# Patient Record
Sex: FEMALE | Race: WHITE | NOT HISPANIC OR LATINO | URBAN - METROPOLITAN AREA
[De-identification: names, ages, dates, MRNs, and addresses within clinical notes are randomized per-mention and may not be internally consistent; named-entity substitution may affect disease eponyms.]

---

## 2017-05-27 ENCOUNTER — EMERGENCY (EMERGENCY)
Facility: HOSPITAL | Age: 72
LOS: 0 days | Discharge: HOME | End: 2017-05-27

## 2017-06-28 DIAGNOSIS — F17.200 NICOTINE DEPENDENCE, UNSPECIFIED, UNCOMPLICATED: ICD-10-CM

## 2017-06-28 DIAGNOSIS — R05 COUGH: ICD-10-CM

## 2017-06-28 DIAGNOSIS — R06.02 SHORTNESS OF BREATH: ICD-10-CM

## 2017-06-28 DIAGNOSIS — I10 ESSENTIAL (PRIMARY) HYPERTENSION: ICD-10-CM

## 2017-06-28 DIAGNOSIS — Z79.899 OTHER LONG TERM (CURRENT) DRUG THERAPY: ICD-10-CM

## 2019-02-18 ENCOUNTER — EMERGENCY (EMERGENCY)
Facility: HOSPITAL | Age: 74
LOS: 0 days | Discharge: HOME | End: 2019-02-18
Attending: EMERGENCY MEDICINE | Admitting: EMERGENCY MEDICINE

## 2019-02-18 VITALS — SYSTOLIC BLOOD PRESSURE: 181 MMHG | HEART RATE: 76 BPM | DIASTOLIC BLOOD PRESSURE: 80 MMHG

## 2019-02-18 VITALS
SYSTOLIC BLOOD PRESSURE: 196 MMHG | TEMPERATURE: 98 F | WEIGHT: 164.91 LBS | OXYGEN SATURATION: 96 % | HEART RATE: 81 BPM | HEIGHT: 60 IN | DIASTOLIC BLOOD PRESSURE: 98 MMHG | RESPIRATION RATE: 18 BRPM

## 2019-02-18 DIAGNOSIS — R10.9 UNSPECIFIED ABDOMINAL PAIN: ICD-10-CM

## 2019-02-18 DIAGNOSIS — N20.2 CALCULUS OF KIDNEY WITH CALCULUS OF URETER: ICD-10-CM

## 2019-02-18 LAB
ALBUMIN SERPL ELPH-MCNC: 4.2 G/DL — SIGNIFICANT CHANGE UP (ref 3.5–5.2)
ALP SERPL-CCNC: 88 U/L — SIGNIFICANT CHANGE UP (ref 30–115)
ALT FLD-CCNC: 11 U/L — SIGNIFICANT CHANGE UP (ref 0–41)
ANION GAP SERPL CALC-SCNC: 14 MMOL/L — SIGNIFICANT CHANGE UP (ref 7–14)
APPEARANCE UR: CLEAR — SIGNIFICANT CHANGE UP
AST SERPL-CCNC: 19 U/L — SIGNIFICANT CHANGE UP (ref 0–41)
BACTERIA # UR AUTO: ABNORMAL
BASOPHILS # BLD AUTO: 0.03 K/UL — SIGNIFICANT CHANGE UP (ref 0–0.2)
BASOPHILS NFR BLD AUTO: 0.2 % — SIGNIFICANT CHANGE UP (ref 0–1)
BILIRUB SERPL-MCNC: 0.5 MG/DL — SIGNIFICANT CHANGE UP (ref 0.2–1.2)
BILIRUB UR-MCNC: ABNORMAL
BUN SERPL-MCNC: 23 MG/DL — HIGH (ref 10–20)
CALCIUM SERPL-MCNC: 9.6 MG/DL — SIGNIFICANT CHANGE UP (ref 8.5–10.1)
CHLORIDE SERPL-SCNC: 100 MMOL/L — SIGNIFICANT CHANGE UP (ref 98–110)
CO2 SERPL-SCNC: 22 MMOL/L — SIGNIFICANT CHANGE UP (ref 17–32)
COD CRY URNS QL: NEGATIVE — SIGNIFICANT CHANGE UP
COLOR SPEC: YELLOW — SIGNIFICANT CHANGE UP
COMMENT - URINE: SIGNIFICANT CHANGE UP
CREAT SERPL-MCNC: 1 MG/DL — SIGNIFICANT CHANGE UP (ref 0.7–1.5)
DIFF PNL FLD: ABNORMAL
EOSINOPHIL # BLD AUTO: 0.03 K/UL — SIGNIFICANT CHANGE UP (ref 0–0.7)
EOSINOPHIL NFR BLD AUTO: 0.2 % — SIGNIFICANT CHANGE UP (ref 0–8)
EPI CELLS # UR: ABNORMAL /HPF
GLUCOSE SERPL-MCNC: 123 MG/DL — HIGH (ref 70–99)
GLUCOSE UR QL: NEGATIVE MG/DL — SIGNIFICANT CHANGE UP
GRAN CASTS # UR COMP ASSIST: NEGATIVE — SIGNIFICANT CHANGE UP
HCT VFR BLD CALC: 42 % — SIGNIFICANT CHANGE UP (ref 37–47)
HGB BLD-MCNC: 13.9 G/DL — SIGNIFICANT CHANGE UP (ref 12–16)
HYALINE CASTS # UR AUTO: NEGATIVE — SIGNIFICANT CHANGE UP
IMM GRANULOCYTES NFR BLD AUTO: 0.4 % — HIGH (ref 0.1–0.3)
KETONES UR-MCNC: ABNORMAL
LACTATE SERPL-SCNC: 1.4 MMOL/L — SIGNIFICANT CHANGE UP (ref 0.5–2.2)
LEUKOCYTE ESTERASE UR-ACNC: NEGATIVE — SIGNIFICANT CHANGE UP
LIDOCAIN IGE QN: 20 U/L — SIGNIFICANT CHANGE UP (ref 7–60)
LYMPHOCYTES # BLD AUTO: 1.16 K/UL — LOW (ref 1.2–3.4)
LYMPHOCYTES # BLD AUTO: 8.6 % — LOW (ref 20.5–51.1)
MCHC RBC-ENTMCNC: 29.4 PG — SIGNIFICANT CHANGE UP (ref 27–31)
MCHC RBC-ENTMCNC: 33.1 G/DL — SIGNIFICANT CHANGE UP (ref 32–37)
MCV RBC AUTO: 88.8 FL — SIGNIFICANT CHANGE UP (ref 81–99)
MONOCYTES # BLD AUTO: 0.71 K/UL — HIGH (ref 0.1–0.6)
MONOCYTES NFR BLD AUTO: 5.2 % — SIGNIFICANT CHANGE UP (ref 1.7–9.3)
NEUTROPHILS # BLD AUTO: 11.57 K/UL — HIGH (ref 1.4–6.5)
NEUTROPHILS NFR BLD AUTO: 85.4 % — HIGH (ref 42.2–75.2)
NITRITE UR-MCNC: NEGATIVE — SIGNIFICANT CHANGE UP
NRBC # BLD: 0 /100 WBCS — SIGNIFICANT CHANGE UP (ref 0–0)
PH UR: 6 — SIGNIFICANT CHANGE UP (ref 5–8)
PLATELET # BLD AUTO: 177 K/UL — SIGNIFICANT CHANGE UP (ref 130–400)
POTASSIUM SERPL-MCNC: 3.8 MMOL/L — SIGNIFICANT CHANGE UP (ref 3.5–5)
POTASSIUM SERPL-SCNC: 3.8 MMOL/L — SIGNIFICANT CHANGE UP (ref 3.5–5)
PROT SERPL-MCNC: 7.5 G/DL — SIGNIFICANT CHANGE UP (ref 6–8)
PROT UR-MCNC: 100 MG/DL
RBC # BLD: 4.73 M/UL — SIGNIFICANT CHANGE UP (ref 4.2–5.4)
RBC # FLD: 13.8 % — SIGNIFICANT CHANGE UP (ref 11.5–14.5)
RBC CASTS # UR COMP ASSIST: ABNORMAL /HPF
SODIUM SERPL-SCNC: 136 MMOL/L — SIGNIFICANT CHANGE UP (ref 135–146)
SP GR SPEC: >=1.03 (ref 1.01–1.03)
TRI-PHOS CRY UR QL COMP ASSIST: NEGATIVE — SIGNIFICANT CHANGE UP
TROPONIN T SERPL-MCNC: <0.01 NG/ML — SIGNIFICANT CHANGE UP
URATE CRY FLD QL MICRO: NEGATIVE — SIGNIFICANT CHANGE UP
UROBILINOGEN FLD QL: 0.2 MG/DL — SIGNIFICANT CHANGE UP (ref 0.2–0.2)
WBC # BLD: 13.56 K/UL — HIGH (ref 4.8–10.8)
WBC # FLD AUTO: 13.56 K/UL — HIGH (ref 4.8–10.8)
WBC UR QL: SIGNIFICANT CHANGE UP /HPF

## 2019-02-18 RX ORDER — TAMSULOSIN HYDROCHLORIDE 0.4 MG/1
1 CAPSULE ORAL
Qty: 7 | Refills: 0
Start: 2019-02-18 | End: 2019-02-24

## 2019-02-18 RX ORDER — ONDANSETRON 8 MG/1
4 TABLET, FILM COATED ORAL ONCE
Qty: 0 | Refills: 0 | Status: COMPLETED | OUTPATIENT
Start: 2019-02-18 | End: 2019-02-18

## 2019-02-18 RX ORDER — KETOROLAC TROMETHAMINE 30 MG/ML
30 SYRINGE (ML) INJECTION ONCE
Qty: 0 | Refills: 0 | Status: COMPLETED | OUTPATIENT
Start: 2019-02-18 | End: 2019-02-18

## 2019-02-18 RX ORDER — MORPHINE SULFATE 50 MG/1
4 CAPSULE, EXTENDED RELEASE ORAL ONCE
Qty: 0 | Refills: 0 | Status: DISCONTINUED | OUTPATIENT
Start: 2019-02-18 | End: 2019-02-18

## 2019-02-18 RX ORDER — SODIUM CHLORIDE 9 MG/ML
1000 INJECTION INTRAMUSCULAR; INTRAVENOUS; SUBCUTANEOUS ONCE
Qty: 0 | Refills: 0 | Status: COMPLETED | OUTPATIENT
Start: 2019-02-18 | End: 2019-02-18

## 2019-02-18 RX ADMIN — ONDANSETRON 4 MILLIGRAM(S): 8 TABLET, FILM COATED ORAL at 17:53

## 2019-02-18 RX ADMIN — SODIUM CHLORIDE 2000 MILLILITER(S): 9 INJECTION INTRAMUSCULAR; INTRAVENOUS; SUBCUTANEOUS at 17:53

## 2019-02-18 RX ADMIN — MORPHINE SULFATE 4 MILLIGRAM(S): 50 CAPSULE, EXTENDED RELEASE ORAL at 19:19

## 2019-02-18 NOTE — ED PROVIDER NOTE - PHYSICAL EXAMINATION
VITAL SIGNS: I have reviewed the initial vital signs.   CONSTITUTIONAL: Awake, alert. Well-developed; well-nourished; in no distress. Non-toxic appearing.   SKIN: No rash, vesicles/lesion, abrasions or lacerations. No ecchymosis or signs of trauma.   HEAD: Normocephalic; atraumatic.   EYES: Symmetrical, no discharge or signs of trauma. Conjunctiva and sclera clear.   CARD: No chest wall deformity or tenderness. S1, S2 normal; no murmurs, gallops, or rubs. Regular rate and rhythm.  RESP: Good air movement. Lungs CTAB. No crackles, wheezes, rales or rhonchi.  ABD: Soft; non-distended; + RUQ tenderness. No rebound/guarding/rigidity. + right CVA tenderness. VITAL SIGNS: I have reviewed the initial vital signs.   CONSTITUTIONAL: Awake, alert. Well-developed; well-nourished; in no distress. Non-toxic appearing.   SKIN: No rash, vesicles/lesion, abrasions or lacerations. No ecchymosis or signs of trauma.   HEAD: Normocephalic; atraumatic.   EYES: Symmetrical, no discharge or signs of trauma. Conjunctiva and sclera clear.   CARD: No chest wall deformity or tenderness. S1, S2 normal; no murmurs, gallops, or rubs. Regular rate and rhythm.  RESP: Good air movement. Lungs CTAB. No crackles, wheezes, rales or rhonchi.  ABD: Soft; non-distended; + RUQ tenderness. No rebound/guarding/rigidity. + right CVA tenderness.  Neuro: awake, alert, following commands.  ENT:  MMM, no congestion

## 2019-02-18 NOTE — ED PROVIDER NOTE - NS ED ROS FT
Except as documented in HPI, all other ROS negative.   GENERAL: Denies fever/chills, loss of appetite/weight or fatigue.  SKIN: Denies rashes, abrasions, lacerations, ecchymosis, erythema, or edema.  HEAD: Denies headache, dizziness or trauma.  CARDIAC: Denies chest pain, palpitations, or SOB.   RESPIRATORY: Denies SOB, cough, hemoptysis or wheezing.   GI: + abdominal pain. + vomiting.   : Denies hematuria, dysuria or frequency.   MSK: Denies myalgias, bony deformity or pain.

## 2019-02-18 NOTE — ED PROVIDER NOTE - PROGRESS NOTE DETAILS
Pt awaiting CT scan. Ambulating around ED. States she is more comfortable if she is walking around. Pt reports improvement of symptoms. Results given to & discussed with pt. Instructed pt to f/u with urology. Signs and symptoms which should prompt return discussed in detail and pt informed they may return to ED at any time. Pt agreeable with plan and comfortable with discharge.

## 2019-02-18 NOTE — ED PROVIDER NOTE - ATTENDING CONTRIBUTION TO CARE
74 yo f presents with right sided abd pain, nausea, vomiting.  no cp, no sob, no headache, no neck pain.  pain started last night.  no urinary complaints.  no dysuria.  no blood in stool, no black stool.  awake, alert.  neck supple.  abd soft with right sided tenderness, no rebound, no guarding.  Lungs clear.  mmm.    a/p:  abd pain, vomiting.  p:  ct, labs, ivf, supportive care, ekg, cxr, rwassess.

## 2019-02-18 NOTE — ED PROVIDER NOTE - OBJECTIVE STATEMENT
Pt is a 72 y/o Female, unknown PMHX as pt has not been to a doctor in years, presents to ED w/ abdominal pain. Pt reports pain goes from the right flank, across the RUQ to the epigastric region. She states it began at 11 pm and is associated with vomiting. No fever, chills, no abdominal surgeries, no bloody vomitus, no urinary complaints, no diarrhea or bloody stools.

## 2019-02-18 NOTE — ED PROVIDER NOTE - CLINICAL SUMMARY MEDICAL DECISION MAKING FREE TEXT BOX
Pt with right sided abd pain.  pt with kidney stone.  no uti.  pt given pain meds, ivf.  pt given rx for pain meds and flomax.  pt nontoxic, well appearing.   Patient feeling better.  Pt dc with outpatient follow up.  Pt understands importance of outpatient follow up.  Pt given strict return precautions.   pt comfortable with follow up plan.  pt will follow up with urology as outpatient.  pt given strict return precautions.

## 2019-02-18 NOTE — ED PROVIDER NOTE - CARE PROVIDER_API CALL
Sathish Welch)  Urology  [address], [place] [address]  Phone: [phone]  Fax: [phone]  Follow Up Time: 1-3 Days

## 2019-02-19 NOTE — ED POST DISCHARGE NOTE - ADDITIONAL DOCUMENTATION
I called pt to follow up.  pt reports pain is much better and she is feeling better.  pt is going to follow up with urology.  I also spoke to pt about her BP.  pt reports that her BP is always this elevated and is always in the 180s-190s systolic.  I spoke to pt at length about this.  she hasn't followed up with her pmd for a long time and never took medications for her BP.  I stressed to pt the importance of following up with pmd for this elevated and encouraged her to begin treatment.  I stressed the dangers of untreated htn.  pt understood.  pt has no cp, no sob, no headache, no dizziness.  pt reports her BP is ALWAYS this high and this is not new.  pt understands importance of follow up for this and treatment/management.  pt has a doctor that she will see. I called pt to follow up.  pt reports pain is much better and she is feeling better.  pt is going to follow up with urology.  I also spoke to pt about her BP.  pt reports that her BP is always this elevated and is always in the 180s-190s systolic.  I spoke to pt at length about this.  she hasn't followed up with her pmd for a long time and never took medications for her BP.  I stressed to pt the importance of following up with pmd for this elevated BP and encouraged her to begin treatment.  I stressed the dangers of untreated htn.  pt understood.  pt has no cp, no sob, no headache, no dizziness.  pt reports her BP is ALWAYS this high and this is not new.  pt understands importance of follow up for this and treatment/management.  pt has a doctor that she will see.

## 2019-05-19 ENCOUNTER — EMERGENCY (EMERGENCY)
Facility: HOSPITAL | Age: 74
LOS: 0 days | Discharge: HOME | End: 2019-05-19
Attending: EMERGENCY MEDICINE | Admitting: EMERGENCY MEDICINE
Payer: COMMERCIAL

## 2019-05-19 VITALS
HEART RATE: 99 BPM | TEMPERATURE: 98 F | DIASTOLIC BLOOD PRESSURE: 72 MMHG | RESPIRATION RATE: 20 BRPM | SYSTOLIC BLOOD PRESSURE: 151 MMHG | OXYGEN SATURATION: 93 %

## 2019-05-19 VITALS
TEMPERATURE: 101 F | RESPIRATION RATE: 22 BRPM | DIASTOLIC BLOOD PRESSURE: 103 MMHG | OXYGEN SATURATION: 93 % | SYSTOLIC BLOOD PRESSURE: 179 MMHG | HEART RATE: 109 BPM | WEIGHT: 179.9 LBS

## 2019-05-19 DIAGNOSIS — R50.9 FEVER, UNSPECIFIED: ICD-10-CM

## 2019-05-19 DIAGNOSIS — R53.1 WEAKNESS: ICD-10-CM

## 2019-05-19 DIAGNOSIS — J06.9 ACUTE UPPER RESPIRATORY INFECTION, UNSPECIFIED: ICD-10-CM

## 2019-05-19 LAB
ALBUMIN SERPL ELPH-MCNC: 4.3 G/DL — SIGNIFICANT CHANGE UP (ref 3.5–5.2)
ALP SERPL-CCNC: 101 U/L — SIGNIFICANT CHANGE UP (ref 30–115)
ALT FLD-CCNC: 10 U/L — SIGNIFICANT CHANGE UP (ref 0–41)
ANION GAP SERPL CALC-SCNC: 9 MMOL/L — SIGNIFICANT CHANGE UP (ref 7–14)
APPEARANCE UR: CLEAR — SIGNIFICANT CHANGE UP
APTT BLD: 39.6 SEC — HIGH (ref 27–39.2)
AST SERPL-CCNC: 14 U/L — SIGNIFICANT CHANGE UP (ref 0–41)
BACTERIA # UR AUTO: ABNORMAL
BASE EXCESS BLDV CALC-SCNC: 1.7 MMOL/L — SIGNIFICANT CHANGE UP (ref -2–2)
BASOPHILS # BLD AUTO: 0.06 K/UL — SIGNIFICANT CHANGE UP (ref 0–0.2)
BASOPHILS NFR BLD AUTO: 0.7 % — SIGNIFICANT CHANGE UP (ref 0–1)
BILIRUB SERPL-MCNC: 0.4 MG/DL — SIGNIFICANT CHANGE UP (ref 0.2–1.2)
BILIRUB UR-MCNC: NEGATIVE — SIGNIFICANT CHANGE UP
BUN SERPL-MCNC: 13 MG/DL — SIGNIFICANT CHANGE UP (ref 10–20)
CA-I SERPL-SCNC: 1.18 MMOL/L — SIGNIFICANT CHANGE UP (ref 1.12–1.3)
CALCIUM SERPL-MCNC: 9.2 MG/DL — SIGNIFICANT CHANGE UP (ref 8.5–10.1)
CHLORIDE SERPL-SCNC: 99 MMOL/L — SIGNIFICANT CHANGE UP (ref 98–110)
CO2 SERPL-SCNC: 27 MMOL/L — SIGNIFICANT CHANGE UP (ref 17–32)
COLOR SPEC: YELLOW — SIGNIFICANT CHANGE UP
CREAT SERPL-MCNC: 0.9 MG/DL — SIGNIFICANT CHANGE UP (ref 0.7–1.5)
DIFF PNL FLD: ABNORMAL
EOSINOPHIL # BLD AUTO: 0.1 K/UL — SIGNIFICANT CHANGE UP (ref 0–0.7)
EOSINOPHIL NFR BLD AUTO: 1.1 % — SIGNIFICANT CHANGE UP (ref 0–8)
EPI CELLS # UR: ABNORMAL /HPF
GAS PNL BLDV: 137 MMOL/L — SIGNIFICANT CHANGE UP (ref 136–145)
GAS PNL BLDV: SIGNIFICANT CHANGE UP
GLUCOSE SERPL-MCNC: 152 MG/DL — HIGH (ref 70–99)
GLUCOSE UR QL: NEGATIVE MG/DL — SIGNIFICANT CHANGE UP
HCO3 BLDV-SCNC: 26 MMOL/L — SIGNIFICANT CHANGE UP (ref 22–29)
HCT VFR BLD CALC: 41.2 % — SIGNIFICANT CHANGE UP (ref 37–47)
HCT VFR BLDA CALC: 43.4 % — SIGNIFICANT CHANGE UP (ref 34–44)
HGB BLD CALC-MCNC: 14.2 G/DL — SIGNIFICANT CHANGE UP (ref 14–18)
HGB BLD-MCNC: 13.8 G/DL — SIGNIFICANT CHANGE UP (ref 12–16)
HOROWITZ INDEX BLDV+IHG-RTO: 21 — SIGNIFICANT CHANGE UP
HYALINE CASTS # UR AUTO: SIGNIFICANT CHANGE UP /LPF
IMM GRANULOCYTES NFR BLD AUTO: 0.3 % — SIGNIFICANT CHANGE UP (ref 0.1–0.3)
INR BLD: 1.09 RATIO — SIGNIFICANT CHANGE UP (ref 0.65–1.3)
KETONES UR-MCNC: NEGATIVE — SIGNIFICANT CHANGE UP
LACTATE BLDV-MCNC: 1.4 MMOL/L — SIGNIFICANT CHANGE UP (ref 0.5–1.6)
LEUKOCYTE ESTERASE UR-ACNC: NEGATIVE — SIGNIFICANT CHANGE UP
LYMPHOCYTES # BLD AUTO: 0.68 K/UL — LOW (ref 1.2–3.4)
LYMPHOCYTES # BLD AUTO: 7.6 % — LOW (ref 20.5–51.1)
MCHC RBC-ENTMCNC: 29.3 PG — SIGNIFICANT CHANGE UP (ref 27–31)
MCHC RBC-ENTMCNC: 33.5 G/DL — SIGNIFICANT CHANGE UP (ref 32–37)
MCV RBC AUTO: 87.5 FL — SIGNIFICANT CHANGE UP (ref 81–99)
MONOCYTES # BLD AUTO: 0.56 K/UL — SIGNIFICANT CHANGE UP (ref 0.1–0.6)
MONOCYTES NFR BLD AUTO: 6.2 % — SIGNIFICANT CHANGE UP (ref 1.7–9.3)
NEUTROPHILS # BLD AUTO: 7.57 K/UL — HIGH (ref 1.4–6.5)
NEUTROPHILS NFR BLD AUTO: 84.1 % — HIGH (ref 42.2–75.2)
NITRITE UR-MCNC: NEGATIVE — SIGNIFICANT CHANGE UP
NRBC # BLD: 0 /100 WBCS — SIGNIFICANT CHANGE UP (ref 0–0)
PCO2 BLDV: 41 MMHG — SIGNIFICANT CHANGE UP (ref 41–51)
PH BLDV: 7.42 — SIGNIFICANT CHANGE UP (ref 7.26–7.43)
PH UR: 7 — SIGNIFICANT CHANGE UP (ref 5–8)
PLATELET # BLD AUTO: 174 K/UL — SIGNIFICANT CHANGE UP (ref 130–400)
PO2 BLDV: 26 MMHG — SIGNIFICANT CHANGE UP (ref 20–40)
POTASSIUM BLDV-SCNC: 4.1 MMOL/L — SIGNIFICANT CHANGE UP (ref 3.3–5.6)
POTASSIUM SERPL-MCNC: 4.6 MMOL/L — SIGNIFICANT CHANGE UP (ref 3.5–5)
POTASSIUM SERPL-SCNC: 4.6 MMOL/L — SIGNIFICANT CHANGE UP (ref 3.5–5)
PROT SERPL-MCNC: 7.3 G/DL — SIGNIFICANT CHANGE UP (ref 6–8)
PROT UR-MCNC: ABNORMAL MG/DL
PROTHROM AB SERPL-ACNC: 12.5 SEC — SIGNIFICANT CHANGE UP (ref 9.95–12.87)
RBC # BLD: 4.71 M/UL — SIGNIFICANT CHANGE UP (ref 4.2–5.4)
RBC # FLD: 14.5 % — SIGNIFICANT CHANGE UP (ref 11.5–14.5)
RBC CASTS # UR COMP ASSIST: SIGNIFICANT CHANGE UP /HPF
SAO2 % BLDV: 51 % — SIGNIFICANT CHANGE UP
SODIUM SERPL-SCNC: 135 MMOL/L — SIGNIFICANT CHANGE UP (ref 135–146)
SP GR SPEC: 1.01 — SIGNIFICANT CHANGE UP (ref 1.01–1.03)
UROBILINOGEN FLD QL: 0.2 MG/DL — SIGNIFICANT CHANGE UP (ref 0.2–0.2)
WBC # BLD: 9 K/UL — SIGNIFICANT CHANGE UP (ref 4.8–10.8)
WBC # FLD AUTO: 9 K/UL — SIGNIFICANT CHANGE UP (ref 4.8–10.8)
WBC UR QL: SIGNIFICANT CHANGE UP /HPF

## 2019-05-19 PROCEDURE — 99284 EMERGENCY DEPT VISIT MOD MDM: CPT

## 2019-05-19 PROCEDURE — 71046 X-RAY EXAM CHEST 2 VIEWS: CPT | Mod: 26

## 2019-05-19 RX ORDER — SODIUM CHLORIDE 9 MG/ML
2000 INJECTION INTRAMUSCULAR; INTRAVENOUS; SUBCUTANEOUS ONCE
Refills: 0 | Status: COMPLETED | OUTPATIENT
Start: 2019-05-19 | End: 2019-05-19

## 2019-05-19 RX ORDER — ACETAMINOPHEN 500 MG
650 TABLET ORAL ONCE
Refills: 0 | Status: COMPLETED | OUTPATIENT
Start: 2019-05-19 | End: 2019-05-19

## 2019-05-19 RX ORDER — AZITHROMYCIN 500 MG/1
1 TABLET, FILM COATED ORAL
Qty: 6 | Refills: 0
Start: 2019-05-19 | End: 2019-05-23

## 2019-05-19 RX ADMIN — Medication 650 MILLIGRAM(S): at 20:58

## 2019-05-19 RX ADMIN — SODIUM CHLORIDE 2000 MILLILITER(S): 9 INJECTION INTRAMUSCULAR; INTRAVENOUS; SUBCUTANEOUS at 20:58

## 2019-05-19 NOTE — ED PROVIDER NOTE - CLINICAL SUMMARY MEDICAL DECISION MAKING FREE TEXT BOX
uri symptoms - initial vitals SIRS+ - improved with fluids, no leukocytosis/lactic acidosis/PNA/UTI - dc home - improved

## 2019-05-19 NOTE — ED PROVIDER NOTE - NS ED ROS FT
Review of Systems    Constitutional: (+) fever    Cardiovascular: (-) chest pain, (-) syncope (-) palpitations  Respiratory: (+) cough, (-) shortness of breath  Gastrointestinal: (-) vomiting, (-) diarrhea (-)black/bloody stools (-) abdominal pain  Genitourinary:  (-) dysuria   Musculoskeletal: (-) neck pain, (-) back pain, (-) leg pain/swelling  Integumentary: (-) rash, (-) edema  Neurological: (-) headache  Allergic/Immunologic: (-) pruritus

## 2019-05-19 NOTE — ED PROVIDER NOTE - OBJECTIVE STATEMENT
72 y/o F with PMH HTN which is diet controlled, +smoker, presents with mild constant improving sore throat, cough, congestion, runny nose,  body aches x this am. +sick contacts with similar symptoms. +chills/sweats. +global weakness. no palliating/provoking symptoms. no CP/SOB/back pain/abdominal pain.

## 2019-05-19 NOTE — ED PROVIDER NOTE - ATTENDING CONTRIBUTION TO CARE
Pt is a 74yo female who comes in for sore throat, ear pain, nasal congestion, dry cough, and myalgias that began this AM.  No travel.  Granddaughter was sick 3d ago but only had symptoms for 1d.  No other complaints.    Exam: RRR, mild crackles in L base, no tachypnea/retractions, soft NT abdomen, no LE edema, no calf tenderenss, soft NT abdomen, no LAD  Imp: r/o sepsis  Plan: labs, ua, cx, ivf, vbg, xr

## 2019-05-19 NOTE — ED ADULT TRIAGE NOTE - CHIEF COMPLAINT QUOTE
pt c/o cough/congestion/bodyaches/sore throat, started today, granddaughter also with the same symptoms a few days ago.

## 2019-05-19 NOTE — ED PROVIDER NOTE - PHYSICAL EXAMINATION
PHYSICAL EXAM:    GENERAL: Alert, appears stated age, well appearing, non-toxic  SKIN: Warm, pink and dry  EYE: Normal lids/conjunctiva  ENT: Normal hearing, patent oropharynx with mild erythema-- no exudate, TMs clear b/l. uvula midline. no LAD.   NECK: +supple. No meningismus  Pulm: Bilateral BS, normal resp effort, no wheezes, stridor, or retractions  CV: RRR, no M/R/G, 2+and = radial pulses  Abd: soft, non-tender, non-distended  Mskel: no erythema, cyanosis, edema. no calf tenderness  Neuro: AAOx3, 5/5 strength throughout. normal gait.

## 2019-05-25 LAB
CULTURE RESULTS: SIGNIFICANT CHANGE UP
SPECIMEN SOURCE: SIGNIFICANT CHANGE UP

## 2019-08-28 ENCOUNTER — EMERGENCY (EMERGENCY)
Facility: HOSPITAL | Age: 74
LOS: 0 days | Discharge: HOME | End: 2019-08-28
Attending: EMERGENCY MEDICINE | Admitting: EMERGENCY MEDICINE
Payer: COMMERCIAL

## 2019-08-28 VITALS
HEART RATE: 84 BPM | RESPIRATION RATE: 16 BRPM | TEMPERATURE: 98 F | SYSTOLIC BLOOD PRESSURE: 220 MMHG | OXYGEN SATURATION: 98 % | WEIGHT: 179.9 LBS | DIASTOLIC BLOOD PRESSURE: 102 MMHG

## 2019-08-28 VITALS — HEART RATE: 75 BPM | DIASTOLIC BLOOD PRESSURE: 107 MMHG | SYSTOLIC BLOOD PRESSURE: 208 MMHG

## 2019-08-28 DIAGNOSIS — S70.12XA CONTUSION OF LEFT THIGH, INITIAL ENCOUNTER: ICD-10-CM
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
## 2019-08-28 DIAGNOSIS — S50.02XA CONTUSION OF LEFT ELBOW, INITIAL ENCOUNTER: ICD-10-CM
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
## 2019-08-28 DIAGNOSIS — S80.12XA CONTUSION OF LEFT LOWER LEG, INITIAL ENCOUNTER: ICD-10-CM
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
## 2019-08-28 DIAGNOSIS — M79.605 PAIN IN LEFT LEG: ICD-10-CM

## 2019-08-28 PROBLEM — I10 ESSENTIAL (PRIMARY) HYPERTENSION: Chronic | Status: ACTIVE | Noted: 2019-05-19

## 2019-08-28 PROCEDURE — 99283 EMERGENCY DEPT VISIT LOW MDM: CPT

## 2019-08-28 PROCEDURE — 73590 X-RAY EXAM OF LOWER LEG: CPT | Mod: 26,LT

## 2019-08-28 PROCEDURE — 73552 X-RAY EXAM OF FEMUR 2/>: CPT | Mod: 26,LT

## 2019-08-28 PROCEDURE — 72170 X-RAY EXAM OF PELVIS: CPT | Mod: 26

## 2019-08-28 PROCEDURE — 73070 X-RAY EXAM OF ELBOW: CPT | Mod: 26,LT

## 2019-08-28 NOTE — ED ADULT NURSE NOTE - NSIMPLEMENTINTERV_GEN_ALL_ED
Implemented All Fall Risk Interventions:  [place] to call system. Call bell, personal items and telephone within reach. Instruct patient to call for assistance. Room bathroom lighting operational. Non-slip footwear when patient is off stretcher. Physically safe environment: no spills, clutter or unnecessary equipment. Stretcher in lowest position, wheels locked, appropriate side rails in place. Provide visual cue, wrist band, yellow gown, etc. Monitor gait and stability. Monitor for mental status changes and reorient to person, place, and time. Review medications for side effects contributing to fall risk. Reinforce activity limits and safety measures with patient and family.

## 2019-08-28 NOTE — ED PROVIDER NOTE - CARE PLAN
Principal Discharge DX:	Fall  Secondary Diagnosis:	Contusion of leg  Secondary Diagnosis:	Contusion of elbow

## 2019-08-28 NOTE — ED PROVIDER NOTE - CARE PROVIDER_API CALL
Adams Mcdoonugh (MD)  Orthopaedic Surgery  [address] [place] [address]  Phone: [phone]  Fax: [phone]  Follow Up Time:

## 2019-08-28 NOTE — ED PROVIDER NOTE - OBJECTIVE STATEMENT
slipped and fell on wet floor today landing on left side. C/o tenderness over left leg, left elbow, No Head injury, no neck or back pain, no LOC,

## 2019-08-28 NOTE — ED PROVIDER NOTE - MUSCULOSKELETAL MINIMAL EXAM
mild tenderness over left lateral elbow  and left lateral thigh and lower leg, FROM, No swelling or bruising, NV intact

## 2019-08-28 NOTE — ED PROVIDER NOTE - CLINICAL SUMMARY MEDICAL DECISION MAKING FREE TEXT BOX
Patient presents for evaluation of left sided elbow and lower leg pain s/p slip and fall from standing with no loc and no vomiting. she has no loc.  she deniesany neck pain she is able to ambulate well

## 2019-08-28 NOTE — ED PROVIDER NOTE - ATTENDING CONTRIBUTION TO CARE
I was present for and supervised the key and critical aspects of the procedures performed during the care of the patient. patient presents for evaluation of fall with left sided moderate throbbing elbow pain she denies any loc, she denies any vomiting she denies any neck pain.    On physical exam the patient is nc/at perrla eomi oropharynx clear cta b/l, rrr s1s2 noted abd-soft nt ndbs+ ext from with no focal deficits she   left sided elbow reveals no swelling mild pain to palpation from radial pulses 2 +=  A/P we obtained xrays which are normal at this time patient improved I will discharge with follow up to pmd

## 2019-08-28 NOTE — ED PROVIDER NOTE - NSFOLLOWUPINSTRUCTIONS_ED_ALL_ED_FT
rest, tylenol for pain, Ice to leg and elbow today and tomorrow. See prive MD for elevated BP, and orthopedic MD if pain persists.

## 2019-08-28 NOTE — ED ADULT TRIAGE NOTE - CHIEF COMPLAINT QUOTE
"I was visiting my daughter on the fourth floor. There must have been water on the floor. I slipped and fell and landed on my left side." Pt complains of pain to left arm and elbow. Pt also complains of pain to left hip and thigh. The pain travels down to left ankle.

## 2019-08-28 NOTE — ED PROVIDER NOTE - PATIENT PORTAL LINK FT
You can access the FollowMyHealth Patient Portal offered by [facility] by registering at the following website: [email or web address]. By joining [facility]’s FollowMyHealth portal, you will also be able to view your health information using other applications (apps) compatible with our system.

## 2019-10-05 ENCOUNTER — EMERGENCY (EMERGENCY)
Facility: HOSPITAL | Age: 74
LOS: 0 days | Discharge: HOME | End: 2019-10-05
Attending: EMERGENCY MEDICINE | Admitting: EMERGENCY MEDICINE
Payer: COMMERCIAL

## 2019-10-05 VITALS
DIASTOLIC BLOOD PRESSURE: 84 MMHG | HEART RATE: 79 BPM | RESPIRATION RATE: 20 BRPM | SYSTOLIC BLOOD PRESSURE: 177 MMHG | OXYGEN SATURATION: 93 %

## 2019-10-05 VITALS
OXYGEN SATURATION: 94 % | RESPIRATION RATE: 20 BRPM | DIASTOLIC BLOOD PRESSURE: 81 MMHG | TEMPERATURE: 97 F | SYSTOLIC BLOOD PRESSURE: 177 MMHG | HEART RATE: 89 BPM

## 2019-10-05 DIAGNOSIS — R05 COUGH: ICD-10-CM

## 2019-10-05 DIAGNOSIS — I10 ESSENTIAL (PRIMARY) HYPERTENSION: ICD-10-CM

## 2019-10-05 DIAGNOSIS — F17.200 NICOTINE DEPENDENCE, UNSPECIFIED, UNCOMPLICATED: ICD-10-CM

## 2019-10-05 DIAGNOSIS — R09.81 NASAL CONGESTION: ICD-10-CM

## 2019-10-05 DIAGNOSIS — R06.02 SHORTNESS OF BREATH: ICD-10-CM

## 2019-10-05 DIAGNOSIS — M25.561 PAIN IN RIGHT KNEE: ICD-10-CM

## 2019-10-05 LAB
ALBUMIN SERPL ELPH-MCNC: 4.3 G/DL — SIGNIFICANT CHANGE UP (ref 3.5–5.2)
ALP SERPL-CCNC: 90 U/L — SIGNIFICANT CHANGE UP (ref 30–115)
ALT FLD-CCNC: 10 U/L — SIGNIFICANT CHANGE UP (ref 0–41)
ANION GAP SERPL CALC-SCNC: 12 MMOL/L — SIGNIFICANT CHANGE UP (ref 7–14)
AST SERPL-CCNC: 13 U/L — SIGNIFICANT CHANGE UP (ref 0–41)
BASOPHILS # BLD AUTO: 0.06 K/UL — SIGNIFICANT CHANGE UP (ref 0–0.2)
BASOPHILS NFR BLD AUTO: 0.5 % — SIGNIFICANT CHANGE UP (ref 0–1)
BILIRUB SERPL-MCNC: 0.6 MG/DL — SIGNIFICANT CHANGE UP (ref 0.2–1.2)
BUN SERPL-MCNC: 18 MG/DL — SIGNIFICANT CHANGE UP (ref 10–20)
CALCIUM SERPL-MCNC: 9.3 MG/DL — SIGNIFICANT CHANGE UP (ref 8.5–10.1)
CHLORIDE SERPL-SCNC: 101 MMOL/L — SIGNIFICANT CHANGE UP (ref 98–110)
CO2 SERPL-SCNC: 25 MMOL/L — SIGNIFICANT CHANGE UP (ref 17–32)
CREAT SERPL-MCNC: 0.7 MG/DL — SIGNIFICANT CHANGE UP (ref 0.7–1.5)
EOSINOPHIL # BLD AUTO: 0.11 K/UL — SIGNIFICANT CHANGE UP (ref 0–0.7)
EOSINOPHIL NFR BLD AUTO: 0.9 % — SIGNIFICANT CHANGE UP (ref 0–8)
GLUCOSE SERPL-MCNC: 123 MG/DL — HIGH (ref 70–99)
HCT VFR BLD CALC: 40.8 % — SIGNIFICANT CHANGE UP (ref 37–47)
HGB BLD-MCNC: 13.5 G/DL — SIGNIFICANT CHANGE UP (ref 12–16)
IMM GRANULOCYTES NFR BLD AUTO: 0.4 % — HIGH (ref 0.1–0.3)
LACTATE SERPL-SCNC: 1 MMOL/L — SIGNIFICANT CHANGE UP (ref 0.5–2.2)
LYMPHOCYTES # BLD AUTO: 1.15 K/UL — LOW (ref 1.2–3.4)
LYMPHOCYTES # BLD AUTO: 9.5 % — LOW (ref 20.5–51.1)
MCHC RBC-ENTMCNC: 29.2 PG — SIGNIFICANT CHANGE UP (ref 27–31)
MCHC RBC-ENTMCNC: 33.1 G/DL — SIGNIFICANT CHANGE UP (ref 32–37)
MCV RBC AUTO: 88.1 FL — SIGNIFICANT CHANGE UP (ref 81–99)
MONOCYTES # BLD AUTO: 0.79 K/UL — HIGH (ref 0.1–0.6)
MONOCYTES NFR BLD AUTO: 6.5 % — SIGNIFICANT CHANGE UP (ref 1.7–9.3)
NEUTROPHILS # BLD AUTO: 9.94 K/UL — HIGH (ref 1.4–6.5)
NEUTROPHILS NFR BLD AUTO: 82.2 % — HIGH (ref 42.2–75.2)
NRBC # BLD: 0 /100 WBCS — SIGNIFICANT CHANGE UP (ref 0–0)
NT-PROBNP SERPL-SCNC: 1390 PG/ML — HIGH (ref 0–300)
PLATELET # BLD AUTO: 208 K/UL — SIGNIFICANT CHANGE UP (ref 130–400)
POTASSIUM SERPL-MCNC: 3.8 MMOL/L — SIGNIFICANT CHANGE UP (ref 3.5–5)
POTASSIUM SERPL-SCNC: 3.8 MMOL/L — SIGNIFICANT CHANGE UP (ref 3.5–5)
PROT SERPL-MCNC: 7.6 G/DL — SIGNIFICANT CHANGE UP (ref 6–8)
RBC # BLD: 4.63 M/UL — SIGNIFICANT CHANGE UP (ref 4.2–5.4)
RBC # FLD: 14.3 % — SIGNIFICANT CHANGE UP (ref 11.5–14.5)
SODIUM SERPL-SCNC: 138 MMOL/L — SIGNIFICANT CHANGE UP (ref 135–146)
TROPONIN T SERPL-MCNC: <0.01 NG/ML — SIGNIFICANT CHANGE UP
WBC # BLD: 12.1 K/UL — HIGH (ref 4.8–10.8)
WBC # FLD AUTO: 12.1 K/UL — HIGH (ref 4.8–10.8)

## 2019-10-05 PROCEDURE — 71046 X-RAY EXAM CHEST 2 VIEWS: CPT | Mod: 26

## 2019-10-05 PROCEDURE — 73562 X-RAY EXAM OF KNEE 3: CPT | Mod: 26,RT

## 2019-10-05 PROCEDURE — 99285 EMERGENCY DEPT VISIT HI MDM: CPT

## 2019-10-05 RX ORDER — IPRATROPIUM/ALBUTEROL SULFATE 18-103MCG
3 AEROSOL WITH ADAPTER (GRAM) INHALATION
Refills: 0 | Status: COMPLETED | OUTPATIENT
Start: 2019-10-05 | End: 2019-10-05

## 2019-10-05 RX ADMIN — Medication 3 MILLILITER(S): at 12:07

## 2019-10-05 RX ADMIN — Medication 3 MILLILITER(S): at 11:58

## 2019-10-05 RX ADMIN — Medication 3 MILLILITER(S): at 11:42

## 2019-10-05 NOTE — ED PROVIDER NOTE - CLINICAL SUMMARY MEDICAL DECISION MAKING FREE TEXT BOX
Patient presents with cough and congestion over the past several days productive of cough. she denies any chest pain we obtained ekg, labs cxr she was given albuterol nebs and has improved based on duration of symptoms I will continue initiate po antibiotics

## 2019-10-05 NOTE — ED PROVIDER NOTE - PHYSICAL EXAMINATION
CONST: Well appearing in NAD  EYES: PERRL, EOMI, Sclera and conjunctiva clear.   ENT: No nasal discharge. TM's clear. Oropharynx normal appearing, no erythema or exudates. No abscess or swelling. Uvula midline. No temporal artery or mastoid tenderness.  NECK: Non-tender, no meningeal signs. normal ROM. supple   CARD: Normal S1 S2; Normal rate and rhythm  RESP: Equal BS B/L,+ rhonchi bilaterally, no distress  GI: Soft, non-tender, non-distended. no cva tenderness. normal BS  MS: Normal ROM in all extremities. No midline spinal tenderness. pulses 2 +. no calf tenderness or swelling  SKIN: Warm, dry, no acute rashes. Good turgor  NEURO: A&Ox3, No focal deficits. Strength 5/5 with no sensory deficits. Steady gait.

## 2019-10-05 NOTE — ED PROVIDER NOTE - PATIENT PORTAL LINK FT
You can access the FollowMyHealth Patient Portal offered by [facility] by registering at the following website: [email or web address]. By joining [email or web address] FollowMyHealth portal, you will also be able to view your health information using other applications (apps) compatible with our system.

## 2019-10-05 NOTE — ED PROVIDER NOTE - OBJECTIVE STATEMENT
74 year old female, HTN, + smoker, presents with nasal congestion and cough x 2 weeks. + SOB over the last few days, worse with exertion. No CP, fever, chills, abd pain, or N/V/D. no recent travel. pt also complaining of right knee pain s/p fall over a month ago, had xrays performed which were normal.

## 2019-10-05 NOTE — ED PROVIDER NOTE - ATTENDING CONTRIBUTION TO CARE
I was present for and supervised the key and critical aspects of the procedures performed during the care of the patient. Patient presents for evaluation of increasing cough and congestion worse over the past 3 days she denies any associated shortness of breath she denies any fevers or chills.  she denies any abdominal pain or back pain at this time   On physical exam she is nc/at perrla eomi oropharynx clear cta b/l, rrr s1s2 noted abd-soft nt nd bs+ ext from   A/P- we obtained cxr, ekg and labs given albuterol neb she has made a vast improvement I will discharge at thistime

## 2019-10-05 NOTE — ED PROVIDER NOTE - NS ED ROS FT
Review of Systems:  	•	CONSTITUTIONAL - no fever, no diaphoresis, no chills  	•	SKIN - no rash  	•	HEMATOLOGIC - no bleeding, no bruising  	•	EYES - no eye pain, no blurry vision  	•	ENT - no change in hearing, no sore throat, no ear pain or tinnitus  	•	RESPIRATORY - + shortness of breath, + cough  	•	CARDIAC - no chest pain, no palpitations  	•	GI - no abd pain, no nausea, no vomiting, no diarrhea, no constipation  	•	GENITO-URINARY - no discharge, no dysuria; no hematuria, no increased urinary frequency  	•	MUSCULOSKELETAL - no joint paint, no swelling, no redness  	•	NEUROLOGIC - no weakness, no headache, no paresthesias, no LOC  	•	PSYCH - no anxiety, non suicidal, non homicidal, no hallucination, no depression

## 2019-10-06 ENCOUNTER — EMERGENCY (EMERGENCY)
Facility: HOSPITAL | Age: 74
LOS: 0 days | Discharge: HOME | End: 2019-10-06
Attending: EMERGENCY MEDICINE | Admitting: EMERGENCY MEDICINE
Payer: COMMERCIAL

## 2019-10-06 VITALS
RESPIRATION RATE: 16 BRPM | HEART RATE: 105 BPM | OXYGEN SATURATION: 94 % | WEIGHT: 179.9 LBS | HEIGHT: 60 IN | DIASTOLIC BLOOD PRESSURE: 98 MMHG | TEMPERATURE: 98 F | SYSTOLIC BLOOD PRESSURE: 186 MMHG

## 2019-10-06 DIAGNOSIS — M25.461 EFFUSION, RIGHT KNEE: ICD-10-CM

## 2019-10-06 DIAGNOSIS — M25.569 PAIN IN UNSPECIFIED KNEE: ICD-10-CM

## 2019-10-06 DIAGNOSIS — M71.21 SYNOVIAL CYST OF POPLITEAL SPACE [BAKER], RIGHT KNEE: ICD-10-CM

## 2019-10-06 PROCEDURE — 99284 EMERGENCY DEPT VISIT MOD MDM: CPT

## 2019-10-06 PROCEDURE — 93971 EXTREMITY STUDY: CPT | Mod: 26,RT

## 2019-10-06 PROCEDURE — 73502 X-RAY EXAM HIP UNI 2-3 VIEWS: CPT | Mod: 26,RT

## 2019-10-06 RX ORDER — KETOROLAC TROMETHAMINE 30 MG/ML
15 SYRINGE (ML) INJECTION ONCE
Refills: 0 | Status: DISCONTINUED | OUTPATIENT
Start: 2019-10-06 | End: 2019-10-06

## 2019-10-06 RX ADMIN — Medication 15 MILLIGRAM(S): at 15:48

## 2019-10-06 NOTE — ED PROVIDER NOTE - ATTENDING CONTRIBUTION TO CARE
I was present for and supervised the key and critical aspects of the procedures performed during the care of the patient. Patient presents for evaluation of knee pain that is moderate and throbbing in nature she denies any new trauma fevers or chills she was given im and po pain medications, we obtained dvt study which is negative for dvt but has + bakers cyst we obtaind hip xray after reviewing prior records and knee injury I will discharge at this time.

## 2019-10-06 NOTE — ED PROVIDER NOTE - PHYSICAL EXAMINATION
VITALS:  I have reviewed the initial vital signs.  GENERAL: Well-developed, well-nourished, in no acute distress. Nontoxic..  CARDIO: RRR, nl S1 and S2. No murmurs, rubs, or gallops. No peripheral edema. 2+ radial and pedal pulses bilaterally.  PULM: Normal effort. CTA b/l without wheezes, rales, or rhonchi.  MSK: +right knee swelling and ttp to medial and suprapatellar joint lines. No erythema/warmth/streaking. No deformity or step off. FROM to b/l hips, knees, and ankles. Calves symmetric b/l w/o erythema/swelling/ttp/warmth.  SKIN: Warm, dry. Capillary refill <2 seconds.  NEURO: A&Ox3. Speech clear. 5/5 strength to lower extremities b/l. Sensation intact and equal throughout.

## 2019-10-06 NOTE — ED PROVIDER NOTE - PROGRESS NOTE DETAILS
LAZARUS: prelim for vascular study negative for DVT, shows baker's cyst on the right. patient reports improvement in her pain after IM toradol. placed in knee immobilizer and given cane. patient to f/u with ortho. verbalizes understanding of return precautions.

## 2019-10-06 NOTE — ED PROVIDER NOTE - CARE PROVIDER_API CALL
Adams Mcdonough (MD)  Orthopaedic Surgery  [address] [place] [address]  Phone: [phone]  Fax: [phone]  Follow Up Time: 1-3 Days

## 2019-10-06 NOTE — ED PROVIDER NOTE - PATIENT PORTAL LINK FT
You can access the FollowMyHealth Patient Portal offered by [facility] by registering at the following website: [email or web address]. By joining [facility]’s FollowMyHealth portal, you will also be able to view your health information using other applications (apps) compatible with our system.

## 2019-10-06 NOTE — ED ADULT NURSE NOTE - DRUG PRE-SCREENING (DAST -1)
RT called to room for patient extubation, Upon arrival MD at bedside, tube pulled, pt placed on Venti Mask @ 40% @ this time, No current distress, will continue to monitor, Vent remains @ bedside @ this time.   Statement Selected

## 2019-10-06 NOTE — ED PROVIDER NOTE - CLINICAL SUMMARY MEDICAL DECISION MAKING FREE TEXT BOX
Patient presents for evaluation of knee pain it is moderate and throbbing in nature worse with movement she denies any trauma or falls he denies any redness fevers or chills, she has improved at this time. patient improved she is able to ambulate we obtained us, which reveals bakers cyst.

## 2019-10-06 NOTE — ED PROVIDER NOTE - NSFOLLOWUPINSTRUCTIONS_ED_ALL_ED_FT
Baker Cyst  A Baker cyst, also called a popliteal cyst, is a sac-like growth that forms at the back of the knee. The cyst forms when the fluid-filled sac (bursa) that cushions the knee joint becomes enlarged. The bursa that becomes a Baker cyst is located at the back of the knee joint.    What are the causes?  In most cases, a Baker cyst results from another knee problem that causes swelling inside the knee. This makes the fluid inside the knee joint (synovial fluid) flow into the bursa behind the knee, causing the bursa to enlarge.    What increases the risk?  You may be more likely to develop a Baker cyst if you already have a knee problem, such as:  A tear in cartilage that cushions the knee joint (meniscal tear).  A tear in the tissues that connect the bones of the knee joint (ligament tear).  Knee swelling from osteoarthritis, rheumatoid arthritis, or gout.  What are the signs or symptoms?  A Baker cyst does not always cause symptoms. A lump behind the knee may be the only sign of the condition. The lump may be painful, especially when the knee is straightened. If the lump is painful, the pain may come and go. The knee may also be stiff.    Symptoms may quickly get more severe if the cyst breaks open (ruptures). If your cyst ruptures, signs and symptoms may affect the knee and the back of the lower leg (calf) and may include:  Sudden or worsening pain.  Swelling.  Bruising.  How is this diagnosed?  This condition may be diagnosed based on your symptoms and medical history. Your health care provider will also do a physical exam. This may include:  Feeling the cyst to check whether it is tender.  Checking your knee for signs of another knee condition that causes swelling.  You may have imaging tests, such as:  X-rays.  MRI.  Ultrasound.  How is this treated?  A Baker cyst that is not painful may go away without treatment. If the cyst gets large or painful, it will likely get better if the underlying knee problem is treated.    Treatment for a Baker cyst may include:  Resting.  Keeping weight off of the knee. This means not leaning on the knee to support your body weight.  NSAIDs to reduce pain and swelling.  A procedure to drain the fluid from the cyst with a needle (aspiration). You may also get an injection of a medicine that reduces swelling (steroid).  Surgery. This may be needed if other treatments do not work. This usually involves correcting knee damage and removing the cyst.  Follow these instructions at home:  Image   Take over-the-counter and prescription medicines only as told by your health care provider.  Rest and return to your normal activities as told by your health care provider. Avoid activities that make pain or swelling worse. Ask your health care provider what activities are safe for you.  Keep all follow-up visits as told by your health care provider. This is important.  Contact a health care provider if:  You have knee pain, stiffness, or swelling that does not get better.  Get help right away if:  You have sudden or worsening pain and swelling in your calf area.  This information is not intended to replace advice given to you by your health care provider. Make sure you discuss any questions you have with your health care provider.    Knee Pain, Adult    Knee pain in adults is common. It can be caused by many things, including:    Arthritis.  A fluid-filled sac (cyst) or growth in your knee.  An infection in your knee.  An injury that will not heal.  Damage, swelling, or irritation of the tissues that support your knee.    Knee pain is usually not a sign of a serious problem. The pain may go away on its own with time and rest. If it does not, a health care provider may order tests to find the cause of the pain. These may include:    Imaging tests, such as an X-ray, MRI, or ultrasound.  Joint aspiration. In this test, fluid is removed from the knee.  Arthroscopy. In this test, a lighted tube is inserted into knee and an image is projected onto a TV screen.  A biopsy. In this test, a sample of tissue is removed from the body and studied under a microscope.    Follow these instructions at home:  Pay attention to any changes in your symptoms. Take these actions to relieve your pain.    Activity     Rest your knee.  Do not do things that cause pain or make pain worse.  Avoid high-impact activities or exercises, such as running, jumping rope, or doing jumping jacks.  General instructions     Take over-the-counter and prescription medicines only as told by your health care provider.  Raise (elevate) your knee above the level of your heart when you are sitting or lying down.  Sleep with a pillow under your knee.  If directed, apply ice to the knee:    Put ice in a plastic bag.  Place a towel between your skin and the bag.  Leave the ice on for 20 minutes, 2–3 times a day.    Ask your health care provider if you should wear an elastic knee support.  Lose weight if you are overweight. Extra weight can put pressure on your knee.  Do not use any products that contain nicotine or tobacco, such as cigarettes and e-cigarettes. Smoking may slow the healing of any bone and joint problems that you may have. If you need help quitting, ask your health care provider.  Contact a health care provider if:  Your knee pain continues, changes, or gets worse.  You have a fever along with knee pain.  Your knee wicho or locks up.  Your knee swells, and the swelling becomes worse.  Get help right away if:  Your knee feels warm to the touch.  You cannot move your knee.  You have severe pain in your knee.  You have chest pain.  You have trouble breathing.  Summary  Knee pain in adults is common. It can be caused by many things, including, arthritis, infection, cysts, or injury.  Knee pain is usually not a sign of a serious problem, but if it does not go away, a health care provider may perform tests to know the cause of the pain.  Pay attention to any changes in your symptoms. Relieve your pain with rest, medicines, light activity, and use of ice.  Get help if your pain continues or becomes very severe, or if your knee wicho or locks up, or if you have chest pain or trouble breathing.  This information is not intended to replace advice given to you by your health care provider. Make sure you discuss any questions you have with your health care provider.

## 2019-10-06 NOTE — ED PROVIDER NOTE - NS ED ROS FT
CONSTITUTIONAL: (-) fevers, (-) chills  CARDIO: (-) chest pain, (-) palpitations  PULM: (-) cough, (-) sputum, (-) shortness of breath, (-) wheezing, (-) hemoptysis  HEME: (-) easy bruising, (-) easy bleeding  MSK: see HPI, (-) back pain, (-) myalgias  SKIN: (-) rashes, (-) wounds, (-) pallor, (-) ecchymosis  NEURO: (-) syncope, (-) numbness, (-) weakness, (-) paresthesias    *all other systems negative except as documented above and in the HPI*

## 2020-02-25 NOTE — ED ADULT NURSE NOTE - PSH
Sinus pressure is primarily a problem of drainage.  You can best help your body clear the sinus secretions and pressure by opening up the natural passageways which are often blocked by viral colds and allergies.    Short courses of a nasal decongestant spray (Afrin or Neosinephrine) are one of the most effective tools in opening sinus drainage passageways.  Their use should be restricted to 3 days though due to the high risk of nasal addiction.  Pseudoephedrine (Sudafed) is often helpful but it can cause elevations in blood pressure and insomnia.  Dextromethorphan/guaifenesin combinations help loosen secretions and often help make the mucous more liquid and easier to clear. Mucinex (guaifenesin)    For pain and fevers, acetaminophen (Tylenol) is most appropriate.  Ibuprofen (Advil) or naproxen (Aleve) are useful too and last longer but they can cause elevation of blood pressure or stomach problems.    Antihistamines (Benadryl, Dimetapp, etc.) cause sedation, confusion, bowel and urinary abnormalities and are of little use for infectious causes of cough and nasal congestion.  Their use should be reserved for allergic symptoms.    The body needs to be treated well in order to help heal itself.  Rest as needed.  It is ok to reduce food intake if appetite is poor but it is quite important to maintain/increase fluid intake.  Sinus pressure and infections usually go away on their own with appropriate care.     Take the zpak as instructed.  Call back with an update in one week, please.  Sooner if issue arise    
No significant past surgical history

## 2021-01-01 ENCOUNTER — LABORATORY RESULT (OUTPATIENT)
Age: 76
End: 2021-01-01

## 2021-01-01 ENCOUNTER — APPOINTMENT (OUTPATIENT)
Dept: CARDIOLOGY | Facility: CLINIC | Age: 76
End: 2021-01-01
Payer: COMMERCIAL

## 2021-01-01 VITALS
HEART RATE: 57 BPM | DIASTOLIC BLOOD PRESSURE: 82 MMHG | SYSTOLIC BLOOD PRESSURE: 170 MMHG | WEIGHT: 150 LBS | BODY MASS INDEX: 29.45 KG/M2 | HEIGHT: 60 IN | TEMPERATURE: 98.2 F

## 2021-01-01 DIAGNOSIS — I34.0 NONRHEUMATIC MITRAL (VALVE) INSUFFICIENCY: ICD-10-CM

## 2021-01-01 PROCEDURE — 99214 OFFICE O/P EST MOD 30 MIN: CPT

## 2021-01-01 PROCEDURE — 93000 ELECTROCARDIOGRAM COMPLETE: CPT

## 2021-01-01 RX ORDER — IBUPROFEN 600 MG/1
600 TABLET, FILM COATED ORAL 3 TIMES DAILY
Qty: 45 | Refills: 0 | Status: DISCONTINUED | COMMUNITY
Start: 2021-10-15 | End: 2021-01-01

## 2021-01-01 RX ORDER — POTASSIUM CHLORIDE 1500 MG/1
20 TABLET, EXTENDED RELEASE ORAL DAILY
Refills: 0 | Status: DISCONTINUED | COMMUNITY
Start: 2021-09-16 | End: 2021-01-01

## 2021-03-10 ENCOUNTER — INPATIENT (INPATIENT)
Facility: HOSPITAL | Age: 76
LOS: 1 days | Discharge: HOME HEALTH | End: 2021-03-12
Attending: INTERNAL MEDICINE | Admitting: INTERNAL MEDICINE
Payer: COMMERCIAL

## 2021-03-10 VITALS
TEMPERATURE: 97 F | OXYGEN SATURATION: 99 % | HEART RATE: 103 BPM | HEIGHT: 60 IN | RESPIRATION RATE: 20 BRPM | WEIGHT: 179.9 LBS | SYSTOLIC BLOOD PRESSURE: 203 MMHG | DIASTOLIC BLOOD PRESSURE: 120 MMHG

## 2021-03-10 DIAGNOSIS — R06.02 SHORTNESS OF BREATH: ICD-10-CM

## 2021-03-10 DIAGNOSIS — I10 ESSENTIAL (PRIMARY) HYPERTENSION: ICD-10-CM

## 2021-03-10 DIAGNOSIS — F17.210 NICOTINE DEPENDENCE, CIGARETTES, UNCOMPLICATED: ICD-10-CM

## 2021-03-10 DIAGNOSIS — I35.1 NONRHEUMATIC AORTIC (VALVE) INSUFFICIENCY: ICD-10-CM

## 2021-03-10 DIAGNOSIS — I11.0 HYPERTENSIVE HEART DISEASE WITH HEART FAILURE: ICD-10-CM

## 2021-03-10 DIAGNOSIS — I50.31 ACUTE DIASTOLIC (CONGESTIVE) HEART FAILURE: ICD-10-CM

## 2021-03-10 DIAGNOSIS — J44.1 CHRONIC OBSTRUCTIVE PULMONARY DISEASE WITH (ACUTE) EXACERBATION: ICD-10-CM

## 2021-03-10 LAB
ALBUMIN SERPL ELPH-MCNC: 4.3 G/DL — SIGNIFICANT CHANGE UP (ref 3.5–5.2)
ALP SERPL-CCNC: 90 U/L — SIGNIFICANT CHANGE UP (ref 30–115)
ALT FLD-CCNC: 9 U/L — SIGNIFICANT CHANGE UP (ref 0–41)
ANION GAP SERPL CALC-SCNC: 9 MMOL/L — SIGNIFICANT CHANGE UP (ref 7–14)
APTT BLD: 34 SEC — SIGNIFICANT CHANGE UP (ref 27–39.2)
AST SERPL-CCNC: 13 U/L — SIGNIFICANT CHANGE UP (ref 0–41)
BASE EXCESS BLDV CALC-SCNC: 1 MMOL/L — SIGNIFICANT CHANGE UP (ref -2–2)
BASOPHILS # BLD AUTO: 0.04 K/UL — SIGNIFICANT CHANGE UP (ref 0–0.2)
BASOPHILS NFR BLD AUTO: 0.6 % — SIGNIFICANT CHANGE UP (ref 0–1)
BILIRUB SERPL-MCNC: 0.4 MG/DL — SIGNIFICANT CHANGE UP (ref 0.2–1.2)
BUN SERPL-MCNC: 21 MG/DL — HIGH (ref 10–20)
CA-I SERPL-SCNC: 1.2 MMOL/L — SIGNIFICANT CHANGE UP (ref 1.12–1.3)
CALCIUM SERPL-MCNC: 9.3 MG/DL — SIGNIFICANT CHANGE UP (ref 8.5–10.1)
CHLORIDE SERPL-SCNC: 104 MMOL/L — SIGNIFICANT CHANGE UP (ref 98–110)
CK MB CFR SERPL CALC: 3 NG/ML — SIGNIFICANT CHANGE UP (ref 0.6–6.3)
CK SERPL-CCNC: 85 U/L — SIGNIFICANT CHANGE UP (ref 0–225)
CO2 SERPL-SCNC: 28 MMOL/L — SIGNIFICANT CHANGE UP (ref 17–32)
CREAT SERPL-MCNC: 0.8 MG/DL — SIGNIFICANT CHANGE UP (ref 0.7–1.5)
EOSINOPHIL # BLD AUTO: 0.18 K/UL — SIGNIFICANT CHANGE UP (ref 0–0.7)
EOSINOPHIL NFR BLD AUTO: 2.6 % — SIGNIFICANT CHANGE UP (ref 0–8)
GAS PNL BLDV: 144 MMOL/L — SIGNIFICANT CHANGE UP (ref 136–145)
GAS PNL BLDV: SIGNIFICANT CHANGE UP
GLUCOSE SERPL-MCNC: 99 MG/DL — SIGNIFICANT CHANGE UP (ref 70–99)
HCO3 BLDV-SCNC: 27 MMOL/L — SIGNIFICANT CHANGE UP (ref 22–29)
HCT VFR BLD CALC: 38.6 % — SIGNIFICANT CHANGE UP (ref 37–47)
HCT VFR BLDA CALC: 41.2 % — SIGNIFICANT CHANGE UP (ref 34–44)
HGB BLD CALC-MCNC: 13.4 G/DL — LOW (ref 14–18)
HGB BLD-MCNC: 12.8 G/DL — SIGNIFICANT CHANGE UP (ref 12–16)
HOROWITZ INDEX BLDV+IHG-RTO: 21 — SIGNIFICANT CHANGE UP
IMM GRANULOCYTES NFR BLD AUTO: 0.3 % — SIGNIFICANT CHANGE UP (ref 0.1–0.3)
INR BLD: 1.09 RATIO — SIGNIFICANT CHANGE UP (ref 0.65–1.3)
LACTATE BLDV-MCNC: 1.8 MMOL/L — HIGH (ref 0.5–1.6)
LYMPHOCYTES # BLD AUTO: 1.82 K/UL — SIGNIFICANT CHANGE UP (ref 1.2–3.4)
LYMPHOCYTES # BLD AUTO: 26 % — SIGNIFICANT CHANGE UP (ref 20.5–51.1)
MCHC RBC-ENTMCNC: 29 PG — SIGNIFICANT CHANGE UP (ref 27–31)
MCHC RBC-ENTMCNC: 33.2 G/DL — SIGNIFICANT CHANGE UP (ref 32–37)
MCV RBC AUTO: 87.5 FL — SIGNIFICANT CHANGE UP (ref 81–99)
MONOCYTES # BLD AUTO: 0.44 K/UL — SIGNIFICANT CHANGE UP (ref 0.1–0.6)
MONOCYTES NFR BLD AUTO: 6.3 % — SIGNIFICANT CHANGE UP (ref 1.7–9.3)
NEUTROPHILS # BLD AUTO: 4.49 K/UL — SIGNIFICANT CHANGE UP (ref 1.4–6.5)
NEUTROPHILS NFR BLD AUTO: 64.2 % — SIGNIFICANT CHANGE UP (ref 42.2–75.2)
NRBC # BLD: 0 /100 WBCS — SIGNIFICANT CHANGE UP (ref 0–0)
NT-PROBNP SERPL-SCNC: 2527 PG/ML — HIGH (ref 0–300)
PCO2 BLDV: 47 MMHG — SIGNIFICANT CHANGE UP (ref 41–51)
PH BLDV: 7.37 — SIGNIFICANT CHANGE UP (ref 7.26–7.43)
PLATELET # BLD AUTO: 175 K/UL — SIGNIFICANT CHANGE UP (ref 130–400)
PO2 BLDV: 33 MMHG — SIGNIFICANT CHANGE UP (ref 20–40)
POTASSIUM BLDV-SCNC: 3.6 MMOL/L — SIGNIFICANT CHANGE UP (ref 3.3–5.6)
POTASSIUM SERPL-MCNC: 4.2 MMOL/L — SIGNIFICANT CHANGE UP (ref 3.5–5)
POTASSIUM SERPL-SCNC: 4.2 MMOL/L — SIGNIFICANT CHANGE UP (ref 3.5–5)
PROT SERPL-MCNC: 7.4 G/DL — SIGNIFICANT CHANGE UP (ref 6–8)
PROTHROM AB SERPL-ACNC: 12.5 SEC — SIGNIFICANT CHANGE UP (ref 9.95–12.87)
RAPID RVP RESULT: SIGNIFICANT CHANGE UP
RBC # BLD: 4.41 M/UL — SIGNIFICANT CHANGE UP (ref 4.2–5.4)
RBC # FLD: 15.2 % — HIGH (ref 11.5–14.5)
SAO2 % BLDV: 62 % — SIGNIFICANT CHANGE UP
SARS-COV-2 RNA SPEC QL NAA+PROBE: SIGNIFICANT CHANGE UP
SODIUM SERPL-SCNC: 141 MMOL/L — SIGNIFICANT CHANGE UP (ref 135–146)
TROPONIN T SERPL-MCNC: <0.01 NG/ML — SIGNIFICANT CHANGE UP
TROPONIN T SERPL-MCNC: <0.01 NG/ML — SIGNIFICANT CHANGE UP
WBC # BLD: 6.99 K/UL — SIGNIFICANT CHANGE UP (ref 4.8–10.8)
WBC # FLD AUTO: 6.99 K/UL — SIGNIFICANT CHANGE UP (ref 4.8–10.8)

## 2021-03-10 PROCEDURE — 99223 1ST HOSP IP/OBS HIGH 75: CPT

## 2021-03-10 PROCEDURE — 99285 EMERGENCY DEPT VISIT HI MDM: CPT

## 2021-03-10 PROCEDURE — 71045 X-RAY EXAM CHEST 1 VIEW: CPT | Mod: 26

## 2021-03-10 PROCEDURE — 99233 SBSQ HOSP IP/OBS HIGH 50: CPT

## 2021-03-10 RX ORDER — PANTOPRAZOLE SODIUM 20 MG/1
40 TABLET, DELAYED RELEASE ORAL
Refills: 0 | Status: DISCONTINUED | OUTPATIENT
Start: 2021-03-10 | End: 2021-03-12

## 2021-03-10 RX ORDER — ENOXAPARIN SODIUM 100 MG/ML
40 INJECTION SUBCUTANEOUS DAILY
Refills: 0 | Status: DISCONTINUED | OUTPATIENT
Start: 2021-03-10 | End: 2021-03-12

## 2021-03-10 RX ORDER — BUDESONIDE AND FORMOTEROL FUMARATE DIHYDRATE 160; 4.5 UG/1; UG/1
2 AEROSOL RESPIRATORY (INHALATION)
Refills: 0 | Status: DISCONTINUED | OUTPATIENT
Start: 2021-03-10 | End: 2021-03-12

## 2021-03-10 RX ORDER — FUROSEMIDE 40 MG
20 TABLET ORAL ONCE
Refills: 0 | Status: COMPLETED | OUTPATIENT
Start: 2021-03-10 | End: 2021-03-10

## 2021-03-10 RX ORDER — IBUPROFEN 200 MG
400 TABLET ORAL EVERY 8 HOURS
Refills: 0 | Status: DISCONTINUED | OUTPATIENT
Start: 2021-03-10 | End: 2021-03-10

## 2021-03-10 RX ORDER — INFLUENZA VIRUS VACCINE 15; 15; 15; 15 UG/.5ML; UG/.5ML; UG/.5ML; UG/.5ML
0.5 SUSPENSION INTRAMUSCULAR ONCE
Refills: 0 | Status: DISCONTINUED | OUTPATIENT
Start: 2021-03-10 | End: 2021-03-12

## 2021-03-10 RX ORDER — IPRATROPIUM/ALBUTEROL SULFATE 18-103MCG
3 AEROSOL WITH ADAPTER (GRAM) INHALATION EVERY 6 HOURS
Refills: 0 | Status: DISCONTINUED | OUTPATIENT
Start: 2021-03-10 | End: 2021-03-12

## 2021-03-10 RX ORDER — METOPROLOL TARTRATE 50 MG
25 TABLET ORAL
Refills: 0 | Status: DISCONTINUED | OUTPATIENT
Start: 2021-03-10 | End: 2021-03-12

## 2021-03-10 RX ORDER — HYDRALAZINE HCL 50 MG
25 TABLET ORAL EVERY 6 HOURS
Refills: 0 | Status: DISCONTINUED | OUTPATIENT
Start: 2021-03-10 | End: 2021-03-12

## 2021-03-10 RX ORDER — FUROSEMIDE 40 MG
20 TABLET ORAL DAILY
Refills: 0 | Status: DISCONTINUED | OUTPATIENT
Start: 2021-03-11 | End: 2021-03-11

## 2021-03-10 RX ADMIN — Medication 40 MILLIGRAM(S): at 18:05

## 2021-03-10 RX ADMIN — Medication 25 MILLIGRAM(S): at 18:05

## 2021-03-10 RX ADMIN — BUDESONIDE AND FORMOTEROL FUMARATE DIHYDRATE 2 PUFF(S): 160; 4.5 AEROSOL RESPIRATORY (INHALATION) at 21:05

## 2021-03-10 RX ADMIN — Medication 3 MILLILITER(S): at 14:34

## 2021-03-10 RX ADMIN — Medication 20 MILLIGRAM(S): at 13:45

## 2021-03-10 RX ADMIN — Medication 3 MILLILITER(S): at 19:20

## 2021-03-10 RX ADMIN — Medication 25 MILLIGRAM(S): at 14:33

## 2021-03-10 NOTE — ED PROVIDER NOTE - ATTENDING CONTRIBUTION TO CARE
I was present for and supervised the key and critical aspects of the procedures performed during the care of the patient. Patient presents for evaluation patient is a 74 yo f who presents with shortness of breath that has been exertional worse after walking less than 1/2 block which is unlike patient she notes it has been gradual but progressively worse, she denies any chest pain, she denies any diaphoresis, she denies any vomiting back pain or extremity pain  on exam patient appears comfortable not in resp distress she is nc/at perrla eomi oropharynx clear cta b/l, rrr s1s2 noted abd-soft nt ndb s+ ext from with no edema radial pulses 2 += pedal pulses 2 +=   a/p- we obtained ekg, labs cxr including troponin I will continue to monitor at this time

## 2021-03-10 NOTE — H&P ADULT - NSHPLABSRESULTS_GEN_ALL_CORE
12.8   6.99  )-----------( 175      ( 10 Mar 2021 10:38 )             38.6       03-10    141  |  104  |  21<H>  ----------------------------<  99  4.2   |  28  |  0.8    Ca    9.3      10 Mar 2021 10:38    TPro  7.4  /  Alb  4.3  /  TBili  0.4  /  DBili  x   /  AST  13  /  ALT  9   /  AlkPhos  90  03-10                PT/INR - ( 10 Mar 2021 10:38 )   PT: 12.50 sec;   INR: 1.09 ratio         PTT - ( 10 Mar 2021 10:38 )  PTT:34.0 sec    Lactate Trend      CARDIAC MARKERS ( 10 Mar 2021 10:38 )  x     / <0.01 ng/mL / x     / x     / x

## 2021-03-10 NOTE — H&P ADULT - NSHPPHYSICALEXAM_GEN_ALL_CORE
T(C): 36.2 (03-10-21 @ 09:57), Max: 36.2 (03-10-21 @ 09:57)  HR: 86 (03-10-21 @ 10:30) (86 - 103)  BP: 196/88 (03-10-21 @ 10:30) (196/88 - 203/120)  RR: 20 (03-10-21 @ 10:30) (20 - 20)  SpO2: 98% (03-10-21 @ 10:30) (98% - 99%)    PHYSICAL EXAM:  GENERAL: NAD, AOx3   CHEST/LUNG: Clear to auscultation bilaterally; +crackles No rhonchi or wheezing, no dullness to percussion  HEART: S1,S2 Regular rate and rhythm; No murmurs, rubs, or gallops  ABDOMEN: Soft, nontender, nondistended, no rebound tenderness; No palpable masses, +BS  EXTREMITIES:  2+ peripheral pulses bilaterally and symmetrically, no clubbing, cyanosis, or edema T(C): 36.2 (03-10-21 @ 09:57), Max: 36.2 (03-10-21 @ 09:57)  HR: 86 (03-10-21 @ 10:30) (86 - 103)  BP: 196/88 (03-10-21 @ 10:30) (196/88 - 203/120)  RR: 20 (03-10-21 @ 10:30) (20 - 20)  SpO2: 98% (03-10-21 @ 10:30) (98% - 99%)    PHYSICAL EXAM:  GENERAL: NAD, AOx3   CHEST/LUNG: + wheezing with decrease air entry.  No retractions or accessory muscle usage  HEART: S1,S2 Regular rate and rhythm; No murmurs, rubs, or gallops  ABDOMEN: Soft, nontender, nondistended, no rebound tenderness; No palpable masses, +BS  EXTREMITIES:  2+ peripheral pulses bilaterally and symmetrically, no clubbing, cyanosis, or edema

## 2021-03-10 NOTE — ED PROVIDER NOTE - CLINICAL SUMMARY MEDICAL DECISION MAKING FREE TEXT BOX
Patient presents for evaluation of dyspnea on exertion, no fevers or chills.  She denies any chest pain but her symptoms are concerning.  On exam patient has no edema noted. cta clear, rrr s1s2 noted.  abd-soft no edema Patient presents for evaluation of dyspnea on exertion, no fevers or chills.  She denies any chest pain but her symptoms are concerning.  On exam patient has no edema noted. cta clear, rrr s1s2 noted.  abd-soft no edema.

## 2021-03-10 NOTE — ED PROVIDER NOTE - OBJECTIVE STATEMENT
patient is a 76 yo f who presents with shortness of breath that has been exertional worse after walking less than 1/2 block which is unlike patient she notes it has been gradual but progressively worse, she denies any chest pain, she denies any diaphoresis, she denies any vomiting back pain or extremity pain

## 2021-03-10 NOTE — H&P ADULT - HISTORY OF PRESENT ILLNESS
Pt is a 75F w/o known PMH presenting with SOB/HERRERA increasing over the past few months. According to the patient, she has not seen a physician in many years; no PCP. Pt is a 1PPD smoker x60 years. Pt reports worse SOB with ambulation, now causing her to have to stop and catch her breath at times. 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 Denies any chest pain or pleuritic pain. +fatigue. Denies LE edema.  Denies HA, dizziness, fever/chills, chest pain, cough, rhinorrhea, NVD, abdominal pain, change in urination and change in BM.     In ED, pt is afebrile w/ WBC: 6.9. BP: 196/88. HR: 86, Trop (-) x1, BNP: 2527. CXR: pending read. Pt was given 20mg IV lasix

## 2021-03-10 NOTE — H&P ADULT - ASSESSMENT
ASSESSMENT: Pt is a 75F w/o known PMH presenting with SOB/HERRERA increasing over the past few months. In ED, pt is afebrile w/ WBC: 6.9. BP: 196/88. HR: 86, Trop (-) x1, BNP: 2527. CXR: pending read. Pt was given 20mg IV lasix       PLAN: Case d/w Dr. Chapin  - Admit to inpatient level of care - medicine  - Pulmonary consult   - Cardio consult   - F/u CXR results   - Trend cardiac enzymes  - ECHO   - Start duonebs and symbicort  - Continue IV lasix 20mg QD   - Start Metoprolol tartrate 25mg BID  - Add hydralazine PRN  - AM labs  - DASH diet   - VTE: SQ lovenox   - GI: protonix    ASSESSMENT: Pt is a 75F w/o known PMH presenting with SOB/HERRERA increasing over the past few months. In ED, pt is afebrile w/ WBC: 6.9. BP: 196/88. HR: 86, Trop (-) x1, BNP: 2527. CXR: pending read. Pt was given 20mg IV lasix     1. SOB  -Possibly secondary to COPD exacerbation, and possible Pulmonary edema  -will start patient on IV steriod, Neb tx, IV lasix, and oxygen  -Serial trop, and TTE  -Pulmonary and cardiology consult     2.  HTN  -BP elevated  -Will start patient on metoprolol  -PO Hydralazine for elevated BP above 160    #Progress Note Handoff  Pending (specify):  as above  Family discussion:  plan of care was discussed with patient  in details.  all questions were answered.  seems to understand, and in agreement

## 2021-03-11 ENCOUNTER — TRANSCRIPTION ENCOUNTER (OUTPATIENT)
Age: 76
End: 2021-03-11

## 2021-03-11 LAB
ALBUMIN SERPL ELPH-MCNC: 4.4 G/DL — SIGNIFICANT CHANGE UP (ref 3.5–5.2)
ALP SERPL-CCNC: 88 U/L — SIGNIFICANT CHANGE UP (ref 30–115)
ALT FLD-CCNC: 10 U/L — SIGNIFICANT CHANGE UP (ref 0–41)
ANION GAP SERPL CALC-SCNC: 13 MMOL/L — SIGNIFICANT CHANGE UP (ref 7–14)
AST SERPL-CCNC: 13 U/L — SIGNIFICANT CHANGE UP (ref 0–41)
BASOPHILS # BLD AUTO: 0.02 K/UL — SIGNIFICANT CHANGE UP (ref 0–0.2)
BASOPHILS NFR BLD AUTO: 0.3 % — SIGNIFICANT CHANGE UP (ref 0–1)
BILIRUB SERPL-MCNC: 0.4 MG/DL — SIGNIFICANT CHANGE UP (ref 0.2–1.2)
BUN SERPL-MCNC: 25 MG/DL — HIGH (ref 10–20)
CALCIUM SERPL-MCNC: 9.6 MG/DL — SIGNIFICANT CHANGE UP (ref 8.5–10.1)
CHLORIDE SERPL-SCNC: 104 MMOL/L — SIGNIFICANT CHANGE UP (ref 98–110)
CK MB CFR SERPL CALC: 2.5 NG/ML — SIGNIFICANT CHANGE UP (ref 0.6–6.3)
CK SERPL-CCNC: 84 U/L — SIGNIFICANT CHANGE UP (ref 0–225)
CO2 SERPL-SCNC: 24 MMOL/L — SIGNIFICANT CHANGE UP (ref 17–32)
CREAT SERPL-MCNC: 1 MG/DL — SIGNIFICANT CHANGE UP (ref 0.7–1.5)
EOSINOPHIL # BLD AUTO: 0 K/UL — SIGNIFICANT CHANGE UP (ref 0–0.7)
EOSINOPHIL NFR BLD AUTO: 0 % — SIGNIFICANT CHANGE UP (ref 0–8)
GLUCOSE SERPL-MCNC: 174 MG/DL — HIGH (ref 70–99)
HCT VFR BLD CALC: 39 % — SIGNIFICANT CHANGE UP (ref 37–47)
HCV AB S/CO SERPL IA: 0.04 COI — SIGNIFICANT CHANGE UP
HCV AB SERPL-IMP: SIGNIFICANT CHANGE UP
HGB BLD-MCNC: 13 G/DL — SIGNIFICANT CHANGE UP (ref 12–16)
IMM GRANULOCYTES NFR BLD AUTO: 0.4 % — HIGH (ref 0.1–0.3)
LYMPHOCYTES # BLD AUTO: 0.86 K/UL — LOW (ref 1.2–3.4)
LYMPHOCYTES # BLD AUTO: 12.1 % — LOW (ref 20.5–51.1)
MCHC RBC-ENTMCNC: 29.1 PG — SIGNIFICANT CHANGE UP (ref 27–31)
MCHC RBC-ENTMCNC: 33.3 G/DL — SIGNIFICANT CHANGE UP (ref 32–37)
MCV RBC AUTO: 87.2 FL — SIGNIFICANT CHANGE UP (ref 81–99)
MONOCYTES # BLD AUTO: 0.24 K/UL — SIGNIFICANT CHANGE UP (ref 0.1–0.6)
MONOCYTES NFR BLD AUTO: 3.4 % — SIGNIFICANT CHANGE UP (ref 1.7–9.3)
NEUTROPHILS # BLD AUTO: 5.97 K/UL — SIGNIFICANT CHANGE UP (ref 1.4–6.5)
NEUTROPHILS NFR BLD AUTO: 83.8 % — HIGH (ref 42.2–75.2)
NRBC # BLD: 0 /100 WBCS — SIGNIFICANT CHANGE UP (ref 0–0)
PLATELET # BLD AUTO: 184 K/UL — SIGNIFICANT CHANGE UP (ref 130–400)
POTASSIUM SERPL-MCNC: 4 MMOL/L — SIGNIFICANT CHANGE UP (ref 3.5–5)
POTASSIUM SERPL-SCNC: 4 MMOL/L — SIGNIFICANT CHANGE UP (ref 3.5–5)
PROT SERPL-MCNC: 7.4 G/DL — SIGNIFICANT CHANGE UP (ref 6–8)
RBC # BLD: 4.47 M/UL — SIGNIFICANT CHANGE UP (ref 4.2–5.4)
RBC # FLD: 14.9 % — HIGH (ref 11.5–14.5)
SODIUM SERPL-SCNC: 141 MMOL/L — SIGNIFICANT CHANGE UP (ref 135–146)
TROPONIN T SERPL-MCNC: <0.01 NG/ML — SIGNIFICANT CHANGE UP
WBC # BLD: 7.12 K/UL — SIGNIFICANT CHANGE UP (ref 4.8–10.8)
WBC # FLD AUTO: 7.12 K/UL — SIGNIFICANT CHANGE UP (ref 4.8–10.8)

## 2021-03-11 PROCEDURE — 99222 1ST HOSP IP/OBS MODERATE 55: CPT

## 2021-03-11 PROCEDURE — 99233 SBSQ HOSP IP/OBS HIGH 50: CPT

## 2021-03-11 RX ORDER — LISINOPRIL 2.5 MG/1
20 TABLET ORAL DAILY
Refills: 0 | Status: DISCONTINUED | OUTPATIENT
Start: 2021-03-11 | End: 2021-03-12

## 2021-03-11 RX ORDER — FUROSEMIDE 40 MG
20 TABLET ORAL DAILY
Refills: 0 | Status: DISCONTINUED | OUTPATIENT
Start: 2021-03-12 | End: 2021-03-12

## 2021-03-11 RX ADMIN — Medication 20 MILLIGRAM(S): at 05:10

## 2021-03-11 RX ADMIN — Medication 3 MILLILITER(S): at 14:41

## 2021-03-11 RX ADMIN — BUDESONIDE AND FORMOTEROL FUMARATE DIHYDRATE 2 PUFF(S): 160; 4.5 AEROSOL RESPIRATORY (INHALATION) at 20:12

## 2021-03-11 RX ADMIN — Medication 25 MILLIGRAM(S): at 05:10

## 2021-03-11 RX ADMIN — Medication 25 MILLIGRAM(S): at 18:16

## 2021-03-11 RX ADMIN — BUDESONIDE AND FORMOTEROL FUMARATE DIHYDRATE 2 PUFF(S): 160; 4.5 AEROSOL RESPIRATORY (INHALATION) at 08:52

## 2021-03-11 RX ADMIN — PANTOPRAZOLE SODIUM 40 MILLIGRAM(S): 20 TABLET, DELAYED RELEASE ORAL at 05:10

## 2021-03-11 RX ADMIN — Medication 3 MILLILITER(S): at 19:34

## 2021-03-11 RX ADMIN — Medication 40 MILLIGRAM(S): at 05:10

## 2021-03-11 RX ADMIN — LISINOPRIL 20 MILLIGRAM(S): 2.5 TABLET ORAL at 09:45

## 2021-03-11 NOTE — DISCHARGE NOTE PROVIDER - NSDCMRMEDTOKEN_GEN_ALL_CORE_FT
ibuprofen 200 mg oral capsule: 2 cap(s) orally 2 times a day  ibuprofen 200 mg oral tablet: 3 tab(s) orally once a day at lunch   budesonide-formoterol 160 mcg-4.5 mcg/inh inhalation aerosol: 1 puff(s) inhaled 2 times a day   furosemide 20 mg oral tablet: 1 tab(s) orally once a day  lisinopril 10 mg oral tablet: 1 tab(s) orally once a day  metoprolol tartrate 25 mg oral tablet: 1 tab(s) orally 2 times a day  pantoprazole 40 mg oral delayed release tablet: 1 tab(s) orally once a day (before a meal)  predniSONE 10 mg oral tablet: 4 tab(s) orally once a day x 3 days  decrease by one tablet every 3 days  Ventolin HFA 90 mcg/inh inhalation aerosol: 2 puff(s) inhaled every 6 hours

## 2021-03-11 NOTE — CHART NOTE - NSCHARTNOTEFT_GEN_A_CORE
Patient seen and examined at bedside. No acute events overnight.  She was started on Lasix 20 mg IV qd, Duoneb, Solumedrol 40 mg IV bid, and Symbicort.  Pt repots feeling much better and admits to improvement in breathing.  Pt saturating 97-98% on RA and 93% on RA ambulating.  CE x 3 negative.  ECHO was done this AM, results pending.  Pulm consult appreciated: continue Symbicort bid; prednisone 40 mg for 3 days then 20 mg for 3 days; outpatient follow up for full PFT and outpatient screening ct scan.   Cardiology input noted.  Pt counseled for smoking cessation.   Results of labs discussed as well as patient's plan.    All patient's questions answered.  Patient encouraged to contact PA with any further issues.     Case d/w Dr. Chapin Patient seen and examined at bedside. No acute events overnight.  She was started on Lasix 20 mg IV qd, Duoneb, Solumedrol 40 mg IV bid, and Symbicort.  Pt repots feeling much better and admits to improvement in breathing.  Pt saturating 97-98% on RA and 93% on RA ambulating.  CE x 3 negative.  ECHO was done this AM, results pending.  Pulm consult appreciated:                               will continue Symbicort bid;                               solumedrol will be switched to prednisone taper: 40 mg for 3 days then 20 mg for 3 days;                               outpatient follow up for full PFT and outpatient screening ct scan.   Cardiology input noted.  Will switch IV lasix to PO.  Continue tele monitoring.  Pt counseled for smoking cessation.   If pt continues to improve clinically, will anticipate for dc home tomorrow.  Results of labs discussed as well as patient's plan.    All patient's questions answered.  Patient encouraged to contact PA with any further issues.         Case d/w Dr. Chapin Patient seen and examined at bedside. No acute events overnight.  She was started on Lasix 20 mg IV qd, Duoneb, Solumedrol 40 mg IV bid, and Symbicort.  Pt repots feeling much better and admits to improvement in breathing.  Pt saturating 97-98% on RA and 93% on RA ambulating.  CE x 3 negative.  ECHO was done this AM, results pending.  Pulm consult appreciated:                               will continue Symbicort bid;                               solumedrol will be switched to prednisone taper: 40 mg for 3 days then 20 mg for 3 days;                               outpatient follow up for full PFT and outpatient screening ct scan.   Cardiology input noted. Pt would need outpatient dobutamine se.  Will switch IV lasix to PO.  Continue tele monitoring.  Pt counseled for smoking cessation.   If pt continues to improve clinically, will anticipate for dc home tomorrow.  Results of labs discussed as well as patient's plan.    All patient's questions answered.  Patient encouraged to contact PA with any further issues.         Case d/w Dr. Chapin

## 2021-03-11 NOTE — DISCHARGE NOTE PROVIDER - PROVIDER TOKENS
PROVIDER:[TOKEN:[[id number]],FOLLOWUP:[2 weeks]],PROVIDER:[TOKEN:[[id number]],FOLLOWUP:[2 weeks]]

## 2021-03-11 NOTE — DISCHARGE NOTE PROVIDER - HOSPITAL COURSE
Pt is a 75F w/o known PMH presenting with SOB/HERRERA increasing over the past few months. According to the patient, she has not seen a physician in many years; no PCP. Pt is a 1PPD smoker x60 years. Pt reports worse SOB with ambulation, now causing her to have to stop and catch her breath at times. 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 Denies any chest pain or pleuritic pain. +fatigue. Denies LE edema.  Denies HA, dizziness, fever/chills, chest pain, cough, rhinorrhea, NVD, abdominal pain, change in urination and change in BM.   In ED, pt is afebrile w/ WBC: 6.9. BP: 196/88. HR: 86, Trop (-) x1, BNP: 2527. CXR showed no radiographic evidence of acute cardiopulmonary disease.Pt was given 20mg IV lasix.  Pt was admitted to non ccu tele. She was started on lasix 20 mg IV, solumedrol, duonebs and symbicort. Pulmonology was consulted who recommended continue symbicort bid, switch solumedrol to prednisone 40 mg for 3 days then 20 mg for 3 days and outpatient follow up for full PFT and screening ct scan.  Pt was also seen by cardiology who recommended echo, BB, asa and outpatient dobutamine se.   Pt is doing well. Her symptoms improved. CE negative x3. Pt saturating 98% on RA and 93% while ambulating.  ECHO results pending     Pt is a 75F w/o known PMH presenting with SOB/HERRERA increasing over the past few months. According to the patient, she has not seen a physician in many years; no PCP. Pt is a 1PPD smoker x60 years. Pt reports worse SOB with ambulation, now causing her to have to stop and catch her breath at times. 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 Denies any chest pain or pleuritic pain. +fatigue. Denies LE edema.  Denies HA, dizziness, fever/chills, chest pain, cough, rhinorrhea, NVD, abdominal pain, change in urination and change in BM.   In ED, pt is afebrile w/ WBC: 6.9. BP: 196/88. HR: 86, Trop (-) x1, BNP: 2527. CXR showed no radiographic evidence of acute cardiopulmonary disease.Pt was given 20mg IV lasix.  Pt was admitted to non ccu tele. She was started on lasix 20 mg IV, solumedrol, duonebs and symbicort. Pulmonology was consulted who recommended continue symbicort bid, switch solumedrol to prednisone 40 mg for 3 days then 20 mg for 3 days and outpatient follow up for full PFT and screening ct scan.  Pt was also seen by cardiology who recommended echo, BB, asa and outpatient dobutamine se.   Pt is doing well. Her symptoms improved. CE negative x3. Pt saturating 98% on RA and 93% while ambulating.  ECHo shows EF of 55% with severe AS.  discussed results with patient in details.  [facility]  discussed TTE finding with cardiology who recommended outpatient follow up     Patient was seen and examined today  feels better.    SOB resolved  offers no other complaints  Constitutional: No fever, fatigue or weight loss.  Skin: No rash.  Eyes: No recent vision problems or eye pain.  ENT: No congestion, ear pain, or sore throat.  Endocrine: No thyroid problems.  Cardiovascular: No chest pain or palpation.  Respiratory: No cough, shortness of breath, congestion, or wheezing.  Gastrointestinal: No abdominal pain, nausea, vomiting, or diarrhea.  Genitourinary: No dysuria.  Musculoskeletal: No joint swelling.  Neurologic: No headache.  Vital Signs Last 24 Hrs  T(C): 36 (03-12-21 @ 05:18), Max: 36.9 (03-11-21 @ 13:51)  T(F): 96.8 (03-12-21 @ 05:18), Max: 98.4 (03-11-21 @ 13:51)  HR: 73 (03-12-21 @ 05:18) (73 - 89)  BP: 119/61 (03-12-21 @ 05:18) (119/61 - 169/77)  BP(mean): --  RR: 20 (03-12-21 @ 09:18) (18 - 20)  SpO2: 94% (03-12-21 @ 09:18) (94% - 97%)  SPO2 on ra after 5 min ambulation is 94%  PHYSICAL EXAM-  GENERAL: NAD,    HEAD:  Atraumatic, Normocephalic  EYES: EOMI, PERRLA, conjunctiva and sclera clear  NECK: Supple, No JVD, Normal thyroid  NERVOUS SYSTEM:  Alert & Oriented X3, Motor Strength 5/5 B/L upper and lower extremities; DTRs 2+ intact and symmetric  CHEST/LUNG: Clear to percussion bilaterally; No rales, rhonchi, wheezing, or rubs  HEART: Regular rate and rhythm;    ABDOMEN: Soft, Nontender, Nondistended; Bowel sounds present  EXTREMITIES:    No clubbing, cyanosis, or edema  SKIN: No rashes or lesions  Hospital course, and discharge planning were discussed with patient, and niece in details.  all questions were answered.  seems to understand, and in agreement.  time 70 min

## 2021-03-11 NOTE — DISCHARGE NOTE PROVIDER - NSDCFUADDAPPT_GEN_ALL_CORE_FT
Please follow up with cardiology, and pulmonary in one week for further workup  Please come back to the hospital if you develop any further symptoms or concerns

## 2021-03-11 NOTE — DISCHARGE NOTE PROVIDER - CARE PROVIDER_API CALL
Mickey Celestin)  Critical Care Medicine; Internal Medicine; Pulmonary Disease  [address], [facility] [address]  [place] [address]  Phone: [phone]  Fax: [phone]  Follow Up Time: 2 weeks    Dre Barcenas)  Cardiovascular Disease; Internal Medicine  [address] [place] [address]  Phone: [phone]  Fax: [phone]  Follow Up Time: 2 weeks

## 2021-03-11 NOTE — PROGRESS NOTE ADULT - SUBJECTIVE AND OBJECTIVE BOX
CC.  SOB  HPI.  Patient reports that she feels better. cough and  SOB are  improving.  afebrile  Offers no other complaints      Constitutional: No fever, fatigue or weight loss.  Skin: No rash.  Eyes: No recent vision problems or eye pain.  ENT: No congestion, ear pain, or sore throat.  Endocrine: No thyroid problems.  Cardiovascular: No chest pain or palpation.  Respiratory: No  , congestion, or wheezing.  Gastrointestinal: No abdominal pain, nausea, vomiting, or diarrhea.  Genitourinary: No dysuria.  Musculoskeletal: No joint swelling.  Neurologic: No headache.      Vital Signs Last 24 Hrs  T(C): 36.1 (03-11-21 @ 04:52), Max: 36.2 (03-10-21 @ 09:57)  T(F): 97 (03-11-21 @ 04:52), Max: 97.2 (03-10-21 @ 09:57)  HR: 65 (03-11-21 @ 07:38) (65 - 103)  BP: 169/81 (03-11-21 @ 07:38) (164/81 - 204/102)  BP(mean): --  RR: 18 (03-11-21 @ 07:38) (16 - 20)  SpO2: 95% (03-11-21 @ 07:38) (95% - 99%)        PHYSICAL EXAM-  GENERAL: NAD,    HEAD:  Atraumatic, Normocephalic  EYES: EOMI, PERRLA, conjunctiva and sclera clear  NECK: Supple, No JVD, Normal thyroid  NERVOUS SYSTEM:  Alert & Oriented X3, Moving all extremities  CHEST/LUNG: + min crackles with wheezing.  good air entry  HEART: Regular rate and rhythm; No murmurs, rubs, or gallops  ABDOMEN: Soft, Nontender, Nondistended; Bowel sounds present  EXTREMITIES:   , No clubbing, cyanosis.  + trace LE edema  SKIN: No rashes or lesions                                  13.0   7.12  )-----------( 184      ( 11 Mar 2021 06:14 )             39.0     03-11    141  |  104  |  25<H>  ----------------------------<  174<H>  4.0   |  24  |  1.0    Ca    9.6      11 Mar 2021 06:14    TPro  7.4  /  Alb  4.4  /  TBili  0.4  /  DBili  x   /  AST  13  /  ALT  10  /  AlkPhos  88  03-11    CARDIAC MARKERS ( 11 Mar 2021 06:14 )  x     / <0.01 ng/mL / 84 U/L / x     / 2.5 ng/mL  CARDIAC MARKERS ( 10 Mar 2021 18:47 )  x     / <0.01 ng/mL / 85 U/L / x     / 3.0 ng/mL  CARDIAC MARKERS ( 10 Mar 2021 10:38 )  x     / <0.01 ng/mL / x     / x     / x              PT/INR - ( 10 Mar 2021 10:38 )   PT: 12.50 sec;   INR: 1.09 ratio         PTT - ( 10 Mar 2021 10:38 )  PTT:34.0 sec        MEDICATIONS  (STANDING):  albuterol/ipratropium for Nebulization 3 milliLiter(s) Nebulizer every 6 hours  budesonide 160 MICROgram(s)/formoterol 4.5 MICROgram(s) Inhaler 2 Puff(s) Inhalation two times a day  enoxaparin Injectable 40 milliGRAM(s) SubCutaneous daily  furosemide   Injectable 20 milliGRAM(s) IV Push daily  influenza   Vaccine 0.5 milliLiter(s) IntraMuscular once  methylPREDNISolone sodium succinate Injectable 40 milliGRAM(s) IV Push two times a day  metoprolol tartrate 25 milliGRAM(s) Oral two times a day  pantoprazole    Tablet 40 milliGRAM(s) Oral before breakfast    MEDICATIONS  (PRN):  hydrALAZINE 25 milliGRAM(s) Oral every 6 hours PRN Systolic blood pressure > 160        Imaging Personally Reviewed:     [x ] YES  [ ] NO    Consultant(s) Notes Reviewed:  [x ] YES  [ ] NO    Care Discussed with Consultants/Other Providers [x ] YES  [ ] NO

## 2021-03-11 NOTE — DISCHARGE NOTE PROVIDER - NSDCFUADDINST_GEN_ALL_CORE_FT
- Return to Emergency room if develop any persistent fever > 101, chills, tremors, persistent nausea/vomiting, severe pain not relieved by pain medication, inability to urinate, chest pains, difficulty breathing or any bleeding.

## 2021-03-11 NOTE — CONSULT NOTE ADULT - ASSESSMENT
Patient with [facility] for months. Currently not worse. She needs a echo. R/o MI. She wants to go home. Beta asa. Can consider outpatient dobuamine se. Ambulate on RA check sat
Impression:  SOb improved most likely fluid overload chf   copd now no wheezing         Plan:  from pulmonary agree with symbicort Q 12 hrs   prednisone 40 mg for 3 days then 20 mg for 3 days   need outpatient follow up for full PFT   need outpatient screening ct scan   counseled for smoking cessation   need echo   cardiology eval

## 2021-03-11 NOTE — PROGRESS NOTE ADULT - ASSESSMENT
Pt is a 75F w/o known PMH presenting with SOB/HERRERA increasing over the past few months.       1. SOB  -Possibly secondary to COPD exacerbation, and possible Pulmonary edema  -Continue with IV steriod, Neb tx, IV lasix, and oxygen  -Serial trop negative.  TTE pending   -Pulmonary and cardiology consult     2.  HTN  -BP elevated  -continue with metoprolol, and will start lisinopril.  Monitor BP  -PO Hydralazine for elevated BP above 160    #Progress Note Handoff  Pending (specify):  still acute   Family discussion:  plan of care was discussed with patient  in details.  all questions were answered.  seems to understand, and in agreement

## 2021-03-11 NOTE — DISCHARGE NOTE PROVIDER - NSDCCPCAREPLAN_GEN_ALL_CORE_FT
PRINCIPAL DISCHARGE DIAGNOSIS  Diagnosis: Shortness of breath  Assessment and Plan of Treatment: - your were admitted to telemetry floor and treated  - your symptoms improved  - you were started on steriods and symbicort. Please continue medications as directed. Prescriptions will be sent to pharmacy.   - please follow up with cardiology for outpatient stess test  -please follow up with pulmonology for full PFT and outpatient screening ct scan  - please stop smoking       PRINCIPAL DISCHARGE DIAGNOSIS  Diagnosis: Shortness of breath  Assessment and Plan of Treatment: - your were admitted to telemetry floor and treated  - your symptoms improved  - you were started on steriods and symbicort. Please continue medications as directed. Prescriptions will be sent to pharmacy.   - please follow up with cardiology for outpatient stess test  -please follow up with pulmonology for full PFT and outpatient screening ct scan  - please stop smoking  -Please follow up with cardiology regarding your aortic stenosis as discussed

## 2021-03-11 NOTE — CONSULT NOTE ADULT - SUBJECTIVE AND OBJECTIVE BOX
Patient is a 75y old  Female who presents with a chief complaint of SOB (11 Mar 2021 08:23)      HPI:  Pt is a 75F w/o known PMH presenting with SOB/HERRERA increasing over the past few months. According to the patient, she has not seen a physician in many years; no PCP. Pt is a 1PPD smoker x60 years. Pt reports worse SOB with ambulation, now causing her to have to stop and catch her breath at times. 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 Denies any chest pain or pleuritic pain. +fatigue. Denies LE edema.  Denies HA, dizziness, fever/chills, chest pain, cough, rhinorrhea, NVD, abdominal pain, change in urination and change in BM.     In ED, pt is afebrile w/ WBC: 6.9. BP: 196/88. HR: 86, Trop (-) x1, BNP: 2527. CXR: pending read. Pt was given 20mg IV lasix  (10 Mar 2021 12:50)  today she feel much better no wheezing     PAST MEDICAL & SURGICAL HISTORY:  HTN (hypertension)    No significant past surgical history        FAMILY HISTORY:  No pertinent family history in first degree relatives      Family history: No family cardiovascular system   Occupation:  Alochol: Denied  Smoking: Denied  Drug Use: Denied  Marital Status:           Allergies    No Known Allergies    Intolerances        Home Medications:  ibuprofen 200 mg oral capsule: 2 cap(s) orally 2 times a day (10 Mar 2021 12:50)  ibuprofen 200 mg oral tablet: 3 tab(s) orally once a day at lunch (10 Mar 2021 12:50)      ROS: as in HPI; All other systems reviewed are negative        PHYSICAL EXAM:  Vital Signs Last 24 Hrs  T(C): 36.1 (11 Mar 2021 04:52), Max: 36.2 (10 Mar 2021 15:19)  T(F): 97 (11 Mar 2021 04:52), Max: 97.1 (10 Mar 2021 15:19)  HR: 65 (11 Mar 2021 07:38) (65 - 90)  BP: 169/81 (11 Mar 2021 07:38) (164/81 - 204/102)  BP(mean): --  RR: 18 (11 Mar 2021 07:38) (16 - 20)  SpO2: 95% (11 Mar 2021 07:38) (95% - 98%)      GENERAL: NAD, well-groomed, well-developed  HEAD:  Atraumatic, Normocephalic  EYES: EOMI, PERRLA, conjunctiva and sclera clear  ENMT: No tonsillar erythema, exudates, or enlargement; Moist mucous membranes, Good dentition, No lesions  NECK: Supple, No JVD, Normal thyroid  NERVOUS SYSTEM:  Alert & Oriented X3, Good concentration; Motor Strength 5/5 B/L upper and lower extremities; DTRs 2+ intact and symmetric  CHEST/LUNG: Clear to percussion bilaterally; No rales, rhonchi, wheezing, or rubs  HEART: Regular rate and rhythm; No murmurs, rubs, or gallops  ABDOMEN: Soft, Nontender, Nondistended; Bowel sounds present  EXTREMITIES:  2+ Peripheral Pulses, No clubbing, cyanosis, or edema  LYMPH: No lymphadenopathy noted  SKIN: No rashes or lesions    HOSPITAL MEDICATIONS:  MEDICATIONS  (STANDING):  albuterol/ipratropium for Nebulization 3 milliLiter(s) Nebulizer every 6 hours  budesonide 160 MICROgram(s)/formoterol 4.5 MICROgram(s) Inhaler 2 Puff(s) Inhalation two times a day  enoxaparin Injectable 40 milliGRAM(s) SubCutaneous daily  furosemide   Injectable 20 milliGRAM(s) IV Push daily  influenza   Vaccine 0.5 milliLiter(s) IntraMuscular once  lisinopril 20 milliGRAM(s) Oral daily  methylPREDNISolone sodium succinate Injectable 40 milliGRAM(s) IV Push two times a day  metoprolol tartrate 25 milliGRAM(s) Oral two times a day  pantoprazole    Tablet 40 milliGRAM(s) Oral before breakfast    MEDICATIONS  (PRN):  hydrALAZINE 25 milliGRAM(s) Oral every 6 hours PRN Systolic blood pressure > 160      LABS:                        13.0   7.12  )-----------( 184      ( 11 Mar 2021 06:14 )             39.0     03-11    141  |  104  |  25<H>  ----------------------------<  174<H>  4.0   |  24  |  1.0    Ca    9.6      11 Mar 2021 06:14    TPro  7.4  /  Alb  4.4  /  TBili  0.4  /  DBili  x   /  AST  13  /  ALT  10  /  AlkPhos  88  03-11    PT/INR - ( 10 Mar 2021 10:38 )   PT: 12.50 sec;   INR: 1.09 ratio         PTT - ( 10 Mar 2021 10:38 )  PTT:34.0 sec      Venous Blood Gas:  03-10 @ 11:19  7.37/47/33/27/62  VBG Lactate: 1.8          RADIOLOGY: ?? mild congestion b/l   [ ] Reviewed and interpreted by me    ECHO:    Point of Care Ultrasound Findings;    PFT:      
CARDIOLOGY CONSULT NOTE     CHIEF COMPLAINT/REASON FOR CONSULT:    HPI:  Pt is a 75F w/o known PMH presenting with SOB/HERRERA increasing over the past few months. According to the patient, she has not seen a physician in many years; no PCP. Pt is a 1PPD smoker x60 years. Pt reports worse SOB with ambulation, now causing her to have to stop and catch her breath at times. 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 Denies any chest pain or pleuritic pain. +fatigue. Denies LE edema.  Denies HA, dizziness, fever/chills, chest pain, cough, rhinorrhea, NVD, abdominal pain, change in urination and change in BM.     In ED, pt is afebrile w/ WBC: 6.9. BP: 196/88. HR: 86, Trop (-) x1, BNP: 2527. CXR: pending read. Pt was given 20mg IV lasix  (10 Mar 2021 12:50)      PAST MEDICAL & SURGICAL HISTORY:  HTN (hypertension)    No significant past surgical history        Cardiac Risks:   [x ]HTN, [ ] DM, [ x] Smoking, [ ] FH,  [ ] Lipids        MEDICATIONS:  MEDICATIONS  (STANDING):  albuterol/ipratropium for Nebulization 3 milliLiter(s) Nebulizer every 6 hours  budesonide 160 MICROgram(s)/formoterol 4.5 MICROgram(s) Inhaler 2 Puff(s) Inhalation two times a day  enoxaparin Injectable 40 milliGRAM(s) SubCutaneous daily  furosemide   Injectable 20 milliGRAM(s) IV Push daily  influenza   Vaccine 0.5 milliLiter(s) IntraMuscular once  lisinopril 20 milliGRAM(s) Oral daily  methylPREDNISolone sodium succinate Injectable 40 milliGRAM(s) IV Push two times a day  metoprolol tartrate 25 milliGRAM(s) Oral two times a day  pantoprazole    Tablet 40 milliGRAM(s) Oral before breakfast      FAMILY HISTORY:  No pertinent family history in first degree relatives        SOCIAL HISTORY:      [ ] Marital status    Allergies    No Known Allergies      	    REVIEW OF SYSTEMS:  CONSTITUTIONAL: No fever, weight loss, or fatigue  EYES: No eye pain, visual disturbances, or discharge  ENMT:  No difficulty hearing, tinnitus, vertigo; No sinus or throat pain  NECK: No pain or stiffness  RESPIRATORY: See above  CARDIOVASCULAR: see above  GASTROINTESTINAL: No abdominal or epigastric pain. No nausea, vomiting, or hematemesis; No diarrhea or constipation. No melena or hematochezia.  GENITOURINARY: No dysuria, frequency, hematuria, or incontinence  NEUROLOGICAL: No headaches, memory loss, loss of strength, numbness, or tremors  SKIN: No itching, burning, rashes, or lesions   	      PHYSICAL EXAM:  T(C): 36.1 (03-11-21 @ 04:52), Max: 36.2 (03-10-21 @ 15:19)  HR: 65 (03-11-21 @ 07:38) (65 - 90)  BP: 169/81 (03-11-21 @ 07:38) (164/81 - 204/102)  RR: 18 (03-11-21 @ 07:38) (16 - 20)  SpO2: 95% (03-11-21 @ 07:38) (95% - 98%)  Wt(kg): --  I&O's Summary      Appearance: Normal	  Psychiatry: A & O x 3, Mood & affect appropriate  HEENT:   Normal oral mucosa, PERRL, EOMI	  Lymphatic: No lymphadenopathy  Cardiovascular: Normal S1 S2,RRR, No JVD, No murmurs  Respiratory: Lungs clear to auscultation	  Gastrointestinal:  Soft, Non-tender, + BS	  Skin: No rashes, No ecchymoses, No cyanosis	  Neurologic: Non-focal  Extremities: Normal range of motion, No clubbing, cyanosis or edema  Vascular: Peripheral pulses palpable 2+ bilaterally      ECG:  < from: 12 Lead ECG (03.10.21 @ 09:39) >  Diagnosis Line Sinus rhythm with Premature supraventricular complexes  Biatrial enlargement  Left ventricular hypertrophy  Abnormal ECG    Confirmed by YAYA COPPOLA MD ([id number]) on 3/10/2021 12:15:14 PM    < end of copied text >  	    	  LABS:	 	    CARDIAC MARKERS:          Serum Pro-Brain Natriuretic Peptide: 2527 pg/mL (03-10 @ 10:38)                            13.0   7.12  )-----------( 184      ( 11 Mar 2021 06:14 )             39.0     03-11    141  |  104  |  25<H>  ----------------------------<  174<H>  4.0   |  24  |  1.0    Ca    9.6      11 Mar 2021 06:14    TPro  7.4  /  Alb  4.4  /  TBili  0.4  /  DBili  x   /  AST  13  /  ALT  10  /  AlkPhos  88  03-11    PT/INR - ( 10 Mar 2021 10:38 )   PT: 12.50 sec;   INR: 1.09 ratio         PTT - ( 10 Mar 2021 10:38 )  PTT:34.0 sec  proBNP: Serum Pro-Brain Natriuretic Peptide: 2527 pg/mL (03-10 @ 10:38)

## 2021-03-12 ENCOUNTER — TRANSCRIPTION ENCOUNTER (OUTPATIENT)
Age: 76
End: 2021-03-12

## 2021-03-12 VITALS — RESPIRATION RATE: 20 BRPM | OXYGEN SATURATION: 94 %

## 2021-03-12 LAB
ANION GAP SERPL CALC-SCNC: 13 MMOL/L — SIGNIFICANT CHANGE UP (ref 7–14)
BUN SERPL-MCNC: 37 MG/DL — HIGH (ref 10–20)
CALCIUM SERPL-MCNC: 9.7 MG/DL — SIGNIFICANT CHANGE UP (ref 8.5–10.1)
CHLORIDE SERPL-SCNC: 104 MMOL/L — SIGNIFICANT CHANGE UP (ref 98–110)
CO2 SERPL-SCNC: 24 MMOL/L — SIGNIFICANT CHANGE UP (ref 17–32)
CREAT SERPL-MCNC: 1.2 MG/DL — SIGNIFICANT CHANGE UP (ref 0.7–1.5)
GLUCOSE SERPL-MCNC: 101 MG/DL — HIGH (ref 70–99)
HCT VFR BLD CALC: 38.5 % — SIGNIFICANT CHANGE UP (ref 37–47)
HGB BLD-MCNC: 12.7 G/DL — SIGNIFICANT CHANGE UP (ref 12–16)
MCHC RBC-ENTMCNC: 29.2 PG — SIGNIFICANT CHANGE UP (ref 27–31)
MCHC RBC-ENTMCNC: 33 G/DL — SIGNIFICANT CHANGE UP (ref 32–37)
MCV RBC AUTO: 88.5 FL — SIGNIFICANT CHANGE UP (ref 81–99)
NRBC # BLD: 0 /100 WBCS — SIGNIFICANT CHANGE UP (ref 0–0)
PLATELET # BLD AUTO: 180 K/UL — SIGNIFICANT CHANGE UP (ref 130–400)
POTASSIUM SERPL-MCNC: 4.1 MMOL/L — SIGNIFICANT CHANGE UP (ref 3.5–5)
POTASSIUM SERPL-SCNC: 4.1 MMOL/L — SIGNIFICANT CHANGE UP (ref 3.5–5)
RBC # BLD: 4.35 M/UL — SIGNIFICANT CHANGE UP (ref 4.2–5.4)
RBC # FLD: 15.6 % — HIGH (ref 11.5–14.5)
SARS-COV-2 IGG SERPL QL IA: NEGATIVE — SIGNIFICANT CHANGE UP
SARS-COV-2 IGM SERPL IA-ACNC: <3.8 AU/ML — SIGNIFICANT CHANGE UP
SODIUM SERPL-SCNC: 141 MMOL/L — SIGNIFICANT CHANGE UP (ref 135–146)
WBC # BLD: 12.23 K/UL — HIGH (ref 4.8–10.8)
WBC # FLD AUTO: 12.23 K/UL — HIGH (ref 4.8–10.8)

## 2021-03-12 PROCEDURE — 99239 HOSP IP/OBS DSCHRG MGMT >30: CPT

## 2021-03-12 RX ORDER — BUDESONIDE AND FORMOTEROL FUMARATE DIHYDRATE 160; 4.5 UG/1; UG/1
1 AEROSOL RESPIRATORY (INHALATION)
Qty: 1 | Refills: 0
Start: 2021-03-12 | End: 2021-04-10

## 2021-03-12 RX ORDER — FUROSEMIDE 40 MG
1 TABLET ORAL
Qty: 30 | Refills: 0
Start: 2021-03-12 | End: 2021-04-10

## 2021-03-12 RX ORDER — PANTOPRAZOLE SODIUM 20 MG/1
1 TABLET, DELAYED RELEASE ORAL
Qty: 30 | Refills: 0
Start: 2021-03-12 | End: 2021-04-10

## 2021-03-12 RX ORDER — IBUPROFEN 200 MG
3 TABLET ORAL
Qty: 0 | Refills: 0 | DISCHARGE

## 2021-03-12 RX ORDER — ALBUTEROL 90 UG/1
2 AEROSOL, METERED ORAL
Qty: 1 | Refills: 0
Start: 2021-03-12 | End: 2021-04-10

## 2021-03-12 RX ORDER — IBUPROFEN 200 MG
2 TABLET ORAL
Qty: 0 | Refills: 0 | DISCHARGE

## 2021-03-12 RX ORDER — LISINOPRIL 2.5 MG/1
1 TABLET ORAL
Qty: 30 | Refills: 0
Start: 2021-03-12 | End: 2021-04-10

## 2021-03-12 RX ORDER — METOPROLOL TARTRATE 50 MG
1 TABLET ORAL
Qty: 60 | Refills: 0
Start: 2021-03-12 | End: 2021-04-10

## 2021-03-12 RX ADMIN — BUDESONIDE AND FORMOTEROL FUMARATE DIHYDRATE 2 PUFF(S): 160; 4.5 AEROSOL RESPIRATORY (INHALATION) at 08:07

## 2021-03-12 RX ADMIN — Medication 25 MILLIGRAM(S): at 05:01

## 2021-03-12 RX ADMIN — Medication 3 MILLILITER(S): at 03:08

## 2021-03-12 RX ADMIN — LISINOPRIL 20 MILLIGRAM(S): 2.5 TABLET ORAL at 05:01

## 2021-03-12 RX ADMIN — Medication 20 MILLIGRAM(S): at 05:01

## 2021-03-12 RX ADMIN — Medication 3 MILLILITER(S): at 07:30

## 2021-03-12 RX ADMIN — PANTOPRAZOLE SODIUM 40 MILLIGRAM(S): 20 TABLET, DELAYED RELEASE ORAL at 05:01

## 2021-03-12 RX ADMIN — Medication 40 MILLIGRAM(S): at 05:01

## 2021-03-12 NOTE — DISCHARGE NOTE NURSING/CASE MANAGEMENT/SOCIAL WORK - PATIENT PORTAL LINK FT
You can access the FollowMyHealth Patient Portal offered by [facility] by registering at the following website: [email or web address]. By joining [employer]’s FollowMyHealth portal, you will also be able to view your health information using other applications (apps) compatible with our system.

## 2021-03-12 NOTE — CHART NOTE - NSCHARTNOTEFT_GEN_A_CORE
Patient seen and examined at bedside.  No acute events overnight.   Pt repots feeling better. She denies cp, abd pain, dizziness.  T(C): 36 (03-12-21 @ 05:18), Max: 36.9 (03-11-21 @ 13:51)  T(F): 96.8 (03-12-21 @ 05:18), Max: 98.4 (03-11-21 @ 13:51)  HR: 73 (03-12-21 @ 05:18) (73 - 89)  BP: 119/61 (03-12-21 @ 05:18) (119/61 - 169/77)  RR: 20 (03-12-21 @ 09:18) (18 - 20)  SpO2: 94% (03-12-21 @ 09:18) (93% - 97%)  ECHO from 3/11/21 shows mild concentric ventricular hypertrophy. Overall LV systolic function is normal, with an EF of >55%. Severe aortic stenosis. Moderate aortic regurg.  Findings d/w pt at length. She would need an outpt f/u with cardiology.  All patient's questions answered.  Patient encouraged to contact PA with any further issues.

## 2021-03-18 ENCOUNTER — APPOINTMENT (OUTPATIENT)
Dept: CARDIOLOGY | Facility: CLINIC | Age: 76
End: 2021-03-18
Payer: COMMERCIAL

## 2021-03-18 VITALS
WEIGHT: 170 LBS | BODY MASS INDEX: 33.38 KG/M2 | TEMPERATURE: 97.7 F | HEIGHT: 60 IN | SYSTOLIC BLOOD PRESSURE: 160 MMHG | HEART RATE: 64 BPM | DIASTOLIC BLOOD PRESSURE: 90 MMHG

## 2021-03-18 PROCEDURE — 99495 TRANSJ CARE MGMT MOD F2F 14D: CPT

## 2021-03-18 PROCEDURE — 93000 ELECTROCARDIOGRAM COMPLETE: CPT

## 2021-03-18 PROCEDURE — 99072 ADDL SUPL MATRL&STAF TM PHE: CPT

## 2021-03-18 NOTE — DISCUSSION/SUMMARY
[FreeTextEntry1] : Cigarette smoking cessation is advised.\par Obtain 2D echo doppler which may have been done at the [facility]\par Lexiscan nuclear stress test.\par Patient cannot ambulate due to her spinal stenosis\par Maintain present antihypertensive medications.\par Patient was instructed to target her T. Cholesterol to less than 200 mg/dl and LDL cholesterol to less than 100 mg/dl.\par weight loss was advised.\par Maintain cardiac medications. \par Patient was advised to repeat a BMP, CBC , fasting lipid profile and hepatic panel and HgA1c.\par RV in 2 weeks.\par if an echocardiogram was not performed while she was admitted, it will be ordered.\par

## 2021-03-18 NOTE — REVIEW OF SYSTEMS
[Dyspnea on exertion] : dyspnea during exertion [Cough] : cough [Wheezing] : no wheezing [Joint Pain] : joint pain [Joint Stiffness] : joint stiffness [Negative] : Heme/Lymph

## 2021-03-18 NOTE — PHYSICAL EXAM
[General Appearance - Well Developed] : well developed [Normal Appearance] : normal appearance [General Appearance - Well Nourished] : well nourished [General Appearance - In No Acute Distress] : no acute distress [Normal Conjunctiva] : the conjunctiva exhibited no abnormalities [Normal Oropharynx] : normal oropharynx [Normal Jugular Venous V Waves Present] : normal jugular venous V waves present [Heart Rate And Rhythm] : heart rate and rhythm were normal [Heart Sounds] : normal S1 and S2 [Edema] : no peripheral edema present [FreeTextEntry1] : Grade I/VI systolic murmur at RSB [Abdomen Soft] : soft [Abdomen Tenderness] : non-tender [Abnormal Walk] : normal gait [Nail Clubbing] : no clubbing of the fingernails [Cyanosis, Localized] : no localized cyanosis [Skin Color & Pigmentation] : normal skin color and pigmentation [Skin Turgor] : normal skin turgor [] : no rash [Oriented To Time, Place, And Person] : oriented to person, place, and time

## 2021-03-18 NOTE — ASSESSMENT
[FreeTextEntry1] : Hypertensive heart disease\par Exertional dyspnea\par Cigarette smoker with possible COPD\par R/O ASHD\par Possible DM

## 2021-03-18 NOTE — REASON FOR VISIT
[FreeTextEntry1] : Patient presents for initial Cardiology evaluation after being admitted to [facility] for symptoms of dyspnea. She has not seen a physician in over 20 years by her own admission and has not been on any medications aside fro NSAIDS for spinal stenosis which she stopped taking.

## 2021-03-18 NOTE — HISTORY OF PRESENT ILLNESS
[FreeTextEntry1] : Hypertension\par Cigarette smoker since the age of 17 , with 1/2 pack to 1 pack per day.\par Probable DM\par Possible emphysema patient is scheduled to see a Pulmonologist\par Spinal stenosis\par Familial history of heart disease, the patient's brother had CAD, MI, and 3v. CABG and cardiomyopathy.

## 2021-03-20 ENCOUNTER — LABORATORY RESULT (OUTPATIENT)
Age: 76
End: 2021-03-20

## 2021-03-28 ENCOUNTER — EMERGENCY (EMERGENCY)
Facility: HOSPITAL | Age: 76
LOS: 0 days | Discharge: HOME | End: 2021-03-28
Attending: EMERGENCY MEDICINE | Admitting: EMERGENCY MEDICINE
Payer: COMMERCIAL

## 2021-03-28 VITALS
HEART RATE: 92 BPM | DIASTOLIC BLOOD PRESSURE: 73 MMHG | HEIGHT: 60 IN | WEIGHT: 169.98 LBS | TEMPERATURE: 97 F | RESPIRATION RATE: 20 BRPM | OXYGEN SATURATION: 96 % | SYSTOLIC BLOOD PRESSURE: 140 MMHG

## 2021-03-28 VITALS
SYSTOLIC BLOOD PRESSURE: 136 MMHG | OXYGEN SATURATION: 97 % | RESPIRATION RATE: 18 BRPM | HEART RATE: 89 BPM | TEMPERATURE: 97 F | DIASTOLIC BLOOD PRESSURE: 70 MMHG

## 2021-03-28 DIAGNOSIS — F17.200 NICOTINE DEPENDENCE, UNSPECIFIED, UNCOMPLICATED: ICD-10-CM

## 2021-03-28 DIAGNOSIS — R53.1 WEAKNESS: ICD-10-CM

## 2021-03-28 DIAGNOSIS — Z20.822 CONTACT WITH AND (SUSPECTED) EXPOSURE TO COVID-19: ICD-10-CM

## 2021-03-28 LAB
ALBUMIN SERPL ELPH-MCNC: 3.7 G/DL — SIGNIFICANT CHANGE UP (ref 3.5–5.2)
ALP SERPL-CCNC: 80 U/L — SIGNIFICANT CHANGE UP (ref 30–115)
ALT FLD-CCNC: 11 U/L — SIGNIFICANT CHANGE UP (ref 0–41)
ANION GAP SERPL CALC-SCNC: 13 MMOL/L — SIGNIFICANT CHANGE UP (ref 7–14)
AST SERPL-CCNC: 12 U/L — SIGNIFICANT CHANGE UP (ref 0–41)
BASOPHILS # BLD AUTO: 0.05 K/UL — SIGNIFICANT CHANGE UP (ref 0–0.2)
BASOPHILS NFR BLD AUTO: 0.5 % — SIGNIFICANT CHANGE UP (ref 0–1)
BILIRUB SERPL-MCNC: 0.4 MG/DL — SIGNIFICANT CHANGE UP (ref 0.2–1.2)
BUN SERPL-MCNC: 17 MG/DL — SIGNIFICANT CHANGE UP (ref 10–20)
CALCIUM SERPL-MCNC: 9.5 MG/DL — SIGNIFICANT CHANGE UP (ref 8.5–10.1)
CHLORIDE SERPL-SCNC: 100 MMOL/L — SIGNIFICANT CHANGE UP (ref 98–110)
CO2 SERPL-SCNC: 25 MMOL/L — SIGNIFICANT CHANGE UP (ref 17–32)
CREAT SERPL-MCNC: 1.2 MG/DL — SIGNIFICANT CHANGE UP (ref 0.7–1.5)
EOSINOPHIL # BLD AUTO: 0.14 K/UL — SIGNIFICANT CHANGE UP (ref 0–0.7)
EOSINOPHIL NFR BLD AUTO: 1.5 % — SIGNIFICANT CHANGE UP (ref 0–8)
GLUCOSE SERPL-MCNC: 115 MG/DL — HIGH (ref 70–99)
HCT VFR BLD CALC: 39.2 % — SIGNIFICANT CHANGE UP (ref 37–47)
HGB BLD-MCNC: 12.8 G/DL — SIGNIFICANT CHANGE UP (ref 12–16)
IMM GRANULOCYTES NFR BLD AUTO: 0.3 % — SIGNIFICANT CHANGE UP (ref 0.1–0.3)
LYMPHOCYTES # BLD AUTO: 1.46 K/UL — SIGNIFICANT CHANGE UP (ref 1.2–3.4)
LYMPHOCYTES # BLD AUTO: 15.8 % — LOW (ref 20.5–51.1)
MAGNESIUM SERPL-MCNC: 2.3 MG/DL — SIGNIFICANT CHANGE UP (ref 1.8–2.4)
MCHC RBC-ENTMCNC: 29.1 PG — SIGNIFICANT CHANGE UP (ref 27–31)
MCHC RBC-ENTMCNC: 32.7 G/DL — SIGNIFICANT CHANGE UP (ref 32–37)
MCV RBC AUTO: 89.1 FL — SIGNIFICANT CHANGE UP (ref 81–99)
MONOCYTES # BLD AUTO: 0.85 K/UL — HIGH (ref 0.1–0.6)
MONOCYTES NFR BLD AUTO: 9.2 % — SIGNIFICANT CHANGE UP (ref 1.7–9.3)
NEUTROPHILS # BLD AUTO: 6.69 K/UL — HIGH (ref 1.4–6.5)
NEUTROPHILS NFR BLD AUTO: 72.7 % — SIGNIFICANT CHANGE UP (ref 42.2–75.2)
NRBC # BLD: 0 /100 WBCS — SIGNIFICANT CHANGE UP (ref 0–0)
PLATELET # BLD AUTO: 154 K/UL — SIGNIFICANT CHANGE UP (ref 130–400)
POTASSIUM SERPL-MCNC: 4.3 MMOL/L — SIGNIFICANT CHANGE UP (ref 3.5–5)
POTASSIUM SERPL-SCNC: 4.3 MMOL/L — SIGNIFICANT CHANGE UP (ref 3.5–5)
PROT SERPL-MCNC: 7 G/DL — SIGNIFICANT CHANGE UP (ref 6–8)
RAPID RVP RESULT: SIGNIFICANT CHANGE UP
RBC # BLD: 4.4 M/UL — SIGNIFICANT CHANGE UP (ref 4.2–5.4)
RBC # FLD: 15.3 % — HIGH (ref 11.5–14.5)
SARS-COV-2 RNA SPEC QL NAA+PROBE: SIGNIFICANT CHANGE UP
SODIUM SERPL-SCNC: 138 MMOL/L — SIGNIFICANT CHANGE UP (ref 135–146)
WBC # BLD: 9.22 K/UL — SIGNIFICANT CHANGE UP (ref 4.8–10.8)
WBC # FLD AUTO: 9.22 K/UL — SIGNIFICANT CHANGE UP (ref 4.8–10.8)

## 2021-03-28 PROCEDURE — 99284 EMERGENCY DEPT VISIT MOD MDM: CPT

## 2021-03-28 RX ORDER — ACETAMINOPHEN 500 MG
975 TABLET ORAL ONCE
Refills: 0 | Status: COMPLETED | OUTPATIENT
Start: 2021-03-28 | End: 2021-03-28

## 2021-03-28 RX ADMIN — Medication 975 MILLIGRAM(S): at 20:29

## 2021-03-28 NOTE — ED ADULT NURSE NOTE - NSIMPLEMENTINTERV_GEN_ALL_ED
Implemented All Fall with Harm Risk Interventions:  [place] to call system. Call bell, personal items and telephone within reach. Instruct patient to call for assistance. Room bathroom lighting operational. Non-slip footwear when patient is off stretcher. Physically safe environment: no spills, clutter or unnecessary equipment. Stretcher in lowest position, wheels locked, appropriate side rails in place. Provide visual cue, wrist band, yellow gown, etc. Monitor gait and stability. Monitor for mental status changes and reorient to person, place, and time. Review medications for side effects contributing to fall risk. Reinforce activity limits and safety measures with patient and family. Provide visual clues: red socks.

## 2021-03-28 NOTE — ED PROVIDER NOTE - NS ED ROS FT
Review of Systems  Constitutional:  No fever, chills. (+) generalized weakness.  Eyes:  No visual changes, eye pain, or discharge.  ENMT:  No hearing changes, pain, or discharge. No nasal congestion, discharge, or bleeding. No throat pain, swelling, or difficulty swallowing.  Cardiac:  No chest pain, palpitations, syncope, or edema.  Respiratory:  No dyspnea, cough. No hemoptysis.  GI:  No nausea, vomiting, diarrhea, or abdominal pain.   :  No dysuria, hematuria, frequency, or burning.   MS:  No back pain. (+) body aches  Skin:  No skin rash, pruritis, jaundice, or lesions.  Neuro:  No headache, dizziness, loss of sensation, or focal weakness.  No change in mental status.   Endocrine: No history of thyroid disease or diabetes.

## 2021-03-28 NOTE — ED PROVIDER NOTE - PATIENT PORTAL LINK FT
You can access the FollowMyHealth Patient Portal offered by [facility] by registering at the following website: [email or web address]. By joining [employer]’s FollowMyHealth portal, you will also be able to view your health information using other applications (apps) compatible with our system.

## 2021-03-28 NOTE — ED PROVIDER NOTE - PHYSICAL EXAMINATION
VITAL SIGNS: I have reviewed nursing notes and confirm.  CONSTITUTIONAL: Well-developed; well-nourished; in no acute distress.  SKIN: Skin exam is warm and dry, no acute rash.  HEAD: Normocephalic; atraumatic.  EYES: PERRL, EOM intact; conjunctiva and sclera clear.  ENT: No nasal discharge; airway clear.   NECK: Supple; non tender.  CARD: S1, S2 normal; no murmurs, gallops, or rubs. Regular rate and rhythm.  RESP: No wheezes, rales or rhonchi. Speaking in full sentences.   ABD: Normal bowel sounds; soft; non-distended; non-tender; No rebound or guarding. No CVA tenderness.  EXT: Normal ROM. No clubbing, cyanosis or edema. No calf TTP or swelling. DP 2+ B/L and equal.   NEURO: Alert, oriented. Grossly unremarkable. No focal deficits.

## 2021-03-28 NOTE — ED PROVIDER NOTE - CLINICAL SUMMARY MEDICAL DECISION MAKING FREE TEXT BOX
Labs unremarkable.  EKG NSR no stemi.  Covid swab negative.  Given Tylenol with improvement.  Will D/C to follow up with PCP.

## 2021-03-28 NOTE — ED PROVIDER NOTE - OBJECTIVE STATEMENT
76 yo F with recent admission for HERRERA x months -- started on symbicort/albuterol/prednisone/lasix and found to have severe AS presents to the ED c/o generalized weakness, fatigue and diffuse body aches/joint pains since Friday. Pt completed course of Prednisone on Thursday and her symptoms started the following day. She has not taken any medication to improve symptoms. She denies other complaints. Pt denies fever, chills, nausea, vomiting, abdominal pain, diarrhea, headache, dizziness, weakness, chest pain, SOB, back pain, LOC, trauma, urinary symptoms, cough, calf pain/swelling, recent travel, recent surgery.

## 2021-04-11 ENCOUNTER — OUTPATIENT (OUTPATIENT)
Dept: OUTPATIENT SERVICES | Facility: HOSPITAL | Age: 76
LOS: 1 days | Discharge: HOME | End: 2021-04-11

## 2021-04-11 ENCOUNTER — LABORATORY RESULT (OUTPATIENT)
Age: 76
End: 2021-04-11

## 2021-04-11 DIAGNOSIS — Z11.59 ENCOUNTER FOR SCREENING FOR OTHER VIRAL DISEASES: ICD-10-CM

## 2021-04-14 ENCOUNTER — RESULT REVIEW (OUTPATIENT)
Age: 76
End: 2021-04-14

## 2021-04-14 ENCOUNTER — OUTPATIENT (OUTPATIENT)
Dept: OUTPATIENT SERVICES | Facility: HOSPITAL | Age: 76
LOS: 1 days | Discharge: HOME | End: 2021-04-14
Payer: COMMERCIAL

## 2021-04-14 DIAGNOSIS — R07.9 CHEST PAIN, UNSPECIFIED: ICD-10-CM

## 2021-04-14 PROCEDURE — 78452 HT MUSCLE IMAGE SPECT MULT: CPT | Mod: 26

## 2021-04-14 PROCEDURE — 93016 CV STRESS TEST SUPVJ ONLY: CPT

## 2021-04-14 PROCEDURE — 93018 CV STRESS TEST I&R ONLY: CPT

## 2021-04-21 ENCOUNTER — APPOINTMENT (OUTPATIENT)
Dept: CARDIOLOGY | Facility: CLINIC | Age: 76
End: 2021-04-21
Payer: COMMERCIAL

## 2021-04-21 VITALS
HEIGHT: 60 IN | BODY MASS INDEX: 33.38 KG/M2 | SYSTOLIC BLOOD PRESSURE: 180 MMHG | HEART RATE: 73 BPM | WEIGHT: 170 LBS | DIASTOLIC BLOOD PRESSURE: 100 MMHG

## 2021-04-21 PROCEDURE — 99072 ADDL SUPL MATRL&STAF TM PHE: CPT

## 2021-04-21 PROCEDURE — 93000 ELECTROCARDIOGRAM COMPLETE: CPT

## 2021-04-21 PROCEDURE — 99214 OFFICE O/P EST MOD 30 MIN: CPT

## 2021-04-21 RX ORDER — ATORVASTATIN CALCIUM 10 MG/1
10 TABLET, FILM COATED ORAL
Qty: 90 | Refills: 3 | Status: ACTIVE | COMMUNITY
Start: 2021-04-21 | End: 1900-01-01

## 2021-04-21 RX ORDER — PREDNISONE 10 MG/1
10 TABLET ORAL
Refills: 0 | Status: DISCONTINUED | COMMUNITY
End: 2021-04-21

## 2021-04-21 RX ORDER — PANTOPRAZOLE 40 MG/1
40 TABLET, DELAYED RELEASE ORAL
Refills: 0 | Status: DISCONTINUED | COMMUNITY
End: 2021-04-21

## 2021-04-21 NOTE — ASSESSMENT
[FreeTextEntry1] : Severe AS\par Abnormal Lexiscan nuclear test c/w myocardial ischemia\par Moderate  MR\par Moderate TR\par Hypertensive heart disease\par Exertional dyspnea\par Cigarette smoker with possible COPD\par Hyperlipidemia\par Possible DM

## 2021-04-21 NOTE — REVIEW OF SYSTEMS
[Dyspnea on exertion] : dyspnea during exertion [Cough] : cough [Joint Pain] : joint pain [Joint Stiffness] : joint stiffness [Negative] : Heme/Lymph [Wheezing] : no wheezing

## 2021-04-21 NOTE — PHYSICAL EXAM
[General Appearance - Well Developed] : well developed [Normal Appearance] : normal appearance [General Appearance - Well Nourished] : well nourished [General Appearance - In No Acute Distress] : no acute distress [Normal Conjunctiva] : the conjunctiva exhibited no abnormalities [Normal Oropharynx] : normal oropharynx [Normal Jugular Venous V Waves Present] : normal jugular venous V waves present [Heart Rate And Rhythm] : heart rate and rhythm were normal [Heart Sounds] : normal S1 and S2 [Edema] : no peripheral edema present [Abdomen Soft] : soft [Abdomen Tenderness] : non-tender [Abnormal Walk] : normal gait [Nail Clubbing] : no clubbing of the fingernails [Cyanosis, Localized] : no localized cyanosis [Skin Color & Pigmentation] : normal skin color and pigmentation [Skin Turgor] : normal skin turgor [] : no rash [Oriented To Time, Place, And Person] : oriented to person, place, and time [Affect] : the affect was normal [FreeTextEntry1] : Grade III/VI systolic ejectionmurmur at RSB, grade II/VI systolic murmur at LSB

## 2021-04-21 NOTE — REASON FOR VISIT
[FreeTextEntry1] : Patient presents for follow up to review her nuclear stress test, 2D echo doppler performed at [facility] and her lab results.

## 2021-04-21 NOTE — DISCUSSION/SUMMARY
[FreeTextEntry1] : Cigarette smoking cessation is advised.\par Obtained 2D echo doppler [facility]\par Lexiscan nuclear stress test.revealed myocardial ischemia.\par Acardiac catheterization was advised Right and left\par All the risks and benefits of the procedure were explained in detail including but not limited to death, MI, CVA, arrhythmia, TIA, CVA, bleeding, infection, nephropathy, emergency surgery.\par Patient understands and consents for the procedure.\par Patient cannot ambulate due to her spinal stenosis\par Maintain present antihypertensive medications.\par Patient was instructed to target her T. Cholesterol to less than 200 mg/dl and LDL cholesterol to less than 70 mg/dl.\par weight loss was advised.\par Maintain cardiac medications. Start ASA 81 mg QD.\par Start lipitor 10 mg QHS\par Patient was advised on her repeat a BMP, CBC , fasting lipid profile and hepatic panel and HgA1c.\par RV in 2 weeks.\par \par

## 2021-04-30 ENCOUNTER — LABORATORY RESULT (OUTPATIENT)
Age: 76
End: 2021-04-30

## 2021-05-08 ENCOUNTER — OUTPATIENT (OUTPATIENT)
Dept: OUTPATIENT SERVICES | Facility: HOSPITAL | Age: 76
LOS: 1 days | Discharge: HOME | End: 2021-05-08

## 2021-05-08 ENCOUNTER — LABORATORY RESULT (OUTPATIENT)
Age: 76
End: 2021-05-08

## 2021-05-08 DIAGNOSIS — Z11.59 ENCOUNTER FOR SCREENING FOR OTHER VIRAL DISEASES: ICD-10-CM

## 2021-05-11 ENCOUNTER — OUTPATIENT (OUTPATIENT)
Dept: OUTPATIENT SERVICES | Facility: HOSPITAL | Age: 76
LOS: 1 days | Discharge: HOME | End: 2021-05-11
Payer: COMMERCIAL

## 2021-05-11 VITALS
RESPIRATION RATE: 18 BRPM | HEART RATE: 70 BPM | OXYGEN SATURATION: 97 % | SYSTOLIC BLOOD PRESSURE: 187 MMHG | DIASTOLIC BLOOD PRESSURE: 80 MMHG | WEIGHT: 169.98 LBS

## 2021-05-11 LAB
ANION GAP SERPL CALC-SCNC: 17 MMOL/L — HIGH (ref 7–14)
BUN SERPL-MCNC: 16 MG/DL — SIGNIFICANT CHANGE UP (ref 10–20)
CALCIUM SERPL-MCNC: 9.6 MG/DL — SIGNIFICANT CHANGE UP (ref 8.5–10.1)
CHLORIDE SERPL-SCNC: 106 MMOL/L — SIGNIFICANT CHANGE UP (ref 98–110)
CO2 SERPL-SCNC: 19 MMOL/L — SIGNIFICANT CHANGE UP (ref 17–32)
CREAT SERPL-MCNC: 0.9 MG/DL — SIGNIFICANT CHANGE UP (ref 0.7–1.5)
GLUCOSE SERPL-MCNC: 101 MG/DL — HIGH (ref 70–99)
HCT VFR BLD CALC: 44.2 % — SIGNIFICANT CHANGE UP (ref 37–47)
HGB BLD-MCNC: 14.6 G/DL — SIGNIFICANT CHANGE UP (ref 12–16)
MCHC RBC-ENTMCNC: 29 PG — SIGNIFICANT CHANGE UP (ref 27–31)
MCHC RBC-ENTMCNC: 33 G/DL — SIGNIFICANT CHANGE UP (ref 32–37)
MCV RBC AUTO: 87.9 FL — SIGNIFICANT CHANGE UP (ref 81–99)
NRBC # BLD: 0 /100 WBCS — SIGNIFICANT CHANGE UP (ref 0–0)
PLATELET # BLD AUTO: 173 K/UL — SIGNIFICANT CHANGE UP (ref 130–400)
POTASSIUM SERPL-MCNC: 4.4 MMOL/L — SIGNIFICANT CHANGE UP (ref 3.5–5)
POTASSIUM SERPL-SCNC: 4.4 MMOL/L — SIGNIFICANT CHANGE UP (ref 3.5–5)
RBC # BLD: 5.03 M/UL — SIGNIFICANT CHANGE UP (ref 4.2–5.4)
RBC # FLD: 16.1 % — HIGH (ref 11.5–14.5)
SODIUM SERPL-SCNC: 142 MMOL/L — SIGNIFICANT CHANGE UP (ref 135–146)
WBC # BLD: 10.72 K/UL — SIGNIFICANT CHANGE UP (ref 4.8–10.8)
WBC # FLD AUTO: 10.72 K/UL — SIGNIFICANT CHANGE UP (ref 4.8–10.8)

## 2021-05-11 PROCEDURE — 93460 R&L HRT ART/VENTRICLE ANGIO: CPT | Mod: 26

## 2021-05-11 NOTE — H&P CARDIOLOGY - HISTORY OF PRESENT ILLNESS
76 y/o female presents here today for LHC/RHC due to new onset HERRERA and evaluation of AS, recent NST showing SMALL REVERSIBLE DEFECT IN THE INFEROLATERAL APICAL WALL OF THE LEFT VENTRICLE CONSISTENT WITH ISCHEMIA.               PMHx: HTN, spinal stenosis, possible emphysema, smoker (1PPD), severe AS                       FH: + FH CAD in brother              PSHx: denies    Pre cath note:    indication:  [ ] STEMI                [ ] NSTEMI                 [ ] Acute coronary syndrome                     [ ]Unstable Angina   [ ] high risk  [ ] intermediate risk  [ ] low risk                     [ ] Stable Angina     non-invasive testing:      NST                    Date: 4/14                    result: [ ] high risk  [ ] intermediate risk  [ ] low risk    Anti- Anginal medications:                    [ ] not used                       [ ] used                   [ ] not used but strong indication not to use    Ejection Fraction                   [ ] <29            [ ] 30-39%   [ ] 40-49%     [x ]>50%    CHF                   [ ] active (within last 14 days on meds   [ ] Chronic (on meds but no exacerbation)    COPD                   [ ] mild (on chronic bronchodilators)  [ ] moderate (on chronic steroid therapy)      [ ] severe (indication for home O2 or PACO2 >50)    Other risk factors:                       [ ] Previous MI                     [ ] CVA/ stroke                    [ ] carotid stent/ CEA                    [ ] PVD/PAD- (arterial aneurysm, non-palpable pulses, tortuous vessel with inability to insert catheter, infra-renal dissection, renal or subclavian artery stenosis)                    [ ] diabetic                    [ ] previous CABG                    [ ] Renal Failure       RIGHT RADIAL ARTERY EVALUATION:  NORA TEST: WNL    Adjusted CathPCI Bleeding Event Risk: 2.8%

## 2021-05-11 NOTE — CHART NOTE - NSCHARTNOTEFT_GEN_A_CORE
PRE-OP DIAGNOSIS: suspected CAD/severe AS/ Abnormal stress test    PROCEDURE: LHC/RHC with coronary angiography    Physician: FÉLIX Blank  Assistant: JACLYN Todd    ANESTHESIA TYPE:  [ x ] Sedation  [ x ] Local/Regional    ESTIMATED BLOOD LOSS:    10   mL    CONDITION  [ x ]Good    SPECIMENS REMOVED (IF APPLICABLE): None    IV CONTRAST: 90   mL    FINDINGS    LVEDP: WNL  2 V CAD  Right femoral 6 Fr - manual  Right femoral 7 Fr vein - Manual    The coronary circulation is right dominant. There was 2-vessel coronary artery disease (RCA and circumflex). Left main: The vessel was medium sized. Angiography showed minor luminal irregularities with no flow limiting lesions. LAD: The vessel was medium sized. Proximal LAD: The vessel was mildly tortuous. Angiography showed minor luminal irregularities with no flow limiting lesions. Mid LAD: Angiography showed mild atherosclerosis with no flow limiting lesions. Distal LAD: The vessel was small sized. Angiography showed minor luminal irregularities with no flow limiting lesions. 1st diagonal: The vessel was medium sized. Angiography showed minor luminal irregularities with no flow limiting lesions. Circumflex: The vessel was medium sized. Proximal circumflex: There was a discrete 90 % stenosis. Distal circumflex: The vessel was small to medium sized. Angiography showed minor luminal irregularities with no flow limiting lesions. 1st obtuse marginal: There was a discrete 70 % stenosis. RCA: The vessel was medium sized. Proximal RCA: Angiography showed minor luminal irregularities with no flow limiting lesions. Mid RCA: The vessel was tortuous. There was a discrete 80 % stenosis. Distal RCA: There was a discrete 80 % stenosis. Right PDA: Angiography showed minor luminal irregularities with no flow limiting lesions. Right posterolateral segment: Angiography showed minor luminal irregularities with no flow limiting lesions.     RIGHT HEART CATHETERIZATION  PA: 58/22/36  PCW: 21/16/18  CO/CI: 3.4/1.96    POST-OP DIAGNOSIS  2 v CAD  Severe AS (Mean 43 mmHg, SARA 0.48 cm2)    PLAN OF CARE  [x ] CT Surgery consult called   cc/hr for 4 hrs PRE-OP DIAGNOSIS: suspected CAD/severe AS/ Abnormal stress test    PROCEDURE: RHC/LHC/SCA/LV    Physician: FÉLIX Blank MD [facility]  Assistant: JACLYN Todd    ANESTHESIA TYPE:  [ x ] Sedation  [ x ] Local/Regional    ESTIMATED BLOOD LOSS:    10   mL    CONDITION  [ x ]Good    SPECIMENS REMOVED (IF APPLICABLE): None    IV CONTRAST: 90   mL    FINDINGS    LVEDP: WNL  2 V CAD  Right femoral 6 Fr - manual  Right femoral 7 Fr vein - Manual    The coronary circulation is right dominant. There was 2-vessel coronary artery disease (RCA and circumflex). Left main: The vessel was medium sized. Angiography showed minor luminal irregularities with no flow limiting lesions. LAD: The vessel was medium sized. Proximal LAD: The vessel was mildly tortuous. Angiography showed minor luminal irregularities with no flow limiting lesions. Mid LAD: Angiography showed mild atherosclerosis with no flow limiting lesions. Distal LAD: The vessel was small sized. Angiography showed minor luminal irregularities with no flow limiting lesions. 1st diagonal: The vessel was medium sized. Angiography showed minor luminal irregularities with no flow limiting lesions. Circumflex: The vessel was medium sized. Proximal circumflex: There was a discrete 90 % stenosis. Distal circumflex: The vessel was small to medium sized. Angiography showed minor luminal irregularities with no flow limiting lesions. 1st obtuse marginal: There was a discrete 70 % stenosis. RCA: The vessel was medium sized. Proximal RCA: Angiography showed minor luminal irregularities with no flow limiting lesions. Mid RCA: The vessel was tortuous. There was a discrete 80 % stenosis. Distal RCA: There was a discrete 80 % stenosis. Right PDA: Angiography showed minor luminal irregularities with no flow limiting lesions. Right posterolateral segment: Angiography showed minor luminal irregularities with no flow limiting lesions.     RIGHT HEART CATHETERIZATION  PA: 58/22/36  PCW: 21/16/18  CO/CI: 3.4/1.96    POST-OP DIAGNOSIS  2 v CAD  Severe AS (Mean 43 mmHg, SARA 0.48 cm2)    PLAN OF CARE  [x ] CT Surgery consult called to evaluate the patient prior to discharge.   cc/hr for 4 hrs

## 2021-05-13 ENCOUNTER — APPOINTMENT (OUTPATIENT)
Dept: CARDIOTHORACIC SURGERY | Facility: CLINIC | Age: 76
End: 2021-05-13
Payer: COMMERCIAL

## 2021-05-13 ENCOUNTER — NON-APPOINTMENT (OUTPATIENT)
Age: 76
End: 2021-05-13

## 2021-05-13 VITALS
BODY MASS INDEX: 33.38 KG/M2 | WEIGHT: 170 LBS | HEART RATE: 78 BPM | RESPIRATION RATE: 16 BRPM | DIASTOLIC BLOOD PRESSURE: 86 MMHG | HEIGHT: 60 IN | SYSTOLIC BLOOD PRESSURE: 146 MMHG | TEMPERATURE: 98.4 F | OXYGEN SATURATION: 96 %

## 2021-05-13 VITALS — SYSTOLIC BLOOD PRESSURE: 156 MMHG | DIASTOLIC BLOOD PRESSURE: 97 MMHG

## 2021-05-13 PROCEDURE — 99072 ADDL SUPL MATRL&STAF TM PHE: CPT

## 2021-05-13 PROCEDURE — 99244 OFF/OP CNSLTJ NEW/EST MOD 40: CPT

## 2021-05-14 ENCOUNTER — NON-APPOINTMENT (OUTPATIENT)
Age: 76
End: 2021-05-14

## 2021-05-14 NOTE — ASSESSMENT
[FreeTextEntry1] : Dafne Wong is a 75F current every day smoker, PMH of HTN, presented to [facility] ED with SOB/HERRERA increasing over the past few months. According to the patient, she has not seen a physician in many years; no PCP. Pt is a 1PPD smoker x60 years. Pt reports worse SOB with ambulation, now causing her to have to stop and catch her breath at times. Pt had stress test and a cardiac catheterization which revealed multivessel disease and aortic stenosis. Patient arrives today for a surgical consultation with Dr. Galeana. Symptoms today are SOB. Patient was examined. Surgery was recommended and all risk and alternatives to surgery were discussed with he patient.\par \par Pt denies CP, palpitations. SOB occasionally on exertion. Reports she was previously more active taking public transport into the city for work but is not as active as she has been working from home. While traveling for work she would get SOB, which has improved since working from home.  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 Need dental clearance. \par Advised to go to ED if develops CP or worsening SOB, verbalized understanding.\par Started on Mucinex, already on daily inhaler, due to smoking status\par Educated on importance of smoking cessation, verbalized understanding. \par Will F/U with patient in office 1-2 weeks, once testing complete\par \par Noemi HIRSCH [facility], am acting as scribe for Dr. Galeana\par \par needs aAVR CABG. explained all the risks of surgery to patient. needs PFTs(long time smker), CT chest and FU. \par \par

## 2021-05-14 NOTE — DATA REVIEWED
[FreeTextEntry1] : ECHO 3/11/2021\par \par SARA 0.7\par Peak 56\par Mean 32\par \par CORONARY CIRCULATION: The coronary circulation is right dominant. There was\par 2-vessel coronary artery disease (RCA and circumflex). Left main: The\par vessel was medium sized. Angiography showed minor luminal irregularities\par with no flow limiting lesions. LAD: The vessel was medium sized. Proximal\par LAD: The vessel was mildly tortuous. Angiography showed minor luminal\par irregularities with no flow limiting lesions. Mid LAD: Angiography showed\par mild atherosclerosis with no flow limiting lesions. Distal LAD: The vessel\par was small sized. Angiography showed minor luminal irregularities with no\par flow limiting lesions. 1st diagonal: The vessel was medium sized.\par Angiography showed minor luminal irregularities with no flow limiting\par lesions. Circumflex: The vessel was medium sized. Proximal circumflex:\par There was a discrete 90 % stenosis. Distal circumflex: The vessel was\par small to medium sized. Angiography showed minor luminal irregularities\par with no flow limiting lesions. 1st obtuse marginal: There was a discrete\par 70 % stenosis. RCA: The vessel was medium sized. Proximal RCA: Angiography\par showed minor luminal irregularities with no flow limiting lesions. Mid\par RCA: The vessel was tortuous. There was a discrete 80 % stenosis. Distal\par RCA: There was a discrete 80 % stenosis. Right PDA: Angiography showed\par minor luminal irregularities with no flow limiting lesions. Right\par posterolateral segment: Angiography showed minor luminal irregularities\par with no flow limiting lesions.

## 2021-05-14 NOTE — HISTORY OF PRESENT ILLNESS
[FreeTextEntry1] : Dafne Wong is a 75F, Current smoker, PMH of HTN, presented to [facility] ED with SOB/HERRERA increasing over the past few months. According to the patient, she has not seen a physician in many years; no PCP. Pt is a 1PPD smoker x60 years. Pt reports worse SOB with ambulation, now causing her to have to stop and catch her breath at times. Pt still working. Pt was seen by cardiology who recommended echo, BB, asa and outpatient dobutamine se. Pt had cardiac cath which reveals multivessel disease. ECHO shows EF of 55% with severe AS.  Arrives today for further discussion of aortic stenosis and CAD.\par \par Pmd: Dimaso\par Cardio: [place]\par \par \par

## 2021-05-21 DIAGNOSIS — I25.118 ATHEROSCLEROTIC HEART DISEASE OF NATIVE CORONARY ARTERY WITH OTHER FORMS OF ANGINA PECTORIS: ICD-10-CM

## 2021-05-21 DIAGNOSIS — E78.00 PURE HYPERCHOLESTEROLEMIA, UNSPECIFIED: ICD-10-CM

## 2021-05-21 DIAGNOSIS — F17.210 NICOTINE DEPENDENCE, CIGARETTES, UNCOMPLICATED: ICD-10-CM

## 2021-05-21 DIAGNOSIS — I35.0 NONRHEUMATIC AORTIC (VALVE) STENOSIS: ICD-10-CM

## 2021-05-21 DIAGNOSIS — I20.8 OTHER FORMS OF ANGINA PECTORIS: ICD-10-CM

## 2021-05-21 DIAGNOSIS — I10 ESSENTIAL (PRIMARY) HYPERTENSION: ICD-10-CM

## 2021-05-21 DIAGNOSIS — Z79.82 LONG TERM (CURRENT) USE OF ASPIRIN: ICD-10-CM

## 2021-05-27 ENCOUNTER — APPOINTMENT (OUTPATIENT)
Dept: CARDIOLOGY | Facility: CLINIC | Age: 76
End: 2021-05-27
Payer: COMMERCIAL

## 2021-05-27 VITALS
TEMPERATURE: 98.4 F | DIASTOLIC BLOOD PRESSURE: 90 MMHG | BODY MASS INDEX: 33.38 KG/M2 | HEIGHT: 60 IN | SYSTOLIC BLOOD PRESSURE: 160 MMHG | WEIGHT: 170 LBS

## 2021-05-27 VITALS — HEART RATE: 65 BPM

## 2021-05-27 DIAGNOSIS — R94.39 ABNORMAL RESULT OF OTHER CARDIOVASCULAR FUNCTION STUDY: ICD-10-CM

## 2021-05-27 PROCEDURE — 99214 OFFICE O/P EST MOD 30 MIN: CPT

## 2021-05-27 PROCEDURE — 99072 ADDL SUPL MATRL&STAF TM PHE: CPT

## 2021-05-27 PROCEDURE — 93000 ELECTROCARDIOGRAM COMPLETE: CPT

## 2021-05-27 NOTE — DISCUSSION/SUMMARY
[FreeTextEntry1] : Cigarette smoking cessation is advised.\par Obtained 2D echo doppler [facility]\par Lexiscan nuclear stress test.revealed myocardial ischemia.\par Will speak with CT surgery regarding planned option for treatment, patient is now being considered for a TAVR without any discussion on timing of coronary intervention.\par Maintain present antihypertensive medications.\par Patient was instructed to target her T. Cholesterol to less than 200 mg/dl and LDL cholesterol to less than 70 mg/dl.\par weight loss was advised.\par Maintain cardiac medications. Start ASA 81 mg QD.\par Start lipitor 10 mg QHS\par Patient was advised on her repeat a BMP, CBC , fasting lipid profile and hepatic panel and HgA1c.\par RV in 4 weeks. she will need to postpone the procedure until she receives help from family members in assisting her with recovery.\par

## 2021-05-27 NOTE — PHYSICAL EXAM
[General Appearance - Well Developed] : well developed [Normal Appearance] : normal appearance [General Appearance - Well Nourished] : well nourished [General Appearance - In No Acute Distress] : no acute distress [Normal Conjunctiva] : the conjunctiva exhibited no abnormalities [Normal Oropharynx] : normal oropharynx [Normal Jugular Venous V Waves Present] : normal jugular venous V waves present [Heart Rate And Rhythm] : heart rate and rhythm were normal [Heart Sounds] : normal S1 and S2 [Edema] : no peripheral edema present [FreeTextEntry1] : Grade III/VI systolic ejectionmurmur at RSB, grade II/VI systolic murmur at LSB [Abdomen Soft] : soft [Abdomen Tenderness] : non-tender [Abnormal Walk] : normal gait [Nail Clubbing] : no clubbing of the fingernails [Cyanosis, Localized] : no localized cyanosis [Skin Color & Pigmentation] : normal skin color and pigmentation [Skin Turgor] : normal skin turgor [] : no rash [Oriented To Time, Place, And Person] : oriented to person, place, and time [Affect] : the affect was normal

## 2021-05-27 NOTE — ASSESSMENT
[FreeTextEntry1] : Severe AS\par Abnormal Lexiscan nuclear test c/w myocardial ischemia\par Moderate  MR\par Moderate TR\par CADas outlined in cardiac catheterization report\par Hypertensive heart disease\par Exertional dyspnea\par Cigarette smoker with possible COPD\par Hyperlipidemia\par Possible DM

## 2021-05-31 ENCOUNTER — OUTPATIENT (OUTPATIENT)
Dept: OUTPATIENT SERVICES | Facility: HOSPITAL | Age: 76
LOS: 1 days | Discharge: HOME | End: 2021-05-31

## 2021-05-31 ENCOUNTER — LABORATORY RESULT (OUTPATIENT)
Age: 76
End: 2021-05-31

## 2021-05-31 DIAGNOSIS — Z11.59 ENCOUNTER FOR SCREENING FOR OTHER VIRAL DISEASES: ICD-10-CM

## 2021-06-01 ENCOUNTER — LABORATORY RESULT (OUTPATIENT)
Age: 76
End: 2021-06-01

## 2021-06-01 ENCOUNTER — OUTPATIENT (OUTPATIENT)
Dept: OUTPATIENT SERVICES | Facility: HOSPITAL | Age: 76
LOS: 1 days | Discharge: HOME | End: 2021-06-01

## 2021-06-01 DIAGNOSIS — Z11.59 ENCOUNTER FOR SCREENING FOR OTHER VIRAL DISEASES: ICD-10-CM

## 2021-06-03 ENCOUNTER — OUTPATIENT (OUTPATIENT)
Dept: OUTPATIENT SERVICES | Facility: HOSPITAL | Age: 76
LOS: 1 days | Discharge: HOME | End: 2021-06-03
Payer: COMMERCIAL

## 2021-06-03 DIAGNOSIS — I35.0 NONRHEUMATIC AORTIC (VALVE) STENOSIS: ICD-10-CM

## 2021-06-03 PROCEDURE — 94727 GAS DIL/WSHOT DETER LNG VOL: CPT | Mod: 26

## 2021-06-03 PROCEDURE — 94729 DIFFUSING CAPACITY: CPT | Mod: 26

## 2021-06-03 PROCEDURE — 94060 EVALUATION OF WHEEZING: CPT | Mod: 26

## 2021-06-04 ENCOUNTER — RESULT REVIEW (OUTPATIENT)
Age: 76
End: 2021-06-04

## 2021-06-04 ENCOUNTER — OUTPATIENT (OUTPATIENT)
Dept: OUTPATIENT SERVICES | Facility: HOSPITAL | Age: 76
LOS: 1 days | Discharge: HOME | End: 2021-06-04
Payer: COMMERCIAL

## 2021-06-04 ENCOUNTER — NON-APPOINTMENT (OUTPATIENT)
Age: 76
End: 2021-06-04

## 2021-06-04 PROCEDURE — 93880 EXTRACRANIAL BILAT STUDY: CPT | Mod: 26

## 2021-06-04 PROCEDURE — 75572 CT HRT W/3D IMAGE: CPT | Mod: 26

## 2021-06-04 PROCEDURE — 74174 CTA ABD&PLVS W/CONTRAST: CPT | Mod: 26

## 2021-06-10 ENCOUNTER — APPOINTMENT (OUTPATIENT)
Dept: CARDIOTHORACIC SURGERY | Facility: CLINIC | Age: 76
End: 2021-06-10
Payer: COMMERCIAL

## 2021-06-10 VITALS
BODY MASS INDEX: 33.38 KG/M2 | OXYGEN SATURATION: 97 % | WEIGHT: 170 LBS | SYSTOLIC BLOOD PRESSURE: 167 MMHG | TEMPERATURE: 98.1 F | RESPIRATION RATE: 16 BRPM | HEART RATE: 75 BPM | HEIGHT: 60 IN | DIASTOLIC BLOOD PRESSURE: 103 MMHG

## 2021-06-10 DIAGNOSIS — I25.10 ATHEROSCLEROTIC HEART DISEASE OF NATIVE CORONARY ARTERY WITHOUT ANGINA PECTORIS: ICD-10-CM

## 2021-06-10 DIAGNOSIS — R42 DIZZINESS AND GIDDINESS: ICD-10-CM

## 2021-06-10 PROCEDURE — 99214 OFFICE O/P EST MOD 30 MIN: CPT

## 2021-06-10 PROCEDURE — 99072 ADDL SUPL MATRL&STAF TM PHE: CPT

## 2021-06-11 NOTE — DATA REVIEWED
[FreeTextEntry1] : EXAM:  CT ANGIO ABD PELV (W)AW IC\par EXAM:  CT HEART OTHER IC\par PROCEDURE DATE:  06/04/2021\par INTERPRETATION:  CLINICAL INDICATION: Aortic stenosis, evaluate for transcatheter aortic valve implantation.\par \par TECHNIQUE: CT angiogram of the chest, abdomen and pelvis was performed.  ECG-gated acquisition through the chest and ungated acquisition through the abdomen and pelvis in the arterial phase of contrast enhancement. Sagittal and coronal reformats were performed as well as 3D reconstructions.\par \par CONTRAST:  110 cc of Optiray 320\par \par COMPARISON: Correlation with CT of the abdomen dated 3/20/2019 and CT chest dated 5/5/2016.\par \par FINDINGS:\par \par ANNULUS/AORTA:\par Annular measurements were performed using images reconstructed during\par systole - RR cycle - 30%\par \par Annulus Area = 370 mm2\par Annulus Perimeter = 70 mm\par Annulus bi-plane diameter of elliptical cross section = 21 x 22 mm\par Aortic root angulation with respect to axial plane: 60 degrees\par \par Apparent bicuspid aortic valve with fusion of the right and left coronary cusps.\par \par VALVULAR CALCIFICATION:\par The aortic valve is severely calcified.\par Protrusion of aortic valve calcifications into the outflow tract: No\par Calcifications of the aorto-mitral continuity: Yes\par Mitral annular calcifications: Yes\par \par The left main coronary artery arises 13 mm from the aortic annulus.\par The right coronary artery arises 11 mm from the aortic annulus.\par \par AORTA:\par The ascending aorta is mildly calcified.\par \par Mean Sinuses of Valsalva diameter= 28 mm (sinus to commissure)\par Left ventricular outflow tract = 19 x 24 mm\par Sinotubular junction:  29 mm\par Mid ascending aorta: 37 mm\par Aortic arch: 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 There is an infrarenal aortic aneurysm measuring up to 37 mm, stable since 2019.\par \par Aorta at the terminus: 13 mm\par \par AXILLARY & ILIOFEMORAL RUNOFF:\par Right axillary/subclavian minimum diameter: 6 mm\par \par Left axillary/subclavian minimum diameter: 5 mm\par \par Right-sided tortuosity: mild\par Right-sided calcification: moderate\par \par Right common iliac minimum diameter: 6 mm\par Right external iliac minimum diameter: 4 mm, with a focal dissection seen on image 567 series 10\par Right common femoral diameter:6 mm\par \par Left-sided tortuosity: mild\par Left-sided calcification: moderate\par \par \par Left common iliac minimum diameter: 6 mm\par Left external iliac minimum diameter: 5 mm\par Left common femoral diameter: 7 mm\par \par AIRWAYS AND LUNGS: The central tracheobronchial tree is patent.  Mosaic attenuation of the lungs, likely due to air trapping. No focal consolidation. Bilateral areas of linear scarring/atelectasis.\par \par MEDIASTINUM AND PLEURA: There are no enlarged mediastinal, hilar or axillary lymph nodes. The visualized portion of the thyroid gland is unremarkable. There is no pleural effusion. There is no pneumothorax.\par \par HEART AND CORONARY ARTERIES: There is cardiomegaly. There is no pericardial effusion. There is atherosclerosis of the major epicardial coronary arteries and their visualized branches.\par HEPATOBILIARY SYSTEM: Unremarkable.\par PANCREAS: Within normal limits.\par SPLEEN: Within normal limits.\par ADRENALS: Mild nonspecific thickening of the left adrenal gland, stable. The right adrenal gland is within normal limits.\par \par KIDNEYS/URETERS: No hydronephrosis or obstructing stones.\par \par BOWEL/MESENTERY: No bowel obstruction or wall thickening. The appendix is normal.\par \par URINARY BLADDER: Within normal limits.\par REPRODUCTIVE ORGANS: Unremarkable.\par PERITONEUM/RETROPERITONEUM: No free air. No free or loculated fluid.\par LYMPH NODES: No abdominal or pelvic lymphadenopathy.\par \par BONES: There are degenerative changes of the spine. Minimal anterolisthesis of L3 on L4 and L4 and L5.\par SOFT TISSUES: Fat-containing of focal hernia.\par \par IMPRESSION:\par \par 1. Apparent bicuspid aortic valve with fusion of the right and left coronary cusps.\par \par 2. Annulus area is 370 mm2, bi-plane diameter of elliptical cross section  21 x 22 mm\par and annulus perimeter is 70 mm.\par \par 3. The smallest vessel diameter in the pelvis is 4 mm on the right and 5 mm on the left.\par \par 4. The smallest vessel diameter in the axilla is 6 mm on the right and 5 mm on the left.\par \par 5. 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 Infrarenal aortic aneurysm measuring up to 37 mm, stable since 2019.\par \par 7. 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

## 2021-06-11 NOTE — ASSESSMENT
[FreeTextEntry1] : Dafne Wong is a 75F current every day smoker, PMH of HTN, presented to [facility] ED with SOB/HERRERA increasing over the past few months. According to the patient, she has not seen a physician in many years; no PCP. Pt is a 1PPD smoker x60 years. Pt reports worse SOB with ambulation, now causing her to have to stop and catch her breath at times. Pt had stress test and a cardiac catheterization which revealed multivessel disease and aortic stenosis. Patient arrives today for a surgical discussion with Dr. Galeana and for review of recent imaging. Symptoms today are SOB and fatigue.  Patient was examined. Surgery was recommended and all risk and alternatives to surgery were discussed with the patient.\par \par \par Pt denies CP, palpitations. SOB occasionally on exertion. Reports she was previously more active taking public transport into the city for work but is not as active as she has been working from home. While traveling for work she would get SOB, which has improved since working from home. \par Today pt states she is very fatigued and falls asleep very easily while sitting in her chair during the day.  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 \par \par Completed PFTs, Carotids and CTA chest/abd/pelvis. Seen by dental, needs extraction. \par Advised to go to ED if develops CP or worsening SOB, or any change to current status, verbalized understanding.\par Educated on importance of smoking cessation, verbalized understanding. \par CTA reviewed with patient\par \par Case to be discussed in a multidisciplinary conference.\par \par Noemi HIRSCH [facility], am acting as scribe for Dr. Galeana\par \par very complex case more so because patient absolutely refuses surgery. still smoking. she has CAD which will require a complex PCI(especially RCA) and bicuspid AV and mid arch aneurysm. will d/w Dr auguste and department to consider options( surgery definitely better but patient is adamantly refusing). she also wants to wait until end of the month for any intervention.\par

## 2021-06-11 NOTE — HISTORY OF PRESENT ILLNESS
[FreeTextEntry1] : Dafne Wong is a 75F, Current smoker, PMH of HTN, presented to [facility] ED with SOB/HERRERA increasing over the past few months. According to the patient, she has not seen a physician in many years; no PCP. Pt is a 1PPD smoker x60 years. Pt reports worse SOB with ambulation, now causing her to have to stop and catch her breath at times. Pt still working. Pt was seen by cardiology who recommended echo, BB, asa and outpatient dobutamine se. Pt had cardiac cath which reveals multivessel disease. ECHO shows EF of 55% with severe AS. Arrives today for further discussion of aortic stenosis and CAD.\par \par Pmd: Dimaso\par Cardio: [place]\par

## 2021-06-11 NOTE — PHYSICAL EXAM
[] : no respiratory distress [Respiration, Rhythm And Depth] : normal respiratory rhythm and effort [Normal Rate] : normal [Rhythm Regular] : regular [III] : a grade 3 [Examination Of The Chest] : the chest was normal in appearance [Bowel Sounds] : normal bowel sounds [Abdomen Soft] : soft [Involuntary Movements] : no involuntary movements were seen [Skin Color & Pigmentation] : normal skin color and pigmentation [No Focal Deficits] : no focal deficits [Oriented To Time, Place, And Person] : oriented to person, place, and time [Impaired Insight] : insight and judgment were intact

## 2021-06-17 ENCOUNTER — APPOINTMENT (OUTPATIENT)
Dept: CARDIOTHORACIC SURGERY | Facility: CLINIC | Age: 76
End: 2021-06-17
Payer: COMMERCIAL

## 2021-06-17 VITALS
DIASTOLIC BLOOD PRESSURE: 100 MMHG | RESPIRATION RATE: 18 BRPM | SYSTOLIC BLOOD PRESSURE: 186 MMHG | WEIGHT: 170 LBS | BODY MASS INDEX: 33.38 KG/M2 | HEART RATE: 72 BPM | HEIGHT: 60 IN | OXYGEN SATURATION: 95 % | TEMPERATURE: 97.8 F

## 2021-06-17 DIAGNOSIS — F17.200 NICOTINE DEPENDENCE, UNSPECIFIED, UNCOMPLICATED: ICD-10-CM

## 2021-06-17 PROCEDURE — 99213 OFFICE O/P EST LOW 20 MIN: CPT

## 2021-06-17 PROCEDURE — 99072 ADDL SUPL MATRL&STAF TM PHE: CPT

## 2021-06-17 NOTE — HISTORY OF PRESENT ILLNESS
[FreeTextEntry1] : Ms. LYUBOV BAHENA 75 year F, current smoker, arrives  today for evaluation of their Aortic Stenosis and Double Vessel disease.  Patient PMH include HTN. She initially presented to [facility] ED with SOB/HERRERA increasing over the past few months. According to the patient, she has not seen a physician in many years prior to this.  Pt reports worsening SOB with ambulation, now causing her to have to stop and catch her breath at times. Pt had cardiac cath which reveals multivessel disease. ECHO shows EF of 55% with severe AS. Arrives today for further discussion of aortic stenosis and CAD. NYHA class II. She had a recent CTA which showed bicuspid aortic valve w, Mid aortic arch aneurysm increased in size, external iliac artery dissection.  Here for discussion of surgery after CTA.\par \par Current Smoker- >45 years 1 PPD+ Started at age 17\par Works from home\par Lives at home with  and sons\par \par Their healthcare team includes the following\par PMD: Dr. Tang\par Cardio: Dr. Blank\par \par

## 2021-06-17 NOTE — ASSESSMENT
[FreeTextEntry1] : Dafne Wong is a 75F current every day smoker, PMH of HTN, presented to [facility] ED with SOB/HERRERA increasing over the past few months. According to the patient, she has not seen a physician in many years; no PCP. Pt is a 1PPD smoker x60 years. Pt reports worse SOB with ambulation, now causing her to have to stop and catch her breath at times. Pt had stress test and a cardiac catheterization which revealed multivessel disease and aortic stenosis. Patient arrives today for a surgical discussion with Dr. Galeana and for review of recent imaging. Symptoms today are SOB and fatigue.  Patient was examined. Surgery was recommended and all risk and alternatives to surgery were discussed with the patient.\par \par \par Pt denies CP, palpitations. SOB occasionally on exertion- she reports not change in symptoms \par she would get SOB, which has improved since working from home. \par Hypertensive in office\par \par Plan:\par CTA reviewed with patient- bicuspid aortic valve with aortic aneurysm\par Double Vessel disease, needs CABG AVR\par Preop- Needs Vein Mapping, PAST/COVID\par Discussed risks, benefits and alternatives with patient in great detail\par Advised to go to ED if develops CP or worsening SOB, or any change to current status, verbalized understanding.\par Educated on importance of smoking cessation, verbalized understanding\par Skylar HIRSCH [facility], am acting as scribe for \par \par Case was discussed extensively w surgeons and cardiolohgists. risk of PVL w TAVR very high. feel AVR/[id number] Risk) followed by TEVAR is the best option. she continues to smoke. Surgical risk of stroke, rupture etc is high.She is undecided and will get back to us about her decision.\par

## 2021-06-17 NOTE — PHYSICAL EXAM
[Sclera] : the sclera and conjunctiva were normal [PERRL With Normal Accommodation] : pupils were equal in size, round, and reactive to light [Neck Appearance] : the appearance of the neck was normal [Neck Cervical Mass (___cm)] : no neck mass was observed [Respiration, Rhythm And Depth] : normal respiratory rhythm and effort [Exaggerated Use Of Accessory Muscles For Inspiration] : no accessory muscle use [Normal Rate] : normal [Rhythm Regular] : regular [Normal S1] : normal S1 [Normal S2] : normal S2 [III] : a grade 3 [Examination Of The Chest] : the chest was normal in appearance [Bowel Sounds] : normal bowel sounds [Abdomen Soft] : soft [Abdomen Tenderness] : non-tender [Abnormal Walk] : normal gait [Nail Clubbing] : no clubbing  or cyanosis of the fingernails [Musculoskeletal - Swelling] : no joint swelling seen [Skin Turgor] : normal skin turgor [Skin Color & Pigmentation] : normal skin color and pigmentation [] : no rash [Cranial Nerves] : cranial nerves 2-12 were intact [Deep Tendon Reflexes (DTR)] : deep tendon reflexes were 2+ and symmetric [Sensation] : the sensory exam was normal to light touch and pinprick [Oriented To Time, Place, And Person] : oriented to person, place, and time [Impaired Insight] : insight and judgment were intact [Affect] : the affect was normal

## 2021-06-18 ENCOUNTER — NON-APPOINTMENT (OUTPATIENT)
Age: 76
End: 2021-06-18

## 2021-06-21 ENCOUNTER — NON-APPOINTMENT (OUTPATIENT)
Age: 76
End: 2021-06-21

## 2021-06-25 ENCOUNTER — NON-APPOINTMENT (OUTPATIENT)
Age: 76
End: 2021-06-25

## 2021-06-26 ENCOUNTER — LABORATORY RESULT (OUTPATIENT)
Age: 76
End: 2021-06-26

## 2021-06-26 ENCOUNTER — OUTPATIENT (OUTPATIENT)
Dept: OUTPATIENT SERVICES | Facility: HOSPITAL | Age: 76
LOS: 1 days | Discharge: HOME | End: 2021-06-26

## 2021-06-26 DIAGNOSIS — Z11.59 ENCOUNTER FOR SCREENING FOR OTHER VIRAL DISEASES: ICD-10-CM

## 2021-06-29 ENCOUNTER — RESULT REVIEW (OUTPATIENT)
Age: 76
End: 2021-06-29

## 2021-06-29 ENCOUNTER — OUTPATIENT (OUTPATIENT)
Dept: OUTPATIENT SERVICES | Facility: HOSPITAL | Age: 76
LOS: 1 days | Discharge: HOME | End: 2021-06-29
Payer: COMMERCIAL

## 2021-06-29 PROCEDURE — 93970 EXTREMITY STUDY: CPT | Mod: 26

## 2021-07-06 DIAGNOSIS — R60.9 EDEMA, UNSPECIFIED: ICD-10-CM

## 2021-07-06 DIAGNOSIS — M79.604 PAIN IN RIGHT LEG: ICD-10-CM

## 2021-07-06 DIAGNOSIS — M79.605 PAIN IN LEFT LEG: ICD-10-CM

## 2021-07-08 ENCOUNTER — RESULT REVIEW (OUTPATIENT)
Age: 76
End: 2021-07-08

## 2021-07-08 ENCOUNTER — APPOINTMENT (OUTPATIENT)
Dept: CARDIOTHORACIC SURGERY | Facility: CLINIC | Age: 76
End: 2021-07-08
Payer: COMMERCIAL

## 2021-07-08 ENCOUNTER — OUTPATIENT (OUTPATIENT)
Dept: OUTPATIENT SERVICES | Facility: HOSPITAL | Age: 76
LOS: 1 days | Discharge: HOME | End: 2021-07-08
Payer: COMMERCIAL

## 2021-07-08 VITALS
WEIGHT: 170 LBS | TEMPERATURE: 98.9 F | HEIGHT: 60 IN | BODY MASS INDEX: 33.38 KG/M2 | HEART RATE: 72 BPM | SYSTOLIC BLOOD PRESSURE: 155 MMHG | RESPIRATION RATE: 16 BRPM | DIASTOLIC BLOOD PRESSURE: 93 MMHG | OXYGEN SATURATION: 96 %

## 2021-07-08 VITALS
WEIGHT: 170.42 LBS | SYSTOLIC BLOOD PRESSURE: 160 MMHG | TEMPERATURE: 98 F | HEIGHT: 60 IN | DIASTOLIC BLOOD PRESSURE: 86 MMHG | HEART RATE: 72 BPM | RESPIRATION RATE: 16 BRPM | OXYGEN SATURATION: 96 %

## 2021-07-08 DIAGNOSIS — I35.0 NONRHEUMATIC AORTIC (VALVE) STENOSIS: ICD-10-CM

## 2021-07-08 DIAGNOSIS — I25.10 ATHEROSCLEROTIC HEART DISEASE OF NATIVE CORONARY ARTERY WITHOUT ANGINA PECTORIS: ICD-10-CM

## 2021-07-08 DIAGNOSIS — Z01.818 ENCOUNTER FOR OTHER PREPROCEDURAL EXAMINATION: ICD-10-CM

## 2021-07-08 LAB
A1C WITH ESTIMATED AVERAGE GLUCOSE RESULT: 5.6 % — SIGNIFICANT CHANGE UP (ref 4–5.6)
ALBUMIN SERPL ELPH-MCNC: 4.4 G/DL — SIGNIFICANT CHANGE UP (ref 3.5–5.2)
ALP SERPL-CCNC: 85 U/L — SIGNIFICANT CHANGE UP (ref 30–115)
ALT FLD-CCNC: 11 U/L — SIGNIFICANT CHANGE UP (ref 0–41)
ANION GAP SERPL CALC-SCNC: 9 MMOL/L — SIGNIFICANT CHANGE UP (ref 7–14)
APPEARANCE UR: CLEAR — SIGNIFICANT CHANGE UP
APTT BLD: 37.1 SEC — SIGNIFICANT CHANGE UP (ref 27–39.2)
AST SERPL-CCNC: 15 U/L — SIGNIFICANT CHANGE UP (ref 0–41)
BASOPHILS # BLD AUTO: 0.05 K/UL — SIGNIFICANT CHANGE UP (ref 0–0.2)
BASOPHILS NFR BLD AUTO: 0.6 % — SIGNIFICANT CHANGE UP (ref 0–1)
BILIRUB SERPL-MCNC: 0.4 MG/DL — SIGNIFICANT CHANGE UP (ref 0.2–1.2)
BILIRUB UR-MCNC: NEGATIVE — SIGNIFICANT CHANGE UP
BLD GP AB SCN SERPL QL: SIGNIFICANT CHANGE UP
BUN SERPL-MCNC: 15 MG/DL — SIGNIFICANT CHANGE UP (ref 10–20)
CALCIUM SERPL-MCNC: 9.5 MG/DL — SIGNIFICANT CHANGE UP (ref 8.5–10.1)
CHLORIDE SERPL-SCNC: 104 MMOL/L — SIGNIFICANT CHANGE UP (ref 98–110)
CO2 SERPL-SCNC: 25 MMOL/L — SIGNIFICANT CHANGE UP (ref 17–32)
COLOR SPEC: SIGNIFICANT CHANGE UP
CREAT SERPL-MCNC: 0.9 MG/DL — SIGNIFICANT CHANGE UP (ref 0.7–1.5)
DIFF PNL FLD: NEGATIVE — SIGNIFICANT CHANGE UP
EOSINOPHIL # BLD AUTO: 0.26 K/UL — SIGNIFICANT CHANGE UP (ref 0–0.7)
EOSINOPHIL NFR BLD AUTO: 3.1 % — SIGNIFICANT CHANGE UP (ref 0–8)
ESTIMATED AVERAGE GLUCOSE: 114 MG/DL — SIGNIFICANT CHANGE UP (ref 68–114)
GLUCOSE SERPL-MCNC: 75 MG/DL — SIGNIFICANT CHANGE UP (ref 70–99)
GLUCOSE UR QL: NEGATIVE — SIGNIFICANT CHANGE UP
HCT VFR BLD CALC: 39.7 % — SIGNIFICANT CHANGE UP (ref 37–47)
HGB BLD-MCNC: 12.9 G/DL — SIGNIFICANT CHANGE UP (ref 12–16)
IMM GRANULOCYTES NFR BLD AUTO: 0.2 % — SIGNIFICANT CHANGE UP (ref 0.1–0.3)
INR BLD: 1.01 RATIO — SIGNIFICANT CHANGE UP (ref 0.65–1.3)
KETONES UR-MCNC: NEGATIVE — SIGNIFICANT CHANGE UP
LEUKOCYTE ESTERASE UR-ACNC: NEGATIVE — SIGNIFICANT CHANGE UP
LYMPHOCYTES # BLD AUTO: 2 K/UL — SIGNIFICANT CHANGE UP (ref 1.2–3.4)
LYMPHOCYTES # BLD AUTO: 23.7 % — SIGNIFICANT CHANGE UP (ref 20.5–51.1)
MCHC RBC-ENTMCNC: 29.6 PG — SIGNIFICANT CHANGE UP (ref 27–31)
MCHC RBC-ENTMCNC: 32.5 G/DL — SIGNIFICANT CHANGE UP (ref 32–37)
MCV RBC AUTO: 91.1 FL — SIGNIFICANT CHANGE UP (ref 81–99)
MONOCYTES # BLD AUTO: 0.51 K/UL — SIGNIFICANT CHANGE UP (ref 0.1–0.6)
MONOCYTES NFR BLD AUTO: 6 % — SIGNIFICANT CHANGE UP (ref 1.7–9.3)
MRSA PCR RESULT.: NEGATIVE — SIGNIFICANT CHANGE UP
NEUTROPHILS # BLD AUTO: 5.6 K/UL — SIGNIFICANT CHANGE UP (ref 1.4–6.5)
NEUTROPHILS NFR BLD AUTO: 66.4 % — SIGNIFICANT CHANGE UP (ref 42.2–75.2)
NITRITE UR-MCNC: NEGATIVE — SIGNIFICANT CHANGE UP
NRBC # BLD: 0 /100 WBCS — SIGNIFICANT CHANGE UP (ref 0–0)
NT-PROBNP SERPL-SCNC: 1568 PG/ML — HIGH (ref 0–300)
PH UR: 6 — SIGNIFICANT CHANGE UP (ref 5–8)
PLATELET # BLD AUTO: 156 K/UL — SIGNIFICANT CHANGE UP (ref 130–400)
POTASSIUM SERPL-MCNC: 4.3 MMOL/L — SIGNIFICANT CHANGE UP (ref 3.5–5)
POTASSIUM SERPL-SCNC: 4.3 MMOL/L — SIGNIFICANT CHANGE UP (ref 3.5–5)
PROT SERPL-MCNC: 7.1 G/DL — SIGNIFICANT CHANGE UP (ref 6–8)
PROT UR-MCNC: NEGATIVE — SIGNIFICANT CHANGE UP
PROTHROM AB SERPL-ACNC: 11.6 SEC — SIGNIFICANT CHANGE UP (ref 9.95–12.87)
RBC # BLD: 4.36 M/UL — SIGNIFICANT CHANGE UP (ref 4.2–5.4)
RBC # FLD: 15.4 % — HIGH (ref 11.5–14.5)
SODIUM SERPL-SCNC: 138 MMOL/L — SIGNIFICANT CHANGE UP (ref 135–146)
SP GR SPEC: 1.02 — SIGNIFICANT CHANGE UP (ref 1.01–1.03)
UROBILINOGEN FLD QL: SIGNIFICANT CHANGE UP
WBC # BLD: 8.44 K/UL — SIGNIFICANT CHANGE UP (ref 4.8–10.8)
WBC # FLD AUTO: 8.44 K/UL — SIGNIFICANT CHANGE UP (ref 4.8–10.8)

## 2021-07-08 PROCEDURE — 71046 X-RAY EXAM CHEST 2 VIEWS: CPT | Mod: 26

## 2021-07-08 PROCEDURE — 93010 ELECTROCARDIOGRAM REPORT: CPT

## 2021-07-08 PROCEDURE — 99213 OFFICE O/P EST LOW 20 MIN: CPT

## 2021-07-08 PROCEDURE — 99072 ADDL SUPL MATRL&STAF TM PHE: CPT

## 2021-07-08 NOTE — PHYSICAL EXAM
[] : no respiratory distress [Respiration, Rhythm And Depth] : normal respiratory rhythm and effort [Heart Rate And Rhythm] : heart rate was normal and rhythm regular [Bowel Sounds] : normal bowel sounds [Abdomen Soft] : soft [Involuntary Movements] : no involuntary movements were seen [Skin Color & Pigmentation] : normal skin color and pigmentation [No Focal Deficits] : no focal deficits [Oriented To Time, Place, And Person] : oriented to person, place, and time [Impaired Insight] : insight and judgment were intact

## 2021-07-08 NOTE — H&P PST ADULT - NSICDXPASTMEDICALHX_GEN_ALL_CORE_FT
PAST MEDICAL HISTORY:  CAD (coronary artery disease)     H/O aortic valve stenosis     HTN (hypertension)

## 2021-07-08 NOTE — HISTORY OF PRESENT ILLNESS
[FreeTextEntry1] : Dafne Wong is a 75F current every day smoker, PMH of HTN, presented to [facility] ED with SOB/HERRERA increasing over the past few months. According to the patient, she has not seen a physician in many years; no PCP. Pt is a 1PPD smoker x60 years. Pt reports worse SOB with ambulation, now causing her to have to stop and catch her breath at times. Pt had stress test and a cardiac catheterization which revealed multivessel disease and aortic stenosis. Patient arrives today for a surgical discussion with Dr. Galeana and for review of recent imaging. Symptoms today are SOB and fatigue.  Patient was examined. Surgery was recommended and all risk and alternatives to surgery were discussed with the patient. Pt arrives today for follow up surgical discussion with Dr. Galeana. \par \par Pmd: Kitty\par Cardio: Abhay

## 2021-07-08 NOTE — H&P PST ADULT - HISTORY OF PRESENT ILLNESS
74 Y/O FEMALE PRESENTS TO PAST WITH HX AVR, CAD. PT C/O INCREASE FATIGUE             PT NOW FOR SCHEDULED PROCEDURE ( AVR, CABG ) . PT DENIES ANY CP SOB PALP COUGH DYSURIA FEVER URI. PT ABLE TO DORON 1/2 FOS W/O SOB  pt denies any covid s/s, or tested positive in the past  pt advised self quarantine till day of procedure  Anesthesia Alert  NO--Difficult Airway  NO--History of neck surgery or radiation  NO--Limited ROM of neck  NO--History of Malignant hyperthermia  NO--Personal or family history of Pseudocholinesterase deficiency.  NO--Prior Anesthesia Complication  NO--Latex Allergy  NO--Loose teeth  NO--History of Rheumatoid Arthritis  NO--IKE  NO--Bleeding risk  NO--Other_____     74 Y/O FEMALE PRESENTS TO PAST WITH HX AVR, CAD. PT C/O INCREASE FATIGUE OVER PAST 6MO- PT STATES LAST MO INCREASED SOB  PT NOW FOR SCHEDULED PROCEDURE ( AVR, CABG ) . PT DENIES ANY CP SOB PALP COUGH DYSURIA FEVER URI. PT ABLE TO DORON 10 FT  W/O SOB   pt denies any covid s/s, or tested positive in the past  pt advised self quarantine till day of procedure  Anesthesia Alert  NO--Difficult Airway  NO--History of neck surgery or radiation  NO--Limited ROM of neck  NO--History of Malignant hyperthermia  NO--Personal or family history of Pseudocholinesterase deficiency.  NO--Prior Anesthesia Complication  NO--Latex Allergy  NO--Loose teeth  NO--History of Rheumatoid Arthritis  NO--IKE  NO--Bleeding risk  NO--Other_____

## 2021-07-08 NOTE — H&P PST ADULT - NSICDXFAMILYHX_GEN_ALL_CORE_FT
FAMILY HISTORY:  Sibling  Still living? Unknown  Family history of CKD (chronic kidney disease), Age at diagnosis: Age Unknown  Family history of diabetes mellitus (DM), Age at diagnosis: Age Unknown  FH: CAD (coronary artery disease), Age at diagnosis: Age Unknown

## 2021-07-08 NOTE — DATA REVIEWED
[FreeTextEntry1] : EXAM:  CT ANGIO ABD PELV (W)AW IC\par EXAM:  CT HEART OTHER IC\par PROCEDURE DATE:  06/04/2021\par INTERPRETATION:  CLINICAL INDICATION: Aortic stenosis, evaluate for transcatheter aortic valve implantation.\par \par TECHNIQUE: CT angiogram of the chest, abdomen and pelvis was performed.  ECG-gated acquisition through the chest and ungated acquisition through the abdomen and pelvis in the arterial phase of contrast enhancement. Sagittal and coronal reformats were performed as well as 3D reconstructions.\par \par CONTRAST:  110 cc of Optiray 320\par \par COMPARISON: Correlation with CT of the abdomen dated 3/20/2019 and CT chest dated 5/5/2016.\par \par FINDINGS:\par \par ANNULUS/AORTA:\par Annular measurements were performed using images reconstructed during\par systole - RR cycle - 30%\par \par Annulus Area = 370 mm2\par Annulus Perimeter = 70 mm\par Annulus bi-plane diameter of elliptical cross section = 21 x 22 mm\par Aortic root angulation with respect to axial plane: 60 degrees\par \par Apparent bicuspid aortic valve with fusion of the right and left coronary cusps.\par \par VALVULAR CALCIFICATION:\par The aortic valve is severely calcified.\par Protrusion of aortic valve calcifications into the outflow tract: No\par Calcifications of the aorto-mitral continuity: Yes\par Mitral annular calcifications: Yes\par \par The left main coronary artery arises 13 mm from the aortic annulus.\par The right coronary artery arises 11 mm from the aortic annulus.\par \par AORTA:\par The ascending aorta is mildly calcified.\par \par Mean Sinuses of Valsalva diameter= 28 mm (sinus to commissure)\par Left ventricular outflow tract = 19 x 24 mm\par Sinotubular junction:  29 mm\par Mid ascending aorta: 37 mm\par Aortic arch: 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 There is an infrarenal aortic aneurysm measuring up to 37 mm, stable since 2019.\par \par Aorta at the terminus: 13 mm\par \par AXILLARY & ILIOFEMORAL RUNOFF:\par Right axillary/subclavian minimum diameter: 6 mm\par \par Left axillary/subclavian minimum diameter: 5 mm\par \par Right-sided tortuosity: mild\par Right-sided calcification: moderate\par \par Right common iliac minimum diameter: 6 mm\par Right external iliac minimum diameter: 4 mm, with a focal dissection seen on image 567 series 10\par Right common femoral diameter:6 mm\par \par Left-sided tortuosity: mild\par Left-sided calcification: moderate\par \par \par Left common iliac minimum diameter: 6 mm\par Left external iliac minimum diameter: 5 mm\par Left common femoral diameter: 7 mm\par \par AIRWAYS AND LUNGS: The central tracheobronchial tree is patent.  Mosaic attenuation of the lungs, likely due to air trapping. No focal consolidation. Bilateral areas of linear scarring/atelectasis.\par \par MEDIASTINUM AND PLEURA: There are no enlarged mediastinal, hilar or axillary lymph nodes. The visualized portion of the thyroid gland is unremarkable. There is no pleural effusion. There is no pneumothorax.\par \par HEART AND CORONARY ARTERIES: There is cardiomegaly. There is no pericardial effusion. There is atherosclerosis of the major epicardial coronary arteries and their visualized branches.\par HEPATOBILIARY SYSTEM: Unremarkable.\par PANCREAS: Within normal limits.\par SPLEEN: Within normal limits.\par ADRENALS: Mild nonspecific thickening of the left adrenal gland, stable. The right adrenal gland is within normal limits.\par \par KIDNEYS/URETERS: No hydronephrosis or obstructing stones.\par \par BOWEL/MESENTERY: No bowel obstruction or wall thickening. The appendix is normal.\par \par URINARY BLADDER: Within normal limits.\par REPRODUCTIVE ORGANS: Unremarkable.\par PERITONEUM/RETROPERITONEUM: No free air. No free or loculated fluid.\par LYMPH NODES: No abdominal or pelvic lymphadenopathy.\par \par BONES: There are degenerative changes of the spine. Minimal anterolisthesis of L3 on L4 and L4 and L5.\par SOFT TISSUES: Fat-containing of focal hernia.\par \par IMPRESSION:\par \par 1. Apparent bicuspid aortic valve with fusion of the right and left coronary cusps.\par \par 2. Annulus area is 370 mm2, bi-plane diameter of elliptical cross section  21 x 22 mm\par and annulus perimeter is 70 mm.\par \par 3. The smallest vessel diameter in the pelvis is 4 mm on the right and 5 mm on the left.\par \par 4. The smallest vessel diameter in the axilla is 6 mm on the right and 5 mm on the left.\par \par 5. 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 Infrarenal aortic aneurysm measuring up to 37 mm, stable since 2019.\par \par 7. 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

## 2021-07-08 NOTE — ASSESSMENT
[FreeTextEntry1] : Dafne Wong is a 75F current every day smoker, PMH of HTN, presented to [facility] ED with SOB/HERRERA increasing over the past few months. According to the patient, she has not seen a physician in many years; no PCP. Pt is a 1PPD smoker x60 years. Pt reports worse SOB with ambulation, now causing her to have to stop and catch her breath at times. Pt had stress test and a cardiac catheterization which revealed multivessel disease and aortic stenosis. Patient arrives today for a surgical discussion with Dr. Galeana and for review of recent imaging. Symptoms today are SOB and fatigue.  Patient was examined. Surgery was recommended and all risk and alternatives to surgery were discussed with the patient. \par \par \par Pt denies CP, palpitations. SOB occasionally on exertion. Reports she was previously more active taking public transport into the city for work but is not as active as she has been working from home. While traveling for work she would get SOB, which has improved since working from home. \par Today pt states she is very fatigued and falls asleep very easily while sitting in her chair during the day.  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 \par \par Completed PFTs, Carotids and CTA chest/abd/pelvis. Seen by dental and cleared.  \par Advised to go to ED if develops CP or worsening SOB, or any change to current status, verbalized understanding.\par Educated on importance of smoking cessation, verbalized understanding. \par Surgery to be discussed with Dr. Muller. \par \par Noemi HIRSCH [facility], am acting as scribe for Dr. Galeana \par \par long d/w patient. very complex surgery. will need AVR/CABG and possible debranching of arch vessels followed by endograft. risk is high but  will d/w Dr Muller about different options.\par \par \par \par \par \par \par \par

## 2021-07-10 ENCOUNTER — LABORATORY RESULT (OUTPATIENT)
Age: 76
End: 2021-07-10

## 2021-07-10 ENCOUNTER — OUTPATIENT (OUTPATIENT)
Dept: OUTPATIENT SERVICES | Facility: HOSPITAL | Age: 76
LOS: 1 days | Discharge: HOME | End: 2021-07-10

## 2021-07-10 DIAGNOSIS — Z11.59 ENCOUNTER FOR SCREENING FOR OTHER VIRAL DISEASES: ICD-10-CM

## 2021-07-10 PROBLEM — Z86.79 PERSONAL HISTORY OF OTHER DISEASES OF THE CIRCULATORY SYSTEM: Chronic | Status: ACTIVE | Noted: 2021-07-08

## 2021-07-10 PROBLEM — I25.10 ATHEROSCLEROTIC HEART DISEASE OF NATIVE CORONARY ARTERY WITHOUT ANGINA PECTORIS: Chronic | Status: ACTIVE | Noted: 2021-07-08

## 2021-07-13 ENCOUNTER — APPOINTMENT (OUTPATIENT)
Dept: CARDIOTHORACIC SURGERY | Facility: HOSPITAL | Age: 76
End: 2021-07-13

## 2021-07-21 ENCOUNTER — APPOINTMENT (OUTPATIENT)
Dept: CARDIOTHORACIC SURGERY | Facility: CLINIC | Age: 76
End: 2021-07-21
Payer: COMMERCIAL

## 2021-07-21 VITALS
RESPIRATION RATE: 17 BRPM | SYSTOLIC BLOOD PRESSURE: 183 MMHG | DIASTOLIC BLOOD PRESSURE: 87 MMHG | TEMPERATURE: 97.8 F | WEIGHT: 168 LBS | HEIGHT: 60 IN | OXYGEN SATURATION: 96 % | BODY MASS INDEX: 32.98 KG/M2 | HEART RATE: 70 BPM

## 2021-07-21 DIAGNOSIS — I35.0 NONRHEUMATIC AORTIC (VALVE) STENOSIS: ICD-10-CM

## 2021-07-21 DIAGNOSIS — I77.72 DISSECTION OF ILIAC ARTERY: ICD-10-CM

## 2021-07-21 PROCEDURE — 99204 OFFICE O/P NEW MOD 45 MIN: CPT

## 2021-07-21 PROCEDURE — 99072 ADDL SUPL MATRL&STAF TM PHE: CPT

## 2021-07-22 ENCOUNTER — APPOINTMENT (OUTPATIENT)
Dept: CARDIOLOGY | Facility: CLINIC | Age: 76
End: 2021-07-22

## 2021-07-22 PROBLEM — I35.0 SEVERE AORTIC STENOSIS: Status: ACTIVE | Noted: 2021-04-21

## 2021-07-22 PROBLEM — I77.72 DISSECTION OF RIGHT ILIAC ARTERY: Status: ACTIVE | Noted: 2021-07-22

## 2021-07-22 RX ORDER — ALBUTEROL 90 MCG
90 AEROSOL (GRAM) INHALATION
Refills: 0 | Status: COMPLETED | COMMUNITY
End: 2021-07-22

## 2021-07-22 RX ORDER — GUAIFENESIN 600 MG/1
600 TABLET, EXTENDED RELEASE ORAL
Qty: 60 | Refills: 0 | Status: COMPLETED | COMMUNITY
Start: 2021-05-13 | End: 2021-07-22

## 2021-07-22 RX ORDER — BUDESONIDE AND FORMOTEROL FUMARATE DIHYDRATE 160; 4.5 UG/1; UG/1
160-4.5 AEROSOL RESPIRATORY (INHALATION) TWICE DAILY
Qty: 1 | Refills: 0 | Status: COMPLETED | COMMUNITY
Start: 1900-01-01 | End: 2021-07-22

## 2021-07-22 NOTE — REVIEW OF SYSTEMS
[Feeling Tired] : feeling tired [Chest Pain] : chest pain [SOB on Exertion] : shortness of breath during exertion [Negative] : Endocrine

## 2021-07-23 NOTE — END OF VISIT
[Time Spent: ___ minutes] : I have spent [unfilled] minutes of time on the encounter. [FreeTextEntry3] : I, PATRICIA URBINA , am scribing for and in the presence of LEISA CONWAY the following sections: History of present illness, past Medical/family/surgical/family/social history, review of systems, vital signs, physical exam and disposition.\par  \par I personally performed the services described in the documentation, reviewed the documentation recorded by the scribe in my presence and it accurately and completely records my words and actions.\par

## 2021-07-23 NOTE — ASSESSMENT
[FreeTextEntry1] : 76 year old female,current every day smoker (59 yrs 1ppd) with a past medical history of HTN, spinal stenosis and arthritis who presents today for surgical consultation. She is currently under the care of Dr. Blank and Dr. Galeana.\par \par I have reviewed the patient's medical records, diagnostic images during the time of this office consultation and have made the following recommendation. I have reviewed the indications for surgery, and used our webpage [email or web address] <[email or web address]> to illustrate the aorta and anatomy of the heart. I have discussed the indications for surgery with the patient. Those indications are the following: size greater than 5.0 cm, symptomatic aneurysms, family history of aortic dissection or aneurysm death with a size greater than 4.5 cm, other necessary cardiac procedures such as coronary artery bypass grafting or valvular disorders with an aneurysm greater than 4.5 cm, or connective tissue disorders with an aneurysm size greater than 4.5 cm. The patient meets the indications.\par \par I had a lengthy discussion with the patient regarding her aneurysmal, valvular disease and coronary artery disease progression. I have recommended that the patient is a candidate for an Aortic Arch Replacement, AVR with bioprosthesis, CABG and TEVAR. I have discussed the risks, benefits and alternatives to surgery. I have explained the risks of the surgery, including approximately 10-15% % major mortality or morbidity including stroke, infection, bleeding, death, renal failure and heart attack. I also quoted a 5% risk of tracheostomy and vocal cord paralysis.  All questions were addressed and patient agrees to proceed with surgery. I have answered all questions fully.\par  \par  \par Plan: \par Admit the day before for medical optimization \par Covid swab 72 hrs prior to admission \par BP elevated during office visit. Advised patient to monitor BP at home \par Smoking cessation prior to surgery. Will prescribe nicotine patch and gum \par Will call patient to discuss possible dates \par \par \par

## 2021-07-23 NOTE — PHYSICAL EXAM
[Sclera] : the sclera and conjunctiva were normal [PERRL With Normal Accommodation] : pupils were equal in size, round, and reactive to light [Extraocular Movements] : extraocular movements were intact [Neck Appearance] : the appearance of the neck was normal [Neck Cervical Mass (___cm)] : no neck mass was observed [Jugular Venous Distention Increased] : there was no jugular-venous distention [Respiration, Rhythm And Depth] : normal respiratory rhythm and effort [Exaggerated Use Of Accessory Muscles For Inspiration] : no accessory muscle use [Auscultation Breath Sounds / Voice Sounds] : lungs were clear to auscultation bilaterally [Apical Impulse] : the apical impulse was normal [Heart Rate And Rhythm] : heart rate was normal and rhythm regular [Heart Sounds] : normal S1 and S2 [Systolic grade ___/6] : A grade [unfilled]/6 systolic murmur was heard. [Examination Of The Chest] : the chest was normal in appearance [2+] : left 2+ [No Abnormalities] : the abdominal aorta was not enlarged and no bruit was heard [Varicose Veins Of The Right Leg] : the patient has varicose veins of the right leg [Varicose Veins Of The Left Leg] : the patient has varicose veins of the left leg [Superficial] : superficial [Bowel Sounds] : normal bowel sounds [Abdomen Soft] : soft [Abdomen Tenderness] : non-tender [No CVA Tenderness] : no ~M costovertebral angle tenderness [No Spinal Tenderness] : no spinal tenderness [Abnormal Walk] : normal gait [Nail Clubbing] : no clubbing  or cyanosis of the fingernails [Musculoskeletal - Swelling] : no joint swelling seen [Motor Tone] : muscle strength and tone were normal [Skin Color & Pigmentation] : normal skin color and pigmentation [Skin Turgor] : normal skin turgor [] : no rash [Skin Lesions] : no skin lesions [Cranial Nerves] : cranial nerves 2-12 were intact [Deep Tendon Reflexes (DTR)] : deep tendon reflexes were 2+ and symmetric [Sensation] : the sensory exam was normal to light touch and pinprick [Motor Exam] : the motor exam was normal [Oriented To Time, Place, And Person] : oriented to person, place, and time [Impaired Insight] : insight and judgment were intact [Affect] : the affect was normal [Mood] : the mood was normal [Left Carotid Bruit] : no bruit heard over the left carotid [Right Carotid Bruit] : no bruit heard over the right carotid [Fingers] :  capillary refill of the fingers was normal [Toes] :  capillary refill of the toes was normal [FreeTextEntry1] : deferred

## 2021-07-23 NOTE — PROCEDURE
[FreeTextEntry1] : Dr. Muller reviewed the indications for surgery, and used our webpage [email or web address] <[email or web address]> to illustrate the aorta and anatomy of the heart. Those indications are the following: size greater than 5.0 cm, symptomatic aneurysms, family history of aortic dissection or aneurysm death with a size greater than 4.5 cm, other necessary cardiac procedures such as coronary artery bypass grafting or valvular disorders with an aneurysm greater than 4.5 cm, or connective tissue disorders with an aneurysm size greater than 4.5 cm. \par \par Dr. Muller discussed activity restrictions with the patient, and would advise exercise at a moderate amount with no heavy lifting over one third of body weight, and avoiding heart rates that exceed 140 beats per minute. In addition, every patient should abstain from tobacco abuse and to avoid all illicit drug use, especially stimulants such as cocaine or methamphetamine. Dr. Muller also counseled regarding maintaining a healthy heart diet, and losing any excessive weight as this also put undue stress on both the aorta and entire cardiovascular system. First degree family members should be screened for bicuspid valve disease, and ascending aortic aneurysms. \par \par Patient was advised to view the educational video prior to this visit regarding aortic pathology, risk factors, surgical procedures, and lifestyle modifications. Video can be retrieved at <[email or web address]>.\par

## 2021-07-23 NOTE — HISTORY OF PRESENT ILLNESS
[FreeTextEntry1] : 76 year old female,current every day smoker (59 yrs 1ppd) with a past medical history of HTN, bicuspid aortic valve,spinal stenosis and arthritis who presents today for surgical consultation. She is currently under the care of Dr. Blank and Dr. Galeana.\par \par She reports that she started experiencing dyspnea on exertion, chest pressure, occasional dizziness and fatigue that started last year. In March she presented to [facility], echo revealed severe AS. She remains symptomatic and can only ambulate 0.5 blocks before having to stop to catch her breath. NYHA III. She denies fever, chills, headache, blurred vision, syncope, palpitations, orthopnea, paroxysmal nocturnal dyspnea, nausea, vomiting, abdominal pain, back pain, BRBPR or swelling to legs.\par  \par Patient is currently employed works in payroll HR, independent of all ADLs and resides with her daughters. Of note, she is not fully vaccinated against covid.  \par \par \par

## 2021-07-23 NOTE — DATA REVIEWED
[FreeTextEntry1] : Chest Xray 7/8/21: \par No radiographic evidence of acute cardiopulmonary disease.\par \par US duplex venous lower extremities 6/29/21: \par No evidence of deep venous thrombosis in either lower extremity.\par \par Carotid US 6/8/21: \par Antegrade flow is noted within both vertebral arteries.\par No significant hemodynamic stenosis of either carotid artery\par \par CT heart w/ IV contrast: 6/4/21\par There is cardiomegaly. There is no pericardial effusion. There is atherosclerosis of the major epicardial coronary arteries and their visualized branches.\par HEPATOBILIARY SYSTEM: Unremarkable.\par PANCREAS: Within normal limits.\par SPLEEN: Within normal limits.\par ADRENALS: Mild nonspecific thickening of the left adrenal gland, stable. The right adrenal gland is within normal limits.\par \par There is cardiomegaly. There is no pericardial effusion. There is atherosclerosis of the major epicardial coronary arteries and their visualized branches.\par HEPATOBILIARY SYSTEM: Unremarkable.\par PANCREAS: Within normal limits.\par SPLEEN: Within normal limits.\par ADRENALS: Mild nonspecific thickening of the left adrenal gland, stable. The right adrenal gland is within normal limits.\par \par Carotids 6/4/21:  Antegrade flow is noted within both vertebral arteries. No significant hemodynamic stenosis of either carotid artery.\par \par \par PFTs 6/3/21: \par Mild obstructive lung defect. The airway obstruction is confirmed by the decrease in flow rate at 50% and 75% of the flow volume curve. An obstructive lung defect is confirmed by an increased RV. There is a severe decrease in diffusion capacity. This is interpreted as an insignificant response to bronchodilator.\par \par EKG 5/27/21: Sinus rhythm, left atrial enlargement, 65bpm \par \par CTA Abdomen/Pelvis 5/13/21\par 1. Apparent bicuspid aortic valve with fusion of the right and left coronary cusps.\par 2. Annulus area is 370 mm2, bi-plane diameter of elliptical cross section  21 x 22 mm\par and annulus perimeter is 70 mm.\par 3. The smallest vessel diameter in the pelvis is 4 mm on the right and 5 mm on the left.\par 4. The smallest vessel diameter in the axilla is 6 mm on the right and 5 mm on the left.\par 5. 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 Infrarenal aortic aneurysm measuring up to 37 mm, stable since 2019.\par 7. 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 Proximal circumflex –90 % stenosis, OM 1 70% stenosis, Mid RCA 80% stenosis, Normal LVEDP, borderline low cardiac output and normal pulmonary capillary wedge pressure, Severe AS\par \par \par Echocardiogram 3/11/21: \par Mild concentric left ventricular hypertrophy\par Overall left ventricular systolic function is normal, with an EF of >55% \par The left ventricle cavity size is normal \par No regional wall abnormalities\par left atrium is mildly dilated \par There moderate aortic regurgitation \par There is severe aortic stenosis present \par Peak/mean gradient across the valve 56.35mmHg/ 32.05 mmHg \par Moderate mitral regurgitation is present \par Moderate tricuspid regurgitation \par The right ventricular systolic pressure, as measured by doppler is 27.44 mmhg \par  \par Nuclear stress test 4/14/21: \par 1. SMALL REVERSIBLE DEFECT IN THE INFEROLATERAL APICAL WALL OF THE LEFT VENTRICLE CONSISTENT WITH ISCHEMIA.\par 2. TRANSIENT ISCHEMIC DILATATION (T.I.D. INDEX 1.76) WHICH MAKES THE FINDING OF ISCHEMIA MORE CLINICALLY SIGNIFICANT.\par 3. NORMAL RESTING LEFT VENTRICULAR WALL MOTION AND WALL THICKENING.\par 4. LEFT VENTRICULAR EJECTION FRACTION OF  73 % WHICH IS WITHIN RANGE OF NORMAL.\par

## 2021-08-03 ENCOUNTER — RX RENEWAL (OUTPATIENT)
Age: 76
End: 2021-08-03

## 2021-08-05 ENCOUNTER — NON-APPOINTMENT (OUTPATIENT)
Age: 76
End: 2021-08-05

## 2021-08-06 ENCOUNTER — OUTPATIENT (OUTPATIENT)
Dept: OUTPATIENT SERVICES | Facility: HOSPITAL | Age: 76
LOS: 1 days | Discharge: HOME | End: 2021-08-06

## 2021-08-06 DIAGNOSIS — Z11.59 ENCOUNTER FOR SCREENING FOR OTHER VIRAL DISEASES: ICD-10-CM

## 2021-08-08 ENCOUNTER — INPATIENT (INPATIENT)
Facility: HOSPITAL | Age: 76
LOS: 25 days | Discharge: HOME HEALTH | DRG: 219 | End: 2021-09-03
Attending: THORACIC SURGERY (CARDIOTHORACIC VASCULAR SURGERY) | Admitting: THORACIC SURGERY (CARDIOTHORACIC VASCULAR SURGERY)
Payer: COMMERCIAL

## 2021-08-08 ENCOUNTER — TRANSCRIPTION ENCOUNTER (OUTPATIENT)
Age: 76
End: 2021-08-08

## 2021-08-08 VITALS
DIASTOLIC BLOOD PRESSURE: 61 MMHG | SYSTOLIC BLOOD PRESSURE: 139 MMHG | RESPIRATION RATE: 18 BRPM | TEMPERATURE: 99 F | HEART RATE: 72 BPM | OXYGEN SATURATION: 96 % | WEIGHT: 171.08 LBS

## 2021-08-08 LAB
A1C WITH ESTIMATED AVERAGE GLUCOSE RESULT: 5.6 % — SIGNIFICANT CHANGE UP (ref 4–5.6)
ALBUMIN SERPL ELPH-MCNC: 3.9 G/DL — SIGNIFICANT CHANGE UP (ref 3.3–5)
ALP SERPL-CCNC: 74 U/L — SIGNIFICANT CHANGE UP (ref 40–120)
ALT FLD-CCNC: 14 U/L — SIGNIFICANT CHANGE UP (ref 10–45)
ANION GAP SERPL CALC-SCNC: 11 MMOL/L — SIGNIFICANT CHANGE UP (ref 5–17)
APPEARANCE UR: CLEAR — SIGNIFICANT CHANGE UP
APTT BLD: 31.5 SEC — SIGNIFICANT CHANGE UP (ref 27.5–35.5)
AST SERPL-CCNC: 14 U/L — SIGNIFICANT CHANGE UP (ref 10–40)
BASE EXCESS BLDA CALC-SCNC: -0.8 MMOL/L — SIGNIFICANT CHANGE UP (ref -2–3)
BASOPHILS # BLD AUTO: 0.09 K/UL — SIGNIFICANT CHANGE UP (ref 0–0.2)
BASOPHILS NFR BLD AUTO: 1.2 % — SIGNIFICANT CHANGE UP (ref 0–2)
BILIRUB SERPL-MCNC: 0.3 MG/DL — SIGNIFICANT CHANGE UP (ref 0.2–1.2)
BILIRUB UR-MCNC: NEGATIVE — SIGNIFICANT CHANGE UP
BLD GP AB SCN SERPL QL: NEGATIVE — SIGNIFICANT CHANGE UP
BLD GP AB SCN SERPL QL: NEGATIVE — SIGNIFICANT CHANGE UP
BUN SERPL-MCNC: 17 MG/DL — SIGNIFICANT CHANGE UP (ref 7–23)
CALCIUM SERPL-MCNC: 9.4 MG/DL — SIGNIFICANT CHANGE UP (ref 8.4–10.5)
CHLORIDE SERPL-SCNC: 109 MMOL/L — HIGH (ref 96–108)
CHOLEST SERPL-MCNC: 140 MG/DL — SIGNIFICANT CHANGE UP
CO2 BLDA-SCNC: 25 MMOL/L — HIGH (ref 19–24)
CO2 SERPL-SCNC: 22 MMOL/L — SIGNIFICANT CHANGE UP (ref 22–31)
COLOR SPEC: YELLOW — SIGNIFICANT CHANGE UP
CREAT SERPL-MCNC: 0.95 MG/DL — SIGNIFICANT CHANGE UP (ref 0.5–1.3)
DIFF PNL FLD: NEGATIVE — SIGNIFICANT CHANGE UP
EOSINOPHIL # BLD AUTO: 0.31 K/UL — SIGNIFICANT CHANGE UP (ref 0–0.5)
EOSINOPHIL NFR BLD AUTO: 4 % — SIGNIFICANT CHANGE UP (ref 0–6)
ESTIMATED AVERAGE GLUCOSE: 114 MG/DL — SIGNIFICANT CHANGE UP (ref 68–114)
GLUCOSE SERPL-MCNC: 90 MG/DL — SIGNIFICANT CHANGE UP (ref 70–99)
GLUCOSE UR QL: NEGATIVE — SIGNIFICANT CHANGE UP
HCO3 BLDA-SCNC: 24 MMOL/L — SIGNIFICANT CHANGE UP (ref 21–28)
HCT VFR BLD CALC: 35.6 % — SIGNIFICANT CHANGE UP (ref 34.5–45)
HDLC SERPL-MCNC: 48 MG/DL — LOW
HGB BLD-MCNC: 11.8 G/DL — SIGNIFICANT CHANGE UP (ref 11.5–15.5)
IMM GRANULOCYTES NFR BLD AUTO: 0.3 % — SIGNIFICANT CHANGE UP (ref 0–1.5)
INR BLD: 1.07 — SIGNIFICANT CHANGE UP (ref 0.88–1.16)
KETONES UR-MCNC: NEGATIVE — SIGNIFICANT CHANGE UP
LEUKOCYTE ESTERASE UR-ACNC: NEGATIVE — SIGNIFICANT CHANGE UP
LIPID PNL WITH DIRECT LDL SERPL: 58 MG/DL — SIGNIFICANT CHANGE UP
LYMPHOCYTES # BLD AUTO: 2.16 K/UL — SIGNIFICANT CHANGE UP (ref 1–3.3)
LYMPHOCYTES # BLD AUTO: 28.1 % — SIGNIFICANT CHANGE UP (ref 13–44)
MAGNESIUM SERPL-MCNC: 2.3 MG/DL — SIGNIFICANT CHANGE UP (ref 1.6–2.6)
MCHC RBC-ENTMCNC: 30.6 PG — SIGNIFICANT CHANGE UP (ref 27–34)
MCHC RBC-ENTMCNC: 33.1 GM/DL — SIGNIFICANT CHANGE UP (ref 32–36)
MCV RBC AUTO: 92.2 FL — SIGNIFICANT CHANGE UP (ref 80–100)
MONOCYTES # BLD AUTO: 0.65 K/UL — SIGNIFICANT CHANGE UP (ref 0–0.9)
MONOCYTES NFR BLD AUTO: 8.5 % — SIGNIFICANT CHANGE UP (ref 2–14)
NEUTROPHILS # BLD AUTO: 4.45 K/UL — SIGNIFICANT CHANGE UP (ref 1.8–7.4)
NEUTROPHILS NFR BLD AUTO: 57.9 % — SIGNIFICANT CHANGE UP (ref 43–77)
NITRITE UR-MCNC: NEGATIVE — SIGNIFICANT CHANGE UP
NON HDL CHOLESTEROL: 92 MG/DL — SIGNIFICANT CHANGE UP
NRBC # BLD: 0 /100 WBCS — SIGNIFICANT CHANGE UP (ref 0–0)
NT-PROBNP SERPL-SCNC: 1570 PG/ML — HIGH (ref 0–300)
PCO2 BLDA: 37 MMHG — HIGH (ref 32–35)
PH BLDA: 7.41 — SIGNIFICANT CHANGE UP (ref 7.35–7.45)
PH UR: 6 — SIGNIFICANT CHANGE UP (ref 5–8)
PLATELET # BLD AUTO: 153 K/UL — SIGNIFICANT CHANGE UP (ref 150–400)
PO2 BLDA: 77 MMHG — LOW (ref 83–108)
POTASSIUM SERPL-MCNC: 4.2 MMOL/L — SIGNIFICANT CHANGE UP (ref 3.5–5.3)
POTASSIUM SERPL-SCNC: 4.2 MMOL/L — SIGNIFICANT CHANGE UP (ref 3.5–5.3)
PROT SERPL-MCNC: 6.6 G/DL — SIGNIFICANT CHANGE UP (ref 6–8.3)
PROT UR-MCNC: NEGATIVE MG/DL — SIGNIFICANT CHANGE UP
PROTHROM AB SERPL-ACNC: 12.8 SEC — SIGNIFICANT CHANGE UP (ref 10.6–13.6)
RBC # BLD: 3.86 M/UL — SIGNIFICANT CHANGE UP (ref 3.8–5.2)
RBC # FLD: 14.7 % — HIGH (ref 10.3–14.5)
RH IG SCN BLD-IMP: POSITIVE — SIGNIFICANT CHANGE UP
RH IG SCN BLD-IMP: POSITIVE — SIGNIFICANT CHANGE UP
SAO2 % BLDA: 98.3 % — HIGH (ref 94–98)
SARS-COV-2 RNA SPEC QL NAA+PROBE: SIGNIFICANT CHANGE UP
SODIUM SERPL-SCNC: 142 MMOL/L — SIGNIFICANT CHANGE UP (ref 135–145)
SP GR SPEC: >=1.03 — SIGNIFICANT CHANGE UP (ref 1–1.03)
TRIGL SERPL-MCNC: 168 MG/DL — HIGH
TROPONIN T SERPL-MCNC: 0.01 NG/ML — SIGNIFICANT CHANGE UP (ref 0–0.01)
TSH SERPL-MCNC: 2.69 UIU/ML — SIGNIFICANT CHANGE UP (ref 0.27–4.2)
UROBILINOGEN FLD QL: 0.2 E.U./DL — SIGNIFICANT CHANGE UP
WBC # BLD: 7.68 K/UL — SIGNIFICANT CHANGE UP (ref 3.8–10.5)
WBC # FLD AUTO: 7.68 K/UL — SIGNIFICANT CHANGE UP (ref 3.8–10.5)

## 2021-08-08 PROCEDURE — 93010 ELECTROCARDIOGRAM REPORT: CPT

## 2021-08-08 PROCEDURE — 71045 X-RAY EXAM CHEST 1 VIEW: CPT | Mod: 26

## 2021-08-08 PROCEDURE — 99221 1ST HOSP IP/OBS SF/LOW 40: CPT | Mod: 57

## 2021-08-08 RX ORDER — CHLORHEXIDINE GLUCONATE 213 G/1000ML
1 SOLUTION TOPICAL ONCE
Refills: 0 | Status: COMPLETED | OUTPATIENT
Start: 2021-08-08 | End: 2021-08-08

## 2021-08-08 RX ORDER — CHLORHEXIDINE GLUCONATE 213 G/1000ML
1 SOLUTION TOPICAL ONCE
Refills: 0 | Status: COMPLETED | OUTPATIENT
Start: 2021-08-09 | End: 2021-08-09

## 2021-08-08 RX ORDER — CHLORHEXIDINE GLUCONATE 213 G/1000ML
15 SOLUTION TOPICAL ONCE
Refills: 0 | Status: COMPLETED | OUTPATIENT
Start: 2021-08-08 | End: 2021-08-08

## 2021-08-08 RX ORDER — INSULIN HUMAN 100 [IU]/ML
1 INJECTION, SOLUTION SUBCUTANEOUS
Qty: 50 | Refills: 0 | Status: DISCONTINUED | OUTPATIENT
Start: 2021-08-09 | End: 2021-08-11

## 2021-08-08 RX ORDER — ALBUTEROL 90 UG/1
2 AEROSOL, METERED ORAL EVERY 6 HOURS
Refills: 0 | Status: DISCONTINUED | OUTPATIENT
Start: 2021-08-08 | End: 2021-08-16

## 2021-08-08 RX ORDER — BUDESONIDE AND FORMOTEROL FUMARATE DIHYDRATE 160; 4.5 UG/1; UG/1
2 AEROSOL RESPIRATORY (INHALATION)
Refills: 0 | Status: DISCONTINUED | OUTPATIENT
Start: 2021-08-08 | End: 2021-08-09

## 2021-08-08 RX ORDER — SODIUM CHLORIDE 9 MG/ML
1000 INJECTION, SOLUTION INTRAVENOUS
Refills: 0 | Status: DISCONTINUED | OUTPATIENT
Start: 2021-08-08 | End: 2021-08-09

## 2021-08-08 RX ORDER — ASPIRIN/CALCIUM CARB/MAGNESIUM 324 MG
81 TABLET ORAL DAILY
Refills: 0 | Status: DISCONTINUED | OUTPATIENT
Start: 2021-08-08 | End: 2021-08-09

## 2021-08-08 RX ORDER — HEPARIN SODIUM 5000 [USP'U]/ML
5000 INJECTION INTRAVENOUS; SUBCUTANEOUS EVERY 8 HOURS
Refills: 0 | Status: DISCONTINUED | OUTPATIENT
Start: 2021-08-08 | End: 2021-08-09

## 2021-08-08 RX ORDER — DEXTROSE 50 % IN WATER 50 %
25 SYRINGE (ML) INTRAVENOUS
Refills: 0 | Status: DISCONTINUED | OUTPATIENT
Start: 2021-08-09 | End: 2021-08-20

## 2021-08-08 RX ORDER — ASPIRIN/CALCIUM CARB/MAGNESIUM 324 MG
81 TABLET ORAL DAILY
Refills: 0 | Status: DISCONTINUED | OUTPATIENT
Start: 2021-08-09 | End: 2021-08-11

## 2021-08-08 RX ORDER — DEXTROSE 50 % IN WATER 50 %
50 SYRINGE (ML) INTRAVENOUS
Refills: 0 | Status: DISCONTINUED | OUTPATIENT
Start: 2021-08-09 | End: 2021-08-20

## 2021-08-08 RX ORDER — PROPOFOL 10 MG/ML
5 INJECTION, EMULSION INTRAVENOUS
Qty: 1000 | Refills: 0 | Status: DISCONTINUED | OUTPATIENT
Start: 2021-08-09 | End: 2021-08-14

## 2021-08-08 RX ORDER — METOPROLOL TARTRATE 50 MG
25 TABLET ORAL EVERY 12 HOURS
Refills: 0 | Status: DISCONTINUED | OUTPATIENT
Start: 2021-08-08 | End: 2021-08-09

## 2021-08-08 RX ORDER — CHLORHEXIDINE GLUCONATE 213 G/1000ML
5 SOLUTION TOPICAL
Refills: 0 | Status: DISCONTINUED | OUTPATIENT
Start: 2021-08-09 | End: 2021-08-17

## 2021-08-08 RX ORDER — SODIUM CHLORIDE 9 MG/ML
3 INJECTION INTRAMUSCULAR; INTRAVENOUS; SUBCUTANEOUS EVERY 8 HOURS
Refills: 0 | Status: DISCONTINUED | OUTPATIENT
Start: 2021-08-08 | End: 2021-08-09

## 2021-08-08 RX ORDER — MEPERIDINE HYDROCHLORIDE 50 MG/ML
25 INJECTION INTRAMUSCULAR; INTRAVENOUS; SUBCUTANEOUS ONCE
Refills: 0 | Status: DISCONTINUED | OUTPATIENT
Start: 2021-08-09 | End: 2021-08-09

## 2021-08-08 RX ORDER — PANTOPRAZOLE SODIUM 20 MG/1
40 TABLET, DELAYED RELEASE ORAL DAILY
Refills: 0 | Status: DISCONTINUED | OUTPATIENT
Start: 2021-08-09 | End: 2021-08-30

## 2021-08-08 RX ORDER — SODIUM CHLORIDE 9 MG/ML
1000 INJECTION INTRAMUSCULAR; INTRAVENOUS; SUBCUTANEOUS
Refills: 0 | Status: DISCONTINUED | OUTPATIENT
Start: 2021-08-09 | End: 2021-08-24

## 2021-08-08 RX ORDER — ATORVASTATIN CALCIUM 80 MG/1
10 TABLET, FILM COATED ORAL AT BEDTIME
Refills: 0 | Status: DISCONTINUED | OUTPATIENT
Start: 2021-08-08 | End: 2021-08-16

## 2021-08-08 RX ORDER — HYDRALAZINE HCL 50 MG
10 TABLET ORAL ONCE
Refills: 0 | Status: COMPLETED | OUTPATIENT
Start: 2021-08-08 | End: 2021-08-08

## 2021-08-08 RX ORDER — PANTOPRAZOLE SODIUM 20 MG/1
40 TABLET, DELAYED RELEASE ORAL
Refills: 0 | Status: DISCONTINUED | OUTPATIENT
Start: 2021-08-08 | End: 2021-08-09

## 2021-08-08 RX ORDER — LABETALOL HCL 100 MG
10 TABLET ORAL ONCE
Refills: 0 | Status: COMPLETED | OUTPATIENT
Start: 2021-08-08 | End: 2021-08-08

## 2021-08-08 RX ORDER — HEPARIN SODIUM 5000 [USP'U]/ML
5000 INJECTION INTRAVENOUS; SUBCUTANEOUS EVERY 8 HOURS
Refills: 0 | Status: DISCONTINUED | OUTPATIENT
Start: 2021-08-09 | End: 2021-08-12

## 2021-08-08 RX ADMIN — CHLORHEXIDINE GLUCONATE 15 MILLILITER(S): 213 SOLUTION TOPICAL at 17:32

## 2021-08-08 RX ADMIN — Medication 10 MILLIGRAM(S): at 18:20

## 2021-08-08 RX ADMIN — ATORVASTATIN CALCIUM 10 MILLIGRAM(S): 80 TABLET, FILM COATED ORAL at 21:32

## 2021-08-08 RX ADMIN — BUDESONIDE AND FORMOTEROL FUMARATE DIHYDRATE 2 PUFF(S): 160; 4.5 AEROSOL RESPIRATORY (INHALATION) at 17:51

## 2021-08-08 RX ADMIN — Medication 25 MILLIGRAM(S): at 17:32

## 2021-08-08 RX ADMIN — CHLORHEXIDINE GLUCONATE 1 APPLICATION(S): 213 SOLUTION TOPICAL at 21:31

## 2021-08-08 RX ADMIN — Medication 10 MILLIGRAM(S): at 18:54

## 2021-08-08 RX ADMIN — SODIUM CHLORIDE 3 MILLILITER(S): 9 INJECTION INTRAMUSCULAR; INTRAVENOUS; SUBCUTANEOUS at 21:12

## 2021-08-08 RX ADMIN — CHLORHEXIDINE GLUCONATE 1 APPLICATION(S): 213 SOLUTION TOPICAL at 20:57

## 2021-08-08 RX ADMIN — HEPARIN SODIUM 5000 UNIT(S): 5000 INJECTION INTRAVENOUS; SUBCUTANEOUS at 21:31

## 2021-08-08 NOTE — H&P ADULT - NSHPPHYSICALEXAM_GEN_ALL_CORE
Physical Exam  CONSTITUTIONAL:                                                              WNL  NEURO:                                                                       WNL                      EYES:                                                                                WNL  ENMT:                                                                               WNL  CV:                                                                                   WNL  RESPIRATORY:                                                                 WNL  GI:                                                                                     WNL  : REGALADO + / -                                                                  WNL  MUSKULOSKELETAL:                                                       WNL  SKIN / BREAST:                                                                  WNL Physical Exam  CONSTITUTIONAL: well appearing, NAD, laying comfortably in bed  NEURO: A&OX3, no focal deficits noted                    EYES: PERRLA  ENMT: Neck supple   CV: RRR, no m/r/g  RESPIRATORY: CTA bilateral posterior lung fields   GI: +BS, NT/ND  : No yanes  MUSKULOSKELETAL: No peripheral edema or calf tenderness. Moving bilateral upper and lower extremities.   SKIN / BREAST: No rashes noted Physical Exam  T(C):   T(F): 97  HR: 70  BP: 171/77  BP(mean): --  RR: 16  SpO2: 97%    CONSTITUTIONAL: well appearing, NAD, laying comfortably in bed  NEURO: A&OX3, no focal deficits noted                    EYES: PERRLA  ENMT: Neck supple   CV: RRR, no m/r/g  RESPIRATORY: CTA bilateral posterior lung fields   GI: +BS, NT/ND  : No yanes  MUSKULOSKELETAL: No peripheral edema or calf tenderness. Moving bilateral upper and lower extremities.   SKIN / BREAST: No rashes noted

## 2021-08-08 NOTE — H&P ADULT - HISTORY OF PRESENT ILLNESS
76 y.o female, current every day smoker (59 PY), with PMHx HTN, bicuspid aortic valve, spinal stenosis, arthritis who complained of increased HERRERA for 6 months. TTE 3/2021 revealed EF normal, severe AS, moderate AI. NST 4/2021 revealed transient ischemic dilatation. Cardiac cath 5/11/21 revealed severe AS, prox circumflex 90%, OM1 70%, mid RCA 80%. 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 She was referred to Dr. Muller for surgical evaluation and deemed a good surgical candidate. On 8/8 she was admitted to [facility] for planned pre operative medical optimization prior to OR scheduled 8/9.

## 2021-08-08 NOTE — H&P ADULT - NSHPREVIEWOFSYSTEMS_GEN_ALL_CORE
Review of Systems  CONSTITUTIONAL:  Denies Fevers / chills, sweats, fatigue, weight loss, weight gain                                      NEURO:  Denies parasthesias, seizures, syncope, confusion                                                                                EYES:  Denies Blurry vision, discharge, pain, loss of vision                                                                                    ENMT:  Denies Difficulty hearing, vertigo, dysphagia, epistaxis, recent dental work                                       CV:  Denies Chest pain, palpitations, HERRERA, orthopnea                                                                                          RESPIRATORY:  Denies Wheezing, SOB, cough / sputum, hemoptysis                                                                GI:  Denies Nausea, vomiting, diarrhea, constipation, melena, difficulty swallowing                                               : Denies Hematuria, dysuria, urgency, incontinence                                                                                         MUSKULOSKELETAL:  Denies arthritis, joint swelling, muscle weakness                                                             SKIN/BREAST:  Denies rash, itching, hair loss, masses                                                                                            PSYCH:  Denies depression, anxiety, suicidal ideation                                                                                               HEME/LYMPH:  Denies bruises easily, enlarged lymph nodes, tender lymph nodes                                        ENDOCRINE:  Denies cold intolerance, heat intolerance, polydipsia
[phone]

## 2021-08-08 NOTE — H&P ADULT - ASSESSMENT
A/P:    76 y.o female, current every day smoker (59 PY), with PMHx HTN, bicuspid aortic valve, spinal stenosis, arthritis who complained of increased HERRERA for 6 months. TTE 3/2021 revealed EF normal, severe AS, moderate AI. NST 4/2021 revealed transient ischemic dilatation. Cardiac cath 5/11/21 revealed severe AS, prox circumflex 90%, OM1 70%, mid RCA 80%. 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 She was referred to Dr. Muller for surgical evaluation and deemed a good surgical candidate. On 8/8 she was admitted to [facility] for planned pre operative medical optimization prior to OR scheduled 8/9.     Neurovascular:   - No delirium. Pain well controlled with current regimen.  -Continue tylenol PRN    Cardiovascular:   - POD_ s/p _  -Hemodynamically stable. HR controlled (X-X)  - Hx of HTN, BP controlled (X-X)  - ASA, Plavix, BB, statin  - EKG. TTE. Cardiac Panel. Lipid Panel. BNP.      Respiratory:   - 02 Sat = 98% on RA.  -If on oxygen wean to RA from for O2 Sat > 93%.  -Encourage C+DB and Use of IS 10x / hr while awake.  -CXR.    GI:   - Stable.  -NPO after MN.  -Continue GI PPX with protonix  -PO Diet.    Renal / :  - BUN/Cr stable at X/X  -Continue to monitor renal function.  -Monitor I/O's.  - Replete electrolytes PRN    Endocrine:    -A1c.  -TSH.    Hematologic:  -H/H stable at X/X with no obvious signs of bleeding  -Coagulation Panel.    ID:  -Pt remains afebrile with no elevation in WBC or signs of infection  -Continue to monitor CBC  -Observe for SIRS/Sepsis Syndrome.    Prophylaxis:  -DVT prophylaxis with 5000 SubQ Heparin q8h.  -SCD's    Disposition:  -Home when medically appropriate.   A/P:    76 y.o female, current every day smoker (59 PY), with PMHx HTN, bicuspid aortic valve, spinal stenosis, arthritis who complained of increased HERRERA for 6 months. TTE 3/2021 revealed EF normal, severe AS, moderate AI. NST 4/2021 revealed transient ischemic dilatation. Cardiac cath 5/11/21 revealed severe AS, prox circumflex 90%, OM1 70%, mid RCA 80%. 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 She was referred to Dr. Muller for surgical evaluation and deemed a good surgical candidate. On 8/8 she was admitted to [facility] for planned pre operative medical optimization prior to OR scheduled 8/9.     Neurovascular:   - No delirium. Pain well controlled with current regimen.    Cardiovascular:   - OR 8/9 for AVR, aortic arch replacement, CABG, TEVAR  - Pre op workup completed as an outpatient  -Hemodynamically stable. HR controlled  - Hx of HTN, BP controlled, continue metoprolol 25 mg q12 hours  - CAD: continue ASA, atorvastatin 10 mg    Respiratory:   - 02 Sat = 98% on RA.  - Current smoker, 59 PY, RA ABG to be drawn on admission  -If on oxygen wean to RA from for O2 Sat > 93%.  -Encourage C+DB and Use of IS 10x / hr while awake.  -CXR without obvious infiltrates/ effusions     GI:   - Stable.  -NPO after MN for OR tomorrow  -Continue GI PPX with protonix  -PO Diet.    Renal / :  - BUN/Cr pending, no known hx kidney disease   -Continue to monitor renal function.  -Monitor I/O's.  - Replete electrolytes PRN    Endocrine:    -A1c pending, no known hx diabetes  -TSH pending, no known hx thyroid disease     Hematologic:  -H/H pending, no obvious signs of bleeding  -Coagulation Panel.    ID:  -Pt remains afebrile with no elevation in WBC or signs of infection  -Continue to monitor CBC  -Observe for SIRS/Sepsis Syndrome.    Prophylaxis:  -DVT prophylaxis with 5000 SubQ Heparin q8h.  -SCD's    Disposition:  - OR tomorrow

## 2021-08-08 NOTE — H&P ADULT - NSHPSOCIALHISTORY_GEN_ALL_CORE
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

## 2021-08-08 NOTE — PROGRESS NOTE ADULT - SUBJECTIVE AND OBJECTIVE BOX
Planned Date of Surgery:  8/9/21                                                                                                           Surgeon: Dr. Muller    Procedure: AVR, ascending arch replacement, CABG, TEVAR    HPI:  76 y.o female, current every day smoker (59 PY), with PMHx HTN, bicuspid aortic valve, spinal stenosis, arthritis who complained of increased HERRERA for 6 months. TTE 3/2021 revealed EF normal, severe AS, moderate AI. NST 4/2021 revealed transient ischemic dilatation. Cardiac cath 5/11/21 revealed severe AS, prox circumflex 90%, OM1 70%, mid RCA 80%. 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 She was referred to Dr. Muller for surgical evaluation and deemed a good surgical candidate. On 8/8 she was admitted to [facility] for planned pre operative medical optimization prior to OR scheduled 8/9.     PAST MEDICAL & SURGICAL HISTORY:  HTN (hypertension)    H/O aortic valve stenosis    CAD (coronary artery disease)    No significant past surgical history        No Known Allergies      Physical Exam  T(C): --  HR: --  BP: --  RR: --  SpO2: --  Constitutional:  Neuro:  CV:  Pulmonary:  GI:  Extremeties:    MEDICATIONS  (STANDING):    MEDICATIONS  (PRN):      On Beta Blocker? YES    Labs:      Hgb A1C:    EKG:  `< from: 12 Lead ECG (07.08.21 @ 10:46) >  Ventricular Rate 63 BPM    Atrial Rate 63 BPM    P-R Interval 140 ms    QRS Duration 74 ms    Q-T Interval 430 ms    QTC Calculation(Bazett) 440 ms    P Axis 46 degrees    R Axis 32 degrees    T Axis 69 degrees    Diagnosis Line Normal sinus rhythm  Possible Left atrial enlargement  Borderline ECG    < end of copied text >      CXR:  < from: Xray Chest 2 Views PA/Lat (07.08.21 @ 12:10) >  Impression:    No radiographic evidence of acute cardiopulmonary disease.    < end of copied text >      CT Scans:  < from: [facility] Duplex Lower Ext Vein Scan, Bilat (06.29.21 @ 10:29) >  IMPRESSION:  No evidence of deep venous thrombosis in either lower extremity.    < end of copied text >    < from: CT Angio Abdomen and Pelvis w/ IV Cont (06.04.21 @ 12:25) >  IMPRESSION:    1. Apparent bicuspid aortic valve with fusion of the right and left coronary cusps.    2. Annulus area is 370 mm2, bi-plane diameter of elliptical cross section  21 x 22 mm  and annulus perimeter is 70 mm.    3. The smallest vessel diameter in the pelvis is 4 mm on the right and 5 mm on the left.    4. The smallest vessel diameter in the axilla is 6 mm on the right and 5 mm on the left.    5. 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    6. Infrarenal aortic aneurysm measuring up to 37 mm, stable since 2019.    7. 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    < end of copied text >        Cath Report:  5/11/21:  prox circumflex 90%, OM1 70%, mid RCA 80%    Echo:  TTE 3/11/21: mild LV hypertrophy, EF >55%, moderate aortic regurgitation, severe aortic stenosis, moderate mitral regurgitation, moderate tricuspid regurgitation     PFT's:  6/3/21:   FVC 2.27 101%  FEV1 1.56 91%  FEV1/FVC 71 61%    Carotid Duplex:  c< from: [facility] Duplex Carotid, Bilat (06.04.21 @ 10:35) >  IMPRESSION: No significant hemodynamic stenosis of either carotid artery.    < end of copied text >      Consult in Chart?  YES / NO  Consent in Chart? YES / NO  Pre-op Orders Placed? YES / NO  Blood Prodeucts Ordered? YES / NO  NPO ordered? YES / NO Planned Date of Surgery:  8/9/21                                                                                                           Surgeon: Dr. Muller    Procedure: AVR, ascending arch replacement, CABG, TEVAR    HPI:  76 y.o female, current every day smoker (59 PY), with PMHx HTN, bicuspid aortic valve, spinal stenosis, arthritis who complained of increased HERRERA for 6 months. TTE 3/2021 revealed EF normal, severe AS, moderate AI. NST 4/2021 revealed transient ischemic dilatation. Cardiac cath 5/11/21 revealed severe AS, prox circumflex 90%, OM1 70%, mid RCA 80%. 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 She was referred to Dr. Muller for surgical evaluation and deemed a good surgical candidate. On 8/8 she was admitted to [facility] for planned pre operative medical optimization prior to OR scheduled 8/9.     PAST MEDICAL & SURGICAL HISTORY:  HTN (hypertension)    H/O aortic valve stenosis    CAD (coronary artery disease)    No significant past surgical history        No Known Allergies      Physical Exam  Vital Signs Last 24 Hrs  T(C): --  T(F): --  HR: --  BP: --  BP(mean): --  RR: --  SpO2: --  CONSTITUTIONAL: Well appearing in NAD assessed laying comfortably in bed   NEURO: A&OX3. No focal deficits noted, moving bilateral upper and lower extremities                    CV: RRR, no murmurs, rubs, gallops  RESPIRATORY: Clear to auscultation bilateral posterior lung fields, no wheezes, rales, rhonchi   GI: +BS, NT/ND  MUSKULOSKELETAL: No peripheral edema or calf tenderness. Full strength and ROM bilateral upper and lower extremities   VASCULAR: Bilateral distal pulses 2+  INCISIONS: None      MEDICATIONS  (STANDING):    MEDICATIONS  (PRN):      On Beta Blocker? YES    Labs:      Hgb A1C:    EKG:  `< from: 12 Lead ECG (07.08.21 @ 10:46) >  Ventricular Rate 63 BPM    Atrial Rate 63 BPM    P-R Interval 140 ms    QRS Duration 74 ms    Q-T Interval 430 ms    QTC Calculation(Bazett) 440 ms    P Axis 46 degrees    R Axis 32 degrees    T Axis 69 degrees    Diagnosis Line Normal sinus rhythm  Possible Left atrial enlargement  Borderline ECG    < end of copied text >      CXR:  < from: Xray Chest 2 Views PA/Lat (07.08.21 @ 12:10) >  Impression:    No radiographic evidence of acute cardiopulmonary disease.    < end of copied text >      CT Scans:  < from: [facility] Duplex Lower Ext Vein Scan, Bilat (06.29.21 @ 10:29) >  IMPRESSION:  No evidence of deep venous thrombosis in either lower extremity.    < end of copied text >    < from: CT Angio Abdomen and Pelvis w/ IV Cont (06.04.21 @ 12:25) >  IMPRESSION:    1. Apparent bicuspid aortic valve with fusion of the right and left coronary cusps.    2. Annulus area is 370 mm2, bi-plane diameter of elliptical cross section  21 x 22 mm  and annulus perimeter is 70 mm.    3. The smallest vessel diameter in the pelvis is 4 mm on the right and 5 mm on the left.    4. The smallest vessel diameter in the axilla is 6 mm on the right and 5 mm on the left.    5. 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    6. Infrarenal aortic aneurysm measuring up to 37 mm, stable since 2019.    7. 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    < end of copied text >        Cath Report:  5/11/21:  prox circumflex 90%, OM1 70%, mid RCA 80%    Echo:  TTE 3/11/21: mild LV hypertrophy, EF >55%, moderate aortic regurgitation, severe aortic stenosis, moderate mitral regurgitation, moderate tricuspid regurgitation     PFT's:  6/3/21:   FVC 2.27 101%  FEV1 1.56 91%  FEV1/FVC 71 61%    Carotid Duplex:  c< from: [facility] Duplex Carotid, Bilat (06.04.21 @ 10:35) >  IMPRESSION: No significant hemodynamic stenosis of either carotid artery.    < end of copied text >      Consult in Chart?  YES   Consent in Chart? YES  Pre-op Orders Placed? YES   Blood Prodeucts Ordered? YES   NPO ordered? YES

## 2021-08-09 ENCOUNTER — APPOINTMENT (OUTPATIENT)
Dept: CARDIOTHORACIC SURGERY | Facility: HOSPITAL | Age: 76
End: 2021-08-09

## 2021-08-09 ENCOUNTER — RESULT REVIEW (OUTPATIENT)
Age: 76
End: 2021-08-09

## 2021-08-09 LAB
ALBUMIN SERPL ELPH-MCNC: 2.5 G/DL — LOW (ref 3.3–5)
ALBUMIN SERPL ELPH-MCNC: 3.8 G/DL — SIGNIFICANT CHANGE UP (ref 3.3–5)
ALP SERPL-CCNC: 38 U/L — LOW (ref 40–120)
ALP SERPL-CCNC: 41 U/L — SIGNIFICANT CHANGE UP (ref 40–120)
ALT FLD-CCNC: 18 U/L — SIGNIFICANT CHANGE UP (ref 10–45)
ALT FLD-CCNC: 18 U/L — SIGNIFICANT CHANGE UP (ref 10–45)
ANION GAP SERPL CALC-SCNC: 10 MMOL/L — SIGNIFICANT CHANGE UP (ref 5–17)
ANION GAP SERPL CALC-SCNC: 11 MMOL/L — SIGNIFICANT CHANGE UP (ref 5–17)
ANION GAP SERPL CALC-SCNC: 9 MMOL/L — SIGNIFICANT CHANGE UP (ref 5–17)
APTT BLD: 41.5 SEC — HIGH (ref 27.5–35.5)
APTT BLD: 41.9 SEC — HIGH (ref 27.5–35.5)
APTT BLD: 57.9 SEC — HIGH (ref 27.5–35.5)
AST SERPL-CCNC: 59 U/L — HIGH (ref 10–40)
AST SERPL-CCNC: 73 U/L — HIGH (ref 10–40)
BASE EXCESS BLDV CALC-SCNC: -0.8 MMOL/L — SIGNIFICANT CHANGE UP (ref -2–3)
BASE EXCESS BLDV CALC-SCNC: -3.6 MMOL/L — LOW (ref -2–3)
BASOPHILS # BLD AUTO: 0.05 K/UL — SIGNIFICANT CHANGE UP (ref 0–0.2)
BASOPHILS NFR BLD AUTO: 0.3 % — SIGNIFICANT CHANGE UP (ref 0–2)
BILIRUB SERPL-MCNC: 1 MG/DL — SIGNIFICANT CHANGE UP (ref 0.2–1.2)
BILIRUB SERPL-MCNC: 2.1 MG/DL — HIGH (ref 0.2–1.2)
BUN SERPL-MCNC: 13 MG/DL — SIGNIFICANT CHANGE UP (ref 7–23)
BUN SERPL-MCNC: 14 MG/DL — SIGNIFICANT CHANGE UP (ref 7–23)
BUN SERPL-MCNC: 16 MG/DL — SIGNIFICANT CHANGE UP (ref 7–23)
CA-I SERPL-SCNC: 1.26 MMOL/L — SIGNIFICANT CHANGE UP (ref 1.15–1.33)
CALCIUM SERPL-MCNC: 8.8 MG/DL — SIGNIFICANT CHANGE UP (ref 8.4–10.5)
CALCIUM SERPL-MCNC: 9.1 MG/DL — SIGNIFICANT CHANGE UP (ref 8.4–10.5)
CALCIUM SERPL-MCNC: 9.8 MG/DL — SIGNIFICANT CHANGE UP (ref 8.4–10.5)
CHLORIDE SERPL-SCNC: 109 MMOL/L — HIGH (ref 96–108)
CHLORIDE SERPL-SCNC: 109 MMOL/L — HIGH (ref 96–108)
CHLORIDE SERPL-SCNC: 111 MMOL/L — HIGH (ref 96–108)
CO2 BLDV-SCNC: 25.5 MMOL/L — SIGNIFICANT CHANGE UP (ref 22–26)
CO2 BLDV-SCNC: 26.3 MMOL/L — HIGH (ref 22–26)
CO2 SERPL-SCNC: 22 MMOL/L — SIGNIFICANT CHANGE UP (ref 22–31)
CO2 SERPL-SCNC: 22 MMOL/L — SIGNIFICANT CHANGE UP (ref 22–31)
CO2 SERPL-SCNC: 23 MMOL/L — SIGNIFICANT CHANGE UP (ref 22–31)
COVID-19 SPIKE DOMAIN AB INTERP: NEGATIVE — SIGNIFICANT CHANGE UP
COVID-19 SPIKE DOMAIN ANTIBODY RESULT: 0.4 U/ML — SIGNIFICANT CHANGE UP
CREAT SERPL-MCNC: 0.76 MG/DL — SIGNIFICANT CHANGE UP (ref 0.5–1.3)
CREAT SERPL-MCNC: 0.8 MG/DL — SIGNIFICANT CHANGE UP (ref 0.5–1.3)
CREAT SERPL-MCNC: 0.81 MG/DL — SIGNIFICANT CHANGE UP (ref 0.5–1.3)
EOSINOPHIL # BLD AUTO: 0.02 K/UL — SIGNIFICANT CHANGE UP (ref 0–0.5)
EOSINOPHIL NFR BLD AUTO: 0.1 % — SIGNIFICANT CHANGE UP (ref 0–6)
GAS PNL BLDA: SIGNIFICANT CHANGE UP
GAS PNL BLDA: SIGNIFICANT CHANGE UP
GAS PNL BLDV: 141 MMOL/L — SIGNIFICANT CHANGE UP (ref 136–145)
GAS PNL BLDV: SIGNIFICANT CHANGE UP
GLUCOSE BLDC GLUCOMTR-MCNC: 110 MG/DL — HIGH (ref 70–99)
GLUCOSE BLDC GLUCOMTR-MCNC: 168 MG/DL — HIGH (ref 70–99)
GLUCOSE BLDC GLUCOMTR-MCNC: 173 MG/DL — HIGH (ref 70–99)
GLUCOSE BLDC GLUCOMTR-MCNC: 176 MG/DL — HIGH (ref 70–99)
GLUCOSE SERPL-MCNC: 109 MG/DL — HIGH (ref 70–99)
GLUCOSE SERPL-MCNC: 163 MG/DL — HIGH (ref 70–99)
GLUCOSE SERPL-MCNC: 172 MG/DL — HIGH (ref 70–99)
HCO3 BLDV-SCNC: 24 MMOL/L — SIGNIFICANT CHANGE UP (ref 22–29)
HCO3 BLDV-SCNC: 25 MMOL/L — SIGNIFICANT CHANGE UP (ref 22–29)
HCT VFR BLD CALC: 30.6 % — LOW (ref 34.5–45)
HCT VFR BLD CALC: 34.3 % — LOW (ref 34.5–45)
HCT VFR BLD CALC: 40.2 % — SIGNIFICANT CHANGE UP (ref 34.5–45)
HGB BLD-MCNC: 10.7 G/DL — LOW (ref 11.5–15.5)
HGB BLD-MCNC: 11.3 G/DL — LOW (ref 11.5–15.5)
HGB BLD-MCNC: 12.7 G/DL — SIGNIFICANT CHANGE UP (ref 11.5–15.5)
IMM GRANULOCYTES NFR BLD AUTO: 1 % — SIGNIFICANT CHANGE UP (ref 0–1.5)
INR BLD: 1.02 — SIGNIFICANT CHANGE UP (ref 0.88–1.16)
INR BLD: 1.1 — SIGNIFICANT CHANGE UP (ref 0.88–1.16)
INR BLD: 1.11 — SIGNIFICANT CHANGE UP (ref 0.88–1.16)
LACTATE SERPL-SCNC: 2.8 MMOL/L — HIGH (ref 0.5–2)
LACTATE SERPL-SCNC: 4 MMOL/L — CRITICAL HIGH (ref 0.5–2)
LYMPHOCYTES # BLD AUTO: 1.06 K/UL — SIGNIFICANT CHANGE UP (ref 1–3.3)
LYMPHOCYTES # BLD AUTO: 6.4 % — LOW (ref 13–44)
MAGNESIUM SERPL-MCNC: 2.3 MG/DL — SIGNIFICANT CHANGE UP (ref 1.6–2.6)
MAGNESIUM SERPL-MCNC: 3.1 MG/DL — HIGH (ref 1.6–2.6)
MCHC RBC-ENTMCNC: 30.1 PG — SIGNIFICANT CHANGE UP (ref 27–34)
MCHC RBC-ENTMCNC: 30.6 PG — SIGNIFICANT CHANGE UP (ref 27–34)
MCHC RBC-ENTMCNC: 31.5 PG — SIGNIFICANT CHANGE UP (ref 27–34)
MCHC RBC-ENTMCNC: 31.6 GM/DL — LOW (ref 32–36)
MCHC RBC-ENTMCNC: 32.9 GM/DL — SIGNIFICANT CHANGE UP (ref 32–36)
MCHC RBC-ENTMCNC: 35 GM/DL — SIGNIFICANT CHANGE UP (ref 32–36)
MCV RBC AUTO: 90 FL — SIGNIFICANT CHANGE UP (ref 80–100)
MCV RBC AUTO: 91.5 FL — SIGNIFICANT CHANGE UP (ref 80–100)
MCV RBC AUTO: 96.9 FL — SIGNIFICANT CHANGE UP (ref 80–100)
MONOCYTES # BLD AUTO: 1.39 K/UL — HIGH (ref 0–0.9)
MONOCYTES NFR BLD AUTO: 8.4 % — SIGNIFICANT CHANGE UP (ref 2–14)
NEUTROPHILS # BLD AUTO: 13.76 K/UL — HIGH (ref 1.8–7.4)
NEUTROPHILS NFR BLD AUTO: 83.8 % — HIGH (ref 43–77)
NRBC # BLD: 0 /100 WBCS — SIGNIFICANT CHANGE UP (ref 0–0)
PCO2 BLDV: 44 MMHG — HIGH (ref 39–42)
PCO2 BLDV: 52 MMHG — HIGH (ref 39–42)
PH BLDV: 7.27 — LOW (ref 7.32–7.43)
PH BLDV: 7.36 — SIGNIFICANT CHANGE UP (ref 7.32–7.43)
PHOSPHATE SERPL-MCNC: 2.7 MG/DL — SIGNIFICANT CHANGE UP (ref 2.5–4.5)
PLATELET # BLD AUTO: 134 K/UL — LOW (ref 150–400)
PLATELET # BLD AUTO: 138 K/UL — LOW (ref 150–400)
PLATELET # BLD AUTO: 79 K/UL — LOW (ref 150–400)
PO2 BLDV: 37 MMHG — SIGNIFICANT CHANGE UP
PO2 BLDV: 43 MMHG — SIGNIFICANT CHANGE UP
POTASSIUM BLDV-SCNC: 4.7 MMOL/L — SIGNIFICANT CHANGE UP (ref 3.5–5.1)
POTASSIUM SERPL-MCNC: 4.2 MMOL/L — SIGNIFICANT CHANGE UP (ref 3.5–5.3)
POTASSIUM SERPL-MCNC: 4.3 MMOL/L — SIGNIFICANT CHANGE UP (ref 3.5–5.3)
POTASSIUM SERPL-MCNC: 4.6 MMOL/L — SIGNIFICANT CHANGE UP (ref 3.5–5.3)
POTASSIUM SERPL-SCNC: 4.2 MMOL/L — SIGNIFICANT CHANGE UP (ref 3.5–5.3)
POTASSIUM SERPL-SCNC: 4.3 MMOL/L — SIGNIFICANT CHANGE UP (ref 3.5–5.3)
POTASSIUM SERPL-SCNC: 4.6 MMOL/L — SIGNIFICANT CHANGE UP (ref 3.5–5.3)
PROT SERPL-MCNC: 4.4 G/DL — LOW (ref 6–8.3)
PROT SERPL-MCNC: 5.5 G/DL — LOW (ref 6–8.3)
PROTHROM AB SERPL-ACNC: 12.2 SEC — SIGNIFICANT CHANGE UP (ref 10.6–13.6)
PROTHROM AB SERPL-ACNC: 13.1 SEC — SIGNIFICANT CHANGE UP (ref 10.6–13.6)
PROTHROM AB SERPL-ACNC: 13.3 SEC — SIGNIFICANT CHANGE UP (ref 10.6–13.6)
RBC # BLD: 3.4 M/UL — LOW (ref 3.8–5.2)
RBC # BLD: 3.75 M/UL — LOW (ref 3.8–5.2)
RBC # BLD: 4.15 M/UL — SIGNIFICANT CHANGE UP (ref 3.8–5.2)
RBC # FLD: 13.5 % — SIGNIFICANT CHANGE UP (ref 10.3–14.5)
RBC # FLD: 13.9 % — SIGNIFICANT CHANGE UP (ref 10.3–14.5)
RBC # FLD: 14.9 % — HIGH (ref 10.3–14.5)
SAO2 % BLDV: 66.8 % — SIGNIFICANT CHANGE UP
SAO2 % BLDV: 75 % — SIGNIFICANT CHANGE UP
SARS-COV-2 IGG+IGM SERPL QL IA: 0.4 U/ML — SIGNIFICANT CHANGE UP
SARS-COV-2 IGG+IGM SERPL QL IA: NEGATIVE — SIGNIFICANT CHANGE UP
SODIUM SERPL-SCNC: 141 MMOL/L — SIGNIFICANT CHANGE UP (ref 135–145)
SODIUM SERPL-SCNC: 142 MMOL/L — SIGNIFICANT CHANGE UP (ref 135–145)
SODIUM SERPL-SCNC: 143 MMOL/L — SIGNIFICANT CHANGE UP (ref 135–145)
WBC # BLD: 12.13 K/UL — HIGH (ref 3.8–10.5)
WBC # BLD: 16.45 K/UL — HIGH (ref 3.8–10.5)
WBC # BLD: 8.36 K/UL — SIGNIFICANT CHANGE UP (ref 3.8–10.5)
WBC # FLD AUTO: 12.13 K/UL — HIGH (ref 3.8–10.5)
WBC # FLD AUTO: 16.45 K/UL — HIGH (ref 3.8–10.5)
WBC # FLD AUTO: 8.36 K/UL — SIGNIFICANT CHANGE UP (ref 3.8–10.5)

## 2021-08-09 PROCEDURE — 33405 REPLACEMENT AORTIC VALVE OPN: CPT | Mod: 80,59

## 2021-08-09 PROCEDURE — 99291 CRITICAL CARE FIRST HOUR: CPT

## 2021-08-09 PROCEDURE — 33511 CABG VEIN TWO: CPT | Mod: 59

## 2021-08-09 PROCEDURE — 93010 ELECTROCARDIOGRAM REPORT: CPT

## 2021-08-09 PROCEDURE — 33405 REPLACEMENT AORTIC VALVE OPN: CPT | Mod: 59

## 2021-08-09 PROCEDURE — 35650 BPG AXILLARY-AXILLARY: CPT

## 2021-08-09 PROCEDURE — 33880 EVASC RPR TA NDGFT COV LSA: CPT

## 2021-08-09 PROCEDURE — 99292 CRITICAL CARE ADDL 30 MIN: CPT

## 2021-08-09 PROCEDURE — 99292 CRITICAL CARE ADDL 30 MIN: CPT | Mod: 25

## 2021-08-09 PROCEDURE — 33511 CABG VEIN TWO: CPT | Mod: 80,59

## 2021-08-09 PROCEDURE — 33859 AS-AORT GRF F/DS OTH/THN DSJ: CPT

## 2021-08-09 PROCEDURE — 88305 TISSUE EXAM BY PATHOLOGIST: CPT | Mod: 26

## 2021-08-09 PROCEDURE — 35650 BPG AXILLARY-AXILLARY: CPT | Mod: 80

## 2021-08-09 PROCEDURE — 88311 DECALCIFY TISSUE: CPT | Mod: 26

## 2021-08-09 PROCEDURE — 33866 AORTIC HEMIARCH GRAFT: CPT | Mod: 59

## 2021-08-09 PROCEDURE — 71045 X-RAY EXAM CHEST 1 VIEW: CPT | Mod: 26

## 2021-08-09 PROCEDURE — 33880 EVASC RPR TA NDGFT COV LSA: CPT | Mod: 80

## 2021-08-09 RX ORDER — ACETAMINOPHEN 500 MG
1000 TABLET ORAL ONCE
Refills: 0 | Status: COMPLETED | OUTPATIENT
Start: 2021-08-09 | End: 2021-08-10

## 2021-08-09 RX ORDER — CHLORHEXIDINE GLUCONATE 213 G/1000ML
15 SOLUTION TOPICAL ONCE
Refills: 0 | Status: COMPLETED | OUTPATIENT
Start: 2021-08-09 | End: 2021-08-09

## 2021-08-09 RX ORDER — NOREPINEPHRINE BITARTRATE/D5W 8 MG/250ML
0.02 PLASTIC BAG, INJECTION (ML) INTRAVENOUS
Qty: 16 | Refills: 0 | Status: DISCONTINUED | OUTPATIENT
Start: 2021-08-09 | End: 2021-08-10

## 2021-08-09 RX ORDER — CHLORHEXIDINE GLUCONATE 213 G/1000ML
15 SOLUTION TOPICAL EVERY 12 HOURS
Refills: 0 | Status: DISCONTINUED | OUTPATIENT
Start: 2021-08-09 | End: 2021-08-09

## 2021-08-09 RX ORDER — EPINEPHRINE 0.3 MG/.3ML
0.02 INJECTION INTRAMUSCULAR; SUBCUTANEOUS
Qty: 4 | Refills: 0 | Status: DISCONTINUED | OUTPATIENT
Start: 2021-08-09 | End: 2021-08-09

## 2021-08-09 RX ORDER — ALBUMIN HUMAN 25 %
250 VIAL (ML) INTRAVENOUS ONCE
Refills: 0 | Status: COMPLETED | OUTPATIENT
Start: 2021-08-09 | End: 2021-08-09

## 2021-08-09 RX ORDER — DEXMEDETOMIDINE HYDROCHLORIDE IN 0.9% SODIUM CHLORIDE 4 UG/ML
0.2 INJECTION INTRAVENOUS
Qty: 400 | Refills: 0 | Status: DISCONTINUED | OUTPATIENT
Start: 2021-08-09 | End: 2021-08-12

## 2021-08-09 RX ORDER — EPINEPHRINE 0.3 MG/.3ML
0.02 INJECTION INTRAMUSCULAR; SUBCUTANEOUS
Qty: 4 | Refills: 0 | Status: DISCONTINUED | OUTPATIENT
Start: 2021-08-09 | End: 2021-08-10

## 2021-08-09 RX ORDER — NICARDIPINE HYDROCHLORIDE 30 MG/1
5 CAPSULE, EXTENDED RELEASE ORAL
Qty: 40 | Refills: 0 | Status: DISCONTINUED | OUTPATIENT
Start: 2021-08-09 | End: 2021-08-10

## 2021-08-09 RX ORDER — FENTANYL CITRATE 50 UG/ML
25 INJECTION INTRAVENOUS
Refills: 0 | Status: DISCONTINUED | OUTPATIENT
Start: 2021-08-09 | End: 2021-08-11

## 2021-08-09 RX ORDER — NOREPINEPHRINE BITARTRATE/D5W 8 MG/250ML
0.02 PLASTIC BAG, INJECTION (ML) INTRAVENOUS
Qty: 16 | Refills: 0 | Status: DISCONTINUED | OUTPATIENT
Start: 2021-08-09 | End: 2021-08-09

## 2021-08-09 RX ORDER — LABETALOL HCL 100 MG
10 TABLET ORAL ONCE
Refills: 0 | Status: COMPLETED | OUTPATIENT
Start: 2021-08-09 | End: 2021-08-09

## 2021-08-09 RX ADMIN — HEPARIN SODIUM 5000 UNIT(S): 5000 INJECTION INTRAVENOUS; SUBCUTANEOUS at 22:06

## 2021-08-09 RX ADMIN — CHLORHEXIDINE GLUCONATE 15 MILLILITER(S): 213 SOLUTION TOPICAL at 05:48

## 2021-08-09 RX ADMIN — CHLORHEXIDINE GLUCONATE 5 MILLILITER(S): 213 SOLUTION TOPICAL at 22:05

## 2021-08-09 RX ADMIN — FENTANYL CITRATE 25 MICROGRAM(S): 50 INJECTION INTRAVENOUS at 18:45

## 2021-08-09 RX ADMIN — Medication 125 MILLILITER(S): at 19:18

## 2021-08-09 RX ADMIN — Medication 25 MILLIGRAM(S): at 05:26

## 2021-08-09 RX ADMIN — Medication 250 MILLILITER(S): at 19:54

## 2021-08-09 RX ADMIN — Medication 10 MILLIGRAM(S): at 01:27

## 2021-08-09 RX ADMIN — SODIUM CHLORIDE 50 MILLILITER(S): 9 INJECTION, SOLUTION INTRAVENOUS at 00:00

## 2021-08-09 RX ADMIN — SODIUM CHLORIDE 3 MILLILITER(S): 9 INJECTION INTRAMUSCULAR; INTRAVENOUS; SUBCUTANEOUS at 05:26

## 2021-08-09 RX ADMIN — FENTANYL CITRATE 25 MICROGRAM(S): 50 INJECTION INTRAVENOUS at 23:00

## 2021-08-09 RX ADMIN — Medication 81 MILLIGRAM(S): at 22:04

## 2021-08-09 RX ADMIN — Medication 125 MILLILITER(S): at 18:50

## 2021-08-09 RX ADMIN — CHLORHEXIDINE GLUCONATE 1 APPLICATION(S): 213 SOLUTION TOPICAL at 05:26

## 2021-08-09 RX ADMIN — HEPARIN SODIUM 5000 UNIT(S): 5000 INJECTION INTRAVENOUS; SUBCUTANEOUS at 05:26

## 2021-08-09 RX ADMIN — ATORVASTATIN CALCIUM 10 MILLIGRAM(S): 80 TABLET, FILM COATED ORAL at 22:04

## 2021-08-09 RX ADMIN — BUDESONIDE AND FORMOTEROL FUMARATE DIHYDRATE 2 PUFF(S): 160; 4.5 AEROSOL RESPIRATORY (INHALATION) at 05:26

## 2021-08-09 RX ADMIN — Medication 125 MILLILITER(S): at 18:20

## 2021-08-09 RX ADMIN — FENTANYL CITRATE 25 MICROGRAM(S): 50 INJECTION INTRAVENOUS at 19:24

## 2021-08-09 RX ADMIN — FENTANYL CITRATE 25 MICROGRAM(S): 50 INJECTION INTRAVENOUS at 22:30

## 2021-08-09 NOTE — PROGRESS NOTE ADULT - SUBJECTIVE AND OBJECTIVE BOX
CTICU  CRITICAL  CARE  attending     Hand off received 					   Pertinent clinical, laboratory, radiographic, hemodynamic, echocardiographic, respiratory data, microbiologic data and chart were reviewed and analyzed frequently throughout the course of the day and night  Patient seen and examined with CTS/ SH attending at bedside    Pt is a 76y , Female, operative day today:    s/p AVR; replacement of ascending aorta and jeff arch; frozen elephant trunk; Aorto-L Subclavian bypass; CABG x 2; TEVAR    Intraop:      CPB  251 mins  XCT   169 mins   CA     23 mins  ACP   21 mins      7 PRBC,   2 Plt,   2 FFP,   15 cryo,   1000 FEIBA    admitted with low dose Epinephrine/levophed  AV paced @ 80; asystole underneath  initial lactic acidosis @ 4  Volume resuscitated  Epi weaned off  MAP maintained >80  Non focal off sedation  no LE motor deficit'    HPI:    76 y.o female, current every day smoker (59 PY), with PMHx HTN, bicuspid aortic valve, spinal stenosis, arthritis who complained of increased HERRERA for 6 months. TTE 3/2021 revealed EF normal, severe AS, moderate AI. NST 4/2021 revealed transient ischemic dilatation. Cardiac cath 5/11/21 revealed severe AS, prox circumflex 90%, OM1 70%, mid RCA 80%. 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 She was referred to Dr. Muller for surgical evaluation and deemed a good surgical candidate. On 8/8 she was admitted to [facility] for planned pre operative medical optimization prior to OR scheduled 8/9.       FAMILY HISTORY:  FH: CAD (coronary artery disease) (Sibling)  CABG    Family history of CKD (chronic kidney disease) (Sibling)  ESRD    Family history of diabetes mellitus (DM) (Sibling)    PAST MEDICAL & SURGICAL HISTORY:  HTN (hypertension)    H/O aortic valve stenosis    CAD (coronary artery disease)    No significant past surgical history      Patient is a 76y old  Female who presents with a chief complaint of severe aortic stenosis    14 system review limited 2/2 post op state    Vital signs, hemodynamic and respiratory parameters were reviewed from the bedside nursing flowsheet.  ICU Vital Signs Last 24 Hrs  T(C): 35.8 (10 Aug 2021 02:00), Max: 36 (09 Aug 2021 17:50)  T(F): 96.4 (10 Aug 2021 02:00), Max: 96.8 (09 Aug 2021 17:50)  HR: 69 (10 Aug 2021 02:01) (65 - 73)  BP: 132/74 (09 Aug 2021 19:45) (132/74 - 185/88)  BP(mean): 97 (09 Aug 2021 19:45) (97 - 126)  ABP: 137/63 (10 Aug 2021 02:01) (98/51 - 169/76)  ABP(mean): 90 (10 Aug 2021 02:01) (67 - 107)  RR: 14 (10 Aug 2021 02:01) (14 - 20)  SpO2: 99% (10 Aug 2021 02:01) (92% - 100%)    Adult Advanced Hemodynamics Last 24 Hrs  CVP(mm Hg): 11 (10 Aug 2021 02:01) (7 - 17)  CVP(cm H2O): --  CO: 3.5 (10 Aug 2021 02:01) (2.7 - 3.7)  CI: 2 (10 Aug 2021 02:01) (1.6 - 2.1)  PA: 39/16 (09 Aug 2021 20:00) (26/19 - 41/20)  PA(mean): 23 (10 Aug 2021 02:01) (22 - 33)  PCWP: --  SVR: 1803 (10 Aug 2021 02:01) (1803 - 2131)  SVRI: 3156 (10 Aug 2021 02:01) (3153 - 3668)  PVR: --  PVRI: --, ABG - ( 09 Aug 2021 21:40 )  pH, Arterial: 7.37  pH, Blood: x     /  pCO2: 40    /  pO2: 83    / HCO3: 23    / Base Excess: -2.0  /  SaO2: 98.1              Mode: AC/ CMV (Assist Control/ Continuous Mandatory Ventilation)  RR (machine): 14  TV (machine): 500  FiO2: 50  PEEP: 5  ITime: 1  MAP: 10  PIP: 22    Intake and output was reviewed and the fluid balance was calculated  Daily     Daily   I&O's Summary    08 Aug 2021 07:01  -  09 Aug 2021 07:00  --------------------------------------------------------  IN: 400 mL / OUT: 750 mL / NET: -350 mL    09 Aug 2021 07:01  -  10 Aug 2021 02:59  --------------------------------------------------------  IN: 1629.3 mL / OUT: 2630 mL / NET: -1000.7 mL        All lines and drain sites were assessed  Glycemic trend was reviewedCAPILLARY BLOOD GLUCOSE      POCT Blood Glucose.: 105 mg/dL (10 Aug 2021 02:02)    No acute change in mental status  Auscultation of the chest reveals equal bs  Abdomen is soft  Extremities are warm and well perfused  Wounds appear clean and unremarkable  Antibiotics are periop    labs  CBC Full  -  ( 09 Aug 2021 21:32 )  WBC Count : 12.13 K/uL  RBC Count : 3.40 M/uL  Hemoglobin : 10.7 g/dL  Hematocrit : 30.6 %  Platelet Count - Automated : 79 K/uL  Mean Cell Volume : 90.0 fl  Mean Cell Hemoglobin : 31.5 pg  Mean Cell Hemoglobin Concentration : 35.0 gm/dL  Auto Neutrophil # : x  Auto Lymphocyte # : x  Auto Monocyte # : x  Auto Eosinophil # : x  Auto Basophil # : x  Auto Neutrophil % : x  Auto Lymphocyte % : x  Auto Monocyte % : x  Auto Eosinophil % : x  Auto Basophil % : x    08-09    143  |  109<H>  |  14  ----------------------------<  172<H>  4.3   |  23  |  0.80    Ca    9.1      09 Aug 2021 21:32  Phos  2.7     08-09  Mg     3.1     08-09    TPro  5.5<L>  /  Alb  3.8  /  TBili  2.1<H>  /  DBili  x   /  AST  73<H>  /  ALT  18  /  AlkPhos  38<L>  08-09    PT/INR - ( 09 Aug 2021 21:32 )   PT: 13.1 sec;   INR: 1.10          PTT - ( 09 Aug 2021 21:32 )  PTT:57.9 sec  The current medications were reviewed   MEDICATIONS  (STANDING):  acetaminophen  IVPB .. 1000 milliGRAM(s) IV Intermittent once  albumin human  5% IVPB 250 milliLiter(s) IV Intermittent every 1 hour  aspirin enteric coated 81 milliGRAM(s) Oral daily  atorvastatin 10 milliGRAM(s) Oral at bedtime  chlorhexidine 0.12% Liquid 5 milliLiter(s) Oral Mucosa two times a day  dexMEDEtomidine Infusion 0.2 MICROgram(s)/kG/Hr (3.88 mL/Hr) IV Continuous <Continuous>  dextrose 50% Injectable 50 milliLiter(s) IV Push every 15 minutes  dextrose 50% Injectable 25 milliLiter(s) IV Push every 15 minutes  EPINEPHrine    Infusion 0.02 MICROgram(s)/kG/Min (5.82 mL/Hr) IV Continuous <Continuous>  heparin   Injectable 5000 Unit(s) SubCutaneous every 8 hours  insulin regular Infusion 1 Unit(s)/Hr (1 mL/Hr) IV Continuous <Continuous>  niCARdipine Infusion 5 mG/Hr (25 mL/Hr) IV Continuous <Continuous>  norepinephrine Infusion 0.02 MICROgram(s)/kG/Min (1.46 mL/Hr) IV Continuous <Continuous>  pantoprazole  Injectable 40 milliGRAM(s) IV Push daily  propofol Infusion 5 MICROgram(s)/kG/Min (2.33 mL/Hr) IV Continuous <Continuous>  sodium chloride 0.9%. 1000 milliLiter(s) (10 mL/Hr) IV Continuous <Continuous>    MEDICATIONS  (PRN):  ALBUTerol    90 MICROgram(s) HFA Inhaler 2 Puff(s) Inhalation every 6 hours PRN Wheezing  fentaNYL    Injectable 25 MICROGram(s) IV Push every 3 hours PRN Severe Pain (7 - 10)       PROBLEM LIST/ ASSESSMENT:  HEALTH ISSUES - PROBLEM Dx:      ,   Patient is a 76y old  Female who presents with a chief complaint of severe aortic stenosis   s/p  AVR; replacement of ascending aorta and jeff arch; frozen elephant trunk; Aorto-L Subclavian bypass; CABG x 2; TEVAR        My plan includes :    CV:    AV paced @ 80; complete asystole underneath  maintain CI > 2.2  maintain MAP > 80  Titrate pressor/inotrope support to meet above indices    Pulm:    full Vent support overnight  titrate Fio2 to maintain SPO2 >95  serial ABGs'    Neuro:    grossly non focal exam off sedation  no LE paralysis  light sedation while on full vent support      close hemodynamic, ventilatory and drain monitoring and management per post op routine    Monitor for arrhythmias and monitor parameters for organ perfusion  monitor neurologic status  Head of the bed should remain elevated to 45 deg .   chest PT and IS will be encouraged  monitor adequacy of oxygenation and ventilation and attempt to wean oxygen  Nutritional goals will be met using po eventually , ensure adequate caloric intake and montior the same  Stress ulcer and VTE prophylaxis will be achieved    Glycemic control is satisfactory  Electrolytes have been repleted as necessary and wound care has been carried out. Pain control has been achieved.   agressive physical therapy and early mobility and ambulation goals will be met   The family was updated about the course and plan  CRITICAL CARE TIME SPENT in evaluation and management, reassessments, review and interpretation of labs and x-rays, ventilator and hemodynamic management, formulating a plan and coordinating care: _60__ MIN.  Time does not include procedural time.  CTICU ATTENDING     					    Joe Locke MD

## 2021-08-09 NOTE — PRE-OP CHECKLIST - SELECT TESTS ORDERED
BMP/CBC/CMP/PT/PTT/INR/Hepatic Function/Type and Cross/Type and Screen/Urinalysis/EKG/CXR/POCT Blood Glucose/COVID-19

## 2021-08-09 NOTE — BRIEF OPERATIVE NOTE - COMMENTS
Dr. Vernon was the first assistant for this case including but not limited to opening, cannulation, valve repair/replacement, decannulation, and closure.     I was present for this procedure and participated as first assistant as described by the PA above, unless otherwise noted below.

## 2021-08-09 NOTE — BRIEF OPERATIVE NOTE - NSICDXBRIEFPREOP_GEN_ALL_CORE_FT
PRE-OP DIAGNOSIS:  Aortic stenosis 09-Aug-2021 17:45:43  Micah Herrera  AS (aortic stenosis) 09-Aug-2021 17:45:45  Micah Herrera  Aortic arch aneurysm 09-Aug-2021 17:45:55  Micah Herrera  CAD (coronary artery disease) 09-Aug-2021 17:46:09  Micah Herrera

## 2021-08-09 NOTE — BRIEF OPERATIVE NOTE - NSICDXBRIEFPROCEDURE_GEN_ALL_CORE_FT
PROCEDURES:  Aortic valve replacement, tissue 09-Aug-2021 17:44:11 AVR, Ascending, hemiarch replacement, frozen elephant trunk, left aorto-subclavian bypass, CABG x 2 (SVG-RPDA and SVG-LCx) Micah Herrera

## 2021-08-09 NOTE — BRIEF OPERATIVE NOTE - NSICDXBRIEFPOSTOP_GEN_ALL_CORE_FT
POST-OP DIAGNOSIS:  Aortic stenosis 09-Aug-2021 17:46:22  Micah Herrera  Aortic arch aneurysm 09-Aug-2021 17:46:41  Micah Herrera  CAD (coronary artery disease) 09-Aug-2021 17:46:49  Micah Herrera

## 2021-08-10 LAB
ALBUMIN SERPL ELPH-MCNC: 3.5 G/DL — SIGNIFICANT CHANGE UP (ref 3.3–5)
ALBUMIN SERPL ELPH-MCNC: 3.8 G/DL — SIGNIFICANT CHANGE UP (ref 3.3–5)
ALBUMIN SERPL ELPH-MCNC: 3.8 G/DL — SIGNIFICANT CHANGE UP (ref 3.3–5)
ALP SERPL-CCNC: 26 U/L — LOW (ref 40–120)
ALP SERPL-CCNC: 28 U/L — LOW (ref 40–120)
ALP SERPL-CCNC: 31 U/L — LOW (ref 40–120)
ALT FLD-CCNC: 16 U/L — SIGNIFICANT CHANGE UP (ref 10–45)
ALT FLD-CCNC: 16 U/L — SIGNIFICANT CHANGE UP (ref 10–45)
ALT FLD-CCNC: 17 U/L — SIGNIFICANT CHANGE UP (ref 10–45)
ANION GAP SERPL CALC-SCNC: 10 MMOL/L — SIGNIFICANT CHANGE UP (ref 5–17)
ANION GAP SERPL CALC-SCNC: 11 MMOL/L — SIGNIFICANT CHANGE UP (ref 5–17)
ANION GAP SERPL CALC-SCNC: 9 MMOL/L — SIGNIFICANT CHANGE UP (ref 5–17)
APTT BLD: 39.5 SEC — HIGH (ref 27.5–35.5)
APTT BLD: 40.2 SEC — HIGH (ref 27.5–35.5)
APTT BLD: 44.9 SEC — HIGH (ref 27.5–35.5)
AST SERPL-CCNC: 86 U/L — HIGH (ref 10–40)
AST SERPL-CCNC: 90 U/L — HIGH (ref 10–40)
AST SERPL-CCNC: 94 U/L — HIGH (ref 10–40)
BASE EXCESS BLDV CALC-SCNC: 2.7 MMOL/L — SIGNIFICANT CHANGE UP (ref -2–3)
BILIRUB SERPL-MCNC: 0.5 MG/DL — SIGNIFICANT CHANGE UP (ref 0.2–1.2)
BILIRUB SERPL-MCNC: 0.8 MG/DL — SIGNIFICANT CHANGE UP (ref 0.2–1.2)
BILIRUB SERPL-MCNC: 1.4 MG/DL — HIGH (ref 0.2–1.2)
BUN SERPL-MCNC: 13 MG/DL — SIGNIFICANT CHANGE UP (ref 7–23)
BUN SERPL-MCNC: 16 MG/DL — SIGNIFICANT CHANGE UP (ref 7–23)
BUN SERPL-MCNC: 19 MG/DL — SIGNIFICANT CHANGE UP (ref 7–23)
CALCIUM SERPL-MCNC: 8.6 MG/DL — SIGNIFICANT CHANGE UP (ref 8.4–10.5)
CALCIUM SERPL-MCNC: 8.7 MG/DL — SIGNIFICANT CHANGE UP (ref 8.4–10.5)
CALCIUM SERPL-MCNC: 9 MG/DL — SIGNIFICANT CHANGE UP (ref 8.4–10.5)
CHLORIDE SERPL-SCNC: 107 MMOL/L — SIGNIFICANT CHANGE UP (ref 96–108)
CHLORIDE SERPL-SCNC: 109 MMOL/L — HIGH (ref 96–108)
CHLORIDE SERPL-SCNC: 110 MMOL/L — HIGH (ref 96–108)
CO2 BLDV-SCNC: 29.3 MMOL/L — HIGH (ref 22–26)
CO2 SERPL-SCNC: 24 MMOL/L — SIGNIFICANT CHANGE UP (ref 22–31)
CO2 SERPL-SCNC: 26 MMOL/L — SIGNIFICANT CHANGE UP (ref 22–31)
CO2 SERPL-SCNC: 26 MMOL/L — SIGNIFICANT CHANGE UP (ref 22–31)
CREAT SERPL-MCNC: 0.76 MG/DL — SIGNIFICANT CHANGE UP (ref 0.5–1.3)
CREAT SERPL-MCNC: 0.93 MG/DL — SIGNIFICANT CHANGE UP (ref 0.5–1.3)
CREAT SERPL-MCNC: 1 MG/DL — SIGNIFICANT CHANGE UP (ref 0.5–1.3)
GAS PNL BLDA: SIGNIFICANT CHANGE UP
GLUCOSE BLDC GLUCOMTR-MCNC: 104 MG/DL — HIGH (ref 70–99)
GLUCOSE BLDC GLUCOMTR-MCNC: 105 MG/DL — HIGH (ref 70–99)
GLUCOSE BLDC GLUCOMTR-MCNC: 106 MG/DL — HIGH (ref 70–99)
GLUCOSE BLDC GLUCOMTR-MCNC: 110 MG/DL — HIGH (ref 70–99)
GLUCOSE BLDC GLUCOMTR-MCNC: 112 MG/DL — HIGH (ref 70–99)
GLUCOSE BLDC GLUCOMTR-MCNC: 125 MG/DL — HIGH (ref 70–99)
GLUCOSE BLDC GLUCOMTR-MCNC: 128 MG/DL — HIGH (ref 70–99)
GLUCOSE BLDC GLUCOMTR-MCNC: 134 MG/DL — HIGH (ref 70–99)
GLUCOSE BLDC GLUCOMTR-MCNC: 145 MG/DL — HIGH (ref 70–99)
GLUCOSE BLDC GLUCOMTR-MCNC: 94 MG/DL — SIGNIFICANT CHANGE UP (ref 70–99)
GLUCOSE BLDC GLUCOMTR-MCNC: 95 MG/DL — SIGNIFICANT CHANGE UP (ref 70–99)
GLUCOSE BLDC GLUCOMTR-MCNC: 98 MG/DL — SIGNIFICANT CHANGE UP (ref 70–99)
GLUCOSE BLDC GLUCOMTR-MCNC: 99 MG/DL — SIGNIFICANT CHANGE UP (ref 70–99)
GLUCOSE BLDC GLUCOMTR-MCNC: 99 MG/DL — SIGNIFICANT CHANGE UP (ref 70–99)
GLUCOSE SERPL-MCNC: 111 MG/DL — HIGH (ref 70–99)
GLUCOSE SERPL-MCNC: 113 MG/DL — HIGH (ref 70–99)
GLUCOSE SERPL-MCNC: 121 MG/DL — HIGH (ref 70–99)
HCO3 BLDV-SCNC: 28 MMOL/L — SIGNIFICANT CHANGE UP (ref 22–29)
HCT VFR BLD CALC: 22.5 % — LOW (ref 34.5–45)
HCT VFR BLD CALC: 22.6 % — LOW (ref 34.5–45)
HCT VFR BLD CALC: 22.7 % — LOW (ref 34.5–45)
HCT VFR BLD CALC: 26.4 % — LOW (ref 34.5–45)
HEPARIN-PF4 AB RESULT: <0.6 U/ML — SIGNIFICANT CHANGE UP (ref 0–0.9)
HGB BLD-MCNC: 7.8 G/DL — LOW (ref 11.5–15.5)
HGB BLD-MCNC: 7.8 G/DL — LOW (ref 11.5–15.5)
HGB BLD-MCNC: 7.9 G/DL — LOW (ref 11.5–15.5)
HGB BLD-MCNC: 9.1 G/DL — LOW (ref 11.5–15.5)
INR BLD: 1.15 — SIGNIFICANT CHANGE UP (ref 0.88–1.16)
INR BLD: 1.18 — HIGH (ref 0.88–1.16)
INR BLD: 1.27 — HIGH (ref 0.88–1.16)
LACTATE SERPL-SCNC: 1.9 MMOL/L — SIGNIFICANT CHANGE UP (ref 0.5–2)
MAGNESIUM SERPL-MCNC: 2.7 MG/DL — HIGH (ref 1.6–2.6)
MAGNESIUM SERPL-MCNC: 2.8 MG/DL — HIGH (ref 1.6–2.6)
MAGNESIUM SERPL-MCNC: 2.9 MG/DL — HIGH (ref 1.6–2.6)
MCHC RBC-ENTMCNC: 30.4 PG — SIGNIFICANT CHANGE UP (ref 27–34)
MCHC RBC-ENTMCNC: 30.8 PG — SIGNIFICANT CHANGE UP (ref 27–34)
MCHC RBC-ENTMCNC: 30.9 PG — SIGNIFICANT CHANGE UP (ref 27–34)
MCHC RBC-ENTMCNC: 31.1 PG — SIGNIFICANT CHANGE UP (ref 27–34)
MCHC RBC-ENTMCNC: 34.5 GM/DL — SIGNIFICANT CHANGE UP (ref 32–36)
MCHC RBC-ENTMCNC: 34.5 GM/DL — SIGNIFICANT CHANGE UP (ref 32–36)
MCHC RBC-ENTMCNC: 34.7 GM/DL — SIGNIFICANT CHANGE UP (ref 32–36)
MCHC RBC-ENTMCNC: 34.8 GM/DL — SIGNIFICANT CHANGE UP (ref 32–36)
MCV RBC AUTO: 88.3 FL — SIGNIFICANT CHANGE UP (ref 80–100)
MCV RBC AUTO: 88.7 FL — SIGNIFICANT CHANGE UP (ref 80–100)
MCV RBC AUTO: 88.9 FL — SIGNIFICANT CHANGE UP (ref 80–100)
MCV RBC AUTO: 90 FL — SIGNIFICANT CHANGE UP (ref 80–100)
NRBC # BLD: 0 /100 WBCS — SIGNIFICANT CHANGE UP (ref 0–0)
PCO2 BLDV: 44 MMHG — HIGH (ref 39–42)
PF4 HEPARIN CMPLX AB SER-ACNC: NEGATIVE — SIGNIFICANT CHANGE UP
PH BLDV: 7.41 — SIGNIFICANT CHANGE UP (ref 7.32–7.43)
PHOSPHATE SERPL-MCNC: 3.9 MG/DL — SIGNIFICANT CHANGE UP (ref 2.5–4.5)
PHOSPHATE SERPL-MCNC: 4.6 MG/DL — HIGH (ref 2.5–4.5)
PHOSPHATE SERPL-MCNC: 5.1 MG/DL — HIGH (ref 2.5–4.5)
PLATELET # BLD AUTO: 43 K/UL — LOW (ref 150–400)
PLATELET # BLD AUTO: 47 K/UL — LOW (ref 150–400)
PLATELET # BLD AUTO: 48 K/UL — LOW (ref 150–400)
PLATELET # BLD AUTO: 52 K/UL — LOW (ref 150–400)
PO2 BLDV: 34 MMHG — SIGNIFICANT CHANGE UP
POTASSIUM SERPL-MCNC: 4 MMOL/L — SIGNIFICANT CHANGE UP (ref 3.5–5.3)
POTASSIUM SERPL-MCNC: 4.4 MMOL/L — SIGNIFICANT CHANGE UP (ref 3.5–5.3)
POTASSIUM SERPL-MCNC: 4.5 MMOL/L — SIGNIFICANT CHANGE UP (ref 3.5–5.3)
POTASSIUM SERPL-SCNC: 4 MMOL/L — SIGNIFICANT CHANGE UP (ref 3.5–5.3)
POTASSIUM SERPL-SCNC: 4.4 MMOL/L — SIGNIFICANT CHANGE UP (ref 3.5–5.3)
POTASSIUM SERPL-SCNC: 4.5 MMOL/L — SIGNIFICANT CHANGE UP (ref 3.5–5.3)
PROT SERPL-MCNC: 4.9 G/DL — LOW (ref 6–8.3)
PROT SERPL-MCNC: 5.1 G/DL — LOW (ref 6–8.3)
PROT SERPL-MCNC: 5.2 G/DL — LOW (ref 6–8.3)
PROTHROM AB SERPL-ACNC: 13.7 SEC — HIGH (ref 10.6–13.6)
PROTHROM AB SERPL-ACNC: 14.1 SEC — HIGH (ref 10.6–13.6)
PROTHROM AB SERPL-ACNC: 15.1 SEC — HIGH (ref 10.6–13.6)
RBC # BLD: 2.51 M/UL — LOW (ref 3.8–5.2)
RBC # BLD: 2.53 M/UL — LOW (ref 3.8–5.2)
RBC # BLD: 2.56 M/UL — LOW (ref 3.8–5.2)
RBC # BLD: 2.99 M/UL — LOW (ref 3.8–5.2)
RBC # FLD: 14.2 % — SIGNIFICANT CHANGE UP (ref 10.3–14.5)
RBC # FLD: 14.8 % — HIGH (ref 10.3–14.5)
RBC # FLD: 14.8 % — HIGH (ref 10.3–14.5)
RBC # FLD: 15 % — HIGH (ref 10.3–14.5)
SAO2 % BLDV: 64.7 % — SIGNIFICANT CHANGE UP
SODIUM SERPL-SCNC: 140 MMOL/L — SIGNIFICANT CHANGE UP (ref 135–145)
SODIUM SERPL-SCNC: 146 MMOL/L — HIGH (ref 135–145)
SODIUM SERPL-SCNC: 146 MMOL/L — HIGH (ref 135–145)
WBC # BLD: 10.54 K/UL — HIGH (ref 3.8–10.5)
WBC # BLD: 10.8 K/UL — HIGH (ref 3.8–10.5)
WBC # BLD: 9.71 K/UL — SIGNIFICANT CHANGE UP (ref 3.8–10.5)
WBC # BLD: 9.97 K/UL — SIGNIFICANT CHANGE UP (ref 3.8–10.5)
WBC # FLD AUTO: 10.54 K/UL — HIGH (ref 3.8–10.5)
WBC # FLD AUTO: 10.8 K/UL — HIGH (ref 3.8–10.5)
WBC # FLD AUTO: 9.71 K/UL — SIGNIFICANT CHANGE UP (ref 3.8–10.5)
WBC # FLD AUTO: 9.97 K/UL — SIGNIFICANT CHANGE UP (ref 3.8–10.5)

## 2021-08-10 PROCEDURE — 99292 CRITICAL CARE ADDL 30 MIN: CPT

## 2021-08-10 PROCEDURE — 99291 CRITICAL CARE FIRST HOUR: CPT

## 2021-08-10 PROCEDURE — 71045 X-RAY EXAM CHEST 1 VIEW: CPT | Mod: 26

## 2021-08-10 RX ORDER — VASOPRESSIN 20 [USP'U]/ML
0.01 INJECTION INTRAVENOUS
Qty: 50 | Refills: 0 | Status: DISCONTINUED | OUTPATIENT
Start: 2021-08-10 | End: 2021-08-13

## 2021-08-10 RX ORDER — ACETAMINOPHEN 500 MG
1000 TABLET ORAL ONCE
Refills: 0 | Status: COMPLETED | OUTPATIENT
Start: 2021-08-10 | End: 2021-08-10

## 2021-08-10 RX ORDER — KETOROLAC TROMETHAMINE 30 MG/ML
15 SYRINGE (ML) INJECTION ONCE
Refills: 0 | Status: DISCONTINUED | OUTPATIENT
Start: 2021-08-10 | End: 2021-08-10

## 2021-08-10 RX ORDER — ALBUMIN HUMAN 25 %
250 VIAL (ML) INTRAVENOUS
Refills: 0 | Status: COMPLETED | OUTPATIENT
Start: 2021-08-10 | End: 2021-08-10

## 2021-08-10 RX ORDER — FUROSEMIDE 40 MG
20 TABLET ORAL ONCE
Refills: 0 | Status: COMPLETED | OUTPATIENT
Start: 2021-08-10 | End: 2021-08-10

## 2021-08-10 RX ORDER — CEFAZOLIN SODIUM 1 G
2000 VIAL (EA) INJECTION EVERY 8 HOURS
Refills: 0 | Status: COMPLETED | OUTPATIENT
Start: 2021-08-10 | End: 2021-08-11

## 2021-08-10 RX ORDER — POTASSIUM CHLORIDE 20 MEQ
20 PACKET (EA) ORAL ONCE
Refills: 0 | Status: COMPLETED | OUTPATIENT
Start: 2021-08-10 | End: 2021-08-10

## 2021-08-10 RX ADMIN — PROPOFOL 2.33 MICROGRAM(S)/KG/MIN: 10 INJECTION, EMULSION INTRAVENOUS at 21:07

## 2021-08-10 RX ADMIN — CHLORHEXIDINE GLUCONATE 5 MILLILITER(S): 213 SOLUTION TOPICAL at 19:22

## 2021-08-10 RX ADMIN — HEPARIN SODIUM 5000 UNIT(S): 5000 INJECTION INTRAVENOUS; SUBCUTANEOUS at 06:23

## 2021-08-10 RX ADMIN — Medication 1000 MILLIGRAM(S): at 06:41

## 2021-08-10 RX ADMIN — Medication 250 MILLILITER(S): at 03:14

## 2021-08-10 RX ADMIN — FENTANYL CITRATE 25 MICROGRAM(S): 50 INJECTION INTRAVENOUS at 07:46

## 2021-08-10 RX ADMIN — Medication 100 MILLIGRAM(S): at 16:04

## 2021-08-10 RX ADMIN — FENTANYL CITRATE 25 MICROGRAM(S): 50 INJECTION INTRAVENOUS at 10:07

## 2021-08-10 RX ADMIN — Medication 250 MILLILITER(S): at 01:22

## 2021-08-10 RX ADMIN — CHLORHEXIDINE GLUCONATE 5 MILLILITER(S): 213 SOLUTION TOPICAL at 06:23

## 2021-08-10 RX ADMIN — DEXMEDETOMIDINE HYDROCHLORIDE IN 0.9% SODIUM CHLORIDE 3.88 MICROGRAM(S)/KG/HR: 4 INJECTION INTRAVENOUS at 18:47

## 2021-08-10 RX ADMIN — FENTANYL CITRATE 25 MICROGRAM(S): 50 INJECTION INTRAVENOUS at 10:22

## 2021-08-10 RX ADMIN — Medication 250 MILLILITER(S): at 06:00

## 2021-08-10 RX ADMIN — Medication 20 MILLIGRAM(S): at 14:16

## 2021-08-10 RX ADMIN — PANTOPRAZOLE SODIUM 40 MILLIGRAM(S): 20 TABLET, DELAYED RELEASE ORAL at 11:49

## 2021-08-10 RX ADMIN — Medication 100 MILLIEQUIVALENT(S): at 04:46

## 2021-08-10 RX ADMIN — Medication 20 MILLIGRAM(S): at 21:08

## 2021-08-10 RX ADMIN — Medication 250 MILLILITER(S): at 03:30

## 2021-08-10 RX ADMIN — Medication 15 MILLIGRAM(S): at 17:30

## 2021-08-10 RX ADMIN — Medication 15 MILLIGRAM(S): at 18:00

## 2021-08-10 RX ADMIN — Medication 400 MILLIGRAM(S): at 16:58

## 2021-08-10 RX ADMIN — HEPARIN SODIUM 5000 UNIT(S): 5000 INJECTION INTRAVENOUS; SUBCUTANEOUS at 21:08

## 2021-08-10 RX ADMIN — Medication 100 MILLIGRAM(S): at 08:28

## 2021-08-10 RX ADMIN — FENTANYL CITRATE 25 MICROGRAM(S): 50 INJECTION INTRAVENOUS at 07:37

## 2021-08-10 RX ADMIN — Medication 400 MILLIGRAM(S): at 06:23

## 2021-08-10 RX ADMIN — ATORVASTATIN CALCIUM 10 MILLIGRAM(S): 80 TABLET, FILM COATED ORAL at 22:09

## 2021-08-10 RX ADMIN — Medication 1000 MILLIGRAM(S): at 17:22

## 2021-08-10 NOTE — PROGRESS NOTE ADULT - SUBJECTIVE AND OBJECTIVE BOX
INTERVAL HPI/OVERNIGHT EVENTS:    POD#1  AVR (tissue), Ascending, hemiarch replacement, frozen elephant trunk, left aorto-subclavian bypass, CABG x 2  EF    77yo Female active tobacco use; HTN, Known bicuspid aortic valve, spinal stenosis, arthritis w/sxs SOB/HERRERA    ECHO 3/2021: EF normal, severe AS, moderate AI.   NST 4/2021: transient ischemic dilatation.   Cath 5/11/21: severe AS, prox circumflex 90%, OM1 70%, mid RCA 80%.   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     To OR 8/9  Intraop: 7 U pRBC/2 plt/2 FFP/1000 FEIBA; 3 L Crystalloid  arrived to ICU on levophed   AV paced 80 - underlying native rhythm reported asystole    initial LA 4 - volume (albumin given ) and normalized - last 1.9  overnight - titrated off infusions; not requiring pressor support       PMHx includes but is not limited to:   HTN (hypertension)  H/O aortic valve stenosis  CAD (coronary artery disease)    ICU Vital Signs Last 24 Hrs  T(C): 36.5 (10 Aug 2021 05:01), Max: 36.5 (10 Aug 2021 05:01)  T(F): 97.7 (10 Aug 2021 05:01), Max: 97.7 (10 Aug 2021 05:01)  HR: 69 (10 Aug 2021 07:00) (69 - 69) paced AV 70  BP: 132/74 (09 Aug 2021 19:45) (132/74 - 132/74)  BP(mean): 97 (09 Aug 2021 19:45) (97 - 97)  ABP: 132/55 (10 Aug 2021 07:00) (98/51 - 169/76)  ABP(mean): 81 (10 Aug 2021 07:00) (67 - 107)  RR: 14 (10 Aug 2021 07:00) (14 - 22)  SpO2: 100% (10 Aug 2021 07:00) (97% - 100%) Fi02 50%    Qtts:   Insulin  Propofol     I&O's Summary    09 Aug 2021 07:01  -  10 Aug 2021 07:00  --------------------------------------------------------  IN: 2409 mL / OUT: 3205 mL / NET: -796 mL    Vent settings: AC 14/500/50/5    Physical Exam    Heart - regular/paced - no rub/gallop  Lungs - BS appreciated bilaterally - no rhonchi/wheeze  Abd - (+)BS  but decreased - soft NTND (-)r/r/g  Ext - warm to touch; no cyanosis/clubbing  Chest - op bandage in place  Neuro - opens eyes to voice; following simple commands; shakes head yes/no to answer questions  Skin - no rash     LABS:                        9.1    9.97  )-----------( 52       ( 10 Aug 2021 03:33 )             26.4     08-10    146<H>  |  109<H>  |  13  ----------------------------<  113<H>  4.0   |  26  |  0.76    Ca    9.0      10 Aug 2021 03:33  Phos  3.9     08-10  Mg     2.9     08-10    TPro  5.2<L>  /  Alb  3.8  /  TBili  1.4<H>  /  DBili  x   /  AST  90<H>  /  ALT  17  /  AlkPhos  31<L>  08-10    PT/INR - ( 10 Aug 2021 03:33 )   PT: 13.7 sec;   INR: 1.15     PTT - ( 10 Aug 2021 03:33 )  PTT:44.9 sec    ABG - ( 10 Aug 2021 03:36 ) 7.42/39/122/99    RADIOLOGY & ADDITIONAL STUDIES: reviewed     Patient with severe symptomatic aortic stenosis and CAD - imaging demonstrating enlarging aortic arch aneurysm now POD#1 - doing well    1. CV  stable hemodynamics - not requiring pressors at this time  paced rhythm - underlying native rhythm checked - sinus 60's later dec to 50's with dec in BP and returned to pacing   initial elevation to LA now normalized   ASA/Statin ; anticipate start statin in post-op course   no AV andra agents at this time - cont to monitor rhythm closely   complete periop Abx prophylaxis     2. Pulm   remains on vent   Fi02 requirement 50% at this time with good p02   serial ABG to optimize oxygenation and ventilation  plan early mobility and assess for ability to wean from vent   monitor chest tube output  noted active smoker - cessation education and support when able     3. Endocrine  insulin infusion per protocol   Maintain glycemic control - POC 95/134/94  HgA1c 5.6  TSH 2.6    thrombocytopenia - hold SC heparin for now - repeat and if low will consider obtaining HIT panel   SCD and GI prophylaxis     d/w patient/staff and CTS    I have spent/provided stated minutes of critical care time to this patient: 2 hour

## 2021-08-10 NOTE — PHYSICAL THERAPY INITIAL EVALUATION ADULT - ADDITIONAL COMMENTS
[facility] bedroom on ground floor however shower is 1 flight of stairs up, independent prior to arrival

## 2021-08-10 NOTE — PROGRESS NOTE ADULT - SUBJECTIVE AND OBJECTIVE BOX
CTICU  CRITICAL  CARE  attending     Hand off received 					   Pertinent clinical, laboratory, radiographic, hemodynamic, echocardiographic, respiratory data, microbiologic data and chart were reviewed and analyzed frequently throughout the course of the day and night      76 years old female with HTN, bicuspid aortic valve, spinal stenosis, arthritis.  She was evaluated for increased HERRERA for 6 months.   ECHO:  3/2021 revealed EF normal, severe AS, moderate AI.   NST 4/2021 revealed transient ischemic dilatation.   Cardiac cath revealed severe AS, prox circumflex 90%, OM1 70%, mid RCA 80%.   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  She was referred to Dr. Muller for surgical evaluation and deemed a good surgical candidate.     S/P AVR and CABG  S/P Replacement of AA and Hemiarch.      FAMILY HISTORY:  FH: CAD (coronary artery disease) (Sibling)  CABG    Family history of CKD (chronic kidney disease) (Sibling)  ESRD    Family history of diabetes mellitus (DM) (Sibling)    PAST MEDICAL & SURGICAL HISTORY:  HTN (hypertension)  H/O aortic valve stenosis  CAD (coronary artery disease)  No significant past surgical history        14 system review was unremarkable    Vital signs, hemodynamic and respiratory parameters were reviewed from the bedside nursing flow sheet.  ICU Vital Signs Last 24 Hrs  T(C): 36 (10 Aug 2021 21:22), Max: 36.5 (10 Aug 2021 05:01)  T(F): 96.8 (10 Aug 2021 21:22), Max: 97.7 (10 Aug 2021 05:01)  HR: 69 (10 Aug 2021 21:00) (57 - 69)  BP: --  BP(mean): --  ABP: 123/60 (10 Aug 2021 21:00) (100/53 - 143/66)  ABP(mean): 83 (10 Aug 2021 21:00) (61 - 94)  RR: 20 (10 Aug 2021 21:00) (14 - 22)  SpO2: 98% (10 Aug 2021 21:00) (96% - 100%)    Adult Advanced Hemodynamics Last 24 Hrs  CVP(mm Hg): 16 (10 Aug 2021 21:00) (-48 - 22)  CVP(cm H2O): --  CO: 3 (10 Aug 2021 08:00) (3 - 4)  CI: 1.7 (10 Aug 2021 08:00) (1.7 - 2.3)  PA: --  PA(mean): -62 (10 Aug 2021 09:00) (-62 - 25)  PCWP: --  SVR: 1331 (10 Aug 2021 08:00) (1318 - 2022)  SVRI: 2350 (10 Aug 2021 08:00) (2292 - 3595)  PVR: --  PVRI: --, ABG - ( 10 Aug 2021 16:39 )  pH, Arterial: 7.46  pH, Blood: x     /  pCO2: 30    /  pO2: 105   / HCO3: 21    / Base Excess: -1.5  /  SaO2: 100.0             Mode: AC/ CMV (Assist Control/ Continuous Mandatory Ventilation)  RR (machine): 14  TV (machine): 550  FiO2: 50  PEEP: 5  ITime: 1  MAP: 10  PIP: 23    Intake and output was reviewed and the fluid balance was calculated  Daily     Daily   I&O's Summary    09 Aug 2021 07:01  -  10 Aug 2021 07:00  --------------------------------------------------------  IN: 2426 mL / OUT: 3205 mL / NET: -779 mL    10 Aug 2021 07:01  -  10 Aug 2021 22:10  --------------------------------------------------------  IN: 486.2 mL / OUT: 1075 mL / NET: -588.8 mL        All lines and drain sites were assessed      Neuro: Follows commands. Moves all 4 extremities.  Neck: No JVD.  CVS: S1, S2, No S3.  Lungs: Good air entry bilaterally.  Abd: Soft. No tenderness. + Bowel sounds.  Vascular: + DP/PT.  Extremities: No edema.  Lymphatic: Normal.  Skin: No abnormalities.      labs  CBC Full  -  ( 10 Aug 2021 16:39 )  WBC Count : 10.54 K/uL  RBC Count : 2.51 M/uL  Hemoglobin : 7.8 g/dL  Hematocrit : 22.6 %  Platelet Count - Automated : 43 K/uL  Mean Cell Volume : 90.0 fl  Mean Cell Hemoglobin : 31.1 pg  Mean Cell Hemoglobin Concentration : 34.5 gm/dL  Auto Neutrophil # : x  Auto Lymphocyte # : x  Auto Monocyte # : x  Auto Eosinophil # : x  Auto Basophil # : x  Auto Neutrophil % : x  Auto Lymphocyte % : x  Auto Monocyte % : x  Auto Eosinophil % : x  Auto Basophil % : x    08-10    146<H>  |  110<H>  |  19  ----------------------------<  121<H>  4.4   |  26  |  1.00    Ca    8.6      10 Aug 2021 16:39  Phos  5.1     08-10  Mg     2.8     08-10    TPro  4.9<L>  /  Alb  3.5  /  TBili  0.5  /  DBili  x   /  AST  86<H>  /  ALT  16  /  AlkPhos  28<L>  08-10    PT/INR - ( 10 Aug 2021 16:39 )   PT: 15.1 sec;   INR: 1.27          PTT - ( 10 Aug 2021 16:39 )  PTT:40.2 sec  The current medications were reviewed   MEDICATIONS  (STANDING):  aspirin enteric coated 81 milliGRAM(s) Oral daily  atorvastatin 10 milliGRAM(s) Oral at bedtime  ceFAZolin   IVPB 2000 milliGRAM(s) IV Intermittent every 8 hours  chlorhexidine 0.12% Liquid 5 milliLiter(s) Oral Mucosa two times a day  dexMEDEtomidine Infusion 0.2 MICROgram(s)/kG/Hr (3.88 mL/Hr) IV Continuous <Continuous>  dextrose 50% Injectable 50 milliLiter(s) IV Push every 15 minutes  dextrose 50% Injectable 25 milliLiter(s) IV Push every 15 minutes  heparin   Injectable 5000 Unit(s) SubCutaneous every 8 hours  insulin regular Infusion 1 Unit(s)/Hr (1 mL/Hr) IV Continuous <Continuous>  pantoprazole  Injectable 40 milliGRAM(s) IV Push daily  propofol Infusion 5 MICROgram(s)/kG/Min (2.33 mL/Hr) IV Continuous <Continuous>  sodium chloride 0.9%. 1000 milliLiter(s) (10 mL/Hr) IV Continuous <Continuous>  vasopressin Infusion 0.015 Unit(s)/Min (0.9 mL/Hr) IV Continuous <Continuous>    MEDICATIONS  (PRN):  ALBUTerol    90 MICROgram(s) HFA Inhaler 2 Puff(s) Inhalation every 6 hours PRN Wheezing  fentaNYL    Injectable 25 MICROGram(s) IV Push every 3 hours PRN Severe Pain (7 - 10)            76y old  Female with CAD, aortic stenosis, bicuspid aortic valve and dilated AA.  S/P AVR  S/P CABG x 2.  S/P Replacement of AA and Hemiarch.  S/P Aorta to left subclavian bypass.  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    Intraop: 7 U pRBC/2 plt/2 FFP/1000 FEIBA; 3 L Crystalloid  arrived to ICU on levophed   AV paced 80 - underlying native rhythm reported asystole immediate postoperative period.  initial LA 4 - volume (albumin given ) and normalized - last 1.9  overnight - titrated off infusions; not requiring pressor support     Underlying rhythm right now is sinus bradycardia.  Hemodynamically stable.  Good oxygenation.  Fair urine out put.        My plan includes :  D/C vasopressin.  WEAN to Extubate.  Statin and Betablocker.  Close hemodynamic, ventilatory and drain monitoring and management  Monitor for arrhythmias and monitor parameters for organ perfusion  Monitor neurologic status  Monitor renal function.  Head of the bed should remain elevated to 45 deg .   Chest PT and IS will be encouraged  Monitor adequacy of oxygenation and ventilation and attempt to wean oxygen  Nutritional goals will be met using po eventually , ensure adequate caloric intake and monitor the same  Stress ulcer and VTE prophylaxis will be achieved    Glycemic control is satisfactory  Electrolytes have been repleted as necessary and wound care has been carried out. Pain control has been achieved.   Aggressive physical therapy and early mobility and ambulation goals will be met   The family was updated about the course and plan  CRITICAL CARE TIME SPENT in evaluation and management, reassessments, review and interpretation of labs and x-rays, ventilator and hemodynamic management, formulating a plan and coordinating care: ___60____ MIN.  Time does not include procedural time.  CTICU ATTENDING     					    Lex Gudino MD

## 2021-08-10 NOTE — PHYSICAL THERAPY INITIAL EVALUATION ADULT - DID THE PATIENT HAVE SURGERY?
AVR (tissue), Ascending, hemiarch replacement, frozen elephant trunk, left aorto-subclavian bypass, CABG x 2/yes

## 2021-08-10 NOTE — PHYSICAL THERAPY INITIAL EVALUATION ADULT - LEVEL OF INDEPENDENCE: SUPINE/SIT, REHAB EVAL
dangled x 5 min with mod/max A 2/2 poor trunk control and lethargy/maximum assist (25% patients effort)

## 2021-08-10 NOTE — PHYSICAL THERAPY INITIAL EVALUATION ADULT - GENERAL OBSERVATIONS, REHAB EVAL
Received supine, lethargic, denies pain +EKG, IV, a-line, temp PPM, provena, 4JP, yanes, ETT (CMV, Fi02 50%, PEEP5), BUE restraints, central line. left as found +lines intact, ANDRE irwin present, call meléndez

## 2021-08-10 NOTE — PHYSICAL THERAPY INITIAL EVALUATION ADULT - PERTINENT HX OF CURRENT PROBLEM, REHAB EVAL
76F who complained of increased HERRERA for 6 months. She was referred to Dr. Muller for surgical evaluation and deemed a good surgical candidate. On 8/8 she was admitted to [facility] for planned pre operative medical optimization prior to OR scheduled 8/9.

## 2021-08-10 NOTE — PHYSICAL THERAPY INITIAL EVALUATION ADULT - ACTIVE RANGE OF MOTION EXAMINATION, REHAB EVAL
BUE < 90 deg shoulder flex 2/2 sternal precautions/bilateral lower extremity Active ROM was WNL (within normal limits)

## 2021-08-11 LAB
ALBUMIN SERPL ELPH-MCNC: 3.4 G/DL — SIGNIFICANT CHANGE UP (ref 3.3–5)
ALBUMIN SERPL ELPH-MCNC: 4.1 G/DL — SIGNIFICANT CHANGE UP (ref 3.3–5)
ALBUMIN SERPL ELPH-MCNC: 4.2 G/DL — SIGNIFICANT CHANGE UP (ref 3.3–5)
ALP SERPL-CCNC: 32 U/L — LOW (ref 40–120)
ALP SERPL-CCNC: 33 U/L — LOW (ref 40–120)
ALP SERPL-CCNC: 79 U/L — SIGNIFICANT CHANGE UP (ref 40–120)
ALT FLD-CCNC: 15 U/L — SIGNIFICANT CHANGE UP (ref 10–45)
ALT FLD-CCNC: 16 U/L — SIGNIFICANT CHANGE UP (ref 10–45)
ALT FLD-CCNC: 27 U/L — SIGNIFICANT CHANGE UP (ref 10–45)
ANION GAP SERPL CALC-SCNC: 10 MMOL/L — SIGNIFICANT CHANGE UP (ref 5–17)
ANION GAP SERPL CALC-SCNC: 12 MMOL/L — SIGNIFICANT CHANGE UP (ref 5–17)
ANION GAP SERPL CALC-SCNC: 14 MMOL/L — SIGNIFICANT CHANGE UP (ref 5–17)
APTT BLD: 31.6 SEC — SIGNIFICANT CHANGE UP (ref 27.5–35.5)
APTT BLD: 34.6 SEC — SIGNIFICANT CHANGE UP (ref 27.5–35.5)
APTT BLD: 65.9 SEC — HIGH (ref 27.5–35.5)
AST SERPL-CCNC: 65 U/L — HIGH (ref 10–40)
AST SERPL-CCNC: 72 U/L — HIGH (ref 10–40)
AST SERPL-CCNC: 76 U/L — HIGH (ref 10–40)
BASE EXCESS BLDV CALC-SCNC: 0 MMOL/L — SIGNIFICANT CHANGE UP (ref -2–3)
BASE EXCESS BLDV CALC-SCNC: 0.7 MMOL/L — SIGNIFICANT CHANGE UP (ref -2–3)
BILIRUB SERPL-MCNC: 0.3 MG/DL — SIGNIFICANT CHANGE UP (ref 0.2–1.2)
BILIRUB SERPL-MCNC: 0.5 MG/DL — SIGNIFICANT CHANGE UP (ref 0.2–1.2)
BILIRUB SERPL-MCNC: 0.7 MG/DL — SIGNIFICANT CHANGE UP (ref 0.2–1.2)
BLD GP AB SCN SERPL QL: NEGATIVE — SIGNIFICANT CHANGE UP
BUN SERPL-MCNC: 26 MG/DL — HIGH (ref 7–23)
BUN SERPL-MCNC: 29 MG/DL — HIGH (ref 7–23)
BUN SERPL-MCNC: 33 MG/DL — HIGH (ref 7–23)
CA-I SERPL-SCNC: 1.1 MMOL/L — LOW (ref 1.15–1.33)
CA-I SERPL-SCNC: 1.12 MMOL/L — LOW (ref 1.15–1.33)
CALCIUM SERPL-MCNC: 8.5 MG/DL — SIGNIFICANT CHANGE UP (ref 8.4–10.5)
CALCIUM SERPL-MCNC: 8.6 MG/DL — SIGNIFICANT CHANGE UP (ref 8.4–10.5)
CALCIUM SERPL-MCNC: 9 MG/DL — SIGNIFICANT CHANGE UP (ref 8.4–10.5)
CHLORIDE SERPL-SCNC: 103 MMOL/L — SIGNIFICANT CHANGE UP (ref 96–108)
CHLORIDE SERPL-SCNC: 104 MMOL/L — SIGNIFICANT CHANGE UP (ref 96–108)
CHLORIDE SERPL-SCNC: 109 MMOL/L — HIGH (ref 96–108)
CO2 BLDV-SCNC: 26 MMOL/L — SIGNIFICANT CHANGE UP (ref 22–26)
CO2 BLDV-SCNC: 26.6 MMOL/L — HIGH (ref 22–26)
CO2 SERPL-SCNC: 25 MMOL/L — SIGNIFICANT CHANGE UP (ref 22–31)
CO2 SERPL-SCNC: 25 MMOL/L — SIGNIFICANT CHANGE UP (ref 22–31)
CO2 SERPL-SCNC: 26 MMOL/L — SIGNIFICANT CHANGE UP (ref 22–31)
CREAT SERPL-MCNC: 1.14 MG/DL — SIGNIFICANT CHANGE UP (ref 0.5–1.3)
CREAT SERPL-MCNC: 1.14 MG/DL — SIGNIFICANT CHANGE UP (ref 0.5–1.3)
CREAT SERPL-MCNC: 1.28 MG/DL — SIGNIFICANT CHANGE UP (ref 0.5–1.3)
GAS PNL BLDA: SIGNIFICANT CHANGE UP
GAS PNL BLDV: 126 MMOL/L — LOW (ref 136–145)
GAS PNL BLDV: 135 MMOL/L — LOW (ref 136–145)
GAS PNL BLDV: SIGNIFICANT CHANGE UP
GAS PNL BLDV: SIGNIFICANT CHANGE UP
GLUCOSE BLDC GLUCOMTR-MCNC: 102 MG/DL — HIGH (ref 70–99)
GLUCOSE BLDC GLUCOMTR-MCNC: 118 MG/DL — HIGH (ref 70–99)
GLUCOSE BLDC GLUCOMTR-MCNC: 146 MG/DL — HIGH (ref 70–99)
GLUCOSE BLDC GLUCOMTR-MCNC: 77 MG/DL — SIGNIFICANT CHANGE UP (ref 70–99)
GLUCOSE SERPL-MCNC: 106 MG/DL — HIGH (ref 70–99)
GLUCOSE SERPL-MCNC: 121 MG/DL — HIGH (ref 70–99)
GLUCOSE SERPL-MCNC: 178 MG/DL — HIGH (ref 70–99)
HCO3 BLDV-SCNC: 25 MMOL/L — SIGNIFICANT CHANGE UP (ref 22–29)
HCO3 BLDV-SCNC: 25 MMOL/L — SIGNIFICANT CHANGE UP (ref 22–29)
HCT VFR BLD CALC: 22 % — LOW (ref 34.5–45)
HCT VFR BLD CALC: 22.9 % — LOW (ref 34.5–45)
HCT VFR BLD CALC: 26.4 % — LOW (ref 34.5–45)
HGB BLD-MCNC: 7.6 G/DL — LOW (ref 11.5–15.5)
HGB BLD-MCNC: 7.8 G/DL — LOW (ref 11.5–15.5)
HGB BLD-MCNC: 9 G/DL — LOW (ref 11.5–15.5)
INR BLD: 1.27 — HIGH (ref 0.88–1.16)
INR BLD: 1.3 — HIGH (ref 0.88–1.16)
INR BLD: 1.34 — HIGH (ref 0.88–1.16)
LACTATE SERPL-SCNC: 1.2 MMOL/L — SIGNIFICANT CHANGE UP (ref 0.5–2)
LACTATE SERPL-SCNC: 1.5 MMOL/L — SIGNIFICANT CHANGE UP (ref 0.5–2)
LACTATE SERPL-SCNC: 1.7 MMOL/L — SIGNIFICANT CHANGE UP (ref 0.5–2)
MAGNESIUM SERPL-MCNC: 2.7 MG/DL — HIGH (ref 1.6–2.6)
MAGNESIUM SERPL-MCNC: 2.7 MG/DL — HIGH (ref 1.6–2.6)
MAGNESIUM SERPL-MCNC: 2.9 MG/DL — HIGH (ref 1.6–2.6)
MCHC RBC-ENTMCNC: 30.8 PG — SIGNIFICANT CHANGE UP (ref 27–34)
MCHC RBC-ENTMCNC: 31 PG — SIGNIFICANT CHANGE UP (ref 27–34)
MCHC RBC-ENTMCNC: 31.8 PG — SIGNIFICANT CHANGE UP (ref 27–34)
MCHC RBC-ENTMCNC: 34.1 GM/DL — SIGNIFICANT CHANGE UP (ref 32–36)
MCHC RBC-ENTMCNC: 34.1 GM/DL — SIGNIFICANT CHANGE UP (ref 32–36)
MCHC RBC-ENTMCNC: 34.5 GM/DL — SIGNIFICANT CHANGE UP (ref 32–36)
MCV RBC AUTO: 90.4 FL — SIGNIFICANT CHANGE UP (ref 80–100)
MCV RBC AUTO: 90.9 FL — SIGNIFICANT CHANGE UP (ref 80–100)
MCV RBC AUTO: 92.1 FL — SIGNIFICANT CHANGE UP (ref 80–100)
NRBC # BLD: 0 /100 WBCS — SIGNIFICANT CHANGE UP (ref 0–0)
PCO2 BLDV: 40 MMHG — SIGNIFICANT CHANGE UP (ref 39–42)
PCO2 BLDV: 40 MMHG — SIGNIFICANT CHANGE UP (ref 39–42)
PH BLDV: 7.4 — SIGNIFICANT CHANGE UP (ref 7.32–7.43)
PH BLDV: 7.41 — SIGNIFICANT CHANGE UP (ref 7.32–7.43)
PHOSPHATE SERPL-MCNC: 4.9 MG/DL — HIGH (ref 2.5–4.5)
PHOSPHATE SERPL-MCNC: 5.2 MG/DL — HIGH (ref 2.5–4.5)
PHOSPHATE SERPL-MCNC: 5.5 MG/DL — HIGH (ref 2.5–4.5)
PLATELET # BLD AUTO: 36 K/UL — LOW (ref 150–400)
PLATELET # BLD AUTO: 37 K/UL — LOW (ref 150–400)
PLATELET # BLD AUTO: 37 K/UL — LOW (ref 150–400)
PO2 BLDV: 28 MMHG — SIGNIFICANT CHANGE UP
PO2 BLDV: 31 MMHG — SIGNIFICANT CHANGE UP
POTASSIUM BLDV-SCNC: 3.3 MMOL/L — LOW (ref 3.5–5.1)
POTASSIUM BLDV-SCNC: 3.4 MMOL/L — LOW (ref 3.5–5.1)
POTASSIUM SERPL-MCNC: 3.8 MMOL/L — SIGNIFICANT CHANGE UP (ref 3.5–5.3)
POTASSIUM SERPL-MCNC: 3.9 MMOL/L — SIGNIFICANT CHANGE UP (ref 3.5–5.3)
POTASSIUM SERPL-MCNC: 4.5 MMOL/L — SIGNIFICANT CHANGE UP (ref 3.5–5.3)
POTASSIUM SERPL-SCNC: 3.8 MMOL/L — SIGNIFICANT CHANGE UP (ref 3.5–5.3)
POTASSIUM SERPL-SCNC: 3.9 MMOL/L — SIGNIFICANT CHANGE UP (ref 3.5–5.3)
POTASSIUM SERPL-SCNC: 4.5 MMOL/L — SIGNIFICANT CHANGE UP (ref 3.5–5.3)
PROT SERPL-MCNC: 4.9 G/DL — LOW (ref 6–8.3)
PROT SERPL-MCNC: 5.5 G/DL — LOW (ref 6–8.3)
PROT SERPL-MCNC: 5.8 G/DL — LOW (ref 6–8.3)
PROTHROM AB SERPL-ACNC: 15.1 SEC — HIGH (ref 10.6–13.6)
PROTHROM AB SERPL-ACNC: 15.4 SEC — HIGH (ref 10.6–13.6)
PROTHROM AB SERPL-ACNC: 15.9 SEC — HIGH (ref 10.6–13.6)
RBC # BLD: 2.39 M/UL — LOW (ref 3.8–5.2)
RBC # BLD: 2.52 M/UL — LOW (ref 3.8–5.2)
RBC # BLD: 2.92 M/UL — LOW (ref 3.8–5.2)
RBC # FLD: 14.8 % — HIGH (ref 10.3–14.5)
RBC # FLD: 14.9 % — HIGH (ref 10.3–14.5)
RBC # FLD: 15 % — HIGH (ref 10.3–14.5)
RH IG SCN BLD-IMP: POSITIVE — SIGNIFICANT CHANGE UP
SAO2 % BLDV: 49.5 % — SIGNIFICANT CHANGE UP
SAO2 % BLDV: 51.4 % — SIGNIFICANT CHANGE UP
SODIUM SERPL-SCNC: 141 MMOL/L — SIGNIFICANT CHANGE UP (ref 135–145)
SODIUM SERPL-SCNC: 142 MMOL/L — SIGNIFICANT CHANGE UP (ref 135–145)
SODIUM SERPL-SCNC: 145 MMOL/L — SIGNIFICANT CHANGE UP (ref 135–145)
WBC # BLD: 11.45 K/UL — HIGH (ref 3.8–10.5)
WBC # BLD: 11.89 K/UL — HIGH (ref 3.8–10.5)
WBC # BLD: 12.28 K/UL — HIGH (ref 3.8–10.5)
WBC # FLD AUTO: 11.45 K/UL — HIGH (ref 3.8–10.5)
WBC # FLD AUTO: 11.89 K/UL — HIGH (ref 3.8–10.5)
WBC # FLD AUTO: 12.28 K/UL — HIGH (ref 3.8–10.5)

## 2021-08-11 PROCEDURE — 36556 INSERT NON-TUNNEL CV CATH: CPT | Mod: 59

## 2021-08-11 PROCEDURE — 99292 CRITICAL CARE ADDL 30 MIN: CPT | Mod: 25

## 2021-08-11 PROCEDURE — 93503 INSERT/PLACE HEART CATHETER: CPT

## 2021-08-11 PROCEDURE — 71045 X-RAY EXAM CHEST 1 VIEW: CPT | Mod: 26,77

## 2021-08-11 PROCEDURE — 76937 US GUIDE VASCULAR ACCESS: CPT | Mod: 26

## 2021-08-11 PROCEDURE — 99291 CRITICAL CARE FIRST HOUR: CPT | Mod: 25

## 2021-08-11 PROCEDURE — 93308 TTE F-UP OR LMTD: CPT | Mod: 26

## 2021-08-11 PROCEDURE — 93010 ELECTROCARDIOGRAM REPORT: CPT

## 2021-08-11 PROCEDURE — 92960 CARDIOVERSION ELECTRIC EXT: CPT | Mod: 59

## 2021-08-11 PROCEDURE — 71045 X-RAY EXAM CHEST 1 VIEW: CPT | Mod: 26

## 2021-08-11 RX ORDER — DOBUTAMINE HCL 250MG/20ML
3 VIAL (ML) INTRAVENOUS
Qty: 500 | Refills: 0 | Status: DISCONTINUED | OUTPATIENT
Start: 2021-08-11 | End: 2021-08-12

## 2021-08-11 RX ORDER — ALBUMIN HUMAN 25 %
50 VIAL (ML) INTRAVENOUS
Refills: 0 | Status: COMPLETED | OUTPATIENT
Start: 2021-08-11 | End: 2021-08-11

## 2021-08-11 RX ORDER — AMIODARONE HYDROCHLORIDE 400 MG/1
1 TABLET ORAL
Qty: 900 | Refills: 0 | Status: DISCONTINUED | OUTPATIENT
Start: 2021-08-11 | End: 2021-08-11

## 2021-08-11 RX ORDER — AMIODARONE HYDROCHLORIDE 400 MG/1
150 TABLET ORAL ONCE
Refills: 0 | Status: COMPLETED | OUTPATIENT
Start: 2021-08-11 | End: 2021-08-11

## 2021-08-11 RX ORDER — DEXTROSE 50 % IN WATER 50 %
15 SYRINGE (ML) INTRAVENOUS ONCE
Refills: 0 | Status: DISCONTINUED | OUTPATIENT
Start: 2021-08-11 | End: 2021-08-20

## 2021-08-11 RX ORDER — SODIUM CHLORIDE 9 MG/ML
1000 INJECTION, SOLUTION INTRAVENOUS
Refills: 0 | Status: DISCONTINUED | OUTPATIENT
Start: 2021-08-11 | End: 2021-09-03

## 2021-08-11 RX ORDER — FENTANYL CITRATE 50 UG/ML
25 INJECTION INTRAVENOUS
Refills: 0 | Status: DISCONTINUED | OUTPATIENT
Start: 2021-08-11 | End: 2021-08-12

## 2021-08-11 RX ORDER — ASPIRIN/CALCIUM CARB/MAGNESIUM 324 MG
81 TABLET ORAL DAILY
Refills: 0 | Status: DISCONTINUED | OUTPATIENT
Start: 2021-08-11 | End: 2021-09-03

## 2021-08-11 RX ORDER — EPINEPHRINE 0.3 MG/.3ML
0.03 INJECTION INTRAMUSCULAR; SUBCUTANEOUS
Qty: 4 | Refills: 0 | Status: DISCONTINUED | OUTPATIENT
Start: 2021-08-11 | End: 2021-08-12

## 2021-08-11 RX ORDER — FUROSEMIDE 40 MG
40 TABLET ORAL ONCE
Refills: 0 | Status: COMPLETED | OUTPATIENT
Start: 2021-08-11 | End: 2021-08-11

## 2021-08-11 RX ORDER — FUROSEMIDE 40 MG
1 TABLET ORAL
Qty: 500 | Refills: 0 | Status: DISCONTINUED | OUTPATIENT
Start: 2021-08-11 | End: 2021-08-11

## 2021-08-11 RX ORDER — AMIODARONE HYDROCHLORIDE 400 MG/1
0.5 TABLET ORAL
Qty: 900 | Refills: 0 | Status: DISCONTINUED | OUTPATIENT
Start: 2021-08-11 | End: 2021-08-12

## 2021-08-11 RX ORDER — INSULIN LISPRO 100/ML
VIAL (ML) SUBCUTANEOUS EVERY 6 HOURS
Refills: 0 | Status: DISCONTINUED | OUTPATIENT
Start: 2021-08-11 | End: 2021-08-20

## 2021-08-11 RX ORDER — ALBUMIN HUMAN 25 %
250 VIAL (ML) INTRAVENOUS
Refills: 0 | Status: COMPLETED | OUTPATIENT
Start: 2021-08-11 | End: 2021-08-11

## 2021-08-11 RX ORDER — FUROSEMIDE 40 MG
10 TABLET ORAL
Qty: 500 | Refills: 0 | Status: DISCONTINUED | OUTPATIENT
Start: 2021-08-11 | End: 2021-08-13

## 2021-08-11 RX ORDER — CISATRACURIUM BESYLATE 2 MG/ML
10 INJECTION INTRAVENOUS ONCE
Refills: 0 | Status: COMPLETED | OUTPATIENT
Start: 2021-08-11 | End: 2021-08-11

## 2021-08-11 RX ORDER — MILRINONE LACTATE 1 MG/ML
0.25 INJECTION, SOLUTION INTRAVENOUS
Qty: 20 | Refills: 0 | Status: DISCONTINUED | OUTPATIENT
Start: 2021-08-11 | End: 2021-08-14

## 2021-08-11 RX ORDER — POTASSIUM CHLORIDE 20 MEQ
20 PACKET (EA) ORAL ONCE
Refills: 0 | Status: COMPLETED | OUTPATIENT
Start: 2021-08-11 | End: 2021-08-11

## 2021-08-11 RX ORDER — ALBUMIN HUMAN 25 %
50 VIAL (ML) INTRAVENOUS
Refills: 0 | Status: DISCONTINUED | OUTPATIENT
Start: 2021-08-11 | End: 2021-08-19

## 2021-08-11 RX ORDER — AMIODARONE HYDROCHLORIDE 400 MG/1
0.5 TABLET ORAL
Qty: 900 | Refills: 0 | Status: DISCONTINUED | OUTPATIENT
Start: 2021-08-11 | End: 2021-08-11

## 2021-08-11 RX ORDER — CALCIUM GLUCONATE 100 MG/ML
2 VIAL (ML) INTRAVENOUS ONCE
Refills: 0 | Status: COMPLETED | OUTPATIENT
Start: 2021-08-11 | End: 2021-08-11

## 2021-08-11 RX ORDER — NOREPINEPHRINE BITARTRATE/D5W 8 MG/250ML
0.05 PLASTIC BAG, INJECTION (ML) INTRAVENOUS
Qty: 8 | Refills: 0 | Status: DISCONTINUED | OUTPATIENT
Start: 2021-08-11 | End: 2021-08-11

## 2021-08-11 RX ORDER — GLUCAGON INJECTION, SOLUTION 0.5 MG/.1ML
1 INJECTION, SOLUTION SUBCUTANEOUS ONCE
Refills: 0 | Status: DISCONTINUED | OUTPATIENT
Start: 2021-08-11 | End: 2021-09-03

## 2021-08-11 RX ADMIN — Medication 7.28 MICROGRAM(S)/KG/MIN: at 03:20

## 2021-08-11 RX ADMIN — AMIODARONE HYDROCHLORIDE 16.7 MG/MIN: 400 TABLET ORAL at 08:24

## 2021-08-11 RX ADMIN — CHLORHEXIDINE GLUCONATE 5 MILLILITER(S): 213 SOLUTION TOPICAL at 07:07

## 2021-08-11 RX ADMIN — Medication 100 MILLIGRAM(S): at 16:52

## 2021-08-11 RX ADMIN — Medication 40 MILLIGRAM(S): at 04:16

## 2021-08-11 RX ADMIN — Medication 0.5 MG/HR: at 08:23

## 2021-08-11 RX ADMIN — Medication 250 MILLILITER(S): at 18:07

## 2021-08-11 RX ADMIN — DEXMEDETOMIDINE HYDROCHLORIDE IN 0.9% SODIUM CHLORIDE 3.88 MICROGRAM(S)/KG/HR: 4 INJECTION INTRAVENOUS at 03:20

## 2021-08-11 RX ADMIN — Medication 50 MILLILITER(S): at 09:55

## 2021-08-11 RX ADMIN — PANTOPRAZOLE SODIUM 40 MILLIGRAM(S): 20 TABLET, DELAYED RELEASE ORAL at 11:02

## 2021-08-11 RX ADMIN — Medication 50 MILLILITER(S): at 02:30

## 2021-08-11 RX ADMIN — FENTANYL CITRATE 25 MICROGRAM(S): 50 INJECTION INTRAVENOUS at 03:19

## 2021-08-11 RX ADMIN — Medication 250 MILLILITER(S): at 19:07

## 2021-08-11 RX ADMIN — PROPOFOL 2.33 MICROGRAM(S)/KG/MIN: 10 INJECTION, EMULSION INTRAVENOUS at 01:21

## 2021-08-11 RX ADMIN — ATORVASTATIN CALCIUM 10 MILLIGRAM(S): 80 TABLET, FILM COATED ORAL at 23:38

## 2021-08-11 RX ADMIN — Medication 200 GRAM(S): at 17:27

## 2021-08-11 RX ADMIN — CISATRACURIUM BESYLATE 10 MILLIGRAM(S): 2 INJECTION INTRAVENOUS at 19:45

## 2021-08-11 RX ADMIN — CHLORHEXIDINE GLUCONATE 5 MILLILITER(S): 213 SOLUTION TOPICAL at 19:45

## 2021-08-11 RX ADMIN — PROPOFOL 2.33 MICROGRAM(S)/KG/MIN: 10 INJECTION, EMULSION INTRAVENOUS at 07:26

## 2021-08-11 RX ADMIN — AMIODARONE HYDROCHLORIDE 100 MILLIGRAM(S): 400 TABLET ORAL at 02:49

## 2021-08-11 RX ADMIN — FENTANYL CITRATE 25 MICROGRAM(S): 50 INJECTION INTRAVENOUS at 03:49

## 2021-08-11 RX ADMIN — Medication 50 MILLILITER(S): at 09:38

## 2021-08-11 RX ADMIN — AMIODARONE HYDROCHLORIDE 600 MILLIGRAM(S): 400 TABLET ORAL at 18:30

## 2021-08-11 RX ADMIN — AMIODARONE HYDROCHLORIDE 133.33 MILLIGRAM(S): 400 TABLET ORAL at 19:40

## 2021-08-11 RX ADMIN — Medication 100 MILLIGRAM(S): at 08:28

## 2021-08-11 RX ADMIN — CISATRACURIUM BESYLATE 10 MILLIGRAM(S): 2 INJECTION INTRAVENOUS at 14:45

## 2021-08-11 RX ADMIN — Medication 100 MILLIGRAM(S): at 00:19

## 2021-08-11 RX ADMIN — CISATRACURIUM BESYLATE 10 MILLIGRAM(S): 2 INJECTION INTRAVENOUS at 14:27

## 2021-08-11 RX ADMIN — Medication 50 MILLILITER(S): at 04:23

## 2021-08-11 RX ADMIN — Medication 100 MILLIEQUIVALENT(S): at 19:41

## 2021-08-11 NOTE — DIETITIAN INITIAL EVALUATION ADULT. - ENTERAL
If unable to wean off vent support recommend Vital HP @ 30ml/hr x24hrs to provide 720mL TV, 720kcal (1142kcal w propofol), 63g pro, 602ml h2o (1.57g/kg pro/IBW), start at 20ml and increase by 10ml q6hrs to goal, monitor s/sx of intolerance

## 2021-08-11 NOTE — DIETITIAN INITIAL EVALUATION ADULT. - OTHER INFO
76F with HTN, bicuspid aortic valve, spinal stenosis, arthritis. She was evaluated for increased HERRERA for 6 months.  ECHO:  3/2021 revealed EF normal, severe AS, moderate AI. NST 4/2021 revealed transient ischemic dilatation. Cardiac cath revealed severe AS, prox circumflex 90%, OM1 70%, mid RCA 80%. CTA Chest Abdomen/ pelvis, revealed bicuspid aortic valve, mid aortic arch aneurysm, increased in size from prior imaging. She was referred to Dr. Muller for surgical evaluation and deemed a good surgical candidate. s/p AVR and CABG + Replacement of AA and Hemiarch on 8/9. 76F with HTN, bicuspid aortic valve, spinal stenosis, arthritis. She was evaluated for increased HERRERA for 6 months.  ECHO:  3/2021 revealed EF normal, severe AS, moderate AI. NST 4/2021 revealed transient ischemic dilatation. Cardiac cath revealed severe AS, prox circumflex 90%, OM1 70%, mid RCA 80%. CTA Chest Abdomen/ pelvis, revealed bicuspid aortic valve, mid aortic arch aneurysm, increased in size from prior imaging. She was referred to Dr. Muller for surgical evaluation and deemed a good surgical candidate. s/p AVR and CABG + Replacement of AA and Hemiarch on 8/9. Remains intubated on SIMV mode at this time, sedated on propofol @ 16ml/hr (422kcal), precedex, on vaso and levo for BP support, MAP 99. NPO, unclear feeding plan. No noted d/c, skin with salo 15, 3+ generalized edema, MSI, ecchymotic. NKFA per EMR. Continues to be monitored closely, see below for recs if unable to wean off vent support. Will follow per protocol.

## 2021-08-11 NOTE — DIETITIAN INITIAL EVALUATION ADULT. - ADD RECOMMEND
1. Initiate feeds as above if unable to wean 2. Manage pain prn 3. Trend wts 4. Reinforce ed 5. Monitor and replete lytes

## 2021-08-11 NOTE — PROGRESS NOTE ADULT - SUBJECTIVE AND OBJECTIVE BOX
Last echo 8/2018 with no WMAs, grade 1DD, EF 60%  -Strict I/O, daily weights  -Continue diuresis   CTICU  CRITICAL  CARE  ATTENDING:   				   Pertinent clinical, laboratory, radiographic, hemodynamic, echocardiographic, respiratory data, microbiologic data and chart were reviewed and analyzed frequently throughout the course of the day and night    Patient seen and examined with CTS/ SH attending at bedside    Pt is a 76y Female admitted for avr, cabg     HEALTH ISSUES - PROBLEM Dx:         FAMILY HISTORY:  FH: CAD (coronary artery disease) (Sibling)  CABG    Family history of CKD (chronic kidney disease) (Sibling)  ESRD    Family history of diabetes mellitus (DM) (Sibling)    PAST MEDICAL & SURGICAL HISTORY:  HTN (hypertension)    H/O aortic valve stenosis    CAD (coronary artery disease)    No significant past surgical history        14 system review was unremarkable      Vital signs, hemodynamic and respiratory parameters were reviewed from the bedside nursing flowsheet.  ICU Vital Signs Last 24 Hrs  T(C): 36.4 (11 Aug 2021 22:00), Max: 36.4 (11 Aug 2021 07:01)  T(F): 97.6 (11 Aug 2021 22:00), Max: 97.6 (11 Aug 2021 07:01)  HR: 76 (11 Aug 2021 23:00) (65 - 119)  BP: --  BP(mean): --  ABP: 134/55 (11 Aug 2021 23:00) (93/55 - 150/80)  ABP(mean): 77 (11 Aug 2021 23:00) (68 - 101)  RR: 16 (11 Aug 2021 23:00) (14 - 18)  SpO2: 97% (11 Aug 2021 23:00) (94% - 100%)    Adult Advanced Hemodynamics Last 24 Hrs  CVP(mm Hg): 11 (11 Aug 2021 23:00) (0 - 141)  CVP(cm H2O): --  CO: 4.3 (11 Aug 2021 23:00) (4 - 4.3)  CI: 2.5 (11 Aug 2021 23:00) (2.3 - 2.5)  PA: 45/31 (11 Aug 2021 19:00) (33/22 - 45/31)  PA(mean): 24 (11 Aug 2021 23:00) (24 - 39)  PCWP: --  SVR: 1226 (11 Aug 2021 23:00) (1071 - 1318)  SVRI: 2109 (11 Aug 2021 23:00) (1979 - 2292)  PVR: --  PVRI: --, ABG - ( 11 Aug 2021 16:07 )  pH, Arterial: 7.45  pH, Blood: x     /  pCO2: 32    /  pO2: 109   / HCO3: 22    / Base Excess: -1.0  /  SaO2: 99.4              Mode: AC/ CMV (Assist Control/ Continuous Mandatory Ventilation)  RR (machine): 14  TV (machine): 550  FiO2: 50  PEEP: 5  ITime: 1  MAP: 9  PIP: 20    Intake and output was reviewed and the fluid balance was calculated  Daily     Daily   I&O's Summary    10 Aug 2021 07:01  -  11 Aug 2021 07:00  --------------------------------------------------------  IN: 1332 mL / OUT: 2465 mL / NET: -1133 mL    11 Aug 2021 07:01  -  12 Aug 2021 00:28  --------------------------------------------------------  IN: 2652 mL / OUT: 3605 mL / NET: -953 mL        All lines and drain sites were assessed  Glycemic trend was reviewedCAPILLARY BLOOD GLUCOSE      POCT Blood Glucose.: 146 mg/dL (11 Aug 2021 17:05)        Exam:   Gen: NAD   Neuro: Mental status  Lungs: Auscultation of the chest reveals equal bs  Abd: soft, nt/nd  Ext: warm and well perfused  Wound: appears clean and unremarkable  Antibiotics are periop      labs  CBC Full  -  ( 11 Aug 2021 16:10 )  WBC Count : 11.89 K/uL  RBC Count : 2.92 M/uL  Hemoglobin : 9.0 g/dL  Hematocrit : 26.4 %  Platelet Count - Automated : 37 K/uL  Mean Cell Volume : 90.4 fl  Mean Cell Hemoglobin : 30.8 pg  Mean Cell Hemoglobin Concentration : 34.1 gm/dL  Auto Neutrophil # : x  Auto Lymphocyte # : x  Auto Monocyte # : x  Auto Eosinophil # : x  Auto Basophil # : x  Auto Neutrophil % : x  Auto Lymphocyte % : x  Auto Monocyte % : x  Auto Eosinophil % : x  Auto Basophil % : x    08-11    142  |  103  |  33<H>  ----------------------------<  178<H>  3.8   |  25  |  1.28    Ca    8.5      11 Aug 2021 16:10  Phos  5.5     08-11  Mg     2.7     08-11    TPro  5.5<L>  /  Alb  4.1  /  TBili  0.7  /  DBili  x   /  AST  76<H>  /  ALT  27  /  AlkPhos  79  08-11    PT/INR - ( 11 Aug 2021 16:10 )   PT: 15.1 sec;   INR: 1.27          PTT - ( 11 Aug 2021 16:10 )  PTT:65.9 sec    The current medications were reviewed   MEDICATIONS  (STANDING):  albumin human 25% IVPB 50 milliLiter(s) IV Intermittent every 10 minutes  aMIOdarone Infusion 0.5 mG/Min (16.7 mL/Hr) IV Continuous <Continuous>  aMIOdarone IVPB 150 milliGRAM(s) IV Intermittent once  aspirin  chewable 81 milliGRAM(s) Enteral Tube daily  atorvastatin 10 milliGRAM(s) Oral at bedtime  chlorhexidine 0.12% Liquid 5 milliLiter(s) Oral Mucosa two times a day  dexMEDEtomidine Infusion 0.2 MICROgram(s)/kG/Hr (3.88 mL/Hr) IV Continuous <Continuous>  dextrose 40% Gel 15 Gram(s) Oral once  dextrose 5%. 1000 milliLiter(s) (50 mL/Hr) IV Continuous <Continuous>  dextrose 5%. 1000 milliLiter(s) (100 mL/Hr) IV Continuous <Continuous>  dextrose 50% Injectable 50 milliLiter(s) IV Push every 15 minutes  dextrose 50% Injectable 25 milliLiter(s) IV Push every 15 minutes  DOBUTamine Infusion 3 MICROgram(s)/kG/Min (6.98 mL/Hr) IV Continuous <Continuous>  EPINEPHrine    Infusion 0.03 MICROgram(s)/kG/Min (8.73 mL/Hr) IV Continuous <Continuous>  furosemide Infusion 10 mG/Hr (5 mL/Hr) IV Continuous <Continuous>  glucagon  Injectable 1 milliGRAM(s) IntraMuscular once  heparin   Injectable 5000 Unit(s) SubCutaneous every 8 hours  insulin lispro (ADMELOG) corrective regimen sliding scale   SubCutaneous every 6 hours  milrinone Infusion 0.25 MICROgram(s)/kG/Min (5.82 mL/Hr) IV Continuous <Continuous>  pantoprazole  Injectable 40 milliGRAM(s) IV Push daily  propofol Infusion 5 MICROgram(s)/kG/Min (2.33 mL/Hr) IV Continuous <Continuous>  sodium chloride 0.9%. 1000 milliLiter(s) (10 mL/Hr) IV Continuous <Continuous>  vasopressin Infusion 0.015 Unit(s)/Min (0.9 mL/Hr) IV Continuous <Continuous>    MEDICATIONS  (PRN):  ALBUTerol    90 MICROgram(s) HFA Inhaler 2 Puff(s) Inhalation every 6 hours PRN Wheezing  fentaNYL    Injectable 25 MICROGram(s) IV Push every 3 hours PRN Severe Pain (7 - 10)       PROBLEM LIST/ ASSESSMENT:  HEALTH ISSUES - PROBLEM Dx:         Patient is a 76y old  Female with avr, cabg      My plan includes :  -Close hemodynamic, ventilatory and drain monitoring and management per post op routine  -Monitor for arrhythmias and monitor parameters for organ perfusion  -Monitor neurologic status  -Head of the bed should remain elevated to 45 deg .   -Chest PT and IS will be encouraged  -Monitor adequacy of oxygenation and ventilation and attempt to wean oxygen  -Nutritional goals will be met using po eventually , ensure adequate caloric intake and monitor the same  -Stress ulcer and VTE prophylaxis will be achieved    -Glycemic control is satisfactory  -Electrolytes have been repleated as necessary and wound care has been carried out. Pain control has been achieved.   -Agressive physical therapy and early mobility and ambulation goals will be met   The family was updated about the course and plan    CRITICAL CARE TIME SPENT in evaluation and management, reassessments, review and interpretation of labs and x-rays, ventilator and hemodynamic management, formulating a plan and coordinating care: ___30____ MIN.  Time does not include procedural time.      CTICU ATTENDING:      		  Jas Santos MD

## 2021-08-11 NOTE — DIETITIAN INITIAL EVALUATION ADULT. - OTHER CALCULATIONS
Ideal body weight used for calculations as pt >120% of IBW. Nutrient needs based on [facility] standards of care for maintenance in adults, adjusted for age, post-op needs, vent

## 2021-08-12 LAB
ALBUMIN SERPL ELPH-MCNC: 4.1 G/DL — SIGNIFICANT CHANGE UP (ref 3.3–5)
ALP SERPL-CCNC: 61 U/L — SIGNIFICANT CHANGE UP (ref 40–120)
ALP SERPL-CCNC: 62 U/L — SIGNIFICANT CHANGE UP (ref 40–120)
ALP SERPL-CCNC: 69 U/L — SIGNIFICANT CHANGE UP (ref 40–120)
ALT FLD-CCNC: 14 U/L — SIGNIFICANT CHANGE UP (ref 10–45)
ALT FLD-CCNC: 19 U/L — SIGNIFICANT CHANGE UP (ref 10–45)
ALT FLD-CCNC: 24 U/L — SIGNIFICANT CHANGE UP (ref 10–45)
ANION GAP SERPL CALC-SCNC: 13 MMOL/L — SIGNIFICANT CHANGE UP (ref 5–17)
ANION GAP SERPL CALC-SCNC: 13 MMOL/L — SIGNIFICANT CHANGE UP (ref 5–17)
ANION GAP SERPL CALC-SCNC: 15 MMOL/L — SIGNIFICANT CHANGE UP (ref 5–17)
ANION GAP SERPL CALC-SCNC: 16 MMOL/L — SIGNIFICANT CHANGE UP (ref 5–17)
APTT BLD: 32.3 SEC — SIGNIFICANT CHANGE UP (ref 27.5–35.5)
APTT BLD: 32.8 SEC — SIGNIFICANT CHANGE UP (ref 27.5–35.5)
APTT BLD: 32.9 SEC — SIGNIFICANT CHANGE UP (ref 27.5–35.5)
APTT BLD: 36.1 SEC — HIGH (ref 27.5–35.5)
AST SERPL-CCNC: 66 U/L — HIGH (ref 10–40)
AST SERPL-CCNC: 69 U/L — HIGH (ref 10–40)
AST SERPL-CCNC: 79 U/L — HIGH (ref 10–40)
BASE EXCESS BLDV CALC-SCNC: -0.2 MMOL/L — SIGNIFICANT CHANGE UP (ref -2–3)
BASE EXCESS BLDV CALC-SCNC: 1 MMOL/L — SIGNIFICANT CHANGE UP (ref -2–3)
BASE EXCESS BLDV CALC-SCNC: 1.7 MMOL/L — SIGNIFICANT CHANGE UP (ref -2–3)
BASE EXCESS BLDV CALC-SCNC: 2.6 MMOL/L — SIGNIFICANT CHANGE UP (ref -2–3)
BILIRUB SERPL-MCNC: 0.4 MG/DL — SIGNIFICANT CHANGE UP (ref 0.2–1.2)
BILIRUB SERPL-MCNC: 0.5 MG/DL — SIGNIFICANT CHANGE UP (ref 0.2–1.2)
BILIRUB SERPL-MCNC: 0.6 MG/DL — SIGNIFICANT CHANGE UP (ref 0.2–1.2)
BUN SERPL-MCNC: 39 MG/DL — HIGH (ref 7–23)
BUN SERPL-MCNC: 41 MG/DL — HIGH (ref 7–23)
BUN SERPL-MCNC: 44 MG/DL — HIGH (ref 7–23)
BUN SERPL-MCNC: 46 MG/DL — HIGH (ref 7–23)
CA-I SERPL-SCNC: 1.11 MMOL/L — LOW (ref 1.15–1.33)
CA-I SERPL-SCNC: 1.18 MMOL/L — SIGNIFICANT CHANGE UP (ref 1.15–1.33)
CA-I SERPL-SCNC: 1.21 MMOL/L — SIGNIFICANT CHANGE UP (ref 1.15–1.33)
CALCIUM SERPL-MCNC: 8.7 MG/DL — SIGNIFICANT CHANGE UP (ref 8.4–10.5)
CALCIUM SERPL-MCNC: 9.2 MG/DL — SIGNIFICANT CHANGE UP (ref 8.4–10.5)
CALCIUM SERPL-MCNC: 9.2 MG/DL — SIGNIFICANT CHANGE UP (ref 8.4–10.5)
CALCIUM SERPL-MCNC: 9.4 MG/DL — SIGNIFICANT CHANGE UP (ref 8.4–10.5)
CHLORIDE SERPL-SCNC: 100 MMOL/L — SIGNIFICANT CHANGE UP (ref 96–108)
CHLORIDE SERPL-SCNC: 101 MMOL/L — SIGNIFICANT CHANGE UP (ref 96–108)
CHLORIDE SERPL-SCNC: 102 MMOL/L — SIGNIFICANT CHANGE UP (ref 96–108)
CHLORIDE SERPL-SCNC: 99 MMOL/L — SIGNIFICANT CHANGE UP (ref 96–108)
CO2 BLDV-SCNC: 26.1 MMOL/L — HIGH (ref 22–26)
CO2 BLDV-SCNC: 27.3 MMOL/L — HIGH (ref 22–26)
CO2 BLDV-SCNC: 27.9 MMOL/L — HIGH (ref 22–26)
CO2 BLDV-SCNC: 28.5 MMOL/L — HIGH (ref 22–26)
CO2 SERPL-SCNC: 24 MMOL/L — SIGNIFICANT CHANGE UP (ref 22–31)
CO2 SERPL-SCNC: 25 MMOL/L — SIGNIFICANT CHANGE UP (ref 22–31)
CREAT SERPL-MCNC: 1.75 MG/DL — HIGH (ref 0.5–1.3)
CREAT SERPL-MCNC: 1.82 MG/DL — HIGH (ref 0.5–1.3)
CREAT SERPL-MCNC: 2.14 MG/DL — HIGH (ref 0.5–1.3)
CREAT SERPL-MCNC: 2.29 MG/DL — HIGH (ref 0.5–1.3)
GAS PNL BLDA: SIGNIFICANT CHANGE UP
GAS PNL BLDV: 136 MMOL/L — SIGNIFICANT CHANGE UP (ref 136–145)
GAS PNL BLDV: 137 MMOL/L — SIGNIFICANT CHANGE UP (ref 136–145)
GAS PNL BLDV: 137 MMOL/L — SIGNIFICANT CHANGE UP (ref 136–145)
GAS PNL BLDV: SIGNIFICANT CHANGE UP
GLUCOSE BLDC GLUCOMTR-MCNC: 116 MG/DL — HIGH (ref 70–99)
GLUCOSE BLDC GLUCOMTR-MCNC: 120 MG/DL — HIGH (ref 70–99)
GLUCOSE BLDC GLUCOMTR-MCNC: 145 MG/DL — HIGH (ref 70–99)
GLUCOSE BLDC GLUCOMTR-MCNC: 167 MG/DL — HIGH (ref 70–99)
GLUCOSE BLDC GLUCOMTR-MCNC: 219 MG/DL — HIGH (ref 70–99)
GLUCOSE SERPL-MCNC: 128 MG/DL — HIGH (ref 70–99)
GLUCOSE SERPL-MCNC: 145 MG/DL — HIGH (ref 70–99)
GLUCOSE SERPL-MCNC: 172 MG/DL — HIGH (ref 70–99)
GLUCOSE SERPL-MCNC: 194 MG/DL — HIGH (ref 70–99)
HCO3 BLDV-SCNC: 25 MMOL/L — SIGNIFICANT CHANGE UP (ref 22–29)
HCO3 BLDV-SCNC: 26 MMOL/L — SIGNIFICANT CHANGE UP (ref 22–29)
HCO3 BLDV-SCNC: 27 MMOL/L — SIGNIFICANT CHANGE UP (ref 22–29)
HCO3 BLDV-SCNC: 27 MMOL/L — SIGNIFICANT CHANGE UP (ref 22–29)
HCT VFR BLD CALC: 23 % — LOW (ref 34.5–45)
HCT VFR BLD CALC: 25.9 % — LOW (ref 34.5–45)
HCT VFR BLD CALC: 26.9 % — LOW (ref 34.5–45)
HCT VFR BLD CALC: 27.2 % — LOW (ref 34.5–45)
HGB BLD-MCNC: 7.9 G/DL — LOW (ref 11.5–15.5)
HGB BLD-MCNC: 8.8 G/DL — LOW (ref 11.5–15.5)
HGB BLD-MCNC: 8.8 G/DL — LOW (ref 11.5–15.5)
HGB BLD-MCNC: 9.2 G/DL — LOW (ref 11.5–15.5)
INR BLD: 1.08 — SIGNIFICANT CHANGE UP (ref 0.88–1.16)
INR BLD: 1.08 — SIGNIFICANT CHANGE UP (ref 0.88–1.16)
INR BLD: 1.21 — HIGH (ref 0.88–1.16)
INR BLD: 1.26 — HIGH (ref 0.88–1.16)
LACTATE SERPL-SCNC: 1.5 MMOL/L — SIGNIFICANT CHANGE UP (ref 0.5–2)
LACTATE SERPL-SCNC: 1.5 MMOL/L — SIGNIFICANT CHANGE UP (ref 0.5–2)
LACTATE SERPL-SCNC: 1.6 MMOL/L — SIGNIFICANT CHANGE UP (ref 0.5–2)
LACTATE SERPL-SCNC: 2.8 MMOL/L — HIGH (ref 0.5–2)
MAGNESIUM SERPL-MCNC: 2.4 MG/DL — SIGNIFICANT CHANGE UP (ref 1.6–2.6)
MAGNESIUM SERPL-MCNC: 2.5 MG/DL — SIGNIFICANT CHANGE UP (ref 1.6–2.6)
MAGNESIUM SERPL-MCNC: 2.5 MG/DL — SIGNIFICANT CHANGE UP (ref 1.6–2.6)
MAGNESIUM SERPL-MCNC: 2.6 MG/DL — SIGNIFICANT CHANGE UP (ref 1.6–2.6)
MCHC RBC-ENTMCNC: 29.6 PG — SIGNIFICANT CHANGE UP (ref 27–34)
MCHC RBC-ENTMCNC: 30.3 PG — SIGNIFICANT CHANGE UP (ref 27–34)
MCHC RBC-ENTMCNC: 30.4 PG — SIGNIFICANT CHANGE UP (ref 27–34)
MCHC RBC-ENTMCNC: 30.9 PG — SIGNIFICANT CHANGE UP (ref 27–34)
MCHC RBC-ENTMCNC: 32.7 GM/DL — SIGNIFICANT CHANGE UP (ref 32–36)
MCHC RBC-ENTMCNC: 33.8 GM/DL — SIGNIFICANT CHANGE UP (ref 32–36)
MCHC RBC-ENTMCNC: 34 GM/DL — SIGNIFICANT CHANGE UP (ref 32–36)
MCHC RBC-ENTMCNC: 34.3 GM/DL — SIGNIFICANT CHANGE UP (ref 32–36)
MCV RBC AUTO: 89.3 FL — SIGNIFICANT CHANGE UP (ref 80–100)
MCV RBC AUTO: 89.8 FL — SIGNIFICANT CHANGE UP (ref 80–100)
MCV RBC AUTO: 89.8 FL — SIGNIFICANT CHANGE UP (ref 80–100)
MCV RBC AUTO: 90.6 FL — SIGNIFICANT CHANGE UP (ref 80–100)
NRBC # BLD: 0 /100 WBCS — SIGNIFICANT CHANGE UP (ref 0–0)
PCO2 BLDV: 41 MMHG — SIGNIFICANT CHANGE UP (ref 39–42)
PCO2 BLDV: 41 MMHG — SIGNIFICANT CHANGE UP (ref 39–42)
PCO2 BLDV: 42 MMHG — SIGNIFICANT CHANGE UP (ref 39–42)
PCO2 BLDV: 42 MMHG — SIGNIFICANT CHANGE UP (ref 39–42)
PH BLDV: 7.39 — SIGNIFICANT CHANGE UP (ref 7.32–7.43)
PH BLDV: 7.4 — SIGNIFICANT CHANGE UP (ref 7.32–7.43)
PH BLDV: 7.41 — SIGNIFICANT CHANGE UP (ref 7.32–7.43)
PH BLDV: 7.43 — SIGNIFICANT CHANGE UP (ref 7.32–7.43)
PHOSPHATE SERPL-MCNC: 4.3 MG/DL — SIGNIFICANT CHANGE UP (ref 2.5–4.5)
PHOSPHATE SERPL-MCNC: 4.7 MG/DL — HIGH (ref 2.5–4.5)
PHOSPHATE SERPL-MCNC: 5.1 MG/DL — HIGH (ref 2.5–4.5)
PLATELET # BLD AUTO: 36 K/UL — LOW (ref 150–400)
PLATELET # BLD AUTO: 39 K/UL — LOW (ref 150–400)
PLATELET # BLD AUTO: 49 K/UL — LOW (ref 150–400)
PLATELET # BLD AUTO: 55 K/UL — LOW (ref 150–400)
PO2 BLDV: 36 MMHG — SIGNIFICANT CHANGE UP
PO2 BLDV: 36 MMHG — SIGNIFICANT CHANGE UP
PO2 BLDV: 41 MMHG — SIGNIFICANT CHANGE UP
PO2 BLDV: 47 MMHG — SIGNIFICANT CHANGE UP
POTASSIUM BLDV-SCNC: 3.1 MMOL/L — LOW (ref 3.5–5.1)
POTASSIUM BLDV-SCNC: 3.2 MMOL/L — LOW (ref 3.5–5.1)
POTASSIUM BLDV-SCNC: 3.5 MMOL/L — SIGNIFICANT CHANGE UP (ref 3.5–5.1)
POTASSIUM SERPL-MCNC: 3.4 MMOL/L — LOW (ref 3.5–5.3)
POTASSIUM SERPL-MCNC: 3.6 MMOL/L — SIGNIFICANT CHANGE UP (ref 3.5–5.3)
POTASSIUM SERPL-MCNC: 3.6 MMOL/L — SIGNIFICANT CHANGE UP (ref 3.5–5.3)
POTASSIUM SERPL-MCNC: 3.7 MMOL/L — SIGNIFICANT CHANGE UP (ref 3.5–5.3)
POTASSIUM SERPL-SCNC: 3.4 MMOL/L — LOW (ref 3.5–5.3)
POTASSIUM SERPL-SCNC: 3.6 MMOL/L — SIGNIFICANT CHANGE UP (ref 3.5–5.3)
POTASSIUM SERPL-SCNC: 3.6 MMOL/L — SIGNIFICANT CHANGE UP (ref 3.5–5.3)
POTASSIUM SERPL-SCNC: 3.7 MMOL/L — SIGNIFICANT CHANGE UP (ref 3.5–5.3)
PROT SERPL-MCNC: 5.1 G/DL — LOW (ref 6–8.3)
PROT SERPL-MCNC: 5.5 G/DL — LOW (ref 6–8.3)
PROT SERPL-MCNC: 5.6 G/DL — LOW (ref 6–8.3)
PROTHROM AB SERPL-ACNC: 12.9 SEC — SIGNIFICANT CHANGE UP (ref 10.6–13.6)
PROTHROM AB SERPL-ACNC: 12.9 SEC — SIGNIFICANT CHANGE UP (ref 10.6–13.6)
PROTHROM AB SERPL-ACNC: 14.4 SEC — HIGH (ref 10.6–13.6)
PROTHROM AB SERPL-ACNC: 14.9 SEC — HIGH (ref 10.6–13.6)
RBC # BLD: 2.56 M/UL — LOW (ref 3.8–5.2)
RBC # BLD: 2.9 M/UL — LOW (ref 3.8–5.2)
RBC # BLD: 2.97 M/UL — LOW (ref 3.8–5.2)
RBC # BLD: 3.03 M/UL — LOW (ref 3.8–5.2)
RBC # FLD: 14.8 % — HIGH (ref 10.3–14.5)
RBC # FLD: 14.8 % — HIGH (ref 10.3–14.5)
RBC # FLD: 14.9 % — HIGH (ref 10.3–14.5)
RBC # FLD: 15 % — HIGH (ref 10.3–14.5)
SAO2 % BLDV: 62.7 % — SIGNIFICANT CHANGE UP
SAO2 % BLDV: 65 % — SIGNIFICANT CHANGE UP
SAO2 % BLDV: 71.6 % — SIGNIFICANT CHANGE UP
SAO2 % BLDV: 78.6 % — SIGNIFICANT CHANGE UP
SODIUM SERPL-SCNC: 138 MMOL/L — SIGNIFICANT CHANGE UP (ref 135–145)
SODIUM SERPL-SCNC: 139 MMOL/L — SIGNIFICANT CHANGE UP (ref 135–145)
SODIUM SERPL-SCNC: 139 MMOL/L — SIGNIFICANT CHANGE UP (ref 135–145)
SODIUM SERPL-SCNC: 142 MMOL/L — SIGNIFICANT CHANGE UP (ref 135–145)
WBC # BLD: 12.18 K/UL — HIGH (ref 3.8–10.5)
WBC # BLD: 14.5 K/UL — HIGH (ref 3.8–10.5)
WBC # BLD: 16.25 K/UL — HIGH (ref 3.8–10.5)
WBC # BLD: 18.59 K/UL — HIGH (ref 3.8–10.5)
WBC # FLD AUTO: 12.18 K/UL — HIGH (ref 3.8–10.5)
WBC # FLD AUTO: 14.5 K/UL — HIGH (ref 3.8–10.5)
WBC # FLD AUTO: 16.25 K/UL — HIGH (ref 3.8–10.5)
WBC # FLD AUTO: 18.59 K/UL — HIGH (ref 3.8–10.5)

## 2021-08-12 PROCEDURE — 99292 CRITICAL CARE ADDL 30 MIN: CPT

## 2021-08-12 PROCEDURE — 99291 CRITICAL CARE FIRST HOUR: CPT

## 2021-08-12 PROCEDURE — 71045 X-RAY EXAM CHEST 1 VIEW: CPT | Mod: 26

## 2021-08-12 RX ORDER — POTASSIUM CHLORIDE 20 MEQ
20 PACKET (EA) ORAL ONCE
Refills: 0 | Status: COMPLETED | OUTPATIENT
Start: 2021-08-12 | End: 2021-08-12

## 2021-08-12 RX ORDER — CALCIUM GLUCONATE 100 MG/ML
2 VIAL (ML) INTRAVENOUS ONCE
Refills: 0 | Status: COMPLETED | OUTPATIENT
Start: 2021-08-12 | End: 2021-08-12

## 2021-08-12 RX ORDER — DOBUTAMINE HCL 250MG/20ML
3 VIAL (ML) INTRAVENOUS
Qty: 1000 | Refills: 0 | Status: DISCONTINUED | OUTPATIENT
Start: 2021-08-12 | End: 2021-08-13

## 2021-08-12 RX ORDER — ACETAMINOPHEN 500 MG
1000 TABLET ORAL ONCE
Refills: 0 | Status: DISCONTINUED | OUTPATIENT
Start: 2021-08-12 | End: 2021-08-12

## 2021-08-12 RX ORDER — PHENYLEPHRINE HYDROCHLORIDE 10 MG/ML
1 INJECTION INTRAVENOUS
Qty: 160 | Refills: 0 | Status: DISCONTINUED | OUTPATIENT
Start: 2021-08-12 | End: 2021-08-14

## 2021-08-12 RX ORDER — ACETAMINOPHEN 500 MG
1000 TABLET ORAL ONCE
Refills: 0 | Status: COMPLETED | OUTPATIENT
Start: 2021-08-12 | End: 2021-08-12

## 2021-08-12 RX ORDER — ALBUMIN HUMAN 25 %
250 VIAL (ML) INTRAVENOUS
Refills: 0 | Status: COMPLETED | OUTPATIENT
Start: 2021-08-12 | End: 2021-08-12

## 2021-08-12 RX ORDER — AMIODARONE HYDROCHLORIDE 400 MG/1
1 TABLET ORAL
Qty: 900 | Refills: 0 | Status: DISCONTINUED | OUTPATIENT
Start: 2021-08-12 | End: 2021-08-13

## 2021-08-12 RX ORDER — POTASSIUM CHLORIDE 20 MEQ
20 PACKET (EA) ORAL
Refills: 0 | Status: COMPLETED | OUTPATIENT
Start: 2021-08-12 | End: 2021-08-12

## 2021-08-12 RX ORDER — PHENYLEPHRINE HYDROCHLORIDE 10 MG/ML
0.1 INJECTION INTRAVENOUS
Qty: 40 | Refills: 0 | Status: DISCONTINUED | OUTPATIENT
Start: 2021-08-12 | End: 2021-08-12

## 2021-08-12 RX ADMIN — Medication 100 MILLIEQUIVALENT(S): at 06:56

## 2021-08-12 RX ADMIN — FENTANYL CITRATE 25 MICROGRAM(S): 50 INJECTION INTRAVENOUS at 19:18

## 2021-08-12 RX ADMIN — Medication 125 MILLILITER(S): at 03:31

## 2021-08-12 RX ADMIN — Medication 5 MG/HR: at 09:24

## 2021-08-12 RX ADMIN — Medication 100 MILLIEQUIVALENT(S): at 05:37

## 2021-08-12 RX ADMIN — FENTANYL CITRATE 25 MICROGRAM(S): 50 INJECTION INTRAVENOUS at 00:55

## 2021-08-12 RX ADMIN — Medication 100 MILLIEQUIVALENT(S): at 20:13

## 2021-08-12 RX ADMIN — FENTANYL CITRATE 25 MICROGRAM(S): 50 INJECTION INTRAVENOUS at 20:30

## 2021-08-12 RX ADMIN — Medication 125 MILLILITER(S): at 03:37

## 2021-08-12 RX ADMIN — Medication 200 GRAM(S): at 04:41

## 2021-08-12 RX ADMIN — PROPOFOL 2.33 MICROGRAM(S)/KG/MIN: 10 INJECTION, EMULSION INTRAVENOUS at 08:45

## 2021-08-12 RX ADMIN — AMIODARONE HYDROCHLORIDE 33.3 MG/MIN: 400 TABLET ORAL at 05:38

## 2021-08-12 RX ADMIN — PANTOPRAZOLE SODIUM 40 MILLIGRAM(S): 20 TABLET, DELAYED RELEASE ORAL at 11:42

## 2021-08-12 RX ADMIN — Medication 1000 MILLIGRAM(S): at 23:30

## 2021-08-12 RX ADMIN — MILRINONE LACTATE 5.82 MICROGRAM(S)/KG/MIN: 1 INJECTION, SOLUTION INTRAVENOUS at 22:45

## 2021-08-12 RX ADMIN — PROPOFOL 2.33 MICROGRAM(S)/KG/MIN: 10 INJECTION, EMULSION INTRAVENOUS at 15:02

## 2021-08-12 RX ADMIN — Medication 81 MILLIGRAM(S): at 11:42

## 2021-08-12 RX ADMIN — ATORVASTATIN CALCIUM 10 MILLIGRAM(S): 80 TABLET, FILM COATED ORAL at 22:45

## 2021-08-12 RX ADMIN — PROPOFOL 2.33 MICROGRAM(S)/KG/MIN: 10 INJECTION, EMULSION INTRAVENOUS at 22:45

## 2021-08-12 RX ADMIN — CHLORHEXIDINE GLUCONATE 5 MILLILITER(S): 213 SOLUTION TOPICAL at 06:58

## 2021-08-12 RX ADMIN — FENTANYL CITRATE 25 MICROGRAM(S): 50 INJECTION INTRAVENOUS at 00:39

## 2021-08-12 RX ADMIN — Medication 100 MILLIEQUIVALENT(S): at 13:34

## 2021-08-12 RX ADMIN — Medication 100 MILLIEQUIVALENT(S): at 03:32

## 2021-08-12 RX ADMIN — Medication 400 MILLIGRAM(S): at 23:01

## 2021-08-12 RX ADMIN — PHENYLEPHRINE HYDROCHLORIDE 14.6 MICROGRAM(S)/KG/MIN: 10 INJECTION INTRAVENOUS at 18:38

## 2021-08-12 RX ADMIN — PHENYLEPHRINE HYDROCHLORIDE 2.91 MICROGRAM(S)/KG/MIN: 10 INJECTION INTRAVENOUS at 15:39

## 2021-08-12 RX ADMIN — FENTANYL CITRATE 25 MICROGRAM(S): 50 INJECTION INTRAVENOUS at 14:00

## 2021-08-12 RX ADMIN — PROPOFOL 2.33 MICROGRAM(S)/KG/MIN: 10 INJECTION, EMULSION INTRAVENOUS at 03:33

## 2021-08-12 RX ADMIN — Medication 4: at 00:38

## 2021-08-12 RX ADMIN — Medication 2: at 06:46

## 2021-08-12 RX ADMIN — Medication 3.49 MICROGRAM(S)/KG/MIN: at 18:36

## 2021-08-12 RX ADMIN — FENTANYL CITRATE 25 MICROGRAM(S): 50 INJECTION INTRAVENOUS at 13:44

## 2021-08-12 RX ADMIN — CHLORHEXIDINE GLUCONATE 5 MILLILITER(S): 213 SOLUTION TOPICAL at 18:36

## 2021-08-12 NOTE — PROGRESS NOTE ADULT - SUBJECTIVE AND OBJECTIVE BOX
CTICU  CRITICAL  CARE  attending     Hand off received 					   Pertinent clinical, laboratory, radiographic, hemodynamic, echocardiographic, respiratory data, microbiologic data and chart were reviewed and analyzed frequently throughout the course of the day and night        76 years old female with HTN, bicuspid aortic valve, spinal stenosis, arthritis.  She was evaluated for increased HERRERA for 6 months.   ECHO:  3/2021 revealed EF normal, severe AS, moderate AI.   NST 4/2021 revealed transient ischemic dilatation.   Cardiac cath revealed severe AS, prox circumflex 90%, OM1 70%, mid RCA 80%.   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  She was referred to Dr. Muller for surgical evaluation and deemed a good surgical candidate.     S/P AVR and CABG  S/P Replacement of AA and Hemiarch.        FAMILY HISTORY:  FH: CAD (coronary artery disease) (Sibling) (CABG)  Family history of CKD (chronic kidney disease) (Sibling) ESRD  Family history of diabetes mellitus (DM) (Sibling)    PAST MEDICAL & SURGICAL HISTORY:  HTN (hypertension)  H/O aortic valve stenosis  CAD (coronary artery disease)  No significant past surgical history        14 system review was unremarkable    Vital signs, hemodynamic and respiratory parameters were reviewed from the bedside nursing flow sheet.  ICU Vital Signs Last 24 Hrs  T(C): 37.4 (12 Aug 2021 19:00), Max: 37.9 (12 Aug 2021 15:00)  T(F): 99.3 (12 Aug 2021 19:00), Max: 100.2 (12 Aug 2021 15:00)  HR: 64 (12 Aug 2021 21:29) (63 - 82)  BP: --  BP(mean): --  ABP: 155/59 (12 Aug 2021 20:00) (96/41 - 159/61)  ABP(mean): 87 (12 Aug 2021 20:00) (57 - 89)  RR: 18 (12 Aug 2021 21:29) (14 - 20)  SpO2: 100% (12 Aug 2021 21:29) (97% - 100%)    Adult Advanced Hemodynamics Last 24 Hrs  CVP(mm Hg): 13 (12 Aug 2021 20:00) (8 - 16)  CVP(cm H2O): --  CO: 4.4 (12 Aug 2021 19:00) (4.2 - 5.3)  CI: 2.6 (12 Aug 2021 19:00) (2.4 - 3.1)  PA: 46/17 (12 Aug 2021 19:00) (35/17 - 46/26)  PA(mean): 30 (12 Aug 2021 20:00) (21 - 146)  PCWP: --  SVR: 1289 (12 Aug 2021 19:00) (752 - 1289)  SVRI: 2181 (12 Aug 2021 19:00) (1305 - 2181)  PVR: --  PVRI: --, ABG - ( 12 Aug 2021 18:18 )  pH, Arterial: 7.43  pH, Blood: x     /  pCO2: 38    /  pO2: 110   / HCO3: 25    / Base Excess: 1.0   /  SaO2: 99.2              Mode: AC/ CMV (Assist Control/ Continuous Mandatory Ventilation)  RR (machine): 14  TV (machine): 550  FiO2: 50  PEEP: 5  ITime: 1  MAP: 12  PIP: 28    Intake and output was reviewed and the fluid balance was calculated  Daily     Daily   I&O's Summary    11 Aug 2021 07:01  -  12 Aug 2021 07:00  --------------------------------------------------------  IN: 4573.3 mL / OUT: 4760 mL / NET: -186.7 mL    12 Aug 2021 07:01  -  12 Aug 2021 22:25  --------------------------------------------------------  IN: 1917.3 mL / OUT: 1685 mL / NET: 232.3 mL        All lines and drain sites were assessed.      Neuro: Follows commands. Moves all 4 extremities.  Neck: No JVD.  CVS: S1, S2, No S3.  Lungs: Good air entry bilaterally.  Abd: Soft. No tenderness. + Bowel sounds.  Vascular: + DP/PT.  Extremities: No edema.  Lymphatic: Normal.  Skin: No abnormalities.      labs  CBC Full  -  ( 12 Aug 2021 18:21 )  WBC Count : 16.25 K/uL  RBC Count : 3.03 M/uL  Hemoglobin : 9.2 g/dL  Hematocrit : 27.2 %  Platelet Count - Automated : 49 K/uL  Mean Cell Volume : 89.8 fl  Mean Cell Hemoglobin : 30.4 pg  Mean Cell Hemoglobin Concentration : 33.8 gm/dL  Auto Neutrophil # : x  Auto Lymphocyte # : x  Auto Monocyte # : x  Auto Eosinophil # : x  Auto Basophil # : x  Auto Neutrophil % : x  Auto Lymphocyte % : x  Auto Monocyte % : x  Auto Eosinophil % : x  Auto Basophil % : x    08-12    142  |  102  |  46<H>  ----------------------------<  128<H>  3.6   |  25  |  2.29<H>    Ca    9.2      12 Aug 2021 18:21  Phos  4.3     08-12  Mg     2.6     08-12    TPro  5.6<L>  /  Alb  4.1  /  TBili  0.6  /  DBili  x   /  AST  66<H>  /  ALT  14  /  AlkPhos  62  08-12    PT/INR - ( 12 Aug 2021 18:21 )   PT: 12.9 sec;   INR: 1.08          PTT - ( 12 Aug 2021 18:21 )  PTT:32.3 sec  The current medications were reviewed   MEDICATIONS  (STANDING):  acetaminophen  IVPB .. 1000 milliGRAM(s) IV Intermittent once  albumin human 25% IVPB 50 milliLiter(s) IV Intermittent every 10 minutes  aMIOdarone Infusion 1 mG/Min (33.3 mL/Hr) IV Continuous <Continuous>  aspirin  chewable 81 milliGRAM(s) Enteral Tube daily  atorvastatin 10 milliGRAM(s) Oral at bedtime  chlorhexidine 0.12% Liquid 5 milliLiter(s) Oral Mucosa two times a day  dextrose 40% Gel 15 Gram(s) Oral once  dextrose 5%. 1000 milliLiter(s) (50 mL/Hr) IV Continuous <Continuous>  dextrose 5%. 1000 milliLiter(s) (100 mL/Hr) IV Continuous <Continuous>  dextrose 50% Injectable 50 milliLiter(s) IV Push every 15 minutes  dextrose 50% Injectable 25 milliLiter(s) IV Push every 15 minutes  DOBUTamine Infusion 3 MICROgram(s)/kG/Min (3.49 mL/Hr) IV Continuous <Continuous>  furosemide Infusion 10 mG/Hr (5 mL/Hr) IV Continuous <Continuous>  glucagon  Injectable 1 milliGRAM(s) IntraMuscular once  insulin lispro (ADMELOG) corrective regimen sliding scale   SubCutaneous every 6 hours  milrinone Infusion 0.25 MICROgram(s)/kG/Min (5.82 mL/Hr) IV Continuous <Continuous>  pantoprazole  Injectable 40 milliGRAM(s) IV Push daily  phenylephrine    Infusion 1 MICROgram(s)/kG/Min (14.6 mL/Hr) IV Continuous <Continuous>  propofol Infusion 5 MICROgram(s)/kG/Min (2.33 mL/Hr) IV Continuous <Continuous>  sodium chloride 0.9%. 1000 milliLiter(s) (10 mL/Hr) IV Continuous <Continuous>  vasopressin Infusion 0.015 Unit(s)/Min (0.9 mL/Hr) IV Continuous <Continuous>    MEDICATIONS  (PRN):  ALBUTerol    90 MICROgram(s) HFA Inhaler 2 Puff(s) Inhalation every 6 hours PRN Wheezing        76y old  Female with CAD, aortic stenosis, bicuspid aortic valve and dilated AA.  S/P AVR  S/P CABG x 2.  S/P Replacement of AA and Hemiarch.  S/P Aorta to left subclavian bypass.  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    Intraop: 7 U pRBC/2 plt/2 FFP/1000 FEIBA; 3 L Crystalloid  arrived to ICU on levophed   AV paced 80 - underlying native rhythm reported asystole immediate postoperative period.  initial LA 4 - volume (albumin given ) and normalized - last 1.9  overnight - titrated off infusions; not requiring pressor support   Renal Failure  Hemodynamically stable. (Sinus Rhythm).  Good oxygenation.  Fair urine out put.        My plan includes :  WEAN to Extubate  Low dose IV amiodarone.  WEAN inotropes as tolerated  Statin Rx.  Close hemodynamic, ventilatory and drain monitoring and management  Monitor for arrhythmias and monitor parameters for organ perfusion  Monitor neurologic status  Monitor renal function.  Head of the bed should remain elevated to 45 deg .   Chest PT and IS will be encouraged  Monitor adequacy of oxygenation and ventilation and attempt to wean oxygen  Nutritional goals will be met using po eventually , ensure adequate caloric intake and monitor the same  Stress ulcer and VTE prophylaxis will be achieved    Glycemic control is satisfactory  Electrolytes have been repleted as necessary and wound care has been carried out. Pain control has been achieved.   Aggressive physical therapy and early mobility and ambulation goals will be met   The family was updated about the course and plan  CRITICAL CARE TIME SPENT in evaluation and management, reassessments, review and interpretation of labs and x-rays, ventilator and hemodynamic management, formulating a plan and coordinating care: ___30____ MIN.  Time does not include procedural time.  CTICU ATTENDING     					    Lex Gudino MD

## 2021-08-12 NOTE — PROGRESS NOTE ADULT - SUBJECTIVE AND OBJECTIVE BOX
CTICU  CRITICAL  CARE  attending     Hand off received 					   Pertinent clinical, laboratory, radiographic, hemodynamic, echocardiographic, respiratory data, microbiologic data and chart were reviewed and analyzed frequently throughout the course of the day and night  Patient seen and examined with CTS/ SH attending at bedside          , FAMILY HISTORY:  FH: CAD (coronary artery disease) (Sibling)  CABG    Family history of CKD (chronic kidney disease) (Sibling)  ESRD    Family history of diabetes mellitus (DM) (Sibling)    PAST MEDICAL & SURGICAL HISTORY:  HTN (hypertension)    H/O aortic valve stenosis    CAD (coronary artery disease)    No significant past surgical history      Patient is a 76y old  Female who presents with a chief complaint of     14 system review was unremarkable  acute changes include acute respiratory failure  Vital signs, hemodynamic and respiratory parameters were reviewed from the bedside nursing flowsheet.  ICU Vital Signs Last 24 Hrs  T(C): 37.9 (12 Aug 2021 16:00), Max: 37.9 (12 Aug 2021 15:00)  T(F): 100.2 (12 Aug 2021 16:00), Max: 100.2 (12 Aug 2021 15:00)  HR: 76 (12 Aug 2021 16:00) (63 - 119)  BP: --  BP(mean): --  ABP: 158/59 (12 Aug 2021 16:00) (96/41 - 158/59)  ABP(mean): 89 (12 Aug 2021 16:00) (57 - 101)  RR: 14 (12 Aug 2021 16:00) (14 - 20)  SpO2: 98% (12 Aug 2021 16:00) (94% - 100%)    Adult Advanced Hemodynamics Last 24 Hrs  CVP(mm Hg): 14 (12 Aug 2021 16:00) (8 - 21)  CVP(cm H2O): --  CO: 5.3 (12 Aug 2021 15:00) (4 - 5.3)  CI: 3.1 (12 Aug 2021 15:00) (2.3 - 3.1)  PA: 45/19 (12 Aug 2021 13:00) (33/22 - 45/31)  PA(mean): 31 (12 Aug 2021 16:00) (21 - 146)  PCWP: --  SVR: 799 (12 Aug 2021 15:00) (752 - 1318)  SVRI: 1366 (12 Aug 2021 15:00) (1305 - 2292)  PVR: --  PVRI: --, ABG - ( 12 Aug 2021 12:39 )  pH, Arterial: 7.46  pH, Blood: x     /  pCO2: 36    /  pO2: 133   / HCO3: 26    / Base Excess: 2.0   /  SaO2: 98.9              Mode: AC/ CMV (Assist Control/ Continuous Mandatory Ventilation)  RR (machine): 14  TV (machine): 550  FiO2: 50  PEEP: 5  ITime: 1  MAP: 10  PIP: 22    Intake and output was reviewed and the fluid balance was calculated  Daily     Daily   I&O's Summary    11 Aug 2021 07:01  -  12 Aug 2021 07:00  --------------------------------------------------------  IN: 4573.3 mL / OUT: 4760 mL / NET: -186.7 mL    12 Aug 2021 07:01  -  12 Aug 2021 16:21  --------------------------------------------------------  IN: 1322.5 mL / OUT: 805 mL / NET: 517.5 mL        All lines and drain sites were assessed  [facility]      POCT Blood Glucose.: 145 mg/dL (12 Aug 2021 12:34)    No acute change in mental status  Auscultation of the chest reveals equal bs  Abdomen is soft  Extremities are warm and well perfused  Wounds appear clean and unremarkable  Antibiotics are periop    labs  CBC Full  -  ( 12 Aug 2021 12:45 )  WBC Count : 14.50 K/uL  RBC Count : 2.97 M/uL  Hemoglobin : 8.8 g/dL  Hematocrit : 26.9 %  Platelet Count - Automated : 39 K/uL  Mean Cell Volume : 90.6 fl  Mean Cell Hemoglobin : 29.6 pg  Mean Cell Hemoglobin Concentration : 32.7 gm/dL  Auto Neutrophil # : x  Auto Lymphocyte # : x  Auto Monocyte # : x  Auto Eosinophil # : x  Auto Basophil # : x  Auto Neutrophil % : x  Auto Lymphocyte % : x  Auto Monocyte % : x  Auto Eosinophil % : x  Auto Basophil % : x    08-12    138  |  100  |  44<H>  ----------------------------<  145<H>  3.7   |  25  |  2.14<H>    Ca    9.2      12 Aug 2021 12:45  Phos  4.3     08-12  Mg     2.4     08-12    TPro  5.6<L>  /  Alb  4.1  /  TBili  0.6  /  DBili  x   /  AST  66<H>  /  ALT  14  /  AlkPhos  62  08-12    PT/INR - ( 12 Aug 2021 12:45 )   PT: 12.9 sec;   INR: 1.08          PTT - ( 12 Aug 2021 12:45 )  PTT:32.9 sec  The current medications were reviewed   MEDICATIONS  (STANDING):  albumin human 25% IVPB 50 milliLiter(s) IV Intermittent every 10 minutes  aMIOdarone Infusion 1 mG/Min (33.3 mL/Hr) IV Continuous <Continuous>  aspirin  chewable 81 milliGRAM(s) Enteral Tube daily  atorvastatin 10 milliGRAM(s) Oral at bedtime  chlorhexidine 0.12% Liquid 5 milliLiter(s) Oral Mucosa two times a day  dextrose 40% Gel 15 Gram(s) Oral once  dextrose 5%. 1000 milliLiter(s) (50 mL/Hr) IV Continuous <Continuous>  dextrose 5%. 1000 milliLiter(s) (100 mL/Hr) IV Continuous <Continuous>  dextrose 50% Injectable 50 milliLiter(s) IV Push every 15 minutes  dextrose 50% Injectable 25 milliLiter(s) IV Push every 15 minutes  DOBUTamine Infusion 3 MICROgram(s)/kG/Min (3.49 mL/Hr) IV Continuous <Continuous>  furosemide Infusion 10 mG/Hr (5 mL/Hr) IV Continuous <Continuous>  glucagon  Injectable 1 milliGRAM(s) IntraMuscular once  insulin lispro (ADMELOG) corrective regimen sliding scale   SubCutaneous every 6 hours  milrinone Infusion 0.25 MICROgram(s)/kG/Min (5.82 mL/Hr) IV Continuous <Continuous>  pantoprazole  Injectable 40 milliGRAM(s) IV Push daily  phenylephrine    Infusion 1 MICROgram(s)/kG/Min (14.6 mL/Hr) IV Continuous <Continuous>  propofol Infusion 5 MICROgram(s)/kG/Min (2.33 mL/Hr) IV Continuous <Continuous>  sodium chloride 0.9%. 1000 milliLiter(s) (10 mL/Hr) IV Continuous <Continuous>  vasopressin Infusion 0.015 Unit(s)/Min (0.9 mL/Hr) IV Continuous <Continuous>    MEDICATIONS  (PRN):  ALBUTerol    90 MICROgram(s) HFA Inhaler 2 Puff(s) Inhalation every 6 hours PRN Wheezing  fentaNYL    Injectable 25 MICROGram(s) IV Push every 3 hours PRN Severe Pain (7 - 10)       PROBLEM LIST/ ASSESSMENT:  HEALTH ISSUES - PROBLEM Dx:      ,   Patient is a 76y old  Female who presents with a chief complaint of    s/p   acute changes include acute respiratory failure    My plan includes :  close hemodynamic, ventilatory and drain monitoring and management per post op routine    Monitor for arrhythmias and monitor parameters for organ perfusion  monitor neurologic status  Head of the bed should remain elevated to 45 deg .   chest PT and IS will be encouraged  monitor adequacy of oxygenation and ventilation and attempt to wean oxygen  Nutritional goals will be met using po eventually , ensure adequate caloric intake and montior the same  Stress ulcer and VTE prophylaxis will be achieved    Glycemic control is satisfactory  Electrolytes have been repleted as necessary and wound care has been carried out. Pain control has been achieved.   agressive physical therapy and early mobility and ambulation goals will be met   The family was updated about the course and plan  CRITICAL CARE TIME SPENT in evaluation and management, reassessments, review and interpretation of labs and x-rays, ventilator and hemodynamic management, formulating a plan and coordinating care: ___90____ MIN.  Time does not include procedural time.  CTICU ATTENDING     					    Jesu Garcia MD                        	 CTICU  CRITICAL  CARE  attending     Hand off received 					   Pertinent clinical, laboratory, radiographic, hemodynamic, echocardiographic, respiratory data, microbiologic data and chart were reviewed and analyzed frequently throughout the course of the day and night  Patient seen and examined with CTS/ SH attending at bedside    Pt is a 76y , Female,    08/09: Operative day    s/p AVR; replacement of ascending aorta and jeff arch; frozen elephant trunk; Aorto-L Subclavian bypass; CABG x 2; TEVAR    Intraop:      CPB  251 mins  XCT   169 mins   CA     23 mins  ACP   21 mins      7 PRBC,   2 Plt,   2 FFP,   15 cryo,   1000 FEIBA    admitted with low dose Epinephrine/levophed  AV paced @ 80; asystole underneath  initial lactic acidosis @ 4  Volume resuscitated  Epi weaned off  MAP maintained >80  Non focal off sedation  no LE motor deficit'      08/9-12:    Remains on full vent support  Inotrope support with Primacor  Pressor support with vasopressin/neosynephrine  FIDEL; S Cr up to 2.1  Afib with RVR s/p Cardioversion x 3  in Sinus rhythm now on Amiodarone infusion    HPI:    76 y.o female, current every day smoker (59 PY), with PMHx HTN, bicuspid aortic valve, spinal stenosis, arthritis who complained of increased HERRERA for 6 months. TTE 3/2021 revealed EF normal, severe AS, moderate AI. NST 4/2021 revealed transient ischemic dilatation. Cardiac cath 5/11/21 revealed severe AS, prox circumflex 90%, OM1 70%, mid RCA 80%. 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 She was referred to Dr. Muller for surgical evaluation and deemed a good surgical candidate. On 8/8 she was admitted to [facility] for planned pre operative medical optimization prior to OR scheduled 8/9.           , FAMILY HISTORY:  FH: CAD (coronary artery disease) (Sibling)  CABG    Family history of CKD (chronic kidney disease) (Sibling)  ESRD    Family history of diabetes mellitus (DM) (Sibling)    PAST MEDICAL & SURGICAL HISTORY:  HTN (hypertension)    H/O aortic valve stenosis    CAD (coronary artery disease)    No significant past surgical history      Patient is a 76y old  Female who presents with a chief complaint of severe aortic stenosis      14 system review limited 2/2 post op/intubated state  acute changes include acute respiratory failure  Vital signs, hemodynamic and respiratory parameters were reviewed from the bedside nursing flowsheet.  ICU Vital Signs Last 24 Hrs  T(C): 37.9 (12 Aug 2021 16:00), Max: 37.9 (12 Aug 2021 15:00)  T(F): 100.2 (12 Aug 2021 16:00), Max: 100.2 (12 Aug 2021 15:00)  HR: 76 (12 Aug 2021 16:00) (63 - 119)  BP: --  BP(mean): --  ABP: 158/59 (12 Aug 2021 16:00) (96/41 - 158/59)  ABP(mean): 89 (12 Aug 2021 16:00) (57 - 101)  RR: 14 (12 Aug 2021 16:00) (14 - 20)  SpO2: 98% (12 Aug 2021 16:00) (94% - 100%)    Adult Advanced Hemodynamics Last 24 Hrs  CVP(mm Hg): 14 (12 Aug 2021 16:00) (8 - 21)  CVP(cm H2O): --  CO: 5.3 (12 Aug 2021 15:00) (4 - 5.3)  CI: 3.1 (12 Aug 2021 15:00) (2.3 - 3.1)  PA: 45/19 (12 Aug 2021 13:00) (33/22 - 45/31)  PA(mean): 31 (12 Aug 2021 16:00) (21 - 146)  PCWP: --  SVR: 799 (12 Aug 2021 15:00) (752 - 1318)  SVRI: 1366 (12 Aug 2021 15:00) (1305 - 2292)  PVR: --  PVRI: --, ABG - ( 12 Aug 2021 12:39 )  pH, Arterial: 7.46  pH, Blood: x     /  pCO2: 36    /  pO2: 133   / HCO3: 26    / Base Excess: 2.0   /  SaO2: 98.9              Mode: AC/ CMV (Assist Control/ Continuous Mandatory Ventilation)  RR (machine): 14  TV (machine): 550  FiO2: 50  PEEP: 5  ITime: 1  MAP: 10  PIP: 22    Intake and output was reviewed and the fluid balance was calculated  Daily     Daily   I&O's Summary    11 Aug 2021 07:01  -  12 Aug 2021 07:00  --------------------------------------------------------  IN: 4573.3 mL / OUT: 4760 mL / NET: -186.7 mL    12 Aug 2021 07:01  -  12 Aug 2021 16:21  --------------------------------------------------------  IN: 1322.5 mL / OUT: 805 mL / NET: 517.5 mL        All lines and drain sites were assessed  [facility]      POCT Blood Glucose.: 145 mg/dL (12 Aug 2021 12:34)    No acute change in mental status  Auscultation of the chest reveals equal bs  Abdomen is soft  Extremities are warm and well perfused  Wounds appear clean and unremarkable  Antibiotics are periop    labs  CBC Full  -  ( 12 Aug 2021 12:45 )  WBC Count : 14.50 K/uL  RBC Count : 2.97 M/uL  Hemoglobin : 8.8 g/dL  Hematocrit : 26.9 %  Platelet Count - Automated : 39 K/uL  Mean Cell Volume : 90.6 fl  Mean Cell Hemoglobin : 29.6 pg  Mean Cell Hemoglobin Concentration : 32.7 gm/dL  Auto Neutrophil # : x  Auto Lymphocyte # : x  Auto Monocyte # : x  Auto Eosinophil # : x  Auto Basophil # : x  Auto Neutrophil % : x  Auto Lymphocyte % : x  Auto Monocyte % : x  Auto Eosinophil % : x  Auto Basophil % : x    08-12    138  |  100  |  44<H>  ----------------------------<  145<H>  3.7   |  25  |  2.14<H>    Ca    9.2      12 Aug 2021 12:45  Phos  4.3     08-12  Mg     2.4     08-12    TPro  5.6<L>  /  Alb  4.1  /  TBili  0.6  /  DBili  x   /  AST  66<H>  /  ALT  14  /  AlkPhos  62  08-12    PT/INR - ( 12 Aug 2021 12:45 )   PT: 12.9 sec;   INR: 1.08          PTT - ( 12 Aug 2021 12:45 )  PTT:32.9 sec  The current medications were reviewed   MEDICATIONS  (STANDING):  albumin human 25% IVPB 50 milliLiter(s) IV Intermittent every 10 minutes  aMIOdarone Infusion 1 mG/Min (33.3 mL/Hr) IV Continuous <Continuous>  aspirin  chewable 81 milliGRAM(s) Enteral Tube daily  atorvastatin 10 milliGRAM(s) Oral at bedtime  chlorhexidine 0.12% Liquid 5 milliLiter(s) Oral Mucosa two times a day  dextrose 40% Gel 15 Gram(s) Oral once  dextrose 5%. 1000 milliLiter(s) (50 mL/Hr) IV Continuous <Continuous>  dextrose 5%. 1000 milliLiter(s) (100 mL/Hr) IV Continuous <Continuous>  dextrose 50% Injectable 50 milliLiter(s) IV Push every 15 minutes  dextrose 50% Injectable 25 milliLiter(s) IV Push every 15 minutes  DOBUTamine Infusion 3 MICROgram(s)/kG/Min (3.49 mL/Hr) IV Continuous <Continuous>  furosemide Infusion 10 mG/Hr (5 mL/Hr) IV Continuous <Continuous>  glucagon  Injectable 1 milliGRAM(s) IntraMuscular once  insulin lispro (ADMELOG) corrective regimen sliding scale   SubCutaneous every 6 hours  milrinone Infusion 0.25 MICROgram(s)/kG/Min (5.82 mL/Hr) IV Continuous <Continuous>  pantoprazole  Injectable 40 milliGRAM(s) IV Push daily  phenylephrine    Infusion 1 MICROgram(s)/kG/Min (14.6 mL/Hr) IV Continuous <Continuous>  propofol Infusion 5 MICROgram(s)/kG/Min (2.33 mL/Hr) IV Continuous <Continuous>  sodium chloride 0.9%. 1000 milliLiter(s) (10 mL/Hr) IV Continuous <Continuous>  vasopressin Infusion 0.015 Unit(s)/Min (0.9 mL/Hr) IV Continuous <Continuous>    MEDICATIONS  (PRN):  ALBUTerol    90 MICROgram(s) HFA Inhaler 2 Puff(s) Inhalation every 6 hours PRN Wheezing  fentaNYL    Injectable 25 MICROGram(s) IV Push every 3 hours PRN Severe Pain (7 - 10)       PROBLEM LIST/ ASSESSMENT:  HEALTH ISSUES - PROBLEM Dx:      ,   Patient is a 76y old  Female who presents with a chief complaint of severe aortic stenosis     s/p AVR; replacement of ascending aorta and jeff arch; frozen elephant trunk; Aorto-L Subclavian bypass; CABG x 2; TEVAR    acute changes include acute respiratory failure    My plan includes :    CV:    continue amiodarone infusion; s/p DC Cardioversion yesterday x 3  Continue primacor and dobutamine for inotrope support  maintain CI > 2.2  maintain MAP > 80  Titrate pressor/inotrope support to meet above indices    Pulm:    full Vent support   titrate Fio2 to maintain SPO2 >95  serial ABGs'  Goal negative fluid balance    Neuro:    grossly non focal exam off sedation  no LE paralysis  light sedation while on full vent support    Renal:    FIDEL 2/2 long pump run  will maintain higher perfusion pressures with MAP >80  Avoid nephrotoxic agents      close hemodynamic, ventilatory and drain monitoring and management per post op routine    Monitor for arrhythmias and monitor parameters for organ perfusion  monitor neurologic status  Head of the bed should remain elevated to 45 deg .   chest PT and IS will be encouraged  monitor adequacy of oxygenation and ventilation and attempt to wean oxygen  Nutritional goals will be met using po eventually , ensure adequate caloric intake and montior the same  Stress ulcer and VTE prophylaxis will be achieved    Glycemic control is satisfactory  Electrolytes have been repleted as necessary and wound care has been carried out. Pain control has been achieved.   agressive physical therapy and early mobility and ambulation goals will be met   The family was updated about the course and plan  CRITICAL CARE TIME SPENT in evaluation and management, reassessments, review and interpretation of labs and x-rays, ventilator and hemodynamic management, formulating a plan and coordinating care: ___90____ MIN.  Time does not include procedural time.  CTICU ATTENDING     					    Joe Locke MD                        	 CTICU  CRITICAL  CARE  attending     Hand off received 					   Pertinent clinical, laboratory, radiographic, hemodynamic, echocardiographic, respiratory data, microbiologic data and chart were reviewed and analyzed frequently throughout the course of the day and night  Patient seen and examined with CTS/ SH attending at bedside    Pt is a 76y , Female,    08/09: Operative day    s/p AVR; replacement of ascending aorta and jeff arch; frozen elephant trunk; Aorto-L Subclavian bypass; CABG x 2; TEVAR    Intraop:      CPB  251 mins  XCT   169 mins   CA     23 mins  ACP   21 mins      7 PRBC,   2 Plt,   2 FFP,   15 cryo,   1000 FEIBA    admitted with low dose Epinephrine/levophed  AV paced @ 80; asystole underneath  initial lactic acidosis @ 4  Volume resuscitated  Epi weaned off  MAP maintained >80  Non focal off sedation  no LE motor deficit'      08/9-12:    Remains on full vent support  Inotrope support with Primacor  Pressor support with vasopressin/neosynephrine  FIDEL; S Cr up to 2.1  Afib with RVR s/p Cardioversion x 3  in Sinus rhythm now on Amiodarone infusion  s/p 1 unit PRBC for acute post H'gic anemia; Hct 23    HPI:    76 y.o female, current every day smoker (59 PY), with PMHx HTN, bicuspid aortic valve, spinal stenosis, arthritis who complained of increased HERRERA for 6 months. TTE 3/2021 revealed EF normal, severe AS, moderate AI. NST 4/2021 revealed transient ischemic dilatation. Cardiac cath 5/11/21 revealed severe AS, prox circumflex 90%, OM1 70%, mid RCA 80%. 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 She was referred to Dr. Muller for surgical evaluation and deemed a good surgical candidate. On 8/8 she was admitted to [facility] for planned pre operative medical optimization prior to OR scheduled 8/9.           , FAMILY HISTORY:  FH: CAD (coronary artery disease) (Sibling)  CABG    Family history of CKD (chronic kidney disease) (Sibling)  ESRD    Family history of diabetes mellitus (DM) (Sibling)    PAST MEDICAL & SURGICAL HISTORY:  HTN (hypertension)    H/O aortic valve stenosis    CAD (coronary artery disease)    No significant past surgical history      Patient is a 76y old  Female who presents with a chief complaint of severe aortic stenosis      14 system review limited 2/2 post op/intubated state  acute changes include acute respiratory failure  Vital signs, hemodynamic and respiratory parameters were reviewed from the bedside nursing flowsheet.  ICU Vital Signs Last 24 Hrs  T(C): 37.9 (12 Aug 2021 16:00), Max: 37.9 (12 Aug 2021 15:00)  T(F): 100.2 (12 Aug 2021 16:00), Max: 100.2 (12 Aug 2021 15:00)  HR: 76 (12 Aug 2021 16:00) (63 - 119)  BP: --  BP(mean): --  ABP: 158/59 (12 Aug 2021 16:00) (96/41 - 158/59)  ABP(mean): 89 (12 Aug 2021 16:00) (57 - 101)  RR: 14 (12 Aug 2021 16:00) (14 - 20)  SpO2: 98% (12 Aug 2021 16:00) (94% - 100%)    Adult Advanced Hemodynamics Last 24 Hrs  CVP(mm Hg): 14 (12 Aug 2021 16:00) (8 - 21)  CVP(cm H2O): --  CO: 5.3 (12 Aug 2021 15:00) (4 - 5.3)  CI: 3.1 (12 Aug 2021 15:00) (2.3 - 3.1)  PA: 45/19 (12 Aug 2021 13:00) (33/22 - 45/31)  PA(mean): 31 (12 Aug 2021 16:00) (21 - 146)  PCWP: --  SVR: 799 (12 Aug 2021 15:00) (752 - 1318)  SVRI: 1366 (12 Aug 2021 15:00) (1305 - 2292)  PVR: --  PVRI: --, ABG - ( 12 Aug 2021 12:39 )  pH, Arterial: 7.46  pH, Blood: x     /  pCO2: 36    /  pO2: 133   / HCO3: 26    / Base Excess: 2.0   /  SaO2: 98.9              Mode: AC/ CMV (Assist Control/ Continuous Mandatory Ventilation)  RR (machine): 14  TV (machine): 550  FiO2: 50  PEEP: 5  ITime: 1  MAP: 10  PIP: 22    Intake and output was reviewed and the fluid balance was calculated  Daily     Daily   I&O's Summary    11 Aug 2021 07:01  -  12 Aug 2021 07:00  --------------------------------------------------------  IN: 4573.3 mL / OUT: 4760 mL / NET: -186.7 mL    12 Aug 2021 07:01  -  12 Aug 2021 16:21  --------------------------------------------------------  IN: 1322.5 mL / OUT: 805 mL / NET: 517.5 mL        All lines and drain sites were assessed  [facility]      POCT Blood Glucose.: 145 mg/dL (12 Aug 2021 12:34)    No acute change in mental status  Auscultation of the chest reveals equal bs  Abdomen is soft  Extremities are warm and well perfused  Wounds appear clean and unremarkable  Antibiotics are periop    labs  CBC Full  -  ( 12 Aug 2021 12:45 )  WBC Count : 14.50 K/uL  RBC Count : 2.97 M/uL  Hemoglobin : 8.8 g/dL  Hematocrit : 26.9 %  Platelet Count - Automated : 39 K/uL  Mean Cell Volume : 90.6 fl  Mean Cell Hemoglobin : 29.6 pg  Mean Cell Hemoglobin Concentration : 32.7 gm/dL  Auto Neutrophil # : x  Auto Lymphocyte # : x  Auto Monocyte # : x  Auto Eosinophil # : x  Auto Basophil # : x  Auto Neutrophil % : x  Auto Lymphocyte % : x  Auto Monocyte % : x  Auto Eosinophil % : x  Auto Basophil % : x    08-12    138  |  100  |  44<H>  ----------------------------<  145<H>  3.7   |  25  |  2.14<H>    Ca    9.2      12 Aug 2021 12:45  Phos  4.3     08-12  Mg     2.4     08-12    TPro  5.6<L>  /  Alb  4.1  /  TBili  0.6  /  DBili  x   /  AST  66<H>  /  ALT  14  /  AlkPhos  62  08-12    PT/INR - ( 12 Aug 2021 12:45 )   PT: 12.9 sec;   INR: 1.08          PTT - ( 12 Aug 2021 12:45 )  PTT:32.9 sec  The current medications were reviewed   MEDICATIONS  (STANDING):  albumin human 25% IVPB 50 milliLiter(s) IV Intermittent every 10 minutes  aMIOdarone Infusion 1 mG/Min (33.3 mL/Hr) IV Continuous <Continuous>  aspirin  chewable 81 milliGRAM(s) Enteral Tube daily  atorvastatin 10 milliGRAM(s) Oral at bedtime  chlorhexidine 0.12% Liquid 5 milliLiter(s) Oral Mucosa two times a day  dextrose 40% Gel 15 Gram(s) Oral once  dextrose 5%. 1000 milliLiter(s) (50 mL/Hr) IV Continuous <Continuous>  dextrose 5%. 1000 milliLiter(s) (100 mL/Hr) IV Continuous <Continuous>  dextrose 50% Injectable 50 milliLiter(s) IV Push every 15 minutes  dextrose 50% Injectable 25 milliLiter(s) IV Push every 15 minutes  DOBUTamine Infusion 3 MICROgram(s)/kG/Min (3.49 mL/Hr) IV Continuous <Continuous>  furosemide Infusion 10 mG/Hr (5 mL/Hr) IV Continuous <Continuous>  glucagon  Injectable 1 milliGRAM(s) IntraMuscular once  insulin lispro (ADMELOG) corrective regimen sliding scale   SubCutaneous every 6 hours  milrinone Infusion 0.25 MICROgram(s)/kG/Min (5.82 mL/Hr) IV Continuous <Continuous>  pantoprazole  Injectable 40 milliGRAM(s) IV Push daily  phenylephrine    Infusion 1 MICROgram(s)/kG/Min (14.6 mL/Hr) IV Continuous <Continuous>  propofol Infusion 5 MICROgram(s)/kG/Min (2.33 mL/Hr) IV Continuous <Continuous>  sodium chloride 0.9%. 1000 milliLiter(s) (10 mL/Hr) IV Continuous <Continuous>  vasopressin Infusion 0.015 Unit(s)/Min (0.9 mL/Hr) IV Continuous <Continuous>    MEDICATIONS  (PRN):  ALBUTerol    90 MICROgram(s) HFA Inhaler 2 Puff(s) Inhalation every 6 hours PRN Wheezing  fentaNYL    Injectable 25 MICROGram(s) IV Push every 3 hours PRN Severe Pain (7 - 10)       PROBLEM LIST/ ASSESSMENT:  HEALTH ISSUES - PROBLEM Dx:      ,   Patient is a 76y old  Female who presents with a chief complaint of severe aortic stenosis     s/p AVR; replacement of ascending aorta and jeff arch; frozen elephant trunk; Aorto-L Subclavian bypass; CABG x 2; TEVAR    acute changes include acute respiratory failure    My plan includes :    CV:    continue amiodarone infusion; s/p DC Cardioversion yesterday x 3  Continue primacor and dobutamine for inotrope support  maintain CI > 2.2  maintain MAP > 80  Titrate pressor/inotrope support to meet above indices    Pulm:    full Vent support   titrate Fio2 to maintain SPO2 >95  serial ABGs'  Goal negative fluid balance    Neuro:    grossly non focal exam off sedation  no LE paralysis  light sedation while on full vent support    Renal:    FIDEL 2/2 long pump run  will maintain higher perfusion pressures with MAP >80  Avoid nephrotoxic agents      close hemodynamic, ventilatory and drain monitoring and management per post op routine    Monitor for arrhythmias and monitor parameters for organ perfusion  monitor neurologic status  Head of the bed should remain elevated to 45 deg .   chest PT and IS will be encouraged  monitor adequacy of oxygenation and ventilation and attempt to wean oxygen  Nutritional goals will be met using po eventually , ensure adequate caloric intake and montior the same  Stress ulcer and VTE prophylaxis will be achieved    Glycemic control is satisfactory  Electrolytes have been repleted as necessary and wound care has been carried out. Pain control has been achieved.   agressive physical therapy and early mobility and ambulation goals will be met   The family was updated about the course and plan  CRITICAL CARE TIME SPENT in evaluation and management, reassessments, review and interpretation of labs and x-rays, ventilator and hemodynamic management, formulating a plan and coordinating care: ___90____ MIN.  Time does not include procedural time.  CTICU ATTENDING     					    Joe Locke MD

## 2021-08-13 LAB
ALBUMIN SERPL ELPH-MCNC: 3.7 G/DL — SIGNIFICANT CHANGE UP (ref 3.3–5)
ALBUMIN SERPL ELPH-MCNC: 4 G/DL — SIGNIFICANT CHANGE UP (ref 3.3–5)
ALP SERPL-CCNC: 75 U/L — SIGNIFICANT CHANGE UP (ref 40–120)
ALP SERPL-CCNC: 79 U/L — SIGNIFICANT CHANGE UP (ref 40–120)
ALT FLD-CCNC: 12 U/L — SIGNIFICANT CHANGE UP (ref 10–45)
ALT FLD-CCNC: 15 U/L — SIGNIFICANT CHANGE UP (ref 10–45)
ANION GAP SERPL CALC-SCNC: 12 MMOL/L — SIGNIFICANT CHANGE UP (ref 5–17)
ANION GAP SERPL CALC-SCNC: 13 MMOL/L — SIGNIFICANT CHANGE UP (ref 5–17)
ANION GAP SERPL CALC-SCNC: 14 MMOL/L — SIGNIFICANT CHANGE UP (ref 5–17)
APTT BLD: 29.9 SEC — SIGNIFICANT CHANGE UP (ref 27.5–35.5)
APTT BLD: 32.1 SEC — SIGNIFICANT CHANGE UP (ref 27.5–35.5)
APTT BLD: 32.9 SEC — SIGNIFICANT CHANGE UP (ref 27.5–35.5)
AST SERPL-CCNC: 52 U/L — HIGH (ref 10–40)
AST SERPL-CCNC: 56 U/L — HIGH (ref 10–40)
BASE EXCESS BLDA CALC-SCNC: 3.9 MMOL/L — HIGH (ref -2–3)
BASE EXCESS BLDV CALC-SCNC: 1.5 MMOL/L — SIGNIFICANT CHANGE UP (ref -2–3)
BASE EXCESS BLDV CALC-SCNC: 2.5 MMOL/L — SIGNIFICANT CHANGE UP (ref -2–3)
BASE EXCESS BLDV CALC-SCNC: 2.5 MMOL/L — SIGNIFICANT CHANGE UP (ref -2–3)
BILIRUB SERPL-MCNC: 0.7 MG/DL — SIGNIFICANT CHANGE UP (ref 0.2–1.2)
BILIRUB SERPL-MCNC: 0.7 MG/DL — SIGNIFICANT CHANGE UP (ref 0.2–1.2)
BUN SERPL-MCNC: 48 MG/DL — HIGH (ref 7–23)
BUN SERPL-MCNC: 50 MG/DL — HIGH (ref 7–23)
BUN SERPL-MCNC: 50 MG/DL — HIGH (ref 7–23)
CA-I SERPL-SCNC: 1.13 MMOL/L — LOW (ref 1.15–1.33)
CA-I SERPL-SCNC: 1.17 MMOL/L — SIGNIFICANT CHANGE UP (ref 1.15–1.33)
CA-I SERPL-SCNC: 1.18 MMOL/L — SIGNIFICANT CHANGE UP (ref 1.15–1.33)
CALCIUM SERPL-MCNC: 8.9 MG/DL — SIGNIFICANT CHANGE UP (ref 8.4–10.5)
CALCIUM SERPL-MCNC: 9.2 MG/DL — SIGNIFICANT CHANGE UP (ref 8.4–10.5)
CALCIUM SERPL-MCNC: 9.7 MG/DL — SIGNIFICANT CHANGE UP (ref 8.4–10.5)
CHLORIDE SERPL-SCNC: 100 MMOL/L — SIGNIFICANT CHANGE UP (ref 96–108)
CHLORIDE SERPL-SCNC: 101 MMOL/L — SIGNIFICANT CHANGE UP (ref 96–108)
CHLORIDE SERPL-SCNC: 99 MMOL/L — SIGNIFICANT CHANGE UP (ref 96–108)
CO2 BLDA-SCNC: 30 MMOL/L — HIGH (ref 19–24)
CO2 BLDV-SCNC: 27.4 MMOL/L — HIGH (ref 22–26)
CO2 BLDV-SCNC: 27.9 MMOL/L — HIGH (ref 22–26)
CO2 BLDV-SCNC: 29.3 MMOL/L — HIGH (ref 22–26)
CO2 SERPL-SCNC: 25 MMOL/L — SIGNIFICANT CHANGE UP (ref 22–31)
CO2 SERPL-SCNC: 26 MMOL/L — SIGNIFICANT CHANGE UP (ref 22–31)
CO2 SERPL-SCNC: 28 MMOL/L — SIGNIFICANT CHANGE UP (ref 22–31)
CREAT SERPL-MCNC: 2.39 MG/DL — HIGH (ref 0.5–1.3)
CREAT SERPL-MCNC: 2.42 MG/DL — HIGH (ref 0.5–1.3)
CREAT SERPL-MCNC: 2.45 MG/DL — HIGH (ref 0.5–1.3)
GAS PNL BLDA: SIGNIFICANT CHANGE UP
GAS PNL BLDV: 138 MMOL/L — SIGNIFICANT CHANGE UP (ref 136–145)
GAS PNL BLDV: SIGNIFICANT CHANGE UP
GLUCOSE BLDC GLUCOMTR-MCNC: 124 MG/DL — HIGH (ref 70–99)
GLUCOSE BLDC GLUCOMTR-MCNC: 126 MG/DL — HIGH (ref 70–99)
GLUCOSE BLDC GLUCOMTR-MCNC: 137 MG/DL — HIGH (ref 70–99)
GLUCOSE BLDC GLUCOMTR-MCNC: 147 MG/DL — HIGH (ref 70–99)
GLUCOSE SERPL-MCNC: 127 MG/DL — HIGH (ref 70–99)
GLUCOSE SERPL-MCNC: 129 MG/DL — HIGH (ref 70–99)
GLUCOSE SERPL-MCNC: 141 MG/DL — HIGH (ref 70–99)
HCO3 BLDA-SCNC: 28 MMOL/L — SIGNIFICANT CHANGE UP (ref 21–28)
HCO3 BLDV-SCNC: 26 MMOL/L — SIGNIFICANT CHANGE UP (ref 22–29)
HCO3 BLDV-SCNC: 27 MMOL/L — SIGNIFICANT CHANGE UP (ref 22–29)
HCO3 BLDV-SCNC: 28 MMOL/L — SIGNIFICANT CHANGE UP (ref 22–29)
HCT VFR BLD CALC: 26.2 % — LOW (ref 34.5–45)
HCT VFR BLD CALC: 27 % — LOW (ref 34.5–45)
HCT VFR BLD CALC: 27 % — LOW (ref 34.5–45)
HGB BLD-MCNC: 8.9 G/DL — LOW (ref 11.5–15.5)
HGB BLD-MCNC: 9.1 G/DL — LOW (ref 11.5–15.5)
HGB BLD-MCNC: 9.1 G/DL — LOW (ref 11.5–15.5)
INR BLD: 1.04 — SIGNIFICANT CHANGE UP (ref 0.88–1.16)
INR BLD: 1.04 — SIGNIFICANT CHANGE UP (ref 0.88–1.16)
INR BLD: 1.07 — SIGNIFICANT CHANGE UP (ref 0.88–1.16)
MAGNESIUM SERPL-MCNC: 2.4 MG/DL — SIGNIFICANT CHANGE UP (ref 1.6–2.6)
MAGNESIUM SERPL-MCNC: 2.5 MG/DL — SIGNIFICANT CHANGE UP (ref 1.6–2.6)
MAGNESIUM SERPL-MCNC: 2.5 MG/DL — SIGNIFICANT CHANGE UP (ref 1.6–2.6)
MAGNESIUM SERPL-MCNC: 2.6 MG/DL — SIGNIFICANT CHANGE UP (ref 1.6–2.6)
MCHC RBC-ENTMCNC: 30.3 PG — SIGNIFICANT CHANGE UP (ref 27–34)
MCHC RBC-ENTMCNC: 30.5 PG — SIGNIFICANT CHANGE UP (ref 27–34)
MCHC RBC-ENTMCNC: 31 PG — SIGNIFICANT CHANGE UP (ref 27–34)
MCHC RBC-ENTMCNC: 33.7 GM/DL — SIGNIFICANT CHANGE UP (ref 32–36)
MCHC RBC-ENTMCNC: 33.7 GM/DL — SIGNIFICANT CHANGE UP (ref 32–36)
MCHC RBC-ENTMCNC: 34 GM/DL — SIGNIFICANT CHANGE UP (ref 32–36)
MCV RBC AUTO: 90 FL — SIGNIFICANT CHANGE UP (ref 80–100)
MCV RBC AUTO: 90.6 FL — SIGNIFICANT CHANGE UP (ref 80–100)
MCV RBC AUTO: 91.3 FL — SIGNIFICANT CHANGE UP (ref 80–100)
NRBC # BLD: 0 /100 WBCS — SIGNIFICANT CHANGE UP (ref 0–0)
PCO2 BLDA: 42 MMHG — HIGH (ref 32–35)
PCO2 BLDV: 37 MMHG — LOW (ref 39–42)
PCO2 BLDV: 43 MMHG — HIGH (ref 39–42)
PCO2 BLDV: 45 MMHG — HIGH (ref 39–42)
PH BLDA: 7.44 — SIGNIFICANT CHANGE UP (ref 7.35–7.45)
PH BLDV: 7.4 — SIGNIFICANT CHANGE UP (ref 7.32–7.43)
PH BLDV: 7.4 — SIGNIFICANT CHANGE UP (ref 7.32–7.43)
PH BLDV: 7.46 — HIGH (ref 7.32–7.43)
PHOSPHATE SERPL-MCNC: 3.7 MG/DL — SIGNIFICANT CHANGE UP (ref 2.5–4.5)
PHOSPHATE SERPL-MCNC: 3.8 MG/DL — SIGNIFICANT CHANGE UP (ref 2.5–4.5)
PHOSPHATE SERPL-MCNC: 3.9 MG/DL — SIGNIFICANT CHANGE UP (ref 2.5–4.5)
PLATELET # BLD AUTO: 44 K/UL — LOW (ref 150–400)
PLATELET # BLD AUTO: 44 K/UL — LOW (ref 150–400)
PLATELET # BLD AUTO: 49 K/UL — LOW (ref 150–400)
PO2 BLDA: 132 MMHG — HIGH (ref 83–108)
PO2 BLDV: 34 MMHG — SIGNIFICANT CHANGE UP
PO2 BLDV: 38 MMHG — SIGNIFICANT CHANGE UP
PO2 BLDV: 40 MMHG — SIGNIFICANT CHANGE UP
POTASSIUM BLDV-SCNC: 2.8 MMOL/L — CRITICAL LOW (ref 3.5–5.1)
POTASSIUM BLDV-SCNC: 3 MMOL/L — LOW (ref 3.5–5.1)
POTASSIUM BLDV-SCNC: 3.1 MMOL/L — LOW (ref 3.5–5.1)
POTASSIUM SERPL-MCNC: 3.2 MMOL/L — LOW (ref 3.5–5.3)
POTASSIUM SERPL-MCNC: 3.4 MMOL/L — LOW (ref 3.5–5.3)
POTASSIUM SERPL-MCNC: 3.4 MMOL/L — LOW (ref 3.5–5.3)
POTASSIUM SERPL-MCNC: 3.9 MMOL/L — SIGNIFICANT CHANGE UP (ref 3.5–5.3)
POTASSIUM SERPL-MCNC: 3.9 MMOL/L — SIGNIFICANT CHANGE UP (ref 3.5–5.3)
POTASSIUM SERPL-SCNC: 3.2 MMOL/L — LOW (ref 3.5–5.3)
POTASSIUM SERPL-SCNC: 3.4 MMOL/L — LOW (ref 3.5–5.3)
POTASSIUM SERPL-SCNC: 3.4 MMOL/L — LOW (ref 3.5–5.3)
POTASSIUM SERPL-SCNC: 3.9 MMOL/L — SIGNIFICANT CHANGE UP (ref 3.5–5.3)
POTASSIUM SERPL-SCNC: 3.9 MMOL/L — SIGNIFICANT CHANGE UP (ref 3.5–5.3)
PROT SERPL-MCNC: 5.6 G/DL — LOW (ref 6–8.3)
PROT SERPL-MCNC: 5.6 G/DL — LOW (ref 6–8.3)
PROTHROM AB SERPL-ACNC: 12.4 SEC — SIGNIFICANT CHANGE UP (ref 10.6–13.6)
PROTHROM AB SERPL-ACNC: 12.5 SEC — SIGNIFICANT CHANGE UP (ref 10.6–13.6)
PROTHROM AB SERPL-ACNC: 12.8 SEC — SIGNIFICANT CHANGE UP (ref 10.6–13.6)
RBC # BLD: 2.87 M/UL — LOW (ref 3.8–5.2)
RBC # BLD: 2.98 M/UL — LOW (ref 3.8–5.2)
RBC # BLD: 3 M/UL — LOW (ref 3.8–5.2)
RBC # FLD: 14.7 % — HIGH (ref 10.3–14.5)
RBC # FLD: 15 % — HIGH (ref 10.3–14.5)
RBC # FLD: 15.1 % — HIGH (ref 10.3–14.5)
SAO2 % BLDA: 100 % — HIGH (ref 94–98)
SAO2 % BLDV: 64.9 % — SIGNIFICANT CHANGE UP
SAO2 % BLDV: 67.1 % — SIGNIFICANT CHANGE UP
SAO2 % BLDV: 68.9 % — SIGNIFICANT CHANGE UP
SODIUM SERPL-SCNC: 138 MMOL/L — SIGNIFICANT CHANGE UP (ref 135–145)
SODIUM SERPL-SCNC: 140 MMOL/L — SIGNIFICANT CHANGE UP (ref 135–145)
SODIUM SERPL-SCNC: 140 MMOL/L — SIGNIFICANT CHANGE UP (ref 135–145)
WBC # BLD: 12.67 K/UL — HIGH (ref 3.8–10.5)
WBC # BLD: 14.96 K/UL — HIGH (ref 3.8–10.5)
WBC # BLD: 16.29 K/UL — HIGH (ref 3.8–10.5)
WBC # FLD AUTO: 12.67 K/UL — HIGH (ref 3.8–10.5)
WBC # FLD AUTO: 14.96 K/UL — HIGH (ref 3.8–10.5)
WBC # FLD AUTO: 16.29 K/UL — HIGH (ref 3.8–10.5)

## 2021-08-13 PROCEDURE — 99292 CRITICAL CARE ADDL 30 MIN: CPT

## 2021-08-13 PROCEDURE — 71045 X-RAY EXAM CHEST 1 VIEW: CPT | Mod: 26

## 2021-08-13 PROCEDURE — 99291 CRITICAL CARE FIRST HOUR: CPT

## 2021-08-13 RX ORDER — CALCIUM GLUCONATE 100 MG/ML
2 VIAL (ML) INTRAVENOUS ONCE
Refills: 0 | Status: COMPLETED | OUTPATIENT
Start: 2021-08-13 | End: 2021-08-13

## 2021-08-13 RX ORDER — DEXMEDETOMIDINE HYDROCHLORIDE IN 0.9% SODIUM CHLORIDE 4 UG/ML
0.1 INJECTION INTRAVENOUS
Qty: 400 | Refills: 0 | Status: DISCONTINUED | OUTPATIENT
Start: 2021-08-13 | End: 2021-08-14

## 2021-08-13 RX ORDER — POTASSIUM CHLORIDE 20 MEQ
10 PACKET (EA) ORAL ONCE
Refills: 0 | Status: COMPLETED | OUTPATIENT
Start: 2021-08-13 | End: 2021-08-13

## 2021-08-13 RX ORDER — ACETAMINOPHEN 500 MG
1000 TABLET ORAL ONCE
Refills: 0 | Status: COMPLETED | OUTPATIENT
Start: 2021-08-13 | End: 2021-08-13

## 2021-08-13 RX ORDER — POTASSIUM CHLORIDE 20 MEQ
20 PACKET (EA) ORAL
Refills: 0 | Status: COMPLETED | OUTPATIENT
Start: 2021-08-13 | End: 2021-08-13

## 2021-08-13 RX ORDER — POTASSIUM CHLORIDE 20 MEQ
20 PACKET (EA) ORAL ONCE
Refills: 0 | Status: COMPLETED | OUTPATIENT
Start: 2021-08-13 | End: 2021-08-13

## 2021-08-13 RX ORDER — MIDAZOLAM HYDROCHLORIDE 1 MG/ML
1 INJECTION, SOLUTION INTRAMUSCULAR; INTRAVENOUS ONCE
Refills: 0 | Status: DISCONTINUED | OUTPATIENT
Start: 2021-08-13 | End: 2021-08-14

## 2021-08-13 RX ORDER — POTASSIUM CHLORIDE 20 MEQ
40 PACKET (EA) ORAL ONCE
Refills: 0 | Status: COMPLETED | OUTPATIENT
Start: 2021-08-13 | End: 2021-08-13

## 2021-08-13 RX ORDER — CHLORHEXIDINE GLUCONATE 213 G/1000ML
1 SOLUTION TOPICAL
Refills: 0 | Status: DISCONTINUED | OUTPATIENT
Start: 2021-08-13 | End: 2021-08-24

## 2021-08-13 RX ORDER — ALBUMIN HUMAN 25 %
250 VIAL (ML) INTRAVENOUS
Refills: 0 | Status: COMPLETED | OUTPATIENT
Start: 2021-08-13 | End: 2021-08-13

## 2021-08-13 RX ORDER — POTASSIUM CHLORIDE 20 MEQ
20 PACKET (EA) ORAL ONCE
Refills: 0 | Status: COMPLETED | OUTPATIENT
Start: 2021-08-13 | End: 2021-08-14

## 2021-08-13 RX ORDER — FENTANYL CITRATE 50 UG/ML
25 INJECTION INTRAVENOUS ONCE
Refills: 0 | Status: DISCONTINUED | OUTPATIENT
Start: 2021-08-13 | End: 2021-08-14

## 2021-08-13 RX ADMIN — Medication 125 MILLILITER(S): at 10:16

## 2021-08-13 RX ADMIN — Medication 100 MILLIEQUIVALENT(S): at 10:58

## 2021-08-13 RX ADMIN — Medication 40 MILLIEQUIVALENT(S): at 16:18

## 2021-08-13 RX ADMIN — Medication 200 GRAM(S): at 04:33

## 2021-08-13 RX ADMIN — DEXMEDETOMIDINE HYDROCHLORIDE IN 0.9% SODIUM CHLORIDE 1.94 MICROGRAM(S)/KG/HR: 4 INJECTION INTRAVENOUS at 10:16

## 2021-08-13 RX ADMIN — Medication 100 MILLIEQUIVALENT(S): at 16:03

## 2021-08-13 RX ADMIN — Medication 100 MILLIEQUIVALENT(S): at 09:55

## 2021-08-13 RX ADMIN — PANTOPRAZOLE SODIUM 40 MILLIGRAM(S): 20 TABLET, DELAYED RELEASE ORAL at 11:01

## 2021-08-13 RX ADMIN — MILRINONE LACTATE 5.82 MICROGRAM(S)/KG/MIN: 1 INJECTION, SOLUTION INTRAVENOUS at 12:31

## 2021-08-13 RX ADMIN — Medication 125 MILLILITER(S): at 11:08

## 2021-08-13 RX ADMIN — PROPOFOL 2.33 MICROGRAM(S)/KG/MIN: 10 INJECTION, EMULSION INTRAVENOUS at 09:13

## 2021-08-13 RX ADMIN — PROPOFOL 2.33 MICROGRAM(S)/KG/MIN: 10 INJECTION, EMULSION INTRAVENOUS at 02:30

## 2021-08-13 RX ADMIN — CHLORHEXIDINE GLUCONATE 5 MILLILITER(S): 213 SOLUTION TOPICAL at 17:04

## 2021-08-13 RX ADMIN — ATORVASTATIN CALCIUM 10 MILLIGRAM(S): 80 TABLET, FILM COATED ORAL at 22:00

## 2021-08-13 RX ADMIN — Medication 400 MILLIGRAM(S): at 11:22

## 2021-08-13 RX ADMIN — Medication 81 MILLIGRAM(S): at 11:13

## 2021-08-13 RX ADMIN — FENTANYL CITRATE 25 MICROGRAM(S): 50 INJECTION INTRAVENOUS at 22:31

## 2021-08-13 RX ADMIN — Medication 1000 MILLIGRAM(S): at 11:22

## 2021-08-13 RX ADMIN — CHLORHEXIDINE GLUCONATE 5 MILLILITER(S): 213 SOLUTION TOPICAL at 07:02

## 2021-08-13 RX ADMIN — FENTANYL CITRATE 25 MICROGRAM(S): 50 INJECTION INTRAVENOUS at 22:45

## 2021-08-13 RX ADMIN — MIDAZOLAM HYDROCHLORIDE 1 MILLIGRAM(S): 1 INJECTION, SOLUTION INTRAMUSCULAR; INTRAVENOUS at 22:31

## 2021-08-13 RX ADMIN — Medication 50 MILLIEQUIVALENT(S): at 07:09

## 2021-08-13 RX ADMIN — Medication 50 MILLIEQUIVALENT(S): at 03:16

## 2021-08-13 NOTE — PROGRESS NOTE ADULT - SUBJECTIVE AND OBJECTIVE BOX
CTICU  CRITICAL  CARE  attending     Hand off received 					   Pertinent clinical, laboratory, radiographic, hemodynamic, echocardiographic, respiratory data, microbiologic data and chart were reviewed and analyzed frequently throughout the course of the day and night  Patient seen and examined with CTS/ SH attending at bedside    Pt is a 76y , Female,    08/09: Operative day    s/p AVR; replacement of ascending aorta and jeff arch; frozen elephant trunk; Aorto-L Subclavian bypass; CABG x 2; TEVAR    Intraop:      CPB  251 mins  XCT   169 mins   CA     23 mins  ACP   21 mins      7 PRBC,   2 Plt,   2 FFP,   15 cryo,   1000 FEIBA    admitted with low dose Epinephrine/levophed  AV paced @ 80; asystole underneath  initial lactic acidosis @ 4  Volume resuscitated  Epi weaned off  MAP maintained >80  Non focal off sedation  no LE motor deficit'      08/9-12:    Remains on full vent support  Inotrope support with Primacor  Pressor support with vasopressin/neosynephrine  FIDEL; S Cr up to 2.1  Afib with RVR s/p Cardioversion x 3  in Sinus rhythm now on Amiodarone infusion  s/p 1 unit PRBC for acute post H'gic anemia; Hct 23    08/13: ( today)    remains on full Vent support  Inotrope support with Primacor  pressor support with low dose neosynephrine  FIDEL; Cr 2,39  PAP 35/15; CVP 12  CO 3.8; CI 2,2  SVR 1345    HPI:    76 y.o female, current every day smoker (59 PY), with PMHx HTN, bicuspid aortic valve, spinal stenosis, arthritis who complained of increased HERRERA for 6 months. TTE 3/2021 revealed EF normal, severe AS, moderate AI. NST 4/2021 revealed transient ischemic dilatation. Cardiac cath 5/11/21 revealed severe AS, prox circumflex 90%, OM1 70%, mid RCA 80%. 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 She was referred to Dr. Muller for surgical evaluation and deemed a good surgical candidate. On 8/8 she was admitted to [facility] for planned pre operative medical optimization prior to OR scheduled 8/9.             , FAMILY HISTORY:  FH: CAD (coronary artery disease) (Sibling)  CABG    Family history of CKD (chronic kidney disease) (Sibling)  ESRD    Family history of diabetes mellitus (DM) (Sibling)    PAST MEDICAL & SURGICAL HISTORY:  HTN (hypertension)    H/O aortic valve stenosis    CAD (coronary artery disease)    No significant past surgical history      Patient is a 76y old  Female who presents with a chief complaint of severe aortic stenosis    14 system review limited 2/2 post op/intubated state  acute changes include acute respiratory failure  Vital signs, hemodynamic and respiratory parameters were reviewed from the bedside nursing flowsheet.  ICU Vital Signs Last 24 Hrs  T(C): 36 (15 Aug 2021 21:13), Max: 36.4 (15 Aug 2021 09:31)  T(F): 96.8 (15 Aug 2021 21:13), Max: 97.5 (15 Aug 2021 09:31)  HR: 113 (16 Aug 2021 01:03) (57 - 140)  BP: 163/97 (15 Aug 2021 09:00) (119/58 - 163/97)  BP(mean): 124 (15 Aug 2021 09:00) (84 - 124)  ABP: 127/70 (16 Aug 2021 01:00) (101/49 - 194/80)  ABP(mean): 88 (16 Aug 2021 01:00) (63 - 176)  RR: 18 (16 Aug 2021 01:03) (14 - 24)  SpO2: 96% (16 Aug 2021 01:03) (92% - 99%)    Adult Advanced Hemodynamics Last 24 Hrs  CVP(mm Hg): --  CVP(cm H2O): --  CO: --  CI: --  PA: --  PA(mean): --  PCWP: --  SVR: --  SVRI: --  PVR: --  PVRI: --, ABG - ( 15 Aug 2021 02:26 )  pH, Arterial: 7.43  pH, Blood: x     /  pCO2: 40    /  pO2: 85    / HCO3: 26    / Base Excess: 2.0   /  SaO2: 97.7                Intake and output was reviewed and the fluid balance was calculated  Daily     Daily   I&O's Summary    14 Aug 2021 07:01  -  15 Aug 2021 07:00  --------------------------------------------------------  IN: 635.8 mL / OUT: 2110 mL / NET: -1474.2 mL    15 Aug 2021 07:01  -  16 Aug 2021 01:22  --------------------------------------------------------  IN: 791.7 mL / OUT: 1155 mL / NET: -363.3 mL        All lines and drain sites were assessed  Glycemic trend was reviewedCAPILLARY BLOOD GLUCOSE      POCT Blood Glucose.: 129 mg/dL (15 Aug 2021 23:13)    No acute change in mental status  Auscultation of the chest reveals equal bs  Abdomen is soft  Extremities are warm and well perfused  Wounds appear clean and unremarkable  Antibiotics are periop    labs  CBC Full  -  ( 15 Aug 2021 20:42 )  WBC Count : 13.46 K/uL  RBC Count : 3.05 M/uL  Hemoglobin : 9.4 g/dL  Hematocrit : 28.8 %  Platelet Count - Automated : 83 K/uL  Mean Cell Volume : 94.4 fl  Mean Cell Hemoglobin : 30.8 pg  Mean Cell Hemoglobin Concentration : 32.6 gm/dL  Auto Neutrophil # : x  Auto Lymphocyte # : x  Auto Monocyte # : x  Auto Eosinophil # : x  Auto Basophil # : x  Auto Neutrophil % : x  Auto Lymphocyte % : x  Auto Monocyte % : x  Auto Eosinophil % : x  Auto Basophil % : x    08-15    141  |  103  |  48<H>  ----------------------------<  147<H>  4.0   |  26  |  0.98    Ca    9.8      15 Aug 2021 20:42  Phos  3.8     08-15  Mg     2.6     08-15    TPro  6.2  /  Alb  3.8  /  TBili  1.4<H>  /  DBili  x   /  AST  152<H>  /  ALT  79<H>  /  AlkPhos  95  08-15    PT/INR - ( 15 Aug 2021 20:42 )   PT: 18.0 sec;   INR: 1.53          PTT - ( 15 Aug 2021 20:42 )  PTT:31.1 sec  The current medications were reviewed   MEDICATIONS  (STANDING):  albumin human 25% IVPB 50 milliLiter(s) IV Intermittent every 10 minutes  aspirin  chewable 81 milliGRAM(s) Enteral Tube daily  atorvastatin 10 milliGRAM(s) Oral at bedtime  chlorhexidine 0.12% Liquid 5 milliLiter(s) Oral Mucosa two times a day  chlorhexidine 2% Cloths 1 Application(s) Topical <User Schedule>  dexMEDEtomidine Infusion 0.2 MICROgram(s)/kG/Hr (3.88 mL/Hr) IV Continuous <Continuous>  dextrose 40% Gel 15 Gram(s) Oral once  dextrose 5%. 1000 milliLiter(s) (50 mL/Hr) IV Continuous <Continuous>  dextrose 5%. 1000 milliLiter(s) (100 mL/Hr) IV Continuous <Continuous>  dextrose 50% Injectable 50 milliLiter(s) IV Push every 15 minutes  dextrose 50% Injectable 25 milliLiter(s) IV Push every 15 minutes  glucagon  Injectable 1 milliGRAM(s) IntraMuscular once  insulin lispro (ADMELOG) corrective regimen sliding scale   SubCutaneous every 6 hours  levalbuterol Inhalation 0.63 milliGRAM(s) Inhalation every 6 hours  metoprolol tartrate 12.5 milliGRAM(s) Oral every 6 hours  pantoprazole  Injectable 40 milliGRAM(s) IV Push daily  senna 2 Tablet(s) Oral at bedtime  sodium chloride 0.9%. 1000 milliLiter(s) (10 mL/Hr) IV Continuous <Continuous>    MEDICATIONS  (PRN):  ALBUTerol    90 MICROgram(s) HFA Inhaler 2 Puff(s) Inhalation every 6 hours PRN Wheezing       PROBLEM LIST/ ASSESSMENT:  HEALTH ISSUES - PROBLEM Dx:      ,   Patient is a 76y old  Female who presents with a chief complaint of severe aortic stenosis   s/p  AVR; replacement of ascending aorta and jeff arch; frozen elephant trunk; Aorto-L Subclavian bypass; CABG x 2; TEVAR    acute changes include acute respiratory failure    My plan includes :      CV:      Continue primacor for inotrope support  continue low dose phenylephrine for pressor support  maintain CI > 2.2  maintain MAP > 80  Titrate pressor/inotrope support to meet above indices    Pulm:    full Vent support   titrate Fio2 to maintain SPO2 >95  serial ABGs'  Goal negative fluid balance    Neuro:    grossly non focal exam off sedation  no LE paralysis  light sedation while on full vent support    Renal:    FIDEL 2/2 long pump run  will maintain higher perfusion pressures with MAP >80  Avoid nephrotoxic agents        close hemodynamic, ventilatory and drain monitoring and management per post op routine    Monitor for arrhythmias and monitor parameters for organ perfusion  monitor neurologic status  Head of the bed should remain elevated to 45 deg .   chest PT and IS will be encouraged  monitor adequacy of oxygenation and ventilation and attempt to wean oxygen  Nutritional goals will be met using po eventually , ensure adequate caloric intake and montior the same  Stress ulcer and VTE prophylaxis will be achieved    Glycemic control is satisfactory  Electrolytes have been repleted as necessary and wound care has been carried out. Pain control has been achieved.   agressive physical therapy and early mobility and ambulation goals will be met   The family was updated about the course and plan  CRITICAL CARE TIME SPENT in evaluation and management, reassessments, review and interpretation of labs and x-rays, ventilator and hemodynamic management, formulating a plan and coordinating care: __60___ MIN.  Time does not include procedural time.  CTICU ATTENDING     					    Joe Locke MD

## 2021-08-13 NOTE — PROGRESS NOTE ADULT - SUBJECTIVE AND OBJECTIVE BOX
INTERVAL HPI/OVERNIGHT EVENTS:    8/9:  AVR (tissue), Ascending, hemiarch replacement, frozen elephant trunk, left aorto-subclavian bypass, CABG x 2  EF 50%    77yo Female active tobacco use; HTN, Known bicuspid aortic valve, spinal stenosis, arthritis w/sxs SOB/HERRERA    ECHO 3/2021: EF normal, severe AS, moderate AI.   NST 4/2021: transient ischemic dilatation.   Cath 5/11/21: severe AS, prox circumflex 90%, OM1 70%, mid RCA 80%.   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     To OR 8/9  Intraop: 7 U pRBC/2 plt/2 FFP/1000 FEIBA; 3 L Crystalloid  arrived to ICU on levophed   AV paced 80 - underlying native rhythm reported asystole    initial LA 4 - volume (albumin given ) and normalized - last 1.9; swan out   titrated off infusions post-op and working with PT/early mobility on vent    8/11: developed post-op atrial fib with drop in BP - amiodarone; levo and vaso started ; lasix qtt   ECHO: no pericardial effusion  given 2 U pRBC for Hct 22  [place] re-inserted - CI 2.4 - primacor and Dobutamine restarted     DCCV  for Fib/RVR - 200J x 3 - ultimately converted to sinus 8p 8/11 8/12: remains in sinus - additional 1 U pRBC given     remains on vent - no acute events reported overnight     PMHx includes but is not limited to:   HTN (hypertension)  H/O aortic valve stenosis  CAD (coronary artery disease)    ICU Vital Signs Last 24 Hrs  T(C): 37.1 (13 Aug 2021 05:01), Max: 37.9 (12 Aug 2021 15:00)  T(F): 98.8 (13 Aug 2021 05:01), Max: 100.2 (12 Aug 2021 15:00)  HR: 66 (13 Aug 2021 08:00) (61 - 82) sinus   ABP: 151/56 (13 Aug 2021 08:00) (96/41 - 163/58)  ABP(mean): 82 (13 Aug 2021 08:00) (57 - 89)  RR: 14 (13 Aug 2021 08:00) (14 - 20)  SpO2: 99% (13 Aug 2021 08:00) (97% - 100%) Fi02 50%    Qtts:   Amiodarone 0.25 - now off  Lasix 10mg/hour - now off   Propofol   Marty 0.5  Primacor 0.25    I&O's Summary    12 Aug 2021 07:01  -  13 Aug 2021 07:00  --------------------------------------------------------  IN: 3226.3 mL / OUT: 2835 mL / NET: 391.3 mL    13 Aug 2021 07:01  -  13 Aug 2021 08:53  --------------------------------------------------------  IN: 81.9 mL / OUT: 180 mL / NET: -98.1 mL    Vent settings: AC 14/550/40/5    Physical Exam    Heart - regular (-)rub/gallop  Lungs - BS appreciated bilaterally - no wheeze/rhonchi  Abd - (+)BS soft/obese (-)r/r/g  Ext - warm to touch; no cyanosis/clubbing   Chest - incision site clean and dry  Neuro - sedated at this time - sedation to be off and assessed  Skin - no rash     LABS:                        9.1    16.29 )-----------( 49       ( 13 Aug 2021 01:44 )             27.0     08-13    138  |  99  |  50<H>  ----------------------------<  141<H>  3.4<L>   |  25  |  2.45<H>    Ca    8.9      13 Aug 2021 01:44  Phos  3.9     08-13  Mg     2.4     08-13    TPro  5.6<L>  /  Alb  4.1  /  TBili  0.6  /  DBili  x   /  AST  66<H>  /  ALT  14  /  AlkPhos  62  08-12    PT/INR - ( 13 Aug 2021 01:44 )   PT: 12.5 sec;   INR: 1.04     PTT - ( 13 Aug 2021 01:44 )  PTT:32.9 sec    ABG - ( 13 Aug 2021 01:41 ) 7.42/35/128/98    RADIOLOGY & ADDITIONAL STUDIES: reviewed       Patient with severe symptomatic aortic stenosis and CAD - imaging demonstrating enlarging aortic arch aneurysm with post-op atrial fib and now FIDEL     1. CV  remains on low dose primacor - anticipate d/c  return of native rhythm - required pacing initially post-op  currently in sinus - Afib 8/11 causing hypotension . amio/DCCV  ASA/statin  observe rhythm - if fib returns will need assessment for possible systemic AC    2. Pulm   remains on vent - attempted early mobility 8/10  to trial again today  Fi02 - 50% at this time with good p02   serial ABG to optimize oxygenation and ventilation  plan early mobility and assess for ability to wean from vent   noted active smoker - cessation education and support when able     3. Renal  FIDEL - suspect pre-renal azotemia  intravascular depletion complicated by lasix infusion - d/c lasix infusion  volume back - colloid preferred in light of 3rd spacing  repeat.serial labs to monitor   UO >100/hour for several hours per flowsheet  monitor UO/Lytes and Cr closely  renally adjust meds as needed for dec CrCl and avoid nephrotoxins  maintain higher MAP     4. Endocrine  ISS; Maintain glycemic control -   HgA1c 5.6  TSH 2.6    persistent thrombocytopenia - HIT panel 8/10: negative ; off SQ heparin  Optimize nutrition   SCD and GI prophylaxis     d/w patient/staff and CTS    I have spent/provided stated minutes of critical care time to this patient: 2 hour

## 2021-08-14 LAB
ALBUMIN SERPL ELPH-MCNC: 3.9 G/DL — SIGNIFICANT CHANGE UP (ref 3.3–5)
ALBUMIN SERPL ELPH-MCNC: 4 G/DL — SIGNIFICANT CHANGE UP (ref 3.3–5)
ALP SERPL-CCNC: 82 U/L — SIGNIFICANT CHANGE UP (ref 40–120)
ALP SERPL-CCNC: 84 U/L — SIGNIFICANT CHANGE UP (ref 40–120)
ALT FLD-CCNC: 18 U/L — SIGNIFICANT CHANGE UP (ref 10–45)
ALT FLD-CCNC: 19 U/L — SIGNIFICANT CHANGE UP (ref 10–45)
ANION GAP SERPL CALC-SCNC: 11 MMOL/L — SIGNIFICANT CHANGE UP (ref 5–17)
ANION GAP SERPL CALC-SCNC: 8 MMOL/L — SIGNIFICANT CHANGE UP (ref 5–17)
APTT BLD: 29.9 SEC — SIGNIFICANT CHANGE UP (ref 27.5–35.5)
APTT BLD: 30.8 SEC — SIGNIFICANT CHANGE UP (ref 27.5–35.5)
AST SERPL-CCNC: 51 U/L — HIGH (ref 10–40)
AST SERPL-CCNC: 52 U/L — HIGH (ref 10–40)
BASE EXCESS BLDV CALC-SCNC: 5.3 MMOL/L — HIGH (ref -2–3)
BILIRUB SERPL-MCNC: 0.8 MG/DL — SIGNIFICANT CHANGE UP (ref 0.2–1.2)
BILIRUB SERPL-MCNC: 0.8 MG/DL — SIGNIFICANT CHANGE UP (ref 0.2–1.2)
BUN SERPL-MCNC: 49 MG/DL — HIGH (ref 7–23)
BUN SERPL-MCNC: 52 MG/DL — HIGH (ref 7–23)
CA-I SERPL-SCNC: 1.23 MMOL/L — SIGNIFICANT CHANGE UP (ref 1.15–1.33)
CALCIUM SERPL-MCNC: 9.2 MG/DL — SIGNIFICANT CHANGE UP (ref 8.4–10.5)
CALCIUM SERPL-MCNC: 9.4 MG/DL — SIGNIFICANT CHANGE UP (ref 8.4–10.5)
CHLORIDE SERPL-SCNC: 101 MMOL/L — SIGNIFICANT CHANGE UP (ref 96–108)
CHLORIDE SERPL-SCNC: 103 MMOL/L — SIGNIFICANT CHANGE UP (ref 96–108)
CO2 BLDV-SCNC: 31.9 MMOL/L — HIGH (ref 22–26)
CO2 SERPL-SCNC: 28 MMOL/L — SIGNIFICANT CHANGE UP (ref 22–31)
CO2 SERPL-SCNC: 28 MMOL/L — SIGNIFICANT CHANGE UP (ref 22–31)
CREAT SERPL-MCNC: 1.68 MG/DL — HIGH (ref 0.5–1.3)
CREAT SERPL-MCNC: 1.98 MG/DL — HIGH (ref 0.5–1.3)
GAS PNL BLDA: SIGNIFICANT CHANGE UP
GAS PNL BLDV: 137 MMOL/L — SIGNIFICANT CHANGE UP (ref 136–145)
GAS PNL BLDV: SIGNIFICANT CHANGE UP
GLUCOSE BLDC GLUCOMTR-MCNC: 108 MG/DL — HIGH (ref 70–99)
GLUCOSE BLDC GLUCOMTR-MCNC: 112 MG/DL — HIGH (ref 70–99)
GLUCOSE BLDC GLUCOMTR-MCNC: 126 MG/DL — HIGH (ref 70–99)
GLUCOSE BLDC GLUCOMTR-MCNC: 151 MG/DL — HIGH (ref 70–99)
GLUCOSE SERPL-MCNC: 131 MG/DL — HIGH (ref 70–99)
GLUCOSE SERPL-MCNC: 151 MG/DL — HIGH (ref 70–99)
HCO3 BLDV-SCNC: 30 MMOL/L — HIGH (ref 22–29)
HCT VFR BLD CALC: 26.4 % — LOW (ref 34.5–45)
HCT VFR BLD CALC: 28.2 % — LOW (ref 34.5–45)
HGB BLD-MCNC: 8.9 G/DL — LOW (ref 11.5–15.5)
HGB BLD-MCNC: 9.1 G/DL — LOW (ref 11.5–15.5)
INR BLD: 1.03 — SIGNIFICANT CHANGE UP (ref 0.88–1.16)
INR BLD: 1.06 — SIGNIFICANT CHANGE UP (ref 0.88–1.16)
LACTATE SERPL-SCNC: 0.9 MMOL/L — SIGNIFICANT CHANGE UP (ref 0.5–2)
MAGNESIUM SERPL-MCNC: 2.4 MG/DL — SIGNIFICANT CHANGE UP (ref 1.6–2.6)
MAGNESIUM SERPL-MCNC: 2.5 MG/DL — SIGNIFICANT CHANGE UP (ref 1.6–2.6)
MCHC RBC-ENTMCNC: 30.1 PG — SIGNIFICANT CHANGE UP (ref 27–34)
MCHC RBC-ENTMCNC: 30.9 PG — SIGNIFICANT CHANGE UP (ref 27–34)
MCHC RBC-ENTMCNC: 32.3 GM/DL — SIGNIFICANT CHANGE UP (ref 32–36)
MCHC RBC-ENTMCNC: 33.7 GM/DL — SIGNIFICANT CHANGE UP (ref 32–36)
MCV RBC AUTO: 91.7 FL — SIGNIFICANT CHANGE UP (ref 80–100)
MCV RBC AUTO: 93.4 FL — SIGNIFICANT CHANGE UP (ref 80–100)
NRBC # BLD: 0 /100 WBCS — SIGNIFICANT CHANGE UP (ref 0–0)
NRBC # BLD: 0 /100 WBCS — SIGNIFICANT CHANGE UP (ref 0–0)
PCO2 BLDV: 46 MMHG — HIGH (ref 39–42)
PH BLDV: 7.43 — SIGNIFICANT CHANGE UP (ref 7.32–7.43)
PHOSPHATE SERPL-MCNC: 3 MG/DL — SIGNIFICANT CHANGE UP (ref 2.5–4.5)
PHOSPHATE SERPL-MCNC: 3.1 MG/DL — SIGNIFICANT CHANGE UP (ref 2.5–4.5)
PLATELET # BLD AUTO: 42 K/UL — LOW (ref 150–400)
PLATELET # BLD AUTO: 47 K/UL — LOW (ref 150–400)
PO2 BLDV: 33 MMHG — SIGNIFICANT CHANGE UP
POTASSIUM BLDV-SCNC: 4.1 MMOL/L — SIGNIFICANT CHANGE UP (ref 3.5–5.1)
POTASSIUM SERPL-MCNC: 3.6 MMOL/L — SIGNIFICANT CHANGE UP (ref 3.5–5.3)
POTASSIUM SERPL-MCNC: 4 MMOL/L — SIGNIFICANT CHANGE UP (ref 3.5–5.3)
POTASSIUM SERPL-SCNC: 3.6 MMOL/L — SIGNIFICANT CHANGE UP (ref 3.5–5.3)
POTASSIUM SERPL-SCNC: 4 MMOL/L — SIGNIFICANT CHANGE UP (ref 3.5–5.3)
PROT SERPL-MCNC: 5.6 G/DL — LOW (ref 6–8.3)
PROT SERPL-MCNC: 6 G/DL — SIGNIFICANT CHANGE UP (ref 6–8.3)
PROTHROM AB SERPL-ACNC: 12.3 SEC — SIGNIFICANT CHANGE UP (ref 10.6–13.6)
PROTHROM AB SERPL-ACNC: 12.7 SEC — SIGNIFICANT CHANGE UP (ref 10.6–13.6)
RBC # BLD: 2.88 M/UL — LOW (ref 3.8–5.2)
RBC # BLD: 3.02 M/UL — LOW (ref 3.8–5.2)
RBC # FLD: 14.7 % — HIGH (ref 10.3–14.5)
RBC # FLD: 14.8 % — HIGH (ref 10.3–14.5)
SAO2 % BLDV: 62.3 % — SIGNIFICANT CHANGE UP
SODIUM SERPL-SCNC: 139 MMOL/L — SIGNIFICANT CHANGE UP (ref 135–145)
SODIUM SERPL-SCNC: 140 MMOL/L — SIGNIFICANT CHANGE UP (ref 135–145)
WBC # BLD: 10.02 K/UL — SIGNIFICANT CHANGE UP (ref 3.8–10.5)
WBC # BLD: 10.44 K/UL — SIGNIFICANT CHANGE UP (ref 3.8–10.5)
WBC # FLD AUTO: 10.02 K/UL — SIGNIFICANT CHANGE UP (ref 3.8–10.5)
WBC # FLD AUTO: 10.44 K/UL — SIGNIFICANT CHANGE UP (ref 3.8–10.5)

## 2021-08-14 PROCEDURE — 99292 CRITICAL CARE ADDL 30 MIN: CPT

## 2021-08-14 PROCEDURE — 99291 CRITICAL CARE FIRST HOUR: CPT

## 2021-08-14 PROCEDURE — 71045 X-RAY EXAM CHEST 1 VIEW: CPT | Mod: 26,76

## 2021-08-14 RX ORDER — FENTANYL CITRATE 50 UG/ML
25 INJECTION INTRAVENOUS ONCE
Refills: 0 | Status: DISCONTINUED | OUTPATIENT
Start: 2021-08-14 | End: 2021-08-15

## 2021-08-14 RX ORDER — METOPROLOL TARTRATE 50 MG
5 TABLET ORAL ONCE
Refills: 0 | Status: COMPLETED | OUTPATIENT
Start: 2021-08-14 | End: 2021-08-14

## 2021-08-14 RX ORDER — ACETAMINOPHEN 500 MG
1000 TABLET ORAL ONCE
Refills: 0 | Status: COMPLETED | OUTPATIENT
Start: 2021-08-14 | End: 2021-08-14

## 2021-08-14 RX ORDER — DEXMEDETOMIDINE HYDROCHLORIDE IN 0.9% SODIUM CHLORIDE 4 UG/ML
0.1 INJECTION INTRAVENOUS
Qty: 400 | Refills: 0 | Status: DISCONTINUED | OUTPATIENT
Start: 2021-08-14 | End: 2021-08-15

## 2021-08-14 RX ORDER — LEVALBUTEROL 1.25 MG/.5ML
0.63 SOLUTION, CONCENTRATE RESPIRATORY (INHALATION) EVERY 6 HOURS
Refills: 0 | Status: COMPLETED | OUTPATIENT
Start: 2021-08-14 | End: 2021-08-16

## 2021-08-14 RX ORDER — LIDOCAINE 4 G/100G
1 CREAM TOPICAL ONCE
Refills: 0 | Status: COMPLETED | OUTPATIENT
Start: 2021-08-14 | End: 2021-08-14

## 2021-08-14 RX ORDER — POTASSIUM CHLORIDE 20 MEQ
20 PACKET (EA) ORAL
Refills: 0 | Status: COMPLETED | OUTPATIENT
Start: 2021-08-14 | End: 2021-08-14

## 2021-08-14 RX ADMIN — Medication 5 MILLIGRAM(S): at 17:00

## 2021-08-14 RX ADMIN — CHLORHEXIDINE GLUCONATE 1 APPLICATION(S): 213 SOLUTION TOPICAL at 06:56

## 2021-08-14 RX ADMIN — Medication 100 MILLIEQUIVALENT(S): at 02:28

## 2021-08-14 RX ADMIN — CHLORHEXIDINE GLUCONATE 5 MILLILITER(S): 213 SOLUTION TOPICAL at 17:44

## 2021-08-14 RX ADMIN — Medication 100 MILLIEQUIVALENT(S): at 03:08

## 2021-08-14 RX ADMIN — LIDOCAINE 1 PATCH: 4 CREAM TOPICAL at 18:57

## 2021-08-14 RX ADMIN — ATORVASTATIN CALCIUM 10 MILLIGRAM(S): 80 TABLET, FILM COATED ORAL at 21:25

## 2021-08-14 RX ADMIN — LIDOCAINE 1 PATCH: 4 CREAM TOPICAL at 12:45

## 2021-08-14 RX ADMIN — Medication 400 MILLIGRAM(S): at 15:00

## 2021-08-14 RX ADMIN — CHLORHEXIDINE GLUCONATE 5 MILLILITER(S): 213 SOLUTION TOPICAL at 06:00

## 2021-08-14 RX ADMIN — Medication 2: at 06:51

## 2021-08-14 RX ADMIN — Medication 100 MILLIEQUIVALENT(S): at 04:07

## 2021-08-14 RX ADMIN — Medication 1000 MILLIGRAM(S): at 15:15

## 2021-08-14 RX ADMIN — Medication 1000 MILLIGRAM(S): at 09:04

## 2021-08-14 RX ADMIN — PANTOPRAZOLE SODIUM 40 MILLIGRAM(S): 20 TABLET, DELAYED RELEASE ORAL at 11:22

## 2021-08-14 RX ADMIN — Medication 400 MILLIGRAM(S): at 08:45

## 2021-08-14 RX ADMIN — Medication 81 MILLIGRAM(S): at 11:22

## 2021-08-14 NOTE — PROGRESS NOTE ADULT - SUBJECTIVE AND OBJECTIVE BOX
CTICU  CRITICAL  CARE  attending     Hand off received 					   Pertinent clinical, laboratory, radiographic, hemodynamic, echocardiographic, respiratory data, microbiologic data and chart were reviewed and analyzed frequently throughout the course of the day and night        76 years old female with HTN, bicuspid aortic valve, spinal stenosis, arthritis.  She was evaluated for increased HERRERA for 6 months.   ECHO:  3/2021 revealed EF normal, severe AS, moderate AI.   NST 4/2021 revealed transient ischemic dilatation.   Cardiac cath revealed severe AS, prox circumflex 90%, OM1 70%, mid RCA 80%.   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  She was referred to Dr. Muller for surgical evaluation and deemed a good surgical candidate.     S/P AVR and CABG  S/P Replacement of AA and Hemiarch.          FAMILY HISTORY:  FH: CAD (coronary artery disease) (Sibling)  CABG    Family history of CKD (chronic kidney disease) (Sibling)  ESRD    Family history of diabetes mellitus (DM) (Sibling)    PAST MEDICAL & SURGICAL HISTORY:  HTN (hypertension)    H/O aortic valve stenosis    CAD (coronary artery disease)    No significant past surgical history      14 system review was unremarkable    Vital signs, hemodynamic and respiratory parameters were reviewed from the bedside nursing flow sheet.  ICU Vital Signs Last 24 Hrs  T(C): 36.1 (14 Aug 2021 20:58), Max: 37.5 (14 Aug 2021 05:00)  T(F): 97 (14 Aug 2021 20:58), Max: 99.5 (14 Aug 2021 05:00)  HR: 77 (14 Aug 2021 23:00) (55 - 82)  BP: 163/73 (14 Aug 2021 22:00) (157/72 - 173/73)  BP(mean): 105 (14 Aug 2021 22:00) (101 - 108)  ABP: 173/64 (14 Aug 2021 23:00) (122/53 - 193/74)  ABP(mean): 101 (14 Aug 2021 23:00) (70 - 119)  RR: 14 (14 Aug 2021 23:00) (14 - 25)  SpO2: 99% (14 Aug 2021 23:00) (95% - 100%)    Adult Advanced Hemodynamics Last 24 Hrs  CVP(mm Hg): 10 (14 Aug 2021 15:00) (6 - 11)  CVP(cm H2O): --  CO: 4.1 (14 Aug 2021 10:00) (3.8 - 4.6)  CI: 2.3 (14 Aug 2021 10:00) (2.2 - 2.6)  PA: 33/13 (14 Aug 2021 10:00) (33/13 - 36/15)  PA(mean): 21 (14 Aug 2021 10:00) (21 - 27)  PCWP: --  SVR: 1383 (14 Aug 2021 10:00) (1146 - 1534)  SVRI: 2466 (14 Aug 2021 10:00) (2028 - 2651)  PVR: --  PVRI: --, ABG - ( 14 Aug 2021 12:25 )  pH, Arterial: 7.42  pH, Blood: x     /  pCO2: 43    /  pO2: 89    / HCO3: 28    / Base Excess: 2.9   /  SaO2: 98.0              Mode: standby  FiO2:   PEEP:   PS:   MAP:   PIP:     Intake and output was reviewed and the fluid balance was calculated  Daily     Daily   I&O's Summary    13 Aug 2021 07:01  -  14 Aug 2021 07:00  --------------------------------------------------------  IN: 2679.7 mL / OUT: 3695 mL / NET: -1015.3 mL    14 Aug 2021 07:01  -  14 Aug 2021 23:12  --------------------------------------------------------  IN: 436.7 mL / OUT: 1240 mL / NET: -803.3 mL        All lines and drain sites were assessed    Neuro: Follows commands. Moves all 4 extremities.  Neck: No JVD.  CVS: S1, S2, No S3.  Lungs: Good air entry bilaterally.  Abd: Soft. No tenderness. + Bowel sounds.  Vascular: + DP/PT.  Extremities: No edema.  Lymphatic: Normal.  Skin: No abnormalities.      labs  CBC Full  -  ( 14 Aug 2021 10:04 )  WBC Count : 10.44 K/uL  RBC Count : 3.02 M/uL  Hemoglobin : 9.1 g/dL  Hematocrit : 28.2 %  Platelet Count - Automated : 47 K/uL  Mean Cell Volume : 93.4 fl  Mean Cell Hemoglobin : 30.1 pg  Mean Cell Hemoglobin Concentration : 32.3 gm/dL  Auto Neutrophil # : x  Auto Lymphocyte # : x  Auto Monocyte # : x  Auto Eosinophil # : x  Auto Basophil # : x  Auto Neutrophil % : x  Auto Lymphocyte % : x  Auto Monocyte % : x  Auto Eosinophil % : x  Auto Basophil % : x    08-14    139  |  103  |  52<H>  ----------------------------<  131<H>  4.0   |  28  |  1.68<H>    Ca    9.4      14 Aug 2021 10:04  Phos  3.0     08-14  Mg     2.4     08-14    TPro  6.0  /  Alb  4.0  /  TBili  0.8  /  DBili  x   /  AST  51<H>  /  ALT  19  /  AlkPhos  84  08-14    PT/INR - ( 14 Aug 2021 10:04 )   PT: 12.3 sec;   INR: 1.03          PTT - ( 14 Aug 2021 10:04 )  PTT:30.8 sec  The current medications were reviewed   MEDICATIONS  (STANDING):  albumin human 25% IVPB 50 milliLiter(s) IV Intermittent every 10 minutes  aspirin  chewable 81 milliGRAM(s) Enteral Tube daily  atorvastatin 10 milliGRAM(s) Oral at bedtime  chlorhexidine 0.12% Liquid 5 milliLiter(s) Oral Mucosa two times a day  chlorhexidine 2% Cloths 1 Application(s) Topical <User Schedule>  dexMEDEtomidine Infusion 0.1 MICROgram(s)/kG/Hr (1.94 mL/Hr) IV Continuous <Continuous>  dextrose 40% Gel 15 Gram(s) Oral once  dextrose 5%. 1000 milliLiter(s) (50 mL/Hr) IV Continuous <Continuous>  dextrose 5%. 1000 milliLiter(s) (100 mL/Hr) IV Continuous <Continuous>  dextrose 50% Injectable 50 milliLiter(s) IV Push every 15 minutes  dextrose 50% Injectable 25 milliLiter(s) IV Push every 15 minutes  glucagon  Injectable 1 milliGRAM(s) IntraMuscular once  insulin lispro (ADMELOG) corrective regimen sliding scale   SubCutaneous every 6 hours  levalbuterol Inhalation 0.63 milliGRAM(s) Inhalation every 6 hours  pantoprazole  Injectable 40 milliGRAM(s) IV Push daily  sodium chloride 0.9%. 1000 milliLiter(s) (10 mL/Hr) IV Continuous <Continuous>    MEDICATIONS  (PRN):  ALBUTerol    90 MICROgram(s) HFA Inhaler 2 Puff(s) Inhalation every 6 hours PRN Wheezing        76y old  Female with CAD, aortic stenosis, bicuspid aortic valve and dilated AA.  S/P AVR  S/P CABG x 2.  S/P Replacement of AA and Hemiarch.  S/P Aorta to left subclavian bypass.  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  Postoperative course complicated by AV block, atrial fibrillation, renal failure and thrombocytopenia (HIT negative).  Hemodynamically stable. (Now off inotropes).   Good oxygenation.  Fair urine out put.        My plan includes :  ASA  and Apixaban.  Bronchodilator Rx.  Statin and Betablocker.  Close hemodynamic, ventilatory and drain monitoring and management  Monitor for arrhythmias and monitor parameters for organ perfusion  Monitor neurologic status  Monitor renal function.  Head of the bed should remain elevated to 45 deg .   Chest PT and IS will be encouraged  Monitor adequacy of oxygenation and ventilation and attempt to wean oxygen  Nutritional goals will be met using po eventually , ensure adequate caloric intake and monitor the same  Stress ulcer and VTE prophylaxis will be achieved    Glycemic control is satisfactory  Electrolytes have been repleted as necessary and wound care has been carried out. Pain control has been achieved.   Aggressive physical therapy and early mobility and ambulation goals will be met   The family was updated about the course and plan  CRITICAL CARE TIME SPENT in evaluation and management, reassessments, review and interpretation of labs and x-rays, ventilator and hemodynamic management, formulating a plan and coordinating care: ___30____ MIN.  Time does not include procedural time.  CTICU ATTENDING     					    Lex Gudino MD

## 2021-08-14 NOTE — PROGRESS NOTE ADULT - SUBJECTIVE AND OBJECTIVE BOX
INTERVAL HPI/OVERNIGHT EVENTS:    8/9:  AVR (tissue), Ascending, hemiarch replacement, frozen elephant trunk, left aorto-subclavian bypass, CABG x 2  EF 50%    75yo Female active tobacco use; HTN, Known bicuspid aortic valve, spinal stenosis, arthritis w/sxs SOB/HERRERA    ECHO 3/2021: EF normal, severe AS, moderate AI.   NST 4/2021: transient ischemic dilatation.   Cath 5/11/21: severe AS, prox circumflex 90%, OM1 70%, mid RCA 80%.   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     To OR 8/9  Intraop: 7 U pRBC/2 plt/2 FFP/1000 FEIBA; 3 L Crystalloid  arrived to ICU on levophed   AV paced 80 - underlying native rhythm reported asystole    initial LA 4 - volume (albumin given ) and normalized - last 1.9; swan out   titrated off infusions post-op and working with PT/early mobility on vent    8/11: developed post-op atrial fib with drop in BP - amiodarone; levo and vaso started ; lasix qtt   ECHO: no pericardial effusion  given 2 U pRBC for Hct 22  [place] re-inserted - CI 2.4 - primacor and Dobutamine restarted     DCCV  for Fib/RVR - 200J x 3 - ultimately converted to sinus 8p 8/11 8/12: remains in sinus - additional 1 U pRBC given   8/13 - FIDEL - lasix infusion stopped and volume given back  inotrope titrated down to off and sedation held to assess ability to wean/liberate from vent    remains on vent - no acute events reported overnight     PMHx includes but is not limited to:   HTN (hypertension)  H/O aortic valve stenosis  CAD (coronary artery disease)    ICU Vital Signs Last 24 Hrs  T(C): 37.2 (14 Aug 2021 10:00), Max: 37.5 (14 Aug 2021 05:00)  T(F): 99 (14 Aug 2021 10:00), Max: 99.5 (14 Aug 2021 05:00)  HR: 77 (14 Aug 2021 13:00) (54 - 82) sinus   BP: 159/76 (14 Aug 2021 12:00) (159/76 - 159/76)  BP(mean): 108 (14 Aug 2021 12:00) (108 - 108)  ABP: 175/62 (14 Aug 2021 13:00) (115/41 - 178/65)  ABP(mean): 100 (14 Aug 2021 13:00) (60 - 106)  RR: 18 (14 Aug 2021 13:00) (14 - 25)  SpO2: 97% (14 Aug 2021 13:00) (96% - 100%) 50%    Qtts: None    I&O's Summary    13 Aug 2021 07:01  -  14 Aug 2021 07:00  --------------------------------------------------------  IN: 2679.7 mL / OUT: 3695 mL / NET: -1015.3 mL    14 Aug 2021 07:01  -  14 Aug 2021 13:12  --------------------------------------------------------  IN: 210 mL / OUT: 335 mL / NET: -125 mL    Physical Exam    Heart - regular (-)rub/gallop  Lungs - BS appreciated bilaterally - no wheeze  Abd - (+)BS obese/soft NTND (-)r/r/g  Ext - warm to touch; no cyanosis/clubbing  Neuro - alert/oriented and interactive - non-focal and moving all extremities - weak. but improvng on assessment over time   Skin - no rash     LABS:                        9.1    10.44 )-----------( 47       ( 14 Aug 2021 10:04 )             28.2     08-14    139  |  103  |  52<H>  ----------------------------<  131<H>  4.0   |  28  |  1.68<H>    Ca    9.4      14 Aug 2021 10:04  Phos  3.0     08-14  Mg     2.4     08-14    TPro  6.0  /  Alb  4.0  /  TBili  0.8  /  DBili  x   /  AST  51<H>  /  ALT  19  /  AlkPhos  84  08-14    PT/INR - ( 14 Aug 2021 10:04 )   PT: 12.3 sec;   INR: 1.03     PTT - ( 14 Aug 2021 10:04 )  PTT:30.8 sec    ABG - ( 14 Aug 2021 12:25 )  pH, Arterial: 7.42  pH, Blood: x     /  pCO2: 43    /  pO2: 89    / HCO3: 28    / Base Excess: 2.9   /  SaO2: 98.0      RADIOLOGY & ADDITIONAL STUDIES: reviewed     Patient with severe symptomatic aortic stenosis and CAD - imaging demonstrating enlarging aortic arch aneurysm with post-op atrial fib and now FIDEL - improved with holding aggressive diuresis and volume back - now on PS anticipating extubation     1. CV  off inotropes  sinus rhythm   currently in sinus - Afib 8/11 causing hypotension . amio/DCCV  ASA/statin  observe rhythm - if fib returns will need assessment for possible systemic AC    2. Pulm   extubate this am  aggressive pulm toilet  anticipate OOB and ambulation later today  noted active smoker - cessation education and support when able     3. Renal  FIDEL - suspect pre-renal azotemia  intravascular depletion complicated by lasix infusion - d/c lasix infusion  volume back - colloid preferred in light of 3rd spacing   repeat/serial labs to monitor demonstrating improvement  - Cr now 1.68  monitor UO/Lytes and Cr closely    4. Endocrine  ISS; Maintain glycemic control -   HgA1c 5.6  TSH 2.6    persistent thrombocytopenia - HIT panel 8/10: negative ; off SQ heparin  Optimize nutrition - extubated but to maintain NGT in event needs non-oral means of nutrition of supplementation of PO intake for appropriate caloric intake     SCD and GI prophylaxis     d/w patient & daughter updated at bedside/staff and CTS    I have spent/provided stated minutes of critical care time to this patient: 90

## 2021-08-15 LAB
ALBUMIN SERPL ELPH-MCNC: 3.8 G/DL — SIGNIFICANT CHANGE UP (ref 3.3–5)
ALBUMIN SERPL ELPH-MCNC: 4 G/DL — SIGNIFICANT CHANGE UP (ref 3.3–5)
ALP SERPL-CCNC: 86 U/L — SIGNIFICANT CHANGE UP (ref 40–120)
ALP SERPL-CCNC: 95 U/L — SIGNIFICANT CHANGE UP (ref 40–120)
ALT FLD-CCNC: 20 U/L — SIGNIFICANT CHANGE UP (ref 10–45)
ALT FLD-CCNC: 79 U/L — HIGH (ref 10–45)
ANION GAP SERPL CALC-SCNC: 11 MMOL/L — SIGNIFICANT CHANGE UP (ref 5–17)
ANION GAP SERPL CALC-SCNC: 12 MMOL/L — SIGNIFICANT CHANGE UP (ref 5–17)
ANION GAP SERPL CALC-SCNC: 12 MMOL/L — SIGNIFICANT CHANGE UP (ref 5–17)
APTT BLD: 30.2 SEC — SIGNIFICANT CHANGE UP (ref 27.5–35.5)
APTT BLD: 31.1 SEC — SIGNIFICANT CHANGE UP (ref 27.5–35.5)
AST SERPL-CCNC: 152 U/L — HIGH (ref 10–40)
AST SERPL-CCNC: 40 U/L — SIGNIFICANT CHANGE UP (ref 10–40)
BILIRUB SERPL-MCNC: 1.2 MG/DL — SIGNIFICANT CHANGE UP (ref 0.2–1.2)
BILIRUB SERPL-MCNC: 1.4 MG/DL — HIGH (ref 0.2–1.2)
BUN SERPL-MCNC: 45 MG/DL — HIGH (ref 7–23)
BUN SERPL-MCNC: 45 MG/DL — HIGH (ref 7–23)
BUN SERPL-MCNC: 48 MG/DL — HIGH (ref 7–23)
CALCIUM SERPL-MCNC: 9.7 MG/DL — SIGNIFICANT CHANGE UP (ref 8.4–10.5)
CALCIUM SERPL-MCNC: 9.7 MG/DL — SIGNIFICANT CHANGE UP (ref 8.4–10.5)
CALCIUM SERPL-MCNC: 9.8 MG/DL — SIGNIFICANT CHANGE UP (ref 8.4–10.5)
CHLORIDE SERPL-SCNC: 103 MMOL/L — SIGNIFICANT CHANGE UP (ref 96–108)
CHLORIDE SERPL-SCNC: 104 MMOL/L — SIGNIFICANT CHANGE UP (ref 96–108)
CHLORIDE SERPL-SCNC: 105 MMOL/L — SIGNIFICANT CHANGE UP (ref 96–108)
CO2 SERPL-SCNC: 26 MMOL/L — SIGNIFICANT CHANGE UP (ref 22–31)
CO2 SERPL-SCNC: 28 MMOL/L — SIGNIFICANT CHANGE UP (ref 22–31)
CO2 SERPL-SCNC: 28 MMOL/L — SIGNIFICANT CHANGE UP (ref 22–31)
CREAT SERPL-MCNC: 0.95 MG/DL — SIGNIFICANT CHANGE UP (ref 0.5–1.3)
CREAT SERPL-MCNC: 0.98 MG/DL — SIGNIFICANT CHANGE UP (ref 0.5–1.3)
CREAT SERPL-MCNC: 1.19 MG/DL — SIGNIFICANT CHANGE UP (ref 0.5–1.3)
GAS PNL BLDA: SIGNIFICANT CHANGE UP
GLUCOSE BLDC GLUCOMTR-MCNC: 104 MG/DL — HIGH (ref 70–99)
GLUCOSE BLDC GLUCOMTR-MCNC: 112 MG/DL — HIGH (ref 70–99)
GLUCOSE BLDC GLUCOMTR-MCNC: 128 MG/DL — HIGH (ref 70–99)
GLUCOSE BLDC GLUCOMTR-MCNC: 129 MG/DL — HIGH (ref 70–99)
GLUCOSE SERPL-MCNC: 118 MG/DL — HIGH (ref 70–99)
GLUCOSE SERPL-MCNC: 129 MG/DL — HIGH (ref 70–99)
GLUCOSE SERPL-MCNC: 147 MG/DL — HIGH (ref 70–99)
HCT VFR BLD CALC: 28.8 % — LOW (ref 34.5–45)
HCT VFR BLD CALC: 30.5 % — LOW (ref 34.5–45)
HGB BLD-MCNC: 9.4 G/DL — LOW (ref 11.5–15.5)
HGB BLD-MCNC: 9.8 G/DL — LOW (ref 11.5–15.5)
INR BLD: 1.07 — SIGNIFICANT CHANGE UP (ref 0.88–1.16)
INR BLD: 1.53 — HIGH (ref 0.88–1.16)
MAGNESIUM SERPL-MCNC: 2.5 MG/DL — SIGNIFICANT CHANGE UP (ref 1.6–2.6)
MAGNESIUM SERPL-MCNC: 2.6 MG/DL — SIGNIFICANT CHANGE UP (ref 1.6–2.6)
MAGNESIUM SERPL-MCNC: 2.6 MG/DL — SIGNIFICANT CHANGE UP (ref 1.6–2.6)
MCHC RBC-ENTMCNC: 30.4 PG — SIGNIFICANT CHANGE UP (ref 27–34)
MCHC RBC-ENTMCNC: 30.8 PG — SIGNIFICANT CHANGE UP (ref 27–34)
MCHC RBC-ENTMCNC: 32.1 GM/DL — SIGNIFICANT CHANGE UP (ref 32–36)
MCHC RBC-ENTMCNC: 32.6 GM/DL — SIGNIFICANT CHANGE UP (ref 32–36)
MCV RBC AUTO: 94.4 FL — SIGNIFICANT CHANGE UP (ref 80–100)
MCV RBC AUTO: 94.7 FL — SIGNIFICANT CHANGE UP (ref 80–100)
NRBC # BLD: 0 /100 WBCS — SIGNIFICANT CHANGE UP (ref 0–0)
NRBC # BLD: 0 /100 WBCS — SIGNIFICANT CHANGE UP (ref 0–0)
PHOSPHATE SERPL-MCNC: 3.4 MG/DL — SIGNIFICANT CHANGE UP (ref 2.5–4.5)
PHOSPHATE SERPL-MCNC: 3.8 MG/DL — SIGNIFICANT CHANGE UP (ref 2.5–4.5)
PLATELET # BLD AUTO: 58 K/UL — LOW (ref 150–400)
PLATELET # BLD AUTO: 83 K/UL — LOW (ref 150–400)
POTASSIUM SERPL-MCNC: 4 MMOL/L — SIGNIFICANT CHANGE UP (ref 3.5–5.3)
POTASSIUM SERPL-MCNC: 4 MMOL/L — SIGNIFICANT CHANGE UP (ref 3.5–5.3)
POTASSIUM SERPL-MCNC: 4.1 MMOL/L — SIGNIFICANT CHANGE UP (ref 3.5–5.3)
POTASSIUM SERPL-SCNC: 4 MMOL/L — SIGNIFICANT CHANGE UP (ref 3.5–5.3)
POTASSIUM SERPL-SCNC: 4 MMOL/L — SIGNIFICANT CHANGE UP (ref 3.5–5.3)
POTASSIUM SERPL-SCNC: 4.1 MMOL/L — SIGNIFICANT CHANGE UP (ref 3.5–5.3)
PROT SERPL-MCNC: 6 G/DL — SIGNIFICANT CHANGE UP (ref 6–8.3)
PROT SERPL-MCNC: 6.2 G/DL — SIGNIFICANT CHANGE UP (ref 6–8.3)
PROTHROM AB SERPL-ACNC: 12.8 SEC — SIGNIFICANT CHANGE UP (ref 10.6–13.6)
PROTHROM AB SERPL-ACNC: 18 SEC — HIGH (ref 10.6–13.6)
RBC # BLD: 3.05 M/UL — LOW (ref 3.8–5.2)
RBC # BLD: 3.22 M/UL — LOW (ref 3.8–5.2)
RBC # FLD: 14.5 % — SIGNIFICANT CHANGE UP (ref 10.3–14.5)
RBC # FLD: 14.6 % — HIGH (ref 10.3–14.5)
SODIUM SERPL-SCNC: 141 MMOL/L — SIGNIFICANT CHANGE UP (ref 135–145)
SODIUM SERPL-SCNC: 144 MMOL/L — SIGNIFICANT CHANGE UP (ref 135–145)
SODIUM SERPL-SCNC: 144 MMOL/L — SIGNIFICANT CHANGE UP (ref 135–145)
WBC # BLD: 13.46 K/UL — HIGH (ref 3.8–10.5)
WBC # BLD: 13.55 K/UL — HIGH (ref 3.8–10.5)
WBC # FLD AUTO: 13.46 K/UL — HIGH (ref 3.8–10.5)
WBC # FLD AUTO: 13.55 K/UL — HIGH (ref 3.8–10.5)

## 2021-08-15 PROCEDURE — 99292 CRITICAL CARE ADDL 30 MIN: CPT

## 2021-08-15 PROCEDURE — 99291 CRITICAL CARE FIRST HOUR: CPT

## 2021-08-15 PROCEDURE — 71045 X-RAY EXAM CHEST 1 VIEW: CPT | Mod: 26,77

## 2021-08-15 PROCEDURE — 71045 X-RAY EXAM CHEST 1 VIEW: CPT | Mod: 26

## 2021-08-15 RX ORDER — POTASSIUM CHLORIDE 20 MEQ
20 PACKET (EA) ORAL ONCE
Refills: 0 | Status: DISCONTINUED | OUTPATIENT
Start: 2021-08-15 | End: 2021-08-15

## 2021-08-15 RX ORDER — AMIODARONE HYDROCHLORIDE 400 MG/1
150 TABLET ORAL ONCE
Refills: 0 | Status: COMPLETED | OUTPATIENT
Start: 2021-08-15 | End: 2021-08-15

## 2021-08-15 RX ORDER — METOPROLOL TARTRATE 50 MG
2.5 TABLET ORAL ONCE
Refills: 0 | Status: COMPLETED | OUTPATIENT
Start: 2021-08-15 | End: 2021-08-15

## 2021-08-15 RX ORDER — ACETAMINOPHEN 500 MG
1000 TABLET ORAL ONCE
Refills: 0 | Status: COMPLETED | OUTPATIENT
Start: 2021-08-15 | End: 2021-08-15

## 2021-08-15 RX ORDER — SENNA PLUS 8.6 MG/1
2 TABLET ORAL AT BEDTIME
Refills: 0 | Status: DISCONTINUED | OUTPATIENT
Start: 2021-08-15 | End: 2021-09-03

## 2021-08-15 RX ORDER — METOPROLOL TARTRATE 50 MG
12.5 TABLET ORAL EVERY 6 HOURS
Refills: 0 | Status: DISCONTINUED | OUTPATIENT
Start: 2021-08-15 | End: 2021-08-17

## 2021-08-15 RX ORDER — DEXMEDETOMIDINE HYDROCHLORIDE IN 0.9% SODIUM CHLORIDE 4 UG/ML
0.2 INJECTION INTRAVENOUS
Qty: 400 | Refills: 0 | Status: DISCONTINUED | OUTPATIENT
Start: 2021-08-15 | End: 2021-08-17

## 2021-08-15 RX ORDER — LIDOCAINE 4 G/100G
1 CREAM TOPICAL ONCE
Refills: 0 | Status: COMPLETED | OUTPATIENT
Start: 2021-08-15 | End: 2021-08-15

## 2021-08-15 RX ADMIN — ATORVASTATIN CALCIUM 10 MILLIGRAM(S): 80 TABLET, FILM COATED ORAL at 23:09

## 2021-08-15 RX ADMIN — Medication 81 MILLIGRAM(S): at 14:16

## 2021-08-15 RX ADMIN — LEVALBUTEROL 0.63 MILLIGRAM(S): 1.25 SOLUTION, CONCENTRATE RESPIRATORY (INHALATION) at 06:06

## 2021-08-15 RX ADMIN — Medication 12.5 MILLIGRAM(S): at 18:26

## 2021-08-15 RX ADMIN — LIDOCAINE 1 PATCH: 4 CREAM TOPICAL at 19:58

## 2021-08-15 RX ADMIN — Medication 1000 MILLIGRAM(S): at 03:48

## 2021-08-15 RX ADMIN — CHLORHEXIDINE GLUCONATE 5 MILLILITER(S): 213 SOLUTION TOPICAL at 06:09

## 2021-08-15 RX ADMIN — Medication 12.5 MILLIGRAM(S): at 23:10

## 2021-08-15 RX ADMIN — Medication 400 MILLIGRAM(S): at 03:48

## 2021-08-15 RX ADMIN — AMIODARONE HYDROCHLORIDE 600 MILLIGRAM(S): 400 TABLET ORAL at 16:23

## 2021-08-15 RX ADMIN — DEXMEDETOMIDINE HYDROCHLORIDE IN 0.9% SODIUM CHLORIDE 1.94 MICROGRAM(S)/KG/HR: 4 INJECTION INTRAVENOUS at 03:12

## 2021-08-15 RX ADMIN — Medication 1000 MILLIGRAM(S): at 10:55

## 2021-08-15 RX ADMIN — LIDOCAINE 1 PATCH: 4 CREAM TOPICAL at 00:45

## 2021-08-15 RX ADMIN — CHLORHEXIDINE GLUCONATE 1 APPLICATION(S): 213 SOLUTION TOPICAL at 06:09

## 2021-08-15 RX ADMIN — LEVALBUTEROL 0.63 MILLIGRAM(S): 1.25 SOLUTION, CONCENTRATE RESPIRATORY (INHALATION) at 01:12

## 2021-08-15 RX ADMIN — PANTOPRAZOLE SODIUM 40 MILLIGRAM(S): 20 TABLET, DELAYED RELEASE ORAL at 11:45

## 2021-08-15 RX ADMIN — LEVALBUTEROL 0.63 MILLIGRAM(S): 1.25 SOLUTION, CONCENTRATE RESPIRATORY (INHALATION) at 11:45

## 2021-08-15 RX ADMIN — AMIODARONE HYDROCHLORIDE 133.33 MILLIGRAM(S): 400 TABLET ORAL at 19:30

## 2021-08-15 RX ADMIN — LEVALBUTEROL 0.63 MILLIGRAM(S): 1.25 SOLUTION, CONCENTRATE RESPIRATORY (INHALATION) at 18:25

## 2021-08-15 RX ADMIN — Medication 2.5 MILLIGRAM(S): at 11:14

## 2021-08-15 RX ADMIN — FENTANYL CITRATE 25 MICROGRAM(S): 50 INJECTION INTRAVENOUS at 00:31

## 2021-08-15 RX ADMIN — Medication 1000 MILLIGRAM(S): at 18:37

## 2021-08-15 RX ADMIN — FENTANYL CITRATE 25 MICROGRAM(S): 50 INJECTION INTRAVENOUS at 00:01

## 2021-08-15 RX ADMIN — LIDOCAINE 1 PATCH: 4 CREAM TOPICAL at 16:05

## 2021-08-15 RX ADMIN — Medication 400 MILLIGRAM(S): at 17:32

## 2021-08-15 RX ADMIN — Medication 2.5 MILLIGRAM(S): at 09:07

## 2021-08-15 RX ADMIN — CHLORHEXIDINE GLUCONATE 5 MILLILITER(S): 213 SOLUTION TOPICAL at 18:27

## 2021-08-15 RX ADMIN — Medication 400 MILLIGRAM(S): at 09:40

## 2021-08-15 NOTE — SWALLOW BEDSIDE ASSESSMENT ADULT - SWALLOW EVAL: RECOMMENDED DIET
NPO/NGT; allow ice chips in moderation x3/hour, with strict aspiration precautions, for swallow practice and to facilitate secretion clearance.

## 2021-08-15 NOTE — PROGRESS NOTE ADULT - SUBJECTIVE AND OBJECTIVE BOX
INTERVAL HPI/OVERNIGHT EVENTS:    8/9:  AVR (tissue), Ascending, hemiarch replacement, frozen elephant trunk, left aorto-subclavian bypass, CABG x 2  EF 50%    77yo Female active tobacco use; HTN, Known bicuspid aortic valve, spinal stenosis, arthritis w/sxs SOB/HERRERA    ECHO 3/2021: EF normal, severe AS, moderate AI.   NST 4/2021: transient ischemic dilatation.   Cath 5/11/21: severe AS, prox circumflex 90%, OM1 70%, mid RCA 80%.   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     To OR 8/9  Intraop: 7 U pRBC/2 plt/2 FFP/1000 FEIBA; 3 L Crystalloid  arrived to ICU on levophed   AV paced 80 - underlying native rhythm reported asystole    initial LA 4 - volume (albumin given ) and normalized - last 1.9; swan out   titrated off infusions post-op and working with PT/early mobility on vent    8/11: developed post-op atrial fib with drop in BP - amiodarone; levo and vaso started ; lasix qtt   ECHO: no pericardial effusion  given 2 U pRBC for Hct 22  [place] re-inserted - CI 2.4 - primacor and Dobutamine restarted     DCCV  for Fib/RVR - 200J x 3 - ultimately converted to sinus 8p 8/11 8/12: remains in sinus - additional 1 U pRBC given   8/13 - FIDEL - lasix infusion stopped and volume given back  inotrope titrated down to off and sedation held to assess ability to wean/liberate from vent    extubated 8/14 - good cough and later that day OOB in chair  No acute events reported overnight    Up and ambulating on unit with walker and staff assist this am       PMHx includes but is not limited to:   HTN (hypertension)  H/O aortic valve stenosis  CAD (coronary artery disease)    ICU Vital Signs Last 24 Hrs  T(C): 36.4 (15 Aug 2021 09:31), Max: 36.6 (14 Aug 2021 17:31)  T(F): 97.5 (15 Aug 2021 09:31), Max: 97.8 (14 Aug 2021 17:31)  HR: 69 (15 Aug 2021 12:00) (55 - 82) sinus   BP: 163/97 (15 Aug 2021 09:00) (119/58 - 173/73)  BP(mean): 124 (15 Aug 2021 09:00) (84 - 124)  ABP: 157/67 (15 Aug 2021 12:00) (105/44 - 194/80)  ABP(mean): 99 (15 Aug 2021 12:00) (64 - 176)  RR: 18 (15 Aug 2021 12:00) (14 - 22)  SpO2: 97% (15 Aug 2021 12:00) (94% - 100%) 50% face tent    Qtts: none    I&O's Summary    14 Aug 2021 07:01  -  15 Aug 2021 07:00  --------------------------------------------------------  IN: 635.8 mL / OUT: 2110 mL / NET: -1474.2 mL    15 Aug 2021 07:01  -  15 Aug 2021 12:46  --------------------------------------------------------  IN: 160 mL / OUT: 410 mL / NET: -250 mL    Physical Exam    Heart - regular (-)rub/gallop  Lungs - scattered rhonchi - improves with cough; no wheeze  Abd - (+)BS soft NTND (-)r/r/g  Ext - warm to touch; no cyanosis/clubbing  Chest - incision site clean and dry  Neuro - alert/oriented and interactive - non-focal and moving all extremities  Skin - no rash     LABS:                        9.8    13.55 )-----------( 58       ( 15 Aug 2021 02:39 )             30.5     08-15    144  |  104  |  45<H>  ----------------------------<  129<H>  4.0   |  28  |  1.19    Ca    9.7      15 Aug 2021 02:39  Phos  3.4     08-15  Mg     2.5     08-15    TPro  6.0  /  Alb  4.0  /  TBili  1.2  /  DBili  x   /  AST  40  /  ALT  20  /  AlkPhos  86  08-15    PT/INR - ( 15 Aug 2021 02:39 )   PT: 12.8 sec;   INR: 1.07     PTT - ( 15 Aug 2021 02:39 )  PTT:30.2 sec    ABG - ( 15 Aug 2021 02:26 ) 7.43/40/85/97    RADIOLOGY & ADDITIONAL STUDIES: reviewed     Patient with severe symptomatic aortic stenosis and CAD - imaging demonstrating enlarging aortic arch aneurysm with post-op atrial fib and now FIDEL - improved with holding aggressive diuresis and volume back - extubated 8/14 and doing well     1. CV  stable hemodynamics  sinus rhythm   Hypertensive - given lopressor dosings and now on Metoprolol 12.5 q6h with hold parameters   ASA/statin    2. Pulm   remains on face tent (mouth breather and NGT remains in place pending swallow assessment  ongoing aggressive pulm toilet - patient iwth good cough  OOB and ambulation/incentive spirometry  anticipate titration of supplemental oxygen as clinical scenario allows  noted active smoker - cessation education and support     3. Endocrine  ISS; Maintain glycemic control - /104  HgA1c 5.6  TSH 2.6    persistent thrombocytopenia (58)- HIT panel 8/10: negative ; off SQ heparin    Optimize nutrition - to assess S/S today - if unable to safely take po will feed enterally    SCD and GI prophylaxis     d/w patient & daughter updated at bedside/staff and CTS    I have spent/provided stated minutes of critical care time to this patient: 90

## 2021-08-16 LAB
ALBUMIN SERPL ELPH-MCNC: 3.5 G/DL — SIGNIFICANT CHANGE UP (ref 3.3–5)
ALBUMIN SERPL ELPH-MCNC: 3.9 G/DL — SIGNIFICANT CHANGE UP (ref 3.3–5)
ALBUMIN SERPL ELPH-MCNC: 4 G/DL — SIGNIFICANT CHANGE UP (ref 3.3–5)
ALP SERPL-CCNC: 111 U/L — SIGNIFICANT CHANGE UP (ref 40–120)
ALP SERPL-CCNC: 153 U/L — HIGH (ref 40–120)
ALP SERPL-CCNC: 155 U/L — HIGH (ref 40–120)
ALT FLD-CCNC: 112 U/L — HIGH (ref 10–45)
ALT FLD-CCNC: 172 U/L — HIGH (ref 10–45)
ALT FLD-CCNC: 175 U/L — HIGH (ref 10–45)
ANION GAP SERPL CALC-SCNC: 13 MMOL/L — SIGNIFICANT CHANGE UP (ref 5–17)
ANION GAP SERPL CALC-SCNC: 9 MMOL/L — SIGNIFICANT CHANGE UP (ref 5–17)
APTT BLD: 29.8 SEC — SIGNIFICANT CHANGE UP (ref 27.5–35.5)
APTT BLD: 30.1 SEC — SIGNIFICANT CHANGE UP (ref 27.5–35.5)
APTT BLD: 33.9 SEC — SIGNIFICANT CHANGE UP (ref 27.5–35.5)
AST SERPL-CCNC: 274 U/L — HIGH (ref 10–40)
AST SERPL-CCNC: 409 U/L — HIGH (ref 10–40)
AST SERPL-CCNC: 477 U/L — HIGH (ref 10–40)
BILIRUB SERPL-MCNC: 1.1 MG/DL — SIGNIFICANT CHANGE UP (ref 0.2–1.2)
BILIRUB SERPL-MCNC: 1.4 MG/DL — HIGH (ref 0.2–1.2)
BILIRUB SERPL-MCNC: 1.7 MG/DL — HIGH (ref 0.2–1.2)
BUN SERPL-MCNC: 51 MG/DL — HIGH (ref 7–23)
BUN SERPL-MCNC: 53 MG/DL — HIGH (ref 7–23)
BUN SERPL-MCNC: 53 MG/DL — HIGH (ref 7–23)
BUN SERPL-MCNC: 57 MG/DL — HIGH (ref 7–23)
CALCIUM SERPL-MCNC: 9.4 MG/DL — SIGNIFICANT CHANGE UP (ref 8.4–10.5)
CALCIUM SERPL-MCNC: 9.7 MG/DL — SIGNIFICANT CHANGE UP (ref 8.4–10.5)
CALCIUM SERPL-MCNC: 9.9 MG/DL — SIGNIFICANT CHANGE UP (ref 8.4–10.5)
CALCIUM SERPL-MCNC: 9.9 MG/DL — SIGNIFICANT CHANGE UP (ref 8.4–10.5)
CHLORIDE SERPL-SCNC: 103 MMOL/L — SIGNIFICANT CHANGE UP (ref 96–108)
CHLORIDE SERPL-SCNC: 104 MMOL/L — SIGNIFICANT CHANGE UP (ref 96–108)
CHLORIDE SERPL-SCNC: 104 MMOL/L — SIGNIFICANT CHANGE UP (ref 96–108)
CHLORIDE SERPL-SCNC: 105 MMOL/L — SIGNIFICANT CHANGE UP (ref 96–108)
CO2 SERPL-SCNC: 25 MMOL/L — SIGNIFICANT CHANGE UP (ref 22–31)
CO2 SERPL-SCNC: 26 MMOL/L — SIGNIFICANT CHANGE UP (ref 22–31)
CO2 SERPL-SCNC: 27 MMOL/L — SIGNIFICANT CHANGE UP (ref 22–31)
CO2 SERPL-SCNC: 28 MMOL/L — SIGNIFICANT CHANGE UP (ref 22–31)
CREAT SERPL-MCNC: 0.81 MG/DL — SIGNIFICANT CHANGE UP (ref 0.5–1.3)
CREAT SERPL-MCNC: 0.89 MG/DL — SIGNIFICANT CHANGE UP (ref 0.5–1.3)
CREAT SERPL-MCNC: 0.9 MG/DL — SIGNIFICANT CHANGE UP (ref 0.5–1.3)
CREAT SERPL-MCNC: 0.98 MG/DL — SIGNIFICANT CHANGE UP (ref 0.5–1.3)
GAS PNL BLDA: SIGNIFICANT CHANGE UP
GLUCOSE BLDC GLUCOMTR-MCNC: 125 MG/DL — HIGH (ref 70–99)
GLUCOSE BLDC GLUCOMTR-MCNC: 65 MG/DL — LOW (ref 70–99)
GLUCOSE BLDC GLUCOMTR-MCNC: 85 MG/DL — SIGNIFICANT CHANGE UP (ref 70–99)
GLUCOSE BLDC GLUCOMTR-MCNC: 98 MG/DL — SIGNIFICANT CHANGE UP (ref 70–99)
GLUCOSE SERPL-MCNC: 139 MG/DL — HIGH (ref 70–99)
GLUCOSE SERPL-MCNC: 141 MG/DL — HIGH (ref 70–99)
GLUCOSE SERPL-MCNC: 141 MG/DL — HIGH (ref 70–99)
GLUCOSE SERPL-MCNC: 150 MG/DL — HIGH (ref 70–99)
HCT VFR BLD CALC: 26.4 % — LOW (ref 34.5–45)
HCT VFR BLD CALC: 29.8 % — LOW (ref 34.5–45)
HCT VFR BLD CALC: 30.2 % — LOW (ref 34.5–45)
HGB BLD-MCNC: 8.5 G/DL — LOW (ref 11.5–15.5)
HGB BLD-MCNC: 9.6 G/DL — LOW (ref 11.5–15.5)
HGB BLD-MCNC: 9.8 G/DL — LOW (ref 11.5–15.5)
INR BLD: 1.57 — HIGH (ref 0.88–1.16)
INR BLD: 1.6 — HIGH (ref 0.88–1.16)
INR BLD: 1.75 — HIGH (ref 0.88–1.16)
LACTATE SERPL-SCNC: 1.8 MMOL/L — SIGNIFICANT CHANGE UP (ref 0.5–2)
MAGNESIUM SERPL-MCNC: 2.5 MG/DL — SIGNIFICANT CHANGE UP (ref 1.6–2.6)
MAGNESIUM SERPL-MCNC: 2.6 MG/DL — SIGNIFICANT CHANGE UP (ref 1.6–2.6)
MCHC RBC-ENTMCNC: 30.2 PG — SIGNIFICANT CHANGE UP (ref 27–34)
MCHC RBC-ENTMCNC: 30.2 PG — SIGNIFICANT CHANGE UP (ref 27–34)
MCHC RBC-ENTMCNC: 30.4 PG — SIGNIFICANT CHANGE UP (ref 27–34)
MCHC RBC-ENTMCNC: 32.2 GM/DL — SIGNIFICANT CHANGE UP (ref 32–36)
MCHC RBC-ENTMCNC: 32.2 GM/DL — SIGNIFICANT CHANGE UP (ref 32–36)
MCHC RBC-ENTMCNC: 32.5 GM/DL — SIGNIFICANT CHANGE UP (ref 32–36)
MCV RBC AUTO: 93.7 FL — SIGNIFICANT CHANGE UP (ref 80–100)
MCV RBC AUTO: 93.8 FL — SIGNIFICANT CHANGE UP (ref 80–100)
MCV RBC AUTO: 94 FL — SIGNIFICANT CHANGE UP (ref 80–100)
NRBC # BLD: 0 /100 WBCS — SIGNIFICANT CHANGE UP (ref 0–0)
PHOSPHATE SERPL-MCNC: 3.5 MG/DL — SIGNIFICANT CHANGE UP (ref 2.5–4.5)
PHOSPHATE SERPL-MCNC: 3.9 MG/DL — SIGNIFICANT CHANGE UP (ref 2.5–4.5)
PLATELET # BLD AUTO: 111 K/UL — LOW (ref 150–400)
PLATELET # BLD AUTO: 129 K/UL — LOW (ref 150–400)
PLATELET # BLD AUTO: 85 K/UL — LOW (ref 150–400)
POTASSIUM SERPL-MCNC: 3.8 MMOL/L — SIGNIFICANT CHANGE UP (ref 3.5–5.3)
POTASSIUM SERPL-MCNC: 4.2 MMOL/L — SIGNIFICANT CHANGE UP (ref 3.5–5.3)
POTASSIUM SERPL-MCNC: 4.2 MMOL/L — SIGNIFICANT CHANGE UP (ref 3.5–5.3)
POTASSIUM SERPL-MCNC: 4.4 MMOL/L — SIGNIFICANT CHANGE UP (ref 3.5–5.3)
POTASSIUM SERPL-SCNC: 3.8 MMOL/L — SIGNIFICANT CHANGE UP (ref 3.5–5.3)
POTASSIUM SERPL-SCNC: 4.2 MMOL/L — SIGNIFICANT CHANGE UP (ref 3.5–5.3)
POTASSIUM SERPL-SCNC: 4.2 MMOL/L — SIGNIFICANT CHANGE UP (ref 3.5–5.3)
POTASSIUM SERPL-SCNC: 4.4 MMOL/L — SIGNIFICANT CHANGE UP (ref 3.5–5.3)
PROT SERPL-MCNC: 5.5 G/DL — LOW (ref 6–8.3)
PROT SERPL-MCNC: 6.3 G/DL — SIGNIFICANT CHANGE UP (ref 6–8.3)
PROT SERPL-MCNC: 6.5 G/DL — SIGNIFICANT CHANGE UP (ref 6–8.3)
PROTHROM AB SERPL-ACNC: 18.4 SEC — HIGH (ref 10.6–13.6)
PROTHROM AB SERPL-ACNC: 18.8 SEC — HIGH (ref 10.6–13.6)
PROTHROM AB SERPL-ACNC: 20.5 SEC — HIGH (ref 10.6–13.6)
RBC # BLD: 2.81 M/UL — LOW (ref 3.8–5.2)
RBC # BLD: 3.18 M/UL — LOW (ref 3.8–5.2)
RBC # BLD: 3.22 M/UL — LOW (ref 3.8–5.2)
RBC # FLD: 14.3 % — SIGNIFICANT CHANGE UP (ref 10.3–14.5)
RBC # FLD: 14.6 % — HIGH (ref 10.3–14.5)
RBC # FLD: 14.6 % — HIGH (ref 10.3–14.5)
SARS-COV-2 RNA SPEC QL NAA+PROBE: SIGNIFICANT CHANGE UP
SODIUM SERPL-SCNC: 141 MMOL/L — SIGNIFICANT CHANGE UP (ref 135–145)
SODIUM SERPL-SCNC: 142 MMOL/L — SIGNIFICANT CHANGE UP (ref 135–145)
SODIUM SERPL-SCNC: 143 MMOL/L — SIGNIFICANT CHANGE UP (ref 135–145)
SODIUM SERPL-SCNC: 144 MMOL/L — SIGNIFICANT CHANGE UP (ref 135–145)
WBC # BLD: 11.98 K/UL — HIGH (ref 3.8–10.5)
WBC # BLD: 14.31 K/UL — HIGH (ref 3.8–10.5)
WBC # BLD: 16.71 K/UL — HIGH (ref 3.8–10.5)
WBC # FLD AUTO: 11.98 K/UL — HIGH (ref 3.8–10.5)
WBC # FLD AUTO: 14.31 K/UL — HIGH (ref 3.8–10.5)
WBC # FLD AUTO: 16.71 K/UL — HIGH (ref 3.8–10.5)

## 2021-08-16 PROCEDURE — 71045 X-RAY EXAM CHEST 1 VIEW: CPT | Mod: 26

## 2021-08-16 PROCEDURE — 32557 INSERT CATH PLEURA W/ IMAGE: CPT

## 2021-08-16 PROCEDURE — 93308 TTE F-UP OR LMTD: CPT | Mod: 26

## 2021-08-16 PROCEDURE — 99232 SBSQ HOSP IP/OBS MODERATE 35: CPT | Mod: 25

## 2021-08-16 PROCEDURE — 99292 CRITICAL CARE ADDL 30 MIN: CPT | Mod: 25

## 2021-08-16 PROCEDURE — 99291 CRITICAL CARE FIRST HOUR: CPT

## 2021-08-16 PROCEDURE — 33017 PRCRD DRG 6YR+ W/O CGEN CAR: CPT

## 2021-08-16 RX ORDER — AMIODARONE HYDROCHLORIDE 400 MG/1
0.5 TABLET ORAL
Qty: 900 | Refills: 0 | Status: DISCONTINUED | OUTPATIENT
Start: 2021-08-16 | End: 2021-08-17

## 2021-08-16 RX ORDER — HEPARIN SODIUM 5000 [USP'U]/ML
5000 INJECTION INTRAVENOUS; SUBCUTANEOUS EVERY 12 HOURS
Refills: 0 | Status: DISCONTINUED | OUTPATIENT
Start: 2021-08-16 | End: 2021-08-16

## 2021-08-16 RX ORDER — POTASSIUM CHLORIDE 20 MEQ
20 PACKET (EA) ORAL ONCE
Refills: 0 | Status: COMPLETED | OUTPATIENT
Start: 2021-08-16 | End: 2021-08-16

## 2021-08-16 RX ORDER — HEPARIN SODIUM 5000 [USP'U]/ML
900 INJECTION INTRAVENOUS; SUBCUTANEOUS
Qty: 25000 | Refills: 0 | Status: DISCONTINUED | OUTPATIENT
Start: 2021-08-16 | End: 2021-08-16

## 2021-08-16 RX ORDER — FENTANYL CITRATE 50 UG/ML
12.5 INJECTION INTRAVENOUS ONCE
Refills: 0 | Status: DISCONTINUED | OUTPATIENT
Start: 2021-08-16 | End: 2021-08-16

## 2021-08-16 RX ORDER — ACETAMINOPHEN 500 MG
1000 TABLET ORAL ONCE
Refills: 0 | Status: COMPLETED | OUTPATIENT
Start: 2021-08-16 | End: 2021-08-16

## 2021-08-16 RX ORDER — BUDESONIDE AND FORMOTEROL FUMARATE DIHYDRATE 160; 4.5 UG/1; UG/1
2 AEROSOL RESPIRATORY (INHALATION)
Refills: 0 | Status: DISCONTINUED | OUTPATIENT
Start: 2021-08-16 | End: 2021-09-03

## 2021-08-16 RX ORDER — LEVALBUTEROL 1.25 MG/.5ML
0.63 SOLUTION, CONCENTRATE RESPIRATORY (INHALATION) EVERY 6 HOURS
Refills: 0 | Status: DISCONTINUED | OUTPATIENT
Start: 2021-08-16 | End: 2021-09-03

## 2021-08-16 RX ORDER — FENTANYL CITRATE 50 UG/ML
25 INJECTION INTRAVENOUS
Refills: 0 | Status: DISCONTINUED | OUTPATIENT
Start: 2021-08-16 | End: 2021-08-16

## 2021-08-16 RX ORDER — AMIODARONE HYDROCHLORIDE 400 MG/1
150 TABLET ORAL ONCE
Refills: 0 | Status: COMPLETED | OUTPATIENT
Start: 2021-08-16 | End: 2021-08-16

## 2021-08-16 RX ORDER — HYDRALAZINE HCL 50 MG
10 TABLET ORAL ONCE
Refills: 0 | Status: COMPLETED | OUTPATIENT
Start: 2021-08-16 | End: 2021-08-16

## 2021-08-16 RX ADMIN — FENTANYL CITRATE 25 MICROGRAM(S): 50 INJECTION INTRAVENOUS at 19:28

## 2021-08-16 RX ADMIN — FENTANYL CITRATE 12.5 MICROGRAM(S): 50 INJECTION INTRAVENOUS at 22:19

## 2021-08-16 RX ADMIN — Medication 100 MILLIEQUIVALENT(S): at 22:17

## 2021-08-16 RX ADMIN — Medication 1000 MILLIGRAM(S): at 13:36

## 2021-08-16 RX ADMIN — AMIODARONE HYDROCHLORIDE 200 MILLIGRAM(S): 400 TABLET ORAL at 06:58

## 2021-08-16 RX ADMIN — Medication 4 MILLIGRAM(S): at 15:34

## 2021-08-16 RX ADMIN — FENTANYL CITRATE 12.5 MICROGRAM(S): 50 INJECTION INTRAVENOUS at 22:18

## 2021-08-16 RX ADMIN — Medication 10 MILLIGRAM(S): at 19:17

## 2021-08-16 RX ADMIN — AMIODARONE HYDROCHLORIDE 200 MILLIGRAM(S): 400 TABLET ORAL at 00:59

## 2021-08-16 RX ADMIN — CHLORHEXIDINE GLUCONATE 5 MILLILITER(S): 213 SOLUTION TOPICAL at 07:01

## 2021-08-16 RX ADMIN — HEPARIN SODIUM 9 UNIT(S)/HR: 5000 INJECTION INTRAVENOUS; SUBCUTANEOUS at 09:23

## 2021-08-16 RX ADMIN — LEVALBUTEROL 0.63 MILLIGRAM(S): 1.25 SOLUTION, CONCENTRATE RESPIRATORY (INHALATION) at 01:00

## 2021-08-16 RX ADMIN — LEVALBUTEROL 0.63 MILLIGRAM(S): 1.25 SOLUTION, CONCENTRATE RESPIRATORY (INHALATION) at 17:44

## 2021-08-16 RX ADMIN — AMIODARONE HYDROCHLORIDE 16.7 MG/MIN: 400 TABLET ORAL at 07:55

## 2021-08-16 RX ADMIN — FENTANYL CITRATE 25 MICROGRAM(S): 50 INJECTION INTRAVENOUS at 19:20

## 2021-08-16 RX ADMIN — SENNA PLUS 2 TABLET(S): 8.6 TABLET ORAL at 00:59

## 2021-08-16 RX ADMIN — FENTANYL CITRATE 12.5 MICROGRAM(S): 50 INJECTION INTRAVENOUS at 20:01

## 2021-08-16 RX ADMIN — Medication 12.5 MILLIGRAM(S): at 23:43

## 2021-08-16 RX ADMIN — Medication 4 MILLIGRAM(S): at 18:03

## 2021-08-16 RX ADMIN — FENTANYL CITRATE 12.5 MICROGRAM(S): 50 INJECTION INTRAVENOUS at 13:33

## 2021-08-16 RX ADMIN — HEPARIN SODIUM 5000 UNIT(S): 5000 INJECTION INTRAVENOUS; SUBCUTANEOUS at 19:37

## 2021-08-16 RX ADMIN — FENTANYL CITRATE 25 MICROGRAM(S): 50 INJECTION INTRAVENOUS at 19:18

## 2021-08-16 RX ADMIN — CHLORHEXIDINE GLUCONATE 1 APPLICATION(S): 213 SOLUTION TOPICAL at 07:30

## 2021-08-16 RX ADMIN — SENNA PLUS 2 TABLET(S): 8.6 TABLET ORAL at 22:17

## 2021-08-16 RX ADMIN — Medication 12.5 MILLIGRAM(S): at 06:59

## 2021-08-16 RX ADMIN — LIDOCAINE 1 PATCH: 4 CREAM TOPICAL at 04:00

## 2021-08-16 RX ADMIN — Medication 400 MILLIGRAM(S): at 13:33

## 2021-08-16 RX ADMIN — LEVALBUTEROL 0.63 MILLIGRAM(S): 1.25 SOLUTION, CONCENTRATE RESPIRATORY (INHALATION) at 06:45

## 2021-08-16 RX ADMIN — FENTANYL CITRATE 25 MICROGRAM(S): 50 INJECTION INTRAVENOUS at 20:58

## 2021-08-16 RX ADMIN — Medication 12.5 MILLIGRAM(S): at 18:12

## 2021-08-16 RX ADMIN — FENTANYL CITRATE 12.5 MICROGRAM(S): 50 INJECTION INTRAVENOUS at 13:36

## 2021-08-16 RX ADMIN — PANTOPRAZOLE SODIUM 40 MILLIGRAM(S): 20 TABLET, DELAYED RELEASE ORAL at 13:37

## 2021-08-16 RX ADMIN — Medication 100 MILLIEQUIVALENT(S): at 00:59

## 2021-08-16 NOTE — CONSULT NOTE ADULT - SUBJECTIVE AND OBJECTIVE BOX
Clinical History: I34.0 I71.2 I25.110  Patient with severe symptomatic aortic stenosis and CAD - imaging demonstrating enlarging aortic arch aneurysm with post-op atrial fib and now FIDEL - improved with holding aggressive diuresis and volume back - extubated 8/14. Tachycardic and tachypneic this AM, echo demonstrates pericardial effusion new from 8/11        No pertinent family history in first degree relatives    FH: CAD (coronary artery disease) (Sibling)    Family history of CKD (chronic kidney disease) (Sibling)    Family history of diabetes mellitus (DM) (Sibling)    Handoff    MEWS Score    No pertinent past medical history    HTN (hypertension)    H/O aortic valve stenosis    CAD (coronary artery disease)    Aortic stenosis    Aortic arch aneurysm    CAD (coronary artery disease)    Aortic stenosis    AS (aortic stenosis)    Aortic arch aneurysm    CAD (coronary artery disease)    Aortic valve replacement, tissue    No significant past surgical history    SysAdmin_VstLnk        Meds:albumin human 25% IVPB 50 milliLiter(s) IV Intermittent every 10 minutes  ALBUTerol    90 MICROgram(s) HFA Inhaler 2 Puff(s) Inhalation every 6 hours PRN  aMIOdarone Infusion 0.5 mG/Min IV Continuous <Continuous>  aspirin  chewable 81 milliGRAM(s) Enteral Tube daily  chlorhexidine 0.12% Liquid 5 milliLiter(s) Oral Mucosa two times a day  chlorhexidine 2% Cloths 1 Application(s) Topical <User Schedule>  dexMEDEtomidine Infusion 0.2 MICROgram(s)/kG/Hr IV Continuous <Continuous>  dextrose 40% Gel 15 Gram(s) Oral once  dextrose 5%. 1000 milliLiter(s) IV Continuous <Continuous>  dextrose 5%. 1000 milliLiter(s) IV Continuous <Continuous>  dextrose 50% Injectable 50 milliLiter(s) IV Push every 15 minutes  dextrose 50% Injectable 25 milliLiter(s) IV Push every 15 minutes  fentaNYL    Injectable 12.5 MICROGram(s) IV Push once  glucagon  Injectable 1 milliGRAM(s) IntraMuscular once  heparin  Infusion 900 Unit(s)/Hr IV Continuous <Continuous>  insulin lispro (ADMELOG) corrective regimen sliding scale   SubCutaneous every 6 hours  levalbuterol Inhalation 0.63 milliGRAM(s) Inhalation every 6 hours  metoprolol tartrate 12.5 milliGRAM(s) Oral every 6 hours  pantoprazole  Injectable 40 milliGRAM(s) IV Push daily  senna 2 Tablet(s) Oral at bedtime  sodium chloride 0.9%. 1000 milliLiter(s) IV Continuous <Continuous>      Allergies:No Known Allergies        Labs:                           9.6    16.71 )-----------( 111      ( 16 Aug 2021 10:10 )             29.8     PT/INR - ( 16 Aug 2021 10:10 )   PT: 18.8 sec;   INR: 1.60          PTT - ( 16 Aug 2021 10:10 )  PTT:33.9 sec  08-16    142  |  105  |  53<H>  ----------------------------<  139<H>  4.2   |  28  |  0.98    Ca    9.4      16 Aug 2021 02:55  Phos  3.9     08-16  Mg     2.6     08-16    TPro  5.5<L>  /  Alb  3.5  /  TBili  1.1  /  DBili  x   /  AST  274<H>  /  ALT  112<H>  /  AlkPhos  111  08-16          Imaging Findings: Per Echo report  1. Limited study obtained for evaluation of pericardial effusion.  2. Technically difficult study.  3. Small to moderate pericardial effusion, largest in size (moderate) posterolateral to the left ventricle. No obvious chamber compression. Respiratory variation across mitral inflow cannot be assessed due to tachycardic and irregular rhythm.  4. Compared to the previous TTE performed on 8/11/2021, pericardial effusion is now present.

## 2021-08-16 NOTE — PROGRESS NOTE ADULT - SUBJECTIVE AND OBJECTIVE BOX
CTICU  CRITICAL  CARE  attending     Hand off received 					   Pertinent clinical, laboratory, radiographic, hemodynamic, echocardiographic, respiratory data, microbiologic data and chart were reviewed and analyzed frequently throughout the course of the day and night  Patient seen and examined with CTS/ SH attending at bedside  Pt is a 76y , Female, HEALTH ISSUES - PROBLEM Dx:      , FAMILY HISTORY:  FH: CAD (coronary artery disease) (Sibling)  CABG    Family history of CKD (chronic kidney disease) (Sibling)  ESRD    Family history of diabetes mellitus (DM) (Sibling)    PAST MEDICAL & SURGICAL HISTORY:  HTN (hypertension)    H/O aortic valve stenosis    CAD (coronary artery disease)    No significant past surgical history      Patient is a 76y old  Female who presents with a chief complaint of HERRERA (16 Aug 2021 11:00)      14 system review limited by mentation and multiorgan morbidity     Vital signs, hemodynamic and respiratory parameters were reviewed from the bedside nursing flowsheet.  ICU Vital Signs Last 24 Hrs  T(C): 36.3 (16 Aug 2021 17:31), Max: 36.3 (16 Aug 2021 17:31)  T(F): 97.3 (16 Aug 2021 17:31), Max: 97.3 (16 Aug 2021 17:31)  HR: 75 (16 Aug 2021 19:00) (55 - 135)  BP: --  BP(mean): --  ABP: 158/64 (16 Aug 2021 19:00) (114/68 - 172/70)  ABP(mean): 98 (16 Aug 2021 19:00) (78 - 108)  RR: 22 (16 Aug 2021 19:00) (15 - 24)  SpO2: 95% (16 Aug 2021 19:00) (89% - 100%)    Adult Advanced Hemodynamics Last 24 Hrs  CVP(mm Hg): --  CVP(cm H2O): --  CO: --  CI: --  PA: --  PA(mean): --  PCWP: --  SVR: --  SVRI: --  PVR: --  PVRI: --, ABG - ( 16 Aug 2021 15:42 )  pH, Arterial: 7.50  pH, Blood: x     /  pCO2: 32    /  pO2: 68    / HCO3: 25    / Base Excess: 2.4   /  SaO2: 96.6                Intake and output was reviewed and the fluid balance was calculated  Daily     Daily   I&O's Summary    15 Aug 2021 07:01  -  16 Aug 2021 07:00  --------------------------------------------------------  IN: 853.4 mL / OUT: 1440 mL / NET: -586.6 mL    16 Aug 2021 07:01  -  16 Aug 2021 19:51  --------------------------------------------------------  IN: 340.5 mL / OUT: 1675 mL / NET: -1334.5 mL        All lines and drain sites were assessed  [facility]      POCT Blood Glucose.: 125 mg/dL (16 Aug 2021 17:12)    No acute change in focality  Auscultation of the chest reveals equal bs  Abdomen is soft  Extremities are warm and well perfused  Wounds appear clean and unremarkable  Antibiotics are periop    labs  CBC Full  -  ( 16 Aug 2021 15:45 )  WBC Count : 14.31 K/uL  RBC Count : 3.22 M/uL  Hemoglobin : 9.8 g/dL  Hematocrit : 30.2 %  Platelet Count - Automated : 129 K/uL  Mean Cell Volume : 93.8 fl  Mean Cell Hemoglobin : 30.4 pg  Mean Cell Hemoglobin Concentration : 32.5 gm/dL  Auto Neutrophil # : x  Auto Lymphocyte # : x  Auto Monocyte # : x  Auto Eosinophil # : x  Auto Basophil # : x  Auto Neutrophil % : x  Auto Lymphocyte % : x  Auto Monocyte % : x  Auto Eosinophil % : x  Auto Basophil % : x    08-16    143  |  104  |  51<H>  ----------------------------<  141<H>  3.8   |  26  |  0.89    Ca    9.9      16 Aug 2021 15:45  Phos  3.5     08-16  Mg     2.6     08-16    TPro  6.3  /  Alb  3.9  /  TBili  1.7<H>  /  DBili  x   /  AST  409<H>  /  ALT  172<H>  /  AlkPhos  155<H>  08-16    PT/INR - ( 16 Aug 2021 15:45 )   PT: 20.5 sec;   INR: 1.75          PTT - ( 16 Aug 2021 15:45 )  PTT:29.8 sec  The current medications were reviewed   MEDICATIONS  (STANDING):  albumin human 25% IVPB 50 milliLiter(s) IV Intermittent every 10 minutes  aMIOdarone Infusion 0.5 mG/Min (16.7 mL/Hr) IV Continuous <Continuous>  aspirin  chewable 81 milliGRAM(s) Enteral Tube daily  chlorhexidine 0.12% Liquid 5 milliLiter(s) Oral Mucosa two times a day  chlorhexidine 2% Cloths 1 Application(s) Topical <User Schedule>  dexMEDEtomidine Infusion 0.2 MICROgram(s)/kG/Hr (3.88 mL/Hr) IV Continuous <Continuous>  dextrose 40% Gel 15 Gram(s) Oral once  dextrose 5%. 1000 milliLiter(s) (50 mL/Hr) IV Continuous <Continuous>  dextrose 5%. 1000 milliLiter(s) (100 mL/Hr) IV Continuous <Continuous>  dextrose 50% Injectable 50 milliLiter(s) IV Push every 15 minutes  dextrose 50% Injectable 25 milliLiter(s) IV Push every 15 minutes  glucagon  Injectable 1 milliGRAM(s) IntraMuscular once  heparin  Infusion 900 Unit(s)/Hr (9 mL/Hr) IV Continuous <Continuous>  insulin lispro (ADMELOG) corrective regimen sliding scale   SubCutaneous every 6 hours  metoprolol tartrate 12.5 milliGRAM(s) Oral every 6 hours  pantoprazole  Injectable 40 milliGRAM(s) IV Push daily  senna 2 Tablet(s) Oral at bedtime  sodium chloride 0.9%. 1000 milliLiter(s) (10 mL/Hr) IV Continuous <Continuous>  testosterone patch 4 mG/24 Hr(s) 4 milliGRAM(s) Transdermal <User Schedule>    MEDICATIONS  (PRN):  ALBUTerol    90 MICROgram(s) HFA Inhaler 2 Puff(s) Inhalation every 6 hours PRN Wheezing       PROBLEM LIST/ ASSESSMENT:  HEALTH ISSUES - PROBLEM Dx:      ,   Patient is a 76y old  Female who presents with a chief complaint of HERRERA (16 Aug 2021 11:00)     s/p cardiac surgery                My plan includes :  close hemodynamic, ventilatory and drain monitoring and management per post op routine    Monitor for arrhythmias and monitor parameters for organ perfusion  beta blockade not administered due to hemodynamic instability and bradycardia  monitor neurologic status  Head of the bed should remain elevated to 45 deg .   chest PT and IS will be encouraged  monitor adequacy of oxygenation and ventilation and attempt to wean oxygen  antibiotic regimen will be tailored to the clinical, laboratory and microbiologic data  Nutritional goals will be met using po eventually , ensure adequate caloric intake and montior the same  Stress ulcer and VTE prophylaxis will be achieved    Glycemic control is satisfactory  Electrolytes have been repleted as necessary and wound care has been carried out. Pain control has been achieved.   agressive physical therapy and early mobility and ambulation goals will be met   The family was updated about the course and plan  CRITICAL CARE TIME personally provided by me  in evaluation and management, reassessments, review and interpretation of labs and x-rays, ventilator and hemodynamic management, formulating a plan and coordinating care: ___90____ MIN.  Time does not include procedural time. Time spent was non routine post-operarive caRE and included multiple and repeated evaluations at the bedside  CTICU ATTENDING     					    Jesu Garcia MD

## 2021-08-16 NOTE — CONSULT NOTE ADULT - SUBJECTIVE AND OBJECTIVE BOX
HPI:    PAST MEDICAL & SURGICAL HISTORY:  HTN (hypertension)  H/O aortic valve stenosis  CAD (coronary artery disease)  No significant past surgical history    No pertinent family history in first degree relatives    FH: CAD (coronary artery disease) (Sibling)    Family history of CKD (chronic kidney disease) (Sibling)    Family history of diabetes mellitus (DM) (Sibling)    Social History: No smoking, no drugs, no algohol    pertinent home medications:    Inpatient Medications:   albumin human 25% IVPB 50 milliLiter(s) IV Intermittent every 10 minutes  ALBUTerol    90 MICROgram(s) HFA Inhaler 2 Puff(s) Inhalation every 6 hours PRN  aMIOdarone Infusion 0.5 mG/Min IV Continuous <Continuous>  aspirin  chewable 81 milliGRAM(s) Enteral Tube daily  chlorhexidine 0.12% Liquid 5 milliLiter(s) Oral Mucosa two times a day  chlorhexidine 2% Cloths 1 Application(s) Topical <User Schedule>  dexMEDEtomidine Infusion 0.2 MICROgram(s)/kG/Hr IV Continuous <Continuous>  fentaNYL    Injectable 12.5 MICROGram(s) IV Push once  glucagon  Injectable 1 milliGRAM(s) IntraMuscular once  heparin  Infusion 900 Unit(s)/Hr IV Continuous <Continuous>  insulin lispro (ADMELOG) corrective regimen sliding scale   SubCutaneous every 6 hours  levalbuterol Inhalation 0.63 milliGRAM(s) Inhalation every 6 hours  metoprolol tartrate 12.5 milliGRAM(s) Oral every 6 hours  pantoprazole  Injectable 40 milliGRAM(s) IV Push daily  senna 2 Tablet(s) Oral at bedtime  sodium chloride 0.9%. 1000 milliLiter(s) IV Continuous <Continuous>      Allergies: No Known Allergies      ROS:   CONSTITUTIONAL: No fever, weight loss + fatigue  EYES: Pt denies  RESPIRATORY: No cough, wheezing, chills or hemoptysis; No Shortness of Breath  CARDIOVASCULAR: see HPI  GASTROINTESTINAL: Pt denies  NEUROLOGICAL: Pt denies  SKIN: Pt denies   PSYCHIATRIC: Pt denies  HEME/LYMPH: Pt denies    PHYSICAL:  T(C): 36.1 (08-16-21 @ 09:00), Max: 36.3 (08-15-21 @ 13:34)  HR: 117 (08-16-21 @ 10:00) (59 - 140)  BP: --  RR: 21 (08-16-21 @ 10:00) (15 - 24)  SpO2: 100% (08-16-21 @ 10:00) (92% - 100%)    Appearance: No acute distress, well developed  Eyes: normal appearing conjunctiva, pupils and eyelids  Cardiovascular: Normal S1 S2, No JVD, No murmurs, No edema  Respiratory: Lungs clear to auscultation	bilaterally.  No wheeze, rhonchi, rales noted  Gastrointestinal:  Soft, NT/ND 	  Neurologic:  No deficit noted  Psych: A&Ox3, normal mood/affect  Musculoskeletal: normal gait  Skin: no rash noted, normal color and pigmentation.        LABS:                        9.6    16.71 )-----------( 111      ( 16 Aug 2021 10:10 )             29.8     08-16    142  |  105  |  53<H>  ----------------------------<  139<H>  4.2   |  28  |  0.98    Ca    9.4      16 Aug 2021 02:55  Phos  3.9     08-16  Mg     2.6     08-16    TPro  5.5<L>  /  Alb  3.5  /  TBili  1.1  /  DBili  x   /  AST  274<H>  /  ALT  112<H>  /  AlkPhos  111  08-16    PT/INR - ( 16 Aug 2021 10:10 )   PT: 18.8 sec;   INR: 1.60          PTT - ( 16 Aug 2021 10:10 )  PTT:33.9 sec  TSH  Troponin    EKG:    Telemetry:    ECHO:    Prior EP procedures:    Cath / stress / Cardiac CTa:    Assessment Plan:         Electrophysiology Consult Note:     CHIEF COMPLAINT:  Patient is a 76y old  Female who presents with a chief complaint of SOB, HERRERA (16 Aug 2021 11:00)        HISTORY OF PRESENT ILLNESS:   76 y.o female, current every day smoker (59 PY), with PMHx HTN, bicuspid aortic valve / severe AS with normal LVEF.  Cardiac cath 21 also showed CAD. 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  She is now s/p bioprosthetic AVR, ascending/hemiarch replacement, frozen elephant trunk, left aorto-subclavian bypass and CABG x 2 on 21.  Has postop AFIB with RVR needed amio and bedside dccv done by team on 21.  Extubated on .  Telemetry noted to have AFIB recurrence 8/15/21 with RVR up to 130s.  Started on Amio boluses and now on gtt.  There are a few conversions since 8/15 with few ventricular pacing beats from epicardial backup wire at 40 bpm.  Occasionally her sinus rate "competed" with the backup pacing rate at 40 bpm.  No dizziness / syncope.  Currently in NSR at 70s bpm.    Went for left chest tube placement today.   On Heparin gtt.       PAST MEDICAL & SURGICAL HISTORY:  HTN (hypertension)  H/O aortic valve stenosis  CAD (coronary artery disease)  No significant past surgical history    FAMILY HISTORY:  FH: CAD (coronary artery disease) (Sibling)  CABG  Family history of CKD (chronic kidney disease) (Sibling)  ESRD  Family history of diabetes mellitus (DM) (Sibling)    SOCIAL HISTORY:    Smoker -- 1ppd x 59 years.     ETOH Use:   NO     Ilicit Drug Use:   NO      Allergies  No Known Allergies  	    MEDICATIONS  (STANDING):  albumin human 25% IVPB 50 milliLiter(s) IV Intermittent every 10 minutes  aMIOdarone Infusion 0.5 mG/Min (16.7 mL/Hr) IV Continuous <Continuous>  aspirin  chewable 81 milliGRAM(s) Enteral Tube daily  chlorhexidine 0.12% Liquid 5 milliLiter(s) Oral Mucosa two times a day  chlorhexidine 2% Cloths 1 Application(s) Topical <User Schedule>  dexMEDEtomidine Infusion 0.2 MICROgram(s)/kG/Hr (3.88 mL/Hr) IV Continuous <Continuous>  heparin  Infusion 900 Unit(s)/Hr (9 mL/Hr) IV Continuous <Continuous>  insulin lispro (ADMELOG) corrective regimen sliding scale   SubCutaneous every 6 hours  levalbuterol Inhalation 0.63 milliGRAM(s) Inhalation every 6 hours  metoprolol tartrate 12.5 milliGRAM(s) Oral every 6 hours  pantoprazole  Injectable 40 milliGRAM(s) IV Push daily  senna 2 Tablet(s) Oral at bedtime  sodium chloride 0.9%. 1000 milliLiter(s) (10 mL/Hr) IV Continuous <Continuous>  testosterone patch 4 mG/24 Hr(s) 4 milliGRAM(s) Transdermal <User Schedule>    MEDICATIONS  (PRN):  ALBUTerol    90 MICROgram(s) HFA Inhaler 2 Puff(s) Inhalation every 6 hours PRN Wheezing      REVIEW OF SYSTEMS:  CONSTITUTIONAL: No fever, weight loss, or fatigue  EYES: No eye pain, visual disturbances, or discharge  ENMT:  No difficulty hearing, tinnitus, vertigo; No sinus or throat pain  NECK: No pain or stiffness  RESPIRATORY: No cough, wheezing, chills or hemoptysis; No Shortness of Breath  CARDIOVASCULAR: post surgical wound pain. Left posterior chest wall pain (from having chest tube).    GASTROINTESTINAL: No abdominal or epigastric pain. No nausea, vomiting, or hematemesis; No diarrhea or constipation. No melena or hematochezia.  GENITOURINARY: No dysuria, frequency, hematuria, or incontinence  NEUROLOGICAL: No headaches, memory loss, loss of strength, numbness, or tremors  SKIN: No itching, burning, rashes, or lesions   LYMPH Nodes: No enlarged glands  ENDOCRINE: No heat or cold intolerance; No hair loss  MUSCULOSKELETAL: + back pain  PSYCHIATRIC: No depression, anxiety, mood swings, or difficulty sleeping  HEME/LYMPH: No easy bruising, or bleeding gums  ALLERY AND IMMUNOLOGIC: No hives or eczema	      PHYSICAL EXAM:  Vital Signs Last 24 Hrs  T(C): 36.1 (16 Aug 2021 14:00), Max: 36.3 (15 Aug 2021 17:25)  T(F): 97 (16 Aug 2021 14:00), Max: 97.3 (15 Aug 2021 17:25)  HR: 72 (16 Aug 2021 15:00) (55 - 140)  BP: 145/56  RR: 22 (16 Aug 2021 13:00) (15 - 24)  SpO2: 95% (16 Aug 2021 15:00) (89% - 100%)      Constitutional: NAD	  HEENT:   Normal oral mucosa, PERRL, EOMI	  neck: Left TLC.   CVS: Normal S1 / S2, RRR, sternal wound dressing clean.   Pulm: diminished BS. Left posterior chest wall with CT in place.   GI:  + BS, soft, NT / ND   Ext: + pitting LE edema bilaterally. vein graft wound intact.   Vascular: Peripheral pulses palpable 2+ bilaterally  Neurologic: A&O x 3, Non-focal  Psych: Pleasant, has good insight  Skin: No rash or lesion       	  LABS:	                         9.6    16.71 )-----------( 111      ( 16 Aug 2021 10:10 )             29.8         141  |  103  |  57<H>  ----------------------------<  141<H>  4.2   |  25  |  0.90    Ca    9.7      16 Aug 2021 10:10  Phos  3.9       Mg     2.5         TPro  6.5  /  Alb  4.0  /  TBili  1.4<H>  /  DBili  x   /  AST  477<H>  /  ALT  175<H>  /  AlkPhos  153<H>      EK21: NSR at 75 bpm. Normal intervals (IA 146ms. Qrs 76 ms. qtc 453 ms)   8/15/21: NSR 62 bpm. PAC.  Normal intervals (IA 178ms. qrs 76 ms. qtc 432 ms)     TTE Limited Echo w/o Cont (21 @ 09:36)   1. Limited study obtained for evaluation of pericardial effusion.   2. Technically difficult study.   3. Small to moderate pericardial effusion, largest in size (moderate) posterolateral to the left ventricle. No obvious chamber compression. Respiratory variation across mitral inflow cannot be assessed due to tachycardic and irregular rhythm.   4. Compared to the previous TTE performed on 2021, pericardial effusion is now present.   5. Findings were discussed with clinical team on 2021 at 10:24am.      TTE Limited Echo w/o Cont (21 @ 13:39)    1. Limited study obtained for evaluation of pericardial effusion.   2. Small pericardial effusion without echocardiographic evidence of cardiac tamponade physiology.   3. Compared to the previous TTE performed on 2021, pericardial effusion has slightly decreased.

## 2021-08-17 LAB
ALBUMIN SERPL ELPH-MCNC: 3.7 G/DL — SIGNIFICANT CHANGE UP (ref 3.3–5)
ALBUMIN SERPL ELPH-MCNC: 4.1 G/DL — SIGNIFICANT CHANGE UP (ref 3.3–5)
ALP SERPL-CCNC: 134 U/L — HIGH (ref 40–120)
ALP SERPL-CCNC: 137 U/L — HIGH (ref 40–120)
ALT FLD-CCNC: 118 U/L — HIGH (ref 10–45)
ALT FLD-CCNC: 121 U/L — HIGH (ref 10–45)
ANION GAP SERPL CALC-SCNC: 10 MMOL/L — SIGNIFICANT CHANGE UP (ref 5–17)
ANION GAP SERPL CALC-SCNC: 11 MMOL/L — SIGNIFICANT CHANGE UP (ref 5–17)
APTT BLD: 28.3 SEC — SIGNIFICANT CHANGE UP (ref 27.5–35.5)
APTT BLD: 29.2 SEC — SIGNIFICANT CHANGE UP (ref 27.5–35.5)
AST SERPL-CCNC: 151 U/L — HIGH (ref 10–40)
AST SERPL-CCNC: 207 U/L — HIGH (ref 10–40)
BASE EXCESS BLDV CALC-SCNC: 5.4 MMOL/L — HIGH (ref -2–3)
BASE EXCESS BLDV CALC-SCNC: 6.2 MMOL/L — HIGH (ref -2–3)
BILIRUB SERPL-MCNC: 1.4 MG/DL — HIGH (ref 0.2–1.2)
BILIRUB SERPL-MCNC: 1.5 MG/DL — HIGH (ref 0.2–1.2)
BUN SERPL-MCNC: 49 MG/DL — HIGH (ref 7–23)
BUN SERPL-MCNC: 49 MG/DL — HIGH (ref 7–23)
CA-I SERPL-SCNC: 1.29 MMOL/L — SIGNIFICANT CHANGE UP (ref 1.15–1.33)
CA-I SERPL-SCNC: 1.29 MMOL/L — SIGNIFICANT CHANGE UP (ref 1.15–1.33)
CALCIUM SERPL-MCNC: 9.6 MG/DL — SIGNIFICANT CHANGE UP (ref 8.4–10.5)
CALCIUM SERPL-MCNC: 9.8 MG/DL — SIGNIFICANT CHANGE UP (ref 8.4–10.5)
CHLORIDE SERPL-SCNC: 106 MMOL/L — SIGNIFICANT CHANGE UP (ref 96–108)
CHLORIDE SERPL-SCNC: 106 MMOL/L — SIGNIFICANT CHANGE UP (ref 96–108)
CO2 BLDV-SCNC: 31.2 MMOL/L — HIGH (ref 22–26)
CO2 BLDV-SCNC: 31.9 MMOL/L — HIGH (ref 22–26)
CO2 SERPL-SCNC: 27 MMOL/L — SIGNIFICANT CHANGE UP (ref 22–31)
CO2 SERPL-SCNC: 28 MMOL/L — SIGNIFICANT CHANGE UP (ref 22–31)
CREAT SERPL-MCNC: 0.74 MG/DL — SIGNIFICANT CHANGE UP (ref 0.5–1.3)
CREAT SERPL-MCNC: 0.79 MG/DL — SIGNIFICANT CHANGE UP (ref 0.5–1.3)
GAS PNL BLDA: SIGNIFICANT CHANGE UP
GAS PNL BLDV: 142 MMOL/L — SIGNIFICANT CHANGE UP (ref 136–145)
GAS PNL BLDV: 143 MMOL/L — SIGNIFICANT CHANGE UP (ref 136–145)
GAS PNL BLDV: SIGNIFICANT CHANGE UP
GLUCOSE BLDC GLUCOMTR-MCNC: 110 MG/DL — HIGH (ref 70–99)
GLUCOSE BLDC GLUCOMTR-MCNC: 112 MG/DL — HIGH (ref 70–99)
GLUCOSE BLDC GLUCOMTR-MCNC: 148 MG/DL — HIGH (ref 70–99)
GLUCOSE SERPL-MCNC: 170 MG/DL — HIGH (ref 70–99)
GLUCOSE SERPL-MCNC: 174 MG/DL — HIGH (ref 70–99)
HCO3 BLDV-SCNC: 30 MMOL/L — HIGH (ref 22–29)
HCO3 BLDV-SCNC: 31 MMOL/L — HIGH (ref 22–29)
HCT VFR BLD CALC: 27.8 % — LOW (ref 34.5–45)
HCT VFR BLD CALC: 29.6 % — LOW (ref 34.5–45)
HGB BLD-MCNC: 9 G/DL — LOW (ref 11.5–15.5)
HGB BLD-MCNC: 9.3 G/DL — LOW (ref 11.5–15.5)
INR BLD: 1.6 — HIGH (ref 0.88–1.16)
INR BLD: 1.78 — HIGH (ref 0.88–1.16)
MAGNESIUM SERPL-MCNC: 2.2 MG/DL — SIGNIFICANT CHANGE UP (ref 1.6–2.6)
MAGNESIUM SERPL-MCNC: 2.5 MG/DL — SIGNIFICANT CHANGE UP (ref 1.6–2.6)
MCHC RBC-ENTMCNC: 29.6 PG — SIGNIFICANT CHANGE UP (ref 27–34)
MCHC RBC-ENTMCNC: 30.3 PG — SIGNIFICANT CHANGE UP (ref 27–34)
MCHC RBC-ENTMCNC: 31.4 GM/DL — LOW (ref 32–36)
MCHC RBC-ENTMCNC: 32.4 GM/DL — SIGNIFICANT CHANGE UP (ref 32–36)
MCV RBC AUTO: 93.6 FL — SIGNIFICANT CHANGE UP (ref 80–100)
MCV RBC AUTO: 94.3 FL — SIGNIFICANT CHANGE UP (ref 80–100)
NRBC # BLD: 0 /100 WBCS — SIGNIFICANT CHANGE UP (ref 0–0)
NRBC # BLD: 0 /100 WBCS — SIGNIFICANT CHANGE UP (ref 0–0)
PCO2 BLDV: 42 MMHG — SIGNIFICANT CHANGE UP (ref 39–42)
PCO2 BLDV: 42 MMHG — SIGNIFICANT CHANGE UP (ref 39–42)
PH BLDV: 7.46 — HIGH (ref 7.32–7.43)
PH BLDV: 7.47 — HIGH (ref 7.32–7.43)
PHOSPHATE SERPL-MCNC: 2.5 MG/DL — SIGNIFICANT CHANGE UP (ref 2.5–4.5)
PHOSPHATE SERPL-MCNC: 3.1 MG/DL — SIGNIFICANT CHANGE UP (ref 2.5–4.5)
PLATELET # BLD AUTO: 138 K/UL — LOW (ref 150–400)
PLATELET # BLD AUTO: 174 K/UL — SIGNIFICANT CHANGE UP (ref 150–400)
PO2 BLDV: 33 MMHG — SIGNIFICANT CHANGE UP
PO2 BLDV: 34 MMHG — SIGNIFICANT CHANGE UP
POTASSIUM BLDV-SCNC: 3.6 MMOL/L — SIGNIFICANT CHANGE UP (ref 3.5–5.1)
POTASSIUM BLDV-SCNC: 3.6 MMOL/L — SIGNIFICANT CHANGE UP (ref 3.5–5.1)
POTASSIUM SERPL-MCNC: 3.6 MMOL/L — SIGNIFICANT CHANGE UP (ref 3.5–5.3)
POTASSIUM SERPL-MCNC: 3.7 MMOL/L — SIGNIFICANT CHANGE UP (ref 3.5–5.3)
POTASSIUM SERPL-MCNC: 4 MMOL/L — SIGNIFICANT CHANGE UP (ref 3.5–5.3)
POTASSIUM SERPL-SCNC: 3.6 MMOL/L — SIGNIFICANT CHANGE UP (ref 3.5–5.3)
POTASSIUM SERPL-SCNC: 3.7 MMOL/L — SIGNIFICANT CHANGE UP (ref 3.5–5.3)
POTASSIUM SERPL-SCNC: 4 MMOL/L — SIGNIFICANT CHANGE UP (ref 3.5–5.3)
PROT SERPL-MCNC: 6 G/DL — SIGNIFICANT CHANGE UP (ref 6–8.3)
PROT SERPL-MCNC: 6.6 G/DL — SIGNIFICANT CHANGE UP (ref 6–8.3)
PROTHROM AB SERPL-ACNC: 18.8 SEC — HIGH (ref 10.6–13.6)
PROTHROM AB SERPL-ACNC: 20.8 SEC — HIGH (ref 10.6–13.6)
RBC # BLD: 2.97 M/UL — LOW (ref 3.8–5.2)
RBC # BLD: 3.14 M/UL — LOW (ref 3.8–5.2)
RBC # FLD: 14.6 % — HIGH (ref 10.3–14.5)
RBC # FLD: 14.9 % — HIGH (ref 10.3–14.5)
SAO2 % BLDV: 54.2 % — SIGNIFICANT CHANGE UP
SAO2 % BLDV: 57.6 % — SIGNIFICANT CHANGE UP
SODIUM SERPL-SCNC: 143 MMOL/L — SIGNIFICANT CHANGE UP (ref 135–145)
SODIUM SERPL-SCNC: 145 MMOL/L — SIGNIFICANT CHANGE UP (ref 135–145)
WBC # BLD: 13.9 K/UL — HIGH (ref 3.8–10.5)
WBC # BLD: 14.42 K/UL — HIGH (ref 3.8–10.5)
WBC # FLD AUTO: 13.9 K/UL — HIGH (ref 3.8–10.5)
WBC # FLD AUTO: 14.42 K/UL — HIGH (ref 3.8–10.5)

## 2021-08-17 PROCEDURE — 93308 TTE F-UP OR LMTD: CPT | Mod: 26

## 2021-08-17 PROCEDURE — 93971 EXTREMITY STUDY: CPT | Mod: 26,LT

## 2021-08-17 PROCEDURE — 99292 CRITICAL CARE ADDL 30 MIN: CPT | Mod: 25

## 2021-08-17 PROCEDURE — 71045 X-RAY EXAM CHEST 1 VIEW: CPT | Mod: 26,76

## 2021-08-17 PROCEDURE — 99291 CRITICAL CARE FIRST HOUR: CPT

## 2021-08-17 RX ORDER — OXYCODONE AND ACETAMINOPHEN 5; 325 MG/1; MG/1
1 TABLET ORAL EVERY 6 HOURS
Refills: 0 | Status: DISCONTINUED | OUTPATIENT
Start: 2021-08-17 | End: 2021-08-24

## 2021-08-17 RX ORDER — AMIODARONE HYDROCHLORIDE 400 MG/1
200 TABLET ORAL DAILY
Refills: 0 | Status: DISCONTINUED | OUTPATIENT
Start: 2021-08-17 | End: 2021-08-22

## 2021-08-17 RX ORDER — METOPROLOL TARTRATE 50 MG
25 TABLET ORAL ONCE
Refills: 0 | Status: COMPLETED | OUTPATIENT
Start: 2021-08-17 | End: 2021-08-17

## 2021-08-17 RX ORDER — FENTANYL CITRATE 50 UG/ML
12.5 INJECTION INTRAVENOUS ONCE
Refills: 0 | Status: DISCONTINUED | OUTPATIENT
Start: 2021-08-17 | End: 2021-08-17

## 2021-08-17 RX ORDER — HYDRALAZINE HCL 50 MG
5 TABLET ORAL ONCE
Refills: 0 | Status: COMPLETED | OUTPATIENT
Start: 2021-08-17 | End: 2021-08-17

## 2021-08-17 RX ORDER — METOPROLOL TARTRATE 50 MG
25 TABLET ORAL EVERY 6 HOURS
Refills: 0 | Status: DISCONTINUED | OUTPATIENT
Start: 2021-08-17 | End: 2021-08-20

## 2021-08-17 RX ORDER — POTASSIUM CHLORIDE 20 MEQ
20 PACKET (EA) ORAL ONCE
Refills: 0 | Status: COMPLETED | OUTPATIENT
Start: 2021-08-17 | End: 2021-08-17

## 2021-08-17 RX ORDER — HEPARIN SODIUM 5000 [USP'U]/ML
5000 INJECTION INTRAVENOUS; SUBCUTANEOUS EVERY 8 HOURS
Refills: 0 | Status: DISCONTINUED | OUTPATIENT
Start: 2021-08-17 | End: 2021-09-03

## 2021-08-17 RX ORDER — ACETAMINOPHEN 500 MG
1000 TABLET ORAL ONCE
Refills: 0 | Status: COMPLETED | OUTPATIENT
Start: 2021-08-17 | End: 2021-08-17

## 2021-08-17 RX ORDER — POTASSIUM CHLORIDE 20 MEQ
20 PACKET (EA) ORAL
Refills: 0 | Status: COMPLETED | OUTPATIENT
Start: 2021-08-17 | End: 2021-08-17

## 2021-08-17 RX ORDER — METOPROLOL TARTRATE 50 MG
5 TABLET ORAL ONCE
Refills: 0 | Status: COMPLETED | OUTPATIENT
Start: 2021-08-17 | End: 2021-08-17

## 2021-08-17 RX ORDER — NICARDIPINE HYDROCHLORIDE 30 MG/1
5 CAPSULE, EXTENDED RELEASE ORAL
Qty: 40 | Refills: 0 | Status: DISCONTINUED | OUTPATIENT
Start: 2021-08-17 | End: 2021-08-17

## 2021-08-17 RX ADMIN — BUDESONIDE AND FORMOTEROL FUMARATE DIHYDRATE 2 PUFF(S): 160; 4.5 AEROSOL RESPIRATORY (INHALATION) at 00:00

## 2021-08-17 RX ADMIN — Medication 4 MILLIGRAM(S): at 05:02

## 2021-08-17 RX ADMIN — Medication 100 MILLIEQUIVALENT(S): at 12:25

## 2021-08-17 RX ADMIN — Medication 4 MILLIGRAM(S): at 18:32

## 2021-08-17 RX ADMIN — Medication 25 MILLIGRAM(S): at 12:25

## 2021-08-17 RX ADMIN — PANTOPRAZOLE SODIUM 40 MILLIGRAM(S): 20 TABLET, DELAYED RELEASE ORAL at 12:21

## 2021-08-17 RX ADMIN — OXYCODONE AND ACETAMINOPHEN 1 TABLET(S): 5; 325 TABLET ORAL at 17:50

## 2021-08-17 RX ADMIN — FENTANYL CITRATE 12.5 MICROGRAM(S): 50 INJECTION INTRAVENOUS at 14:55

## 2021-08-17 RX ADMIN — Medication 100 MILLIEQUIVALENT(S): at 04:13

## 2021-08-17 RX ADMIN — OXYCODONE AND ACETAMINOPHEN 1 TABLET(S): 5; 325 TABLET ORAL at 21:15

## 2021-08-17 RX ADMIN — LEVALBUTEROL 0.63 MILLIGRAM(S): 1.25 SOLUTION, CONCENTRATE RESPIRATORY (INHALATION) at 18:35

## 2021-08-17 RX ADMIN — OXYCODONE AND ACETAMINOPHEN 1 TABLET(S): 5; 325 TABLET ORAL at 18:20

## 2021-08-17 RX ADMIN — Medication 1000 MILLIGRAM(S): at 10:00

## 2021-08-17 RX ADMIN — Medication 100 MILLIEQUIVALENT(S): at 14:06

## 2021-08-17 RX ADMIN — Medication 4 MILLIGRAM(S): at 18:37

## 2021-08-17 RX ADMIN — Medication 25 MILLIGRAM(S): at 17:47

## 2021-08-17 RX ADMIN — Medication 5 MILLIGRAM(S): at 14:00

## 2021-08-17 RX ADMIN — Medication 400 MILLIGRAM(S): at 09:35

## 2021-08-17 RX ADMIN — LEVALBUTEROL 0.63 MILLIGRAM(S): 1.25 SOLUTION, CONCENTRATE RESPIRATORY (INHALATION) at 00:11

## 2021-08-17 RX ADMIN — AMIODARONE HYDROCHLORIDE 200 MILLIGRAM(S): 400 TABLET ORAL at 12:26

## 2021-08-17 RX ADMIN — Medication 5 MILLIGRAM(S): at 21:00

## 2021-08-17 RX ADMIN — FENTANYL CITRATE 12.5 MICROGRAM(S): 50 INJECTION INTRAVENOUS at 11:30

## 2021-08-17 RX ADMIN — Medication 5 MILLIGRAM(S): at 20:53

## 2021-08-17 RX ADMIN — FENTANYL CITRATE 12.5 MICROGRAM(S): 50 INJECTION INTRAVENOUS at 16:56

## 2021-08-17 RX ADMIN — CHLORHEXIDINE GLUCONATE 1 APPLICATION(S): 213 SOLUTION TOPICAL at 05:09

## 2021-08-17 RX ADMIN — HEPARIN SODIUM 5000 UNIT(S): 5000 INJECTION INTRAVENOUS; SUBCUTANEOUS at 22:52

## 2021-08-17 RX ADMIN — Medication 100 MILLIEQUIVALENT(S): at 10:04

## 2021-08-17 RX ADMIN — HEPARIN SODIUM 5000 UNIT(S): 5000 INJECTION INTRAVENOUS; SUBCUTANEOUS at 14:05

## 2021-08-17 RX ADMIN — LEVALBUTEROL 0.63 MILLIGRAM(S): 1.25 SOLUTION, CONCENTRATE RESPIRATORY (INHALATION) at 12:25

## 2021-08-17 RX ADMIN — FENTANYL CITRATE 12.5 MICROGRAM(S): 50 INJECTION INTRAVENOUS at 11:13

## 2021-08-17 RX ADMIN — OXYCODONE AND ACETAMINOPHEN 1 TABLET(S): 5; 325 TABLET ORAL at 20:45

## 2021-08-17 RX ADMIN — Medication 81 MILLIGRAM(S): at 12:21

## 2021-08-17 RX ADMIN — Medication 4 MILLIGRAM(S): at 15:34

## 2021-08-17 RX ADMIN — FENTANYL CITRATE 12.5 MICROGRAM(S): 50 INJECTION INTRAVENOUS at 17:10

## 2021-08-17 RX ADMIN — SENNA PLUS 2 TABLET(S): 8.6 TABLET ORAL at 22:52

## 2021-08-17 RX ADMIN — LEVALBUTEROL 0.63 MILLIGRAM(S): 1.25 SOLUTION, CONCENTRATE RESPIRATORY (INHALATION) at 06:00

## 2021-08-17 RX ADMIN — NICARDIPINE HYDROCHLORIDE 25 MG/HR: 30 CAPSULE, EXTENDED RELEASE ORAL at 03:15

## 2021-08-17 RX ADMIN — Medication 12.5 MILLIGRAM(S): at 05:09

## 2021-08-17 RX ADMIN — FENTANYL CITRATE 12.5 MICROGRAM(S): 50 INJECTION INTRAVENOUS at 15:20

## 2021-08-17 NOTE — OCCUPATIONAL THERAPY INITIAL EVALUATION ADULT - MD ORDER
76 y.o female, current every day smoker (59 PY), with PMHx HTN, bicuspid aortic valve, spinal stenosis, arthritis who complained of increased HERRERA for 6 months. TTE 3/2021 revealed EF normal, severe AS, moderate AI. NST 4/2021 revealed transient ischemic dilatation. Cardiac cath 5/11/21 revealed severe AS. CTA Abdomen/ pelvis on 5/13/21 revealed bicuspid aortic valve, mid aortic arch aneurysm increased in size from prior imaging.

## 2021-08-17 NOTE — OCCUPATIONAL THERAPY INITIAL EVALUATION ADULT - PERTINENT HX OF CURRENT PROBLEM, REHAB EVAL
S/P AVR (tissue), Ascending, hemiarch replacement, frozen elephant trunk, left aorto-subclavian bypass, CABG x 2

## 2021-08-17 NOTE — PROGRESS NOTE ADULT - ASSESSMENT
Pericardial drain to wall suction with 350cc serous output over last 24h. Flushes easily with 2cc NS. Pt afebrile and asymptomatic, WBC trending down to 13.90 today. Continue to monitor output. IR will continue to follow.

## 2021-08-17 NOTE — PROGRESS NOTE ADULT - SUBJECTIVE AND OBJECTIVE BOX
INTERVAL HPI/OVERNIGHT EVENTS:    8/9:  AVR (tissue), Ascending, hemiarch replacement, frozen elephant trunk, left aorto-subclavian bypass, CABG x 2  EF 50%    77yo Female active tobacco use; HTN, Known bicuspid aortic valve, spinal stenosis, arthritis w/sxs SOB/HERRERA    ECHO 3/2021: EF normal, severe AS, moderate AI.   NST 4/2021: transient ischemic dilatation.   Cath 5/11/21: severe AS, prox circumflex 90%, OM1 70%, mid RCA 80%.   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     To OR 8/9  Intraop: 7 U pRBC/2 plt/2 FFP/1000 FEIBA; 3 L Crystalloid  arrived to ICU on levophed   AV paced 80 - underlying native rhythm reported asystole    initial LA 4 - volume (albumin given ) and normalized - last 1.9; swan out   titrated off infusions post-op and working with PT/early mobility on vent    8/11: developed post-op atrial fib with drop in BP - amiodarone; levo and vaso started ; lasix qtt   ECHO: no pericardial effusion  given 2 U pRBC for Hct 22  Parker re-inserted - CI 2.4 - primacor and Dobutamine restarted     DCCV  for Fib/RVR - 200J x 3 - ultimately converted to sinus 8p 8/11 8/12: remains in sinus - additional 1 U pRBC given   8/13 - FIDEL - lasix infusion stopped and volume given back  inotrope titrated down to off and sedation held to assess ability to wean/liberate from vent    extubated 8/14 - good cough and later that day OOB in chair  8/16: ECHO demonstrating moderate pericardial effusion  IR consulted and drain placed   EP consullted - tachy/marifer; atrial fib mgmt     Up and ambulating on unit with walker and staff assist this am   failed Formal S/S assessment this am - remains NPO    ICU Vital Signs Last 24 Hrs  T(C): 36.1 (17 Aug 2021 13:31), Max: 36.6 (17 Aug 2021 01:07)  T(F): 97 (17 Aug 2021 13:31), Max: 97.9 (17 Aug 2021 01:07)  HR: 67 (17 Aug 2021 15:53) (50 - 78) sinus   ABP: 169/67 (17 Aug 2021 15:00) (130/51 - 173/66)  ABP(mean): 103 (17 Aug 2021 15:00) (77 - 104)  RR: 18 (17 Aug 2021 15:53) (16 - 22)  SpO2: 98% (17 Aug 2021 15:53) (89% - 100%) face tent 35%    Qtts: None     I&O's Summary    16 Aug 2021 07:01  -  17 Aug 2021 07:00  --------------------------------------------------------  IN: 944.6 mL / OUT: 2475 mL / NET: -1530.4 mL    17 Aug 2021 07:01  -  17 Aug 2021 16:11  --------------------------------------------------------  IN: 673.2 mL / OUT: 655 mL / NET: 18.2 mL    Physical Exam    Heart - regular (-)rub/gallop  Lungs - scattered rhonchi - improves with cough; no wheeze  Abd - (+)BS soft NTND (-)r/r/g  Ext - warm to touch; no cyanosis/clubbing  Chest - incision site clean and dry  Neuro - alert/oriented and interactive - non-focal and moving all extremities  Skin - no rash     LABS:                        9.3    14.42 )-----------( 174      ( 17 Aug 2021 09:59 )             29.6     08-17    143  |  106  |  49<H>  ----------------------------<  174<H>  3.7   |  27  |  0.74    Ca    9.8      17 Aug 2021 09:59  Phos  2.5     08-17  Mg     2.5     08-17    TPro  6.6  /  Alb  4.1  /  TBili  1.4<H>  /  DBili  x   /  AST  151<H>  /  ALT  121<H>  /  AlkPhos  134<H>  08-17    PT/INR - ( 17 Aug 2021 09:59 )   PT: 18.8 sec;   INR: 1.60     PTT - ( 17 Aug 2021 09:59 )  PTT:28.3 sec    ABG - ( 17 Aug 2021 13:45 )  pH, Arterial: 7.49  pH, Blood: x     /  pCO2: 35    /  pO2: 72    / HCO3: 27    / Base Excess: 3.5   /  SaO2: 95.7      RADIOLOGY & ADDITIONAL STUDIES:    Patient with severe symptomatic aortic stenosis and CAD - imaging demonstrating enlarging aortic arch aneurysm with post-op atrial fib and now FIDEL - improved with holding aggressive diuresis and volume back - extubated 8/14 and doing well     1. CV  stable hemodynamics  sinus rhythm   Metoprolol 12.5 q6h   Amiodarone 200mg daily   ASA/statin    2. Pulm   remains on face tent (mouth breather and NGT remains in place pending swallow assessment  ongoing aggressive pulm toilet - patient with good cough  OOB and ambulation/incentive spirometry  anticipate titration of supplemental oxygen as clinical scenario allows  noted active smoker - cessation education and support     3. Endocrine  ISS; Maintain glycemic control -   HgA1c 5.6  TSH 2.6    prior thrombocytopenia (174)- HIT panel 8/10: negative     Optimize nutrition - failed S/S assessment today     DVT and GI prophylaxis     d/w patient & daughter updated at bedside/staff and CTS      I have spent/provided stated minutes of critical care time to this patient: 90

## 2021-08-17 NOTE — PROGRESS NOTE ADULT - SUBJECTIVE AND OBJECTIVE BOX
Patient with severe symptomatic aortic stenosis and CAD - imaging demonstrating enlarging aortic arch aneurysm with post-op atrial fib and now FIDEL - improved with holding aggressive diuresis and volume back - extubated 8/14. Tachycardic and tachypneic 8/16 with echo demonstrating pericardial effusion new from 8/11. Now s/p IR Pericardial drain placement 8/16.

## 2021-08-17 NOTE — OCCUPATIONAL THERAPY INITIAL EVALUATION ADULT - GENERAL OBSERVATIONS, REHAB EVAL
Pt's RN Sierra aware of intent to eval/tx; cleared Pt. PT Kristy present. Pt received in chair - +HFNC FIO2 50%, TPM, NGT, IVs, telemetry, sternal dressing intact. Pt w/ flat affect, agreeable to OT. Pt's RN Sierra aware of intent to eval/tx; cleared Pt. PT Kristy present. Pt received in chair - +HFNC FIO2 50%, Chest tube (+wall suctioned) TPM, NGT, IVs, telemetry, sternal dressing intact. Pt w/ flat affect, agreeable to OT.

## 2021-08-17 NOTE — OCCUPATIONAL THERAPY INITIAL EVALUATION ADULT - ADDITIONAL COMMENTS
Pt lives in [facility]. Pt was independent prior to admission. No AEs/DMEs reported.

## 2021-08-18 LAB
ALBUMIN SERPL ELPH-MCNC: 3.9 G/DL — SIGNIFICANT CHANGE UP (ref 3.3–5)
ALBUMIN SERPL ELPH-MCNC: 4.1 G/DL — SIGNIFICANT CHANGE UP (ref 3.3–5)
ALP SERPL-CCNC: 135 U/L — HIGH (ref 40–120)
ALP SERPL-CCNC: 136 U/L — HIGH (ref 40–120)
ALT FLD-CCNC: 92 U/L — HIGH (ref 10–45)
ALT FLD-CCNC: 95 U/L — HIGH (ref 10–45)
ANION GAP SERPL CALC-SCNC: 12 MMOL/L — SIGNIFICANT CHANGE UP (ref 5–17)
ANION GAP SERPL CALC-SCNC: 9 MMOL/L — SIGNIFICANT CHANGE UP (ref 5–17)
APTT BLD: 28.7 SEC — SIGNIFICANT CHANGE UP (ref 27.5–35.5)
APTT BLD: 28.8 SEC — SIGNIFICANT CHANGE UP (ref 27.5–35.5)
AST SERPL-CCNC: 73 U/L — HIGH (ref 10–40)
AST SERPL-CCNC: 75 U/L — HIGH (ref 10–40)
BILIRUB SERPL-MCNC: 1.3 MG/DL — HIGH (ref 0.2–1.2)
BILIRUB SERPL-MCNC: 1.5 MG/DL — HIGH (ref 0.2–1.2)
BUN SERPL-MCNC: 39 MG/DL — HIGH (ref 7–23)
BUN SERPL-MCNC: 41 MG/DL — HIGH (ref 7–23)
CALCIUM SERPL-MCNC: 9.8 MG/DL — SIGNIFICANT CHANGE UP (ref 8.4–10.5)
CALCIUM SERPL-MCNC: 9.9 MG/DL — SIGNIFICANT CHANGE UP (ref 8.4–10.5)
CHLORIDE SERPL-SCNC: 108 MMOL/L — SIGNIFICANT CHANGE UP (ref 96–108)
CHLORIDE SERPL-SCNC: 110 MMOL/L — HIGH (ref 96–108)
CO2 SERPL-SCNC: 25 MMOL/L — SIGNIFICANT CHANGE UP (ref 22–31)
CO2 SERPL-SCNC: 26 MMOL/L — SIGNIFICANT CHANGE UP (ref 22–31)
CREAT SERPL-MCNC: 0.65 MG/DL — SIGNIFICANT CHANGE UP (ref 0.5–1.3)
CREAT SERPL-MCNC: 0.75 MG/DL — SIGNIFICANT CHANGE UP (ref 0.5–1.3)
GAS PNL BLDA: SIGNIFICANT CHANGE UP
GAS PNL BLDA: SIGNIFICANT CHANGE UP
GLUCOSE BLDC GLUCOMTR-MCNC: 129 MG/DL — HIGH (ref 70–99)
GLUCOSE BLDC GLUCOMTR-MCNC: 133 MG/DL — HIGH (ref 70–99)
GLUCOSE BLDC GLUCOMTR-MCNC: 140 MG/DL — HIGH (ref 70–99)
GLUCOSE BLDC GLUCOMTR-MCNC: 77 MG/DL — SIGNIFICANT CHANGE UP (ref 70–99)
GLUCOSE BLDC GLUCOMTR-MCNC: 83 MG/DL — SIGNIFICANT CHANGE UP (ref 70–99)
GLUCOSE SERPL-MCNC: 152 MG/DL — HIGH (ref 70–99)
GLUCOSE SERPL-MCNC: 161 MG/DL — HIGH (ref 70–99)
HCT VFR BLD CALC: 29.1 % — LOW (ref 34.5–45)
HCT VFR BLD CALC: 29.3 % — LOW (ref 34.5–45)
HGB BLD-MCNC: 9.1 G/DL — LOW (ref 11.5–15.5)
HGB BLD-MCNC: 9.4 G/DL — LOW (ref 11.5–15.5)
INR BLD: 1.36 — HIGH (ref 0.88–1.16)
INR BLD: 1.38 — HIGH (ref 0.88–1.16)
MAGNESIUM SERPL-MCNC: 2.3 MG/DL — SIGNIFICANT CHANGE UP (ref 1.6–2.6)
MAGNESIUM SERPL-MCNC: 2.3 MG/DL — SIGNIFICANT CHANGE UP (ref 1.6–2.6)
MCHC RBC-ENTMCNC: 29.5 PG — SIGNIFICANT CHANGE UP (ref 27–34)
MCHC RBC-ENTMCNC: 30.5 PG — SIGNIFICANT CHANGE UP (ref 27–34)
MCHC RBC-ENTMCNC: 31.3 GM/DL — LOW (ref 32–36)
MCHC RBC-ENTMCNC: 32.1 GM/DL — SIGNIFICANT CHANGE UP (ref 32–36)
MCV RBC AUTO: 94.5 FL — SIGNIFICANT CHANGE UP (ref 80–100)
MCV RBC AUTO: 95.1 FL — SIGNIFICANT CHANGE UP (ref 80–100)
NRBC # BLD: 0 /100 WBCS — SIGNIFICANT CHANGE UP (ref 0–0)
NRBC # BLD: 0 /100 WBCS — SIGNIFICANT CHANGE UP (ref 0–0)
PHOSPHATE SERPL-MCNC: 2.7 MG/DL — SIGNIFICANT CHANGE UP (ref 2.5–4.5)
PLATELET # BLD AUTO: 181 K/UL — SIGNIFICANT CHANGE UP (ref 150–400)
PLATELET # BLD AUTO: 213 K/UL — SIGNIFICANT CHANGE UP (ref 150–400)
POTASSIUM SERPL-MCNC: 3.6 MMOL/L — SIGNIFICANT CHANGE UP (ref 3.5–5.3)
POTASSIUM SERPL-MCNC: 4.5 MMOL/L — SIGNIFICANT CHANGE UP (ref 3.5–5.3)
POTASSIUM SERPL-SCNC: 3.6 MMOL/L — SIGNIFICANT CHANGE UP (ref 3.5–5.3)
POTASSIUM SERPL-SCNC: 4.5 MMOL/L — SIGNIFICANT CHANGE UP (ref 3.5–5.3)
PROT SERPL-MCNC: 6.4 G/DL — SIGNIFICANT CHANGE UP (ref 6–8.3)
PROT SERPL-MCNC: 6.7 G/DL — SIGNIFICANT CHANGE UP (ref 6–8.3)
PROTHROM AB SERPL-ACNC: 16.1 SEC — HIGH (ref 10.6–13.6)
PROTHROM AB SERPL-ACNC: 16.3 SEC — HIGH (ref 10.6–13.6)
RBC # BLD: 3.08 M/UL — LOW (ref 3.8–5.2)
RBC # BLD: 3.08 M/UL — LOW (ref 3.8–5.2)
RBC # FLD: 15 % — HIGH (ref 10.3–14.5)
RBC # FLD: 15.2 % — HIGH (ref 10.3–14.5)
SODIUM SERPL-SCNC: 143 MMOL/L — SIGNIFICANT CHANGE UP (ref 135–145)
SODIUM SERPL-SCNC: 147 MMOL/L — HIGH (ref 135–145)
WBC # BLD: 15.36 K/UL — HIGH (ref 3.8–10.5)
WBC # BLD: 15.89 K/UL — HIGH (ref 3.8–10.5)
WBC # FLD AUTO: 15.36 K/UL — HIGH (ref 3.8–10.5)
WBC # FLD AUTO: 15.89 K/UL — HIGH (ref 3.8–10.5)

## 2021-08-18 PROCEDURE — 99292 CRITICAL CARE ADDL 30 MIN: CPT

## 2021-08-18 PROCEDURE — 99291 CRITICAL CARE FIRST HOUR: CPT

## 2021-08-18 PROCEDURE — 93308 TTE F-UP OR LMTD: CPT | Mod: 26

## 2021-08-18 PROCEDURE — 71045 X-RAY EXAM CHEST 1 VIEW: CPT | Mod: 26

## 2021-08-18 RX ORDER — POTASSIUM CHLORIDE 20 MEQ
20 PACKET (EA) ORAL
Refills: 0 | Status: COMPLETED | OUTPATIENT
Start: 2021-08-18 | End: 2021-08-18

## 2021-08-18 RX ORDER — FUROSEMIDE 40 MG
20 TABLET ORAL ONCE
Refills: 0 | Status: COMPLETED | OUTPATIENT
Start: 2021-08-18 | End: 2021-08-18

## 2021-08-18 RX ORDER — HYDRALAZINE HCL 50 MG
10 TABLET ORAL ONCE
Refills: 0 | Status: COMPLETED | OUTPATIENT
Start: 2021-08-18 | End: 2021-08-18

## 2021-08-18 RX ORDER — HYDRALAZINE HCL 50 MG
5 TABLET ORAL ONCE
Refills: 0 | Status: COMPLETED | OUTPATIENT
Start: 2021-08-18 | End: 2021-08-18

## 2021-08-18 RX ORDER — ALBUMIN HUMAN 25 %
250 VIAL (ML) INTRAVENOUS ONCE
Refills: 0 | Status: COMPLETED | OUTPATIENT
Start: 2021-08-18 | End: 2021-08-18

## 2021-08-18 RX ADMIN — Medication 4 MILLIGRAM(S): at 07:04

## 2021-08-18 RX ADMIN — SENNA PLUS 2 TABLET(S): 8.6 TABLET ORAL at 23:18

## 2021-08-18 RX ADMIN — Medication 4 MILLIGRAM(S): at 17:44

## 2021-08-18 RX ADMIN — PANTOPRAZOLE SODIUM 40 MILLIGRAM(S): 20 TABLET, DELAYED RELEASE ORAL at 11:08

## 2021-08-18 RX ADMIN — Medication 25 MILLIGRAM(S): at 11:08

## 2021-08-18 RX ADMIN — LEVALBUTEROL 0.63 MILLIGRAM(S): 1.25 SOLUTION, CONCENTRATE RESPIRATORY (INHALATION) at 13:23

## 2021-08-18 RX ADMIN — Medication 10 MILLIGRAM(S): at 06:12

## 2021-08-18 RX ADMIN — AMIODARONE HYDROCHLORIDE 200 MILLIGRAM(S): 400 TABLET ORAL at 05:09

## 2021-08-18 RX ADMIN — BUDESONIDE AND FORMOTEROL FUMARATE DIHYDRATE 2 PUFF(S): 160; 4.5 AEROSOL RESPIRATORY (INHALATION) at 23:01

## 2021-08-18 RX ADMIN — Medication 4 MILLIGRAM(S): at 19:55

## 2021-08-18 RX ADMIN — Medication 25 MILLIGRAM(S): at 05:12

## 2021-08-18 RX ADMIN — LEVALBUTEROL 0.63 MILLIGRAM(S): 1.25 SOLUTION, CONCENTRATE RESPIRATORY (INHALATION) at 06:44

## 2021-08-18 RX ADMIN — OXYCODONE AND ACETAMINOPHEN 1 TABLET(S): 5; 325 TABLET ORAL at 06:15

## 2021-08-18 RX ADMIN — HEPARIN SODIUM 5000 UNIT(S): 5000 INJECTION INTRAVENOUS; SUBCUTANEOUS at 05:09

## 2021-08-18 RX ADMIN — Medication 4 MILLIGRAM(S): at 12:38

## 2021-08-18 RX ADMIN — OXYCODONE AND ACETAMINOPHEN 1 TABLET(S): 5; 325 TABLET ORAL at 07:00

## 2021-08-18 RX ADMIN — Medication 81 MILLIGRAM(S): at 11:08

## 2021-08-18 RX ADMIN — HEPARIN SODIUM 5000 UNIT(S): 5000 INJECTION INTRAVENOUS; SUBCUTANEOUS at 23:01

## 2021-08-18 RX ADMIN — Medication 20 MILLIGRAM(S): at 12:16

## 2021-08-18 RX ADMIN — LEVALBUTEROL 0.63 MILLIGRAM(S): 1.25 SOLUTION, CONCENTRATE RESPIRATORY (INHALATION) at 00:57

## 2021-08-18 RX ADMIN — Medication 25 MILLIGRAM(S): at 19:49

## 2021-08-18 RX ADMIN — CHLORHEXIDINE GLUCONATE 1 APPLICATION(S): 213 SOLUTION TOPICAL at 05:07

## 2021-08-18 RX ADMIN — Medication 50 MILLIEQUIVALENT(S): at 06:20

## 2021-08-18 RX ADMIN — HEPARIN SODIUM 5000 UNIT(S): 5000 INJECTION INTRAVENOUS; SUBCUTANEOUS at 14:54

## 2021-08-18 RX ADMIN — Medication 50 MILLIEQUIVALENT(S): at 04:20

## 2021-08-18 RX ADMIN — LEVALBUTEROL 0.63 MILLIGRAM(S): 1.25 SOLUTION, CONCENTRATE RESPIRATORY (INHALATION) at 23:37

## 2021-08-18 RX ADMIN — BUDESONIDE AND FORMOTEROL FUMARATE DIHYDRATE 2 PUFF(S): 160; 4.5 AEROSOL RESPIRATORY (INHALATION) at 00:57

## 2021-08-18 RX ADMIN — Medication 4 MILLIGRAM(S): at 19:54

## 2021-08-18 RX ADMIN — Medication 5 MILLIGRAM(S): at 00:35

## 2021-08-18 RX ADMIN — LEVALBUTEROL 0.63 MILLIGRAM(S): 1.25 SOLUTION, CONCENTRATE RESPIRATORY (INHALATION) at 17:31

## 2021-08-18 RX ADMIN — BUDESONIDE AND FORMOTEROL FUMARATE DIHYDRATE 2 PUFF(S): 160; 4.5 AEROSOL RESPIRATORY (INHALATION) at 09:32

## 2021-08-18 RX ADMIN — Medication 125 MILLILITER(S): at 12:08

## 2021-08-18 NOTE — SWALLOW FEES ASSESSMENT ADULT - PRELIMINARY ENDOSCOPIC EXAMINATIONS
Bilateral ventricular edema/Interarytenoid/post-commissure edema/Interarytenoid/Arytenoid edema/Interarytenoid/Arytenoid erythema/Pharyngeal squeeze/Vocal fold adduction/abduction

## 2021-08-18 NOTE — SWALLOW FEES ASSESSMENT ADULT - PHARYNGEAL PHASE COMMENTS
Mildly delayed pharyngeal swallow initiation with bolus head in hypopharynx with liquids. Delayed swallow and mistimed airway protection resulted in deep penetration and suspected aspiration during the swallow with thins via tsp, nectar via tsp, and shallow penetration with puree. Aspiration could not be fully visualized given white-out period during the study, but is suspected given intermittent cough/throat clear (i.e. sensory response) during the swallow, and material visualized on TVFs following swallow. Pt was not reliably sensitive to penetrate. Pt not able to effectively clear penetrated material via cued cough/throat clear. Reduced pharyngeal strength resulted in moderate amount of stasis with puree, mild-mod with liquids, primarily in valleculae, also in lateral channels, pyriforms, and trace at interarytenoid space. As trials progressed, amount of stasis increased, particularly with puree. Pt benefitted somewhat from liquid wash and multiple dry swallows to clear, but airway protection was still compromised. Chin tuck maneuver was not effective in clearing stasis from valleculae or protecting airway. Pt was increasingly SOB following trials, benefitted from rest breaks.

## 2021-08-18 NOTE — SWALLOW FEES ASSESSMENT ADULT - COMMENTS
White lesions on vocal process bilaterally, likely granulation tissue consistent with intubation. +erythematous lesion on laryngeal side of epiglottis, also likely consistent with intubation. Purpose, benefit, and risk of FEES provided to pt. Pt verbally agreed to participate in the study. Pt tolerated the passing and presence of the scope. Pt was fed by Mariposa Rios, AMY.    Verbal order for FEES provided by MD Garcia.

## 2021-08-18 NOTE — PROGRESS NOTE ADULT - ASSESSMENT
Pericardial drain to wall suction with 50cc serous output over last 24h (350cc day prior). Flushes easily with 2cc NS. Pt afebrile and asymptomatic, OOBTC, WBC uptrending to 15.36 today. Continue to monitor output. IR will continue to follow.

## 2021-08-18 NOTE — SWALLOW FEES ASSESSMENT ADULT - DIAGNOSTIC IMPRESSIONS
Pt presented with moderate oropharyngeal dysphagia, characterized primarily by reduced bolus control, delayed pharyngeal swallow initiation, mistimed airway protection, reduced coordination of respiration/swallowing, and reduced pharyngeal strength. This resulted in deep penetration and suspect aspiration with thin and nectar-thick liquids, penetration with puree, as well as poor clearance of bolus through the pharynx. Airway invasion and pharyngeal stasis both increased as trials progressed. Pt was not reliably sensate to airway invasion. Given clinical presentation, fatigue, and respiratory insufficiency on HFNC, pt is at high risk for aspiration-related sequelae with an oral diet.

## 2021-08-18 NOTE — SWALLOW FEES ASSESSMENT ADULT - RECOMMENDED CONSISTENCY
Continue NPO w/ NGT.  Allow ice chips, ~3-5/hour, for swallow practice with strict aspiration precautions, following thorough oral care.

## 2021-08-19 LAB
ALBUMIN SERPL ELPH-MCNC: 3.8 G/DL — SIGNIFICANT CHANGE UP (ref 3.3–5)
ALP SERPL-CCNC: 125 U/L — HIGH (ref 40–120)
ALT FLD-CCNC: 69 U/L — HIGH (ref 10–45)
ANION GAP SERPL CALC-SCNC: 11 MMOL/L — SIGNIFICANT CHANGE UP (ref 5–17)
ANION GAP SERPL CALC-SCNC: 9 MMOL/L — SIGNIFICANT CHANGE UP (ref 5–17)
APTT BLD: 29.2 SEC — SIGNIFICANT CHANGE UP (ref 27.5–35.5)
APTT BLD: 29.4 SEC — SIGNIFICANT CHANGE UP (ref 27.5–35.5)
AST SERPL-CCNC: 49 U/L — HIGH (ref 10–40)
BILIRUB SERPL-MCNC: 1.4 MG/DL — HIGH (ref 0.2–1.2)
BUN SERPL-MCNC: 38 MG/DL — HIGH (ref 7–23)
BUN SERPL-MCNC: 41 MG/DL — HIGH (ref 7–23)
CALCIUM SERPL-MCNC: 9.8 MG/DL — SIGNIFICANT CHANGE UP (ref 8.4–10.5)
CALCIUM SERPL-MCNC: 9.9 MG/DL — SIGNIFICANT CHANGE UP (ref 8.4–10.5)
CHLORIDE SERPL-SCNC: 110 MMOL/L — HIGH (ref 96–108)
CHLORIDE SERPL-SCNC: 110 MMOL/L — HIGH (ref 96–108)
CO2 SERPL-SCNC: 26 MMOL/L — SIGNIFICANT CHANGE UP (ref 22–31)
CO2 SERPL-SCNC: 28 MMOL/L — SIGNIFICANT CHANGE UP (ref 22–31)
CREAT SERPL-MCNC: 0.7 MG/DL — SIGNIFICANT CHANGE UP (ref 0.5–1.3)
CREAT SERPL-MCNC: 0.74 MG/DL — SIGNIFICANT CHANGE UP (ref 0.5–1.3)
GAS PNL BLDA: SIGNIFICANT CHANGE UP
GAS PNL BLDA: SIGNIFICANT CHANGE UP
GLUCOSE BLDC GLUCOMTR-MCNC: 109 MG/DL — HIGH (ref 70–99)
GLUCOSE BLDC GLUCOMTR-MCNC: 117 MG/DL — HIGH (ref 70–99)
GLUCOSE BLDC GLUCOMTR-MCNC: 133 MG/DL — HIGH (ref 70–99)
GLUCOSE BLDC GLUCOMTR-MCNC: 93 MG/DL — SIGNIFICANT CHANGE UP (ref 70–99)
GLUCOSE SERPL-MCNC: 129 MG/DL — HIGH (ref 70–99)
GLUCOSE SERPL-MCNC: 135 MG/DL — HIGH (ref 70–99)
HCT VFR BLD CALC: 27 % — LOW (ref 34.5–45)
HCT VFR BLD CALC: 27.8 % — LOW (ref 34.5–45)
HGB BLD-MCNC: 8.6 G/DL — LOW (ref 11.5–15.5)
HGB BLD-MCNC: 8.7 G/DL — LOW (ref 11.5–15.5)
INR BLD: 1.28 — HIGH (ref 0.88–1.16)
INR BLD: 1.28 — HIGH (ref 0.88–1.16)
MAGNESIUM SERPL-MCNC: 2.3 MG/DL — SIGNIFICANT CHANGE UP (ref 1.6–2.6)
MAGNESIUM SERPL-MCNC: 2.3 MG/DL — SIGNIFICANT CHANGE UP (ref 1.6–2.6)
MCHC RBC-ENTMCNC: 29.9 PG — SIGNIFICANT CHANGE UP (ref 27–34)
MCHC RBC-ENTMCNC: 30.7 PG — SIGNIFICANT CHANGE UP (ref 27–34)
MCHC RBC-ENTMCNC: 31.3 GM/DL — LOW (ref 32–36)
MCHC RBC-ENTMCNC: 31.9 GM/DL — LOW (ref 32–36)
MCV RBC AUTO: 95.5 FL — SIGNIFICANT CHANGE UP (ref 80–100)
MCV RBC AUTO: 96.4 FL — SIGNIFICANT CHANGE UP (ref 80–100)
NRBC # BLD: 0 /100 WBCS — SIGNIFICANT CHANGE UP (ref 0–0)
NRBC # BLD: 0 /100 WBCS — SIGNIFICANT CHANGE UP (ref 0–0)
PLATELET # BLD AUTO: 189 K/UL — SIGNIFICANT CHANGE UP (ref 150–400)
PLATELET # BLD AUTO: 191 K/UL — SIGNIFICANT CHANGE UP (ref 150–400)
POTASSIUM SERPL-MCNC: 3.7 MMOL/L — SIGNIFICANT CHANGE UP (ref 3.5–5.3)
POTASSIUM SERPL-MCNC: 4.2 MMOL/L — SIGNIFICANT CHANGE UP (ref 3.5–5.3)
POTASSIUM SERPL-SCNC: 3.7 MMOL/L — SIGNIFICANT CHANGE UP (ref 3.5–5.3)
POTASSIUM SERPL-SCNC: 4.2 MMOL/L — SIGNIFICANT CHANGE UP (ref 3.5–5.3)
PROT SERPL-MCNC: 6.3 G/DL — SIGNIFICANT CHANGE UP (ref 6–8.3)
PROTHROM AB SERPL-ACNC: 15.2 SEC — HIGH (ref 10.6–13.6)
PROTHROM AB SERPL-ACNC: 15.2 SEC — HIGH (ref 10.6–13.6)
RBC # BLD: 2.8 M/UL — LOW (ref 3.8–5.2)
RBC # BLD: 2.91 M/UL — LOW (ref 3.8–5.2)
RBC # FLD: 15.2 % — HIGH (ref 10.3–14.5)
RBC # FLD: 15.3 % — HIGH (ref 10.3–14.5)
SODIUM SERPL-SCNC: 147 MMOL/L — HIGH (ref 135–145)
SODIUM SERPL-SCNC: 147 MMOL/L — HIGH (ref 135–145)
WBC # BLD: 14.52 K/UL — HIGH (ref 3.8–10.5)
WBC # BLD: 14.66 K/UL — HIGH (ref 3.8–10.5)
WBC # FLD AUTO: 14.52 K/UL — HIGH (ref 3.8–10.5)
WBC # FLD AUTO: 14.66 K/UL — HIGH (ref 3.8–10.5)

## 2021-08-19 PROCEDURE — 71045 X-RAY EXAM CHEST 1 VIEW: CPT | Mod: 26

## 2021-08-19 PROCEDURE — 99292 CRITICAL CARE ADDL 30 MIN: CPT

## 2021-08-19 PROCEDURE — 99291 CRITICAL CARE FIRST HOUR: CPT

## 2021-08-19 RX ORDER — ACETAMINOPHEN 500 MG
1000 TABLET ORAL ONCE
Refills: 0 | Status: COMPLETED | OUTPATIENT
Start: 2021-08-19 | End: 2021-08-19

## 2021-08-19 RX ORDER — LISINOPRIL 2.5 MG/1
5 TABLET ORAL DAILY
Refills: 0 | Status: DISCONTINUED | OUTPATIENT
Start: 2021-08-19 | End: 2021-08-20

## 2021-08-19 RX ORDER — POTASSIUM CHLORIDE 20 MEQ
40 PACKET (EA) ORAL ONCE
Refills: 0 | Status: COMPLETED | OUTPATIENT
Start: 2021-08-19 | End: 2021-08-19

## 2021-08-19 RX ORDER — HYDRALAZINE HCL 50 MG
5 TABLET ORAL ONCE
Refills: 0 | Status: COMPLETED | OUTPATIENT
Start: 2021-08-19 | End: 2021-08-19

## 2021-08-19 RX ADMIN — BUDESONIDE AND FORMOTEROL FUMARATE DIHYDRATE 2 PUFF(S): 160; 4.5 AEROSOL RESPIRATORY (INHALATION) at 15:46

## 2021-08-19 RX ADMIN — HEPARIN SODIUM 5000 UNIT(S): 5000 INJECTION INTRAVENOUS; SUBCUTANEOUS at 05:16

## 2021-08-19 RX ADMIN — Medication 4 MILLIGRAM(S): at 07:20

## 2021-08-19 RX ADMIN — LEVALBUTEROL 0.63 MILLIGRAM(S): 1.25 SOLUTION, CONCENTRATE RESPIRATORY (INHALATION) at 15:47

## 2021-08-19 RX ADMIN — Medication 4 MILLIGRAM(S): at 17:12

## 2021-08-19 RX ADMIN — Medication 25 MILLIGRAM(S): at 00:47

## 2021-08-19 RX ADMIN — HEPARIN SODIUM 5000 UNIT(S): 5000 INJECTION INTRAVENOUS; SUBCUTANEOUS at 22:05

## 2021-08-19 RX ADMIN — Medication 4 MILLIGRAM(S): at 17:11

## 2021-08-19 RX ADMIN — SENNA PLUS 2 TABLET(S): 8.6 TABLET ORAL at 22:18

## 2021-08-19 RX ADMIN — OXYCODONE AND ACETAMINOPHEN 1 TABLET(S): 5; 325 TABLET ORAL at 04:31

## 2021-08-19 RX ADMIN — Medication 5 MILLIGRAM(S): at 00:46

## 2021-08-19 RX ADMIN — AMIODARONE HYDROCHLORIDE 200 MILLIGRAM(S): 400 TABLET ORAL at 05:16

## 2021-08-19 RX ADMIN — Medication 400 MILLIGRAM(S): at 23:03

## 2021-08-19 RX ADMIN — Medication 40 MILLIEQUIVALENT(S): at 05:16

## 2021-08-19 RX ADMIN — CHLORHEXIDINE GLUCONATE 1 APPLICATION(S): 213 SOLUTION TOPICAL at 07:02

## 2021-08-19 RX ADMIN — PANTOPRAZOLE SODIUM 40 MILLIGRAM(S): 20 TABLET, DELAYED RELEASE ORAL at 11:42

## 2021-08-19 RX ADMIN — LEVALBUTEROL 0.63 MILLIGRAM(S): 1.25 SOLUTION, CONCENTRATE RESPIRATORY (INHALATION) at 06:16

## 2021-08-19 RX ADMIN — OXYCODONE AND ACETAMINOPHEN 1 TABLET(S): 5; 325 TABLET ORAL at 18:37

## 2021-08-19 RX ADMIN — OXYCODONE AND ACETAMINOPHEN 1 TABLET(S): 5; 325 TABLET ORAL at 17:37

## 2021-08-19 RX ADMIN — Medication 400 MILLIGRAM(S): at 08:05

## 2021-08-19 RX ADMIN — Medication 25 MILLIGRAM(S): at 23:03

## 2021-08-19 RX ADMIN — Medication 4 MILLIGRAM(S): at 12:30

## 2021-08-19 RX ADMIN — Medication 4 MILLIGRAM(S): at 17:30

## 2021-08-19 RX ADMIN — OXYCODONE AND ACETAMINOPHEN 1 TABLET(S): 5; 325 TABLET ORAL at 03:31

## 2021-08-19 RX ADMIN — OXYCODONE AND ACETAMINOPHEN 1 TABLET(S): 5; 325 TABLET ORAL at 23:56

## 2021-08-19 RX ADMIN — LISINOPRIL 5 MILLIGRAM(S): 2.5 TABLET ORAL at 22:05

## 2021-08-19 RX ADMIN — Medication 4 MILLIGRAM(S): at 14:00

## 2021-08-19 RX ADMIN — Medication 81 MILLIGRAM(S): at 11:43

## 2021-08-19 RX ADMIN — Medication 4 MILLIGRAM(S): at 11:41

## 2021-08-19 RX ADMIN — Medication 25 MILLIGRAM(S): at 05:16

## 2021-08-19 RX ADMIN — Medication 25 MILLIGRAM(S): at 17:36

## 2021-08-19 RX ADMIN — HEPARIN SODIUM 5000 UNIT(S): 5000 INJECTION INTRAVENOUS; SUBCUTANEOUS at 15:45

## 2021-08-19 RX ADMIN — Medication 1000 MILLIGRAM(S): at 08:30

## 2021-08-19 NOTE — PROGRESS NOTE ADULT - SUBJECTIVE AND OBJECTIVE BOX
INTERVAL HPI/OVERNIGHT EVENTS:    8/9:  AVR (tissue), Ascending, hemiarch replacement, frozen elephant trunk, left aorto-subclavian bypass, CABG x 2  EF 50%    77yo Female active tobacco use; HTN, Known bicuspid aortic valve, spinal stenosis, arthritis w/sxs SOB/HERRERA    ECHO 3/2021: EF normal, severe AS, moderate AI.   NST 4/2021: transient ischemic dilatation.   Cath 5/11/21: severe AS, prox circumflex 90%, OM1 70%, mid RCA 80%.   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     To OR 8/9  Intraop: 7 U pRBC/2 plt/2 FFP/1000 FEIBA; 3 L Crystalloid  arrived to ICU on levophed   AV paced 80 - underlying native rhythm reported asystole    initial LA 4 - volume (albumin given ) and normalized - last 1.9; swan out   titrated off infusions post-op and working with PT/early mobility on vent    8/11: developed post-op atrial fib with drop in BP - amiodarone; levo and vaso started ; lasix qtt   ECHO: no pericardial effusion  given 2 U pRBC for Hct 22  [place] re-inserted - CI 2.4 - primacor and Dobutamine restarted     DCCV  for Fib/RVR - 200J x 3 - ultimately converted to sinus 8p 8/11 8/12: remains in sinus - additional 1 U pRBC given   8/13 - FIDEL - lasix infusion stopped and volume given back  inotrope titrated down to off and sedation held to assess ability to wean/liberate from vent    Extubated 8/14 - good cough and later that day OOB in chair  8/16: ECHO demonstrating moderate pericardial effusion  IR consulted and drain placed   EP consulted - tachy/marifer; atrial fib mgmt     Up and ambulating on unit with walker and staff assist this am   failed Formal S/S assessment 8/17 - NPO  ECHO: small posterior pericardial effusion. There is no diastolic invagination of the right ventricle. There is no significant (<30%) respiratory variation in the maximal mitral E wave velocity. The inferior vena cava is dilated (>2.1cm) with abnormal inspiratory collapse (<50%) consistent with elevated right atrial pressure (  15, range 10-20mmHg). Overall no echocardiographic evidence of cardiac tamponade    8/18: repeat S/S assessment - NPO except ice chips  placed back on HFNC - 40/40 =- transition to nasal cannula - patient tolerates face mask  up and ambulating with staff   ECHO: small pericardial effusion     No acute events reported overnight     ICU Vital Signs Last 24 Hrs  T(C): 36.1 (19 Aug 2021 09:15), Max: 36.3 (18 Aug 2021 17:26)  T(F): 96.9 (19 Aug 2021 09:15), Max: 97.4 (18 Aug 2021 17:26)  HR: 54 (19 Aug 2021 10:00) (51 - 76)sinus   BP: 174/81 (19 Aug 2021 00:30) (174/81 - 174/81)  BP(mean): 116 (19 Aug 2021 00:30) (116 - 116)  ABP: 135/42 (19 Aug 2021 10:00) (135/42 - 199/70)  ABP(mean): 70 (19 Aug 2021 10:00) (70 - 116)  RR: 18 (19 Aug 2021 10:00) (12 - 20)  SpO2: 97% (19 Aug 2021 10:00) (93% - 97%) HFNC 40/40    Qtts: None     I&O's Summary    18 Aug 2021 07:01  -  19 Aug 2021 07:00  --------------------------------------------------------  IN: 480 mL / OUT: 1170 mL / NET: -690 mL    19 Aug 2021 07:01  -  19 Aug 2021 11:46  --------------------------------------------------------  IN: 60 mL / OUT: 0 mL / NET: 60 mL    Physical Exam    Heart - regular (-)rub/gallop  Lungs - CTA anterior (-)rhonchi/wheeze  Abd - (+)BS soft NTND (-)r/r/g  Ext - warm to touch; no cyanosis/clubbing  Chest incision site clean and dry  Neuro - alert/oriented and interactive - non-focal   Skin - no rash     LABS:                        8.7    14.66 )-----------( 191      ( 19 Aug 2021 02:38 )             27.8     08-19    147<H>  |  110<H>  |  38<H>  ----------------------------<  135<H>  3.7   |  26  |  0.70    Ca    9.9      19 Aug 2021 02:38  Phos  2.7     08-18  Mg     2.3     08-19    TPro  6.3  /  Alb  3.8  /  TBili  1.4<H>  /  DBili  x   /  AST  49<H>  /  ALT  69<H>  /  AlkPhos  125<H>  08-19    PT/INR - ( 19 Aug 2021 02:38 )   PT: 15.2 sec;   INR: 1.28     PTT - ( 19 Aug 2021 02:38 )  PTT:29.2 sec    ABG - ( 19 Aug 2021 02:45 ) 7.57/31/86/98    RADIOLOGY & ADDITIONAL STUDIES: reviewed     Patient with severe symptomatic aortic stenosis and CAD - imaging demonstrating enlarging aortic arch aneurysm with post-op atrial fib and now FIDEL - improved with holding aggressive diuresis and volume back - extubated 8/14 and doing well. remains NPO after failed swallow assessments     1. CV  stable hemodynamics  sinus rhythm   Metoprolol 25 q6h   Amiodarone 200mg daily   ASA/statin    2. Pulm   remains on face tent (mouth breather and NGT remains in place pending swallow assessment  ongoing aggressive pulm toilet - patient with good cough  OOB and ambulation/incentive spirometry  anticipate titration of supplemental oxygen as clinical scenario allows  noted active smoker - cessation education and support     3. Endocrine  ISS; Maintain glycemic control -   HgA1c 5.6  TSH 2.6    prior thrombocytopenia resolved (191)- HIT panel 8/10: negative     Optimize nutrition - failed S/S - ice chips    DVT and GI prophylaxis     I have spent/provided stated minutes of critical care time to this patient: 90  INTERVAL HPI/OVERNIGHT EVENTS:    8/9:  AVR (tissue), Ascending, hemiarch replacement, frozen elephant trunk, left aorto-subclavian bypass, CABG x 2  EF 50%    75yo Female active tobacco use; HTN, Known bicuspid aortic valve, spinal stenosis, arthritis w/sxs SOB/HERRERA    ECHO 3/2021: EF normal, severe AS, moderate AI.   NST 4/2021: transient ischemic dilatation.   Cath 5/11/21: severe AS, prox circumflex 90%, OM1 70%, mid RCA 80%.   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     To OR 8/9  Intraop: 7 U pRBC/2 plt/2 FFP/1000 FEIBA; 3 L Crystalloid  arrived to ICU on levophed   AV paced 80 - underlying native rhythm reported asystole    initial LA 4 - volume (albumin given ) and normalized - last 1.9; swan out   titrated off infusions post-op and working with PT/early mobility on vent    8/11: developed post-op atrial fib with drop in BP - amiodarone; levo and vaso started ; lasix qtt   ECHO: no pericardial effusion  given 2 U pRBC for Hct 22  [place] re-inserted - CI 2.4 - primacor and Dobutamine restarted     DCCV  for Fib/RVR - 200J x 3 - ultimately converted to sinus 8p 8/11 8/12: remains in sinus - additional 1 U pRBC given   8/13 - FIDEL - lasix infusion stopped and volume given back  inotrope titrated down to off and sedation held to assess ability to wean/liberate from vent    Extubated 8/14 - good cough and later that day OOB in chair  8/16: ECHO demonstrating moderate pericardial effusion  IR consulted and drain placed   EP consulted - tachy/marifer; atrial fib mgmt     Up and ambulating on unit with walker and staff assist this am   failed Formal S/S assessment 8/17 - NPO  ECHO: small posterior pericardial effusion. There is no diastolic invagination of the right ventricle. There is no significant (<30%) respiratory variation in the maximal mitral E wave velocity. The inferior vena cava is dilated (>2.1cm) with abnormal inspiratory collapse (<50%) consistent with elevated right atrial pressure (  15, range 10-20mmHg). Overall no echocardiographic evidence of cardiac tamponade    8/18: repeat S/S assessment - NPO except ice chips  placed back on HFNC - 40/40 =- transition to nasal cannula - patient tolerates face mask  up and ambulating with staff   ECHO: small pericardial effusion     No acute events reported overnight   repeat ECHO today: mod symmetric LVH. s/p AVR, Aortic root and hemiarch replacement.  Mod mitral stenosis, MG 5.4 mmHg at 73 bpm. No evidence pulm HTN. Medium sized pericardial effusion without echocardiographic evidence of cardiac tamponade physiology.    IR flushed tube -     ICU Vital Signs Last 24 Hrs  T(C): 36.1 (19 Aug 2021 09:15), Max: 36.3 (18 Aug 2021 17:26)  T(F): 96.9 (19 Aug 2021 09:15), Max: 97.4 (18 Aug 2021 17:26)  HR: 54 (19 Aug 2021 10:00) (51 - 76)sinus   BP: 174/81 (19 Aug 2021 00:30) (174/81 - 174/81)  BP(mean): 116 (19 Aug 2021 00:30) (116 - 116)  ABP: 135/42 (19 Aug 2021 10:00) (135/42 - 199/70)  ABP(mean): 70 (19 Aug 2021 10:00) (70 - 116)  RR: 18 (19 Aug 2021 10:00) (12 - 20)  SpO2: 97% (19 Aug 2021 10:00) (93% - 97%) HFNC 40/40    Qtts: None     I&O's Summary    18 Aug 2021 07:01  -  19 Aug 2021 07:00  --------------------------------------------------------  IN: 480 mL / OUT: 1170 mL / NET: -690 mL    19 Aug 2021 07:01  -  19 Aug 2021 11:46  --------------------------------------------------------  IN: 60 mL / OUT: 0 mL / NET: 60 mL    Physical Exam    Heart - regular (-)rub/gallop  Lungs - CTA anterior (-)rhonchi/wheeze  Abd - (+)BS soft NTND (-)r/r/g  Ext - warm to touch; no cyanosis/clubbing  Chest incision site clean and dry  Neuro - alert/oriented and interactive - non-focal   Skin - no rash     LABS:                        8.7    14.66 )-----------( 191      ( 19 Aug 2021 02:38 )             27.8     08-19    147<H>  |  110<H>  |  38<H>  ----------------------------<  135<H>  3.7   |  26  |  0.70    Ca    9.9      19 Aug 2021 02:38  Phos  2.7     08-18  Mg     2.3     08-19    TPro  6.3  /  Alb  3.8  /  TBili  1.4<H>  /  DBili  x   /  AST  49<H>  /  ALT  69<H>  /  AlkPhos  125<H>  08-19    PT/INR - ( 19 Aug 2021 02:38 )   PT: 15.2 sec;   INR: 1.28     PTT - ( 19 Aug 2021 02:38 )  PTT:29.2 sec    ABG - ( 19 Aug 2021 02:45 ) 7.57/31/86/98    RADIOLOGY & ADDITIONAL STUDIES: reviewed     Patient with severe symptomatic aortic stenosis and CAD - imaging demonstrating enlarging aortic arch aneurysm with post-op atrial fib and now FIDEL - improved with holding aggressive diuresis and volume back - extubated 8/14 and doing well. remains NPO after failed swallow assessments     1. CV  stable hemodynamics  sinus rhythm   Metoprolol 25 q6h   Amiodarone 200mg daily   lisinopril restarted this am   ASA/statin    2. Pulm   remains on face tent (mouth breather and NGT remains in place pending swallow assessment  ongoing aggressive pulm toilet - patient with good cough  OOB and ambulation/incentive spirometry  anticipate titration of supplemental oxygen as clinical scenario allows  noted active smoker - cessation education and support     Maintain glycemic control ;  HgA1c 5.6    Optimize nutrition - failed S/S assessment again today - plan reassessment 8/22 - ice chips    DVT and GI prophylaxis     d/w patient/staff/speech pathology/CTS    I have spent/provided stated minutes of critical care time to this patient: 90

## 2021-08-19 NOTE — PROGRESS NOTE ADULT - SUBJECTIVE AND OBJECTIVE BOX
CTICU  CRITICAL  CARE  attending     Hand off received 					   Pertinent clinical, laboratory, radiographic, hemodynamic, echocardiographic, respiratory data, microbiologic data and chart were reviewed and analyzed frequently throughout the course of the day and night  Patient seen and examined with CTS/ SH attending at bedside  Pt is a 76y , Female, HEALTH ISSUES - PROBLEM Dx:      , FAMILY HISTORY:  FH: CAD (coronary artery disease) (Sibling)  CABG    Family history of CKD (chronic kidney disease) (Sibling)  ESRD    Family history of diabetes mellitus (DM) (Sibling)    PAST MEDICAL & SURGICAL HISTORY:  HTN (hypertension)    H/O aortic valve stenosis    CAD (coronary artery disease)    No significant past surgical history      Patient is a 76y old  Female who presents with a chief complaint of HERRERA (16 Aug 2021 11:00)      14 system review limited by mentation and multiorgan morbidity     Vital signs, hemodynamic and respiratory parameters were reviewed from the bedside nursing flowsheet.  ICU Vital Signs Last 24 Hrs  T(C): 36.1 (19 Aug 2021 13:31), Max: 36.3 (18 Aug 2021 17:26)  T(F): 96.9 (19 Aug 2021 13:31), Max: 97.4 (18 Aug 2021 17:26)  HR: 53 (19 Aug 2021 15:00) (51 - 76)  BP: 166/73 (19 Aug 2021 10:30) (166/73 - 174/81)  BP(mean): 105 (19 Aug 2021 10:30) (105 - 116)  ABP: 154/55 (19 Aug 2021 15:00) (135/42 - 199/70)  ABP(mean): 86 (19 Aug 2021 15:00) (70 - 116)  RR: 16 (19 Aug 2021 15:00) (12 - 20)  SpO2: 93% (19 Aug 2021 15:00) (85% - 99%)    Adult Advanced Hemodynamics Last 24 Hrs  CVP(mm Hg): --  CVP(cm H2O): --  CO: --  CI: --  PA: --  PA(mean): --  PCWP: --  SVR: --  SVRI: --  PVR: --  PVRI: --, ABG - ( 19 Aug 2021 12:17 )  pH, Arterial: 7.46  pH, Blood: x     /  pCO2: 39    /  pO2: 106   / HCO3: 28    / Base Excess: 3.7   /  SaO2: 98.9                Intake and output was reviewed and the fluid balance was calculated  Daily     Daily   I&O's Summary    18 Aug 2021 07:01  -  19 Aug 2021 07:00  --------------------------------------------------------  IN: 480 mL / OUT: 1170 mL / NET: -690 mL    19 Aug 2021 07:01  -  19 Aug 2021 16:00  --------------------------------------------------------  IN: 120 mL / OUT: 25 mL / NET: 95 mL        All lines and drain sites were assessed  Glycemic trend was reviewedCAPILLARY BLOOD GLUCOSE      POCT Blood Glucose.: 117 mg/dL (19 Aug 2021 11:52)    No acute change in focality  Auscultation of the chest reveals equal bs  Abdomen is soft  Extremities are warm and well perfused  Wounds appear clean and unremarkable  Antibiotics are periop    labs  CBC Full  -  ( 19 Aug 2021 12:00 )  WBC Count : 14.52 K/uL  RBC Count : 2.80 M/uL  Hemoglobin : 8.6 g/dL  Hematocrit : 27.0 %  Platelet Count - Automated : 189 K/uL  Mean Cell Volume : 96.4 fl  Mean Cell Hemoglobin : 30.7 pg  Mean Cell Hemoglobin Concentration : 31.9 gm/dL  Auto Neutrophil # : x  Auto Lymphocyte # : x  Auto Monocyte # : x  Auto Eosinophil # : x  Auto Basophil # : x  Auto Neutrophil % : x  Auto Lymphocyte % : x  Auto Monocyte % : x  Auto Eosinophil % : x  Auto Basophil % : x    08-19    147<H>  |  110<H>  |  41<H>  ----------------------------<  129<H>  4.2   |  28  |  0.74    Ca    9.8      19 Aug 2021 12:00  Phos  2.7     08-18  Mg     2.3     08-19    TPro  6.3  /  Alb  3.8  /  TBili  1.4<H>  /  DBili  x   /  AST  49<H>  /  ALT  69<H>  /  AlkPhos  125<H>  08-19    PT/INR - ( 19 Aug 2021 12:00 )   PT: 15.2 sec;   INR: 1.28          PTT - ( 19 Aug 2021 12:00 )  PTT:29.4 sec  The current medications were reviewed   MEDICATIONS  (STANDING):  aMIOdarone    Tablet 200 milliGRAM(s) Oral daily  aspirin  chewable 81 milliGRAM(s) Enteral Tube daily  budesonide  80 MICROgram(s)/formoterol 4.5 MICROgram(s) Inhaler 2 Puff(s) Inhalation two times a day  chlorhexidine 2% Cloths 1 Application(s) Topical <User Schedule>  dextrose 40% Gel 15 Gram(s) Oral once  dextrose 5%. 1000 milliLiter(s) (50 mL/Hr) IV Continuous <Continuous>  dextrose 5%. 1000 milliLiter(s) (100 mL/Hr) IV Continuous <Continuous>  dextrose 50% Injectable 25 milliLiter(s) IV Push every 15 minutes  dextrose 50% Injectable 50 milliLiter(s) IV Push every 15 minutes  glucagon  Injectable 1 milliGRAM(s) IntraMuscular once  heparin   Injectable 5000 Unit(s) SubCutaneous every 8 hours  insulin lispro (ADMELOG) corrective regimen sliding scale   SubCutaneous every 6 hours  levalbuterol Inhalation 0.63 milliGRAM(s) Inhalation every 6 hours  metoprolol tartrate 25 milliGRAM(s) Oral every 6 hours  pantoprazole  Injectable 40 milliGRAM(s) IV Push daily  senna 2 Tablet(s) Oral at bedtime  sodium chloride 0.9%. 1000 milliLiter(s) (10 mL/Hr) IV Continuous <Continuous>  testosterone patch 4 mG/24 Hr(s) 4 milliGRAM(s) Transdermal daily  testosterone patch 4 mG/24 Hr(s) 4 milliGRAM(s) Transdermal <User Schedule>    MEDICATIONS  (PRN):  oxycodone    5 mG/acetaminophen 325 mG 1 Tablet(s) Oral/Enteral Tube every 6 hours PRN Moderate Pain (4 - 6)       PROBLEM LIST/ ASSESSMENT:  HEALTH ISSUES - PROBLEM Dx:      ,   Patient is a 76y old  Female who presents with a chief complaint of HERRERA (16 Aug 2021 11:00)     s/p cardiac surgery                My plan includes :  close hemodynamic, ventilatory and drain monitoring and management per post op routine    Monitor for arrhythmias and monitor parameters for organ perfusion  beta blockade not administered due to hemodynamic instability and bradycardia  monitor neurologic status  Head of the bed should remain elevated to 45 deg .   chest PT and IS will be encouraged  monitor adequacy of oxygenation and ventilation and attempt to wean oxygen  antibiotic regimen will be tailored to the clinical, laboratory and microbiologic data  Nutritional goals will be met using po eventually , ensure adequate caloric intake and montior the same  Stress ulcer and VTE prophylaxis will be achieved    Glycemic control is satisfactory  Electrolytes have been repleted as necessary and wound care has been carried out. Pain control has been achieved.   agressive physical therapy and early mobility and ambulation goals will be met   The family was updated about the course and plan  CRITICAL CARE TIME personally provided by me  in evaluation and management, reassessments, review and interpretation of labs and x-rays, ventilator and hemodynamic management, formulating a plan and coordinating care: ___90____ MIN.  Time does not include procedural time. Time spent was non routine post-operarive caRE and included multiple and repeated evaluations at the bedside  CTICU ATTENDING     					    Jesu Garcia MD

## 2021-08-19 NOTE — PROGRESS NOTE ADULT - ASSESSMENT
Pericardial drain to wall suction with 35 cc serous output over last 24h. Flushes easily with 2cc NS.

## 2021-08-20 LAB
ALBUMIN SERPL ELPH-MCNC: 3.7 G/DL — SIGNIFICANT CHANGE UP (ref 3.3–5)
ALP SERPL-CCNC: 133 U/L — HIGH (ref 40–120)
ALT FLD-CCNC: 61 U/L — HIGH (ref 10–45)
ANION GAP SERPL CALC-SCNC: 8 MMOL/L — SIGNIFICANT CHANGE UP (ref 5–17)
APTT BLD: 33.1 SEC — SIGNIFICANT CHANGE UP (ref 27.5–35.5)
AST SERPL-CCNC: 43 U/L — HIGH (ref 10–40)
BILIRUB SERPL-MCNC: 1.5 MG/DL — HIGH (ref 0.2–1.2)
BUN SERPL-MCNC: 35 MG/DL — HIGH (ref 7–23)
CALCIUM SERPL-MCNC: 9.6 MG/DL — SIGNIFICANT CHANGE UP (ref 8.4–10.5)
CHLORIDE SERPL-SCNC: 111 MMOL/L — HIGH (ref 96–108)
CO2 SERPL-SCNC: 27 MMOL/L — SIGNIFICANT CHANGE UP (ref 22–31)
CREAT SERPL-MCNC: 0.68 MG/DL — SIGNIFICANT CHANGE UP (ref 0.5–1.3)
GAS PNL BLDA: SIGNIFICANT CHANGE UP
GLUCOSE BLDC GLUCOMTR-MCNC: 124 MG/DL — HIGH (ref 70–99)
GLUCOSE SERPL-MCNC: 133 MG/DL — HIGH (ref 70–99)
HCT VFR BLD CALC: 27.2 % — LOW (ref 34.5–45)
HGB BLD-MCNC: 8.5 G/DL — LOW (ref 11.5–15.5)
INR BLD: 1.28 — HIGH (ref 0.88–1.16)
MAGNESIUM SERPL-MCNC: 2.3 MG/DL — SIGNIFICANT CHANGE UP (ref 1.6–2.6)
MCHC RBC-ENTMCNC: 29.9 PG — SIGNIFICANT CHANGE UP (ref 27–34)
MCHC RBC-ENTMCNC: 31.3 GM/DL — LOW (ref 32–36)
MCV RBC AUTO: 95.8 FL — SIGNIFICANT CHANGE UP (ref 80–100)
NRBC # BLD: 0 /100 WBCS — SIGNIFICANT CHANGE UP (ref 0–0)
PHOSPHATE SERPL-MCNC: 3.3 MG/DL — SIGNIFICANT CHANGE UP (ref 2.5–4.5)
PLATELET # BLD AUTO: 189 K/UL — SIGNIFICANT CHANGE UP (ref 150–400)
POTASSIUM SERPL-MCNC: 3.9 MMOL/L — SIGNIFICANT CHANGE UP (ref 3.5–5.3)
POTASSIUM SERPL-SCNC: 3.9 MMOL/L — SIGNIFICANT CHANGE UP (ref 3.5–5.3)
PROT SERPL-MCNC: 6.3 G/DL — SIGNIFICANT CHANGE UP (ref 6–8.3)
PROTHROM AB SERPL-ACNC: 15.2 SEC — HIGH (ref 10.6–13.6)
RBC # BLD: 2.84 M/UL — LOW (ref 3.8–5.2)
RBC # FLD: 15.2 % — HIGH (ref 10.3–14.5)
SODIUM SERPL-SCNC: 146 MMOL/L — HIGH (ref 135–145)
WBC # BLD: 15.36 K/UL — HIGH (ref 3.8–10.5)
WBC # FLD AUTO: 15.36 K/UL — HIGH (ref 3.8–10.5)

## 2021-08-20 PROCEDURE — 99292 CRITICAL CARE ADDL 30 MIN: CPT | Mod: 25

## 2021-08-20 PROCEDURE — 93306 TTE W/DOPPLER COMPLETE: CPT | Mod: 26

## 2021-08-20 PROCEDURE — 71045 X-RAY EXAM CHEST 1 VIEW: CPT | Mod: 26

## 2021-08-20 PROCEDURE — 99291 CRITICAL CARE FIRST HOUR: CPT

## 2021-08-20 RX ORDER — NICARDIPINE HYDROCHLORIDE 30 MG/1
5 CAPSULE, EXTENDED RELEASE ORAL
Qty: 40 | Refills: 0 | Status: DISCONTINUED | OUTPATIENT
Start: 2021-08-20 | End: 2021-08-20

## 2021-08-20 RX ORDER — ALPRAZOLAM 0.25 MG
0.25 TABLET ORAL
Refills: 0 | Status: DISCONTINUED | OUTPATIENT
Start: 2021-08-20 | End: 2021-08-27

## 2021-08-20 RX ORDER — FUROSEMIDE 40 MG
20 TABLET ORAL ONCE
Refills: 0 | Status: COMPLETED | OUTPATIENT
Start: 2021-08-20 | End: 2021-08-20

## 2021-08-20 RX ORDER — LIDOCAINE 4 G/100G
1 CREAM TOPICAL DAILY
Refills: 0 | Status: DISCONTINUED | OUTPATIENT
Start: 2021-08-20 | End: 2021-09-03

## 2021-08-20 RX ORDER — LISINOPRIL 2.5 MG/1
10 TABLET ORAL DAILY
Refills: 0 | Status: DISCONTINUED | OUTPATIENT
Start: 2021-08-20 | End: 2021-09-03

## 2021-08-20 RX ORDER — METOPROLOL TARTRATE 50 MG
25 TABLET ORAL EVERY 6 HOURS
Refills: 0 | Status: DISCONTINUED | OUTPATIENT
Start: 2021-08-20 | End: 2021-08-22

## 2021-08-20 RX ADMIN — OXYCODONE AND ACETAMINOPHEN 1 TABLET(S): 5; 325 TABLET ORAL at 07:10

## 2021-08-20 RX ADMIN — Medication 25 MILLIGRAM(S): at 12:25

## 2021-08-20 RX ADMIN — Medication 4 MILLIGRAM(S): at 13:50

## 2021-08-20 RX ADMIN — HEPARIN SODIUM 5000 UNIT(S): 5000 INJECTION INTRAVENOUS; SUBCUTANEOUS at 14:00

## 2021-08-20 RX ADMIN — Medication 4 MILLIGRAM(S): at 15:30

## 2021-08-20 RX ADMIN — SENNA PLUS 2 TABLET(S): 8.6 TABLET ORAL at 22:52

## 2021-08-20 RX ADMIN — Medication 25 MILLIGRAM(S): at 07:10

## 2021-08-20 RX ADMIN — Medication 0.25 MILLIGRAM(S): at 19:50

## 2021-08-20 RX ADMIN — LEVALBUTEROL 0.63 MILLIGRAM(S): 1.25 SOLUTION, CONCENTRATE RESPIRATORY (INHALATION) at 00:24

## 2021-08-20 RX ADMIN — LEVALBUTEROL 0.63 MILLIGRAM(S): 1.25 SOLUTION, CONCENTRATE RESPIRATORY (INHALATION) at 17:31

## 2021-08-20 RX ADMIN — Medication 81 MILLIGRAM(S): at 12:26

## 2021-08-20 RX ADMIN — PANTOPRAZOLE SODIUM 40 MILLIGRAM(S): 20 TABLET, DELAYED RELEASE ORAL at 12:25

## 2021-08-20 RX ADMIN — Medication 25 MILLIGRAM(S): at 17:31

## 2021-08-20 RX ADMIN — Medication 4 MILLIGRAM(S): at 12:25

## 2021-08-20 RX ADMIN — BUDESONIDE AND FORMOTEROL FUMARATE DIHYDRATE 2 PUFF(S): 160; 4.5 AEROSOL RESPIRATORY (INHALATION) at 09:00

## 2021-08-20 RX ADMIN — Medication 0.25 MILLIGRAM(S): at 10:25

## 2021-08-20 RX ADMIN — CHLORHEXIDINE GLUCONATE 1 APPLICATION(S): 213 SOLUTION TOPICAL at 07:05

## 2021-08-20 RX ADMIN — NICARDIPINE HYDROCHLORIDE 25 MG/HR: 30 CAPSULE, EXTENDED RELEASE ORAL at 04:52

## 2021-08-20 RX ADMIN — Medication 20 MILLIGRAM(S): at 04:51

## 2021-08-20 RX ADMIN — LEVALBUTEROL 0.63 MILLIGRAM(S): 1.25 SOLUTION, CONCENTRATE RESPIRATORY (INHALATION) at 05:03

## 2021-08-20 RX ADMIN — LIDOCAINE 1 PATCH: 4 CREAM TOPICAL at 12:28

## 2021-08-20 RX ADMIN — AMIODARONE HYDROCHLORIDE 200 MILLIGRAM(S): 400 TABLET ORAL at 07:09

## 2021-08-20 RX ADMIN — HEPARIN SODIUM 5000 UNIT(S): 5000 INJECTION INTRAVENOUS; SUBCUTANEOUS at 22:53

## 2021-08-20 RX ADMIN — HEPARIN SODIUM 5000 UNIT(S): 5000 INJECTION INTRAVENOUS; SUBCUTANEOUS at 07:09

## 2021-08-20 RX ADMIN — LISINOPRIL 10 MILLIGRAM(S): 2.5 TABLET ORAL at 22:53

## 2021-08-20 RX ADMIN — Medication 4 MILLIGRAM(S): at 11:00

## 2021-08-20 NOTE — PROGRESS NOTE ADULT - ASSESSMENT
Pericardial drain to wall suction with 0cc output over last 24h (35cc day prior). Flushes easily with 2cc NS. Pt afebrile and asymptomatic, OOBTC, WBC uptrending to 15.36 today. Continue to monitor output. IR will continue to follow.  Pericardial drain to wall suction with 0cc output over last 24h (35cc day prior). Flushed easily after initial resistance with 2cc NS, clot removed from wall tubing. Pt afebrile and asymptomatic, OOBTC, WBC remains elevated. Continue to monitor output. IR will continue to follow.  Pericardial drain to wall suction with 0cc output over last 24h (35cc day prior). Flushed easily after initial resistance with 2cc NS, clot removed from wall tubing followed by approx 100 cc serosanguinous fluid from drain. Pt afebrile and asymptomatic, OOBTC, WBC remains elevated. Continue to monitor output. IR will continue to follow.  Pericardial drain to wall suction with 0cc output over last 24h (35cc day prior). Flushed easily after initial resistance with 2cc NS, clot removed from wall tubing followed by approx 10cc serosanguinous fluid from drain. Pt afebrile and asymptomatic, OOBTC, WBC remains elevated. Continue to monitor output. IR will continue to follow.

## 2021-08-20 NOTE — PROGRESS NOTE ADULT - SUBJECTIVE AND OBJECTIVE BOX
INTERVAL HPI/OVERNIGHT EVENTS:    8/9:  AVR (tissue), Ascending, hemiarch replacement, frozen elephant trunk, left aorto-subclavian bypass, CABG x 2  EF 50%    77yo Female active tobacco use; HTN, Known bicuspid aortic valve, spinal stenosis, arthritis w/sxs SOB/HERRERA    ECHO 3/2021: EF normal, severe AS, moderate AI.   NST 4/2021: transient ischemic dilatation.   Cath 5/11/21: severe AS, prox circumflex 90%, OM1 70%, mid RCA 80%.   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     To OR 8/9  Intraop: 7 U pRBC/2 plt/2 FFP/1000 FEIBA; 3 L Crystalloid  arrived to ICU on levophed   AV paced 80 - underlying native rhythm reported asystole    initial LA 4 - volume (albumin given ) and normalized - last 1.9; swan out   titrated off infusions post-op and working with PT/early mobility on vent    8/11: developed post-op atrial fib with drop in BP - amiodarone; levo and vaso started ; lasix qtt   ECHO: no pericardial effusion  given 2 U pRBC for Hct 22  [employer] re-inserted - CI 2.4 - primacor and Dobutamine restarted     DCCV  for Fib/RVR - 200J x 3 - ultimately converted to sinus 8p 8/11 8/12: remains in sinus - additional 1 U pRBC given   8/13 - FIDEL - lasix infusion stopped and volume given back  inotrope titrated down to off and sedation held to assess ability to wean/liberate from vent    Extubated 8/14 - good cough and later that day OOB in chair  8/16: ECHO demonstrating moderate pericardial effusion  IR consulted and drain placed   EP consulted - tachy/marifer; atrial fib mgmt     Up and ambulating on unit with walker and staff assist this am   failed Formal S/S assessment 8/17 - NPO  ECHO: small posterior pericardial effusion. There is no diastolic invagination of the right ventricle. There is no significant (<30%) respiratory variation in the maximal mitral E wave velocity. The inferior vena cava is dilated (>2.1cm) with abnormal inspiratory collapse (<50%) consistent with elevated right atrial pressure (  15, range 10-20mmHg). Overall no echocardiographic evidence of cardiac tamponade    8/18: repeat S/S assessment - NPO except ice chips  placed back on HFNC - 40/40 =- transition to nasal cannula - patient tolerates face mask  up and ambulating with staff   ECHO: small pericardial effusion     ongoing work with PT - ambulated outside        ICU Vital Signs Last 24 Hrs  T(C): 35.6 (20 Aug 2021 09:00), Max: 36.5 (19 Aug 2021 21:09)  T(F): 96 (20 Aug 2021 09:00), Max: 97.7 (19 Aug 2021 21:09)  HR: 63 (20 Aug 2021 13:05) (52 - 77)  BP: 152/67 (20 Aug 2021 13:05) (152/65 - 174/74)  BP(mean): 96 (20 Aug 2021 13:05) (94 - 109)  ABP: 141/46 (20 Aug 2021 07:00) (141/46 - 186/64)  ABP(mean): 74 (20 Aug 2021 07:00) (74 - 110)  RR: 15 (20 Aug 2021 12:00) (12 - 22)  SpO2: 93% (20 Aug 2021 13:05) (91% - 100%)    Qtts: levo 0.05    I&O's Summary    19 Aug 2021 07:01  -  20 Aug 2021 07:00  --------------------------------------------------------  IN: 710 mL / OUT: 1725 mL / NET: -1015 mL    20 Aug 2021 07:01  -  20 Aug 2021 13:51  --------------------------------------------------------  IN: 156 mL / OUT: 300 mL / NET: -144 mL    Physical Exam    Heart  Lungs  Abd  Ext  Chest  Neuro  Skin    LABS:                        8.5    15.36 )-----------( 189      ( 20 Aug 2021 03:49 )             27.2     08-20    146<H>  |  111<H>  |  35<H>  ----------------------------<  133<H>  3.9   |  27  |  0.68    Ca    9.6      20 Aug 2021 03:49  Phos  3.3     08-20  Mg     2.3     08-20    TPro  6.3  /  Alb  3.7  /  TBili  1.5<H>  /  DBili  x   /  AST  43<H>  /  ALT  61<H>  /  AlkPhos  133<H>  08-20    PT/INR - ( 20 Aug 2021 03:49 )   PT: 15.2 sec;   INR: 1.28          PTT - ( 20 Aug 2021 03:49 )  PTT:33.1 sec    ABG - ( 20 Aug 2021 03:42 )  pH, Arterial: 7.50  pH, Blood: x     /  pCO2: 35    /  pO2: 61    / HCO3: 27    / Base Excess: 4.2   /  SaO2: 93.7                RADIOLOGY & ADDITIONAL STUDIES:    I have spent/provided stated minutes of critical care time to this patient:

## 2021-08-21 LAB
ALBUMIN SERPL ELPH-MCNC: 3.5 G/DL — SIGNIFICANT CHANGE UP (ref 3.3–5)
ALBUMIN SERPL ELPH-MCNC: 3.7 G/DL — SIGNIFICANT CHANGE UP (ref 3.3–5)
ALBUMIN SERPL ELPH-MCNC: 3.7 G/DL — SIGNIFICANT CHANGE UP (ref 3.3–5)
ALP SERPL-CCNC: 125 U/L — HIGH (ref 40–120)
ALP SERPL-CCNC: 136 U/L — HIGH (ref 40–120)
ALP SERPL-CCNC: 139 U/L — HIGH (ref 40–120)
ALT FLD-CCNC: 44 U/L — SIGNIFICANT CHANGE UP (ref 10–45)
ALT FLD-CCNC: 50 U/L — HIGH (ref 10–45)
ALT FLD-CCNC: 52 U/L — HIGH (ref 10–45)
ANION GAP SERPL CALC-SCNC: 10 MMOL/L — SIGNIFICANT CHANGE UP (ref 5–17)
ANION GAP SERPL CALC-SCNC: 11 MMOL/L — SIGNIFICANT CHANGE UP (ref 5–17)
ANION GAP SERPL CALC-SCNC: 13 MMOL/L — SIGNIFICANT CHANGE UP (ref 5–17)
APTT BLD: 30.2 SEC — SIGNIFICANT CHANGE UP (ref 27.5–35.5)
APTT BLD: 30.3 SEC — SIGNIFICANT CHANGE UP (ref 27.5–35.5)
AST SERPL-CCNC: 27 U/L — SIGNIFICANT CHANGE UP (ref 10–40)
AST SERPL-CCNC: 32 U/L — SIGNIFICANT CHANGE UP (ref 10–40)
AST SERPL-CCNC: 40 U/L — SIGNIFICANT CHANGE UP (ref 10–40)
BILIRUB SERPL-MCNC: 1.5 MG/DL — HIGH (ref 0.2–1.2)
BILIRUB SERPL-MCNC: 1.5 MG/DL — HIGH (ref 0.2–1.2)
BILIRUB SERPL-MCNC: 1.7 MG/DL — HIGH (ref 0.2–1.2)
BLD GP AB SCN SERPL QL: NEGATIVE — SIGNIFICANT CHANGE UP
BUN SERPL-MCNC: 33 MG/DL — HIGH (ref 7–23)
BUN SERPL-MCNC: 34 MG/DL — HIGH (ref 7–23)
BUN SERPL-MCNC: 36 MG/DL — HIGH (ref 7–23)
CALCIUM SERPL-MCNC: 9.3 MG/DL — SIGNIFICANT CHANGE UP (ref 8.4–10.5)
CALCIUM SERPL-MCNC: 9.4 MG/DL — SIGNIFICANT CHANGE UP (ref 8.4–10.5)
CALCIUM SERPL-MCNC: 9.4 MG/DL — SIGNIFICANT CHANGE UP (ref 8.4–10.5)
CHLORIDE SERPL-SCNC: 108 MMOL/L — SIGNIFICANT CHANGE UP (ref 96–108)
CHLORIDE SERPL-SCNC: 108 MMOL/L — SIGNIFICANT CHANGE UP (ref 96–108)
CHLORIDE SERPL-SCNC: 110 MMOL/L — HIGH (ref 96–108)
CO2 SERPL-SCNC: 22 MMOL/L — SIGNIFICANT CHANGE UP (ref 22–31)
CO2 SERPL-SCNC: 27 MMOL/L — SIGNIFICANT CHANGE UP (ref 22–31)
CO2 SERPL-SCNC: 30 MMOL/L — SIGNIFICANT CHANGE UP (ref 22–31)
CREAT SERPL-MCNC: 0.67 MG/DL — SIGNIFICANT CHANGE UP (ref 0.5–1.3)
CREAT SERPL-MCNC: 0.67 MG/DL — SIGNIFICANT CHANGE UP (ref 0.5–1.3)
CREAT SERPL-MCNC: 0.82 MG/DL — SIGNIFICANT CHANGE UP (ref 0.5–1.3)
GLUCOSE SERPL-MCNC: 107 MG/DL — HIGH (ref 70–99)
GLUCOSE SERPL-MCNC: 134 MG/DL — HIGH (ref 70–99)
GLUCOSE SERPL-MCNC: 174 MG/DL — HIGH (ref 70–99)
HCT VFR BLD CALC: 28.5 % — LOW (ref 34.5–45)
HCT VFR BLD CALC: 28.9 % — LOW (ref 34.5–45)
HGB BLD-MCNC: 8.8 G/DL — LOW (ref 11.5–15.5)
HGB BLD-MCNC: 8.9 G/DL — LOW (ref 11.5–15.5)
INR BLD: 1.25 — HIGH (ref 0.88–1.16)
INR BLD: 1.41 — HIGH (ref 0.88–1.16)
MAGNESIUM SERPL-MCNC: 2.2 MG/DL — SIGNIFICANT CHANGE UP (ref 1.6–2.6)
MAGNESIUM SERPL-MCNC: 2.2 MG/DL — SIGNIFICANT CHANGE UP (ref 1.6–2.6)
MCHC RBC-ENTMCNC: 29.9 PG — SIGNIFICANT CHANGE UP (ref 27–34)
MCHC RBC-ENTMCNC: 30.1 PG — SIGNIFICANT CHANGE UP (ref 27–34)
MCHC RBC-ENTMCNC: 30.8 GM/DL — LOW (ref 32–36)
MCHC RBC-ENTMCNC: 30.9 GM/DL — LOW (ref 32–36)
MCV RBC AUTO: 97 FL — SIGNIFICANT CHANGE UP (ref 80–100)
MCV RBC AUTO: 97.6 FL — SIGNIFICANT CHANGE UP (ref 80–100)
NRBC # BLD: 0 /100 WBCS — SIGNIFICANT CHANGE UP (ref 0–0)
NRBC # BLD: 0 /100 WBCS — SIGNIFICANT CHANGE UP (ref 0–0)
PHOSPHATE SERPL-MCNC: 2.9 MG/DL — SIGNIFICANT CHANGE UP (ref 2.5–4.5)
PHOSPHATE SERPL-MCNC: 3.3 MG/DL — SIGNIFICANT CHANGE UP (ref 2.5–4.5)
PLATELET # BLD AUTO: 163 K/UL — SIGNIFICANT CHANGE UP (ref 150–400)
PLATELET # BLD AUTO: 207 K/UL — SIGNIFICANT CHANGE UP (ref 150–400)
POTASSIUM SERPL-MCNC: 3.8 MMOL/L — SIGNIFICANT CHANGE UP (ref 3.5–5.3)
POTASSIUM SERPL-MCNC: 4.2 MMOL/L — SIGNIFICANT CHANGE UP (ref 3.5–5.3)
POTASSIUM SERPL-MCNC: 4.2 MMOL/L — SIGNIFICANT CHANGE UP (ref 3.5–5.3)
POTASSIUM SERPL-SCNC: 3.8 MMOL/L — SIGNIFICANT CHANGE UP (ref 3.5–5.3)
POTASSIUM SERPL-SCNC: 4.2 MMOL/L — SIGNIFICANT CHANGE UP (ref 3.5–5.3)
POTASSIUM SERPL-SCNC: 4.2 MMOL/L — SIGNIFICANT CHANGE UP (ref 3.5–5.3)
PROT SERPL-MCNC: 6.5 G/DL — SIGNIFICANT CHANGE UP (ref 6–8.3)
PROTHROM AB SERPL-ACNC: 14.8 SEC — HIGH (ref 10.6–13.6)
PROTHROM AB SERPL-ACNC: 16.6 SEC — HIGH (ref 10.6–13.6)
RBC # BLD: 2.92 M/UL — LOW (ref 3.8–5.2)
RBC # BLD: 2.98 M/UL — LOW (ref 3.8–5.2)
RBC # FLD: 15.3 % — HIGH (ref 10.3–14.5)
RBC # FLD: 15.5 % — HIGH (ref 10.3–14.5)
RH IG SCN BLD-IMP: POSITIVE — SIGNIFICANT CHANGE UP
SODIUM SERPL-SCNC: 145 MMOL/L — SIGNIFICANT CHANGE UP (ref 135–145)
SODIUM SERPL-SCNC: 146 MMOL/L — HIGH (ref 135–145)
SODIUM SERPL-SCNC: 148 MMOL/L — HIGH (ref 135–145)
WBC # BLD: 16.68 K/UL — HIGH (ref 3.8–10.5)
WBC # BLD: 17.3 K/UL — HIGH (ref 3.8–10.5)
WBC # FLD AUTO: 16.68 K/UL — HIGH (ref 3.8–10.5)
WBC # FLD AUTO: 17.3 K/UL — HIGH (ref 3.8–10.5)

## 2021-08-21 PROCEDURE — 71045 X-RAY EXAM CHEST 1 VIEW: CPT | Mod: 26,76

## 2021-08-21 PROCEDURE — 99291 CRITICAL CARE FIRST HOUR: CPT

## 2021-08-21 PROCEDURE — 99292 CRITICAL CARE ADDL 30 MIN: CPT

## 2021-08-21 PROCEDURE — 36000 PLACE NEEDLE IN VEIN: CPT

## 2021-08-21 RX ORDER — POTASSIUM CHLORIDE 20 MEQ
20 PACKET (EA) ORAL ONCE
Refills: 0 | Status: DISCONTINUED | OUTPATIENT
Start: 2021-08-21 | End: 2021-08-21

## 2021-08-21 RX ORDER — POTASSIUM CHLORIDE 20 MEQ
20 PACKET (EA) ORAL ONCE
Refills: 0 | Status: COMPLETED | OUTPATIENT
Start: 2021-08-21 | End: 2021-08-21

## 2021-08-21 RX ADMIN — OXYCODONE AND ACETAMINOPHEN 1 TABLET(S): 5; 325 TABLET ORAL at 07:00

## 2021-08-21 RX ADMIN — BUDESONIDE AND FORMOTEROL FUMARATE DIHYDRATE 2 PUFF(S): 160; 4.5 AEROSOL RESPIRATORY (INHALATION) at 09:57

## 2021-08-21 RX ADMIN — LIDOCAINE 1 PATCH: 4 CREAM TOPICAL at 12:33

## 2021-08-21 RX ADMIN — LISINOPRIL 10 MILLIGRAM(S): 2.5 TABLET ORAL at 06:52

## 2021-08-21 RX ADMIN — LEVALBUTEROL 0.63 MILLIGRAM(S): 1.25 SOLUTION, CONCENTRATE RESPIRATORY (INHALATION) at 05:10

## 2021-08-21 RX ADMIN — Medication 20 MILLIEQUIVALENT(S): at 06:52

## 2021-08-21 RX ADMIN — LEVALBUTEROL 0.63 MILLIGRAM(S): 1.25 SOLUTION, CONCENTRATE RESPIRATORY (INHALATION) at 18:01

## 2021-08-21 RX ADMIN — OXYCODONE AND ACETAMINOPHEN 1 TABLET(S): 5; 325 TABLET ORAL at 19:45

## 2021-08-21 RX ADMIN — Medication 4 MILLIGRAM(S): at 08:31

## 2021-08-21 RX ADMIN — Medication 81 MILLIGRAM(S): at 11:25

## 2021-08-21 RX ADMIN — CHLORHEXIDINE GLUCONATE 1 APPLICATION(S): 213 SOLUTION TOPICAL at 06:52

## 2021-08-21 RX ADMIN — OXYCODONE AND ACETAMINOPHEN 1 TABLET(S): 5; 325 TABLET ORAL at 14:30

## 2021-08-21 RX ADMIN — HEPARIN SODIUM 5000 UNIT(S): 5000 INJECTION INTRAVENOUS; SUBCUTANEOUS at 22:37

## 2021-08-21 RX ADMIN — OXYCODONE AND ACETAMINOPHEN 1 TABLET(S): 5; 325 TABLET ORAL at 13:06

## 2021-08-21 RX ADMIN — Medication 25 MILLIGRAM(S): at 07:56

## 2021-08-21 RX ADMIN — Medication 25 MILLIGRAM(S): at 00:07

## 2021-08-21 RX ADMIN — Medication 4 MILLIGRAM(S): at 11:26

## 2021-08-21 RX ADMIN — Medication 0.25 MILLIGRAM(S): at 12:36

## 2021-08-21 RX ADMIN — Medication 25 MILLIGRAM(S): at 23:09

## 2021-08-21 RX ADMIN — LEVALBUTEROL 0.63 MILLIGRAM(S): 1.25 SOLUTION, CONCENTRATE RESPIRATORY (INHALATION) at 11:28

## 2021-08-21 RX ADMIN — LIDOCAINE 1 PATCH: 4 CREAM TOPICAL at 19:28

## 2021-08-21 RX ADMIN — LEVALBUTEROL 0.63 MILLIGRAM(S): 1.25 SOLUTION, CONCENTRATE RESPIRATORY (INHALATION) at 00:20

## 2021-08-21 RX ADMIN — HEPARIN SODIUM 5000 UNIT(S): 5000 INJECTION INTRAVENOUS; SUBCUTANEOUS at 14:55

## 2021-08-21 RX ADMIN — OXYCODONE AND ACETAMINOPHEN 1 TABLET(S): 5; 325 TABLET ORAL at 08:00

## 2021-08-21 RX ADMIN — Medication 4 MILLIGRAM(S): at 15:32

## 2021-08-21 RX ADMIN — LEVALBUTEROL 0.63 MILLIGRAM(S): 1.25 SOLUTION, CONCENTRATE RESPIRATORY (INHALATION) at 23:40

## 2021-08-21 RX ADMIN — AMIODARONE HYDROCHLORIDE 200 MILLIGRAM(S): 400 TABLET ORAL at 06:52

## 2021-08-21 RX ADMIN — Medication 4 MILLIGRAM(S): at 12:32

## 2021-08-21 RX ADMIN — HEPARIN SODIUM 5000 UNIT(S): 5000 INJECTION INTRAVENOUS; SUBCUTANEOUS at 06:52

## 2021-08-21 RX ADMIN — LIDOCAINE 1 PATCH: 4 CREAM TOPICAL at 00:00

## 2021-08-21 RX ADMIN — Medication 4 MILLIGRAM(S): at 17:57

## 2021-08-21 RX ADMIN — BUDESONIDE AND FORMOTEROL FUMARATE DIHYDRATE 2 PUFF(S): 160; 4.5 AEROSOL RESPIRATORY (INHALATION) at 22:14

## 2021-08-21 RX ADMIN — OXYCODONE AND ACETAMINOPHEN 1 TABLET(S): 5; 325 TABLET ORAL at 19:30

## 2021-08-21 RX ADMIN — PANTOPRAZOLE SODIUM 40 MILLIGRAM(S): 20 TABLET, DELAYED RELEASE ORAL at 11:25

## 2021-08-21 RX ADMIN — SENNA PLUS 2 TABLET(S): 8.6 TABLET ORAL at 22:36

## 2021-08-21 NOTE — PROGRESS NOTE ADULT - ASSESSMENT
Pericardial drain to wall suction with 50cc output over last 24h (0cc day prior), likely increased 2/2 to removal of clot from tubing to wall yesterday. Flushed easily with 2cc NS. Pt OOBTC, WBC continues to uptrend. Continue to monitor output. IR will continue to follow.

## 2021-08-21 NOTE — PROGRESS NOTE ADULT - SUBJECTIVE AND OBJECTIVE BOX
CTICU  CRITICAL  CARE  attending     Hand off received 					   Pertinent clinical, laboratory, radiographic, hemodynamic, echocardiographic, respiratory data, microbiologic data and chart were reviewed and analyzed frequently throughout the course of the day and night  Patient seen and examined with CTS/ SH attending at bedside      Pt is a 76y , Female,    08/09: Operative day    s/p AVR; replacement of ascending aorta and jeff arch; frozen elephant trunk; Aorto-L Subclavian bypass; CABG x 2; TEVAR    Intraop:      CPB  251 mins  XCT   169 mins   CA     23 mins  ACP   21 mins      7 PRBC,   2 Plt,   2 FFP,   15 cryo,   1000 FEIBA    admitted with low dose Epinephrine/levophed  AV paced @ 80; asystole underneath  initial lactic acidosis @ 4  Volume resuscitated  Epi weaned off  MAP maintained >80  Non focal off sedation  no LE motor deficit'      08/9-12:    Remains on full vent support  Inotrope support with Primacor  Pressor support with vasopressin/neosynephrine  FIDEL; S Cr up to 2.1  Afib with RVR s/p Cardioversion x 3  in Sinus rhythm now on Amiodarone infusion  s/p 1 unit PRBC for acute post H'gic anemia; Hct 23    08/13:     remains on full Vent support  Inotrope support with Primacor  pressor support with low dose neosynephrine  FIDEL; Cr 2,39  PAP 35/15; CVP 12  CO 3.8; CI 2,2  SVR 1345    08/14:    Primacor weaned off; Cr 1.68; extubated/OOB in chair    08/15:    remains extubated; works with PT; slow progress    08/16:     TTE: moderate pericardial effusion; s/p IR drainage; Left pigtail thoracentesis in ICU    08/17-20:     failed speech and swallow; progress with PT; on tube feeds  titrating amiodarone and beta blocker dosings for sinus bradycardia  Repeat Echo: moderate effusion; IR flushed drain; minimal output    08/21:  (today)    sinus marifer 50's; tube feeds; Supplemental O2 via face tent;   Pericardial drain d/cd; runs of atrial fibrillation with aberrancy Vs NSVT post drain removal  acute post H'gic anemia      HPI:    76 y.o female, current every day smoker (59 PY), with PMHx HTN, bicuspid aortic valve, spinal stenosis, arthritis who complained of increased HERRERA for 6 months. TTE 3/2021 revealed EF normal, severe AS, moderate AI. NST 4/2021 revealed transient ischemic dilatation. Cardiac cath 5/11/21 revealed severe AS, prox circumflex 90%, OM1 70%, mid RCA 80%. 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 She was referred to Dr. Muller for surgical evaluation and deemed a good surgical candidate. On 8/8 she was admitted to [facility] for planned pre operative medical optimization prior to OR scheduled 8/9.           , FAMILY HISTORY:  FH: CAD (coronary artery disease) (Sibling)  CABG    Family history of CKD (chronic kidney disease) (Sibling)  ESRD    Family history of diabetes mellitus (DM) (Sibling)    PAST MEDICAL & SURGICAL HISTORY:  HTN (hypertension)    H/O aortic valve stenosis    CAD (coronary artery disease)    No significant past surgical history      Patient is a 76y old  Female who presents with a chief complaint of severe aortic stenosis (16 Aug 2021 11:00)      14 system review limited 2/2 post op norbidity  acute changes include acute respiratory failure  Vital signs, hemodynamic and respiratory parameters were reviewed from the bedside nursing flowsheet.  ICU Vital Signs Last 24 Hrs  T(C): 36.2 (22 Aug 2021 01:16), Max: 36.5 (21 Aug 2021 21:25)  T(F): 97.1 (22 Aug 2021 01:16), Max: 97.7 (21 Aug 2021 21:25)  HR: 54 (22 Aug 2021 02:00) (51 - 74)  BP: 136/63 (22 Aug 2021 02:00) (119/56 - 173/72)  BP(mean): 91 (22 Aug 2021 02:00) (80 - 114)  ABP: --  ABP(mean): --  RR: 11 (22 Aug 2021 02:00) (10 - 23)  SpO2: 95% (22 Aug 2021 02:00) (89% - 100%)    Adult Advanced Hemodynamics Last 24 Hrs  CVP(mm Hg): --  CVP(cm H2O): --  CO: --  CI: --  PA: --  PA(mean): --  PCWP: --  SVR: --  SVRI: --  PVR: --  PVRI: --, ABG - ( 20 Aug 2021 03:42 )  pH, Arterial: 7.50  pH, Blood: x     /  pCO2: 35    /  pO2: 61    / HCO3: 27    / Base Excess: 4.2   /  SaO2: 93.7                Intake and output was reviewed and the fluid balance was calculated  Daily     Daily   I&O's Summary    20 Aug 2021 07:01  -  21 Aug 2021 07:00  --------------------------------------------------------  IN: 520 mL / OUT: 800 mL / NET: -280 mL    21 Aug 2021 07:01  -  22 Aug 2021 02:17  --------------------------------------------------------  IN: 797 mL / OUT: 950 mL / NET: -153 mL        All lines and drain sites were assessed  Glycemic trend was reviewedCAPILLARY BLOOD GLUCOSE      POCT Blood Glucose.: 124 mg/dL (20 Aug 2021 07:58)    No acute change in mental status  Auscultation of the chest reveals equal bs  Abdomen is soft  Extremities are warm and well perfused  Wounds appear clean and unremarkable  Antibiotics are periop    labs  CBC Full  -  ( 21 Aug 2021 15:49 )  WBC Count : 17.30 K/uL  RBC Count : 2.92 M/uL  Hemoglobin : 8.8 g/dL  Hematocrit : 28.5 %  Platelet Count - Automated : 207 K/uL  Mean Cell Volume : 97.6 fl  Mean Cell Hemoglobin : 30.1 pg  Mean Cell Hemoglobin Concentration : 30.9 gm/dL  Auto Neutrophil # : x  Auto Lymphocyte # : x  Auto Monocyte # : x  Auto Eosinophil # : x  Auto Basophil # : x  Auto Neutrophil % : x  Auto Lymphocyte % : x  Auto Monocyte % : x  Auto Eosinophil % : x  Auto Basophil % : x    08-21    146<H>  |  108  |  36<H>  ----------------------------<  174<H>  4.2   |  27  |  0.82    Ca    9.3      21 Aug 2021 15:49  Phos  2.9     08-21  Mg     2.2     08-21    TPro  6.5  /  Alb  3.7  /  TBili  1.5<H>  /  DBili  x   /  AST  27  /  ALT  44  /  AlkPhos  125<H>  08-21    PT/INR - ( 21 Aug 2021 15:49 )   PT: 14.8 sec;   INR: 1.25          PTT - ( 21 Aug 2021 15:49 )  PTT:30.3 sec  The current medications were reviewed   MEDICATIONS  (STANDING):  aMIOdarone    Tablet 200 milliGRAM(s) Oral daily  aspirin  chewable 81 milliGRAM(s) Enteral Tube daily  budesonide  80 MICROgram(s)/formoterol 4.5 MICROgram(s) Inhaler 2 Puff(s) Inhalation two times a day  chlorhexidine 2% Cloths 1 Application(s) Topical <User Schedule>  dextrose 5%. 1000 milliLiter(s) (50 mL/Hr) IV Continuous <Continuous>  dextrose 5%. 1000 milliLiter(s) (100 mL/Hr) IV Continuous <Continuous>  glucagon  Injectable 1 milliGRAM(s) IntraMuscular once  heparin   Injectable 5000 Unit(s) SubCutaneous every 8 hours  levalbuterol Inhalation 0.63 milliGRAM(s) Inhalation every 6 hours  lidocaine   4% Patch 1 Patch Transdermal daily  lisinopril 10 milliGRAM(s) Oral daily  metoprolol tartrate 25 milliGRAM(s) Oral every 6 hours  pantoprazole  Injectable 40 milliGRAM(s) IV Push daily  senna 2 Tablet(s) Oral at bedtime  sodium chloride 0.9%. 1000 milliLiter(s) (10 mL/Hr) IV Continuous <Continuous>  testosterone patch 4 mG/24 Hr(s) 4 milliGRAM(s) Transdermal <User Schedule>  testosterone patch 4 mG/24 Hr(s) 4 milliGRAM(s) Transdermal daily    MEDICATIONS  (PRN):  ALPRAZolam 0.25 milliGRAM(s) Oral two times a day PRN anxiety  oxycodone    5 mG/acetaminophen 325 mG 1 Tablet(s) Oral/Enteral Tube every 6 hours PRN Moderate Pain (4 - 6)       PROBLEM LIST/ ASSESSMENT:  HEALTH ISSUES - PROBLEM Dx:      ,   Patient is a 76y old  Female who presents with a chief complaint of severe aortic stenosis(16 Aug 2021 11:00)     s/p AVR; replacement of ascending aorta and jeff arch; frozen elephant trunk; Aorto-L Subclavian bypass; CABG x 2; TEVAR    acute changes include acute respiratory failure    My plan includes :    CV:    s/p d/cing pericardial drain  pt with dysrhythmias  continue beta blockers/amiodarone  Epicardial wires as back up  f/u TTE to f/u effusion    Pulm:    supplemental O2 via face tent  titrate Fio2 to maintain Sao2 >95    GI:    cont tube feeds  will re study with SLP    close hemodynamic, ventilatory and drain monitoring and management per post op routine    Monitor for arrhythmias and monitor parameters for organ perfusion  monitor neurologic status  Head of the bed should remain elevated to 45 deg .   chest PT and IS will be encouraged  monitor adequacy of oxygenation and ventilation and attempt to wean oxygen  Nutritional goals will be met using po eventually , ensure adequate caloric intake and montior the same  Stress ulcer and VTE prophylaxis will be achieved    Glycemic control is satisfactory  Electrolytes have been repleted as necessary and wound care has been carried out. Pain control has been achieved.   agressive physical therapy and early mobility and ambulation goals will be met   The family was updated about the course and plan  CRITICAL CARE TIME SPENT in evaluation and management, reassessments, review and interpretation of labs and x-rays, ventilator and hemodynamic management, formulating a plan and coordinating care: ___90____ MIN.  Time does not include procedural time.  CTICU ATTENDING     					    Joe Locke MD

## 2021-08-22 LAB
ALBUMIN SERPL ELPH-MCNC: 3.7 G/DL — SIGNIFICANT CHANGE UP (ref 3.3–5)
ALBUMIN SERPL ELPH-MCNC: 3.7 G/DL — SIGNIFICANT CHANGE UP (ref 3.3–5)
ALP SERPL-CCNC: 117 U/L — SIGNIFICANT CHANGE UP (ref 40–120)
ALP SERPL-CCNC: 127 U/L — HIGH (ref 40–120)
ALT FLD-CCNC: 39 U/L — SIGNIFICANT CHANGE UP (ref 10–45)
ALT FLD-CCNC: 44 U/L — SIGNIFICANT CHANGE UP (ref 10–45)
ANION GAP SERPL CALC-SCNC: 10 MMOL/L — SIGNIFICANT CHANGE UP (ref 5–17)
ANION GAP SERPL CALC-SCNC: 9 MMOL/L — SIGNIFICANT CHANGE UP (ref 5–17)
APTT BLD: 25.3 SEC — LOW (ref 27.5–35.5)
APTT BLD: 26.2 SEC — LOW (ref 27.5–35.5)
AST SERPL-CCNC: 23 U/L — SIGNIFICANT CHANGE UP (ref 10–40)
AST SERPL-CCNC: 29 U/L — SIGNIFICANT CHANGE UP (ref 10–40)
BILIRUB SERPL-MCNC: 1.5 MG/DL — HIGH (ref 0.2–1.2)
BILIRUB SERPL-MCNC: 1.7 MG/DL — HIGH (ref 0.2–1.2)
BUN SERPL-MCNC: 35 MG/DL — HIGH (ref 7–23)
BUN SERPL-MCNC: 37 MG/DL — HIGH (ref 7–23)
CALCIUM SERPL-MCNC: 9.2 MG/DL — SIGNIFICANT CHANGE UP (ref 8.4–10.5)
CALCIUM SERPL-MCNC: 9.3 MG/DL — SIGNIFICANT CHANGE UP (ref 8.4–10.5)
CHLORIDE SERPL-SCNC: 108 MMOL/L — SIGNIFICANT CHANGE UP (ref 96–108)
CHLORIDE SERPL-SCNC: 108 MMOL/L — SIGNIFICANT CHANGE UP (ref 96–108)
CO2 SERPL-SCNC: 25 MMOL/L — SIGNIFICANT CHANGE UP (ref 22–31)
CO2 SERPL-SCNC: 26 MMOL/L — SIGNIFICANT CHANGE UP (ref 22–31)
CREAT SERPL-MCNC: 0.73 MG/DL — SIGNIFICANT CHANGE UP (ref 0.5–1.3)
CREAT SERPL-MCNC: 0.81 MG/DL — SIGNIFICANT CHANGE UP (ref 0.5–1.3)
GLUCOSE SERPL-MCNC: 112 MG/DL — HIGH (ref 70–99)
GLUCOSE SERPL-MCNC: 128 MG/DL — HIGH (ref 70–99)
HCT VFR BLD CALC: 29.3 % — LOW (ref 34.5–45)
HCT VFR BLD CALC: 29.3 % — LOW (ref 34.5–45)
HGB BLD-MCNC: 8.9 G/DL — LOW (ref 11.5–15.5)
HGB BLD-MCNC: 9 G/DL — LOW (ref 11.5–15.5)
INR BLD: 1.17 — HIGH (ref 0.88–1.16)
INR BLD: 1.2 — HIGH (ref 0.88–1.16)
MAGNESIUM SERPL-MCNC: 2 MG/DL — SIGNIFICANT CHANGE UP (ref 1.6–2.6)
MAGNESIUM SERPL-MCNC: 2.1 MG/DL — SIGNIFICANT CHANGE UP (ref 1.6–2.6)
MCHC RBC-ENTMCNC: 29.8 PG — SIGNIFICANT CHANGE UP (ref 27–34)
MCHC RBC-ENTMCNC: 30.2 PG — SIGNIFICANT CHANGE UP (ref 27–34)
MCHC RBC-ENTMCNC: 30.4 GM/DL — LOW (ref 32–36)
MCHC RBC-ENTMCNC: 30.7 GM/DL — LOW (ref 32–36)
MCV RBC AUTO: 98 FL — SIGNIFICANT CHANGE UP (ref 80–100)
MCV RBC AUTO: 98.3 FL — SIGNIFICANT CHANGE UP (ref 80–100)
NRBC # BLD: 0 /100 WBCS — SIGNIFICANT CHANGE UP (ref 0–0)
NRBC # BLD: 0 /100 WBCS — SIGNIFICANT CHANGE UP (ref 0–0)
PHOSPHATE SERPL-MCNC: 2.5 MG/DL — SIGNIFICANT CHANGE UP (ref 2.5–4.5)
PHOSPHATE SERPL-MCNC: 3.1 MG/DL — SIGNIFICANT CHANGE UP (ref 2.5–4.5)
PLATELET # BLD AUTO: 187 K/UL — SIGNIFICANT CHANGE UP (ref 150–400)
PLATELET # BLD AUTO: 211 K/UL — SIGNIFICANT CHANGE UP (ref 150–400)
POTASSIUM SERPL-MCNC: 4.1 MMOL/L — SIGNIFICANT CHANGE UP (ref 3.5–5.3)
POTASSIUM SERPL-MCNC: 4.6 MMOL/L — SIGNIFICANT CHANGE UP (ref 3.5–5.3)
POTASSIUM SERPL-SCNC: 4.1 MMOL/L — SIGNIFICANT CHANGE UP (ref 3.5–5.3)
POTASSIUM SERPL-SCNC: 4.6 MMOL/L — SIGNIFICANT CHANGE UP (ref 3.5–5.3)
PROT SERPL-MCNC: 6.7 G/DL — SIGNIFICANT CHANGE UP (ref 6–8.3)
PROT SERPL-MCNC: 6.7 G/DL — SIGNIFICANT CHANGE UP (ref 6–8.3)
PROTHROM AB SERPL-ACNC: 13.9 SEC — HIGH (ref 10.6–13.6)
PROTHROM AB SERPL-ACNC: 14.3 SEC — HIGH (ref 10.6–13.6)
RBC # BLD: 2.98 M/UL — LOW (ref 3.8–5.2)
RBC # BLD: 2.99 M/UL — LOW (ref 3.8–5.2)
RBC # FLD: 15.5 % — HIGH (ref 10.3–14.5)
RBC # FLD: 15.5 % — HIGH (ref 10.3–14.5)
SODIUM SERPL-SCNC: 142 MMOL/L — SIGNIFICANT CHANGE UP (ref 135–145)
SODIUM SERPL-SCNC: 144 MMOL/L — SIGNIFICANT CHANGE UP (ref 135–145)
WBC # BLD: 15.76 K/UL — HIGH (ref 3.8–10.5)
WBC # BLD: 16.69 K/UL — HIGH (ref 3.8–10.5)
WBC # FLD AUTO: 15.76 K/UL — HIGH (ref 3.8–10.5)
WBC # FLD AUTO: 16.69 K/UL — HIGH (ref 3.8–10.5)

## 2021-08-22 PROCEDURE — 99291 CRITICAL CARE FIRST HOUR: CPT

## 2021-08-22 PROCEDURE — 71045 X-RAY EXAM CHEST 1 VIEW: CPT | Mod: 26

## 2021-08-22 RX ORDER — FUROSEMIDE 40 MG
20 TABLET ORAL ONCE
Refills: 0 | Status: COMPLETED | OUTPATIENT
Start: 2021-08-22 | End: 2021-08-22

## 2021-08-22 RX ORDER — FUROSEMIDE 40 MG
20 TABLET ORAL
Refills: 0 | Status: DISCONTINUED | OUTPATIENT
Start: 2021-08-22 | End: 2021-08-26

## 2021-08-22 RX ORDER — ACETAMINOPHEN 500 MG
1000 TABLET ORAL ONCE
Refills: 0 | Status: COMPLETED | OUTPATIENT
Start: 2021-08-22 | End: 2021-08-22

## 2021-08-22 RX ORDER — LACTULOSE 10 G/15ML
10 SOLUTION ORAL ONCE
Refills: 0 | Status: COMPLETED | OUTPATIENT
Start: 2021-08-22 | End: 2021-08-22

## 2021-08-22 RX ORDER — METOPROLOL TARTRATE 50 MG
12.5 TABLET ORAL EVERY 6 HOURS
Refills: 0 | Status: DISCONTINUED | OUTPATIENT
Start: 2021-08-22 | End: 2021-08-24

## 2021-08-22 RX ORDER — POTASSIUM CHLORIDE 20 MEQ
40 PACKET (EA) ORAL ONCE
Refills: 0 | Status: COMPLETED | OUTPATIENT
Start: 2021-08-22 | End: 2021-08-22

## 2021-08-22 RX ADMIN — OXYCODONE AND ACETAMINOPHEN 1 TABLET(S): 5; 325 TABLET ORAL at 00:31

## 2021-08-22 RX ADMIN — BUDESONIDE AND FORMOTEROL FUMARATE DIHYDRATE 2 PUFF(S): 160; 4.5 AEROSOL RESPIRATORY (INHALATION) at 10:55

## 2021-08-22 RX ADMIN — OXYCODONE AND ACETAMINOPHEN 1 TABLET(S): 5; 325 TABLET ORAL at 06:31

## 2021-08-22 RX ADMIN — Medication 4 MILLIGRAM(S): at 17:10

## 2021-08-22 RX ADMIN — LEVALBUTEROL 0.63 MILLIGRAM(S): 1.25 SOLUTION, CONCENTRATE RESPIRATORY (INHALATION) at 23:05

## 2021-08-22 RX ADMIN — LIDOCAINE 1 PATCH: 4 CREAM TOPICAL at 11:57

## 2021-08-22 RX ADMIN — LEVALBUTEROL 0.63 MILLIGRAM(S): 1.25 SOLUTION, CONCENTRATE RESPIRATORY (INHALATION) at 12:27

## 2021-08-22 RX ADMIN — Medication 12.5 MILLIGRAM(S): at 11:56

## 2021-08-22 RX ADMIN — Medication 12.5 MILLIGRAM(S): at 19:33

## 2021-08-22 RX ADMIN — Medication 20 MILLIGRAM(S): at 10:47

## 2021-08-22 RX ADMIN — LEVALBUTEROL 0.63 MILLIGRAM(S): 1.25 SOLUTION, CONCENTRATE RESPIRATORY (INHALATION) at 19:33

## 2021-08-22 RX ADMIN — Medication 4 MILLIGRAM(S): at 06:46

## 2021-08-22 RX ADMIN — HEPARIN SODIUM 5000 UNIT(S): 5000 INJECTION INTRAVENOUS; SUBCUTANEOUS at 22:29

## 2021-08-22 RX ADMIN — Medication 4 MILLIGRAM(S): at 11:57

## 2021-08-22 RX ADMIN — CHLORHEXIDINE GLUCONATE 1 APPLICATION(S): 213 SOLUTION TOPICAL at 06:02

## 2021-08-22 RX ADMIN — LACTULOSE 10 GRAM(S): 10 SOLUTION ORAL at 10:47

## 2021-08-22 RX ADMIN — Medication 40 MILLIEQUIVALENT(S): at 10:47

## 2021-08-22 RX ADMIN — Medication 4 MILLIGRAM(S): at 17:08

## 2021-08-22 RX ADMIN — PANTOPRAZOLE SODIUM 40 MILLIGRAM(S): 20 TABLET, DELAYED RELEASE ORAL at 11:56

## 2021-08-22 RX ADMIN — LISINOPRIL 10 MILLIGRAM(S): 2.5 TABLET ORAL at 06:03

## 2021-08-22 RX ADMIN — LIDOCAINE 1 PATCH: 4 CREAM TOPICAL at 23:15

## 2021-08-22 RX ADMIN — OXYCODONE AND ACETAMINOPHEN 1 TABLET(S): 5; 325 TABLET ORAL at 19:32

## 2021-08-22 RX ADMIN — LIDOCAINE 1 PATCH: 4 CREAM TOPICAL at 00:00

## 2021-08-22 RX ADMIN — Medication 81 MILLIGRAM(S): at 11:56

## 2021-08-22 RX ADMIN — LIDOCAINE 1 PATCH: 4 CREAM TOPICAL at 19:39

## 2021-08-22 RX ADMIN — Medication 400 MILLIGRAM(S): at 12:00

## 2021-08-22 RX ADMIN — Medication 4 MILLIGRAM(S): at 12:27

## 2021-08-22 RX ADMIN — HEPARIN SODIUM 5000 UNIT(S): 5000 INJECTION INTRAVENOUS; SUBCUTANEOUS at 14:00

## 2021-08-22 RX ADMIN — LEVALBUTEROL 0.63 MILLIGRAM(S): 1.25 SOLUTION, CONCENTRATE RESPIRATORY (INHALATION) at 06:38

## 2021-08-22 RX ADMIN — BUDESONIDE AND FORMOTEROL FUMARATE DIHYDRATE 2 PUFF(S): 160; 4.5 AEROSOL RESPIRATORY (INHALATION) at 23:08

## 2021-08-22 RX ADMIN — HEPARIN SODIUM 5000 UNIT(S): 5000 INJECTION INTRAVENOUS; SUBCUTANEOUS at 06:03

## 2021-08-22 RX ADMIN — Medication 4 MILLIGRAM(S): at 17:11

## 2021-08-22 RX ADMIN — Medication 20 MILLIGRAM(S): at 19:33

## 2021-08-22 RX ADMIN — OXYCODONE AND ACETAMINOPHEN 1 TABLET(S): 5; 325 TABLET ORAL at 20:06

## 2021-08-22 RX ADMIN — Medication 25 MILLIGRAM(S): at 06:03

## 2021-08-22 RX ADMIN — AMIODARONE HYDROCHLORIDE 200 MILLIGRAM(S): 400 TABLET ORAL at 06:03

## 2021-08-22 NOTE — PROGRESS NOTE ADULT - SUBJECTIVE AND OBJECTIVE BOX
POD #15 s/p AVR, Ascending hemiarch, frozen elephant trunk  CABG X 2   EF nl     HPI 77yo with bicuspid AoV, severe AS/AI recent workup for HERRERA  Cath showed CAD  CTA showed enlarging mid aortic arch aneurysm     OR 8/9 for complex surgery   post op complicated by   1. hypoxia   2. tachy-marifer   3. dysphagia    PMH: Smoker (52Pk yrs), HTN, HLD    24 hours : doing well, no acute issues    ICU Vital Signs Last 24 Hrs  T(C): 36.3 (08-22-21 @ 14:00), Max: 36.5 (08-21-21 @ 21:25)  T(F): 97.3 (08-22-21 @ 14:00), Max: 97.7 (08-21-21 @ 21:25)  HR: 49 (08-22-21 @ 14:00) (49 - 74)  BP: 118/56 (08-22-21 @ 14:00) (111/54 - 163/72)  BP(mean): 81 (08-22-21 @ 14:00) (78 - 114)  RR: 17 (08-22-21 @ 14:00) (10 - 23)  SpO2: 100% (08-22-21 @ 14:00) (88% - 100%)      I&O's Detail    21 Aug 2021 07:01  -  22 Aug 2021 07:00  --------------------------------------------------------  IN:    Jevity 1.5: 737 mL    Oral Fluid: 160 mL  Total IN: 897 mL    OUT:    Drain (mL): 0 mL    Voided (mL): 950 mL  Total OUT: 950 mL    Total NET: -53 mL      MEDICATIONS  (STANDING):  aspirin  chewable 81 milliGRAM(s) Enteral Tube daily  budesonide  80 MICROgram(s)/formoterol 4.5 MICROgram(s) Inhaler 2 Puff(s) Inhalation BID  furosemide    Tablet 20 milliGRAM(s) Oral <User Schedule>  heparin   Injectable 5000 Unit(s) SubCutaneous every 8 hours  levalbuterol Inhalation 0.63 milliGRAM(s) Inhalation every 6 hours  lisinopril 10 milliGRAM(s) Oral daily  metoprolol tartrate 12.5 milliGRAM(s) Oral every 6 hours  pantoprazole  Injectable 40 milliGRAM(s) IV Push daily  senna 2 Tablet(s) Oral at bedtime    pt seen and examined

## 2021-08-22 NOTE — PROGRESS NOTE ADULT - ASSESSMENT
Neuro -- pain control  CVS - hemodynamic support, monitor for arrhythmia.    chest tube management  Pulm - vent weaning as tolerated   GI - GI proph   - monitor UOP  Endo - glycemic control  Heme - correct coagulapathy, monitor for bleeding  ID - periop antibiotics.       Critical post op.    Critical care time spent 50 min

## 2021-08-23 LAB
ANION GAP SERPL CALC-SCNC: 10 MMOL/L — SIGNIFICANT CHANGE UP (ref 5–17)
APTT BLD: 36.1 SEC — HIGH (ref 27.5–35.5)
BUN SERPL-MCNC: 34 MG/DL — HIGH (ref 7–23)
CALCIUM SERPL-MCNC: 9.3 MG/DL — SIGNIFICANT CHANGE UP (ref 8.4–10.5)
CHLORIDE SERPL-SCNC: 109 MMOL/L — HIGH (ref 96–108)
CO2 SERPL-SCNC: 27 MMOL/L — SIGNIFICANT CHANGE UP (ref 22–31)
CREAT SERPL-MCNC: 0.8 MG/DL — SIGNIFICANT CHANGE UP (ref 0.5–1.3)
GLUCOSE SERPL-MCNC: 116 MG/DL — HIGH (ref 70–99)
HCT VFR BLD CALC: 26.9 % — LOW (ref 34.5–45)
HGB BLD-MCNC: 8.3 G/DL — LOW (ref 11.5–15.5)
INR BLD: 1.18 — HIGH (ref 0.88–1.16)
MAGNESIUM SERPL-MCNC: 2.2 MG/DL — SIGNIFICANT CHANGE UP (ref 1.6–2.6)
MCHC RBC-ENTMCNC: 30.2 PG — SIGNIFICANT CHANGE UP (ref 27–34)
MCHC RBC-ENTMCNC: 30.9 GM/DL — LOW (ref 32–36)
MCV RBC AUTO: 97.8 FL — SIGNIFICANT CHANGE UP (ref 80–100)
NRBC # BLD: 0 /100 WBCS — SIGNIFICANT CHANGE UP (ref 0–0)
PLATELET # BLD AUTO: 198 K/UL — SIGNIFICANT CHANGE UP (ref 150–400)
POTASSIUM SERPL-MCNC: 4.2 MMOL/L — SIGNIFICANT CHANGE UP (ref 3.5–5.3)
POTASSIUM SERPL-SCNC: 4.2 MMOL/L — SIGNIFICANT CHANGE UP (ref 3.5–5.3)
PROTHROM AB SERPL-ACNC: 14.1 SEC — HIGH (ref 10.6–13.6)
RBC # BLD: 2.75 M/UL — LOW (ref 3.8–5.2)
RBC # FLD: 15.6 % — HIGH (ref 10.3–14.5)
SODIUM SERPL-SCNC: 146 MMOL/L — HIGH (ref 135–145)
WBC # BLD: 14.32 K/UL — HIGH (ref 3.8–10.5)
WBC # FLD AUTO: 14.32 K/UL — HIGH (ref 3.8–10.5)

## 2021-08-23 PROCEDURE — 99291 CRITICAL CARE FIRST HOUR: CPT

## 2021-08-23 PROCEDURE — 71045 X-RAY EXAM CHEST 1 VIEW: CPT | Mod: 26

## 2021-08-23 RX ORDER — ALBUMIN HUMAN 25 %
250 VIAL (ML) INTRAVENOUS ONCE
Refills: 0 | Status: COMPLETED | OUTPATIENT
Start: 2021-08-23 | End: 2021-08-23

## 2021-08-23 RX ADMIN — OXYCODONE AND ACETAMINOPHEN 1 TABLET(S): 5; 325 TABLET ORAL at 02:53

## 2021-08-23 RX ADMIN — BUDESONIDE AND FORMOTEROL FUMARATE DIHYDRATE 2 PUFF(S): 160; 4.5 AEROSOL RESPIRATORY (INHALATION) at 22:00

## 2021-08-23 RX ADMIN — HEPARIN SODIUM 5000 UNIT(S): 5000 INJECTION INTRAVENOUS; SUBCUTANEOUS at 06:28

## 2021-08-23 RX ADMIN — Medication 4 MILLIGRAM(S): at 18:40

## 2021-08-23 RX ADMIN — LEVALBUTEROL 0.63 MILLIGRAM(S): 1.25 SOLUTION, CONCENTRATE RESPIRATORY (INHALATION) at 23:41

## 2021-08-23 RX ADMIN — CHLORHEXIDINE GLUCONATE 1 APPLICATION(S): 213 SOLUTION TOPICAL at 06:28

## 2021-08-23 RX ADMIN — Medication 125 MILLILITER(S): at 10:22

## 2021-08-23 RX ADMIN — LIDOCAINE 1 PATCH: 4 CREAM TOPICAL at 18:40

## 2021-08-23 RX ADMIN — LEVALBUTEROL 0.63 MILLIGRAM(S): 1.25 SOLUTION, CONCENTRATE RESPIRATORY (INHALATION) at 11:33

## 2021-08-23 RX ADMIN — Medication 12.5 MILLIGRAM(S): at 18:54

## 2021-08-23 RX ADMIN — BUDESONIDE AND FORMOTEROL FUMARATE DIHYDRATE 2 PUFF(S): 160; 4.5 AEROSOL RESPIRATORY (INHALATION) at 09:00

## 2021-08-23 RX ADMIN — LISINOPRIL 10 MILLIGRAM(S): 2.5 TABLET ORAL at 06:28

## 2021-08-23 RX ADMIN — Medication 81 MILLIGRAM(S): at 11:32

## 2021-08-23 RX ADMIN — Medication 12.5 MILLIGRAM(S): at 11:32

## 2021-08-23 RX ADMIN — Medication 20 MILLIGRAM(S): at 18:54

## 2021-08-23 RX ADMIN — Medication 4 MILLIGRAM(S): at 10:20

## 2021-08-23 RX ADMIN — OXYCODONE AND ACETAMINOPHEN 1 TABLET(S): 5; 325 TABLET ORAL at 20:11

## 2021-08-23 RX ADMIN — HEPARIN SODIUM 5000 UNIT(S): 5000 INJECTION INTRAVENOUS; SUBCUTANEOUS at 22:20

## 2021-08-23 RX ADMIN — LIDOCAINE 1 PATCH: 4 CREAM TOPICAL at 23:35

## 2021-08-23 RX ADMIN — Medication 4 MILLIGRAM(S): at 11:32

## 2021-08-23 RX ADMIN — LIDOCAINE 1 PATCH: 4 CREAM TOPICAL at 11:33

## 2021-08-23 RX ADMIN — LEVALBUTEROL 0.63 MILLIGRAM(S): 1.25 SOLUTION, CONCENTRATE RESPIRATORY (INHALATION) at 06:30

## 2021-08-23 RX ADMIN — Medication 4 MILLIGRAM(S): at 10:19

## 2021-08-23 RX ADMIN — Medication 20 MILLIGRAM(S): at 06:27

## 2021-08-23 RX ADMIN — PANTOPRAZOLE SODIUM 40 MILLIGRAM(S): 20 TABLET, DELAYED RELEASE ORAL at 11:31

## 2021-08-23 RX ADMIN — Medication 1000 MILLIGRAM(S): at 10:19

## 2021-08-23 RX ADMIN — Medication 0.25 MILLIGRAM(S): at 22:30

## 2021-08-23 RX ADMIN — Medication 4 MILLIGRAM(S): at 11:28

## 2021-08-23 RX ADMIN — OXYCODONE AND ACETAMINOPHEN 1 TABLET(S): 5; 325 TABLET ORAL at 02:02

## 2021-08-23 RX ADMIN — OXYCODONE AND ACETAMINOPHEN 1 TABLET(S): 5; 325 TABLET ORAL at 18:55

## 2021-08-23 RX ADMIN — HEPARIN SODIUM 5000 UNIT(S): 5000 INJECTION INTRAVENOUS; SUBCUTANEOUS at 14:00

## 2021-08-23 NOTE — SWALLOW BEDSIDE ASSESSMENT ADULT - MODE OF PRESENTATION
spoon
1x spoon sip thin, 3oz indep cup sips thin, 2oz applesauce, 2 bites mech soft/cup/spoon/self fed

## 2021-08-23 NOTE — SWALLOW BEDSIDE ASSESSMENT ADULT - NS SPL SWALLOW CLINIC TRIAL FT
Pt coughed following ice chip x1 and thin via tsp x2, suggestive of aspiration. Pt endorsed odynophagia. Coughing resulted in expectoration of thick mucus x2; pt used oral suction to facilitate clearance. +SOB noted following each trial, with desaturation to high 80s. Pt became exhausted after a few trials; poor endurance for PO.
pt verbalized preference to start with puree d/t anxiety re resuming an oral diet.

## 2021-08-23 NOTE — SWALLOW BEDSIDE ASSESSMENT ADULT - SWALLOW EVAL: RECOMMENDED DIET
Puree / thin liquid Puree / thin liquid; can adv to [facility] soft solids in 1-2 days at Pt tolerates.

## 2021-08-23 NOTE — SWALLOW BEDSIDE ASSESSMENT ADULT - COMMENTS
Received awake & alert, sitting OOB to chair, +NGT in place. Pt taking ice-chips in moderation, voice appears mildly stronger vs last week & is intermittently wet-sounding but clears with a cued throat clear & reswallow. Language skills intact at the conversational level.
Pt receiving 50% O2 via face tent. Aggressive pulm toilet on-going.

## 2021-08-23 NOTE — SWALLOW BEDSIDE ASSESSMENT ADULT - SPECIFY REASON(S)
Physical Therapy Daily Treatment    Visit Count: 4  Plan of Care: 1/2/2019 Through: 2/27/2019  Insurance Information: [employer]  Precautions: none    SUBJECTIVE   Doing exercises/stretches. No issues   Current Pain (0-10 scale): 2   Functional Change: none yet unable to ge to deep knee bend    OBJECTIVE     Hamstring length 60 degrees  Treatment   Therapeutic Exercise:   Exercise Repetitions Sets Position/Cues   Quadriceps stretch 1     MURIEL and FADIR 1 2    Standing IT band 2     4 way hip red  15     Hamstring stretch 1           Comments: tolerated well     Ultrasound (63915):held  Location: lateral knee and distal quadriceps IT band  Position: supine  Duration: 8 minutes Duty Cycle: 100% duty cycle  Frequency: 1 Mhz  Intensity: 1.2 W/cm2   Results: no change in symptoms immediately following modality; no adverse reaction to treatment    Skilled input: general progression   Home Program:   As above     Writer verbally educated the patient and received verbal consent from the patient on hand placement, positioning of patient, and techniques to be performed today including cuing and how they are pertinent to the patient's plan of care.      Suggestions for next session as indicated: progress per plan of care, US as beneficial progress strength progress to weight bearing exercise    ASSESSMENT   Tolerated well given progressive bands to allow self progression   Requires cues to maintain erect posture    Pain after treatment (patient reported, 0-10 scale): 0-1  Result of above outlined education: Verbalizes understanding and Needs reinforcement    THERAPY DAILY BILLING   Insurance: MEDICARE 2. [facility]    Evaluation Procedures:  No evaluation codes were used on this date of service    Timed Procedures:   Therapeutic Exercise, 40 minutes    Untimed Procedures:  No untimed codes were used on this date of service    Total Treatment Time: 40 minutes  
Assess for safest, least restrictive PO diet
To assess swallow function and safety for oral diet

## 2021-08-23 NOTE — SWALLOW BEDSIDE ASSESSMENT ADULT - SWALLOW EVAL: DIAGNOSIS
Pt p/w significantly improved swallow function on clinical exam vs last week. She appears safe to resume an oral diet w strict aspiration precautions d/t persistent risk factors for dysphagia.
Pt presented with suspected pharyngeal dysphagia s/p cardiac surgery. Pt is at high risk for aspiration given clinical presentation, dysphonia, poor secretion management, and respiratory insufficiency; an oral diet is premature at this time. Will f/u to re-assess.

## 2021-08-23 NOTE — SWALLOW BEDSIDE ASSESSMENT ADULT - PHARYNGEAL PHASE
Hyolaryngeal excursion palpated during swallow trigger, appears brisk & timely. Across all trials, Pt had throat clear one time when taking cup sips of water after taking several bites of applesauce. No other signs of penetration, aspiration or other swallowing impairment were observed on this exam.

## 2021-08-23 NOTE — PROGRESS NOTE ADULT - SUBJECTIVE AND OBJECTIVE BOX
CTICU  CRITICAL  CARE  attending     Hand off received 					   Pertinent clinical, laboratory, radiographic, hemodynamic, echocardiographic, respiratory data, microbiologic data and chart were reviewed and analyzed frequently throughout the course of the day and night  Patient seen and examined with CTS/ SH attending at bedside  Pt is a 76y , Female, HEALTH ISSUES - PROBLEM Dx:      , FAMILY HISTORY:  FH: CAD (coronary artery disease) (Sibling)  CABG    Family history of CKD (chronic kidney disease) (Sibling)  ESRD    Family history of diabetes mellitus (DM) (Sibling)    PAST MEDICAL & SURGICAL HISTORY:  HTN (hypertension)    H/O aortic valve stenosis    CAD (coronary artery disease)    No significant past surgical history      Patient is a 76y old  Female who presents with a chief complaint of HERRERA (16 Aug 2021 11:00)      14 system review limited by mentation and multiorgan morbidity     Vital signs, hemodynamic and respiratory parameters were reviewed from the bedside nursing flowsheet.  ICU Vital Signs Last 24 Hrs  T(C): 36 (23 Aug 2021 09:00), Max: 37.3 (23 Aug 2021 01:16)  T(F): 96.8 (23 Aug 2021 09:00), Max: 99.1 (23 Aug 2021 01:16)  HR: 72 (23 Aug 2021 11:00) (49 - 75)  BP: 117/65 (23 Aug 2021 11:00) (111/54 - 145/61)  BP(mean): 77 (23 Aug 2021 11:00) (74 - 94)  ABP: --  ABP(mean): --  RR: 26 (23 Aug 2021 11:00) (11 - 47)  SpO2: 98% (23 Aug 2021 11:00) (93% - 100%)    Adult Advanced Hemodynamics Last 24 Hrs  CVP(mm Hg): --  CVP(cm H2O): --  CO: --  CI: --  PA: --  PA(mean): --  PCWP: --  SVR: --  SVRI: --  PVR: --  PVRI: --,     Intake and output was reviewed and the fluid balance was calculated  Daily     Daily   I&O's Summary    22 Aug 2021 07:01  -  23 Aug 2021 07:00  --------------------------------------------------------  IN: 740 mL / OUT: 550 mL / NET: 190 mL    23 Aug 2021 07:01  -  23 Aug 2021 11:45  --------------------------------------------------------  IN: 370 mL / OUT: 350 mL / NET: 20 mL        All lines and drain sites were assessed  Glycemic trend was reviewedCAPILLARY BLOOD GLUCOSE        No acute change in focality  Auscultation of the chest reveals equal bs  Abdomen is soft  Extremities are warm and well perfused  Wounds appear clean and unremarkable  Antibiotics are periop    labs  CBC Full  -  ( 23 Aug 2021 03:00 )  WBC Count : 14.32 K/uL  RBC Count : 2.75 M/uL  Hemoglobin : 8.3 g/dL  Hematocrit : 26.9 %  Platelet Count - Automated : 198 K/uL  Mean Cell Volume : 97.8 fl  Mean Cell Hemoglobin : 30.2 pg  Mean Cell Hemoglobin Concentration : 30.9 gm/dL  Auto Neutrophil # : x  Auto Lymphocyte # : x  Auto Monocyte # : x  Auto Eosinophil # : x  Auto Basophil # : x  Auto Neutrophil % : x  Auto Lymphocyte % : x  Auto Monocyte % : x  Auto Eosinophil % : x  Auto Basophil % : x    08-23    146<H>  |  109<H>  |  34<H>  ----------------------------<  116<H>  4.2   |  27  |  0.80    Ca    9.3      23 Aug 2021 03:00  Phos  2.5     08-22  Mg     2.2     08-23    TPro  6.7  /  Alb  3.7  /  TBili  1.7<H>  /  DBili  x   /  AST  23  /  ALT  39  /  AlkPhos  117  08-22    PT/INR - ( 23 Aug 2021 03:00 )   PT: 14.1 sec;   INR: 1.18          PTT - ( 23 Aug 2021 03:00 )  PTT:36.1 sec  The current medications were reviewed   MEDICATIONS  (STANDING):  aspirin  chewable 81 milliGRAM(s) Enteral Tube daily  budesonide  80 MICROgram(s)/formoterol 4.5 MICROgram(s) Inhaler 2 Puff(s) Inhalation two times a day  chlorhexidine 2% Cloths 1 Application(s) Topical <User Schedule>  dextrose 5%. 1000 milliLiter(s) (50 mL/Hr) IV Continuous <Continuous>  dextrose 5%. 1000 milliLiter(s) (100 mL/Hr) IV Continuous <Continuous>  furosemide    Tablet 20 milliGRAM(s) Oral <User Schedule>  glucagon  Injectable 1 milliGRAM(s) IntraMuscular once  heparin   Injectable 5000 Unit(s) SubCutaneous every 8 hours  levalbuterol Inhalation 0.63 milliGRAM(s) Inhalation every 6 hours  lidocaine   4% Patch 1 Patch Transdermal daily  lisinopril 10 milliGRAM(s) Oral daily  metoprolol tartrate 12.5 milliGRAM(s) Oral every 6 hours  pantoprazole  Injectable 40 milliGRAM(s) IV Push daily  senna 2 Tablet(s) Oral at bedtime  sodium chloride 0.9%. 1000 milliLiter(s) (10 mL/Hr) IV Continuous <Continuous>  testosterone patch 4 mG/24 Hr(s) 4 milliGRAM(s) Transdermal <User Schedule>  testosterone patch 4 mG/24 Hr(s) 4 milliGRAM(s) Transdermal daily    MEDICATIONS  (PRN):  ALPRAZolam 0.25 milliGRAM(s) Oral two times a day PRN anxiety  oxycodone    5 mG/acetaminophen 325 mG 1 Tablet(s) Oral/Enteral Tube every 6 hours PRN Moderate Pain (4 - 6)       PROBLEM LIST/ ASSESSMENT:  HEALTH ISSUES - PROBLEM Dx:      ,   Patient is a 76y old  Female who presents with a chief complaint of HERRERA (16 Aug 2021 11:00)     s/p cardiac surgery                My plan includes :  close hemodynamic, ventilatory and drain monitoring and management per post op routine    Monitor for arrhythmias and monitor parameters for organ perfusion  beta blockade not administered due to hemodynamic instability and bradycardia  monitor neurologic status  Head of the bed should remain elevated to 45 deg .   chest PT and IS will be encouraged  monitor adequacy of oxygenation and ventilation and attempt to wean oxygen  antibiotic regimen will be tailored to the clinical, laboratory and microbiologic data  Nutritional goals will be met using po eventually , ensure adequate caloric intake and montior the same  Stress ulcer and VTE prophylaxis will be achieved    Glycemic control is satisfactory  Electrolytes have been repleted as necessary and wound care has been carried out. Pain control has been achieved.   agressive physical therapy and early mobility and ambulation goals will be met   The family was updated about the course and plan  CRITICAL CARE TIME personally provided by me  in evaluation and management, reassessments, review and interpretation of labs and x-rays, ventilator and hemodynamic management, formulating a plan and coordinating care: ___90____ MIN.  Time does not include procedural time. Time spent was non routine post-operarive caRE and included multiple and repeated evaluations at the bedside  CTICU ATTENDING     					    Jesu Garcia MD

## 2021-08-23 NOTE — SWALLOW BEDSIDE ASSESSMENT ADULT - SLP PERTINENT HISTORY OF CURRENT PROBLEM
AVR, ascending hemiarch replacement, frozen elephant trunk, left aorto-sublavian bypass, CABG x2 on 8/9. Intubated 8/11-8/14.
77 yo W s/p AVR, CABG x2, h/o spinal stenosis, intub from 8/11-8/14, known to this [facility] for multiple clinical & instrumental swallow eval.

## 2021-08-24 LAB
ALBUMIN SERPL ELPH-MCNC: 3.6 G/DL — SIGNIFICANT CHANGE UP (ref 3.3–5)
ALP SERPL-CCNC: 106 U/L — SIGNIFICANT CHANGE UP (ref 40–120)
ALT FLD-CCNC: 28 U/L — SIGNIFICANT CHANGE UP (ref 10–45)
ANION GAP SERPL CALC-SCNC: 9 MMOL/L — SIGNIFICANT CHANGE UP (ref 5–17)
APTT BLD: 28.6 SEC — SIGNIFICANT CHANGE UP (ref 27.5–35.5)
AST SERPL-CCNC: 18 U/L — SIGNIFICANT CHANGE UP (ref 10–40)
BILIRUB SERPL-MCNC: 1.5 MG/DL — HIGH (ref 0.2–1.2)
BUN SERPL-MCNC: 26 MG/DL — HIGH (ref 7–23)
CALCIUM SERPL-MCNC: 8.9 MG/DL — SIGNIFICANT CHANGE UP (ref 8.4–10.5)
CHLORIDE SERPL-SCNC: 106 MMOL/L — SIGNIFICANT CHANGE UP (ref 96–108)
CO2 SERPL-SCNC: 29 MMOL/L — SIGNIFICANT CHANGE UP (ref 22–31)
CREAT SERPL-MCNC: 0.8 MG/DL — SIGNIFICANT CHANGE UP (ref 0.5–1.3)
GLUCOSE SERPL-MCNC: 112 MG/DL — HIGH (ref 70–99)
HCT VFR BLD CALC: 26.3 % — LOW (ref 34.5–45)
HGB BLD-MCNC: 8.1 G/DL — LOW (ref 11.5–15.5)
INR BLD: 1.15 — SIGNIFICANT CHANGE UP (ref 0.88–1.16)
MAGNESIUM SERPL-MCNC: 2.2 MG/DL — SIGNIFICANT CHANGE UP (ref 1.6–2.6)
MCHC RBC-ENTMCNC: 30.1 PG — SIGNIFICANT CHANGE UP (ref 27–34)
MCHC RBC-ENTMCNC: 30.8 GM/DL — LOW (ref 32–36)
MCV RBC AUTO: 97.8 FL — SIGNIFICANT CHANGE UP (ref 80–100)
NRBC # BLD: 0 /100 WBCS — SIGNIFICANT CHANGE UP (ref 0–0)
PHOSPHATE SERPL-MCNC: 2.8 MG/DL — SIGNIFICANT CHANGE UP (ref 2.5–4.5)
PLATELET # BLD AUTO: 181 K/UL — SIGNIFICANT CHANGE UP (ref 150–400)
POTASSIUM SERPL-MCNC: 4.1 MMOL/L — SIGNIFICANT CHANGE UP (ref 3.5–5.3)
POTASSIUM SERPL-SCNC: 4.1 MMOL/L — SIGNIFICANT CHANGE UP (ref 3.5–5.3)
PROT SERPL-MCNC: 6.1 G/DL — SIGNIFICANT CHANGE UP (ref 6–8.3)
PROTHROM AB SERPL-ACNC: 13.7 SEC — HIGH (ref 10.6–13.6)
RBC # BLD: 2.69 M/UL — LOW (ref 3.8–5.2)
RBC # FLD: 15.7 % — HIGH (ref 10.3–14.5)
SODIUM SERPL-SCNC: 144 MMOL/L — SIGNIFICANT CHANGE UP (ref 135–145)
WBC # BLD: 11.2 K/UL — HIGH (ref 3.8–10.5)
WBC # FLD AUTO: 11.2 K/UL — HIGH (ref 3.8–10.5)

## 2021-08-24 PROCEDURE — 71045 X-RAY EXAM CHEST 1 VIEW: CPT | Mod: 26

## 2021-08-24 RX ORDER — CEFAZOLIN SODIUM 1 G
1000 VIAL (EA) INJECTION EVERY 8 HOURS
Refills: 0 | Status: DISCONTINUED | OUTPATIENT
Start: 2021-08-24 | End: 2021-08-26

## 2021-08-24 RX ORDER — OXYCODONE AND ACETAMINOPHEN 5; 325 MG/1; MG/1
1 TABLET ORAL EVERY 6 HOURS
Refills: 0 | Status: DISCONTINUED | OUTPATIENT
Start: 2021-08-24 | End: 2021-08-30

## 2021-08-24 RX ORDER — METOPROLOL TARTRATE 50 MG
12.5 TABLET ORAL EVERY 12 HOURS
Refills: 0 | Status: DISCONTINUED | OUTPATIENT
Start: 2021-08-24 | End: 2021-08-24

## 2021-08-24 RX ORDER — SODIUM CHLORIDE 9 MG/ML
3 INJECTION INTRAMUSCULAR; INTRAVENOUS; SUBCUTANEOUS EVERY 8 HOURS
Refills: 0 | Status: DISCONTINUED | OUTPATIENT
Start: 2021-08-24 | End: 2021-09-03

## 2021-08-24 RX ADMIN — SODIUM CHLORIDE 3 MILLILITER(S): 9 INJECTION INTRAMUSCULAR; INTRAVENOUS; SUBCUTANEOUS at 13:32

## 2021-08-24 RX ADMIN — OXYCODONE AND ACETAMINOPHEN 1 TABLET(S): 5; 325 TABLET ORAL at 05:30

## 2021-08-24 RX ADMIN — Medication 20 MILLIGRAM(S): at 17:41

## 2021-08-24 RX ADMIN — SENNA PLUS 2 TABLET(S): 8.6 TABLET ORAL at 21:01

## 2021-08-24 RX ADMIN — Medication 12.5 MILLIGRAM(S): at 05:21

## 2021-08-24 RX ADMIN — Medication 4 MILLIGRAM(S): at 06:44

## 2021-08-24 RX ADMIN — OXYCODONE AND ACETAMINOPHEN 1 TABLET(S): 5; 325 TABLET ORAL at 13:39

## 2021-08-24 RX ADMIN — BUDESONIDE AND FORMOTEROL FUMARATE DIHYDRATE 2 PUFF(S): 160; 4.5 AEROSOL RESPIRATORY (INHALATION) at 09:07

## 2021-08-24 RX ADMIN — HEPARIN SODIUM 5000 UNIT(S): 5000 INJECTION INTRAVENOUS; SUBCUTANEOUS at 13:38

## 2021-08-24 RX ADMIN — Medication 12.5 MILLIGRAM(S): at 00:12

## 2021-08-24 RX ADMIN — OXYCODONE AND ACETAMINOPHEN 1 TABLET(S): 5; 325 TABLET ORAL at 04:29

## 2021-08-24 RX ADMIN — LIDOCAINE 1 PATCH: 4 CREAM TOPICAL at 11:36

## 2021-08-24 RX ADMIN — PANTOPRAZOLE SODIUM 40 MILLIGRAM(S): 20 TABLET, DELAYED RELEASE ORAL at 11:36

## 2021-08-24 RX ADMIN — HEPARIN SODIUM 5000 UNIT(S): 5000 INJECTION INTRAVENOUS; SUBCUTANEOUS at 05:22

## 2021-08-24 RX ADMIN — HEPARIN SODIUM 5000 UNIT(S): 5000 INJECTION INTRAVENOUS; SUBCUTANEOUS at 21:01

## 2021-08-24 RX ADMIN — CHLORHEXIDINE GLUCONATE 1 APPLICATION(S): 213 SOLUTION TOPICAL at 06:44

## 2021-08-24 RX ADMIN — BUDESONIDE AND FORMOTEROL FUMARATE DIHYDRATE 2 PUFF(S): 160; 4.5 AEROSOL RESPIRATORY (INHALATION) at 21:02

## 2021-08-24 RX ADMIN — LIDOCAINE 1 PATCH: 4 CREAM TOPICAL at 23:29

## 2021-08-24 RX ADMIN — LEVALBUTEROL 0.63 MILLIGRAM(S): 1.25 SOLUTION, CONCENTRATE RESPIRATORY (INHALATION) at 23:43

## 2021-08-24 RX ADMIN — Medication 100 MILLIGRAM(S): at 23:44

## 2021-08-24 RX ADMIN — Medication 4 MILLIGRAM(S): at 10:43

## 2021-08-24 RX ADMIN — OXYCODONE AND ACETAMINOPHEN 1 TABLET(S): 5; 325 TABLET ORAL at 21:01

## 2021-08-24 RX ADMIN — LIDOCAINE 1 PATCH: 4 CREAM TOPICAL at 18:59

## 2021-08-24 RX ADMIN — LEVALBUTEROL 0.63 MILLIGRAM(S): 1.25 SOLUTION, CONCENTRATE RESPIRATORY (INHALATION) at 17:41

## 2021-08-24 RX ADMIN — LEVALBUTEROL 0.63 MILLIGRAM(S): 1.25 SOLUTION, CONCENTRATE RESPIRATORY (INHALATION) at 06:23

## 2021-08-24 RX ADMIN — Medication 81 MILLIGRAM(S): at 11:35

## 2021-08-24 RX ADMIN — SODIUM CHLORIDE 3 MILLILITER(S): 9 INJECTION INTRAMUSCULAR; INTRAVENOUS; SUBCUTANEOUS at 21:06

## 2021-08-24 RX ADMIN — Medication 4 MILLIGRAM(S): at 18:59

## 2021-08-24 RX ADMIN — LEVALBUTEROL 0.63 MILLIGRAM(S): 1.25 SOLUTION, CONCENTRATE RESPIRATORY (INHALATION) at 11:35

## 2021-08-24 RX ADMIN — LISINOPRIL 10 MILLIGRAM(S): 2.5 TABLET ORAL at 05:22

## 2021-08-24 RX ADMIN — OXYCODONE AND ACETAMINOPHEN 1 TABLET(S): 5; 325 TABLET ORAL at 22:47

## 2021-08-24 RX ADMIN — Medication 4 MILLIGRAM(S): at 11:38

## 2021-08-24 RX ADMIN — Medication 20 MILLIGRAM(S): at 05:22

## 2021-08-24 RX ADMIN — OXYCODONE AND ACETAMINOPHEN 1 TABLET(S): 5; 325 TABLET ORAL at 14:15

## 2021-08-25 LAB
ANION GAP SERPL CALC-SCNC: 10 MMOL/L — SIGNIFICANT CHANGE UP (ref 5–17)
BASOPHILS # BLD AUTO: 0.05 K/UL — SIGNIFICANT CHANGE UP (ref 0–0.2)
BASOPHILS NFR BLD AUTO: 0.5 % — SIGNIFICANT CHANGE UP (ref 0–2)
BUN SERPL-MCNC: 26 MG/DL — HIGH (ref 7–23)
CALCIUM SERPL-MCNC: 9 MG/DL — SIGNIFICANT CHANGE UP (ref 8.4–10.5)
CHLORIDE SERPL-SCNC: 104 MMOL/L — SIGNIFICANT CHANGE UP (ref 96–108)
CO2 SERPL-SCNC: 25 MMOL/L — SIGNIFICANT CHANGE UP (ref 22–31)
CREAT SERPL-MCNC: 0.87 MG/DL — SIGNIFICANT CHANGE UP (ref 0.5–1.3)
EOSINOPHIL # BLD AUTO: 0.16 K/UL — SIGNIFICANT CHANGE UP (ref 0–0.5)
EOSINOPHIL NFR BLD AUTO: 1.5 % — SIGNIFICANT CHANGE UP (ref 0–6)
GLUCOSE SERPL-MCNC: 100 MG/DL — HIGH (ref 70–99)
HCT VFR BLD CALC: 26.7 % — LOW (ref 34.5–45)
HGB BLD-MCNC: 8.1 G/DL — LOW (ref 11.5–15.5)
IMM GRANULOCYTES NFR BLD AUTO: 1.7 % — HIGH (ref 0–1.5)
LYMPHOCYTES # BLD AUTO: 0.92 K/UL — LOW (ref 1–3.3)
LYMPHOCYTES # BLD AUTO: 8.5 % — LOW (ref 13–44)
MAGNESIUM SERPL-MCNC: 1.8 MG/DL — SIGNIFICANT CHANGE UP (ref 1.6–2.6)
MCHC RBC-ENTMCNC: 30.1 PG — SIGNIFICANT CHANGE UP (ref 27–34)
MCHC RBC-ENTMCNC: 30.3 GM/DL — LOW (ref 32–36)
MCV RBC AUTO: 99.3 FL — SIGNIFICANT CHANGE UP (ref 80–100)
MONOCYTES # BLD AUTO: 0.68 K/UL — SIGNIFICANT CHANGE UP (ref 0–0.9)
MONOCYTES NFR BLD AUTO: 6.3 % — SIGNIFICANT CHANGE UP (ref 2–14)
NEUTROPHILS # BLD AUTO: 8.86 K/UL — HIGH (ref 1.8–7.4)
NEUTROPHILS NFR BLD AUTO: 81.5 % — HIGH (ref 43–77)
NRBC # BLD: 0 /100 WBCS — SIGNIFICANT CHANGE UP (ref 0–0)
PHOSPHATE SERPL-MCNC: 2.8 MG/DL — SIGNIFICANT CHANGE UP (ref 2.5–4.5)
PLATELET # BLD AUTO: 162 K/UL — SIGNIFICANT CHANGE UP (ref 150–400)
POTASSIUM SERPL-MCNC: 4 MMOL/L — SIGNIFICANT CHANGE UP (ref 3.5–5.3)
POTASSIUM SERPL-SCNC: 4 MMOL/L — SIGNIFICANT CHANGE UP (ref 3.5–5.3)
RBC # BLD: 2.69 M/UL — LOW (ref 3.8–5.2)
RBC # FLD: 15.8 % — HIGH (ref 10.3–14.5)
SODIUM SERPL-SCNC: 139 MMOL/L — SIGNIFICANT CHANGE UP (ref 135–145)
SURGICAL PATHOLOGY STUDY: SIGNIFICANT CHANGE UP
WBC # BLD: 10.85 K/UL — HIGH (ref 3.8–10.5)
WBC # FLD AUTO: 10.85 K/UL — HIGH (ref 3.8–10.5)

## 2021-08-25 PROCEDURE — 93308 TTE F-UP OR LMTD: CPT | Mod: 26

## 2021-08-25 PROCEDURE — 71045 X-RAY EXAM CHEST 1 VIEW: CPT | Mod: 26

## 2021-08-25 PROCEDURE — 76604 US EXAM CHEST: CPT | Mod: 26

## 2021-08-25 PROCEDURE — 99233 SBSQ HOSP IP/OBS HIGH 50: CPT | Mod: 25

## 2021-08-25 RX ORDER — AMIODARONE HYDROCHLORIDE 400 MG/1
TABLET ORAL
Refills: 0 | Status: DISCONTINUED | OUTPATIENT
Start: 2021-08-25 | End: 2021-08-28

## 2021-08-25 RX ORDER — AMIODARONE HYDROCHLORIDE 400 MG/1
400 TABLET ORAL ONCE
Refills: 0 | Status: COMPLETED | OUTPATIENT
Start: 2021-08-25 | End: 2021-08-25

## 2021-08-25 RX ORDER — MAGNESIUM OXIDE 400 MG ORAL TABLET 241.3 MG
400 TABLET ORAL ONCE
Refills: 0 | Status: COMPLETED | OUTPATIENT
Start: 2021-08-25 | End: 2021-08-25

## 2021-08-25 RX ORDER — ALBUMIN HUMAN 25 %
50 VIAL (ML) INTRAVENOUS
Refills: 0 | Status: COMPLETED | OUTPATIENT
Start: 2021-08-25 | End: 2021-08-25

## 2021-08-25 RX ORDER — POTASSIUM CHLORIDE 20 MEQ
20 PACKET (EA) ORAL ONCE
Refills: 0 | Status: COMPLETED | OUTPATIENT
Start: 2021-08-25 | End: 2021-08-25

## 2021-08-25 RX ORDER — AMIODARONE HYDROCHLORIDE 400 MG/1
400 TABLET ORAL EVERY 8 HOURS
Refills: 0 | Status: DISCONTINUED | OUTPATIENT
Start: 2021-08-25 | End: 2021-08-28

## 2021-08-25 RX ORDER — METOPROLOL TARTRATE 50 MG
5 TABLET ORAL ONCE
Refills: 0 | Status: DISCONTINUED | OUTPATIENT
Start: 2021-08-25 | End: 2021-08-25

## 2021-08-25 RX ORDER — FUROSEMIDE 40 MG
20 TABLET ORAL ONCE
Refills: 0 | Status: COMPLETED | OUTPATIENT
Start: 2021-08-25 | End: 2021-08-25

## 2021-08-25 RX ORDER — METOPROLOL TARTRATE 50 MG
5 TABLET ORAL ONCE
Refills: 0 | Status: COMPLETED | OUTPATIENT
Start: 2021-08-25 | End: 2021-08-25

## 2021-08-25 RX ORDER — MAGNESIUM OXIDE 400 MG ORAL TABLET 241.3 MG
800 TABLET ORAL ONCE
Refills: 0 | Status: COMPLETED | OUTPATIENT
Start: 2021-08-25 | End: 2021-08-25

## 2021-08-25 RX ORDER — ALBUMIN HUMAN 25 %
250 VIAL (ML) INTRAVENOUS ONCE
Refills: 0 | Status: COMPLETED | OUTPATIENT
Start: 2021-08-25 | End: 2021-08-25

## 2021-08-25 RX ADMIN — LEVALBUTEROL 0.63 MILLIGRAM(S): 1.25 SOLUTION, CONCENTRATE RESPIRATORY (INHALATION) at 06:24

## 2021-08-25 RX ADMIN — MAGNESIUM OXIDE 400 MG ORAL TABLET 400 MILLIGRAM(S): 241.3 TABLET ORAL at 17:07

## 2021-08-25 RX ADMIN — OXYCODONE AND ACETAMINOPHEN 1 TABLET(S): 5; 325 TABLET ORAL at 06:37

## 2021-08-25 RX ADMIN — LEVALBUTEROL 0.63 MILLIGRAM(S): 1.25 SOLUTION, CONCENTRATE RESPIRATORY (INHALATION) at 17:03

## 2021-08-25 RX ADMIN — Medication 100 MILLIGRAM(S): at 06:52

## 2021-08-25 RX ADMIN — Medication 100 MILLIGRAM(S): at 15:01

## 2021-08-25 RX ADMIN — Medication 20 MILLIGRAM(S): at 06:25

## 2021-08-25 RX ADMIN — AMIODARONE HYDROCHLORIDE 400 MILLIGRAM(S): 400 TABLET ORAL at 21:30

## 2021-08-25 RX ADMIN — Medication 100 MILLIGRAM(S): at 21:29

## 2021-08-25 RX ADMIN — Medication 81 MILLIGRAM(S): at 11:46

## 2021-08-25 RX ADMIN — Medication 4 MILLIGRAM(S): at 19:35

## 2021-08-25 RX ADMIN — Medication 4 MILLIGRAM(S): at 07:14

## 2021-08-25 RX ADMIN — LIDOCAINE 1 PATCH: 4 CREAM TOPICAL at 22:56

## 2021-08-25 RX ADMIN — HEPARIN SODIUM 5000 UNIT(S): 5000 INJECTION INTRAVENOUS; SUBCUTANEOUS at 15:01

## 2021-08-25 RX ADMIN — OXYCODONE AND ACETAMINOPHEN 1 TABLET(S): 5; 325 TABLET ORAL at 08:04

## 2021-08-25 RX ADMIN — Medication 4 MILLIGRAM(S): at 11:57

## 2021-08-25 RX ADMIN — LIDOCAINE 1 PATCH: 4 CREAM TOPICAL at 19:34

## 2021-08-25 RX ADMIN — Medication 5 MILLIGRAM(S): at 14:16

## 2021-08-25 RX ADMIN — SODIUM CHLORIDE 3 MILLILITER(S): 9 INJECTION INTRAMUSCULAR; INTRAVENOUS; SUBCUTANEOUS at 07:10

## 2021-08-25 RX ADMIN — BUDESONIDE AND FORMOTEROL FUMARATE DIHYDRATE 2 PUFF(S): 160; 4.5 AEROSOL RESPIRATORY (INHALATION) at 22:57

## 2021-08-25 RX ADMIN — LIDOCAINE 1 PATCH: 4 CREAM TOPICAL at 11:47

## 2021-08-25 RX ADMIN — HEPARIN SODIUM 5000 UNIT(S): 5000 INJECTION INTRAVENOUS; SUBCUTANEOUS at 06:25

## 2021-08-25 RX ADMIN — Medication 20 MILLIGRAM(S): at 17:02

## 2021-08-25 RX ADMIN — LEVALBUTEROL 0.63 MILLIGRAM(S): 1.25 SOLUTION, CONCENTRATE RESPIRATORY (INHALATION) at 16:02

## 2021-08-25 RX ADMIN — PANTOPRAZOLE SODIUM 40 MILLIGRAM(S): 20 TABLET, DELAYED RELEASE ORAL at 11:46

## 2021-08-25 RX ADMIN — HEPARIN SODIUM 5000 UNIT(S): 5000 INJECTION INTRAVENOUS; SUBCUTANEOUS at 21:30

## 2021-08-25 RX ADMIN — SODIUM CHLORIDE 3 MILLILITER(S): 9 INJECTION INTRAMUSCULAR; INTRAVENOUS; SUBCUTANEOUS at 14:31

## 2021-08-25 RX ADMIN — OXYCODONE AND ACETAMINOPHEN 1 TABLET(S): 5; 325 TABLET ORAL at 21:30

## 2021-08-25 RX ADMIN — OXYCODONE AND ACETAMINOPHEN 1 TABLET(S): 5; 325 TABLET ORAL at 22:00

## 2021-08-25 RX ADMIN — AMIODARONE HYDROCHLORIDE 400 MILLIGRAM(S): 400 TABLET ORAL at 17:00

## 2021-08-25 RX ADMIN — Medication 4 MILLIGRAM(S): at 07:13

## 2021-08-25 RX ADMIN — Medication 20 MILLIEQUIVALENT(S): at 17:07

## 2021-08-25 RX ADMIN — LISINOPRIL 10 MILLIGRAM(S): 2.5 TABLET ORAL at 06:25

## 2021-08-25 RX ADMIN — Medication 20 MILLIGRAM(S): at 11:46

## 2021-08-25 RX ADMIN — Medication 125 MILLILITER(S): at 16:24

## 2021-08-25 RX ADMIN — SODIUM CHLORIDE 3 MILLILITER(S): 9 INJECTION INTRAMUSCULAR; INTRAVENOUS; SUBCUTANEOUS at 22:22

## 2021-08-25 RX ADMIN — BUDESONIDE AND FORMOTEROL FUMARATE DIHYDRATE 2 PUFF(S): 160; 4.5 AEROSOL RESPIRATORY (INHALATION) at 11:39

## 2021-08-25 RX ADMIN — MAGNESIUM OXIDE 400 MG ORAL TABLET 800 MILLIGRAM(S): 241.3 TABLET ORAL at 08:01

## 2021-08-25 NOTE — PROGRESS NOTE ADULT - SUBJECTIVE AND OBJECTIVE BOX
Patient discussed on morning rounds with Dr. Muller     Operation / Date: 8/9/21 -- AVR, ascending, hemiarch replacement, frozen elephant trunk, left aorto-subclavian bypass, CABGx2 (SVG-RPDA, and SVG-LCx)     SUBJECTIVE ASSESSMENT:  Pt is feeling well this morning. States he breathing feels better but not completely back to baseline. She thinks her breathing issues are related to how she is sitting in the chair. Admits she has not been ambulating frequently or using her IS enough and agrees to use it more. Denies any CP, palpitations, SOB, wheezing, abd pain, n/v/d/c, fevers or chills.    Vital Signs Last 24 Hrs  T(C): 35.9 (25 Aug 2021 09:16), Max: 37.1 (24 Aug 2021 14:00)  T(F): 96.7 (25 Aug 2021 09:16), Max: 98.7 (24 Aug 2021 14:00)  HR: 64 (25 Aug 2021 13:00) (51 - 71)  BP: 91/49 (25 Aug 2021 13:00) (91/49 - 135/61)  BP(mean): 67 (25 Aug 2021 13:00) (67 - 97)  RR: 17 (25 Aug 2021 13:00) (11 - 27)  SpO2: 95% (25 Aug 2021 13:00) (91% - 98%)  I&O's Detail    24 Aug 2021 07:01  -  25 Aug 2021 07:00  --------------------------------------------------------  IN:    IV PiggyBack: 100 mL    Oral Fluid: 460 mL  Total IN: 560 mL    OUT:    Voided (mL): 900 mL  Total OUT: 900 mL    Total NET: -340 mL      25 Aug 2021 07:01  -  25 Aug 2021 13:29  --------------------------------------------------------  IN:    Oral Fluid: 240 mL  Total IN: 240 mL    OUT:    Voided (mL): 350 mL  Total OUT: 350 mL    Total NET: -110 mL    CHEST TUBE:  no  FRANCHESKA DRAIN:  no  EPICARDIAL WIRES: yes  TIE DOWNS: yes  REGALADO: no    PHYSICAL EXAM:  General: well appearing sitting in chair in NAD   Neurological: AOx3. Motor skills grossly intact  Cardiovascular: Normal S1/S2. Regular rate/rhythm. No murmurs  Respiratory: Decreased lung sounds b/l in lungs, no wheezing   Gastrointestinal: +BS in all 4 quadrants. Non-distended. Soft. Non-tender  Extremities: Strength 5/5 b/l upper/lower extremities. Sensation grossly intact upper/lower extremities. 3+LE edema B/L. No calf tenderness.  Vascular: Radial 2+bilaterally, DP 2+ b/l  Incision Sites: sternotomy with mild discharge from center of MSI. No ecchymosis. Mild erythema.     LABS:                        8.1    10.85 )-----------( 162      ( 25 Aug 2021 06:35 )             26.7       COUMADIN:  no    PT/INR - ( 24 Aug 2021 04:28 )   PT: 13.7 sec;   INR: 1.15     PTT - ( 24 Aug 2021 04:28 )  PTT:28.6 sec    08-25    139  |  104  |  26<H>  ----------------------------<  100<H>  4.0   |  25  |  0.87    Ca    9.0      25 Aug 2021 06:34  Phos  2.8     08-25  Mg     1.8     08-25    TPro  6.1  /  Alb  3.6  /  TBili  1.5<H>  /  DBili  x   /  AST  18  /  ALT  28  /  AlkPhos  106  08-24    MEDICATIONS  (STANDING):  aspirin  chewable 81 milliGRAM(s) Enteral Tube daily  budesonide  80 MICROgram(s)/formoterol 4.5 MICROgram(s) Inhaler 2 Puff(s) Inhalation two times a day  ceFAZolin   IVPB 1000 milliGRAM(s) IV Intermittent every 8 hours  dextrose 5%. 1000 milliLiter(s) (50 mL/Hr) IV Continuous <Continuous>  dextrose 5%. 1000 milliLiter(s) (100 mL/Hr) IV Continuous <Continuous>  furosemide    Tablet 20 milliGRAM(s) Oral <User Schedule>  glucagon  Injectable 1 milliGRAM(s) IntraMuscular once  heparin   Injectable 5000 Unit(s) SubCutaneous every 8 hours  levalbuterol Inhalation 0.63 milliGRAM(s) Inhalation every 6 hours  lidocaine   4% Patch 1 Patch Transdermal daily  lisinopril 10 milliGRAM(s) Oral daily  pantoprazole  Injectable 40 milliGRAM(s) IV Push daily  senna 2 Tablet(s) Oral at bedtime  sodium chloride 0.9% lock flush 3 milliLiter(s) IV Push every 8 hours  testosterone patch 4 mG/24 Hr(s) 4 milliGRAM(s) Transdermal daily  testosterone patch 4 mG/24 Hr(s) 4 milliGRAM(s) Transdermal <User Schedule>    MEDICATIONS  (PRN):  ALPRAZolam 0.25 milliGRAM(s) Oral two times a day PRN anxiety  oxycodone    5 mG/acetaminophen 325 mG 1 Tablet(s) Oral/Enteral Tube every 6 hours PRN Moderate Pain (4 - 6)    RADIOLOGY & ADDITIONAL TESTS:  < from: Xray Chest 1 View- PORTABLE-Routine (Xray Chest 1 View- PORTABLE-Routine in AM.) (08.25.21 @ 05:55) >  Findings/  impression: Left suprahilaropacity, increased. Stable heart size, status post median sternotomy, aortic stent, aortic valve replacement. Bilateral opacities/right pleural effusion and bony structures are stable. Left axillary surgical clips  < end of copied text >

## 2021-08-25 NOTE — PROGRESS NOTE ADULT - ASSESSMENT
77 y/o female, current every day smoker (59 PY), with PMHx HTN, bicuspid aortic valve, spinal stenosis, arthritis who complained of increased HERRERA for 6 months. TTE 3/2021 revealed EF normal, severe AS, moderate AI. NST 4/2021 revealed transient ischemic dilatation. Cardiac cath 5/11/21 revealed severe AS, prox circumflex 90%, OM1 70%, mid RCA 80%. 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 She was referred to Dr. Muller for surgical evaluation and deemed a good surgical candidate. Admitted for surgical planning and on 8/9/21 pt underwent AVR (tissue), Ascending, hemiarch replacement, frozen elephant trunk, left aorto-subclavian bypass, CABG x 2. OR course significant for 7 U pRBC/2 plt/2 FFP/1000 FEIBA; 3 L IVF. Arrived to CTICU on levo and initial LA was 4.1. POD1-2 extubated. POD3 AF with hypotension. Continued on pressors and lasix gtt. DCCV POD4  75 y/o female, current every day smoker (59 PY), with PMHx HTN, bicuspid aortic valve, spinal stenosis, arthritis who complained of increased HERRERA for 6 months. TTE 3/2021 revealed EF normal, severe AS, moderate AI. NST 4/2021 revealed transient ischemic dilatation. Cardiac cath 5/11/21 revealed severe AS, prox circumflex 90%, OM1 70%, mid RCA 80%. 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 She was referred to Dr. Muller for surgical evaluation and deemed a good surgical candidate. Admitted for surgical planning and on 8/9/21 pt underwent AVR (tissue), Ascending, hemiarch replacement, frozen elephant trunk, left aorto-subclavian bypass, CABG x 2. OR course significant for 7 U pRBC/2 plt/2 FFP/1000 FEIBA; 3 L IVF. Arrived to CTICU on levo and initial LA was 4.1. POD1-2 extubated. POD3 AF with hypotension. Continued on pressors and lasix gtt, DCCV. POD4 AF, FIDEL. POD5 primacor was weaned off, Cr improved, extubated. POD7 ECHO with moderate pericardial effusion, s/p IR drainage. POD8 failed S/S. POD11 diuresed. POD13 sinus bradycardia, stable. POD15 transferred to 9L. POD 16 rapid AF with hypotension requiring pushes of linda and amio bolus. IV Lopressor  given and bradycardia at a rate of 45, V-paced. EP reconsulted, planning for PPM tomorrow.     Plan:    Neurovascular:   -Pain well controlled with current regimen. PRN's: oxycodone/acetaminophen  -Anxiety: Xanax as needed     Cardiovascular:   -POD16 AVR, ascending/hemiarch replacement, frozen elephant trunk, left aorto-subclavian bypass, CABGx2 (SVG-RPDA, SVG-LCx)    -post-op AF: Amio PO, episodes of hypotension with associated AF with RVR.     -continue ASA    -tachy-bradycardia syndrome, planning for PPM tomorrow     -Vwires backup at 45bpm   -HTN: lisinopril 10mg daily   -Hemodynamically stable.   -Monitor: BP, HR, tele    Respiratory:   -Oxygenating well on room air  -Encourage continued use of IS 10x/hr and frequent ambulation  -CXR: no acute pathology, no acute pathology     GI:  -GI PPX: pantoprazole 40mg daily   -PO Diet: Dysphagia 2 mechanical soft-thin liquids, NPO after midnight   -Bowel Regimen: senna     Renal / :  -continue with lasix 20mg BID   -Additional 20mg IV lasix given today   -Continue to monitor renal function: BUN/Cr 26/0.87  -Monitor I/O's daily     Endocrine:    -No hx of DM or thyroid dx  -A1c: 5.6   -TSH: 2.6     Hematologic:  -CBC: H/H- 8.1/26  -Coagulation Panel: WNL     ID:  -MSI with drainage, continue with Ancef for empiric coverage   -Temperature: afebrile   -CBC: WBC- 10.8   -Continue to observe for SIRS/Sepsis Syndrome.    Prophylaxis:  -DVT prophylaxis with 5000 SubQ Heparin q8h.  -Continue with SCD's b/l while patient is at rest     Disposition:  -Discharge home once patient is medically ready   75 y/o female, current every day smoker (59 PY), with PMHx HTN, bicuspid aortic valve, spinal stenosis, arthritis who complained of increased HERRERA for 6 months. TTE 3/2021 revealed EF normal, severe AS, moderate AI. NST 4/2021 revealed transient ischemic dilatation. Cardiac cath 5/11/21 revealed severe AS, prox circumflex 90%, OM1 70%, mid RCA 80%. 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 She was referred to Dr. Muller for surgical evaluation and deemed a good surgical candidate. Admitted for surgical planning and on 8/9/21 pt underwent AVR (tissue), Ascending, hemiarch replacement, frozen elephant trunk, left aorto-subclavian bypass, CABG x 2. OR course significant for 7 U pRBC/2 plt/2 FFP/1000 FEIBA; 3 L IVF. Arrived to CTICU on levo and initial LA was 4.1. POD1-2 extubated. POD3 AF with hypotension. Continued on pressors and lasix gtt, DCCV. POD4 AF, FIDEL. POD5 primacor was weaned off, Cr improved, extubated. POD7 ECHO with moderate pericardial effusion, s/p IR drainage. POD8 failed S/S. POD11 diuresed. POD13 sinus bradycardia, stable. POD15 transferred to 9L. POD 16 rapid AF with hypotension requiring pushes of linda and amio bolus. IV Lopressor  given and bradycardia at a rate of 45, V-paced. EP reconsulted, planning for PPM tomorrow.     Plan:  Neurovascular:   -Pain well controlled with current regimen. PRN's: oxycodone/acetaminophen  -Anxiety: Xanax as needed     Cardiovascular:   -POD16 AVR, ascending/hemiarch replacement, frozen elephant trunk, left aorto-subclavian bypass, CABGx2 (SVG-RPDA, SVG-LCx)    -post-op AF: Amio PO, episodes of hypotension with associated AF with RVR.     -continue ASA    -tachy-bradycardia syndrome, planning for PPM tomorrow     -Vwires backup at 45bpm   -HTN: lisinopril 10mg daily   -Hemodynamically stable.   -Monitor: BP, HR, tele    Respiratory:   -Oxygenating 97% on 2lpm  -Encourage continued use of IS 10x/hr and frequent ambulation  -CXR: no acute pathology, no acute pathology     GI:  -GI PPX: pantoprazole 40mg daily   -PO Diet: Dysphagia 2 mechanical soft-thin liquids, NPO after midnight   -Bowel Regimen: senna     Renal / :  -continue with lasix 20mg BID   -Additional 20mg IV lasix given today   -Continue to monitor renal function: BUN/Cr 26/0.87  -Monitor I/O's daily     Endocrine:    -No hx of DM or thyroid dx  -A1c: 5.6   -TSH: 2.6     Hematologic:  -CBC: H/H- 8.1/26  -Coagulation Panel: WNL     ID:  -MSI with drainage, continue with Ancef for empiric coverage   -Temperature: afebrile   -CBC: WBC- 10.8   -Continue to observe for SIRS/Sepsis Syndrome.    Prophylaxis:  -DVT prophylaxis with 5000 SubQ Heparin q8h.  -Continue with SCD's b/l while patient is at rest     Disposition:  -Discharge home once patient is medically ready

## 2021-08-26 LAB
ALBUMIN SERPL ELPH-MCNC: 3.7 G/DL — SIGNIFICANT CHANGE UP (ref 3.3–5)
ALBUMIN SERPL ELPH-MCNC: 3.8 G/DL — SIGNIFICANT CHANGE UP (ref 3.3–5)
ALP SERPL-CCNC: 105 U/L — SIGNIFICANT CHANGE UP (ref 40–120)
ALP SERPL-CCNC: 118 U/L — SIGNIFICANT CHANGE UP (ref 40–120)
ALT FLD-CCNC: 17 U/L — SIGNIFICANT CHANGE UP (ref 10–45)
ALT FLD-CCNC: 17 U/L — SIGNIFICANT CHANGE UP (ref 10–45)
ANION GAP SERPL CALC-SCNC: 11 MMOL/L — SIGNIFICANT CHANGE UP (ref 5–17)
ANION GAP SERPL CALC-SCNC: 11 MMOL/L — SIGNIFICANT CHANGE UP (ref 5–17)
APTT BLD: 41.1 SEC — HIGH (ref 27.5–35.5)
AST SERPL-CCNC: 23 U/L — SIGNIFICANT CHANGE UP (ref 10–40)
AST SERPL-CCNC: 30 U/L — SIGNIFICANT CHANGE UP (ref 10–40)
BILIRUB DIRECT SERPL-MCNC: 0.3 MG/DL — HIGH (ref 0–0.2)
BILIRUB INDIRECT FLD-MCNC: 0.8 MG/DL — SIGNIFICANT CHANGE UP (ref 0.2–1)
BILIRUB SERPL-MCNC: 1.2 MG/DL — SIGNIFICANT CHANGE UP (ref 0.2–1.2)
BILIRUB SERPL-MCNC: 1.4 MG/DL — HIGH (ref 0.2–1.2)
BLD GP AB SCN SERPL QL: NEGATIVE — SIGNIFICANT CHANGE UP
BUN SERPL-MCNC: 28 MG/DL — HIGH (ref 7–23)
BUN SERPL-MCNC: 31 MG/DL — HIGH (ref 7–23)
CALCIUM SERPL-MCNC: 8.9 MG/DL — SIGNIFICANT CHANGE UP (ref 8.4–10.5)
CALCIUM SERPL-MCNC: 9.3 MG/DL — SIGNIFICANT CHANGE UP (ref 8.4–10.5)
CHLORIDE SERPL-SCNC: 100 MMOL/L — SIGNIFICANT CHANGE UP (ref 96–108)
CHLORIDE SERPL-SCNC: 102 MMOL/L — SIGNIFICANT CHANGE UP (ref 96–108)
CO2 SERPL-SCNC: 25 MMOL/L — SIGNIFICANT CHANGE UP (ref 22–31)
CO2 SERPL-SCNC: 27 MMOL/L — SIGNIFICANT CHANGE UP (ref 22–31)
CREAT SERPL-MCNC: 1.04 MG/DL — SIGNIFICANT CHANGE UP (ref 0.5–1.3)
CREAT SERPL-MCNC: 1.06 MG/DL — SIGNIFICANT CHANGE UP (ref 0.5–1.3)
GLUCOSE BLDC GLUCOMTR-MCNC: 137 MG/DL — HIGH (ref 70–99)
GLUCOSE SERPL-MCNC: 113 MG/DL — HIGH (ref 70–99)
GLUCOSE SERPL-MCNC: 176 MG/DL — HIGH (ref 70–99)
GRAM STN FLD: SIGNIFICANT CHANGE UP
GRAM STN FLD: SIGNIFICANT CHANGE UP
HCT VFR BLD CALC: 24 % — LOW (ref 34.5–45)
HCT VFR BLD CALC: 30.9 % — LOW (ref 34.5–45)
HGB BLD-MCNC: 7.4 G/DL — LOW (ref 11.5–15.5)
HGB BLD-MCNC: 9.1 G/DL — LOW (ref 11.5–15.5)
INR BLD: 1.19 — HIGH (ref 0.88–1.16)
MAGNESIUM SERPL-MCNC: 2.1 MG/DL — SIGNIFICANT CHANGE UP (ref 1.6–2.6)
MAGNESIUM SERPL-MCNC: 2.1 MG/DL — SIGNIFICANT CHANGE UP (ref 1.6–2.6)
MCHC RBC-ENTMCNC: 29.1 PG — SIGNIFICANT CHANGE UP (ref 27–34)
MCHC RBC-ENTMCNC: 29.4 GM/DL — LOW (ref 32–36)
MCHC RBC-ENTMCNC: 30.1 PG — SIGNIFICANT CHANGE UP (ref 27–34)
MCHC RBC-ENTMCNC: 30.8 GM/DL — LOW (ref 32–36)
MCV RBC AUTO: 97.6 FL — SIGNIFICANT CHANGE UP (ref 80–100)
MCV RBC AUTO: 98.7 FL — SIGNIFICANT CHANGE UP (ref 80–100)
NRBC # BLD: 0 /100 WBCS — SIGNIFICANT CHANGE UP (ref 0–0)
NRBC # BLD: 0 /100 WBCS — SIGNIFICANT CHANGE UP (ref 0–0)
PHOSPHATE SERPL-MCNC: 2.7 MG/DL — SIGNIFICANT CHANGE UP (ref 2.5–4.5)
PLATELET # BLD AUTO: 142 K/UL — LOW (ref 150–400)
PLATELET # BLD AUTO: 168 K/UL — SIGNIFICANT CHANGE UP (ref 150–400)
POTASSIUM SERPL-MCNC: 4.3 MMOL/L — SIGNIFICANT CHANGE UP (ref 3.5–5.3)
POTASSIUM SERPL-MCNC: 4.4 MMOL/L — SIGNIFICANT CHANGE UP (ref 3.5–5.3)
POTASSIUM SERPL-SCNC: 4.3 MMOL/L — SIGNIFICANT CHANGE UP (ref 3.5–5.3)
POTASSIUM SERPL-SCNC: 4.4 MMOL/L — SIGNIFICANT CHANGE UP (ref 3.5–5.3)
PROT SERPL-MCNC: 6.2 G/DL — SIGNIFICANT CHANGE UP (ref 6–8.3)
PROT SERPL-MCNC: 7 G/DL — SIGNIFICANT CHANGE UP (ref 6–8.3)
PROTHROM AB SERPL-ACNC: 14.2 SEC — HIGH (ref 10.6–13.6)
RBC # BLD: 2.46 M/UL — LOW (ref 3.8–5.2)
RBC # BLD: 3.13 M/UL — LOW (ref 3.8–5.2)
RBC # FLD: 15.9 % — HIGH (ref 10.3–14.5)
RBC # FLD: 16.1 % — HIGH (ref 10.3–14.5)
RH IG SCN BLD-IMP: POSITIVE — SIGNIFICANT CHANGE UP
SARS-COV-2 RNA SPEC QL NAA+PROBE: SIGNIFICANT CHANGE UP
SODIUM SERPL-SCNC: 138 MMOL/L — SIGNIFICANT CHANGE UP (ref 135–145)
SODIUM SERPL-SCNC: 138 MMOL/L — SIGNIFICANT CHANGE UP (ref 135–145)
SPECIMEN SOURCE: SIGNIFICANT CHANGE UP
SPECIMEN SOURCE: SIGNIFICANT CHANGE UP
WBC # BLD: 10.62 K/UL — HIGH (ref 3.8–10.5)
WBC # BLD: 8.58 K/UL — SIGNIFICANT CHANGE UP (ref 3.8–10.5)
WBC # FLD AUTO: 10.62 K/UL — HIGH (ref 3.8–10.5)
WBC # FLD AUTO: 8.58 K/UL — SIGNIFICANT CHANGE UP (ref 3.8–10.5)

## 2021-08-26 PROCEDURE — 97605 NEG PRS WND THER DME<=50SQCM: CPT

## 2021-08-26 PROCEDURE — 11042 DBRDMT SUBQ TIS 1ST 20SQCM/<: CPT

## 2021-08-26 PROCEDURE — 71045 X-RAY EXAM CHEST 1 VIEW: CPT | Mod: 26

## 2021-08-26 RX ORDER — POTASSIUM CHLORIDE 20 MEQ
10 PACKET (EA) ORAL DAILY
Refills: 0 | Status: DISCONTINUED | OUTPATIENT
Start: 2021-08-26 | End: 2021-08-31

## 2021-08-26 RX ORDER — FUROSEMIDE 40 MG
40 TABLET ORAL ONCE
Refills: 0 | Status: COMPLETED | OUTPATIENT
Start: 2021-08-26 | End: 2021-08-26

## 2021-08-26 RX ORDER — FUROSEMIDE 40 MG
20 TABLET ORAL EVERY 12 HOURS
Refills: 0 | Status: DISCONTINUED | OUTPATIENT
Start: 2021-08-26 | End: 2021-08-31

## 2021-08-26 RX ORDER — VANCOMYCIN HCL 1 G
1250 VIAL (EA) INTRAVENOUS EVERY 12 HOURS
Refills: 0 | Status: DISCONTINUED | OUTPATIENT
Start: 2021-08-26 | End: 2021-08-28

## 2021-08-26 RX ORDER — VANCOMYCIN HCL 1 G
VIAL (EA) INTRAVENOUS
Refills: 0 | Status: DISCONTINUED | OUTPATIENT
Start: 2021-08-26 | End: 2021-08-26

## 2021-08-26 RX ADMIN — SODIUM CHLORIDE 3 MILLILITER(S): 9 INJECTION INTRAMUSCULAR; INTRAVENOUS; SUBCUTANEOUS at 14:57

## 2021-08-26 RX ADMIN — Medication 4 MILLIGRAM(S): at 18:40

## 2021-08-26 RX ADMIN — Medication 40 MILLIGRAM(S): at 12:43

## 2021-08-26 RX ADMIN — LEVALBUTEROL 0.63 MILLIGRAM(S): 1.25 SOLUTION, CONCENTRATE RESPIRATORY (INHALATION) at 23:05

## 2021-08-26 RX ADMIN — Medication 100 MILLIGRAM(S): at 05:43

## 2021-08-26 RX ADMIN — OXYCODONE AND ACETAMINOPHEN 1 TABLET(S): 5; 325 TABLET ORAL at 22:57

## 2021-08-26 RX ADMIN — LEVALBUTEROL 0.63 MILLIGRAM(S): 1.25 SOLUTION, CONCENTRATE RESPIRATORY (INHALATION) at 00:45

## 2021-08-26 RX ADMIN — LEVALBUTEROL 0.63 MILLIGRAM(S): 1.25 SOLUTION, CONCENTRATE RESPIRATORY (INHALATION) at 18:45

## 2021-08-26 RX ADMIN — OXYCODONE AND ACETAMINOPHEN 1 TABLET(S): 5; 325 TABLET ORAL at 16:21

## 2021-08-26 RX ADMIN — BUDESONIDE AND FORMOTEROL FUMARATE DIHYDRATE 2 PUFF(S): 160; 4.5 AEROSOL RESPIRATORY (INHALATION) at 09:21

## 2021-08-26 RX ADMIN — OXYCODONE AND ACETAMINOPHEN 1 TABLET(S): 5; 325 TABLET ORAL at 04:19

## 2021-08-26 RX ADMIN — Medication 81 MILLIGRAM(S): at 11:13

## 2021-08-26 RX ADMIN — LEVALBUTEROL 0.63 MILLIGRAM(S): 1.25 SOLUTION, CONCENTRATE RESPIRATORY (INHALATION) at 11:35

## 2021-08-26 RX ADMIN — OXYCODONE AND ACETAMINOPHEN 1 TABLET(S): 5; 325 TABLET ORAL at 16:49

## 2021-08-26 RX ADMIN — OXYCODONE AND ACETAMINOPHEN 1 TABLET(S): 5; 325 TABLET ORAL at 03:51

## 2021-08-26 RX ADMIN — AMIODARONE HYDROCHLORIDE 400 MILLIGRAM(S): 400 TABLET ORAL at 05:43

## 2021-08-26 RX ADMIN — HEPARIN SODIUM 5000 UNIT(S): 5000 INJECTION INTRAVENOUS; SUBCUTANEOUS at 21:11

## 2021-08-26 RX ADMIN — Medication 4 MILLIGRAM(S): at 11:13

## 2021-08-26 RX ADMIN — Medication 166.67 MILLIGRAM(S): at 12:53

## 2021-08-26 RX ADMIN — Medication 166.67 MILLIGRAM(S): at 23:05

## 2021-08-26 RX ADMIN — HEPARIN SODIUM 5000 UNIT(S): 5000 INJECTION INTRAVENOUS; SUBCUTANEOUS at 05:43

## 2021-08-26 RX ADMIN — BUDESONIDE AND FORMOTEROL FUMARATE DIHYDRATE 2 PUFF(S): 160; 4.5 AEROSOL RESPIRATORY (INHALATION) at 21:41

## 2021-08-26 RX ADMIN — Medication 4 MILLIGRAM(S): at 07:17

## 2021-08-26 RX ADMIN — PANTOPRAZOLE SODIUM 40 MILLIGRAM(S): 20 TABLET, DELAYED RELEASE ORAL at 11:14

## 2021-08-26 RX ADMIN — LEVALBUTEROL 0.63 MILLIGRAM(S): 1.25 SOLUTION, CONCENTRATE RESPIRATORY (INHALATION) at 05:43

## 2021-08-26 RX ADMIN — HEPARIN SODIUM 5000 UNIT(S): 5000 INJECTION INTRAVENOUS; SUBCUTANEOUS at 14:59

## 2021-08-26 RX ADMIN — Medication 50 MILLILITER(S): at 01:00

## 2021-08-26 RX ADMIN — LIDOCAINE 1 PATCH: 4 CREAM TOPICAL at 22:35

## 2021-08-26 RX ADMIN — SODIUM CHLORIDE 3 MILLILITER(S): 9 INJECTION INTRAMUSCULAR; INTRAVENOUS; SUBCUTANEOUS at 06:44

## 2021-08-26 RX ADMIN — Medication 4 MILLIGRAM(S): at 14:59

## 2021-08-26 RX ADMIN — LIDOCAINE 1 PATCH: 4 CREAM TOPICAL at 18:40

## 2021-08-26 RX ADMIN — SODIUM CHLORIDE 3 MILLILITER(S): 9 INJECTION INTRAMUSCULAR; INTRAVENOUS; SUBCUTANEOUS at 21:19

## 2021-08-26 RX ADMIN — Medication 4 MILLIGRAM(S): at 10:29

## 2021-08-26 RX ADMIN — AMIODARONE HYDROCHLORIDE 400 MILLIGRAM(S): 400 TABLET ORAL at 14:59

## 2021-08-26 RX ADMIN — Medication 50 MILLILITER(S): at 00:00

## 2021-08-26 RX ADMIN — AMIODARONE HYDROCHLORIDE 400 MILLIGRAM(S): 400 TABLET ORAL at 21:10

## 2021-08-26 RX ADMIN — OXYCODONE AND ACETAMINOPHEN 1 TABLET(S): 5; 325 TABLET ORAL at 23:30

## 2021-08-26 RX ADMIN — SENNA PLUS 2 TABLET(S): 8.6 TABLET ORAL at 21:11

## 2021-08-26 RX ADMIN — LIDOCAINE 1 PATCH: 4 CREAM TOPICAL at 11:14

## 2021-08-26 RX ADMIN — Medication 20 MILLIGRAM(S): at 05:44

## 2021-08-26 NOTE — PROGRESS NOTE ADULT - SUBJECTIVE AND OBJECTIVE BOX
Patient discussed on morning rounds with Dr. Muller     Operation / Date: 8/9/21 -- AVR, ascending/hemiarch replacement, frozen elephant trunk, left aorto-subclavian bypass, CABG x2 (SVG-RPDA, SVG-LCx), EF 75%    SUBJECTIVE ASSESSMENT:  Pt is feeling the same as yesterday. Fatigued but not worse from yesterday. Eating/drinking well. Can ambulate but only a few steps at a time with a walker. Denies any CP, palpitations, SOB, wheezing, abd pain, n/v/d/c, fevers or chills.     Vital Signs Last 24 Hrs  T(C): 36.7 (26 Aug 2021 17:33), Max: 36.7 (26 Aug 2021 17:33)  T(F): 98 (26 Aug 2021 17:33), Max: 98 (26 Aug 2021 17:33)  HR: 53 (26 Aug 2021 17:00) (53 - 74)  BP: 121/56 (26 Aug 2021 17:00) (89/53 - 182/75)  BP(mean): 81 (26 Aug 2021 17:00) (66 - 109)  RR: 17 (26 Aug 2021 17:00) (10 - 36)  SpO2: 100% (26 Aug 2021 17:00) (93% - 100%)  I&O's Detail    25 Aug 2021 07:01  -  26 Aug 2021 07:00  --------------------------------------------------------  IN:    Albumin 25%  -  50 mL: 100 mL    IV PiggyBack: 250 mL    Oral Fluid: 240 mL  Total IN: 590 mL    OUT:    Voided (mL): 1050 mL  Total OUT: 1050 mL    Total NET: -460 mL      26 Aug 2021 07:01  -  26 Aug 2021 18:14  --------------------------------------------------------  IN:    IV PiggyBack: 250 mL    Oral Fluid: 180 mL    PRBCs (Packed Red Blood Cells): 300 mL  Total IN: 730 mL    OUT:    Voided (mL): 900 mL  Total OUT: 900 mL    Total NET: -170 mL    CHEST TUBE:  no  FRANCHESKA DRAIN:  no  EPICARDIAL WIRES: yes  TIE DOWNS: no  REGALADO: no    PHYSICAL EXAM:  General: slightly pale/yellow skin color. No acute distress.   Neurological: AOx3. Motor skills grossly intact  Cardiovascular: Normal S1/S2. Regular rate/rhythm. No murmurs  Respiratory: +crackles b/l. No wheezing.   Gastrointestinal: +BS in all 4 quadrants. Non-distended. Soft. Non-tender  Extremities: Strength 5/5 b/l upper/lower extremities. Sensation grossly intact upper/lower extremities. No edema. No calf tenderness.  Vascular: Radial 2+bilaterally, DP 2+ b/l  Incision Sites: sternotomy healing well, no erythema, purulence or ecchymosis.     LABS:                        9.1    10.62 )-----------( 168      ( 26 Aug 2021 17:09 )             30.9     COUMADIN:  no    PT/INR - ( 26 Aug 2021 06:35 )   PT: 14.2 sec;   INR: 1.19     PTT - ( 26 Aug 2021 06:35 )  PTT:41.1 sec    08-26    138  |  100  |  28<H>  ----------------------------<  176<H>  4.4   |  27  |  1.04    Ca    9.3      26 Aug 2021 17:09  Phos  2.7     08-26  Mg     2.1     08-26    TPro  7.0  /  Alb  3.8  /  TBili  1.4<H>  /  DBili  x   /  AST  23  /  ALT  17  /  AlkPhos  118  08-26    MEDICATIONS  (STANDING):  aMIOdarone    Tablet 400 milliGRAM(s) Oral every 8 hours  aMIOdarone    Tablet   Oral   aspirin  chewable 81 milliGRAM(s) Enteral Tube daily  budesonide  80 MICROgram(s)/formoterol 4.5 MICROgram(s) Inhaler 2 Puff(s) Inhalation two times a day  dextrose 5%. 1000 milliLiter(s) (50 mL/Hr) IV Continuous <Continuous>  dextrose 5%. 1000 milliLiter(s) (100 mL/Hr) IV Continuous <Continuous>  furosemide    Tablet 20 milliGRAM(s) Oral <User Schedule>  glucagon  Injectable 1 milliGRAM(s) IntraMuscular once  heparin   Injectable 5000 Unit(s) SubCutaneous every 8 hours  levalbuterol Inhalation 0.63 milliGRAM(s) Inhalation every 6 hours  lidocaine   4% Patch 1 Patch Transdermal daily  lisinopril 10 milliGRAM(s) Oral daily  pantoprazole  Injectable 40 milliGRAM(s) IV Push daily  senna 2 Tablet(s) Oral at bedtime  sodium chloride 0.9% lock flush 3 milliLiter(s) IV Push every 8 hours  testosterone patch 4 mG/24 Hr(s) 4 milliGRAM(s) Transdermal daily  testosterone patch 4 mG/24 Hr(s) 4 milliGRAM(s) Transdermal <User Schedule>  vancomycin  IVPB 1250 milliGRAM(s) IV Intermittent every 12 hours    MEDICATIONS  (PRN):  ALPRAZolam 0.25 milliGRAM(s) Oral two times a day PRN anxiety  oxycodone    5 mG/acetaminophen 325 mG 1 Tablet(s) Oral/Enteral Tube every 6 hours PRN Moderate Pain (4 - 6)    RADIOLOGY & ADDITIONAL TESTS:  < from: Xray Chest 1 View- PORTABLE-Routine (Xray Chest 1 View- PORTABLE-Routine in AM.) (08.25.21 @ 05:55) >  Findings/  impression: Left suprahilaropacity, increased. Stable heart size, status post median sternotomy, aortic stent, aortic valve replacement. Bilateral opacities/right pleural effusion and bony structures are stable. Left axillary surgical clips  < end of copied text >

## 2021-08-26 NOTE — PROGRESS NOTE ADULT - ASSESSMENT
75 y/o female, current every day smoker (59 PY), with PMHx HTN, bicuspid aortic valve, spinal stenosis, arthritis who complained of increased HERRERA for 6 months. TTE 3/2021 revealed EF normal, severe AS, moderate AI. NST 4/2021 revealed transient ischemic dilatation. Cardiac cath 5/11/21 revealed severe AS, prox circumflex 90%, OM1 70%, mid RCA 80%. 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 She was referred to Dr. Muller for surgical evaluation and deemed a good surgical candidate. Admitted for surgical planning and on 8/9/21 pt underwent AVR (tissue), Ascending, hemiarch replacement, frozen elephant trunk, left aorto-subclavian bypass, CABG x 2. OR course significant for 7 U pRBC/2 plt/2 FFP/1000 FEIBA; 3 L IVF. Arrived to CTICU on levo and initial LA was 4.1. POD1-2 extubated. POD3 AF with hypotension. Continued on pressors and lasix gtt, DCCV. POD4 AF, FIDEL. POD5 primacor was weaned off, Cr improved, extubated. POD7 ECHO with moderate pericardial effusion, s/p IR drainage. POD8 failed S/S. POD11 diuresed. POD13 sinus bradycardia, stable. POD15 transferred to 9L. POD 16 rapid AF with hypotension requiring pushes of linda and amio bolus. IV Lopressor  given and bradycardia at a rate of 45, V-paced. EP reconsulted and will need PPM. Monitoring sternal wound for worsening erythema/discharge. POD17 sternal wound erythema worsened with purulence expressed with pressure. Sternal wound opened at bedside and wound vac placed.     Plan:  Neurovascular:   -Pain well controlled with current regimen. PRN's: oxycodone/acetaminophen  -Anxiety: Xanax as needed     Cardiovascular:   -POD16 AVR, ascending/hemiarch replacement, frozen elephant trunk, left aorto-subclavian bypass, CABGx2 (SVG-RPDA, SVG-LCx)    -post-op AF: Amio PO, episodes of hypotension with associated AF with RVR.     -continue ASA    -tachy-bradycardia syndrome, planning for PPM tomorrow     -Vwires backup at 45bpm   -HTN: lisinopril 10mg daily   -Hemodynamically stable.   -Monitor: BP, HR, tele    Respiratory:   -Oxygenating 97% on 2lpm  -Encourage continued use of IS 10x/hr and frequent ambulation  -CXR: no acute pathology, no acute pathology     GI:  -GI PPX: pantoprazole 40mg daily   -PO Diet: Dysphagia 2 mechanical soft-thin liquids, NPO after midnight   -Bowel Regimen: senna     Renal / :  -continue with lasix 20mg BID   -Additional 20mg IV lasix given today   -Continue to monitor renal function: BUN/Cr 26/0.87  -Monitor I/O's daily     Endocrine:    -No hx of DM or thyroid dx  -A1c: 5.6   -TSH: 2.6     Hematologic:  -CBC: H/H- 8.1/26  -Coagulation Panel: WNL     ID:  -MSI with drainage, continue with Ancef for empiric coverage   -Temperature: afebrile   -CBC: WBC- 10.8   -Continue to observe for SIRS/Sepsis Syndrome.    Prophylaxis:  -DVT prophylaxis with 5000 SubQ Heparin q8h.  -Continue with SCD's b/l while patient is at rest     Disposition:  -Discharge home once patient is medically ready   77 y/o female, current every day smoker (59 PY), with PMHx HTN, bicuspid aortic valve, spinal stenosis, arthritis who complained of increased HERRERA for 6 months. TTE 3/2021 revealed EF normal, severe AS, moderate AI. NST 4/2021 revealed transient ischemic dilatation. Cardiac cath 5/11/21 revealed severe AS, prox circumflex 90%, OM1 70%, mid RCA 80%. 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 She was referred to Dr. Muller for surgical evaluation and deemed a good surgical candidate. Admitted for surgical planning and on 8/9/21 pt underwent AVR (tissue), Ascending, hemiarch replacement, frozen elephant trunk, left aorto-subclavian bypass, CABG x 2. OR course significant for 7 U pRBC/2 plt/2 FFP/1000 FEIBA; 3 L IVF. Arrived to CTICU on levo and initial LA was 4.1. POD1-2 extubated. POD3 AF with hypotension. Continued on pressors and lasix gtt, DCCV. POD4 AF, FIDEL. POD5 primacor was weaned off, Cr improved, extubated. POD7 ECHO with moderate pericardial effusion, s/p IR drainage. POD8 failed S/S. POD11 diuresed. POD13 sinus bradycardia, stable. POD15 transferred to 9L. POD 16 rapid AF with hypotension requiring pushes of linda and amio bolus. IV Lopressor  given and bradycardia at a rate of 45, V-paced. EP reconsulted and will need PPM. Monitoring sternal wound for worsening erythema/discharge. POD17 sternal wound erythema worsened with purulence expressed with pressure. Sternal wound opened at bedside and wound vac placed. Vanco started. PPM placement postponed. Remains V-paced at bedside. x1 unit of pRBCs, pending repeat labs     Plan:  Neurovascular:   -Pain well controlled with current regimen. PRN's: oxycodone/acetaminophen  -Anxiety: Xanax as needed     Cardiovascular:   -POD17 AVR, ascending/hemiarch replacement, frozen elephant trunk, left aorto-subclavian bypass, CABGx2 (SVG-RPDA, SVG-LCx)    -post-op AF: Amio PO, episodes of hypotension with associated AF with RVR.     -continue ASA    -tachy-bradycardia syndrome, planning for PPM the next few days (post-pone for sternal wound dehiscence)    -Vwires backup at 45bpm     -Sternal wound opened today, wound vac placed   -HTN: lisinopril 10mg daily   -Hemodynamically stable.   -Monitor: BP, HR, tele    Respiratory:   -Oxygenating 97% on 2lpm  -Encourage continued use of IS 10x/hr and frequent ambulation  -CXR: no acute pathology, no acute pathology     GI:  -GI PPX: pantoprazole 40mg daily   -PO Diet: Dysphagia 2 mechanical soft-thin liquids, NPO after midnight   -Bowel Regimen: senna   -Trend Bilirubin, pt slight yellow hue    Renal / :  -continue with lasix 20mg BID   -Additional 40IV lasix given today   -Continue to monitor renal function: BUN/Cr 28/1.61   -Monitor I/O's daily     Endocrine:    -No hx of DM or thyroid dx  -A1c: 5.6   -TSH: 2.6     Hematologic:  -CBC: H/H- 9.2/29  -Coagulation Panel: WNL     ID:  -MSI with wound vac in place, started on Vancomycin (switched from ancef)   -Temperature: afebrile   -CBC: WBC- 20   -Continue to observe for SIRS/Sepsis Syndrome.    Prophylaxis:  -DVT prophylaxis with 5000 SubQ Heparin q8h.  -Continue with SCD's b/l while patient is at rest     Disposition:  -Discharge home once patient is medically ready

## 2021-08-27 LAB
ALBUMIN SERPL ELPH-MCNC: 3.7 G/DL — SIGNIFICANT CHANGE UP (ref 3.3–5)
ALP SERPL-CCNC: 102 U/L — SIGNIFICANT CHANGE UP (ref 40–120)
ALT FLD-CCNC: 14 U/L — SIGNIFICANT CHANGE UP (ref 10–45)
ANION GAP SERPL CALC-SCNC: 10 MMOL/L — SIGNIFICANT CHANGE UP (ref 5–17)
APTT BLD: 30.9 SEC — SIGNIFICANT CHANGE UP (ref 27.5–35.5)
AST SERPL-CCNC: 18 U/L — SIGNIFICANT CHANGE UP (ref 10–40)
BILIRUB SERPL-MCNC: 1.3 MG/DL — HIGH (ref 0.2–1.2)
BUN SERPL-MCNC: 28 MG/DL — HIGH (ref 7–23)
CALCIUM SERPL-MCNC: 9.2 MG/DL — SIGNIFICANT CHANGE UP (ref 8.4–10.5)
CHLORIDE SERPL-SCNC: 100 MMOL/L — SIGNIFICANT CHANGE UP (ref 96–108)
CO2 SERPL-SCNC: 28 MMOL/L — SIGNIFICANT CHANGE UP (ref 22–31)
CREAT SERPL-MCNC: 1.09 MG/DL — SIGNIFICANT CHANGE UP (ref 0.5–1.3)
GLUCOSE SERPL-MCNC: 117 MG/DL — HIGH (ref 70–99)
HCT VFR BLD CALC: 29.2 % — LOW (ref 34.5–45)
HGB BLD-MCNC: 8.9 G/DL — LOW (ref 11.5–15.5)
INR BLD: 1.12 — SIGNIFICANT CHANGE UP (ref 0.88–1.16)
MAGNESIUM SERPL-MCNC: 2.1 MG/DL — SIGNIFICANT CHANGE UP (ref 1.6–2.6)
MCHC RBC-ENTMCNC: 29.7 PG — SIGNIFICANT CHANGE UP (ref 27–34)
MCHC RBC-ENTMCNC: 30.5 GM/DL — LOW (ref 32–36)
MCV RBC AUTO: 97.3 FL — SIGNIFICANT CHANGE UP (ref 80–100)
NRBC # BLD: 0 /100 WBCS — SIGNIFICANT CHANGE UP (ref 0–0)
PLATELET # BLD AUTO: 146 K/UL — LOW (ref 150–400)
POTASSIUM SERPL-MCNC: 4.2 MMOL/L — SIGNIFICANT CHANGE UP (ref 3.5–5.3)
POTASSIUM SERPL-SCNC: 4.2 MMOL/L — SIGNIFICANT CHANGE UP (ref 3.5–5.3)
PROT SERPL-MCNC: 6.4 G/DL — SIGNIFICANT CHANGE UP (ref 6–8.3)
PROTHROM AB SERPL-ACNC: 13.4 SEC — SIGNIFICANT CHANGE UP (ref 10.6–13.6)
RBC # BLD: 3 M/UL — LOW (ref 3.8–5.2)
RBC # FLD: 16.2 % — HIGH (ref 10.3–14.5)
SODIUM SERPL-SCNC: 138 MMOL/L — SIGNIFICANT CHANGE UP (ref 135–145)
WBC # BLD: 9.58 K/UL — SIGNIFICANT CHANGE UP (ref 3.8–10.5)
WBC # FLD AUTO: 9.58 K/UL — SIGNIFICANT CHANGE UP (ref 3.8–10.5)

## 2021-08-27 PROCEDURE — 71045 X-RAY EXAM CHEST 1 VIEW: CPT | Mod: 26

## 2021-08-27 PROCEDURE — 99232 SBSQ HOSP IP/OBS MODERATE 35: CPT

## 2021-08-27 PROCEDURE — 99254 IP/OBS CNSLTJ NEW/EST MOD 60: CPT

## 2021-08-27 RX ORDER — ALPRAZOLAM 0.25 MG
0.25 TABLET ORAL
Refills: 0 | Status: DISCONTINUED | OUTPATIENT
Start: 2021-08-27 | End: 2021-09-03

## 2021-08-27 RX ORDER — CEFTRIAXONE 500 MG/1
2000 INJECTION, POWDER, FOR SOLUTION INTRAMUSCULAR; INTRAVENOUS EVERY 24 HOURS
Refills: 0 | Status: COMPLETED | OUTPATIENT
Start: 2021-08-27 | End: 2021-09-02

## 2021-08-27 RX ADMIN — SENNA PLUS 2 TABLET(S): 8.6 TABLET ORAL at 21:49

## 2021-08-27 RX ADMIN — SODIUM CHLORIDE 3 MILLILITER(S): 9 INJECTION INTRAMUSCULAR; INTRAVENOUS; SUBCUTANEOUS at 21:43

## 2021-08-27 RX ADMIN — Medication 166.67 MILLIGRAM(S): at 13:14

## 2021-08-27 RX ADMIN — Medication 4 MILLIGRAM(S): at 20:02

## 2021-08-27 RX ADMIN — Medication 10 MILLIEQUIVALENT(S): at 12:57

## 2021-08-27 RX ADMIN — Medication 81 MILLIGRAM(S): at 12:57

## 2021-08-27 RX ADMIN — CEFTRIAXONE 100 MILLIGRAM(S): 500 INJECTION, POWDER, FOR SOLUTION INTRAMUSCULAR; INTRAVENOUS at 21:49

## 2021-08-27 RX ADMIN — BUDESONIDE AND FORMOTEROL FUMARATE DIHYDRATE 2 PUFF(S): 160; 4.5 AEROSOL RESPIRATORY (INHALATION) at 21:49

## 2021-08-27 RX ADMIN — SODIUM CHLORIDE 3 MILLILITER(S): 9 INJECTION INTRAMUSCULAR; INTRAVENOUS; SUBCUTANEOUS at 06:15

## 2021-08-27 RX ADMIN — AMIODARONE HYDROCHLORIDE 400 MILLIGRAM(S): 400 TABLET ORAL at 13:01

## 2021-08-27 RX ADMIN — Medication 4 MILLIGRAM(S): at 13:43

## 2021-08-27 RX ADMIN — Medication 4 MILLIGRAM(S): at 14:05

## 2021-08-27 RX ADMIN — HEPARIN SODIUM 5000 UNIT(S): 5000 INJECTION INTRAVENOUS; SUBCUTANEOUS at 13:01

## 2021-08-27 RX ADMIN — Medication 4 MILLIGRAM(S): at 06:16

## 2021-08-27 RX ADMIN — Medication 4 MILLIGRAM(S): at 13:13

## 2021-08-27 RX ADMIN — Medication 4 MILLIGRAM(S): at 06:15

## 2021-08-27 RX ADMIN — PANTOPRAZOLE SODIUM 40 MILLIGRAM(S): 20 TABLET, DELAYED RELEASE ORAL at 13:00

## 2021-08-27 RX ADMIN — SODIUM CHLORIDE 3 MILLILITER(S): 9 INJECTION INTRAMUSCULAR; INTRAVENOUS; SUBCUTANEOUS at 13:42

## 2021-08-27 RX ADMIN — Medication 20 MILLIGRAM(S): at 18:57

## 2021-08-27 RX ADMIN — AMIODARONE HYDROCHLORIDE 400 MILLIGRAM(S): 400 TABLET ORAL at 21:48

## 2021-08-27 RX ADMIN — Medication 4 MILLIGRAM(S): at 14:10

## 2021-08-27 RX ADMIN — LISINOPRIL 10 MILLIGRAM(S): 2.5 TABLET ORAL at 05:16

## 2021-08-27 RX ADMIN — LIDOCAINE 1 PATCH: 4 CREAM TOPICAL at 19:06

## 2021-08-27 RX ADMIN — OXYCODONE AND ACETAMINOPHEN 1 TABLET(S): 5; 325 TABLET ORAL at 23:00

## 2021-08-27 RX ADMIN — Medication 4 MILLIGRAM(S): at 20:01

## 2021-08-27 RX ADMIN — AMIODARONE HYDROCHLORIDE 400 MILLIGRAM(S): 400 TABLET ORAL at 05:14

## 2021-08-27 RX ADMIN — HEPARIN SODIUM 5000 UNIT(S): 5000 INJECTION INTRAVENOUS; SUBCUTANEOUS at 21:49

## 2021-08-27 RX ADMIN — OXYCODONE AND ACETAMINOPHEN 1 TABLET(S): 5; 325 TABLET ORAL at 22:00

## 2021-08-27 RX ADMIN — LEVALBUTEROL 0.63 MILLIGRAM(S): 1.25 SOLUTION, CONCENTRATE RESPIRATORY (INHALATION) at 06:27

## 2021-08-27 RX ADMIN — LIDOCAINE 1 PATCH: 4 CREAM TOPICAL at 13:14

## 2021-08-27 RX ADMIN — HEPARIN SODIUM 5000 UNIT(S): 5000 INJECTION INTRAVENOUS; SUBCUTANEOUS at 05:16

## 2021-08-27 RX ADMIN — Medication 20 MILLIGRAM(S): at 05:16

## 2021-08-27 NOTE — PROGRESS NOTE ADULT - SUBJECTIVE AND OBJECTIVE BOX
Patient discussed on morning rounds with Dr. Muller     Operation / Date: 8/9/21 -- AVR, ascending and hemiarch replacement, frozen elephant trunk, left aorto-subclavian bypass, CABGx3 (SVG-RPDA and SVG-LCx), EF 75%     SUBJECTIVE ASSESSMENT:  Pt is feeling     Vital Signs Last 24 Hrs  T(C): 36.2 (27 Aug 2021 10:04), Max: 36.7 (26 Aug 2021 17:33)  T(F): 97.1 (27 Aug 2021 10:04), Max: 98 (26 Aug 2021 17:33)  HR: 55 (27 Aug 2021 12:00) (49 - 74)  BP: 133/62 (27 Aug 2021 12:00) (97/52 - 177/71)  BP(mean): 89 (27 Aug 2021 12:00) (72 - 102)  RR: 23 (27 Aug 2021 12:00) (10 - 31)  SpO2: 99% (27 Aug 2021 12:00) (95% - 100%)  I&O's Detail    26 Aug 2021 07:01  -  27 Aug 2021 07:00  --------------------------------------------------------  IN:    IV PiggyBack: 500 mL    Oral Fluid: 520 mL    PRBCs (Packed Red Blood Cells): 300 mL  Total IN: 1320 mL    OUT:    Drain (mL): 30 mL    Voided (mL): 1700 mL  Total OUT: 1730 mL    Total NET: -410 mL          CHEST TUBE:  Yes/No. AIR LEAKS: Yes/No. Suction / H2O SEAL.   FRANCHESKA DRAIN:  Yes/No.  EPICARDIAL WIRES: Yes/No.  TIE DOWNS: Yes/No.  REGALADO: Yes/No.    PHYSICAL EXAM:    General:     Neurological:    Cardiovascular:    Respiratory:    Gastrointestinal:    Extremities:    Vascular:    Incision Sites:    LABS:                        8.9    9.58  )-----------( 146      ( 27 Aug 2021 06:03 )             29.2       COUMADIN:  Yes/No. REASON: .    PT/INR - ( 27 Aug 2021 06:03 )   PT: 13.4 sec;   INR: 1.12          PTT - ( 27 Aug 2021 06:03 )  PTT:30.9 sec    08-27    138  |  100  |  28<H>  ----------------------------<  117<H>  4.2   |  28  |  1.09    Ca    9.2      27 Aug 2021 06:03  Phos  2.7     08-26  Mg     2.1     08-27    TPro  6.4  /  Alb  3.7  /  TBili  1.3<H>  /  DBili  x   /  AST  18  /  ALT  14  /  AlkPhos  102  08-27          MEDICATIONS  (STANDING):  aMIOdarone    Tablet 400 milliGRAM(s) Oral every 8 hours  aMIOdarone    Tablet   Oral   aspirin  chewable 81 milliGRAM(s) Enteral Tube daily  budesonide  80 MICROgram(s)/formoterol 4.5 MICROgram(s) Inhaler 2 Puff(s) Inhalation two times a day  dextrose 5%. 1000 milliLiter(s) (50 mL/Hr) IV Continuous <Continuous>  dextrose 5%. 1000 milliLiter(s) (100 mL/Hr) IV Continuous <Continuous>  furosemide   Injectable 20 milliGRAM(s) IV Push every 12 hours  glucagon  Injectable 1 milliGRAM(s) IntraMuscular once  heparin   Injectable 5000 Unit(s) SubCutaneous every 8 hours  levalbuterol Inhalation 0.63 milliGRAM(s) Inhalation every 6 hours  lidocaine   4% Patch 1 Patch Transdermal daily  lisinopril 10 milliGRAM(s) Oral daily  pantoprazole  Injectable 40 milliGRAM(s) IV Push daily  potassium chloride    Tablet ER 10 milliEquivalent(s) Oral daily  senna 2 Tablet(s) Oral at bedtime  sodium chloride 0.9% lock flush 3 milliLiter(s) IV Push every 8 hours  testosterone patch 4 mG/24 Hr(s) 4 milliGRAM(s) Transdermal <User Schedule>  testosterone patch 4 mG/24 Hr(s) 4 milliGRAM(s) Transdermal daily  vancomycin  IVPB 1250 milliGRAM(s) IV Intermittent every 12 hours    MEDICATIONS  (PRN):  ALPRAZolam 0.25 milliGRAM(s) Oral two times a day PRN anxiety  oxycodone    5 mG/acetaminophen 325 mG 1 Tablet(s) Oral/Enteral Tube every 6 hours PRN Moderate Pain (4 - 6)        RADIOLOGY & ADDITIONAL TESTS:     Patient discussed on morning rounds with Dr. Muller     Operation / Date: 8/9/21 -- AVR, ascending and hemiarch replacement, frozen elephant trunk, left aorto-subclavian bypass, CABGx3 (SVG-RPDA and SVG-LCx), EF 75%     SUBJECTIVE ASSESSMENT:  Pt is feeling well, improved from yesterday. Eating/drinking well. Breathing feels better. Denies any CP, palpitations, SOB, wheezing, abd pain, n/v/d/c, fevers or chills.    Vital Signs Last 24 Hrs  T(C): 36.2 (27 Aug 2021 10:04), Max: 36.7 (26 Aug 2021 17:33)  T(F): 97.1 (27 Aug 2021 10:04), Max: 98 (26 Aug 2021 17:33)  HR: 55 (27 Aug 2021 12:00) (49 - 74)  BP: 133/62 (27 Aug 2021 12:00) (97/52 - 177/71)  BP(mean): 89 (27 Aug 2021 12:00) (72 - 102)  RR: 23 (27 Aug 2021 12:00) (10 - 31)  SpO2: 99% (27 Aug 2021 12:00) (95% - 100%)  I&O's Detail    26 Aug 2021 07:01  -  27 Aug 2021 07:00  --------------------------------------------------------  IN:    IV PiggyBack: 500 mL    Oral Fluid: 520 mL    PRBCs (Packed Red Blood Cells): 300 mL  Total IN: 1320 mL    OUT:    Drain (mL): 30 mL    Voided (mL): 1700 mL  Total OUT: 1730 mL    Total NET: -410 mL    CHEST TUBE:  no  FRANCHESKA DRAIN:  no  EPICARDIAL WIRES: no  TIE DOWNS: no  REGALADO: no    PHYSICAL EXAM:  General: well appearing sitting in chair in NAD   Neurological: AOx3. Motor skills grossly intact  Cardiovascular: Normal S1/S2. Regular rate/rhythm. No murmurs  Respiratory: Lungs CTA bilaterally. No wheezing or rales  Gastrointestinal: +BS in all 4 quadrants. Non-distended. Soft. Non-tender  Extremities: 3+ LE edema b/l. No calf tenderness   Vascular: Radial 2+bilaterally, DP 2+ b/l  Incision Sites: sternotomy with wound vac in place.     LABS:             8.9    9.58  )-----------( 146      ( 27 Aug 2021 06:03 )             29.2       COUMADIN:  No    PT/INR - ( 27 Aug 2021 06:03 )   PT: 13.4 sec;   INR: 1.12     PTT - ( 27 Aug 2021 06:03 )  PTT:30.9 sec    08-27    138  |  100  |  28<H>  ----------------------------<  117<H>  4.2   |  28  |  1.09    Ca    9.2      27 Aug 2021 06:03  Phos  2.7     08-26  Mg     2.1     08-27    TPro  6.4  /  Alb  3.7  /  TBili  1.3<H>  /  DBili  x   /  AST  18  /  ALT  14  /  AlkPhos  102  08-27          MEDICATIONS  (STANDING):  aMIOdarone    Tablet 400 milliGRAM(s) Oral every 8 hours  aMIOdarone    Tablet   Oral   aspirin  chewable 81 milliGRAM(s) Enteral Tube daily  budesonide  80 MICROgram(s)/formoterol 4.5 MICROgram(s) Inhaler 2 Puff(s) Inhalation two times a day  dextrose 5%. 1000 milliLiter(s) (50 mL/Hr) IV Continuous <Continuous>  dextrose 5%. 1000 milliLiter(s) (100 mL/Hr) IV Continuous <Continuous>  furosemide   Injectable 20 milliGRAM(s) IV Push every 12 hours  glucagon  Injectable 1 milliGRAM(s) IntraMuscular once  heparin   Injectable 5000 Unit(s) SubCutaneous every 8 hours  levalbuterol Inhalation 0.63 milliGRAM(s) Inhalation every 6 hours  lidocaine   4% Patch 1 Patch Transdermal daily  lisinopril 10 milliGRAM(s) Oral daily  pantoprazole  Injectable 40 milliGRAM(s) IV Push daily  potassium chloride    Tablet ER 10 milliEquivalent(s) Oral daily  senna 2 Tablet(s) Oral at bedtime  sodium chloride 0.9% lock flush 3 milliLiter(s) IV Push every 8 hours  testosterone patch 4 mG/24 Hr(s) 4 milliGRAM(s) Transdermal <User Schedule>  testosterone patch 4 mG/24 Hr(s) 4 milliGRAM(s) Transdermal daily  vancomycin  IVPB 1250 milliGRAM(s) IV Intermittent every 12 hours    MEDICATIONS  (PRN):  ALPRAZolam 0.25 milliGRAM(s) Oral two times a day PRN anxiety  oxycodone    5 mG/acetaminophen 325 mG 1 Tablet(s) Oral/Enteral Tube every 6 hours PRN Moderate Pain (4 - 6)        RADIOLOGY & ADDITIONAL TESTS:     Patient discussed on morning rounds with Dr. Muller     Operation / Date: 8/9/21 -- AVR, ascending and hemiarch replacement, frozen elephant trunk, left aorto-subclavian bypass, CABGx3 (SVG-RPDA and SVG-LCx), EF 75%     SUBJECTIVE ASSESSMENT:  Pt is feeling well, improved from yesterday. Eating/drinking well. Breathing feels better. Denies any CP, palpitations, SOB, wheezing, abd pain, n/v/d/c, fevers or chills.    Vital Signs Last 24 Hrs  T(C): 36.2 (27 Aug 2021 10:04), Max: 36.7 (26 Aug 2021 17:33)  T(F): 97.1 (27 Aug 2021 10:04), Max: 98 (26 Aug 2021 17:33)  HR: 55 (27 Aug 2021 12:00) (49 - 74)  BP: 133/62 (27 Aug 2021 12:00) (97/52 - 177/71)  BP(mean): 89 (27 Aug 2021 12:00) (72 - 102)  RR: 23 (27 Aug 2021 12:00) (10 - 31)  SpO2: 99% (27 Aug 2021 12:00) (95% - 100%)  I&O's Detail    26 Aug 2021 07:01  -  27 Aug 2021 07:00  --------------------------------------------------------  IN:    IV PiggyBack: 500 mL    Oral Fluid: 520 mL    PRBCs (Packed Red Blood Cells): 300 mL  Total IN: 1320 mL    OUT:    Drain (mL): 30 mL    Voided (mL): 1700 mL  Total OUT: 1730 mL    Total NET: -410 mL    CHEST TUBE:  no  FRANCHESKA DRAIN:  no  EPICARDIAL WIRES: no  TIE DOWNS: no  REGALADO: no    PHYSICAL EXAM:  General: well appearing sitting in chair in NAD   Neurological: AOx3. Motor skills grossly intact  Cardiovascular: Normal S1/S2. Regular rate/rhythm. No murmurs  Respiratory: Lungs CTA bilaterally. No wheezing or rales  Gastrointestinal: +BS in all 4 quadrants. Non-distended. Soft. Non-tender  Extremities: 3+ LE edema b/l. No calf tenderness   Vascular: Radial 2+bilaterally, DP 2+ b/l  Incision Sites: sternotomy with wound vac in place.     LABS:             8.9    9.58  )-----------( 146      ( 27 Aug 2021 06:03 )             29.2       COUMADIN:  No    PT/INR - ( 27 Aug 2021 06:03 )   PT: 13.4 sec;   INR: 1.12     PTT - ( 27 Aug 2021 06:03 )  PTT:30.9 sec    08-27    138  |  100  |  28<H>  ----------------------------<  117<H>  4.2   |  28  |  1.09    Ca    9.2      27 Aug 2021 06:03  Phos  2.7     08-26  Mg     2.1     08-27    TPro  6.4  /  Alb  3.7  /  TBili  1.3<H>  /  DBili  x   /  AST  18  /  ALT  14  /  AlkPhos  102  08-27    MEDICATIONS  (STANDING):  aMIOdarone    Tablet 400 milliGRAM(s) Oral every 8 hours  aMIOdarone    Tablet   Oral   aspirin  chewable 81 milliGRAM(s) Enteral Tube daily  budesonide  80 MICROgram(s)/formoterol 4.5 MICROgram(s) Inhaler 2 Puff(s) Inhalation two times a day  dextrose 5%. 1000 milliLiter(s) (50 mL/Hr) IV Continuous <Continuous>  dextrose 5%. 1000 milliLiter(s) (100 mL/Hr) IV Continuous <Continuous>  furosemide   Injectable 20 milliGRAM(s) IV Push every 12 hours  glucagon  Injectable 1 milliGRAM(s) IntraMuscular once  heparin   Injectable 5000 Unit(s) SubCutaneous every 8 hours  levalbuterol Inhalation 0.63 milliGRAM(s) Inhalation every 6 hours  lidocaine   4% Patch 1 Patch Transdermal daily  lisinopril 10 milliGRAM(s) Oral daily  pantoprazole  Injectable 40 milliGRAM(s) IV Push daily  potassium chloride    Tablet ER 10 milliEquivalent(s) Oral daily  senna 2 Tablet(s) Oral at bedtime  sodium chloride 0.9% lock flush 3 milliLiter(s) IV Push every 8 hours  testosterone patch 4 mG/24 Hr(s) 4 milliGRAM(s) Transdermal <User Schedule>  testosterone patch 4 mG/24 Hr(s) 4 milliGRAM(s) Transdermal daily  vancomycin  IVPB 1250 milliGRAM(s) IV Intermittent every 12 hours    MEDICATIONS  (PRN):  ALPRAZolam 0.25 milliGRAM(s) Oral two times a day PRN anxiety  oxycodone    5 mG/acetaminophen 325 mG 1 Tablet(s) Oral/Enteral Tube every 6 hours PRN Moderate Pain (4 - 6)    RADIOLOGY & ADDITIONAL TESTS:  < from: Xray Chest 1 View- PORTABLE-Routine (Xray Chest 1 View- PORTABLE-Routine in AM.) (08.26.21 @ 05:17) >  IMPRESSION:  Postsurgical changes unchanged with persistent pulmonary venous congestion and bibasilar pleural effusions.  < end of copied text >

## 2021-08-27 NOTE — PROGRESS NOTE ADULT - ASSESSMENT
75 y/o female, current every day smoker (59 PY), with PMHx HTN, bicuspid aortic valve, spinal stenosis, arthritis who complained of increased HERRERA for 6 months. TTE 3/2021 revealed EF normal, severe AS, moderate AI. NST 4/2021 revealed transient ischemic dilatation. Cardiac cath 5/11/21 revealed severe AS, prox circumflex 90%, OM1 70%, mid RCA 80%. 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 She was referred to Dr. Muller for surgical evaluation and deemed a good surgical candidate. Admitted for surgical planning and on 8/9/21 pt underwent AVR (tissue), Ascending, hemiarch replacement, frozen elephant trunk, left aorto-subclavian bypass, CABG x 2. OR course significant for 7 U pRBC/2 plt/2 FFP/1000 FEIBA; 3 L IVF. Arrived to CTICU on levo and initial LA was 4.1. POD1-2 extubated. POD3 AF with hypotension. Continued on pressors and lasix gtt, DCCV. POD4 AF, FIDEL. POD5 primacor was weaned off, Cr improved, extubated. POD7 ECHO with moderate pericardial effusion, s/p IR drainage. POD8 failed S/S. POD11 diuresed. POD13 sinus bradycardia, stable. POD15 transferred to 9L. POD 16 rapid AF with hypotension requiring pushes of linda and amio bolus. IV Lopressor  given and bradycardia at a rate of 45, V-paced. EP reconsulted and will need PPM. Monitoring sternal wound for worsening erythema/discharge. POD17 sternal wound erythema worsened with purulence expressed with pressure. Sternal wound opened at bedside and wound vac placed. Vanco started. PPM placement postponed. Remains V-paced at bedside. x1 unit of pRBCs, pending repeat labs. POD18 pending ID clearance for PPM placement given MSI dehiscence. Continuing diuresis.     Plan:  Neurovascular:   -Pain well controlled with current regimen. PRN's: oxycodone/acetaminophen  -Anxiety: Xanax as needed     Cardiovascular:   -POD18 AVR, ascending/hemiarch replacement, frozen elephant trunk, left aorto-subclavian bypass, CABGx2 (SVG-RPDA, SVG-LCx)    -post-op AF: Amio PO, episodes of hypotension with associated AF with RVR.     -continue ASA    -tachy-bradycardia syndrome, planning for PPM on Monday with EP     -Vwires backup at 45bpm     -Sternal wound opened today, wound vac placed   -HTN: lisinopril 10mg daily   -Hemodynamically stable.   -Monitor: BP, HR, tele    Respiratory:   -Oxygenating 97% on 2lpm  -Encourage continued use of IS 10x/hr and frequent ambulation  -CXR: no acute pathology, no acute pathology     GI:  -GI PPX: pantoprazole 40mg daily   -PO Diet: Dysphagia 2 mechanical soft-thin liquids, NPO after midnight   -Bowel Regimen: senna     Renal / :  -continue with lasix 20mg IV BID + Potassium 10mEq   -Additional 40IV lasix given today   -Continue to monitor renal function: BUN/Cr 28/1.09   -Monitor I/O's daily     Endocrine:    -No hx of DM or thyroid dx  -A1c: 5.6   -TSH: 2.6     Hematologic:  -CBC: H/H- 8.9/29  -Coagulation Panel: WNL     ID:  -MSI with wound vac in place, continuing Vancomycin, Trough due before next dose at midnight tonight     - wound Cx negative to date, continue to monitor      - WBC WNL and no fevers     - pending ID consult for clearance prior to placement of PPM   -Temperature: afebrile   -CBC: WBC- 9.5  -Continue to observe for SIRS/Sepsis Syndrome.    Prophylaxis:  -DVT prophylaxis with 5000 SubQ Heparin q8h.  -Continue with SCD's b/l while patient is at rest     Disposition:  -Pending PPM placement

## 2021-08-27 NOTE — PROGRESS NOTE ADULT - ASSESSMENT
76 y.o female, current every day smoker (59 PY), withHTN, bicuspid aortic valve / severe AS with normal LVEF, and CAD.  s/p bioprosthetic AVR, ascending/hemiarch replacement, frozen elephant trunk, left aorto-subclavian bypass and CABG x 2 on 8/9/21.  Has postop paroxysmal AFIB, on Amio load.  Not yet on A/C. Concern for sick sinus.   PPM procedure was postponed yesterday given sternal wound infection (pus was expressed from wound during wound debridement). Currently has wound vac.    - Will need ID input / clearance regarding timing of ppm.  No planned PPM implant today as discussed yesterday with primary team.  Please d/c NPO order.   - Has backup pacing via epicardial wire VVI 50 bpm. Her sinus rate is currently around 50s.   76 y.o female, current every day smoker (59 PY), withHTN, bicuspid aortic valve / severe AS with normal LVEF, and CAD.  s/p bioprosthetic AVR, ascending/hemiarch replacement, frozen elephant trunk, left aorto-subclavian bypass and CABG x 2 on 8/9/21.  Has postop paroxysmal AFIB, on Amio load.  Not yet on A/C. Concern for sick sinus.   PPM procedure was postponed yesterday given sternal wound infection (pus was expressed from wound during wound debridement). Currently has wound vac.    - Will need ID input / clearance regarding timing of ppm.  No planned PPM implant today as discussed yesterday with primary team.  Please d/c NPO order.   - Has backup pacing via epicardial wire VVI 50 bpm.  Her sinus rate is currently around 50s.  I turned it down to VVI 45 bpm.  Her RV threshold is around 1 amp.

## 2021-08-27 NOTE — CONSULT NOTE ADULT - ASSESSMENT
76F h/o bicuspid AV, severe AS, CAD and aortic arch aneurysm p/w elective cardiac surgery, s/p AVR (tissue), ascending, hemiarch placement, frozen elephant trunk, L aorto-subclavian bypass, CABG x2 on 8/9/21.  Now found to have sternotomy site infection with K. oxytoca/raoutella ornithinolytica (which is GNR, similar to Klebsiella).  Currently unclear how deep the infection is and if I&D is needed or not.    - currently on vanc 1250mg IV q12h.  Check vanc trough tonight, goal 13-17  - obtain BCx x 2 sets now  - start CTX 2g IV q24h after BCx sent  - CT chest with IV contrast to r/o underlying abscess collection   - f/u WCx   - hold PPM placement until further work-up       Team 1 will follow you.  Dr. Mcneill is covering me this weekend.  Case d/w primary team.    Merissa Crystal MD, MS  Infectious Disease attending  work cell [phone]    
Assessment:  Pericardial drain          Communicated with:  Dr NICHOLE Muller. Primary Team
76 y.o female, current every day smoker (59 PY), withHTN, bicuspid aortic valve / severe AS with normal LVEF, and CAD.  s/p bioprosthetic AVR, ascending/hemiarch replacement, frozen elephant trunk, left aorto-subclavian bypass and CABG x 2 on 8/9/21.  Has postop paroxysmal AFIB with brief backup pacing at VVI 40 bpm post conversion, and occasionally her sinus rate was competing with back up rate during these conversions.  - Backup pacing was lowered to 30 bpm for now to see if she needs pacing. Continue to monitor to see if there worsening of sinus node and if there is need for permanent pacing need.  This was explained to pt and her daughter at bedside.  She is currently in NSR at 70s bpm, maintaining on Amio gtt and heparin gtt and Metoprolol 12.5 mg q6.

## 2021-08-27 NOTE — CONSULT NOTE ADULT - SUBJECTIVE AND OBJECTIVE BOX
INFECTIOUS DISEASES INITIAL CONSULT NOTE    HPI: 76F h/o bicuspid AV, severe AS, CAD and aortic arch aneurysm p/w elective cardiac surgery.  She underwent AVR (tissue), ascending, hemiarch placement, frozen elephant trunk, L aorto-subclavian bypass, CABG x2 on 8/9/21.  Course c/b FIDEL, low grade fever 100.2 on 8/12, extubated on 8/14.  She had Afib RVR with hypotension requiring pressors on 8/15 and seen by cardiology.  Around 8/24-8/25, she was found to have sternotomy drainage, which got worsened with purulent discharge and erythema around surgical site.  WCx was taken on 8/26 and wound MARIANO placed.  PPM was supposed to be placed but was postponed due to the SSI.  WCx growing K. oytoca/raoutella ornithinolytica.  ID was consulted for management of SSI and if PPM can be placed.         PAST MEDICAL & SURGICAL HISTORY:  HTN (hypertension)    H/O aortic valve stenosis    CAD (coronary artery disease)    No significant past surgical history          Review of Systems:   Constitutional, eyes, ENT, cardiovascular, respiratory, gastrointestinal, genitourinary, integumentary, neurological, psychiatric and heme/lymph are otherwise negative other than noted above       ANTIBIOTICS:  MEDICATIONS  (STANDING):  aMIOdarone    Tablet 400 milliGRAM(s) Oral every 8 hours  aMIOdarone    Tablet   Oral   aspirin  chewable 81 milliGRAM(s) Enteral Tube daily  budesonide  80 MICROgram(s)/formoterol 4.5 MICROgram(s) Inhaler 2 Puff(s) Inhalation two times a day  cefTRIAXone   IVPB 2000 milliGRAM(s) IV Intermittent every 24 hours  dextrose 5%. 1000 milliLiter(s) (50 mL/Hr) IV Continuous <Continuous>  dextrose 5%. 1000 milliLiter(s) (100 mL/Hr) IV Continuous <Continuous>  furosemide   Injectable 20 milliGRAM(s) IV Push every 12 hours  glucagon  Injectable 1 milliGRAM(s) IntraMuscular once  heparin   Injectable 5000 Unit(s) SubCutaneous every 8 hours  levalbuterol Inhalation 0.63 milliGRAM(s) Inhalation every 6 hours  lidocaine   4% Patch 1 Patch Transdermal daily  lisinopril 10 milliGRAM(s) Oral daily  pantoprazole  Injectable 40 milliGRAM(s) IV Push daily  potassium chloride    Tablet ER 10 milliEquivalent(s) Oral daily  senna 2 Tablet(s) Oral at bedtime  sodium chloride 0.9% lock flush 3 milliLiter(s) IV Push every 8 hours  testosterone patch 4 mG/24 Hr(s) 4 milliGRAM(s) Transdermal daily  testosterone patch 4 mG/24 Hr(s) 4 milliGRAM(s) Transdermal <User Schedule>  vancomycin  IVPB 1250 milliGRAM(s) IV Intermittent every 12 hours    MEDICATIONS  (PRN):  ALPRAZolam 0.25 milliGRAM(s) Oral two times a day PRN anxiety  oxycodone    5 mG/acetaminophen 325 mG 1 Tablet(s) Oral/Enteral Tube every 6 hours PRN Moderate Pain (4 - 6)      Allergies    No Known Allergies    Intolerances        SOCIAL HISTORY:    FAMILY HISTORY:  FH: CAD (coronary artery disease) (Sibling)  CABG    Family history of CKD (chronic kidney disease) (Sibling)  ESRD    Family history of diabetes mellitus (DM) (Sibling)     no FH leading to current infection    Vital Signs Last 24 Hrs  T(C): 36.3 (27 Aug 2021 18:02), Max: 36.3 (27 Aug 2021 01:00)  T(F): 97.3 (27 Aug 2021 18:02), Max: 97.4 (27 Aug 2021 05:01)  HR: 69 (27 Aug 2021 21:00) (49 - 73)  BP: 130/72 (27 Aug 2021 21:00) (97/52 - 181/75)  BP(mean): 91 (27 Aug 2021 21:00) (72 - 102)  RR: 24 (27 Aug 2021 21:00) (10 - 32)  SpO2: 90% (27 Aug 2021 21:00) (90% - 99%)    08-26-21 @ 07:01  -  08-27-21 @ 07:00  --------------------------------------------------------  IN: 1320 mL / OUT: 1730 mL / NET: -410 mL    08-27-21 @ 07:01  -  08-27-21 @ 21:21  --------------------------------------------------------  IN: 200 mL / OUT: 400 mL / NET: -200 mL        PHYSICAL EXAM:  Constitutional: alert, NAD  Eyes: the sclera and conjunctiva were normal.   ENT: the ears and nose were normal in appearance.   Neck: the appearance of the neck was normal and the neck was supple.   Pulmonary: no respiratory distress and lungs were clear to auscultation bilaterally.   Heart: heart rate was normal and rhythm regular, normal S1 and S2  Chest: sternotomy wound erythematous, purulent drainage seen under wound VAC, no ttp   Abdomen: normal bowel sounds, soft, non-tender  Neurological: no focal deficits.   Psychiatric: the affect was normal      LABS:                        8.9    9.58  )-----------( 146      ( 27 Aug 2021 06:03 )             29.2     08-27    138  |  100  |  28<H>  ----------------------------<  117<H>  4.2   |  28  |  1.09    Ca    9.2      27 Aug 2021 06:03  Phos  2.7     08-26  Mg     2.1     08-27    TPro  6.4  /  Alb  3.7  /  TBili  1.3<H>  /  DBili  x   /  AST  18  /  ALT  14  /  AlkPhos  102  08-27    PT/INR - ( 27 Aug 2021 06:03 )   PT: 13.4 sec;   INR: 1.12          PTT - ( 27 Aug 2021 06:03 )  PTT:30.9 sec      MICROBIOLOGY:  8/26 sternal WCx: K. oxytoca/raoutella ornithinolytica  8/26 sternal WCx: K. oxytoca/raoutella ornithinolytica    RADIOLOGY & ADDITIONAL STUDIES:  CXR clear

## 2021-08-27 NOTE — PROGRESS NOTE ADULT - SUBJECTIVE AND OBJECTIVE BOX
EPS Progress Note    S: OOB to chair.  No new complaints.        O: T(C): 36.2 (08-27-21 @ 10:04), Max: 36.7 (08-26-21 @ 17:33)  HR: 73 (08-27-21 @ 10:00) (49 - 74)  BP: 177/71 (08-27-21 @ 10:00) (97/52 - 182/75)  RR: 31 (08-27-21 @ 10:00) (10 - 31)  SpO2: 97% (08-27-21 @ 10:00) (95% - 100%)      TELE: NSR HR 50-60s.  Last episode of AFIB RVR was on 8/26.     PHYSICAL  Constitutional:  NAD        Pulm:  diminished BS   Cardiac:   + s1/s2, regular. Epicardial wire in place. Back up vvi 50 bpm. Sternal wound with wound vac.   GI:  +BS , soft ND/NT  Vascular: + LE edema  Neuro: AAO x 3. no focal deficit       LABS:                        8.9    9.58  )-----------( 146      ( 27 Aug 2021 06:03 )             29.2     08-27    138  |  100  |  28<H>  ----------------------------<  117<H>  4.2   |  28  |  1.09    Ca    9.2      27 Aug 2021 06:03  Phos  2.7     08-26  Mg     2.1     08-27    TPro  6.4  /  Alb  3.7  /  TBili  1.3<H>  /  DBili  x   /  AST  18  /  ALT  14  /  AlkPhos  102  08-27    PT/INR - ( 27 Aug 2021 06:03 )   PT: 13.4 sec;   INR: 1.12          PTT - ( 27 Aug 2021 06:03 )  PTT:30.9 sec    MEDICATIONS:  ALPRAZolam 0.25 milliGRAM(s) Oral two times a day PRN  aMIOdarone    Tablet 400 milliGRAM(s) Oral every 8 hours  aMIOdarone    Tablet   Oral   aspirin  chewable 81 milliGRAM(s) Enteral Tube daily  budesonide  80 MICROgram(s)/formoterol 4.5 MICROgram(s) Inhaler 2 Puff(s) Inhalation two times a day  dextrose 5%. 1000 milliLiter(s) IV Continuous <Continuous>  dextrose 5%. 1000 milliLiter(s) IV Continuous <Continuous>  furosemide   Injectable 20 milliGRAM(s) IV Push every 12 hours  glucagon  Injectable 1 milliGRAM(s) IntraMuscular once  heparin   Injectable 5000 Unit(s) SubCutaneous every 8 hours  levalbuterol Inhalation 0.63 milliGRAM(s) Inhalation every 6 hours  lidocaine   4% Patch 1 Patch Transdermal daily  lisinopril 10 milliGRAM(s) Oral daily  oxycodone    5 mG/acetaminophen 325 mG 1 Tablet(s) Oral/Enteral Tube every 6 hours PRN  pantoprazole  Injectable 40 milliGRAM(s) IV Push daily  potassium chloride    Tablet ER 10 milliEquivalent(s) Oral daily  senna 2 Tablet(s) Oral at bedtime  sodium chloride 0.9% lock flush 3 milliLiter(s) IV Push every 8 hours  testosterone patch 4 mG/24 Hr(s) 4 milliGRAM(s) Transdermal <User Schedule>  testosterone patch 4 mG/24 Hr(s) 4 milliGRAM(s) Transdermal daily  vancomycin  IVPB 1250 milliGRAM(s) IV Intermittent every 12 hours

## 2021-08-28 LAB
-  AMPICILLIN/SULBACTAM: SIGNIFICANT CHANGE UP
-  AMPICILLIN/SULBACTAM: SIGNIFICANT CHANGE UP
-  AMPICILLIN: SIGNIFICANT CHANGE UP
-  CEFAZOLIN: SIGNIFICANT CHANGE UP
-  CEFAZOLIN: SIGNIFICANT CHANGE UP
-  CEFTRIAXONE: SIGNIFICANT CHANGE UP
-  CEFTRIAXONE: SIGNIFICANT CHANGE UP
-  CIPROFLOXACIN: SIGNIFICANT CHANGE UP
-  CIPROFLOXACIN: SIGNIFICANT CHANGE UP
-  ERTAPENEM: SIGNIFICANT CHANGE UP
-  ERTAPENEM: SIGNIFICANT CHANGE UP
-  GENTAMICIN: SIGNIFICANT CHANGE UP
-  GENTAMICIN: SIGNIFICANT CHANGE UP
-  PIPERACILLIN/TAZOBACTAM: SIGNIFICANT CHANGE UP
-  PIPERACILLIN/TAZOBACTAM: SIGNIFICANT CHANGE UP
-  TOBRAMYCIN: SIGNIFICANT CHANGE UP
-  TOBRAMYCIN: SIGNIFICANT CHANGE UP
-  TRIMETHOPRIM/SULFAMETHOXAZOLE: SIGNIFICANT CHANGE UP
-  TRIMETHOPRIM/SULFAMETHOXAZOLE: SIGNIFICANT CHANGE UP
ALBUMIN SERPL ELPH-MCNC: 3.5 G/DL — SIGNIFICANT CHANGE UP (ref 3.3–5)
ALP SERPL-CCNC: 96 U/L — SIGNIFICANT CHANGE UP (ref 40–120)
ALT FLD-CCNC: 14 U/L — SIGNIFICANT CHANGE UP (ref 10–45)
ANION GAP SERPL CALC-SCNC: 11 MMOL/L — SIGNIFICANT CHANGE UP (ref 5–17)
AST SERPL-CCNC: 20 U/L — SIGNIFICANT CHANGE UP (ref 10–40)
BILIRUB SERPL-MCNC: 1.2 MG/DL — SIGNIFICANT CHANGE UP (ref 0.2–1.2)
BUN SERPL-MCNC: 23 MG/DL — SIGNIFICANT CHANGE UP (ref 7–23)
CALCIUM SERPL-MCNC: 9 MG/DL — SIGNIFICANT CHANGE UP (ref 8.4–10.5)
CHLORIDE SERPL-SCNC: 97 MMOL/L — SIGNIFICANT CHANGE UP (ref 96–108)
CO2 SERPL-SCNC: 28 MMOL/L — SIGNIFICANT CHANGE UP (ref 22–31)
CREAT SERPL-MCNC: 1 MG/DL — SIGNIFICANT CHANGE UP (ref 0.5–1.3)
GLUCOSE BLDC GLUCOMTR-MCNC: 109 MG/DL — HIGH (ref 70–99)
GLUCOSE SERPL-MCNC: 99 MG/DL — SIGNIFICANT CHANGE UP (ref 70–99)
HCT VFR BLD CALC: 26.9 % — LOW (ref 34.5–45)
HGB BLD-MCNC: 8.3 G/DL — LOW (ref 11.5–15.5)
MAGNESIUM SERPL-MCNC: 2.1 MG/DL — SIGNIFICANT CHANGE UP (ref 1.6–2.6)
MCHC RBC-ENTMCNC: 29.9 PG — SIGNIFICANT CHANGE UP (ref 27–34)
MCHC RBC-ENTMCNC: 30.9 GM/DL — LOW (ref 32–36)
MCV RBC AUTO: 96.8 FL — SIGNIFICANT CHANGE UP (ref 80–100)
METHOD TYPE: SIGNIFICANT CHANGE UP
METHOD TYPE: SIGNIFICANT CHANGE UP
NRBC # BLD: 0 /100 WBCS — SIGNIFICANT CHANGE UP (ref 0–0)
PLATELET # BLD AUTO: 133 K/UL — LOW (ref 150–400)
POTASSIUM SERPL-MCNC: 3.9 MMOL/L — SIGNIFICANT CHANGE UP (ref 3.5–5.3)
POTASSIUM SERPL-SCNC: 3.9 MMOL/L — SIGNIFICANT CHANGE UP (ref 3.5–5.3)
PROT SERPL-MCNC: 6.2 G/DL — SIGNIFICANT CHANGE UP (ref 6–8.3)
RBC # BLD: 2.78 M/UL — LOW (ref 3.8–5.2)
RBC # FLD: 16.4 % — HIGH (ref 10.3–14.5)
SODIUM SERPL-SCNC: 136 MMOL/L — SIGNIFICANT CHANGE UP (ref 135–145)
VANCOMYCIN TROUGH SERPL-MCNC: 16.3 UG/ML — SIGNIFICANT CHANGE UP (ref 10–20)
VANCOMYCIN TROUGH SERPL-MCNC: 20.5 UG/ML — HIGH (ref 10–20)
VANCOMYCIN TROUGH SERPL-MCNC: 29.5 UG/ML — CRITICAL HIGH (ref 10–20)
WBC # BLD: 8.39 K/UL — SIGNIFICANT CHANGE UP (ref 3.8–10.5)
WBC # FLD AUTO: 8.39 K/UL — SIGNIFICANT CHANGE UP (ref 3.8–10.5)

## 2021-08-28 PROCEDURE — 71045 X-RAY EXAM CHEST 1 VIEW: CPT | Mod: 26

## 2021-08-28 PROCEDURE — 71260 CT THORAX DX C+: CPT | Mod: 26

## 2021-08-28 RX ORDER — ACETAMINOPHEN 500 MG
650 TABLET ORAL EVERY 6 HOURS
Refills: 0 | Status: DISCONTINUED | OUTPATIENT
Start: 2021-08-28 | End: 2021-09-03

## 2021-08-28 RX ORDER — VANCOMYCIN HCL 1 G
750 VIAL (EA) INTRAVENOUS EVERY 12 HOURS
Refills: 0 | Status: DISCONTINUED | OUTPATIENT
Start: 2021-08-29 | End: 2021-08-30

## 2021-08-28 RX ORDER — POTASSIUM CHLORIDE 20 MEQ
20 PACKET (EA) ORAL ONCE
Refills: 0 | Status: COMPLETED | OUTPATIENT
Start: 2021-08-28 | End: 2021-08-28

## 2021-08-28 RX ADMIN — SENNA PLUS 2 TABLET(S): 8.6 TABLET ORAL at 21:50

## 2021-08-28 RX ADMIN — LEVALBUTEROL 0.63 MILLIGRAM(S): 1.25 SOLUTION, CONCENTRATE RESPIRATORY (INHALATION) at 19:31

## 2021-08-28 RX ADMIN — Medication 20 MILLIEQUIVALENT(S): at 10:35

## 2021-08-28 RX ADMIN — CEFTRIAXONE 100 MILLIGRAM(S): 500 INJECTION, POWDER, FOR SOLUTION INTRAMUSCULAR; INTRAVENOUS at 21:50

## 2021-08-28 RX ADMIN — Medication 4 MILLIGRAM(S): at 18:05

## 2021-08-28 RX ADMIN — SODIUM CHLORIDE 3 MILLILITER(S): 9 INJECTION INTRAMUSCULAR; INTRAVENOUS; SUBCUTANEOUS at 05:38

## 2021-08-28 RX ADMIN — Medication 250 MILLIGRAM(S): at 23:56

## 2021-08-28 RX ADMIN — Medication 4 MILLIGRAM(S): at 11:49

## 2021-08-28 RX ADMIN — SODIUM CHLORIDE 3 MILLILITER(S): 9 INJECTION INTRAMUSCULAR; INTRAVENOUS; SUBCUTANEOUS at 21:11

## 2021-08-28 RX ADMIN — HEPARIN SODIUM 5000 UNIT(S): 5000 INJECTION INTRAVENOUS; SUBCUTANEOUS at 05:52

## 2021-08-28 RX ADMIN — LIDOCAINE 1 PATCH: 4 CREAM TOPICAL at 18:05

## 2021-08-28 RX ADMIN — AMIODARONE HYDROCHLORIDE 400 MILLIGRAM(S): 400 TABLET ORAL at 05:51

## 2021-08-28 RX ADMIN — HEPARIN SODIUM 5000 UNIT(S): 5000 INJECTION INTRAVENOUS; SUBCUTANEOUS at 14:17

## 2021-08-28 RX ADMIN — OXYCODONE AND ACETAMINOPHEN 1 TABLET(S): 5; 325 TABLET ORAL at 21:40

## 2021-08-28 RX ADMIN — Medication 650 MILLIGRAM(S): at 18:06

## 2021-08-28 RX ADMIN — LIDOCAINE 1 PATCH: 4 CREAM TOPICAL at 22:52

## 2021-08-28 RX ADMIN — LEVALBUTEROL 0.63 MILLIGRAM(S): 1.25 SOLUTION, CONCENTRATE RESPIRATORY (INHALATION) at 23:56

## 2021-08-28 RX ADMIN — Medication 20 MILLIGRAM(S): at 05:51

## 2021-08-28 RX ADMIN — BUDESONIDE AND FORMOTEROL FUMARATE DIHYDRATE 2 PUFF(S): 160; 4.5 AEROSOL RESPIRATORY (INHALATION) at 08:52

## 2021-08-28 RX ADMIN — Medication 4 MILLIGRAM(S): at 07:36

## 2021-08-28 RX ADMIN — LIDOCAINE 1 PATCH: 4 CREAM TOPICAL at 01:02

## 2021-08-28 RX ADMIN — HEPARIN SODIUM 5000 UNIT(S): 5000 INJECTION INTRAVENOUS; SUBCUTANEOUS at 21:50

## 2021-08-28 RX ADMIN — LEVALBUTEROL 0.63 MILLIGRAM(S): 1.25 SOLUTION, CONCENTRATE RESPIRATORY (INHALATION) at 05:50

## 2021-08-28 RX ADMIN — Medication 10 MILLIEQUIVALENT(S): at 11:49

## 2021-08-28 RX ADMIN — Medication 81 MILLIGRAM(S): at 11:49

## 2021-08-28 RX ADMIN — Medication 4 MILLIGRAM(S): at 12:07

## 2021-08-28 RX ADMIN — Medication 650 MILLIGRAM(S): at 14:30

## 2021-08-28 RX ADMIN — SODIUM CHLORIDE 3 MILLILITER(S): 9 INJECTION INTRAMUSCULAR; INTRAVENOUS; SUBCUTANEOUS at 14:11

## 2021-08-28 RX ADMIN — Medication 4 MILLIGRAM(S): at 14:11

## 2021-08-28 RX ADMIN — LEVALBUTEROL 0.63 MILLIGRAM(S): 1.25 SOLUTION, CONCENTRATE RESPIRATORY (INHALATION) at 12:10

## 2021-08-28 RX ADMIN — PANTOPRAZOLE SODIUM 40 MILLIGRAM(S): 20 TABLET, DELAYED RELEASE ORAL at 11:49

## 2021-08-28 RX ADMIN — BUDESONIDE AND FORMOTEROL FUMARATE DIHYDRATE 2 PUFF(S): 160; 4.5 AEROSOL RESPIRATORY (INHALATION) at 21:56

## 2021-08-28 RX ADMIN — Medication 20 MILLIGRAM(S): at 17:59

## 2021-08-28 RX ADMIN — LIDOCAINE 1 PATCH: 4 CREAM TOPICAL at 11:50

## 2021-08-28 RX ADMIN — LISINOPRIL 10 MILLIGRAM(S): 2.5 TABLET ORAL at 05:51

## 2021-08-28 RX ADMIN — OXYCODONE AND ACETAMINOPHEN 1 TABLET(S): 5; 325 TABLET ORAL at 20:40

## 2021-08-28 RX ADMIN — Medication 4 MILLIGRAM(S): at 14:17

## 2021-08-28 NOTE — PROGRESS NOTE ADULT - SUBJECTIVE AND OBJECTIVE BOX
Patient discussed on morning rounds with Dr. Bell    Operation / Date: 8/9/21 -- AVR, ascending and hemiarch replacement, frozen elephant trunk, left aorto-subclavian bypass, CABGx3 (SVG-RPDA and SVG-LCx), EF 75%     SUBJECTIVE ASSESSMENT:  76y Female seen and examined at bedside.  Patient ambulated with PT today.  Complaining of pain with sternal incision when lying flat.  Denies chest pain, shortness of breath, nausea, vomiting.        Vital Signs Last 24 Hrs  T(C): 36.2 (28 Aug 2021 10:00), Max: 36.6 (28 Aug 2021 01:19)  T(F): 97.2 (28 Aug 2021 10:00), Max: 97.9 (28 Aug 2021 01:19)  HR: 69 (28 Aug 2021 11:23) (49 - 70)  BP: 159/68 (28 Aug 2021 11:23) (113/56 - 181/75)  BP(mean): 98 (28 Aug 2021 11:23) (80 - 103)  RR: 28 (28 Aug 2021 11:23) (12 - 28)  SpO2: 91% (28 Aug 2021 11:23) (90% - 97%)  I&O's Detail    27 Aug 2021 07:01  -  28 Aug 2021 07:00  --------------------------------------------------------  IN:    Oral Fluid: 200 mL  Total IN: 200 mL    OUT:    Drain (mL): 0 mL    Voided (mL): 1000 mL  Total OUT: 1000 mL    Total NET: -800 mL          CHEST TUBE:  no  FRANCHESKA DRAIN:  no  EPICARDIAL WIRES: no  TIE DOWNS: no  REGALADO: no    PHYSICAL EXAM:  General: well appearing sitting in chair in NAD   Neurological: AOx3. Motor skills grossly intact  Cardiovascular: Normal S1/S2. Regular rate/rhythm. No murmurs  Respiratory: Lungs CTA bilaterally. No wheezing or rales  Gastrointestinal: +BS in all 4 quadrants. Non-distended. Soft. Non-tender  Extremities: 3+ LE edema b/l. No calf tenderness   Vascular: Radial 2+bilaterally, DP 2+ b/l  Incision Sites: sternotomy with wound vac in place.     LABS:                        8.3    8.39  )-----------( 133      ( 28 Aug 2021 06:13 )             26.9       COUMADIN:  No. REASON: .    PT/INR - ( 27 Aug 2021 06:03 )   PT: 13.4 sec;   INR: 1.12          PTT - ( 27 Aug 2021 06:03 )  PTT:30.9 sec    08-28    136  |  97  |  23  ----------------------------<  99  3.9   |  28  |  1.00    Ca    9.0      28 Aug 2021 06:13  Mg     2.1     08-28    TPro  6.2  /  Alb  3.5  /  TBili  1.2  /  DBili  x   /  AST  20  /  ALT  14  /  AlkPhos  96  08-28          MEDICATIONS  (STANDING):  aspirin  chewable 81 milliGRAM(s) Enteral Tube daily  budesonide  80 MICROgram(s)/formoterol 4.5 MICROgram(s) Inhaler 2 Puff(s) Inhalation two times a day  cefTRIAXone   IVPB 2000 milliGRAM(s) IV Intermittent every 24 hours  dextrose 5%. 1000 milliLiter(s) (50 mL/Hr) IV Continuous <Continuous>  dextrose 5%. 1000 milliLiter(s) (100 mL/Hr) IV Continuous <Continuous>  furosemide   Injectable 20 milliGRAM(s) IV Push every 12 hours  glucagon  Injectable 1 milliGRAM(s) IntraMuscular once  heparin   Injectable 5000 Unit(s) SubCutaneous every 8 hours  levalbuterol Inhalation 0.63 milliGRAM(s) Inhalation every 6 hours  lidocaine   4% Patch 1 Patch Transdermal daily  lisinopril 10 milliGRAM(s) Oral daily  pantoprazole  Injectable 40 milliGRAM(s) IV Push daily  potassium chloride    Tablet ER 10 milliEquivalent(s) Oral daily  senna 2 Tablet(s) Oral at bedtime  sodium chloride 0.9% lock flush 3 milliLiter(s) IV Push every 8 hours  testosterone patch 4 mG/24 Hr(s) 4 milliGRAM(s) Transdermal daily  testosterone patch 4 mG/24 Hr(s) 4 milliGRAM(s) Transdermal <User Schedule>    MEDICATIONS  (PRN):  ALPRAZolam 0.25 milliGRAM(s) Oral two times a day PRN anxiety  oxycodone    5 mG/acetaminophen 325 mG 1 Tablet(s) Oral/Enteral Tube every 6 hours PRN Moderate Pain (4 - 6)        RADIOLOGY & ADDITIONAL TESTS:  < from: Xray Chest 1 View- PORTABLE-Routine (Xray Chest 1 View- PORTABLE-Routine in AM.) (08.28.21 @ 05:24) >   Small right effusion    < end of copied text >

## 2021-08-29 LAB
ANION GAP SERPL CALC-SCNC: 11 MMOL/L — SIGNIFICANT CHANGE UP (ref 5–17)
BUN SERPL-MCNC: 20 MG/DL — SIGNIFICANT CHANGE UP (ref 7–23)
CALCIUM SERPL-MCNC: 9.2 MG/DL — SIGNIFICANT CHANGE UP (ref 8.4–10.5)
CHLORIDE SERPL-SCNC: 98 MMOL/L — SIGNIFICANT CHANGE UP (ref 96–108)
CO2 SERPL-SCNC: 27 MMOL/L — SIGNIFICANT CHANGE UP (ref 22–31)
CREAT SERPL-MCNC: 1 MG/DL — SIGNIFICANT CHANGE UP (ref 0.5–1.3)
GLUCOSE SERPL-MCNC: 99 MG/DL — SIGNIFICANT CHANGE UP (ref 70–99)
HCT VFR BLD CALC: 30.8 % — LOW (ref 34.5–45)
HGB BLD-MCNC: 9.4 G/DL — LOW (ref 11.5–15.5)
MAGNESIUM SERPL-MCNC: 2.1 MG/DL — SIGNIFICANT CHANGE UP (ref 1.6–2.6)
MCHC RBC-ENTMCNC: 30 PG — SIGNIFICANT CHANGE UP (ref 27–34)
MCHC RBC-ENTMCNC: 30.5 GM/DL — LOW (ref 32–36)
MCV RBC AUTO: 98.4 FL — SIGNIFICANT CHANGE UP (ref 80–100)
NRBC # BLD: 0 /100 WBCS — SIGNIFICANT CHANGE UP (ref 0–0)
PLATELET # BLD AUTO: 128 K/UL — LOW (ref 150–400)
POTASSIUM SERPL-MCNC: 4 MMOL/L — SIGNIFICANT CHANGE UP (ref 3.5–5.3)
POTASSIUM SERPL-SCNC: 4 MMOL/L — SIGNIFICANT CHANGE UP (ref 3.5–5.3)
RBC # BLD: 3.13 M/UL — LOW (ref 3.8–5.2)
RBC # FLD: 16.4 % — HIGH (ref 10.3–14.5)
SODIUM SERPL-SCNC: 136 MMOL/L — SIGNIFICANT CHANGE UP (ref 135–145)
WBC # BLD: 8.75 K/UL — SIGNIFICANT CHANGE UP (ref 3.8–10.5)
WBC # FLD AUTO: 8.75 K/UL — SIGNIFICANT CHANGE UP (ref 3.8–10.5)

## 2021-08-29 PROCEDURE — 99232 SBSQ HOSP IP/OBS MODERATE 35: CPT

## 2021-08-29 PROCEDURE — 71045 X-RAY EXAM CHEST 1 VIEW: CPT | Mod: 26

## 2021-08-29 RX ORDER — POLYETHYLENE GLYCOL 3350 17 G/17G
17 POWDER, FOR SOLUTION ORAL DAILY
Refills: 0 | Status: DISCONTINUED | OUTPATIENT
Start: 2021-08-29 | End: 2021-09-03

## 2021-08-29 RX ORDER — AMIODARONE HYDROCHLORIDE 400 MG/1
200 TABLET ORAL DAILY
Refills: 0 | Status: DISCONTINUED | OUTPATIENT
Start: 2021-08-29 | End: 2021-09-03

## 2021-08-29 RX ADMIN — Medication 4 MILLIGRAM(S): at 18:52

## 2021-08-29 RX ADMIN — Medication 4 MILLIGRAM(S): at 08:13

## 2021-08-29 RX ADMIN — Medication 4 MILLIGRAM(S): at 14:06

## 2021-08-29 RX ADMIN — Medication 4 MILLIGRAM(S): at 11:43

## 2021-08-29 RX ADMIN — Medication 650 MILLIGRAM(S): at 19:23

## 2021-08-29 RX ADMIN — Medication 250 MILLIGRAM(S): at 13:23

## 2021-08-29 RX ADMIN — Medication 650 MILLIGRAM(S): at 10:36

## 2021-08-29 RX ADMIN — LISINOPRIL 10 MILLIGRAM(S): 2.5 TABLET ORAL at 06:15

## 2021-08-29 RX ADMIN — Medication 650 MILLIGRAM(S): at 09:18

## 2021-08-29 RX ADMIN — LEVALBUTEROL 0.63 MILLIGRAM(S): 1.25 SOLUTION, CONCENTRATE RESPIRATORY (INHALATION) at 12:22

## 2021-08-29 RX ADMIN — POLYETHYLENE GLYCOL 3350 17 GRAM(S): 17 POWDER, FOR SOLUTION ORAL at 18:17

## 2021-08-29 RX ADMIN — Medication 20 MILLIGRAM(S): at 06:15

## 2021-08-29 RX ADMIN — SENNA PLUS 2 TABLET(S): 8.6 TABLET ORAL at 21:30

## 2021-08-29 RX ADMIN — SODIUM CHLORIDE 3 MILLILITER(S): 9 INJECTION INTRAMUSCULAR; INTRAVENOUS; SUBCUTANEOUS at 21:24

## 2021-08-29 RX ADMIN — Medication 4 MILLIGRAM(S): at 08:14

## 2021-08-29 RX ADMIN — LIDOCAINE 1 PATCH: 4 CREAM TOPICAL at 12:48

## 2021-08-29 RX ADMIN — Medication 81 MILLIGRAM(S): at 12:20

## 2021-08-29 RX ADMIN — LIDOCAINE 1 PATCH: 4 CREAM TOPICAL at 18:50

## 2021-08-29 RX ADMIN — PANTOPRAZOLE SODIUM 40 MILLIGRAM(S): 20 TABLET, DELAYED RELEASE ORAL at 12:20

## 2021-08-29 RX ADMIN — BUDESONIDE AND FORMOTEROL FUMARATE DIHYDRATE 2 PUFF(S): 160; 4.5 AEROSOL RESPIRATORY (INHALATION) at 20:56

## 2021-08-29 RX ADMIN — AMIODARONE HYDROCHLORIDE 200 MILLIGRAM(S): 400 TABLET ORAL at 12:49

## 2021-08-29 RX ADMIN — BUDESONIDE AND FORMOTEROL FUMARATE DIHYDRATE 2 PUFF(S): 160; 4.5 AEROSOL RESPIRATORY (INHALATION) at 09:13

## 2021-08-29 RX ADMIN — OXYCODONE AND ACETAMINOPHEN 1 TABLET(S): 5; 325 TABLET ORAL at 21:54

## 2021-08-29 RX ADMIN — LEVALBUTEROL 0.63 MILLIGRAM(S): 1.25 SOLUTION, CONCENTRATE RESPIRATORY (INHALATION) at 06:13

## 2021-08-29 RX ADMIN — Medication 20 MILLIGRAM(S): at 18:17

## 2021-08-29 RX ADMIN — LEVALBUTEROL 0.63 MILLIGRAM(S): 1.25 SOLUTION, CONCENTRATE RESPIRATORY (INHALATION) at 18:17

## 2021-08-29 RX ADMIN — HEPARIN SODIUM 5000 UNIT(S): 5000 INJECTION INTRAVENOUS; SUBCUTANEOUS at 21:30

## 2021-08-29 RX ADMIN — Medication 650 MILLIGRAM(S): at 18:20

## 2021-08-29 RX ADMIN — SODIUM CHLORIDE 3 MILLILITER(S): 9 INJECTION INTRAMUSCULAR; INTRAVENOUS; SUBCUTANEOUS at 14:06

## 2021-08-29 RX ADMIN — Medication 10 MILLIEQUIVALENT(S): at 12:20

## 2021-08-29 RX ADMIN — CEFTRIAXONE 100 MILLIGRAM(S): 500 INJECTION, POWDER, FOR SOLUTION INTRAMUSCULAR; INTRAVENOUS at 21:30

## 2021-08-29 RX ADMIN — HEPARIN SODIUM 5000 UNIT(S): 5000 INJECTION INTRAVENOUS; SUBCUTANEOUS at 14:00

## 2021-08-29 RX ADMIN — HEPARIN SODIUM 5000 UNIT(S): 5000 INJECTION INTRAVENOUS; SUBCUTANEOUS at 06:16

## 2021-08-29 RX ADMIN — Medication 4 MILLIGRAM(S): at 11:41

## 2021-08-29 RX ADMIN — SODIUM CHLORIDE 3 MILLILITER(S): 9 INJECTION INTRAMUSCULAR; INTRAVENOUS; SUBCUTANEOUS at 05:22

## 2021-08-29 RX ADMIN — Medication 4 MILLIGRAM(S): at 14:09

## 2021-08-29 RX ADMIN — OXYCODONE AND ACETAMINOPHEN 1 TABLET(S): 5; 325 TABLET ORAL at 20:54

## 2021-08-29 NOTE — PROGRESS NOTE ADULT - ASSESSMENT
IMPRESSION:  Sternotomy site infection secondary to Klebsiella.  Clinically stable.  Will be going for repeat debridement tomorrow    Recommend:  1.  Continue Ceftriaxone 2 grams IV daily  2.  Continue Vancomycin 750 mg IV q12 for now   3.  Please send tissue/fluid from any further debridement for culture. If no evidence of gram positive infection, can likely stop vancomycin    ID team 1 will follow.  Dr. Crystal returns on 8/30/21

## 2021-08-29 NOTE — PROGRESS NOTE ADULT - SUBJECTIVE AND OBJECTIVE BOX
Patient discussed on morning rounds with Dr. Bell    Operation / Date: 8/9/21 -- AVR, ascending and hemiarch replacement, frozen elephant trunk, left aorto-subclavian bypass, CABGx3 (SVG-RPDA and SVG-LCx), EF 75%     SUBJECTIVE ASSESSMENT:  76y Female seen and examined at bedside.  Patient with no complaints.  Denies chest pain, shortness of breath, nausea, vomiting.        Vital Signs Last 24 Hrs  T(C): 36.3 (29 Aug 2021 10:29), Max: 37.1 (28 Aug 2021 22:37)  T(F): 97.4 (29 Aug 2021 10:29), Max: 98.7 (28 Aug 2021 22:37)  HR: 59 (29 Aug 2021 11:00) (49 - 68)  BP: 139/64 (29 Aug 2021 11:00) (113/58 - 163/70)  BP(mean): 92 (29 Aug 2021 11:00) (81 - 101)  RR: 22 (29 Aug 2021 11:00) (12 - 33)  SpO2: 94% (29 Aug 2021 11:00) (91% - 98%)  I&O's Detail    28 Aug 2021 07:01  -  29 Aug 2021 07:00  --------------------------------------------------------  IN:    IV PiggyBack: 300 mL    Oral Fluid: 125 mL  Total IN: 425 mL    OUT:    Drain (mL): 25 mL    Voided (mL): 1550 mL  Total OUT: 1575 mL    Total NET: -1150 mL      29 Aug 2021 07:01  -  29 Aug 2021 12:02  --------------------------------------------------------  IN:    Oral Fluid: 175 mL  Total IN: 175 mL    OUT:    Voided (mL): 400 mL  Total OUT: 400 mL    Total NET: -225 mL          CHEST TUBE:  No.   FRANCHESKA DRAIN:  No.  EPICARDIAL WIRES: Yes.  TIE DOWNS: Yes.  REGALADO: No.    PHYSICAL EXAM:    General: well appearing sitting in chair in NAD   Neurological: AOx3. Motor skills grossly intact  Cardiovascular: Normal S1/S2. Regular rate/rhythm. No murmurs  Respiratory: Lungs CTA bilaterally. No wheezing or rales  Gastrointestinal: +BS in all 4 quadrants. Non-distended. Soft. Non-tender  Extremities: 3+ LE edema b/l. No calf tenderness   Vascular: Radial 2+bilaterally, DP 2+ b/l  Incision Sites: sternotomy with wound vac in place.       LABS:                        9.4    8.75  )-----------( 128      ( 29 Aug 2021 08:08 )             30.8       COUMADIN:  No. REASON: .        08-29    136  |  98  |  20  ----------------------------<  99  4.0   |  27  |  1.00    Ca    9.2      29 Aug 2021 08:08  Mg     2.1     08-29    TPro  6.2  /  Alb  3.5  /  TBili  1.2  /  DBili  x   /  AST  20  /  ALT  14  /  AlkPhos  96  08-28          MEDICATIONS  (STANDING):  aMIOdarone    Tablet 200 milliGRAM(s) Oral daily  aspirin  chewable 81 milliGRAM(s) Enteral Tube daily  budesonide  80 MICROgram(s)/formoterol 4.5 MICROgram(s) Inhaler 2 Puff(s) Inhalation two times a day  cefTRIAXone   IVPB 2000 milliGRAM(s) IV Intermittent every 24 hours  dextrose 5%. 1000 milliLiter(s) (50 mL/Hr) IV Continuous <Continuous>  dextrose 5%. 1000 milliLiter(s) (100 mL/Hr) IV Continuous <Continuous>  furosemide   Injectable 20 milliGRAM(s) IV Push every 12 hours  glucagon  Injectable 1 milliGRAM(s) IntraMuscular once  heparin   Injectable 5000 Unit(s) SubCutaneous every 8 hours  levalbuterol Inhalation 0.63 milliGRAM(s) Inhalation every 6 hours  lidocaine   4% Patch 1 Patch Transdermal daily  lisinopril 10 milliGRAM(s) Oral daily  pantoprazole  Injectable 40 milliGRAM(s) IV Push daily  potassium chloride    Tablet ER 10 milliEquivalent(s) Oral daily  senna 2 Tablet(s) Oral at bedtime  sodium chloride 0.9% lock flush 3 milliLiter(s) IV Push every 8 hours  testosterone patch 4 mG/24 Hr(s) 4 milliGRAM(s) Transdermal daily  testosterone patch 4 mG/24 Hr(s) 4 milliGRAM(s) Transdermal <User Schedule>  vancomycin  IVPB 750 milliGRAM(s) IV Intermittent every 12 hours    MEDICATIONS  (PRN):  acetaminophen   Tablet .. 650 milliGRAM(s) Oral every 6 hours PRN Severe Pain (7 - 10)  ALPRAZolam 0.25 milliGRAM(s) Oral two times a day PRN anxiety  oxycodone    5 mG/acetaminophen 325 mG 1 Tablet(s) Oral/Enteral Tube every 6 hours PRN Moderate Pain (4 - 6)        RADIOLOGY & ADDITIONAL TESTS:    8/29/21: no acute pathology

## 2021-08-29 NOTE — PROGRESS NOTE ADULT - SUBJECTIVE AND OBJECTIVE BOX
INTERVAL HPI/OVERNIGHT EVENTS:    Coverage for Dr. Crystal (Team 1)    Patient was seen and examined at bedside.  No complaints.  Tolerating antibiotics.  Patient will be getting further debridement tomorrow    CONSTITUTIONAL:  Negative fever or chills, feels well, good appetite  EYES:  Negative  blurry vision or double vision  CARDIOVASCULAR:  Negative for chest pain or palpitations  RESPIRATORY:  Negative for cough, wheezing, or SOB   GASTROINTESTINAL:  Negative for nausea, vomiting, diarrhea, constipation, or abdominal pain  GENITOURINARY:  Negative frequency, urgency or dysuria  NEUROLOGIC:  No headache, confusion, dizziness, lightheadedness      ANTIBIOTICS/RELEVANT:    MEDICATIONS  (STANDING):  aMIOdarone    Tablet 200 milliGRAM(s) Oral daily  aspirin  chewable 81 milliGRAM(s) Enteral Tube daily  budesonide  80 MICROgram(s)/formoterol 4.5 MICROgram(s) Inhaler 2 Puff(s) Inhalation two times a day  cefTRIAXone   IVPB 2000 milliGRAM(s) IV Intermittent every 24 hours  dextrose 5%. 1000 milliLiter(s) (50 mL/Hr) IV Continuous <Continuous>  dextrose 5%. 1000 milliLiter(s) (100 mL/Hr) IV Continuous <Continuous>  furosemide   Injectable 20 milliGRAM(s) IV Push every 12 hours  glucagon  Injectable 1 milliGRAM(s) IntraMuscular once  heparin   Injectable 5000 Unit(s) SubCutaneous every 8 hours  levalbuterol Inhalation 0.63 milliGRAM(s) Inhalation every 6 hours  lidocaine   4% Patch 1 Patch Transdermal daily  lisinopril 10 milliGRAM(s) Oral daily  pantoprazole  Injectable 40 milliGRAM(s) IV Push daily  potassium chloride    Tablet ER 10 milliEquivalent(s) Oral daily  senna 2 Tablet(s) Oral at bedtime  sodium chloride 0.9% lock flush 3 milliLiter(s) IV Push every 8 hours  testosterone patch 4 mG/24 Hr(s) 4 milliGRAM(s) Transdermal daily  testosterone patch 4 mG/24 Hr(s) 4 milliGRAM(s) Transdermal <User Schedule>  vancomycin  IVPB 750 milliGRAM(s) IV Intermittent every 12 hours    MEDICATIONS  (PRN):  acetaminophen   Tablet .. 650 milliGRAM(s) Oral every 6 hours PRN Severe Pain (7 - 10)  ALPRAZolam 0.25 milliGRAM(s) Oral two times a day PRN anxiety  oxycodone    5 mG/acetaminophen 325 mG 1 Tablet(s) Oral/Enteral Tube every 6 hours PRN Moderate Pain (4 - 6)        Vital Signs Last 24 Hrs  T(C): 36.3 (29 Aug 2021 10:29), Max: 37.1 (28 Aug 2021 22:37)  T(F): 97.4 (29 Aug 2021 10:29), Max: 98.7 (28 Aug 2021 22:37)  HR: 59 (29 Aug 2021 11:00) (49 - 68)  BP: 139/64 (29 Aug 2021 11:00) (113/58 - 163/70)  BP(mean): 92 (29 Aug 2021 11:00) (81 - 101)  RR: 22 (29 Aug 2021 11:00) (12 - 33)  SpO2: 94% (29 Aug 2021 11:00) (91% - 98%)    PHYSICAL EXAM:  Constitutional:  non-toxic, no distress  Eyes: no icterus  Ear/Nose/Throat: no oral lesion 	  Neck:  supple  Respiratory: CTA marcia, wound vac in place   Cardiovascular: S1S2 RRR, no murmurs  Gastrointestinal:soft, (+) BS, no HSM  Extremities:no e/e/c  Vascular: DP Pulse:	right normal; left normal      LABS:                        9.4    8.75  )-----------( 128      ( 29 Aug 2021 08:08 )             30.8     08-29    136  |  98  |  20  ----------------------------<  99  4.0   |  27  |  1.00    Ca    9.2      29 Aug 2021 08:08  Mg     2.1     08-29    TPro  6.2  /  Alb  3.5  /  TBili  1.2  /  DBili  x   /  AST  20  /  ALT  14  /  AlkPhos  96  08-28          MICROBIOLOGY:    Culture - Blood (08.27.21 @ 21:40)    Specimen Source: .Blood Blood    Culture Results:   No growth at 1 day.        Culture - Blood (08.27.21 @ 21:39)    Specimen Source: .Blood Blood    Culture Results:   No growth at 1 day.      Culture - Other (08.26.21 @ 13:00)    -  Trimethoprim/Sulfamethoxazole: S <=0.5/9.5    Gram Stain:   No organisms seen  Few WBC's    -  Ampicillin: R >16 These ampicillin results predict results for amoxicillin    -  Ampicillin/Sulbactam: S <=4/2 Enterobacter, Citrobacter, and Serratia may develop resistance during prolonged therapy (3-4 days)    -  Cefazolin: S 4 Enterobacter, Citrobacter, and Serratia may develop resistance during prolonged therapy (3-4 days)    -  Ceftriaxone: S <=1 Enterobacter, Citrobacter, and Serratia may develop resistance during prolonged therapy    -  Ciprofloxacin: S <=0.25    -  Ertapenem: S <=0.5    -  Gentamicin: S <=2    -  Piperacillin/Tazobactam: S <=8    -  Tobramycin: S <=2    Specimen Source: .Other sternal wound    Culture Results:   Rare Klebsiella oxytoca/Raoutella ornithinolytica  Culture being held to rule out anaerobes.    Organism Identification: Klebsiella oxytoca /Raoutella ornithinolytica    Organism: Klebsiella oxytoca /Raoutella ornithinolytica    Method Type: [facility]      RADIOLOGY & ADDITIONAL STUDIES:    < from: CT Chest w/ IV Cont (08.28.21 @ 11:24) >  IMPRESSION:  1. Postoperative changes involving the aortic arch and left subclavian artery with patent bypass graft  in place, as detailed above.  2. Moderately dense small degree of fluid in the anterior mediastinum, suspected sequela of  postoperative change.  3. Moderately dense small pericardial effusion, likely partially hemorrhagic.  4. Smallbilateral pleural effusions.  5. Infrarenal abdominal aortic aneurysm measuring 3.7 x 3.3 cm in cross-sectional diameter.  6. Acute nondisplaced fracture of the lateral aspect of the right 1st rib.  7. Other findings, as above.    < end of copied text >

## 2021-08-29 NOTE — PROGRESS NOTE ADULT - ASSESSMENT
77 y/o female, current every day smoker (59 PY), with PMHx HTN, bicuspid aortic valve, spinal stenosis, arthritis who complained of increased HERRERA for 6 months. TTE 3/2021 revealed EF normal, severe AS, moderate AI. NST 4/2021 revealed transient ischemic dilatation. Cardiac cath 5/11/21 revealed severe AS, prox circumflex 90%, OM1 70%, mid RCA 80%. 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 She was referred to Dr. Muller for surgical evaluation and deemed a good surgical candidate. Admitted for surgical planning and on 8/9/21 pt underwent AVR (tissue), Ascending, hemiarch replacement, frozen elephant trunk, left aorto-subclavian bypass, CABG x 2. OR course significant for 7 U pRBC/2 plt/2 FFP/1000 FEIBA; 3 L IVF. Arrived to CTICU on levo and initial LA was 4.1. POD1-2 extubated. POD3 AF with hypotension. Continued on pressors and lasix gtt, DCCV. POD4 AF, FIDEL. POD5 primacor was weaned off, Cr improved, extubated. POD7 ECHO with moderate pericardial effusion, s/p IR drainage. POD8 failed S/S. POD11 diuresed. POD13 sinus bradycardia, stable. POD15 transferred to 9L. POD 16 rapid AF with hypotension requiring pushes of linda and amio bolus. IV Lopressor  given and bradycardia at a rate of 45, V-paced. EP reconsulted and will need PPM. Monitoring sternal wound for worsening erythema/discharge. POD17 sternal wound erythema worsened with purulence expressed with pressure. Sternal wound opened at bedside and wound vac placed. Vanco started. PPM placement postponed. Remains V-paced at bedside. x1 unit of pRBCs, pending repeat labs. POD18 pending ID clearance for PPM placement given MSI dehiscence. Continuing diuresis. POD 19 wound vac changed POD 20 no events    Plan:  Neurovascular:   -Pain well controlled with current regimen. PRN's: oxycodone/acetaminophen  -Anxiety: Xanax as needed     Cardiovascular:   -POD19 AVR, ascending/hemiarch replacement, frozen elephant trunk, left aorto-subclavian bypass, CABGx2 (SVG-RPDA, SVG-LCx)    -post-op AF: Amio 200mg QD per EP    -continue ASA    -tachy-bradycardia syndrome    -Vwires backup at 45bpm     -Sternal wound opened 8/26, last changed 8/28, examined with Dr. Bell, planning for further bedside debridement tomorrow with Dr. Vernon, wound growing klebsiella  -HTN: lisinopril 10mg daily   -Hemodynamically stable.   -Monitor: BP, HR, tele    Respiratory:   -Oxygenating 97% on 2lpm  -Encourage continued use of IS 10x/hr and frequent ambulation  -CXR: no acute pathology, no acute pathology     GI:  -GI PPX: pantoprazole 40mg daily   -PO Diet: Dysphagia 2 mechanical soft-thin liquids  -Bowel Regimen: senna     Renal / :  -continue with lasix 20mg IV BID + Potassium 10mEq   -Continue to monitor renal function: BUN/Cr 20/1.00  -Monitor I/O's daily     Endocrine:    -No hx of DM or thyroid dx  -A1c: 5.6   -TSH: 2.6     Hematologic:  -CBC: H/H- 9.4/30.8  -Coagulation Panel: WNL     ID:  -MSI with wound vac in place, continuing Vancomycin 750mg Q12hrs, next trought 8/30/21 @11am     - wound Cx negative to date, continue to monitor      - WBC WNL and no fevers     - pending ID clearance prior to placement of PPM   -Temperature: afebrile   -CBC: WBC- 8.75  -Continue to observe for SIRS/Sepsis Syndrome.    Prophylaxis:  -DVT prophylaxis with 5000 SubQ Heparin q8h.  -Continue with SCD's b/l while patient is at rest     Disposition:  -Mini-ICU

## 2021-08-30 LAB
ANION GAP SERPL CALC-SCNC: 9 MMOL/L — SIGNIFICANT CHANGE UP (ref 5–17)
BUN SERPL-MCNC: 15 MG/DL — SIGNIFICANT CHANGE UP (ref 7–23)
CALCIUM SERPL-MCNC: 9 MG/DL — SIGNIFICANT CHANGE UP (ref 8.4–10.5)
CHLORIDE SERPL-SCNC: 100 MMOL/L — SIGNIFICANT CHANGE UP (ref 96–108)
CO2 SERPL-SCNC: 27 MMOL/L — SIGNIFICANT CHANGE UP (ref 22–31)
CREAT SERPL-MCNC: 0.87 MG/DL — SIGNIFICANT CHANGE UP (ref 0.5–1.3)
CULTURE RESULTS: SIGNIFICANT CHANGE UP
CULTURE RESULTS: SIGNIFICANT CHANGE UP
GLUCOSE SERPL-MCNC: 91 MG/DL — SIGNIFICANT CHANGE UP (ref 70–99)
GRAM STN FLD: SIGNIFICANT CHANGE UP
GRAM STN FLD: SIGNIFICANT CHANGE UP
HCT VFR BLD CALC: 28.9 % — LOW (ref 34.5–45)
HGB BLD-MCNC: 8.8 G/DL — LOW (ref 11.5–15.5)
MAGNESIUM SERPL-MCNC: 2.1 MG/DL — SIGNIFICANT CHANGE UP (ref 1.6–2.6)
MCHC RBC-ENTMCNC: 29.8 PG — SIGNIFICANT CHANGE UP (ref 27–34)
MCHC RBC-ENTMCNC: 30.4 GM/DL — LOW (ref 32–36)
MCV RBC AUTO: 98 FL — SIGNIFICANT CHANGE UP (ref 80–100)
NRBC # BLD: 0 /100 WBCS — SIGNIFICANT CHANGE UP (ref 0–0)
ORGANISM # SPEC MICROSCOPIC CNT: SIGNIFICANT CHANGE UP
PLATELET # BLD AUTO: 136 K/UL — LOW (ref 150–400)
POTASSIUM SERPL-MCNC: 4.1 MMOL/L — SIGNIFICANT CHANGE UP (ref 3.5–5.3)
POTASSIUM SERPL-SCNC: 4.1 MMOL/L — SIGNIFICANT CHANGE UP (ref 3.5–5.3)
RBC # BLD: 2.95 M/UL — LOW (ref 3.8–5.2)
RBC # FLD: 16.5 % — HIGH (ref 10.3–14.5)
SODIUM SERPL-SCNC: 136 MMOL/L — SIGNIFICANT CHANGE UP (ref 135–145)
SPECIMEN SOURCE: SIGNIFICANT CHANGE UP
VANCOMYCIN TROUGH SERPL-MCNC: 21.1 UG/ML — HIGH (ref 10–20)
WBC # BLD: 7.96 K/UL — SIGNIFICANT CHANGE UP (ref 3.8–10.5)
WBC # FLD AUTO: 7.96 K/UL — SIGNIFICANT CHANGE UP (ref 3.8–10.5)

## 2021-08-30 PROCEDURE — 97605 NEG PRS WND THER DME<=50SQCM: CPT

## 2021-08-30 PROCEDURE — 99232 SBSQ HOSP IP/OBS MODERATE 35: CPT

## 2021-08-30 PROCEDURE — 71045 X-RAY EXAM CHEST 1 VIEW: CPT | Mod: 26

## 2021-08-30 RX ORDER — PANTOPRAZOLE SODIUM 20 MG/1
40 TABLET, DELAYED RELEASE ORAL
Refills: 0 | Status: DISCONTINUED | OUTPATIENT
Start: 2021-08-31 | End: 2021-09-03

## 2021-08-30 RX ORDER — OXYCODONE AND ACETAMINOPHEN 5; 325 MG/1; MG/1
1 TABLET ORAL EVERY 6 HOURS
Refills: 0 | Status: DISCONTINUED | OUTPATIENT
Start: 2021-08-30 | End: 2021-09-03

## 2021-08-30 RX ORDER — FUROSEMIDE 40 MG
20 TABLET ORAL ONCE
Refills: 0 | Status: COMPLETED | OUTPATIENT
Start: 2021-08-30 | End: 2021-08-30

## 2021-08-30 RX ORDER — VANCOMYCIN HCL 1 G
1000 VIAL (EA) INTRAVENOUS EVERY 24 HOURS
Refills: 0 | Status: DISCONTINUED | OUTPATIENT
Start: 2021-08-30 | End: 2021-08-31

## 2021-08-30 RX ADMIN — Medication 250 MILLIGRAM(S): at 00:16

## 2021-08-30 RX ADMIN — LIDOCAINE 1 PATCH: 4 CREAM TOPICAL at 12:03

## 2021-08-30 RX ADMIN — SENNA PLUS 2 TABLET(S): 8.6 TABLET ORAL at 21:37

## 2021-08-30 RX ADMIN — LIDOCAINE 1 PATCH: 4 CREAM TOPICAL at 00:16

## 2021-08-30 RX ADMIN — PANTOPRAZOLE SODIUM 40 MILLIGRAM(S): 20 TABLET, DELAYED RELEASE ORAL at 12:01

## 2021-08-30 RX ADMIN — Medication 10 MILLIEQUIVALENT(S): at 12:02

## 2021-08-30 RX ADMIN — Medication 4 MILLIGRAM(S): at 14:21

## 2021-08-30 RX ADMIN — SODIUM CHLORIDE 3 MILLILITER(S): 9 INJECTION INTRAMUSCULAR; INTRAVENOUS; SUBCUTANEOUS at 05:07

## 2021-08-30 RX ADMIN — OXYCODONE AND ACETAMINOPHEN 1 TABLET(S): 5; 325 TABLET ORAL at 07:56

## 2021-08-30 RX ADMIN — LIDOCAINE 1 PATCH: 4 CREAM TOPICAL at 19:19

## 2021-08-30 RX ADMIN — Medication 4 MILLIGRAM(S): at 08:08

## 2021-08-30 RX ADMIN — POLYETHYLENE GLYCOL 3350 17 GRAM(S): 17 POWDER, FOR SOLUTION ORAL at 12:02

## 2021-08-30 RX ADMIN — Medication 650 MILLIGRAM(S): at 23:48

## 2021-08-30 RX ADMIN — LEVALBUTEROL 0.63 MILLIGRAM(S): 1.25 SOLUTION, CONCENTRATE RESPIRATORY (INHALATION) at 23:04

## 2021-08-30 RX ADMIN — LEVALBUTEROL 0.63 MILLIGRAM(S): 1.25 SOLUTION, CONCENTRATE RESPIRATORY (INHALATION) at 18:44

## 2021-08-30 RX ADMIN — HEPARIN SODIUM 5000 UNIT(S): 5000 INJECTION INTRAVENOUS; SUBCUTANEOUS at 14:26

## 2021-08-30 RX ADMIN — Medication 81 MILLIGRAM(S): at 12:02

## 2021-08-30 RX ADMIN — Medication 20 MILLIGRAM(S): at 16:22

## 2021-08-30 RX ADMIN — Medication 4 MILLIGRAM(S): at 07:09

## 2021-08-30 RX ADMIN — AMIODARONE HYDROCHLORIDE 200 MILLIGRAM(S): 400 TABLET ORAL at 05:57

## 2021-08-30 RX ADMIN — CEFTRIAXONE 100 MILLIGRAM(S): 500 INJECTION, POWDER, FOR SOLUTION INTRAMUSCULAR; INTRAVENOUS at 21:36

## 2021-08-30 RX ADMIN — LISINOPRIL 10 MILLIGRAM(S): 2.5 TABLET ORAL at 05:57

## 2021-08-30 RX ADMIN — Medication 20 MILLIGRAM(S): at 05:57

## 2021-08-30 RX ADMIN — SODIUM CHLORIDE 3 MILLILITER(S): 9 INJECTION INTRAMUSCULAR; INTRAVENOUS; SUBCUTANEOUS at 14:21

## 2021-08-30 RX ADMIN — Medication 20 MILLIGRAM(S): at 18:36

## 2021-08-30 RX ADMIN — OXYCODONE AND ACETAMINOPHEN 1 TABLET(S): 5; 325 TABLET ORAL at 06:56

## 2021-08-30 RX ADMIN — LEVALBUTEROL 0.63 MILLIGRAM(S): 1.25 SOLUTION, CONCENTRATE RESPIRATORY (INHALATION) at 05:57

## 2021-08-30 RX ADMIN — SODIUM CHLORIDE 3 MILLILITER(S): 9 INJECTION INTRAMUSCULAR; INTRAVENOUS; SUBCUTANEOUS at 22:00

## 2021-08-30 RX ADMIN — Medication 4 MILLIGRAM(S): at 11:58

## 2021-08-30 RX ADMIN — HEPARIN SODIUM 5000 UNIT(S): 5000 INJECTION INTRAVENOUS; SUBCUTANEOUS at 21:37

## 2021-08-30 RX ADMIN — HEPARIN SODIUM 5000 UNIT(S): 5000 INJECTION INTRAVENOUS; SUBCUTANEOUS at 05:58

## 2021-08-30 RX ADMIN — Medication 4 MILLIGRAM(S): at 12:13

## 2021-08-30 RX ADMIN — Medication 4 MILLIGRAM(S): at 19:19

## 2021-08-30 RX ADMIN — Medication 250 MILLIGRAM(S): at 21:36

## 2021-08-30 RX ADMIN — BUDESONIDE AND FORMOTEROL FUMARATE DIHYDRATE 2 PUFF(S): 160; 4.5 AEROSOL RESPIRATORY (INHALATION) at 22:03

## 2021-08-30 RX ADMIN — LEVALBUTEROL 0.63 MILLIGRAM(S): 1.25 SOLUTION, CONCENTRATE RESPIRATORY (INHALATION) at 12:11

## 2021-08-30 RX ADMIN — Medication 650 MILLIGRAM(S): at 22:49

## 2021-08-30 RX ADMIN — BUDESONIDE AND FORMOTEROL FUMARATE DIHYDRATE 2 PUFF(S): 160; 4.5 AEROSOL RESPIRATORY (INHALATION) at 09:21

## 2021-08-30 NOTE — PROGRESS NOTE ADULT - SUBJECTIVE AND OBJECTIVE BOX
EPS Progress Note  S: seen earlier. Was walking with RN.      TELE: NSR HR 50-60s. Briefly marifer overnight lowers ~49 bpm. No pacing. SO far, no recurrent AFIB for the past few days     O: T(C): 36.9 (08-30-21 @ 09:10), Max: 36.9 (08-30-21 @ 09:10)  HR: 72 (08-30-21 @ 13:00) (52 - 72)  BP: 157/69 (08-30-21 @ 13:00) (101/51 - 164/67)  RR: 23 (08-30-21 @ 13:00) (12 - 23)  SpO2: 95% (08-30-21 @ 13:00) (93% - 100%)      PHYSICAL  Constitutional:  NAD        Neck: No JVD  Pulm:  CTA B/L  Cardiac:   + s1/s2, RRR. sternal wound with wound vac.   GI:  +BS , soft ND/NT  Vascular: +LE edema, pulse 2+  Neuro: AAO x 3. no focal deficit  Skin: Warm. No rash or lesion     LABS:                        8.8    7.96  )-----------( 136      ( 30 Aug 2021 06:36 )             28.9     08-30    136  |  100  |  15  ----------------------------<  91  4.1   |  27  |  0.87    Ca    9.0      30 Aug 2021 06:36  Mg     2.1     08-30          MEDICATIONS:  acetaminophen   Tablet .. 650 milliGRAM(s) Oral every 6 hours PRN  ALPRAZolam 0.25 milliGRAM(s) Oral two times a day PRN  aMIOdarone    Tablet 200 milliGRAM(s) Oral daily  aspirin  chewable 81 milliGRAM(s) Enteral Tube daily  budesonide  80 MICROgram(s)/formoterol 4.5 MICROgram(s) Inhaler 2 Puff(s) Inhalation two times a day  cefTRIAXone   IVPB 2000 milliGRAM(s) IV Intermittent every 24 hours  dextrose 5%. 1000 milliLiter(s) IV Continuous <Continuous>  dextrose 5%. 1000 milliLiter(s) IV Continuous <Continuous>  furosemide   Injectable 20 milliGRAM(s) IV Push every 12 hours  glucagon  Injectable 1 milliGRAM(s) IntraMuscular once  heparin   Injectable 5000 Unit(s) SubCutaneous every 8 hours  levalbuterol Inhalation 0.63 milliGRAM(s) Inhalation every 6 hours  lidocaine   4% Patch 1 Patch Transdermal daily  lisinopril 10 milliGRAM(s) Oral daily  oxycodone    5 mG/acetaminophen 325 mG 1 Tablet(s) Oral/Enteral Tube every 6 hours PRN  pantoprazole  Injectable 40 milliGRAM(s) IV Push daily  polyethylene glycol 3350 17 Gram(s) Oral daily  potassium chloride    Tablet ER 10 milliEquivalent(s) Oral daily  senna 2 Tablet(s) Oral at bedtime  sodium chloride 0.9% lock flush 3 milliLiter(s) IV Push every 8 hours  testosterone patch 4 mG/24 Hr(s) 4 milliGRAM(s) Transdermal daily  testosterone patch 4 mG/24 Hr(s) 4 milliGRAM(s) Transdermal <User Schedule>

## 2021-08-30 NOTE — PROGRESS NOTE ADULT - SUBJECTIVE AND OBJECTIVE BOX
INFECTIOUS DISEASES CONSULT FOLLOW-UP NOTE    INTERVAL HPI/OVERNIGHT EVENTS:  No event overnight  patient feels well  denied CP, SOB, n/v/d, abdominal pain     ROS:   Constitutional, eyes, ENT, cardiovascular, respiratory, gastrointestinal, genitourinary, integumentary, neurological, psychiatric and heme/lymph are otherwise negative other than noted above       ANTIBIOTICS/RELEVANT:    MEDICATIONS  (STANDING):  aMIOdarone    Tablet 200 milliGRAM(s) Oral daily  aspirin  chewable 81 milliGRAM(s) Enteral Tube daily  budesonide  80 MICROgram(s)/formoterol 4.5 MICROgram(s) Inhaler 2 Puff(s) Inhalation two times a day  cefTRIAXone   IVPB 2000 milliGRAM(s) IV Intermittent every 24 hours  dextrose 5%. 1000 milliLiter(s) (50 mL/Hr) IV Continuous <Continuous>  dextrose 5%. 1000 milliLiter(s) (100 mL/Hr) IV Continuous <Continuous>  furosemide   Injectable 20 milliGRAM(s) IV Push every 12 hours  glucagon  Injectable 1 milliGRAM(s) IntraMuscular once  heparin   Injectable 5000 Unit(s) SubCutaneous every 8 hours  levalbuterol Inhalation 0.63 milliGRAM(s) Inhalation every 6 hours  lidocaine   4% Patch 1 Patch Transdermal daily  lisinopril 10 milliGRAM(s) Oral daily  polyethylene glycol 3350 17 Gram(s) Oral daily  potassium chloride    Tablet ER 10 milliEquivalent(s) Oral daily  senna 2 Tablet(s) Oral at bedtime  sodium chloride 0.9% lock flush 3 milliLiter(s) IV Push every 8 hours  testosterone patch 4 mG/24 Hr(s) 4 milliGRAM(s) Transdermal daily  vancomycin  IVPB 1000 milliGRAM(s) IV Intermittent every 24 hours    MEDICATIONS  (PRN):  acetaminophen   Tablet .. 650 milliGRAM(s) Oral every 6 hours PRN Severe Pain (7 - 10)  ALPRAZolam 0.25 milliGRAM(s) Oral two times a day PRN anxiety  oxycodone    5 mG/acetaminophen 325 mG 1 Tablet(s) Oral every 6 hours PRN Moderate Pain (4 - 6)        Vital Signs Last 24 Hrs  T(C): 36.8 (30 Aug 2021 17:44), Max: 37.1 (30 Aug 2021 14:19)  T(F): 98.3 (30 Aug 2021 17:44), Max: 98.7 (30 Aug 2021 14:19)  HR: 82 (30 Aug 2021 20:00) (52 - 82)  BP: 167/72 (30 Aug 2021 20:00) (101/51 - 167/72)  BP(mean): 103 (30 Aug 2021 20:00) (72 - 103)  RR: 20 (30 Aug 2021 20:00) (12 - 23)  SpO2: 95% (30 Aug 2021 20:00) (93% - 100%)    08-29-21 @ 07:01  -  08-30-21 @ 07:00  --------------------------------------------------------  IN: 1558 mL / OUT: 1810 mL / NET: -252 mL    08-30-21 @ 07:01  -  08-30-21 @ 20:50  --------------------------------------------------------  IN: 470 mL / OUT: 975 mL / NET: -505 mL      PHYSICAL EXAM:  Constitutional: alert, NAD  Eyes: the sclera and conjunctiva were normal.   ENT: the ears and nose were normal in appearance.   Neck: the appearance of the neck was normal and the neck was supple.   Pulmonary: no respiratory distress and lungs were clear to auscultation bilaterally.   Heart: heart rate was normal and rhythm regular, normal S1 and S2  Chest: sternotomy wound with wound VAC, purulent fluid seen   Abdomen: normal bowel sounds, soft, non-tender  Neurological: no focal deficits.   Psychiatric: the affect was normal        LABS:                        8.8    7.96  )-----------( 136      ( 30 Aug 2021 06:36 )             28.9     08-30    136  |  100  |  15  ----------------------------<  91  4.1   |  27  |  0.87    Ca    9.0      30 Aug 2021 06:36  Mg     2.1     08-30            MICROBIOLOGY:      RADIOLOGY & ADDITIONAL STUDIES:  Reviewed

## 2021-08-30 NOTE — PROGRESS NOTE ADULT - ASSESSMENT
76 y.o female, current every day smoker (59 PY), withHTN, bicuspid aortic valve / severe AS with normal LVEF, and CAD.  s/p bioprosthetic AVR, ascending/hemiarch replacement, frozen elephant trunk, left aorto-subclavian bypass and CABG x 2 on 8/9/21.  Has postop paroxysmal AFIB, on Amio load.  Not yet on A/C. Concern for tachy-marifer.  VVI backup at 45 bpm.   - Sternal wound infection with need for debridement last friday and again today. Wound cx with Klesiella.    - No immediate plan for PPM implant.  If her rhythm can remain is sinus with current Amio dose, perhaps she doesn't need ppm.  Would need ID clearance for definite plan for ppm.   - Case d/w DR. Khanna and team.

## 2021-08-30 NOTE — PROGRESS NOTE ADULT - SUBJECTIVE AND OBJECTIVE BOX
Patient discussed on morning rounds with Dr. Muller    Operation / Date: 8/9/21 -- AVE, ascending/hemiarch replacement, frozen elephant trunk, left aorto-subclavian bypass, CABG x2 (SVG-RPDA and SVG-LCx), EF 75%      SUBJECTIVE ASSESSMENT:  Pt is feeling well this morning, improved over the last few days. Eating/drinking well, moving bowels regularly. States swelling in her legs is mildly better, but not by much. Denies any CP, palpitations, SOB, wheezing, abd pain, n/v/d/c, fevers or chills.     Vital Signs Last 24 Hrs  T(C): 37.1 (30 Aug 2021 14:19), Max: 37.1 (30 Aug 2021 14:19)  T(F): 98.7 (30 Aug 2021 14:19), Max: 98.7 (30 Aug 2021 14:19)  HR: 72 (30 Aug 2021 13:00) (52 - 72)  BP: 157/69 (30 Aug 2021 13:00) (101/51 - 164/67)  BP(mean): 99 (30 Aug 2021 13:00) (72 - 101)  RR: 23 (30 Aug 2021 13:00) (12 - 23)  SpO2: 95% (30 Aug 2021 13:00) (93% - 100%)  I&O's Detail    29 Aug 2021 07:01  -  30 Aug 2021 07:00  --------------------------------------------------------  IN:    IV PiggyBack: 550 mL    Oral Fluid: 1008 mL  Total IN: 1558 mL    OUT:    Drain (mL): 10 mL    Voided (mL): 1800 mL  Total OUT: 1810 mL    Total NET: -252 mL      30 Aug 2021 07:01  -  30 Aug 2021 14:38  --------------------------------------------------------  IN:    Oral Fluid: 115 mL  Total IN: 115 mL    OUT:    Drain (mL): 0 mL    Voided (mL): 625 mL  Total OUT: 625 mL    Total NET: -510 mL    CHEST TUBE:  no  FRANCHESKA DRAIN:  no  EPICARDIAL WIRES: no.  TIE DOWNS: no  REGALADO: no    PHYSICAL EXAM:  General: well appearing sitting in chair in NAD   Neurological: AOx3. Motor skills grossly intact  Cardiovascular: Normal S1/S2. Regular rate/rhythm. No murmurs  Respiratory: crackles b/l lower lobes. No wheezing   Gastrointestinal: +BS in all 4 quadrants. Non-distended. Soft. Non-tender  Extremities: 2+ edema b/l in lower extremities.  Vascular: Radial 2+bilaterally, DP 2+ b/l  Incision Sites: sternotomy healing well no erythema, purulence or ecchymosis. EVH sites b/l with ecchymosis, stable. No erythema, purulence or discharge.     LABS:                        8.8    7.96  )-----------( 136      ( 30 Aug 2021 06:36 )             28.9     COUMADIN:  no    08-30    136  |  100  |  15  ----------------------------<  91  4.1   |  27  |  0.87    Ca    9.0      30 Aug 2021 06:36  Mg     2.1     08-30    MEDICATIONS  (STANDING):  aMIOdarone    Tablet 200 milliGRAM(s) Oral daily  aspirin  chewable 81 milliGRAM(s) Enteral Tube daily  budesonide  80 MICROgram(s)/formoterol 4.5 MICROgram(s) Inhaler 2 Puff(s) Inhalation two times a day  cefTRIAXone   IVPB 2000 milliGRAM(s) IV Intermittent every 24 hours  dextrose 5%. 1000 milliLiter(s) (50 mL/Hr) IV Continuous <Continuous>  dextrose 5%. 1000 milliLiter(s) (100 mL/Hr) IV Continuous <Continuous>  furosemide   Injectable 20 milliGRAM(s) IV Push every 12 hours  glucagon  Injectable 1 milliGRAM(s) IntraMuscular once  heparin   Injectable 5000 Unit(s) SubCutaneous every 8 hours  levalbuterol Inhalation 0.63 milliGRAM(s) Inhalation every 6 hours  lidocaine   4% Patch 1 Patch Transdermal daily  lisinopril 10 milliGRAM(s) Oral daily  polyethylene glycol 3350 17 Gram(s) Oral daily  potassium chloride    Tablet ER 10 milliEquivalent(s) Oral daily  senna 2 Tablet(s) Oral at bedtime  sodium chloride 0.9% lock flush 3 milliLiter(s) IV Push every 8 hours  testosterone patch 4 mG/24 Hr(s) 4 milliGRAM(s) Transdermal daily    MEDICATIONS  (PRN):  acetaminophen   Tablet .. 650 milliGRAM(s) Oral every 6 hours PRN Severe Pain (7 - 10)  ALPRAZolam 0.25 milliGRAM(s) Oral two times a day PRN anxiety  oxycodone    5 mG/acetaminophen 325 mG 1 Tablet(s) Oral every 6 hours PRN Moderate Pain (4 - 6)    RADIOLOGY & ADDITIONAL TESTS:  < from: Xray Chest 1 View- PORTABLE-Routine (Xray Chest 1 View- PORTABLE-Routine in AM.) (08.30.21 @ 02:49) >  Findings/  impression: Stable cardiomegaly, status post median sternotomy, aortic stent. Bilateral opacities/pleural effusions, unchanged. Left axillary surgical clips. Stable bony structures.  < end of copied text >

## 2021-08-30 NOTE — PROGRESS NOTE ADULT - ASSESSMENT
76F h/o bicuspid AV, severe AS, CAD and aortic arch aneurysm p/w elective cardiac surgery, s/p AVR (tissue), ascending, hemiarch placement, frozen elephant trunk, L aorto-subclavian bypass, CABG x2 on 8/9/21.  Now found to have sternotomy site infection with K. oxytoca/raoutella ornithinolytica (which is GNR, similar to Klebsiella).  CT chest negative for abscess.  s/p bedside debridement.    - recheck vanc level in 12h.  If <20, then start vanco 750mg IV q12h   - cont CTX 2g IV q24h after BCx sent  - f/u BCx (so far ngtd since 8/27)  - f/u WCx from today   - hold PPM placement until further work-up       Team 1 will follow you.    Case d/w primary team.    Merissa Crystal MD, MS  Infectious Disease attending  work cell [phone]

## 2021-08-31 LAB
ANION GAP SERPL CALC-SCNC: 9 MMOL/L — SIGNIFICANT CHANGE UP (ref 5–17)
BUN SERPL-MCNC: 14 MG/DL — SIGNIFICANT CHANGE UP (ref 7–23)
CALCIUM SERPL-MCNC: 8.8 MG/DL — SIGNIFICANT CHANGE UP (ref 8.4–10.5)
CHLORIDE SERPL-SCNC: 98 MMOL/L — SIGNIFICANT CHANGE UP (ref 96–108)
CO2 SERPL-SCNC: 28 MMOL/L — SIGNIFICANT CHANGE UP (ref 22–31)
CREAT SERPL-MCNC: 0.93 MG/DL — SIGNIFICANT CHANGE UP (ref 0.5–1.3)
GLUCOSE SERPL-MCNC: 85 MG/DL — SIGNIFICANT CHANGE UP (ref 70–99)
HCT VFR BLD CALC: 27.8 % — LOW (ref 34.5–45)
HGB BLD-MCNC: 8.5 G/DL — LOW (ref 11.5–15.5)
MAGNESIUM SERPL-MCNC: 1.9 MG/DL — SIGNIFICANT CHANGE UP (ref 1.6–2.6)
MCHC RBC-ENTMCNC: 29.6 PG — SIGNIFICANT CHANGE UP (ref 27–34)
MCHC RBC-ENTMCNC: 30.6 GM/DL — LOW (ref 32–36)
MCV RBC AUTO: 96.9 FL — SIGNIFICANT CHANGE UP (ref 80–100)
NRBC # BLD: 0 /100 WBCS — SIGNIFICANT CHANGE UP (ref 0–0)
PLATELET # BLD AUTO: 122 K/UL — LOW (ref 150–400)
POTASSIUM SERPL-MCNC: 3.8 MMOL/L — SIGNIFICANT CHANGE UP (ref 3.5–5.3)
POTASSIUM SERPL-SCNC: 3.8 MMOL/L — SIGNIFICANT CHANGE UP (ref 3.5–5.3)
RBC # BLD: 2.87 M/UL — LOW (ref 3.8–5.2)
RBC # FLD: 16.3 % — HIGH (ref 10.3–14.5)
SARS-COV-2 RNA SPEC QL NAA+PROBE: SIGNIFICANT CHANGE UP
SODIUM SERPL-SCNC: 135 MMOL/L — SIGNIFICANT CHANGE UP (ref 135–145)
WBC # BLD: 6.39 K/UL — SIGNIFICANT CHANGE UP (ref 3.8–10.5)
WBC # FLD AUTO: 6.39 K/UL — SIGNIFICANT CHANGE UP (ref 3.8–10.5)

## 2021-08-31 PROCEDURE — 99232 SBSQ HOSP IP/OBS MODERATE 35: CPT

## 2021-08-31 PROCEDURE — 71045 X-RAY EXAM CHEST 1 VIEW: CPT | Mod: 26

## 2021-08-31 RX ORDER — LACTULOSE 10 G/15ML
10 SOLUTION ORAL ONCE
Refills: 0 | Status: COMPLETED | OUTPATIENT
Start: 2021-08-31 | End: 2021-08-31

## 2021-08-31 RX ORDER — FUROSEMIDE 40 MG
20 TABLET ORAL
Refills: 0 | Status: DISCONTINUED | OUTPATIENT
Start: 2021-08-31 | End: 2021-09-01

## 2021-08-31 RX ORDER — FUROSEMIDE 40 MG
40 TABLET ORAL
Refills: 0 | Status: DISCONTINUED | OUTPATIENT
Start: 2021-09-01 | End: 2021-09-01

## 2021-08-31 RX ORDER — VANCOMYCIN HCL 1 G
1000 VIAL (EA) INTRAVENOUS EVERY 24 HOURS
Refills: 0 | Status: DISCONTINUED | OUTPATIENT
Start: 2021-08-31 | End: 2021-09-01

## 2021-08-31 RX ORDER — MAGNESIUM OXIDE 400 MG ORAL TABLET 241.3 MG
800 TABLET ORAL ONCE
Refills: 0 | Status: COMPLETED | OUTPATIENT
Start: 2021-08-31 | End: 2021-08-31

## 2021-08-31 RX ORDER — FUROSEMIDE 40 MG
20 TABLET ORAL DAILY
Refills: 0 | Status: DISCONTINUED | OUTPATIENT
Start: 2021-08-31 | End: 2021-08-31

## 2021-08-31 RX ORDER — POTASSIUM CHLORIDE 20 MEQ
10 PACKET (EA) ORAL DAILY
Refills: 0 | Status: DISCONTINUED | OUTPATIENT
Start: 2021-08-31 | End: 2021-09-01

## 2021-08-31 RX ADMIN — OXYCODONE AND ACETAMINOPHEN 1 TABLET(S): 5; 325 TABLET ORAL at 21:45

## 2021-08-31 RX ADMIN — Medication 4 MILLIGRAM(S): at 11:08

## 2021-08-31 RX ADMIN — Medication 250 MILLIGRAM(S): at 21:51

## 2021-08-31 RX ADMIN — AMIODARONE HYDROCHLORIDE 200 MILLIGRAM(S): 400 TABLET ORAL at 06:29

## 2021-08-31 RX ADMIN — Medication 4 MILLIGRAM(S): at 11:10

## 2021-08-31 RX ADMIN — OXYCODONE AND ACETAMINOPHEN 1 TABLET(S): 5; 325 TABLET ORAL at 15:01

## 2021-08-31 RX ADMIN — PANTOPRAZOLE SODIUM 40 MILLIGRAM(S): 20 TABLET, DELAYED RELEASE ORAL at 06:29

## 2021-08-31 RX ADMIN — Medication 1 APPLICATION(S): at 18:15

## 2021-08-31 RX ADMIN — CEFTRIAXONE 100 MILLIGRAM(S): 500 INJECTION, POWDER, FOR SOLUTION INTRAMUSCULAR; INTRAVENOUS at 21:51

## 2021-08-31 RX ADMIN — LEVALBUTEROL 0.63 MILLIGRAM(S): 1.25 SOLUTION, CONCENTRATE RESPIRATORY (INHALATION) at 23:16

## 2021-08-31 RX ADMIN — LIDOCAINE 1 PATCH: 4 CREAM TOPICAL at 23:00

## 2021-08-31 RX ADMIN — Medication 10 MILLIEQUIVALENT(S): at 11:08

## 2021-08-31 RX ADMIN — BUDESONIDE AND FORMOTEROL FUMARATE DIHYDRATE 2 PUFF(S): 160; 4.5 AEROSOL RESPIRATORY (INHALATION) at 08:51

## 2021-08-31 RX ADMIN — Medication 4 MILLIGRAM(S): at 18:16

## 2021-08-31 RX ADMIN — SODIUM CHLORIDE 3 MILLILITER(S): 9 INJECTION INTRAMUSCULAR; INTRAVENOUS; SUBCUTANEOUS at 14:42

## 2021-08-31 RX ADMIN — SENNA PLUS 2 TABLET(S): 8.6 TABLET ORAL at 21:50

## 2021-08-31 RX ADMIN — Medication 20 MILLIGRAM(S): at 18:15

## 2021-08-31 RX ADMIN — HEPARIN SODIUM 5000 UNIT(S): 5000 INJECTION INTRAVENOUS; SUBCUTANEOUS at 06:53

## 2021-08-31 RX ADMIN — Medication 20 MILLIGRAM(S): at 06:29

## 2021-08-31 RX ADMIN — LISINOPRIL 10 MILLIGRAM(S): 2.5 TABLET ORAL at 06:29

## 2021-08-31 RX ADMIN — LEVALBUTEROL 0.63 MILLIGRAM(S): 1.25 SOLUTION, CONCENTRATE RESPIRATORY (INHALATION) at 18:15

## 2021-08-31 RX ADMIN — Medication 81 MILLIGRAM(S): at 11:08

## 2021-08-31 RX ADMIN — HEPARIN SODIUM 5000 UNIT(S): 5000 INJECTION INTRAVENOUS; SUBCUTANEOUS at 14:43

## 2021-08-31 RX ADMIN — LIDOCAINE 1 PATCH: 4 CREAM TOPICAL at 18:14

## 2021-08-31 RX ADMIN — OXYCODONE AND ACETAMINOPHEN 1 TABLET(S): 5; 325 TABLET ORAL at 01:09

## 2021-08-31 RX ADMIN — Medication 4 MILLIGRAM(S): at 06:51

## 2021-08-31 RX ADMIN — OXYCODONE AND ACETAMINOPHEN 1 TABLET(S): 5; 325 TABLET ORAL at 02:00

## 2021-08-31 RX ADMIN — SODIUM CHLORIDE 3 MILLILITER(S): 9 INJECTION INTRAMUSCULAR; INTRAVENOUS; SUBCUTANEOUS at 06:51

## 2021-08-31 RX ADMIN — OXYCODONE AND ACETAMINOPHEN 1 TABLET(S): 5; 325 TABLET ORAL at 14:46

## 2021-08-31 RX ADMIN — LIDOCAINE 1 PATCH: 4 CREAM TOPICAL at 11:09

## 2021-08-31 RX ADMIN — OXYCODONE AND ACETAMINOPHEN 1 TABLET(S): 5; 325 TABLET ORAL at 21:00

## 2021-08-31 RX ADMIN — LIDOCAINE 1 PATCH: 4 CREAM TOPICAL at 00:02

## 2021-08-31 RX ADMIN — MAGNESIUM OXIDE 400 MG ORAL TABLET 800 MILLIGRAM(S): 241.3 TABLET ORAL at 08:50

## 2021-08-31 RX ADMIN — HEPARIN SODIUM 5000 UNIT(S): 5000 INJECTION INTRAVENOUS; SUBCUTANEOUS at 21:50

## 2021-08-31 RX ADMIN — BUDESONIDE AND FORMOTEROL FUMARATE DIHYDRATE 2 PUFF(S): 160; 4.5 AEROSOL RESPIRATORY (INHALATION) at 22:15

## 2021-08-31 RX ADMIN — LACTULOSE 10 GRAM(S): 10 SOLUTION ORAL at 18:14

## 2021-08-31 RX ADMIN — POLYETHYLENE GLYCOL 3350 17 GRAM(S): 17 POWDER, FOR SOLUTION ORAL at 11:08

## 2021-08-31 RX ADMIN — LEVALBUTEROL 0.63 MILLIGRAM(S): 1.25 SOLUTION, CONCENTRATE RESPIRATORY (INHALATION) at 06:37

## 2021-08-31 RX ADMIN — SODIUM CHLORIDE 3 MILLILITER(S): 9 INJECTION INTRAMUSCULAR; INTRAVENOUS; SUBCUTANEOUS at 22:00

## 2021-08-31 RX ADMIN — LEVALBUTEROL 0.63 MILLIGRAM(S): 1.25 SOLUTION, CONCENTRATE RESPIRATORY (INHALATION) at 12:51

## 2021-08-31 NOTE — PROGRESS NOTE ADULT - ASSESSMENT
76 y.o female, current every day smoker (59 PY), withHTN, bicuspid aortic valve / severe AS with normal LVEF, and CAD.  s/p bioprosthetic AVR, ascending/hemiarch replacement, frozen elephant trunk, left aorto-subclavian bypass and CABG x 2 on 8/9/21.  Has postop paroxysmal AFIB, on Amio load.  Not yet on A/C. Concern for tachy-marifer.  VVI backup at 45 bpm. 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 BCx negative up to date from 8/27. - ID gave clearance for PPM implant.  Given superficial sternal wound infection and the small likelihood of pacing (ppm will be used to prevent long off-set pauses), the best option to minimize hardware infection is the leadless PPM (Micra).  This was explained to the patient who is in agreement. Plan for micra implant 9/1.  NPO after midnight.  repeat Covid test as per [facility] protocol   - Continue low dose Amio to help maintain her sinus rhythm

## 2021-08-31 NOTE — PROGRESS NOTE ADULT - ASSESSMENT
Assessment    77 y/o female, current every day smoker (59 PY), with PMHx HTN, bicuspid aortic valve, aortic stenosis, spinal stenosis, arthritis who complained of increased HERRERA for 6 months. TTE 3/2021 revealed EF normal, severe AS, moderate AI. NST 4/2021 revealed transient ischemic dilatation. Cardiac cath 5/11/21 revealed severe AS, prox circumflex 90%, OM1 70%, mid RCA 80%. 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 She was referred to Dr. Muller for surgical evaluation and deemed a good surgical candidate. Admitted for surgical planning and on 8/9/21 pt underwent AVR (tissue), Ascending, hemiarch replacement, frozen elephant trunk, left aorto-subclavian bypass, CABG x 2. OR course significant for 7 U pRBC/2 plt/2 FFP/1000 FEIBA; 3 L IVF. Arrived to CTICU on levo and initial LA was 4.1. POD1-2 extubated. POD3 AF with hypotension. Continued on pressors and lasix gtt, DCCV. POD4 AF, FIDEL. POD5 primacor was weaned off, Cr improved, extubated. POD7 ECHO with moderate pericardial effusion, s/p IR drainage. POD8 failed S/S. POD11 diuresed. POD13 sinus bradycardia, stable. POD15 transferred to 9L. POD 16 rapid AF with hypotension requiring pushes of linda and amio bolus. IV Lopressor  given and bradycardia at a rate of 45, V-paced. EP reconsulted and will need PPM. Monitoring sternal wound for worsening erythema/discharge. POD17 sternal wound erythema worsened with purulence expressed with pressure. Sternal wound opened at bedside and wound vac placed. Vanco started. PPM placement postponed. V-paced at bedside, given x1 unit of pRBCs. POD18 pending ID clearance for PPM placement given MSI dehiscence. POD 19 wound vac changed. POD 20 no events. POD21 repeat wound debridement at bedside completed, wound opened additional inch superiorly. Wound vac replaced. POD22 per ID recommendations, patient cleared for PPM placement as Bcx are neg.      Plan:    Neurovascular:   -Pain well controlled with current regimen. PRN's: Percocet, Tylenol  -deconditioning: testosterone patch  -anxiety: Xanax PRN    Cardiovascular:   -POD21 AVR, ascending, hemiarch replacement, frozen elephant trunk, left aorto-subclavian bypass, CABGx2 ( SVG-RPDA and SVG-LCx), EF 75%       - SSS (tachy-marifer syndrome): pending PPM placement, cleared per ID as Bcx were negative, will reconsult with EP        - Cont. Aspirin, Lasix, Potassium chloride, Amiodarone, Lisinopril   -Hemodynamically stable.   -Monitor: BP, HR, tele    Respiratory:   -Oxygenating well on room air  -Cont: Xopenex, Symbicort  -Encourage continued use of IS 10x/hr and frequent ambulation  -CXR: stable     GI:  -GI PPX: cont. pantoprazole  -PO Diet  -Bowel Regimen: cont. senna, and miralax for constipation    Renal / :  -Continue to monitor renal function: BUN/Cr 14/0.93  -Monitor I/O's daily     Endocrine:    -No hx of DM or thyroid dx  -A1c:  -TSH:    Hematologic:  -CBC: H/H-  -Coagulation Panel.    ID:  -Temperature:   -CBC: WBC-  -Continue to observe for SIRS/Sepsis Syndrome.    Prophylaxis:  -DVT prophylaxis with 5000 SubQ Heparin q8h.  -Continue with SCD's b/l while patient is at rest     Disposition:  -Discharge home once patient is medically ready   Assessment    75 y/o female, current every day smoker (59 PY), with PMHx HTN, bicuspid aortic valve, aortic stenosis, spinal stenosis, arthritis who complained of increased HERRERA for 6 months. TTE 3/2021 revealed EF normal, severe AS, moderate AI. NST 4/2021 revealed transient ischemic dilatation. Cardiac cath 5/11/21 revealed severe AS, prox circumflex 90%, OM1 70%, mid RCA 80%. 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 She was referred to Dr. Muller for surgical evaluation and deemed a good surgical candidate. Admitted for surgical planning and on 8/9/21 pt underwent AVR (tissue), Ascending, hemiarch replacement, frozen elephant trunk, left aorto-subclavian bypass, CABG x 2. OR course significant for 7 U pRBC/2 plt/2 FFP/1000 FEIBA; 3 L IVF. Arrived to CTICU on levo and initial LA was 4.1. POD1-2 extubated. POD3 AF with hypotension. Continued on pressors and lasix gtt, DCCV. POD4 AF, FIDEL. POD5 primacor was weaned off, Cr improved, extubated. POD7 ECHO with moderate pericardial effusion, s/p IR drainage. POD8 failed S/S. POD11 diuresed. POD13 sinus bradycardia, stable. POD15 transferred to 9L. POD 16 rapid AF with hypotension requiring pushes of linda and amio bolus. IV Lopressor  given and bradycardia at a rate of 45, V-paced. EP reconsulted and will need PPM. Monitoring sternal wound for worsening erythema/discharge. POD17 sternal wound erythema worsened with purulence expressed with pressure. Sternal wound opened at bedside and wound vac placed. Vanco started. PPM placement postponed. V-paced at bedside, given x1 unit of pRBCs. POD18 pending ID clearance for PPM placement given MSI dehiscence. POD 19 wound vac changed. POD 20 no events. POD21 repeat wound debridement at bedside completed, wound opened additional inch superiorly. Wound vac replaced. POD22 per ID recommendations, patient cleared for PPM placement as Bcx are neg.      Plan:    Neurovascular:   -Pain well controlled with current regimen. PRN's: Percocet, Tylenol, lidocaine patch  -deconditioning: testosterone patch  -anxiety: Xanax PRN    Cardiovascular:   -POD21 AVR, ascending, hemiarch replacement, frozen elephant trunk, left aorto-subclavian bypass, CABGx2 ( SVG-RPDA and SVG-LCx), EF 75%       - SSS (tachy-marifer syndrome): pending PPM placement, cleared per ID as Bcx were negative, will reconsult with EP        - Cont. Aspirin, Lasix, Potassium chloride, Amiodarone, Lisinopril   -Hemodynamically stable.   -Monitor: BP, HR, tele    Respiratory:   -Oxygenating well on room air  -Cont: Xopenex, Symbicort  -Encourage continued use of IS 10x/hr and frequent ambulation  -CXR: stable     GI:  -GI PPX: cont. pantoprazole  -PO Diet  -Bowel Regimen: cont. senna, and miralax for constipation    Renal / :  -Continue to monitor renal function: BUN/Cr 14/0.93  -Monitor I/O's daily     Endocrine:    -No hx of DM or thyroid dx  -A1c: 5.6   -TSH: 2.690    Hematologic:  -CBC: H/H- 8.5/27.8  -Coagulation Panel: PT/PTT/INR - 13.4/30.9/1.12    ID:  -Temperature: 98.2 F (temporal)  -CBC: WBC- 6.39  - Cont. Vancomycin, Ceftriaxone, Silver Sulfadiazine  -Continue to observe for SIRS/Sepsis Syndrome.    Prophylaxis:  -DVT prophylaxis with 5000 SubQ Heparin q8h.  -Continue with SCD's b/l while patient is at rest     Disposition:  -PPM placement pending EP consult, subsequent discharge when medically ready  Assessment    77 y/o female, current every day smoker (59 PY), with PMHx HTN, bicuspid aortic valve, aortic stenosis, spinal stenosis, arthritis who complained of increased HERRERA for 6 months. TTE 3/2021 revealed EF normal, severe AS, moderate AI. NST 4/2021 revealed transient ischemic dilatation. Cardiac cath 5/11/21 revealed severe AS, prox circumflex 90%, OM1 70%, mid RCA 80%. 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 She was referred to Dr. Muller for surgical evaluation and deemed a good surgical candidate. Admitted for surgical planning and on 8/9/21 pt underwent AVR (tissue), Ascending, hemiarch replacement, frozen elephant trunk, left aorto-subclavian bypass, CABG x 2. OR course significant for 7 U pRBC/2 plt/2 FFP/1000 FEIBA; 3 L IVF. Arrived to CTICU on levo and initial LA was 4.1. POD1-2 extubated. POD3 AF with hypotension. Continued on pressors and lasix gtt, DCCV. POD4 AF, FIDEL. POD5 primacor was weaned off, Cr improved, extubated. POD7 ECHO with moderate pericardial effusion, s/p IR drainage. POD8 failed S/S. POD11 diuresed. POD13 sinus bradycardia, stable. POD15 transferred to 9L. POD 16 rapid AF with hypotension requiring pushes of linda and amio bolus. IV Lopressor  given and bradycardia at a rate of 45, V-paced. EP reconsulted and will need PPM. Monitoring sternal wound for worsening erythema/discharge. POD17 sternal wound erythema worsened with purulence expressed with pressure. Sternal wound opened at bedside and wound vac placed. Vanco started. PPM placement postponed. V-paced at bedside, given x1 unit of pRBCs. POD18 pending ID clearance for PPM placement given MSI dehiscence. POD 19 wound vac changed. POD 20 no events. POD21 repeat wound debridement at bedside completed, wound opened additional inch superiorly. Wound vac replaced. POD22 per ID recommendations, patient cleared for PPM placement as Bcx are neg. EP cleared patient for Micra PPM tomorrow.    Plan:    Neurovascular:   -Pain well controlled with current regimen. PRN's: Percocet, Tylenol, lidocaine patch  -anxiety: Xanax PRN    Cardiovascular:   -POD22 AVR, ascending, hemiarch replacement, frozen elephant trunk, left aorto-subclavian bypass, CABGx2 ( SVG-RPDA and SVG-LCx), EF 75%       - SSS (tachy-marifer syndrome): pending PPM placement, plan is for Micra PPM tomorrow with EP, cleared per ID as Bcx were negative,       - Cont. Aspirin, Lasix, Potassium chloride, Amiodarone, Lisinopril   -Hemodynamically stable.   -Monitor: BP, HR, tele    Respiratory:   -Oxygenating well on NC 2lpm, wean as tolerated  -Cont: Xopenex, Symbicort  -Encourage continued use of IS 10x/hr and frequent ambulation  -CXR: stable     GI:  -GI PPX: cont. pantoprazole  -PO Diet: NPO after midnight for PPM  -Bowel Regimen: cont. senna, and miralax for constipation, giving dose of lactulose     -pending BM     Renal / :  -Diuresis: 20 IV BID with potassium, switched to PO regimen: 40mg AM, 20mg PM with 10 mEq of K.   -Continue to monitor renal function: BUN/Cr 14/0.93  -Monitor I/O's daily     Endocrine:    -No hx of DM or thyroid dx  -A1c: 5.6   -TSH: 2.690    Hematologic:  -CBC: H/H- 8.5/27.8  -Coagulation Panel: PT/PTT/INR - 13.4/30.9/1.12    ID:  -Sternal Wound: Wound Vac in place (last changed on 8/30, due to be changed on 9/2)    -Continue Abx listed below    -Vanco trough due before 4th dose on 9/2    -ID is following, appreciate recs     -Wound Cx from yesterday 8/30 NGTD, Blood Cx NGTD, Wound Cx + on 8/26  -Temperature: 98.2 F (temporal)  -CBC: WBC- 6.39  -Cont. Vancomycin, Ceftriaxone, Silver Sulfadiazine  -Continue to observe for SIRS/Sepsis Syndrome.    Prophylaxis:  -DVT prophylaxis with 5000 SubQ Heparin Q8h.  -Continue with SCD's b/l while patient is at rest     Disposition:  -PPM tomorrow, possible home Thursday  75 y/o female, current every day smoker (59 PY), with PMHx HTN, bicuspid aortic valve, aortic stenosis, spinal stenosis, arthritis who complained of increased HERRERA for 6 months. TTE 3/2021 revealed EF normal, severe AS, moderate AI. NST 4/2021 revealed transient ischemic dilatation. Cardiac cath 5/11/21 revealed severe AS, prox circumflex 90%, OM1 70%, mid RCA 80%. 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 She was referred to Dr. Muller for surgical evaluation and deemed a good surgical candidate. Admitted for surgical planning and on 8/9/21 pt underwent AVR (tissue), Ascending, hemiarch replacement, frozen elephant trunk, left aorto-subclavian bypass, CABG x 2. OR course significant for 7 U pRBC/2 plt/2 FFP/1000 FEIBA; 3 L IVF. Arrived to CTICU on levo and initial LA was 4.1. POD1-2 extubated. POD3 AF with hypotension. Continued on pressors and lasix gtt, DCCV. POD4 AF, FIDEL. POD5 primacor was weaned off, Cr improved, extubated. POD7 ECHO with moderate pericardial effusion, s/p IR drainage. POD8 failed S/S. POD11 diuresed. POD13 sinus bradycardia, stable. POD15 transferred to 9L. POD 16 rapid AF with hypotension requiring pushes of linda and amio bolus. IV Lopressor  given and bradycardia at a rate of 45, V-paced. EP reconsulted and will need PPM. Monitoring sternal wound for worsening erythema/discharge. POD17 sternal wound erythema worsened with purulence expressed with pressure. Sternal wound opened at bedside and wound vac placed. Vanco started. PPM placement postponed. V-paced at bedside, given x1 unit of pRBCs. POD18 pending ID clearance for PPM placement given MSI dehiscence. POD 19 wound vac changed. POD 20 no events. POD21 repeat wound debridement at bedside completed, wound opened additional inch superiorly. Wound vac replaced. POD22 per ID recommendations, patient cleared for PPM placement as Bcx are neg. EP cleared patient for Micra PPM tomorrow.    Plan:  Neurovascular:   -Pain well controlled with current regimen. PRN's: Percocet, Tylenol, lidocaine patch  -anxiety: Xanax PRN    Cardiovascular:   -POD22 AVR, ascending, hemiarch replacement, frozen elephant trunk, left aorto-subclavian bypass, CABGx2 ( SVG-RPDA and SVG-LCx), EF 75%       - SSS (tachy-marifer syndrome): pending PPM placement, plan is for Micra PPM tomorrow with EP, cleared per ID as Bcx were negative,       - Cont. Aspirin, Lasix, Potassium chloride, Amiodarone, Lisinopril   -Hemodynamically stable.   -Monitor: BP, HR, tele    Respiratory:   -Oxygenating well on NC 2lpm, wean as tolerated  -Cont: Xopenex, Symbicort  -Encourage continued use of IS 10x/hr and frequent ambulation  -CXR: stable     GI:  -GI PPX: cont. pantoprazole  -PO Diet: NPO after midnight for PPM  -Bowel Regimen: cont. senna, and miralax for constipation, giving dose of lactulose     -pending BM     Renal / :  -Diuresis: 20 IV BID with potassium, switched to PO regimen: 40mg AM, 20mg PM with 10 mEq of K.   -Continue to monitor renal function: BUN/Cr 14/0.93  -Monitor I/O's daily     Endocrine:    -No hx of DM or thyroid dx  -A1c: 5.6   -TSH: 2.690    Hematologic:  -CBC: H/H- 8.5/27.8  -Coagulation Panel: PT/PTT/INR - 13.4/30.9/1.12    ID:  -Sternal Wound: Wound Vac in place (last changed on 8/30, due to be changed on 9/2)    -Continue Abx listed below    -Vanco trough due before 4th dose on 9/2    -ID is following, appreciate recs     -Wound Cx from yesterday 8/30 NGTD, Blood Cx NGTD, Wound Cx + on 8/26  -Temperature: 98.2 F (temporal)  -CBC: WBC- 6.39  -Cont. Vancomycin, Ceftriaxone, Silver Sulfadiazine  -Continue to observe for SIRS/Sepsis Syndrome.    Prophylaxis:  -DVT prophylaxis with 5000 SubQ Heparin Q8h.  -Continue with SCD's b/l while patient is at rest     Disposition:  -PPM tomorrow, possible home Thursday

## 2021-08-31 NOTE — PROGRESS NOTE ADULT - ASSESSMENT
76F h/o bicuspid AV, severe AS, CAD and aortic arch aneurysm p/w elective cardiac surgery, s/p AVR (tissue), ascending, hemiarch placement, frozen elephant trunk, L aorto-subclavian bypass, CABG x2 on 8/9/21. Now found to have sternotomy site infection with K. oxytoca/raoutella ornithinolytica (which is GNR, similar to Klebsiella).  CT chest negative for abscess.  s/p bedside debridement.    - Can c/w vancomycin 1g q24hr as patient most probably will be supratherapeutic on q12 dosing, please check vanc level before the 3rd dose tomorrow at 9pm (scheduled for 10pm)  - cont CTX 2g IV q24h for Klebsiella  - f/u BCx (so far ngtd since 8/27)  - f/u WCx from 8/30  - Clear for PPM placement as Bcx are neg    ID Team 1 will continue to follow. Please see below attending attestation for finalized recommendations.    Nahum Domingo MD PGY2 Internal Medicine  Infectious Disease Consult Service, Team 1

## 2021-08-31 NOTE — PROGRESS NOTE ADULT - SUBJECTIVE AND OBJECTIVE BOX
EPS Progress Note    S: OOB to chair and just came back from short walk with aid. Feels that she is getting stronger.     O: T(C): 36.8 (08-31-21 @ 13:56), Max: 36.8 (08-30-21 @ 17:44)  HR: 69 (08-31-21 @ 12:47) (54 - 82)  BP: 141/78 (08-31-21 @ 12:47) (99/53 - 167/72)  RR: 18 (08-31-21 @ 12:47) (13 - 22)  SpO2: 97% (08-31-21 @ 12:47) (93% - 100%)    TELE:  NSR HR 49-70. No AFIB    PHYSICAL  Constitutional:  NAD        Pulm:  CTA B/L  Cardiac:   + s1/s2, RRR. Sternal wound iwth wound vac   GI:  +BS , soft ND/NT  Vascular: +LE edema, pulse 2+  Neuro: AAO x 3. no focal deficit  Skin: Warm. No rash or lesion     LABS:                        8.5    6.39  )-----------( 122      ( 31 Aug 2021 06:52 )             27.8     08-31    135  |  98  |  14  ----------------------------<  85  3.8   |  28  |  0.93    Ca    8.8      31 Aug 2021 06:52  Mg     1.9     08-31          MEDICATIONS:  acetaminophen   Tablet .. 650 milliGRAM(s) Oral every 6 hours PRN  ALPRAZolam 0.25 milliGRAM(s) Oral two times a day PRN  aMIOdarone    Tablet 200 milliGRAM(s) Oral daily  aspirin  chewable 81 milliGRAM(s) Enteral Tube daily  budesonide  80 MICROgram(s)/formoterol 4.5 MICROgram(s) Inhaler 2 Puff(s) Inhalation two times a day  cefTRIAXone   IVPB 2000 milliGRAM(s) IV Intermittent every 24 hours  dextrose 5%. 1000 milliLiter(s) IV Continuous <Continuous>  dextrose 5%. 1000 milliLiter(s) IV Continuous <Continuous>  furosemide    Tablet 20 milliGRAM(s) Oral daily  glucagon  Injectable 1 milliGRAM(s) IntraMuscular once  heparin   Injectable 5000 Unit(s) SubCutaneous every 8 hours  levalbuterol Inhalation 0.63 milliGRAM(s) Inhalation every 6 hours  lidocaine   4% Patch 1 Patch Transdermal daily  lisinopril 10 milliGRAM(s) Oral daily  oxycodone    5 mG/acetaminophen 325 mG 1 Tablet(s) Oral every 6 hours PRN  pantoprazole    Tablet 40 milliGRAM(s) Oral before breakfast  polyethylene glycol 3350 17 Gram(s) Oral daily  potassium chloride    Tablet ER 10 milliEquivalent(s) Oral daily  senna 2 Tablet(s) Oral at bedtime  silver sulfADIAZINE 1% Cream 1 Application(s) Topical every 12 hours  sodium chloride 0.9% lock flush 3 milliLiter(s) IV Push every 8 hours  vancomycin  IVPB 1000 milliGRAM(s) IV Intermittent every 24 hours

## 2021-08-31 NOTE — PROGRESS NOTE ADULT - SUBJECTIVE AND OBJECTIVE BOX
Patient discussed on morning rounds with Dr. Muller     Operation / Date:  AVE, ascending/hemiarch replacement, frozen elephant trunk, left aorto-subclavian bypass, CABG x2 (SVG-RPDA and SVG-LCx), EF 75%  ---  8/9/21     SUBJECTIVE ASSESSMENT:  76y Female with PMHx of HTN, Bicuspid Aortic Valve, Spinal Stenosis, Arthritis         Vital Signs Last 24 Hrs  T(C): 36.6 (31 Aug 2021 09:02), Max: 37.1 (30 Aug 2021 14:19)  T(F): 97.9 (31 Aug 2021 09:02), Max: 98.7 (30 Aug 2021 14:19)  HR: 69 (31 Aug 2021 12:47) (54 - 82)  BP: 141/78 (31 Aug 2021 12:47) (99/53 - 167/72)  BP(mean): 94 (31 Aug 2021 12:47) (71 - 103)  RR: 18 (31 Aug 2021 12:47) (13 - 22)  SpO2: 97% (31 Aug 2021 12:47) (93% - 100%)  I&O's Detail    30 Aug 2021 07:01  -  31 Aug 2021 07:00  --------------------------------------------------------  IN:    IV PiggyBack: 300 mL    Oral Fluid: 650 mL  Total IN: 950 mL    OUT:    Drain (mL): 0 mL    Voided (mL): 2275 mL  Total OUT: 2275 mL    Total NET: -1325 mL      31 Aug 2021 07:01  -  31 Aug 2021 13:28  --------------------------------------------------------  IN:    Oral Fluid: 300 mL  Total IN: 300 mL    OUT:    Drain (mL): 0 mL    Voided (mL): 300 mL  Total OUT: 300 mL    Total NET: 0 mL          CHEST TUBE:  Yes/No. AIR LEAKS: Yes/No. Suction / H2O SEAL.   FRANCHESKA DRAIN:  Yes/No.  EPICARDIAL WIRES: Yes/No.  TIE DOWNS: Yes/No.  REGALADO: Yes/No.    PHYSICAL EXAM:    General:     Neurological:    Cardiovascular:    Respiratory:    Gastrointestinal:    Extremities:    Vascular:    Incision Sites:    LABS:                        8.5    6.39  )-----------( 122      ( 31 Aug 2021 06:52 )             27.8       COUMADIN:  Yes/No. REASON: .        08-31    135  |  98  |  14  ----------------------------<  85  3.8   |  28  |  0.93    Ca    8.8      31 Aug 2021 06:52  Mg     1.9     08-31            MEDICATIONS  (STANDING):  aMIOdarone    Tablet 200 milliGRAM(s) Oral daily  aspirin  chewable 81 milliGRAM(s) Enteral Tube daily  budesonide  80 MICROgram(s)/formoterol 4.5 MICROgram(s) Inhaler 2 Puff(s) Inhalation two times a day  cefTRIAXone   IVPB 2000 milliGRAM(s) IV Intermittent every 24 hours  dextrose 5%. 1000 milliLiter(s) (50 mL/Hr) IV Continuous <Continuous>  dextrose 5%. 1000 milliLiter(s) (100 mL/Hr) IV Continuous <Continuous>  furosemide   Injectable 20 milliGRAM(s) IV Push every 12 hours  glucagon  Injectable 1 milliGRAM(s) IntraMuscular once  heparin   Injectable 5000 Unit(s) SubCutaneous every 8 hours  levalbuterol Inhalation 0.63 milliGRAM(s) Inhalation every 6 hours  lidocaine   4% Patch 1 Patch Transdermal daily  lisinopril 10 milliGRAM(s) Oral daily  pantoprazole    Tablet 40 milliGRAM(s) Oral before breakfast  polyethylene glycol 3350 17 Gram(s) Oral daily  potassium chloride    Tablet ER 10 milliEquivalent(s) Oral daily  senna 2 Tablet(s) Oral at bedtime  silver sulfADIAZINE 1% Cream 1 Application(s) Topical every 12 hours  sodium chloride 0.9% lock flush 3 milliLiter(s) IV Push every 8 hours  testosterone patch 4 mG/24 Hr(s) 4 milliGRAM(s) Transdermal daily    MEDICATIONS  (PRN):  acetaminophen   Tablet .. 650 milliGRAM(s) Oral every 6 hours PRN Severe Pain (7 - 10)  ALPRAZolam 0.25 milliGRAM(s) Oral two times a day PRN anxiety  oxycodone    5 mG/acetaminophen 325 mG 1 Tablet(s) Oral every 6 hours PRN Moderate Pain (4 - 6)        RADIOLOGY & ADDITIONAL TESTS:     Patient discussed on morning rounds with Dr. Muller     Operation / Date:  AVE, ascending/hemiarch replacement, frozen elephant trunk, left aorto-subclavian bypass, CABG x2 (SVG-RPDA and SVG-LCx), EF 75%  ---  8/9/21     SUBJECTIVE ASSESSMENT:  76y Female with PMHx of HTN, Bicuspid Aortic Valve, Aortic Stenosis, Spinal Stenosis, Arthritis. Patient is feeling well today and states she is slowly feeling better and her pain is well controlled. States the swelling in her legs has improved and it is easier to walk. Patient is ambulating well 3x per day. Admits to mild shortness of breath when walking. Denies chest pain, palpitations, dizziness, N/V, fever, or chills.         Vital Signs Last 24 Hrs  T(C): 36.6 (31 Aug 2021 09:02), Max: 37.1 (30 Aug 2021 14:19)  T(F): 97.9 (31 Aug 2021 09:02), Max: 98.7 (30 Aug 2021 14:19)  HR: 69 (31 Aug 2021 12:47) (54 - 82)  BP: 141/78 (31 Aug 2021 12:47) (99/53 - 167/72)  BP(mean): 94 (31 Aug 2021 12:47) (71 - 103)  RR: 18 (31 Aug 2021 12:47) (13 - 22)  SpO2: 97% (31 Aug 2021 12:47) (93% - 100%)  I&O's Detail    30 Aug 2021 07:01  -  31 Aug 2021 07:00  --------------------------------------------------------  IN:    IV PiggyBack: 300 mL    Oral Fluid: 650 mL  Total IN: 950 mL    OUT:    Drain (mL): 0 mL    Voided (mL): 2275 mL  Total OUT: 2275 mL    Total NET: -1325 mL      31 Aug 2021 07:01  -  31 Aug 2021 13:28  --------------------------------------------------------  IN:    Oral Fluid: 300 mL  Total IN: 300 mL    OUT:    Drain (mL): 0 mL    Voided (mL): 300 mL  Total OUT: 300 mL    Total NET: 0 mL      CHEST TUBE:  No   FRANCHESKA DRAIN:  No  EPICARDIAL WIRES: Yes  TIE DOWNS: No.  REGALADO: Yes    PHYSICAL EXAM:    General: Well appearing, sitting up in recliner, NAD  Neurological: AOx4, no focal neurological deficits   Cardiovascular: Regular rate and rhythm, S1/S2 heard, Grade II/VI Systolic Murmur appreciated  Respiratory: Crackles auscultated in lower lung lobes b/l, no wheezing   Gastrointestinal: Abdomen soft, non tender, non distended, bowel sounds present in all quadrants,   Extremities: 2+ lower extremity edema b/l  Vascular: Distal pulses 2+ radial b/l in upper extremities, 2+ DP b/l in lower extremities  Incision Sites: sternotomy with wound vac in place, no erythema, purulence or ecchymosis. EVH sites b/l with stable ecchymosis, and no erythema, purulence or discharge    LABS:                        8.5    6.39  )-----------( 122      ( 31 Aug 2021 06:52 )             27.8       COUMADIN:  No        08-31    135  |  98  |  14  ----------------------------<  85  3.8   |  28  |  0.93    Ca    8.8      31 Aug 2021 06:52  Mg     1.9     08-31            MEDICATIONS  (STANDING):  aMIOdarone    Tablet 200 milliGRAM(s) Oral daily  aspirin  chewable 81 milliGRAM(s) Enteral Tube daily  budesonide  80 MICROgram(s)/formoterol 4.5 MICROgram(s) Inhaler 2 Puff(s) Inhalation two times a day  cefTRIAXone   IVPB 2000 milliGRAM(s) IV Intermittent every 24 hours  dextrose 5%. 1000 milliLiter(s) (50 mL/Hr) IV Continuous <Continuous>  dextrose 5%. 1000 milliLiter(s) (100 mL/Hr) IV Continuous <Continuous>  furosemide   Injectable 20 milliGRAM(s) IV Push every 12 hours  glucagon  Injectable 1 milliGRAM(s) IntraMuscular once  heparin   Injectable 5000 Unit(s) SubCutaneous every 8 hours  levalbuterol Inhalation 0.63 milliGRAM(s) Inhalation every 6 hours  lidocaine   4% Patch 1 Patch Transdermal daily  lisinopril 10 milliGRAM(s) Oral daily  pantoprazole    Tablet 40 milliGRAM(s) Oral before breakfast  polyethylene glycol 3350 17 Gram(s) Oral daily  potassium chloride    Tablet ER 10 milliEquivalent(s) Oral daily  senna 2 Tablet(s) Oral at bedtime  silver sulfADIAZINE 1% Cream 1 Application(s) Topical every 12 hours  sodium chloride 0.9% lock flush 3 milliLiter(s) IV Push every 8 hours  testosterone patch 4 mG/24 Hr(s) 4 milliGRAM(s) Transdermal daily    MEDICATIONS  (PRN):  acetaminophen   Tablet .. 650 milliGRAM(s) Oral every 6 hours PRN Severe Pain (7 - 10)  ALPRAZolam 0.25 milliGRAM(s) Oral two times a day PRN anxiety  oxycodone    5 mG/acetaminophen 325 mG 1 Tablet(s) Oral every 6 hours PRN Moderate Pain (4 - 6)        RADIOLOGY & ADDITIONAL TESTS:         Patient discussed on morning rounds with Dr. Muller     Operation / Date:  AVE, ascending/hemiarch replacement, frozen elephant trunk, left aorto-subclavian bypass, CABG x2 (SVG-RPDA and SVG-LCx), EF 75%  ---  8/9/21     SUBJECTIVE ASSESSMENT:  76y Female with PMHx of HTN, Bicuspid Aortic Valve, Aortic Stenosis, Spinal Stenosis, Arthritis. Patient is feeling well today and states she is slowly feeling better and her pain is well controlled. States the swelling in her legs has improved and it is easier to walk. Patient is ambulating well 3x per day. Admits to mild shortness of breath when walking. Denies chest pain, palpitations, dizziness, N/V, fever, or chills.         Vital Signs Last 24 Hrs  T(C): 36.6 (31 Aug 2021 09:02), Max: 37.1 (30 Aug 2021 14:19)  T(F): 97.9 (31 Aug 2021 09:02), Max: 98.7 (30 Aug 2021 14:19)  HR: 69 (31 Aug 2021 12:47) (54 - 82)  BP: 141/78 (31 Aug 2021 12:47) (99/53 - 167/72)  BP(mean): 94 (31 Aug 2021 12:47) (71 - 103)  RR: 18 (31 Aug 2021 12:47) (13 - 22)  SpO2: 97% (31 Aug 2021 12:47) (93% - 100%)  I&O's Detail    30 Aug 2021 07:01  -  31 Aug 2021 07:00  --------------------------------------------------------  IN:    IV PiggyBack: 300 mL    Oral Fluid: 650 mL  Total IN: 950 mL    OUT:    Drain (mL): 0 mL    Voided (mL): 2275 mL  Total OUT: 2275 mL    Total NET: -1325 mL      31 Aug 2021 07:01  -  31 Aug 2021 13:28  --------------------------------------------------------  IN:    Oral Fluid: 300 mL  Total IN: 300 mL    OUT:    Drain (mL): 0 mL    Voided (mL): 300 mL  Total OUT: 300 mL    Total NET: 0 mL      CHEST TUBE:  No   FRANCHESKA DRAIN:  No  EPICARDIAL WIRES: Yes  TIE DOWNS: No.  REGALADO: Yes    PHYSICAL EXAM:    General: Well appearing, sitting up in recliner, NAD  Neurological: AOx4, no focal neurological deficits   Cardiovascular: Regular rate and rhythm, S1/S2 heard, Grade II/VI Systolic Murmur appreciated  Respiratory: Crackles auscultated in lower lung lobes b/l, no wheezing   Gastrointestinal: Abdomen soft, non tender, non distended, bowel sounds present in all quadrants,   Extremities: 2+ lower extremity edema b/l  Vascular: Distal pulses 2+ radial b/l in upper extremities, 2+ DP b/l in lower extremities  Incision Sites: sternotomy with wound vac in place, no erythema, purulence or ecchymosis. EVH sites b/l with stable ecchymosis, and no erythema, purulence or discharge    LABS:                        8.5    6.39  )-----------( 122      ( 31 Aug 2021 06:52 )             27.8       COUMADIN:  No        08-31    135  |  98  |  14  ----------------------------<  85  3.8   |  28  |  0.93    Ca    8.8      31 Aug 2021 06:52  Mg     1.9     08-31            MEDICATIONS  (STANDING):  aMIOdarone    Tablet 200 milliGRAM(s) Oral daily  aspirin  chewable 81 milliGRAM(s) Enteral Tube daily  budesonide  80 MICROgram(s)/formoterol 4.5 MICROgram(s) Inhaler 2 Puff(s) Inhalation two times a day  cefTRIAXone   IVPB 2000 milliGRAM(s) IV Intermittent every 24 hours  dextrose 5%. 1000 milliLiter(s) (50 mL/Hr) IV Continuous <Continuous>  dextrose 5%. 1000 milliLiter(s) (100 mL/Hr) IV Continuous <Continuous>  furosemide   Injectable 20 milliGRAM(s) IV Push every 12 hours  glucagon  Injectable 1 milliGRAM(s) IntraMuscular once  heparin   Injectable 5000 Unit(s) SubCutaneous every 8 hours  levalbuterol Inhalation 0.63 milliGRAM(s) Inhalation every 6 hours  lidocaine   4% Patch 1 Patch Transdermal daily  lisinopril 10 milliGRAM(s) Oral daily  pantoprazole    Tablet 40 milliGRAM(s) Oral before breakfast  polyethylene glycol 3350 17 Gram(s) Oral daily  potassium chloride    Tablet ER 10 milliEquivalent(s) Oral daily  senna 2 Tablet(s) Oral at bedtime  silver sulfADIAZINE 1% Cream 1 Application(s) Topical every 12 hours  sodium chloride 0.9% lock flush 3 milliLiter(s) IV Push every 8 hours  testosterone patch 4 mG/24 Hr(s) 4 milliGRAM(s) Transdermal daily    MEDICATIONS  (PRN):  acetaminophen   Tablet .. 650 milliGRAM(s) Oral every 6 hours PRN Severe Pain (7 - 10)  ALPRAZolam 0.25 milliGRAM(s) Oral two times a day PRN anxiety  oxycodone    5 mG/acetaminophen 325 mG 1 Tablet(s) Oral every 6 hours PRN Moderate Pain (4 - 6)        RADIOLOGY & ADDITIONAL TESTS:    Stable cardiomegaly, mild improvement of bilateral opacities/pleural effusions     Patient discussed on morning rounds with Dr. Muller     Operation / Date:  AVR, ascending/hemiarch replacement, frozen elephant trunk, left aorto-subclavian bypass, CABG x2 (SVG-RPDA and SVG-LCx), EF 75%  ---  8/9/21     SUBJECTIVE ASSESSMENT:  76y Female with PMHx of HTN, Bicuspid Aortic Valve, Aortic Stenosis, Spinal Stenosis, Arthritis. Patient is feeling well today and states she is slowly feeling better and her pain is well controlled. States the swelling in her legs has improved and it is easier to walk. Patient is ambulating well 3x per day. No BM in last few days, requesting medication. Admits to mild shortness of breath when walking. Denies chest pain, palpitations, dizziness, N/V, fever, or chills.       Vital Signs Last 24 Hrs  T(C): 36.6 (31 Aug 2021 09:02), Max: 37.1 (30 Aug 2021 14:19)  T(F): 97.9 (31 Aug 2021 09:02), Max: 98.7 (30 Aug 2021 14:19)  HR: 69 (31 Aug 2021 12:47) (54 - 82)  BP: 141/78 (31 Aug 2021 12:47) (99/53 - 167/72)  BP(mean): 94 (31 Aug 2021 12:47) (71 - 103)  RR: 18 (31 Aug 2021 12:47) (13 - 22)  SpO2: 97% (31 Aug 2021 12:47) (93% - 100%)  I&O's Detail    30 Aug 2021 07:01  -  31 Aug 2021 07:00  --------------------------------------------------------  IN:    IV PiggyBack: 300 mL    Oral Fluid: 650 mL  Total IN: 950 mL    OUT:    Drain (mL): 0 mL    Voided (mL): 2275 mL  Total OUT: 2275 mL    Total NET: -1325 mL      31 Aug 2021 07:01  -  31 Aug 2021 13:28  --------------------------------------------------------  IN:    Oral Fluid: 300 mL  Total IN: 300 mL    OUT:    Drain (mL): 0 mL    Voided (mL): 300 mL  Total OUT: 300 mL    Total NET: 0 mL      CHEST TUBE:  No   FRANCHESKA DRAIN:  No  EPICARDIAL WIRES: Yes  TIE DOWNS: No.  REGALADO: no    PHYSICAL EXAM:  General: Well appearing, sitting up in recliner, NAD  Neurological: AOx4, no focal neurological deficits   Cardiovascular: Regular rate and rhythm, S1/S2 heard, Grade II/VI Systolic Murmur appreciated  Respiratory: Crackles auscultated in lower lung lobes b/l, no wheezing   Gastrointestinal: Abdomen soft, non tender, non distended, bowel sounds present in all quadrants,   Extremities: 2+ lower extremity edema b/l  Vascular: Distal pulses 2+ radial b/l in upper extremities, 2+ DP b/l in lower extremities  Incision Sites: sternotomy with wound vac in place, no erythema, purulence or ecchymosis. EVH sites b/l with stable ecchymosis, and no erythema, purulence or discharge    LABS:                        8.5    6.39  )-----------( 122      ( 31 Aug 2021 06:52 )             27.8       COUMADIN:  No        08-31    135  |  98  |  14  ----------------------------<  85  3.8   |  28  |  0.93    Ca    8.8      31 Aug 2021 06:52  Mg     1.9     08-31            MEDICATIONS  (STANDING):  aMIOdarone    Tablet 200 milliGRAM(s) Oral daily  aspirin  chewable 81 milliGRAM(s) Enteral Tube daily  budesonide  80 MICROgram(s)/formoterol 4.5 MICROgram(s) Inhaler 2 Puff(s) Inhalation two times a day  cefTRIAXone   IVPB 2000 milliGRAM(s) IV Intermittent every 24 hours  dextrose 5%. 1000 milliLiter(s) (50 mL/Hr) IV Continuous <Continuous>  dextrose 5%. 1000 milliLiter(s) (100 mL/Hr) IV Continuous <Continuous>  furosemide   Injectable 20 milliGRAM(s) IV Push every 12 hours  glucagon  Injectable 1 milliGRAM(s) IntraMuscular once  heparin   Injectable 5000 Unit(s) SubCutaneous every 8 hours  levalbuterol Inhalation 0.63 milliGRAM(s) Inhalation every 6 hours  lidocaine   4% Patch 1 Patch Transdermal daily  lisinopril 10 milliGRAM(s) Oral daily  pantoprazole    Tablet 40 milliGRAM(s) Oral before breakfast  polyethylene glycol 3350 17 Gram(s) Oral daily  potassium chloride    Tablet ER 10 milliEquivalent(s) Oral daily  senna 2 Tablet(s) Oral at bedtime  silver sulfADIAZINE 1% Cream 1 Application(s) Topical every 12 hours  sodium chloride 0.9% lock flush 3 milliLiter(s) IV Push every 8 hours  testosterone patch 4 mG/24 Hr(s) 4 milliGRAM(s) Transdermal daily    MEDICATIONS  (PRN):  acetaminophen   Tablet .. 650 milliGRAM(s) Oral every 6 hours PRN Severe Pain (7 - 10)  ALPRAZolam 0.25 milliGRAM(s) Oral two times a day PRN anxiety  oxycodone    5 mG/acetaminophen 325 mG 1 Tablet(s) Oral every 6 hours PRN Moderate Pain (4 - 6)        RADIOLOGY & ADDITIONAL TESTS:  CXR: Stable cardiomegaly, mild improvement of bilateral opacities/pleural effusions (pending official read)

## 2021-08-31 NOTE — PROGRESS NOTE ADULT - SUBJECTIVE AND OBJECTIVE BOX
**INCOMPLETE NOTE    OVERNIGHT EVENTS:    SUBJECTIVE:  Patient seen and examined at bedside.    Vital Signs Last 12 Hrs  T(F): 96.9 (08-31-21 @ 05:01), Max: 97.9 (08-30-21 @ 21:26)  HR: 71 (08-31-21 @ 07:00) (54 - 72)  BP: 121/77 (08-31-21 @ 07:00) (106/51 - 146/66)  BP(mean): 91 (08-31-21 @ 07:00) (74 - 96)  RR: 14 (08-31-21 @ 07:00) (13 - 21)  SpO2: 96% (08-31-21 @ 07:00) (93% - 100%)  I&O's Summary    30 Aug 2021 07:01  -  31 Aug 2021 07:00  --------------------------------------------------------  IN: 950 mL / OUT: 2275 mL / NET: -1325 mL        PHYSICAL EXAM:  Constitutional: NAD, comfortable in bed.  HEENT: NC/AT, PERRLA, EOMI, no conjunctival pallor or scleral icterus, MMM  Neck: Supple, no JVD  Respiratory: CTA B/L. No w/r/r.   Cardiovascular: RRR, normal S1 and S2, no m/r/g.   Gastrointestinal: +BS, soft NTND, no guarding or rebound tenderness, no palpable masses   Extremities: wwp; no cyanosis, clubbing or edema.   Vascular: Pulses equal and strong throughout.   Neurological: AAOx3, no CN deficits, strength and sensation intact throughout.   Skin: No gross skin abnormalities or rashes        LABS:                        8.5    6.39  )-----------( 122      ( 31 Aug 2021 06:52 )             27.8     08-31    135  |  98  |  14  ----------------------------<  85  3.8   |  28  |  0.93    Ca    8.8      31 Aug 2021 06:52  Mg     1.9     08-31              RADIOLOGY & ADDITIONAL TESTS:    MEDICATIONS  (STANDING):  aMIOdarone    Tablet 200 milliGRAM(s) Oral daily  aspirin  chewable 81 milliGRAM(s) Enteral Tube daily  budesonide  80 MICROgram(s)/formoterol 4.5 MICROgram(s) Inhaler 2 Puff(s) Inhalation two times a day  cefTRIAXone   IVPB 2000 milliGRAM(s) IV Intermittent every 24 hours  dextrose 5%. 1000 milliLiter(s) (50 mL/Hr) IV Continuous <Continuous>  dextrose 5%. 1000 milliLiter(s) (100 mL/Hr) IV Continuous <Continuous>  furosemide   Injectable 20 milliGRAM(s) IV Push every 12 hours  glucagon  Injectable 1 milliGRAM(s) IntraMuscular once  heparin   Injectable 5000 Unit(s) SubCutaneous every 8 hours  levalbuterol Inhalation 0.63 milliGRAM(s) Inhalation every 6 hours  lidocaine   4% Patch 1 Patch Transdermal daily  lisinopril 10 milliGRAM(s) Oral daily  magnesium oxide 800 milliGRAM(s) Oral once  pantoprazole    Tablet 40 milliGRAM(s) Oral before breakfast  polyethylene glycol 3350 17 Gram(s) Oral daily  potassium chloride    Tablet ER 10 milliEquivalent(s) Oral daily  senna 2 Tablet(s) Oral at bedtime  sodium chloride 0.9% lock flush 3 milliLiter(s) IV Push every 8 hours  testosterone patch 4 mG/24 Hr(s) 4 milliGRAM(s) Transdermal daily  vancomycin  IVPB 1000 milliGRAM(s) IV Intermittent every 24 hours    MEDICATIONS  (PRN):  acetaminophen   Tablet .. 650 milliGRAM(s) Oral every 6 hours PRN Severe Pain (7 - 10)  ALPRAZolam 0.25 milliGRAM(s) Oral two times a day PRN anxiety  oxycodone    5 mG/acetaminophen 325 mG 1 Tablet(s) Oral every 6 hours PRN Moderate Pain (4 - 6)     SUBJECTIVE/ interval events: BCx 8/27 and wound Cx 8/30 NGTD, last growth wound 8/26 K. oxytoca/raoutella ornithinolytica, Afebrile, WBC wnl. s/p debridement yesterday, possible wound vac change today. Patient seen and examined at bedside. Patient has no new complaints. Patient denies h/n/v/d, fever, chills, cp, palpitations, sob, abd pain, leg swelling, rashes, dysuria, and changes in BM.     Vital Signs Last 12 Hrs  T(F): 96.9 (08-31-21 @ 05:01), Max: 97.9 (08-30-21 @ 21:26)  HR: 71 (08-31-21 @ 07:00) (54 - 72)  BP: 121/77 (08-31-21 @ 07:00) (106/51 - 146/66)  BP(mean): 91 (08-31-21 @ 07:00) (74 - 96)  RR: 14 (08-31-21 @ 07:00) (13 - 21)  SpO2: 96% (08-31-21 @ 07:00) (93% - 100%)  I&O's Summary    30 Aug 2021 07:01  -  31 Aug 2021 07:00  --------------------------------------------------------  IN: 950 mL / OUT: 2275 mL / NET: -1325 mL    PHYSICAL EXAM:  Constitutional: NAD, comfortable in bed.  HEENT: NC/AT, PERRLA, EOMI, no conjunctival pallor or scleral icterus, MMM  Neck: Supple, no JVD  Respiratory: Bibasilar crackles. chest sternotomy wound with wound vac, upper portion with erythemas margins   Cardiovascular: RRR, normal S1 and S2, no m/r/g.   Gastrointestinal: +BS, soft NTND, no guarding or rebound tenderness, no palpable masses   Extremities: wwp; no cyanosis, clubbing or edema.   Vascular: Pulses equal and strong throughout.   Neurological: AAOx3, no CN deficits, strength and sensation intact throughout.   Skin: No gross skin abnormalities or rashes    LABS:                        8.5    6.39  )-----------( 122      ( 31 Aug 2021 06:52 )             27.8     08-31    135  |  98  |  14  ----------------------------<  85  3.8   |  28  |  0.93    Ca    8.8      31 Aug 2021 06:52  Mg     1.9     08-31    MEDICATIONS  (STANDING):  aMIOdarone    Tablet 200 milliGRAM(s) Oral daily  aspirin  chewable 81 milliGRAM(s) Enteral Tube daily  budesonide  80 MICROgram(s)/formoterol 4.5 MICROgram(s) Inhaler 2 Puff(s) Inhalation two times a day  cefTRIAXone   IVPB 2000 milliGRAM(s) IV Intermittent every 24 hours  dextrose 5%. 1000 milliLiter(s) (50 mL/Hr) IV Continuous <Continuous>  dextrose 5%. 1000 milliLiter(s) (100 mL/Hr) IV Continuous <Continuous>  furosemide   Injectable 20 milliGRAM(s) IV Push every 12 hours  glucagon  Injectable 1 milliGRAM(s) IntraMuscular once  heparin   Injectable 5000 Unit(s) SubCutaneous every 8 hours  levalbuterol Inhalation 0.63 milliGRAM(s) Inhalation every 6 hours  lidocaine   4% Patch 1 Patch Transdermal daily  lisinopril 10 milliGRAM(s) Oral daily  magnesium oxide 800 milliGRAM(s) Oral once  pantoprazole    Tablet 40 milliGRAM(s) Oral before breakfast  polyethylene glycol 3350 17 Gram(s) Oral daily  potassium chloride    Tablet ER 10 milliEquivalent(s) Oral daily  senna 2 Tablet(s) Oral at bedtime  sodium chloride 0.9% lock flush 3 milliLiter(s) IV Push every 8 hours  testosterone patch 4 mG/24 Hr(s) 4 milliGRAM(s) Transdermal daily  vancomycin  IVPB 1000 milliGRAM(s) IV Intermittent every 24 hours    MEDICATIONS  (PRN):  acetaminophen   Tablet .. 650 milliGRAM(s) Oral every 6 hours PRN Severe Pain (7 - 10)  ALPRAZolam 0.25 milliGRAM(s) Oral two times a day PRN anxiety  oxycodone    5 mG/acetaminophen 325 mG 1 Tablet(s) Oral every 6 hours PRN Moderate Pain (4 - 6)

## 2021-09-01 LAB
-  AMIKACIN: SIGNIFICANT CHANGE UP
-  AMIKACIN: SIGNIFICANT CHANGE UP
-  AMPICILLIN/SULBACTAM: SIGNIFICANT CHANGE UP
-  AMPICILLIN: SIGNIFICANT CHANGE UP
-  AZTREONAM: SIGNIFICANT CHANGE UP
-  AZTREONAM: SIGNIFICANT CHANGE UP
-  CEFAZOLIN: SIGNIFICANT CHANGE UP
-  CEFEPIME: SIGNIFICANT CHANGE UP
-  CEFEPIME: SIGNIFICANT CHANGE UP
-  CEFOTAXIME: SIGNIFICANT CHANGE UP
-  CEFOTAXIME: SIGNIFICANT CHANGE UP
-  CEFOXITIN: SIGNIFICANT CHANGE UP
-  CEFOXITIN: SIGNIFICANT CHANGE UP
-  CEFTAZIDIME: SIGNIFICANT CHANGE UP
-  CEFTAZIDIME: SIGNIFICANT CHANGE UP
-  CEFTRIAXONE: SIGNIFICANT CHANGE UP
-  CEFUROXIME: SIGNIFICANT CHANGE UP
-  CEFUROXIME: SIGNIFICANT CHANGE UP
-  CIPROFLOXACIN: SIGNIFICANT CHANGE UP
-  ERTAPENEM: SIGNIFICANT CHANGE UP
-  GENTAMICIN: SIGNIFICANT CHANGE UP
-  LEVOFLOXACIN: SIGNIFICANT CHANGE UP
-  LEVOFLOXACIN: SIGNIFICANT CHANGE UP
-  MEROPENEM: SIGNIFICANT CHANGE UP
-  MEROPENEM: SIGNIFICANT CHANGE UP
-  MINOCYCLINE: SIGNIFICANT CHANGE UP
-  MINOCYCLINE: SIGNIFICANT CHANGE UP
-  PIPERACILLIN/TAZOBACTAM: SIGNIFICANT CHANGE UP
-  TIGECYCLINE: SIGNIFICANT CHANGE UP
-  TIGECYCLINE: SIGNIFICANT CHANGE UP
-  TOBRAMYCIN: <=2 — SIGNIFICANT CHANGE UP
-  TOBRAMYCIN: SIGNIFICANT CHANGE UP
-  TRIMETHOPRIM/SULFAMETHOXAZOLE: SIGNIFICANT CHANGE UP
ALBUMIN SERPL ELPH-MCNC: 3.3 G/DL — SIGNIFICANT CHANGE UP (ref 3.3–5)
ALP SERPL-CCNC: 91 U/L — SIGNIFICANT CHANGE UP (ref 40–120)
ALT FLD-CCNC: 17 U/L — SIGNIFICANT CHANGE UP (ref 10–45)
ANION GAP SERPL CALC-SCNC: 9 MMOL/L — SIGNIFICANT CHANGE UP (ref 5–17)
AST SERPL-CCNC: 25 U/L — SIGNIFICANT CHANGE UP (ref 10–40)
BILIRUB SERPL-MCNC: 0.8 MG/DL — SIGNIFICANT CHANGE UP (ref 0.2–1.2)
BLD GP AB SCN SERPL QL: NEGATIVE — SIGNIFICANT CHANGE UP
BUN SERPL-MCNC: 12 MG/DL — SIGNIFICANT CHANGE UP (ref 7–23)
CALCIUM SERPL-MCNC: 9 MG/DL — SIGNIFICANT CHANGE UP (ref 8.4–10.5)
CHLORIDE SERPL-SCNC: 98 MMOL/L — SIGNIFICANT CHANGE UP (ref 96–108)
CO2 SERPL-SCNC: 28 MMOL/L — SIGNIFICANT CHANGE UP (ref 22–31)
CREAT SERPL-MCNC: 0.84 MG/DL — SIGNIFICANT CHANGE UP (ref 0.5–1.3)
CULTURE RESULTS: SIGNIFICANT CHANGE UP
GLUCOSE SERPL-MCNC: 92 MG/DL — SIGNIFICANT CHANGE UP (ref 70–99)
HCT VFR BLD CALC: 30.3 % — LOW (ref 34.5–45)
HGB BLD-MCNC: 9.2 G/DL — LOW (ref 11.5–15.5)
MAGNESIUM SERPL-MCNC: 2 MG/DL — SIGNIFICANT CHANGE UP (ref 1.6–2.6)
MCHC RBC-ENTMCNC: 30 PG — SIGNIFICANT CHANGE UP (ref 27–34)
MCHC RBC-ENTMCNC: 30.4 GM/DL — LOW (ref 32–36)
MCV RBC AUTO: 98.7 FL — SIGNIFICANT CHANGE UP (ref 80–100)
METHOD TYPE: SIGNIFICANT CHANGE UP
NRBC # BLD: 0 /100 WBCS — SIGNIFICANT CHANGE UP (ref 0–0)
ORGANISM # SPEC MICROSCOPIC CNT: SIGNIFICANT CHANGE UP
PLATELET # BLD AUTO: 127 K/UL — LOW (ref 150–400)
POTASSIUM SERPL-MCNC: 4.1 MMOL/L — SIGNIFICANT CHANGE UP (ref 3.5–5.3)
POTASSIUM SERPL-SCNC: 4.1 MMOL/L — SIGNIFICANT CHANGE UP (ref 3.5–5.3)
PROT SERPL-MCNC: 6.3 G/DL — SIGNIFICANT CHANGE UP (ref 6–8.3)
RBC # BLD: 3.07 M/UL — LOW (ref 3.8–5.2)
RBC # FLD: 16.7 % — HIGH (ref 10.3–14.5)
RH IG SCN BLD-IMP: POSITIVE — SIGNIFICANT CHANGE UP
SODIUM SERPL-SCNC: 135 MMOL/L — SIGNIFICANT CHANGE UP (ref 135–145)
SPECIMEN SOURCE: SIGNIFICANT CHANGE UP
WBC # BLD: 6.5 K/UL — SIGNIFICANT CHANGE UP (ref 3.8–10.5)
WBC # FLD AUTO: 6.5 K/UL — SIGNIFICANT CHANGE UP (ref 3.8–10.5)

## 2021-09-01 PROCEDURE — 99232 SBSQ HOSP IP/OBS MODERATE 35: CPT

## 2021-09-01 PROCEDURE — 71045 X-RAY EXAM CHEST 1 VIEW: CPT | Mod: 26,77

## 2021-09-01 PROCEDURE — 33274 TCAT INSJ/RPL PERM LDLS PM: CPT

## 2021-09-01 PROCEDURE — 97605 NEG PRS WND THER DME<=50SQCM: CPT

## 2021-09-01 PROCEDURE — 71045 X-RAY EXAM CHEST 1 VIEW: CPT | Mod: 26

## 2021-09-01 RX ORDER — FUROSEMIDE 40 MG
40 TABLET ORAL EVERY 12 HOURS
Refills: 0 | Status: DISCONTINUED | OUTPATIENT
Start: 2021-09-01 | End: 2021-09-03

## 2021-09-01 RX ORDER — MAGNESIUM OXIDE 400 MG ORAL TABLET 241.3 MG
800 TABLET ORAL ONCE
Refills: 0 | Status: COMPLETED | OUTPATIENT
Start: 2021-09-01 | End: 2021-09-01

## 2021-09-01 RX ORDER — POTASSIUM CHLORIDE 20 MEQ
20 PACKET (EA) ORAL
Refills: 0 | Status: DISCONTINUED | OUTPATIENT
Start: 2021-09-01 | End: 2021-09-03

## 2021-09-01 RX ORDER — FUROSEMIDE 40 MG
40 TABLET ORAL EVERY 8 HOURS
Refills: 0 | Status: DISCONTINUED | OUTPATIENT
Start: 2021-09-01 | End: 2021-09-01

## 2021-09-01 RX ADMIN — Medication 20 MILLIEQUIVALENT(S): at 17:37

## 2021-09-01 RX ADMIN — BUDESONIDE AND FORMOTEROL FUMARATE DIHYDRATE 2 PUFF(S): 160; 4.5 AEROSOL RESPIRATORY (INHALATION) at 22:12

## 2021-09-01 RX ADMIN — Medication 40 MILLIGRAM(S): at 19:39

## 2021-09-01 RX ADMIN — LISINOPRIL 10 MILLIGRAM(S): 2.5 TABLET ORAL at 06:27

## 2021-09-01 RX ADMIN — LEVALBUTEROL 0.63 MILLIGRAM(S): 1.25 SOLUTION, CONCENTRATE RESPIRATORY (INHALATION) at 17:37

## 2021-09-01 RX ADMIN — Medication 650 MILLIGRAM(S): at 01:23

## 2021-09-01 RX ADMIN — Medication 1 APPLICATION(S): at 06:24

## 2021-09-01 RX ADMIN — LEVALBUTEROL 0.63 MILLIGRAM(S): 1.25 SOLUTION, CONCENTRATE RESPIRATORY (INHALATION) at 12:26

## 2021-09-01 RX ADMIN — LIDOCAINE 1 PATCH: 4 CREAM TOPICAL at 12:24

## 2021-09-01 RX ADMIN — SENNA PLUS 2 TABLET(S): 8.6 TABLET ORAL at 22:11

## 2021-09-01 RX ADMIN — OXYCODONE AND ACETAMINOPHEN 1 TABLET(S): 5; 325 TABLET ORAL at 17:36

## 2021-09-01 RX ADMIN — LEVALBUTEROL 0.63 MILLIGRAM(S): 1.25 SOLUTION, CONCENTRATE RESPIRATORY (INHALATION) at 06:28

## 2021-09-01 RX ADMIN — Medication 81 MILLIGRAM(S): at 17:37

## 2021-09-01 RX ADMIN — MAGNESIUM OXIDE 400 MG ORAL TABLET 800 MILLIGRAM(S): 241.3 TABLET ORAL at 17:37

## 2021-09-01 RX ADMIN — Medication 40 MILLIGRAM(S): at 06:27

## 2021-09-01 RX ADMIN — Medication 4 MILLIGRAM(S): at 08:24

## 2021-09-01 RX ADMIN — AMIODARONE HYDROCHLORIDE 200 MILLIGRAM(S): 400 TABLET ORAL at 06:27

## 2021-09-01 RX ADMIN — OXYCODONE AND ACETAMINOPHEN 1 TABLET(S): 5; 325 TABLET ORAL at 18:30

## 2021-09-01 RX ADMIN — SODIUM CHLORIDE 3 MILLILITER(S): 9 INJECTION INTRAMUSCULAR; INTRAVENOUS; SUBCUTANEOUS at 06:00

## 2021-09-01 RX ADMIN — CEFTRIAXONE 100 MILLIGRAM(S): 500 INJECTION, POWDER, FOR SOLUTION INTRAMUSCULAR; INTRAVENOUS at 22:12

## 2021-09-01 RX ADMIN — Medication 650 MILLIGRAM(S): at 00:53

## 2021-09-01 RX ADMIN — SODIUM CHLORIDE 3 MILLILITER(S): 9 INJECTION INTRAMUSCULAR; INTRAVENOUS; SUBCUTANEOUS at 22:28

## 2021-09-01 RX ADMIN — Medication 1 APPLICATION(S): at 17:38

## 2021-09-01 RX ADMIN — POLYETHYLENE GLYCOL 3350 17 GRAM(S): 17 POWDER, FOR SOLUTION ORAL at 17:37

## 2021-09-01 RX ADMIN — Medication 4 MILLIGRAM(S): at 11:30

## 2021-09-01 RX ADMIN — HEPARIN SODIUM 5000 UNIT(S): 5000 INJECTION INTRAVENOUS; SUBCUTANEOUS at 06:27

## 2021-09-01 NOTE — PROGRESS NOTE ADULT - ASSESSMENT
Assessment:  75 y/o female, current every day smoker (59 PY), with PMHx HTN, bicuspid aortic valve, aortic stenosis, spinal stenosis, arthritis who complained of increased HERRERA for 6 months. TTE 3/2021 revealed EF normal, severe AS, moderate AI. NST 4/2021 revealed transient ischemic dilatation. Cardiac cath 5/11/21 revealed severe AS, prox circumflex 90%, OM1 70%, mid RCA 80%. 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 She was referred to Dr. Muller for surgical evaluation and deemed a good surgical candidate. Admitted for surgical planning and on 8/9/21 pt underwent AVR (tissue), Ascending, hemiarch replacement, frozen elephant trunk, left aorto-subclavian bypass, CABG x 2. OR course significant for 7 U pRBC/2 plt/2 FFP/1000 FEIBA; 3 L IVF. Arrived to CTICU on levo and initial LA was 4.1. POD1-2 extubated. POD3 AF with hypotension. Continued on pressors and lasix gtt, DCCV. POD4 AF, FIDEL. POD5 primacor was weaned off, Cr improved, extubated. POD7 ECHO with moderate pericardial effusion, s/p IR drainage. POD8 failed S/S. POD11 diuresed. POD13 sinus bradycardia, stable. POD15 transferred to 9L. POD 16 rapid AF with hypotension requiring pushes of linda and amio bolus. IV Lopressor  given and bradycardia at a rate of 45, V-paced. EP reconsulted and will need PPM. Monitoring sternal wound for worsening erythema/discharge. POD17 sternal wound erythema worsened with purulence expressed with pressure. Sternal wound opened at bedside and wound vac placed. Vanco started. PPM placement postponed. V-paced at bedside, given x1 unit of pRBCs. POD18 pending ID clearance for PPM placement given MSI dehiscence. POD 19 wound vac changed. POD 20 no events. POD21 repeat wound debridement at bedside completed, wound opened additional inch superiorly. Wound vac replaced. POD22 per ID recommendations, patient cleared for PPM placement as Bcx are neg. EP cleared patient for Micra PPM. POD23 Micra PPM placement today, uncomplicated course, post-procedure xray pending.       Plan:    Neurovascular:   -Pain well controlled with current regimen. PRN's: Percocet, Tylenol, lidocaine patch  -anxiety: Xanax PRN    Cardiovascular:   -POD23 AVR, ascending, hemiarch replacement, frozen elephant trunk, left aorto-subclavian bypass, CABGx2 (SVG-RPDA and SVG-LCx), EF 75%       - SSS (tachy-marifer syndrome): Micra PPM placement today, post-procedure xray pending        - Cont. Aspirin, Lasix, Potassium chloride, Amiodarone, Lisinopril   -Hemodynamically stable.   -Monitor: BP, HR, tele         -HR: 54-82 last 24 hrs         -BP: () / (53-78) last 24 hrs    Respiratory:   -Oxygenating well on NC 2lpm, wean as tolerated  -Cont: Xopenex, Symbicort  -Encourage continued use of IS 10x/hr and frequent ambulation  -CXR: Stable cardiomegaly, mild stable bilateral opacities/pleural effusions    GI:  -GI PPX: cont. pantoprazole  -PO Diet   -Bowel Regimen: cont. senna, and miralax for constipation       -pending BM     Renal / :  -Diuresis: 40 IV q8h with potassium    -Continue to monitor renal function: BUN/Cr 12/0.84  -Monitor I/O's daily     Endocrine:    -No hx of DM or thyroid dx  -A1c: 5.6   -TSH: 2.690    Hematologic:  -CBC: H/H- 9.2/30.3  -Coagulation Panel: PT/PTT/INR - 13.4/30.9/1.12    ID:  -Sternal Wound: Wound Vac in place (last changed on 8/30, due to be changed on 9/2)    -Continue Abx listed below          -Vancomycin, Ceftriaxone, Silver Sulfadiazine               -Vanco trough due before 4th dose on 9/2          -ID is following, appreciate recs     -Wound Cx from 8/30 + for Klebsiella, Blood Cx NGTD, Wound Cx + on 8/26 for klebsiella  -Temperature: 24hr Tmax of 98.7 F (temporal)  -CBC: WBC- 6.50  -Continue to observe for SIRS/Sepsis Syndrome.    Prophylaxis:  -DVT prophylaxis with 5000 SubQ Heparin q8h.  -Continue with SCD's b/l while patient is at rest     Disposition:  -Discharge home once patient is medically ready   Assessment:  75 y/o female, current every day smoker (59 PY), with PMHx HTN, bicuspid aortic valve, aortic stenosis, spinal stenosis, arthritis who complained of increased HERRERA for 6 months. TTE 3/2021 revealed EF normal, severe AS, moderate AI. NST 4/2021 revealed transient ischemic dilatation. Cardiac cath 5/11/21 revealed severe AS, prox circumflex 90%, OM1 70%, mid RCA 80%. 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 She was referred to Dr. Muller for surgical evaluation and deemed a good surgical candidate. Admitted for surgical planning and on 8/9/21 pt underwent AVR (tissue), Ascending, hemiarch replacement, frozen elephant trunk, left aorto-subclavian bypass, CABG x 2. OR course significant for 7 U pRBC/2 plt/2 FFP/1000 FEIBA; 3 L IVF. Arrived to CTICU on levo and initial LA was 4.1. POD1-2 extubated. POD3 AF with hypotension. Continued on pressors and lasix gtt, DCCV. POD4 AF, FIDEL. POD5 primacor was weaned off, Cr improved, extubated. POD7 ECHO with moderate pericardial effusion, s/p IR drainage. POD8 failed S/S. POD11 diuresed. POD13 sinus bradycardia, stable. POD15 transferred to 9L. POD 16 rapid AF with hypotension requiring pushes of linda and amio bolus. IV Lopressor  given and bradycardia at a rate of 45, V-paced. EP reconsulted and will need PPM. Monitoring sternal wound for worsening erythema/discharge. POD17 sternal wound erythema worsened with purulence expressed with pressure. Sternal wound opened at bedside and wound vac placed. Vanco started. PPM placement postponed. V-paced at bedside, given x1 unit of pRBCs. POD18 pending ID clearance for PPM placement given MSI dehiscence. POD 19 wound vac changed. POD 20 no events. POD21 repeat wound debridement at bedside completed, wound opened additional inch superiorly. Wound vac replaced. POD22 per ID recommendations, patient cleared for PPM placement as Bcx are neg. EP cleared patient for Micra PPM. POD23 Micra PPM placement today, uncomplicated course, post-procedure xray pending.       Plan:    Neurovascular:   -Pain well controlled with current regimen. PRN's: Percocet, Tylenol, lidocaine patch  -anxiety: Xanax PRN    Cardiovascular:   -POD23 AVR, ascending, hemiarch replacement, frozen elephant trunk, left aorto-subclavian bypass, CABGx2 (SVG-RPDA and SVG-LCx), EF 75%       - SSS (tachy-marifer syndrome): Micra PPM placement today, post-procedure xray pending        - Cont. Aspirin, Lasix, Potassium chloride, Amiodarone, Lisinopril   -Hemodynamically stable.   -Monitor: BP, HR, tele         -HR: 54-82 last 24 hrs         -BP: () / (53-78) last 24 hrs    Respiratory:   -Oxygenating well on NC 2lpm, wean as tolerated  -Cont: Xopenex, Symbicort  -Encourage continued use of IS 10x/hr and frequent ambulation  -CXR: Stable cardiomegaly, mild stable bilateral opacities/pleural effusions    GI:  -GI PPX: cont. pantoprazole  -PO Diet   -Bowel Regimen: cont. senna, and miralax for constipation       -pending BM     Renal / :  -Diuresis: 40 IV q8h with potassium    -Continue to monitor renal function: BUN/Cr 12/0.84  -Monitor I/O's daily     Endocrine:    -No hx of DM or thyroid dx  -A1c: 5.6   -TSH: 2.690    Hematologic:  -CBC: H/H- 9.2/30.3  -Coagulation Panel: PT/PTT/INR - 13.4/30.9/1.12    ID:  -Sternal Wound: Wound Vac in place (last changed today, changing to home wound vac on friday)    -Continue Abx listed below          -Ceftriaxone, Silver Sulfadiazine               -Vanco d/c'd today          -midline to be placed tomorrow          -ID is following, appreciate recs     -Wound Cx from 8/30 + for Klebsiella, Blood Cx NGTD, Wound Cx + on 8/26 for klebsiella  -Temperature: 24hr Tmax of 98.7 F (temporal)  -CBC: WBC- 6.50  -Continue to observe for SIRS/Sepsis Syndrome.    Prophylaxis:  -DVT prophylaxis with 5000 SubQ Heparin q8h.  -Continue with SCD's b/l while patient is at rest     Disposition:  -Discharge home once patient is medically ready

## 2021-09-01 NOTE — PROGRESS NOTE ADULT - TIME BILLING
management of sternotomy site infection
management of SSI
sternotomy SSI management
management of SSI

## 2021-09-01 NOTE — PROGRESS NOTE ADULT - ATTENDING COMMENTS
Feels well, WBC normal, WBC ngtd.  WCx from 8/30 pending. Cont vanc for now while awaiting WCx 8/30, likely can stop in the next few days. Cont CTX 2g IV q24h to treat sternotomy site infection.  Patient is not bacteremic and no deep abscess at sternotomy site - c/w superficial SSI.  There is no contraindication to place PPM from ID standpoint, as long as the device is placed at noninfected area.    Team 1 will follow you.  Case d/w primary team.    Merissa Crystal MD, MS  Infectious Disease attending  work cell [phone]
WCx grew Klebsiella pneumoniae, S to CTX.  Stop vanco.  She will need at least 2 weeks of IV CTX to treat sternotomy SSI, tentative end date 9/14.  She will need to see me on 9/14 at 2:20pm.  Place midline.    Thank you for your consult.  Please re-consult us or call us with questions.  Case d/w primary team.    Merissa Crystal MD, MS  Infectious Disease attending  work cell [phone]

## 2021-09-01 NOTE — PROGRESS NOTE ADULT - SUBJECTIVE AND OBJECTIVE BOX
Patient discussed on morning rounds with Dr. Muller    Operation / Date: AVR, ascending/hemiarch replacement, frozen elephant trunk, left aorto-subclavian bypass, CABG x2 (SVG-RPDA and SVG-LCx), EF 75%  ---  8/9/21     SUBJECTIVE ASSESSMENT:  76y Female with PMHx of HTN, Bicuspid Aortic Valve, Aortic Stenosis, Spinal Stenosis, Arthritis. Patient is feeling well today but states she is anxious for her pacemaker placement today. States she has parasternal tenderness near her sternotomy site, as well as tenderness of b/l lower extremities, better than yesterday. States the swelling in her legs has improved and it is easier to walk. Patient is ambulating well 3x per day. Still no BM in last few days, requesting medication. States mild shortness of breath when walking is still present. Denies chest pain, palpitations, dizziness, N/V, fever, or chills.        Vital Signs Last 24 Hrs  T(C): 36.6 (01 Sep 2021 09:16), Max: 36.7 (31 Aug 2021 21:36)  T(F): 97.9 (01 Sep 2021 09:16), Max: 98 (31 Aug 2021 21:36)  HR: 71 (01 Sep 2021 13:07) (61 - 72)  BP: 169/73 (01 Sep 2021 13:07) (115/54 - 174/72)  BP(mean): 105 (01 Sep 2021 13:07) (78 - 105)  RR: 20 (01 Sep 2021 13:07) (12 - 21)  SpO2: 95% (01 Sep 2021 13:07) (94% - 98%)  I&O's Detail    31 Aug 2021 07:01  -  01 Sep 2021 07:00  --------------------------------------------------------  IN:    IV PiggyBack: 300 mL    Oral Fluid: 900 mL  Total IN: 1200 mL    OUT:    Drain (mL): 0 mL    Voided (mL): 1870 mL  Total OUT: 1870 mL    Total NET: -670 mL      01 Sep 2021 07:01  -  01 Sep 2021 15:21  --------------------------------------------------------  IN:  Total IN: 0 mL    OUT:    Voided (mL): 1150 mL  Total OUT: 1150 mL    Total NET: -1150 mL          CHEST TUBE:  No   FRANCHESKA DRAIN:  No.  EPICARDIAL WIRES: Yes  TIE DOWNS: No  REGALADO: No.    PHYSICAL EXAM:  General: Well appearing, sitting up in bed on an incline, NAD  Neurological: AOx4, no focal neurological deficits   Cardiovascular: Regular rate and rhythm, S1/S2 heard, Grade II/VI Systolic Murmur appreciated  Respiratory: Crackles auscultated in lower lung lobes b/l, no wheezing   Gastrointestinal: Abdomen soft, non tender, non distended, bowel sounds present in all quadrants,   Extremities: 2+ lower extremity edema b/l, better than yesterday  Vascular: Distal pulses 2+ radial b/l in upper extremities, 2+ DP b/l in lower extremities  Incision Sites: sternotomy with wound vac in place, no erythema, purulence or ecchymosis. EVH sites b/l with stable ecchymosis, and no erythema, purulence or discharge    LABS:                        9.2    6.50  )-----------( 127      ( 01 Sep 2021 08:04 )             30.3       COUMADIN:  No.        09-01    135  |  98  |  12  ----------------------------<  92  4.1   |  28  |  0.84    Ca    9.0      01 Sep 2021 06:06  Mg     2.0     09-01    TPro  6.3  /  Alb  3.3  /  TBili  0.8  /  DBili  x   /  AST  25  /  ALT  17  /  AlkPhos  91  09-01      MEDICATIONS  (STANDING):  aMIOdarone    Tablet 200 milliGRAM(s) Oral daily  aspirin  chewable 81 milliGRAM(s) Enteral Tube daily  budesonide  80 MICROgram(s)/formoterol 4.5 MICROgram(s) Inhaler 2 Puff(s) Inhalation two times a day  cefTRIAXone   IVPB 2000 milliGRAM(s) IV Intermittent every 24 hours  dextrose 5%. 1000 milliLiter(s) (50 mL/Hr) IV Continuous <Continuous>  dextrose 5%. 1000 milliLiter(s) (100 mL/Hr) IV Continuous <Continuous>  furosemide   Injectable 40 milliGRAM(s) IV Push every 8 hours  glucagon  Injectable 1 milliGRAM(s) IntraMuscular once  heparin   Injectable 5000 Unit(s) SubCutaneous every 8 hours  levalbuterol Inhalation 0.63 milliGRAM(s) Inhalation every 6 hours  lidocaine   4% Patch 1 Patch Transdermal daily  lisinopril 10 milliGRAM(s) Oral daily  magnesium oxide 800 milliGRAM(s) Oral once  pantoprazole    Tablet 40 milliGRAM(s) Oral before breakfast  polyethylene glycol 3350 17 Gram(s) Oral daily  potassium chloride    Tablet ER 20 milliEquivalent(s) Oral two times a day  senna 2 Tablet(s) Oral at bedtime  silver sulfADIAZINE 1% Cream 1 Application(s) Topical every 12 hours  sodium chloride 0.9% lock flush 3 milliLiter(s) IV Push every 8 hours    MEDICATIONS  (PRN):  acetaminophen   Tablet .. 650 milliGRAM(s) Oral every 6 hours PRN Severe Pain (7 - 10)  ALPRAZolam 0.25 milliGRAM(s) Oral two times a day PRN anxiety  oxycodone    5 mG/acetaminophen 325 mG 1 Tablet(s) Oral every 6 hours PRN Moderate Pain (4 - 6)      RADIOLOGY & ADDITIONAL TESTS:    CXR: Stable cardiomegaly, mild stable bilateral opacities/pleural effusions (pending official read)

## 2021-09-01 NOTE — PROGRESS NOTE ADULT - SUBJECTIVE AND OBJECTIVE BOX
**INCOMPLETE NOTE    OVERNIGHT EVENTS:    SUBJECTIVE:  Patient seen and examined at bedside.    Vital Signs Last 12 Hrs  T(F): 96.9 (09-01-21 @ 05:29), Max: 98 (08-31-21 @ 21:36)  HR: 70 (09-01-21 @ 05:29) (61 - 70)  BP: 145/63 (09-01-21 @ 05:29) (117/55 - 145/63)  BP(mean): 90 (09-01-21 @ 05:29) (79 - 90)  RR: 18 (09-01-21 @ 05:29) (14 - 18)  SpO2: 98% (09-01-21 @ 05:29) (94% - 98%)  I&O's Summary    31 Aug 2021 07:01  -  01 Sep 2021 07:00  --------------------------------------------------------  IN: 1200 mL / OUT: 1870 mL / NET: -670 mL        PHYSICAL EXAM:  Constitutional: NAD, comfortable in bed.  HEENT: NC/AT, PERRLA, EOMI, no conjunctival pallor or scleral icterus, MMM  Neck: Supple, no JVD  Respiratory: CTA B/L. No w/r/r.   Cardiovascular: RRR, normal S1 and S2, no m/r/g.   Gastrointestinal: +BS, soft NTND, no guarding or rebound tenderness, no palpable masses   Extremities: wwp; no cyanosis, clubbing or edema.   Vascular: Pulses equal and strong throughout.   Neurological: AAOx3, no CN deficits, strength and sensation intact throughout.   Skin: No gross skin abnormalities or rashes        LABS:                        9.2    6.50  )-----------( 127      ( 01 Sep 2021 08:04 )             30.3     09-01    135  |  98  |  12  ----------------------------<  92  4.1   |  28  |  0.84    Ca    9.0      01 Sep 2021 06:06  Mg     2.0     09-01    TPro  6.3  /  Alb  3.3  /  TBili  0.8  /  DBili  x   /  AST  25  /  ALT  17  /  AlkPhos  91  09-01            RADIOLOGY & ADDITIONAL TESTS:    MEDICATIONS  (STANDING):  aMIOdarone    Tablet 200 milliGRAM(s) Oral daily  aspirin  chewable 81 milliGRAM(s) Enteral Tube daily  budesonide  80 MICROgram(s)/formoterol 4.5 MICROgram(s) Inhaler 2 Puff(s) Inhalation two times a day  cefTRIAXone   IVPB 2000 milliGRAM(s) IV Intermittent every 24 hours  dextrose 5%. 1000 milliLiter(s) (50 mL/Hr) IV Continuous <Continuous>  dextrose 5%. 1000 milliLiter(s) (100 mL/Hr) IV Continuous <Continuous>  furosemide    Tablet 20 milliGRAM(s) Oral <User Schedule>  furosemide    Tablet 40 milliGRAM(s) Oral <User Schedule>  glucagon  Injectable 1 milliGRAM(s) IntraMuscular once  heparin   Injectable 5000 Unit(s) SubCutaneous every 8 hours  levalbuterol Inhalation 0.63 milliGRAM(s) Inhalation every 6 hours  lidocaine   4% Patch 1 Patch Transdermal daily  lisinopril 10 milliGRAM(s) Oral daily  magnesium oxide 800 milliGRAM(s) Oral once  pantoprazole    Tablet 40 milliGRAM(s) Oral before breakfast  polyethylene glycol 3350 17 Gram(s) Oral daily  potassium chloride    Tablet ER 10 milliEquivalent(s) Oral daily  senna 2 Tablet(s) Oral at bedtime  silver sulfADIAZINE 1% Cream 1 Application(s) Topical every 12 hours  sodium chloride 0.9% lock flush 3 milliLiter(s) IV Push every 8 hours  vancomycin  IVPB 1000 milliGRAM(s) IV Intermittent every 24 hours    MEDICATIONS  (PRN):  acetaminophen   Tablet .. 650 milliGRAM(s) Oral every 6 hours PRN Severe Pain (7 - 10)  ALPRAZolam 0.25 milliGRAM(s) Oral two times a day PRN anxiety  oxycodone    5 mG/acetaminophen 325 mG 1 Tablet(s) Oral every 6 hours PRN Moderate Pain (4 - 6)     SUBJECTIVE: 8/30 wound cx grows gram neg rods, afebrile, wbc wnl, CXR with congestion and effusion. Patient seen and examined at bedside. Patient has no new complaints. Complains of constipation. Patient denies h/n/v/d, fever, chills, cp, palpitations, sob, abd pain, leg swelling, rashes, dysuria, and changes in BM.     Vital Signs Last 12 Hrs  T(F): 96.9 (09-01-21 @ 05:29), Max: 98 (08-31-21 @ 21:36)  HR: 70 (09-01-21 @ 05:29) (61 - 70)  BP: 145/63 (09-01-21 @ 05:29) (117/55 - 145/63)  BP(mean): 90 (09-01-21 @ 05:29) (79 - 90)  RR: 18 (09-01-21 @ 05:29) (14 - 18)  SpO2: 98% (09-01-21 @ 05:29) (94% - 98%)  I&O's Summary    31 Aug 2021 07:01  -  01 Sep 2021 07:00  --------------------------------------------------------  IN: 1200 mL / OUT: 1870 mL / NET: -670 mL    PHYSICAL EXAM:  Constitutional: NAD, comfortable in bed.  HEENT: NC/AT, PERRLA, EOMI, no conjunctival pallor or scleral icterus, MMM  Neck: Supple, no JVD  Respiratory: Bibasilar crackles. Chest sternotomy wound with wound vac, upper portion with erythemas margins   Cardiovascular: RRR, normal S1 and S2, no m/r/g.   Gastrointestinal: +BS, soft NTND, no guarding or rebound tenderness, no palpable masses   Extremities: wwp; no cyanosis, clubbing or edema.   Vascular: Pulses equal and strong throughout.   Neurological: AAOx3, no CN deficits, strength and sensation intact throughout.   Skin: No gross skin abnormalities or rashes    LABS:                        9.2    6.50  )-----------( 127      ( 01 Sep 2021 08:04 )             30.3     09-01    135  |  98  |  12  ----------------------------<  92  4.1   |  28  |  0.84    Ca    9.0      01 Sep 2021 06:06  Mg     2.0     09-01    TPro  6.3  /  Alb  3.3  /  TBili  0.8  /  DBili  x   /  AST  25  /  ALT  17  /  AlkPhos  91  09-01    MEDICATIONS  (STANDING):  aMIOdarone    Tablet 200 milliGRAM(s) Oral daily  aspirin  chewable 81 milliGRAM(s) Enteral Tube daily  budesonide  80 MICROgram(s)/formoterol 4.5 MICROgram(s) Inhaler 2 Puff(s) Inhalation two times a day  cefTRIAXone   IVPB 2000 milliGRAM(s) IV Intermittent every 24 hours  dextrose 5%. 1000 milliLiter(s) (50 mL/Hr) IV Continuous <Continuous>  dextrose 5%. 1000 milliLiter(s) (100 mL/Hr) IV Continuous <Continuous>  furosemide    Tablet 20 milliGRAM(s) Oral <User Schedule>  furosemide    Tablet 40 milliGRAM(s) Oral <User Schedule>  glucagon  Injectable 1 milliGRAM(s) IntraMuscular once  heparin   Injectable 5000 Unit(s) SubCutaneous every 8 hours  levalbuterol Inhalation 0.63 milliGRAM(s) Inhalation every 6 hours  lidocaine   4% Patch 1 Patch Transdermal daily  lisinopril 10 milliGRAM(s) Oral daily  magnesium oxide 800 milliGRAM(s) Oral once  pantoprazole    Tablet 40 milliGRAM(s) Oral before breakfast  polyethylene glycol 3350 17 Gram(s) Oral daily  potassium chloride    Tablet ER 10 milliEquivalent(s) Oral daily  senna 2 Tablet(s) Oral at bedtime  silver sulfADIAZINE 1% Cream 1 Application(s) Topical every 12 hours  sodium chloride 0.9% lock flush 3 milliLiter(s) IV Push every 8 hours  vancomycin  IVPB 1000 milliGRAM(s) IV Intermittent every 24 hours    MEDICATIONS  (PRN):  acetaminophen   Tablet .. 650 milliGRAM(s) Oral every 6 hours PRN Severe Pain (7 - 10)  ALPRAZolam 0.25 milliGRAM(s) Oral two times a day PRN anxiety  oxycodone    5 mG/acetaminophen 325 mG 1 Tablet(s) Oral every 6 hours PRN Moderate Pain (4 - 6)

## 2021-09-02 LAB
ANION GAP SERPL CALC-SCNC: 10 MMOL/L — SIGNIFICANT CHANGE UP (ref 5–17)
BUN SERPL-MCNC: 15 MG/DL — SIGNIFICANT CHANGE UP (ref 7–23)
CALCIUM SERPL-MCNC: 9.3 MG/DL — SIGNIFICANT CHANGE UP (ref 8.4–10.5)
CHLORIDE SERPL-SCNC: 98 MMOL/L — SIGNIFICANT CHANGE UP (ref 96–108)
CO2 SERPL-SCNC: 28 MMOL/L — SIGNIFICANT CHANGE UP (ref 22–31)
CREAT SERPL-MCNC: 1 MG/DL — SIGNIFICANT CHANGE UP (ref 0.5–1.3)
GLUCOSE SERPL-MCNC: 108 MG/DL — HIGH (ref 70–99)
HCT VFR BLD CALC: 31.4 % — LOW (ref 34.5–45)
HGB BLD-MCNC: 9.5 G/DL — LOW (ref 11.5–15.5)
MAGNESIUM SERPL-MCNC: 2 MG/DL — SIGNIFICANT CHANGE UP (ref 1.6–2.6)
MCHC RBC-ENTMCNC: 30.1 PG — SIGNIFICANT CHANGE UP (ref 27–34)
MCHC RBC-ENTMCNC: 30.3 GM/DL — LOW (ref 32–36)
MCV RBC AUTO: 99.4 FL — SIGNIFICANT CHANGE UP (ref 80–100)
NRBC # BLD: 0 /100 WBCS — SIGNIFICANT CHANGE UP (ref 0–0)
PLATELET # BLD AUTO: 127 K/UL — LOW (ref 150–400)
POTASSIUM SERPL-MCNC: 4.6 MMOL/L — SIGNIFICANT CHANGE UP (ref 3.5–5.3)
POTASSIUM SERPL-SCNC: 4.6 MMOL/L — SIGNIFICANT CHANGE UP (ref 3.5–5.3)
RBC # BLD: 3.16 M/UL — LOW (ref 3.8–5.2)
RBC # FLD: 16.8 % — HIGH (ref 10.3–14.5)
SODIUM SERPL-SCNC: 136 MMOL/L — SIGNIFICANT CHANGE UP (ref 135–145)
WBC # BLD: 8.5 K/UL — SIGNIFICANT CHANGE UP (ref 3.8–10.5)
WBC # FLD AUTO: 8.5 K/UL — SIGNIFICANT CHANGE UP (ref 3.8–10.5)

## 2021-09-02 PROCEDURE — 93279 PRGRMG DEV EVAL PM/LDLS PM: CPT | Mod: 26

## 2021-09-02 PROCEDURE — 36569 INSJ PICC 5 YR+ W/O IMAGING: CPT

## 2021-09-02 RX ORDER — MULTIVIT WITH MIN/MFOLATE/K2 340-15/3 G
1 POWDER (GRAM) ORAL ONCE
Refills: 0 | Status: COMPLETED | OUTPATIENT
Start: 2021-09-02 | End: 2021-09-02

## 2021-09-02 RX ORDER — SODIUM CHLORIDE 9 MG/ML
10 INJECTION INTRAMUSCULAR; INTRAVENOUS; SUBCUTANEOUS
Refills: 0 | Status: DISCONTINUED | OUTPATIENT
Start: 2021-09-02 | End: 2021-09-03

## 2021-09-02 RX ORDER — CHLORHEXIDINE GLUCONATE 213 G/1000ML
1 SOLUTION TOPICAL
Refills: 0 | Status: DISCONTINUED | OUTPATIENT
Start: 2021-09-02 | End: 2021-09-03

## 2021-09-02 RX ADMIN — Medication 1 APPLICATION(S): at 06:29

## 2021-09-02 RX ADMIN — CEFTRIAXONE 100 MILLIGRAM(S): 500 INJECTION, POWDER, FOR SOLUTION INTRAMUSCULAR; INTRAVENOUS at 21:53

## 2021-09-02 RX ADMIN — SENNA PLUS 2 TABLET(S): 8.6 TABLET ORAL at 21:53

## 2021-09-02 RX ADMIN — Medication 40 MILLIGRAM(S): at 18:21

## 2021-09-02 RX ADMIN — PANTOPRAZOLE SODIUM 40 MILLIGRAM(S): 20 TABLET, DELAYED RELEASE ORAL at 06:28

## 2021-09-02 RX ADMIN — SODIUM CHLORIDE 3 MILLILITER(S): 9 INJECTION INTRAMUSCULAR; INTRAVENOUS; SUBCUTANEOUS at 21:46

## 2021-09-02 RX ADMIN — OXYCODONE AND ACETAMINOPHEN 1 TABLET(S): 5; 325 TABLET ORAL at 01:01

## 2021-09-02 RX ADMIN — Medication 1 APPLICATION(S): at 18:21

## 2021-09-02 RX ADMIN — LEVALBUTEROL 0.63 MILLIGRAM(S): 1.25 SOLUTION, CONCENTRATE RESPIRATORY (INHALATION) at 06:28

## 2021-09-02 RX ADMIN — OXYCODONE AND ACETAMINOPHEN 1 TABLET(S): 5; 325 TABLET ORAL at 00:21

## 2021-09-02 RX ADMIN — Medication 1 BOTTLE: at 18:25

## 2021-09-02 RX ADMIN — LEVALBUTEROL 0.63 MILLIGRAM(S): 1.25 SOLUTION, CONCENTRATE RESPIRATORY (INHALATION) at 00:44

## 2021-09-02 RX ADMIN — OXYCODONE AND ACETAMINOPHEN 1 TABLET(S): 5; 325 TABLET ORAL at 21:56

## 2021-09-02 RX ADMIN — SODIUM CHLORIDE 3 MILLILITER(S): 9 INJECTION INTRAMUSCULAR; INTRAVENOUS; SUBCUTANEOUS at 15:27

## 2021-09-02 RX ADMIN — Medication 20 MILLIEQUIVALENT(S): at 06:29

## 2021-09-02 RX ADMIN — POLYETHYLENE GLYCOL 3350 17 GRAM(S): 17 POWDER, FOR SOLUTION ORAL at 13:16

## 2021-09-02 RX ADMIN — SODIUM CHLORIDE 3 MILLILITER(S): 9 INJECTION INTRAMUSCULAR; INTRAVENOUS; SUBCUTANEOUS at 06:29

## 2021-09-02 RX ADMIN — LIDOCAINE 1 PATCH: 4 CREAM TOPICAL at 18:47

## 2021-09-02 RX ADMIN — BUDESONIDE AND FORMOTEROL FUMARATE DIHYDRATE 2 PUFF(S): 160; 4.5 AEROSOL RESPIRATORY (INHALATION) at 21:53

## 2021-09-02 RX ADMIN — HEPARIN SODIUM 5000 UNIT(S): 5000 INJECTION INTRAVENOUS; SUBCUTANEOUS at 06:28

## 2021-09-02 RX ADMIN — LISINOPRIL 10 MILLIGRAM(S): 2.5 TABLET ORAL at 06:27

## 2021-09-02 RX ADMIN — LIDOCAINE 1 PATCH: 4 CREAM TOPICAL at 13:15

## 2021-09-02 RX ADMIN — LEVALBUTEROL 0.63 MILLIGRAM(S): 1.25 SOLUTION, CONCENTRATE RESPIRATORY (INHALATION) at 18:26

## 2021-09-02 RX ADMIN — AMIODARONE HYDROCHLORIDE 200 MILLIGRAM(S): 400 TABLET ORAL at 06:28

## 2021-09-02 RX ADMIN — HEPARIN SODIUM 5000 UNIT(S): 5000 INJECTION INTRAVENOUS; SUBCUTANEOUS at 21:53

## 2021-09-02 RX ADMIN — OXYCODONE AND ACETAMINOPHEN 1 TABLET(S): 5; 325 TABLET ORAL at 22:26

## 2021-09-02 RX ADMIN — Medication 40 MILLIGRAM(S): at 06:28

## 2021-09-02 RX ADMIN — LIDOCAINE 1 PATCH: 4 CREAM TOPICAL at 01:01

## 2021-09-02 RX ADMIN — Medication 81 MILLIGRAM(S): at 13:15

## 2021-09-02 RX ADMIN — HEPARIN SODIUM 5000 UNIT(S): 5000 INJECTION INTRAVENOUS; SUBCUTANEOUS at 13:16

## 2021-09-02 NOTE — PROGRESS NOTE ADULT - ASSESSMENT
75 y/o female, current every day smoker (59 PY), with PMHx HTN, bicuspid aortic valve, aortic stenosis, spinal stenosis, arthritis who complained of increased HERRERA for 6 months. TTE 3/2021 revealed EF normal, severe AS, moderate AI. NST 4/2021 revealed transient ischemic dilatation. Cardiac cath 5/11/21 revealed severe AS, prox circumflex 90%, OM1 70%, mid RCA 80%. 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 She was referred to Dr. Muller for surgical evaluation and deemed a good surgical candidate. Admitted for surgical planning and on 8/9/21 pt underwent AVR (tissue), Ascending, hemiarch replacement, frozen elephant trunk, left aorto-subclavian bypass, CABG x 2. OR course significant for 7 U pRBC/2 plt/2 FFP/1000 FEIBA; 3 L IVF. Arrived to CTICU on levo and initial LA was 4.1. POD1-2 extubated. POD3 AF with hypotension. Continued on pressors and lasix gtt, DCCV. POD4 AF, FIDEL. POD5 primacor was weaned off, Cr improved, extubated. POD7 ECHO with moderate pericardial effusion, s/p IR drainage. POD8 failed S/S. POD11 diuresed. POD13 sinus bradycardia, stable. POD15 transferred to 9L. POD 16 rapid AF with hypotension requiring pushes of linda and amio bolus. IV Lopressor  given and bradycardia at a rate of 45, V-paced. EP reconsulted and will need PPM. POD17 sternal wound erythema worsened with purulence expressed with pressure. Sternal wound opened at bedside and wound vac placed. Vanco started. PPM placement postponed. V-paced at bedside, given x1 unit of pRBCs. POD18 PPM placement was further postponed given MSI dehiscence per ID. POD 19 wound vac changed. POD 20 no events. POD21 repeat wound debridement at bedside completed, wound opened additional inch superiorly. Wound vac replaced. POD22 per ID recommendations, patient cleared for PPM placement as Bcx are neg. EP cleared patient for Micra PPM. POD23 Micra PPM placement today, uncomplicated course. POD24 IR placement of right basilic PICC line for IV ceftriaxone.    Plan:    Neurovascular:   -Pain well controlled with current regimen. PRN's: Percocet, Tylenol, lidocaine patch  -anxiety: Xanax PRN    Cardiovascular:   -POD24 AVR, ascending, hemiarch replacement, frozen elephant trunk, left aorto-subclavian bypass, CABGx2 (SVG-RPDA and SVG-LCx), EF 75%       - SSS (tachy-marifer syndrome): Micra PPM placement 9/1/21, post-procedure xray shows Left-sided leadless pacemaker in place, mild bibasilar atelectasis       - Cont. Aspirin, Lasix, Potassium chloride, Amiodarone, Lisinopril   -Hemodynamically stable.   -Monitor: BP, HR, tele         -HR: 62-77 last 24 hrs         -BP: () / (48-73) last 24 hrs    Respiratory:   -Oxygenating well on NC 3lpm, wean as tolerated  -Cont: Xopenex, Symbicort  -Encourage continued use of IS 10x/hr and frequent ambulation  -CXR (9/1/21 evening): Stable cardiomegaly, PPM in place, mild stable bibasilar atelectasis     GI:  -GI PPX: cont. pantoprazole  -PO Diet   -Bowel Regimen: magnesium citrate given today, cont. senna, and miralax PRN for constipation       -pending BM     Renal / :  -Diuresis: 40 IV q12h with potassium    -Continue to monitor renal function: BUN/Cr 15/1.00  -Monitor I/O's daily     Endocrine:    -No hx of DM or thyroid dx  -A1c: 5.6   -TSH: 2.690    Hematologic:  -CBC: H/H- 9.5/31.4  -Coagulation Panel: PT/PTT/INR - 13.4/30.9/1.12    ID:  -Sternal Wound: Wound Vac in place (last changed 9/1/21, changing to home wound vac on friday)    -Continue Abx listed below          -Ceftriaxone, Silver Sulfadiazine          -right basilic PICC line placed today by IR          -ID is following, appreciate recs     -Wound Cx from 8/30 + for Klebsiella, Blood Cx NGTD, Wound Cx + on 8/26 for klebsiella  -Temperature: 24hr Tmax of 97.4 F (temporal)  -CBC: WBC- 8.50  -Continue to observe for SIRS/Sepsis Syndrome.    Prophylaxis:  -DVT prophylaxis with 5000 SubQ Heparin q8h.  -Continue with SCD's b/l while patient is at rest     Disposition:  -Discharge home once patient is medically ready 77 y/o female, current every day smoker (59 PY), with PMHx HTN, bicuspid aortic valve, aortic stenosis, spinal stenosis, arthritis who complained of increased HERRERA for 6 months. TTE 3/2021 revealed EF normal, severe AS, moderate AI. NST 4/2021 revealed transient ischemic dilatation. Cardiac cath 5/11/21 revealed severe AS, prox circumflex 90%, OM1 70%, mid RCA 80%. 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 She was referred to Dr. Muller for surgical evaluation and deemed a good surgical candidate. Admitted for surgical planning and on 8/9/21 pt underwent AVR (tissue), Ascending, hemiarch replacement, frozen elephant trunk, left aorto-subclavian bypass, CABG x 2. OR course significant for 7 U pRBC/2 plt/2 FFP/1000 FEIBA; 3 L IVF. Arrived to CTICU on levo and initial LA was 4.1. POD1-2 extubated. POD3 AF with hypotension. Continued on pressors and lasix gtt, DCCV. POD4 AF, FIDEL. POD5 primacor was weaned off, Cr improved, extubated. POD7 ECHO with moderate pericardial effusion, s/p IR drainage. POD8 failed S/S. POD11 diuresed. POD13 sinus bradycardia, stable. POD15 transferred to 9L. POD 16 rapid AF with hypotension requiring pushes of linda and amio bolus. IV Lopressor  given and bradycardia at a rate of 45, V-paced. EP reconsulted and will need PPM. POD17 sternal wound erythema worsened with purulence expressed with pressure. Sternal wound opened at bedside and wound vac placed. Vanco started. PPM placement postponed. V-paced at bedside, given x1 unit of pRBCs. POD18 PPM placement was further postponed given MSI dehiscence per ID. POD 19 wound vac changed. POD 20 no events. POD21 repeat wound debridement at bedside completed, wound opened additional inch superiorly. Wound vac replaced. POD22 per ID recommendations, patient cleared for PPM placement as Bcx are neg. EP cleared patient for Micra PPM. POD23 Micra PPM placement today, uncomplicated course. POD24 IR placement of right basilic PICC line for IV ceftriaxone.    Plan:    Neurovascular:   -Pain well controlled with current regimen. PRN's: Percocet, Tylenol, lidocaine patch  -anxiety: Xanax PRN    Cardiovascular:   -POD24 AVR, ascending, hemiarch replacement, frozen elephant trunk, left aorto-subclavian bypass, CABGx2 (SVG-RPDA and SVG-LCx), EF 75%       - SSS (tachy-marifer syndrome): Micra PPM placement 9/1/21, post-procedure xray shows Left-sided leadless pacemaker in place, mild bibasilar atelectasis       - Cont. Aspirin, Lasix, Potassium chloride, Amiodarone, Lisinopril   -Hemodynamically stable.   -Monitor: BP, HR, tele         -HR: 62-77 last 24 hrs         -BP: () / (48-73) last 24 hrs    Respiratory:   -Oxygenating well on NC 3lpm, wean as tolerated  -Cont: Xopenex, Symbicort  -Encourage continued use of IS 10x/hr and frequent ambulation  -CXR (9/1/21 evening): Stable cardiomegaly, PPM in place, mild stable bibasilar atelectasis  -Pending repeat CXR (9/2/21)    GI:  -GI PPX: cont. pantoprazole  -PO Diet   -Bowel Regimen: magnesium citrate given today, cont. senna, and miralax PRN for constipation       -still pending BM     Renal / :  -Diuresis: 40 IV q12h with potassium    -Continue to monitor renal function: BUN/Cr 15/1.00  -Monitor I/O's daily     Endocrine:    -No hx of DM or thyroid dx  -A1c: 5.6   -TSH: 2.690    Hematologic:  -CBC: H/H- 9.5/31.4  -Coagulation Panel: PT/PTT/INR - 13.4/30.9/1.12    ID:  -Sternal Wound: Wound Vac in place (last changed 9/1/21, changing to home wound vac on friday)    -Continue Abx listed below          -Ceftriaxone, Silver Sulfadiazine          -right basilic PICC line placed today by IR          -ID is following, appreciate recs     -Wound Cx from 8/30 + for Klebsiella, Blood Cx NGTD, Wound Cx + on 8/26 for klebsiella  -Temperature: 24hr Tmax of 97.4 F (temporal)  -CBC: WBC- 8.50  -Continue to observe for SIRS/Sepsis Syndrome.    Prophylaxis:  -DVT prophylaxis with 5000 SubQ Heparin q8h.  -Continue with SCD's b/l while patient is at rest   -Advised pt on the benefits of acute rehab placement before discharge home, patient not amenable to placement at this time       -Cont. to work with Physical Therapy    Disposition:  -Discharge home once patient is medically ready 77 y/o female, current every day smoker (59 PY), with PMHx HTN, bicuspid aortic valve, aortic stenosis, spinal stenosis, arthritis who complained of increased HERRERA for 6 months. TTE 3/2021 revealed EF normal, severe AS, moderate AI. NST 4/2021 revealed transient ischemic dilatation. Cardiac cath 5/11/21 revealed severe AS, prox circumflex 90%, OM1 70%, mid RCA 80%. 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 She was referred to Dr. Muller for surgical evaluation and deemed a good surgical candidate. Admitted for surgical planning and on 8/9/21 pt underwent AVR (tissue), Ascending, hemiarch replacement, frozen elephant trunk, left aorto-subclavian bypass, CABG x 2. OR course significant for 7 U pRBC/2 plt/2 FFP/1000 FEIBA; 3 L IVF. Arrived to CTICU on levo and initial LA was 4.1. POD1-2 extubated. POD3 AF with hypotension. Continued on pressors and lasix gtt, DCCV. POD4 AF, FIDEL. POD5 primacor was weaned off, Cr improved, extubated. POD7 ECHO with moderate pericardial effusion, s/p IR drainage. POD8 failed S/S. POD11 diuresed. POD13 sinus bradycardia, stable. POD15 transferred to 9L. POD 16 rapid AF with hypotension requiring pushes of linda and amio bolus. IV Lopressor  given and bradycardia at a rate of 45, V-paced. EP reconsulted for need for PPM. POD17 sternal wound erythema worsened with purulence expressed with pressure. Sternal wound opened at bedside and wound vac placed. Vanco started. PPM placement postponed. V-paced at bedside, given x1 unit of pRBCs. POD18 PPM placement was further postponed given MSI dehiscence per ID. POD 19 wound vac changed. POD20 no events. POD21 repeat wound debridement at bedside completed, wound opened additional inch superiorly. Wound vac replaced. POD22 per ID recommendations, patient cleared for PPM placement as Bcx are neg. EP cleared patient for Micra PPM. POD23 Micra PPM placement, uncomplicated course. Wound changed. POD24 IR placement of right basilic PICC line for IV ceftriaxone. Pending official recommendations with PT for home vs rehab. Pending BM.    Plan:  Neurovascular:   -Pain well controlled with current regimen. PRN's: Percocet, Tylenol, lidocaine patch  -anxiety: Xanax PRN    Cardiovascular:   -POD24 AVR, ascending, hemiarch replacement, frozen elephant trunk, left aorto-subclavian bypass, CABGx2 (SVG-RPDA and SVG-LCx), EF 75%       - SSS (tachy-marifer syndrome): Micra PPM placement 9/1/21, post-procedure xray shows Left-sided leadless pacemaker in place, mild bibasilar atelectasis       - Cont. Aspirin, Lasix, Potassium chloride, Amiodarone, Lisinopril   -Hemodynamically stable.   -Monitor: BP, HR, tele         -HR: 62-77 last 24 hrs         -BP: () / (48-73) last 24 hrs    Respiratory:   -Oxygenating well on NC 3lpm, wean as tolerated  -Cont: Xopenex, Symbicort  -Encourage continued use of IS 10x/hr and frequent ambulation  -CXR (9/1/21 evening): Stable cardiomegaly, PPM in place, mild stable bibasilar atelectasis  -Pending repeat CXR (9/2/21)    GI:  -GI PPX: cont. pantoprazole  -PO Diet   -Bowel Regimen: magnesium citrate given today, cont. senna, and miralax PRN for constipation       -still pending BM     Renal / :  -Diuresis: 40 IV q12h with potassium    -Continue to monitor renal function: BUN/Cr 15/1.00  -Monitor I/O's daily     Endocrine:    -No hx of DM or thyroid dx  -A1c: 5.6   -TSH: 2.690    Hematologic:  -CBC: H/H- 9.5/31.4  -Coagulation Panel: PT/PTT/INR - 13.4/30.9/1.12    ID:  -Sternal Wound: Wound Vac in place (last changed 9/1/21, changing to home wound vac on friday)    -Continue Abx listed below          -Ceftriaxone End date 9/14/21          -right basilic PICC line placed today by IR          -ID is following, appreciate recs     -Wound Cx from 8/30 + for Klebsiella, Blood Cx NGTD, Wound Cx + on 8/26 for klebsiella  -Temperature: 24hr Tmax of 97.4 F (temporal)  -Skin irritation: c/w Silvadeen   -CBC: WBC- 8.50  -Continue to observe for SIRS/Sepsis Syndrome.    Prophylaxis:  -DVT prophylaxis with 5000 SubQ Heparin q8h.  -Continue with SCD's b/l while patient is at rest   -Advised pt on the benefits of acute rehab placement before discharge home, patient not amenable to placement at this time       -Cont. to work with Physical Therapy, pending PT evaluation today on 9/2    Disposition:  -Discharge home once patient is medically ready

## 2021-09-02 NOTE — PROCEDURE NOTE - NSPROCDETAILS_GEN_ALL_CORE
ultrasound assessment of fluid (location)
Wound debrided at bedside under local anesthesia.  Pus expressed from wound.  Incision opened 4 cm, copiously irrigated with normal saline and wound vac applied sterilely.
location identified, draped/prepped, sterile technique used/sterile dressing applied/sterile technique, catheter placed

## 2021-09-02 NOTE — CONSULT NOTE ADULT - SUBJECTIVE AND OBJECTIVE BOX
Vascular Access Service Consult Note    76yFemaleHEALTH ISSUES - PROBLEM Dx:             Diagnosis: wound infection     Indications for Vascular Access (Check all that apply)  [  X]  Antibiotic Therapy       Antibiotic Prescribed: ceftriaxone                                                        Expected Duration of Therapy:  2 weeks             [  ]  IV Hydration  [  ]  Total Parenteral Nutrition  [  ]  Chemotherapy  [  ]  Difficult Venous Access  [  ]  CVP monitoring  [  ]  Medications with high potential for tissue necrosis on extravasation  [  ]  Other    Screening (Check all that apply)  Previous Radiation to chest  [  ] Yes      [X  ]  No  Breast Cancer                          [  ] Left     [  ]  Right    [ X ]  No  Lymph Node Dissection         [  ] Left     [  ]  Right    [  X]  No  Pacemaker or ICD                   [  ] Left     [  ]  Right    [  ]  No YES- located IVC   Upper Extremity DVT             [  ] Left     [  ]  Right    [ X ]  No  Chronic Kidney Disease         [  ]  Yes     [X  ]  No  Hemodialysis                           [  ]  Yes     [X  ]  No  AV Fistula/ Graft                     [  ]  Left    [  ]  Right    [ X ]  No  Temp>101F in past 24 H       [  ]  Yes     [ X ]  No  H/O PICC/Midline                   [  ]  Yes     [ X ]  No    Lab data:                        9.2    6.50  )-----------( 127      ( 01 Sep 2021 08:04 )             30.3     09-01    135  |  98  |  12  ----------------------------<  92  4.1   |  28  |  0.84    Ca    9.0      01 Sep 2021 06:06  Mg     2.0     09-01    TPro  6.3  /  Alb  3.3  /  TBili  0.8  /  DBili  x   /  AST  25  /  ALT  17  /  AlkPhos  91  09-01              I have reviewed the chart, interviewed and examined the patient and determined that this patient:  [  ] Is a candidate for a PICC line  [X  ] Is a candidate for a Midline  [  ] Is not a candidate for vascular access device (reason)    Lumens:    [ X ] Single  [  ] Double

## 2021-09-02 NOTE — PROGRESS NOTE ADULT - SUBJECTIVE AND OBJECTIVE BOX
Patient discussed on morning rounds with Dr. Muller     Operation / Date: AVR, ascending/hemiarch replacement, frozen elephant trunk, left aorto-subclavian bypass, CABG x2 (SVG-RPDA and SVG-LCx), EF 75%  ---  8/9/21     SUBJECTIVE ASSESSMENT:    76y Female with PMHx of HTN, Bicuspid Aortic Valve, Aortic Stenosis, Spinal Stenosis, Arthritis. Patient is feeling well today and is in less pain than yesterday evening post PPM placement. She does endorse mild lightheadedness in the AM but does denies N/V. Still admits to parasternal tenderness near her sternotomy site. Tenderness of b/l lower extremities has greatly improved since yesterday. Patient has not yet ambulated today, encouraged to walk. Still no BM, but endorses passing of gas. States she still has mild shortness of breath. Denies chest pain, palpitations, dizziness, fever, or chills.        Vital Signs Last 24 Hrs  T(C): 36.3 (02 Sep 2021 14:05), Max: 36.3 (01 Sep 2021 17:43)  T(F): 97.4 (02 Sep 2021 14:05), Max: 97.4 (02 Sep 2021 14:05)  HR: 63 (02 Sep 2021 13:04) (62 - 77)  BP: 106/51 (02 Sep 2021 13:04) (95/48 - 109/52)  BP(mean): 74 (02 Sep 2021 13:04) (68 - 78)  RR: 15 (02 Sep 2021 13:04) (12 - 20)  SpO2: 95% (02 Sep 2021 13:04) (94% - 100%)  I&O's Detail    01 Sep 2021 07:01  -  02 Sep 2021 07:00  --------------------------------------------------------  IN:    Oral Fluid: 240 mL  Total IN: 240 mL    OUT:    Drain (mL): 0 mL    Voided (mL): 2250 mL  Total OUT: 2250 mL    Total NET: -2010 mL      02 Sep 2021 07:01  -  02 Sep 2021 14:32  --------------------------------------------------------  IN:    Oral Fluid: 200 mL  Total IN: 200 mL    OUT:  Total OUT: 0 mL    Total NET: 200 mL          CHEST TUBE:  No   FRANCHESKA DRAIN:  Yes/No.  EPICARDIAL WIRES: Yes/No.  TIE DOWNS: Yes/No.  REGALADO: Yes/No.    PHYSICAL EXAM:    General:     Neurological:    Cardiovascular:    Respiratory:    Gastrointestinal:    Extremities:    Vascular:    Incision Sites:    LABS:                        9.5    8.50  )-----------( 127      ( 02 Sep 2021 10:56 )             31.4       COUMADIN:  Yes/No. REASON: .        09-02    136  |  98  |  15  ----------------------------<  108<H>  4.6   |  28  |  1.00    Ca    9.3      02 Sep 2021 10:57  Mg     2.0     09-02    TPro  6.3  /  Alb  3.3  /  TBili  0.8  /  DBili  x   /  AST  25  /  ALT  17  /  AlkPhos  91  09-01          MEDICATIONS  (STANDING):  aMIOdarone    Tablet 200 milliGRAM(s) Oral daily  aspirin  chewable 81 milliGRAM(s) Enteral Tube daily  budesonide  80 MICROgram(s)/formoterol 4.5 MICROgram(s) Inhaler 2 Puff(s) Inhalation two times a day  cefTRIAXone   IVPB 2000 milliGRAM(s) IV Intermittent every 24 hours  chlorhexidine 2% Cloths 1 Application(s) Topical <User Schedule>  dextrose 5%. 1000 milliLiter(s) (50 mL/Hr) IV Continuous <Continuous>  dextrose 5%. 1000 milliLiter(s) (100 mL/Hr) IV Continuous <Continuous>  furosemide   Injectable 40 milliGRAM(s) IV Push every 12 hours  glucagon  Injectable 1 milliGRAM(s) IntraMuscular once  heparin   Injectable 5000 Unit(s) SubCutaneous every 8 hours  levalbuterol Inhalation 0.63 milliGRAM(s) Inhalation every 6 hours  lidocaine   4% Patch 1 Patch Transdermal daily  lisinopril 10 milliGRAM(s) Oral daily  magnesium citrate Oral Solution 1 Bottle Oral once  pantoprazole    Tablet 40 milliGRAM(s) Oral before breakfast  polyethylene glycol 3350 17 Gram(s) Oral daily  potassium chloride    Tablet ER 20 milliEquivalent(s) Oral two times a day  senna 2 Tablet(s) Oral at bedtime  silver sulfADIAZINE 1% Cream 1 Application(s) Topical every 12 hours  sodium chloride 0.9% lock flush 3 milliLiter(s) IV Push every 8 hours    MEDICATIONS  (PRN):  acetaminophen   Tablet .. 650 milliGRAM(s) Oral every 6 hours PRN Severe Pain (7 - 10)  ALPRAZolam 0.25 milliGRAM(s) Oral two times a day PRN anxiety  oxycodone    5 mG/acetaminophen 325 mG 1 Tablet(s) Oral every 6 hours PRN Moderate Pain (4 - 6)  sodium chloride 0.9% lock flush 10 milliLiter(s) IV Push every 1 hour PRN Pre/post blood products, medications, blood draw, and to maintain line patency        RADIOLOGY & ADDITIONAL TESTS:     Patient discussed on morning rounds with Dr. Muller     Operation / Date: AVR, ascending/hemiarch replacement, frozen elephant trunk, left aorto-subclavian bypass, CABG x2 (SVG-RPDA and SVG-LCx), EF 75%  ---  8/9/21     SUBJECTIVE ASSESSMENT:    76y Female with PMHx of HTN, Bicuspid Aortic Valve, Aortic Stenosis, Spinal Stenosis, Arthritis. Patient is feeling well today and is in less pain than yesterday evening post PPM placement. She does endorse mild lightheadedness in the AM but does denies N/V. Still admits to parasternal tenderness near her sternotomy site. Tenderness of b/l lower extremities has greatly improved since yesterday. Patient has not yet ambulated today, encouraged to walk. Still no BM, but endorses passing of gas. States she still has mild shortness of breath. Denies chest pain, palpitations, dizziness, fever, or chills.        Vital Signs Last 24 Hrs  T(C): 36.3 (02 Sep 2021 14:05), Max: 36.3 (01 Sep 2021 17:43)  T(F): 97.4 (02 Sep 2021 14:05), Max: 97.4 (02 Sep 2021 14:05)  HR: 63 (02 Sep 2021 13:04) (62 - 77)  BP: 106/51 (02 Sep 2021 13:04) (95/48 - 109/52)  BP(mean): 74 (02 Sep 2021 13:04) (68 - 78)  RR: 15 (02 Sep 2021 13:04) (12 - 20)  SpO2: 95% (02 Sep 2021 13:04) (94% - 100%)  I&O's Detail    01 Sep 2021 07:01  -  02 Sep 2021 07:00  --------------------------------------------------------  IN:    Oral Fluid: 240 mL  Total IN: 240 mL    OUT:    Drain (mL): 0 mL    Voided (mL): 2250 mL  Total OUT: 2250 mL    Total NET: -2010 mL      02 Sep 2021 07:01  -  02 Sep 2021 14:32  --------------------------------------------------------  IN:    Oral Fluid: 200 mL  Total IN: 200 mL    OUT:  Total OUT: 0 mL    Total NET: 200 mL          CHEST TUBE:  No   FRANCHESKA DRAIN:  No.  EPICARDIAL WIRES: Yes  TIE DOWNS: Yes/No.  REGALADO: No    PHYSICAL EXAM:  General: Well appearing, sitting up in bed on an incline, NAD  Neurological: AOx4, no focal neurological deficits   Cardiovascular: Regular rate and rhythm, S1/S2 heard, Grade II/VI Systolic Murmur appreciated  Respiratory: Crackles auscultated in lower lung lobes b/l, no wheezing   Gastrointestinal: Abdomen soft, non tender, non distended, bowel sounds present in all quadrants   Extremities: 1+ lower extremity edema b/l, better than yesterday. RUE PICC line in place, LLE   Vascular: Distal pulses 2+ radial b/l in upper extremities, 2+ DP b/l in lower extremities  Incision Sites: sternotomy with wound vac in place, no erythema, purulence or ecchymosis. EVH sites b/l with stable ecchymosis, and no erythema, or purulence    LABS:                        9.5    8.50  )-----------( 127      ( 02 Sep 2021 10:56 )             31.4       COUMADIN:  No      09-02    136  |  98  |  15  ----------------------------<  108<H>  4.6   |  28  |  1.00    Ca    9.3      02 Sep 2021 10:57  Mg     2.0     09-02    TPro  6.3  /  Alb  3.3  /  TBili  0.8  /  DBili  x   /  AST  25  /  ALT  17  /  AlkPhos  91  09-01          MEDI/CATIONS  (STANDING):/  aMIOdarone    Tablet 200 milliGRAM(s) Oral daily   aspirin  chewable 81 milliGRAM(s) Enteral Tube daily  budesonide  80 MICROgram(s)/formoterol 4.5 MICROgram(s) Inhaler 2 Puff(s) Inhalation two times a day  cefTRIAXone   IVPB 2000 milliGRAM(s) IV Intermittent every 24 hours  chlorhexidine 2% Cloths 1 Application(s) Topical <User Schedule>  dextrose 5%. 1000 milliLiter(s) (50 mL/Hr) IV Continuous <Continuous>  dextrose 5%. 1000 milliLiter(s) (100 mL/Hr) IV Continuous <Continuous>  furosemide   Injectable 40 milliGRAM(s) IV Push every 12 hours  glucagon  Injectable 1 milliGRAM(s) IntraMuscular once  heparin   Injectable 5000 Unit(s) SubCutaneous every 8 hours  levalbuterol Inhalation 0.63 milliGRAM(s) Inhalation every 6 hours  lidocaine   4% Patch 1 Patch Transdermal daily  lisinopril 10 milliGRAM(s) Oral daily  magnesium citrate Oral Solution 1 Bottle Oral once  pantoprazole    Tablet 40 milliGRAM(s) Oral before breakfast  polyethylene glycol 3350 17 Gram(s) Oral daily  potassium chloride    Tablet ER 20 milliEquivalent(s) Oral two times a day  senna 2 Tablet(s) Oral at bedtime  silver sulfADIAZINE 1% Cream 1 Application(s) Topical every 12 hours  sodium chloride 0.9% lock flush 3 milliLiter(s) IV Push every 8 hours    MEDICATIONS  (PRN):  acetaminophen   Tablet .. 650 milliGRAM(s) Oral every 6 hours PRN Severe Pain (7 - 10)  ALPRAZolam 0.25 milliGRAM(s) Oral two times a day PRN anxiety  oxycodone    5 mG/acetaminophen 325 mG 1 Tablet(s) Oral every 6 hours PRN Moderate Pain (4 - 6)  sodium chloride 0.9% lock flush 10 milliLiter(s) IV Push every 1 hour PRN Pre/post blood products, medications, blood draw, and to maintain line patency        RADIOLOGY & ADDITIONAL TESTS:    US: IR procedure, PICC placement - SUCCESSFUL PLACEMENT OF A 4 [place] X 20 CM SINGLE LUMEN MIDLINE  CXR (9/1/21 evening): Mild bibasilar atelectasis  CXR this morning: Image pending   Patient discussed on morning rounds with Dr. Muller     Operation / Date: AVR, ascending/hemiarch replacement, frozen elephant trunk, left aorto-subclavian bypass, CABG x2 (SVG-RPDA and SVG-LCx), EF 75%  ---  8/9/21     SUBJECTIVE ASSESSMENT:    76y Female with PMHx of HTN, Bicuspid Aortic Valve, Aortic Stenosis, Spinal Stenosis, Arthritis. Patient is feeling well today and is in less pain than yesterday evening post PPM placement. She does endorse mild lightheadedness in the AM but does denies N/V. Still admits to parasternal tenderness near her sternotomy site. Tenderness of b/l lower extremities has greatly improved since yesterday. Patient has not yet ambulated today, encouraged to walk. Still no BM, but endorses passing of gas. States she still has mild shortness of breath. Denies chest pain, palpitations, dizziness, fever, or chills.        Vital Signs Last 24 Hrs  T(C): 36.3 (02 Sep 2021 14:05), Max: 36.3 (01 Sep 2021 17:43)  T(F): 97.4 (02 Sep 2021 14:05), Max: 97.4 (02 Sep 2021 14:05)  HR: 63 (02 Sep 2021 13:04) (62 - 77)  BP: 106/51 (02 Sep 2021 13:04) (95/48 - 109/52)  BP(mean): 74 (02 Sep 2021 13:04) (68 - 78)  RR: 15 (02 Sep 2021 13:04) (12 - 20)  SpO2: 95% (02 Sep 2021 13:04) (94% - 100%)  I&O's Detail    01 Sep 2021 07:01  -  02 Sep 2021 07:00  --------------------------------------------------------  IN:    Oral Fluid: 240 mL  Total IN: 240 mL    OUT:    Drain (mL): 0 mL    Voided (mL): 2250 mL  Total OUT: 2250 mL    Total NET: -2010 mL      02 Sep 2021 07:01  -  02 Sep 2021 14:32  --------------------------------------------------------  IN:    Oral Fluid: 200 mL  Total IN: 200 mL    OUT:  Total OUT: 0 mL    Total NET: 200 mL          CHEST TUBE:  No   FRANCHESKA DRAIN:  No.  EPICARDIAL WIRES: Yes  TIE DOWNS: No.  REGALADO: No    PHYSICAL EXAM:  General: Well appearing, sitting up in bed on an incline, NAD  Neurological: AOx4, no focal neurological deficits   Cardiovascular: Regular rate and rhythm, S1/S2 heard, Grade II/VI Systolic Murmur appreciated  Respiratory: Crackles auscultated in lower lung lobes b/l, no wheezing   Gastrointestinal: Abdomen soft, non tender, non distended, bowel sounds present in all quadrants   Extremities: 1+ lower extremity edema b/l, better than yesterday. RUE PICC line in place  Vascular: Distal pulses 2+ radial b/l in upper extremities, 2+ DP b/l in lower extremities  Incision Sites: sternotomy with wound vac in place, no erythema, purulence or ecchymosis. EVH sites b/l with stable ecchymosis, and no erythema, or purulence    LABS:                        9.5    8.50  )-----------( 127      ( 02 Sep 2021 10:56 )             31.4       COUMADIN:  No      09-02    136  |  98  |  15  ----------------------------<  108<H>  4.6   |  28  |  1.00    Ca    9.3      02 Sep 2021 10:57  Mg     2.0     09-02    TPro  6.3  /  Alb  3.3  /  TBili  0.8  /  DBili  x   /  AST  25  /  ALT  17  /  AlkPhos  91  09-01          MEDI/CATIONS  (STANDING):/  aMIOdarone    Tablet 200 milliGRAM(s) Oral daily   aspirin  chewable 81 milliGRAM(s) Enteral Tube daily  budesonide  80 MICROgram(s)/formoterol 4.5 MICROgram(s) Inhaler 2 Puff(s) Inhalation two times a day  cefTRIAXone   IVPB 2000 milliGRAM(s) IV Intermittent every 24 hours  chlorhexidine 2% Cloths 1 Application(s) Topical <User Schedule>  dextrose 5%. 1000 milliLiter(s) (50 mL/Hr) IV Continuous <Continuous>  dextrose 5%. 1000 milliLiter(s) (100 mL/Hr) IV Continuous <Continuous>  furosemide   Injectable 40 milliGRAM(s) IV Push every 12 hours  glucagon  Injectable 1 milliGRAM(s) IntraMuscular once  heparin   Injectable 5000 Unit(s) SubCutaneous every 8 hours  levalbuterol Inhalation 0.63 milliGRAM(s) Inhalation every 6 hours  lidocaine   4% Patch 1 Patch Transdermal daily  lisinopril 10 milliGRAM(s) Oral daily  magnesium citrate Oral Solution 1 Bottle Oral once  pantoprazole    Tablet 40 milliGRAM(s) Oral before breakfast  polyethylene glycol 3350 17 Gram(s) Oral daily  potassium chloride    Tablet ER 20 milliEquivalent(s) Oral two times a day  senna 2 Tablet(s) Oral at bedtime  silver sulfADIAZINE 1% Cream 1 Application(s) Topical every 12 hours  sodium chloride 0.9% lock flush 3 milliLiter(s) IV Push every 8 hours    MEDICATIONS  (PRN):  acetaminophen   Tablet .. 650 milliGRAM(s) Oral every 6 hours PRN Severe Pain (7 - 10)  ALPRAZolam 0.25 milliGRAM(s) Oral two times a day PRN anxiety  oxycodone    5 mG/acetaminophen 325 mG 1 Tablet(s) Oral every 6 hours PRN Moderate Pain (4 - 6)  sodium chloride 0.9% lock flush 10 milliLiter(s) IV Push every 1 hour PRN Pre/post blood products, medications, blood draw, and to maintain line patency        RADIOLOGY & ADDITIONAL TESTS:    US: IR procedure, PICC placement - SUCCESSFUL PLACEMENT OF A 4 [place] X 20 CM SINGLE LUMEN MIDLINE  CXR (9/1/21 evening): Mild bibasilar atelectasis  CXR (9/2/21): Image pending   Patient discussed on morning rounds with Dr. Muller     Operation / Date: AVR, ascending/hemiarch replacement, frozen elephant trunk, left aorto-subclavian bypass, CABG x2 (SVG-RPDA and SVG-LCx), EF 75%  ---  8/9/21     SUBJECTIVE ASSESSMENT:    76y Female with PMHx of HTN, Bicuspid Aortic Valve, Aortic Stenosis, Spinal Stenosis, Arthritis. Patient is feeling well today and is in less pain than yesterday evening post PPM placement. She does endorse mild lightheadedness in the AM but does denies N/V. Still admits to parasternal tenderness near her sternotomy site. Tenderness of b/l lower extremities has greatly improved since yesterday. Patient has not yet ambulated today, encouraged to walk. Still no BM, but endorses passing of gas. States she still has mild shortness of breath. Denies chest pain, palpitations, dizziness, fever, or chills.    Vital Signs Last 24 Hrs  T(C): 36.3 (02 Sep 2021 14:05), Max: 36.3 (01 Sep 2021 17:43)  T(F): 97.4 (02 Sep 2021 14:05), Max: 97.4 (02 Sep 2021 14:05)  HR: 63 (02 Sep 2021 13:04) (62 - 77)  BP: 106/51 (02 Sep 2021 13:04) (95/48 - 109/52)  BP(mean): 74 (02 Sep 2021 13:04) (68 - 78)  RR: 15 (02 Sep 2021 13:04) (12 - 20)  SpO2: 95% (02 Sep 2021 13:04) (94% - 100%)  I&O's Detail    01 Sep 2021 07:01  -  02 Sep 2021 07:00  --------------------------------------------------------  IN:    Oral Fluid: 240 mL  Total IN: 240 mL    OUT:    Drain (mL): 0 mL    Voided (mL): 2250 mL  Total OUT: 2250 mL    Total NET: -2010 mL      02 Sep 2021 07:01  -  02 Sep 2021 14:32  --------------------------------------------------------  IN:    Oral Fluid: 200 mL  Total IN: 200 mL    OUT:  Total OUT: 0 mL    Total NET: 200 mL    CHEST TUBE:  No   FRANCHESKA DRAIN:  No.  EPICARDIAL WIRES: Yes  TIE DOWNS: No.  REGALADO: No    PHYSICAL EXAM:  General: Well appearing, sitting up in bed on an incline, NAD  Neurological: AOx4, no focal neurological deficits   Cardiovascular: Regular rate and rhythm, S1/S2 heard, Grade II/VI Systolic Murmur appreciated  Respiratory: Crackles auscultated in lower lung lobes b/l, no wheezing   Gastrointestinal: Abdomen soft, non tender, non distended, bowel sounds present in all quadrants   Extremities: 1+ lower extremity edema b/l, better than yesterday. RUE PICC line in place  Vascular: Distal pulses 2+ radial b/l in upper extremities, 2+ DP b/l in lower extremities  Incision Sites: sternotomy with wound vac in place, no erythema, purulence or ecchymosis. EVH sites b/l with stable ecchymosis, and no erythema, or purulence    LABS:                        9.5    8.50  )-----------( 127      ( 02 Sep 2021 10:56 )             31.4       COUMADIN:  No      09-02    136  |  98  |  15  ----------------------------<  108<H>  4.6   |  28  |  1.00    Ca    9.3      02 Sep 2021 10:57  Mg     2.0     09-02    TPro  6.3  /  Alb  3.3  /  TBili  0.8  /  DBili  x   /  AST  25  /  ALT  17  /  AlkPhos  91  09-01          MEDI/CATIONS  (STANDING):/  aMIOdarone    Tablet 200 milliGRAM(s) Oral daily   aspirin  chewable 81 milliGRAM(s) Enteral Tube daily  budesonide  80 MICROgram(s)/formoterol 4.5 MICROgram(s) Inhaler 2 Puff(s) Inhalation two times a day  cefTRIAXone   IVPB 2000 milliGRAM(s) IV Intermittent every 24 hours  chlorhexidine 2% Cloths 1 Application(s) Topical <User Schedule>  dextrose 5%. 1000 milliLiter(s) (50 mL/Hr) IV Continuous <Continuous>  dextrose 5%. 1000 milliLiter(s) (100 mL/Hr) IV Continuous <Continuous>  furosemide   Injectable 40 milliGRAM(s) IV Push every 12 hours  glucagon  Injectable 1 milliGRAM(s) IntraMuscular once  heparin   Injectable 5000 Unit(s) SubCutaneous every 8 hours  levalbuterol Inhalation 0.63 milliGRAM(s) Inhalation every 6 hours  lidocaine   4% Patch 1 Patch Transdermal daily  lisinopril 10 milliGRAM(s) Oral daily  magnesium citrate Oral Solution 1 Bottle Oral once  pantoprazole    Tablet 40 milliGRAM(s) Oral before breakfast  polyethylene glycol 3350 17 Gram(s) Oral daily  potassium chloride    Tablet ER 20 milliEquivalent(s) Oral two times a day  senna 2 Tablet(s) Oral at bedtime  silver sulfADIAZINE 1% Cream 1 Application(s) Topical every 12 hours  sodium chloride 0.9% lock flush 3 milliLiter(s) IV Push every 8 hours    MEDICATIONS  (PRN):  acetaminophen   Tablet .. 650 milliGRAM(s) Oral every 6 hours PRN Severe Pain (7 - 10)  ALPRAZolam 0.25 milliGRAM(s) Oral two times a day PRN anxiety  oxycodone    5 mG/acetaminophen 325 mG 1 Tablet(s) Oral every 6 hours PRN Moderate Pain (4 - 6)  sodium chloride 0.9% lock flush 10 milliLiter(s) IV Push every 1 hour PRN Pre/post blood products, medications, blood draw, and to maintain line patency        RADIOLOGY & ADDITIONAL TESTS:    US: IR procedure, PICC placement - SUCCESSFUL PLACEMENT OF A 4 [place] X 20 CM SINGLE LUMEN MIDLINE  CXR (9/1/21 evening): Mild bibasilar atelectasis  CXR (9/2/21): Image pending

## 2021-09-02 NOTE — CONSULT NOTE ADULT - CONSULT REASON
clearance for PPM placement, sternotomy infection
Pericardial Effusion requiring drain
evaluation for midline placement
tachy- marifer, afib management

## 2021-09-02 NOTE — PROGRESS NOTE ADULT - ASSESSMENT
76 y.o female, current every day smoker (59 PY), withHTN, bicuspid aortic valve / severe AS with normal LVEF, and CAD.  s/p bioprosthetic AVR, ascending/hemiarch replacement, frozen elephant trunk, left aorto-subclavian bypass and CABG x 2 on 21.  Has postop paroxysmal AFIB, on Amio load.  Not yet on A/C. Concern for tachy-marifer. 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 BCx negative up to date from . ID gave clearance for PPM implant.  Given superficial sternal wound infection and the small likelihood of pacing (ppm will be used to prevent long off-set pauses), the best option to minimize hardware infection is the leadless PPM (Micra).    - s/p VVI micra implant on 21.  Right groin suture removed this morning.  Wound is stable upon recheck 20 mins later.    - Device interrogation shows good sensing/ impedance and threshold:  RV sensin.9 mv,  impedance: 1310 ohms.  Threshold: 0.25 V at 0.24 ms   Device setting: VVI 40 bpm as backup.   - Continue Amio 200 mg daily.    - A/C when determined safe as per primary surgical team.   - She can f/u with Dr. Khanna on 10/7/21 at 2:30 PM

## 2021-09-02 NOTE — PROCEDURE NOTE - PROCEDURE DATE TIME, MLM
30-Aug-2021 13:10
16-Aug-2021
02-Sep-2021 10:55
11-Aug-2021 19:57
11-Aug-2021 20:04
26-Aug-2021 12:35
11-Aug-2021 19:56

## 2021-09-02 NOTE — PROGRESS NOTE ADULT - SUBJECTIVE AND OBJECTIVE BOX
CTICU  CRITICAL  CARE  attending     Hand off received 					   Pertinent clinical, laboratory, radiographic, hemodynamic, echocardiographic, respiratory data, microbiologic data and chart were reviewed and analyzed frequently throughout the course of the day and night  Patient seen and examined with CTS/ SH attending at bedside  Pt is a 76y , Female, HEALTH ISSUES - PROBLEM Dx:      , FAMILY HISTORY:  FH: CAD (coronary artery disease) (Sibling)  CABG    Family history of CKD (chronic kidney disease) (Sibling)  ESRD    Family history of diabetes mellitus (DM) (Sibling)    PAST MEDICAL & SURGICAL HISTORY:  HTN (hypertension)    H/O aortic valve stenosis    CAD (coronary artery disease)    No significant past surgical history      Patient is a 76y old  Female who presents with a chief complaint of Aortic Surgery (02 Sep 2021 14:26)      14 system review limited by mentation and multiorgan morbidity     Vital signs, hemodynamic and respiratory parameters were reviewed from the bedside nursing flowsheet.  ICU Vital Signs Last 24 Hrs  T(C): 36.3 (02 Sep 2021 14:05), Max: 36.3 (01 Sep 2021 17:43)  T(F): 97.4 (02 Sep 2021 14:05), Max: 97.4 (02 Sep 2021 14:05)  HR: 63 (02 Sep 2021 13:04) (62 - 69)  BP: 106/51 (02 Sep 2021 13:04) (95/48 - 109/52)  BP(mean): 74 (02 Sep 2021 13:04) (68 - 74)  ABP: --  ABP(mean): --  RR: 15 (02 Sep 2021 13:04) (15 - 20)  SpO2: 95% (02 Sep 2021 13:04) (94% - 100%)    Adult Advanced Hemodynamics Last 24 Hrs  CVP(mm Hg): --  CVP(cm H2O): --  CO: --  CI: --  PA: --  PA(mean): --  PCWP: --  SVR: --  SVRI: --  PVR: --  PVRI: --,     Intake and output was reviewed and the fluid balance was calculated  Daily     Daily   I&O's Summary    01 Sep 2021 07:01  -  02 Sep 2021 07:00  --------------------------------------------------------  IN: 240 mL / OUT: 2250 mL / NET: -2010 mL    02 Sep 2021 07:01  -  02 Sep 2021 16:37  --------------------------------------------------------  IN: 450 mL / OUT: 600 mL / NET: -150 mL        All lines and drain sites were assessed  Glycemic trend was reviewedCAPILLARY BLOOD GLUCOSE        No acute change in focality  Auscultation of the chest reveals equal bs  Abdomen is soft  Extremities are warm and well perfused  Wounds appear clean and unremarkable  Antibiotics are periop    labs  CBC Full  -  ( 02 Sep 2021 10:56 )  WBC Count : 8.50 K/uL  RBC Count : 3.16 M/uL  Hemoglobin : 9.5 g/dL  Hematocrit : 31.4 %  Platelet Count - Automated : 127 K/uL  Mean Cell Volume : 99.4 fl  Mean Cell Hemoglobin : 30.1 pg  Mean Cell Hemoglobin Concentration : 30.3 gm/dL  Auto Neutrophil # : x  Auto Lymphocyte # : x  Auto Monocyte # : x  Auto Eosinophil # : x  Auto Basophil # : x  Auto Neutrophil % : x  Auto Lymphocyte % : x  Auto Monocyte % : x  Auto Eosinophil % : x  Auto Basophil % : x    09-02    136  |  98  |  15  ----------------------------<  108<H>  4.6   |  28  |  1.00    Ca    9.3      02 Sep 2021 10:57  Mg     2.0     09-02    TPro  6.3  /  Alb  3.3  /  TBili  0.8  /  DBili  x   /  AST  25  /  ALT  17  /  AlkPhos  91  09-01      The current medications were reviewed   MEDICATIONS  (STANDING):  aMIOdarone    Tablet 200 milliGRAM(s) Oral daily  aspirin  chewable 81 milliGRAM(s) Enteral Tube daily  budesonide  80 MICROgram(s)/formoterol 4.5 MICROgram(s) Inhaler 2 Puff(s) Inhalation two times a day  cefTRIAXone   IVPB 2000 milliGRAM(s) IV Intermittent every 24 hours  chlorhexidine 2% Cloths 1 Application(s) Topical <User Schedule>  dextrose 5%. 1000 milliLiter(s) (50 mL/Hr) IV Continuous <Continuous>  dextrose 5%. 1000 milliLiter(s) (100 mL/Hr) IV Continuous <Continuous>  furosemide   Injectable 40 milliGRAM(s) IV Push every 12 hours  glucagon  Injectable 1 milliGRAM(s) IntraMuscular once  heparin   Injectable 5000 Unit(s) SubCutaneous every 8 hours  levalbuterol Inhalation 0.63 milliGRAM(s) Inhalation every 6 hours  lidocaine   4% Patch 1 Patch Transdermal daily  lisinopril 10 milliGRAM(s) Oral daily  magnesium citrate Oral Solution 1 Bottle Oral once  pantoprazole    Tablet 40 milliGRAM(s) Oral before breakfast  polyethylene glycol 3350 17 Gram(s) Oral daily  potassium chloride    Tablet ER 20 milliEquivalent(s) Oral two times a day  senna 2 Tablet(s) Oral at bedtime  silver sulfADIAZINE 1% Cream 1 Application(s) Topical every 12 hours  sodium chloride 0.9% lock flush 3 milliLiter(s) IV Push every 8 hours    MEDICATIONS  (PRN):  acetaminophen   Tablet .. 650 milliGRAM(s) Oral every 6 hours PRN Severe Pain (7 - 10)  ALPRAZolam 0.25 milliGRAM(s) Oral two times a day PRN anxiety  oxycodone    5 mG/acetaminophen 325 mG 1 Tablet(s) Oral every 6 hours PRN Moderate Pain (4 - 6)  sodium chloride 0.9% lock flush 10 milliLiter(s) IV Push every 1 hour PRN Pre/post blood products, medications, blood draw, and to maintain line patency       PROBLEM LIST/ ASSESSMENT:  HEALTH ISSUES - PROBLEM Dx:      ,   Patient is a 76y old  Female who presents with a chief complaint of Aortic Surgery (02 Sep 2021 14:26)     s/p cardiac surgery                My plan includes :  close hemodynamic, ventilatory and drain monitoring and management per post op routine    Monitor for arrhythmias and monitor parameters for organ perfusion  beta blockade not administered due to hemodynamic instability and bradycardia  monitor neurologic status  Head of the bed should remain elevated to 45 deg .   chest PT and IS will be encouraged  monitor adequacy of oxygenation and ventilation and attempt to wean oxygen  antibiotic regimen will be tailored to the clinical, laboratory and microbiologic data  Nutritional goals will be met using po eventually , ensure adequate caloric intake and montior the same  Stress ulcer and VTE prophylaxis will be achieved    Glycemic control is satisfactory  Electrolytes have been repleted as necessary and wound care has been carried out. Pain control has been achieved.   agressive physical therapy and early mobility and ambulation goals will be met   The family was updated about the course and plan  CRITICAL CARE TIME personally provided by me  in evaluation and management, reassessments, review and interpretation of labs and x-rays, ventilator and hemodynamic management, formulating a plan and coordinating care: ___90____ MIN.  Time does not include procedural time. Time spent was non routine post-operarive caRE and included multiple and repeated evaluations at the bedside  CTICU ATTENDING     					    Jesu Garcia MD

## 2021-09-02 NOTE — PROGRESS NOTE ADULT - SUBJECTIVE AND OBJECTIVE BOX
EPS Progress Note    S: s/p micra ppm implant yesterday. Suture removed this morning. Doing well. No acute complaint.       O: T(C): 36.2 (09-02-21 @ 09:31), Max: 36.3 (09-01-21 @ 17:43)  HR: 69 (09-02-21 @ 08:45) (62 - 77)  BP: 109/52 (09-02-21 @ 08:45) (95/48 - 174/72)  RR: 20 (09-02-21 @ 08:45) (12 - 20)  SpO2: 98% (09-02-21 @ 08:45) (94% - 100%)  Wt(kg): --    TELE: NSR. No pacing     PHYSICAL  Constitutional:  NAD        Neck: No JVD  Pulm:  CTA B/L, no wheeze or rale   Cardiac:   + s1/s2, RRR  GI:  +BS , soft ND/NT  Vascular: Mild LE edema. Right groin wound is stable. Suture removed. Dressing on .  Neuro: AAO x 3. no focal deficit  Skin: Warm. No rash or lesion     LABS:                        9.5    8.50  )-----------( 127      ( 02 Sep 2021 10:56 )             31.4     09-01    135  |  98  |  12  ----------------------------<  92  4.1   |  28  |  0.84    Ca    9.0      01 Sep 2021 06:06  Mg     2.0     09-01    TPro  6.3  /  Alb  3.3  /  TBili  0.8  /  DBili  x   /  AST  25  /  ALT  17  /  AlkPhos  91  09-01        MEDICATIONS:  acetaminophen   Tablet .. 650 milliGRAM(s) Oral every 6 hours PRN  ALPRAZolam 0.25 milliGRAM(s) Oral two times a day PRN  aMIOdarone    Tablet 200 milliGRAM(s) Oral daily  aspirin  chewable 81 milliGRAM(s) Enteral Tube daily  budesonide  80 MICROgram(s)/formoterol 4.5 MICROgram(s) Inhaler 2 Puff(s) Inhalation two times a day  cefTRIAXone   IVPB 2000 milliGRAM(s) IV Intermittent every 24 hours  chlorhexidine 2% Cloths 1 Application(s) Topical <User Schedule>  dextrose 5%. 1000 milliLiter(s) IV Continuous <Continuous>  dextrose 5%. 1000 milliLiter(s) IV Continuous <Continuous>  furosemide   Injectable 40 milliGRAM(s) IV Push every 12 hours  glucagon  Injectable 1 milliGRAM(s) IntraMuscular once  heparin   Injectable 5000 Unit(s) SubCutaneous every 8 hours  levalbuterol Inhalation 0.63 milliGRAM(s) Inhalation every 6 hours  lidocaine   4% Patch 1 Patch Transdermal daily  lisinopril 10 milliGRAM(s) Oral daily  magnesium citrate Oral Solution 1 Bottle Oral once  oxycodone    5 mG/acetaminophen 325 mG 1 Tablet(s) Oral every 6 hours PRN  pantoprazole    Tablet 40 milliGRAM(s) Oral before breakfast  polyethylene glycol 3350 17 Gram(s) Oral daily  potassium chloride    Tablet ER 20 milliEquivalent(s) Oral two times a day  senna 2 Tablet(s) Oral at bedtime  silver sulfADIAZINE 1% Cream 1 Application(s) Topical every 12 hours  sodium chloride 0.9% lock flush 10 milliLiter(s) IV Push every 1 hour PRN  sodium chloride 0.9% lock flush 3 milliLiter(s) IV Push every 8 hours

## 2021-09-02 NOTE — PROCEDURE NOTE - NSINFORMCONSENT_GEN_A_CORE
Benefits, risks, and possible complications of procedure explained to patient/caregiver who verbalized understanding and gave written consent.
This was an emergent procedure.
Benefits, risks, and possible complications of procedure explained to patient/caregiver who verbalized understanding and gave written consent.
Benefits, risks, and possible complications of procedure explained to patient/caregiver who verbalized understanding and gave verbal consent.
Benefits, risks, and possible complications of procedure explained to patient/caregiver who verbalized understanding and gave written consent.
Benefits, risks, and possible complications of procedure explained to patient/caregiver who verbalized understanding and gave verbal consent.
Benefits, risks, and possible complications of procedure explained to patient/caregiver who verbalized understanding and gave verbal consent.

## 2021-09-02 NOTE — PROCEDURE NOTE - NSANESTHESIA_GEN_A_CORE
1% lidocaine
did not require/no anesthesia administered

## 2021-09-02 NOTE — PROCEDURE NOTE - NSPROCNAME_GEN_A_CORE
Cardioversion
Central Line Insertion
Central Line Insertion
Midline Insertion
Debridement
Chest Tube
Debridement

## 2021-09-02 NOTE — PROCEDURE NOTE - NSINDICATIONS_GEN_A_CORE
antibiotic therapy
pleural effusion
critical illness/hemodynamic monitoring
critical illness/emergency venous access/hemodynamic monitoring
wound erythema
devitalized or nonviable tissue at the level of:

## 2021-09-03 ENCOUNTER — TRANSCRIPTION ENCOUNTER (OUTPATIENT)
Age: 76
End: 2021-09-03

## 2021-09-03 VITALS
DIASTOLIC BLOOD PRESSURE: 49 MMHG | OXYGEN SATURATION: 88 % | SYSTOLIC BLOOD PRESSURE: 99 MMHG | HEART RATE: 74 BPM | RESPIRATION RATE: 21 BRPM

## 2021-09-03 LAB
ANION GAP SERPL CALC-SCNC: 9 MMOL/L — SIGNIFICANT CHANGE UP (ref 5–17)
BUN SERPL-MCNC: 14 MG/DL — SIGNIFICANT CHANGE UP (ref 7–23)
CALCIUM SERPL-MCNC: 9 MG/DL — SIGNIFICANT CHANGE UP (ref 8.4–10.5)
CHLORIDE SERPL-SCNC: 98 MMOL/L — SIGNIFICANT CHANGE UP (ref 96–108)
CO2 SERPL-SCNC: 30 MMOL/L — SIGNIFICANT CHANGE UP (ref 22–31)
CREAT SERPL-MCNC: 0.94 MG/DL — SIGNIFICANT CHANGE UP (ref 0.5–1.3)
GLUCOSE SERPL-MCNC: 87 MG/DL — SIGNIFICANT CHANGE UP (ref 70–99)
HCT VFR BLD CALC: 28.3 % — LOW (ref 34.5–45)
HGB BLD-MCNC: 8.6 G/DL — LOW (ref 11.5–15.5)
MAGNESIUM SERPL-MCNC: 2.2 MG/DL — SIGNIFICANT CHANGE UP (ref 1.6–2.6)
MCHC RBC-ENTMCNC: 30.1 PG — SIGNIFICANT CHANGE UP (ref 27–34)
MCHC RBC-ENTMCNC: 30.4 GM/DL — LOW (ref 32–36)
MCV RBC AUTO: 99 FL — SIGNIFICANT CHANGE UP (ref 80–100)
NRBC # BLD: 0 /100 WBCS — SIGNIFICANT CHANGE UP (ref 0–0)
PLATELET # BLD AUTO: 101 K/UL — LOW (ref 150–400)
POTASSIUM SERPL-MCNC: 4.4 MMOL/L — SIGNIFICANT CHANGE UP (ref 3.5–5.3)
POTASSIUM SERPL-SCNC: 4.4 MMOL/L — SIGNIFICANT CHANGE UP (ref 3.5–5.3)
RBC # BLD: 2.86 M/UL — LOW (ref 3.8–5.2)
RBC # FLD: 16.6 % — HIGH (ref 10.3–14.5)
SODIUM SERPL-SCNC: 137 MMOL/L — SIGNIFICANT CHANGE UP (ref 135–145)
WBC # BLD: 6.71 K/UL — SIGNIFICANT CHANGE UP (ref 3.8–10.5)
WBC # FLD AUTO: 6.71 K/UL — SIGNIFICANT CHANGE UP (ref 3.8–10.5)

## 2021-09-03 PROCEDURE — 93306 TTE W/DOPPLER COMPLETE: CPT

## 2021-09-03 PROCEDURE — 86901 BLOOD TYPING SEROLOGIC RH(D): CPT

## 2021-09-03 PROCEDURE — C1769: CPT

## 2021-09-03 PROCEDURE — U0005: CPT

## 2021-09-03 PROCEDURE — 97605 NEG PRS WND THER DME<=50SQCM: CPT

## 2021-09-03 PROCEDURE — 93005 ELECTROCARDIOGRAM TRACING: CPT

## 2021-09-03 PROCEDURE — 71260 CT THORAX DX C+: CPT

## 2021-09-03 PROCEDURE — 36415 COLL VENOUS BLD VENIPUNCTURE: CPT

## 2021-09-03 PROCEDURE — U0003: CPT

## 2021-09-03 PROCEDURE — 84484 ASSAY OF TROPONIN QUANT: CPT

## 2021-09-03 PROCEDURE — 97116 GAIT TRAINING THERAPY: CPT

## 2021-09-03 PROCEDURE — 82803 BLOOD GASES ANY COMBINATION: CPT

## 2021-09-03 PROCEDURE — 86850 RBC ANTIBODY SCREEN: CPT

## 2021-09-03 PROCEDURE — C1874: CPT

## 2021-09-03 PROCEDURE — 83036 HEMOGLOBIN GLYCOSYLATED A1C: CPT

## 2021-09-03 PROCEDURE — 94002 VENT MGMT INPAT INIT DAY: CPT

## 2021-09-03 PROCEDURE — 88305 TISSUE EXAM BY PATHOLOGIST: CPT

## 2021-09-03 PROCEDURE — C1894: CPT

## 2021-09-03 PROCEDURE — 97110 THERAPEUTIC EXERCISES: CPT

## 2021-09-03 PROCEDURE — 85730 THROMBOPLASTIN TIME PARTIAL: CPT

## 2021-09-03 PROCEDURE — 80076 HEPATIC FUNCTION PANEL: CPT

## 2021-09-03 PROCEDURE — 80053 COMPREHEN METABOLIC PANEL: CPT

## 2021-09-03 PROCEDURE — 86769 SARS-COV-2 COVID-19 ANTIBODY: CPT

## 2021-09-03 PROCEDURE — P9045: CPT

## 2021-09-03 PROCEDURE — 97162 PT EVAL MOD COMPLEX 30 MIN: CPT

## 2021-09-03 PROCEDURE — P9011: CPT

## 2021-09-03 PROCEDURE — 80202 ASSAY OF VANCOMYCIN: CPT

## 2021-09-03 PROCEDURE — 71045 X-RAY EXAM CHEST 1 VIEW: CPT

## 2021-09-03 PROCEDURE — 92610 EVALUATE SWALLOWING FUNCTION: CPT

## 2021-09-03 PROCEDURE — P9059: CPT

## 2021-09-03 PROCEDURE — 84132 ASSAY OF SERUM POTASSIUM: CPT

## 2021-09-03 PROCEDURE — 85025 COMPLETE CBC W/AUTO DIFF WBC: CPT

## 2021-09-03 PROCEDURE — 80061 LIPID PANEL: CPT

## 2021-09-03 PROCEDURE — 92526 ORAL FUNCTION THERAPY: CPT

## 2021-09-03 PROCEDURE — 83735 ASSAY OF MAGNESIUM: CPT

## 2021-09-03 PROCEDURE — 76937 US GUIDE VASCULAR ACCESS: CPT

## 2021-09-03 PROCEDURE — P9016: CPT

## 2021-09-03 PROCEDURE — 86923 COMPATIBILITY TEST ELECTRIC: CPT

## 2021-09-03 PROCEDURE — 93971 EXTREMITY STUDY: CPT

## 2021-09-03 PROCEDURE — 80048 BASIC METABOLIC PNL TOTAL CA: CPT

## 2021-09-03 PROCEDURE — 93308 TTE F-UP OR LMTD: CPT

## 2021-09-03 PROCEDURE — 94640 AIRWAY INHALATION TREATMENT: CPT

## 2021-09-03 PROCEDURE — 85610 PROTHROMBIN TIME: CPT

## 2021-09-03 PROCEDURE — C1887: CPT

## 2021-09-03 PROCEDURE — C1889: CPT

## 2021-09-03 PROCEDURE — 85027 COMPLETE CBC AUTOMATED: CPT

## 2021-09-03 PROCEDURE — 86022 PLATELET ANTIBODIES: CPT

## 2021-09-03 PROCEDURE — 92612 ENDOSCOPY SWALLOW (FEES) VID: CPT

## 2021-09-03 PROCEDURE — 81003 URINALYSIS AUTO W/O SCOPE: CPT

## 2021-09-03 PROCEDURE — C1768: CPT

## 2021-09-03 PROCEDURE — 94660 CPAP INITIATION&MGMT: CPT

## 2021-09-03 PROCEDURE — 84100 ASSAY OF PHOSPHORUS: CPT

## 2021-09-03 PROCEDURE — 86900 BLOOD TYPING SEROLOGIC ABO: CPT

## 2021-09-03 PROCEDURE — P9012: CPT

## 2021-09-03 PROCEDURE — 87040 BLOOD CULTURE FOR BACTERIA: CPT

## 2021-09-03 PROCEDURE — 97530 THERAPEUTIC ACTIVITIES: CPT

## 2021-09-03 PROCEDURE — 82330 ASSAY OF CALCIUM: CPT

## 2021-09-03 PROCEDURE — 84295 ASSAY OF SERUM SODIUM: CPT

## 2021-09-03 PROCEDURE — C1786: CPT

## 2021-09-03 PROCEDURE — 33017 PRCRD DRG 6YR+ W/O CGEN CAR: CPT

## 2021-09-03 PROCEDURE — 87070 CULTURE OTHR SPECIMN AEROBIC: CPT

## 2021-09-03 PROCEDURE — 93321 DOPPLER ECHO F-UP/LMTD STD: CPT

## 2021-09-03 PROCEDURE — 86891 AUTOLOGOUS BLOOD OP SALVAGE: CPT

## 2021-09-03 PROCEDURE — 97535 SELF CARE MNGMENT TRAINING: CPT

## 2021-09-03 PROCEDURE — 83880 ASSAY OF NATRIURETIC PEPTIDE: CPT

## 2021-09-03 PROCEDURE — 84443 ASSAY THYROID STIM HORMONE: CPT

## 2021-09-03 PROCEDURE — 82962 GLUCOSE BLOOD TEST: CPT

## 2021-09-03 PROCEDURE — 86965 POOLING BLOOD PLATELETS: CPT

## 2021-09-03 PROCEDURE — 36410 VNPNXR 3YR/> PHY/QHP DX/THER: CPT

## 2021-09-03 PROCEDURE — 97163 PT EVAL HIGH COMPLEX 45 MIN: CPT

## 2021-09-03 PROCEDURE — 83605 ASSAY OF LACTIC ACID: CPT

## 2021-09-03 PROCEDURE — 71045 X-RAY EXAM CHEST 1 VIEW: CPT | Mod: 26

## 2021-09-03 PROCEDURE — P9047: CPT

## 2021-09-03 PROCEDURE — 36430 TRANSFUSION BLD/BLD COMPNT: CPT

## 2021-09-03 PROCEDURE — C1729: CPT

## 2021-09-03 PROCEDURE — P9037: CPT

## 2021-09-03 PROCEDURE — 88311 DECALCIFY TISSUE: CPT

## 2021-09-03 PROCEDURE — 87635 SARS-COV-2 COVID-19 AMP PRB: CPT

## 2021-09-03 PROCEDURE — 87186 SC STD MICRODIL/AGAR DIL: CPT

## 2021-09-03 RX ORDER — ASPIRIN/CALCIUM CARB/MAGNESIUM 324 MG
1 TABLET ORAL
Qty: 0 | Refills: 0 | DISCHARGE

## 2021-09-03 RX ORDER — SENNA PLUS 8.6 MG/1
2 TABLET ORAL
Qty: 14 | Refills: 0
Start: 2021-09-03 | End: 2021-09-09

## 2021-09-03 RX ORDER — BUDESONIDE AND FORMOTEROL FUMARATE DIHYDRATE 160; 4.5 UG/1; UG/1
1 AEROSOL RESPIRATORY (INHALATION)
Qty: 1 | Refills: 0
Start: 2021-09-03 | End: 2021-10-02

## 2021-09-03 RX ORDER — ALPRAZOLAM 0.25 MG
1 TABLET ORAL
Qty: 14 | Refills: 0
Start: 2021-09-03 | End: 2021-09-09

## 2021-09-03 RX ORDER — LISINOPRIL 2.5 MG/1
1 TABLET ORAL
Qty: 30 | Refills: 0
Start: 2021-09-03 | End: 2021-10-02

## 2021-09-03 RX ORDER — ACETAMINOPHEN 500 MG
2 TABLET ORAL
Qty: 240 | Refills: 0
Start: 2021-09-03 | End: 2021-10-02

## 2021-09-03 RX ORDER — ATORVASTATIN CALCIUM 80 MG/1
1 TABLET, FILM COATED ORAL
Qty: 0 | Refills: 0 | DISCHARGE

## 2021-09-03 RX ORDER — ASPIRIN/CALCIUM CARB/MAGNESIUM 324 MG
1 TABLET ORAL
Qty: 30 | Refills: 0
Start: 2021-09-03 | End: 2021-10-02

## 2021-09-03 RX ORDER — CEFTRIAXONE 500 MG/1
2000 INJECTION, POWDER, FOR SOLUTION INTRAMUSCULAR; INTRAVENOUS EVERY 24 HOURS
Refills: 0 | Status: DISCONTINUED | OUTPATIENT
Start: 2021-09-03 | End: 2021-09-03

## 2021-09-03 RX ORDER — POTASSIUM CHLORIDE 20 MEQ
1 PACKET (EA) ORAL
Qty: 60 | Refills: 0
Start: 2021-09-03 | End: 2021-10-02

## 2021-09-03 RX ORDER — POLYETHYLENE GLYCOL 3350 17 G/17G
17 POWDER, FOR SOLUTION ORAL
Qty: 119 | Refills: 0
Start: 2021-09-03 | End: 2021-09-09

## 2021-09-03 RX ORDER — ATORVASTATIN CALCIUM 80 MG/1
1 TABLET, FILM COATED ORAL
Qty: 30 | Refills: 0
Start: 2021-09-03 | End: 2021-10-02

## 2021-09-03 RX ORDER — FUROSEMIDE 40 MG
1 TABLET ORAL
Qty: 60 | Refills: 0
Start: 2021-09-03 | End: 2021-10-02

## 2021-09-03 RX ORDER — AMIODARONE HYDROCHLORIDE 400 MG/1
1 TABLET ORAL
Qty: 30 | Refills: 0
Start: 2021-09-03 | End: 2021-10-02

## 2021-09-03 RX ADMIN — BUDESONIDE AND FORMOTEROL FUMARATE DIHYDRATE 2 PUFF(S): 160; 4.5 AEROSOL RESPIRATORY (INHALATION) at 11:25

## 2021-09-03 RX ADMIN — LEVALBUTEROL 0.63 MILLIGRAM(S): 1.25 SOLUTION, CONCENTRATE RESPIRATORY (INHALATION) at 06:34

## 2021-09-03 RX ADMIN — PANTOPRAZOLE SODIUM 40 MILLIGRAM(S): 20 TABLET, DELAYED RELEASE ORAL at 06:34

## 2021-09-03 RX ADMIN — SODIUM CHLORIDE 3 MILLILITER(S): 9 INJECTION INTRAMUSCULAR; INTRAVENOUS; SUBCUTANEOUS at 05:20

## 2021-09-03 RX ADMIN — Medication 81 MILLIGRAM(S): at 11:12

## 2021-09-03 RX ADMIN — CHLORHEXIDINE GLUCONATE 1 APPLICATION(S): 213 SOLUTION TOPICAL at 06:34

## 2021-09-03 RX ADMIN — AMIODARONE HYDROCHLORIDE 200 MILLIGRAM(S): 400 TABLET ORAL at 06:34

## 2021-09-03 RX ADMIN — LIDOCAINE 1 PATCH: 4 CREAM TOPICAL at 11:13

## 2021-09-03 RX ADMIN — Medication 40 MILLIGRAM(S): at 06:33

## 2021-09-03 RX ADMIN — LIDOCAINE 1 PATCH: 4 CREAM TOPICAL at 01:00

## 2021-09-03 RX ADMIN — POLYETHYLENE GLYCOL 3350 17 GRAM(S): 17 POWDER, FOR SOLUTION ORAL at 11:13

## 2021-09-03 RX ADMIN — CEFTRIAXONE 100 MILLIGRAM(S): 500 INJECTION, POWDER, FOR SOLUTION INTRAMUSCULAR; INTRAVENOUS at 11:12

## 2021-09-03 RX ADMIN — LEVALBUTEROL 0.63 MILLIGRAM(S): 1.25 SOLUTION, CONCENTRATE RESPIRATORY (INHALATION) at 11:13

## 2021-09-03 RX ADMIN — LISINOPRIL 10 MILLIGRAM(S): 2.5 TABLET ORAL at 06:33

## 2021-09-03 RX ADMIN — HEPARIN SODIUM 5000 UNIT(S): 5000 INJECTION INTRAVENOUS; SUBCUTANEOUS at 06:33

## 2021-09-03 RX ADMIN — Medication 20 MILLIEQUIVALENT(S): at 06:33

## 2021-09-03 NOTE — DISCHARGE NOTE NURSING/CASE MANAGEMENT/SOCIAL WORK - NSDCPEEMAIL_GEN_ALL_CORE
[facility] for Tobacco Control email [email or web address]

## 2021-09-03 NOTE — DISCHARGE NOTE NURSING/CASE MANAGEMENT/SOCIAL WORK - NSDCPEWEB_GEN_ALL_CORE
[facility] for Tobacco Control website --- [email or web address] --- [email or web address]

## 2021-09-03 NOTE — DISCHARGE NOTE NURSING/CASE MANAGEMENT/SOCIAL WORK - NSDCPEFALRISK_GEN_ALL_CORE
For information on Fall & injury Prevention, visit [email or web address]

## 2021-09-03 NOTE — DISCHARGE NOTE PROVIDER - NSDCFUADDINST_GEN_ALL_CORE_FT
- Call us with any questions #[phone].    - Continue to change your wound vac dressing every Monday/Wednesday/Friday. If Wound vac becomes disconnected please place wet-to-dry dressing and call visiting nurse or our office.     - Walk daily as tolerated and use your incentive spirometer every hour.    - No driving or strenuous activity/exercise for 6 weeks, or until cleared by your surgeon.    - Gently clean your incisions with anti-bacterial soap and water, pat dry.  You may leave them open to air.    - Call your doctor if you have shortness of breath, chest pain not relieved by pain medication, dizziness, fever >101.5, or increased redness or drainage from incisions.

## 2021-09-03 NOTE — CHART NOTE - NSCHARTNOTEFT_GEN_A_CORE
Admitting Diagnosis:   Patient is a 76y old  Female who presents with a chief complaint of HERRERA (16 Aug 2021 11:00)      PAST MEDICAL & SURGICAL HISTORY:  HTN (hypertension)    H/O aortic valve stenosis    CAD (coronary artery disease)    No significant past surgical history        Current Nutrition Order: Jevity 1.5 @ 52ml/hr x16hrs to provide 832ml TV, 1248kcal, 53g pro, 632ml h2o (1.32g/kg pro/IBW)       PO Intake: Good (%) [   ]  Fair (50-75%) [   ] Poor (<25%) [   ] - n/a     GI Issues:   No d/c/v/n    Pain:  no pain noted    Skin Integrity:  Yousif 18  1+ generalized edema   2+ edema to B/L arms and hand  MSI     Labs:   08-20    146<H>  |  111<H>  |  35<H>  ----------------------------<  133<H>  3.9   |  27  |  0.68    Ca    9.6      20 Aug 2021 03:49  Phos  3.3     08-20  Mg     2.3     08-20    TPro  6.3  /  Alb  3.7  /  TBili  1.5<H>  /  DBili  x   /  AST  43<H>  /  ALT  61<H>  /  AlkPhos  133<H>  08-20    CAPILLARY BLOOD GLUCOSE      POCT Blood Glucose.: 124 mg/dL (20 Aug 2021 07:58)  POCT Blood Glucose.: 93 mg/dL (19 Aug 2021 23:23)  POCT Blood Glucose.: 133 mg/dL (19 Aug 2021 16:33)      Medications:  MEDICATIONS  (STANDING):  aMIOdarone    Tablet 200 milliGRAM(s) Oral daily  aspirin  chewable 81 milliGRAM(s) Enteral Tube daily  budesonide  80 MICROgram(s)/formoterol 4.5 MICROgram(s) Inhaler 2 Puff(s) Inhalation two times a day  chlorhexidine 2% Cloths 1 Application(s) Topical <User Schedule>  dextrose 40% Gel 15 Gram(s) Oral once  dextrose 5%. 1000 milliLiter(s) (50 mL/Hr) IV Continuous <Continuous>  dextrose 5%. 1000 milliLiter(s) (100 mL/Hr) IV Continuous <Continuous>  dextrose 50% Injectable 50 milliLiter(s) IV Push every 15 minutes  dextrose 50% Injectable 25 milliLiter(s) IV Push every 15 minutes  glucagon  Injectable 1 milliGRAM(s) IntraMuscular once  heparin   Injectable 5000 Unit(s) SubCutaneous every 8 hours  insulin lispro (ADMELOG) corrective regimen sliding scale   SubCutaneous every 6 hours  levalbuterol Inhalation 0.63 milliGRAM(s) Inhalation every 6 hours  lidocaine   4% Patch 1 Patch Transdermal daily  lisinopril 5 milliGRAM(s) Oral daily  metoprolol tartrate 25 milliGRAM(s) Oral every 6 hours  pantoprazole  Injectable 40 milliGRAM(s) IV Push daily  senna 2 Tablet(s) Oral at bedtime  sodium chloride 0.9%. 1000 milliLiter(s) (10 mL/Hr) IV Continuous <Continuous>  testosterone patch 4 mG/24 Hr(s) 4 milliGRAM(s) Transdermal <User Schedule>  testosterone patch 4 mG/24 Hr(s) 4 milliGRAM(s) Transdermal daily    MEDICATIONS  (PRN):  ALPRAZolam 0.25 milliGRAM(s) Oral two times a day PRN anxiety  oxycodone    5 mG/acetaminophen 325 mG 1 Tablet(s) Oral/Enteral Tube every 6 hours PRN Moderate Pain (4 - 6)      Weight: 77.6kg     Weight Change: none    Nutrition Focused Physical Exam: Completed [   ]  Not Pertinent [ x  ]    Estimated energy needs:   Ideal body weight used for calculations as pt >120% of IBW. Nutrient needs based on [facility] standards of care for maintenance in adults, adjusted for age, post-op needs, fluid per team   1000-1200kcal (25-30kcal/kg)   48-56g pro (1.2-1.4g/kg pro)     Subjective:   76F with HTN, bicuspid aortic valve, spinal stenosis, arthritis. She was evaluated for increased HERRERA for 6 months.  ECHO:  3/2021 revealed EF normal, severe AS, moderate AI. NST 4/2021 revealed transient ischemic dilatation. Cardiac cath revealed severe AS, prox circumflex 90%, OM1 70%, mid RCA 80%. CTA Chest Abdomen/ pelvis, revealed bicuspid aortic valve, mid aortic arch aneurysm, increased in size from prior imaging. She was referred to Dr. Muller for surgical evaluation and deemed a good surgical candidate. s/p AVR and CABG + Replacement of AA and Hemiarch on 8/9. Extubated 8/14, underwent SLP eval 8/15 recommending c/w NPO and TF. Repeat FEES 8/18 recommending c/w NPO and NGT feeds. On NC with humidifier at time of assessment. Continues to be monitored closely, see below for recs to best meet needs. Will follow per protocol.     Previous Nutrition Diagnosis:  Increased nutrient needs r/t hypermetabolic state AEB post-op demand     Active [  x ]  Resolved [   ]    If resolved, new PES:     Goal: meet >75% EER via preferred route     Recommendations:  1. Recommend switch feeds to Jevity 1.5 @ 35ml/hr x24hrs to provide 840mL TV, 1260kcal, 53g pro, 638ml h2o (1.32g/kg pro/ABW). Keep HOB >45 degrees for feeding, monitor s/sx of intolerance   2. Manage pain   3. Trend wts  4. Reinforce ed   5. Monitor and replete lytes     Education: n/a     Risk Level: High [  x ] Moderate [   ] Low [   ]
Admitting Diagnosis:   Patient is a 76y old  Female who presents with a chief complaint of HERRERA (16 Aug 2021 11:00)      PAST MEDICAL & SURGICAL HISTORY:  HTN (hypertension)    H/O aortic valve stenosis    CAD (coronary artery disease)    No significant past surgical history        Current Nutrition Order: NPO   mech soft + thins + Ensure Enlive TID (1050 kcal, 60g protein, 540mL free H2O)       PO Intake: Good (%) [   ]  Fair (50-75%) [   ] Poor (<25%) [   ]- fair intake prior to NPO     GI Issues: no n/v/d    Pain: at wound site     Skin Integrity:  Yousif 17  2+ generalized edema   3+ edema to B/L legs  MSI   EVH site incision     Labs:       138  |  100  |  28<H>  ----------------------------<  117<H>  4.2   |  28  |  1.09    Ca    9.2      27 Aug 2021 06:03  Phos  2.7       Mg     2.1         TPro  6.4  /  Alb  3.7  /  TBili  1.3<H>  /  DBili  x   /  AST  18  /  ALT  14  /  AlkPhos  102      CAPILLARY BLOOD GLUCOSE          Medications:  MEDICATIONS  (STANDING):  aMIOdarone    Tablet 400 milliGRAM(s) Oral every 8 hours  aMIOdarone    Tablet   Oral   aspirin  chewable 81 milliGRAM(s) Enteral Tube daily  budesonide  80 MICROgram(s)/formoterol 4.5 MICROgram(s) Inhaler 2 Puff(s) Inhalation two times a day  dextrose 5%. 1000 milliLiter(s) (50 mL/Hr) IV Continuous <Continuous>  dextrose 5%. 1000 milliLiter(s) (100 mL/Hr) IV Continuous <Continuous>  furosemide   Injectable 20 milliGRAM(s) IV Push every 12 hours  glucagon  Injectable 1 milliGRAM(s) IntraMuscular once  heparin   Injectable 5000 Unit(s) SubCutaneous every 8 hours  levalbuterol Inhalation 0.63 milliGRAM(s) Inhalation every 6 hours  lidocaine   4% Patch 1 Patch Transdermal daily  lisinopril 10 milliGRAM(s) Oral daily  pantoprazole  Injectable 40 milliGRAM(s) IV Push daily  potassium chloride    Tablet ER 10 milliEquivalent(s) Oral daily  senna 2 Tablet(s) Oral at bedtime  sodium chloride 0.9% lock flush 3 milliLiter(s) IV Push every 8 hours  testosterone patch 4 mG/24 Hr(s) 4 milliGRAM(s) Transdermal daily  testosterone patch 4 mG/24 Hr(s) 4 milliGRAM(s) Transdermal <User Schedule>  vancomycin  IVPB 1250 milliGRAM(s) IV Intermittent every 12 hours    MEDICATIONS  (PRN):  ALPRAZolam 0.25 milliGRAM(s) Oral two times a day PRN anxiety  oxycodone    5 mG/acetaminophen 325 mG 1 Tablet(s) Oral/Enteral Tube every 6 hours PRN Moderate Pain (4 - 6)      Weight:  77.6kg ()   83kg ()  Daily Weight in k.1 (27 Aug 2021 06:00)    Weight Change: likely in setting of edema/ fluid shifts     Nutrition Focused Physical Exam: Completed [   ]  Not Pertinent [x   ]    Estimated energy needs:   Ideal body weight used for calculations as pt >120% of IBW. Nutrient needs based on [facility] standards of care for maintenance in adults, adjusted for age, post-op needs, fluid per team   1000-1200kcal (25-30kcal/kg)   48-56g pro (1.2-1.4g/kg pro)     Subjective:   76F with HTN, bicuspid aortic valve, spinal stenosis, arthritis. She was evaluated for increased HERRERA for 6 months.  ECHO:  3/2021 revealed EF normal, severe AS, moderate AI. NST 2021 revealed transient ischemic dilatation. Cardiac cath revealed severe AS, prox circumflex 90%, OM1 70%, mid RCA 80%. CTA Chest Abdomen/ pelvis, revealed bicuspid aortic valve, mid aortic arch aneurysm, increased in size from prior imaging. She was referred to Dr. Muller for surgical evaluation and deemed a good surgical candidate. s/p AVR and CABG + Replacement of AA and Hemiarch on . Extubated , underwent SLP eval 8/15 recommending c/w NPO and TF. Repeat FEES  recommending c/w NPO and NGT feeds. Again f/u  recommending pureed with advancement to [facility] soft in 1-2 days. Now on [facility] soft diet. Fair intake noted, pending AR. Underwent wound debridement and vac placement  for erythema. Continues to be monitored closely, see below for recs to best meet needs. Will follow per protocol.     Previous Nutrition Diagnosis: Increased nutrient needs r/t hypermetabolic state AEB post-op demand     Active [ x  ]  Resolved [   ]    If resolved, new PES:     Goal:  meet >75% EER via preferred route     Recommendations:  1. C/w diet per SLP   2. Manage pain prn  3. Trend wts   4. Reinforce ed   5. C/w Ensure Enlive TID (1050 kcal, 60g protein, 540mL free H2O)     Education: encouraged intake through day     Risk Level: High [   ] Moderate [ x  ] Low [   ]
Admitting Diagnosis:   Patient is a 76y old  Female who presents with a chief complaint of HERRERA (16 Aug 2021 11:00)      PAST MEDICAL & SURGICAL HISTORY:  HTN (hypertension)    H/O aortic valve stenosis    CAD (coronary artery disease)    No significant past surgical history        Current Nutrition Order: pureed + Ensure Enlive TID (1050 kcal, 60g protein, 540mL free H2O)        PO Intake: Good (%) [   ]  Fair (50-75%) [ x  ] Poor (<25%) [   ]    GI Issues: no n/v/d/c    Pain: denies     Skin Integrity:  Yousif 16   2+ generalized edema   3+ edema to B/L legs  MSI   EVH site incision     Labs:       144  |  106  |  26<H>  ----------------------------<  112<H>  4.1   |  29  |  0.80    Ca    8.9      24 Aug 2021 04:28  Phos  2.8     08-24  Mg     2.2     08-24    TPro  6.1  /  Alb  3.6  /  TBili  1.5<H>  /  DBili  x   /  AST  18  /  ALT  28  /  AlkPhos  106  08-24    CAPILLARY BLOOD GLUCOSE          Medications:  MEDICATIONS  (STANDING):  aspirin  chewable 81 milliGRAM(s) Enteral Tube daily  budesonide  80 MICROgram(s)/formoterol 4.5 MICROgram(s) Inhaler 2 Puff(s) Inhalation two times a day  dextrose 5%. 1000 milliLiter(s) (50 mL/Hr) IV Continuous <Continuous>  dextrose 5%. 1000 milliLiter(s) (100 mL/Hr) IV Continuous <Continuous>  furosemide    Tablet 20 milliGRAM(s) Oral <User Schedule>  glucagon  Injectable 1 milliGRAM(s) IntraMuscular once  heparin   Injectable 5000 Unit(s) SubCutaneous every 8 hours  levalbuterol Inhalation 0.63 milliGRAM(s) Inhalation every 6 hours  lidocaine   4% Patch 1 Patch Transdermal daily  lisinopril 10 milliGRAM(s) Oral daily  metoprolol tartrate 12.5 milliGRAM(s) Oral every 12 hours  pantoprazole  Injectable 40 milliGRAM(s) IV Push daily  senna 2 Tablet(s) Oral at bedtime  sodium chloride 0.9% lock flush 3 milliLiter(s) IV Push every 8 hours  testosterone patch 4 mG/24 Hr(s) 4 milliGRAM(s) Transdermal <User Schedule>    MEDICATIONS  (PRN):  ALPRAZolam 0.25 milliGRAM(s) Oral two times a day PRN anxiety  oxycodone    5 mG/acetaminophen 325 mG 1 Tablet(s) Oral/Enteral Tube every 6 hours PRN Moderate Pain (4 - 6)      Weight:  77.6kg  ()  Daily Weight in k.3 (24 Aug 2021 06:00)    Weight Change: +6kg likely in setting of fluid shifts     Nutrition Focused Physical Exam: Completed [   ]  Not Pertinent [ x  ]    Estimated energy needs:   Ideal body weight used for calculations as pt >120% of IBW. Nutrient needs based on [facility] standards of care for maintenance in adults, adjusted for age, post-op needs, fluid per team   1000-1200kcal (25-30kcal/kg)   48-56g pro (1.2-1.4g/kg pro)     Subjective:   76F with HTN, bicuspid aortic valve, spinal stenosis, arthritis. She was evaluated for increased HERRERA for 6 months.  ECHO:  3/2021 revealed EF normal, severe AS, moderate AI. NST 2021 revealed transient ischemic dilatation. Cardiac cath revealed severe AS, prox circumflex 90%, OM1 70%, mid RCA 80%. CTA Chest Abdomen/ pelvis, revealed bicuspid aortic valve, mid aortic arch aneurysm, increased in size from prior imaging. She was referred to Dr. Muller for surgical evaluation and deemed a good surgical candidate. s/p AVR and CABG + Replacement of AA and Hemiarch on . Extubated , underwent SLP eval 8/15 recommending c/w NPO and TF. Repeat FEES  recommending c/w NPO and NGT feeds. Again f/u  recommending pureed with advancement to [facility] soft in 1-2 days. Fair intake noted, pending AR. On NC 2L. Continues to be monitored closely, see below for recs to best meet needs. Will follow per protocol.     Previous Nutrition Diagnosis:  Increased nutrient needs r/t hypermetabolic state AEB post-op demand     Active [ x ]  Resolved [   ]    If resolved, new PES:     Goal: meet >75% EER via preferred route     Recommendations:  1. C/w pureed diet per SLP  2. Manage pain prn  3. Trend wts   4. Reinforce ed     Education: encouraged intake through day     Risk Level: High [ x  ] Moderate [   ] Low [   ]
Admitting Diagnosis:   Patient is a 76y old  Female who presents with a chief complaint of SOB HERRERA (16 Aug 2021 11:00)      PAST MEDICAL & SURGICAL HISTORY:  HTN (hypertension)    H/O aortic valve stenosis    CAD (coronary artery disease)    No significant past surgical history        Current Nutrition Order: trickle feeds of Jevity 1.5 @ 10ml/hr        PO Intake: Good (%) [   ]  Fair (50-75%) [   ] Poor (<25%) [   ]- n/a     GI Issues:   no n/v/d/c    Pain:  Comfortable in chair     Skin Integrity:  1+ generalized edema   Incision to chest/ wrist   No pressure ulcers     Labs:   08-16    141  |  103  |  57<H>  ----------------------------<  141<H>  4.2   |  25  |  0.90    Ca    9.7      16 Aug 2021 10:10  Phos  3.9     08-16  Mg     2.5     08-16    TPro  6.5  /  Alb  4.0  /  TBili  1.4<H>  /  DBili  x   /  AST  477<H>  /  ALT  175<H>  /  AlkPhos  153<H>  08-16    CAPILLARY BLOOD GLUCOSE      POCT Blood Glucose.: 85 mg/dL (16 Aug 2021 13:35)  POCT Blood Glucose.: 98 mg/dL (16 Aug 2021 07:34)  POCT Blood Glucose.: 129 mg/dL (15 Aug 2021 23:13)  POCT Blood Glucose.: 128 mg/dL (15 Aug 2021 18:32)      Medications:  MEDICATIONS  (STANDING):  albumin human 25% IVPB 50 milliLiter(s) IV Intermittent every 10 minutes  aMIOdarone Infusion 0.5 mG/Min (16.7 mL/Hr) IV Continuous <Continuous>  aspirin  chewable 81 milliGRAM(s) Enteral Tube daily  chlorhexidine 0.12% Liquid 5 milliLiter(s) Oral Mucosa two times a day  chlorhexidine 2% Cloths 1 Application(s) Topical <User Schedule>  dexMEDEtomidine Infusion 0.2 MICROgram(s)/kG/Hr (3.88 mL/Hr) IV Continuous <Continuous>  dextrose 40% Gel 15 Gram(s) Oral once  dextrose 5%. 1000 milliLiter(s) (50 mL/Hr) IV Continuous <Continuous>  dextrose 5%. 1000 milliLiter(s) (100 mL/Hr) IV Continuous <Continuous>  dextrose 50% Injectable 50 milliLiter(s) IV Push every 15 minutes  dextrose 50% Injectable 25 milliLiter(s) IV Push every 15 minutes  glucagon  Injectable 1 milliGRAM(s) IntraMuscular once  heparin  Infusion 900 Unit(s)/Hr (9 mL/Hr) IV Continuous <Continuous>  insulin lispro (ADMELOG) corrective regimen sliding scale   SubCutaneous every 6 hours  levalbuterol Inhalation 0.63 milliGRAM(s) Inhalation every 6 hours  metoprolol tartrate 12.5 milliGRAM(s) Oral every 6 hours  pantoprazole  Injectable 40 milliGRAM(s) IV Push daily  senna 2 Tablet(s) Oral at bedtime  sodium chloride 0.9%. 1000 milliLiter(s) (10 mL/Hr) IV Continuous <Continuous>  testosterone patch 4 mG/24 Hr(s) 4 milliGRAM(s) Transdermal <User Schedule>    MEDICATIONS  (PRN):  ALBUTerol    90 MICROgram(s) HFA Inhaler 2 Puff(s) Inhalation every 6 hours PRN Wheezing      Weight: 77.6kg     Weight Change: no new wts noted     Nutrition Focused Physical Exam: Completed [   ]  Not Pertinent [ x  ]    Estimated energy needs:   Ideal body weight used for calculations as pt >120% of IBW. Nutrient needs based on [facility] standards of care for maintenance in adults, adjusted for age, post-op needs, fluid per team   1000-1200kcal (25-30kcal/kg)   48-56g pro (1.2-1.4g/kg pro)     Subjective:   76F with HTN, bicuspid aortic valve, spinal stenosis, arthritis. She was evaluated for increased HERRERA for 6 months.  ECHO:  3/2021 revealed EF normal, severe AS, moderate AI. NST 4/2021 revealed transient ischemic dilatation. Cardiac cath revealed severe AS, prox circumflex 90%, OM1 70%, mid RCA 80%. CTA Chest Abdomen/ pelvis, revealed bicuspid aortic valve, mid aortic arch aneurysm, increased in size from prior imaging. She was referred to Dr. Muller for surgical evaluation and deemed a good surgical candidate. s/p AVR and CABG + Replacement of AA and Hemiarch on 8/9. Extubated 8/14, underwent SLP eval 8/15 recommending c/w NPO and TF. Continues to be monitored closely, see below for recs to best meet needs. Will follow per protocol.     Previous Nutrition Diagnosis: Increased nutrient needs r/t hypermetabolic state AEB post-op demand     Active [ x  ]  Resolved [   ]    If resolved, new PES:     Goal:  meet >75% EER via preferred route     Recommendations:  1. Recommend increase feeds to Jevity 1.5 @ 35ml/hr x24hrs to provide 840mL TV, 1260kcal, 53g pro, 638ml h2o (1.32g/kg pro/ABW). Keep HOB >45 degrees for feeding, monitor s/sx of intolerance   2. Manage pain   3. Trend wts  4. Reinforce ed   5. Monitor and replete lytes      Education: discussed purpose of TF     Risk Level: High [ x  ] Moderate [   ] Low [   ]
Admitting Diagnosis:   Patient is a 76y old  Female who presents with a chief complaint of aortic surgery (31 Aug 2021 13:27)      PAST MEDICAL & SURGICAL HISTORY:  HTN (hypertension)    H/O aortic valve stenosis    CAD (coronary artery disease)    No significant past surgical history        Current Nutrition Order: [facility] soft + thins + Ensure Enlive TID (1050 kcal, 60g protein, 540mL free H2O)     PO Intake: Good (%) [   ]  Fair (50-75%) [ x  ] Poor (<25%) [   ]    GI Issues:   no n/v/d  +C    Pain:  denies     Skin Integrity:  2+ edema to B/L feet  Incision to medial chest - vac placed  Yousif 18  Leg EVH site incision     Labs:       135  |  98  |  12  ----------------------------<  92  4.1   |  28  |  0.84    Ca    9.0      01 Sep 2021 06:06  Mg     2.0         TPro  6.3  /  Alb  3.3  /  TBili  0.8  /  DBili  x   /  AST  25  /  ALT  17  /  AlkPhos  91      CAPILLARY BLOOD GLUCOSE          Medications:  MEDICATIONS  (STANDING):  aMIOdarone    Tablet 200 milliGRAM(s) Oral daily  aspirin  chewable 81 milliGRAM(s) Enteral Tube daily  budesonide  80 MICROgram(s)/formoterol 4.5 MICROgram(s) Inhaler 2 Puff(s) Inhalation two times a day  cefTRIAXone   IVPB 2000 milliGRAM(s) IV Intermittent every 24 hours  dextrose 5%. 1000 milliLiter(s) (50 mL/Hr) IV Continuous <Continuous>  dextrose 5%. 1000 milliLiter(s) (100 mL/Hr) IV Continuous <Continuous>  furosemide   Injectable 40 milliGRAM(s) IV Push every 8 hours  glucagon  Injectable 1 milliGRAM(s) IntraMuscular once  heparin   Injectable 5000 Unit(s) SubCutaneous every 8 hours  levalbuterol Inhalation 0.63 milliGRAM(s) Inhalation every 6 hours  lidocaine   4% Patch 1 Patch Transdermal daily  lisinopril 10 milliGRAM(s) Oral daily  magnesium oxide 800 milliGRAM(s) Oral once  pantoprazole    Tablet 40 milliGRAM(s) Oral before breakfast  polyethylene glycol 3350 17 Gram(s) Oral daily  potassium chloride    Tablet ER 20 milliEquivalent(s) Oral two times a day  senna 2 Tablet(s) Oral at bedtime  silver sulfADIAZINE 1% Cream 1 Application(s) Topical every 12 hours  sodium chloride 0.9% lock flush 3 milliLiter(s) IV Push every 8 hours    MEDICATIONS  (PRN):  acetaminophen   Tablet .. 650 milliGRAM(s) Oral every 6 hours PRN Severe Pain (7 - 10)  ALPRAZolam 0.25 milliGRAM(s) Oral two times a day PRN anxiety  oxycodone    5 mG/acetaminophen 325 mG 1 Tablet(s) Oral every 6 hours PRN Moderate Pain (4 - 6)      Weight:   77.6kg ()   83kg ()  Daily Weight in k.1 (27 Aug)    Weight Change: likely in setting of edema/ fluid shifts     Nutrition Focused Physical Exam: Completed [   ]  Not Pertinent [ x  ]    Estimated energy needs:   Ideal body weight used for calculations as pt >120% of IBW. Nutrient needs based on [facility] standards of care for maintenance in adults, adjusted for age, post-op needs, fluid per team   1000-1200kcal (25-30kcal/kg)   48-56g pro (1.2-1.4g/kg pro)     Subjective:   76F with HTN, bicuspid aortic valve, spinal stenosis, arthritis. She was evaluated for increased HERRERA for 6 months.  ECHO:  3/2021 revealed EF normal, severe AS, moderate AI. NST 2021 revealed transient ischemic dilatation. Cardiac cath revealed severe AS, prox circumflex 90%, OM1 70%, mid RCA 80%. CTA Chest Abdomen/ pelvis, revealed bicuspid aortic valve, mid aortic arch aneurysm, increased in size from prior imaging. She was referred to Dr. Muller for surgical evaluation and deemed a good surgical candidate. s/p AVR and CABG + Replacement of AA and Hemiarch on . Extubated , underwent SLP eval 8/15 recommending c/w NPO and TF. Repeat FEES  recommending c/w NPO and NGT feeds. Again f/u  recommending pureed with advancement to [facility] soft in 1-2 days. Now on [facility] soft diet. Fair intake noted, pending AR. Underwent wound debridement and vac placement  for erythema. Remains admitted for wound management. Continues to be monitored closely, now on 9L, pending home infusion set up. see below for recs to best meet needs. Will follow per protocol.     Previous Nutrition Diagnosis: Increased nutrient needs r/t hypermetabolic state AEB post-op demand     Active [ x  ]  Resolved [   ]    If resolved, new PES:     Goal: meet >75% EER via preferred route     Recommendations:  1. C/w diet per SLP   2. Manage pain prn  3. Trend wts   4. Reinforce ed   5. C/w Ensure Enlive TID (1050 kcal, 60g protein, 540mL free H2O)     Education: encouraged intake     Risk Level: High [   ] Moderate [ x  ] Low [   ]
As per Dr. Muller, sternal wound vac changed at bedside in sterile fashion. Wound appeared measuring 10 cm x 2 cm x 0.5 cm. Granulation tissue present with minimal bleeding. No purulence or fat necrosis noted. Patient tolerated procedure well and remained hemodynamically stable.
Exam:  US Chest    Procedure Date:    History: 76y Female whose CXR today shows a possible b/l plerual effusions.     Findings:    Evaluation of the side of the left side of the thoracic cavity demonstrates small to moderate pleural effusion   Evaluation of the right side of the thoracic cavity demonstrates small pleural effusion.     Impression:  Pt does not want any drains because "I've had drains before and I refuse".   Aggressive pulm pushing with incentive spirometer    Frequent walking   Diuresis
Exam:  US Chest    Procedure Date:    History: 76y Female whose CXR today shows a possible right sided pleural effusion.  Pt is POD #9 from AVR, Ascending, hemiarch replacement, frozen elephant trunk, left aorto-subclavian bypass, CABG x 2 (SVG-RPDA and SVG-LCx)      Findings:                    Evaluation of the right side of the thoracic cavity demonstrates                   small pleural effusion                  Lung is freely movable with in thoracic cavity                    Evaluation of the left side of the thoracic cavity demonstrates                   minimal to trace pleural effusion                  Lung is freely movable with in thoracic cavity    Impression:  small right pleural effusion and trace left pleural effusion.
Patient desaturates to 85% on room air. Will require home oxygen (2L NC) to prevent readmission or worsening clinical status of known COPD
Pre-op Diagnosis:  Sinus node dysfunction  Paroxsymal atrial fibrillation     Post-op Diagnosis:  Same     Procedure:  MICRA VR implantation     Electrophysiologist:  Parvez Khanna MD     Fellow:  Rita Grijalva MD     Anesthesia:  General Anesthesia     Access:  Right femoral vein access     Description:  Successful implantation of MICRA VR.  Required one recapture (due to suboptimal threshold) and a new MICRA VR device was implanted.  Threshold of 0.88 V @ 0.24 msec.  Device programmed VVI 40.  Trace to small pericardial effusion at beginning of procedure which was unchanged at the end of procedure.       Complications:  None     EBL:  Less than 30 ml     Disposition:   stable, to recovery    Plan:  Bed rest for 6 hours.  Figure of 8 suture removal right groin tomorrow AM.  ECG post procedure.   CXR tomorrow AM.
Previous wound vac removed at the bedside. Wound appeared with brown/yellow tissue with trace purulence. Initial wound measured ~8cm. At the bedside, Dr. Vernon further opened the wound to a size of 12cm (extending superiorly). Exposed sutures removed with scissors and hemostat. x2 wound cultures collected and sent to lab. New wound vac was placed and adequate suction was obtained. Pt tolerated the procedure well.
The old dressing was removed. The remainder of the procedure was done in a sterile fashion. The wound bed had pink/red granulation tissue. There is a 4cm tunnel superiorly.  There is pus draining from tunnel.    Measurements were 4 cm wide, 2 cm deep, 9 cm long.     Wound vac placement: The foam was cut and shaped to fit the wound and placed in the wound.  The transparent drape was cut and applied to cover the foam leaving a 1 cm edge.  A 1 cm hole was cut in the center of the drape and the sensatrac pad was placed so that the tubing was arranged in a direction and position that was comfortable for the patient. The tubing was connected to the vac suction canister and the connectors locked together. The vac suction unit was activated and set at 125 mgHg.    The patient tolerated the procedure well.
As per Dr. Muller epicardial pacing wires removed at bedside. Atrial and Ventricular epicardial wires pulled without resistance. Patient tolerated procedure well and remained hemodynamically stable. Plan for patient to stay in bed for 1 hour with q15 vital checks. Patient and RN aware of plan.
TTE 8/20 performed.    CONCLUSIONS:     1. Normal left ventricular cavity size.   2. Moderate symmetric left ventricular hypertrophy.   3. Hyperdynamic left ventricular systolic function with a resting intracavitary gradient of 36 mmHg..   4. Normal right ventricular size and systolic function.   5. s/p Aortic root and hemiarch replacement.   6. Bioprosthetic valve is seen in the aortic position, normally functioning.   7. Moderate mitral stenosis, MG 5.4 mmHg at 73 bpm.   8. No evidence of pulmonary hypertension.   9. Medium sized pericardial effusion without echocardiographic evidence of cardiac tamponade physiology.  10. Compared to the previous TTE performed on 8/18/2021, the pericardial effusion has increased in size.    No output from pericardial drain overnight 8/20 - 8/21 or during day shift on 8/21.  Discussed patient and output with Dr. Adams and Dr. Locke.      Pericardial drain d/c at bedside.  No issues.  Patient tolerated procedure well.  CXR ordered
Wound vac changed at bedside.  Wound measures 12cm x 3cm x 0.5cm with pink granulation tissue and areas of active bleeding.  Site cleansed with NS and wound vac placed with suction in tact.  Patient tolerated the procedure well.

## 2021-09-03 NOTE — DISCHARGE NOTE PROVIDER - NSDCCPTREATMENT_GEN_ALL_CORE_FT
PRINCIPAL PROCEDURE  Procedure: Aortic valve replacement, tissue  Findings and Treatment: AVR, Ascending, hemiarch replacement, frozen elephant trunk, left aorto-subclavian bypass, CABG x 2 (SVG-RPDA and SVG-LCx)      SECONDARY PROCEDURE  Procedure: Aortic valve replacement, tissue  Findings and Treatment: AVR, Ascending, hemiarch replacement, frozen elephant trunk, left aorto-subclavian bypass, CABG x 2 (SVG-RPDA and SVG-LCx)

## 2021-09-03 NOTE — DISCHARGE NOTE PROVIDER - NSDCFUSCHEDAPPT_GEN_ALL_CORE_FT
LYUBOV BAHENA ; 10/07/2021 ; NPP Cardio Vasc [address] LYUBOV BAHENA ; 09/17/2021 ; NPP Cardio [address]  LYUBOV BAHENA ; 10/07/2021 ; NPP Cardio Vasc [address] LYUBOV BAHENA ; 09/15/2021 ; NPP CT Surg [address]  LYUBOV BAHENA ; 09/17/2021 ; NPP Cardio [address]  LYUBOV BAHENA ; 10/07/2021 ; NPP Cardio [address]

## 2021-09-03 NOTE — DISCHARGE NOTE NURSING/CASE MANAGEMENT/SOCIAL WORK - PATIENT PORTAL LINK FT
You can access the FollowMyHealth Patient Portal offered by [facility] by registering at the following website: [email or web address]. By joining [employer]’s FollowMyHealth portal, you will also be able to view your health information using other applications (apps) compatible with our system.

## 2021-09-03 NOTE — PROGRESS NOTE ADULT - PROVIDER SPECIALTY LIST ADULT
CT Surgery
CT Surgery
Critical Care
Infectious Disease
Infectious Disease
CT Surgery
Critical Care
Electrophysiology
Intervent Radiology
CT Surgery
Critical Care
Infectious Disease
Intervent Radiology
CT Surgery
Infectious Disease

## 2021-09-03 NOTE — DISCHARGE NOTE PROVIDER - PROVIDER TOKENS
PROVIDER:[TOKEN:[[id number]],SCHEDULEDAPPT:[09/15/2021],SCHEDULEDAPPTTIME:[10:30 AM]],PROVIDER:[TOKEN:[[id number]],SCHEDULEDAPPT:[10/07/2021],SCHEDULEDAPPTTIME:[02:30 PM]],PROVIDER:[TOKEN:[[id number]],SCHEDULEDAPPT:[09/14/2021],SCHEDULEDAPPTTIME:[02:20 PM]],PROVIDER:[TOKEN:[[id number]],SCHEDULEDAPPT:[09/17/2021],SCHEDULEDAPPTTIME:[01:30 PM]]

## 2021-09-03 NOTE — DISCHARGE NOTE PROVIDER - HOSPITAL COURSE
76 year old female, current everyday smoker with PMHx of HTN, Spinal Stenosis, Arthritis, Bicuspid aortic valve with known aortic stenosis evaluated by Dr. Muller as an outpatient and presented to [facility] on 8/8/2021 to complete preoperative workup. Patient underwent AVR, ascending/hemiarch replacement with frozen elephant trunk and left aorto-subclavian bypass, CABG x2 with Dr. Muller on 8/9/2021. Intraop patient received 7u PRBC, 2PLT, 2 FPP and 1000 FEIBA, she was transferred to CT ICU post operatively intubated and stable on levo. On POD#2 patient had Afib with RVR with associated hypotension requiring increased pressors. She was given 2u PRBC for acute post operative anemia and started on primacor and dobutamine. She was cardioverted to NSR. Again on POD#3 and 4 patient had episodes of Afib with RVR requiring amiodarone boluses. She had FIDEL on POD#5, which improved with hydration. Post operatively patient had acute respiratory failure requiring prolonged intubation and patient was ultimately extubated on POD#6 to Bipap. TTE on POD#7 revealed moderate pericardial effusion s/p IR drainage. Patient continued to have episodes of atrial fibrillation treated with amiodarone, she developed bradycardia on POD#14. EP was consulted and patient was monitored for further episodes. She passed her speech and swallow on POD#14. She remained stable and transferred to  on POD#15. On POD#16 patient again had Afib with RVR with associated hypotension requiring linda pushes and IV amiodarone. She was given IV lopressor and developed bradycardia requiring pacing via epicardial wires. She was scheduled for PPM placement but on POD#17 patient was noted to have sternal wound erythema and purulent drainage. She underwent sternal would debridement and wound vac placement and started on empiric antibiotics. Wound cultures + Klebsiella and she was transitioned to IV ceftriaxone and ID was consulted. She was cleared for PPM and underwent micra PPM placement with EP on POD#23. She continued to remain stable, weaned off oxygen, ambulating with minimal assistance and as per Dr. Muller is ready for discharge home on POD#25.     Of note on day of discharge, BP not able to tolerate addition of Beta blockers, will be started as an outpatient. Also of note patient weaned off oxygen saturating 88-90% on room air. Patient with significant smoking history requiring BIPAP and HFNC during her hospital admission. Will discharge  with home O2 (2L NC) PRN.     35 minutes was spent with the patient reviewing the discharge material including medications, follow up appointments, recovery, concerning symptoms, and how to contact their health care providers if they have questions     CABG  Aspirin               [ x ] Yes  [  ] Contraindicated, Reason_______________________________  Beta-Blocker     [  ] Yes  [x]Contraindicated, Reason - Bradycardia and Hypotension.   Statin                 [ x ] Yes  [  ] Contraindicated, Reason_______________________________       76 year old female, current everyday smoker with PMHx of HTN, Spinal Stenosis, Arthritis, Bicuspid aortic valve with known aortic stenosis evaluated by Dr. Muller as an outpatient and presented to [facility] on 8/8/2021 to complete preoperative workup. Patient underwent AVR, ascending/hemiarch replacement with frozen elephant trunk and left aorto-subclavian bypass, CABG x2 with Dr. Muller on 8/9/2021. Intraop patient received 7u PRBC, 2PLT, 2 FPP and 1000 FEIBA, she was transferred to CT ICU post operatively intubated and stable on levo. On POD#2 patient had Afib with RVR with associated hypotension requiring increased pressors. She was given 2u PRBC for acute post operative anemia and started on primacor and dobutamine. She was cardioverted to NSR. Again on POD#3 and 4 patient had episodes of Afib with RVR requiring amiodarone boluses. She had FIDEL on POD#5, which improved with hydration. Post operatively patient had acute respiratory failure requiring prolonged intubation and patient was ultimately extubated on POD#6 to Bipap. TTE on POD#7 revealed moderate pericardial effusion s/p IR drainage. Patient continued to have episodes of atrial fibrillation treated with amiodarone, she developed bradycardia on POD#14. EP was consulted and patient was monitored for further episodes. She passed her speech and swallow on POD#14. She remained stable and transferred to  on POD#15. On POD#16 patient again had Afib with RVR with associated hypotension requiring linda pushes and IV amiodarone. She was given IV lopressor and developed bradycardia requiring pacing via epicardial wires. She was scheduled for PPM placement but on POD#17 patient was noted to have sternal wound erythema and purulent drainage. She underwent sternal would debridement and wound vac placement and started on empiric antibiotics. Wound cultures + Klebsiella and she was transitioned to IV ceftriaxone and ID was consulted. She was cleared for PPM and underwent micra PPM placement with EP on POD#23. She continued to remain stable, weaned off oxygen, ambulating with minimal assistance and as per Dr. Muller is ready for discharge home on POD#25.     Of note on day of discharge, BP not able to tolerate addition of Beta blockers, will be started as an outpatient. Also of note patient weaned off oxygen saturating 88-90% on room air. Patient with significant smoking history requiring BIPAP and HFNC during her hospital admission. Home O2 set up as needed, patient did not want wait for oxygen to be delivered. Will have oxygen delivered at home if insurance approves.     35 minutes was spent with the patient reviewing the discharge material including medications, follow up appointments, recovery, concerning symptoms, and how to contact their health care providers if they have questions     CABG  Aspirin               [ x ] Yes  [  ] Contraindicated, Reason_______________________________  Beta-Blocker     [  ] Yes  [x]Contraindicated, Reason - Bradycardia and Hypotension.   Statin                 [ x ] Yes  [  ] Contraindicated, Reason_______________________________

## 2021-09-03 NOTE — DISCHARGE NOTE PROVIDER - CARE PROVIDER_API CALL
Bladimir Muller)  Surgery; Thoracic and Cardiac Surgery  [address] [place] [address]  Phone: [phone]  Fax: [phone]  Scheduled Appointment: 09/15/2021 10:30 AM    Parvez Khanna  CARDIAC ELECTROPHYSIOLOGY  [address] [address]  Phone: [phone]  Fax: [phone]  Scheduled Appointment: 10/07/2021 02:30 PM    Merissa Crystal)  Infectious Disease; Internal Medicine  [address], [address] [place] [address]  Phone: [phone]  Fax: [phone]  Scheduled Appointment: 09/14/2021 02:20 PM    Aron Blank  Interventional Cardiology  [address] [place] [address]  Phone: [phone]  Fax: [phone]  Scheduled Appointment: 09/17/2021 01:30 PM

## 2021-09-03 NOTE — DISCHARGE NOTE PROVIDER - NSDCMRMEDTOKEN_GEN_ALL_CORE_FT
acetaminophen 325 mg oral tablet: 2 tab(s) orally every 6 hours, As needed, Severe Pain (7 - 10)  ALPRAZolam 0.25 mg oral tablet: 1 tab(s) orally 2 times a day, As needed, anxiety MDD:2  amiodarone 200 mg oral tablet: 1 tab(s) orally once a day  Aspirin Enteric Coated 81 mg oral delayed release tablet: 1 tab(s) orally once a day  atorvastatin 10 mg oral tablet: 1 tab(s) orally once a day  budesonide-formoterol 160 mcg-4.5 mcg/inh inhalation aerosol: 1 puff(s) inhaled 2 times a day   Ceftriaxone 2G IV every 24 hours. End Date 9/14/21 :   furosemide 40 mg oral tablet: 1 tab(s) orally every 12 hours   lisinopril 10 mg oral tablet: 1 tab(s) orally once a day  oxycodone-acetaminophen 5 mg-325 mg oral tablet: 1 tab(s) orally every 6 hours, As needed, Moderate Pain (4 - 6) MDD:4  polyethylene glycol 3350 oral powder for reconstitution: 17 gram(s) orally once a day, As Needed -for constipation   potassium chloride 20 mEq oral tablet, extended release: 1 tab(s) orally every 12 hours   senna oral tablet: 2 tab(s) orally once a day (at bedtime)   acetaminophen 325 mg oral tablet: 2 tab(s) orally every 6 hours, As needed, Severe Pain (7 - 10)  ALPRAZolam 0.25 mg oral tablet: 1 tab(s) orally 2 times a day, As needed, anxiety MDD:2  amiodarone 200 mg oral tablet: 1 tab(s) orally once a day  Aspirin Enteric Coated 81 mg oral delayed release tablet: 1 tab(s) orally once a day  atorvastatin 10 mg oral tablet: 1 tab(s) orally once a day  budesonide-formoterol 160 mcg-4.5 mcg/inh inhalation aerosol: 1 puff(s) inhaled 2 times a day   Ceftriaxone 2G IV every 24 hours. End Date 9/14/21 :   furosemide 40 mg oral tablet: 1 tab(s) orally every 12 hours   Home Oxygen : 2L Nasal Cannula PRN   Height: 152.4 cm   Weight: 85.6 Kg   ICD Code: J44.9  lisinopril 10 mg oral tablet: 1 tab(s) orally once a day  oxycodone-acetaminophen 5 mg-325 mg oral tablet: 1 tab(s) orally every 6 hours, As needed, Moderate Pain (4 - 6) MDD:4  polyethylene glycol 3350 oral powder for reconstitution: 17 gram(s) orally once a day, As Needed -for constipation   potassium chloride 20 mEq oral tablet, extended release: 1 tab(s) orally every 12 hours   senna oral tablet: 2 tab(s) orally once a day (at bedtime)

## 2021-09-03 NOTE — DISCHARGE NOTE PROVIDER - CARE PROVIDERS DIRECT ADDRESSES
[email or web address]

## 2021-09-03 NOTE — DISCHARGE NOTE PROVIDER - NSDCACTIVITY_GEN_ALL_CORE
No restrictions Showering allowed/Stairs allowed/Walking - Indoors allowed/No heavy lifting/straining/Walking - Outdoors allowed

## 2021-09-03 NOTE — PROGRESS NOTE ADULT - SUBJECTIVE AND OBJECTIVE BOX
Patient discussed on morning rounds with Dr. Muller      Operation / Date: 8/9/2021 AVR, Ascending/Hemiarch replacement with frozen elephant trunk, left subclavian bypass, CABG x2     Surgeon: Dr. Muller     Referring Physician: Dr. Galeana / Dr. Blank     SUBJECTIVE ASSESSMENT:  Patient seen this morning at bedside, doing well and not offering any complaints at this time. Denies any chest pain or shortness of breath. States that she feels comfortable off the oxygen without any worsening SOB. Understands that is safer for her to remain on home oxygen as needed due to her smoking history. Understands that she is not able to smoke around an oxygen tank and it is a fire hazard. Patient is agreeable and states she will no longer smoke.     Hospital Course:  76 year old female, current everyday smoker with PMHx of HTN, Spinal Stenosis, Arthritis, Bicuspid aortic valve with known aortic stenosis evaluated by Dr. Muller as an outpatient and presented to [facility] on 8/8/2021 to complete preoperative workup. Patient underwent AVR, ascending/hemiarch replacement with frozen elephant trunk and left aorto-subclavian bypass, CABG x2 with Dr. Muller on 8/9/2021. Intraop patient received 7u PRBC, 2PLT, 2 FPP and 1000 FEIBA, she was transferred to CT ICU post operatively intubated and stable on levo. On POD#2 patient had Afib with RVR with associated hypotension requiring increased pressors. She was given 2u PRBC for acute post operative anemia and started on primacor and dobutamine. She was cardioverted to NSR. Again on POD#3 and 4 patient had episodes of Afib with RVR requiring amiodarone boluses. She had FIDEL on POD#5, which improved with hydration. Post operatively patient had acute respiratory failure requiring prolonged intubation and patient was ultimately extubated on POD#6 to Bipap. TTE on POD#7 revealed moderate pericardial effusion s/p IR drainage. Patient continued to have episodes of atrial fibrillation treated with amiodarone, she developed bradycardia on POD#14. EP was consulted and patient was monitored for further episodes. She passed her speech and swallow on POD#14. She remained stable and transferred to  on POD#15. On POD#16 patient again had Afib with RVR with associated hypotension requiring linda pushes and IV amiodarone. She was given IV lopressor and developed bradycardia requiring pacing via epicardial wires. She was scheduled for PPM placement but on POD#17 patient was noted to have sternal wound erythema and purulent drainage. She underwent sternal would debridement and wound vac placement and started on empiric antibiotics. Wound cultures + Klebsiella and she was transitioned to IV ceftriaxone and ID was consulted. She was cleared for PPM and underwent micra PPM placement with EP on POD#23. She continued to remain stable, weaned off oxygen, ambulating with minimal assistance and as per Dr. Muller is ready for discharge home on POD#25.     Of note on day of discharge, BP not able to tolerate addition of Beta blockers, will be started as an outpatient. Also of note patient weaned off oxygen saturating 88-90% on room air. Patient with significant smoking history requiring BIPAP and HFNC during her hospital admission. Will discharge  with home O2 (2L NC) PRN.     35 minutes was spent with the patient reviewing the discharge material including medications, follow up appointments, recovery, concerning symptoms, and how to contact their health care providers if they have questions     CABG  Aspirin               [ x ] Yes  [  ] Contraindicated, Reason_______________________________  Beta-Blocker     [  ] Yes  [x]Contraindicated, Reason - Bradycardia and Hypotension.   Statin                 [ x ] Yes  [  ] Contraindicated, Reason_______________________________      Vital Signs Last 24 Hrs  T(C): 36.6 (03 Sep 2021 09:10), Max: 36.7 (02 Sep 2021 16:57)  T(F): 97.8 (03 Sep 2021 09:10), Max: 98.1 (02 Sep 2021 16:57)  HR: 74 (03 Sep 2021 10:27) (61 - 75)  BP: 99/49 (03 Sep 2021 10:27) (99/49 - 126/58)  BP(mean): 70 (03 Sep 2021 10:27) (70 - 84)  RR: 21 (03 Sep 2021 10:27) (15 - 25)  SpO2: 88% (03 Sep 2021 10:27) (88% - 96%)  I&O's Detail    02 Sep 2021 07:01  -  03 Sep 2021 07:00  --------------------------------------------------------  IN:    Oral Fluid: 450 mL  Total IN: 450 mL    OUT:    Drain (mL): 0 mL    Voided (mL): 1400 mL  Total OUT: 1400 mL    Total NET: -950 mL    EPICARDIAL WIRES REMOVED: Yes  TIE DOWNS REMOVED: Yes    PHYSICAL EXAM:    General: Patient lying comfortably in bed, no acute distress     Neurological: Alert and oriented. No focal neurological deficits     Cardiovascular: S1S2, RRR, no murmurs appreciated on exam     Respiratory: Clear to ausculation bilaterally     Gastrointestinal: Abdomen soft, non tender, non distended     Extremities: Warm and well perfused. Trace non pitting edema bilaterally. No calf tenderness     Vascular: Peripheral pulses 2+ bilaterally     Incision Sites: Sternotomy incision with wound vac in place, changed to home wound vac this morning   Bilateral EVH site C/D/I     LABS:                        8.6    6.71  )-----------( 101      ( 03 Sep 2021 06:16 )             28.3       COUMADIN:  No        09-03    137  |  98  |  14  ----------------------------<  87  4.4   |  30  |  0.94    Ca    9.0      03 Sep 2021 06:16  Mg     2.2     09-03    MEDICATIONS  (STANDING): See Med Rec       Discharge CXR: < from: Xray Chest 1 View-PORTABLE IMMEDIATE (Xray Chest 1 View-PORTABLE IMMEDIATE .) (09.01.21 @ 17:43) >  IMPRESSION: Mild bibasilar atelectasis.    < end of copied text >      Discharge ECHO:    < from: TTE Echo Limited or F/U (08.25.21 @ 15:57) >  --------------------------------------------------------------------------------  CONCLUSIONS:     1. Limited study obtained for evaluation of EF and rule out pericardial effusion.   2. Technically difficult study.   3. Small pericardial effusion without echocardiographic evidence of cardiac tamponade physiology.   4. Left ventricular endocardium is not well visualized. Grossly, left ventricular function appears normal to hyperdynamic.    < end of copied text >      Follow Up:  Care Providers for Follow up (PCP/Outpatient Provider)	Bladimir Muller)  Surgery; Thoracic and Cardiac Surgery  [address], [address] [place] [address]  Phone: [phone]  Fax: [phone]  Scheduled Appointment: 09/15/2021 10:30 AM    Parvez Khanna  CARDIAC ELECTROPHYSIOLOGY  [address] [address]  Phone: [phone]  Fax: [phone]  Scheduled Appointment: 10/07/2021 02:30 PM    Merissa Crystal)  Infectious Disease; Internal Medicine  [address], [address] [place] [address]  Phone: [phone]  Fax: [phone]  Scheduled Appointment: 09/14/2021 02:20 PM    Aron Blank  Interventional Cardiology  [address] [place] [address]  Phone: [phone]  Fax: [phone]  Scheduled Appointment: 09/17/2021 01:30 PM  Patient's Scheduled Appointments	LYUBOV BAHENA ; 10/07/2021 ; NPP Cardio Vasc [address] [facility]  Discharge Diet	DASH Diet, Mechanical Soft Diet  Activity	No restrictions  Additional Instructions	- Call us with any questions #[phone].    - Continue to change your wound vac dressing every Monday/Wednesday/Friday. If Wound vac becomes disconnected please place wet-to-dry dressing and call visiting nurse or our office.     - Walk daily as tolerated and use your incentive spirometer every hour.    - No driving or strenuous activity/exercise for 6 weeks, or until cleared by your surgeon.    - Gently clean your incisions with anti-bacterial soap and water, pat dry.  You may leave them open to air.    - Call your doctor if you have shortness of breath, chest pain not relieved by pain medication, dizziness, fever >101.5, or increased redness or drainage from incisions.   -CRP and ferritin trending down  -continue steroids and Kineret  -continue supportive care  -s/p plaquenil and azithromycin  -O2 sats are stable in the low 90s  -encourage proning or lateral decubitus positioning

## 2021-09-03 NOTE — CHART NOTE - NSCHARTNOTESELECT_GEN_ALL_CORE
D/C PW/Event Note
Event Note
Wound Vac Change/Event Note
Brief EP Post Op Note
Home Oxygen/Event Note
Nutrition Follow-up/Nutrition Services
POCUS thoracic cavity/Event Note
POCUS/Event Note
Wound VAC/Event Note
Wound vac/Event Note
wound vac exchange/Event Note

## 2021-09-04 ENCOUNTER — TRANSCRIPTION ENCOUNTER (OUTPATIENT)
Age: 76
End: 2021-09-04

## 2021-09-04 ENCOUNTER — INPATIENT (INPATIENT)
Facility: HOSPITAL | Age: 76
LOS: 4 days | Discharge: NURSING, REHAB OR LONG-TERM CARE | DRG: 641 | End: 2021-09-09
Attending: THORACIC SURGERY (CARDIOTHORACIC VASCULAR SURGERY) | Admitting: THORACIC SURGERY (CARDIOTHORACIC VASCULAR SURGERY)
Payer: COMMERCIAL

## 2021-09-04 VITALS
OXYGEN SATURATION: 99 % | SYSTOLIC BLOOD PRESSURE: 145 MMHG | RESPIRATION RATE: 18 BRPM | HEART RATE: 69 BPM | TEMPERATURE: 98 F | WEIGHT: 179.9 LBS | DIASTOLIC BLOOD PRESSURE: 76 MMHG | HEIGHT: 60 IN

## 2021-09-04 DIAGNOSIS — Z95.2 PRESENCE OF PROSTHETIC HEART VALVE: Chronic | ICD-10-CM

## 2021-09-04 DIAGNOSIS — Z95.0 PRESENCE OF CARDIAC PACEMAKER: Chronic | ICD-10-CM

## 2021-09-04 DIAGNOSIS — Z95.828 PRESENCE OF OTHER VASCULAR IMPLANTS AND GRAFTS: Chronic | ICD-10-CM

## 2021-09-04 DIAGNOSIS — Z95.1 PRESENCE OF AORTOCORONARY BYPASS GRAFT: Chronic | ICD-10-CM

## 2021-09-04 LAB
ALBUMIN SERPL ELPH-MCNC: 3.5 G/DL — SIGNIFICANT CHANGE UP (ref 3.3–5)
ALP SERPL-CCNC: 91 U/L — SIGNIFICANT CHANGE UP (ref 40–120)
ALT FLD-CCNC: 16 U/L — SIGNIFICANT CHANGE UP (ref 10–45)
ANION GAP SERPL CALC-SCNC: 10 MMOL/L — SIGNIFICANT CHANGE UP (ref 5–17)
APTT BLD: 29.3 SEC — SIGNIFICANT CHANGE UP (ref 27.5–35.5)
AST SERPL-CCNC: 23 U/L — SIGNIFICANT CHANGE UP (ref 10–40)
BASE EXCESS BLDV CALC-SCNC: 6.4 MMOL/L — HIGH (ref -2–3)
BASOPHILS # BLD AUTO: 0.05 K/UL — SIGNIFICANT CHANGE UP (ref 0–0.2)
BASOPHILS NFR BLD AUTO: 0.7 % — SIGNIFICANT CHANGE UP (ref 0–2)
BILIRUB SERPL-MCNC: 0.9 MG/DL — SIGNIFICANT CHANGE UP (ref 0.2–1.2)
BUN SERPL-MCNC: 16 MG/DL — SIGNIFICANT CHANGE UP (ref 7–23)
CA-I SERPL-SCNC: 1.18 MMOL/L — SIGNIFICANT CHANGE UP (ref 1.15–1.33)
CALCIUM SERPL-MCNC: 9.3 MG/DL — SIGNIFICANT CHANGE UP (ref 8.4–10.5)
CHLORIDE SERPL-SCNC: 98 MMOL/L — SIGNIFICANT CHANGE UP (ref 96–108)
CO2 BLDV-SCNC: 33.4 MMOL/L — HIGH (ref 22–26)
CO2 SERPL-SCNC: 30 MMOL/L — SIGNIFICANT CHANGE UP (ref 22–31)
CREAT SERPL-MCNC: 1.16 MG/DL — SIGNIFICANT CHANGE UP (ref 0.5–1.3)
EOSINOPHIL # BLD AUTO: 0.18 K/UL — SIGNIFICANT CHANGE UP (ref 0–0.5)
EOSINOPHIL NFR BLD AUTO: 2.6 % — SIGNIFICANT CHANGE UP (ref 0–6)
GAS PNL BLDV: 132 MMOL/L — LOW (ref 136–145)
GAS PNL BLDV: SIGNIFICANT CHANGE UP
GAS PNL BLDV: SIGNIFICANT CHANGE UP
GLUCOSE SERPL-MCNC: 116 MG/DL — HIGH (ref 70–99)
HCO3 BLDV-SCNC: 32 MMOL/L — HIGH (ref 22–29)
HCT VFR BLD CALC: 27.9 % — LOW (ref 34.5–45)
HGB BLD-MCNC: 8.7 G/DL — LOW (ref 11.5–15.5)
IMM GRANULOCYTES NFR BLD AUTO: 0.7 % — SIGNIFICANT CHANGE UP (ref 0–1.5)
INR BLD: 1.16 — SIGNIFICANT CHANGE UP (ref 0.88–1.16)
LACTATE SERPL-SCNC: 1.6 MMOL/L — SIGNIFICANT CHANGE UP (ref 0.5–2)
LYMPHOCYTES # BLD AUTO: 0.97 K/UL — LOW (ref 1–3.3)
LYMPHOCYTES # BLD AUTO: 14.2 % — SIGNIFICANT CHANGE UP (ref 13–44)
MCHC RBC-ENTMCNC: 29.9 PG — SIGNIFICANT CHANGE UP (ref 27–34)
MCHC RBC-ENTMCNC: 31.2 GM/DL — LOW (ref 32–36)
MCV RBC AUTO: 95.9 FL — SIGNIFICANT CHANGE UP (ref 80–100)
MONOCYTES # BLD AUTO: 0.75 K/UL — SIGNIFICANT CHANGE UP (ref 0–0.9)
MONOCYTES NFR BLD AUTO: 11 % — SIGNIFICANT CHANGE UP (ref 2–14)
NEUTROPHILS # BLD AUTO: 4.82 K/UL — SIGNIFICANT CHANGE UP (ref 1.8–7.4)
NEUTROPHILS NFR BLD AUTO: 70.8 % — SIGNIFICANT CHANGE UP (ref 43–77)
NRBC # BLD: 0 /100 WBCS — SIGNIFICANT CHANGE UP (ref 0–0)
NT-PROBNP SERPL-SCNC: 5003 PG/ML — HIGH (ref 0–300)
PCO2 BLDV: 48 MMHG — HIGH (ref 39–42)
PH BLDV: 7.43 — SIGNIFICANT CHANGE UP (ref 7.32–7.43)
PLATELET # BLD AUTO: 106 K/UL — LOW (ref 150–400)
PO2 BLDV: <35 MMHG — LOW (ref 25–45)
POTASSIUM BLDV-SCNC: 4.5 MMOL/L — SIGNIFICANT CHANGE UP (ref 3.5–5.1)
POTASSIUM SERPL-MCNC: 4.9 MMOL/L — SIGNIFICANT CHANGE UP (ref 3.5–5.3)
POTASSIUM SERPL-SCNC: 4.9 MMOL/L — SIGNIFICANT CHANGE UP (ref 3.5–5.3)
PROT SERPL-MCNC: 6.8 G/DL — SIGNIFICANT CHANGE UP (ref 6–8.3)
PROTHROM AB SERPL-ACNC: 13.8 SEC — HIGH (ref 10.6–13.6)
RBC # BLD: 2.91 M/UL — LOW (ref 3.8–5.2)
RBC # FLD: 16.6 % — HIGH (ref 10.3–14.5)
SAO2 % BLDV: 43.5 % — LOW (ref 67–88)
SARS-COV-2 RNA SPEC QL NAA+PROBE: SIGNIFICANT CHANGE UP
SODIUM SERPL-SCNC: 138 MMOL/L — SIGNIFICANT CHANGE UP (ref 135–145)
TROPONIN T SERPL-MCNC: 0.26 NG/ML — CRITICAL HIGH (ref 0–0.01)
WBC # BLD: 6.82 K/UL — SIGNIFICANT CHANGE UP (ref 3.8–10.5)
WBC # FLD AUTO: 6.82 K/UL — SIGNIFICANT CHANGE UP (ref 3.8–10.5)

## 2021-09-04 PROCEDURE — 99285 EMERGENCY DEPT VISIT HI MDM: CPT

## 2021-09-04 PROCEDURE — 93010 ELECTROCARDIOGRAM REPORT: CPT

## 2021-09-04 PROCEDURE — 97605 NEG PRS WND THER DME<=50SQCM: CPT

## 2021-09-04 PROCEDURE — 71045 X-RAY EXAM CHEST 1 VIEW: CPT | Mod: 26

## 2021-09-04 RX ORDER — INFLUENZA VIRUS VACCINE 15; 15; 15; 15 UG/.5ML; UG/.5ML; UG/.5ML; UG/.5ML
0.5 SUSPENSION INTRAMUSCULAR ONCE
Refills: 0 | Status: COMPLETED | OUTPATIENT
Start: 2021-09-04 | End: 2021-09-04

## 2021-09-04 RX ORDER — ATORVASTATIN CALCIUM 80 MG/1
10 TABLET, FILM COATED ORAL AT BEDTIME
Refills: 0 | Status: DISCONTINUED | OUTPATIENT
Start: 2021-09-04 | End: 2021-09-09

## 2021-09-04 RX ORDER — POTASSIUM CHLORIDE 20 MEQ
40 PACKET (EA) ORAL EVERY 12 HOURS
Refills: 0 | Status: DISCONTINUED | OUTPATIENT
Start: 2021-09-04 | End: 2021-09-06

## 2021-09-04 RX ORDER — FUROSEMIDE 40 MG
40 TABLET ORAL EVERY 12 HOURS
Refills: 0 | Status: DISCONTINUED | OUTPATIENT
Start: 2021-09-04 | End: 2021-09-06

## 2021-09-04 RX ORDER — HEPARIN SODIUM 5000 [USP'U]/ML
5000 INJECTION INTRAVENOUS; SUBCUTANEOUS EVERY 8 HOURS
Refills: 0 | Status: DISCONTINUED | OUTPATIENT
Start: 2021-09-04 | End: 2021-09-09

## 2021-09-04 RX ORDER — SODIUM CHLORIDE 9 MG/ML
3 INJECTION INTRAMUSCULAR; INTRAVENOUS; SUBCUTANEOUS EVERY 8 HOURS
Refills: 0 | Status: DISCONTINUED | OUTPATIENT
Start: 2021-09-04 | End: 2021-09-09

## 2021-09-04 RX ORDER — ALPRAZOLAM 0.25 MG
0.25 TABLET ORAL
Refills: 0 | Status: DISCONTINUED | OUTPATIENT
Start: 2021-09-04 | End: 2021-09-09

## 2021-09-04 RX ORDER — ASPIRIN/CALCIUM CARB/MAGNESIUM 324 MG
81 TABLET ORAL DAILY
Refills: 0 | Status: DISCONTINUED | OUTPATIENT
Start: 2021-09-04 | End: 2021-09-09

## 2021-09-04 RX ORDER — CEFTRIAXONE 500 MG/1
2000 INJECTION, POWDER, FOR SOLUTION INTRAMUSCULAR; INTRAVENOUS ONCE
Refills: 0 | Status: COMPLETED | OUTPATIENT
Start: 2021-09-04 | End: 2021-09-04

## 2021-09-04 RX ORDER — POLYETHYLENE GLYCOL 3350 17 G/17G
17 POWDER, FOR SOLUTION ORAL DAILY
Refills: 0 | Status: DISCONTINUED | OUTPATIENT
Start: 2021-09-04 | End: 2021-09-09

## 2021-09-04 RX ORDER — CEFTRIAXONE 500 MG/1
2000 INJECTION, POWDER, FOR SOLUTION INTRAMUSCULAR; INTRAVENOUS EVERY 24 HOURS
Refills: 0 | Status: DISCONTINUED | OUTPATIENT
Start: 2021-09-05 | End: 2021-09-09

## 2021-09-04 RX ORDER — AMIODARONE HYDROCHLORIDE 400 MG/1
200 TABLET ORAL DAILY
Refills: 0 | Status: DISCONTINUED | OUTPATIENT
Start: 2021-09-04 | End: 2021-09-09

## 2021-09-04 RX ORDER — CEFTRIAXONE 500 MG/1
INJECTION, POWDER, FOR SOLUTION INTRAMUSCULAR; INTRAVENOUS
Refills: 0 | Status: DISCONTINUED | OUTPATIENT
Start: 2021-09-04 | End: 2021-09-09

## 2021-09-04 RX ORDER — BUDESONIDE AND FORMOTEROL FUMARATE DIHYDRATE 160; 4.5 UG/1; UG/1
2 AEROSOL RESPIRATORY (INHALATION)
Refills: 0 | Status: DISCONTINUED | OUTPATIENT
Start: 2021-09-04 | End: 2021-09-09

## 2021-09-04 RX ORDER — ACETAMINOPHEN 500 MG
650 TABLET ORAL EVERY 6 HOURS
Refills: 0 | Status: DISCONTINUED | OUTPATIENT
Start: 2021-09-04 | End: 2021-09-09

## 2021-09-04 RX ADMIN — Medication 40 MILLIGRAM(S): at 06:43

## 2021-09-04 RX ADMIN — HEPARIN SODIUM 5000 UNIT(S): 5000 INJECTION INTRAVENOUS; SUBCUTANEOUS at 14:17

## 2021-09-04 RX ADMIN — SODIUM CHLORIDE 3 MILLILITER(S): 9 INJECTION INTRAMUSCULAR; INTRAVENOUS; SUBCUTANEOUS at 12:47

## 2021-09-04 RX ADMIN — Medication 81 MILLIGRAM(S): at 12:03

## 2021-09-04 RX ADMIN — AMIODARONE HYDROCHLORIDE 200 MILLIGRAM(S): 400 TABLET ORAL at 12:03

## 2021-09-04 RX ADMIN — Medication 40 MILLIGRAM(S): at 17:16

## 2021-09-04 RX ADMIN — BUDESONIDE AND FORMOTEROL FUMARATE DIHYDRATE 2 PUFF(S): 160; 4.5 AEROSOL RESPIRATORY (INHALATION) at 22:13

## 2021-09-04 RX ADMIN — Medication 650 MILLIGRAM(S): at 17:16

## 2021-09-04 RX ADMIN — Medication 40 MILLIEQUIVALENT(S): at 17:16

## 2021-09-04 RX ADMIN — Medication 0.25 MILLIGRAM(S): at 16:07

## 2021-09-04 RX ADMIN — SODIUM CHLORIDE 3 MILLILITER(S): 9 INJECTION INTRAMUSCULAR; INTRAVENOUS; SUBCUTANEOUS at 22:14

## 2021-09-04 RX ADMIN — BUDESONIDE AND FORMOTEROL FUMARATE DIHYDRATE 2 PUFF(S): 160; 4.5 AEROSOL RESPIRATORY (INHALATION) at 12:04

## 2021-09-04 RX ADMIN — Medication 0.25 MILLIGRAM(S): at 22:13

## 2021-09-04 RX ADMIN — HEPARIN SODIUM 5000 UNIT(S): 5000 INJECTION INTRAVENOUS; SUBCUTANEOUS at 22:13

## 2021-09-04 RX ADMIN — ATORVASTATIN CALCIUM 10 MILLIGRAM(S): 80 TABLET, FILM COATED ORAL at 22:14

## 2021-09-04 RX ADMIN — Medication 650 MILLIGRAM(S): at 12:04

## 2021-09-04 RX ADMIN — Medication 650 MILLIGRAM(S): at 17:18

## 2021-09-04 RX ADMIN — CEFTRIAXONE 100 MILLIGRAM(S): 500 INJECTION, POWDER, FOR SOLUTION INTRAMUSCULAR; INTRAVENOUS at 06:43

## 2021-09-04 RX ADMIN — Medication 650 MILLIGRAM(S): at 12:03

## 2021-09-04 NOTE — ED ADULT NURSE NOTE - PRIMARY CARE PROVIDER
RN cannot approve Refill Request    RN can NOT refill this medication med is not covered by policy/route to provider. Last office visit: 3/13/2019 Geoff Crabtree MD Last Physical: Visit date not found Last MTM visit: Visit date not found Last visit same specialty: 3/13/2019 Geoff Crabtree MD.  Next visit within 3 mo: Visit date not found  Next physical within 3 mo: Visit date not found      Ghislaine Kern, [facility] Triage/Med Refill 4/3/2019    Requested Prescriptions   Pending Prescriptions Disp Refills     meloxicam (MOBIC) 7.5 MG tablet [Pharmacy Med Name: MELOXICAM 7.5MG TABLETS] 90 tablet 0     Sig: TAKE 1 TABLET(7.5 MG) BY MOUTH DAILY    There is no refill protocol information for this order            non affiliated

## 2021-09-04 NOTE — ED ADULT TRIAGE NOTE - NSWEIGHTCALCTOOLDRUG_GEN_A_CORE
Mom dropped off paperwork for pt to be filled out by provider, mom would like to be called once form is completed. Form in bin for provider.     used

## 2021-09-04 NOTE — ED ADULT TRIAGE NOTE - CHIEF COMPLAINT QUOTE
Status post "AVR, Ascending/Hemiarch replacement with frozen elephant trunk, left subclavian bypass, CABG x2" 8/9/2021 discharged home yesterday, co SOB, orthopnea, HERRERA. SpO2 87% on EMS arrival. Denies CP. Wound vac to sternum CDI, pressure maintained.

## 2021-09-04 NOTE — H&P ADULT - NSHPPHYSICALEXAM_GEN_ALL_CORE
Neuro: A+O x 3, non-focal, speech clear and intact  HEENT: PERRL, EOMI, oral mucosa pink and moist  Neck: supple, no JVD  CV: regular rate, regular rhythm, +S1S2, no murmurs or rub, Wound vac in place with good seal  Pulm/chest: lung sounds CTA and equal bilaterally, no accessory muscle use noted  Abd: soft, NT, ND, +BS  Ext: RITTER x 4, no C/C/E +2 lower extremity through the knee, ace bandages in place  Skin: warm, well perfused, no rashes

## 2021-09-04 NOTE — H&P ADULT - HISTORY OF PRESENT ILLNESS
76 year old female, current everyday smoker with PMHx of HTN, Spinal Stenosis, Arthritis, Bicuspid aortic valve with known aortic stenosis evaluated by Dr. Muller as an outpatient and presented to [facility] on 8/8/2021 to complete preoperative workup. Patient underwent AVR, ascending/hemiarch replacement with frozen elephant trunk and left aorto-subclavian bypass, CABG x2 with Dr. Muller on 8/9/2021. Post-operative course complicated by prolonged intubation (extubated on day6), moderate pericardial effusion drained by IR on POD7, Sternal wound erythema s/p sternal debridement and wound vac placement +Klebsillia on POD 17, multiple episodes of afib requiring amiodarone with subsequent bradycardia requiring PPM on POD 23 and FIDEL, resolved with hydration. Oxygen saturation continued to be 88-90% on room air and was planned for home oxygen, pending insurance approval. Of note, patient was recommended to [facility] but patient and family refused [facility] requesting to be discharged home.     Patient presents to ED today complaining of worsening shortness of breath and lower extremity swelling since being discharged home, patient feels nervous that it is too much work to be home and is requesting to be sent to [facility]. Patient resting comfortably in ED and denies dizziness, vision changes, chest pain, palpitations, cough, n/v/d, extremity swelling, calf tenderness.

## 2021-09-04 NOTE — H&P ADULT - NSHPSOCIALHISTORY_GEN_ALL_CORE
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

## 2021-09-04 NOTE — ED PROVIDER NOTE - NSICDXPASTSURGICALHX_GEN_ALL_CORE_FT
PAST SURGICAL HISTORY:  H/O aortic arch replacement     S/P aortic valve replacement     S/P CABG (coronary artery bypass graft)

## 2021-09-04 NOTE — PROGRESS NOTE ADULT - ASSESSMENT
77 y/o F, current everyday smoker with PMHx of HTN, Spinal Stenosis, Arthritis, Bicuspid aortic valve with known aortic stenosis evaluated by Dr. Muller as an outpatient and presented to [facility] on 8/8/2021 to complete preoperative workup. Patient underwent AVR, ascending/hemiarch replacement with frozen elephant trunk and left aorto-subclavian bypass, CABG x2 with Dr. Muller on 8/9/2021. Post-operative course complicated by prolonged intubation, moderate pericardial effusion drained by IR on POD7, Sternal wound erythema s/p sternal debridement and wound vac placement Klebsiella on POD 17, multiple episodes of AF with RVR/hypotension requiring amiodarone with subsequent bradycardia requiring PPM on POD 23 and FIDEL, resolved with hydration. Pt was discharged home on 9/3/21 to [facility] but represented to the hospital on 9/4/21 due to failure to thrive at home and requesting to go to [facility]. Pending [facility] placement.     Plan:    Neurovascular:   -Pain well controlled with current regimen. PRN's: acetaminophen  -anxiety: continue with xanax     Cardiovascular:   -8/9/21 -- AVR, ascending/hemiarch replacement with frozen elephant trunkand left aorto-subclavian bypass, CABGx2    -s/p PPM placement and sternal wound bedside debridement x2 with wound vac placement.     -wound vac in place, to be changed to hospital system today     -post-op AF with RVR, s/p PPM     -continue with ASA, statin   -Hemodynamically stable.   -Monitor: BP, HR, tele    Respiratory:   -Oxygenating well on room air  -Encourage continued use of IS 10x/hr and frequent ambulation  -CXR: no acute pathology     GI:  -continue with budesonide   -GI PPX: pantoprazole 40mg daily   -PO Diet: Regular   -Bowel Regimen: miralax     Renal / :  -Diuresis: continue with lasix 40mg IV Q12 hours   -Continue to monitor renal function: BUN/Cr 16/1.16   -Monitor I/O's daily     Endocrine:    -No hx of DM or thyroid dx    Hematologic:  -CBC: H/H- 8.7/27.9   -Coagulation Panel.    ID:  -Sternal wound coverage: continue with ceftriaxone IV     -wound vac in place, to be changed today   -Temperature: afebrile   -CBC: WBC- 6.8   -Continue to observe for SIRS/Sepsis Syndrome.    Prophylaxis:  -DVT prophylaxis with 5000 SubQ Heparin q8h.  -Continue with SCD's b/l while patient is at rest     Disposition:  -pending [facility] placement

## 2021-09-04 NOTE — ED PROVIDER NOTE - OBJECTIVE STATEMENT
76F hx CAD, aortic stenosis, htn, s/p AVR, ascending/hemiarch replacement, aorto-subclavian bypass, CABG 8/9 with dr. Muller. pt states was offered rehab however she chose to go home yesterday.  pt states unable to get around and had her neighbors help lift her into her apartment.  +LE swelling. pt has wound vac in place currently to chest wall.  pt states felt short of breath tonight, states thinks she felt nervous.  no vomiting, no fevers,. pain to anterior chest with deep inspiration. upon EMS arrival pt found to be hypoxic to 87%.

## 2021-09-04 NOTE — ED PROVIDER NOTE - PROGRESS NOTE DETAILS
pt seen by CT surgery - will be readmitted to their service  cxr - mild pulm vasc congestion, cardiomegaly

## 2021-09-04 NOTE — H&P ADULT - ASSESSMENT
76 year old female, current everyday smoker with PMHx of HTN, Spinal Stenosis, Arthritis, Bicuspid aortic valve with known aortic stenosis evaluated by Dr. Muller as an outpatient and presented to [facility] on 8/8/2021 to complete preoperative workup. Patient underwent AVR, ascending/hemiarch replacement with frozen elephant trunk and left aorto-subclavian bypass, CABG x2 with Dr. Muller on 8/9/2021. Post-operative course complicated by prolonged intubation (extubated on day6), moderate pericardial effusion drained by IR on POD7, Sternal wound erythema s/p sternal debridement and wound vac placement +Klebsillia on POD 17, multiple episodes of afib requiring amiodarone with subsequent bradycardia requiring PPM on POD 23 and FIDEL, resolved with hydration. Oxygen saturation continued to be 88-90% on room air and was planned for home oxygen, pending insurance approval. Of note, patient was recommended to [facility] but patient and family refused [facility] requesting to be discharged home.     Patient presents to ED today complaining of worsening shortness of breath and lower extremity swelling since being discharged home, patient feels nervous that it is too much work to be home and is requesting to be sent to [facility]. Patient resting comfortably in ED and denies dizziness, vision changes, chest pain, palpitations, cough, n/v/d, extremity swelling, calf tenderness.     #Shortness of breath  - CXray without significant change from discharge   - SPO2 goal >88%   - IV lasix while in the hospital for continued diuresis   - Plan for [facility] based on last assessment     #+Klebsillia with sternal debridement   - Continue Ceftraixone 2 gram though 9/14/2021  - Low threshold for BC while hospitalized   - Wound Vac in place with good seal, will need to address VAC with possible [facility] placement        Discussed with Dr. Adams and CTICU team

## 2021-09-04 NOTE — PROGRESS NOTE ADULT - SUBJECTIVE AND OBJECTIVE BOX
Patient discussed on morning rounds with Dr. Adams     Operation / Date:   8/9/21 -- AVR, ascending/hemiarch replacement with frozen elephant trunk and left aorto-subclavian bypass, CABGx2, Normal EF   8/26/21 -- sternal wound debridement bedside  9/1/21 -- PPM placement     SUBJECTIVE ASSESSMENT:  Pt is feeling well no complaints. Denies any CP, palpitations, SOB, wheezing, abd pain, n/v/d/c, fevers or chills.     Vital Signs Last 24 Hrs  T(C): 36.6 (04 Sep 2021 08:40), Max: 36.6 (04 Sep 2021 03:49)  T(F): 97.8 (04 Sep 2021 08:40), Max: 97.9 (04 Sep 2021 03:49)  HR: 74 (04 Sep 2021 14:15) (69 - 74)  BP: 100/53 (04 Sep 2021 14:15) (100/53 - 152/67)  BP(mean): 75 (04 Sep 2021 14:15) (75 - 75)  RR: 18 (04 Sep 2021 14:15) (18 - 19)  SpO2: 96% (04 Sep 2021 14:15) (96% - 99%)  I&O's Detail    CHEST TUBE:  no  FRANCHESKA DRAIN:  no  EPICARDIAL WIRES: no  TIE DOWNS: no  REGALADO: no    PHYSICAL EXAM:  General: well appearing sitting in chair in NAD   Neurological: AOx3. Motor skills grossly intact  Cardiovascular: Normal S1/S2. Regular rate/rhythm. + systolic murmr   Respiratory: Lungs CTA bilaterally. No wheezing or rales  Gastrointestinal: +BS in all 4 quadrants. Non-distended. Soft. Non-tender  Extremities: Strength 5/5 b/l upper/lower extremities. Sensation grossly intact upper/lower extremities. No edema. No calf tenderness.  Vascular: Radial 2+bilaterally, DP 2+ b/l  Incision Sites: sternotomy with wound vac in place. b/l EVH incisions without erythema, purulence or ecchymosis.     LABS:                        8.7    6.82  )-----------( 106      ( 04 Sep 2021 04:21 )             27.9     COUMADIN:  no    PT/INR - ( 04 Sep 2021 04:21 )   PT: 13.8 sec;   INR: 1.16     PTT - ( 04 Sep 2021 04:21 )  PTT:29.3 sec    09-04    138  |  98  |  16  ----------------------------<  116<H>  4.9   |  30  |  1.16    Ca    9.3      04 Sep 2021 04:21  Mg     2.2     09-03    TPro  6.8  /  Alb  3.5  /  TBili  0.9  /  DBili  x   /  AST  23  /  ALT  16  /  AlkPhos  91  09-04    MEDICATIONS  (STANDING):  acetaminophen   Tablet .. 650 milliGRAM(s) Oral every 6 hours  aMIOdarone    Tablet 200 milliGRAM(s) Oral daily  aspirin enteric coated 81 milliGRAM(s) Oral daily  atorvastatin 10 milliGRAM(s) Oral at bedtime  budesonide 160 MICROgram(s)/formoterol 4.5 MICROgram(s) Inhaler 2 Puff(s) Inhalation two times a day  cefTRIAXone   IVPB      furosemide   Injectable 40 milliGRAM(s) IV Push every 12 hours  heparin   Injectable 5000 Unit(s) SubCutaneous every 8 hours  potassium chloride    Tablet ER 40 milliEquivalent(s) Oral every 12 hours  sodium chloride 0.9% lock flush 3 milliLiter(s) IV Push every 8 hours    MEDICATIONS  (PRN):  ALPRAZolam 0.25 milliGRAM(s) Oral two times a day PRN anxiety  polyethylene glycol 3350 17 Gram(s) Oral daily PRN Constipation    RADIOLOGY & ADDITIONAL TESTS:  < from: Xray Chest 1 View-PORTABLE IMMEDIATE (Xray Chest 1 View-PORTABLE IMMEDIATE .) (09.04.21 @ 05:51) >  IMPRESSION: Limited inspiration. Possible small bilateral effusions  < end of copied text >

## 2021-09-05 LAB
ALBUMIN SERPL ELPH-MCNC: 3.4 G/DL — SIGNIFICANT CHANGE UP (ref 3.3–5)
ALP SERPL-CCNC: 83 U/L — SIGNIFICANT CHANGE UP (ref 40–120)
ALT FLD-CCNC: 14 U/L — SIGNIFICANT CHANGE UP (ref 10–45)
ANION GAP SERPL CALC-SCNC: 10 MMOL/L — SIGNIFICANT CHANGE UP (ref 5–17)
AST SERPL-CCNC: 16 U/L — SIGNIFICANT CHANGE UP (ref 10–40)
BASOPHILS # BLD AUTO: 0.03 K/UL — SIGNIFICANT CHANGE UP (ref 0–0.2)
BASOPHILS NFR BLD AUTO: 0.5 % — SIGNIFICANT CHANGE UP (ref 0–2)
BILIRUB SERPL-MCNC: 0.6 MG/DL — SIGNIFICANT CHANGE UP (ref 0.2–1.2)
BUN SERPL-MCNC: 15 MG/DL — SIGNIFICANT CHANGE UP (ref 7–23)
CALCIUM SERPL-MCNC: 9.3 MG/DL — SIGNIFICANT CHANGE UP (ref 8.4–10.5)
CHLORIDE SERPL-SCNC: 98 MMOL/L — SIGNIFICANT CHANGE UP (ref 96–108)
CO2 SERPL-SCNC: 30 MMOL/L — SIGNIFICANT CHANGE UP (ref 22–31)
COVID-19 SPIKE DOMAIN AB INTERP: POSITIVE
COVID-19 SPIKE DOMAIN ANTIBODY RESULT: >250 U/ML — HIGH
CREAT SERPL-MCNC: 1.05 MG/DL — SIGNIFICANT CHANGE UP (ref 0.5–1.3)
EOSINOPHIL # BLD AUTO: 0.17 K/UL — SIGNIFICANT CHANGE UP (ref 0–0.5)
EOSINOPHIL NFR BLD AUTO: 2.7 % — SIGNIFICANT CHANGE UP (ref 0–6)
GLUCOSE SERPL-MCNC: 107 MG/DL — HIGH (ref 70–99)
HCT VFR BLD CALC: 26.5 % — LOW (ref 34.5–45)
HGB BLD-MCNC: 8.2 G/DL — LOW (ref 11.5–15.5)
IMM GRANULOCYTES NFR BLD AUTO: 0.3 % — SIGNIFICANT CHANGE UP (ref 0–1.5)
LYMPHOCYTES # BLD AUTO: 0.71 K/UL — LOW (ref 1–3.3)
LYMPHOCYTES # BLD AUTO: 11.2 % — LOW (ref 13–44)
MAGNESIUM SERPL-MCNC: 2.4 MG/DL — SIGNIFICANT CHANGE UP (ref 1.6–2.6)
MCHC RBC-ENTMCNC: 30 PG — SIGNIFICANT CHANGE UP (ref 27–34)
MCHC RBC-ENTMCNC: 30.9 GM/DL — LOW (ref 32–36)
MCV RBC AUTO: 97.1 FL — SIGNIFICANT CHANGE UP (ref 80–100)
MONOCYTES # BLD AUTO: 0.54 K/UL — SIGNIFICANT CHANGE UP (ref 0–0.9)
MONOCYTES NFR BLD AUTO: 8.5 % — SIGNIFICANT CHANGE UP (ref 2–14)
NEUTROPHILS # BLD AUTO: 4.88 K/UL — SIGNIFICANT CHANGE UP (ref 1.8–7.4)
NEUTROPHILS NFR BLD AUTO: 76.8 % — SIGNIFICANT CHANGE UP (ref 43–77)
NRBC # BLD: 0 /100 WBCS — SIGNIFICANT CHANGE UP (ref 0–0)
PLATELET # BLD AUTO: 91 K/UL — LOW (ref 150–400)
POTASSIUM SERPL-MCNC: 4.4 MMOL/L — SIGNIFICANT CHANGE UP (ref 3.5–5.3)
POTASSIUM SERPL-SCNC: 4.4 MMOL/L — SIGNIFICANT CHANGE UP (ref 3.5–5.3)
PROT SERPL-MCNC: 6.4 G/DL — SIGNIFICANT CHANGE UP (ref 6–8.3)
RBC # BLD: 2.73 M/UL — LOW (ref 3.8–5.2)
RBC # FLD: 16.6 % — HIGH (ref 10.3–14.5)
SARS-COV-2 IGG+IGM SERPL QL IA: >250 U/ML — HIGH
SARS-COV-2 IGG+IGM SERPL QL IA: POSITIVE
SODIUM SERPL-SCNC: 138 MMOL/L — SIGNIFICANT CHANGE UP (ref 135–145)
WBC # BLD: 6.35 K/UL — SIGNIFICANT CHANGE UP (ref 3.8–10.5)
WBC # FLD AUTO: 6.35 K/UL — SIGNIFICANT CHANGE UP (ref 3.8–10.5)

## 2021-09-05 PROCEDURE — 71045 X-RAY EXAM CHEST 1 VIEW: CPT | Mod: 26

## 2021-09-05 RX ORDER — OXYCODONE HYDROCHLORIDE 5 MG/1
5 TABLET ORAL EVERY 6 HOURS
Refills: 0 | Status: DISCONTINUED | OUTPATIENT
Start: 2021-09-05 | End: 2021-09-09

## 2021-09-05 RX ADMIN — Medication 40 MILLIGRAM(S): at 06:01

## 2021-09-05 RX ADMIN — BUDESONIDE AND FORMOTEROL FUMARATE DIHYDRATE 2 PUFF(S): 160; 4.5 AEROSOL RESPIRATORY (INHALATION) at 21:20

## 2021-09-05 RX ADMIN — Medication 650 MILLIGRAM(S): at 01:00

## 2021-09-05 RX ADMIN — OXYCODONE HYDROCHLORIDE 5 MILLIGRAM(S): 5 TABLET ORAL at 18:39

## 2021-09-05 RX ADMIN — Medication 40 MILLIGRAM(S): at 18:40

## 2021-09-05 RX ADMIN — Medication 650 MILLIGRAM(S): at 06:00

## 2021-09-05 RX ADMIN — SODIUM CHLORIDE 3 MILLILITER(S): 9 INJECTION INTRAMUSCULAR; INTRAVENOUS; SUBCUTANEOUS at 14:45

## 2021-09-05 RX ADMIN — Medication 650 MILLIGRAM(S): at 11:11

## 2021-09-05 RX ADMIN — Medication 81 MILLIGRAM(S): at 11:04

## 2021-09-05 RX ADMIN — Medication 650 MILLIGRAM(S): at 06:30

## 2021-09-05 RX ADMIN — ATORVASTATIN CALCIUM 10 MILLIGRAM(S): 80 TABLET, FILM COATED ORAL at 21:18

## 2021-09-05 RX ADMIN — HEPARIN SODIUM 5000 UNIT(S): 5000 INJECTION INTRAVENOUS; SUBCUTANEOUS at 06:01

## 2021-09-05 RX ADMIN — BUDESONIDE AND FORMOTEROL FUMARATE DIHYDRATE 2 PUFF(S): 160; 4.5 AEROSOL RESPIRATORY (INHALATION) at 09:56

## 2021-09-05 RX ADMIN — SODIUM CHLORIDE 3 MILLILITER(S): 9 INJECTION INTRAMUSCULAR; INTRAVENOUS; SUBCUTANEOUS at 06:01

## 2021-09-05 RX ADMIN — Medication 650 MILLIGRAM(S): at 00:30

## 2021-09-05 RX ADMIN — Medication 650 MILLIGRAM(S): at 11:04

## 2021-09-05 RX ADMIN — SODIUM CHLORIDE 3 MILLILITER(S): 9 INJECTION INTRAMUSCULAR; INTRAVENOUS; SUBCUTANEOUS at 21:20

## 2021-09-05 RX ADMIN — POLYETHYLENE GLYCOL 3350 17 GRAM(S): 17 POWDER, FOR SOLUTION ORAL at 14:00

## 2021-09-05 RX ADMIN — HEPARIN SODIUM 5000 UNIT(S): 5000 INJECTION INTRAVENOUS; SUBCUTANEOUS at 21:20

## 2021-09-05 RX ADMIN — OXYCODONE HYDROCHLORIDE 5 MILLIGRAM(S): 5 TABLET ORAL at 19:47

## 2021-09-05 RX ADMIN — Medication 40 MILLIEQUIVALENT(S): at 06:00

## 2021-09-05 RX ADMIN — Medication 40 MILLIEQUIVALENT(S): at 18:39

## 2021-09-05 RX ADMIN — AMIODARONE HYDROCHLORIDE 200 MILLIGRAM(S): 400 TABLET ORAL at 06:01

## 2021-09-05 RX ADMIN — HEPARIN SODIUM 5000 UNIT(S): 5000 INJECTION INTRAVENOUS; SUBCUTANEOUS at 14:47

## 2021-09-05 RX ADMIN — Medication 650 MILLIGRAM(S): at 18:39

## 2021-09-05 RX ADMIN — CEFTRIAXONE 100 MILLIGRAM(S): 500 INJECTION, POWDER, FOR SOLUTION INTRAMUSCULAR; INTRAVENOUS at 06:00

## 2021-09-05 RX ADMIN — Medication 650 MILLIGRAM(S): at 19:14

## 2021-09-05 NOTE — PROGRESS NOTE ADULT - ASSESSMENT
75 y/o F, current everyday smoker with PMHx of HTN, Spinal Stenosis, Arthritis, Bicuspid aortic valve with known aortic stenosis evaluated by Dr. Muller as an outpatient and presented to [facility] on 8/8/2021 to complete preoperative workup. Patient underwent AVR, ascending/hemiarch replacement with frozen elephant trunk and left aorto-subclavian bypass, CABG x2 with Dr. Muller on 8/9/2021. Post-operative course complicated by prolonged intubation, moderate pericardial effusion drained by IR on POD7, Sternal wound erythema s/p sternal debridement and wound vac placement Klebsiella on POD 17, multiple episodes of AF with RVR/hypotension requiring amiodarone with subsequent bradycardia requiring PPM on POD 23 and FIDEL, resolved with hydration. Pt was discharged home on 9/3/21 to [facility] but represented to the hospital on 9/4/21 due to failure to thrive at home and requesting to go to [facility]. Pending [facility] placement.     Plan:  Neurovascular:   -Pain well controlled with current regimen. PRN's: acetaminophen  -anxiety: continue with xanax     Cardiovascular:   -8/9/21 -- AVR, ascending/hemiarch replacement with frozen elephant trunkand left aorto-subclavian bypass, CABGx2    -s/p PPM placement and sternal wound bedside debridement x2 with wound vac placement.     -wound vac in place (see ID section)    -post-op SSS (bradycardia alternating with AF with RVR) s/p PPM (Micra)    -continue with ASA, statin   -Hemodynamically stable.   -Monitor: BP, HR, tele    Respiratory:   -Oxygenating well on 2lpm via NC   -Encourage continued use of IS 10x/hr and frequent ambulation  -CXR: no acute pathology     GI:  -continue with budesonide   -GI PPX: pantoprazole 40mg daily   -PO Diet: Regular   -Bowel Regimen: miralax     Renal / :  -Diuresis: continue with lasix 40mg IV Q12 hours, good urine output  -Continue to monitor renal function: BUN/Cr 15/1.05  -Monitor I/O's daily     Endocrine:    -No hx of DM or thyroid dx    Hematologic:  -CBC: H/H- 8.2/26  -Coagulation Panel:WNL    ID:  -Sternal wound coverage: continue with ceftriaxone IV (continue with dosing through 9/14, was supposed to see Dr. Crystal on this date)     -wound vac in place (changed to hospital system on 9/4/21 -- due to be changed on 9/7/21 or to home system prior to discharge (needs more home supplies at the bedside in anticipation)  -Temperature: afebrile   -CBC: WBC- 6.3   -Continue to observe for SIRS/Sepsis Syndrome.    Prophylaxis:  -DVT prophylaxis with 5000 SubQ Heparin q8h.  -Continue with SCD's b/l while patient is at rest     Disposition:  -pending [facility] placement

## 2021-09-05 NOTE — PROGRESS NOTE ADULT - SUBJECTIVE AND OBJECTIVE BOX
Patient discussed on morning rounds with Dr. Adams    Operation / Date:   8/9/21 -- AVR, ascending/hemiarch replacement with frozen elephant trunk and left aorto-subclavian bypass, CABGx2, Normal EF   8/26/21 -- sternal wound debridement bedside  9/1/21 -- PPM placement     SUBJECTIVE ASSESSMENT:  Pt is feeling well no complaints. Feels better from yesterday. Agrees to walk more today. Feeling frustrated she is in the hospital. Denies any CP, palpitations, SOB, wheezing, abd pain, n/v/d/c, fevers or chills.     Vital Signs Last 24 Hrs  T(C): 36.7 (05 Sep 2021 09:32), Max: 36.7 (05 Sep 2021 05:26)  T(F): 98 (05 Sep 2021 09:32), Max: 98 (05 Sep 2021 05:26)  HR: 62 (05 Sep 2021 08:25) (62 - 74)  BP: 117/71 (05 Sep 2021 08:25) (100/53 - 158/67)  BP(mean): 84 (05 Sep 2021 08:25) (75 - 97)  RR: 19 (05 Sep 2021 08:25) (18 - 20)  SpO2: 98% (05 Sep 2021 08:25) (94% - 98%)  I&O's Detail    04 Sep 2021 07:01  -  05 Sep 2021 07:00  --------------------------------------------------------  IN:    Oral Fluid: 500 mL  Total IN: 500 mL    OUT:    Voided (mL): 1900 mL  Total OUT: 1900 mL    Total NET: -1400 mL      05 Sep 2021 07:01  -  05 Sep 2021 10:16  --------------------------------------------------------  IN:    Oral Fluid: 236 mL  Total IN: 236 mL    OUT:  Total OUT: 0 mL    Total NET: 236 mL    CHEST TUBE:  no  FRANCHESKA DRAIN:  no.  EPICARDIAL WIRES: no  TIE DOWNS: no  REGALADO: no    PHYSICAL EXAM:  General: well appearing sitting up in chair in NAD   Neurological: AOx3. Motor skills grossly intact  Cardiovascular: Normal S1/S2. Regular rate/rhythm. + systolic murmur   Respiratory: Lungs CTA bilaterally, trace crackles b/l   Gastrointestinal: +BS in all 4 quadrants. Non-distended. Soft. Non-tender  Extremities: 2+ edema in LE (improving), no calf tenderness   Vascular: Radial 2+bilaterally, DP 2+ b/l  Incision Sites: groin incisions healing well, sternotomy with wound vac in place. Stable ecchymosis on groinEVH incisions     LABS:                        8.2    6.35  )-----------( 91       ( 05 Sep 2021 07:40 )             26.5     COUMADIN:  no    PT/INR - ( 04 Sep 2021 04:21 )   PT: 13.8 sec;   INR: 1.16     PTT - ( 04 Sep 2021 04:21 )  PTT:29.3 sec    09-05    138  |  98  |  15  ----------------------------<  107<H>  4.4   |  30  |  1.05    Ca    9.3      05 Sep 2021 07:40  Mg     2.4     09-05    TPro  6.4  /  Alb  3.4  /  TBili  0.6  /  DBili  x   /  AST  16  /  ALT  14  /  AlkPhos  83  09-05    MEDICATIONS  (STANDING):  acetaminophen   Tablet .. 650 milliGRAM(s) Oral every 6 hours  aMIOdarone    Tablet 200 milliGRAM(s) Oral daily  aspirin enteric coated 81 milliGRAM(s) Oral daily  atorvastatin 10 milliGRAM(s) Oral at bedtime  budesonide 160 MICROgram(s)/formoterol 4.5 MICROgram(s) Inhaler 2 Puff(s) Inhalation two times a day  cefTRIAXone   IVPB 2000 milliGRAM(s) IV Intermittent every 24 hours  cefTRIAXone   IVPB      furosemide   Injectable 40 milliGRAM(s) IV Push every 12 hours  heparin   Injectable 5000 Unit(s) SubCutaneous every 8 hours  potassium chloride    Tablet ER 40 milliEquivalent(s) Oral every 12 hours  sodium chloride 0.9% lock flush 3 milliLiter(s) IV Push every 8 hours    MEDICATIONS  (PRN):  ALPRAZolam 0.25 milliGRAM(s) Oral two times a day PRN anxiety  polyethylene glycol 3350 17 Gram(s) Oral daily PRN Constipation    RADIOLOGY & ADDITIONAL TESTS:  < from: Xray Chest 1 View-PORTABLE IMMEDIATE (Xray Chest 1 View-PORTABLE IMMEDIATE .) (09.04.21 @ 05:51) >  IMPRESSION: Limited inspiration. Possible small bilateral effusions  < end of copied text >

## 2021-09-06 LAB
ANION GAP SERPL CALC-SCNC: 12 MMOL/L — SIGNIFICANT CHANGE UP (ref 5–17)
BUN SERPL-MCNC: 16 MG/DL — SIGNIFICANT CHANGE UP (ref 7–23)
CALCIUM SERPL-MCNC: 9.4 MG/DL — SIGNIFICANT CHANGE UP (ref 8.4–10.5)
CHLORIDE SERPL-SCNC: 98 MMOL/L — SIGNIFICANT CHANGE UP (ref 96–108)
CO2 SERPL-SCNC: 25 MMOL/L — SIGNIFICANT CHANGE UP (ref 22–31)
CREAT SERPL-MCNC: 1.01 MG/DL — SIGNIFICANT CHANGE UP (ref 0.5–1.3)
GLUCOSE SERPL-MCNC: 90 MG/DL — SIGNIFICANT CHANGE UP (ref 70–99)
HCT VFR BLD CALC: 30.6 % — LOW (ref 34.5–45)
HGB BLD-MCNC: 9.3 G/DL — LOW (ref 11.5–15.5)
MAGNESIUM SERPL-MCNC: 2.2 MG/DL — SIGNIFICANT CHANGE UP (ref 1.6–2.6)
MCHC RBC-ENTMCNC: 30.1 PG — SIGNIFICANT CHANGE UP (ref 27–34)
MCHC RBC-ENTMCNC: 30.4 GM/DL — LOW (ref 32–36)
MCV RBC AUTO: 99 FL — SIGNIFICANT CHANGE UP (ref 80–100)
NRBC # BLD: 0 /100 WBCS — SIGNIFICANT CHANGE UP (ref 0–0)
PLATELET # BLD AUTO: 99 K/UL — LOW (ref 150–400)
POTASSIUM SERPL-MCNC: 5.1 MMOL/L — SIGNIFICANT CHANGE UP (ref 3.5–5.3)
POTASSIUM SERPL-SCNC: 5.1 MMOL/L — SIGNIFICANT CHANGE UP (ref 3.5–5.3)
RBC # BLD: 3.09 M/UL — LOW (ref 3.8–5.2)
RBC # FLD: 16.7 % — HIGH (ref 10.3–14.5)
SODIUM SERPL-SCNC: 135 MMOL/L — SIGNIFICANT CHANGE UP (ref 135–145)
WBC # BLD: 6 K/UL — SIGNIFICANT CHANGE UP (ref 3.8–10.5)
WBC # FLD AUTO: 6 K/UL — SIGNIFICANT CHANGE UP (ref 3.8–10.5)

## 2021-09-06 PROCEDURE — 71045 X-RAY EXAM CHEST 1 VIEW: CPT | Mod: 26

## 2021-09-06 RX ORDER — POTASSIUM CHLORIDE 20 MEQ
40 PACKET (EA) ORAL
Refills: 0 | Status: DISCONTINUED | OUTPATIENT
Start: 2021-09-06 | End: 2021-09-08

## 2021-09-06 RX ORDER — FUROSEMIDE 40 MG
40 TABLET ORAL
Refills: 0 | Status: DISCONTINUED | OUTPATIENT
Start: 2021-09-06 | End: 2021-09-09

## 2021-09-06 RX ADMIN — AMIODARONE HYDROCHLORIDE 200 MILLIGRAM(S): 400 TABLET ORAL at 05:36

## 2021-09-06 RX ADMIN — HEPARIN SODIUM 5000 UNIT(S): 5000 INJECTION INTRAVENOUS; SUBCUTANEOUS at 13:31

## 2021-09-06 RX ADMIN — OXYCODONE HYDROCHLORIDE 5 MILLIGRAM(S): 5 TABLET ORAL at 17:56

## 2021-09-06 RX ADMIN — OXYCODONE HYDROCHLORIDE 5 MILLIGRAM(S): 5 TABLET ORAL at 10:29

## 2021-09-06 RX ADMIN — Medication 650 MILLIGRAM(S): at 05:36

## 2021-09-06 RX ADMIN — Medication 650 MILLIGRAM(S): at 06:25

## 2021-09-06 RX ADMIN — Medication 40 MILLIEQUIVALENT(S): at 17:20

## 2021-09-06 RX ADMIN — HEPARIN SODIUM 5000 UNIT(S): 5000 INJECTION INTRAVENOUS; SUBCUTANEOUS at 22:25

## 2021-09-06 RX ADMIN — SODIUM CHLORIDE 3 MILLILITER(S): 9 INJECTION INTRAMUSCULAR; INTRAVENOUS; SUBCUTANEOUS at 05:37

## 2021-09-06 RX ADMIN — OXYCODONE HYDROCHLORIDE 5 MILLIGRAM(S): 5 TABLET ORAL at 23:20

## 2021-09-06 RX ADMIN — CEFTRIAXONE 100 MILLIGRAM(S): 500 INJECTION, POWDER, FOR SOLUTION INTRAMUSCULAR; INTRAVENOUS at 05:36

## 2021-09-06 RX ADMIN — Medication 40 MILLIGRAM(S): at 05:36

## 2021-09-06 RX ADMIN — Medication 81 MILLIGRAM(S): at 11:31

## 2021-09-06 RX ADMIN — OXYCODONE HYDROCHLORIDE 5 MILLIGRAM(S): 5 TABLET ORAL at 11:26

## 2021-09-06 RX ADMIN — BUDESONIDE AND FORMOTEROL FUMARATE DIHYDRATE 2 PUFF(S): 160; 4.5 AEROSOL RESPIRATORY (INHALATION) at 10:27

## 2021-09-06 RX ADMIN — Medication 650 MILLIGRAM(S): at 01:40

## 2021-09-06 RX ADMIN — Medication 650 MILLIGRAM(S): at 11:31

## 2021-09-06 RX ADMIN — Medication 40 MILLIGRAM(S): at 17:19

## 2021-09-06 RX ADMIN — HEPARIN SODIUM 5000 UNIT(S): 5000 INJECTION INTRAVENOUS; SUBCUTANEOUS at 05:37

## 2021-09-06 RX ADMIN — Medication 650 MILLIGRAM(S): at 18:20

## 2021-09-06 RX ADMIN — SODIUM CHLORIDE 3 MILLILITER(S): 9 INJECTION INTRAMUSCULAR; INTRAVENOUS; SUBCUTANEOUS at 22:25

## 2021-09-06 RX ADMIN — ATORVASTATIN CALCIUM 10 MILLIGRAM(S): 80 TABLET, FILM COATED ORAL at 22:25

## 2021-09-06 RX ADMIN — Medication 650 MILLIGRAM(S): at 01:09

## 2021-09-06 RX ADMIN — SODIUM CHLORIDE 3 MILLILITER(S): 9 INJECTION INTRAMUSCULAR; INTRAVENOUS; SUBCUTANEOUS at 13:26

## 2021-09-06 RX ADMIN — Medication 650 MILLIGRAM(S): at 17:20

## 2021-09-06 RX ADMIN — Medication 40 MILLIEQUIVALENT(S): at 05:36

## 2021-09-06 RX ADMIN — OXYCODONE HYDROCHLORIDE 5 MILLIGRAM(S): 5 TABLET ORAL at 16:56

## 2021-09-06 RX ADMIN — Medication 650 MILLIGRAM(S): at 12:30

## 2021-09-06 RX ADMIN — OXYCODONE HYDROCHLORIDE 5 MILLIGRAM(S): 5 TABLET ORAL at 22:26

## 2021-09-06 NOTE — PROGRESS NOTE ADULT - SUBJECTIVE AND OBJECTIVE BOX
Patient discussed on morning rounds with       Operation / Date:     SUBJECTIVE ASSESSMENT:  76y Female         Vital Signs Last 24 Hrs  T(C): 36.6 (06 Sep 2021 08:54), Max: 36.7 (05 Sep 2021 18:10)  T(F): 97.9 (06 Sep 2021 08:54), Max: 98.1 (05 Sep 2021 18:10)  HR: 65 (06 Sep 2021 08:54) (61 - 69)  BP: 108/56 (06 Sep 2021 08:54) (108/56 - 148/66)  BP(mean): 79 (06 Sep 2021 08:54) (79 - 96)  RR: 18 (06 Sep 2021 08:54) (15 - 21)  SpO2: 94% (06 Sep 2021 08:54) (94% - 100%)  I&O's Detail    05 Sep 2021 07:01  -  06 Sep 2021 07:00  --------------------------------------------------------  IN:    IV PiggyBack: 50 mL    Oral Fluid: 472 mL  Total IN: 522 mL    OUT:    VAC (Vacuum Assisted Closure) System (mL): 0 mL    Voided (mL): 1500 mL  Total OUT: 1500 mL    Total NET: -978 mL      06 Sep 2021 07:01  -  06 Sep 2021 12:46  --------------------------------------------------------  IN:    Oral Fluid: 150 mL  Total IN: 150 mL    OUT:    Voided (mL): 800 mL  Total OUT: 800 mL    Total NET: -650 mL          CHEST TUBE:  Yes/No. AIR LEAKS: Yes/No. Suction / H2O SEAL.   FRANCHESKA DRAIN:  Yes/No.  EPICARDIAL WIRES: Yes/No.  TIE DOWNS: Yes/No.  REGALADO: Yes/No.    PHYSICAL EXAM:    General:     Neurological:    Cardiovascular:    Respiratory:    Gastrointestinal:    Extremities:    Vascular:    Incision Sites:    LABS:                        9.3    6.00  )-----------( 99       ( 06 Sep 2021 07:52 )             30.6       COUMADIN:  Yes/No. REASON: .        09-06    135  |  98  |  16  ----------------------------<  90  5.1   |  25  |  1.01    Ca    9.4      06 Sep 2021 07:52  Mg     2.2     09-06    TPro  6.4  /  Alb  3.4  /  TBili  0.6  /  DBili  x   /  AST  16  /  ALT  14  /  AlkPhos  83  09-05          MEDICATIONS  (STANDING):  acetaminophen   Tablet .. 650 milliGRAM(s) Oral every 6 hours  aMIOdarone    Tablet 200 milliGRAM(s) Oral daily  aspirin enteric coated 81 milliGRAM(s) Oral daily  atorvastatin 10 milliGRAM(s) Oral at bedtime  budesonide 160 MICROgram(s)/formoterol 4.5 MICROgram(s) Inhaler 2 Puff(s) Inhalation two times a day  cefTRIAXone   IVPB 2000 milliGRAM(s) IV Intermittent every 24 hours  cefTRIAXone   IVPB      furosemide   Injectable 40 milliGRAM(s) IV Push every 12 hours  heparin   Injectable 5000 Unit(s) SubCutaneous every 8 hours  potassium chloride    Tablet ER 40 milliEquivalent(s) Oral every 12 hours  sodium chloride 0.9% lock flush 3 milliLiter(s) IV Push every 8 hours    MEDICATIONS  (PRN):  ALPRAZolam 0.25 milliGRAM(s) Oral two times a day PRN anxiety  oxyCODONE    IR 5 milliGRAM(s) Oral every 6 hours PRN Mild Pain (1 - 3)  polyethylene glycol 3350 17 Gram(s) Oral daily PRN Constipation        RADIOLOGY & ADDITIONAL TESTS:

## 2021-09-06 NOTE — PROGRESS NOTE ADULT - ASSESSMENT
75 y/o F, current everyday smoker with PMHx of HTN, Spinal Stenosis, Arthritis, Bicuspid aortic valve with known aortic stenosis evaluated by Dr. Muller as an outpatient and presented to [facility] on 8/8/2021 to complete preoperative workup. Patient underwent AVR, ascending/hemiarch replacement with frozen elephant trunk and left aorto-subclavian bypass, CABG x2 with Dr. Muller on 8/9/2021. Post-operative course complicated by prolonged intubation, moderate pericardial effusion drained by IR on POD7, Sternal wound erythema s/p sternal debridement and wound vac placement Klebsiella on POD 17, multiple episodes of AF with RVR/hypotension requiring amiodarone with subsequent bradycardia requiring PPM on POD 23 and FIDEL, resolved with hydration. Pt was discharged home on 9/3/21 to [facility] but represented to the hospital on 9/4/21 due to failure to thrive at home and requesting to go to [facility]. Pending [facility] placement.     Plan:  Neurovascular:   -Pain well controlled with current regimen. PRN's: acetaminophen  -anxiety: continue with xanax     Cardiovascular:   -8/9/21 -- AVR, ascending/hemiarch replacement with frozen elephant trunkand left aorto-subclavian bypass, CABGx2    -s/p PPM placement and sternal wound bedside debridement x2 with wound vac placement.     -wound vac in place, changed on 9/4    -post-op SSS (bradycardia alternating with AF with RVR) s/p PPM (Micra)    -continue with ASA, statin   -Hemodynamically stable.   -Monitor: BP, HR, tele    Respiratory:   -Oxygenating well on 2lpm via NC   -Encourage continued use of IS 10x/hr and frequent ambulation  -CXR: no acute pathology     GI:  -continue with budesonide   -GI PPX: pantoprazole 40mg daily   -PO Diet: Regular   -Bowel Regimen: miralax     Renal / :  -Diuresis: continue with lasix 40mg IV twice daily  -Continue to monitor renal function: BUN/Cr stable  -Monitor I/O's daily     Endocrine:    -No hx of DM or thyroid dx    Hematologic:  -CBC: H/H- stable    ID:  -Sternal wound coverage: continue with ceftriaxone IV (continue with dosing through 9/14, was supposed to see Dr. Crystal on 9/14)     -wound vac in place (changed to hospital system on 9/4/21 -- due to be changed on 9/7/21 or to home system prior to discharge (needs more home supplies at the bedside in anticipation)   -Continue to observe for SIRS/Sepsis Syndrome.    Prophylaxis:  -DVT prophylaxis with 5000 SubQ Heparin q8h.  -Continue with SCD's b/l while patient is at rest     Disposition:  -pending [facility] placement

## 2021-09-06 NOTE — PROGRESS NOTE ADULT - SUBJECTIVE AND OBJECTIVE BOX
Patient discussed on morning rounds with Dr. Adams    Operation / Date:     SUBJECTIVE ASSESSMENT:  76y Female         Vital Signs Last 24 Hrs  T(C): 36.6 (06 Sep 2021 08:54), Max: 36.7 (05 Sep 2021 18:10)  T(F): 97.9 (06 Sep 2021 08:54), Max: 98.1 (05 Sep 2021 18:10)  HR: 65 (06 Sep 2021 08:54) (61 - 69)  BP: 108/56 (06 Sep 2021 08:54) (108/56 - 148/66)  BP(mean): 79 (06 Sep 2021 08:54) (79 - 96)  RR: 18 (06 Sep 2021 08:54) (15 - 21)  SpO2: 94% (06 Sep 2021 08:54) (94% - 100%)  I&O's Detail    05 Sep 2021 07:01  -  06 Sep 2021 07:00  --------------------------------------------------------  IN:    IV PiggyBack: 50 mL    Oral Fluid: 472 mL  Total IN: 522 mL    OUT:    VAC (Vacuum Assisted Closure) System (mL): 0 mL    Voided (mL): 1500 mL  Total OUT: 1500 mL    Total NET: -978 mL      06 Sep 2021 07:01  -  06 Sep 2021 12:36  --------------------------------------------------------  IN:    Oral Fluid: 150 mL  Total IN: 150 mL    OUT:    Voided (mL): 800 mL  Total OUT: 800 mL    Total NET: -650 mL          CHEST TUBE:  Yes/No. AIR LEAKS: Yes/No. Suction / H2O SEAL.   FRANCHESKA DRAIN:  Yes/No.  EPICARDIAL WIRES: Yes/No.  TIE DOWNS: Yes/No.  REGALADO: Yes/No.    PHYSICAL EXAM:    General:     Neurological:    Cardiovascular:    Respiratory:    Gastrointestinal:    Extremities:    Vascular:    Incision Sites:    LABS:                        9.3    6.00  )-----------( 99       ( 06 Sep 2021 07:52 )             30.6       COUMADIN:  Yes/No. REASON: .        09-06    135  |  98  |  16  ----------------------------<  90  5.1   |  25  |  1.01    Ca    9.4      06 Sep 2021 07:52  Mg     2.2     09-06    TPro  6.4  /  Alb  3.4  /  TBili  0.6  /  DBili  x   /  AST  16  /  ALT  14  /  AlkPhos  83  09-05          MEDICATIONS  (STANDING):  acetaminophen   Tablet .. 650 milliGRAM(s) Oral every 6 hours  aMIOdarone    Tablet 200 milliGRAM(s) Oral daily  aspirin enteric coated 81 milliGRAM(s) Oral daily  atorvastatin 10 milliGRAM(s) Oral at bedtime  budesonide 160 MICROgram(s)/formoterol 4.5 MICROgram(s) Inhaler 2 Puff(s) Inhalation two times a day  cefTRIAXone   IVPB 2000 milliGRAM(s) IV Intermittent every 24 hours  cefTRIAXone   IVPB      furosemide   Injectable 40 milliGRAM(s) IV Push every 12 hours  heparin   Injectable 5000 Unit(s) SubCutaneous every 8 hours  potassium chloride    Tablet ER 40 milliEquivalent(s) Oral every 12 hours  sodium chloride 0.9% lock flush 3 milliLiter(s) IV Push every 8 hours    MEDICATIONS  (PRN):  ALPRAZolam 0.25 milliGRAM(s) Oral two times a day PRN anxiety  oxyCODONE    IR 5 milliGRAM(s) Oral every 6 hours PRN Mild Pain (1 - 3)  polyethylene glycol 3350 17 Gram(s) Oral daily PRN Constipation        RADIOLOGY & ADDITIONAL TESTS:     Patient discussed on morning rounds with Dr. Adams    Operation / Date: 8/9/21 AVR, asc/jeff arch with frozen trunk, left aorto-subclavian bypass, CABGx2 c/b wound infection on ceftriaxone    SUBJECTIVE ASSESSMENT:  76y Female seen and examined. No complaints. LE edema improving. Agrees to walk today        Vital Signs Last 24 Hrs  T(C): 36.6 (06 Sep 2021 08:54), Max: 36.7 (05 Sep 2021 18:10)  T(F): 97.9 (06 Sep 2021 08:54), Max: 98.1 (05 Sep 2021 18:10)  HR: 65 (06 Sep 2021 08:54) (61 - 69)  BP: 108/56 (06 Sep 2021 08:54) (108/56 - 148/66)  BP(mean): 79 (06 Sep 2021 08:54) (79 - 96)  RR: 18 (06 Sep 2021 08:54) (15 - 21)  SpO2: 94% (06 Sep 2021 08:54) (94% - 100%)  I&O's Detail    05 Sep 2021 07:01  -  06 Sep 2021 07:00  --------------------------------------------------------  IN:    IV PiggyBack: 50 mL    Oral Fluid: 472 mL  Total IN: 522 mL    OUT:    VAC (Vacuum Assisted Closure) System (mL): 0 mL    Voided (mL): 1500 mL  Total OUT: 1500 mL    Total NET: -978 mL      06 Sep 2021 07:01  -  06 Sep 2021 12:36  --------------------------------------------------------  IN:    Oral Fluid: 150 mL  Total IN: 150 mL    OUT:    Voided (mL): 800 mL  Total OUT: 800 mL    Total NET: -650 mL        PHYSICAL EXAM:  Appearance: No acute distress.  Neurologic: AAOx3, no AMS or focal deficits.  Responds appropriately to verbal and physical stimuli; exhibits purposeful movement in all extremities.  Cardiovascular: RRR  Respiratory: No acute respiratory distress. breath sounds diminished at bases, otherwise clear throughout.   Gastrointestinal:  Soft, non-tender, non-distended, + BS.	  Extremities: warm, well perfused. Bilateral peripheral edema L>R  Incision Sites: MSI with wound vac    LABS:                        9.3    6.00  )-----------( 99       ( 06 Sep 2021 07:52 )             30.6       COUMADIN:  No        09-06    135  |  98  |  16  ----------------------------<  90  5.1   |  25  |  1.01    Ca    9.4      06 Sep 2021 07:52  Mg     2.2     09-06    TPro  6.4  /  Alb  3.4  /  TBili  0.6  /  DBili  x   /  AST  16  /  ALT  14  /  AlkPhos  83  09-05          MEDICATIONS  (STANDING):  acetaminophen   Tablet .. 650 milliGRAM(s) Oral every 6 hours  aMIOdarone    Tablet 200 milliGRAM(s) Oral daily  aspirin enteric coated 81 milliGRAM(s) Oral daily  atorvastatin 10 milliGRAM(s) Oral at bedtime  budesonide 160 MICROgram(s)/formoterol 4.5 MICROgram(s) Inhaler 2 Puff(s) Inhalation two times a day  cefTRIAXone   IVPB 2000 milliGRAM(s) IV Intermittent every 24 hours  cefTRIAXone   IVPB      furosemide   Injectable 40 milliGRAM(s) IV Push every 12 hours  heparin   Injectable 5000 Unit(s) SubCutaneous every 8 hours  potassium chloride    Tablet ER 40 milliEquivalent(s) Oral every 12 hours  sodium chloride 0.9% lock flush 3 milliLiter(s) IV Push every 8 hours    MEDICATIONS  (PRN):  ALPRAZolam 0.25 milliGRAM(s) Oral two times a day PRN anxiety  oxyCODONE    IR 5 milliGRAM(s) Oral every 6 hours PRN Mild Pain (1 - 3)  polyethylene glycol 3350 17 Gram(s) Oral daily PRN Constipation        RADIOLOGY & ADDITIONAL TESTS:

## 2021-09-07 RX ADMIN — SODIUM CHLORIDE 3 MILLILITER(S): 9 INJECTION INTRAMUSCULAR; INTRAVENOUS; SUBCUTANEOUS at 21:21

## 2021-09-07 RX ADMIN — SODIUM CHLORIDE 3 MILLILITER(S): 9 INJECTION INTRAMUSCULAR; INTRAVENOUS; SUBCUTANEOUS at 13:06

## 2021-09-07 RX ADMIN — Medication 650 MILLIGRAM(S): at 01:00

## 2021-09-07 RX ADMIN — HEPARIN SODIUM 5000 UNIT(S): 5000 INJECTION INTRAVENOUS; SUBCUTANEOUS at 21:26

## 2021-09-07 RX ADMIN — Medication 650 MILLIGRAM(S): at 19:16

## 2021-09-07 RX ADMIN — Medication 650 MILLIGRAM(S): at 17:05

## 2021-09-07 RX ADMIN — CEFTRIAXONE 100 MILLIGRAM(S): 500 INJECTION, POWDER, FOR SOLUTION INTRAMUSCULAR; INTRAVENOUS at 05:38

## 2021-09-07 RX ADMIN — Medication 40 MILLIEQUIVALENT(S): at 17:04

## 2021-09-07 RX ADMIN — Medication 81 MILLIGRAM(S): at 11:01

## 2021-09-07 RX ADMIN — AMIODARONE HYDROCHLORIDE 200 MILLIGRAM(S): 400 TABLET ORAL at 05:13

## 2021-09-07 RX ADMIN — Medication 650 MILLIGRAM(S): at 06:23

## 2021-09-07 RX ADMIN — Medication 40 MILLIGRAM(S): at 05:13

## 2021-09-07 RX ADMIN — Medication 40 MILLIGRAM(S): at 17:04

## 2021-09-07 RX ADMIN — Medication 40 MILLIEQUIVALENT(S): at 05:12

## 2021-09-07 RX ADMIN — SODIUM CHLORIDE 3 MILLILITER(S): 9 INJECTION INTRAMUSCULAR; INTRAVENOUS; SUBCUTANEOUS at 05:15

## 2021-09-07 RX ADMIN — Medication 650 MILLIGRAM(S): at 07:07

## 2021-09-07 RX ADMIN — Medication 650 MILLIGRAM(S): at 11:01

## 2021-09-07 RX ADMIN — Medication 650 MILLIGRAM(S): at 00:30

## 2021-09-07 RX ADMIN — HEPARIN SODIUM 5000 UNIT(S): 5000 INJECTION INTRAVENOUS; SUBCUTANEOUS at 13:02

## 2021-09-07 RX ADMIN — Medication 650 MILLIGRAM(S): at 11:23

## 2021-09-07 RX ADMIN — ATORVASTATIN CALCIUM 10 MILLIGRAM(S): 80 TABLET, FILM COATED ORAL at 21:26

## 2021-09-07 RX ADMIN — BUDESONIDE AND FORMOTEROL FUMARATE DIHYDRATE 2 PUFF(S): 160; 4.5 AEROSOL RESPIRATORY (INHALATION) at 21:43

## 2021-09-07 RX ADMIN — HEPARIN SODIUM 5000 UNIT(S): 5000 INJECTION INTRAVENOUS; SUBCUTANEOUS at 05:13

## 2021-09-07 NOTE — PROGRESS NOTE ADULT - SUBJECTIVE AND OBJECTIVE BOX
Patient discussed on morning rounds with Dr. Adams     Operation / Date: 8/9/21 AVR, asc/jeff arch with frozen trunk, left aorto-subclavian bypass, CABGx2 c/b wound infection on ceftriaxone    SUBJECTIVE ASSESSMENT:  76y Female. NAEO. Patient gearing up for acute rehab placement. Improving LE edema b/l. Patient endorses having a bowel movement. Tolerating po diet. Wound vac changed at bedside today.    Vital Signs Last 24 Hrs  T(C): 36.4 (07 Sep 2021 11:18), Max: 36.8 (06 Sep 2021 13:28)  T(F): 97.6 (07 Sep 2021 11:18), Max: 98.2 (06 Sep 2021 13:28)  HR: 75 (07 Sep 2021 09:00) (56 - 75)  BP: 117/55 (07 Sep 2021 09:00) (117/55 - 162/71)  BP(mean): 79 (07 Sep 2021 09:00) (2 - 96)  RR: 17 (07 Sep 2021 09:00) (16 - 19)  SpO2: 99% (07 Sep 2021 09:00) (92% - 99%)  I&O's Detail    06 Sep 2021 07:01  -  07 Sep 2021 07:00  --------------------------------------------------------  IN:    Oral Fluid: 250 mL  Total IN: 250 mL    OUT:    VAC (Vacuum Assisted Closure) System (mL): 0 mL    Voided (mL): 2500 mL  Total OUT: 2500 mL    Total NET: -2250 mL    CHEST TUBE:   No.    FRANCHESKA DRAIN:   No.  EPICARDIAL WIRES:  No.  TIE DOWNS: No.  REGALADO:  No.    PHYSICAL EXAM:    Appearance: No acute distress.  Neurologic: AAOx3, no AMS or focal deficits.  Responds appropriately to verbal and physical stimuli; exhibits purposeful movement in all extremities.  Cardiovascular: RRR  Respiratory: No acute respiratory distress. breath sounds diminished at bases, otherwise clear throughout.   Gastrointestinal:  Soft, non-tender, non-distended, + BS.	  Extremities: warm, well perfused. Bilateral peripheral edema L>R  Incision Sites: MSI with wound vac; changed at bedside today      LABS:                        9.3    6.00  )-----------( 99       ( 06 Sep 2021 07:52 )             30.6       COUMADIN:  No. REASON: .      09-06    135  |  98  |  16  ----------------------------<  90  5.1   |  25  |  1.01    Ca    9.4      06 Sep 2021 07:52  Mg     2.2     09-06      MEDICATIONS  (STANDING):  acetaminophen   Tablet .. 650 milliGRAM(s) Oral every 6 hours  aMIOdarone    Tablet 200 milliGRAM(s) Oral daily  aspirin enteric coated 81 milliGRAM(s) Oral daily  atorvastatin 10 milliGRAM(s) Oral at bedtime  budesonide 160 MICROgram(s)/formoterol 4.5 MICROgram(s) Inhaler 2 Puff(s) Inhalation two times a day  cefTRIAXone   IVPB 2000 milliGRAM(s) IV Intermittent every 24 hours  cefTRIAXone   IVPB      furosemide   Injectable 40 milliGRAM(s) IV Push <User Schedule>  heparin   Injectable 5000 Unit(s) SubCutaneous every 8 hours  potassium chloride   Powder 40 milliEquivalent(s) Oral two times a day  sodium chloride 0.9% lock flush 3 milliLiter(s) IV Push every 8 hours    MEDICATIONS  (PRN):  ALPRAZolam 0.25 milliGRAM(s) Oral two times a day PRN anxiety  oxyCODONE    IR 5 milliGRAM(s) Oral every 6 hours PRN Mild Pain (1 - 3)  polyethylene glycol 3350 17 Gram(s) Oral daily PRN Constipation        RADIOLOGY & ADDITIONAL TESTS:  < from: Xray Chest 1 View- PORTABLE-Routine (Xray Chest 1 View- PORTABLE-Routine in AM.) (09.06.21 @ 05:00) >    INTERPRETATION:  Clinical history/reason for exam: Postop.    Frontal chest.    Comparison: September 5, 2021.    Findings/  impression: Left lead less pacemaker, unchanged. Right axillary catheter. Stable cardiomegaly, status post median sternotomy, aortic stent, aortic valve replacement.. Bilateral opacities/pleural effusions and bony structures are stable. Left axillary surgical clips.    < end of copied text >

## 2021-09-07 NOTE — PROGRESS NOTE ADULT - ASSESSMENT
76 year old female, current everyday smoker with PMHx of HTN, Spinal Stenosis, Arthritis, Bicuspid aortic valve with known aortic stenosis evaluated by Dr. Muller as an outpatient and presented to [facility] on 8/8/2021 to complete preoperative workup. Patient underwent AVR, ascending/hemiarch replacement with frozen elephant trunk and left aorto-subclavian bypass, CABG x2 with Dr. Muller on 8/9/2021. Post-operative course complicated by prolonged intubation, moderate pericardial effusion drained by IR on POD7, Sternal wound erythema s/p sternal debridement and wound vac placement Klebsiella on POD 17, multiple episodes of AF with RVR/hypotension requiring amiodarone with subsequent bradycardia requiring PPM on POD 23 and FIDEL, resolved with hydration. Pt was discharged home on 9/3/21 but represented to the hospital on 9/4/21 due to failure to thrive at home and requesting to go to [facility]. Pending [facility] placement at this time.    Plan:  Neurovascular:   -Pain well controlled with current regimen. PRN's: acetaminophen  -anxiety: continue with xanax     Cardiovascular:   -8/9/21 -- AVR, ascending/hemiarch replacement with frozen elephant trunkand left aorto-subclavian bypass, CABGx2    - s/p PPM placement and sternal wound bedside debridement x2 with wound vac placement.     -wound vac in place, changed on 9/7    -post-op SSS (bradycardia alternating with AF with RVR) s/p PPM (Micra)    -continue with ASA, statin   - Hemodynamically stable.   - Monitor: BP, HR, tele    Respiratory:   -Oxygenating well on 2lpm via NC   -Encourage continued use of IS 10x/hr and frequent ambulation  -CXR: no acute pathology     GI:  -continue with budesonide   -GI PPX: pantoprazole 40mg daily   -PO Diet: Regular   -Bowel Regimen: miralax     Renal / :  -Diuresis: continue with lasix 40mg IV twice daily  -Continue to monitor renal function: BUN/Cr stable: 16/1.01  -Monitor I/O's daily     Endocrine:    -No hx of DM or thyroid dx    Hematologic:  -CBC: H/H- 9.3/30.6    ID:  -Sternal wound coverage: continue with ceftriaxone IV (continue with dosing through 9/14, was supposed to see Dr. Crystal on 9/14)     -wound vac in place (changed to hospital system on 9/4/21 -- changed 9/7/21 and change to home system prior to discharge (needs more home supplies at the bedside in anticipation)   -Continue to observe for SIRS/Sepsis Syndrome  -WBC: 6 (from 6.35)    Prophylaxis:  -DVT prophylaxis with 5000 SubQ Heparin q8h  -Continue with SCD's b/l while patient is at rest     Disposition:  -pending [facility] placement

## 2021-09-08 LAB
ALBUMIN SERPL ELPH-MCNC: 3.2 G/DL — LOW (ref 3.3–5)
ALP SERPL-CCNC: 87 U/L — SIGNIFICANT CHANGE UP (ref 40–120)
ALT FLD-CCNC: 11 U/L — SIGNIFICANT CHANGE UP (ref 10–45)
ANION GAP SERPL CALC-SCNC: 11 MMOL/L — SIGNIFICANT CHANGE UP (ref 5–17)
AST SERPL-CCNC: 16 U/L — SIGNIFICANT CHANGE UP (ref 10–40)
BILIRUB SERPL-MCNC: 0.7 MG/DL — SIGNIFICANT CHANGE UP (ref 0.2–1.2)
BUN SERPL-MCNC: 20 MG/DL — SIGNIFICANT CHANGE UP (ref 7–23)
CALCIUM SERPL-MCNC: 8.9 MG/DL — SIGNIFICANT CHANGE UP (ref 8.4–10.5)
CHLORIDE SERPL-SCNC: 98 MMOL/L — SIGNIFICANT CHANGE UP (ref 96–108)
CO2 SERPL-SCNC: 25 MMOL/L — SIGNIFICANT CHANGE UP (ref 22–31)
CREAT SERPL-MCNC: 1.13 MG/DL — SIGNIFICANT CHANGE UP (ref 0.5–1.3)
GLUCOSE SERPL-MCNC: 91 MG/DL — SIGNIFICANT CHANGE UP (ref 70–99)
HCT VFR BLD CALC: 28.5 % — LOW (ref 34.5–45)
HGB BLD-MCNC: 8.9 G/DL — LOW (ref 11.5–15.5)
MAGNESIUM SERPL-MCNC: 2.4 MG/DL — SIGNIFICANT CHANGE UP (ref 1.6–2.6)
MCHC RBC-ENTMCNC: 30.2 PG — SIGNIFICANT CHANGE UP (ref 27–34)
MCHC RBC-ENTMCNC: 31.2 GM/DL — LOW (ref 32–36)
MCV RBC AUTO: 96.6 FL — SIGNIFICANT CHANGE UP (ref 80–100)
NRBC # BLD: 0 /100 WBCS — SIGNIFICANT CHANGE UP (ref 0–0)
PLATELET # BLD AUTO: 114 K/UL — LOW (ref 150–400)
POTASSIUM SERPL-MCNC: 4.5 MMOL/L — SIGNIFICANT CHANGE UP (ref 3.5–5.3)
POTASSIUM SERPL-SCNC: 4.5 MMOL/L — SIGNIFICANT CHANGE UP (ref 3.5–5.3)
PROT SERPL-MCNC: 6.4 G/DL — SIGNIFICANT CHANGE UP (ref 6–8.3)
RBC # BLD: 2.95 M/UL — LOW (ref 3.8–5.2)
RBC # FLD: 17.1 % — HIGH (ref 10.3–14.5)
SODIUM SERPL-SCNC: 134 MMOL/L — LOW (ref 135–145)
WBC # BLD: 6.17 K/UL — SIGNIFICANT CHANGE UP (ref 3.8–10.5)
WBC # FLD AUTO: 6.17 K/UL — SIGNIFICANT CHANGE UP (ref 3.8–10.5)

## 2021-09-08 PROCEDURE — 71045 X-RAY EXAM CHEST 1 VIEW: CPT | Mod: 26

## 2021-09-08 RX ORDER — SODIUM CHLORIDE 9 MG/ML
1000 INJECTION INTRAMUSCULAR; INTRAVENOUS; SUBCUTANEOUS ONCE
Refills: 0 | Status: COMPLETED | OUTPATIENT
Start: 2021-09-08 | End: 2021-09-08

## 2021-09-08 RX ORDER — POTASSIUM CHLORIDE 20 MEQ
40 PACKET (EA) ORAL
Refills: 0 | Status: DISCONTINUED | OUTPATIENT
Start: 2021-09-08 | End: 2021-09-09

## 2021-09-08 RX ADMIN — BUDESONIDE AND FORMOTEROL FUMARATE DIHYDRATE 2 PUFF(S): 160; 4.5 AEROSOL RESPIRATORY (INHALATION) at 21:20

## 2021-09-08 RX ADMIN — Medication 650 MILLIGRAM(S): at 01:09

## 2021-09-08 RX ADMIN — Medication 0.25 MILLIGRAM(S): at 21:20

## 2021-09-08 RX ADMIN — SODIUM CHLORIDE 3 MILLILITER(S): 9 INJECTION INTRAMUSCULAR; INTRAVENOUS; SUBCUTANEOUS at 16:31

## 2021-09-08 RX ADMIN — Medication 40 MILLIEQUIVALENT(S): at 17:55

## 2021-09-08 RX ADMIN — Medication 650 MILLIGRAM(S): at 11:10

## 2021-09-08 RX ADMIN — SODIUM CHLORIDE 3 MILLILITER(S): 9 INJECTION INTRAMUSCULAR; INTRAVENOUS; SUBCUTANEOUS at 05:55

## 2021-09-08 RX ADMIN — AMIODARONE HYDROCHLORIDE 200 MILLIGRAM(S): 400 TABLET ORAL at 06:23

## 2021-09-08 RX ADMIN — Medication 40 MILLIGRAM(S): at 06:23

## 2021-09-08 RX ADMIN — Medication 650 MILLIGRAM(S): at 02:09

## 2021-09-08 RX ADMIN — HEPARIN SODIUM 5000 UNIT(S): 5000 INJECTION INTRAVENOUS; SUBCUTANEOUS at 06:23

## 2021-09-08 RX ADMIN — HEPARIN SODIUM 5000 UNIT(S): 5000 INJECTION INTRAVENOUS; SUBCUTANEOUS at 15:32

## 2021-09-08 RX ADMIN — Medication 650 MILLIGRAM(S): at 17:29

## 2021-09-08 RX ADMIN — ATORVASTATIN CALCIUM 10 MILLIGRAM(S): 80 TABLET, FILM COATED ORAL at 21:20

## 2021-09-08 RX ADMIN — Medication 650 MILLIGRAM(S): at 06:23

## 2021-09-08 RX ADMIN — Medication 40 MILLIEQUIVALENT(S): at 06:23

## 2021-09-08 RX ADMIN — Medication 650 MILLIGRAM(S): at 07:23

## 2021-09-08 RX ADMIN — SODIUM CHLORIDE 1000 MILLILITER(S): 9 INJECTION INTRAMUSCULAR; INTRAVENOUS; SUBCUTANEOUS at 09:46

## 2021-09-08 RX ADMIN — HEPARIN SODIUM 5000 UNIT(S): 5000 INJECTION INTRAVENOUS; SUBCUTANEOUS at 21:20

## 2021-09-08 RX ADMIN — CEFTRIAXONE 100 MILLIGRAM(S): 500 INJECTION, POWDER, FOR SOLUTION INTRAMUSCULAR; INTRAVENOUS at 06:22

## 2021-09-08 RX ADMIN — Medication 81 MILLIGRAM(S): at 11:10

## 2021-09-08 RX ADMIN — Medication 650 MILLIGRAM(S): at 13:47

## 2021-09-08 RX ADMIN — Medication 40 MILLIGRAM(S): at 15:32

## 2021-09-08 RX ADMIN — SODIUM CHLORIDE 3 MILLILITER(S): 9 INJECTION INTRAMUSCULAR; INTRAVENOUS; SUBCUTANEOUS at 21:11

## 2021-09-08 NOTE — PHYSICAL THERAPY INITIAL EVALUATION ADULT - GAIT DEVIATIONS NOTED, PT EVAL
her BP was lower this session than her previous BPs which were around 120s-130s, no c/o lightheadedness, ANDRE Ely informed. slightly unsteady at times, no loss of balance

## 2021-09-08 NOTE — PROGRESS NOTE ADULT - ASSESSMENT
76 year old female, current everyday smoker with PMHx of HTN, Spinal Stenosis, Arthritis, Bicuspid aortic valve with known aortic stenosis evaluated by Dr. Muller as an outpatient and presented to [facility] on 8/8/2021 to complete preoperative workup. Patient underwent AVR, ascending/hemiarch replacement with frozen elephant trunk and left aorto-subclavian bypass, CABG x2 with Dr. Muller on 8/9/2021. Post-operative course complicated by prolonged intubation, moderate pericardial effusion drained by IR on POD7, Sternal wound erythema s/p sternal debridement and wound vac placement Klebsiella on POD 17, multiple episodes of AF with RVR/hypotension requiring amiodarone with subsequent bradycardia requiring PPM on POD 23 and FIDEL, resolved with hydration. Pt was discharged home on 9/3/21 but represented to the hospital on 9/4/21 due to failure to thrive at home and requesting to go to [facility]. Pending [facility] placement at this time.    Plan:  Neurovascular:   -Pain well controlled with current regimen. PRN's: acetaminophen  -anxiety: continue with xanax     Cardiovascular:   -8/9/21 -- AVR, ascending/hemiarch replacement with frozen elephant trunkand left aorto-subclavian bypass, CABGx2    - s/p PPM placement and sternal wound bedside debridement x2 with wound vac placement.     -wound vac in place, changed on 9/7; next wound vac change 9/10    -post-op SSS (bradycardia alternating with AF with RVR) s/p PPM (Micra)    -continue with ASA, statin   - Hemodynamically stable.   - Monitor: BP, HR, tele    Respiratory:   -Oxygenating well on 2lpm via NC   -Encourage continued use of IS 10x/hr and frequent ambulation  -CXR: no acute pathology     GI:  -continue with budesonide   -GI PPX: pantoprazole 40mg daily   -PO Diet: Regular   -Bowel Regimen: miralax     Renal / :  -Diuresis: continue with lasix 40mg IV twice daily  -Continue to monitor renal function: BUN/Cr stable: 20/1.13  -Monitor I/O's daily     Endocrine:    -No hx of DM or thyroid dx    Hematologic:  -CBC: H/H- 8.9/28.5    ID:  -Sternal wound coverage: continue with ceftriaxone IV (continue with dosing through 9/14, was supposed to see Dr. Crystal on 9/14)     -wound vac in place (changed to hospital system on 9/4/21 -- changed 9/7/21 and change to home system prior to discharge (needs more home supplies at the bedside in anticipation)   -Continue to observe for SIRS/Sepsis Syndrome  -WBC: 6.17     Prophylaxis:  -DVT prophylaxis with 5000 SubQ Heparin q8h  -Continue with SCD's b/l while patient is at rest     Disposition:  -pending [facility] placement: maybe Friday     76 year old female, current everyday smoker with PMHx of HTN, Spinal Stenosis, Arthritis, Bicuspid aortic valve with known aortic stenosis evaluated by Dr. Muller as an outpatient and presented to [facility] on 8/8/2021 to complete preoperative workup. Patient underwent AVR, ascending/hemiarch replacement with frozen elephant trunk and left aorto-subclavian bypass, CABG x2 with Dr. Muller on 8/9/2021. Post-operative course complicated by prolonged intubation, moderate pericardial effusion drained by IR on POD7, Sternal wound erythema s/p sternal debridement and wound vac placement Klebsiella on POD 17, multiple episodes of AF with RVR/hypotension requiring amiodarone with subsequent bradycardia requiring PPM on POD 23 and FIDEL, resolved with hydration. Pt was discharged home on 9/3/21 but represented to the hospital on 9/4/21 due to failure to thrive at home and requesting to go to [facility]. Pending [facility] placement at this time.    Plan:  Neurovascular:   -Pain well controlled with current regimen. PRN's: acetaminophen  -anxiety: continue with xanax     Cardiovascular:   -8/9/21 -- AVR, ascending/hemiarch replacement with frozen elephant trunkand left aorto-subclavian bypass, CABGx2    - s/p PPM placement and sternal wound bedside debridement x2 with wound vac placement.     -wound vac in place, changed on 9/7; next wound vac change 9/10    -post-op SSS (bradycardia alternating with AF with RVR) s/p PPM (Micra)    -continue with ASA, statin   - Hemodynamically stable.   - Monitor: BP, HR, tele    Respiratory:   -Oxygenating well on RA   -Encourage continued use of IS 10x/hr and frequent ambulation  -CXR: no acute pathology     GI:  -continue with budesonide   -GI PPX: pantoprazole 40mg daily   -PO Diet: Regular   -Bowel Regimen: miralax, bisacodyl    Renal / :  -Diuresis: continue with lasix 40mg IV twice daily  -Continue to monitor renal function: BUN/Cr stable: 20/1.13  -Monitor I/O's daily     Endocrine:    -No hx of DM or thyroid dx    Hematologic:  -CBC: H/H- 8.9/28.5    ID:  -Sternal wound coverage: continue with ceftriaxone IV (continue with dosing through 9/14, was supposed to see Dr. Crystal on 9/14)     -wound vac in place (changed to hospital system on 9/4/21 -- changed 9/7/21 and change to home system prior to discharge (needs more home supplies at the bedside in anticipation)   -Continue to observe for SIRS/Sepsis Syndrome  -WBC: 6.17     Prophylaxis:  -DVT prophylaxis with 5000 SubQ Heparin q8h  -Continue with SCD's b/l while patient is at rest     Disposition:  -pending [facility] placement: maybe Friday

## 2021-09-08 NOTE — DIETITIAN INITIAL EVALUATION ADULT. - OTHER CALCULATIONS
Ideal body weight used for calculations as pt >120% of IBW. (181% IBW) Nutrient needs based on [facility] standards of care for maintenance in adults, adjusted for age, infection, fluid per team

## 2021-09-08 NOTE — PROGRESS NOTE ADULT - SUBJECTIVE AND OBJECTIVE BOX
Patient discussed on morning rounds with Dr. Adams      Operation / Date: 8/9/21 AVR, asc/hemiarch with frozen trunk, left aorto-subclavian bypass, CABGx2 c/b wound infection on ceftriaxone    SUBJECTIVE ASSESSMENT:  76y Female. NAEO. Patient gearing up for acute rehab placement. Improving LE edema b/l. Patient endorses having a bowel movement. Tolerating po diet. Patient with some dizziness and hypotension this morning which has since resolved.    Vital Signs Last 24 Hrs  T(C): 36.6 (08 Sep 2021 09:17), Max: 36.6 (08 Sep 2021 09:17)  T(F): 97.8 (08 Sep 2021 09:17), Max: 97.8 (08 Sep 2021 09:17)  HR: 73 (08 Sep 2021 11:20) (56 - 73)  BP: 116/56 (08 Sep 2021 11:20) (101/63 - 141/68)  BP(mean): 80 (08 Sep 2021 11:20) (77 - 98)  RR: 17 (08 Sep 2021 05:04) (16 - 18)  SpO2: 93% (08 Sep 2021 11:20) (91% - 97%)  I&O's Detail    07 Sep 2021 07:01  -  08 Sep 2021 07:00  --------------------------------------------------------  IN:    Oral Fluid: 625 mL  Total IN: 625 mL    OUT:    Voided (mL): 1500 mL  Total OUT: 1500 mL    Total NET: -875 mL    CHEST TUBE:  No.   FRANCHESKA DRAIN: No.  EPICARDIAL WIRES:  No.  TIE DOWNS: No.  REGALADO:  No.    PHYSICAL EXAM:  GEN: NAD, looks comfortable  Psych: Mood appropriate  Neuro: A&Ox3.  No focal deficits.  Moving all extremities.   HEENT: No obvious abnormalities  CV: S1S2, regular, no murmurs appreciated.  No carotid bruits.  No JVD  Lungs: Clear B/L.  No wheezing, rales or rhonchi  ABD: Soft, non-tender, non-distended.  +Bowel sounds  EXT: Warm and well perfused.  No peripheral edema noted  Musculoskeletal: Moving all extremities with normal ROM, no joint swelling  PV: Pedal pulses palpable  Incisions: c/d/i; wound vac in place    LABS:                        8.9    6.17  )-----------( 114      ( 08 Sep 2021 06:19 )             28.5       COUMADIN:  No.         09-08    134<L>  |  98  |  20  ----------------------------<  91  4.5   |  25  |  1.13    Ca    8.9      08 Sep 2021 06:19  Mg     2.4     09-08    TPro  6.4  /  Alb  3.2<L>  /  TBili  0.7  /  DBili  x   /  AST  16  /  ALT  11  /  AlkPhos  87  09-08      MEDICATIONS  (STANDING):  acetaminophen   Tablet .. 650 milliGRAM(s) Oral every 6 hours  aMIOdarone    Tablet 200 milliGRAM(s) Oral daily  aspirin enteric coated 81 milliGRAM(s) Oral daily  atorvastatin 10 milliGRAM(s) Oral at bedtime  budesonide 160 MICROgram(s)/formoterol 4.5 MICROgram(s) Inhaler 2 Puff(s) Inhalation two times a day  cefTRIAXone   IVPB 2000 milliGRAM(s) IV Intermittent every 24 hours  cefTRIAXone   IVPB      furosemide   Injectable 40 milliGRAM(s) IV Push <User Schedule>  heparin   Injectable 5000 Unit(s) SubCutaneous every 8 hours  potassium chloride   Powder 40 milliEquivalent(s) Oral two times a day  sodium chloride 0.9% lock flush 3 milliLiter(s) IV Push every 8 hours    MEDICATIONS  (PRN):  ALPRAZolam 0.25 milliGRAM(s) Oral two times a day PRN anxiety  bisacodyl Suppository 10 milliGRAM(s) Rectal daily PRN Constipation  oxyCODONE    IR 5 milliGRAM(s) Oral every 6 hours PRN Mild Pain (1 - 3)  polyethylene glycol 3350 17 Gram(s) Oral daily PRN Constipation        RADIOLOGY & ADDITIONAL TESTS:  < from: Xray Chest 1 View- PORTABLE-Routine (Xray Chest 1 View- PORTABLE-Routine in AM.) (09.06.21 @ 05:00) >    INTERPRETATION:  Clinical history/reason for exam: Postop.    Frontal chest.    Comparison: September 5, 2021.    Findings/  impression: Left lead less pacemaker, unchanged. Right axillary catheter. Stable cardiomegaly, status post median sternotomy, aortic stent, aortic valve replacement.. Bilateral opacities/pleural effusions and bony structures are stable. Left axillary surgical clips.    < end of copied text >

## 2021-09-08 NOTE — DIETITIAN INITIAL EVALUATION ADULT. - OTHER INFO
76F current everyday smoker with PMHx of HTN, Spinal Stenosis, Arthritis, Bicuspid aortic valve with known aortic stenosis evaluated by Dr. Muller as an outpatient and presented to [facility] on 8/8/2021 to complete preoperative workup. Patient underwent AVR, ascending/hemiarch replacement with frozen elephant trunk and left aorto-subclavian bypass, CABG x2 with Dr. Muller on 8/9/2021. Post-operative course complicated by prolonged intubation (extubated on day 6), moderate pericardial effusion drained by IR on POD7, Sternal wound erythema s/p sternal debridement and wound vac placement +Klebsillia on POD 17, multiple episodes of afib requiring amiodarone with subsequent bradycardia requiring PPM on POD 23 and FIDEL, resolved with hydration. Oxygen saturation continued to be 88-90% on room air and was planned for home oxygen, pending insurance approval. Of note, patient was recommended to [facility] but patient and family refused [facility] requesting to be discharged home.     Patient presents to ED today complaining of worsening shortness of breath and lower extremity swelling since being discharged home, patient feels nervous that it is too much work to be home and is now requesting to be sent to [facility]. At time of assessment noted fair intake, dislikes food choices on current diet. Discussed purpose of DASH diet with pt. Since prior admission wt increased 4kg, suspect in setting of fluid shifts. Denies n/v/d/c, chewing/ swallowing issues or pain impacting intake, skin is with VAC to MSI, 3+ edema to B/L feet. NKFA. Observed missing dentition however no issues chewing reported. Will continue to follow per protocol and reinforce ed as needed. Pending auth to rehab facility at this time.

## 2021-09-08 NOTE — DIETITIAN INITIAL EVALUATION ADULT. - ORAL INTAKE PTA/DIET HISTORY
DASH diet prior admission (recent admit in August), reports low sodium diet for short period at home, fair intake at present as pt dislikes options

## 2021-09-08 NOTE — PHYSICAL THERAPY INITIAL EVALUATION ADULT - PERTINENT HX OF CURRENT PROBLEM, REHAB EVAL
Estimated Creatinine Clearance: 138.2 mL/min (based on SCr of 0.6 mg/dL).   Medications reviewed, no dose adjustments needed     76 year old female s/p AVR, ascending/hemiarch replacement with frozen elephant trunk and left aorto-subclavian bypass, CABG x2 with Dr. Muller on 8/9/2021. Post-operative course complicated, see H+P for detail.. n. Pt was discharged home on 9/3/21 but represented to the hospital on 9/4/21 due to failure to thrive at home 76 year old female s/p AVR, ascending/hemiarch replacement with frozen elephant trunk and left aorto-subclavian bypass, CABG x2 with Dr. Muller on 8/9/2021. Post-operative course complicated, see H+P for further detail. Pt was discharged home on 9/3/21 but represented to the hospital on 9/4/21 due to failure to thrive at home

## 2021-09-08 NOTE — PHYSICAL THERAPY INITIAL EVALUATION ADULT - IMPAIRED TRANSFERS: SIT/STAND, REHAB EVAL
static stand with contact guard/min assist, slightly unsteady, no loss of balance/impaired balance/decreased strength

## 2021-09-08 NOTE — PHYSICAL THERAPY INITIAL EVALUATION ADULT - ADDITIONAL COMMENTS
prior to previous admission patient was independent with all functional mobility without an assistive device. She lives with her daughter and grandchildren in a home with 1 step to enter however shower is 1 flight of stairs up. Prior to DC from [facility] she was a 1 person assist for ambulation with rolling walker. She was recommended at that time for acute rehab.

## 2021-09-09 ENCOUNTER — TRANSCRIPTION ENCOUNTER (OUTPATIENT)
Age: 76
End: 2021-09-09

## 2021-09-09 VITALS
DIASTOLIC BLOOD PRESSURE: 56 MMHG | OXYGEN SATURATION: 96 % | RESPIRATION RATE: 16 BRPM | HEART RATE: 65 BPM | SYSTOLIC BLOOD PRESSURE: 115 MMHG

## 2021-09-09 LAB
ALBUMIN SERPL ELPH-MCNC: 3.7 G/DL — SIGNIFICANT CHANGE UP (ref 3.3–5)
ALP SERPL-CCNC: 94 U/L — SIGNIFICANT CHANGE UP (ref 40–120)
ALT FLD-CCNC: 15 U/L — SIGNIFICANT CHANGE UP (ref 10–45)
ANION GAP SERPL CALC-SCNC: 11 MMOL/L — SIGNIFICANT CHANGE UP (ref 5–17)
AST SERPL-CCNC: 20 U/L — SIGNIFICANT CHANGE UP (ref 10–40)
BILIRUB SERPL-MCNC: 0.7 MG/DL — SIGNIFICANT CHANGE UP (ref 0.2–1.2)
BUN SERPL-MCNC: 22 MG/DL — SIGNIFICANT CHANGE UP (ref 7–23)
CALCIUM SERPL-MCNC: 9.3 MG/DL — SIGNIFICANT CHANGE UP (ref 8.4–10.5)
CHLORIDE SERPL-SCNC: 98 MMOL/L — SIGNIFICANT CHANGE UP (ref 96–108)
CO2 SERPL-SCNC: 22 MMOL/L — SIGNIFICANT CHANGE UP (ref 22–31)
CREAT SERPL-MCNC: 1.1 MG/DL — SIGNIFICANT CHANGE UP (ref 0.5–1.3)
GLUCOSE SERPL-MCNC: 101 MG/DL — HIGH (ref 70–99)
HCT VFR BLD CALC: 32.8 % — LOW (ref 34.5–45)
HGB BLD-MCNC: 10 G/DL — LOW (ref 11.5–15.5)
MAGNESIUM SERPL-MCNC: 2.3 MG/DL — SIGNIFICANT CHANGE UP (ref 1.6–2.6)
MCHC RBC-ENTMCNC: 29.6 PG — SIGNIFICANT CHANGE UP (ref 27–34)
MCHC RBC-ENTMCNC: 30.5 GM/DL — LOW (ref 32–36)
MCV RBC AUTO: 97 FL — SIGNIFICANT CHANGE UP (ref 80–100)
NRBC # BLD: 0 /100 WBCS — SIGNIFICANT CHANGE UP (ref 0–0)
PLATELET # BLD AUTO: 120 K/UL — LOW (ref 150–400)
POTASSIUM SERPL-MCNC: 4.8 MMOL/L — SIGNIFICANT CHANGE UP (ref 3.5–5.3)
POTASSIUM SERPL-SCNC: 4.8 MMOL/L — SIGNIFICANT CHANGE UP (ref 3.5–5.3)
PROT SERPL-MCNC: 7.3 G/DL — SIGNIFICANT CHANGE UP (ref 6–8.3)
RBC # BLD: 3.38 M/UL — LOW (ref 3.8–5.2)
RBC # FLD: 17.5 % — HIGH (ref 10.3–14.5)
SARS-COV-2 RNA SPEC QL NAA+PROBE: SIGNIFICANT CHANGE UP
SODIUM SERPL-SCNC: 131 MMOL/L — LOW (ref 135–145)
WBC # BLD: 8.45 K/UL — SIGNIFICANT CHANGE UP (ref 3.8–10.5)
WBC # FLD AUTO: 8.45 K/UL — SIGNIFICANT CHANGE UP (ref 3.8–10.5)

## 2021-09-09 PROCEDURE — 96374 THER/PROPH/DIAG INJ IV PUSH: CPT

## 2021-09-09 PROCEDURE — 83735 ASSAY OF MAGNESIUM: CPT

## 2021-09-09 PROCEDURE — 71045 X-RAY EXAM CHEST 1 VIEW: CPT | Mod: 26

## 2021-09-09 PROCEDURE — 82330 ASSAY OF CALCIUM: CPT

## 2021-09-09 PROCEDURE — 96375 TX/PRO/DX INJ NEW DRUG ADDON: CPT

## 2021-09-09 PROCEDURE — 84132 ASSAY OF SERUM POTASSIUM: CPT

## 2021-09-09 PROCEDURE — 93005 ELECTROCARDIOGRAM TRACING: CPT

## 2021-09-09 PROCEDURE — 85027 COMPLETE CBC AUTOMATED: CPT

## 2021-09-09 PROCEDURE — 97161 PT EVAL LOW COMPLEX 20 MIN: CPT

## 2021-09-09 PROCEDURE — 94640 AIRWAY INHALATION TREATMENT: CPT

## 2021-09-09 PROCEDURE — 84295 ASSAY OF SERUM SODIUM: CPT

## 2021-09-09 PROCEDURE — 85025 COMPLETE CBC W/AUTO DIFF WBC: CPT

## 2021-09-09 PROCEDURE — 36415 COLL VENOUS BLD VENIPUNCTURE: CPT

## 2021-09-09 PROCEDURE — 80053 COMPREHEN METABOLIC PANEL: CPT

## 2021-09-09 PROCEDURE — 80048 BASIC METABOLIC PNL TOTAL CA: CPT

## 2021-09-09 PROCEDURE — 87635 SARS-COV-2 COVID-19 AMP PRB: CPT

## 2021-09-09 PROCEDURE — 76604 US EXAM CHEST: CPT | Mod: 26

## 2021-09-09 PROCEDURE — 99285 EMERGENCY DEPT VISIT HI MDM: CPT

## 2021-09-09 PROCEDURE — 83605 ASSAY OF LACTIC ACID: CPT

## 2021-09-09 PROCEDURE — 83880 ASSAY OF NATRIURETIC PEPTIDE: CPT

## 2021-09-09 PROCEDURE — 85730 THROMBOPLASTIN TIME PARTIAL: CPT

## 2021-09-09 PROCEDURE — 71045 X-RAY EXAM CHEST 1 VIEW: CPT

## 2021-09-09 PROCEDURE — 85610 PROTHROMBIN TIME: CPT

## 2021-09-09 PROCEDURE — 82803 BLOOD GASES ANY COMBINATION: CPT

## 2021-09-09 PROCEDURE — 84484 ASSAY OF TROPONIN QUANT: CPT

## 2021-09-09 PROCEDURE — 86769 SARS-COV-2 COVID-19 ANTIBODY: CPT

## 2021-09-09 RX ORDER — POLYETHYLENE GLYCOL 3350 17 G/17G
17 POWDER, FOR SOLUTION ORAL
Qty: 0 | Refills: 0 | DISCHARGE
Start: 2021-09-09

## 2021-09-09 RX ORDER — FUROSEMIDE 40 MG
1 TABLET ORAL
Qty: 0 | Refills: 0 | DISCHARGE
Start: 2021-09-09

## 2021-09-09 RX ORDER — CEFTRIAXONE 500 MG/1
2 INJECTION, POWDER, FOR SOLUTION INTRAMUSCULAR; INTRAVENOUS
Qty: 0 | Refills: 0 | DISCHARGE
Start: 2021-09-09

## 2021-09-09 RX ORDER — ATORVASTATIN CALCIUM 80 MG/1
1 TABLET, FILM COATED ORAL
Qty: 0 | Refills: 0 | DISCHARGE
Start: 2021-09-09

## 2021-09-09 RX ORDER — OXYCODONE HYDROCHLORIDE 5 MG/1
1 TABLET ORAL
Qty: 0 | Refills: 0 | DISCHARGE
Start: 2021-09-09

## 2021-09-09 RX ORDER — METOPROLOL TARTRATE 50 MG
0.5 TABLET ORAL
Qty: 0 | Refills: 0 | DISCHARGE
Start: 2021-09-09

## 2021-09-09 RX ORDER — ACETAMINOPHEN 500 MG
2 TABLET ORAL
Qty: 0 | Refills: 0 | DISCHARGE
Start: 2021-09-09

## 2021-09-09 RX ORDER — POTASSIUM CHLORIDE 20 MEQ
1 PACKET (EA) ORAL
Qty: 0 | Refills: 0 | DISCHARGE
Start: 2021-09-09

## 2021-09-09 RX ORDER — METOPROLOL TARTRATE 50 MG
12.5 TABLET ORAL EVERY 12 HOURS
Refills: 0 | Status: DISCONTINUED | OUTPATIENT
Start: 2021-09-09 | End: 2021-09-09

## 2021-09-09 RX ADMIN — Medication 650 MILLIGRAM(S): at 18:13

## 2021-09-09 RX ADMIN — Medication 40 MILLIEQUIVALENT(S): at 06:05

## 2021-09-09 RX ADMIN — SODIUM CHLORIDE 3 MILLILITER(S): 9 INJECTION INTRAMUSCULAR; INTRAVENOUS; SUBCUTANEOUS at 06:23

## 2021-09-09 RX ADMIN — SODIUM CHLORIDE 3 MILLILITER(S): 9 INJECTION INTRAMUSCULAR; INTRAVENOUS; SUBCUTANEOUS at 14:00

## 2021-09-09 RX ADMIN — Medication 650 MILLIGRAM(S): at 00:08

## 2021-09-09 RX ADMIN — Medication 12.5 MILLIGRAM(S): at 09:45

## 2021-09-09 RX ADMIN — Medication 650 MILLIGRAM(S): at 06:04

## 2021-09-09 RX ADMIN — Medication 650 MILLIGRAM(S): at 18:30

## 2021-09-09 RX ADMIN — Medication 650 MILLIGRAM(S): at 00:38

## 2021-09-09 RX ADMIN — BUDESONIDE AND FORMOTEROL FUMARATE DIHYDRATE 2 PUFF(S): 160; 4.5 AEROSOL RESPIRATORY (INHALATION) at 09:45

## 2021-09-09 RX ADMIN — Medication 650 MILLIGRAM(S): at 06:34

## 2021-09-09 RX ADMIN — AMIODARONE HYDROCHLORIDE 200 MILLIGRAM(S): 400 TABLET ORAL at 06:05

## 2021-09-09 RX ADMIN — Medication 81 MILLIGRAM(S): at 11:56

## 2021-09-09 RX ADMIN — Medication 40 MILLIGRAM(S): at 06:04

## 2021-09-09 RX ADMIN — CEFTRIAXONE 100 MILLIGRAM(S): 500 INJECTION, POWDER, FOR SOLUTION INTRAMUSCULAR; INTRAVENOUS at 06:04

## 2021-09-09 RX ADMIN — HEPARIN SODIUM 5000 UNIT(S): 5000 INJECTION INTRAVENOUS; SUBCUTANEOUS at 06:04

## 2021-09-09 RX ADMIN — Medication 650 MILLIGRAM(S): at 12:00

## 2021-09-09 RX ADMIN — HEPARIN SODIUM 5000 UNIT(S): 5000 INJECTION INTRAVENOUS; SUBCUTANEOUS at 14:52

## 2021-09-09 RX ADMIN — Medication 650 MILLIGRAM(S): at 11:56

## 2021-09-09 NOTE — DISCHARGE NOTE NURSING/CASE MANAGEMENT/SOCIAL WORK - PATIENT PORTAL LINK FT
You can access the FollowMyHealth Patient Portal offered by [facility] by registering at the following website: [email or web address]. By joining [employer]’s FollowMyHealth portal, you will also be able to view your health information using other applications (apps) compatible with our system.

## 2021-09-09 NOTE — PROGRESS NOTE ADULT - ASSESSMENT
Bladimir Muller)  Surgery; Thoracic and Cardiac Surgery  [address], [address] [place] [address]  Phone: [phone]  Fax: [phone]  Scheduled Appointment: 09/15/2021 10:30 AM    Parvez Khanna  CARDIAC ELECTROPHYSIOLOGY  [address] [address]  Phone: [phone]  Fax: [phone]  Scheduled Appointment: 10/07/2021 02:30 PM    Merissa Crystal)  Infectious Disease; Internal Medicine  [address], [address] [place] [address]  Phone: [phone]  Fax: [phone]  Scheduled Appointment: 09/14/2021 02:20 PM    Aron Blank  Interventional Cardiology  [address] [place] [address]  Phone: [phone]  Fax: [phone]  Scheduled Appointment: 09/17/2021 01:30 PM

## 2021-09-09 NOTE — DISCHARGE NOTE PROVIDER - NSDCFUSCHEDAPPT_GEN_ALL_CORE_FT
LYUBOV BAHENA ; 09/17/2021 ; NPP Cardio [address]  LYUBOV BAHENA ; 09/22/2021 ; NPP CT Surg [address]   LYUBOV BAHENA ; 10/07/2021 ; NPP Cardio Vasc [address]

## 2021-09-09 NOTE — DISCHARGE NOTE NURSING/CASE MANAGEMENT/SOCIAL WORK - NSDCPEWEB_GEN_ALL_CORE
[facility] for Tobacco Control website --- [email or web address] website --- [email or web address]

## 2021-09-09 NOTE — DISCHARGE NOTE PROVIDER - CARE PROVIDER_API CALL
Bladimir Muller)  Surgery; Thoracic and Cardiac Surgery  [address], [address] [place] [address]  Phone: [phone]  Fax: [phone]  Scheduled Appointment: 09/15/2021 10:30 AM    Parvez Khanna  CARDIAC ELECTROPHYSIOLOGY  [address] [address]  Phone: [phone]  Fax: [phone]  Scheduled Appointment: 10/07/2021 02:30 PM    Merissa Crystal)  Infectious Disease; Internal Medicine  [address], [address] [place] [address]  Phone: [phone]  Fax: [phone]  Scheduled Appointment: 09/14/2021 02:20 PM    Aron Blank  Interventional Cardiology  [address] [place] [address]  Phone: [phone]  Fax: [phone]  Scheduled Appointment: 09/17/2021 01:30 PM

## 2021-09-09 NOTE — DISCHARGE NOTE PROVIDER - NSDCFUADDINST_GEN_ALL_CORE_FT
- You are being discharged home with a wound vac in place. This should be changed three times a week. The rehab will have to change the dressing to their hospital system upon arrival.     - Please note you are being discharged home on a diuretic (lasix) three times a day. This can be decreased as your edema improves.     -Walk daily as tolerated and use your incentive spirometer 10 times every hour while you are awake.     -Please weigh yourself daily. If you notice over a 3 pound weight gain in 3 days, this is a sign you are likely retaining too much fluid. It is imperative you call our right away with unexplained rapid weight gain.      -Please continue to wear the compression stockings given to you in the hospital at home. This is a way to prevent fluid from building up in your legs.     -No driving or strenuous activity/exercise until cleared by your surgeon.    -Gently clean your incisions with unscented/antibacterial soap and water, pat dry.  You may leave them open to air.    -Call your doctor if you have shortness of breath, chest pain not relieved by pain medication, dizziness, fever >101.5, or increased redness or drainage from incisions.

## 2021-09-09 NOTE — DISCHARGE NOTE PROVIDER - NSDCCPCAREPLAN_GEN_ALL_CORE_FT
PRINCIPAL DISCHARGE DIAGNOSIS  Diagnosis: SOB (shortness of breath)  Assessment and Plan of Treatment:

## 2021-09-09 NOTE — PROGRESS NOTE ADULT - SUBJECTIVE AND OBJECTIVE BOX
Patient discussed on morning rounds with Dr. Muller     Operation / Date: 8/9/2021. AVR, ascending/hemiarch replacement with frozen elephant trunk and left aorto-subclavian bypass, CABG x2   8/26 - Sternal wound debridement with PPM placement  9/1- PPM placement    Surgeon: Dr. Muller    Referring Physician:    SUBJECTIVE ASSESSMENT AND HOSPITAL COURSE:  76y Female seen and examined at the bedside. She feels well today, wants to leave the hospital and go to rehab. Endorses minimal SOB with ambulation. Using IS pulling 1000. She is moving her bowels, tolerating PO diet. Denies CP, SOB, abd pain, n/v/c/d.     76 year old female, current everyday smoker with PMHx of HTN, Spinal Stenosis, Arthritis, Bicuspid aortic valve with known aortic stenosis evaluated by Dr. Muller as an outpatient and presented to [facility] on 8/8/2021 to complete preoperative workup. Patient underwent AVR, ascending/hemiarch replacement with frozen elephant trunk and left aorto-subclavian bypass, CABG x2 with Dr. Muller on 8/9/2021. Intraop patient received 7u PRBC, 2PLT, 2 FPP and 1000 FEIBA, she was transferred to CT ICU post operatively intubated and stable on levo. On POD#2 patient had Afib with RVR with associated hypotension requiring increased pressors. She was given 2u PRBC for acute post operative anemia and started on primacor and dobutamine. She was cardioverted to NSR. Again on POD#3 and 4 patient had episodes of Afib with RVR requiring amiodarone boluses. She had FIDEL on POD#5, which improved with hydration. Post operatively patient had acute respiratory failure requiring prolonged intubation and patient was ultimately extubated on POD#6 to Bipap. TTE on POD#7 revealed moderate pericardial effusion s/p IR drainage. Patient continued to have episodes of atrial fibrillation treated with amiodarone, she developed bradycardia on POD#14. EP was consulted and patient was monitored for further episodes. She passed her speech and swallow on POD#14. She remained stable and transferred to  on POD#15. On POD#16 patient again had Afib with RVR with associated hypotension requiring linda pushes and IV amiodarone. She was given IV lopressor and developed bradycardia requiring pacing via epicardial wires. She was scheduled for PPM placement but on POD#17 patient was noted to have sternal wound erythema and purulent drainage. She underwent sternal would debridement and wound vac placement and started on empiric antibiotics. Wound cultures + Klebsiella and she was transitioned to IV ceftriaxone and ID was consulted. She was cleared for PPM and underwent micra PPM placement with EP on POD#23. She continued to remain stable, weaned off oxygen, ambulating with minimal assistance and as per Dr. Muller is ready for discharge home on POD#25.     Of note on day of discharge, BP not able to tolerate addition of Beta blockers, will be started as an outpatient. Also of note patient weaned off oxygen saturating 88-90% on room air. Patient with significant smoking history requiring BIPAP and HFNC during her hospital admission. Home O2 set up as needed, patient did not want wait for oxygen to be delivered. Will have oxygen delivered at home if insurance approves.     35 minutes was spent with the patient reviewing the discharge material including medications, follow up appointments, recovery, concerning symptoms, and how to contact their health care providers if they have questions     Vital Signs Last 24 Hrs  T(C): 36.6 (09 Sep 2021 14:39), Max: 36.6 (08 Sep 2021 21:58)  T(F): 97.9 (09 Sep 2021 14:39), Max: 97.9 (08 Sep 2021 21:58)  HR: 60 (09 Sep 2021 11:55) (60 - 73)  BP: 114/54 (09 Sep 2021 11:55) (112/53 - 148/64)  BP(mean): 78 (09 Sep 2021 11:55) (75 - 92)  RR: 18 (09 Sep 2021 11:55) (15 - 18)  SpO2: 97% (09 Sep 2021 11:55) (94% - 98%)    EPICARDIAL WIRES REMOVED: yes  TIE DOWNS REMOVED: yes    PHYSICAL EXAM:    General: NAD, resting comfortable     Neurological: A&O x3,  RITTER, no focal deficits    Cardiovascular: RRR, no m/r/g    Respiratory: CTAB, non labored breathing    Gastrointestinal: soft, nt, nd    Extremities: wwp, b/l LE edema 2+    Vascular: peripheral pulses palpable     Incision Sites: MSI with wound vac with adequate seal and suction     LABS:                        10.0   8.45  )-----------( 120      ( 09 Sep 2021 06:43 )             32.8       COUMADIN: no        09-09    131<L>  |  98  |  22  ----------------------------<  101<H>  4.8   |  22  |  1.10    Ca    9.3      09 Sep 2021 06:43  Mg     2.3     09-09    TPro  7.3  /  Alb  3.7  /  TBili  0.7  /  DBili  x   /  AST  20  /  ALT  15  /  AlkPhos  94  09-09          Discharge CXR:  No significant pleural effusions, no ptx, no collections

## 2021-09-09 NOTE — DISCHARGE NOTE PROVIDER - CARE PROVIDERS DIRECT ADDRESSES
,[email or web address],[email or web address]

## 2021-09-09 NOTE — CHART NOTE - NSCHARTNOTEFT_GEN_A_CORE
Exam:  US Chest    Procedure Date: 9/9/21    History: 76y Female whose CXR today shows a possible ____bilateral_____ sided pleural effusion.  Pt is s/p AVR, ascending, hemiarch replacement.       Findings:                    Evaluation of the ___left_______ side of the thoracic cavity demonstrates                   trace pleural effusion                  Lung is freely movable with in thoracic cavity                    Evaluation of the ___right_______ side of the thoracic cavity demonstrates                   no pleural effusion, + atelectasis     Impression:  trace left effusion, no indication for intervention
Home wound Vac system removed and placed at the bedside. Wound appears 12cm x 8cm 1cm. Tissues appears healthy with healthy granulation tissue. New wound vac (hospital system) measured and placed on patient. Adequate suction achieved. Patient tolerated the procedure well. No issues.

## 2021-09-09 NOTE — DISCHARGE NOTE PROVIDER - HOSPITAL COURSE
76 year old female, current everyday smoker with PMHx of HTN, Spinal Stenosis, Arthritis, Bicuspid aortic valve with known aortic stenosis evaluated by Dr. Muller as an outpatient and presented to [facility] on 8/8/2021 to complete preoperative workup. Patient underwent AVR, ascending/hemiarch replacement with frozen elephant trunk and left aorto-subclavian bypass, CABG x2 with Dr. Muller on 8/9/2021. Post-operative course complicated by prolonged intubation, moderate pericardial effusion drained by IR on POD7, Sternal wound erythema s/p sternal debridement and wound vac placement Klebsiella on POD 17, multiple episodes of AF with RVR/hypotension requiring amiodarone with subsequent bradycardia requiring PPM on POD 23 and FIDEL, resolved with hydration. Pt was discharged home on 9/3/21 but represented to the hospital on 9/4/21 due to failure to thrive at home and volume overload. As per Dr. Muller the patient was stable and ready for discharge to [facility] on 9/9/21.     Over 30 minutes was spent with the patient reviewing the discharge material including medications, follow up appointments, recovery, concerning symptoms, and how to contact their health care providers if they have questions 76 year old female, current everyday smoker with PMHx of HTN, Spinal Stenosis, Arthritis, Bicuspid aortic valve with known aortic stenosis evaluated by Dr. Muller as an outpatient and presented to [facility] on 8/8/2021 to complete preoperative workup. Patient underwent AVR, ascending/hemiarch replacement with frozen elephant trunk and left aorto-subclavian bypass, CABG x2 with Dr. Muller on 8/9/2021. Post-operative course complicated by prolonged intubation, moderate pericardial effusion drained by IR on POD7, Sternal wound erythema s/p sternal debridement and wound vac placement Klebsiella on POD 17, multiple episodes of AF with RVR/hypotension requiring amiodarone with subsequent bradycardia requiring PPM on POD 23 and FIDEL, resolved with hydration. Pt was discharged home on 9/3/21 but represented to the hospital on 9/4/21 due to failure to thrive at home and volume overload. As per Dr. Muller the patient was stable and ready for discharge to [facility] on 9/9/21.     Over 30 minutes was spent with the patient reviewing the discharge material including medications, follow up appointments, recovery, concerning symptoms, and how to contact their health care providers if they have questions    [facility] phone number: [phone]

## 2021-09-09 NOTE — DISCHARGE NOTE NURSING/CASE MANAGEMENT/SOCIAL WORK - NSDCPEEMAIL_GEN_ALL_CORE
[facility] for Tobacco Control email [email or web address]

## 2021-09-09 NOTE — PROGRESS NOTE ADULT - REASON FOR ADMISSION
Shortness of Breath / Rehab placement

## 2021-09-09 NOTE — DISCHARGE NOTE PROVIDER - NSDCMRMEDTOKEN_GEN_ALL_CORE_FT
acetaminophen 325 mg oral tablet: 2 tab(s) orally every 6 hours, As needed, Severe Pain (7 - 10)  ALPRAZolam 0.25 mg oral tablet: 1 tab(s) orally 2 times a day, As needed, anxiety MDD:2  amiodarone 200 mg oral tablet: 1 tab(s) orally once a day  Aspirin Enteric Coated 81 mg oral delayed release tablet: 1 tab(s) orally once a day  atorvastatin 10 mg oral tablet: 1 tab(s) orally once a day  budesonide-formoterol 160 mcg-4.5 mcg/inh inhalation aerosol: 1 puff(s) inhaled 2 times a day   Ceftriaxone 2G IV every 24 hours. End Date 9/14/21 :   furosemide 40 mg oral tablet: 1 tab(s) orally every 12 hours   Home Oxygen : 2L Nasal Cannula PRN   Height: 152.4 cm   Weight: 85.6 Kg   ICD Code: J44.9  lisinopril 10 mg oral tablet: 1 tab(s) orally once a day  oxycodone-acetaminophen 5 mg-325 mg oral tablet: 1 tab(s) orally every 6 hours, As needed, Moderate Pain (4 - 6) MDD:4  polyethylene glycol 3350 oral powder for reconstitution: 17 gram(s) orally once a day, As Needed -for constipation   potassium chloride 20 mEq oral tablet, extended release: 1 tab(s) orally every 12 hours   senna oral tablet: 2 tab(s) orally once a day (at bedtime)   acetaminophen 325 mg oral tablet: 2 tab(s) orally every 6 hours  ALPRAZolam 0.25 mg oral tablet: 1 tab(s) orally 2 times a day, As needed, anxiety MDD:2  amiodarone 200 mg oral tablet: 1 tab(s) orally once a day  Aspirin Enteric Coated 81 mg oral delayed release tablet: 1 tab(s) orally once a day  atorvastatin 10 mg oral tablet: 1 tab(s) orally once a day (at bedtime)  bisacodyl 10 mg rectal suppository: 1 suppository(ies) rectal once a day, As needed, Constipation  cefTRIAXone 2 g/50 mL-iso-osmotic dextrose intravenous solution: 2 gram(s) intravenous every 24 hours  Lasix 40 mg oral tablet: 1 tab(s) orally once a day  metoprolol tartrate 25 mg oral tablet: 0.5 tab(s) orally every 12 hours  oxyCODONE 5 mg oral tablet: 1 tab(s) orally every 6 hours, As needed, Mild Pain (1 - 3)  polyethylene glycol 3350 oral powder for reconstitution: 17 gram(s) orally once a day, As needed, Constipation  potassium chloride 20 mEq oral tablet, extended release: 1 tab(s) orally 2 times a day  senna oral tablet: 2 tab(s) orally once a day (at bedtime)   acetaminophen 325 mg oral tablet: 2 tab(s) orally every 6 hours  ALPRAZolam 0.25 mg oral tablet: 1 tab(s) orally 2 times a day, As needed, anxiety MDD:2  amiodarone 200 mg oral tablet: 1 tab(s) orally once a day  Aspirin Enteric Coated 81 mg oral delayed release tablet: 1 tab(s) orally once a day  atorvastatin 10 mg oral tablet: 1 tab(s) orally once a day (at bedtime)  bisacodyl 10 mg rectal suppository: 1 suppository(ies) rectal once a day, As needed, Constipation  cefTRIAXone 2 g/50 mL-iso-osmotic dextrose intravenous solution: 2 gram(s) intravenous every 24 hours  Lasix 40 mg oral tablet: 1 tab(s) orally every 12 hours  metoprolol tartrate 25 mg oral tablet: 0.5 tab(s) orally every 12 hours  oxyCODONE 5 mg oral tablet: 1 tab(s) orally every 6 hours, As needed, Mild Pain (1 - 3)  polyethylene glycol 3350 oral powder for reconstitution: 17 gram(s) orally once a day, As needed, Constipation  potassium chloride 20 mEq oral tablet, extended release: 1 tab(s) orally 2 times a day  senna oral tablet: 2 tab(s) orally once a day (at bedtime)

## 2021-09-09 NOTE — DISCHARGE NOTE PROVIDER - PROVIDER TOKENS
PROVIDER:[TOKEN:[[id number]],SCHEDULEDAPPT:[09/15/2021],SCHEDULEDAPPTTIME:[10:30 AM]],PROVIDER:[TOKEN:[[id number]],SCHEDULEDAPPT:[10/07/2021],SCHEDULEDAPPTTIME:[02:30 PM]],PROVIDER:[TOKEN:[[id number]],SCHEDULEDAPPT:[09/14/2021],SCHEDULEDAPPTTIME:[02:20 PM]],PROVIDER:[TOKEN:[[id number]],SCHEDULEDAPPT:[09/17/2021],SCHEDULEDAPPTTIME:[01:30 PM]]

## 2021-09-13 ENCOUNTER — NON-APPOINTMENT (OUTPATIENT)
Age: 76
End: 2021-09-13

## 2021-09-13 ENCOUNTER — TRANSCRIPTION ENCOUNTER (OUTPATIENT)
Age: 76
End: 2021-09-13

## 2021-09-14 ENCOUNTER — TRANSCRIPTION ENCOUNTER (OUTPATIENT)
Age: 76
End: 2021-09-14

## 2021-09-14 ENCOUNTER — APPOINTMENT (OUTPATIENT)
Dept: INFECTIOUS DISEASE | Facility: CLINIC | Age: 76
End: 2021-09-14
Payer: COMMERCIAL

## 2021-09-14 DIAGNOSIS — F17.210 NICOTINE DEPENDENCE, CIGARETTES, UNCOMPLICATED: ICD-10-CM

## 2021-09-14 DIAGNOSIS — Z82.49 FAMILY HISTORY OF ISCHEMIC HEART DISEASE AND OTHER DISEASES OF THE CIRCULATORY SYSTEM: ICD-10-CM

## 2021-09-14 DIAGNOSIS — D62 ACUTE POSTHEMORRHAGIC ANEMIA: ICD-10-CM

## 2021-09-14 DIAGNOSIS — E87.2 ACIDOSIS: ICD-10-CM

## 2021-09-14 DIAGNOSIS — D69.6 THROMBOCYTOPENIA, UNSPECIFIED: ICD-10-CM

## 2021-09-14 DIAGNOSIS — I97.710 INTRAOPERATIVE CARDIAC ARREST DURING CARDIAC SURGERY: ICD-10-CM

## 2021-09-14 DIAGNOSIS — R45.1 RESTLESSNESS AND AGITATION: ICD-10-CM

## 2021-09-14 DIAGNOSIS — Z78.1 PHYSICAL RESTRAINT STATUS: ICD-10-CM
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
## 2021-09-14 DIAGNOSIS — Z95.1 PRESENCE OF AORTOCORONARY BYPASS GRAFT: ICD-10-CM

## 2021-09-14 DIAGNOSIS — I50.41 ACUTE COMBINED SYSTOLIC (CONGESTIVE) AND DIASTOLIC (CONGESTIVE) HEART FAILURE: ICD-10-CM

## 2021-09-14 DIAGNOSIS — T41.205A ADVERSE EFFECT OF UNSPECIFIED GENERAL ANESTHETICS, INITIAL ENCOUNTER: ICD-10-CM

## 2021-09-14 DIAGNOSIS — J44.9 CHRONIC OBSTRUCTIVE PULMONARY DISEASE, UNSPECIFIED: ICD-10-CM

## 2021-09-14 DIAGNOSIS — Z79.2 LONG TERM (CURRENT) USE OF ANTIBIOTICS: ICD-10-CM

## 2021-09-14 DIAGNOSIS — F05 DELIRIUM DUE TO KNOWN PHYSIOLOGICAL CONDITION: ICD-10-CM

## 2021-09-14 DIAGNOSIS — Q23.1 CONGENITAL INSUFFICIENCY OF AORTIC VALVE: ICD-10-CM

## 2021-09-14 DIAGNOSIS — T81.19XA OTHER POSTPROCEDURAL SHOCK, INITIAL ENCOUNTER: ICD-10-CM
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
## 2021-09-14 DIAGNOSIS — I48.0 PAROXYSMAL ATRIAL FIBRILLATION: ICD-10-CM

## 2021-09-14 DIAGNOSIS — I49.5 SICK SINUS SYNDROME: ICD-10-CM

## 2021-09-14 DIAGNOSIS — T81.41XA INFECTION FOLLOWING A PROCEDURE, SUPERFICIAL INCISIONAL SURGICAL SITE, INITIAL ENCOUNTER: ICD-10-CM

## 2021-09-14 DIAGNOSIS — Z00.6 ENCOUNTER FOR EXAMINATION FOR NORMAL COMPARISON AND CONTROL IN CLINICAL RESEARCH PROGRAM: ICD-10-CM

## 2021-09-14 DIAGNOSIS — I95.9 HYPOTENSION, UNSPECIFIED: ICD-10-CM

## 2021-09-14 DIAGNOSIS — I31.3 PERICARDIAL EFFUSION (NONINFLAMMATORY): ICD-10-CM

## 2021-09-14 DIAGNOSIS — N17.0 ACUTE KIDNEY FAILURE WITH TUBULAR NECROSIS: ICD-10-CM

## 2021-09-14 DIAGNOSIS — B96.1 KLEBSIELLA PNEUMONIAE [K. PNEUMONIAE] AS THE CAUSE OF DISEASES CLASSIFIED ELSEWHERE: ICD-10-CM

## 2021-09-14 DIAGNOSIS — T81.49XA INFECTION FOLLOWING A PROCEDURE, OTHER SURGICAL SITE, INITIAL ENCOUNTER: ICD-10-CM

## 2021-09-14 DIAGNOSIS — Z99.11 DEPENDENCE ON RESPIRATOR [VENTILATOR] STATUS: ICD-10-CM

## 2021-09-14 DIAGNOSIS — G92 TOXIC ENCEPHALOPATHY: ICD-10-CM

## 2021-09-14 DIAGNOSIS — R13.10 DYSPHAGIA, UNSPECIFIED: ICD-10-CM

## 2021-09-14 DIAGNOSIS — I25.10 ATHEROSCLEROTIC HEART DISEASE OF NATIVE CORONARY ARTERY WITHOUT ANGINA PECTORIS: ICD-10-CM
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
## 2021-09-14 DIAGNOSIS — T81.719A COMPLICATION OF UNSPECIFIED ARTERY FOLLOWING A PROCEDURE, NOT ELSEWHERE CLASSIFIED, INITIAL ENCOUNTER: ICD-10-CM

## 2021-09-14 DIAGNOSIS — I11.0 HYPERTENSIVE HEART DISEASE WITH HEART FAILURE: ICD-10-CM

## 2021-09-14 DIAGNOSIS — R00.1 BRADYCARDIA, UNSPECIFIED: ICD-10-CM

## 2021-09-14 DIAGNOSIS — J95.821 ACUTE POSTPROCEDURAL RESPIRATORY FAILURE: ICD-10-CM

## 2021-09-14 DIAGNOSIS — T81.31XA DISRUPTION OF EXTERNAL OPERATION (SURGICAL) WOUND, NOT ELSEWHERE CLASSIFIED, INITIAL ENCOUNTER: ICD-10-CM

## 2021-09-14 DIAGNOSIS — I71.2 THORACIC AORTIC ANEURYSM, WITHOUT RUPTURE: ICD-10-CM

## 2021-09-14 PROCEDURE — 99443: CPT

## 2021-09-15 ENCOUNTER — APPOINTMENT (OUTPATIENT)
Dept: CARDIOTHORACIC SURGERY | Facility: CLINIC | Age: 76
End: 2021-09-15
Payer: COMMERCIAL

## 2021-09-15 ENCOUNTER — TRANSCRIPTION ENCOUNTER (OUTPATIENT)
Age: 76
End: 2021-09-15

## 2021-09-15 ENCOUNTER — OUTPATIENT (OUTPATIENT)
Dept: OUTPATIENT SERVICES | Facility: HOSPITAL | Age: 76
LOS: 1 days | End: 2021-09-15
Payer: COMMERCIAL

## 2021-09-15 ENCOUNTER — NON-APPOINTMENT (OUTPATIENT)
Age: 76
End: 2021-09-15

## 2021-09-15 VITALS
HEART RATE: 54 BPM | OXYGEN SATURATION: 97 % | DIASTOLIC BLOOD PRESSURE: 58 MMHG | TEMPERATURE: 98 F | SYSTOLIC BLOOD PRESSURE: 118 MMHG | RESPIRATION RATE: 16 BRPM

## 2021-09-15 DIAGNOSIS — Z95.1 PRESENCE OF AORTOCORONARY BYPASS GRAFT: Chronic | ICD-10-CM

## 2021-09-15 DIAGNOSIS — Z95.828 PRESENCE OF OTHER VASCULAR IMPLANTS AND GRAFTS: Chronic | ICD-10-CM

## 2021-09-15 DIAGNOSIS — Z95.2 PRESENCE OF PROSTHETIC HEART VALVE: Chronic | ICD-10-CM

## 2021-09-15 DIAGNOSIS — Z95.0 PRESENCE OF CARDIAC PACEMAKER: Chronic | ICD-10-CM

## 2021-09-15 PROBLEM — T81.49XA SURGICAL SITE INFECTION: Status: ACTIVE | Noted: 2021-09-15

## 2021-09-15 PROBLEM — Z79.2 RECEIVING INTRAVENOUS ANTIBIOTIC TREATMENT AS OUTPATIENT: Status: ACTIVE | Noted: 2021-09-15

## 2021-09-15 PROCEDURE — 71046 X-RAY EXAM CHEST 2 VIEWS: CPT

## 2021-09-15 PROCEDURE — 99024 POSTOP FOLLOW-UP VISIT: CPT

## 2021-09-15 PROCEDURE — 71046 X-RAY EXAM CHEST 2 VIEWS: CPT | Mod: 26

## 2021-09-15 NOTE — HISTORY OF PRESENT ILLNESS
[FreeTextEntry1] : 76F h/o bicuspid AV, severe AS, CAD and aortic arch aneurysm p/w elective cardiac surgery, s/p AVR (tissue), ascending, hemiarch placement, frozen elephant trunk, L aorto-subclavian bypass, CABG x2 on 8/9/21 c/b sternotomy SSI with K. oxytoca on IV CTX x 2 weeks (end 9/14) p/w management of sternotomy SSI.\par \par Patient was supposed to have in-person visit however she is at rehab in [facility] and rehab was unable to arrange transportation.  There was an issue with telehealth, and the visit was converted to phone visit.\par \par Patient was admitted from 8/7 - 9/3, underwent elective AVR (tissue), ascending, hemiarch placement, frozen elephant trunk, L aorto-subclavian bypass, CABG x2 on 8/9.   Course c/b pericardial effusion s/p drainage, sternal wound infection s/p multiple bedside debridement (WCx grew K. oxytoca/Raoutella ornithinolytica) which improved on IV CTX, and Afib RVR with hypotention.  Patient was sent home with IV abx. Then the next day she was readmitted for FTT and discharged to [facility].  \par \par Patient reports feeling better, participating PT at rehab.  Denied fever/chills, n/v/d.  Per RN Javon, patient is doing well, wound is much smaller, [id number], with minimal drainage, no surrounding erythema, has visible granulation tissue, no clinical e/o infection. \par

## 2021-09-15 NOTE — DATA REVIEWED
[FreeTextEntry1] : 8/30 sternal WCx: Klebsiella oxytoca (pan S except for Amp)\par 8/30 sternal WCx: Klebsiella oxytoca \par 8/26 sternal WCx: K. oxytoca/Raoutella ornithinolytica (amp I),

## 2021-09-15 NOTE — REASON FOR VISIT
[Other Location: e.g. School (Enter Location, City,State)___] : at [unfilled], at the time of the visit. [Medical Office: ([facility])___] : at the medical office located in  [Other:____] : [unfilled] [Verbal consent obtained from patient] : the patient, [unfilled] [Follow-Up: _____] : a [unfilled] follow-up visit [FreeTextEntry1] : sernotomy surgical site infection

## 2021-09-15 NOTE — ASSESSMENT
[FreeTextEntry1] : 76F h/o bicuspid AV, severe AS, CAD and aortic arch aneurysm p/w elective cardiac surgery, s/p AVR (tissue), ascending, hemiarch placement, frozen elephant trunk, L aorto-subclavian bypass, CABG x2 on 8/9/21 c/b sternotomy SSI with K. oxytoca on IV CTX x 2 weeks (end 9/14) p/w management of sternotomy SSI.\par \par Per rehab ANDRE Alejandro, sternal wound is healing well, much improved from prior, no clinical e/o infection.  \par \par - compete CTX 2g IV q24h for Klebsiella x 2 weeks course (last day today)\par - OK to remove PICC after 9/14 last dose  \par - Rehab: fax [phone]; attn: Dr. Lulu Gilman \par - RTC 2-3 weeks \par - f/u Dr. Muller

## 2021-09-16 DIAGNOSIS — Z95.1 PRESENCE OF AORTOCORONARY BYPASS GRAFT: ICD-10-CM

## 2021-09-16 DIAGNOSIS — M19.90 UNSPECIFIED OSTEOARTHRITIS, UNSPECIFIED SITE: ICD-10-CM

## 2021-09-16 DIAGNOSIS — M79.89 OTHER SPECIFIED SOFT TISSUE DISORDERS: ICD-10-CM

## 2021-09-16 DIAGNOSIS — F17.210 NICOTINE DEPENDENCE, CIGARETTES, UNCOMPLICATED: ICD-10-CM
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
## 2021-09-16 DIAGNOSIS — E87.70 FLUID OVERLOAD, UNSPECIFIED: ICD-10-CM

## 2021-09-16 DIAGNOSIS — R06.02 SHORTNESS OF BREATH: ICD-10-CM

## 2021-09-16 DIAGNOSIS — I25.10 ATHEROSCLEROTIC HEART DISEASE OF NATIVE CORONARY ARTERY WITHOUT ANGINA PECTORIS: ICD-10-CM

## 2021-09-16 DIAGNOSIS — R62.7 ADULT FAILURE TO THRIVE: ICD-10-CM

## 2021-09-16 DIAGNOSIS — Z95.2 PRESENCE OF PROSTHETIC HEART VALVE: ICD-10-CM

## 2021-09-16 DIAGNOSIS — I10 ESSENTIAL (PRIMARY) HYPERTENSION: ICD-10-CM

## 2021-09-16 DIAGNOSIS — Z82.49 FAMILY HISTORY OF ISCHEMIC HEART DISEASE AND OTHER DISEASES OF THE CIRCULATORY SYSTEM: ICD-10-CM

## 2021-09-16 DIAGNOSIS — Z95.0 PRESENCE OF CARDIAC PACEMAKER: ICD-10-CM

## 2021-09-16 DIAGNOSIS — T81.41XA INFECTION FOLLOWING A PROCEDURE, SUPERFICIAL INCISIONAL SURGICAL SITE, INITIAL ENCOUNTER: ICD-10-CM

## 2021-09-16 DIAGNOSIS — B96.1 KLEBSIELLA PNEUMONIAE [K. PNEUMONIAE] AS THE CAUSE OF DISEASES CLASSIFIED ELSEWHERE: ICD-10-CM

## 2021-09-17 ENCOUNTER — APPOINTMENT (OUTPATIENT)
Dept: CARDIOLOGY | Facility: CLINIC | Age: 76
End: 2021-09-17
Payer: COMMERCIAL

## 2021-09-17 VITALS
HEIGHT: 60 IN | WEIGHT: 170 LBS | SYSTOLIC BLOOD PRESSURE: 106 MMHG | BODY MASS INDEX: 33.38 KG/M2 | HEART RATE: 55 BPM | DIASTOLIC BLOOD PRESSURE: 60 MMHG

## 2021-09-17 DIAGNOSIS — A49.8 OTHER BACTERIAL INFECTIONS OF UNSPECIFIED SITE: ICD-10-CM

## 2021-09-17 DIAGNOSIS — Z00.00 ENCOUNTER FOR GENERAL ADULT MEDICAL EXAMINATION W/OUT ABNORMAL FINDINGS: ICD-10-CM

## 2021-09-17 PROCEDURE — 99215 OFFICE O/P EST HI 40 MIN: CPT

## 2021-09-17 PROCEDURE — 93000 ELECTROCARDIOGRAM COMPLETE: CPT

## 2021-09-17 RX ORDER — NICOTINE POLACRILEX 4 MG/1
4 GUM, CHEWING ORAL
Qty: 60 | Refills: 0 | Status: DISCONTINUED | COMMUNITY
Start: 2021-07-22 | End: 2021-09-17

## 2021-09-17 RX ORDER — NICOTINE 21 MG/24HR
21 PATCH, TRANSDERMAL 24 HOURS TRANSDERMAL DAILY
Qty: 30 | Refills: 0 | Status: DISCONTINUED | COMMUNITY
Start: 2021-07-22 | End: 2021-09-17

## 2021-09-17 NOTE — DISCUSSION/SUMMARY
[FreeTextEntry1] : Maintain present medications\par No further Cardiac invasive testing at this time\par Patient will need a repeat 2D echo doppler once her sternal wound heals. She has a persistent drain in place and dressings over the wound site which is still healing.\par Patient will need CT surgery to decide upon the removal of the drain. Patient is apparently off antibiotics.\par Maintain metoprolol,ASA lipitor, and furosemide as ordered.\par She was placed on amiodarone post operatively\par RV in 6 weeks

## 2021-09-17 NOTE — REVIEW OF SYSTEMS
[SOB] : shortness of breath [Dyspnea on exertion] : dyspnea during exertion [Negative] : Heme/Lymph [Chest Discomfort] : chest discomfort

## 2021-09-17 NOTE — ASSESSMENT
[FreeTextEntry1] : S/P AVR, for  Severe AS, 2v CABG, ascending aortic hemiarch replacement left aorto subclavian bypass complicated by a sternal wound infection requiring skilled nursing facility care and rehabilitation at [facility]\par Abnormal Lexiscan nuclear test c/w myocardial ischemia\par Moderate  MR\par Moderate TR\par CAD as outlined in cardiac catheterization report\par Hypertensive heart disease\par Exertional dyspnea\par Cigarette smoker with possible COPD\par Hyperlipidemia\par Possible DM

## 2021-09-17 NOTE — PHYSICAL EXAM
[General Appearance - Well Developed] : well developed [Normal Appearance] : normal appearance [General Appearance - Well Nourished] : well nourished [General Appearance - In No Acute Distress] : no acute distress [Normal Oropharynx] : normal oropharynx [Normal Jugular Venous V Waves Present] : normal jugular venous V waves present [Heart Rate And Rhythm] : heart rate and rhythm were normal [Heart Sounds] : normal S1 and S2 [Edema] : no peripheral edema present [Abdomen Soft] : soft [Abnormal Walk] : normal gait [Abdomen Tenderness] : non-tender [Nail Clubbing] : no clubbing of the fingernails [Cyanosis, Localized] : no localized cyanosis [Skin Color & Pigmentation] : normal skin color and pigmentation [Skin Turgor] : normal skin turgor [Oriented To Time, Place, And Person] : oriented to person, place, and time [] : no rash [Affect] : the affect was normal [Well Developed] : well developed [Well Nourished] : well nourished [No Acute Distress] : no acute distress [Normal Conjunctiva] : normal conjunctiva [Normal Venous Pressure] : normal venous pressure [No Carotid Bruit] : no carotid bruit [Normal S1, S2] : normal S1, S2 [No Murmur] : no murmur [No Rub] : no rub [No Gallop] : no gallop [Clear Lung Fields] : clear lung fields [Good Air Entry] : good air entry [No Respiratory Distress] : no respiratory distress  [Soft] : abdomen soft [Non Tender] : non-tender [No Masses/organomegaly] : no masses/organomegaly [Normal Bowel Sounds] : normal bowel sounds [Normal Gait] : normal gait [No Edema] : no edema [No Cyanosis] : no cyanosis [No Clubbing] : no clubbing [No Varicosities] : no varicosities [No Rash] : no rash [No Skin Lesions] : no skin lesions [Moves all extremities] : moves all extremities [No Focal Deficits] : no focal deficits [Normal Speech] : normal speech [Alert and Oriented] : alert and oriented [Normal memory] : normal memory [de-identified] : Sternal wound infection with presence of a drain [FreeTextEntry1] : Grade III/VI systolic ejectionmurmur at RSB, grade II/VI systolic murmur at LSB

## 2021-09-17 NOTE — REASON FOR VISIT
[FreeTextEntry1] : Patient presents for a follow up visit s/p AVR, ascending aortic aneurysm repair, left aorto sbclavian bypass, 2 vessel CABG, and thoracic stent graft, with PPM all complicated by a sternal wound infection and sternal debridement, atrial fibrillation, prolonged intubation and infection with klebsiella

## 2021-09-20 ENCOUNTER — TRANSCRIPTION ENCOUNTER (OUTPATIENT)
Age: 76
End: 2021-09-20

## 2021-09-24 ENCOUNTER — TRANSCRIPTION ENCOUNTER (OUTPATIENT)
Age: 76
End: 2021-09-24

## 2021-09-27 ENCOUNTER — TRANSCRIPTION ENCOUNTER (OUTPATIENT)
Age: 76
End: 2021-09-27

## 2021-09-28 ENCOUNTER — TRANSCRIPTION ENCOUNTER (OUTPATIENT)
Age: 76
End: 2021-09-28

## 2021-09-28 ENCOUNTER — NON-APPOINTMENT (OUTPATIENT)
Age: 76
End: 2021-09-28

## 2021-09-28 ENCOUNTER — INPATIENT (INPATIENT)
Facility: HOSPITAL | Age: 76
LOS: 5 days | Discharge: HOME | DRG: 908 | End: 2021-10-04
Attending: THORACIC SURGERY (CARDIOTHORACIC VASCULAR SURGERY) | Admitting: THORACIC SURGERY (CARDIOTHORACIC VASCULAR SURGERY)
Payer: COMMERCIAL

## 2021-09-28 VITALS
RESPIRATION RATE: 18 BRPM | HEART RATE: 65 BPM | TEMPERATURE: 98 F | HEIGHT: 60.5 IN | SYSTOLIC BLOOD PRESSURE: 149 MMHG | OXYGEN SATURATION: 100 % | DIASTOLIC BLOOD PRESSURE: 71 MMHG | WEIGHT: 164.02 LBS

## 2021-09-28 DIAGNOSIS — Z95.0 PRESENCE OF CARDIAC PACEMAKER: Chronic | ICD-10-CM

## 2021-09-28 DIAGNOSIS — Z95.2 PRESENCE OF PROSTHETIC HEART VALVE: Chronic | ICD-10-CM

## 2021-09-28 DIAGNOSIS — Z95.1 PRESENCE OF AORTOCORONARY BYPASS GRAFT: Chronic | ICD-10-CM

## 2021-09-28 DIAGNOSIS — Z95.828 PRESENCE OF OTHER VASCULAR IMPLANTS AND GRAFTS: Chronic | ICD-10-CM

## 2021-09-28 LAB
ALBUMIN SERPL ELPH-MCNC: 3.6 G/DL — SIGNIFICANT CHANGE UP (ref 3.3–5)
ALP SERPL-CCNC: 94 U/L — SIGNIFICANT CHANGE UP (ref 40–120)
ALT FLD-CCNC: 17 U/L — SIGNIFICANT CHANGE UP (ref 10–45)
ANION GAP SERPL CALC-SCNC: 11 MMOL/L — SIGNIFICANT CHANGE UP (ref 5–17)
APTT BLD: 32.7 SEC — SIGNIFICANT CHANGE UP (ref 27.5–35.5)
AST SERPL-CCNC: 21 U/L — SIGNIFICANT CHANGE UP (ref 10–40)
BASOPHILS # BLD AUTO: 0.07 K/UL — SIGNIFICANT CHANGE UP (ref 0–0.2)
BASOPHILS NFR BLD AUTO: 0.8 % — SIGNIFICANT CHANGE UP (ref 0–2)
BILIRUB SERPL-MCNC: 0.4 MG/DL — SIGNIFICANT CHANGE UP (ref 0.2–1.2)
BLD GP AB SCN SERPL QL: NEGATIVE — SIGNIFICANT CHANGE UP
BUN SERPL-MCNC: 22 MG/DL — SIGNIFICANT CHANGE UP (ref 7–23)
CALCIUM SERPL-MCNC: 9.9 MG/DL — SIGNIFICANT CHANGE UP (ref 8.4–10.5)
CHLORIDE SERPL-SCNC: 106 MMOL/L — SIGNIFICANT CHANGE UP (ref 96–108)
CO2 SERPL-SCNC: 20 MMOL/L — LOW (ref 22–31)
CREAT SERPL-MCNC: 1.04 MG/DL — SIGNIFICANT CHANGE UP (ref 0.5–1.3)
EOSINOPHIL # BLD AUTO: 0.35 K/UL — SIGNIFICANT CHANGE UP (ref 0–0.5)
EOSINOPHIL NFR BLD AUTO: 3.9 % — SIGNIFICANT CHANGE UP (ref 0–6)
GLUCOSE SERPL-MCNC: 105 MG/DL — HIGH (ref 70–99)
HCT VFR BLD CALC: 31.7 % — LOW (ref 34.5–45)
HGB BLD-MCNC: 10.3 G/DL — LOW (ref 11.5–15.5)
IMM GRANULOCYTES NFR BLD AUTO: 0.2 % — SIGNIFICANT CHANGE UP (ref 0–1.5)
INR BLD: 1.05 — SIGNIFICANT CHANGE UP (ref 0.88–1.16)
LYMPHOCYTES # BLD AUTO: 3.07 K/UL — SIGNIFICANT CHANGE UP (ref 1–3.3)
LYMPHOCYTES # BLD AUTO: 34.5 % — SIGNIFICANT CHANGE UP (ref 13–44)
MCHC RBC-ENTMCNC: 31 PG — SIGNIFICANT CHANGE UP (ref 27–34)
MCHC RBC-ENTMCNC: 32.5 GM/DL — SIGNIFICANT CHANGE UP (ref 32–36)
MCV RBC AUTO: 95.5 FL — SIGNIFICANT CHANGE UP (ref 80–100)
MONOCYTES # BLD AUTO: 0.75 K/UL — SIGNIFICANT CHANGE UP (ref 0–0.9)
MONOCYTES NFR BLD AUTO: 8.4 % — SIGNIFICANT CHANGE UP (ref 2–14)
NEUTROPHILS # BLD AUTO: 4.63 K/UL — SIGNIFICANT CHANGE UP (ref 1.8–7.4)
NEUTROPHILS NFR BLD AUTO: 52.2 % — SIGNIFICANT CHANGE UP (ref 43–77)
NRBC # BLD: 0 /100 WBCS — SIGNIFICANT CHANGE UP (ref 0–0)
PLATELET # BLD AUTO: 148 K/UL — LOW (ref 150–400)
POTASSIUM SERPL-MCNC: 4.7 MMOL/L — SIGNIFICANT CHANGE UP (ref 3.5–5.3)
POTASSIUM SERPL-SCNC: 4.7 MMOL/L — SIGNIFICANT CHANGE UP (ref 3.5–5.3)
PROT SERPL-MCNC: 7.2 G/DL — SIGNIFICANT CHANGE UP (ref 6–8.3)
PROTHROM AB SERPL-ACNC: 12.6 SEC — SIGNIFICANT CHANGE UP (ref 10.6–13.6)
RBC # BLD: 3.32 M/UL — LOW (ref 3.8–5.2)
RBC # FLD: 17.3 % — HIGH (ref 10.3–14.5)
RH IG SCN BLD-IMP: POSITIVE — SIGNIFICANT CHANGE UP
SODIUM SERPL-SCNC: 137 MMOL/L — SIGNIFICANT CHANGE UP (ref 135–145)
WBC # BLD: 8.89 K/UL — SIGNIFICANT CHANGE UP (ref 3.8–10.5)
WBC # FLD AUTO: 8.89 K/UL — SIGNIFICANT CHANGE UP (ref 3.8–10.5)

## 2021-09-28 PROCEDURE — 99285 EMERGENCY DEPT VISIT HI MDM: CPT

## 2021-09-28 PROCEDURE — 93010 ELECTROCARDIOGRAM REPORT: CPT

## 2021-09-28 PROCEDURE — 99221 1ST HOSP IP/OBS SF/LOW 40: CPT

## 2021-09-28 PROCEDURE — 71045 X-RAY EXAM CHEST 1 VIEW: CPT | Mod: 26

## 2021-09-28 RX ORDER — AMIODARONE HYDROCHLORIDE 400 MG/1
200 TABLET ORAL DAILY
Refills: 0 | Status: DISCONTINUED | OUTPATIENT
Start: 2021-09-28 | End: 2021-09-29

## 2021-09-28 RX ORDER — ATORVASTATIN CALCIUM 80 MG/1
10 TABLET, FILM COATED ORAL AT BEDTIME
Refills: 0 | Status: DISCONTINUED | OUTPATIENT
Start: 2021-09-28 | End: 2021-09-29

## 2021-09-28 RX ORDER — SODIUM CHLORIDE 9 MG/ML
3 INJECTION INTRAMUSCULAR; INTRAVENOUS; SUBCUTANEOUS EVERY 8 HOURS
Refills: 0 | Status: DISCONTINUED | OUTPATIENT
Start: 2021-09-28 | End: 2021-09-29

## 2021-09-28 RX ORDER — ASPIRIN/CALCIUM CARB/MAGNESIUM 324 MG
81 TABLET ORAL DAILY
Refills: 0 | Status: DISCONTINUED | OUTPATIENT
Start: 2021-09-28 | End: 2021-09-29

## 2021-09-28 RX ORDER — METOPROLOL TARTRATE 50 MG
12.5 TABLET ORAL EVERY 12 HOURS
Refills: 0 | Status: DISCONTINUED | OUTPATIENT
Start: 2021-09-28 | End: 2021-09-29

## 2021-09-28 RX ORDER — ACETAMINOPHEN 500 MG
650 TABLET ORAL EVERY 6 HOURS
Refills: 0 | Status: DISCONTINUED | OUTPATIENT
Start: 2021-09-28 | End: 2021-09-29

## 2021-09-28 RX ORDER — ALPRAZOLAM 0.25 MG
0.25 TABLET ORAL EVERY 12 HOURS
Refills: 0 | Status: DISCONTINUED | OUTPATIENT
Start: 2021-09-28 | End: 2021-09-29

## 2021-09-28 RX ORDER — FUROSEMIDE 40 MG
40 TABLET ORAL DAILY
Refills: 0 | Status: DISCONTINUED | OUTPATIENT
Start: 2021-09-28 | End: 2021-09-29

## 2021-09-28 RX ADMIN — Medication 12.5 MILLIGRAM(S): at 23:22

## 2021-09-28 NOTE — ED PROVIDER NOTE - CLINICAL SUMMARY MEDICAL DECISION MAKING FREE TEXT BOX
avss. referred in for anticipated wound debridement. preop labs/ekg/cxr done. cts consulted. will admit per reccs.

## 2021-09-28 NOTE — ED ADULT TRIAGE NOTE - PAIN: PRESENCE, MLM
Health Call Center    Phone Message    May a detailed message be left on voicemail: yes    Reason for Call: Other: Erick from  [facility] calling with an update on patient. Patient's rash on her arms, back, and chest is starting to look a little better, still red. Patient is having peeling skin on her hands & feet. Patient is peeling of her skin. Wondering if Dr. Chen wants to continue with the antibiotic. Patient seen by Derm recently & they said okay for the antibiotic. Please contact Erick at [phone]     Action Taken: Message routed to:  [facility] ([facility]): Infectious Disease     denies pain/discomfort

## 2021-09-28 NOTE — H&P ADULT - NSHPREVIEWOFSYSTEMS_GEN_ALL_CORE
Review of Systems  CONSTITUTIONAL:  Denies Fevers / chills, sweats, fatigue, weight loss, weight gain                                      NEURO:  Denies paresthesias, seizures, syncope, confusion                                                                                EYES:  Denies Blurry vision, discharge, pain, loss of vision                                                                                    ENMT:  Denies Difficulty hearing, vertigo, dysphagia, epistaxis, recent dental work                                       CV:  Denies Chest pain, palpitations, HERRERA, orthopnea                                                                                          RESPIRATORY:  Denies Wheezing, SOB, cough / sputum, hemoptysis                                                                GI:  Denies Nausea, vomiting, diarrhea, constipation, melena, difficulty swallowing                                               : Denies Hematuria, dysuria, urgency, incontinence                                                                                         MUSKULOSKELETAL:  Denies arthritis, joint swelling, muscle weakness                                                             SKIN/BREAST:  Denies rash, itching, hair loss, masses                                                                                            PSYCH:  Denies depression, anxiety, suicidal ideation                                                                                               HEME/LYMPH:  Denies bruises easily, enlarged lymph nodes, tender lymph nodes                                        ENDOCRINE:  Denies cold intolerance, heat intolerance, polydipsia

## 2021-09-28 NOTE — ED ADULT NURSE NOTE - OBJECTIVE STATEMENT
patient is alert and oriented x4.  denies  pain . dressing noted to mid chest. sent by her doctors office to be admitted as per patient.

## 2021-09-28 NOTE — ED PROVIDER NOTE - OBJECTIVE STATEMENT
76F daily smoker, bicupsid aortic valve w/ AS, htn, spinal stenosis, preivously hospitalized for AVR/ascending/hemiarch repalecment w/ frozen elephant trunk and L aorto-subclavian bypass and cabg x2 (8/9/21) c/b sternal wound klebsiella infection s/p sternal debridement/wound vac (8/26/21), subsequently hospitalized for FTT discharged to rehab (9/4/21-9/9/21), since discharged home (9/25/21), now referred from home to ED by dr king for wound vac/wound evaluation. pt reports rehab took back wound vac and her home wound vac is not completely functional. no fever/chills, no uri/cough, no cp/sob, no abd pain/n/v, no dysuria, no pedal edema/pain/rash, no trauma, no etoh-dpt/ivdu.     cts: christine 76F daily smoker, bicupsid aortic valve w/ AS, htn, spinal stenosis, previously hospitalized for AVR/ascending/hemiarch replacement w/ frozen elephant trunk and L aorto-subclavian bypass and cabg x2 (8/9/21) c/b sternal wound klebsiella infection s/p sternal debridement/wound vac (8/26/21), subsequently hospitalized for FTT discharged to rehab (9/4/21-9/9/21), since discharged home (9/25/21), now referred from home to ED by dr king for wound vac/wound evaluation. pt reports rehab took back wound vac and her home wound vac is not completely functional. no fever/chills, no uri/cough, no cp/sob, no abd pain/n/v, no dysuria, no pedal edema/pain/rash, no trauma, no etoh-dpt/ivdu.     cts: christine

## 2021-09-28 NOTE — CONSULT NOTE ADULT - SUBJECTIVE AND OBJECTIVE BOX
Plastic Surgery ER Consult    Pt is a 75 yo smoker with HTN, bicuspid aortic valve, aortic stenosis s/p 8/8/21 AVR, ascending arch replacement, frozen elephant trunk, and left aorto-subclavian bypass, CABG x 2. Pt course complicated by prolonged intubated, pericardial effusion, sternal wound s/p debridement and VAC, ultimately found to have klebsiella infection s/p IV abx course (followed by Dr. Crystal). Patient was at [facility] on 9/9/21, and home on 9/24. Patient was getting home wound vac changes and Dr. Muller instructed to come urgently to the ER. In the ER, the patient is AFVSS with WBC of 8.89.     PMH: as above; spinal stenosis, arthritis  PSH: as above  Meds: per chart  All: Denies   ROS: Pt denies fevers, chills, nausea, vomit, diarrhea, chest pain, SOB. Has otherwise been feeling well. No change in behavior or appetite.     Gen: Pleasant woman, nontoxic  CV: RRR  Pulm: Breathing room air comfortably   Sternum: Open chest wound with yellow fibrous tissue and exposed bone. Not draining, No surrounding cellulitis   Moving all extremities   Abdomen soft, NT/ND      WBC 8.89  HCT 31.7  INR 1.05  Cr 1.04    A/P: Pt is a 75 yo with multiple medical problems and previous cardiac surgery c/b wound infection, debridement and now chronic wound. Plastic surgery consulted for debridement and muscle flap closure.   -Agree with cardiac surgery plan for debridement  -Cardiac OR  -R/B/A to surgery including no surgery discussed. Consent obtained  -Available tomorrow after 12:00     SCW

## 2021-09-28 NOTE — H&P ADULT - ASSESSMENT
A/P:    76 year old female, current everyday smoker with PMHx of HTN, Spinal Stenosis, Arthritis, Bicuspid aortic valve with known aortic stenosis evaluated by Dr. Muller as an outpatient and presented to [facility] on 8/8/2021 to complete preoperative workup. Patient underwent AVR, ascending/hemiarch replacement with frozen elephant trunk and left aorto-subclavian bypass, CABG x2 with Dr. Muller on 8/9/2021. Post-operative course complicated by prolonged intubation, moderate pericardial effusion drained by IR on POD7, Sternal wound erythema s/p sternal debridement and wound vac placement (cultures growing Klebsiella) on POD 17, multiple episodes of AF with RVR/hypotension requiring amiodarone with subsequent bradycardia requiring PPM on POD 23 and FIDEL, resolved with hydration. Pt was discharged home on 9/3/21 but represented to the hospital on 9/4/21 due to failure to thrive at home and volume overload. As per Dr. Muller the patient was stable and ready for discharge to [facility] on 9/9/21. The patient was discharged home from rehab on 9/24. She states she was doing well overall. When her home nurse came to change her wound vac she sent a photograph to Dr. Muller and was noted to have poor wound healing. She was instructed to report to the ED for evaluation for muscle flap.     Neurovascular:   - No delirium. Pain well controlled with current regimen.  -Continue tylenol PRN  - Anxiety: continue xanax PRN    Cardiovascular:   - S/p AVR, ascending/hemiarch replacement with frozen elephant trunk and left aorto-subclavian bypass, CABG x2 8/9/21   - Now represent with poor wound healing of MSI, to undergo muscle flap with Dr. Criss Burks (plastic surgeon)   -Hemodynamically stable. HR controlled  - Post op AF: continue amio  daily, metoprolol 12.5 q12 hours  - Hx of HTN, BP controlled  - CAD s/p CABG: continue ASA, atorvastatin 10    Respiratory:   - 02 Sat = 98% on RA.  -If on oxygen wean to RA from for O2 Sat > 93%.  -Encourage C+DB and Use of IS 10x / hr while awake.  -CXR with no infiltrates, obvious effusions     GI:   - Stable.  -NPO after MN for possible OR tomorrow   -Continue GI PPX with protonix  -PO Diet.    Renal / :  - BUN/Cr stable at 22/1.04  -Continue to monitor renal function.  -Monitor I/O's.  - Replete electrolytes PRN  - Continue diuresis with 40 lasix daily, volume status improved from last admission     Endocrine:    - No known hx DM or thyroid disease     Hematologic:  -H/H stable at 10.3/31.7 with no obvious signs of bleeding  -Coagulation Panel.    ID:  -Pt remains afebrile with no elevation in WBC or signs of infection  - During prior admission wound cultures grew Klebsiella, is now s/p IV abx course (Ceftriaxone), follows with Dr. Crystal  - MSI with no erythema, purulence   -Continue to monitor CBC  -Observe for SIRS/Sepsis Syndrome.    Prophylaxis:  -DVT prophylaxis with 5000 SubQ Heparin q8h.  -SCD's    Disposition:  -Pending OR with plastic surgery

## 2021-09-28 NOTE — ED CLERICAL - NS ED CARE COORDINATION INFORMATION
This patient is enrolled in the [facility] Your [facility] program and has undergone a cardiac surgery procedure and has active care navigation. This patient can be followed up by the care navigation team within 24 hours. To arrange close follow-up or to obtain additional clinical information about this patient, please call the contact number above.     Please call the cardiac surgery team once patient is registered at [phone] for consultation PRIOR to disposition decision.  The patient recently underwent a cardiac surgery procedure and the team can assist in acute medical management.

## 2021-09-28 NOTE — H&P ADULT - HISTORY OF PRESENT ILLNESS
76 year old female, current everyday smoker with PMHx of HTN, Spinal Stenosis, Arthritis, Bicuspid aortic valve with known aortic stenosis evaluated by Dr. Muller as an outpatient and presented to [facility] on 8/8/2021 to complete preoperative workup. Patient underwent AVR, ascending/hemiarch replacement with frozen elephant trunk and left aorto-subclavian bypass, CABG x2 with Dr. Muller on 8/9/2021. Post-operative course complicated by prolonged intubation, moderate pericardial effusion drained by IR on POD7, Sternal wound erythema s/p sternal debridement and wound vac placement (cultures growing Klebsiella) on POD 17, multiple episodes of AF with RVR/hypotension requiring amiodarone with subsequent bradycardia requiring PPM on POD 23 and FIDEL, resolved with hydration. Pt was discharged home on 9/3/21 but represented to the hospital on 9/4/21 due to failure to thrive at home and volume overload. As per Dr. Muller the patient was stable and ready for discharge to [facility] on 9/9/21. The patient was discharged home from rehab on 9/24. She states she was doing well overall. When her home nurse came to change her wound vac she sent a photograph to Dr. Muller and was noted to have poor wound healing. She was instructed to report to the ED for evaluation for muscle flap.

## 2021-09-28 NOTE — ED PROVIDER NOTE - PHYSICAL EXAMINATION
CONST: overweight nontoxic NAD speaking in full sentences  HEAD: atraumatic  EYES: conjunctivae clear, PERRL, EOMI  ENT: mmm  NECK: supple/FROM  CARD: +L chest ppm C/D/I, +central chest scar w/ dressing C/D/I, rrr no murmurs  CHEST: ctab no r/r/w, no stridor/retractions/tripoding  ABD: soft, nd, nttp, no rebound/guarding  EXT: FROM, symmetric distal pulses intact  SKIN: warm, dry, no rash, cap refill <2sec  NEURO: a+ox3, 5/5 strength x4, gross sensation intact x4, baseline gait

## 2021-09-28 NOTE — ED ADULT TRIAGE NOTE - CHIEF COMPLAINT QUOTE
per pt, sent by Dr. Muller for wound debridement. per pt, s/p CABG, has midsternal chest wound that was infected, had wound vac, states "the battery stopped working" denies n/v/d, fever, chills, cough, cp or sob.

## 2021-09-28 NOTE — H&P ADULT - NSHPPHYSICALEXAM_GEN_ALL_CORE
Physical Exam  CONSTITUTIONAL: NAD, assessed laying comfortably in the ED  NEURO: A&OX3, no focal deficits                     EYES: PERRLA  ENMT: Neck supple   CV: RRR, no m/r/g  RESPIRATORY: CTA bilateral posterior lung fields, no wheezes, rales, rhonchi   GI: +BS, NT/ND  : No yanes   MUSKULOSKELETAL: Bilateral LE edema, 2+ by ankles, much improved from last admission. No calf tenderness.   SKIN / BREAST: MSI with poor wound healing. No purulence, tissue appears healthy. Covered with clean gauze dressing.

## 2021-09-28 NOTE — H&P ADULT - NSHPSOCIALHISTORY_GEN_ALL_CORE
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

## 2021-09-29 LAB
A1C WITH ESTIMATED AVERAGE GLUCOSE RESULT: 4.8 % — SIGNIFICANT CHANGE UP (ref 4–5.6)
ALBUMIN SERPL ELPH-MCNC: 3.3 G/DL — SIGNIFICANT CHANGE UP (ref 3.3–5)
ALP SERPL-CCNC: 85 U/L — SIGNIFICANT CHANGE UP (ref 40–120)
ALT FLD-CCNC: 15 U/L — SIGNIFICANT CHANGE UP (ref 10–45)
ANION GAP SERPL CALC-SCNC: 10 MMOL/L — SIGNIFICANT CHANGE UP (ref 5–17)
ANION GAP SERPL CALC-SCNC: 9 MMOL/L — SIGNIFICANT CHANGE UP (ref 5–17)
APPEARANCE UR: CLEAR — SIGNIFICANT CHANGE UP
APTT BLD: 32.7 SEC — SIGNIFICANT CHANGE UP (ref 27.5–35.5)
APTT BLD: 33.4 SEC — SIGNIFICANT CHANGE UP (ref 27.5–35.5)
AST SERPL-CCNC: 18 U/L — SIGNIFICANT CHANGE UP (ref 10–40)
BASOPHILS # BLD AUTO: 0.03 K/UL — SIGNIFICANT CHANGE UP (ref 0–0.2)
BASOPHILS NFR BLD AUTO: 0.4 % — SIGNIFICANT CHANGE UP (ref 0–2)
BILIRUB SERPL-MCNC: 0.4 MG/DL — SIGNIFICANT CHANGE UP (ref 0.2–1.2)
BILIRUB UR-MCNC: NEGATIVE — SIGNIFICANT CHANGE UP
BLD GP AB SCN SERPL QL: NEGATIVE — SIGNIFICANT CHANGE UP
BUN SERPL-MCNC: 16 MG/DL — SIGNIFICANT CHANGE UP (ref 7–23)
BUN SERPL-MCNC: 16 MG/DL — SIGNIFICANT CHANGE UP (ref 7–23)
CALCIUM SERPL-MCNC: 9.1 MG/DL — SIGNIFICANT CHANGE UP (ref 8.4–10.5)
CALCIUM SERPL-MCNC: 9.2 MG/DL — SIGNIFICANT CHANGE UP (ref 8.4–10.5)
CHLORIDE SERPL-SCNC: 103 MMOL/L — SIGNIFICANT CHANGE UP (ref 96–108)
CHLORIDE SERPL-SCNC: 107 MMOL/L — SIGNIFICANT CHANGE UP (ref 96–108)
CO2 SERPL-SCNC: 22 MMOL/L — SIGNIFICANT CHANGE UP (ref 22–31)
CO2 SERPL-SCNC: 22 MMOL/L — SIGNIFICANT CHANGE UP (ref 22–31)
COLOR SPEC: YELLOW — SIGNIFICANT CHANGE UP
COVID-19 SPIKE DOMAIN AB INTERP: POSITIVE
COVID-19 SPIKE DOMAIN ANTIBODY RESULT: 189 U/ML — HIGH
CREAT SERPL-MCNC: 0.97 MG/DL — SIGNIFICANT CHANGE UP (ref 0.5–1.3)
CREAT SERPL-MCNC: 1.03 MG/DL — SIGNIFICANT CHANGE UP (ref 0.5–1.3)
DIFF PNL FLD: NEGATIVE — SIGNIFICANT CHANGE UP
EOSINOPHIL # BLD AUTO: 0.11 K/UL — SIGNIFICANT CHANGE UP (ref 0–0.5)
EOSINOPHIL NFR BLD AUTO: 1.5 % — SIGNIFICANT CHANGE UP (ref 0–6)
ESTIMATED AVERAGE GLUCOSE: 91 MG/DL — SIGNIFICANT CHANGE UP (ref 68–114)
GAS PNL BLDA: SIGNIFICANT CHANGE UP
GLUCOSE SERPL-MCNC: 167 MG/DL — HIGH (ref 70–99)
GLUCOSE SERPL-MCNC: 96 MG/DL — SIGNIFICANT CHANGE UP (ref 70–99)
GLUCOSE UR QL: NEGATIVE — SIGNIFICANT CHANGE UP
GRAM STN FLD: SIGNIFICANT CHANGE UP
GRAM STN FLD: SIGNIFICANT CHANGE UP
HCT VFR BLD CALC: 29.6 % — LOW (ref 34.5–45)
HCT VFR BLD CALC: 30.3 % — LOW (ref 34.5–45)
HGB BLD-MCNC: 9.8 G/DL — LOW (ref 11.5–15.5)
HGB BLD-MCNC: 9.9 G/DL — LOW (ref 11.5–15.5)
IMM GRANULOCYTES NFR BLD AUTO: 0.4 % — SIGNIFICANT CHANGE UP (ref 0–1.5)
INR BLD: 1.03 — SIGNIFICANT CHANGE UP (ref 0.88–1.16)
INR BLD: 1.07 — SIGNIFICANT CHANGE UP (ref 0.88–1.16)
KETONES UR-MCNC: NEGATIVE — SIGNIFICANT CHANGE UP
LEUKOCYTE ESTERASE UR-ACNC: NEGATIVE — SIGNIFICANT CHANGE UP
LYMPHOCYTES # BLD AUTO: 1.96 K/UL — SIGNIFICANT CHANGE UP (ref 1–3.3)
LYMPHOCYTES # BLD AUTO: 26.3 % — SIGNIFICANT CHANGE UP (ref 13–44)
MAGNESIUM SERPL-MCNC: 2.2 MG/DL — SIGNIFICANT CHANGE UP (ref 1.6–2.6)
MAGNESIUM SERPL-MCNC: 2.3 MG/DL — SIGNIFICANT CHANGE UP (ref 1.6–2.6)
MCHC RBC-ENTMCNC: 31 PG — SIGNIFICANT CHANGE UP (ref 27–34)
MCHC RBC-ENTMCNC: 31.7 PG — SIGNIFICANT CHANGE UP (ref 27–34)
MCHC RBC-ENTMCNC: 32.3 GM/DL — SIGNIFICANT CHANGE UP (ref 32–36)
MCHC RBC-ENTMCNC: 33.4 GM/DL — SIGNIFICANT CHANGE UP (ref 32–36)
MCV RBC AUTO: 94.9 FL — SIGNIFICANT CHANGE UP (ref 80–100)
MCV RBC AUTO: 95.9 FL — SIGNIFICANT CHANGE UP (ref 80–100)
MONOCYTES # BLD AUTO: 0.36 K/UL — SIGNIFICANT CHANGE UP (ref 0–0.9)
MONOCYTES NFR BLD AUTO: 4.8 % — SIGNIFICANT CHANGE UP (ref 2–14)
NEUTROPHILS # BLD AUTO: 4.95 K/UL — SIGNIFICANT CHANGE UP (ref 1.8–7.4)
NEUTROPHILS NFR BLD AUTO: 66.6 % — SIGNIFICANT CHANGE UP (ref 43–77)
NITRITE UR-MCNC: NEGATIVE — SIGNIFICANT CHANGE UP
NRBC # BLD: 0 /100 WBCS — SIGNIFICANT CHANGE UP (ref 0–0)
NRBC # BLD: 0 /100 WBCS — SIGNIFICANT CHANGE UP (ref 0–0)
PH UR: 6 — SIGNIFICANT CHANGE UP (ref 5–8)
PHOSPHATE SERPL-MCNC: 4.3 MG/DL — SIGNIFICANT CHANGE UP (ref 2.5–4.5)
PLATELET # BLD AUTO: 125 K/UL — LOW (ref 150–400)
PLATELET # BLD AUTO: 131 K/UL — LOW (ref 150–400)
POTASSIUM SERPL-MCNC: 4.1 MMOL/L — SIGNIFICANT CHANGE UP (ref 3.5–5.3)
POTASSIUM SERPL-MCNC: 4.5 MMOL/L — SIGNIFICANT CHANGE UP (ref 3.5–5.3)
POTASSIUM SERPL-SCNC: 4.1 MMOL/L — SIGNIFICANT CHANGE UP (ref 3.5–5.3)
POTASSIUM SERPL-SCNC: 4.5 MMOL/L — SIGNIFICANT CHANGE UP (ref 3.5–5.3)
PROT SERPL-MCNC: 6.7 G/DL — SIGNIFICANT CHANGE UP (ref 6–8.3)
PROT UR-MCNC: NEGATIVE MG/DL — SIGNIFICANT CHANGE UP
PROTHROM AB SERPL-ACNC: 12.3 SEC — SIGNIFICANT CHANGE UP (ref 10.6–13.6)
PROTHROM AB SERPL-ACNC: 12.8 SEC — SIGNIFICANT CHANGE UP (ref 10.6–13.6)
RBC # BLD: 3.12 M/UL — LOW (ref 3.8–5.2)
RBC # BLD: 3.16 M/UL — LOW (ref 3.8–5.2)
RBC # FLD: 17.2 % — HIGH (ref 10.3–14.5)
RBC # FLD: 17.2 % — HIGH (ref 10.3–14.5)
RH IG SCN BLD-IMP: POSITIVE — SIGNIFICANT CHANGE UP
SARS-COV-2 IGG+IGM SERPL QL IA: 189 U/ML — HIGH
SARS-COV-2 IGG+IGM SERPL QL IA: POSITIVE
SARS-COV-2 RNA SPEC QL NAA+PROBE: SIGNIFICANT CHANGE UP
SODIUM SERPL-SCNC: 135 MMOL/L — SIGNIFICANT CHANGE UP (ref 135–145)
SODIUM SERPL-SCNC: 138 MMOL/L — SIGNIFICANT CHANGE UP (ref 135–145)
SP GR SPEC: 1.01 — SIGNIFICANT CHANGE UP (ref 1–1.03)
SPECIMEN SOURCE: SIGNIFICANT CHANGE UP
SPECIMEN SOURCE: SIGNIFICANT CHANGE UP
TSH SERPL-MCNC: 12.83 UIU/ML — HIGH (ref 0.27–4.2)
UROBILINOGEN FLD QL: 0.2 E.U./DL — SIGNIFICANT CHANGE UP
WBC # BLD: 6.97 K/UL — SIGNIFICANT CHANGE UP (ref 3.8–10.5)
WBC # BLD: 7.44 K/UL — SIGNIFICANT CHANGE UP (ref 3.8–10.5)
WBC # FLD AUTO: 6.97 K/UL — SIGNIFICANT CHANGE UP (ref 3.8–10.5)
WBC # FLD AUTO: 7.44 K/UL — SIGNIFICANT CHANGE UP (ref 3.8–10.5)

## 2021-09-29 PROCEDURE — 15734 MUSCLE-SKIN GRAFT TRUNK: CPT | Mod: AS

## 2021-09-29 PROCEDURE — 71045 X-RAY EXAM CHEST 1 VIEW: CPT | Mod: 26

## 2021-09-29 RX ORDER — AMIODARONE HYDROCHLORIDE 400 MG/1
200 TABLET ORAL DAILY
Refills: 0 | Status: DISCONTINUED | OUTPATIENT
Start: 2021-09-30 | End: 2021-10-04

## 2021-09-29 RX ORDER — DEXTROSE 50 % IN WATER 50 %
25 SYRINGE (ML) INTRAVENOUS ONCE
Refills: 0 | Status: DISCONTINUED | OUTPATIENT
Start: 2021-09-29 | End: 2021-09-30

## 2021-09-29 RX ORDER — INFLUENZA VIRUS VACCINE 15; 15; 15; 15 UG/.5ML; UG/.5ML; UG/.5ML; UG/.5ML
0.7 SUSPENSION INTRAMUSCULAR ONCE
Refills: 0 | Status: COMPLETED | OUTPATIENT
Start: 2021-09-29 | End: 2021-09-29

## 2021-09-29 RX ORDER — SENNA PLUS 8.6 MG/1
2 TABLET ORAL AT BEDTIME
Refills: 0 | Status: DISCONTINUED | OUTPATIENT
Start: 2021-09-29 | End: 2021-10-04

## 2021-09-29 RX ORDER — CEFAZOLIN SODIUM 1 G
2000 VIAL (EA) INJECTION EVERY 8 HOURS
Refills: 0 | Status: DISCONTINUED | OUTPATIENT
Start: 2021-09-29 | End: 2021-09-30

## 2021-09-29 RX ORDER — DEXTROSE 50 % IN WATER 50 %
15 SYRINGE (ML) INTRAVENOUS ONCE
Refills: 0 | Status: DISCONTINUED | OUTPATIENT
Start: 2021-09-29 | End: 2021-09-30

## 2021-09-29 RX ORDER — HYDROMORPHONE HYDROCHLORIDE 2 MG/ML
0.5 INJECTION INTRAMUSCULAR; INTRAVENOUS; SUBCUTANEOUS ONCE
Refills: 0 | Status: DISCONTINUED | OUTPATIENT
Start: 2021-09-29 | End: 2021-09-29

## 2021-09-29 RX ORDER — GLUCAGON INJECTION, SOLUTION 0.5 MG/.1ML
1 INJECTION, SOLUTION SUBCUTANEOUS ONCE
Refills: 0 | Status: DISCONTINUED | OUTPATIENT
Start: 2021-09-29 | End: 2021-09-30

## 2021-09-29 RX ORDER — POLYETHYLENE GLYCOL 3350 17 G/17G
17 POWDER, FOR SOLUTION ORAL DAILY
Refills: 0 | Status: DISCONTINUED | OUTPATIENT
Start: 2021-09-29 | End: 2021-10-04

## 2021-09-29 RX ORDER — HEPARIN SODIUM 5000 [USP'U]/ML
5000 INJECTION INTRAVENOUS; SUBCUTANEOUS EVERY 8 HOURS
Refills: 0 | Status: DISCONTINUED | OUTPATIENT
Start: 2021-09-29 | End: 2021-09-30

## 2021-09-29 RX ORDER — CEFTRIAXONE 500 MG/1
2000 INJECTION, POWDER, FOR SOLUTION INTRAMUSCULAR; INTRAVENOUS ONCE
Refills: 0 | Status: DISCONTINUED | OUTPATIENT
Start: 2021-09-29 | End: 2021-09-29

## 2021-09-29 RX ORDER — CHLORHEXIDINE GLUCONATE 213 G/1000ML
1 SOLUTION TOPICAL DAILY
Refills: 0 | Status: DISCONTINUED | OUTPATIENT
Start: 2021-09-29 | End: 2021-10-04

## 2021-09-29 RX ORDER — DEXTROSE 50 % IN WATER 50 %
12.5 SYRINGE (ML) INTRAVENOUS ONCE
Refills: 0 | Status: DISCONTINUED | OUTPATIENT
Start: 2021-09-29 | End: 2021-09-30

## 2021-09-29 RX ORDER — POTASSIUM CHLORIDE 20 MEQ
20 PACKET (EA) ORAL DAILY
Refills: 0 | Status: DISCONTINUED | OUTPATIENT
Start: 2021-09-30 | End: 2021-10-04

## 2021-09-29 RX ORDER — OXYCODONE AND ACETAMINOPHEN 5; 325 MG/1; MG/1
1 TABLET ORAL EVERY 6 HOURS
Refills: 0 | Status: DISCONTINUED | OUTPATIENT
Start: 2021-09-29 | End: 2021-09-30

## 2021-09-29 RX ORDER — PANTOPRAZOLE SODIUM 20 MG/1
40 TABLET, DELAYED RELEASE ORAL DAILY
Refills: 0 | Status: DISCONTINUED | OUTPATIENT
Start: 2021-09-29 | End: 2021-09-30

## 2021-09-29 RX ORDER — ATORVASTATIN CALCIUM 80 MG/1
10 TABLET, FILM COATED ORAL AT BEDTIME
Refills: 0 | Status: DISCONTINUED | OUTPATIENT
Start: 2021-09-29 | End: 2021-10-04

## 2021-09-29 RX ORDER — SODIUM CHLORIDE 9 MG/ML
1000 INJECTION, SOLUTION INTRAVENOUS
Refills: 0 | Status: DISCONTINUED | OUTPATIENT
Start: 2021-09-29 | End: 2021-09-30

## 2021-09-29 RX ORDER — INSULIN LISPRO 100/ML
VIAL (ML) SUBCUTANEOUS
Refills: 0 | Status: DISCONTINUED | OUTPATIENT
Start: 2021-09-29 | End: 2021-09-30

## 2021-09-29 RX ORDER — FUROSEMIDE 40 MG
40 TABLET ORAL DAILY
Refills: 0 | Status: DISCONTINUED | OUTPATIENT
Start: 2021-09-30 | End: 2021-10-04

## 2021-09-29 RX ORDER — OXYCODONE AND ACETAMINOPHEN 5; 325 MG/1; MG/1
2 TABLET ORAL EVERY 6 HOURS
Refills: 0 | Status: DISCONTINUED | OUTPATIENT
Start: 2021-09-29 | End: 2021-09-30

## 2021-09-29 RX ORDER — INFLUENZA VIRUS VACCINE 15; 15; 15; 15 UG/.5ML; UG/.5ML; UG/.5ML; UG/.5ML
0.5 SUSPENSION INTRAMUSCULAR ONCE
Refills: 0 | Status: DISCONTINUED | OUTPATIENT
Start: 2021-09-29 | End: 2021-09-29

## 2021-09-29 RX ORDER — ACETAMINOPHEN 500 MG
650 TABLET ORAL EVERY 6 HOURS
Refills: 0 | Status: DISCONTINUED | OUTPATIENT
Start: 2021-09-29 | End: 2021-09-30

## 2021-09-29 RX ORDER — SODIUM CHLORIDE 9 MG/ML
1000 INJECTION INTRAMUSCULAR; INTRAVENOUS; SUBCUTANEOUS
Refills: 0 | Status: DISCONTINUED | OUTPATIENT
Start: 2021-09-29 | End: 2021-09-30

## 2021-09-29 RX ADMIN — OXYCODONE AND ACETAMINOPHEN 2 TABLET(S): 5; 325 TABLET ORAL at 20:33

## 2021-09-29 RX ADMIN — HYDROMORPHONE HYDROCHLORIDE 0.5 MILLIGRAM(S): 2 INJECTION INTRAMUSCULAR; INTRAVENOUS; SUBCUTANEOUS at 17:37

## 2021-09-29 RX ADMIN — Medication 40 MILLIGRAM(S): at 05:51

## 2021-09-29 RX ADMIN — Medication 650 MILLIGRAM(S): at 03:00

## 2021-09-29 RX ADMIN — Medication 100 MILLIGRAM(S): at 20:33

## 2021-09-29 RX ADMIN — OXYCODONE AND ACETAMINOPHEN 2 TABLET(S): 5; 325 TABLET ORAL at 21:33

## 2021-09-29 RX ADMIN — Medication 650 MILLIGRAM(S): at 02:22

## 2021-09-29 RX ADMIN — HYDROMORPHONE HYDROCHLORIDE 0.5 MILLIGRAM(S): 2 INJECTION INTRAMUSCULAR; INTRAVENOUS; SUBCUTANEOUS at 17:21

## 2021-09-29 RX ADMIN — HEPARIN SODIUM 5000 UNIT(S): 5000 INJECTION INTRAVENOUS; SUBCUTANEOUS at 22:22

## 2021-09-29 RX ADMIN — SENNA PLUS 2 TABLET(S): 8.6 TABLET ORAL at 22:22

## 2021-09-29 RX ADMIN — ATORVASTATIN CALCIUM 10 MILLIGRAM(S): 80 TABLET, FILM COATED ORAL at 22:22

## 2021-09-29 RX ADMIN — SODIUM CHLORIDE 3 MILLILITER(S): 9 INJECTION INTRAMUSCULAR; INTRAVENOUS; SUBCUTANEOUS at 05:51

## 2021-09-29 RX ADMIN — AMIODARONE HYDROCHLORIDE 200 MILLIGRAM(S): 400 TABLET ORAL at 05:51

## 2021-09-29 NOTE — BRIEF OPERATIVE NOTE - NSICDXBRIEFPROCEDURE_GEN_ALL_CORE_FT
PROCEDURES:  Debridement and closure of sternal wound with removal of sternal hardware in adult 29-Sep-2021 15:23:34  Emiliano Silvestre  Debridement of sternum with pectoralis muscle flap 29-Sep-2021 15:24:12 bilateral pectoralis flap advancement Emiliano Silvestre  Debridement, sternum, with wound VAC application 29-Sep-2021 15:25:37  Emiliano Silvestre

## 2021-09-29 NOTE — BRIEF OPERATIVE NOTE - OPERATION/FINDINGS
opened original median sternal incision  removal of sternal wires x 6  sternal debridement   bilateral pectoralis flap advancement   sternal wound closure  incisional wound vac placement
See dictation

## 2021-09-29 NOTE — PROGRESS NOTE ADULT - ASSESSMENT
76 year old female, current everyday smoker with PMHx of HTN, Spinal Stenosis, Arthritis, Bicuspid aortic valve with known aortic stenosis evaluated by Dr. Muller as an outpatient and presented to [facility] on 8/8/2021 to complete preoperative workup. Patient underwent AVR, ascending/hemiarch replacement with frozen elephant trunk and left aorto-subclavian bypass, CABG x2 with Dr. Muller on 8/9/2021. Post-operative course complicated by prolonged intubation, moderate pericardial effusion drained by IR on POD7, Sternal wound erythema s/p sternal debridement and wound vac placement (cultures growing Klebsiella) on POD 17, multiple episodes of AF with RVR/hypotension requiring amiodarone with subsequent bradycardia requiring PPM on POD 23 and FIDEL, resolved with hydration. Pt was discharged home on 9/3/21 but represented to the hospital on 9/4/21 due to failure to thrive at home and volume overload. As per Dr. Muller the patient was stable and ready for discharge to [facility] on 9/9/21. The patient was discharged home from rehab on 9/24. She states she was doing well overall. When her home nurse came to change her wound vac she sent a photograph to Dr. Muller and was noted to have poor wound healing. She was instructed to report to the ED for evaluation for muscle flap. Plan for muscle flab today with plastics.     Neurovascular:   - No delirium. Pain well controlled with current regimen.  -Continue tylenol PRN  - Anxiety: continue xanax PRN    Cardiovascular:   - S/p AVR, ascending/hemiarch replacement with frozen elephant trunk and left aorto-subclavian bypass, CABG x2 8/9/21   - Now represent with poor wound healing of MSI, to undergo muscle flap with Dr. Criss Burks (plastic surgeon)   -Hemodynamically stable. HR controlled  - Post op AF: continue amio  daily, metoprolol 12.5 q12 hours  - Hx of HTN, BP controlled  - CAD s/p CABG: continue ASA, atorvastatin 10    Respiratory:   - 02 Sat = 98% on RA.  -If on oxygen wean to RA from for O2 Sat > 93%.  -Encourage C+DB and Use of IS 10x / hr while awake.  -CXR with no infiltrates, obvious effusions     GI:   - Stable.  -NPO after MN for possible OR tomorrow   -Continue GI PPX with protonix  -PO Diet.    Renal / :  - BUN/Cr stable at 16/0.97  -Continue to monitor renal function.  -Monitor I/O's.  - Replete electrolytes PRN  - Continue diuresis with 40 lasix daily, volume status improved from last admission     Endocrine:    - No known hx DM or thyroid disease     Hematologic:  -H/H stable at 9/30 with no obvious signs of bleeding  -Coagulation Panel.    ID:  -Pt remains afebrile with no elevation in WBC or signs of infection  - During prior admission wound cultures grew Klebsiella, is now s/p IV abx course (Ceftriaxone), follows with Dr. Crystal  - MSI with no erythema, purulence   -Continue to monitor CBC  -Observe for SIRS/Sepsis Syndrome.    Prophylaxis:  -DVT prophylaxis with 5000 SubQ Heparin q8h.  -SCD's    Disposition:  -Pending OR with plastic surgery, today

## 2021-09-29 NOTE — PROGRESS NOTE ADULT - SUBJECTIVE AND OBJECTIVE BOX
Patient discussed on morning rounds with Dr. Muller    Operation / Date:  s/p AVR, ascending/hemiarch replacement with frozen elephant trunk and left aorto-subclavian bypass, CABG x2 ()  : Pending muscle flap with plastic surgery      SUBJECTIVE ASSESSMENT:  76y Female seen and examined. Feels well, awaiting OR. Offers no acute complaints.         Vital Signs Last 24 Hrs  T(C): 37.1 (29 Sep 2021 08:30), Max: 37.1 (29 Sep 2021 08:30)  T(F): 98.7 (29 Sep 2021 08:30), Max: 98.7 (29 Sep 2021 08:30)  HR: 64 (29 Sep 2021 08:30) (59 - 74)  BP: 153/68 (29 Sep 2021 08:30) (121/60 - 187/80)  BP(mean): --  RR: 18 (29 Sep 2021 08:30) (18 - 20)  SpO2: 100% (29 Sep 2021 08:30) (95% - 100%)  I&O's Detail    28 Sep 2021 07:01  -  29 Sep 2021 07:00  --------------------------------------------------------  IN:  Total IN: 0 mL    OUT:    Voided (mL): 500 mL  Total OUT: 500 mL    Total NET: -500 mL      29 Sep 2021 07:01  -  29 Sep 2021 09:31  --------------------------------------------------------  IN:  Total IN: 0 mL    OUT:    Voided (mL): 700 mL  Total OUT: 700 mL    Total NET: -700 mL        PHYSICAL EXAM:    General: Patient lying comfortably in bed, no acute distress     Neurological: Alert and oriented. No focal neurological deficits     Cardiovascular: S1S2, RRR, no murmurs appreciated on exam     Respiratory: Clear to ausculation bilaterally, no wheeze/rhonchi/rales    Gastrointestinal: + BS, soft, non tender, non distended     Extremities: Warm and well perfused. 2+ b/l LE edema, no calf tenderness     Vascular: palpable peripheral pulses b/l     Incision Sites:  MSI with no purulence, tissue appears healthy, poor wound healing, slightly deshisced.     LABS:                        9.8    6.97  )-----------( 125      ( 29 Sep 2021 07:38 )             30.3       COUMADIN:  NO    PT/INR - ( 29 Sep 2021 07:38 )   PT: 12.3 sec;   INR: 1.03          PTT - ( 29 Sep 2021 07:38 )  PTT:32.7 sec        138  |  107  |  16  ----------------------------<  96  4.1   |  22  |  0.97    Ca    9.1      29 Sep 2021 07:38  Mg     2.3         TPro  7.2  /  Alb  3.6  /  TBili  0.4  /  DBili  x   /  AST  21  /  ALT  17  /  AlkPhos  94        Urinalysis Basic - ( 29 Sep 2021 01:58 )    Color: Yellow / Appearance: Clear / S.015 / pH: x  Gluc: x / Ketone: NEGATIVE  / Bili: Negative / Urobili: 0.2 E.U./dL   Blood: x / Protein: NEGATIVE mg/dL / Nitrite: NEGATIVE   Leuk Esterase: NEGATIVE / RBC: x / WBC x   Sq Epi: x / Non Sq Epi: x / Bacteria: x        MEDICATIONS  (STANDING):  aMIOdarone    Tablet 200 milliGRAM(s) Oral daily  aspirin enteric coated 81 milliGRAM(s) Oral daily  atorvastatin 10 milliGRAM(s) Oral at bedtime  furosemide    Tablet 40 milliGRAM(s) Oral daily  metoprolol tartrate 12.5 milliGRAM(s) Oral every 12 hours  sodium chloride 0.9% lock flush 3 milliLiter(s) IV Push every 8 hours    MEDICATIONS  (PRN):  acetaminophen   Tablet .. 650 milliGRAM(s) Oral every 6 hours PRN Moderate Pain (4 - 6)  ALPRAZolam 0.25 milliGRAM(s) Oral every 12 hours PRN anxiety        RADIOLOGY & ADDITIONAL TESTS:

## 2021-09-29 NOTE — BRIEF OPERATIVE NOTE - COMMENTS
Emiliano Silvestre PA-C was the first assistant for this case including but not limited to wound debridement, wire removal, pectoralis flap, closure    I was present for this procedure and participated as first assistant as described by the PA above, unless otherwise noted below.

## 2021-09-29 NOTE — BRIEF OPERATIVE NOTE - SPECIMENS
Sternal wires; sternal bone culture
sternal wires, anterior sternal bone, excised superficial skin
no

## 2021-09-29 NOTE — BRIEF OPERATIVE NOTE - VENOUS THROMBOEMBOLISM PROPHYLAXIS THERAPY
Past Medical History:   Diagnosis Date    Anxiety     Diverticulosis     Gastric ulcer     old    GERD (gastroesophageal reflux disease)     Hepatic steatosis 9/12/2016    Hyperbilirubinemia     Hypertension     Hypothyroidism     Peptic ulcer disease 9/12/2016    UPJ (ureteropelvic junction) obstruction     Vertigo        Past Surgical History:   Procedure Laterality Date    APPENDECTOMY      CHOLECYSTECTOMY      ESOPHAGOGASTRODUODENOSCOPY N/A 6/12/2019    Procedure: ESOPHAGOGASTRODUODENOSCOPY (EGD);  Surgeon: Arben Brown MD;  Location: [facility];  Service: Endoscopy;  Laterality: N/A;    HYSTERECTOMY      PANCREAS SURGERY      TONSILLECTOMY         Review of patient's allergies indicates:   Allergen Reactions    Codeine Itching       Current Facility-Administered Medications on File Prior to Encounter   Medication    0.9%  NaCl infusion (for blood administration)    0.9%  NaCl infusion (for blood administration)    [COMPLETED] famotidine (PF) injection 20 mg    [COMPLETED] ondansetron injection 4 mg    pantoprazole (PROTONIX) 40 mg in dextrose 5 % 100 mL infusion    [COMPLETED] pantoprazole injection 80 mg    [COMPLETED] sodium chloride 0.9% bolus 1,000 mL     Current Outpatient Medications on File Prior to Encounter   Medication Sig    aluminum & magnesium hydroxide-simethicone (MYLANTA MAX STRENGTH) 400-400-40 mg/5 mL suspension Take 30 mLs by mouth 3 (three) times daily with meals.    calcium carbonate (TUMS) 200 mg calcium (500 mg) chewable tablet Take 1 tablet (500 mg total) by mouth as needed for Heartburn.    clonazePAM (KLONOPIN) 0.5 MG tablet Take 1 tablet (0.5 mg total) by mouth 3 (three) times daily as needed for Anxiety.    diazePAM (VALIUM) 5 MG tablet Take 1 tablet (5 mg total) by mouth every 12 (twelve) hours as needed (vertigo).    diclofenac (VOLTAREN) 50 MG EC tablet TAKE 1 TABLET BY MOUTH TWICE A DAY AFTER MEALS    duloxetine (CYMBALTA) 20 MG capsule Take 1  capsule (20 mg total) by mouth 2 (two) times daily.    famotidine (PEPCID) 20 MG tablet Take 1 tablet (20 mg total) by mouth 2 (two) times daily.    gabapentin (NEURONTIN) 100 MG capsule Take 1 capsule (100 mg total) by mouth 3 (three) times daily.    HYDROcodone-acetaminophen (NORCO) 5-325 mg per tablet Take 1 tablet by mouth every 6 (six) hours as needed for Pain.    lisinopril (PRINIVIL,ZESTRIL) 20 MG tablet Take 1 tablet (20 mg total) by mouth once daily.    meclizine (ANTIVERT) 25 mg tablet Take 1 tablet (25 mg total) by mouth 3 (three) times daily as needed. (Patient taking differently: Take 25 mg by mouth 3 (three) times daily as needed. For dizziness (vertigo))    metoprolol succinate (TOPROL-XL) 25 MG 24 hr tablet Take 25 mg by mouth once daily.    pantoprazole (PROTONIX) 40 MG tablet Take 1 tablet (40 mg total) by mouth 2 (two) times daily.    potassium chloride (KLOR-CON) 10 MEQ TbSR Take 1 tablet (10 mEq total) by mouth once daily.     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  However, no consumption in 20 years.    Drug use: No    Sexual activity: Not on file     Review of Systems   Constitutional: Negative for activity change, chills, diaphoresis and fatigue.   HENT: Negative for congestion, drooling and hearing loss.    Eyes: Negative for discharge.   Respiratory: Negative for apnea, chest tightness, shortness of breath and wheezing.    Cardiovascular: Negative for palpitations and leg swelling.   Gastrointestinal: Positive for blood in stool, nausea and vomiting. Negative for abdominal distention, abdominal pain, constipation and diarrhea.   Endocrine: Negative for cold intolerance and heat intolerance.   Genitourinary: Negative for difficulty urinating.   Musculoskeletal: Negative for arthralgias and gait problem.   Skin: Negative for rash.   Neurological: Positive for  dizziness and weakness. Negative for seizures, light-headedness and numbness.   Hematological: Negative for adenopathy.   Psychiatric/Behavioral: Negative for agitation and behavioral problems.     Objective:     Vital Signs (Most Recent):    Vital Signs (24h Range):  Temp:  [98 °F (36.7 °C)-98.9 °F (37.2 °C)] 98.5 °F (36.9 °C)  Pulse:  [109-127] 118  Resp:  [13-36] 20  SpO2:  [96 %-100 %] 100 %  BP: ()/(36-66) 117/54        There is no height or weight on file to calculate BMI.    Physical Exam   Constitutional: She is oriented to person, place, and time. She appears well-developed and well-nourished.   HENT:   Head: Normocephalic and atraumatic.   Eyes: Pupils are equal, round, and reactive to light. Conjunctivae and EOM are normal. Right eye exhibits no discharge. Left eye exhibits no discharge. No scleral icterus.   Neck: Normal range of motion. Neck supple.   Cardiovascular: Normal rate, regular rhythm and normal heart sounds. Exam reveals no gallop and no friction rub.   No murmur heard.  Pulmonary/Chest: Effort normal and breath sounds normal. No stridor. No respiratory distress. She has no wheezes. She has no rales.   Abdominal: Soft. Bowel sounds are normal. She exhibits no distension. There is tenderness (epigastric ). There is no guarding.   Musculoskeletal: Normal range of motion. She exhibits no edema or deformity.   Neurological: She is oriented to person, place, and time. No cranial nerve deficit. She exhibits normal muscle tone.   Skin: Skin is warm and dry. Capillary refill takes less than 2 seconds. No pallor.   Psychiatric: She has a normal mood and affect. Her behavior is normal.   Nursing note and vitals reviewed.        CRANIAL NERVES     CN III, IV, VI   Pupils are equal, round, and reactive to light.  Extraocular motions are normal.        Significant Labs: All pertinent labs within the past 24 hours have been reviewed.    Significant Imaging: I have reviewed and interpreted all  pertinent imaging results/findings within the past 24 hours.         SCDs

## 2021-09-30 LAB
ANION GAP SERPL CALC-SCNC: 9 MMOL/L — SIGNIFICANT CHANGE UP (ref 5–17)
APTT BLD: 32.3 SEC — SIGNIFICANT CHANGE UP (ref 27.5–35.5)
BUN SERPL-MCNC: 16 MG/DL — SIGNIFICANT CHANGE UP (ref 7–23)
CALCIUM SERPL-MCNC: 8.9 MG/DL — SIGNIFICANT CHANGE UP (ref 8.4–10.5)
CHLORIDE SERPL-SCNC: 104 MMOL/L — SIGNIFICANT CHANGE UP (ref 96–108)
CO2 SERPL-SCNC: 22 MMOL/L — SIGNIFICANT CHANGE UP (ref 22–31)
CREAT SERPL-MCNC: 1.01 MG/DL — SIGNIFICANT CHANGE UP (ref 0.5–1.3)
GAS PNL BLDA: SIGNIFICANT CHANGE UP
GLUCOSE SERPL-MCNC: 87 MG/DL — SIGNIFICANT CHANGE UP (ref 70–99)
HCT VFR BLD CALC: 30.6 % — LOW (ref 34.5–45)
HGB BLD-MCNC: 9.8 G/DL — LOW (ref 11.5–15.5)
INR BLD: 1.05 — SIGNIFICANT CHANGE UP (ref 0.88–1.16)
MAGNESIUM SERPL-MCNC: 2.2 MG/DL — SIGNIFICANT CHANGE UP (ref 1.6–2.6)
MCHC RBC-ENTMCNC: 30.6 PG — SIGNIFICANT CHANGE UP (ref 27–34)
MCHC RBC-ENTMCNC: 32 GM/DL — SIGNIFICANT CHANGE UP (ref 32–36)
MCV RBC AUTO: 95.6 FL — SIGNIFICANT CHANGE UP (ref 80–100)
NIGHT BLUE STAIN TISS: SIGNIFICANT CHANGE UP
NRBC # BLD: 0 /100 WBCS — SIGNIFICANT CHANGE UP (ref 0–0)
PHOSPHATE SERPL-MCNC: 4.4 MG/DL — SIGNIFICANT CHANGE UP (ref 2.5–4.5)
PLATELET # BLD AUTO: 145 K/UL — LOW (ref 150–400)
POTASSIUM SERPL-MCNC: 4.1 MMOL/L — SIGNIFICANT CHANGE UP (ref 3.5–5.3)
POTASSIUM SERPL-SCNC: 4.1 MMOL/L — SIGNIFICANT CHANGE UP (ref 3.5–5.3)
PROTHROM AB SERPL-ACNC: 12.6 SEC — SIGNIFICANT CHANGE UP (ref 10.6–13.6)
RBC # BLD: 3.2 M/UL — LOW (ref 3.8–5.2)
RBC # FLD: 17.2 % — HIGH (ref 10.3–14.5)
SODIUM SERPL-SCNC: 135 MMOL/L — SIGNIFICANT CHANGE UP (ref 135–145)
SPECIMEN SOURCE: SIGNIFICANT CHANGE UP
T4 FREE SERPL-MCNC: 1.07 NG/DL — SIGNIFICANT CHANGE UP (ref 0.93–1.7)
WBC # BLD: 10.38 K/UL — SIGNIFICANT CHANGE UP (ref 3.8–10.5)
WBC # FLD AUTO: 10.38 K/UL — SIGNIFICANT CHANGE UP (ref 3.8–10.5)

## 2021-09-30 PROCEDURE — 71045 X-RAY EXAM CHEST 1 VIEW: CPT | Mod: 26

## 2021-09-30 PROCEDURE — 99253 IP/OBS CNSLTJ NEW/EST LOW 45: CPT | Mod: GC

## 2021-09-30 PROCEDURE — 99254 IP/OBS CNSLTJ NEW/EST MOD 60: CPT | Mod: GC

## 2021-09-30 RX ORDER — ALBUMIN HUMAN 25 %
250 VIAL (ML) INTRAVENOUS ONCE
Refills: 0 | Status: COMPLETED | OUTPATIENT
Start: 2021-09-30 | End: 2021-09-30

## 2021-09-30 RX ORDER — ASPIRIN/CALCIUM CARB/MAGNESIUM 324 MG
81 TABLET ORAL DAILY
Refills: 0 | Status: DISCONTINUED | OUTPATIENT
Start: 2021-10-01 | End: 2021-10-04

## 2021-09-30 RX ORDER — OXYCODONE AND ACETAMINOPHEN 5; 325 MG/1; MG/1
2 TABLET ORAL EVERY 6 HOURS
Refills: 0 | Status: DISCONTINUED | OUTPATIENT
Start: 2021-09-30 | End: 2021-10-04

## 2021-09-30 RX ORDER — CEFTRIAXONE 500 MG/1
2000 INJECTION, POWDER, FOR SOLUTION INTRAMUSCULAR; INTRAVENOUS EVERY 24 HOURS
Refills: 0 | Status: DISCONTINUED | OUTPATIENT
Start: 2021-09-30 | End: 2021-10-04

## 2021-09-30 RX ORDER — LEVOTHYROXINE SODIUM 125 MCG
50 TABLET ORAL DAILY
Refills: 0 | Status: DISCONTINUED | OUTPATIENT
Start: 2021-09-30 | End: 2021-10-04

## 2021-09-30 RX ORDER — HEPARIN SODIUM 5000 [USP'U]/ML
5000 INJECTION INTRAVENOUS; SUBCUTANEOUS EVERY 8 HOURS
Refills: 0 | Status: DISCONTINUED | OUTPATIENT
Start: 2021-09-30 | End: 2021-10-04

## 2021-09-30 RX ORDER — SODIUM CHLORIDE 9 MG/ML
3 INJECTION INTRAMUSCULAR; INTRAVENOUS; SUBCUTANEOUS EVERY 8 HOURS
Refills: 0 | Status: DISCONTINUED | OUTPATIENT
Start: 2021-09-30 | End: 2021-10-04

## 2021-09-30 RX ORDER — OXYCODONE AND ACETAMINOPHEN 5; 325 MG/1; MG/1
1 TABLET ORAL EVERY 6 HOURS
Refills: 0 | Status: DISCONTINUED | OUTPATIENT
Start: 2021-09-30 | End: 2021-10-04

## 2021-09-30 RX ADMIN — CEFTRIAXONE 100 MILLIGRAM(S): 500 INJECTION, POWDER, FOR SOLUTION INTRAMUSCULAR; INTRAVENOUS at 17:02

## 2021-09-30 RX ADMIN — HEPARIN SODIUM 5000 UNIT(S): 5000 INJECTION INTRAVENOUS; SUBCUTANEOUS at 21:27

## 2021-09-30 RX ADMIN — Medication 100 MILLIGRAM(S): at 04:45

## 2021-09-30 RX ADMIN — OXYCODONE AND ACETAMINOPHEN 2 TABLET(S): 5; 325 TABLET ORAL at 03:21

## 2021-09-30 RX ADMIN — OXYCODONE AND ACETAMINOPHEN 2 TABLET(S): 5; 325 TABLET ORAL at 21:27

## 2021-09-30 RX ADMIN — Medication 125 MILLILITER(S): at 16:01

## 2021-09-30 RX ADMIN — Medication 125 MILLILITER(S): at 11:25

## 2021-09-30 RX ADMIN — HEPARIN SODIUM 5000 UNIT(S): 5000 INJECTION INTRAVENOUS; SUBCUTANEOUS at 06:18

## 2021-09-30 RX ADMIN — ATORVASTATIN CALCIUM 10 MILLIGRAM(S): 80 TABLET, FILM COATED ORAL at 21:27

## 2021-09-30 RX ADMIN — SODIUM CHLORIDE 3 MILLILITER(S): 9 INJECTION INTRAMUSCULAR; INTRAVENOUS; SUBCUTANEOUS at 13:42

## 2021-09-30 RX ADMIN — AMIODARONE HYDROCHLORIDE 200 MILLIGRAM(S): 400 TABLET ORAL at 07:00

## 2021-09-30 RX ADMIN — SENNA PLUS 2 TABLET(S): 8.6 TABLET ORAL at 21:27

## 2021-09-30 RX ADMIN — OXYCODONE AND ACETAMINOPHEN 2 TABLET(S): 5; 325 TABLET ORAL at 04:21

## 2021-09-30 RX ADMIN — OXYCODONE AND ACETAMINOPHEN 2 TABLET(S): 5; 325 TABLET ORAL at 21:57

## 2021-09-30 RX ADMIN — OXYCODONE AND ACETAMINOPHEN 2 TABLET(S): 5; 325 TABLET ORAL at 14:47

## 2021-09-30 RX ADMIN — OXYCODONE AND ACETAMINOPHEN 2 TABLET(S): 5; 325 TABLET ORAL at 13:34

## 2021-09-30 RX ADMIN — CHLORHEXIDINE GLUCONATE 1 APPLICATION(S): 213 SOLUTION TOPICAL at 11:35

## 2021-09-30 RX ADMIN — Medication 125 MILLILITER(S): at 20:15

## 2021-09-30 RX ADMIN — Medication 40 MILLIGRAM(S): at 05:00

## 2021-09-30 RX ADMIN — Medication 125 MILLILITER(S): at 13:42

## 2021-09-30 RX ADMIN — SODIUM CHLORIDE 3 MILLILITER(S): 9 INJECTION INTRAMUSCULAR; INTRAVENOUS; SUBCUTANEOUS at 21:22

## 2021-09-30 NOTE — PHYSICAL THERAPY INITIAL EVALUATION ADULT - ADDITIONAL COMMENTS
Pt lives in [facility] with [address], living area/bedroom/bathroom on 1st floor. pt lives with two adult daughters and granddaughter. Since d/c form rehab, using RW for household distances, able to wash up and complete most ADLs indptly. Assist from family for iADLs. Owns cane, no other devices, no falls. 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

## 2021-09-30 NOTE — PHYSICAL THERAPY INITIAL EVALUATION ADULT - REFERRING PHYSICIAN, REHAB EVAL
I got a fax over the weekend that the solifenacin 10 mg isn't covered and needed more info as to why the preferred meds didn't work. I called [phone] spoke to Scarlett Valdivia. I went over the information and he said that he is going to do a prior auth for brand vesicare, since the generic was denied. He reports that the turn around time is 24-72 hours. [place] from [facility] [place] the brand vesicare. I have sent [employer] message to pt and faxed the approval to [employer]. Yohana

## 2021-09-30 NOTE — CONSULT NOTE ADULT - TIME BILLING
Pt e-mails this morning to apologize, just returned from [place] with gastro upset and unable to come in this morning.  
Newly-diagnosed hypothyroidism

## 2021-09-30 NOTE — PHYSICAL THERAPY INITIAL EVALUATION ADULT - DIAGNOSIS, PT EVAL
5A: Primary Prevention/Risk Reduction for Loss of Balance and Falling , 4I: Impaired Joint Mobility, Motor Function, Muscle Performance, and Range of Motion Associated with Bony or Soft Tissue Surgery

## 2021-09-30 NOTE — CONSULT NOTE ADULT - SUBJECTIVE AND OBJECTIVE BOX
HPI: 76 year old female, current everyday smoker with PMHx of HTN, Spinal Stenosis, Arthritis, Bicuspid aortic valve with known aortic stenosis evaluated by Dr. Muller as an outpatient and presented to [facility] on 2021 to complete preoperative workup. Patient underwent AVR, ascending/hemiarch replacement with frozen elephant trunk and left aorto-subclavian bypass, CABG x2 with Dr. Muller on 2021. Post-operative course complicated by prolonged intubation, moderate pericardial effusion drained by IR on POD7, Sternal wound erythema s/p sternal debridement and wound vac placement (cultures growing Klebsiella) on POD 17, multiple episodes of AF with RVR/hypotension requiring amiodarone with subsequent bradycardia requiring PPM on POD 23 and FIDEL, resolved with hydration. Pt was discharged home on 9/3/21 but represented to the hospital on 21 due to failure to thrive at home and volume overload. As per Dr. Muller the patient was stable and ready for discharge to [facility] on 21. The patient was discharged home from rehab on . She states she was doing well overall. When her home nurse came to change her wound vac she sent a photograph to Dr. Muller and was noted to have poor wound healing. She was instructed to report to the ED for evaluation for muscle flap.     s/p Debridement and closure of sternal wound with removal of sternal hardware, Debridement of sternum with pectoralis muscle flap, bilateral pectoralis flap, Debridement, sternum, with wound VAC application on     PMH & Surgical Hx:SURGICAL WOUND PRESENT    No pertinent family history in first degree relatives    FH: CAD (coronary artery disease) (Sibling)    Family history of CKD (chronic kidney disease) (Sibling)    Family history of diabetes mellitus (DM) (Sibling)    Handoff    MEWS Score    No pertinent past medical history    HTN (hypertension)    H/O aortic valve stenosis    CAD (coronary artery disease)    Sternal wound dehiscence    Sternal wound dehiscence    Open chest wound    Surgical wound present    Irrigation and debridement of sternal wound    Debridement and closure of sternal wound with removal of sternal hardware in adult    Debridement of sternum with pectoralis muscle flap    Debridement, sternum, with wound VAC application    Sternal debridement    Removal of deeply implanted hardware    Closure of wound of sternum using pectoralis muscle flap    Debridement of sternum with pectoralis muscle flap    No significant past surgical history    S/P aortic valve replacement    S/P CABG (coronary artery bypass graft)    H/O aortic arch replacement    WOUND CHECK    18    [email or web address]        FH:  DM:  Thyroid:  Autoimmune:  Other:    SH:  Smoking  Etoh:  Recreational Drugs:  Social Life:    Current Meds:  albumin human  5% IVPB 250 milliLiter(s) IV Intermittent once  aMIOdarone    Tablet 200 milliGRAM(s) Oral daily  atorvastatin 10 milliGRAM(s) Oral at bedtime  chlorhexidine 2% Cloths 1 Application(s) Topical daily  furosemide    Tablet 40 milliGRAM(s) Oral daily  oxycodone    5 mG/acetaminophen 325 mG 2 Tablet(s) Oral every 6 hours PRN  oxycodone    5 mG/acetaminophen 325 mG 1 Tablet(s) Oral every 6 hours PRN  polyethylene glycol 3350 17 Gram(s) Oral daily  potassium chloride    Tablet ER 20 milliEquivalent(s) Oral daily  senna 2 Tablet(s) Oral at bedtime  sodium chloride 0.9% lock flush 3 milliLiter(s) IV Push every 8 hours      Allergies:  No Known Allergies      ROS:  Denies the following except as indicated.    General: weight loss/weight gain, decreased appetite, fatigue  Eyes: Blurry vision, double vision, visual changes  ENT: Throat pain, changes in voice,   CV: palpitations, SOB, CP, cough  GI: NVD, difficulty swallowing, abdominal pain  : polyuria, dysuria  Endo: abnormal menses, temperature intolerance, decreased libido  MSK: weakness, joint pain  Skin: rash, dryness, diaphoresis  Heme: Easy bruising,bleeding  Neuro: HA, dizziness, lightheadedness, numbness tingling  Psych: Anxiety, Depression    Vital Signs Last 24 Hrs  T(C): 36.4 (30 Sep 2021 09:39), Max: 36.4 (30 Sep 2021 09:39)  T(F): 97.5 (30 Sep 2021 09:39), Max: 97.5 (30 Sep 2021 09:39)  HR: 68 (30 Sep 2021 11:20) (56 - 78)  BP: 128/61 (30 Sep 2021 11:20) (109/78 - 128/61)  BP(mean): 88 (30 Sep 2021 11:20) (87 - 88)  RR: 19 (30 Sep 2021 11:20) (15 - 19)  SpO2: 100% (30 Sep 2021 11:20) (83% - 100%)  Height (cm): 153.7 ( @ 10:56)  Weight (kg): 73.7 ( @ 10:56)  BMI (kg/m2): 31.2 ( @ 10:56)      Constitutional: wn/wd in NAD.   HEENT: NCAT, MMM, OP clear, EOMI, , no proptosis or lid retraction  Neck: no thyromegaly or palpable thyroid nodules   Respiratory: lungs CTAB.  Cardiovascular: regular rhythm, normal S1 and S2, no audible murmurs, no peripheral edema  GI: soft, NT/ND, no masses/HSM appreciated.  Neurology: no tremors, DTR 2+  Skin: no visible rashes/lesions  Psychiatric: AAO x 3, normal affect/mood.  Ext: radial pulses intact, DP pulses intact, extremities warm, no cyanosis, clubbing or edema.       LABS:                        9.8    10.38 )-----------( 145      ( 30 Sep 2021 03:28 )             30.6         135  |  104  |  16  ----------------------------<  87  4.1   |  22  |  1.01    Ca    8.9      30 Sep 2021 03:28  Phos  4.4       Mg     2.2         TPro  6.7  /  Alb  3.3  /  TBili  0.4  /  DBili  x   /  AST  18  /  ALT  15  /  AlkPhos  85      PT/INR - ( 30 Sep 2021 03:28 )   PT: 12.6 sec;   INR: 1.05          PTT - ( 30 Sep 2021 03:28 )  PTT:32.3 sec  Urinalysis Basic - ( 29 Sep 2021 01:58 )    Color: Yellow / Appearance: Clear / S.015 / pH: x  Gluc: x / Ketone: NEGATIVE  / Bili: Negative / Urobili: 0.2 E.U./dL   Blood: x / Protein: NEGATIVE mg/dL / Nitrite: NEGATIVE   Leuk Esterase: NEGATIVE / RBC: x / WBC x   Sq Epi: x / Non Sq Epi: x / Bacteria: x        Thyroid Stimulating Hormone, Serum: 12.830 ( @ 07:38)  Thyroid Stimulating Hormone, Serum: 2.690 ( @ 15:27)      RADIOLOGY & ADDITIONAL STUDIES:  CAPILLARY BLOOD GLUCOSE            A/P: 76 year old female, current everyday smoker with PMHx of HTN, Spinal Stenosis, Arthritis, Bicuspid aortic valve with known aortic stenosis evaluated by Dr. Muller as an outpatient and presented to [facility] on 2021. Patient underwent AVR, ascending/hemiarch replacement with frozen elephant trunk and left aorto-subclavian bypass, CABG x2 with Dr. Muller on 2021. Post-operative course complicated by prolonged intubation, moderate pericardial effusion drained by IR on POD7, Sternal wound erythema s/p sternal debridement and wound vac placement (cultures growing Klebsiella) on POD 17, multiple episodes of AF with RVR/hypotension requiring amiodarone with subsequent bradycardia requiring PPM on POD 23 and FIDEL, resolved with hydration. Pt was discharged home on 9/3/21 but represented to the hospital on 21 due to failure to thrive at home and volume overload. As per Dr. Muller the patient was stable and ready for discharge to [facility] on 21. The patient was discharged home from rehab on . She states she was doing well overall. When her home nurse came to change her wound vac she sent a photograph to Dr. Muller and was noted to have poor wound healing. She was instructed to report to the ED for evaluation for muscle flap.     s/p Debridement and closure of sternal wound with removal of sternal hardware, Debridement of sternum with pectoralis muscle flap, bilateral pectoralis flap, Debridement, sternum, with wound VAC application on     Pt's fingerstick glucose goal is     Will continue to monitor     For discharge, pt can continue    Pt can follow up at discharge with [facility] by calling  to make an appointment.   Will discuss case with     and update primary team HPI: 76 year old female, current everyday smoker with PMHx of HTN, Spinal Stenosis, Arthritis, Bicuspid aortic valve with known aortic stenosis evaluated by Dr. Muller as an outpatient and presented to [facility] on 2021 to complete preoperative workup. Patient underwent AVR, ascending/hemiarch replacement with frozen elephant trunk and left aorto-subclavian bypass, CABG x2 with Dr. Muller on 2021. Post-operative course complicated by prolonged intubation, moderate pericardial effusion drained by IR on POD7, Sternal wound erythema s/p sternal debridement and wound vac placement (cultures growing Klebsiella) on POD 17, multiple episodes of AF with RVR/hypotension requiring amiodarone with subsequent bradycardia requiring PPM on POD 23 and FIDEL, resolved with hydration. Pt was discharged home on 9/3/21 but represented to the hospital on 21 due to failure to thrive at home and volume overload. As per Dr. Muller the patient was stable and ready for discharge to [facility] on 21. The patient was discharged home from rehab on . She states she was doing well overall. When her home nurse came to change her wound vac she sent a photograph to Dr. Muller and was noted to have poor wound healing. She was instructed to report to the ED for evaluation for muscle flap.     s/p Debridement and closure of sternal wound with removal of sternal hardware, Debridement of sternum with pectoralis muscle flap, bilateral pectoralis flap, Debridement, sternum, with wound VAC application on .    Endocrinology has been consulted as pt has a TSH of 12.8 on the 21 the TSH was 2.6, Pt does reports fatigue, tiredness dry skin and feeling more cold than usual. Denies bowl changes. Pt does not report of any thyroid abnormality in the past. She does report that her 2 daughters have a thyroid condition. Pt unsure if they have hyper or hypo. On the previous admission but was started on Amiodarone due to Afib with RVR to which she is responding well.    PMH & Surgical Hx:SURGICAL WOUND PRESENT    No pertinent family history in first degree relatives    FH: CAD (coronary artery disease) (Sibling)    Family history of CKD (chronic kidney disease) (Sibling)    Family history of diabetes mellitus (DM) (Sibling)    Handoff    MEWS Score    No pertinent past medical history    HTN (hypertension)    H/O aortic valve stenosis    CAD (coronary artery disease)    Sternal wound dehiscence    Sternal wound dehiscence    Open chest wound    Surgical wound present    Irrigation and debridement of sternal wound    Debridement and closure of sternal wound with removal of sternal hardware in adult    Debridement of sternum with pectoralis muscle flap    Debridement, sternum, with wound VAC application    Sternal debridement    Removal of deeply implanted hardware    Closure of wound of sternum using pectoralis muscle flap    Debridement of sternum with pectoralis muscle flap    No significant past surgical history    S/P aortic valve replacement    S/P CABG (coronary artery bypass graft)    H/O aortic arch replacement    WOUND CHECK    18    [email or web address]        FH:  Thyroid: 2 dausghter. Pt unsure if hyper or hypo  Autoimmune: None    SH:   Smoking, Quit almost 2 months ago, 1PPD for almost 50 yrs.  Etoh: None  Recreational Drugs: None      Current Meds:  albumin human  5% IVPB 250 milliLiter(s) IV Intermittent once  aMIOdarone    Tablet 200 milliGRAM(s) Oral daily  atorvastatin 10 milliGRAM(s) Oral at bedtime  chlorhexidine 2% Cloths 1 Application(s) Topical daily  furosemide    Tablet 40 milliGRAM(s) Oral daily  oxycodone    5 mG/acetaminophen 325 mG 2 Tablet(s) Oral every 6 hours PRN  oxycodone    5 mG/acetaminophen 325 mG 1 Tablet(s) Oral every 6 hours PRN  polyethylene glycol 3350 17 Gram(s) Oral daily  potassium chloride    Tablet ER 20 milliEquivalent(s) Oral daily  senna 2 Tablet(s) Oral at bedtime  sodium chloride 0.9% lock flush 3 milliLiter(s) IV Push every 8 hours      Allergies:  No Known Allergies      ROS:  Denies the following except as indicated.    General: weight loss/weight gain, decreased appetite, reports of fatigue.  Eyes: Blurry vision, double vision, visual changes  ENT: Throat pain, changes in voice,   CV: palpitations, SOB, CP, cough  GI: NVD, difficulty swallowing, abdominal pain  : polyuria, dysuria  Endo: Reports of cold intolerance  MSK: weakness, joint pain  Skin: Reports of dryness.  Heme: Easy bruising,bleeding  Neuro: HA, dizziness, lightheadedness, numbness tingling  Psych: Anxiety, Depression    Vital Signs Last 24 Hrs  T(C): 36.4 (30 Sep 2021 09:39), Max: 36.4 (30 Sep 2021 09:39)  T(F): 97.5 (30 Sep 2021 09:39), Max: 97.5 (30 Sep 2021 09:39)  HR: 68 (30 Sep 2021 11:20) (56 - 78)  BP: 128/61 (30 Sep 2021 11:20) (109/78 - 128/61)  BP(mean): 88 (30 Sep 2021 11:20) (87 - 88)  RR: 19 (30 Sep 2021 11:20) (15 - 19)  SpO2: 100% (30 Sep 2021 11:20) (83% - 100%)  Height (cm): 153.7 ( @ 10:56)  Weight (kg): 73.7 ( @ 10:56)  BMI (kg/m2): 31.2 ( @ 10:56)      Constitutional: wn/wd in NAD.   HEENT: NCAT, MMM, OP clear, EOMI, , no proptosis or lid retraction  Neck: no thyromegaly or palpable thyroid nodules   Respiratory: lungs CTAB.  Cardiovascular: regular rhythm, normal S1 and S2, no audible murmurs, no peripheral edema  GI: soft, NT/ND, no masses/HSM appreciated.  Neurology: no tremors, DTR 2+  Skin: wound vac in place, no erythema noted.  Psychiatric: AAO x 3, normal affect/mood.  Ext: radial pulses intact, DP pulses intact, extremities warm, no cyanosis, clubbing or edema.       LABS:                        9.8    10.38 )-----------( 145      ( 30 Sep 2021 03:28 )             30.6     09-30    135  |  104  |  16  ----------------------------<  87  4.1   |  22  |  1.01    Ca    8.9      30 Sep 2021 03:28  Phos  4.4       Mg     2.2     -30    TPro  6.7  /  Alb  3.3  /  TBili  0.4  /  DBili  x   /  AST  18  /  ALT  15  /  AlkPhos  85  29    PT/INR - ( 30 Sep 2021 03:28 )   PT: 12.6 sec;   INR: 1.05          PTT - ( 30 Sep 2021 03:28 )  PTT:32.3 sec  Urinalysis Basic - ( 29 Sep 2021 01:58 )    Color: Yellow / Appearance: Clear / S.015 / pH: x  Gluc: x / Ketone: NEGATIVE  / Bili: Negative / Urobili: 0.2 E.U./dL   Blood: x / Protein: NEGATIVE mg/dL / Nitrite: NEGATIVE   Leuk Esterase: NEGATIVE / RBC: x / WBC x   Sq Epi: x / Non Sq Epi: x / Bacteria: x        Thyroid Stimulating Hormone, Serum: 12.830 ( @ 07:38)  Thyroid Stimulating Hormone, Serum: 2.690 ( @ 15:27)      RADIOLOGY & ADDITIONAL STUDIES:  CAPILLARY BLOOD GLUCOSE            A/P: 76 year old female, current everyday smoker with PMHx of HTN, Spinal Stenosis, Arthritis, Bicuspid aortic valve with known aortic stenosis evaluated by Dr. Muller as an outpatient and presented to [facility] on 2021. Patient underwent AVR, ascending/hemiarch replacement with frozen elephant trunk and left aorto-subclavian bypass, CABG x2 with Dr. Muller on 2021. The patient was discharged home from rehab on . She states she was doing well overall. When her home nurse came to change her wound vac she sent a photograph to Dr. Muller and was noted to have poor wound healing. She was instructed to report to the ED for evaluation for muscle flap. s/p Debridement and closure of sternal wound with removal of sternal hardware, Debridement of sternum with pectoralis muscle flap, bilateral pectoralis flap, Debridement, sternum, with wound VAC application on     -Will add synthroid 50 mcg. Hypothyroidism can be attributed to Amiodarone will c/w Amiodarone as pt is tolerating the medication and did not have episodes of Afib with RVR.   -f/u labs for Hashimoto to consider an underlying pathology.    Will continue to monitor     For discharge, pt can continue    Pt can follow up at discharge with [facility] by calling  to make an appointment.   Will discuss case with     and update primary team

## 2021-09-30 NOTE — CONSULT NOTE ADULT - ATTENDING COMMENTS
Pt seen on rounds this afternoon.  76-yo woman s/p AVR, now readmitted with sternal wound infection.  Has no history of thyroid disease and normal TSH level of 2.6 uU/ml pre-op, but now has TSH level of 12.8 uU/ml.  The rise in TSH coincides quite closely with her having been started on amiodarone, however, which is almost certainly responsible for her decrease in thyroid function.  Pts who develop amio-induced hypothyroidism often have underlying Hashimoto's disease, and would therefore check thyroid antibodies.  Of note is that she has two daughters with autoimmune thyroid disease--(one almost certainly with Graves' disease)  Since she clearly needs to stay on amio, would start levothyroxine replacement at 50 mcg/day.
As above.  She completed course for superficial SSI (Klebsiella oxytoca) with ceftriaxone, readmitted for poor wound healing, now s/p debridement and pec-flap closure.  Though no overt signs of continued infection, in view of complexity would treat with ceftriaxone while OR cultures are pending.  Will follow with you - team 1.

## 2021-09-30 NOTE — PROGRESS NOTE ADULT - SUBJECTIVE AND OBJECTIVE BOX
Patient discussed on morning rounds with Dr. Muller    Operation / Date:   s/p AVR, ascending/hemiarch replacement with frozen elephant trunk and left aorto-subclavian bypass, CABG x2 ()  : Pending muscle flap with plastic surgery    SUBJECTIVE ASSESSMENT:  76y Female assessed at bedside this morning. She states she is feeling well today. She endorses some pain around her sternal incision site. Denies any CP, SOB, Dizziness, N/V, Fever, Chills. She still has a yanes catheter in place but endorses regular bowel movements. She is hesitant to move around with her drains in place and states she has not walked much. States she is using her IS as instructed, pulling 1000cc today.    Vital Signs Last 24 Hrs  T(C): 36.4 (30 Sep 2021 09:39), Max: 36.4 (30 Sep 2021 09:39)  T(F): 97.5 (30 Sep 2021 09:39), Max: 97.5 (30 Sep 2021 09:39)  HR: 68 (30 Sep 2021 11:20) (56 - 78)  BP: 128/61 (30 Sep 2021 11:20) (109/78 - 128/61)  BP(mean): 88 (30 Sep 2021 11:20) (87 - 88)  RR: 19 (30 Sep 2021 11:20) (15 - 19)  SpO2: 100% (30 Sep 2021 11:20) (83% - 100%)  I&O's Detail    29 Sep 2021 07:01  -  30 Sep 2021 07:00  --------------------------------------------------------  IN:    IV PiggyBack: 100 mL    Oral Fluid: 375 mL    sodium chloride 0.9%: 110 mL  Total IN: 585 mL    OUT:    Bulb (mL): 85 mL    Bulb (mL): 40 mL    Bulb (mL): 80 mL    Indwelling Catheter - Urethral (mL): 515 mL    VAC (Vacuum Assisted Closure) System (mL): 0 mL    Voided (mL): 950 mL  Total OUT: 1670 mL    Total NET: -1085 mL      30 Sep 2021 07:01  -  30 Sep 2021 12:13  --------------------------------------------------------  IN:  Total IN: 0 mL    OUT:    Indwelling Catheter - Urethral (mL): 35 mL    sodium chloride 0.9%: 0 mL  Total OUT: 35 mL    Total NET: -35 mL    CHEST TUBE:  No.   FRANCHESKA DRAIN:  Yes. x2  EPICARDIAL WIRES: No.  TIE DOWNS: Yes.  YANES: Yes.    PHYSICAL EXAM:  General: Well appearing, Sitting upright in recliner, NAD  Neurological: AOx4, no focal neurological deficits  Cardiovascular: regular rate/rhythm, S1/S2 auscultated, no murmurs or extra heart sounds  Respiratory: mild, bibasilar crackles, CTA over all other lung fields.  Gastrointestinal: bowel sounds present in all 4 quadrants, abdomen is soft, non-tender, non-distended  Extremities: trace peripheral edema noted in b/l lower extremities, 5/5 strength and full range of motion in all 4 extremities b/l  Vascular: 2+ peripheral pulses b/l in upper and lower extremities  Incision Sites: MSI with wound vac in place, no erythema, no purulence noted.    LABS:                        9.8    10.38 )-----------( 145      ( 30 Sep 2021 03:28 )             30.6     COUMADIN:  No.     PT/INR - ( 30 Sep 2021 03:28 )   PT: 12.6 sec;   INR: 1.05          PTT - ( 30 Sep 2021 03:28 )  PTT:32.3 sec    09-30    135  |  104  |  16  ----------------------------<  87  4.1   |  22  |  1.01    Ca    8.9      30 Sep 2021 03:28  Phos  4.4     09-  Mg     2.2     -30    TPro  6.7  /  Alb  3.3  /  TBili  0.4  /  DBili  x   /  AST  18  /  ALT  15  /  AlkPhos  85  09-29      Urinalysis Basic - ( 29 Sep 2021 01:58 )    Color: Yellow / Appearance: Clear / S.015 / pH: x  Gluc: x / Ketone: NEGATIVE  / Bili: Negative / Urobili: 0.2 E.U./dL   Blood: x / Protein: NEGATIVE mg/dL / Nitrite: NEGATIVE   Leuk Esterase: NEGATIVE / RBC: x / WBC x   Sq Epi: x / Non Sq Epi: x / Bacteria: x    MEDICATIONS  (STANDING):  albumin human  5% IVPB 250 milliLiter(s) IV Intermittent once  aMIOdarone    Tablet 200 milliGRAM(s) Oral daily  atorvastatin 10 milliGRAM(s) Oral at bedtime  chlorhexidine 2% Cloths 1 Application(s) Topical daily  furosemide    Tablet 40 milliGRAM(s) Oral daily  polyethylene glycol 3350 17 Gram(s) Oral daily  potassium chloride    Tablet ER 20 milliEquivalent(s) Oral daily  senna 2 Tablet(s) Oral at bedtime  sodium chloride 0.9% lock flush 3 milliLiter(s) IV Push every 8 hours    MEDICATIONS  (PRN):  oxycodone    5 mG/acetaminophen 325 mG 2 Tablet(s) Oral every 6 hours PRN Severe Pain (7 - 10)  oxycodone    5 mG/acetaminophen 325 mG 1 Tablet(s) Oral every 6 hours PRN Moderate Pain (4 - 6)    RADIOLOGY & ADDITIONAL TESTS:    CXR: Mild congestion (pending official read)   Patient discussed on morning rounds with Dr. Muller    Operation / Date:   s/p AVR, ascending/hemiarch replacement with frozen elephant trunk and left aorto-subclavian bypass, CABG x2 ()  : Pending muscle flap with plastic surgery    SUBJECTIVE ASSESSMENT:  76y Female assessed at bedside this morning. She states she is feeling well today. She endorses some pain around her sternal incision site. Denies any CP, SOB, Dizziness, N/V, Fever, Chills. She still has a yanes catheter in place but endorses regular bowel movements. She is hesitant to move around with her drains in place and states she has not walked much. States she is using her IS as instructed, pulling 1000cc today.    Vital Signs Last 24 Hrs  T(C): 36.4 (30 Sep 2021 09:39), Max: 36.4 (30 Sep 2021 09:39)  T(F): 97.5 (30 Sep 2021 09:39), Max: 97.5 (30 Sep 2021 09:39)  HR: 68 (30 Sep 2021 11:20) (56 - 78)  BP: 128/61 (30 Sep 2021 11:20) (109/78 - 128/61)  BP(mean): 88 (30 Sep 2021 11:20) (87 - 88)  RR: 19 (30 Sep 2021 11:20) (15 - 19)  SpO2: 100% (30 Sep 2021 11:20) (83% - 100%)  I&O's Detail    29 Sep 2021 07:01  -  30 Sep 2021 07:00  --------------------------------------------------------  IN:    IV PiggyBack: 100 mL    Oral Fluid: 375 mL    sodium chloride 0.9%: 110 mL  Total IN: 585 mL    OUT:    Bulb (mL): 85 mL    Bulb (mL): 40 mL    Bulb (mL): 80 mL    Indwelling Catheter - Urethral (mL): 515 mL    VAC (Vacuum Assisted Closure) System (mL): 0 mL    Voided (mL): 950 mL  Total OUT: 1670 mL    Total NET: -1085 mL      30 Sep 2021 07:01  -  30 Sep 2021 12:13  --------------------------------------------------------  IN:  Total IN: 0 mL    OUT:    Indwelling Catheter - Urethral (mL): 35 mL    sodium chloride 0.9%: 0 mL  Total OUT: 35 mL    Total NET: -35 mL    CHEST TUBE:  No.   FRANCHESKA DRAIN:  Yes. x4  EPICARDIAL WIRES: No.  TIE DOWNS: No  YANES: Yes.    PHYSICAL EXAM:  General: Well appearing, Sitting upright in recliner, NAD  Neurological: AOx4, no focal neurological deficits  Cardiovascular: regular rate/rhythm, S1/S2 auscultated, no murmurs or extra heart sounds  Respiratory: mild, bibasilar crackles, CTA over all other lung fields.  Gastrointestinal: bowel sounds present in all 4 quadrants, abdomen is soft, non-tender, non-distended  Extremities: trace peripheral edema noted in b/l lower extremities, 5/5 strength and full range of motion in all 4 extremities b/l  Vascular: 2+ peripheral pulses b/l in upper and lower extremities  Incision Sites: MSI with wound vac in place, no erythema, no purulence noted.    LABS:                        9.8    10.38 )-----------( 145      ( 30 Sep 2021 03:28 )             30.6     COUMADIN:  No.     PT/INR - ( 30 Sep 2021 03:28 )   PT: 12.6 sec;   INR: 1.05          PTT - ( 30 Sep 2021 03:28 )  PTT:32.3 sec    09-    135  |  104  |  16  ----------------------------<  87  4.1   |  22  |  1.01    Ca    8.9      30 Sep 2021 03:28  Phos  4.4     -  Mg     2.2     -30    TPro  6.7  /  Alb  3.3  /  TBili  0.4  /  DBili  x   /  AST  18  /  ALT  15  /  AlkPhos  85  09-29      Urinalysis Basic - ( 29 Sep 2021 01:58 )    Color: Yellow / Appearance: Clear / S.015 / pH: x  Gluc: x / Ketone: NEGATIVE  / Bili: Negative / Urobili: 0.2 E.U./dL   Blood: x / Protein: NEGATIVE mg/dL / Nitrite: NEGATIVE   Leuk Esterase: NEGATIVE / RBC: x / WBC x   Sq Epi: x / Non Sq Epi: x / Bacteria: x    MEDICATIONS  (STANDING):  albumin human  5% IVPB 250 milliLiter(s) IV Intermittent once  aMIOdarone    Tablet 200 milliGRAM(s) Oral daily  atorvastatin 10 milliGRAM(s) Oral at bedtime  chlorhexidine 2% Cloths 1 Application(s) Topical daily  furosemide    Tablet 40 milliGRAM(s) Oral daily  polyethylene glycol 3350 17 Gram(s) Oral daily  potassium chloride    Tablet ER 20 milliEquivalent(s) Oral daily  senna 2 Tablet(s) Oral at bedtime  sodium chloride 0.9% lock flush 3 milliLiter(s) IV Push every 8 hours    MEDICATIONS  (PRN):  oxycodone    5 mG/acetaminophen 325 mG 2 Tablet(s) Oral every 6 hours PRN Severe Pain (7 - 10)  oxycodone    5 mG/acetaminophen 325 mG 1 Tablet(s) Oral every 6 hours PRN Moderate Pain (4 - 6)    RADIOLOGY & ADDITIONAL TESTS:    CXR: Mild congestion (pending official read)

## 2021-09-30 NOTE — PHYSICAL THERAPY INITIAL EVALUATION ADULT - PERTINENT HX OF CURRENT PROBLEM, REHAB EVAL
76Y F with HTN, bicuspid aortic valve s/p AVR, ascending arch replacement, L aortosubclavian bypass, CABG x2, sternal wound w/ Klebsiella now s/p debridement of sternal wound, removal of wires, bilateral pectoralis muscle advancement flaps on 9/29/21

## 2021-09-30 NOTE — PHYSICAL THERAPY INITIAL EVALUATION ADULT - GENERAL OBSERVATIONS, REHAB EVAL
Spoke to RN Cherry, pt cleared for PT.POD#1 wound debridement and vac placement, limited UE push/pull. Pt rcvd OOB to chair, +tele, +radial Ilana, +wound vac, +yanes, +JPx3, +SCDs. Pt agreeable to PT, reports 10/10 pain with UE movement. Tolerated session well

## 2021-09-30 NOTE — PROGRESS NOTE ADULT - ASSESSMENT
76 year old female, current everyday smoker with PMHx of HTN, Spinal Stenosis, Arthritis, Bicuspid aortic valve with known aortic stenosis evaluated by Dr. Muller as an outpatient and presented to [facility] on 8/8/2021 to complete preoperative workup. Patient underwent AVR, ascending/hemiarch replacement with frozen elephant trunk and left aorto-subclavian bypass, CABG x2 with Dr. Muller on 8/9/2021. Post-operative course complicated by prolonged intubation, moderate pericardial effusion drained by IR on POD7, Sternal wound erythema s/p sternal debridement and wound vac placement (cultures growing Klebsiella) on POD 17, multiple episodes of AF with RVR/hypotension requiring amiodarone with subsequent bradycardia requiring PPM on POD 23 and FIDEL, resolved with hydration. Pt was discharged home on 9/3/21 but represented to the hospital on 9/4/21 due to failure to thrive at home and volume overload. As per Dr. Muller the patient was stable and ready for discharge to [facility] on 9/9/21. The patient was discharged home from rehab on 9/24. She states she was doing well overall. When her home nurse came to change her wound vac she sent a photograph to Dr. Muller and was noted to have poor wound healing. She was instructed to report to the ED for evaluation for intervention. 9/29/21 underwent bilateral muscle flap advancement with plastic surgery. POD1, recovering well, pending ToV    Plan:    Neurovascular:   - Pain well controlled with current regimen. PRN's: Tylenol, Oxycodone  -     Cardiovascular:   -Hemodynamically stable.   -Monitor: BP, HR, tele    Respiratory:   -Oxygenating well on room air  -Encourage continued use of IS 10x/hr and frequent ambulation  -CXR:    GI:  -GI PPX:  -PO Diet  -Bowel Regimen:     Renal / :  -Continue to monitor renal function: BUN/Cr  -Monitor I/O's daily     Endocrine:    -No hx of DM or thyroid dx  -A1c:  -TSH:    Hematologic:  -CBC: H/H-  -Coagulation Panel.    ID:  -Temperature:   -CBC: WBC-  -Continue to observe for SIRS/Sepsis Syndrome.    Prophylaxis:  -DVT prophylaxis with 5000 SubQ Heparin q8h.  -Continue with SCD's b/l while patient is at rest     Disposition:  -Discharge home once patient is medically ready   76 year old female, current everyday smoker with PMHx of HTN, Spinal Stenosis, Arthritis, Bicuspid aortic valve with known aortic stenosis evaluated by Dr. Muller as an outpatient and presented to [facility] on 8/8/2021 to complete preoperative workup. Patient underwent AVR, ascending/hemiarch replacement with frozen elephant trunk and left aorto-subclavian bypass, CABG x2 with Dr. Muller on 8/9/2021. Post-operative course complicated by prolonged intubation, moderate pericardial effusion drained by IR on POD7, Sternal wound erythema s/p sternal debridement and wound vac placement (cultures growing Klebsiella) on POD 17, multiple episodes of AF with RVR/hypotension requiring amiodarone with subsequent bradycardia requiring PPM on POD 23 and FIDEL, resolved with hydration. Pt was discharged home on 9/3/21 but represented to the hospital on 9/4/21 due to failure to thrive at home and volume overload. As per Dr. Muller the patient was stable and ready for discharge to [facility] on 9/9/21. The patient was discharged home from rehab on 9/24. She states she was doing well overall. When her home nurse came to change her wound vac she sent a photograph to Dr. Muller and was noted to have poor wound healing. She was instructed to report to the ED for evaluation for intervention. 9/29/21 underwent bilateral muscle flap advancement with plastic surgery. POD1, recovering well, pending ToV    Plan:  Neurovascular:   - Pain well controlled with current regimen. PRN's: Tylenol, Oxycodone  - Anxiety: currently well controlled, takes Xanax at home 0.25mg PRN    Cardiovascular:   - S/p AVR, ascending/hemiarch replacement with frozen elephant trunk and left aorto-subclavian bypass, CABG x2 8/9/21        - Readmission 9/29/21 for poor wound healing of MSI, underwent debridement with bilateral muscle flap and wound vac placement with Dr. Criss Burks (plastic surgeon)       - Previous admission complicated by sternal wound infection (see ID section)       - Cont. ASA, Lipitor  - Hemodynamically stable.  - Hx of Post op AF: continue amio  daily  - Monitor: BP, HR, tele       - 24hr BP range: 119-    Respiratory:   - 02 Sat = 98% on RA.  -If on oxygen wean to RA from for O2 Sat > 93%.  -Encourage C+DB and Use of IS 10x / hr while awake.  -CXR with no infiltrates, obvious effusions     GI:   - Stable.  -NPO after MN for possible OR tomorrow   -Continue GI PPX with protonix  -PO Diet.    Renal / :  - BUN/Cr stable at 16/0.97  -Continue to monitor renal function.  -Monitor I/O's.  - Replete electrolytes PRN  - Continue diuresis with 40 lasix daily, volume status improved from last admission     Endocrine:    - No known hx DM or thyroid disease     Hematologic:  -H/H stable at 9/30 with no obvious signs of bleeding  -Coagulation Panel.    ID:  -Pt remains afebrile with no elevation in WBC or signs of infection  - During prior admission wound cultures grew Klebsiella, is now s/p IV abx course (Ceftriaxone), follows with Dr. Crystal  - MSI with no erythema, purulence   -Continue to monitor CBC  -Observe for SIRS/Sepsis Syndrome.    Prophylaxis:  -DVT prophylaxis with 5000 SubQ Heparin q8h.  -SCD's    Disposition:  -Pending OR with plastic surgery, today    76 year old female, current everyday smoker with PMHx of HTN, Spinal Stenosis, Arthritis, Bicuspid aortic valve with known aortic stenosis evaluated by Dr. Muller as an outpatient and presented to [facility] on 8/8/2021 to complete preoperative workup. Patient underwent AVR, ascending/hemiarch replacement with frozen elephant trunk and left aorto-subclavian bypass, CABG x2 with Dr. Muller on 8/9/2021. Post-operative course complicated by prolonged intubation, moderate pericardial effusion drained by IR on POD7, Sternal wound erythema s/p sternal debridement and wound vac placement (cultures growing Klebsiella) on POD 17, multiple episodes of AF with RVR/hypotension requiring amiodarone with subsequent bradycardia requiring PPM on POD 23 and FIDEL, resolved with hydration. Pt was discharged home on 9/3/21 but represented to the hospital on 9/4/21 due to failure to thrive at home and volume overload. As per Dr. Muller the patient was stable and ready for discharge to [facility] on 9/9/21. The patient was discharged home from rehab on 9/24. She states she was doing well overall. When her home nurse came to change her wound vac she sent a photograph to Dr. Muller and was noted to have poor wound healing. She was instructed to report to the ED for evaluation for intervention. 9/29/21 underwent bilateral muscle flap advancement with plastic surgery. POD1, recovering well, pending ToV    Plan:  Neurovascular:   - Pain well controlled with current regimen. PRN's: Tylenol, Oxycodone  - Anxiety: currently well controlled, takes Xanax at home 0.25mg PRN    Cardiovascular:   - S/p AVR, ascending/hemiarch replacement with frozen elephant trunk and left aorto-subclavian bypass, CABG x2 8/9/21        - Readmission 9/29/21 for poor wound healing of MSI, underwent debridement with bilateral muscle flap and wound vac placement with Dr. Criss Burks (plastic surgeon)       - Previous admission complicated by sternal wound infection (see ID section)       - Cont. ASA, Lipitor       - Takes Metoprolol succinate 25mg PO QD at home, f/u when to restart  - Hx of Post op AF: continue amiodarone  daily  - S/p PPM placement during prior admission.   - Hemodynamically stable.  - Monitor: BP, HR, tele       - 24hr BP range: (119-153)/(60-68)       - 24hr HR range: 56-72    Respiratory:   - 02 Sat = % on 4L O2, will wean to room air  - Encourage C+DB and Use of IS 10x / hr while awake.  - CXR with mild congestion, no infiltrates, no obvious effusions     GI:    - Continue GI PPX with protonix  - PO Diet.  - Bowel Regimen: Cont. Miralax, Senna    Renal / :  - Continue diuresis, 40mg Lasix daily   - BUN/Cr stable at 16/1.01  - Continue to monitor renal function.  - Monitor I/O's.  - Replete electrolytes PRN    Endocrine:    - No known hx DM or thyroid disease  - A1c: 4.8  - TSH: 12.83, TSH was normal during prior admission       - Endocrine consulted, appreciate recs when available       - Free T4, Total T3 pending     Hematologic:  - H/H stable 9.8/30.6   - Coagulation Panel.  PT/PTT/INR 12.6/32.3/1.05    ID:  - Pt remains afebrile, 24hr Tmax = 37.1C   - WBC: 10.38  - Prior admission complicated by Sternal Wound infection growing Klebsiella       - s/p completion of IV abx course (Ceftriaxone), follows with Dr. Crystal       - Given further debridement during recent surgery this admission, ID consulted for advice on possible further ABX treatment. Appreciate recs when available.       - Finishing perioperative, prophylactic course of Ancef   - MSI currently with no erythema or purulence   - Continue to monitor CBC  - Observe for SIRS/Sepsis Syndrome.    Prophylaxis:  - DVT prophylaxis with 5000 SubQ Heparin q8h.  - Continue with SCD's b/l while patient is at rest     Disposition:  - Discharge home once patient is medically ready   76 year old female, current everyday smoker with PMHx of HTN, Spinal Stenosis, Arthritis, Bicuspid aortic valve with known aortic stenosis evaluated by Dr. Muller as an outpatient and presented to [facility] on 8/8/2021 to complete preoperative workup. Patient underwent AVR, ascending/hemiarch replacement with frozen elephant trunk and left aorto-subclavian bypass, CABG x2 with Dr. Muller on 8/9/2021. Post-operative course complicated by prolonged intubation, moderate pericardial effusion drained by IR on POD7, Sternal wound erythema s/p sternal debridement and wound vac placement (cultures growing Klebsiella) on POD 17, multiple episodes of AF with RVR/hypotension requiring amiodarone with subsequent bradycardia requiring PPM on POD 23 and FIDEL, resolved with hydration. Pt was discharged home on 9/3/21 but represented to the hospital on 9/4/21 due to failure to thrive at home and volume overload. As per Dr. Muller the patient was stable and ready for discharge to [facility] on 9/9/21. The patient was discharged home from rehab on 9/24. She states she was doing well overall. When her home nurse came to change her wound vac she sent a photograph to Dr. Muller and was noted to have poor wound healing. She was instructed to report to the ED for evaluation for intervention. 9/29/21 underwent bilateral muscle flap advancement with plastic surgery. POD1, recovering well, pending ToV    Plan:  Neurovascular:   - Pain well controlled with current regimen. PRN's: Tylenol, Oxycodone  - Anxiety: currently well controlled, takes Xanax at home 0.25mg PRN    Cardiovascular:   - S/p AVR, ascending/hemiarch replacement with frozen elephant trunk and left aorto-subclavian bypass, CABG x2 8/9/21        - Readmission 9/29/21 for poor wound healing of MSI, underwent debridement with bilateral muscle flap and wound vac placement with Dr. Criss Burks (plastic surgeon)       - Previous admission complicated by sternal wound infection (see ID section)       - Cont. ASA, Lipitor       - Holding BB for now, patient bradycardic  - Hx of Post op AF: continue amiodarone  daily  - S/p PPM placement during prior admission.   - Hemodynamically stable.  - Monitor: BP, HR, tele       - 24hr BP range: (119-153)/(60-68)       - 24hr HR range: 56-72    Respiratory:   - 02 Sat = % on 4L O2, will wean to room air  - Encourage C+DB and Use of IS 10x / hr while awake.  - CXR with mild congestion, no infiltrates, no obvious effusions     GI:    - Continue GI PPX with protonix  - PO Diet.  - Bowel Regimen: Cont. Miralax, Senna    Renal / :  - Continue diuresis, 40mg Lasix daily   - BUN/Cr stable at 16/1.01  - Continue to monitor renal function.  - Monitor I/O's.  - Replete electrolytes PRN    Endocrine:    - No known hx DM or thyroid disease  - A1c: 4.8  - TSH: 12.83, TSH was normal during prior admission       - Endocrine consulted, appreciate recs when available       - Free T4, Total T3 pending     Hematologic:  - H/H stable 9.8/30.6   - Coagulation Panel.  PT/PTT/INR 12.6/32.3/1.05    ID:  - Pt remains afebrile, 24hr Tmax = 37.1C   - WBC: 10.38  - Prior admission complicated by Sternal Wound infection growing Klebsiella       - s/p completion of IV abx course (Ceftriaxone), follows with Dr. Crystal       - Given further debridement during recent surgery this admission, ID consulted for advice on possible further ABX treatment. Appreciate recs when available.       - Finishing perioperative, prophylactic course of Ancef   - MSI currently with no erythema or purulence   - Continue to monitor CBC  - Observe for SIRS/Sepsis Syndrome.    Prophylaxis:  - DVT prophylaxis with 5000 SubQ Heparin q8h.  - Continue with SCD's b/l while patient is at rest     Disposition:  - Discharge home once patient is medically ready

## 2021-09-30 NOTE — PHYSICAL THERAPY INITIAL EVALUATION ADULT - IMPAIRMENTS FOUND, PT EVAL
aerobic capacity/endurance/gait, locomotion, and balance/integumentary integrity/muscle strength/posture/ROM

## 2021-09-30 NOTE — PROGRESS NOTE ADULT - SUBJECTIVE AND OBJECTIVE BOX
Pt seen and examined. Now POD1 from sternal debridement, removal of hardware, bilateral pec flaps, and incisional vac. Patient did well overnight. No issues. has been out of bed this morning. VAC holding suction. Drains superficial 40, deep 80 x 2. Appropriate quality.    -Continue VAC, continue drains   -Limit upper arm pushing, pulling, straining   -Care per primary team     SCW

## 2021-09-30 NOTE — CONSULT NOTE ADULT - ASSESSMENT
76 year old F (former smoker) with PMHx of HTN, Spinal Stenosis, Arthritis, Bicuspid aortic valve with known aortic stenosis. S/p AVR, ascending/hemiarch replacement with frozen elephant trunk and left aorto-subclavian bypass, CABG x2 with Dr. Muller on 8/9/2021. Postoperative course c/b sternal wound growing K. oxytoca/raoutella ornithinolytica, completed course of ceftriaxone for SSI. Patient now returns for poor wound healing and is s/p wound debridement and removal of sternal hardware with pectoralis flap advancement on 9/29 with plastic surgery. ID consulted for determining need for post-operative antibiotics.     From intra-operative imaging, sternal wound site does not appear to be infected. Does not have leukocytosis or fevers. Empiric abx for post-surgical prophylaxis is warranted in this patient, however.     Recommendations:   - Start ceftriaxone 2g q 24H   - F/u sternal wound cultures from 9/29     Discussed with primary team.    ID Team 1 will continue to follow. Please see below attending attestation for finalized recommendations.  Diann Hunter DO - PGY2 Internal Medicine  Infectious Disease Consult Service, Team 1     76 year old F (former smoker) with PMHx of HTN, Spinal Stenosis, Arthritis, Bicuspid aortic valve with known aortic stenosis. S/p AVR, ascending/hemiarch replacement with frozen elephant trunk and left aorto-subclavian bypass, CABG x2 with Dr. Muller on 8/9/2021. Postoperative course c/b sternal wound growing K. oxytoca/raoutella ornithinolytica, completed course of ceftriaxone for SSI. Patient now returns for poor wound healing and is s/p wound debridement and removal of sternal hardware with pectoralis flap advancement on 9/29 with plastic surgery. ID consulted for determining need for post-operative antibiotics.     From intra-operative imaging, sternal wound site does not appear to be infected. Does not have leukocytosis or fevers.  However, given complicated course, is reasonable to treat while OR cultures are pending.     Recommendations:   - Start ceftriaxone 2g q 24H   - F/u sternal wound cultures from 9/29     Discussed with primary team.    ID Team 1 will continue to follow. Please see below attending attestation for finalized recommendations.  Diann Hunter DO - PGY2 Internal Medicine  Infectious Disease Consult Service, Team 1

## 2021-09-30 NOTE — CONSULT NOTE ADULT - SUBJECTIVE AND OBJECTIVE BOX
Patient is a 76y old  Female who presents with a chief complaint of Poor wound healing (30 Sep 2021 18:00)    HPI:  76 year old female, current everyday smoker with PMHx of HTN, Spinal Stenosis, Arthritis, Bicuspid aortic valve with known aortic stenosis evaluated by Dr. Muller as an outpatient and presented to [facility] on 2021 to complete preoperative workup. Patient underwent AVR, ascending/hemiarch replacement with frozen elephant trunk and left aorto-subclavian bypass, CABG x2 with Dr. Muller on 2021. Post-operative course complicated by prolonged intubation, moderate pericardial effusion drained by IR on POD7, Sternal wound erythema s/p sternal debridement and wound vac placement (cultures growing Klebsiella) on POD 17, multiple episodes of AF with RVR/hypotension requiring amiodarone with subsequent bradycardia requiring PPM on POD 23 and FIDEL, resolved with hydration. She was discharged to home on ceftriaxone 2g q24H (ended on , when she did a telehealth visit with Dr. Crystal). As per Dr. Muller the patient was stable and ready for discharge to [facility] on 21. The patient was discharged home from rehab on . She states she was doing well overall. When her home nurse came to change her wound vac she sent a photograph to Dr. Muller and was noted to have poor wound healing. She was instructed to report to the ED for evaluation for muscle flap.    (28 Sep 2021 22:54)    Patient underwent debridement and closure of sternal wound with removal of sternal hardware on  with Dr. Burks (plastic surgery). She also underwent bilateral pectoralis flap advancement and incisional wound vac placement with sternal wound closure. Intra-operative findings did not suggest infected material, but rather healing, granulation tissue. She was given perioperative cefazolin and ceftriaxone. Sternal wound cultures in progress.     Subjective: Patient was seen and examined. She admits to some pain around surgical site but otherwise feels well - denies any cough, shortness of breath, abdominal pain, nausea/vomiting/diarrhea. She was born in [place] but has lived in the [place] since . She is . Is still employed in payroll services with . She is a former smoking, stopped on . Denies any drug use or exposure to animals (does not live with pets at home).     REVIEW OF SYSTEMS  All review of systems negative, except for documented above.     Allergies    No Known Allergies    Intolerances      Antimicrobials:  cefTRIAXone   IVPB 2000 milliGRAM(s) IV Intermittent every 24 hours      Other Medications:  aMIOdarone    Tablet 200 milliGRAM(s) Oral daily  atorvastatin 10 milliGRAM(s) Oral at bedtime  chlorhexidine 2% Cloths 1 Application(s) Topical daily  furosemide    Tablet 40 milliGRAM(s) Oral daily  heparin   Injectable 5000 Unit(s) SubCutaneous every 8 hours  levothyroxine 50 MICROGram(s) Oral daily  oxycodone    5 mG/acetaminophen 325 mG 1 Tablet(s) Oral every 6 hours PRN  oxycodone    5 mG/acetaminophen 325 mG 2 Tablet(s) Oral every 6 hours PRN  polyethylene glycol 3350 17 Gram(s) Oral daily  potassium chloride    Tablet ER 20 milliEquivalent(s) Oral daily  senna 2 Tablet(s) Oral at bedtime  sodium chloride 0.9% lock flush 3 milliLiter(s) IV Push every 8 hours      FAMILY HISTORY:  FH: CAD (coronary artery disease) (Sibling)  CABG    Family history of CKD (chronic kidney disease) (Sibling)  ESRD    Family history of diabetes mellitus (DM) (Sibling)      PAST MEDICAL & SURGICAL HISTORY:  HTN (hypertension)    H/O aortic valve stenosis    CAD (coronary artery disease)    S/P aortic valve replacement    S/P CABG (coronary artery bypass graft)    H/O aortic arch replacement    Daily     Daily   Head Circumference:  Vital Signs Last 24 Hrs  T(C): 36.9 (30 Sep 2021 17:44), Max: 36.9 (30 Sep 2021 17:44)  T(F): 98.5 (30 Sep 2021 17:44), Max: 98.5 (30 Sep 2021 17:44)  HR: 63 (30 Sep 2021 17:07) (58 - 78)  BP: 102/54 (30 Sep 2021 17:07) (102/54 - 129/59)  BP(mean): 74 (30 Sep 2021 17:07) (74 - 88)  RR: 18 (30 Sep 2021 17:07) (15 - 19)  SpO2: 100% (30 Sep 2021 17:07) (83% - 100%)    PHYSICAL EXAM  General: Well appearing, Sitting upright in recliner, NAD  Neurological: AOx4, no focal neurological deficits  Cardiovascular: regular rate/rhythm, S1/S2 auscultated, no murmurs or extra heart sounds  Respiratory: mild, bibasilar crackles, CTA over all other lung fields.  Gastrointestinal: bowel sounds present in all 4 quadrants, abdomen is soft, non-tender, non-distended  Extremities: trace peripheral edema noted in b/l lower extremities, 5/5 strength and full range of motion in all 4 extremities b/l  Vascular: 2+ peripheral pulses b/l in upper and lower extremities  Incision Sites: wound vac in place, no erythema, no purulence noted. 3 JUSTIN drains with serosanguinous fluid.     Lab Results:                        9.8    10.38 )-----------( 145      ( 30 Sep 2021 03:28 )             30.6     09-30    135  |  104  |  16  ----------------------------<  87  4.1   |  22  |  1.01    Ca    8.9      30 Sep 2021 03:28  Phos  4.4     09-30  Mg     2.2     -30    TPro  6.7  /  Alb  3.3  /  TBili  0.4  /  DBili  x   /  AST  18  /  ALT  15  /  AlkPhos  85  09-29    LIVER FUNCTIONS - ( 29 Sep 2021 16:52 )  Alb: 3.3 g/dL / Pro: 6.7 g/dL / ALK PHOS: 85 U/L / ALT: 15 U/L / AST: 18 U/L / GGT: x           PT/INR - ( 30 Sep 2021 03:28 )   PT: 12.6 sec;   INR: 1.05          PTT - ( 30 Sep 2021 03:28 )  PTT:32.3 sec  Urinalysis Basic - ( 29 Sep 2021 01:58 )    Color: Yellow / Appearance: Clear / S.015 / pH: x  Gluc: x / Ketone: NEGATIVE  / Bili: Negative / Urobili: 0.2 E.U./dL   Blood: x / Protein: NEGATIVE mg/dL / Nitrite: NEGATIVE   Leuk Esterase: NEGATIVE / RBC: x / WBC x   Sq Epi: x / Non Sq Epi: x / Bacteria: x        MICROBIOLOGY    Culture - Acid Fast - Tissue w/Smear (collected 30 Sep 2021 00:49)  Source: .Tissue Anterior Sternal Wound    Culture - Fungal, Tissue (collected 30 Sep 2021 00:49)  Source: .Tissue Anterior Sternal Wound  Preliminary Report (30 Sep 2021 12:07):    Testing in progress    Culture - Tissue with Gram Stain (collected 29 Sep 2021 17:02)  Source: .Tissue Anterior Sternal Wound  Gram Stain (29 Sep 2021 22:00):    No organisms seen    No WBC's seen.  Preliminary Report (30 Sep 2021 09:36):    Culture in progress    Culture - Surgical Swab (collected 29 Sep 2021 17:00)  Source: .Surgical Swab Sternal wire cx  Gram Stain (29 Sep 2021 17:01):    Test cannot be performed on this type of specimen.  Preliminary Report (30 Sep 2021 11:33):    No growth to date

## 2021-09-30 NOTE — PROGRESS NOTE ADULT - ASSESSMENT
ASSESSMENT & PLAN    76Y F with HTN, bicuspid aortic valve s/p AVR, ascending arch replacement, L aortosubclavian bypass, CABG x2, sternal wound w/ Klebsiella now s/p debridement of sternal wound, removal of wires, bilateral pectoralis muscle advancement flaps on 9/29/21, recovering well.    - Continue wound vac for 5 days post op, then change to Aquacel surgical dressing  - Limit upper extremity movement (pushing, pulling, staining)  - Care per primary team (CT surgery)    Plastics Team  Pager: [phone]

## 2021-09-30 NOTE — PROGRESS NOTE ADULT - SUBJECTIVE AND OBJECTIVE BOX
SUBJECTIVE:  Patient was seen and examined at bedside this morning by the Plastic Surgery team. No overnight events. Doing well.     OBJECTIVE:     VITAL SIGNS / I&O's    Vital Signs Last 24 Hrs  T(C): 36 (30 Sep 2021 05:01), Max: 36 (29 Sep 2021 17:42)  T(F): 96.8 (30 Sep 2021 05:01), Max: 96.8 (29 Sep 2021 17:42)  HR: 69 (30 Sep 2021 08:00) (56 - 78)  BP: 119/60 (29 Sep 2021 10:47) (119/60 - 126/64)  BP(mean): --  RR: 18 (30 Sep 2021 08:00) (15 - 19)  SpO2: 96% (30 Sep 2021 08:00) (92% - 100%)    29 Sep 2021 07:01  -  30 Sep 2021 07:00  --------------------------------------------------------  IN:    IV PiggyBack: 100 mL    Oral Fluid: 375 mL    sodium chloride 0.9%: 110 mL  Total IN: 585 mL    OUT:    Bulb (mL): 85 mL    Bulb (mL): 40 mL    Bulb (mL): 80 mL    Indwelling Catheter - Urethral (mL): 515 mL    VAC (Vacuum Assisted Closure) System (mL): 0 mL    Voided (mL): 950 mL  Total OUT: 1670 mL    Total NET: -1085 mL      30 Sep 2021 07:01  -  30 Sep 2021 09:10  --------------------------------------------------------  IN:  Total IN: 0 mL    OUT:    Indwelling Catheter - Urethral (mL): 0 mL    sodium chloride 0.9%: 0 mL  Total OUT: 0 mL    Total NET: 0 mL    PHYSICAL EXAM    -- CONSTITUTIONAL: Alert, Awake. NAD.   -- RESPIRATORY: unlabored breathing, no respiratory distress  -- CHEST/ABD: Wound vac applied to sternal area, holding suction. No surrounding erythema or edema. JUSTIN drains 40-80 serosang.     LABS               9.8    10.38 )-----------( 145      ( 30 Sep 2021 03:28 )             30.6     30 Sep 2021 03:28    135    |  104    |  16     ----------------------------<  87     4.1     |  22     |  1.01     Ca    8.9        30 Sep 2021 03:28  Phos  4.4       30 Sep 2021 03:28  Mg     2.2       30 Sep 2021 03:28    TPro  6.7    /  Alb  3.3    /  TBili  0.4    /  DBili  x      /  AST  18     /  ALT  15     /  AlkPhos  85     29 Sep 2021 16:52    PT/INR - ( 30 Sep 2021 03:28 )   PT: 12.6 sec;   INR: 1.05        PTT - ( 30 Sep 2021 03:28 )  PTT:32.3 sec    MEDICATIONS  (STANDING):  aMIOdarone    Tablet 200 milliGRAM(s) Oral daily  atorvastatin 10 milliGRAM(s) Oral at bedtime  ceFAZolin   IVPB 2000 milliGRAM(s) IV Intermittent every 8 hours  chlorhexidine 2% Cloths 1 Application(s) Topical daily  furosemide    Tablet 40 milliGRAM(s) Oral daily  heparin   Injectable 5000 Unit(s) SubCutaneous every 8 hours  pantoprazole    Tablet 40 milliGRAM(s) Oral daily  polyethylene glycol 3350 17 Gram(s) Oral daily  potassium chloride    Tablet ER 20 milliEquivalent(s) Oral daily  senna 2 Tablet(s) Oral at bedtime  sodium chloride 0.9%. 1000 milliLiter(s) (10 mL/Hr) IV Continuous <Continuous>    MEDICATIONS  (PRN):  acetaminophen   Tablet .. 650 milliGRAM(s) Oral every 6 hours PRN Mild Pain (1 - 3)  oxycodone    5 mG/acetaminophen 325 mG 1 Tablet(s) Oral every 6 hours PRN Moderate Pain (4 - 6)  oxycodone    5 mG/acetaminophen 325 mG 2 Tablet(s) Oral every 6 hours PRN Severe Pain (7 - 10)

## 2021-10-01 LAB
ANION GAP SERPL CALC-SCNC: 11 MMOL/L — SIGNIFICANT CHANGE UP (ref 5–17)
BUN SERPL-MCNC: 23 MG/DL — SIGNIFICANT CHANGE UP (ref 7–23)
CALCIUM SERPL-MCNC: 9 MG/DL — SIGNIFICANT CHANGE UP (ref 8.4–10.5)
CHLORIDE SERPL-SCNC: 100 MMOL/L — SIGNIFICANT CHANGE UP (ref 96–108)
CO2 SERPL-SCNC: 21 MMOL/L — LOW (ref 22–31)
CREAT SERPL-MCNC: 1.51 MG/DL — HIGH (ref 0.5–1.3)
GLUCOSE SERPL-MCNC: 102 MG/DL — HIGH (ref 70–99)
HCT VFR BLD CALC: 27.8 % — LOW (ref 34.5–45)
HGB BLD-MCNC: 8.7 G/DL — LOW (ref 11.5–15.5)
MAGNESIUM SERPL-MCNC: 2 MG/DL — SIGNIFICANT CHANGE UP (ref 1.6–2.6)
MCHC RBC-ENTMCNC: 30.9 PG — SIGNIFICANT CHANGE UP (ref 27–34)
MCHC RBC-ENTMCNC: 31.3 GM/DL — LOW (ref 32–36)
MCV RBC AUTO: 98.6 FL — SIGNIFICANT CHANGE UP (ref 80–100)
NRBC # BLD: 0 /100 WBCS — SIGNIFICANT CHANGE UP (ref 0–0)
PLATELET # BLD AUTO: 127 K/UL — LOW (ref 150–400)
POTASSIUM SERPL-MCNC: 4.4 MMOL/L — SIGNIFICANT CHANGE UP (ref 3.5–5.3)
POTASSIUM SERPL-SCNC: 4.4 MMOL/L — SIGNIFICANT CHANGE UP (ref 3.5–5.3)
RBC # BLD: 2.82 M/UL — LOW (ref 3.8–5.2)
RBC # FLD: 17.5 % — HIGH (ref 10.3–14.5)
SODIUM SERPL-SCNC: 132 MMOL/L — LOW (ref 135–145)
WBC # BLD: 10.18 K/UL — SIGNIFICANT CHANGE UP (ref 3.8–10.5)
WBC # FLD AUTO: 10.18 K/UL — SIGNIFICANT CHANGE UP (ref 3.8–10.5)

## 2021-10-01 PROCEDURE — 71045 X-RAY EXAM CHEST 1 VIEW: CPT | Mod: 26

## 2021-10-01 PROCEDURE — 99232 SBSQ HOSP IP/OBS MODERATE 35: CPT | Mod: GC

## 2021-10-01 RX ORDER — ACETAMINOPHEN 500 MG
650 TABLET ORAL EVERY 6 HOURS
Refills: 0 | Status: DISCONTINUED | OUTPATIENT
Start: 2021-10-01 | End: 2021-10-04

## 2021-10-01 RX ORDER — PANTOPRAZOLE SODIUM 20 MG/1
40 TABLET, DELAYED RELEASE ORAL
Refills: 0 | Status: DISCONTINUED | OUTPATIENT
Start: 2021-10-01 | End: 2021-10-04

## 2021-10-01 RX ORDER — FUROSEMIDE 40 MG
20 TABLET ORAL ONCE
Refills: 0 | Status: COMPLETED | OUTPATIENT
Start: 2021-10-01 | End: 2021-10-01

## 2021-10-01 RX ORDER — SODIUM CHLORIDE 9 MG/ML
500 INJECTION, SOLUTION INTRAVENOUS
Refills: 0 | Status: DISCONTINUED | OUTPATIENT
Start: 2021-10-01 | End: 2021-10-02

## 2021-10-01 RX ADMIN — HEPARIN SODIUM 5000 UNIT(S): 5000 INJECTION INTRAVENOUS; SUBCUTANEOUS at 21:15

## 2021-10-01 RX ADMIN — OXYCODONE AND ACETAMINOPHEN 2 TABLET(S): 5; 325 TABLET ORAL at 21:14

## 2021-10-01 RX ADMIN — ATORVASTATIN CALCIUM 10 MILLIGRAM(S): 80 TABLET, FILM COATED ORAL at 21:17

## 2021-10-01 RX ADMIN — SODIUM CHLORIDE 3 MILLILITER(S): 9 INJECTION INTRAMUSCULAR; INTRAVENOUS; SUBCUTANEOUS at 22:06

## 2021-10-01 RX ADMIN — SODIUM CHLORIDE 3 MILLILITER(S): 9 INJECTION INTRAMUSCULAR; INTRAVENOUS; SUBCUTANEOUS at 14:11

## 2021-10-01 RX ADMIN — OXYCODONE AND ACETAMINOPHEN 2 TABLET(S): 5; 325 TABLET ORAL at 07:07

## 2021-10-01 RX ADMIN — AMIODARONE HYDROCHLORIDE 200 MILLIGRAM(S): 400 TABLET ORAL at 06:21

## 2021-10-01 RX ADMIN — OXYCODONE AND ACETAMINOPHEN 2 TABLET(S): 5; 325 TABLET ORAL at 07:37

## 2021-10-01 RX ADMIN — Medication 20 MILLIGRAM(S): at 15:01

## 2021-10-01 RX ADMIN — SODIUM CHLORIDE 75 MILLILITER(S): 9 INJECTION, SOLUTION INTRAVENOUS at 11:51

## 2021-10-01 RX ADMIN — Medication 20 MILLIEQUIVALENT(S): at 12:25

## 2021-10-01 RX ADMIN — OXYCODONE AND ACETAMINOPHEN 2 TABLET(S): 5; 325 TABLET ORAL at 22:00

## 2021-10-01 RX ADMIN — Medication 40 MILLIGRAM(S): at 06:21

## 2021-10-01 RX ADMIN — HEPARIN SODIUM 5000 UNIT(S): 5000 INJECTION INTRAVENOUS; SUBCUTANEOUS at 14:15

## 2021-10-01 RX ADMIN — Medication 81 MILLIGRAM(S): at 12:25

## 2021-10-01 RX ADMIN — CHLORHEXIDINE GLUCONATE 1 APPLICATION(S): 213 SOLUTION TOPICAL at 12:23

## 2021-10-01 RX ADMIN — CEFTRIAXONE 100 MILLIGRAM(S): 500 INJECTION, POWDER, FOR SOLUTION INTRAMUSCULAR; INTRAVENOUS at 16:08

## 2021-10-01 RX ADMIN — SODIUM CHLORIDE 3 MILLILITER(S): 9 INJECTION INTRAMUSCULAR; INTRAVENOUS; SUBCUTANEOUS at 06:19

## 2021-10-01 RX ADMIN — HEPARIN SODIUM 5000 UNIT(S): 5000 INJECTION INTRAVENOUS; SUBCUTANEOUS at 06:21

## 2021-10-01 NOTE — PROGRESS NOTE ADULT - SUBJECTIVE AND OBJECTIVE BOX
Patient discussed on morning rounds with       Operation / Date:     SUBJECTIVE ASSESSMENT:  76y Female         Vital Signs Last 24 Hrs  T(C): 36.3 (01 Oct 2021 13:25), Max: 37 (01 Oct 2021 09:15)  T(F): 97.4 (01 Oct 2021 13:25), Max: 98.6 (01 Oct 2021 09:15)  HR: 62 (01 Oct 2021 13:07) (62 - 84)  BP: 108/52 (01 Oct 2021 13:07) (100/52 - 136/61)  BP(mean): 75 (01 Oct 2021 13:07) (73 - 88)  RR: 17 (01 Oct 2021 13:07) (17 - 23)  SpO2: 99% (01 Oct 2021 13:07) (97% - 100%)  I&O's Detail    30 Sep 2021 07:01  -  01 Oct 2021 07:00  --------------------------------------------------------  IN:    Albumin 5%  - 250 mL: 500 mL    IV PiggyBack: 550 mL    Oral Fluid: 1050 mL  Total IN: 2100 mL    OUT:    Bulb (mL): 50 mL    Bulb (mL): 40 mL    Bulb (mL): 50 mL    Indwelling Catheter - Urethral (mL): 555 mL    sodium chloride 0.9%: 0 mL    VAC (Vacuum Assisted Closure) System (mL): 0 mL    Voided (mL): 100 mL  Total OUT: 795 mL    Total NET: 1305 mL      01 Oct 2021 07:01  -  01 Oct 2021 14:20  --------------------------------------------------------  IN:    Lactated Ringers: 225 mL    Oral Fluid: 250 mL  Total IN: 475 mL    OUT:    Bulb (mL): 10 mL    Bulb (mL): 15 mL    Bulb (mL): 10 mL    VAC (Vacuum Assisted Closure) System (mL): 0 mL    Voided (mL): 0 mL  Total OUT: 35 mL    Total NET: 440 mL          CHEST TUBE:  Yes/No. AIR LEAKS: Yes/No. Suction / H2O SEAL.   FRANCHESKA DRAIN:  Yes/No.  EPICARDIAL WIRES: Yes/No.  TIE DOWNS: Yes/No.  REGALADO: Yes/No.    PHYSICAL EXAM:    General:     Neurological:    Cardiovascular:    Respiratory:    Gastrointestinal:    Extremities:    Vascular:    Incision Sites:    LABS:                        8.7    10.18 )-----------( 127      ( 01 Oct 2021 08:31 )             27.8       COUMADIN:  Yes/No. REASON: .    PT/INR - ( 30 Sep 2021 03:28 )   PT: 12.6 sec;   INR: 1.05          PTT - ( 30 Sep 2021 03:28 )  PTT:32.3 sec    10-01    132<L>  |  100  |  23  ----------------------------<  102<H>  4.4   |  21<L>  |  1.51<H>    Ca    9.0      01 Oct 2021 08:31  Phos  4.4     09-30  Mg     2.0     10-01    TPro  6.7  /  Alb  3.3  /  TBili  0.4  /  DBili  x   /  AST  18  /  ALT  15  /  AlkPhos  85  09-29          MEDICATIONS  (STANDING):  aMIOdarone    Tablet 200 milliGRAM(s) Oral daily  aspirin enteric coated 81 milliGRAM(s) Oral daily  atorvastatin 10 milliGRAM(s) Oral at bedtime  cefTRIAXone   IVPB 2000 milliGRAM(s) IV Intermittent every 24 hours  chlorhexidine 2% Cloths 1 Application(s) Topical daily  furosemide    Tablet 40 milliGRAM(s) Oral daily  furosemide   Injectable 20 milliGRAM(s) IV Push once  heparin   Injectable 5000 Unit(s) SubCutaneous every 8 hours  lactated ringers. 500 milliLiter(s) (75 mL/Hr) IV Continuous <Continuous>  levothyroxine 50 MICROGram(s) Oral daily  polyethylene glycol 3350 17 Gram(s) Oral daily  potassium chloride    Tablet ER 20 milliEquivalent(s) Oral daily  senna 2 Tablet(s) Oral at bedtime  sodium chloride 0.9% lock flush 3 milliLiter(s) IV Push every 8 hours    MEDICATIONS  (PRN):  oxycodone    5 mG/acetaminophen 325 mG 1 Tablet(s) Oral every 6 hours PRN Moderate Pain (4 - 6)  oxycodone    5 mG/acetaminophen 325 mG 2 Tablet(s) Oral every 6 hours PRN Severe Pain (7 - 10)        RADIOLOGY & ADDITIONAL TESTS:     Patient discussed on morning rounds with Dr. Muller    Operation / Date:   s/p AVR, ascending/hemiarch replacement with frozen elephant trunk and left aorto-subclavian bypass, CABG x2 (8/9)  9/29: Sternal wound debridement, b/l muscle flap with plastic surgery    SUBJECTIVE ASSESSMENT:  76y Female assessed at bedside this morning. She states she is feeling well today. States that her pain has decreased since yesterday. Denies any CP, SOB, Dizziness, N/V, Fever, Chills. She has passed her trial of void but is not producing much urine. Having regular bowel movements. She states she is walking more and using her IS as instructed, pulling 1000-1250ctc today.    Vital Signs Last 24 Hrs  T(C): 36.3 (01 Oct 2021 13:25), Max: 37 (01 Oct 2021 09:15)  T(F): 97.4 (01 Oct 2021 13:25), Max: 98.6 (01 Oct 2021 09:15)  HR: 62 (01 Oct 2021 13:07) (62 - 84)  BP: 108/52 (01 Oct 2021 13:07) (100/52 - 136/61)  BP(mean): 75 (01 Oct 2021 13:07) (73 - 88)  RR: 17 (01 Oct 2021 13:07) (17 - 23)  SpO2: 99% (01 Oct 2021 13:07) (97% - 100%)  I&O's Detail    30 Sep 2021 07:01  -  01 Oct 2021 07:00  --------------------------------------------------------  IN:    Albumin 5%  - 250 mL: 500 mL    IV PiggyBack: 550 mL    Oral Fluid: 1050 mL  Total IN: 2100 mL    OUT:    Bulb (mL): 50 mL    Bulb (mL): 40 mL    Bulb (mL): 50 mL    Indwelling Catheter - Urethral (mL): 555 mL    sodium chloride 0.9%: 0 mL    VAC (Vacuum Assisted Closure) System (mL): 0 mL    Voided (mL): 100 mL  Total OUT: 795 mL    Total NET: 1305 mL      01 Oct 2021 07:01  -  01 Oct 2021 14:20  --------------------------------------------------------  IN:    Lactated Ringers: 225 mL    Oral Fluid: 250 mL  Total IN: 475 mL    OUT:    Bulb (mL): 10 mL    Bulb (mL): 15 mL    Bulb (mL): 10 mL    VAC (Vacuum Assisted Closure) System (mL): 0 mL    Voided (mL): 0 mL  Total OUT: 35 mL    Total NET: 440 mL    CHEST TUBE:  No.    KENDELL DRAIN:  Yes x3.  EPICARDIAL WIRES: No.  TIE DOWNS: Yes.  REGALADO: No.    PHYSICAL EXAM:  General: Well appearing, Sitting upright in recliner, NAD  Neurological: AOx4, no focal neurological deficits  Cardiovascular: regular rate/rhythm, S1/S2 auscultated, no murmurs or extra heart sounds  Respiratory: mild, bibasilar crackles, CTA over all other lung fields.  Gastrointestinal: bowel sounds present in all 4 quadrants, abdomen is soft, non-tender, non-distended  Extremities: trace peripheral edema noted in b/l lower extremities, 5/5 strength and full range of motion in all 4 extremities b/l  Vascular: 2+ peripheral pulses b/l in upper and lower extremities  Incision Sites: MSI with wound vac in place, no erythema, no purulence noted. Kendell drain sites c/d/i    LABS:                        8.7    10.18 )-----------( 127      ( 01 Oct 2021 08:31 )             27.8       COUMADIN:  No.    PT/INR - ( 30 Sep 2021 03:28 )   PT: 12.6 sec;   INR: 1.05          PTT - ( 30 Sep 2021 03:28 )  PTT:32.3 sec    10-01    132<L>  |  100  |  23  ----------------------------<  102<H>  4.4   |  21<L>  |  1.51<H>    Ca    9.0      01 Oct 2021 08:31  Phos  4.4     09-30  Mg     2.0     10-01    TPro  6.7  /  Alb  3.3  /  TBili  0.4  /  DBili  x   /  AST  18  /  ALT  15  /  AlkPhos  85  09-29    MEDICATIONS  (STANDING):  aMIOdarone    Tablet 200 milliGRAM(s) Oral daily  aspirin enteric coated 81 milliGRAM(s) Oral daily  atorvastatin 10 milliGRAM(s) Oral at bedtime  cefTRIAXone   IVPB 2000 milliGRAM(s) IV Intermittent every 24 hours  chlorhexidine 2% Cloths 1 Application(s) Topical daily  furosemide    Tablet 40 milliGRAM(s) Oral daily  furosemide   Injectable 20 milliGRAM(s) IV Push once  heparin   Injectable 5000 Unit(s) SubCutaneous every 8 hours  lactated ringers. 500 milliLiter(s) (75 mL/Hr) IV Continuous <Continuous>  levothyroxine 50 MICROGram(s) Oral daily  polyethylene glycol 3350 17 Gram(s) Oral daily  potassium chloride    Tablet ER 20 milliEquivalent(s) Oral daily  senna 2 Tablet(s) Oral at bedtime  sodium chloride 0.9% lock flush 3 milliLiter(s) IV Push every 8 hours    MEDICATIONS  (PRN):  oxycodone    5 mG/acetaminophen 325 mG 1 Tablet(s) Oral every 6 hours PRN Moderate Pain (4 - 6)  oxycodone    5 mG/acetaminophen 325 mG 2 Tablet(s) Oral every 6 hours PRN Severe Pain (7 - 10)      RADIOLOGY & ADDITIONAL TESTS:    < from: Xray Chest 1 View- PORTABLE-Routine (Xray Chest 1 View- PORTABLE-Routine in AM.) (10.01.21 @ 05:08) >  impression: Left loop recorder. Support devices in satisfactory position. Stable heart size, status post median sternotomy, aortic stent, aortic valve replacement... Bilateral opacities/pleural effusions, unchanged. Stable bony structures. Left axillary surgical clips.    < end of copied text >   Patient discussed on morning rounds with Dr. Muller    Operation / Date:   s/p AVR, ascending/hemiarch replacement with frozen elephant trunk and left aorto-subclavian bypass, CABG x2 (8/9)  9/29: Sternal wound debridement, b/l muscle flap with plastic surgery    SUBJECTIVE ASSESSMENT:  76y Female assessed at bedside this morning. She states she is feeling well today. States that her pain has decreased since yesterday. Denies any CP, SOB, Dizziness, N/V, Fever, Chills. She has passed her trial of void but is not producing much urine. Having regular bowel movements. She states she is walking more and using her IS as instructed, pulling 1000-1250cc today.    Vital Signs Last 24 Hrs  T(C): 36.3 (01 Oct 2021 13:25), Max: 37 (01 Oct 2021 09:15)  T(F): 97.4 (01 Oct 2021 13:25), Max: 98.6 (01 Oct 2021 09:15)  HR: 62 (01 Oct 2021 13:07) (62 - 84)  BP: 108/52 (01 Oct 2021 13:07) (100/52 - 136/61)  BP(mean): 75 (01 Oct 2021 13:07) (73 - 88)  RR: 17 (01 Oct 2021 13:07) (17 - 23)  SpO2: 99% (01 Oct 2021 13:07) (97% - 100%)  I&O's Detail    30 Sep 2021 07:01  -  01 Oct 2021 07:00  --------------------------------------------------------  IN:    Albumin 5%  - 250 mL: 500 mL    IV PiggyBack: 550 mL    Oral Fluid: 1050 mL  Total IN: 2100 mL    OUT:    Bulb (mL): 50 mL    Bulb (mL): 40 mL    Bulb (mL): 50 mL    Indwelling Catheter - Urethral (mL): 555 mL    sodium chloride 0.9%: 0 mL    VAC (Vacuum Assisted Closure) System (mL): 0 mL    Voided (mL): 100 mL  Total OUT: 795 mL    Total NET: 1305 mL      01 Oct 2021 07:01  -  01 Oct 2021 14:20  --------------------------------------------------------  IN:    Lactated Ringers: 225 mL    Oral Fluid: 250 mL  Total IN: 475 mL    OUT:    Bulb (mL): 10 mL    Bulb (mL): 15 mL    Bulb (mL): 10 mL    VAC (Vacuum Assisted Closure) System (mL): 0 mL    Voided (mL): 0 mL  Total OUT: 35 mL    Total NET: 440 mL    CHEST TUBE:  No.    KENDELL DRAIN:  Yes x3.  EPICARDIAL WIRES: No.  TIE DOWNS: Yes.  REGALADO: No.    PHYSICAL EXAM:  General: Well appearing, Sitting upright in recliner, NAD  Neurological: AOx4, no focal neurological deficits  Cardiovascular: regular rate/rhythm, S1/S2 auscultated, no murmurs or extra heart sounds  Respiratory: mild, bibasilar crackles, CTA over all other lung fields.  Gastrointestinal: bowel sounds present in all 4 quadrants, abdomen is soft, non-tender, non-distended  Extremities: trace peripheral edema noted in b/l lower extremities, 5/5 strength and full range of motion in all 4 extremities b/l  Vascular: 2+ peripheral pulses b/l in upper and lower extremities  Incision Sites: MSI with wound vac in place, no erythema, no purulence noted. Kendell drain sites c/d/i    LABS:                        8.7    10.18 )-----------( 127      ( 01 Oct 2021 08:31 )             27.8       COUMADIN:  No.    PT/INR - ( 30 Sep 2021 03:28 )   PT: 12.6 sec;   INR: 1.05          PTT - ( 30 Sep 2021 03:28 )  PTT:32.3 sec    10-01    132<L>  |  100  |  23  ----------------------------<  102<H>  4.4   |  21<L>  |  1.51<H>    Ca    9.0      01 Oct 2021 08:31  Phos  4.4     09-30  Mg     2.0     10-01    TPro  6.7  /  Alb  3.3  /  TBili  0.4  /  DBili  x   /  AST  18  /  ALT  15  /  AlkPhos  85  09-29    MEDICATIONS  (STANDING):  aMIOdarone    Tablet 200 milliGRAM(s) Oral daily  aspirin enteric coated 81 milliGRAM(s) Oral daily  atorvastatin 10 milliGRAM(s) Oral at bedtime  cefTRIAXone   IVPB 2000 milliGRAM(s) IV Intermittent every 24 hours  chlorhexidine 2% Cloths 1 Application(s) Topical daily  furosemide    Tablet 40 milliGRAM(s) Oral daily  furosemide   Injectable 20 milliGRAM(s) IV Push once  heparin   Injectable 5000 Unit(s) SubCutaneous every 8 hours  lactated ringers. 500 milliLiter(s) (75 mL/Hr) IV Continuous <Continuous>  levothyroxine 50 MICROGram(s) Oral daily  polyethylene glycol 3350 17 Gram(s) Oral daily  potassium chloride    Tablet ER 20 milliEquivalent(s) Oral daily  senna 2 Tablet(s) Oral at bedtime  sodium chloride 0.9% lock flush 3 milliLiter(s) IV Push every 8 hours    MEDICATIONS  (PRN):  oxycodone    5 mG/acetaminophen 325 mG 1 Tablet(s) Oral every 6 hours PRN Moderate Pain (4 - 6)  oxycodone    5 mG/acetaminophen 325 mG 2 Tablet(s) Oral every 6 hours PRN Severe Pain (7 - 10)      RADIOLOGY & ADDITIONAL TESTS:    < from: Xray Chest 1 View- PORTABLE-Routine (Xray Chest 1 View- PORTABLE-Routine in AM.) (10.01.21 @ 05:08) >  impression: Left loop recorder. Support devices in satisfactory position. Stable heart size, status post median sternotomy, aortic stent, aortic valve replacement... Bilateral opacities/pleural effusions, unchanged. Stable bony structures. Left axillary surgical clips.    < end of copied text >

## 2021-10-01 NOTE — PROGRESS NOTE ADULT - ASSESSMENT
76 year old F (former smoker) with PMHx of HTN, Spinal Stenosis, Arthritis, Bicuspid aortic valve with known aortic stenosis. S/p AVR, ascending/hemiarch replacement with frozen elephant trunk and left aorto-subclavian bypass, CABG x2 with Dr. Muller on 8/9/2021. Postoperative course c/b sternal wound growing K. oxytoca/raoutella ornithinolytica, completed course of ceftriaxone for SSI. Patient now returns for poor wound healing and is s/p wound debridement and removal of sternal hardware with pectoralis flap advancement on 9/29 with plastic surgery. ID consulted for determining need for post-operative antibiotics.     From intra-operative imaging, sternal wound site does not appear to be infected. Does not have leukocytosis or fevers.  However, given complicated course, is reasonable to treat while OR cultures are pending.     Recommendations:   - Start ceftriaxone 2g q 24H   - F/u sternal wound cultures from 9/29     Discussed with primary team.    ID Team 1 will continue to follow. Please see below attending attestation for finalized recommendations.  Diann Hunter DO - PGY2 Internal Medicine  Infectious Disease Consult Service, Team 1   76 year old F (former smoker) with PMHx of HTN, Spinal Stenosis, Arthritis, Bicuspid aortic valve with known aortic stenosis. S/p AVR, ascending/hemiarch replacement with frozen elephant trunk and left aorto-subclavian bypass, CABG x2 with Dr. Muller on 8/9/2021. Postoperative course c/b sternal wound growing K. oxytoca/raoutella ornithinolytica, completed course of ceftriaxone for SSI. Patient now returns for poor wound healing and is s/p wound debridement and removal of sternal hardware with pectoralis flap advancement on 9/29 with plastic surgery. ID consulted for determining need for post-operative antibiotics.     From intra-operative imaging, sternal wound site does not appear to be infected. Does not have leukocytosis or fevers.  However, given complicated course, is reasonable to treat while OR cultures are pending. Patient now with FIDEL, Cr 1.51, however no dosage adjustment necessary with the ceftriaxone.     Recommendations:   - Start ceftriaxone 2g q 24H   - F/u sternal wound cultures from 9/29     Discussed with primary team.    ID Team 1 will continue to follow. Please see below attending attestation for finalized recommendations.  Diann Hunter DO - PGY2 Internal Medicine  Infectious Disease Consult Service, Team 1   76 year old F (former smoker) with PMHx of HTN, Spinal Stenosis, Arthritis, Bicuspid aortic valve with known aortic stenosis. S/p AVR, ascending/hemiarch replacement with frozen elephant trunk and left aorto-subclavian bypass, CABG x2 with Dr. Muller on 8/9/2021. Postoperative course c/b sternal wound growing K. oxytoca/raoutella ornithinolytica, completed course of ceftriaxone for SSI. Patient now returns for poor wound healing and is s/p wound debridement and removal of sternal hardware with pectoralis flap advancement on 9/29 with plastic surgery. ID consulted for determining need for post-operative antibiotics.     From intra-operative imaging, sternal wound site does not appear to be infected. Does not have leukocytosis or fevers.  However, given complicated course, is reasonable to treat while OR cultures are pending. Patient now with FIDEL, Cr 1.51, however no dosage adjustment necessary with the ceftriaxone.     Recommendations:   - Continue ceftriaxone 2g q 24H   - F/u sternal wound cultures from 9/29     Discussed with primary team.    ID Team 1 will continue to follow. Please see below attending attestation for finalized recommendations.  Diann Hunter DO - PGY2 Internal Medicine  Infectious Disease Consult Service, Team 1

## 2021-10-01 NOTE — DIETITIAN INITIAL EVALUATION ADULT. - OTHER INFO
76 year old female, current everyday smoker with PMHx of HTN, Spinal Stenosis, Arthritis, Bicuspid aortic valve with known aortic stenosis, presented to [facility] 8/8/21, s/p AVR ascending/hemiarch replacement with frozen elephant trunk and left aorto-subclavian bypass, CABGx2 8/9. 76F POD2 from debridement of sternal wound, removal of wires, bilateral pec adv. Sternal wound erythema s/p sternal debridement and wound vac placement. Pt was discharged home on 9/3/21 but represented to the hospital on 9/4/21 due to failure to thrive at home and volume overload, d/c to [facility] 9/9/21, and then d/c home from rehab on 9/24. Re adm for poor wound healing. Now s/p debridement of sternal wound, removal of wires, bilateral pec adv 9/29.    On assessment, pt seen in room, pleasant. Pt reports  lbs, now wt at 162 lbs, reports wt loss d/t decrease in appetite since last adm. Indicates wt change of 8 lbs/4% in 1 month. NFPE completed. At current adm, pt reports picking at food. Seen on tray, few bites of bagel, fruit and cream of wheat. Encourage PO to have adequate PO intake and lean proteins to meet [facility] demands for kcal and protein for wound healing. Pt verbalized understanding. She is amenable to ONS to support kcal and protein needs. At time of assessment, pt reports pain well managed/tolerated, denies n/v/d/c. Last BM 9/29. Skin: 18, surgical incision noted. RD to follow per protocol.

## 2021-10-01 NOTE — PROGRESS NOTE ADULT - SUBJECTIVE AND OBJECTIVE BOX
INFECTIOUS DISEASES FOLLOW UP    This is a follow up note for this  76yFemale with poor wound healing of surgical incision.     update/ S: Patient has some pain near surgical incision area but otherwise feels well and has no complaints. Denies any fevers or chills.     ROS:  negative except as above    Allergies    No Known Allergies    Intolerances      ANTIBIOTICS/RELEVANT:  antimicrobials  cefTRIAXone   IVPB 2000 milliGRAM(s) IV Intermittent every 24 hours    immunologic:    OTHER:  aMIOdarone    Tablet 200 milliGRAM(s) Oral daily  aspirin enteric coated 81 milliGRAM(s) Oral daily  atorvastatin 10 milliGRAM(s) Oral at bedtime  chlorhexidine 2% Cloths 1 Application(s) Topical daily  furosemide    Tablet 40 milliGRAM(s) Oral daily  heparin   Injectable 5000 Unit(s) SubCutaneous every 8 hours  lactated ringers. 500 milliLiter(s) IV Continuous <Continuous>  levothyroxine 50 MICROGram(s) Oral daily  oxycodone    5 mG/acetaminophen 325 mG 1 Tablet(s) Oral every 6 hours PRN  oxycodone    5 mG/acetaminophen 325 mG 2 Tablet(s) Oral every 6 hours PRN  polyethylene glycol 3350 17 Gram(s) Oral daily  potassium chloride    Tablet ER 20 milliEquivalent(s) Oral daily  senna 2 Tablet(s) Oral at bedtime  sodium chloride 0.9% lock flush 3 milliLiter(s) IV Push every 8 hours      Objective:  Vital Signs Last 24 Hrs  T(C): 37 (01 Oct 2021 09:15), Max: 37 (01 Oct 2021 09:15)  T(F): 98.6 (01 Oct 2021 09:15), Max: 98.6 (01 Oct 2021 09:15)  HR: 74 (01 Oct 2021 09:03) (63 - 84)  BP: 100/52 (01 Oct 2021 09:03) (100/52 - 136/61)  BP(mean): 73 (01 Oct 2021 09:03) (73 - 88)  RR: 19 (01 Oct 2021 09:03) (18 - 23)  SpO2: 98% (01 Oct 2021 09:03) (90% - 100%)    PHYSICAL EXAM:  General: Well appearing, Sitting upright in recliner, NAD  Neurological: AOx4, no focal neurological deficits  Cardiovascular: regular rate/rhythm, S1/S2 auscultated, no murmurs or extra heart sounds  Respiratory: mild, bibasilar crackles, CTA over all other lung fields.  Gastrointestinal: bowel sounds present in all 4 quadrants, abdomen is soft, non-tender, non-distended  Extremities: trace peripheral edema noted in b/l lower extremities, 5/5 strength and full range of motion in all 4 extremities b/l  Vascular: 2+ peripheral pulses b/l in upper and lower extremities  Incision Sites: wound vac in place, no erythema, no purulence noted. 3 JUSTIN drains with serosanguinous fluid.     LABS:                        8.7    10.18 )-----------( 127      ( 01 Oct 2021 08:31 )             27.8     10-01    132<L>  |  100  |  23  ----------------------------<  102<H>  4.4   |  21<L>  |  1.51<H>    Ca    9.0      01 Oct 2021 08:31  Phos  4.4     09-30  Mg     2.0     10-01    TPro  6.7  /  Alb  3.3  /  TBili  0.4  /  DBili  x   /  AST  18  /  ALT  15  /  AlkPhos  85  09-29    PT/INR - ( 30 Sep 2021 03:28 )   PT: 12.6 sec;   INR: 1.05          PTT - ( 30 Sep 2021 03:28 )  PTT:32.3 sec      MICROBIOLOGY:            RECENT CULTURES:  09-30 @ 00:49  .Tissue Anterior Sternal Wound  --  --  --    Testing in progress  --  09-29 @ 17:02  .Tissue Anterior Sternal Wound  --  --  --    Culture in progress  --  09-29 @ 17:00  .Surgical Swab Sternal wire cx  --  --  --    No growth to date  --      RADIOLOGY & ADDITIONAL STUDIES:

## 2021-10-01 NOTE — DIETITIAN INITIAL EVALUATION ADULT. - OTHER CALCULATIONS
IBW used to calculate energy needs due to pt's current body weight exceeding 120% of IBW (158%). Increased needs for wound healing

## 2021-10-01 NOTE — DIETITIAN INITIAL EVALUATION ADULT. - ADD RECOMMEND
1. Continue with DASH TLC 2. Rec include Ensure Enlive BID (700 kcal, 40g protein, 360 mL free H2O) 3. Monitor %PO intake and ONS tolerance 4. Pain and BM regimen per team 5. Monitor BMP, BG q6hrs, lytes, replete prn

## 2021-10-01 NOTE — DIETITIAN INITIAL EVALUATION ADULT. - NUTRITIONGOAL OUTCOME1
consistently meet >75% EER via adequate PO intake consistently meet >75% EER via adequate PO intake, w/o s/s of malnutrition

## 2021-10-01 NOTE — PROGRESS NOTE ADULT - SUBJECTIVE AND OBJECTIVE BOX
S: pt seen and examined doing well overnight. slept well. up in chair this morning  O: AVSS  Gen NAD  Chest: vac in place holding suction, drains ss; 40/50/50    A/P 76F POD2 from debridement of sternal wound, removal of wires, bilateral pec adv. Doing well.    -fu cx  -cont vac  -cont surg drains  -cont surg bra  -care per primary    Parvez Richardson MD PGY5

## 2021-10-01 NOTE — PROGRESS NOTE ADULT - ASSESSMENT
76 year old female, current everyday smoker with PMHx of HTN, Spinal Stenosis, Arthritis, Bicuspid aortic valve with known aortic stenosis evaluated by Dr. Muller as an outpatient and presented to [facility] on 8/8/2021 to complete preoperative workup. Patient underwent AVR, ascending/hemiarch replacement with frozen elephant trunk and left aorto-subclavian bypass, CABG x2 with Dr. Muller on 8/9/2021. Post-operative course complicated by prolonged intubation, moderate pericardial effusion drained by IR on POD7, Sternal wound erythema s/p sternal debridement and wound vac placement (cultures growing Klebsiella) on POD 17, multiple episodes of AF with RVR/hypotension requiring amiodarone with subsequent bradycardia requiring PPM on POD 23 and FIDEL, resolved with hydration. Pt was discharged home on 9/3/21 but represented to the hospital on 9/4/21 due to failure to thrive at home and volume overload. As per Dr. Muller the patient was stable and ready for discharge to [facility] on 9/9/21. The patient was discharged home from rehab on 9/24. She states she was doing well overall. When her home nurse came to change her wound vac she sent a photograph to Dr. Muller and was noted to have poor wound healing. She was instructed to report to the ED for evaluation for intervention. 9/29/21 underwent bilateral muscle flap advancement with plastic surgery. POD1, recovering well, passed ToV. POD2     Plan:  Neurovascular:   - Pain well controlled with current regimen. PRN's: Tylenol, Oxycodone  - Anxiety: currently well controlled, takes Xanax at home 0.25mg PRN    Cardiovascular:   - S/p AVR, ascending/hemiarch replacement with frozen elephant trunk and left aorto-subclavian bypass, CABG x2 8/9/21        - Readmission 9/29/21 for poor wound healing of MSI, underwent debridement with bilateral muscle flap and wound vac placement with Dr. Criss Burks (plastic surgeon)       - 3 halie drains in place managed by Plastic Surgery       - Previous admission complicated by sternal wound infection (see ID section)       - Cont. ASA, Lipitor       - Holding BB for now, patient bradycardic  - Hx of Post op AF: continue amiodarone  daily  - S/p PPM placement during prior admission.   - Hemodynamically stable.  - Monitor: BP, HR, tele       - 24hr BP range: (105-109)/(53-78)       - 24hr HR range: 62-84    Respiratory:   - 02 Sat = % on 4L O2, will wean to room air  - Encourage C+DB and Use of IS 10x / hr while awake.  - CXR Bilateral opacities/pleural effusions, unchanged    GI:    - Continue GI PPX with protonix  - PO Diet.  - Bowel Regimen: Cont. Miralax, Senna    Renal / :   - BUN/Cr stable at 23/1.51       - Cont. IV hydration       - Continue to monitor renal function.  - Monitor I/O's; low urine output       - Continue diuresis, 40mg Lasix daily  - Replete electrolytes PRN    Endocrine:    - No known hx DM or thyroid disease  - A1c: 4.8  - TSH: 12.83, TSH was normal during prior admission       - Endocrine consulted, appreciate recs       - Free Thyroxine: 1.070       - Cont. 50mcg Synthroid    Hematologic:  - H/H stable 8.7 (9.8 yesterday)/27.8   - Coagulation Panel.  PT/PTT/INR 12.6/32.3/1.05    ID:  - Pt remains afebrile, 24hr Tmax = 36.9C   - WBC: 10.38  - Prior admission complicated by Sternal Wound infection growing Klebsiella       - s/p completion of IV abx course (Ceftriaxone), follows with Dr. Crystal       - Per ID consult, restarting Ceftriaxone in the setting of repeat debridement and b/l muscle flap and closure            - Intra-op Cx NGTD, cont. to f/u            - Ceftriaxone 2g QD  - MSI currently with no erythema or purulence   - Continue to monitor CBC  - Observe for SIRS/Sepsis Syndrome.    Prophylaxis:  - DVT prophylaxis with 5000 SubQ Heparin q8h.  - Continue with SCD's b/l while patient is at rest     Disposition:  - Discharge home once patient is medically ready 76 year old female, current everyday smoker with PMHx of HTN, Spinal Stenosis, Arthritis, Bicuspid aortic valve with known aortic stenosis evaluated by Dr. Muller as an outpatient and presented to [facility] on 8/8/2021 to complete preoperative workup. Patient underwent AVR, ascending/hemiarch replacement with frozen elephant trunk and left aorto-subclavian bypass, CABG x2 with Dr. Muller on 8/9/2021. Post-operative course complicated by prolonged intubation, moderate pericardial effusion drained by IR on POD7, Sternal wound erythema s/p sternal debridement and wound vac placement (cultures growing Klebsiella) on POD 17, multiple episodes of AF with RVR/hypotension requiring amiodarone with subsequent bradycardia requiring PPM on POD 23 and FIDEL, resolved with hydration. Pt was discharged home on 9/3/21 but represented to the hospital on 9/4/21 due to failure to thrive at home and volume overload. As per Dr. Muller the patient was stable and ready for discharge to [facility] on 9/9/21. The patient was discharged home from rehab on 9/24. She states she was doing well overall. When her home nurse came to change her wound vac she sent a photograph to Dr. Muller and was noted to have poor wound healing. She was instructed to report to the ED for evaluation for intervention. 9/29/21 underwent bilateral muscle flap advancement with plastic surgery. POD1, recovering well, passed ToV. POD2 increased Cr, 1 to 1.5, increased IV hydration     Plan:  Neurovascular:   - Pain well controlled with current regimen. PRN's: Tylenol, Oxycodone  - Anxiety: currently well controlled, takes Xanax at home 0.25mg PRN    Cardiovascular:   - S/p AVR, ascending/hemiarch replacement with frozen elephant trunk and left aorto-subclavian bypass, CABG x2 8/9/21        - Readmission 9/29/21 for poor wound healing of MSI, underwent debridement with bilateral muscle flap and wound vac placement with Dr. Criss Burks (plastic surgeon)       - 3 halie drains in place managed by Plastic Surgery       - Previous admission complicated by sternal wound infection (see ID section)       - Cont. ASA, Lipitor       - Holding BB for now, patient bradycardic  - Hx of Post op AF: continue amiodarone  daily  - S/p PPM placement during prior admission.   - Hemodynamically stable.  - Monitor: BP, HR, tele       - 24hr BP range: (105-109)/(53-78)       - 24hr HR range: 62-84    Respiratory:   - 02 Sat = % on 4L O2, will wean to room air  - Encourage C+DB and Use of IS 10x / hr while awake.  - CXR Bilateral opacities/pleural effusions, unchanged    GI:    - Continue GI PPX with protonix  - PO Diet.  - Bowel Regimen: Cont. Miralax, Senna    Renal / :   - BUN/Cr stable at 23/1.51       - Cont. IV hydration       - Continue to monitor renal function.  - Monitor I/O's; low urine output       - Continue diuresis, 40mg Lasix daily  - Replete electrolytes PRN    Endocrine:    - No known hx DM or thyroid disease  - A1c: 4.8  - TSH: 12.83, TSH was normal during prior admission       - Endocrine consulted, appreciate recs       - Free Thyroxine: 1.070       - Cont. 50mcg Synthroid    Hematologic:  - H/H stable 8.7 (9.8 yesterday)/27.8   - Coagulation Panel.  PT/PTT/INR 12.6/32.3/1.05    ID:  - Pt remains afebrile, 24hr Tmax = 36.9C   - WBC: 10.38  - Prior admission complicated by Sternal Wound infection growing Klebsiella       - s/p completion of IV abx course (Ceftriaxone), follows with Dr. Crystal       - Per ID consult, restarting Ceftriaxone in the setting of repeat debridement and b/l muscle flap and closure            - Intra-op Cx NGTD, cont. to f/u            - Ceftriaxone 2g QD  - MSI currently with no erythema or purulence   - Continue to monitor CBC  - Observe for SIRS/Sepsis Syndrome.    Prophylaxis:  - DVT prophylaxis with 5000 SubQ Heparin q8h.  - Continue with SCD's b/l while patient is at rest     Disposition:  - Discharge home once patient is medically ready 76 year old female, current everyday smoker with PMHx of HTN, Spinal Stenosis, Arthritis, Bicuspid aortic valve with known aortic stenosis evaluated by Dr. Muller as an outpatient and presented to [facility] on 8/8/2021 to complete preoperative workup. Patient underwent AVR, ascending/hemiarch replacement with frozen elephant trunk and left aorto-subclavian bypass, CABG x2 with Dr. Muller on 8/9/2021. Post-operative course complicated by prolonged intubation, moderate pericardial effusion drained by IR on POD7, Sternal wound erythema s/p sternal debridement and wound vac placement (cultures growing Klebsiella) on POD 17, multiple episodes of AF with RVR/hypotension requiring amiodarone with subsequent bradycardia requiring PPM on POD 23 and FIDEL, resolved with hydration. Pt was discharged home on 9/3/21 but represented to the hospital on 9/4/21 due to failure to thrive at home and volume overload. As per Dr. Muller the patient was stable and ready for discharge to [facility] on 9/9/21. The patient was discharged home from rehab on 9/24. She states she was doing well overall. When her home nurse came to change her wound vac she sent a photograph to Dr. Muller and was noted to have poor wound healing. She was instructed to report to the ED for evaluation for intervention. 9/29/21 underwent bilateral muscle flap advancement with plastic surgery. POD1, recovering well, passed ToV. POD2 increased Cr, 1 to 1.5, increased IV hydration.     Plan:  Neurovascular:   - Pain well controlled with current regimen. PRN's: Tylenol, percocet   - Anxiety: currently well controlled, takes Xanax at home 0.25mg PRN    Cardiovascular:   - S/p AVR, ascending/hemiarch replacement with frozen elephant trunk and left aorto-subclavian bypass, CABG x2 8/9/21        - Readmission 9/29/21 for poor wound healing of MSI, underwent debridement with bilateral muscle flap and wound vac placement with Dr. Criss Burks (plastic surgeon)       - 3 halie drains in place managed by Plastic Surgery       - Previous admission complicated by sternal wound infection (see ID section)       - Cont. ASA, Lipitor       - Holding BB for now, patient bradycardic  - Hx of Post op AF: continue amiodarone  daily  - S/p PPM placement during prior admission.   - Hemodynamically stable.  - Monitor: BP, HR, tele       - 24hr BP range: (105-109)/(53-78)       - 24hr HR range: 62-84    Respiratory:   - 02 Sat = % on 4L O2, will wean to room air  - Encourage C+DB and Use of IS 10x / hr while awake.  - CXR Bilateral opacities/pleural effusions, unchanged    GI:    - Continue GI PPX with protonix  - PO Diet.  - Bowel Regimen: Cont. Miralax, Senna    Renal / :   - BUN/Cr stable at 23/1.51       - Cont. IV hydration       - Continue to monitor renal function.  - Monitor I/O's; low urine output       - Continue diuresis, 40mg Lasix daily, given extra 20 IV for low UOP  - Replete electrolytes PRN    Endocrine:    - No known hx DM or thyroid disease  - A1c: 4.8  - TSH: 12.83, TSH was normal during prior admission       - Endocrine consulted, appreciate recs, likely related to amiodarone       - Per EPS since amio is controlling her AF, will proceed with treating hypothyroidism        - Free Thyroxine: 1.070       - Cont. 50mcg Synthroid    Hematologic:  - H/H stable 8.7 (9.8 yesterday)/27.8   - Coagulation Panel.  PT/PTT/INR 12.6/32.3/1.05    ID:  - Pt remains afebrile, 24hr Tmax = 36.9C   - WBC: 10.38  - Prior admission complicated by Sternal Wound infection growing Klebsiella       - s/p completion of IV abx course (Ceftriaxone), follows with Dr. Crystal       - Per ID consult, restarting Ceftriaxone in the setting of repeat debridement and b/l muscle flap and closure            - Intra-op Cx NGTD, cont. to f/u            - Ceftriaxone 2g QD  - MSI currently with no erythema or purulence   - Continue to monitor CBC  - Observe for SIRS/Sepsis Syndrome.    Prophylaxis:  - DVT prophylaxis with 5000 SubQ Heparin q8h.  - Continue with SCD's b/l while patient is at rest     Disposition:  - Discharge home once patient is medically ready

## 2021-10-02 LAB
ANION GAP SERPL CALC-SCNC: 10 MMOL/L — SIGNIFICANT CHANGE UP (ref 5–17)
BUN SERPL-MCNC: 27 MG/DL — HIGH (ref 7–23)
CALCIUM SERPL-MCNC: 9.4 MG/DL — SIGNIFICANT CHANGE UP (ref 8.4–10.5)
CHLORIDE SERPL-SCNC: 102 MMOL/L — SIGNIFICANT CHANGE UP (ref 96–108)
CO2 SERPL-SCNC: 24 MMOL/L — SIGNIFICANT CHANGE UP (ref 22–31)
CREAT SERPL-MCNC: 1.42 MG/DL — HIGH (ref 0.5–1.3)
GLUCOSE SERPL-MCNC: 94 MG/DL — SIGNIFICANT CHANGE UP (ref 70–99)
HCT VFR BLD CALC: 28.7 % — LOW (ref 34.5–45)
HGB BLD-MCNC: 8.8 G/DL — LOW (ref 11.5–15.5)
MAGNESIUM SERPL-MCNC: 2.2 MG/DL — SIGNIFICANT CHANGE UP (ref 1.6–2.6)
MCHC RBC-ENTMCNC: 30.3 PG — SIGNIFICANT CHANGE UP (ref 27–34)
MCHC RBC-ENTMCNC: 30.7 GM/DL — LOW (ref 32–36)
MCV RBC AUTO: 99 FL — SIGNIFICANT CHANGE UP (ref 80–100)
NRBC # BLD: 0 /100 WBCS — SIGNIFICANT CHANGE UP (ref 0–0)
PLATELET # BLD AUTO: 141 K/UL — LOW (ref 150–400)
POTASSIUM SERPL-MCNC: 4.4 MMOL/L — SIGNIFICANT CHANGE UP (ref 3.5–5.3)
POTASSIUM SERPL-SCNC: 4.4 MMOL/L — SIGNIFICANT CHANGE UP (ref 3.5–5.3)
RBC # BLD: 2.9 M/UL — LOW (ref 3.8–5.2)
RBC # FLD: 17.5 % — HIGH (ref 10.3–14.5)
SODIUM SERPL-SCNC: 136 MMOL/L — SIGNIFICANT CHANGE UP (ref 135–145)
THYROPEROXIDASE AB SERPL-ACNC: 14.8 IU/ML — SIGNIFICANT CHANGE UP
WBC # BLD: 9.13 K/UL — SIGNIFICANT CHANGE UP (ref 3.8–10.5)
WBC # FLD AUTO: 9.13 K/UL — SIGNIFICANT CHANGE UP (ref 3.8–10.5)

## 2021-10-02 PROCEDURE — 71045 X-RAY EXAM CHEST 1 VIEW: CPT | Mod: 26

## 2021-10-02 PROCEDURE — 99232 SBSQ HOSP IP/OBS MODERATE 35: CPT | Mod: GC

## 2021-10-02 RX ADMIN — Medication 20 MILLIEQUIVALENT(S): at 12:10

## 2021-10-02 RX ADMIN — SODIUM CHLORIDE 3 MILLILITER(S): 9 INJECTION INTRAMUSCULAR; INTRAVENOUS; SUBCUTANEOUS at 14:14

## 2021-10-02 RX ADMIN — OXYCODONE AND ACETAMINOPHEN 2 TABLET(S): 5; 325 TABLET ORAL at 22:22

## 2021-10-02 RX ADMIN — SODIUM CHLORIDE 3 MILLILITER(S): 9 INJECTION INTRAMUSCULAR; INTRAVENOUS; SUBCUTANEOUS at 06:40

## 2021-10-02 RX ADMIN — OXYCODONE AND ACETAMINOPHEN 2 TABLET(S): 5; 325 TABLET ORAL at 06:57

## 2021-10-02 RX ADMIN — Medication 81 MILLIGRAM(S): at 12:11

## 2021-10-02 RX ADMIN — HEPARIN SODIUM 5000 UNIT(S): 5000 INJECTION INTRAVENOUS; SUBCUTANEOUS at 14:25

## 2021-10-02 RX ADMIN — CHLORHEXIDINE GLUCONATE 1 APPLICATION(S): 213 SOLUTION TOPICAL at 12:11

## 2021-10-02 RX ADMIN — CEFTRIAXONE 100 MILLIGRAM(S): 500 INJECTION, POWDER, FOR SOLUTION INTRAMUSCULAR; INTRAVENOUS at 16:43

## 2021-10-02 RX ADMIN — PANTOPRAZOLE SODIUM 40 MILLIGRAM(S): 20 TABLET, DELAYED RELEASE ORAL at 06:41

## 2021-10-02 RX ADMIN — ATORVASTATIN CALCIUM 10 MILLIGRAM(S): 80 TABLET, FILM COATED ORAL at 21:23

## 2021-10-02 RX ADMIN — HEPARIN SODIUM 5000 UNIT(S): 5000 INJECTION INTRAVENOUS; SUBCUTANEOUS at 05:54

## 2021-10-02 RX ADMIN — HEPARIN SODIUM 5000 UNIT(S): 5000 INJECTION INTRAVENOUS; SUBCUTANEOUS at 21:14

## 2021-10-02 RX ADMIN — OXYCODONE AND ACETAMINOPHEN 2 TABLET(S): 5; 325 TABLET ORAL at 07:35

## 2021-10-02 RX ADMIN — AMIODARONE HYDROCHLORIDE 200 MILLIGRAM(S): 400 TABLET ORAL at 05:55

## 2021-10-02 RX ADMIN — OXYCODONE AND ACETAMINOPHEN 2 TABLET(S): 5; 325 TABLET ORAL at 21:22

## 2021-10-02 RX ADMIN — SODIUM CHLORIDE 3 MILLILITER(S): 9 INJECTION INTRAMUSCULAR; INTRAVENOUS; SUBCUTANEOUS at 21:12

## 2021-10-02 RX ADMIN — SENNA PLUS 2 TABLET(S): 8.6 TABLET ORAL at 21:14

## 2021-10-02 RX ADMIN — Medication 50 MICROGRAM(S): at 05:54

## 2021-10-02 RX ADMIN — Medication 40 MILLIGRAM(S): at 05:54

## 2021-10-02 NOTE — PROGRESS NOTE ADULT - ASSESSMENT
77 y/o F, current everyday smoker with PMHx of HTN, Spinal Stenosis, Arthritis, Bicuspid aortic valve with known aortic stenosis evaluated by Dr. Muller as an outpatient and presented to [facility] on 8/8/2021 to complete preoperative workup. Pt underwent AVR, ascending/hemiarch replacement with frozen elephant trunk and left aorto-subclavian bypass, CABG x2 with Dr. Muller on 8/9/2021. Post-op course complicated by prolonged intubation, moderate pericardial effusion drained by IR on POD7, Sternal wound erythema s/p sternal debridement and wound vac placement (cultures growing Klebsiella) on POD 17, multiple episodes of AF with RVR/hypotension requiring amiodarone with subsequent bradycardia requiring PPM on POD 23 and FIDEL, resolved with hydration. Pt was discharged home on 9/3/21 but represented to the hospital on 9/4/21 due to failure to thrive at home and volume overload. As per Dr. Muller the patient was stable and ready for discharge to [facility] on 9/9/21. The patient was discharged home from rehab on 9/24. She states she was doing well overall but when nurse came to change her wound vac she sent a photograph to Dr. Muller and was sternal wound noted to have poor wound healing. She was instructed to report to the ED for evaluation for intervention. 9/29/21 underwent bilateral muscle flap advancement with plastic surgery. POD1, recovering well, passed TOV. POD2 increased Cr, 1 to 1.5, increased IV hydration. POD3 Cr trending down, monitor daily. Wound vac and JUSTIN drains being managed by plastics team.     Plan:    Neurovascular:   -Pain well controlled with current regimen. PRN's: acetaminophen, percocet     Cardiovascular:   -8/9/21 -- AVR, ascending/hemiarch replacement with frozen elephant trunk and left aorto-subclavian by[ass, CABGx2  -9/29/21 -- Sternal wound debridement, bilateral muscle flaps, incisional wound     -continue with ASA, Hep gtt, amio, statin     -4 halie drains in place, to gravity. Managed by plastic surgery team.   -hx of tachy-marifer syndrome/AF: s/p PPM on previous admission. Not previously discharged on AC, f/u if any need with team.     -Hemodynamically stable.   -Monitor: BP, HR, tele    Respiratory:   -Oxygenating well on room air  -Encourage continued use of IS 10x/hr and frequent ambulation  -CXR:    GI:  -GI PPX: pantoprazole 40mg daily   -PO Diet: DASH/TLC   -Bowel Regimen: miralax, senna     Renal / :  -Continue to monitor renal function: BUN/Cr 27/1.42   -Monitor I/O's daily     Endocrine:    -No hx of DM  -hypothyroidism: continue with synthroid 50mcg daily   -A1c: 4.8  -TSH: elevated 12, pending T4     Hematologic:  -CBC: H/H- 8.8/28  -Coagulation Panel: WNL     ID:  -Pending PICC placement for long-term Abx, per Dr. Muller   -Wound vac in place, being managed with plastics team    -per Plastic surgery, will not need wound vac on discharge   -continue with ceftriaxone 2000 IV Q24 hours   -Temperature: afebrile   -CBC: WBC- 9.13   -Continue to observe for SIRS/Sepsis Syndrome.    Prophylaxis:  -DVT prophylaxis with 5000 SubQ Heparin q8h.  -Continue with SCD's b/l while patient is at rest     Disposition:  -Discharge home once patient is medically ready      75 y/o F, current everyday smoker with PMHx of HTN, Spinal Stenosis, Arthritis, Bicuspid aortic valve with known aortic stenosis evaluated by Dr. Muller as an outpatient and presented to [facility] on 8/8/2021 to complete preoperative workup. Pt underwent AVR, ascending/hemiarch replacement with frozen elephant trunk and left aorto-subclavian bypass, CABG x2 with Dr. Muller on 8/9/2021. Post-op course complicated by prolonged intubation, moderate pericardial effusion drained by IR on POD7, Sternal wound erythema s/p sternal debridement and wound vac placement (cultures growing Klebsiella) on POD 17, multiple episodes of AF with RVR/hypotension requiring amiodarone with subsequent bradycardia requiring PPM on POD 23 and FIDEL, resolved with hydration. Pt was discharged home on 9/3/21 but represented to the hospital on 9/4/21 due to failure to thrive at home and volume overload. As per Dr. Muller the patient was stable and ready for discharge to [facility] on 9/9/21. The patient was discharged home from rehab on 9/24. She states she was doing well overall but when nurse came to change her wound vac she sent a photograph to Dr. Muller and was sternal wound noted to have poor wound healing. She was instructed to report to the ED for evaluation for intervention. 9/29/21 underwent bilateral muscle flap advancement with plastic surgery. POD1, recovering well, passed TOV. POD2 increased Cr, 1 to 1.5, increased IV hydration. POD3 Cr trending down, monitor daily. Wound vac and JUSTIN drains being managed by plastics team.     Plan:  Neurovascular:   -Pain well controlled with current regimen. PRN's: acetaminophen, percocet     Cardiovascular:   -8/9/21 -- AVR, ascending/hemiarch replacement with frozen elephant trunk and left aorto-subclavian bypass, CABGx2  -9/29/21 -- Sternal wound debridement, bilateral muscle flaps, incisional wound     -continue with ASA, Hep gtt, amio, statin     -4 halie drains in place, to gravity. Managed by plastic surgery team.   -hx of tachy-marifer syndrome/AF: s/p PPM on previous admission. Not previously discharged on AC, f/u if any need with team.     -Hemodynamically stable.   -Monitor: BP, HR, tele    Respiratory:   -Oxygenating well on room air  -Encourage continued use of IS 10x/hr and frequent ambulation  -CXR: no acute pathology. no PTX     GI:  -GI PPX: pantoprazole 40mg daily   -PO Diet: DASH/TLC   -Bowel Regimen: miralax, senna     Renal / :  -Continue to monitor renal function: BUN/Cr 27/1.42   -Monitor I/O's daily     Endocrine:    -No hx of DM  -hypothyroidism: continue with synthroid 50mcg daily   -A1c: 4.8  -TSH: elevated 12, T$ WNL     Hematologic:  -CBC: H/H- 8.8/28  -Coagulation Panel: WNL     ID:  -Pending PICC placement for long-term Abx, per Dr. Muller   -Wound vac in place, being managed with plastics team    -per Plastic surgery, will not need wound vac on discharge, continue with current vac for 5 days then swtich to Aquacel   -continue with ceftriaxone 2000 IV Q24 hours   -Temperature: afebrile   -CBC: WBC- 9.13   -Continue to observe for SIRS/Sepsis Syndrome.    Prophylaxis:  -DVT prophylaxis with 5000 SubQ Heparin q8h.  -Continue with SCD's b/l while patient is at rest     Disposition:  -Discharge home once patient is medically ready

## 2021-10-02 NOTE — PROGRESS NOTE ADULT - SUBJECTIVE AND OBJECTIVE BOX
INTERVAL HPI/OVERNIGHT EVENTS:  Remains afebrile.      CONSTITUTIONAL:  No fever, chills, night sweats  EYES:  No photophobia or visual changes  CARDIOVASCULAR:  No chest pain  RESPIRATORY:  No cough, wheezing, or SOB   GASTROINTESTINAL:  No nausea, vomiting, diarrhea, constipation, or abdominal pain  GENITOURINARY:  No frequency, urgency, dysuria or hematuria  NEUROLOGIC:  No headache, lightheadedness      ANTIBIOTICS/RELEVANT:    Ceftriaxone 2 g IV q24h (99/29-present)      Vital Signs Last 24 Hrs  T(C): 36.9 (02 Oct 2021 17:41), Max: 37.1 (02 Oct 2021 09:07)  T(F): 98.4 (02 Oct 2021 17:41), Max: 98.7 (02 Oct 2021 09:07)  HR: 72 (02 Oct 2021 17:06) (63 - 85)  BP: 130/62 (02 Oct 2021 17:06) (111/53 - 130/62)  BP(mean): 89 (02 Oct 2021 17:06) (77 - 91)  RR: 16 (02 Oct 2021 17:06) (16 - 20)  SpO2: 92% (02 Oct 2021 17:06) (91% - 99%)    PHYSICAL EXAM:  Constitutional:  Well-developed, well nourished, sitting in chair  Eyes:  Sclerae anicteric, conjunctivae clear, PERRL  Ear/Nose/Throat:  No nasal exudate or sinus tenderness;  No buccal mucosal lesions, no pharyngeal erythema or exudate	  Neck:  Supple, no adenopathy  Respiratory:  Clear bilaterally  Chest:  VAC dressing on sternal incision;  subxihoid drains X 3 with serosanguinous drainage  Cardiovascular:  RRR, S1S2, no murmur appreciated  Gastrointestinal:  Symmetric, normoactive BS, soft, NT, no masses, guarding or rebound.  No HSM  Extremities:  No edema      LABS:                        8.8    9.13  )-----------( 141      ( 02 Oct 2021 07:22 )             28.7         10-02    136  |  102  |  27<H>  ----------------------------<  94  4.4   |  24  |  1.42<H>    Ca    9.4      02 Oct 2021 07:22  Mg     2.2     10-02            MICROBIOLOGY:    9/29 OR cultures:    Surgical swab - wire culture - NGTD  Tissue ant wound:  rare Staph epi, rare Candida albicans    RADIOLOGY & ADDITIONAL STUDIES:

## 2021-10-02 NOTE — PROGRESS NOTE ADULT - SUBJECTIVE AND OBJECTIVE BOX
Plastic Surgery Progress Note    S: Patient seen and examined.  good spirits.  main complaint is that breakfast is late.      Vital Signs Last 24 Hrs  T(C): 36.8 (02 Oct 2021 04:33), Max: 36.9 (02 Oct 2021 00:26)  T(F): 98.2 (02 Oct 2021 04:33), Max: 98.5 (02 Oct 2021 00:26)  HR: 85 (02 Oct 2021 09:07) (62 - 85)  BP: 128/66 (02 Oct 2021 09:07) (108/52 - 128/66)  BP(mean): 91 (02 Oct 2021 09:07) (75 - 99)  RR: 20 (02 Oct 2021 09:07) (16 - 20)  SpO2: 98% (02 Oct 2021 09:07) (92% - 99%)  I&O's Detail    01 Oct 2021 07:01  -  02 Oct 2021 07:00  --------------------------------------------------------  IN:    IV PiggyBack: 50 mL    Lactated Ringers: 750 mL    Oral Fluid: 850 mL  Total IN: 1650 mL    OUT:    Bulb (mL): 30 mL    Bulb (mL): 30 mL    Bulb (mL): 55 mL    VAC (Vacuum Assisted Closure) System (mL): 0 mL    Voided (mL): 900 mL  Total OUT: 1015 mL    Total NET: 635 mL      02 Oct 2021 07:01  -  02 Oct 2021 10:00  --------------------------------------------------------  IN:    Oral Fluid: 200 mL  Total IN: 200 mL    OUT:    Bulb (mL): 15 mL    Bulb (mL): 10 mL    Bulb (mL): 10 mL    VAC (Vacuum Assisted Closure) System (mL): 0 mL    Voided (mL): 100 mL  Total OUT: 135 mL    Total NET: 65 mL          Physical Exam:  General: Awake and alert, NAD  Chest: incisional vac in place holding suction, no output.  drains serosanguinous.  bra in place.

## 2021-10-02 NOTE — PROGRESS NOTE ADULT - SUBJECTIVE AND OBJECTIVE BOX
Patient discussed on morning rounds with Dr. Muller     Operation / Date:   8/9/21 -- AVR, ascending/hemiarch replacement with frozen elephant trunk and left aorto-subclavian by[ass, CABGx2  9/29/21 -- Sternal wound debridement, bilateral muscle flaps, incisional wound     SUBJECTIVE ASSESSMENT:  Pt is feeling well and no complaints this morning. Eating/drinking normally. Using IS regularly. Denies any CP, palpitations, SOB, wheezing, abd pain, n/v/d/c, fevers or chills.      Vital Signs Last 24 Hrs  T(C): 37.1 (02 Oct 2021 09:07), Max: 37.1 (02 Oct 2021 09:07)  T(F): 98.7 (02 Oct 2021 09:07), Max: 98.7 (02 Oct 2021 09:07)  HR: 85 (02 Oct 2021 09:07) (68 - 85)  BP: 128/66 (02 Oct 2021 09:07) (111/53 - 128/66)  BP(mean): 91 (02 Oct 2021 09:07) (77 - 99)  RR: 20 (02 Oct 2021 09:07) (16 - 20)  SpO2: 98% (02 Oct 2021 09:07) (92% - 99%)  I&O's Detail    01 Oct 2021 07:01  -  02 Oct 2021 07:00  --------------------------------------------------------  IN:    IV PiggyBack: 50 mL    Lactated Ringers: 750 mL    Oral Fluid: 850 mL  Total IN: 1650 mL    OUT:    Bulb (mL): 30 mL    Bulb (mL): 30 mL    Bulb (mL): 55 mL    VAC (Vacuum Assisted Closure) System (mL): 0 mL    Voided (mL): 900 mL  Total OUT: 1015 mL  Total NET: 635 mL    02 Oct 2021 07:01  -  02 Oct 2021 13:36  --------------------------------------------------------  IN:    Oral Fluid: 200 mL  Total IN: 200 mL    OUT:    Bulb (mL): 15 mL    Bulb (mL): 10 mL    Bulb (mL): 10 mL    VAC (Vacuum Assisted Closure) System (mL): 0 mL    Voided (mL): 250 mL  Total OUT: 285 mL  Total NET: -85 mL    CHEST TUBE:  no  FRANCHESKA DRAIN:  no  EPICARDIAL WIRES: no  TIE DOWNS: no  REGALADO: no    PHYSICAL EXAM:  General: well appearing sitting in chair in NAD   Neurological: AOx3. Motor skills grossly intact  Cardiovascular: Normal S1/S2. Regular rate/rhythm. No murmurs  Respiratory: Lungs CTA bilaterally, trace crackles LE b/l   Gastrointestinal: +BS in all 4 quadrants. Non-distended. Soft. Non-tender  Extremities: Strength 5/5 b/l upper/lower extremities. Sensation grossly intact upper/lower extremities. No edema. No calf tenderness.  Vascular: Radial 2+bilaterally, DP 2+ b/l  Incision Sites: sternotomy with wound vac in place.     LABS:                        8.8    9.13  )-----------( 141      ( 02 Oct 2021 07:22 )             28.7     COUMADIN: no    10-02    136  |  102  |  27<H>  ----------------------------<  94  4.4   |  24  |  1.42<H>    Ca    9.4      02 Oct 2021 07:22  Mg     2.2     10-02    MEDICATIONS  (STANDING):  aMIOdarone    Tablet 200 milliGRAM(s) Oral daily  aspirin enteric coated 81 milliGRAM(s) Oral daily  atorvastatin 10 milliGRAM(s) Oral at bedtime  cefTRIAXone   IVPB 2000 milliGRAM(s) IV Intermittent every 24 hours  chlorhexidine 2% Cloths 1 Application(s) Topical daily  furosemide    Tablet 40 milliGRAM(s) Oral daily  heparin   Injectable 5000 Unit(s) SubCutaneous every 8 hours  lactated ringers. 500 milliLiter(s) (75 mL/Hr) IV Continuous <Continuous>  levothyroxine 50 MICROGram(s) Oral daily  pantoprazole    Tablet 40 milliGRAM(s) Oral before breakfast  polyethylene glycol 3350 17 Gram(s) Oral daily  potassium chloride    Tablet ER 20 milliEquivalent(s) Oral daily  senna 2 Tablet(s) Oral at bedtime  sodium chloride 0.9% lock flush 3 milliLiter(s) IV Push every 8 hours    MEDICATIONS  (PRN):  acetaminophen   Tablet .. 650 milliGRAM(s) Oral every 6 hours PRN mild pain  oxycodone    5 mG/acetaminophen 325 mG 2 Tablet(s) Oral every 6 hours PRN Severe Pain (7 - 10)  oxycodone    5 mG/acetaminophen 325 mG 1 Tablet(s) Oral every 6 hours PRN Moderate Pain (4 - 6)    RADIOLOGY & ADDITIONAL TESTS:  < from: Xray Chest 1 View- PORTABLE-Routine (Xray Chest 1 View- PORTABLE-Routine in AM.) (10.02.21 @ 03:17) >  Findings/  impression: Left loop recorder. Support devices in satisfactory position. Stable heart size, status post median sternotomy, aortic stent, aortic valve replacement. Bilateral opacities/pleural effusions, unchanged. Stable bony structures. Left axillary surgical clips.  < end of copied text >     Patient discussed on morning rounds with Dr. Muller     Operation / Date:   8/9/21 -- AVR, ascending/hemiarch replacement with frozen elephant trunk and left aorto-subclavian bypass, CABGx2  9/29/21 -- Sternal wound debridement, bilateral muscle flaps, incisional wound     SUBJECTIVE ASSESSMENT:  Pt is feeling well and no complaints this morning. Eating/drinking normally. Using IS regularly. Denies any CP, palpitations, SOB, wheezing, abd pain, n/v/d/c, fevers or chills.      Vital Signs Last 24 Hrs  T(C): 37.1 (02 Oct 2021 09:07), Max: 37.1 (02 Oct 2021 09:07)  T(F): 98.7 (02 Oct 2021 09:07), Max: 98.7 (02 Oct 2021 09:07)  HR: 85 (02 Oct 2021 09:07) (68 - 85)  BP: 128/66 (02 Oct 2021 09:07) (111/53 - 128/66)  BP(mean): 91 (02 Oct 2021 09:07) (77 - 99)  RR: 20 (02 Oct 2021 09:07) (16 - 20)  SpO2: 98% (02 Oct 2021 09:07) (92% - 99%)  I&O's Detail    01 Oct 2021 07:01  -  02 Oct 2021 07:00  --------------------------------------------------------  IN:    IV PiggyBack: 50 mL    Lactated Ringers: 750 mL    Oral Fluid: 850 mL  Total IN: 1650 mL    OUT:    Bulb (mL): 30 mL    Bulb (mL): 30 mL    Bulb (mL): 55 mL    VAC (Vacuum Assisted Closure) System (mL): 0 mL    Voided (mL): 900 mL  Total OUT: 1015 mL  Total NET: 635 mL    02 Oct 2021 07:01  -  02 Oct 2021 13:36  --------------------------------------------------------  IN:    Oral Fluid: 200 mL  Total IN: 200 mL    OUT:    Bulb (mL): 15 mL    Bulb (mL): 10 mL    Bulb (mL): 10 mL    VAC (Vacuum Assisted Closure) System (mL): 0 mL    Voided (mL): 250 mL  Total OUT: 285 mL  Total NET: -85 mL    CHEST TUBE:  no  FRANCHESKA DRAIN:  no  EPICARDIAL WIRES: no  TIE DOWNS: no  REGALADO: no    PHYSICAL EXAM:  General: well appearing sitting in chair in NAD   Neurological: AOx3. Motor skills grossly intact  Cardiovascular: Normal S1/S2. Regular rate/rhythm. No murmurs  Respiratory: Lungs CTA bilaterally, trace crackles LE b/l   Gastrointestinal: +BS in all 4 quadrants. Non-distended. Soft. Non-tender  Extremities: Strength 5/5 b/l upper/lower extremities. Sensation grossly intact upper/lower extremities. No edema. No calf tenderness.  Vascular: Radial 2+bilaterally, DP 2+ b/l  Incision Sites: sternotomy with wound vac in place.     LABS:                        8.8    9.13  )-----------( 141      ( 02 Oct 2021 07:22 )             28.7     COUMADIN: no    10-02    136  |  102  |  27<H>  ----------------------------<  94  4.4   |  24  |  1.42<H>    Ca    9.4      02 Oct 2021 07:22  Mg     2.2     10-02    MEDICATIONS  (STANDING):  aMIOdarone    Tablet 200 milliGRAM(s) Oral daily  aspirin enteric coated 81 milliGRAM(s) Oral daily  atorvastatin 10 milliGRAM(s) Oral at bedtime  cefTRIAXone   IVPB 2000 milliGRAM(s) IV Intermittent every 24 hours  chlorhexidine 2% Cloths 1 Application(s) Topical daily  furosemide    Tablet 40 milliGRAM(s) Oral daily  heparin   Injectable 5000 Unit(s) SubCutaneous every 8 hours  lactated ringers. 500 milliLiter(s) (75 mL/Hr) IV Continuous <Continuous>  levothyroxine 50 MICROGram(s) Oral daily  pantoprazole    Tablet 40 milliGRAM(s) Oral before breakfast  polyethylene glycol 3350 17 Gram(s) Oral daily  potassium chloride    Tablet ER 20 milliEquivalent(s) Oral daily  senna 2 Tablet(s) Oral at bedtime  sodium chloride 0.9% lock flush 3 milliLiter(s) IV Push every 8 hours    MEDICATIONS  (PRN):  acetaminophen   Tablet .. 650 milliGRAM(s) Oral every 6 hours PRN mild pain  oxycodone    5 mG/acetaminophen 325 mG 2 Tablet(s) Oral every 6 hours PRN Severe Pain (7 - 10)  oxycodone    5 mG/acetaminophen 325 mG 1 Tablet(s) Oral every 6 hours PRN Moderate Pain (4 - 6)    RADIOLOGY & ADDITIONAL TESTS:  < from: Xray Chest 1 View- PORTABLE-Routine (Xray Chest 1 View- PORTABLE-Routine in AM.) (10.02.21 @ 03:17) >  Findings/  impression: Left loop recorder. Support devices in satisfactory position. Stable heart size, status post median sternotomy, aortic stent, aortic valve replacement. Bilateral opacities/pleural effusions, unchanged. Stable bony structures. Left axillary surgical clips.  < end of copied text >

## 2021-10-02 NOTE — PROGRESS NOTE ADULT - ASSESSMENT
76 year old F (former smoker) with PMHx of HTN, Spinal Stenosis, Arthritis, Bicuspid aortic valve with known aortic stenosis. S/p AVR, ascending/hemiarch replacement with frozen elephant trunk and left aorto-subclavian bypass, CABG x2 with Dr. Muller on 8/9/2021. Postoperative course c/b sternal wound growing K. oxytoca/raoutella ornithinolytica, completed course of ceftriaxone for SSI. Readmitted 9/28 for poor wound healing is s/p wound debridement and removal of sternal hardware with pectoralis flap advancement on 9/29 with plastic surgery.  Per primary team, wound did not appear infected at the time and I agree based upon images reviewed.  Sternal wire culture remains neg, tissue culture with Staph epi and Candida - may be colonizers - would not target.  Suggest:  - Continue ceftriaxone 2g q 24H for now.  - F/u sternal wound cultures from 9/29   Will continue to follow with you - team 1.

## 2021-10-02 NOTE — PROGRESS NOTE ADULT - ASSESSMENT
A/P 76F s/p debridement of sternal wound, removal of wires, bilateral pec adv. Doing well.    -cont incisional vac/bra  -following cultures and appreciate ID recommendations  -continue drains, will monitor output    BSmith

## 2021-10-03 LAB
ANION GAP SERPL CALC-SCNC: 8 MMOL/L — SIGNIFICANT CHANGE UP (ref 5–17)
APTT BLD: 34.7 SEC — SIGNIFICANT CHANGE UP (ref 27.5–35.5)
BUN SERPL-MCNC: 28 MG/DL — HIGH (ref 7–23)
CALCIUM SERPL-MCNC: 9.5 MG/DL — SIGNIFICANT CHANGE UP (ref 8.4–10.5)
CHLORIDE SERPL-SCNC: 102 MMOL/L — SIGNIFICANT CHANGE UP (ref 96–108)
CO2 SERPL-SCNC: 25 MMOL/L — SIGNIFICANT CHANGE UP (ref 22–31)
CREAT SERPL-MCNC: 1.31 MG/DL — HIGH (ref 0.5–1.3)
GLUCOSE SERPL-MCNC: 98 MG/DL — SIGNIFICANT CHANGE UP (ref 70–99)
HCT VFR BLD CALC: 26.5 % — LOW (ref 34.5–45)
HGB BLD-MCNC: 8.3 G/DL — LOW (ref 11.5–15.5)
INR BLD: 1.04 — SIGNIFICANT CHANGE UP (ref 0.88–1.16)
MAGNESIUM SERPL-MCNC: 2.2 MG/DL — SIGNIFICANT CHANGE UP (ref 1.6–2.6)
MCHC RBC-ENTMCNC: 30.6 PG — SIGNIFICANT CHANGE UP (ref 27–34)
MCHC RBC-ENTMCNC: 31.3 GM/DL — LOW (ref 32–36)
MCV RBC AUTO: 97.8 FL — SIGNIFICANT CHANGE UP (ref 80–100)
NRBC # BLD: 0 /100 WBCS — SIGNIFICANT CHANGE UP (ref 0–0)
PLATELET # BLD AUTO: 132 K/UL — LOW (ref 150–400)
POTASSIUM SERPL-MCNC: 4.4 MMOL/L — SIGNIFICANT CHANGE UP (ref 3.5–5.3)
POTASSIUM SERPL-SCNC: 4.4 MMOL/L — SIGNIFICANT CHANGE UP (ref 3.5–5.3)
PROTHROM AB SERPL-ACNC: 12.4 SEC — SIGNIFICANT CHANGE UP (ref 10.6–13.6)
RBC # BLD: 2.71 M/UL — LOW (ref 3.8–5.2)
RBC # FLD: 17.2 % — HIGH (ref 10.3–14.5)
SODIUM SERPL-SCNC: 135 MMOL/L — SIGNIFICANT CHANGE UP (ref 135–145)
WBC # BLD: 7.08 K/UL — SIGNIFICANT CHANGE UP (ref 3.8–10.5)
WBC # FLD AUTO: 7.08 K/UL — SIGNIFICANT CHANGE UP (ref 3.8–10.5)

## 2021-10-03 PROCEDURE — 71045 X-RAY EXAM CHEST 1 VIEW: CPT | Mod: 26

## 2021-10-03 RX ORDER — SIMETHICONE 80 MG/1
80 TABLET, CHEWABLE ORAL EVERY 8 HOURS
Refills: 0 | Status: DISCONTINUED | OUTPATIENT
Start: 2021-10-03 | End: 2021-10-04

## 2021-10-03 RX ADMIN — CEFTRIAXONE 100 MILLIGRAM(S): 500 INJECTION, POWDER, FOR SOLUTION INTRAMUSCULAR; INTRAVENOUS at 16:06

## 2021-10-03 RX ADMIN — HEPARIN SODIUM 5000 UNIT(S): 5000 INJECTION INTRAVENOUS; SUBCUTANEOUS at 14:21

## 2021-10-03 RX ADMIN — SODIUM CHLORIDE 3 MILLILITER(S): 9 INJECTION INTRAMUSCULAR; INTRAVENOUS; SUBCUTANEOUS at 14:07

## 2021-10-03 RX ADMIN — OXYCODONE AND ACETAMINOPHEN 2 TABLET(S): 5; 325 TABLET ORAL at 21:07

## 2021-10-03 RX ADMIN — Medication 650 MILLIGRAM(S): at 18:10

## 2021-10-03 RX ADMIN — SODIUM CHLORIDE 3 MILLILITER(S): 9 INJECTION INTRAMUSCULAR; INTRAVENOUS; SUBCUTANEOUS at 20:35

## 2021-10-03 RX ADMIN — Medication 650 MILLIGRAM(S): at 16:06

## 2021-10-03 RX ADMIN — Medication 50 MICROGRAM(S): at 05:26

## 2021-10-03 RX ADMIN — ATORVASTATIN CALCIUM 10 MILLIGRAM(S): 80 TABLET, FILM COATED ORAL at 21:06

## 2021-10-03 RX ADMIN — OXYCODONE AND ACETAMINOPHEN 1 TABLET(S): 5; 325 TABLET ORAL at 18:10

## 2021-10-03 RX ADMIN — AMIODARONE HYDROCHLORIDE 200 MILLIGRAM(S): 400 TABLET ORAL at 05:26

## 2021-10-03 RX ADMIN — SIMETHICONE 80 MILLIGRAM(S): 80 TABLET, CHEWABLE ORAL at 16:07

## 2021-10-03 RX ADMIN — Medication 20 MILLIEQUIVALENT(S): at 11:14

## 2021-10-03 RX ADMIN — CHLORHEXIDINE GLUCONATE 1 APPLICATION(S): 213 SOLUTION TOPICAL at 11:40

## 2021-10-03 RX ADMIN — PANTOPRAZOLE SODIUM 40 MILLIGRAM(S): 20 TABLET, DELAYED RELEASE ORAL at 06:18

## 2021-10-03 RX ADMIN — Medication 81 MILLIGRAM(S): at 11:14

## 2021-10-03 RX ADMIN — OXYCODONE AND ACETAMINOPHEN 2 TABLET(S): 5; 325 TABLET ORAL at 22:02

## 2021-10-03 RX ADMIN — OXYCODONE AND ACETAMINOPHEN 1 TABLET(S): 5; 325 TABLET ORAL at 16:14

## 2021-10-03 RX ADMIN — HEPARIN SODIUM 5000 UNIT(S): 5000 INJECTION INTRAVENOUS; SUBCUTANEOUS at 21:07

## 2021-10-03 RX ADMIN — SODIUM CHLORIDE 3 MILLILITER(S): 9 INJECTION INTRAMUSCULAR; INTRAVENOUS; SUBCUTANEOUS at 05:22

## 2021-10-03 RX ADMIN — Medication 40 MILLIGRAM(S): at 05:27

## 2021-10-03 RX ADMIN — HEPARIN SODIUM 5000 UNIT(S): 5000 INJECTION INTRAVENOUS; SUBCUTANEOUS at 05:24

## 2021-10-03 RX ADMIN — POLYETHYLENE GLYCOL 3350 17 GRAM(S): 17 POWDER, FOR SOLUTION ORAL at 11:14

## 2021-10-03 NOTE — PROGRESS NOTE ADULT - SUBJECTIVE AND OBJECTIVE BOX
Pt seen and examined. No acute events. VAC removed, incision c/d/i. chest flat. Drains appropriate volume and quality. Patient will leave with all drains.     ***It is very important that the patient wears her bra all hours of the day except when showering. Her breasts are very large and place tension on the closure/wound.     -Abx per ID  -Dispo per primary  -Thank you for allowing me to be part of this patient's care    SCW

## 2021-10-03 NOTE — PROGRESS NOTE ADULT - ASSESSMENT
75 y/o F, current everyday smoker with PMHx of HTN, Spinal Stenosis, Arthritis, Bicuspid aortic valve with known aortic stenosis evaluated by Dr. Muller as an outpatient and presented to [facility] on 8/8/2021 to complete preoperative workup. Pt underwent AVR, ascending/hemiarch replacement with frozen elephant trunk and left aorto-subclavian bypass, CABG x2 with Dr. Muller on 8/9/2021. Post-op course complicated by prolonged intubation, moderate pericardial effusion drained by IR on POD7, Sternal wound erythema s/p sternal debridement and wound vac placement (cultures growing Klebsiella) on POD 17, multiple episodes of AF with RVR/hypotension requiring amiodarone with subsequent bradycardia requiring PPM on POD 23 and FIDEL, resolved with hydration. Pt was discharged home on 9/3/21 but represented to the hospital on 9/4/21 due to failure to thrive at home and volume overload. As per Dr. Muller the patient was stable and ready for discharge to [facility] ([place] on 9/9/21. The patient was discharged home from rehab on 9/24. She states she was doing well overall but when nurse came to change her wound vac she sent a photograph to Dr. Muller and was sternal wound noted to have poor wound healing. She was instructed to report to the ED for evaluation for intervention. 9/29/21 underwent bilateral muscle flap advancement with plastic surgery. POD1, recovering well, passed TOV. POD2 increased Cr, 1 to 1.5, increased IV hydration. POD3 Cr trending down, monitor daily. POD4 Cr continues to improve. Per Dr. Vernno, likely needs course of ppx IV abx, PICC ordered for AM. Wound vac and JUSTIN drains being managed by plastics team.     Plan:  Neurovascular:   -Pain well controlled with current regimen. PRN's: acetaminophen, percocet     Cardiovascular:   -8/9/21 -- AVR, ascending/hemiarch replacement with frozen elephant trunk and left aorto-subclavian bypass, CABGx2  -9/29/21 -- Sternal wound debridement, bilateral muscle flaps, incisional wound     -continue with ASA, Hep gtt, amio, statin     -4 halie drains in place, to gravity. Managed by plastic surgery team.     -wound vac in place, plastics management, are to removed tomorrow and switch to smaller dressing (pt not going home with wound vac)  -hx of tachy-marifer syndrome/AF: s/p PPM on previous admission. Not previously discharged on AC.   -Hemodynamically stable.   -Monitor: BP, HR, tele    Respiratory:   -Oxygenating well on room air  -Encourage continued use of IS 10x/hr and frequent ambulation  -CXR: no acute pathology. no PTX     GI:  -GI PPX: pantoprazole 40mg daily   -PO Diet: DASH/TLC   -Bowel Regimen: miralax, senna     Renal / :  -Diuresis: continue with Lasix 40 daily   -Continue to monitor renal function: BUN/Cr 28/1.31   -Monitor I/O's daily     Endocrine:    -No hx of DM    -A1c: 4.8  -hypothyroidism: continue with synthroid 50mcg daily     -TSH: elevated 12, T4 WNL     Hematologic:  -CBC: H/H- 8.8/28  -Coagulation Panel: WNL     ID:  -Pending PICC placement for long-term Abx, per Dr. Muller  Ordered for AM.  -Wound vac in place, being managed with plastics team    -per Plastic surgery, will not need wound vac on discharge, continue with current vac for 5 days then switch to Aquacel   -ID following, no reccomendation for long term Abx, not required but per Dr. Muller/Hemli will likely need a prophylactic course given recent cardiac surgery.  -continue with ceftriaxone 2000 IV Q24 hours   -Temperature: afebrile   -CBC: WBC- 7.08  -Continue to observe for SIRS/Sepsis Syndrome.    Prophylaxis:  -DVT prophylaxis with 5000 SubQ Heparin q8h.  -Continue with SCD's b/l while patient is at rest     Disposition:  -Discharge home once patient is medically ready

## 2021-10-03 NOTE — PROGRESS NOTE ADULT - ASSESSMENT
A/P 76F s/p debridement of sternal wound, removal of wires, bilateral pec adv. Doing well.    -cont incisional vac/bra - will remove vac tomorrow (Monday 10/4)  -following cultures and appreciate ID recommendations  -continue drains, will monitor output    BSmith

## 2021-10-03 NOTE — PROGRESS NOTE ADULT - SUBJECTIVE AND OBJECTIVE BOX
Patient discussed on morning rounds with Dr. Muller     Operation / Date: 8/9/21 -- AVR, ascending/hemiarch replacement with frozen elephant trunk and left aorto-subclavian bypass, CABGx2     SUBJECTIVE ASSESSMENT:  No complaints today, feeling well. Eating/drinking well. Moving bowels regularly. Denies any CP, palpitations, SOB, wheezing, abd pain, n/v/d/c, fevers or chills.    Vital Signs Last 24 Hrs  T(C): 36.7 (03 Oct 2021 14:00), Max: 36.9 (02 Oct 2021 17:41)  T(F): 98 (03 Oct 2021 14:00), Max: 98.4 (02 Oct 2021 17:41)  HR: 63 (03 Oct 2021 14:00) (63 - 75)  BP: 107/51 (03 Oct 2021 14:00) (107/51 - 130/62)  BP(mean): 72 (03 Oct 2021 14:00) (72 - 89)  RR: 13 (03 Oct 2021 14:00) (13 - 32)  SpO2: 100% (03 Oct 2021 14:00) (91% - 100%)  I&O's Detail    02 Oct 2021 07:01  -  03 Oct 2021 07:00  --------------------------------------------------------  IN:    IV PiggyBack: 50 mL    Oral Fluid: 750 mL  Total IN: 800 mL    OUT:    Bulb (mL): 35 mL    Bulb (mL): 25 mL    Bulb (mL): 30 mL    VAC (Vacuum Assisted Closure) System (mL): 0 mL    Voided (mL): 1700 mL  Total OUT: 1790 mL    Total NET: -990 mL      03 Oct 2021 07:01  -  03 Oct 2021 15:19  --------------------------------------------------------  IN:    Oral Fluid: 150 mL  Total IN: 150 mL    OUT:    Voided (mL): 200 mL  Total OUT: 200 mL    Total NET: -50 mL    CHEST TUBE:  no  JUSTIN drains: managed with plastic surgery  FRANCHESKA DRAIN: no  EPICARDIAL WIRES: no  TIE DOWNS: no  REGALADO: no  WOUND VAC: yes, mgt by plastic surgery. They will remove tomorrow     PHYSICAL EXAM:  General: well appearing sitting up in chair in NAD   Neurological: AOx3. Motor skills grossly intact  Cardiovascular: Normal S1/S2. Regular rate/rhythm. No murmurs  Respiratory: Lungs CTA bilaterally. Trace crackles b/l  Gastrointestinal: +BS in all 4 quadrants. Non-distended. Soft. Non-tender  Extremities: Strength 5/5 b/l upper/lower extremities. Sensation grossly intact upper/lower extremities. No edema. No calf tenderness.  Vascular: Radial 2+bilaterally, DP 2+ b/l  Incision Sites: sternotomy with wound vac in place c/d/i.    LABS:                        8.3    7.08  )-----------( 132      ( 03 Oct 2021 06:55 )             26.5     COUMADIN:  no    PT/INR - ( 03 Oct 2021 06:55 )   PT: 12.4 sec;   INR: 1.04     PTT - ( 03 Oct 2021 06:55 )  PTT:34.7 sec    10-03    135  |  102  |  28<H>  ----------------------------<  98  4.4   |  25  |  1.31<H>    Ca    9.5      03 Oct 2021 06:55  Mg     2.2     10-03    MEDICATIONS  (STANDING):  aMIOdarone    Tablet 200 milliGRAM(s) Oral daily  aspirin enteric coated 81 milliGRAM(s) Oral daily  atorvastatin 10 milliGRAM(s) Oral at bedtime  cefTRIAXone   IVPB 2000 milliGRAM(s) IV Intermittent every 24 hours  chlorhexidine 2% Cloths 1 Application(s) Topical daily  furosemide    Tablet 40 milliGRAM(s) Oral daily  heparin   Injectable 5000 Unit(s) SubCutaneous every 8 hours  levothyroxine 50 MICROGram(s) Oral daily  pantoprazole    Tablet 40 milliGRAM(s) Oral before breakfast  polyethylene glycol 3350 17 Gram(s) Oral daily  potassium chloride    Tablet ER 20 milliEquivalent(s) Oral daily  senna 2 Tablet(s) Oral at bedtime  sodium chloride 0.9% lock flush 3 milliLiter(s) IV Push every 8 hours    MEDICATIONS  (PRN):  acetaminophen   Tablet .. 650 milliGRAM(s) Oral every 6 hours PRN mild pain  oxycodone    5 mG/acetaminophen 325 mG 1 Tablet(s) Oral every 6 hours PRN Moderate Pain (4 - 6)  oxycodone    5 mG/acetaminophen 325 mG 2 Tablet(s) Oral every 6 hours PRN Severe Pain (7 - 10)  simethicone 80 milliGRAM(s) Chew every 8 hours PRN Gas    RADIOLOGY & ADDITIONAL TESTS:  < from: Xray Chest 1 View- PORTABLE-Routine (Xray Chest 1 View- PORTABLE-Routine in AM.) (10.03.21 @ 03:16) >  Findings/  impression: Satisfactory positioning of support devices. Stable heart size, status post median sternotomy, aortic stent, aortic valve replacement. Bilateral opacities/pleural effusions, unchanged. Stable bony structures. Left axillary surgical clips.  < end of copied text >

## 2021-10-03 NOTE — PROGRESS NOTE ADULT - SUBJECTIVE AND OBJECTIVE BOX
Plastic Surgery Progress Note    S: Patient seen and examined. no new events.  pleasant this am.     Vital Signs Last 24 Hrs  T(C): 36.6 (03 Oct 2021 05:30), Max: 37.1 (02 Oct 2021 09:07)  T(F): 97.9 (03 Oct 2021 05:30), Max: 98.7 (02 Oct 2021 09:07)  HR: 75 (03 Oct 2021 04:25) (63 - 85)  BP: 109/51 (03 Oct 2021 04:25) (109/51 - 130/62)  BP(mean): 73 (03 Oct 2021 04:25) (73 - 91)  RR: 21 (03 Oct 2021 04:25) (16 - 32)  SpO2: 98% (03 Oct 2021 04:25) (91% - 98%)  I&O's Detail    02 Oct 2021 07:01  -  03 Oct 2021 07:00  --------------------------------------------------------  IN:    IV PiggyBack: 50 mL    Oral Fluid: 750 mL  Total IN: 800 mL    OUT:    Bulb (mL): 35 mL    Bulb (mL): 25 mL    Bulb (mL): 30 mL    VAC (Vacuum Assisted Closure) System (mL): 0 mL    Voided (mL): 1700 mL  Total OUT: 1790 mL    Total NET: -990 mL          Physical Exam:  General: Awake and alert, NAD  Chest: vac in place holding suction, no output.  drains serosanguinous.

## 2021-10-04 ENCOUNTER — TRANSCRIPTION ENCOUNTER (OUTPATIENT)
Age: 76
End: 2021-10-04

## 2021-10-04 VITALS
SYSTOLIC BLOOD PRESSURE: 114 MMHG | RESPIRATION RATE: 20 BRPM | DIASTOLIC BLOOD PRESSURE: 70 MMHG | OXYGEN SATURATION: 98 % | HEART RATE: 72 BPM

## 2021-10-04 LAB
-  CEFAZOLIN: SIGNIFICANT CHANGE UP
-  CLINDAMYCIN: SIGNIFICANT CHANGE UP
-  ERYTHROMYCIN: SIGNIFICANT CHANGE UP
-  LINEZOLID: SIGNIFICANT CHANGE UP
-  OXACILLIN: SIGNIFICANT CHANGE UP
-  RIFAMPIN: SIGNIFICANT CHANGE UP
-  TRIMETHOPRIM/SULFAMETHOXAZOLE: SIGNIFICANT CHANGE UP
-  VANCOMYCIN: SIGNIFICANT CHANGE UP
ANION GAP SERPL CALC-SCNC: 10 MMOL/L — SIGNIFICANT CHANGE UP (ref 5–17)
BUN SERPL-MCNC: 24 MG/DL — HIGH (ref 7–23)
CALCIUM SERPL-MCNC: 10.1 MG/DL — SIGNIFICANT CHANGE UP (ref 8.4–10.5)
CHLORIDE SERPL-SCNC: 101 MMOL/L — SIGNIFICANT CHANGE UP (ref 96–108)
CO2 SERPL-SCNC: 27 MMOL/L — SIGNIFICANT CHANGE UP (ref 22–31)
CREAT SERPL-MCNC: 1.18 MG/DL — SIGNIFICANT CHANGE UP (ref 0.5–1.3)
CULTURE RESULTS: SIGNIFICANT CHANGE UP
GLUCOSE SERPL-MCNC: 96 MG/DL — SIGNIFICANT CHANGE UP (ref 70–99)
HCT VFR BLD CALC: 28.3 % — LOW (ref 34.5–45)
HGB BLD-MCNC: 9 G/DL — LOW (ref 11.5–15.5)
MCHC RBC-ENTMCNC: 30.8 PG — SIGNIFICANT CHANGE UP (ref 27–34)
MCHC RBC-ENTMCNC: 31.8 GM/DL — LOW (ref 32–36)
MCV RBC AUTO: 96.9 FL — SIGNIFICANT CHANGE UP (ref 80–100)
METHOD TYPE: SIGNIFICANT CHANGE UP
NRBC # BLD: 0 /100 WBCS — SIGNIFICANT CHANGE UP (ref 0–0)
ORGANISM # SPEC MICROSCOPIC CNT: SIGNIFICANT CHANGE UP
ORGANISM # SPEC MICROSCOPIC CNT: SIGNIFICANT CHANGE UP
PLATELET # BLD AUTO: 187 K/UL — SIGNIFICANT CHANGE UP (ref 150–400)
POTASSIUM SERPL-MCNC: 4.5 MMOL/L — SIGNIFICANT CHANGE UP (ref 3.5–5.3)
POTASSIUM SERPL-SCNC: 4.5 MMOL/L — SIGNIFICANT CHANGE UP (ref 3.5–5.3)
RBC # BLD: 2.92 M/UL — LOW (ref 3.8–5.2)
RBC # FLD: 17 % — HIGH (ref 10.3–14.5)
SODIUM SERPL-SCNC: 138 MMOL/L — SIGNIFICANT CHANGE UP (ref 135–145)
SPECIMEN SOURCE: SIGNIFICANT CHANGE UP
T4 FREE SERPL-MCNC: 1.02 NG/DL — SIGNIFICANT CHANGE UP (ref 0.93–1.7)
TSH SERPL-MCNC: 15.82 UIU/ML — HIGH (ref 0.27–4.2)
WBC # BLD: 8.45 K/UL — SIGNIFICANT CHANGE UP (ref 3.8–10.5)
WBC # FLD AUTO: 8.45 K/UL — SIGNIFICANT CHANGE UP (ref 3.8–10.5)

## 2021-10-04 PROCEDURE — 86891 AUTOLOGOUS BLOOD OP SALVAGE: CPT

## 2021-10-04 PROCEDURE — 71045 X-RAY EXAM CHEST 1 VIEW: CPT | Mod: 26

## 2021-10-04 PROCEDURE — 80048 BASIC METABOLIC PNL TOTAL CA: CPT

## 2021-10-04 PROCEDURE — 87206 SMEAR FLUORESCENT/ACID STAI: CPT

## 2021-10-04 PROCEDURE — 87116 MYCOBACTERIA CULTURE: CPT

## 2021-10-04 PROCEDURE — U0003: CPT

## 2021-10-04 PROCEDURE — 99232 SBSQ HOSP IP/OBS MODERATE 35: CPT | Mod: GC

## 2021-10-04 PROCEDURE — 84295 ASSAY OF SERUM SODIUM: CPT

## 2021-10-04 PROCEDURE — 82330 ASSAY OF CALCIUM: CPT

## 2021-10-04 PROCEDURE — 86769 SARS-COV-2 COVID-19 ANTIBODY: CPT

## 2021-10-04 PROCEDURE — 83036 HEMOGLOBIN GLYCOSYLATED A1C: CPT

## 2021-10-04 PROCEDURE — 87070 CULTURE OTHR SPECIMN AEROBIC: CPT

## 2021-10-04 PROCEDURE — 85610 PROTHROMBIN TIME: CPT

## 2021-10-04 PROCEDURE — 85730 THROMBOPLASTIN TIME PARTIAL: CPT

## 2021-10-04 PROCEDURE — 86901 BLOOD TYPING SEROLOGIC RH(D): CPT

## 2021-10-04 PROCEDURE — 97161 PT EVAL LOW COMPLEX 20 MIN: CPT

## 2021-10-04 PROCEDURE — 85025 COMPLETE CBC W/AUTO DIFF WBC: CPT

## 2021-10-04 PROCEDURE — 84443 ASSAY THYROID STIM HORMONE: CPT

## 2021-10-04 PROCEDURE — 86850 RBC ANTIBODY SCREEN: CPT

## 2021-10-04 PROCEDURE — U0005: CPT

## 2021-10-04 PROCEDURE — 87186 SC STD MICRODIL/AGAR DIL: CPT

## 2021-10-04 PROCEDURE — 87102 FUNGUS ISOLATION CULTURE: CPT

## 2021-10-04 PROCEDURE — 99285 EMERGENCY DEPT VISIT HI MDM: CPT

## 2021-10-04 PROCEDURE — 85027 COMPLETE CBC AUTOMATED: CPT

## 2021-10-04 PROCEDURE — 87015 SPECIMEN INFECT AGNT CONCNTJ: CPT

## 2021-10-04 PROCEDURE — 93005 ELECTROCARDIOGRAM TRACING: CPT

## 2021-10-04 PROCEDURE — 80053 COMPREHEN METABOLIC PANEL: CPT

## 2021-10-04 PROCEDURE — 71045 X-RAY EXAM CHEST 1 VIEW: CPT

## 2021-10-04 PROCEDURE — 84132 ASSAY OF SERUM POTASSIUM: CPT

## 2021-10-04 PROCEDURE — P9045: CPT

## 2021-10-04 PROCEDURE — 86923 COMPATIBILITY TEST ELECTRIC: CPT

## 2021-10-04 PROCEDURE — 84439 ASSAY OF FREE THYROXINE: CPT

## 2021-10-04 PROCEDURE — 83735 ASSAY OF MAGNESIUM: CPT

## 2021-10-04 PROCEDURE — 87075 CULTR BACTERIA EXCEPT BLOOD: CPT

## 2021-10-04 PROCEDURE — 81003 URINALYSIS AUTO W/O SCOPE: CPT

## 2021-10-04 PROCEDURE — 84100 ASSAY OF PHOSPHORUS: CPT

## 2021-10-04 PROCEDURE — 82803 BLOOD GASES ANY COMBINATION: CPT

## 2021-10-04 PROCEDURE — 86900 BLOOD TYPING SEROLOGIC ABO: CPT

## 2021-10-04 PROCEDURE — 36415 COLL VENOUS BLD VENIPUNCTURE: CPT

## 2021-10-04 PROCEDURE — 86376 MICROSOMAL ANTIBODY EACH: CPT

## 2021-10-04 RX ORDER — ACETAMINOPHEN 500 MG
2 TABLET ORAL
Qty: 56 | Refills: 0
Start: 2021-10-04 | End: 2021-10-10

## 2021-10-04 RX ORDER — FUROSEMIDE 40 MG
1 TABLET ORAL
Qty: 7 | Refills: 0
Start: 2021-10-04 | End: 2021-10-10

## 2021-10-04 RX ORDER — LEVOTHYROXINE SODIUM 125 MCG
1 TABLET ORAL
Qty: 30 | Refills: 0
Start: 2021-10-04 | End: 2021-11-03

## 2021-10-04 RX ORDER — POLYETHYLENE GLYCOL 3350 17 G/17G
17 POWDER, FOR SOLUTION ORAL
Qty: 85 | Refills: 0
Start: 2021-10-04 | End: 2021-10-08

## 2021-10-04 RX ORDER — POLYETHYLENE GLYCOL 3350 17 G/17G
17 POWDER, FOR SOLUTION ORAL
Qty: 85 | Refills: 0
Start: 2021-10-04 | End: 2021-10-09

## 2021-10-04 RX ORDER — SENNA PLUS 8.6 MG/1
2 TABLET ORAL
Qty: 28 | Refills: 0
Start: 2021-10-04 | End: 2021-10-18

## 2021-10-04 RX ORDER — AMIODARONE HYDROCHLORIDE 400 MG/1
1 TABLET ORAL
Qty: 30 | Refills: 0
Start: 2021-10-04 | End: 2021-11-03

## 2021-10-04 RX ORDER — FUROSEMIDE 40 MG
1 TABLET ORAL
Qty: 7 | Refills: 0
Start: 2021-10-04 | End: 2021-10-11

## 2021-10-04 RX ORDER — AMIODARONE HYDROCHLORIDE 400 MG/1
1 TABLET ORAL
Qty: 30 | Refills: 0
Start: 2021-10-04 | End: 2021-11-02

## 2021-10-04 RX ORDER — ASPIRIN/CALCIUM CARB/MAGNESIUM 324 MG
1 TABLET ORAL
Qty: 30 | Refills: 0
Start: 2021-10-04 | End: 2021-11-03

## 2021-10-04 RX ORDER — PANTOPRAZOLE SODIUM 20 MG/1
1 TABLET, DELAYED RELEASE ORAL
Qty: 30 | Refills: 0
Start: 2021-10-04 | End: 2021-11-03

## 2021-10-04 RX ORDER — SENNA PLUS 8.6 MG/1
2 TABLET ORAL
Qty: 28 | Refills: 0
Start: 2021-10-04 | End: 2021-10-17

## 2021-10-04 RX ORDER — POTASSIUM CHLORIDE 20 MEQ
1 PACKET (EA) ORAL
Qty: 7 | Refills: 0
Start: 2021-10-04 | End: 2021-10-11

## 2021-10-04 RX ORDER — ATORVASTATIN CALCIUM 80 MG/1
1 TABLET, FILM COATED ORAL
Qty: 30 | Refills: 0
Start: 2021-10-04 | End: 2021-11-03

## 2021-10-04 RX ORDER — LEVOTHYROXINE SODIUM 125 MCG
1 TABLET ORAL
Qty: 30 | Refills: 0
Start: 2021-10-04 | End: 2021-11-02

## 2021-10-04 RX ORDER — ASPIRIN/CALCIUM CARB/MAGNESIUM 324 MG
1 TABLET ORAL
Qty: 30 | Refills: 0
Start: 2021-10-04 | End: 2021-11-02

## 2021-10-04 RX ORDER — PANTOPRAZOLE SODIUM 20 MG/1
1 TABLET, DELAYED RELEASE ORAL
Qty: 30 | Refills: 0
Start: 2021-10-04 | End: 2021-11-02

## 2021-10-04 RX ORDER — POTASSIUM CHLORIDE 20 MEQ
1 PACKET (EA) ORAL
Qty: 7 | Refills: 0
Start: 2021-10-04 | End: 2021-10-10

## 2021-10-04 RX ORDER — ATORVASTATIN CALCIUM 80 MG/1
1 TABLET, FILM COATED ORAL
Qty: 30 | Refills: 0
Start: 2021-10-04 | End: 2021-11-02

## 2021-10-04 RX ADMIN — Medication 20 MILLIEQUIVALENT(S): at 11:06

## 2021-10-04 RX ADMIN — AMIODARONE HYDROCHLORIDE 200 MILLIGRAM(S): 400 TABLET ORAL at 05:48

## 2021-10-04 RX ADMIN — Medication 40 MILLIGRAM(S): at 05:48

## 2021-10-04 RX ADMIN — Medication 81 MILLIGRAM(S): at 11:06

## 2021-10-04 RX ADMIN — PANTOPRAZOLE SODIUM 40 MILLIGRAM(S): 20 TABLET, DELAYED RELEASE ORAL at 05:51

## 2021-10-04 RX ADMIN — Medication 50 MICROGRAM(S): at 05:49

## 2021-10-04 RX ADMIN — SODIUM CHLORIDE 3 MILLILITER(S): 9 INJECTION INTRAMUSCULAR; INTRAVENOUS; SUBCUTANEOUS at 06:24

## 2021-10-04 RX ADMIN — OXYCODONE AND ACETAMINOPHEN 2 TABLET(S): 5; 325 TABLET ORAL at 09:40

## 2021-10-04 NOTE — PROGRESS NOTE ADULT - SUBJECTIVE AND OBJECTIVE BOX
SUBJECTIVE:  Patient was seen and examined on rounds this morning by the Plastic Surgery team. No overnight events. Doing well. Feels ready to go home.    OBJECTIVE:     VITAL SIGNS / I&O's    Vital Signs Last 24 Hrs  T(C): 36.4 (04 Oct 2021 05:00), Max: 36.7 (03 Oct 2021 14:00)  T(F): 97.6 (04 Oct 2021 05:00), Max: 98 (03 Oct 2021 14:00)  HR: 90 (04 Oct 2021 05:15) (63 - 95)  BP: 128/80 (04 Oct 2021 05:15) (107/51 - 142/65)  BP(mean): 99 (04 Oct 2021 05:15) (72 - 99)  RR: 19 (04 Oct 2021 05:15) (13 - 20)  SpO2: 96% (04 Oct 2021 05:15) (95% - 100%)      03 Oct 2021 07:01  -  04 Oct 2021 07:00  --------------------------------------------------------  IN:    Oral Fluid: 690 mL  Total IN: 690 mL    OUT:    Bulb (mL): 20 mL    Bulb (mL): 20 mL    Bulb (mL): 20 mL    VAC (Vacuum Assisted Closure) System (mL): 0 mL    Voided (mL): 1150 mL  Total OUT: 1210 mL    Total NET: -520 mL    PHYSICAL EXAM     Physical Exam:  General: Awake and alert, NAD  Chest: Drains serosanguinous, some minimal leakage from a 19F drain site. Incision c/d/i, stapled, open to air    LABS                        9.0    8.45  )-----------( 187      ( 04 Oct 2021 06:10 )             28.3     04 Oct 2021 06:10    138    |  101    |  24     ----------------------------<  96     4.5     |  27     |  1.18     Ca    10.1       04 Oct 2021 06:10  Mg     2.2       03 Oct 2021 06:55      PT/INR - ( 03 Oct 2021 06:55 )   PT: 12.4 sec;   INR: 1.04          PTT - ( 03 Oct 2021 06:55 )  PTT:34.7 sec    MEDICATIONS  (STANDING):  aMIOdarone    Tablet 200 milliGRAM(s) Oral daily  aspirin enteric coated 81 milliGRAM(s) Oral daily  atorvastatin 10 milliGRAM(s) Oral at bedtime  cefTRIAXone   IVPB 2000 milliGRAM(s) IV Intermittent every 24 hours  chlorhexidine 2% Cloths 1 Application(s) Topical daily  furosemide    Tablet 40 milliGRAM(s) Oral daily  heparin   Injectable 5000 Unit(s) SubCutaneous every 8 hours  levothyroxine 50 MICROGram(s) Oral daily  pantoprazole    Tablet 40 milliGRAM(s) Oral before breakfast  polyethylene glycol 3350 17 Gram(s) Oral daily  potassium chloride    Tablet ER 20 milliEquivalent(s) Oral daily  senna 2 Tablet(s) Oral at bedtime  sodium chloride 0.9% lock flush 3 milliLiter(s) IV Push every 8 hours    MEDICATIONS  (PRN):  acetaminophen   Tablet .. 650 milliGRAM(s) Oral every 6 hours PRN mild pain  oxycodone    5 mG/acetaminophen 325 mG 1 Tablet(s) Oral every 6 hours PRN Moderate Pain (4 - 6)  oxycodone    5 mG/acetaminophen 325 mG 2 Tablet(s) Oral every 6 hours PRN Severe Pain (7 - 10)  simethicone 80 milliGRAM(s) Chew every 8 hours PRN Gas

## 2021-10-04 NOTE — DISCHARGE NOTE NURSING/CASE MANAGEMENT/SOCIAL WORK - PATIENT PORTAL LINK FT
You can access the FollowMyHealth Patient Portal offered by [facility] by registering at the following website: [email or web address]. By joining [facility]’s FollowMyHealth portal, you will also be able to view your health information using other applications (apps) compatible with our system.

## 2021-10-04 NOTE — PROGRESS NOTE ADULT - REASON FOR ADMISSION
Poor wound healing

## 2021-10-04 NOTE — PROGRESS NOTE ADULT - ASSESSMENT
76 year old F (former smoker) with PMHx of HTN, Spinal Stenosis, Arthritis, Bicuspid aortic valve with known aortic stenosis. S/p AVR, ascending/hemiarch replacement with frozen elephant trunk and left aorto-subclavian bypass, CABG x2 with Dr. Muller on 8/9/2021. Postoperative course c/b sternal wound growing K. oxytoca/raoutella ornithinolytica, completed course of ceftriaxone for SSI. Readmitted 9/28 for poor wound healing is s/p wound debridement and removal of sternal hardware with pectoralis flap advancement on 9/29 with plastic surgery.  Per primary team, wound did not appear infected at the time - images reviewed and we are in agreement. Sternal wire culture remains neg, tissue culture with Staph epi and Candida which are skin joselin, and would not recommend targeting these. She is clinically improved with no s/s of infection.     Recommendations:   - D/c ceftriaxone   - Can d/c patient off of abx     Discussed with primary team.     ID Team 1 will sign off. Please re-consult as needed with any questions.     Thank you for allowing us to participate in the care of this patient.

## 2021-10-04 NOTE — DISCHARGE NOTE PROVIDER - NSDCFUADDAPPT_GEN_ALL_CORE_FT
-Please follow up with Dr. Muller on 10/12/21 at 2:30pm.  The office is located at [facility], [facility], 4th floor. Call us with any questions, #[phone].  -Please follow up with Dr. Burks (plastic surgeon) in 1-2 weeks. The office will call you with an appointment. [address] [place], [place] [address] [phone]    -Walk daily as tolerated and use your incentive spirometer every hour.    -No driving or strenuous activity/exercise for 6 weeks, or until cleared by your surgeon.    -You may shower.  Be sure to gently clean your incisions with anti-bacterial soap and water daily, pat dry.  You may leave them open to air.    -Call your doctor if you have shortness of breath, chest pain not relieved by pain medication, dizziness, fever >101.5, or increased redness or drainage from incisions.

## 2021-10-04 NOTE — DISCHARGE NOTE PROVIDER - NSDCCPTREATMENT_GEN_ALL_CORE_FT
PRINCIPAL PROCEDURE  Procedure: Debridement of sternum with pectoralis muscle flap  Findings and Treatment:

## 2021-10-04 NOTE — DISCHARGE NOTE PROVIDER - HOSPITAL COURSE
76 year old F, current everyday smoker with PMHx of HTN, Spinal Stenosis, Arthritis, Bicuspid aortic valve with known aortic stenosis evaluated by Dr. Muller as an outpatient and presented to [facility] on 8/8/2021 to complete preoperative workup. Pt underwent AVR, ascending/hemiarch replacement with frozen elephant trunk and left aorto-subclavian bypass, CABG x2 with Dr. Muller on 8/9/2021. Post-op course complicated by prolonged intubation, moderate pericardial effusion drained by IR on POD7, Sternal wound erythema s/p sternal debridement and wound vac placement (cultures growing Klebsiella) on POD 17, multiple episodes of AF with RVR/hypotension requiring amiodarone with subsequent bradycardia requiring PPM on POD 23 and FIDEL, resolved with hydration. Pt was discharged home on 9/3/21 but represented to the hospital on 9/4/21 due to failure to thrive at home and volume overload. As per Dr. Muller the patient was stable and ready for discharge to [facility] on 9/9/21. The patient was discharged home from rehab on 9/24. She states she was doing well overall but when nurse came to change her wound vac she sent a photograph to Dr. Muller and was sternal wound noted to have poor wound healing. She was instructed to report to the ED for evaluation for intervention. 9/29/21 underwent bilateral muscle flap advancement with plastic surgery. POD1, recovering well, passed TOV. POD2 increased Cr, 1 to 1.5, increased IV hydration. POD3 Cr trending down, monitor daily. POD4 Cr continues to improve. Wound vac removed by plastics and JUSTIN drains being managed by plastics team. Today POD5, patient feels well, offers no acute complaints. She is ambuluating, and tolerating PO Diet. She only wants to go home. Per Dr. Muller, patient is ready for discharge and will have close follow up with plastic surgery. Per ID, no abx are needed at this time and cleared from plastics for discharge. Educated provided by plastics team as patient discharged with 3 JUSTIN drains.   35 minutes was spent with the patient reviewing the discharge material including medications, follow up appointments, recovery, concerning symptoms, and how to contact their health care providers if they have questions.

## 2021-10-04 NOTE — DISCHARGE NOTE PROVIDER - NSDCFUSCHEDAPPT_GEN_ALL_CORE_FT
LYUBOV BAHENA ; 10/07/2021 ; NPP Cardio Vasc [address]  LYUBOV BAHENA ; 10/28/2021 ; NPP Cardio [address]  LYUBOV BAHENA ; 11/17/2021 ; NPP CT Surg [address]

## 2021-10-04 NOTE — DISCHARGE NOTE PROVIDER - DETAILS OF MALNUTRITION DIAGNOSIS/DIAGNOSES
This patient has been assessed with a concern for Malnutrition and was treated during this hospitalization for the following Nutrition diagnosis/diagnoses:     -  10/01/2021: Mild protein-calorie malnutrition

## 2021-10-04 NOTE — PROGRESS NOTE ADULT - SUBJECTIVE AND OBJECTIVE BOX
**Incomplete Note** INFECTIOUS DISEASES FOLLOW UP    This is a follow up note for this  76yFemale with surgical wound.     update/ S: Patient feels well today without any active complaints. Has some pain around surgical incision areas but otherwise feels well.     ROS:  negative except as above    Allergies    No Known Allergies    Intolerances        ANTIBIOTICS/RELEVANT:  antimicrobials  cefTRIAXone   IVPB 2000 milliGRAM(s) IV Intermittent every 24 hours    immunologic:    OTHER:  acetaminophen   Tablet .. 650 milliGRAM(s) Oral every 6 hours PRN  aMIOdarone    Tablet 200 milliGRAM(s) Oral daily  aspirin enteric coated 81 milliGRAM(s) Oral daily  atorvastatin 10 milliGRAM(s) Oral at bedtime  chlorhexidine 2% Cloths 1 Application(s) Topical daily  furosemide    Tablet 40 milliGRAM(s) Oral daily  heparin   Injectable 5000 Unit(s) SubCutaneous every 8 hours  levothyroxine 50 MICROGram(s) Oral daily  oxycodone    5 mG/acetaminophen 325 mG 1 Tablet(s) Oral every 6 hours PRN  oxycodone    5 mG/acetaminophen 325 mG 2 Tablet(s) Oral every 6 hours PRN  pantoprazole    Tablet 40 milliGRAM(s) Oral before breakfast  polyethylene glycol 3350 17 Gram(s) Oral daily  potassium chloride    Tablet ER 20 milliEquivalent(s) Oral daily  senna 2 Tablet(s) Oral at bedtime  simethicone 80 milliGRAM(s) Chew every 8 hours PRN  sodium chloride 0.9% lock flush 3 milliLiter(s) IV Push every 8 hours      Objective:  Vital Signs Last 24 Hrs  T(C): 36.1 (04 Oct 2021 09:41), Max: 36.4 (04 Oct 2021 00:57)  T(F): 97 (04 Oct 2021 09:41), Max: 97.6 (04 Oct 2021 00:57)  HR: 72 (04 Oct 2021 12:00) (70 - 95)  BP: 114/70 (04 Oct 2021 12:00) (114/70 - 141/66)  BP(mean): 99 (04 Oct 2021 05:15) (95 - 99)  RR: 20 (04 Oct 2021 12:00) (18 - 20)  SpO2: 98% (04 Oct 2021 12:00) (96% - 98%)    PHYSICAL EXAM:  Constitutional:  Well-developed, well nourished, sitting in chair  Eyes:  Sclerae anicteric, conjunctivae clear, PERRL  Ear/Nose/Throat:  No nasal exudate or sinus tenderness;  No buccal mucosal lesions, no pharyngeal erythema or exudate	  Neck:  Supple, no adenopathy  Respiratory:  Clear bilaterally  Chest:  VAC dressing on sternal incision;  subxihoid drains X 3 with serosanguinous drainage  Cardiovascular:  RRR, S1S2, no murmur appreciated  Gastrointestinal:  Symmetric, normoactive BS, soft, NT, no masses, guarding or rebound.  No HSM  Extremities:  No edema    LABS:                        9.0    8.45  )-----------( 187      ( 04 Oct 2021 06:10 )             28.3     10-04    138  |  101  |  24<H>  ----------------------------<  96  4.5   |  27  |  1.18    Ca    10.1      04 Oct 2021 06:10  Mg     2.2     10-03      PT/INR - ( 03 Oct 2021 06:55 )   PT: 12.4 sec;   INR: 1.04          PTT - ( 03 Oct 2021 06:55 )  PTT:34.7 sec      MICROBIOLOGY:            RECENT CULTURES:  09-30 @ 00:49  .Tissue Anterior Sternal Wound  --  --  --    Testing in progress  --  09-29 @ 17:02  .Tissue Anterior Sternal Wound  Staphylococcus epidermidis  Staphylococcus epidermidis  SIHLOH    Rare Staphylococcus epidermidis  Rare Candida albicans  --  09-29 @ 17:00  .Surgical Swab Sternal wire cx  --  --  --    Growth in fluid media only Staphylococcus epidermidis  Growth in fluid media only Candida species  --      RADIOLOGY & ADDITIONAL STUDIES:

## 2021-10-04 NOTE — PROGRESS NOTE ADULT - SUBJECTIVE AND OBJECTIVE BOX
Patient discussed on morning rounds with Dr. Muller    Operation / Date:   8/9/21 - Yohana - AVR, ascending/hemiarch replacement with frozen elephant trunk and left aorto-subclavian bypass, CABG x2   9/29/21 - Vitaliy - Sternal wound debridement, bilateral muscle flaps, incisional WV    Surgeon: Dr. Muller     Referring Physician: n/a    SUBJECTIVE ASSESSMENT:  76y Female seen and examined. Patient feels well, insisting on going home, offers no acute complaints. Ambulating on RA, using IS, tolerating PO Diet, + BM. Denies fever, chest pain, palpitations, SOB, abdominal pain, n/v.     Hospital Course:  76 year old F, current everyday smoker with PMHx of HTN, Spinal Stenosis, Arthritis, Bicuspid aortic valve with known aortic stenosis evaluated by Dr. Muller as an outpatient and presented to [facility] on 8/8/2021 to complete preoperative workup. Pt underwent AVR, ascending/hemiarch replacement with frozen elephant trunk and left aorto-subclavian bypass, CABG x2 with Dr. Muller on 8/9/2021. Post-op course complicated by prolonged intubation, moderate pericardial effusion drained by IR on POD7, Sternal wound erythema s/p sternal debridement and wound vac placement (cultures growing Klebsiella) on POD 17, multiple episodes of AF with RVR/hypotension requiring amiodarone with subsequent bradycardia requiring PPM on POD 23 and FIDEL, resolved with hydration. Pt was discharged home on 9/3/21 but represented to the hospital on 9/4/21 due to failure to thrive at home and volume overload. As per Dr. Muller the patient was stable and ready for discharge to [facility] on 9/9/21. The patient was discharged home from rehab on 9/24. She states she was doing well overall but when nurse came to change her wound vac she sent a photograph to Dr. Muller and was sternal wound noted to have poor wound healing. She was instructed to report to the ED for evaluation for intervention. 9/29/21 underwent bilateral muscle flap advancement with plastic surgery. POD1, recovering well, passed TOV. POD2 increased Cr, 1 to 1.5, increased IV hydration. POD3 Cr trending down, monitor daily. POD4 Cr continues to improve. Wound vac removed by plastics and JUSTIN drains being managed by plastics team. Today POD5, patient feels well, offers no acute complaints. She is ambulating and tolerating PO Diet. She only wants to go home. Per Dr. Muller, patient is ready for discharge and will have close follow up with plastic surgery. Per ID, no abx are needed at this time and cleared from plastics for discharge. Educated provided by plastics team as patient discharged with 3 JUSTIN drains.   35 minutes was spent with the patient reviewing the discharge material including medications, follow up appointments, recovery, concerning symptoms, and how to contact their health care providers if they have questions.     Vital Signs Last 24 Hrs  T(C): 36.1 (04 Oct 2021 09:41), Max: 36.7 (03 Oct 2021 14:00)  T(F): 97 (04 Oct 2021 09:41), Max: 98 (03 Oct 2021 14:00)  HR: 90 (04 Oct 2021 05:15) (63 - 95)  BP: 128/80 (04 Oct 2021 05:15) (107/51 - 142/65)  BP(mean): 99 (04 Oct 2021 05:15) (72 - 99)  RR: 19 (04 Oct 2021 05:15) (13 - 20)  SpO2: 96% (04 Oct 2021 05:15) (96% - 100%)  I&O's Detail    03 Oct 2021 07:01  -  04 Oct 2021 07:00  --------------------------------------------------------  IN:    Oral Fluid: 690 mL  Total IN: 690 mL    OUT:    Bulb (mL): 20 mL    Bulb (mL): 20 mL    Bulb (mL): 20 mL    VAC (Vacuum Assisted Closure) System (mL): 0 mL    Voided (mL): 1150 mL  Total OUT: 1210 mL    Total NET: -520 mL        PHYSICAL EXAM:    General: Patient lying comfortably in bed, no acute distress     Neurological: Alert and oriented. No focal neurological deficits     Cardiovascular: S1S2, RRR, no murmurs appreciated on exam     Respiratory: Clear to ausculation bilaterally, no wheeze/rhonchi/rales    Gastrointestinal: + BS, soft, non tender, non distended     Extremities: Warm and well perfused. 1+ b/l LE edema, no calf tenderness     Vascular: palpable peripheral pulses b/l     Incision Sites: MSI; + staples, clean, no drainage. JUSTIN; CDI.     LABS:                        9.0    8.45  )-----------( 187      ( 04 Oct 2021 06:10 )             28.3       COUMADIN: NO    PT/INR - ( 03 Oct 2021 06:55 )   PT: 12.4 sec;   INR: 1.04          PTT - ( 03 Oct 2021 06:55 )  PTT:34.7 sec    10-04    138  |  101  |  24<H>  ----------------------------<  96  4.5   |  27  |  1.18    Ca    10.1      04 Oct 2021 06:10  Mg     2.2     10-03            MEDICATIONS  (STANDING):  aMIOdarone    Tablet 200 milliGRAM(s) Oral daily  aspirin enteric coated 81 milliGRAM(s) Oral daily  atorvastatin 10 milliGRAM(s) Oral at bedtime  cefTRIAXone   IVPB 2000 milliGRAM(s) IV Intermittent every 24 hours  chlorhexidine 2% Cloths 1 Application(s) Topical daily  furosemide    Tablet 40 milliGRAM(s) Oral daily  heparin   Injectable 5000 Unit(s) SubCutaneous every 8 hours  levothyroxine 50 MICROGram(s) Oral daily  pantoprazole    Tablet 40 milliGRAM(s) Oral before breakfast  polyethylene glycol 3350 17 Gram(s) Oral daily  potassium chloride    Tablet ER 20 milliEquivalent(s) Oral daily  senna 2 Tablet(s) Oral at bedtime  sodium chloride 0.9% lock flush 3 milliLiter(s) IV Push every 8 hours      Discharge CXR:    Discharge ECHO:

## 2021-10-04 NOTE — DISCHARGE NOTE PROVIDER - NSDCACTIVITY_GEN_ALL_CORE
Do not drive or operate machinery/Stairs allowed/Walking - Indoors allowed/Walking - Outdoors allowed/Follow Instructions Provided by your Surgical Team

## 2021-10-04 NOTE — DISCHARGE NOTE NURSING/CASE MANAGEMENT/SOCIAL WORK - NSDCFUADDAPPT_GEN_ALL_CORE_FT
-Please follow up with Dr. Muller on 10/12/21 at 2:30pm.  The office is located at [facility], 4th floor. Call us with any questions, #[phone].  -Please follow up with Dr. Burks (plastic surgeon) in 1-2 weeks. The office will call you with an appointment. [address] [place], [place] [address] [phone]    -Walk daily as tolerated and use your incentive spirometer every hour.    -No driving or strenuous activity/exercise for 6 weeks, or until cleared by your surgeon.    -You may shower.  Be sure to gently clean your incisions with anti-bacterial soap and water daily, pat dry.  You may leave them open to air.    -Call your doctor if you have shortness of breath, chest pain not relieved by pain medication, dizziness, fever >101.5, or increased redness or drainage from incisions.

## 2021-10-04 NOTE — PROGRESS NOTE ADULT - NUTRITIONAL ASSESSMENT
This patient has been assessed with a concern for Malnutrition and has been determined to have a diagnosis/diagnoses of Mild protein-calorie malnutrition.    This patient is being managed with:   Diet DASH/TLC-  Sodium & Cholesterol Restricted  Supplement Feeding Modality:  Oral  Ensure Enlive Cans or Servings Per Day:  1       Frequency:  Two Times a day  Entered: Oct  1 2021  1:10PM    Diet DASH/TLC-  Sodium & Cholesterol Restricted  Entered: Sep 29 2021  6:24PM    The following pending diet order is being considered for treatment of Mild protein-calorie malnutrition:null

## 2021-10-04 NOTE — PROGRESS NOTE ADULT - ATTENDING COMMENTS
OR cultures have grown Staph epi and Candida, which are skin joselin.  Wound appeared uninfected clinically.  She is ready for d/c.  Would d/c ceftriaxone at this time.  Please recall if further ID input is desired - team 1.
As above.  Would continue ceftriaxone while OR culture is pending.  Will follow with you - team 1.

## 2021-10-04 NOTE — PROGRESS NOTE ADULT - PROVIDER SPECIALTY LIST ADULT
CT Surgery
CT Surgery
Plastic Surgery
Plastic Surgery
CT Surgery
Infectious Disease
Infectious Disease
Plastic Surgery
Infectious Disease
Plastic Surgery
CT Surgery

## 2021-10-04 NOTE — DISCHARGE NOTE PROVIDER - NSDCCPCAREPLAN_GEN_ALL_CORE_FT
PRINCIPAL DISCHARGE DIAGNOSIS  Diagnosis: Surgical wound present  Assessment and Plan of Treatment:

## 2021-10-04 NOTE — DISCHARGE NOTE PROVIDER - NSDCMRMEDTOKEN_GEN_ALL_CORE_FT
acetaminophen 325 mg oral tablet: 2 tab(s) orally every 6 hours, As needed, mild pain  amiodarone 200 mg oral tablet: 1 tab(s) orally once a day  Aspirin Enteric Coated 81 mg oral delayed release tablet: 1 tab(s) orally once a day   atorvastatin 10 mg oral tablet: 1 tab(s) orally once a day (at bedtime)  furosemide 40 mg oral tablet: 1 tab(s) orally once a day  levothyroxine 50 mcg (0.05 mg) oral tablet: 1 tab(s) orally once a day  oxycodone-acetaminophen 5 mg-325 mg oral tablet: 1 tab(s) orally every 6 hours, As needed, severe Pain MDD:4 tabs  pantoprazole 40 mg oral delayed release tablet: 1 tab(s) orally once a day (before a meal)  polyethylene glycol 3350 oral powder for reconstitution: 17 gram(s) orally once a day  potassium chloride 20 mEq oral tablet, extended release: 1 tab(s) orally once a day  senna oral tablet: 2 tab(s) orally once a day (at bedtime)

## 2021-10-04 NOTE — PROGRESS NOTE ADULT - ASSESSMENT
ASSESSMENT & PLAN    76F s/p debridement of sternal wound, removal of wires, bilateral pec adv. Doing well.    -cont bra except when showering  -remove JPs? (pending)  -following cultures and appreciate ID recommendations  -continue drains, will monitor output  -ok to dc from plastics perspective ASSESSMENT & PLAN    76F s/p debridement of sternal wound, removal of wires, bilateral pec adv. Doing well.    -cont bra except when showering  -JPs to stay in place for discharge  -following cultures and appreciate ID recommendations  -continue drains, will monitor output  -ok to dc from plastics perspective

## 2021-10-05 ENCOUNTER — NON-APPOINTMENT (OUTPATIENT)
Age: 76
End: 2021-10-05

## 2021-10-05 LAB
-  CEFAZOLIN: SIGNIFICANT CHANGE UP
-  CLINDAMYCIN: SIGNIFICANT CHANGE UP
-  ERYTHROMYCIN: SIGNIFICANT CHANGE UP
-  LINEZOLID: SIGNIFICANT CHANGE UP
-  OXACILLIN: SIGNIFICANT CHANGE UP
-  RIFAMPIN: SIGNIFICANT CHANGE UP
-  TRIMETHOPRIM/SULFAMETHOXAZOLE: SIGNIFICANT CHANGE UP
-  VANCOMYCIN: SIGNIFICANT CHANGE UP
CULTURE RESULTS: SIGNIFICANT CHANGE UP
METHOD TYPE: SIGNIFICANT CHANGE UP
ORGANISM # SPEC MICROSCOPIC CNT: SIGNIFICANT CHANGE UP
ORGANISM # SPEC MICROSCOPIC CNT: SIGNIFICANT CHANGE UP
SPECIMEN SOURCE: SIGNIFICANT CHANGE UP

## 2021-10-05 RX ORDER — ACETAMINOPHEN 500 MG
2 TABLET ORAL
Qty: 56 | Refills: 0
Start: 2021-10-05 | End: 2021-10-11

## 2021-10-07 ENCOUNTER — APPOINTMENT (OUTPATIENT)
Dept: HEART AND VASCULAR | Facility: CLINIC | Age: 76
End: 2021-10-07
Payer: COMMERCIAL

## 2021-10-07 VITALS
HEIGHT: 60 IN | SYSTOLIC BLOOD PRESSURE: 106 MMHG | WEIGHT: 160 LBS | BODY MASS INDEX: 31.41 KG/M2 | TEMPERATURE: 97.4 F | HEART RATE: 60 BPM | DIASTOLIC BLOOD PRESSURE: 64 MMHG

## 2021-10-07 PROCEDURE — 93279 PRGRMG DEV EVAL PM/LDLS PM: CPT

## 2021-10-08 ENCOUNTER — TRANSCRIPTION ENCOUNTER (OUTPATIENT)
Age: 76
End: 2021-10-08
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
## 2021-10-08 DIAGNOSIS — Z82.49 FAMILY HISTORY OF ISCHEMIC HEART DISEASE AND OTHER DISEASES OF THE CIRCULATORY SYSTEM: ICD-10-CM

## 2021-10-08 DIAGNOSIS — Z95.2 PRESENCE OF PROSTHETIC HEART VALVE: ICD-10-CM

## 2021-10-08 DIAGNOSIS — Z83.3 FAMILY HISTORY OF DIABETES MELLITUS: ICD-10-CM

## 2021-10-08 DIAGNOSIS — E44.1 MILD PROTEIN-CALORIE MALNUTRITION: ICD-10-CM

## 2021-10-08 DIAGNOSIS — M48.00 SPINAL STENOSIS, SITE UNSPECIFIED: ICD-10-CM
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
## 2021-10-08 DIAGNOSIS — I10 ESSENTIAL (PRIMARY) HYPERTENSION: ICD-10-CM

## 2021-10-08 DIAGNOSIS — Z84.1 FAMILY HISTORY OF DISORDERS OF KIDNEY AND URETER: ICD-10-CM

## 2021-10-08 DIAGNOSIS — I25.10 ATHEROSCLEROTIC HEART DISEASE OF NATIVE CORONARY ARTERY WITHOUT ANGINA PECTORIS: ICD-10-CM

## 2021-10-08 DIAGNOSIS — E03.9 HYPOTHYROIDISM, UNSPECIFIED: ICD-10-CM

## 2021-10-08 DIAGNOSIS — T81.32XA DISRUPTION OF INTERNAL OPERATION (SURGICAL) WOUND, NOT ELSEWHERE CLASSIFIED, INITIAL ENCOUNTER: ICD-10-CM

## 2021-10-08 DIAGNOSIS — F17.200 NICOTINE DEPENDENCE, UNSPECIFIED, UNCOMPLICATED: ICD-10-CM

## 2021-10-08 DIAGNOSIS — Z79.82 LONG TERM (CURRENT) USE OF ASPIRIN: ICD-10-CM

## 2021-10-08 DIAGNOSIS — I49.5 SICK SINUS SYNDROME: ICD-10-CM

## 2021-10-08 DIAGNOSIS — Z95.0 PRESENCE OF CARDIAC PACEMAKER: ICD-10-CM

## 2021-10-08 DIAGNOSIS — Z95.1 PRESENCE OF AORTOCORONARY BYPASS GRAFT: ICD-10-CM

## 2021-10-08 DIAGNOSIS — M96.89 OTHER INTRAOPERATIVE AND POSTPROCEDURAL COMPLICATIONS AND DISORDERS OF THE MUSCULOSKELETAL SYSTEM: ICD-10-CM

## 2021-10-08 DIAGNOSIS — M19.90 UNSPECIFIED OSTEOARTHRITIS, UNSPECIFIED SITE: ICD-10-CM

## 2021-10-08 DIAGNOSIS — I48.91 UNSPECIFIED ATRIAL FIBRILLATION: ICD-10-CM

## 2021-10-14 NOTE — PHYSICAL EXAM
[General Appearance - Well Developed] : well developed [Normal Appearance] : normal appearance [Well Groomed] : well groomed [No Deformities] : no deformities [General Appearance - Well Nourished] : well nourished [General Appearance - In No Acute Distress] : no acute distress [Heart Rate And Rhythm] : heart rate and rhythm were normal [Heart Sounds] : normal S1 and S2 [] : no respiratory distress [Respiration, Rhythm And Depth] : normal respiratory rhythm and effort [Exaggerated Use Of Accessory Muscles For Inspiration] : no accessory muscle use [Auscultation Breath Sounds / Voice Sounds] : lungs were clear to auscultation bilaterally [FreeTextEntry2] : groin with no hematoma

## 2021-10-14 NOTE — REVIEW OF SYSTEMS
[Negative] : Heme/Lymph [Fever] : no fever [Chills] : no chills [SOB] : no shortness of breath [Dyspnea on exertion] : not dyspnea during exertion [Palpitations] : no palpitations [Syncope] : no syncope

## 2021-10-14 NOTE — ADDENDUM
[FreeTextEntry1] : I, Parvez Khanna, was present for the entire visit including the history taking, exam, device interrogation, and extensive discussion of further management with the patient and her daughter. All questions answered. I agree with the note written by my PA, Ms. Soriano, in its entirety.

## 2021-10-14 NOTE — DISCUSSION/SUMMARY
[Pacemaker Function Normal] : normal pacemaker function [FreeTextEntry1] : 76 year old female with HTN, bicuspid aortic valve with severe aortic stenosis s/p AVR, ascending/hemiarch replacement, L aorto-subclavian bypass and CABG x2 8/2021 with post operative atrial fibrillation with pauses requiring epicardial pacing with wound debridement s/p MICRA, who presents for follow up. Groin well healed.  Interrogation reveals normal function and all measured data is within normal limits.  Will likely stop amiodarone on her next visit and defer oral anticoagulation to her surgeon.  She will follow up in 3 months or sooner if needed and knows to call with any questions or concerns

## 2021-10-14 NOTE — HISTORY OF PRESENT ILLNESS
[de-identified] : 76 year old female with HTN, bicuspid aortic valve with severe aortic stenosis s/p AVR, ascending/hemiarch replacement, L aorto-subclavian bypass and CABG x2 8/2021 with post operative atrial fibrillation with pauses requiring epicardial pacing with wound debridement s/p MICRA, who presents for follow up.\par \par Since discharge from the hospital she has been doing progressively better.  SOme fatigue but it is improving.  No issues by groin site.  No palpitations, chest pain, SOB or syncope.  She is on amiodarone but not on AC as per CT surgery discharge.

## 2021-10-14 NOTE — PROCEDURE
[No] : not [NSR] : normal sinus rhythm [Pacemaker] : pacemaker [Lead Imp:  ___ohms] : lead impedance was [unfilled] ohms [Sensing Amplitude ___mv] : sensing amplitude was [unfilled] mv [___V @] : [unfilled] V [___ ms] : [unfilled] ms [Pace ___ %] : Pace [unfilled]% [de-identified] : medtronic [de-identified] : GODWIN [de-identified] : 2021

## 2021-10-18 ENCOUNTER — APPOINTMENT (OUTPATIENT)
Dept: CARDIOTHORACIC SURGERY | Facility: CLINIC | Age: 76
End: 2021-10-18
Payer: COMMERCIAL

## 2021-10-18 ENCOUNTER — OUTPATIENT (OUTPATIENT)
Dept: OUTPATIENT SERVICES | Facility: HOSPITAL | Age: 76
LOS: 1 days | End: 2021-10-18
Payer: COMMERCIAL

## 2021-10-18 ENCOUNTER — APPOINTMENT (OUTPATIENT)
Dept: CARDIOTHORACIC SURGERY | Facility: HOSPITAL | Age: 76
End: 2021-10-18

## 2021-10-18 VITALS
HEART RATE: 64 BPM | OXYGEN SATURATION: 97 % | TEMPERATURE: 98 F | RESPIRATION RATE: 16 BRPM | SYSTOLIC BLOOD PRESSURE: 128 MMHG | DIASTOLIC BLOOD PRESSURE: 59 MMHG

## 2021-10-18 DIAGNOSIS — Z95.2 PRESENCE OF PROSTHETIC HEART VALVE: Chronic | ICD-10-CM

## 2021-10-18 DIAGNOSIS — Z95.1 PRESENCE OF AORTOCORONARY BYPASS GRAFT: Chronic | ICD-10-CM

## 2021-10-18 DIAGNOSIS — Z95.0 PRESENCE OF CARDIAC PACEMAKER: Chronic | ICD-10-CM

## 2021-10-18 DIAGNOSIS — Z95.828 PRESENCE OF OTHER VASCULAR IMPLANTS AND GRAFTS: Chronic | ICD-10-CM

## 2021-10-18 PROCEDURE — 99024 POSTOP FOLLOW-UP VISIT: CPT

## 2021-10-19 PROCEDURE — 71046 X-RAY EXAM CHEST 2 VIEWS: CPT

## 2021-10-19 PROCEDURE — 71046 X-RAY EXAM CHEST 2 VIEWS: CPT | Mod: 26

## 2021-10-28 ENCOUNTER — APPOINTMENT (OUTPATIENT)
Dept: CARDIOLOGY | Facility: CLINIC | Age: 76
End: 2021-10-28
Payer: COMMERCIAL

## 2021-10-28 VITALS
WEIGHT: 160 LBS | DIASTOLIC BLOOD PRESSURE: 80 MMHG | HEIGHT: 60 IN | HEART RATE: 60 BPM | BODY MASS INDEX: 31.41 KG/M2 | SYSTOLIC BLOOD PRESSURE: 146 MMHG | TEMPERATURE: 97.8 F

## 2021-10-28 DIAGNOSIS — T14.8XXA OTHER INJURY OF UNSPECIFIED BODY REGION, INITIAL ENCOUNTER: ICD-10-CM

## 2021-10-28 PROCEDURE — 99214 OFFICE O/P EST MOD 30 MIN: CPT

## 2021-10-28 PROCEDURE — 93000 ELECTROCARDIOGRAM COMPLETE: CPT

## 2021-10-28 RX ORDER — MULTIVIT-MIN/FOLIC/VIT K/LYCOP 400-300MCG
500 TABLET ORAL DAILY
Qty: 90 | Refills: 1 | Status: DISCONTINUED | COMMUNITY
Start: 2021-09-16 | End: 2021-10-28

## 2021-10-28 RX ORDER — CEFTRIAXONE 2 G/1
2 INJECTION, POWDER, FOR SOLUTION INTRAMUSCULAR; INTRAVENOUS
Refills: 0 | Status: DISCONTINUED | COMMUNITY
End: 2021-10-28

## 2021-10-28 NOTE — ASSESSMENT
[FreeTextEntry1] : S/P AVR, for  Severe AS, 2v CABG, ascending aortic hemiarch replacement left aorto subclavian bypass complicated by a sternal wound infection requiring skilled nursing facility care and rehabilitation at [facility]\par Abnormal Lexiscan nuclear test c/w myocardial ischemia\par Moderate  MR\par Moderate TR\par CAD as outlined in cardiac catheterization report\par Hypertensive heart disease\par Exertional dyspnea\par Cigarette smoker with possible COPD\par Hyperlipidemia\par Possible DM

## 2021-10-28 NOTE — PHYSICAL EXAM
[Well Developed] : well developed [Well Nourished] : well nourished [No Acute Distress] : no acute distress [Normal Venous Pressure] : normal venous pressure [No Carotid Bruit] : no carotid bruit [Normal S1, S2] : normal S1, S2 [No Murmur] : no murmur [No Rub] : no rub [No Gallop] : no gallop [Clear Lung Fields] : clear lung fields [Good Air Entry] : good air entry [No Respiratory Distress] : no respiratory distress  [Soft] : abdomen soft [Non Tender] : non-tender [No Masses/organomegaly] : no masses/organomegaly [Normal Bowel Sounds] : normal bowel sounds [Normal Gait] : normal gait [No Edema] : no edema [No Cyanosis] : no cyanosis [No Clubbing] : no clubbing [No Varicosities] : no varicosities [No Rash] : no rash [No Skin Lesions] : no skin lesions [Moves all extremities] : moves all extremities [No Focal Deficits] : no focal deficits [Normal Speech] : normal speech [Alert and Oriented] : alert and oriented [Normal memory] : normal memory [General Appearance - Well Developed] : well developed [Normal Appearance] : normal appearance [Well Groomed] : well groomed [General Appearance - Well Nourished] : well nourished [No Deformities] : no deformities [General Appearance - In No Acute Distress] : no acute distress [Heart Rate And Rhythm] : heart rate and rhythm were normal [Heart Sounds] : normal S1 and S2 [Edema] : no peripheral edema present [Respiration, Rhythm And Depth] : normal respiratory rhythm and effort [Exaggerated Use Of Accessory Muscles For Inspiration] : no accessory muscle use [Auscultation Breath Sounds / Voice Sounds] : lungs were clear to auscultation bilaterally [Normal Conjunctiva] : the conjunctiva exhibited no abnormalities [Normal Oropharynx] : normal oropharynx [Normal Jugular Venous V Waves Present] : normal jugular venous V waves present [Abdomen Soft] : soft [Abdomen Tenderness] : non-tender [Abnormal Walk] : normal gait [Nail Clubbing] : no clubbing of the fingernails [Cyanosis, Localized] : no localized cyanosis [Skin Color & Pigmentation] : normal skin color and pigmentation [Skin Turgor] : normal skin turgor [] : no rash [Oriented To Time, Place, And Person] : oriented to person, place, and time [Affect] : the affect was normal [de-identified] : Sternal wound infection with presence of a drain [FreeTextEntry1] : Grade III/VI systolic ejectionmurmur at RSB, grade II/VI systolic murmur at LSB [FreeTextEntry2] : groin with no hematoma

## 2021-10-28 NOTE — HISTORY OF PRESENT ILLNESS
[FreeTextEntry1] : Hypertension\par Cigarette smoker since the age of 17 , with 1/2 pack to 1 pack per day.\par Probable DM\par Possible emphysema patient is scheduled to see a Pulmonologist\par Spinal stenosis\par Familial history of heart disease, the patient's brother had CAD, MI, and 3v. CABG and cardiomyopathy.\par AS s/p AVR\par S/P aortic root resection and 2 vessel CABG [de-identified] : 76 year old female with HTN, bicuspid aortic valve with severe aortic stenosis s/p AVR, ascending/hemiarch replacement, L aorto-subclavian bypass and CABG x2 8/2021 with post operative atrial fibrillation with pauses requiring epicardial pacing with wound debridement s/p MICRA, who presents for follow up.\par \par Since discharge from the hospital she has been doing progressively better.  SOme fatigue but it is improving.  No issues by groin site.  No palpitations, chest pain, SOB or syncope.  She is on amiodarone but not on AC as per CT surgery discharge.

## 2021-10-28 NOTE — REVIEW OF SYSTEMS
[SOB] : shortness of breath [Dyspnea on exertion] : dyspnea during exertion [Chest Discomfort] : chest discomfort [Negative] : Heme/Lymph [Fever] : no fever [Chills] : no chills [Palpitations] : no palpitations [Syncope] : no syncope

## 2021-10-28 NOTE — DISCUSSION/SUMMARY
[Pacemaker Function Normal] : normal pacemaker function [FreeTextEntry1] : Maintain present medications\par No further Cardiac invasive testing at this time\par Patient will need a repeat 2D echo doppler once her sternal wound heals. She recently had the chestt drain removed and  the wound site is still healing.\par  Patient is off antibiotics.\par Maintain metoprolol,ASA lipitor, and furosemide as ordered.\par She was placed on amiodarone post operatively by EP.\par Patient will be seen at her next appointment and if her wound has healed ,she may benefit from outpatient cardiac rehab given all that she has endured ora and post operatively.\par RV in 6 weeks

## 2021-10-28 NOTE — PROCEDURE
[No] : not [NSR] : normal sinus rhythm [Pacemaker] : pacemaker [Lead Imp:  ___ohms] : lead impedance was [unfilled] ohms [Sensing Amplitude ___mv] : sensing amplitude was [unfilled] mv [___V @] : [unfilled] V [___ ms] : [unfilled] ms [Pace ___ %] : Pace [unfilled]% [de-identified] : medtronic [de-identified] : GODWIN [de-identified] : 2021

## 2021-10-30 LAB
CULTURE RESULTS: SIGNIFICANT CHANGE UP
SPECIMEN SOURCE: SIGNIFICANT CHANGE UP

## 2021-11-17 ENCOUNTER — APPOINTMENT (OUTPATIENT)
Dept: CARDIOTHORACIC SURGERY | Facility: CLINIC | Age: 76
End: 2021-11-17
Payer: COMMERCIAL

## 2021-11-17 VITALS
BODY MASS INDEX: 31.61 KG/M2 | SYSTOLIC BLOOD PRESSURE: 182 MMHG | DIASTOLIC BLOOD PRESSURE: 80 MMHG | TEMPERATURE: 97.3 F | HEIGHT: 60 IN | OXYGEN SATURATION: 96 % | WEIGHT: 161 LBS | HEART RATE: 68 BPM | RESPIRATION RATE: 17 BRPM

## 2021-11-17 DIAGNOSIS — E03.9 HYPOTHYROIDISM, UNSPECIFIED: ICD-10-CM

## 2021-11-17 PROCEDURE — 99024 POSTOP FOLLOW-UP VISIT: CPT

## 2021-11-20 LAB
CULTURE RESULTS: SIGNIFICANT CHANGE UP
SPECIMEN SOURCE: SIGNIFICANT CHANGE UP

## 2021-12-16 PROBLEM — I34.0 MITRAL REGURGITATION: Status: ACTIVE | Noted: 2021-04-21

## 2021-12-16 NOTE — ASSESSMENT
[FreeTextEntry1] : S/P AVR, for  Severe AS, 2v CABG, ascending aortic hemiarch replacement left aorto subclavian bypass complicated by a sternal wound infection requiring skilled nursing facility care and rehabilitation at [facility]\par Abnormal Lexiscan nuclear test c/w myocardial ischemia\par Moderate  MR\par Moderate TR\par CAD as outlined in cardiac catheterization report\par Hypertensive heart disease\par Cigarette smoker with possible COPD\par Hyperlipidemia\par Possible DM

## 2021-12-16 NOTE — REASON FOR VISIT
[FreeTextEntry1] : Patient presents for a follow up visit and review of her lab results. She had seen a plastic surgeon regarding her sternal wound which was lanced by him.

## 2021-12-16 NOTE — DISCUSSION/SUMMARY
[FreeTextEntry1] : Maintain present medications\par She was advised to increase her lisinopril to 10 mg Q12h.\par Patient will need a repeat 2D echo doppler which will be scheduled prior to her next office visit.\par Patient is off antibiotics.\par Maintain metoprolol,ASA lipitor, and furosemide as ordered.\par She was placed on amiodarone post operatively by EP.\par She may benefit from outpatient cardiac rehab given all that she has endured ora and post operatively, but the patient declined to be enrolled at this time.\par the results of her lab test was reviewed with her in detail and a copy was provided to her.\par RV in 3 months.

## 2021-12-16 NOTE — HISTORY OF PRESENT ILLNESS
[FreeTextEntry1] : Hypertension\par Cigarette smoker since the age of 17 , with 1/2 pack to 1 pack per day.\par Probable DM\par Possible emphysema patient is scheduled to see a Pulmonologist\par Spinal stenosis\par Familial history of heart disease, the patient's brother had CAD, MI, and 3v. CABG and cardiomyopathy.\par AS s/p AVR\par S/P aortic root resection and 2 vessel CABG

## 2021-12-16 NOTE — PHYSICAL EXAM
[Well Developed] : well developed [Well Nourished] : well nourished [No Acute Distress] : no acute distress [Normal Venous Pressure] : normal venous pressure [No Carotid Bruit] : no carotid bruit [Normal S1, S2] : normal S1, S2 [No Murmur] : no murmur [No Rub] : no rub [No Gallop] : no gallop [Clear Lung Fields] : clear lung fields [Good Air Entry] : good air entry [No Respiratory Distress] : no respiratory distress  [Soft] : abdomen soft [Non Tender] : non-tender [No Masses/organomegaly] : no masses/organomegaly [Normal Bowel Sounds] : normal bowel sounds [Normal Gait] : normal gait [No Edema] : no edema [No Cyanosis] : no cyanosis [No Clubbing] : no clubbing [No Varicosities] : no varicosities [No Rash] : no rash [No Skin Lesions] : no skin lesions [Moves all extremities] : moves all extremities [No Focal Deficits] : no focal deficits [Normal Speech] : normal speech [Alert and Oriented] : alert and oriented [Normal memory] : normal memory [General Appearance - Well Developed] : well developed [Normal Appearance] : normal appearance [Well Groomed] : well groomed [General Appearance - Well Nourished] : well nourished [No Deformities] : no deformities [General Appearance - In No Acute Distress] : no acute distress [Heart Rate And Rhythm] : heart rate and rhythm were normal [Heart Sounds] : normal S1 and S2 [Edema] : no peripheral edema present [Respiration, Rhythm And Depth] : normal respiratory rhythm and effort [Exaggerated Use Of Accessory Muscles For Inspiration] : no accessory muscle use [Auscultation Breath Sounds / Voice Sounds] : lungs were clear to auscultation bilaterally [Normal Conjunctiva] : the conjunctiva exhibited no abnormalities [Normal Oropharynx] : normal oropharynx [Normal Jugular Venous V Waves Present] : normal jugular venous V waves present [Abdomen Soft] : soft [Abdomen Tenderness] : non-tender [Abnormal Walk] : normal gait [Nail Clubbing] : no clubbing of the fingernails [Cyanosis, Localized] : no localized cyanosis [Skin Color & Pigmentation] : normal skin color and pigmentation [Skin Turgor] : normal skin turgor [] : no rash [Oriented To Time, Place, And Person] : oriented to person, place, and time [Affect] : the affect was normal [de-identified] : Sternal wound infection with presence of a drain [FreeTextEntry1] : Grade III/VI systolic ejectionmurmur at RSB, grade II/VI systolic murmur at LSB [FreeTextEntry2] : groin with no hematoma

## 2022-01-01 ENCOUNTER — APPOINTMENT (OUTPATIENT)
Dept: CARDIOTHORACIC SURGERY | Facility: HOSPITAL | Age: 77
End: 2022-01-01

## 2022-01-01 ENCOUNTER — OUTPATIENT (OUTPATIENT)
Dept: OUTPATIENT SERVICES | Facility: HOSPITAL | Age: 77
LOS: 1 days | End: 2022-01-01
Payer: COMMERCIAL

## 2022-01-01 ENCOUNTER — INPATIENT (INPATIENT)
Facility: HOSPITAL | Age: 77
LOS: 17 days | Discharge: NURSING, REHAB OR LONG-TERM CARE | DRG: 220 | End: 2022-08-14
Attending: THORACIC SURGERY (CARDIOTHORACIC VASCULAR SURGERY) | Admitting: THORACIC SURGERY (CARDIOTHORACIC VASCULAR SURGERY)
Payer: COMMERCIAL

## 2022-01-01 ENCOUNTER — EMERGENCY (EMERGENCY)
Facility: HOSPITAL | Age: 77
LOS: 0 days | Discharge: HOME | End: 2022-09-24
Attending: EMERGENCY MEDICINE | Admitting: EMERGENCY MEDICINE

## 2022-01-01 ENCOUNTER — INPATIENT (INPATIENT)
Facility: HOSPITAL | Age: 77
LOS: 3 days | Discharge: ANOTHER HOSPITAL | End: 2022-11-22
Attending: STUDENT IN AN ORGANIZED HEALTH CARE EDUCATION/TRAINING PROGRAM | Admitting: STUDENT IN AN ORGANIZED HEALTH CARE EDUCATION/TRAINING PROGRAM

## 2022-01-01 ENCOUNTER — TRANSCRIPTION ENCOUNTER (OUTPATIENT)
Age: 77
End: 2022-01-01

## 2022-01-01 ENCOUNTER — APPOINTMENT (OUTPATIENT)
Dept: CARDIOLOGY | Facility: CLINIC | Age: 77
End: 2022-01-01

## 2022-01-01 ENCOUNTER — APPOINTMENT (OUTPATIENT)
Dept: CARDIOLOGY | Facility: CLINIC | Age: 77
End: 2022-01-01
Payer: COMMERCIAL

## 2022-01-01 ENCOUNTER — APPOINTMENT (OUTPATIENT)
Dept: CARDIOTHORACIC SURGERY | Facility: CLINIC | Age: 77
End: 2022-01-01

## 2022-01-01 ENCOUNTER — APPOINTMENT (OUTPATIENT)
Dept: VASCULAR SURGERY | Facility: CLINIC | Age: 77
End: 2022-01-01

## 2022-01-01 ENCOUNTER — APPOINTMENT (OUTPATIENT)
Dept: HEART AND VASCULAR | Facility: CLINIC | Age: 77
End: 2022-01-01
Payer: COMMERCIAL

## 2022-01-01 ENCOUNTER — APPOINTMENT (OUTPATIENT)
Dept: HEART AND VASCULAR | Facility: CLINIC | Age: 77
End: 2022-01-01

## 2022-01-01 ENCOUNTER — APPOINTMENT (OUTPATIENT)
Dept: CARDIOTHORACIC SURGERY | Facility: CLINIC | Age: 77
End: 2022-01-01
Payer: COMMERCIAL

## 2022-01-01 ENCOUNTER — RESULT REVIEW (OUTPATIENT)
Age: 77
End: 2022-01-01

## 2022-01-01 ENCOUNTER — APPOINTMENT (OUTPATIENT)
Dept: INFECTIOUS DISEASE | Facility: CLINIC | Age: 77
End: 2022-01-01

## 2022-01-01 ENCOUNTER — LABORATORY RESULT (OUTPATIENT)
Age: 77
End: 2022-01-01

## 2022-01-01 ENCOUNTER — APPOINTMENT (OUTPATIENT)
Age: 77
End: 2022-01-01
Payer: COMMERCIAL

## 2022-01-01 ENCOUNTER — INPATIENT (INPATIENT)
Facility: HOSPITAL | Age: 77
LOS: 2 days | Discharge: NURSING, REHAB OR LONG-TERM CARE | End: 2022-11-15
Attending: HOSPITALIST | Admitting: HOSPITALIST

## 2022-01-01 ENCOUNTER — NON-APPOINTMENT (OUTPATIENT)
Age: 77
End: 2022-01-01

## 2022-01-01 ENCOUNTER — EMERGENCY (EMERGENCY)
Facility: HOSPITAL | Age: 77
LOS: 0 days | Discharge: HOME | End: 2022-06-28
Attending: EMERGENCY MEDICINE | Admitting: EMERGENCY MEDICINE

## 2022-01-01 ENCOUNTER — EMERGENCY (EMERGENCY)
Facility: HOSPITAL | Age: 77
LOS: 0 days | Discharge: HOME | End: 2022-06-23
Attending: EMERGENCY MEDICINE | Admitting: EMERGENCY MEDICINE

## 2022-01-01 ENCOUNTER — INPATIENT (INPATIENT)
Facility: HOSPITAL | Age: 77
LOS: 13 days | Discharge: NURSING, REHAB OR LONG-TERM CARE | DRG: 91 | End: 2022-12-07
Attending: THORACIC SURGERY (CARDIOTHORACIC VASCULAR SURGERY) | Admitting: THORACIC SURGERY (CARDIOTHORACIC VASCULAR SURGERY)
Payer: COMMERCIAL

## 2022-01-01 ENCOUNTER — EMERGENCY (EMERGENCY)
Facility: HOSPITAL | Age: 77
LOS: 0 days | Discharge: HOME | End: 2022-08-31
Attending: EMERGENCY MEDICINE | Admitting: EMERGENCY MEDICINE

## 2022-01-01 ENCOUNTER — INPATIENT (INPATIENT)
Facility: HOSPITAL | Age: 77
LOS: 1 days | Discharge: HOME | End: 2022-07-22
Attending: SURGERY | Admitting: SURGERY
Payer: SELF-PAY

## 2022-01-01 ENCOUNTER — INPATIENT (INPATIENT)
Facility: HOSPITAL | Age: 77
LOS: 9 days | Discharge: HOME HEALTH | DRG: 862 | End: 2022-11-11
Attending: THORACIC SURGERY (CARDIOTHORACIC VASCULAR SURGERY) | Admitting: THORACIC SURGERY (CARDIOTHORACIC VASCULAR SURGERY)
Payer: COMMERCIAL

## 2022-01-01 VITALS
RESPIRATION RATE: 18 BRPM | DIASTOLIC BLOOD PRESSURE: 72 MMHG | HEART RATE: 72 BPM | TEMPERATURE: 97 F | SYSTOLIC BLOOD PRESSURE: 141 MMHG | OXYGEN SATURATION: 99 %

## 2022-01-01 VITALS
RESPIRATION RATE: 20 BRPM | TEMPERATURE: 99 F | SYSTOLIC BLOOD PRESSURE: 124 MMHG | OXYGEN SATURATION: 99 % | HEIGHT: 62 IN | HEART RATE: 78 BPM | DIASTOLIC BLOOD PRESSURE: 58 MMHG | WEIGHT: 136.25 LBS

## 2022-01-01 VITALS
HEART RATE: 63 BPM | TEMPERATURE: 98.1 F | OXYGEN SATURATION: 94 % | SYSTOLIC BLOOD PRESSURE: 175 MMHG | HEIGHT: 60 IN | WEIGHT: 150 LBS | RESPIRATION RATE: 18 BRPM | DIASTOLIC BLOOD PRESSURE: 74 MMHG | BODY MASS INDEX: 29.45 KG/M2

## 2022-01-01 VITALS
SYSTOLIC BLOOD PRESSURE: 132 MMHG | RESPIRATION RATE: 15 BRPM | DIASTOLIC BLOOD PRESSURE: 63 MMHG | HEART RATE: 75 BPM | OXYGEN SATURATION: 100 %

## 2022-01-01 VITALS
DIASTOLIC BLOOD PRESSURE: 71 MMHG | SYSTOLIC BLOOD PRESSURE: 153 MMHG | TEMPERATURE: 98 F | OXYGEN SATURATION: 97 % | HEART RATE: 74 BPM | RESPIRATION RATE: 18 BRPM

## 2022-01-01 VITALS
WEIGHT: 169.98 LBS | HEIGHT: 62 IN | DIASTOLIC BLOOD PRESSURE: 62 MMHG | OXYGEN SATURATION: 99 % | RESPIRATION RATE: 18 BRPM | SYSTOLIC BLOOD PRESSURE: 136 MMHG | TEMPERATURE: 97 F | HEART RATE: 67 BPM

## 2022-01-01 VITALS
SYSTOLIC BLOOD PRESSURE: 140 MMHG | HEART RATE: 72 BPM | RESPIRATION RATE: 16 BRPM | OXYGEN SATURATION: 97 % | DIASTOLIC BLOOD PRESSURE: 58 MMHG

## 2022-01-01 VITALS
SYSTOLIC BLOOD PRESSURE: 198 MMHG | WEIGHT: 139.99 LBS | HEART RATE: 95 BPM | RESPIRATION RATE: 18 BRPM | HEIGHT: 60.5 IN | OXYGEN SATURATION: 97 % | TEMPERATURE: 98 F | DIASTOLIC BLOOD PRESSURE: 88 MMHG

## 2022-01-01 VITALS
TEMPERATURE: 97 F | WEIGHT: 144 LBS | OXYGEN SATURATION: 99 % | DIASTOLIC BLOOD PRESSURE: 58 MMHG | BODY MASS INDEX: 28.27 KG/M2 | HEIGHT: 60 IN | RESPIRATION RATE: 16 BRPM | SYSTOLIC BLOOD PRESSURE: 125 MMHG | HEART RATE: 82 BPM

## 2022-01-01 VITALS
SYSTOLIC BLOOD PRESSURE: 134 MMHG | WEIGHT: 149.91 LBS | DIASTOLIC BLOOD PRESSURE: 74 MMHG | TEMPERATURE: 97 F | HEIGHT: 60.5 IN | OXYGEN SATURATION: 99 % | RESPIRATION RATE: 99 BRPM | HEART RATE: 99 BPM

## 2022-01-01 VITALS
WEIGHT: 155 LBS | BODY MASS INDEX: 30.43 KG/M2 | HEART RATE: 84 BPM | DIASTOLIC BLOOD PRESSURE: 75 MMHG | SYSTOLIC BLOOD PRESSURE: 156 MMHG | HEIGHT: 60 IN

## 2022-01-01 VITALS
WEIGHT: 143 LBS | RESPIRATION RATE: 17 BRPM | TEMPERATURE: 97.3 F | BODY MASS INDEX: 28.07 KG/M2 | OXYGEN SATURATION: 98 % | SYSTOLIC BLOOD PRESSURE: 147 MMHG | HEIGHT: 60 IN | HEART RATE: 84 BPM | DIASTOLIC BLOOD PRESSURE: 70 MMHG

## 2022-01-01 VITALS — WEIGHT: 149.91 LBS | RESPIRATION RATE: 20 BRPM | HEIGHT: 62 IN | OXYGEN SATURATION: 98 % | TEMPERATURE: 97 F

## 2022-01-01 VITALS
SYSTOLIC BLOOD PRESSURE: 121 MMHG | OXYGEN SATURATION: 95 % | HEIGHT: 62 IN | TEMPERATURE: 96 F | RESPIRATION RATE: 18 BRPM | HEART RATE: 60 BPM | DIASTOLIC BLOOD PRESSURE: 57 MMHG

## 2022-01-01 VITALS
DIASTOLIC BLOOD PRESSURE: 75 MMHG | RESPIRATION RATE: 18 BRPM | HEART RATE: 95 BPM | SYSTOLIC BLOOD PRESSURE: 132 MMHG | OXYGEN SATURATION: 99 %

## 2022-01-01 VITALS
DIASTOLIC BLOOD PRESSURE: 58 MMHG | WEIGHT: 145.06 LBS | TEMPERATURE: 99 F | SYSTOLIC BLOOD PRESSURE: 104 MMHG | HEART RATE: 71 BPM | RESPIRATION RATE: 18 BRPM | HEIGHT: 62 IN | OXYGEN SATURATION: 98 %

## 2022-01-01 VITALS
RESPIRATION RATE: 13 BRPM | DIASTOLIC BLOOD PRESSURE: 56 MMHG | OXYGEN SATURATION: 97 % | HEART RATE: 75 BPM | SYSTOLIC BLOOD PRESSURE: 119 MMHG

## 2022-01-01 VITALS
HEART RATE: 61 BPM | HEIGHT: 60 IN | SYSTOLIC BLOOD PRESSURE: 122 MMHG | BODY MASS INDEX: 29.45 KG/M2 | DIASTOLIC BLOOD PRESSURE: 60 MMHG | WEIGHT: 150 LBS

## 2022-01-01 VITALS
DIASTOLIC BLOOD PRESSURE: 76 MMHG | SYSTOLIC BLOOD PRESSURE: 168 MMHG | BODY MASS INDEX: 29.45 KG/M2 | HEIGHT: 60 IN | TEMPERATURE: 97.1 F | HEART RATE: 60 BPM | WEIGHT: 150 LBS

## 2022-01-01 VITALS
BODY MASS INDEX: 28.86 KG/M2 | HEIGHT: 60 IN | SYSTOLIC BLOOD PRESSURE: 122 MMHG | DIASTOLIC BLOOD PRESSURE: 58 MMHG | TEMPERATURE: 98.7 F | WEIGHT: 147 LBS | HEART RATE: 54 BPM

## 2022-01-01 VITALS
DIASTOLIC BLOOD PRESSURE: 64 MMHG | SYSTOLIC BLOOD PRESSURE: 136 MMHG | TEMPERATURE: 98 F | RESPIRATION RATE: 16 BRPM | HEART RATE: 65 BPM

## 2022-01-01 VITALS
RESPIRATION RATE: 18 BRPM | HEART RATE: 76 BPM | WEIGHT: 154.1 LBS | SYSTOLIC BLOOD PRESSURE: 195 MMHG | TEMPERATURE: 98 F | DIASTOLIC BLOOD PRESSURE: 86 MMHG | OXYGEN SATURATION: 97 % | HEIGHT: 60.5 IN

## 2022-01-01 VITALS
DIASTOLIC BLOOD PRESSURE: 70 MMHG | HEART RATE: 81 BPM | SYSTOLIC BLOOD PRESSURE: 158 MMHG | RESPIRATION RATE: 18 BRPM | TEMPERATURE: 98 F | OXYGEN SATURATION: 98 %

## 2022-01-01 VITALS
RESPIRATION RATE: 19 BRPM | HEART RATE: 58 BPM | TEMPERATURE: 97 F | SYSTOLIC BLOOD PRESSURE: 113 MMHG | DIASTOLIC BLOOD PRESSURE: 54 MMHG

## 2022-01-01 VITALS
HEART RATE: 84 BPM | BODY MASS INDEX: 30.43 KG/M2 | DIASTOLIC BLOOD PRESSURE: 66 MMHG | HEIGHT: 60 IN | OXYGEN SATURATION: 99 % | SYSTOLIC BLOOD PRESSURE: 122 MMHG | WEIGHT: 155 LBS | RESPIRATION RATE: 16 BRPM

## 2022-01-01 VITALS
HEART RATE: 98 BPM | HEIGHT: 60 IN | OXYGEN SATURATION: 98 % | DIASTOLIC BLOOD PRESSURE: 85 MMHG | SYSTOLIC BLOOD PRESSURE: 179 MMHG | RESPIRATION RATE: 18 BRPM | TEMPERATURE: 98 F | WEIGHT: 149.91 LBS

## 2022-01-01 VITALS — TEMPERATURE: 97 F

## 2022-01-01 DIAGNOSIS — Z79.01 LONG TERM (CURRENT) USE OF ANTICOAGULANTS: ICD-10-CM

## 2022-01-01 DIAGNOSIS — T81.41XA INFECTION FOLLOWING A PROCEDURE, SUPERFICIAL INCISIONAL SURGICAL SITE, INITIAL ENCOUNTER: ICD-10-CM

## 2022-01-01 DIAGNOSIS — R41.82 ALTERED MENTAL STATUS, UNSPECIFIED: ICD-10-CM

## 2022-01-01 DIAGNOSIS — M19.90 UNSPECIFIED OSTEOARTHRITIS, UNSPECIFIED SITE: ICD-10-CM
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
## 2022-01-01 DIAGNOSIS — Z95.828 PRESENCE OF OTHER VASCULAR IMPLANTS AND GRAFTS: Chronic | ICD-10-CM

## 2022-01-01 DIAGNOSIS — Z86.79 OTHER SPECIFIED POSTPROCEDURAL STATES: ICD-10-CM

## 2022-01-01 DIAGNOSIS — Z95.5 PRESENCE OF CORONARY ANGIOPLASTY IMPLANT AND GRAFT: ICD-10-CM

## 2022-01-01 DIAGNOSIS — Z86.79 PERSONAL HISTORY OF OTHER DISEASES OF THE CIRCULATORY SYSTEM: ICD-10-CM

## 2022-01-01 DIAGNOSIS — Z95.2 PRESENCE OF PROSTHETIC HEART VALVE: Chronic | ICD-10-CM

## 2022-01-01 DIAGNOSIS — I49.5 SICK SINUS SYNDROME: ICD-10-CM

## 2022-01-01 DIAGNOSIS — E03.9 HYPOTHYROIDISM, UNSPECIFIED: ICD-10-CM

## 2022-01-01 DIAGNOSIS — I25.10 ATHEROSCLEROTIC HEART DISEASE OF NATIVE CORONARY ARTERY WITHOUT ANGINA PECTORIS: ICD-10-CM

## 2022-01-01 DIAGNOSIS — M79.605 PAIN IN LEFT LEG: ICD-10-CM

## 2022-01-01 DIAGNOSIS — Z79.890 HORMONE REPLACEMENT THERAPY: ICD-10-CM

## 2022-01-01 DIAGNOSIS — Z95.1 PRESENCE OF AORTOCORONARY BYPASS GRAFT: Chronic | ICD-10-CM

## 2022-01-01 DIAGNOSIS — I10 ESSENTIAL (PRIMARY) HYPERTENSION: ICD-10-CM

## 2022-01-01 DIAGNOSIS — Z84.1 FAMILY HISTORY OF DISORDERS OF KIDNEY AND URETER: ICD-10-CM

## 2022-01-01 DIAGNOSIS — Q23.1 CONGENITAL INSUFFICIENCY OF AORTIC VALVE: ICD-10-CM

## 2022-01-01 DIAGNOSIS — M48.00 SPINAL STENOSIS, SITE UNSPECIFIED: ICD-10-CM

## 2022-01-01 DIAGNOSIS — D64.9 ANEMIA, UNSPECIFIED: ICD-10-CM

## 2022-01-01 DIAGNOSIS — Z95.0 PRESENCE OF CARDIAC PACEMAKER: Chronic | ICD-10-CM

## 2022-01-01 DIAGNOSIS — Z20.822 CONTACT WITH AND (SUSPECTED) EXPOSURE TO COVID-19: ICD-10-CM

## 2022-01-01 DIAGNOSIS — T81.718A COMPLICATION OF OTHER ARTERY FOLLOWING A PROCEDURE, NOT ELSEWHERE CLASSIFIED, INITIAL ENCOUNTER: ICD-10-CM

## 2022-01-01 DIAGNOSIS — I71.2 THORACIC AORTIC ANEURYSM, W/OUT RUPTURE: ICD-10-CM

## 2022-01-01 DIAGNOSIS — Z83.3 FAMILY HISTORY OF DIABETES MELLITUS: ICD-10-CM

## 2022-01-01 DIAGNOSIS — Z78.9 OTHER SPECIFIED HEALTH STATUS: ICD-10-CM

## 2022-01-01 DIAGNOSIS — Z82.49 FAMILY HISTORY OF ISCHEMIC HEART DISEASE AND OTHER DISEASES OF THE CIRCULATORY SYSTEM: ICD-10-CM

## 2022-01-01 DIAGNOSIS — R53.1 WEAKNESS: ICD-10-CM

## 2022-01-01 DIAGNOSIS — J98.11 ATELECTASIS: ICD-10-CM

## 2022-01-01 DIAGNOSIS — I25.10 ATHEROSCLEROTIC HEART DISEASE OF NATIVE CORONARY ARTERY W/OUT ANGINA PECTORIS: ICD-10-CM
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
## 2022-01-01 DIAGNOSIS — I71.20 THORACIC AORTIC ANEURYSM, WITHOUT RUPTURE, UNSPECIFIED: ICD-10-CM

## 2022-01-01 DIAGNOSIS — Z71.6 TOBACCO ABUSE COUNSELING: ICD-10-CM

## 2022-01-01 DIAGNOSIS — S31.109A UNSPECIFIED OPEN WOUND OF ABDOMINAL WALL, UNSPECIFIED QUADRANT WITHOUT PENETRATION INTO PERITONEAL CAVITY, INITIAL ENCOUNTER: ICD-10-CM

## 2022-01-01 DIAGNOSIS — E78.5 HYPERLIPIDEMIA, UNSPECIFIED: ICD-10-CM
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
## 2022-01-01 DIAGNOSIS — E43 UNSPECIFIED SEVERE PROTEIN-CALORIE MALNUTRITION: ICD-10-CM

## 2022-01-01 DIAGNOSIS — Z79.82 LONG TERM (CURRENT) USE OF ASPIRIN: ICD-10-CM

## 2022-01-01 DIAGNOSIS — I72.8 ANEURYSM OF OTHER SPECIFIED ARTERIES: ICD-10-CM

## 2022-01-01 DIAGNOSIS — E86.1 HYPOVOLEMIA: ICD-10-CM

## 2022-01-01 DIAGNOSIS — Z87.891 PERSONAL HISTORY OF NICOTINE DEPENDENCE: ICD-10-CM

## 2022-01-01 DIAGNOSIS — Z95.2 PRESENCE OF PROSTHETIC HEART VALVE: ICD-10-CM
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
## 2022-01-01 DIAGNOSIS — Z79.51 LONG TERM (CURRENT) USE OF INHALED STEROIDS: ICD-10-CM

## 2022-01-01 DIAGNOSIS — M54.16 RADICULOPATHY, LUMBAR REGION: ICD-10-CM

## 2022-01-01 DIAGNOSIS — I74.2 EMBOLISM AND THROMBOSIS OF ARTERIES OF THE UPPER EXTREMITIES: ICD-10-CM
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
## 2022-01-01 DIAGNOSIS — G62.9 POLYNEUROPATHY, UNSPECIFIED: ICD-10-CM

## 2022-01-01 DIAGNOSIS — Z86.73 PERSONAL HISTORY OF TRANSIENT ISCHEMIC ATTACK (TIA), AND CEREBRAL INFARCTION WITHOUT RESIDUAL DEFICITS: ICD-10-CM

## 2022-01-01 DIAGNOSIS — Z95.828 PRESENCE OF OTHER VASCULAR IMPLANTS AND GRAFTS: ICD-10-CM

## 2022-01-01 DIAGNOSIS — R79.89 OTHER SPECIFIED ABNORMAL FINDINGS OF BLOOD CHEMISTRY: ICD-10-CM

## 2022-01-01 DIAGNOSIS — E87.6 HYPOKALEMIA: ICD-10-CM

## 2022-01-01 DIAGNOSIS — Z95.3 PRESENCE OF XENOGENIC HEART VALVE: ICD-10-CM

## 2022-01-01 DIAGNOSIS — Z87.39 PERSONAL HISTORY OF OTHER DISEASES OF THE MUSCULOSKELETAL SYSTEM AND CONNECTIVE TISSUE: ICD-10-CM

## 2022-01-01 DIAGNOSIS — I74.11 EMBOLISM AND THROMBOSIS OF THORACIC AORTA: ICD-10-CM

## 2022-01-01 DIAGNOSIS — Z95.1 PRESENCE OF AORTOCORONARY BYPASS GRAFT: ICD-10-CM

## 2022-01-01 DIAGNOSIS — I70.298 OTHER ATHEROSCLEROSIS OF NATIVE ARTERIES OF EXTREMITIES, OTHER EXTREMITY: ICD-10-CM

## 2022-01-01 DIAGNOSIS — F05 DELIRIUM DUE TO KNOWN PHYSIOLOGICAL CONDITION: ICD-10-CM

## 2022-01-01 DIAGNOSIS — R60.0 LOCALIZED EDEMA: ICD-10-CM

## 2022-01-01 DIAGNOSIS — T82.310A BREAKDOWN (MECHANICAL) OF AORTIC (BIFURCATION) GRAFT (REPLACEMENT), INITIAL ENCOUNTER: ICD-10-CM

## 2022-01-01 DIAGNOSIS — S21.101A UNSPECIFIED OPEN WOUND OF RIGHT FRONT WALL OF THORAX WITHOUT PENETRATION INTO THORACIC CAVITY, INITIAL ENCOUNTER: ICD-10-CM

## 2022-01-01 DIAGNOSIS — G93.41 METABOLIC ENCEPHALOPATHY: ICD-10-CM

## 2022-01-01 DIAGNOSIS — K59.00 CONSTIPATION, UNSPECIFIED: ICD-10-CM

## 2022-01-01 DIAGNOSIS — Z98.890 OTHER SPECIFIED POSTPROCEDURAL STATES: ICD-10-CM

## 2022-01-01 DIAGNOSIS — R29.898 OTHER SYMPTOMS AND SIGNS INVOLVING THE MUSCULOSKELETAL SYSTEM: ICD-10-CM

## 2022-01-01 DIAGNOSIS — I97.638 POSTPROCEDURAL HEMATOMA OF A CIRCULATORY SYSTEM ORGAN OR STRUCTURE FOLLOWING OTHER CIRCULATORY SYSTEM PROCEDURE: ICD-10-CM

## 2022-01-01 DIAGNOSIS — Z95.0 PRESENCE OF CARDIAC PACEMAKER: ICD-10-CM

## 2022-01-01 DIAGNOSIS — T82.868A THROMBOSIS DUE TO VASCULAR PROSTHETIC DEVICES, IMPLANTS AND GRAFTS, INITIAL ENCOUNTER: ICD-10-CM

## 2022-01-01 DIAGNOSIS — R09.82 POSTNASAL DRIP: ICD-10-CM

## 2022-01-01 DIAGNOSIS — M79.89 OTHER SPECIFIED SOFT TISSUE DISORDERS: ICD-10-CM

## 2022-01-01 DIAGNOSIS — Z09 ENCOUNTER FOR FOLLOW-UP EXAMINATION AFTER COMPLETED TREATMENT FOR CONDITIONS OTHER THAN MALIGNANT NEOPLASM: ICD-10-CM

## 2022-01-01 DIAGNOSIS — R06.00 DYSPNEA, UNSPECIFIED: ICD-10-CM

## 2022-01-01 DIAGNOSIS — I95.9 HYPOTENSION, UNSPECIFIED: ICD-10-CM

## 2022-01-01 DIAGNOSIS — R09.02 HYPOXEMIA: ICD-10-CM
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
## 2022-01-01 DIAGNOSIS — R10.9 UNSPECIFIED ABDOMINAL PAIN: ICD-10-CM
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
## 2022-01-01 DIAGNOSIS — L02.213 CUTANEOUS ABSCESS OF CHEST WALL: ICD-10-CM

## 2022-01-01 DIAGNOSIS — R79.9 ABNORMAL FINDING OF BLOOD CHEMISTRY, UNSPECIFIED: ICD-10-CM

## 2022-01-01 DIAGNOSIS — I51.7 CARDIOMEGALY: ICD-10-CM

## 2022-01-01 DIAGNOSIS — B37.89 OTHER SITES OF CANDIDIASIS: ICD-10-CM

## 2022-01-01 DIAGNOSIS — L02.818 CUTANEOUS ABSCESS OF OTHER SITES: ICD-10-CM

## 2022-01-01 DIAGNOSIS — I11.0 HYPERTENSIVE HEART DISEASE WITH HEART FAILURE: ICD-10-CM

## 2022-01-01 DIAGNOSIS — L89.309 PRESSURE ULCER OF UNSPECIFIED BUTTOCK, UNSPECIFIED STAGE: ICD-10-CM

## 2022-01-01 DIAGNOSIS — R20.0 ANESTHESIA OF SKIN: ICD-10-CM

## 2022-01-01 DIAGNOSIS — T82.898A OTHER SPECIFIED COMPLICATION OF VASCULAR PROSTHETIC DEVICES, IMPLANTS AND GRAFTS, INITIAL ENCOUNTER: ICD-10-CM

## 2022-01-01 DIAGNOSIS — I50.32 CHRONIC DIASTOLIC (CONGESTIVE) HEART FAILURE: ICD-10-CM

## 2022-01-01 DIAGNOSIS — T82.818A EMBOLISM DUE TO VASCULAR PROSTHETIC DEVICES, IMPLANTS AND GRAFTS, INITIAL ENCOUNTER: ICD-10-CM

## 2022-01-01 LAB
-  FLUCONAZOLE: SIGNIFICANT CHANGE UP
-  FLUCONAZOLE: SIGNIFICANT CHANGE UP
A1C WITH ESTIMATED AVERAGE GLUCOSE RESULT: 5.1 % — SIGNIFICANT CHANGE UP (ref 4–5.6)
A1C WITH ESTIMATED AVERAGE GLUCOSE RESULT: 5.5 % — SIGNIFICANT CHANGE UP (ref 4–5.6)
A1C WITH ESTIMATED AVERAGE GLUCOSE RESULT: 5.6 % — SIGNIFICANT CHANGE UP (ref 4–5.6)
ALBUMIN SERPL ELPH-MCNC: 2.6 G/DL — LOW (ref 3.3–5)
ALBUMIN SERPL ELPH-MCNC: 2.7 G/DL — LOW (ref 3.3–5)
ALBUMIN SERPL ELPH-MCNC: 2.9 G/DL — LOW (ref 3.3–5)
ALBUMIN SERPL ELPH-MCNC: 2.9 G/DL — LOW (ref 3.5–5.2)
ALBUMIN SERPL ELPH-MCNC: 3 G/DL — LOW (ref 3.3–5)
ALBUMIN SERPL ELPH-MCNC: 3 G/DL — LOW (ref 3.3–5)
ALBUMIN SERPL ELPH-MCNC: 3.1 G/DL — LOW (ref 3.3–5)
ALBUMIN SERPL ELPH-MCNC: 3.2 G/DL — LOW (ref 3.3–5)
ALBUMIN SERPL ELPH-MCNC: 3.2 G/DL — LOW (ref 3.5–5.2)
ALBUMIN SERPL ELPH-MCNC: 3.3 G/DL — SIGNIFICANT CHANGE UP (ref 3.3–5)
ALBUMIN SERPL ELPH-MCNC: 3.4 G/DL — LOW (ref 3.5–5.2)
ALBUMIN SERPL ELPH-MCNC: 3.4 G/DL — SIGNIFICANT CHANGE UP (ref 3.3–5)
ALBUMIN SERPL ELPH-MCNC: 3.4 G/DL — SIGNIFICANT CHANGE UP (ref 3.3–5)
ALBUMIN SERPL ELPH-MCNC: 3.6 G/DL — SIGNIFICANT CHANGE UP (ref 3.3–5)
ALBUMIN SERPL ELPH-MCNC: 3.6 G/DL — SIGNIFICANT CHANGE UP (ref 3.3–5)
ALBUMIN SERPL ELPH-MCNC: 3.7 G/DL — SIGNIFICANT CHANGE UP (ref 3.3–5)
ALBUMIN SERPL ELPH-MCNC: 3.7 G/DL — SIGNIFICANT CHANGE UP (ref 3.5–5.2)
ALBUMIN SERPL ELPH-MCNC: 3.7 G/DL — SIGNIFICANT CHANGE UP (ref 3.5–5.2)
ALBUMIN SERPL ELPH-MCNC: 3.9 G/DL — SIGNIFICANT CHANGE UP (ref 3.3–5)
ALBUMIN SERPL ELPH-MCNC: 3.9 G/DL — SIGNIFICANT CHANGE UP (ref 3.5–5.2)
ALLERGY+IMMUNOLOGY DIAG STUDY NOTE: SIGNIFICANT CHANGE UP
ALP SERPL-CCNC: 102 U/L — SIGNIFICANT CHANGE UP (ref 40–120)
ALP SERPL-CCNC: 103 U/L — SIGNIFICANT CHANGE UP (ref 40–120)
ALP SERPL-CCNC: 104 U/L — SIGNIFICANT CHANGE UP (ref 40–120)
ALP SERPL-CCNC: 105 U/L — SIGNIFICANT CHANGE UP (ref 30–115)
ALP SERPL-CCNC: 116 U/L — SIGNIFICANT CHANGE UP (ref 40–120)
ALP SERPL-CCNC: 63 U/L — SIGNIFICANT CHANGE UP (ref 40–120)
ALP SERPL-CCNC: 68 U/L — SIGNIFICANT CHANGE UP (ref 40–120)
ALP SERPL-CCNC: 71 U/L — SIGNIFICANT CHANGE UP (ref 40–120)
ALP SERPL-CCNC: 72 U/L — SIGNIFICANT CHANGE UP (ref 40–120)
ALP SERPL-CCNC: 76 U/L — SIGNIFICANT CHANGE UP (ref 40–120)
ALP SERPL-CCNC: 77 U/L — SIGNIFICANT CHANGE UP (ref 40–120)
ALP SERPL-CCNC: 77 U/L — SIGNIFICANT CHANGE UP (ref 40–120)
ALP SERPL-CCNC: 78 U/L — SIGNIFICANT CHANGE UP (ref 40–120)
ALP SERPL-CCNC: 79 U/L — SIGNIFICANT CHANGE UP (ref 40–120)
ALP SERPL-CCNC: 79 U/L — SIGNIFICANT CHANGE UP (ref 40–120)
ALP SERPL-CCNC: 81 U/L — SIGNIFICANT CHANGE UP (ref 40–120)
ALP SERPL-CCNC: 82 U/L — SIGNIFICANT CHANGE UP (ref 40–120)
ALP SERPL-CCNC: 82 U/L — SIGNIFICANT CHANGE UP (ref 40–120)
ALP SERPL-CCNC: 83 U/L — SIGNIFICANT CHANGE UP (ref 30–115)
ALP SERPL-CCNC: 84 U/L — SIGNIFICANT CHANGE UP (ref 40–120)
ALP SERPL-CCNC: 87 U/L — SIGNIFICANT CHANGE UP (ref 40–120)
ALP SERPL-CCNC: 91 U/L — SIGNIFICANT CHANGE UP (ref 30–115)
ALP SERPL-CCNC: 91 U/L — SIGNIFICANT CHANGE UP (ref 30–115)
ALP SERPL-CCNC: 91 U/L — SIGNIFICANT CHANGE UP (ref 40–120)
ALP SERPL-CCNC: 92 U/L — SIGNIFICANT CHANGE UP (ref 40–120)
ALP SERPL-CCNC: 93 U/L — SIGNIFICANT CHANGE UP (ref 40–120)
ALP SERPL-CCNC: 95 U/L — SIGNIFICANT CHANGE UP (ref 30–115)
ALP SERPL-CCNC: 95 U/L — SIGNIFICANT CHANGE UP (ref 40–120)
ALP SERPL-CCNC: 95 U/L — SIGNIFICANT CHANGE UP (ref 40–120)
ALP SERPL-CCNC: 97 U/L — SIGNIFICANT CHANGE UP (ref 30–115)
ALP SERPL-CCNC: 98 U/L — SIGNIFICANT CHANGE UP (ref 40–120)
ALT FLD-CCNC: 10 U/L — SIGNIFICANT CHANGE UP (ref 10–45)
ALT FLD-CCNC: 11 U/L — SIGNIFICANT CHANGE UP (ref 0–41)
ALT FLD-CCNC: 11 U/L — SIGNIFICANT CHANGE UP (ref 10–45)
ALT FLD-CCNC: 12 U/L — SIGNIFICANT CHANGE UP (ref 10–45)
ALT FLD-CCNC: 13 U/L — SIGNIFICANT CHANGE UP (ref 10–45)
ALT FLD-CCNC: 14 U/L — SIGNIFICANT CHANGE UP (ref 0–41)
ALT FLD-CCNC: 14 U/L — SIGNIFICANT CHANGE UP (ref 0–41)
ALT FLD-CCNC: 14 U/L — SIGNIFICANT CHANGE UP (ref 10–45)
ALT FLD-CCNC: 14 U/L — SIGNIFICANT CHANGE UP (ref 10–45)
ALT FLD-CCNC: 15 U/L — SIGNIFICANT CHANGE UP (ref 10–45)
ALT FLD-CCNC: 17 U/L — SIGNIFICANT CHANGE UP (ref 10–45)
ALT FLD-CCNC: 19 U/L — SIGNIFICANT CHANGE UP (ref 10–45)
ALT FLD-CCNC: 6 U/L — LOW (ref 10–45)
ALT FLD-CCNC: 7 U/L — LOW (ref 10–45)
ALT FLD-CCNC: 8 U/L — LOW (ref 10–45)
ALT FLD-CCNC: 8 U/L — LOW (ref 10–45)
ALT FLD-CCNC: 8 U/L — SIGNIFICANT CHANGE UP (ref 0–41)
ALT FLD-CCNC: 9 U/L — LOW (ref 10–45)
ALT FLD-CCNC: 9 U/L — LOW (ref 10–45)
ALT FLD-CCNC: <5 U/L — LOW (ref 10–45)
ALT FLD-CCNC: <5 U/L — LOW (ref 10–45)
AMMONIA BLD-MCNC: 31 UMOL/L — SIGNIFICANT CHANGE UP (ref 11–55)
AMYLASE P1 CFR SERPL: 32 U/L — SIGNIFICANT CHANGE UP (ref 25–125)
ANION GAP SERPL CALC-SCNC: 10 MMOL/L — SIGNIFICANT CHANGE UP (ref 5–17)
ANION GAP SERPL CALC-SCNC: 10 MMOL/L — SIGNIFICANT CHANGE UP (ref 7–14)
ANION GAP SERPL CALC-SCNC: 11 MMOL/L — SIGNIFICANT CHANGE UP (ref 5–17)
ANION GAP SERPL CALC-SCNC: 11 MMOL/L — SIGNIFICANT CHANGE UP (ref 7–14)
ANION GAP SERPL CALC-SCNC: 11 MMOL/L — SIGNIFICANT CHANGE UP (ref 7–14)
ANION GAP SERPL CALC-SCNC: 12 MMOL/L — SIGNIFICANT CHANGE UP (ref 5–17)
ANION GAP SERPL CALC-SCNC: 13 MMOL/L — SIGNIFICANT CHANGE UP (ref 5–17)
ANION GAP SERPL CALC-SCNC: 14 MMOL/L — SIGNIFICANT CHANGE UP (ref 5–17)
ANION GAP SERPL CALC-SCNC: 14 MMOL/L — SIGNIFICANT CHANGE UP (ref 7–14)
ANION GAP SERPL CALC-SCNC: 15 MMOL/L — HIGH (ref 7–14)
ANION GAP SERPL CALC-SCNC: 6 MMOL/L — SIGNIFICANT CHANGE UP (ref 5–17)
ANION GAP SERPL CALC-SCNC: 6 MMOL/L — SIGNIFICANT CHANGE UP (ref 5–17)
ANION GAP SERPL CALC-SCNC: 7 MMOL/L — SIGNIFICANT CHANGE UP (ref 5–17)
ANION GAP SERPL CALC-SCNC: 8 MMOL/L — SIGNIFICANT CHANGE UP (ref 5–17)
ANION GAP SERPL CALC-SCNC: 9 MMOL/L — SIGNIFICANT CHANGE UP (ref 5–17)
ANION GAP SERPL CALC-SCNC: 9 MMOL/L — SIGNIFICANT CHANGE UP (ref 7–14)
ANISOCYTOSIS BLD QL: SLIGHT — SIGNIFICANT CHANGE UP
ANTIBODY INTERPRETATION 2: SIGNIFICANT CHANGE UP
APPEARANCE UR: CLEAR — SIGNIFICANT CHANGE UP
APTT BLD: 109.2 SEC — CRITICAL HIGH (ref 27–39.2)
APTT BLD: 128.5 SEC — CRITICAL HIGH (ref 27.5–35.5)
APTT BLD: 151.3 SEC — CRITICAL HIGH (ref 27.5–35.5)
APTT BLD: 31.3 SEC — SIGNIFICANT CHANGE UP (ref 27.5–35.5)
APTT BLD: 32.2 SEC — SIGNIFICANT CHANGE UP (ref 27.5–35.5)
APTT BLD: 32.9 SEC — SIGNIFICANT CHANGE UP (ref 27.5–35.5)
APTT BLD: 32.9 SEC — SIGNIFICANT CHANGE UP (ref 27–39.2)
APTT BLD: 33 SEC — SIGNIFICANT CHANGE UP (ref 27.5–35.5)
APTT BLD: 33.1 SEC — SIGNIFICANT CHANGE UP (ref 27.5–35.5)
APTT BLD: 33.2 SEC — SIGNIFICANT CHANGE UP (ref 27.5–35.5)
APTT BLD: 33.2 SEC — SIGNIFICANT CHANGE UP (ref 27.5–35.5)
APTT BLD: 33.3 SEC — SIGNIFICANT CHANGE UP (ref 27.5–35.5)
APTT BLD: 33.3 SEC — SIGNIFICANT CHANGE UP (ref 27.5–35.5)
APTT BLD: 34.3 SEC — SIGNIFICANT CHANGE UP (ref 27.5–35.5)
APTT BLD: 34.3 SEC — SIGNIFICANT CHANGE UP (ref 27.5–35.5)
APTT BLD: 34.9 SEC — SIGNIFICANT CHANGE UP (ref 27.5–35.5)
APTT BLD: 35.1 SEC — SIGNIFICANT CHANGE UP (ref 27.5–35.5)
APTT BLD: 35.2 SEC — SIGNIFICANT CHANGE UP (ref 27.5–35.5)
APTT BLD: 35.4 SEC — SIGNIFICANT CHANGE UP (ref 27.5–35.5)
APTT BLD: 35.9 SEC — HIGH (ref 27.5–35.5)
APTT BLD: 36.5 SEC — HIGH (ref 27.5–35.5)
APTT BLD: 37.3 SEC — HIGH (ref 27.5–35.5)
APTT BLD: 38.4 SEC — HIGH (ref 27.5–35.5)
APTT BLD: 39.1 SEC — SIGNIFICANT CHANGE UP (ref 27–39.2)
APTT BLD: 39.4 SEC — HIGH (ref 27.5–35.5)
APTT BLD: 42.1 SEC — HIGH (ref 27.5–35.5)
APTT BLD: 42.7 SEC — HIGH (ref 27.5–35.5)
APTT BLD: 43.3 SEC — HIGH (ref 27.5–35.5)
APTT BLD: 44.3 SEC — HIGH (ref 27.5–35.5)
APTT BLD: 45 SEC — HIGH (ref 27.5–35.5)
APTT BLD: 48.6 SEC — HIGH (ref 27.5–35.5)
APTT BLD: 51.5 SEC — HIGH (ref 27.5–35.5)
APTT BLD: 54.4 SEC — HIGH (ref 27.5–35.5)
APTT BLD: 58.1 SEC — HIGH (ref 27.5–35.5)
APTT BLD: 62.3 SEC — HIGH (ref 27.5–35.5)
APTT BLD: 62.8 SEC — HIGH (ref 27–39.2)
APTT BLD: 63.9 SEC — HIGH (ref 27–39.2)
APTT BLD: 64.2 SEC — HIGH (ref 27.5–35.5)
APTT BLD: 67.5 SEC — HIGH (ref 27–39.2)
APTT BLD: 68.3 SEC — HIGH (ref 27.5–35.5)
APTT BLD: 68.5 SEC — HIGH (ref 27.5–35.5)
APTT BLD: 73.3 SEC — HIGH (ref 27.5–35.5)
APTT BLD: 76.4 SEC — HIGH (ref 27.5–35.5)
APTT BLD: 80.4 SEC — HIGH (ref 27.5–35.5)
APTT BLD: 80.6 SEC — HIGH (ref 27.5–35.5)
APTT BLD: 87.1 SEC — HIGH (ref 27.5–35.5)
AST SERPL-CCNC: 10 U/L — SIGNIFICANT CHANGE UP (ref 10–40)
AST SERPL-CCNC: 13 U/L — SIGNIFICANT CHANGE UP (ref 10–40)
AST SERPL-CCNC: 14 U/L — SIGNIFICANT CHANGE UP (ref 10–40)
AST SERPL-CCNC: 15 U/L — SIGNIFICANT CHANGE UP (ref 0–41)
AST SERPL-CCNC: 15 U/L — SIGNIFICANT CHANGE UP (ref 10–40)
AST SERPL-CCNC: 16 U/L — SIGNIFICANT CHANGE UP (ref 0–41)
AST SERPL-CCNC: 16 U/L — SIGNIFICANT CHANGE UP (ref 10–40)
AST SERPL-CCNC: 17 U/L — SIGNIFICANT CHANGE UP (ref 0–41)
AST SERPL-CCNC: 17 U/L — SIGNIFICANT CHANGE UP (ref 10–40)
AST SERPL-CCNC: 18 U/L — SIGNIFICANT CHANGE UP (ref 0–41)
AST SERPL-CCNC: 18 U/L — SIGNIFICANT CHANGE UP (ref 10–40)
AST SERPL-CCNC: 19 U/L — SIGNIFICANT CHANGE UP (ref 10–40)
AST SERPL-CCNC: 21 U/L — SIGNIFICANT CHANGE UP (ref 10–40)
AST SERPL-CCNC: 22 U/L — SIGNIFICANT CHANGE UP (ref 10–40)
AST SERPL-CCNC: 22 U/L — SIGNIFICANT CHANGE UP (ref 10–40)
AST SERPL-CCNC: 23 U/L — SIGNIFICANT CHANGE UP (ref 10–40)
AST SERPL-CCNC: 24 U/L — SIGNIFICANT CHANGE UP (ref 10–40)
AST SERPL-CCNC: 25 U/L — SIGNIFICANT CHANGE UP (ref 0–41)
AST SERPL-CCNC: 25 U/L — SIGNIFICANT CHANGE UP (ref 10–40)
AST SERPL-CCNC: 26 U/L — SIGNIFICANT CHANGE UP (ref 10–40)
AST SERPL-CCNC: 27 U/L — SIGNIFICANT CHANGE UP (ref 10–40)
AST SERPL-CCNC: 28 U/L — SIGNIFICANT CHANGE UP (ref 10–40)
AST SERPL-CCNC: 28 U/L — SIGNIFICANT CHANGE UP (ref 10–40)
AST SERPL-CCNC: 29 U/L — SIGNIFICANT CHANGE UP (ref 10–40)
AST SERPL-CCNC: 30 U/L — SIGNIFICANT CHANGE UP (ref 0–41)
AST SERPL-CCNC: 35 U/L — SIGNIFICANT CHANGE UP (ref 10–40)
AST SERPL-CCNC: 36 U/L — SIGNIFICANT CHANGE UP (ref 10–40)
BACTERIA # UR AUTO: NEGATIVE — SIGNIFICANT CHANGE UP
BACTERIA # UR AUTO: NEGATIVE — SIGNIFICANT CHANGE UP
BACTERIA # UR AUTO: PRESENT /HPF
BACTERIA # UR AUTO: PRESENT /HPF
BACTERIA # UR AUTO: SIGNIFICANT CHANGE UP /HPF
BASE EXCESS BLDA CALC-SCNC: 1.2 MMOL/L — SIGNIFICANT CHANGE UP (ref -2–3)
BASE EXCESS BLDV CALC-SCNC: -0.3 MMOL/L — SIGNIFICANT CHANGE UP (ref -2–3)
BASE EXCESS BLDV CALC-SCNC: 8.6 MMOL/L — HIGH (ref -2–3)
BASOPHILS # BLD AUTO: 0 K/UL — SIGNIFICANT CHANGE UP (ref 0–0.2)
BASOPHILS # BLD AUTO: 0.01 K/UL — SIGNIFICANT CHANGE UP (ref 0–0.2)
BASOPHILS # BLD AUTO: 0.01 K/UL — SIGNIFICANT CHANGE UP (ref 0–0.2)
BASOPHILS # BLD AUTO: 0.02 K/UL — SIGNIFICANT CHANGE UP (ref 0–0.2)
BASOPHILS # BLD AUTO: 0.03 K/UL — SIGNIFICANT CHANGE UP (ref 0–0.2)
BASOPHILS # BLD AUTO: 0.04 K/UL — SIGNIFICANT CHANGE UP (ref 0–0.2)
BASOPHILS # BLD AUTO: 0.05 K/UL — SIGNIFICANT CHANGE UP (ref 0–0.2)
BASOPHILS # BLD AUTO: 0.06 K/UL — SIGNIFICANT CHANGE UP (ref 0–0.2)
BASOPHILS # BLD AUTO: 0.06 K/UL — SIGNIFICANT CHANGE UP (ref 0–0.2)
BASOPHILS # BLD AUTO: 0.08 K/UL — SIGNIFICANT CHANGE UP (ref 0–0.2)
BASOPHILS NFR BLD AUTO: 0 % — SIGNIFICANT CHANGE UP (ref 0–2)
BASOPHILS NFR BLD AUTO: 0.1 % — SIGNIFICANT CHANGE UP (ref 0–2)
BASOPHILS NFR BLD AUTO: 0.1 % — SIGNIFICANT CHANGE UP (ref 0–2)
BASOPHILS NFR BLD AUTO: 0.2 % — SIGNIFICANT CHANGE UP (ref 0–2)
BASOPHILS NFR BLD AUTO: 0.3 % — SIGNIFICANT CHANGE UP (ref 0–2)
BASOPHILS NFR BLD AUTO: 0.4 % — SIGNIFICANT CHANGE UP (ref 0–2)
BASOPHILS NFR BLD AUTO: 0.5 % — SIGNIFICANT CHANGE UP (ref 0–1)
BASOPHILS NFR BLD AUTO: 0.6 % — SIGNIFICANT CHANGE UP (ref 0–1)
BASOPHILS NFR BLD AUTO: 0.6 % — SIGNIFICANT CHANGE UP (ref 0–1)
BASOPHILS NFR BLD AUTO: 0.6 % — SIGNIFICANT CHANGE UP (ref 0–2)
BASOPHILS NFR BLD AUTO: 0.7 % — SIGNIFICANT CHANGE UP (ref 0–1)
BASOPHILS NFR BLD AUTO: 0.7 % — SIGNIFICANT CHANGE UP (ref 0–1)
BASOPHILS NFR BLD AUTO: 0.8 % — SIGNIFICANT CHANGE UP (ref 0–1)
BASOPHILS NFR BLD AUTO: 0.8 % — SIGNIFICANT CHANGE UP (ref 0–2)
BILIRUB SERPL-MCNC: 0.2 MG/DL — SIGNIFICANT CHANGE UP (ref 0.2–1.2)
BILIRUB SERPL-MCNC: 0.2 MG/DL — SIGNIFICANT CHANGE UP (ref 0.2–1.2)
BILIRUB SERPL-MCNC: 0.3 MG/DL — SIGNIFICANT CHANGE UP (ref 0.2–1.2)
BILIRUB SERPL-MCNC: 0.4 MG/DL — SIGNIFICANT CHANGE UP (ref 0.2–1.2)
BILIRUB SERPL-MCNC: 0.5 MG/DL — SIGNIFICANT CHANGE UP (ref 0.2–1.2)
BILIRUB SERPL-MCNC: 0.6 MG/DL — SIGNIFICANT CHANGE UP (ref 0.2–1.2)
BILIRUB SERPL-MCNC: 0.7 MG/DL — SIGNIFICANT CHANGE UP (ref 0.2–1.2)
BILIRUB SERPL-MCNC: 0.8 MG/DL — SIGNIFICANT CHANGE UP (ref 0.2–1.2)
BILIRUB SERPL-MCNC: 0.9 MG/DL — SIGNIFICANT CHANGE UP (ref 0.2–1.2)
BILIRUB SERPL-MCNC: 1.2 MG/DL — SIGNIFICANT CHANGE UP (ref 0.2–1.2)
BILIRUB UR-MCNC: NEGATIVE — SIGNIFICANT CHANGE UP
BLD GP AB SCN SERPL QL: NEGATIVE — SIGNIFICANT CHANGE UP
BLD GP AB SCN SERPL QL: NEGATIVE — SIGNIFICANT CHANGE UP
BLD GP AB SCN SERPL QL: POSITIVE — SIGNIFICANT CHANGE UP
BLD GP AB SCN SERPL QL: SIGNIFICANT CHANGE UP
BLD GP AB SCN SERPL QL: SIGNIFICANT CHANGE UP
BUN SERPL-MCNC: 14 MG/DL — SIGNIFICANT CHANGE UP (ref 7–23)
BUN SERPL-MCNC: 15 MG/DL — SIGNIFICANT CHANGE UP (ref 10–20)
BUN SERPL-MCNC: 15 MG/DL — SIGNIFICANT CHANGE UP (ref 7–23)
BUN SERPL-MCNC: 16 MG/DL — SIGNIFICANT CHANGE UP (ref 7–23)
BUN SERPL-MCNC: 17 MG/DL — SIGNIFICANT CHANGE UP (ref 10–20)
BUN SERPL-MCNC: 17 MG/DL — SIGNIFICANT CHANGE UP (ref 10–20)
BUN SERPL-MCNC: 17 MG/DL — SIGNIFICANT CHANGE UP (ref 7–23)
BUN SERPL-MCNC: 18 MG/DL — SIGNIFICANT CHANGE UP (ref 7–23)
BUN SERPL-MCNC: 19 MG/DL — SIGNIFICANT CHANGE UP (ref 7–23)
BUN SERPL-MCNC: 20 MG/DL — SIGNIFICANT CHANGE UP (ref 7–23)
BUN SERPL-MCNC: 20 MG/DL — SIGNIFICANT CHANGE UP (ref 7–23)
BUN SERPL-MCNC: 21 MG/DL — HIGH (ref 10–20)
BUN SERPL-MCNC: 21 MG/DL — SIGNIFICANT CHANGE UP (ref 7–23)
BUN SERPL-MCNC: 22 MG/DL — SIGNIFICANT CHANGE UP (ref 7–23)
BUN SERPL-MCNC: 23 MG/DL — HIGH (ref 10–20)
BUN SERPL-MCNC: 23 MG/DL — HIGH (ref 10–20)
BUN SERPL-MCNC: 23 MG/DL — SIGNIFICANT CHANGE UP (ref 7–23)
BUN SERPL-MCNC: 23 MG/DL — SIGNIFICANT CHANGE UP (ref 7–23)
BUN SERPL-MCNC: 24 MG/DL — HIGH (ref 10–20)
BUN SERPL-MCNC: 24 MG/DL — HIGH (ref 7–23)
BUN SERPL-MCNC: 25 MG/DL — HIGH (ref 7–23)
BUN SERPL-MCNC: 27 MG/DL — HIGH (ref 10–20)
CA-I SERPL-SCNC: 1.21 MMOL/L — SIGNIFICANT CHANGE UP (ref 1.15–1.33)
CA-I SERPL-SCNC: 1.22 MMOL/L — SIGNIFICANT CHANGE UP (ref 1.15–1.33)
CALCIUM SERPL-MCNC: 10.2 MG/DL — SIGNIFICANT CHANGE UP (ref 8.4–10.5)
CALCIUM SERPL-MCNC: 8.3 MG/DL — LOW (ref 8.4–10.5)
CALCIUM SERPL-MCNC: 8.4 MG/DL — SIGNIFICANT CHANGE UP (ref 8.4–10.5)
CALCIUM SERPL-MCNC: 8.5 MG/DL — SIGNIFICANT CHANGE UP (ref 8.4–10.5)
CALCIUM SERPL-MCNC: 8.6 MG/DL — SIGNIFICANT CHANGE UP (ref 8.4–10.5)
CALCIUM SERPL-MCNC: 8.7 MG/DL — SIGNIFICANT CHANGE UP (ref 8.4–10.5)
CALCIUM SERPL-MCNC: 8.8 MG/DL — SIGNIFICANT CHANGE UP (ref 8.4–10.5)
CALCIUM SERPL-MCNC: 8.9 MG/DL — SIGNIFICANT CHANGE UP (ref 8.4–10.5)
CALCIUM SERPL-MCNC: 9 MG/DL — SIGNIFICANT CHANGE UP (ref 8.4–10.5)
CALCIUM SERPL-MCNC: 9.1 MG/DL — SIGNIFICANT CHANGE UP (ref 8.4–10.5)
CALCIUM SERPL-MCNC: 9.1 MG/DL — SIGNIFICANT CHANGE UP (ref 8.5–10.1)
CALCIUM SERPL-MCNC: 9.2 MG/DL — SIGNIFICANT CHANGE UP (ref 8.4–10.5)
CALCIUM SERPL-MCNC: 9.2 MG/DL — SIGNIFICANT CHANGE UP (ref 8.5–10.1)
CALCIUM SERPL-MCNC: 9.3 MG/DL — SIGNIFICANT CHANGE UP (ref 8.4–10.4)
CALCIUM SERPL-MCNC: 9.3 MG/DL — SIGNIFICANT CHANGE UP (ref 8.4–10.5)
CALCIUM SERPL-MCNC: 9.4 MG/DL — SIGNIFICANT CHANGE UP (ref 8.4–10.5)
CALCIUM SERPL-MCNC: 9.5 MG/DL — SIGNIFICANT CHANGE UP (ref 8.4–10.5)
CALCIUM SERPL-MCNC: 9.7 MG/DL — SIGNIFICANT CHANGE UP (ref 8.4–10.5)
CHLORIDE SERPL-SCNC: 100 MMOL/L — SIGNIFICANT CHANGE UP (ref 96–108)
CHLORIDE SERPL-SCNC: 101 MMOL/L — SIGNIFICANT CHANGE UP (ref 96–108)
CHLORIDE SERPL-SCNC: 102 MMOL/L — SIGNIFICANT CHANGE UP (ref 96–108)
CHLORIDE SERPL-SCNC: 103 MMOL/L — SIGNIFICANT CHANGE UP (ref 96–108)
CHLORIDE SERPL-SCNC: 103 MMOL/L — SIGNIFICANT CHANGE UP (ref 98–110)
CHLORIDE SERPL-SCNC: 104 MMOL/L — SIGNIFICANT CHANGE UP (ref 96–108)
CHLORIDE SERPL-SCNC: 104 MMOL/L — SIGNIFICANT CHANGE UP (ref 98–110)
CHLORIDE SERPL-SCNC: 104 MMOL/L — SIGNIFICANT CHANGE UP (ref 98–110)
CHLORIDE SERPL-SCNC: 105 MMOL/L — SIGNIFICANT CHANGE UP (ref 96–108)
CHLORIDE SERPL-SCNC: 105 MMOL/L — SIGNIFICANT CHANGE UP (ref 98–110)
CHLORIDE SERPL-SCNC: 105 MMOL/L — SIGNIFICANT CHANGE UP (ref 98–110)
CHLORIDE SERPL-SCNC: 106 MMOL/L — SIGNIFICANT CHANGE UP (ref 96–108)
CHLORIDE SERPL-SCNC: 106 MMOL/L — SIGNIFICANT CHANGE UP (ref 96–108)
CHLORIDE SERPL-SCNC: 108 MMOL/L — SIGNIFICANT CHANGE UP (ref 96–108)
CHLORIDE SERPL-SCNC: 93 MMOL/L — LOW (ref 96–108)
CHLORIDE SERPL-SCNC: 93 MMOL/L — LOW (ref 98–110)
CHLORIDE SERPL-SCNC: 94 MMOL/L — LOW (ref 96–108)
CHLORIDE SERPL-SCNC: 94 MMOL/L — LOW (ref 98–110)
CHLORIDE SERPL-SCNC: 95 MMOL/L — LOW (ref 98–110)
CHLORIDE SERPL-SCNC: 96 MMOL/L — SIGNIFICANT CHANGE UP (ref 96–108)
CHLORIDE SERPL-SCNC: 97 MMOL/L — SIGNIFICANT CHANGE UP (ref 96–108)
CHLORIDE SERPL-SCNC: 99 MMOL/L — SIGNIFICANT CHANGE UP (ref 96–108)
CHOLEST SERPL-MCNC: 137 MG/DL — SIGNIFICANT CHANGE UP
CK MB CFR SERPL CALC: 1.2 NG/ML — SIGNIFICANT CHANGE UP (ref 0–6.7)
CK SERPL-CCNC: 134 U/L — SIGNIFICANT CHANGE UP (ref 0–225)
CK SERPL-CCNC: 149 U/L — SIGNIFICANT CHANGE UP (ref 25–170)
CK SERPL-CCNC: 18 U/L — LOW (ref 25–170)
CK SERPL-CCNC: 22 U/L — SIGNIFICANT CHANGE UP (ref 0–225)
CK SERPL-CCNC: 28 U/L — SIGNIFICANT CHANGE UP (ref 25–170)
CK SERPL-CCNC: 36 U/L — SIGNIFICANT CHANGE UP (ref 25–170)
CO2 BLDA-SCNC: 27 MMOL/L — HIGH (ref 19–24)
CO2 SERPL-SCNC: 18 MMOL/L — LOW (ref 22–31)
CO2 SERPL-SCNC: 18 MMOL/L — LOW (ref 22–31)
CO2 SERPL-SCNC: 19 MMOL/L — LOW (ref 22–31)
CO2 SERPL-SCNC: 20 MMOL/L — LOW (ref 22–31)
CO2 SERPL-SCNC: 21 MMOL/L — LOW (ref 22–31)
CO2 SERPL-SCNC: 21 MMOL/L — SIGNIFICANT CHANGE UP (ref 17–32)
CO2 SERPL-SCNC: 21 MMOL/L — SIGNIFICANT CHANGE UP (ref 17–32)
CO2 SERPL-SCNC: 22 MMOL/L — SIGNIFICANT CHANGE UP (ref 22–31)
CO2 SERPL-SCNC: 22 MMOL/L — SIGNIFICANT CHANGE UP (ref 22–31)
CO2 SERPL-SCNC: 23 MMOL/L — SIGNIFICANT CHANGE UP (ref 17–32)
CO2 SERPL-SCNC: 23 MMOL/L — SIGNIFICANT CHANGE UP (ref 22–31)
CO2 SERPL-SCNC: 24 MMOL/L — SIGNIFICANT CHANGE UP (ref 17–32)
CO2 SERPL-SCNC: 24 MMOL/L — SIGNIFICANT CHANGE UP (ref 22–31)
CO2 SERPL-SCNC: 25 MMOL/L — SIGNIFICANT CHANGE UP (ref 22–31)
CO2 SERPL-SCNC: 26 MMOL/L — SIGNIFICANT CHANGE UP (ref 22–31)
CO2 SERPL-SCNC: 27 MMOL/L — SIGNIFICANT CHANGE UP (ref 22–31)
CO2 SERPL-SCNC: 27 MMOL/L — SIGNIFICANT CHANGE UP (ref 22–31)
CO2 SERPL-SCNC: 28 MMOL/L — SIGNIFICANT CHANGE UP (ref 17–32)
CO2 SERPL-SCNC: 29 MMOL/L — SIGNIFICANT CHANGE UP (ref 22–31)
CO2 SERPL-SCNC: 29 MMOL/L — SIGNIFICANT CHANGE UP (ref 22–31)
CO2 SERPL-SCNC: 30 MMOL/L — SIGNIFICANT CHANGE UP (ref 22–31)
CO2 SERPL-SCNC: 31 MMOL/L — SIGNIFICANT CHANGE UP (ref 17–32)
CO2 SERPL-SCNC: 31 MMOL/L — SIGNIFICANT CHANGE UP (ref 22–31)
COLOR SPEC: SIGNIFICANT CHANGE UP
COLOR SPEC: YELLOW — SIGNIFICANT CHANGE UP
CORTIS F PM SERPL-MCNC: 7.82 UG/DL — SIGNIFICANT CHANGE UP (ref 2.68–10.5)
CREAT SERPL-MCNC: 0.65 MG/DL — SIGNIFICANT CHANGE UP (ref 0.5–1.3)
CREAT SERPL-MCNC: 0.67 MG/DL — SIGNIFICANT CHANGE UP (ref 0.5–1.3)
CREAT SERPL-MCNC: 0.7 MG/DL — SIGNIFICANT CHANGE UP (ref 0.5–1.3)
CREAT SERPL-MCNC: 0.7 MG/DL — SIGNIFICANT CHANGE UP (ref 0.7–1.5)
CREAT SERPL-MCNC: 0.71 MG/DL — SIGNIFICANT CHANGE UP (ref 0.5–1.3)
CREAT SERPL-MCNC: 0.71 MG/DL — SIGNIFICANT CHANGE UP (ref 0.5–1.3)
CREAT SERPL-MCNC: 0.72 MG/DL — SIGNIFICANT CHANGE UP (ref 0.5–1.3)
CREAT SERPL-MCNC: 0.74 MG/DL — SIGNIFICANT CHANGE UP (ref 0.5–1.3)
CREAT SERPL-MCNC: 0.76 MG/DL — SIGNIFICANT CHANGE UP (ref 0.5–1.3)
CREAT SERPL-MCNC: 0.76 MG/DL — SIGNIFICANT CHANGE UP (ref 0.5–1.3)
CREAT SERPL-MCNC: 0.77 MG/DL — SIGNIFICANT CHANGE UP (ref 0.5–1.3)
CREAT SERPL-MCNC: 0.77 MG/DL — SIGNIFICANT CHANGE UP (ref 0.5–1.3)
CREAT SERPL-MCNC: 0.78 MG/DL — SIGNIFICANT CHANGE UP (ref 0.5–1.3)
CREAT SERPL-MCNC: 0.78 MG/DL — SIGNIFICANT CHANGE UP (ref 0.5–1.3)
CREAT SERPL-MCNC: 0.8 MG/DL — SIGNIFICANT CHANGE UP (ref 0.5–1.3)
CREAT SERPL-MCNC: 0.8 MG/DL — SIGNIFICANT CHANGE UP (ref 0.5–1.3)
CREAT SERPL-MCNC: 0.8 MG/DL — SIGNIFICANT CHANGE UP (ref 0.7–1.5)
CREAT SERPL-MCNC: 0.84 MG/DL — SIGNIFICANT CHANGE UP (ref 0.5–1.3)
CREAT SERPL-MCNC: 0.85 MG/DL — SIGNIFICANT CHANGE UP (ref 0.5–1.3)
CREAT SERPL-MCNC: 0.85 MG/DL — SIGNIFICANT CHANGE UP (ref 0.5–1.3)
CREAT SERPL-MCNC: 0.86 MG/DL — SIGNIFICANT CHANGE UP (ref 0.5–1.3)
CREAT SERPL-MCNC: 0.87 MG/DL — SIGNIFICANT CHANGE UP (ref 0.5–1.3)
CREAT SERPL-MCNC: 0.87 MG/DL — SIGNIFICANT CHANGE UP (ref 0.5–1.3)
CREAT SERPL-MCNC: 0.88 MG/DL — SIGNIFICANT CHANGE UP (ref 0.5–1.3)
CREAT SERPL-MCNC: 0.88 MG/DL — SIGNIFICANT CHANGE UP (ref 0.5–1.3)
CREAT SERPL-MCNC: 0.89 MG/DL — SIGNIFICANT CHANGE UP (ref 0.5–1.3)
CREAT SERPL-MCNC: 0.9 MG/DL — SIGNIFICANT CHANGE UP (ref 0.5–1.3)
CREAT SERPL-MCNC: 0.9 MG/DL — SIGNIFICANT CHANGE UP (ref 0.7–1.5)
CREAT SERPL-MCNC: 0.9 MG/DL — SIGNIFICANT CHANGE UP (ref 0.7–1.5)
CREAT SERPL-MCNC: 0.91 MG/DL — SIGNIFICANT CHANGE UP (ref 0.5–1.3)
CREAT SERPL-MCNC: 0.91 MG/DL — SIGNIFICANT CHANGE UP (ref 0.5–1.3)
CREAT SERPL-MCNC: 0.92 MG/DL — SIGNIFICANT CHANGE UP (ref 0.5–1.3)
CREAT SERPL-MCNC: 0.92 MG/DL — SIGNIFICANT CHANGE UP (ref 0.5–1.3)
CREAT SERPL-MCNC: 0.95 MG/DL — SIGNIFICANT CHANGE UP (ref 0.5–1.3)
CREAT SERPL-MCNC: 0.96 MG/DL — SIGNIFICANT CHANGE UP (ref 0.5–1.3)
CREAT SERPL-MCNC: 0.96 MG/DL — SIGNIFICANT CHANGE UP (ref 0.5–1.3)
CREAT SERPL-MCNC: 0.97 MG/DL — SIGNIFICANT CHANGE UP (ref 0.5–1.3)
CREAT SERPL-MCNC: 0.98 MG/DL — SIGNIFICANT CHANGE UP (ref 0.5–1.3)
CREAT SERPL-MCNC: 0.99 MG/DL — SIGNIFICANT CHANGE UP (ref 0.5–1.3)
CREAT SERPL-MCNC: 1 MG/DL — SIGNIFICANT CHANGE UP (ref 0.5–1.3)
CREAT SERPL-MCNC: 1 MG/DL — SIGNIFICANT CHANGE UP (ref 0.7–1.5)
CREAT SERPL-MCNC: 1.01 MG/DL — SIGNIFICANT CHANGE UP (ref 0.5–1.3)
CREAT SERPL-MCNC: 1.02 MG/DL — SIGNIFICANT CHANGE UP (ref 0.5–1.3)
CREAT SERPL-MCNC: 1.09 MG/DL — SIGNIFICANT CHANGE UP (ref 0.5–1.3)
CREAT SERPL-MCNC: 1.2 MG/DL — SIGNIFICANT CHANGE UP (ref 0.7–1.5)
CREAT SERPL-MCNC: 1.24 MG/DL — SIGNIFICANT CHANGE UP (ref 0.5–1.3)
CRP SERPL-MCNC: 44.8 MG/L — HIGH
CRP SERPL-MCNC: 48.8 MG/L — HIGH (ref 0–4)
CULTURE RESULTS: NO GROWTH — SIGNIFICANT CHANGE UP
CULTURE RESULTS: SIGNIFICANT CHANGE UP
DIFF PNL FLD: ABNORMAL
DIFF PNL FLD: NEGATIVE — SIGNIFICANT CHANGE UP
DIR ANTIGLOB POLYSPECIFIC INTERPRETATION: SIGNIFICANT CHANGE UP
EGFR: 45 ML/MIN/1.73M2 — LOW
EGFR: 47 ML/MIN/1.73M2 — LOW
EGFR: 53 ML/MIN/1.73M2 — LOW
EGFR: 57 ML/MIN/1.73M2 — LOW
EGFR: 58 ML/MIN/1.73M2 — LOW
EGFR: 59 ML/MIN/1.73M2 — LOW
EGFR: 60 ML/MIN/1.73M2 — SIGNIFICANT CHANGE UP
EGFR: 61 ML/MIN/1.73M2 — SIGNIFICANT CHANGE UP
EGFR: 62 ML/MIN/1.73M2 — SIGNIFICANT CHANGE UP
EGFR: 65 ML/MIN/1.73M2 — SIGNIFICANT CHANGE UP
EGFR: 66 ML/MIN/1.73M2 — SIGNIFICANT CHANGE UP
EGFR: 67 ML/MIN/1.73M2 — SIGNIFICANT CHANGE UP
EGFR: 68 ML/MIN/1.73M2 — SIGNIFICANT CHANGE UP
EGFR: 68 ML/MIN/1.73M2 — SIGNIFICANT CHANGE UP
EGFR: 69 ML/MIN/1.73M2 — SIGNIFICANT CHANGE UP
EGFR: 69 ML/MIN/1.73M2 — SIGNIFICANT CHANGE UP
EGFR: 70 ML/MIN/1.73M2 — SIGNIFICANT CHANGE UP
EGFR: 71 ML/MIN/1.73M2 — SIGNIFICANT CHANGE UP
EGFR: 71 ML/MIN/1.73M2 — SIGNIFICANT CHANGE UP
EGFR: 72 ML/MIN/1.73M2 — SIGNIFICANT CHANGE UP
EGFR: 76 ML/MIN/1.73M2 — SIGNIFICANT CHANGE UP
EGFR: 79 ML/MIN/1.73M2 — SIGNIFICANT CHANGE UP
EGFR: 79 ML/MIN/1.73M2 — SIGNIFICANT CHANGE UP
EGFR: 80 ML/MIN/1.73M2 — SIGNIFICANT CHANGE UP
EGFR: 80 ML/MIN/1.73M2 — SIGNIFICANT CHANGE UP
EGFR: 81 ML/MIN/1.73M2 — SIGNIFICANT CHANGE UP
EGFR: 81 ML/MIN/1.73M2 — SIGNIFICANT CHANGE UP
EGFR: 84 ML/MIN/1.73M2 — SIGNIFICANT CHANGE UP
EGFR: 87 ML/MIN/1.73M2 — SIGNIFICANT CHANGE UP
EGFR: 88 ML/MIN/1.73M2 — SIGNIFICANT CHANGE UP
EGFR: 88 ML/MIN/1.73M2 — SIGNIFICANT CHANGE UP
EGFR: 90 ML/MIN/1.73M2 — SIGNIFICANT CHANGE UP
EGFR: 90 ML/MIN/1.73M2 — SIGNIFICANT CHANGE UP
EGFR: 91 ML/MIN/1.73M2 — SIGNIFICANT CHANGE UP
EGFR: 91 ML/MIN/1.73M2 — SIGNIFICANT CHANGE UP
EOSINOPHIL # BLD AUTO: 0 K/UL — SIGNIFICANT CHANGE UP (ref 0–0.5)
EOSINOPHIL # BLD AUTO: 0 K/UL — SIGNIFICANT CHANGE UP (ref 0–0.5)
EOSINOPHIL # BLD AUTO: 0.02 K/UL — SIGNIFICANT CHANGE UP (ref 0–0.5)
EOSINOPHIL # BLD AUTO: 0.03 K/UL — SIGNIFICANT CHANGE UP (ref 0–0.5)
EOSINOPHIL # BLD AUTO: 0.05 K/UL — SIGNIFICANT CHANGE UP (ref 0–0.5)
EOSINOPHIL # BLD AUTO: 0.06 K/UL — SIGNIFICANT CHANGE UP (ref 0–0.5)
EOSINOPHIL # BLD AUTO: 0.07 K/UL — SIGNIFICANT CHANGE UP (ref 0–0.5)
EOSINOPHIL # BLD AUTO: 0.08 K/UL — SIGNIFICANT CHANGE UP (ref 0–0.5)
EOSINOPHIL # BLD AUTO: 0.1 K/UL — SIGNIFICANT CHANGE UP (ref 0–0.7)
EOSINOPHIL # BLD AUTO: 0.11 K/UL — SIGNIFICANT CHANGE UP (ref 0–0.5)
EOSINOPHIL # BLD AUTO: 0.12 K/UL — SIGNIFICANT CHANGE UP (ref 0–0.5)
EOSINOPHIL # BLD AUTO: 0.12 K/UL — SIGNIFICANT CHANGE UP (ref 0–0.7)
EOSINOPHIL # BLD AUTO: 0.14 K/UL — SIGNIFICANT CHANGE UP (ref 0–0.7)
EOSINOPHIL # BLD AUTO: 0.14 K/UL — SIGNIFICANT CHANGE UP (ref 0–0.7)
EOSINOPHIL # BLD AUTO: 0.15 K/UL — SIGNIFICANT CHANGE UP (ref 0–0.5)
EOSINOPHIL # BLD AUTO: 0.17 K/UL — SIGNIFICANT CHANGE UP (ref 0–0.7)
EOSINOPHIL # BLD AUTO: 0.23 K/UL — SIGNIFICANT CHANGE UP (ref 0–0.5)
EOSINOPHIL # BLD AUTO: 0.23 K/UL — SIGNIFICANT CHANGE UP (ref 0–0.5)
EOSINOPHIL # BLD AUTO: 0.26 K/UL — SIGNIFICANT CHANGE UP (ref 0–0.7)
EOSINOPHIL # BLD AUTO: 0.28 K/UL — SIGNIFICANT CHANGE UP (ref 0–0.5)
EOSINOPHIL # BLD AUTO: 0.28 K/UL — SIGNIFICANT CHANGE UP (ref 0–0.5)
EOSINOPHIL NFR BLD AUTO: 0 % — SIGNIFICANT CHANGE UP (ref 0–6)
EOSINOPHIL NFR BLD AUTO: 0 % — SIGNIFICANT CHANGE UP (ref 0–6)
EOSINOPHIL NFR BLD AUTO: 0.2 % — SIGNIFICANT CHANGE UP (ref 0–6)
EOSINOPHIL NFR BLD AUTO: 0.2 % — SIGNIFICANT CHANGE UP (ref 0–6)
EOSINOPHIL NFR BLD AUTO: 0.5 % — SIGNIFICANT CHANGE UP (ref 0–6)
EOSINOPHIL NFR BLD AUTO: 0.6 % — SIGNIFICANT CHANGE UP (ref 0–6)
EOSINOPHIL NFR BLD AUTO: 0.6 % — SIGNIFICANT CHANGE UP (ref 0–6)
EOSINOPHIL NFR BLD AUTO: 0.7 % — SIGNIFICANT CHANGE UP (ref 0–6)
EOSINOPHIL NFR BLD AUTO: 0.8 % — SIGNIFICANT CHANGE UP (ref 0–6)
EOSINOPHIL NFR BLD AUTO: 0.8 % — SIGNIFICANT CHANGE UP (ref 0–6)
EOSINOPHIL NFR BLD AUTO: 1 % — SIGNIFICANT CHANGE UP (ref 0–6)
EOSINOPHIL NFR BLD AUTO: 1.2 % — SIGNIFICANT CHANGE UP (ref 0–8)
EOSINOPHIL NFR BLD AUTO: 1.3 % — SIGNIFICANT CHANGE UP (ref 0–6)
EOSINOPHIL NFR BLD AUTO: 1.4 % — SIGNIFICANT CHANGE UP (ref 0–6)
EOSINOPHIL NFR BLD AUTO: 1.5 % — SIGNIFICANT CHANGE UP (ref 0–8)
EOSINOPHIL NFR BLD AUTO: 1.6 % — SIGNIFICANT CHANGE UP (ref 0–8)
EOSINOPHIL NFR BLD AUTO: 2 % — SIGNIFICANT CHANGE UP (ref 0–6)
EOSINOPHIL NFR BLD AUTO: 2.1 % — SIGNIFICANT CHANGE UP (ref 0–8)
EOSINOPHIL NFR BLD AUTO: 2.3 % — SIGNIFICANT CHANGE UP (ref 0–6)
EOSINOPHIL NFR BLD AUTO: 2.5 % — SIGNIFICANT CHANGE UP (ref 0–6)
EOSINOPHIL NFR BLD AUTO: 2.8 % — SIGNIFICANT CHANGE UP (ref 0–8)
EOSINOPHIL NFR BLD AUTO: 2.9 % — SIGNIFICANT CHANGE UP (ref 0–8)
EOSINOPHIL NFR BLD AUTO: 5.5 % — SIGNIFICANT CHANGE UP (ref 0–6)
EPI CELLS # UR: 3 /HPF — SIGNIFICANT CHANGE UP (ref 0–5)
EPI CELLS # UR: 9 /HPF — HIGH (ref 0–5)
EPI CELLS # UR: ABNORMAL /HPF (ref 0–5)
EPI CELLS # UR: SIGNIFICANT CHANGE UP /HPF (ref 0–5)
EPI CELLS # UR: SIGNIFICANT CHANGE UP /HPF (ref 0–5)
ERYTHROCYTE [SEDIMENTATION RATE] IN BLOOD: 112 MM/HR — HIGH
ERYTHROCYTE [SEDIMENTATION RATE] IN BLOOD: 94 MM/HR — HIGH (ref 0–20)
ESTIMATED AVERAGE GLUCOSE: 100 MG/DL — SIGNIFICANT CHANGE UP (ref 68–114)
ESTIMATED AVERAGE GLUCOSE: 111 MG/DL — SIGNIFICANT CHANGE UP (ref 68–114)
ESTIMATED AVERAGE GLUCOSE: 114 MG/DL — SIGNIFICANT CHANGE UP (ref 68–114)
FERRITIN SERPL-MCNC: 1986 NG/ML — HIGH (ref 15–150)
FOLATE SERPL-MCNC: 6.6 NG/ML — SIGNIFICANT CHANGE UP
GAS PNL BLDA: SIGNIFICANT CHANGE UP
GAS PNL BLDV: 132 MMOL/L — LOW (ref 136–145)
GAS PNL BLDV: 136 MMOL/L — SIGNIFICANT CHANGE UP (ref 136–145)
GAS PNL BLDV: SIGNIFICANT CHANGE UP
GIANT PLATELETS BLD QL SMEAR: PRESENT — SIGNIFICANT CHANGE UP
GLUCOSE BLDC GLUCOMTR-MCNC: 100 MG/DL — HIGH (ref 70–99)
GLUCOSE BLDC GLUCOMTR-MCNC: 102 MG/DL — HIGH (ref 70–99)
GLUCOSE BLDC GLUCOMTR-MCNC: 103 MG/DL — HIGH (ref 70–99)
GLUCOSE BLDC GLUCOMTR-MCNC: 105 MG/DL — HIGH (ref 70–99)
GLUCOSE BLDC GLUCOMTR-MCNC: 107 MG/DL — HIGH (ref 70–99)
GLUCOSE BLDC GLUCOMTR-MCNC: 110 MG/DL — HIGH (ref 70–99)
GLUCOSE BLDC GLUCOMTR-MCNC: 110 MG/DL — HIGH (ref 70–99)
GLUCOSE BLDC GLUCOMTR-MCNC: 112 MG/DL — HIGH (ref 70–99)
GLUCOSE BLDC GLUCOMTR-MCNC: 113 MG/DL — HIGH (ref 70–99)
GLUCOSE BLDC GLUCOMTR-MCNC: 114 MG/DL — HIGH (ref 70–99)
GLUCOSE BLDC GLUCOMTR-MCNC: 115 MG/DL — HIGH (ref 70–99)
GLUCOSE BLDC GLUCOMTR-MCNC: 116 MG/DL — HIGH (ref 70–99)
GLUCOSE BLDC GLUCOMTR-MCNC: 117 MG/DL — HIGH (ref 70–99)
GLUCOSE BLDC GLUCOMTR-MCNC: 119 MG/DL — HIGH (ref 70–99)
GLUCOSE BLDC GLUCOMTR-MCNC: 120 MG/DL — HIGH (ref 70–99)
GLUCOSE BLDC GLUCOMTR-MCNC: 122 MG/DL — HIGH (ref 70–99)
GLUCOSE BLDC GLUCOMTR-MCNC: 123 MG/DL — HIGH (ref 70–99)
GLUCOSE BLDC GLUCOMTR-MCNC: 128 MG/DL — HIGH (ref 70–99)
GLUCOSE BLDC GLUCOMTR-MCNC: 128 MG/DL — HIGH (ref 70–99)
GLUCOSE BLDC GLUCOMTR-MCNC: 130 MG/DL — HIGH (ref 70–99)
GLUCOSE BLDC GLUCOMTR-MCNC: 132 MG/DL — HIGH (ref 70–99)
GLUCOSE BLDC GLUCOMTR-MCNC: 132 MG/DL — HIGH (ref 70–99)
GLUCOSE BLDC GLUCOMTR-MCNC: 137 MG/DL — HIGH (ref 70–99)
GLUCOSE BLDC GLUCOMTR-MCNC: 150 MG/DL — HIGH (ref 70–99)
GLUCOSE BLDC GLUCOMTR-MCNC: 150 MG/DL — HIGH (ref 70–99)
GLUCOSE BLDC GLUCOMTR-MCNC: 160 MG/DL — HIGH (ref 70–99)
GLUCOSE BLDC GLUCOMTR-MCNC: 172 MG/DL — HIGH (ref 70–99)
GLUCOSE BLDC GLUCOMTR-MCNC: 182 MG/DL — HIGH (ref 70–99)
GLUCOSE BLDC GLUCOMTR-MCNC: 259 MG/DL — HIGH (ref 70–99)
GLUCOSE BLDC GLUCOMTR-MCNC: 292 MG/DL — HIGH (ref 70–99)
GLUCOSE BLDC GLUCOMTR-MCNC: 82 MG/DL — SIGNIFICANT CHANGE UP (ref 70–99)
GLUCOSE BLDC GLUCOMTR-MCNC: 84 MG/DL — SIGNIFICANT CHANGE UP (ref 70–99)
GLUCOSE BLDC GLUCOMTR-MCNC: 86 MG/DL — SIGNIFICANT CHANGE UP (ref 70–99)
GLUCOSE BLDC GLUCOMTR-MCNC: 86 MG/DL — SIGNIFICANT CHANGE UP (ref 70–99)
GLUCOSE BLDC GLUCOMTR-MCNC: 87 MG/DL — SIGNIFICANT CHANGE UP (ref 70–99)
GLUCOSE BLDC GLUCOMTR-MCNC: 88 MG/DL — SIGNIFICANT CHANGE UP (ref 70–99)
GLUCOSE BLDC GLUCOMTR-MCNC: 89 MG/DL — SIGNIFICANT CHANGE UP (ref 70–99)
GLUCOSE BLDC GLUCOMTR-MCNC: 93 MG/DL — SIGNIFICANT CHANGE UP (ref 70–99)
GLUCOSE BLDC GLUCOMTR-MCNC: 95 MG/DL — SIGNIFICANT CHANGE UP (ref 70–99)
GLUCOSE BLDC GLUCOMTR-MCNC: 95 MG/DL — SIGNIFICANT CHANGE UP (ref 70–99)
GLUCOSE BLDC GLUCOMTR-MCNC: 97 MG/DL — SIGNIFICANT CHANGE UP (ref 70–99)
GLUCOSE BLDC GLUCOMTR-MCNC: 97 MG/DL — SIGNIFICANT CHANGE UP (ref 70–99)
GLUCOSE SERPL-MCNC: 100 MG/DL — HIGH (ref 70–99)
GLUCOSE SERPL-MCNC: 103 MG/DL — HIGH (ref 70–99)
GLUCOSE SERPL-MCNC: 104 MG/DL — HIGH (ref 70–99)
GLUCOSE SERPL-MCNC: 105 MG/DL — HIGH (ref 70–99)
GLUCOSE SERPL-MCNC: 110 MG/DL — HIGH (ref 70–99)
GLUCOSE SERPL-MCNC: 118 MG/DL — HIGH (ref 70–99)
GLUCOSE SERPL-MCNC: 120 MG/DL — HIGH (ref 70–99)
GLUCOSE SERPL-MCNC: 124 MG/DL — HIGH (ref 70–99)
GLUCOSE SERPL-MCNC: 125 MG/DL — HIGH (ref 70–99)
GLUCOSE SERPL-MCNC: 129 MG/DL — HIGH (ref 70–99)
GLUCOSE SERPL-MCNC: 130 MG/DL — HIGH (ref 70–99)
GLUCOSE SERPL-MCNC: 132 MG/DL — HIGH (ref 70–99)
GLUCOSE SERPL-MCNC: 132 MG/DL — HIGH (ref 70–99)
GLUCOSE SERPL-MCNC: 141 MG/DL — HIGH (ref 70–99)
GLUCOSE SERPL-MCNC: 143 MG/DL — HIGH (ref 70–99)
GLUCOSE SERPL-MCNC: 143 MG/DL — HIGH (ref 70–99)
GLUCOSE SERPL-MCNC: 144 MG/DL — HIGH (ref 70–99)
GLUCOSE SERPL-MCNC: 144 MG/DL — HIGH (ref 70–99)
GLUCOSE SERPL-MCNC: 148 MG/DL — HIGH (ref 70–99)
GLUCOSE SERPL-MCNC: 150 MG/DL — HIGH (ref 70–99)
GLUCOSE SERPL-MCNC: 153 MG/DL — HIGH (ref 70–99)
GLUCOSE SERPL-MCNC: 179 MG/DL — HIGH (ref 70–99)
GLUCOSE SERPL-MCNC: 185 MG/DL — HIGH (ref 70–99)
GLUCOSE SERPL-MCNC: 217 MG/DL — HIGH (ref 70–99)
GLUCOSE SERPL-MCNC: 75 MG/DL — SIGNIFICANT CHANGE UP (ref 70–99)
GLUCOSE SERPL-MCNC: 83 MG/DL — SIGNIFICANT CHANGE UP (ref 70–99)
GLUCOSE SERPL-MCNC: 84 MG/DL — SIGNIFICANT CHANGE UP (ref 70–99)
GLUCOSE SERPL-MCNC: 85 MG/DL — SIGNIFICANT CHANGE UP (ref 70–99)
GLUCOSE SERPL-MCNC: 86 MG/DL — SIGNIFICANT CHANGE UP (ref 70–99)
GLUCOSE SERPL-MCNC: 86 MG/DL — SIGNIFICANT CHANGE UP (ref 70–99)
GLUCOSE SERPL-MCNC: 87 MG/DL — SIGNIFICANT CHANGE UP (ref 70–99)
GLUCOSE SERPL-MCNC: 88 MG/DL — SIGNIFICANT CHANGE UP (ref 70–99)
GLUCOSE SERPL-MCNC: 89 MG/DL — SIGNIFICANT CHANGE UP (ref 70–99)
GLUCOSE SERPL-MCNC: 90 MG/DL — SIGNIFICANT CHANGE UP (ref 70–99)
GLUCOSE SERPL-MCNC: 92 MG/DL — SIGNIFICANT CHANGE UP (ref 70–99)
GLUCOSE SERPL-MCNC: 94 MG/DL — SIGNIFICANT CHANGE UP (ref 70–99)
GLUCOSE SERPL-MCNC: 95 MG/DL — SIGNIFICANT CHANGE UP (ref 70–99)
GLUCOSE SERPL-MCNC: 95 MG/DL — SIGNIFICANT CHANGE UP (ref 70–99)
GLUCOSE SERPL-MCNC: 96 MG/DL — SIGNIFICANT CHANGE UP (ref 70–99)
GLUCOSE SERPL-MCNC: 97 MG/DL — SIGNIFICANT CHANGE UP (ref 70–99)
GLUCOSE SERPL-MCNC: 98 MG/DL — SIGNIFICANT CHANGE UP (ref 70–99)
GLUCOSE SERPL-MCNC: 98 MG/DL — SIGNIFICANT CHANGE UP (ref 70–99)
GLUCOSE SERPL-MCNC: 99 MG/DL — SIGNIFICANT CHANGE UP (ref 70–99)
GLUCOSE SERPL-MCNC: 99 MG/DL — SIGNIFICANT CHANGE UP (ref 70–99)
GLUCOSE UR QL: NEGATIVE — SIGNIFICANT CHANGE UP
GRAM STN FLD: SIGNIFICANT CHANGE UP
HCO3 BLDA-SCNC: 26 MMOL/L — SIGNIFICANT CHANGE UP (ref 21–28)
HCO3 BLDV-SCNC: 25 MMOL/L — SIGNIFICANT CHANGE UP (ref 22–29)
HCO3 BLDV-SCNC: 33 MMOL/L — HIGH (ref 22–29)
HCT VFR BLD CALC: 20.5 % — CRITICAL LOW (ref 34.5–45)
HCT VFR BLD CALC: 20.9 % — CRITICAL LOW (ref 34.5–45)
HCT VFR BLD CALC: 21.2 % — LOW (ref 34.5–45)
HCT VFR BLD CALC: 21.9 % — LOW (ref 34.5–45)
HCT VFR BLD CALC: 22.3 % — LOW (ref 34.5–45)
HCT VFR BLD CALC: 22.5 % — LOW (ref 34.5–45)
HCT VFR BLD CALC: 22.9 % — LOW (ref 34.5–45)
HCT VFR BLD CALC: 23.1 % — LOW (ref 34.5–45)
HCT VFR BLD CALC: 23.4 % — LOW (ref 37–47)
HCT VFR BLD CALC: 23.5 % — LOW (ref 34.5–45)
HCT VFR BLD CALC: 23.6 % — LOW (ref 34.5–45)
HCT VFR BLD CALC: 23.7 % — LOW (ref 34.5–45)
HCT VFR BLD CALC: 23.7 % — LOW (ref 34.5–45)
HCT VFR BLD CALC: 23.7 % — LOW (ref 37–47)
HCT VFR BLD CALC: 24 % — LOW (ref 34.5–45)
HCT VFR BLD CALC: 24.4 % — LOW (ref 34.5–45)
HCT VFR BLD CALC: 24.5 % — LOW (ref 34.5–45)
HCT VFR BLD CALC: 24.5 % — LOW (ref 34.5–45)
HCT VFR BLD CALC: 24.6 % — LOW (ref 34.5–45)
HCT VFR BLD CALC: 24.8 % — LOW (ref 34.5–45)
HCT VFR BLD CALC: 25.2 % — LOW (ref 34.5–45)
HCT VFR BLD CALC: 25.3 % — LOW (ref 34.5–45)
HCT VFR BLD CALC: 25.3 % — LOW (ref 34.5–45)
HCT VFR BLD CALC: 25.4 % — LOW (ref 34.5–45)
HCT VFR BLD CALC: 25.4 % — LOW (ref 37–47)
HCT VFR BLD CALC: 25.5 % — LOW (ref 34.5–45)
HCT VFR BLD CALC: 25.5 % — LOW (ref 34.5–45)
HCT VFR BLD CALC: 25.6 % — LOW (ref 34.5–45)
HCT VFR BLD CALC: 25.9 % — LOW (ref 34.5–45)
HCT VFR BLD CALC: 26 % — LOW (ref 34.5–45)
HCT VFR BLD CALC: 26 % — LOW (ref 34.5–45)
HCT VFR BLD CALC: 26.1 % — LOW (ref 37–47)
HCT VFR BLD CALC: 26.3 % — LOW (ref 34.5–45)
HCT VFR BLD CALC: 26.7 % — LOW (ref 34.5–45)
HCT VFR BLD CALC: 26.8 % — LOW (ref 34.5–45)
HCT VFR BLD CALC: 27.5 % — LOW (ref 34.5–45)
HCT VFR BLD CALC: 27.5 % — LOW (ref 34.5–45)
HCT VFR BLD CALC: 27.6 % — LOW (ref 34.5–45)
HCT VFR BLD CALC: 27.7 % — LOW (ref 37–47)
HCT VFR BLD CALC: 27.8 % — LOW (ref 34.5–45)
HCT VFR BLD CALC: 28.1 % — LOW (ref 37–47)
HCT VFR BLD CALC: 28.1 % — LOW (ref 37–47)
HCT VFR BLD CALC: 28.2 % — LOW (ref 37–47)
HCT VFR BLD CALC: 28.4 % — LOW (ref 34.5–45)
HCT VFR BLD CALC: 28.5 % — LOW (ref 34.5–45)
HCT VFR BLD CALC: 29 % — LOW (ref 34.5–45)
HCT VFR BLD CALC: 29.3 % — LOW (ref 34.5–45)
HCT VFR BLD CALC: 30.1 % — LOW (ref 37–47)
HCT VFR BLD CALC: 30.2 % — LOW (ref 34.5–45)
HCT VFR BLD CALC: 30.3 % — LOW (ref 34.5–45)
HCT VFR BLD CALC: 30.3 % — LOW (ref 37–47)
HCT VFR BLD CALC: 32.3 % — LOW (ref 34.5–45)
HCT VFR BLD CALC: 32.3 % — LOW (ref 34.5–45)
HCT VFR BLDA CALC: 26 % — LOW (ref 39–51)
HCT VFR BLDA CALC: 35 % — LOW (ref 39–51)
HDLC SERPL-MCNC: 20 MG/DL — LOW
HEPARIN-PF4 AB RESULT: <0.6 U/ML — SIGNIFICANT CHANGE UP (ref 0–0.9)
HGB BLD CALC-MCNC: 11.5 G/DL — LOW (ref 12.6–17.4)
HGB BLD CALC-MCNC: 8.8 G/DL — LOW (ref 12.6–17.4)
HGB BLD-MCNC: 10.2 G/DL — LOW (ref 11.5–15.5)
HGB BLD-MCNC: 10.4 G/DL — LOW (ref 11.5–15.5)
HGB BLD-MCNC: 6.7 G/DL — CRITICAL LOW (ref 11.5–15.5)
HGB BLD-MCNC: 6.9 G/DL — CRITICAL LOW (ref 11.5–15.5)
HGB BLD-MCNC: 7 G/DL — CRITICAL LOW (ref 11.5–15.5)
HGB BLD-MCNC: 7.1 G/DL — LOW (ref 11.5–15.5)
HGB BLD-MCNC: 7.1 G/DL — LOW (ref 11.5–15.5)
HGB BLD-MCNC: 7.3 G/DL — LOW (ref 11.5–15.5)
HGB BLD-MCNC: 7.3 G/DL — LOW (ref 11.5–15.5)
HGB BLD-MCNC: 7.4 G/DL — LOW (ref 11.5–15.5)
HGB BLD-MCNC: 7.5 G/DL — LOW (ref 11.5–15.5)
HGB BLD-MCNC: 7.5 G/DL — LOW (ref 12–16)
HGB BLD-MCNC: 7.6 G/DL — LOW (ref 12–16)
HGB BLD-MCNC: 7.7 G/DL — LOW (ref 11.5–15.5)
HGB BLD-MCNC: 7.8 G/DL — LOW (ref 11.5–15.5)
HGB BLD-MCNC: 7.9 G/DL — LOW (ref 11.5–15.5)
HGB BLD-MCNC: 8 G/DL — LOW (ref 11.5–15.5)
HGB BLD-MCNC: 8.1 G/DL — LOW (ref 11.5–15.5)
HGB BLD-MCNC: 8.2 G/DL — LOW (ref 11.5–15.5)
HGB BLD-MCNC: 8.2 G/DL — LOW (ref 11.5–15.5)
HGB BLD-MCNC: 8.2 G/DL — LOW (ref 12–16)
HGB BLD-MCNC: 8.3 G/DL — LOW (ref 11.5–15.5)
HGB BLD-MCNC: 8.4 G/DL — LOW (ref 11.5–15.5)
HGB BLD-MCNC: 8.4 G/DL — LOW (ref 11.5–15.5)
HGB BLD-MCNC: 8.5 G/DL — LOW (ref 11.5–15.5)
HGB BLD-MCNC: 8.7 G/DL — LOW (ref 11.5–15.5)
HGB BLD-MCNC: 8.7 G/DL — LOW (ref 12–16)
HGB BLD-MCNC: 8.8 G/DL — LOW (ref 11.5–15.5)
HGB BLD-MCNC: 8.8 G/DL — LOW (ref 12–16)
HGB BLD-MCNC: 8.9 G/DL — LOW (ref 11.5–15.5)
HGB BLD-MCNC: 9 G/DL — LOW (ref 12–16)
HGB BLD-MCNC: 9.1 G/DL — LOW (ref 11.5–15.5)
HGB BLD-MCNC: 9.2 G/DL — LOW (ref 12–16)
HGB BLD-MCNC: 9.2 G/DL — LOW (ref 12–16)
HGB BLD-MCNC: 9.3 G/DL — LOW (ref 11.5–15.5)
HGB BLD-MCNC: 9.6 G/DL — LOW (ref 11.5–15.5)
HGB BLD-MCNC: 9.6 G/DL — LOW (ref 11.5–15.5)
HGB BLD-MCNC: 9.8 G/DL — LOW (ref 12–16)
HGB BLD-MCNC: 9.9 G/DL — LOW (ref 12–16)
HYALINE CASTS # UR AUTO: 30 /LPF — HIGH (ref 0–7)
HYALINE CASTS # UR AUTO: 5 /LPF — SIGNIFICANT CHANGE UP (ref 0–7)
IMM GRANULOCYTES NFR BLD AUTO: 0.2 % — SIGNIFICANT CHANGE UP (ref 0–1.5)
IMM GRANULOCYTES NFR BLD AUTO: 0.3 % — SIGNIFICANT CHANGE UP (ref 0.1–0.3)
IMM GRANULOCYTES NFR BLD AUTO: 0.4 % — HIGH (ref 0.1–0.3)
IMM GRANULOCYTES NFR BLD AUTO: 0.4 % — HIGH (ref 0.1–0.3)
IMM GRANULOCYTES NFR BLD AUTO: 0.4 % — SIGNIFICANT CHANGE UP (ref 0–0.9)
IMM GRANULOCYTES NFR BLD AUTO: 0.4 % — SIGNIFICANT CHANGE UP (ref 0–1.5)
IMM GRANULOCYTES NFR BLD AUTO: 0.4 % — SIGNIFICANT CHANGE UP (ref 0–1.5)
IMM GRANULOCYTES NFR BLD AUTO: 0.5 % — SIGNIFICANT CHANGE UP (ref 0–1.5)
IMM GRANULOCYTES NFR BLD AUTO: 0.6 % — HIGH (ref 0.1–0.3)
IMM GRANULOCYTES NFR BLD AUTO: 0.6 % — SIGNIFICANT CHANGE UP (ref 0–0.9)
IMM GRANULOCYTES NFR BLD AUTO: 0.6 % — SIGNIFICANT CHANGE UP (ref 0–1.5)
IMM GRANULOCYTES NFR BLD AUTO: 0.6 % — SIGNIFICANT CHANGE UP (ref 0–1.5)
IMM GRANULOCYTES NFR BLD AUTO: 0.7 % — SIGNIFICANT CHANGE UP (ref 0–1.5)
IMM GRANULOCYTES NFR BLD AUTO: 0.7 % — SIGNIFICANT CHANGE UP (ref 0–1.5)
IMM GRANULOCYTES NFR BLD AUTO: 0.8 % — HIGH (ref 0.1–0.3)
IMM GRANULOCYTES NFR BLD AUTO: 0.8 % — HIGH (ref 0.1–0.3)
IMM GRANULOCYTES NFR BLD AUTO: 1.1 % — SIGNIFICANT CHANGE UP (ref 0–1.5)
INR BLD: 1.06 RATIO — SIGNIFICANT CHANGE UP (ref 0.65–1.3)
INR BLD: 1.08 RATIO — SIGNIFICANT CHANGE UP (ref 0.65–1.3)
INR BLD: 1.1 — SIGNIFICANT CHANGE UP (ref 0.88–1.16)
INR BLD: 1.16 — SIGNIFICANT CHANGE UP (ref 0.88–1.16)
INR BLD: 1.17 — HIGH (ref 0.88–1.16)
INR BLD: 1.17 — HIGH (ref 0.88–1.16)
INR BLD: 1.19 — HIGH (ref 0.88–1.16)
INR BLD: 1.26 — HIGH (ref 0.88–1.16)
INR BLD: 1.26 — HIGH (ref 0.88–1.16)
INR BLD: 1.27 — HIGH (ref 0.88–1.16)
INR BLD: 1.29 — HIGH (ref 0.88–1.16)
INR BLD: 1.3 — HIGH (ref 0.88–1.16)
INR BLD: 1.32 — HIGH (ref 0.88–1.16)
INR BLD: 1.33 — HIGH (ref 0.88–1.16)
INR BLD: 1.33 — HIGH (ref 0.88–1.16)
INR BLD: 1.34 — HIGH (ref 0.88–1.16)
INR BLD: 1.36 — HIGH (ref 0.88–1.16)
INR BLD: 1.37 — HIGH (ref 0.88–1.16)
INR BLD: 1.37 — HIGH (ref 0.88–1.16)
INR BLD: 1.44 — HIGH (ref 0.88–1.16)
INR BLD: 1.48 — HIGH (ref 0.88–1.16)
INR BLD: 1.51 — HIGH (ref 0.88–1.16)
INR BLD: 1.71 — HIGH (ref 0.88–1.16)
INR BLD: 1.75 — HIGH (ref 0.88–1.16)
INR BLD: 1.82 RATIO — HIGH (ref 0.65–1.3)
INR BLD: 2.08 — HIGH (ref 0.88–1.16)
INR BLD: 2.2 — HIGH (ref 0.88–1.16)
INR BLD: 2.51 — HIGH (ref 0.88–1.16)
IRON SATN MFR SERPL: 41 % — SIGNIFICANT CHANGE UP (ref 15–50)
IRON SATN MFR SERPL: 56 UG/DL — SIGNIFICANT CHANGE UP (ref 35–150)
ISTAT ACTK (ACTIVATED CLOTTING TIME KAOLIN): 138 SEC — HIGH (ref 74–137)
ISTAT ACTK (ACTIVATED CLOTTING TIME KAOLIN): 155 SEC — HIGH (ref 74–137)
ISTAT ACTK (ACTIVATED CLOTTING TIME KAOLIN): 155 SEC — HIGH (ref 74–137)
ISTAT ACTK (ACTIVATED CLOTTING TIME KAOLIN): 213 SEC — HIGH (ref 74–137)
ISTAT ACTK (ACTIVATED CLOTTING TIME KAOLIN): 213 SEC — HIGH (ref 74–137)
ISTAT ACTK (ACTIVATED CLOTTING TIME KAOLIN): 225 SEC — HIGH (ref 74–137)
ISTAT ACTK (ACTIVATED CLOTTING TIME KAOLIN): 231 SEC — HIGH (ref 74–137)
ISTAT ACTK (ACTIVATED CLOTTING TIME KAOLIN): 237 SEC — HIGH (ref 74–137)
ISTAT ARTERIAL BE: 1 MMOL/L — SIGNIFICANT CHANGE UP (ref -2–3)
ISTAT ARTERIAL BE: 2 MMOL/L — SIGNIFICANT CHANGE UP (ref -2–3)
ISTAT ARTERIAL BE: 6 MMOL/L — HIGH (ref -2–3)
ISTAT ARTERIAL GLUCOSE: 105 MG/DL — HIGH (ref 70–99)
ISTAT ARTERIAL GLUCOSE: 121 MG/DL — HIGH (ref 70–99)
ISTAT ARTERIAL GLUCOSE: 144 MG/DL — HIGH (ref 70–99)
ISTAT ARTERIAL GLUCOSE: 156 MG/DL — HIGH (ref 70–99)
ISTAT ARTERIAL GLUCOSE: 159 MG/DL — HIGH (ref 70–99)
ISTAT ARTERIAL HCO3: 25 MMOL/L — SIGNIFICANT CHANGE UP (ref 22–26)
ISTAT ARTERIAL HCO3: 26 MMOL/L — SIGNIFICANT CHANGE UP (ref 22–26)
ISTAT ARTERIAL HCO3: 27 MMOL/L — HIGH (ref 22–26)
ISTAT ARTERIAL HCO3: 27 MMOL/L — HIGH (ref 22–26)
ISTAT ARTERIAL HCO3: 30 MMOL/L — HIGH (ref 22–26)
ISTAT ARTERIAL HEMATOCRIT: 25 % — LOW (ref 34.5–45)
ISTAT ARTERIAL HEMATOCRIT: 26 % — LOW (ref 34.5–45)
ISTAT ARTERIAL HEMATOCRIT: 27 % — LOW (ref 34.5–45)
ISTAT ARTERIAL HEMOGLOBIN: 8.5 G/DL — LOW (ref 11.5–15.5)
ISTAT ARTERIAL HEMOGLOBIN: 8.8 G/DL — LOW (ref 11.5–15.5)
ISTAT ARTERIAL HEMOGLOBIN: 9.2 G/DL — LOW (ref 11.5–15.5)
ISTAT ARTERIAL IONIZED CALCIUM: 1.08 MMOL/L — LOW (ref 1.12–1.3)
ISTAT ARTERIAL IONIZED CALCIUM: 1.21 MMOL/L — SIGNIFICANT CHANGE UP (ref 1.12–1.3)
ISTAT ARTERIAL IONIZED CALCIUM: 1.26 MMOL/L — SIGNIFICANT CHANGE UP (ref 1.12–1.3)
ISTAT ARTERIAL IONIZED CALCIUM: 1.33 MMOL/L — HIGH (ref 1.12–1.3)
ISTAT ARTERIAL IONIZED CALCIUM: 1.37 MMOL/L — HIGH (ref 1.12–1.3)
ISTAT ARTERIAL PCO2: 34 MMHG — LOW (ref 35–45)
ISTAT ARTERIAL PCO2: 43 MMHG — SIGNIFICANT CHANGE UP (ref 35–45)
ISTAT ARTERIAL PCO2: 43 MMHG — SIGNIFICANT CHANGE UP (ref 35–45)
ISTAT ARTERIAL PCO2: 44 MMHG — SIGNIFICANT CHANGE UP (ref 35–45)
ISTAT ARTERIAL PCO2: 46 MMHG — HIGH (ref 35–45)
ISTAT ARTERIAL PH: 7.39 — SIGNIFICANT CHANGE UP (ref 7.35–7.45)
ISTAT ARTERIAL PH: 7.4 — SIGNIFICANT CHANGE UP (ref 7.35–7.45)
ISTAT ARTERIAL PH: 7.4 — SIGNIFICANT CHANGE UP (ref 7.35–7.45)
ISTAT ARTERIAL PH: 7.43 — SIGNIFICANT CHANGE UP (ref 7.35–7.45)
ISTAT ARTERIAL PH: 7.48 — HIGH (ref 7.35–7.45)
ISTAT ARTERIAL PO2: 106 MMHG — HIGH (ref 80–105)
ISTAT ARTERIAL PO2: 392 MMHG — HIGH (ref 80–105)
ISTAT ARTERIAL PO2: 413 MMHG — HIGH (ref 80–105)
ISTAT ARTERIAL PO2: 487 MMHG — HIGH (ref 80–105)
ISTAT ARTERIAL PO2: 68 MMHG — LOW (ref 80–105)
ISTAT ARTERIAL POTASSIUM: 3.7 MMOL/L — SIGNIFICANT CHANGE UP (ref 3.5–5.3)
ISTAT ARTERIAL POTASSIUM: 4 MMOL/L — SIGNIFICANT CHANGE UP (ref 3.5–5.3)
ISTAT ARTERIAL POTASSIUM: 4.1 MMOL/L — SIGNIFICANT CHANGE UP (ref 3.5–5.3)
ISTAT ARTERIAL POTASSIUM: 4.3 MMOL/L — SIGNIFICANT CHANGE UP (ref 3.5–5.3)
ISTAT ARTERIAL POTASSIUM: 4.7 MMOL/L — SIGNIFICANT CHANGE UP (ref 3.5–5.3)
ISTAT ARTERIAL SO2: 100 % — HIGH (ref 95–98)
ISTAT ARTERIAL SO2: 95 % — SIGNIFICANT CHANGE UP (ref 95–98)
ISTAT ARTERIAL SO2: 98 % — SIGNIFICANT CHANGE UP (ref 95–98)
ISTAT ARTERIAL SODIUM: 132 MMOL/L — LOW (ref 135–145)
ISTAT ARTERIAL SODIUM: 135 MMOL/L — SIGNIFICANT CHANGE UP (ref 135–145)
ISTAT ARTERIAL SODIUM: 135 MMOL/L — SIGNIFICANT CHANGE UP (ref 135–145)
ISTAT ARTERIAL SODIUM: 137 MMOL/L — SIGNIFICANT CHANGE UP (ref 135–145)
ISTAT ARTERIAL SODIUM: 137 MMOL/L — SIGNIFICANT CHANGE UP (ref 135–145)
ISTAT ARTERIAL TCO2: 26 MMOL/L — SIGNIFICANT CHANGE UP (ref 22–31)
ISTAT ARTERIAL TCO2: 28 MMOL/L — SIGNIFICANT CHANGE UP (ref 22–31)
ISTAT ARTERIAL TCO2: 32 MMOL/L — HIGH (ref 22–31)
KETONES UR-MCNC: NEGATIVE — SIGNIFICANT CHANGE UP
KETONES UR-MCNC: SIGNIFICANT CHANGE UP
LACTATE BLDV-MCNC: 1.2 MMOL/L — SIGNIFICANT CHANGE UP (ref 0.5–2)
LACTATE BLDV-MCNC: 2.2 MMOL/L — HIGH (ref 0.5–2)
LACTATE SERPL-SCNC: 0.8 MMOL/L — SIGNIFICANT CHANGE UP (ref 0.5–2)
LACTATE SERPL-SCNC: 0.9 MMOL/L — SIGNIFICANT CHANGE UP (ref 0.5–2)
LACTATE SERPL-SCNC: 1 MMOL/L — SIGNIFICANT CHANGE UP (ref 0.5–2)
LACTATE SERPL-SCNC: 1 MMOL/L — SIGNIFICANT CHANGE UP (ref 0.5–2)
LACTATE SERPL-SCNC: 1.6 MMOL/L — SIGNIFICANT CHANGE UP (ref 0.5–2)
LACTATE SERPL-SCNC: 1.7 MMOL/L — SIGNIFICANT CHANGE UP (ref 0.7–2)
LEUKOCYTE ESTERASE UR-ACNC: ABNORMAL
LEUKOCYTE ESTERASE UR-ACNC: NEGATIVE — SIGNIFICANT CHANGE UP
LIDOCAIN IGE QN: 14 U/L — SIGNIFICANT CHANGE UP (ref 7–60)
LIPID PNL WITH DIRECT LDL SERPL: 72 MG/DL — SIGNIFICANT CHANGE UP
LYMPHOCYTES # BLD AUTO: 0.33 K/UL — LOW (ref 1–3.3)
LYMPHOCYTES # BLD AUTO: 0.45 K/UL — LOW (ref 1–3.3)
LYMPHOCYTES # BLD AUTO: 0.52 K/UL — LOW (ref 1–3.3)
LYMPHOCYTES # BLD AUTO: 0.62 K/UL — LOW (ref 1–3.3)
LYMPHOCYTES # BLD AUTO: 0.65 K/UL — LOW (ref 1–3.3)
LYMPHOCYTES # BLD AUTO: 0.79 K/UL — LOW (ref 1.2–3.4)
LYMPHOCYTES # BLD AUTO: 0.84 K/UL — LOW (ref 1–3.3)
LYMPHOCYTES # BLD AUTO: 0.86 K/UL — LOW (ref 1–3.3)
LYMPHOCYTES # BLD AUTO: 0.86 K/UL — LOW (ref 1–3.3)
LYMPHOCYTES # BLD AUTO: 0.9 K/UL — LOW (ref 1.2–3.4)
LYMPHOCYTES # BLD AUTO: 0.9 K/UL — LOW (ref 1–3.3)
LYMPHOCYTES # BLD AUTO: 0.92 K/UL — LOW (ref 1–3.3)
LYMPHOCYTES # BLD AUTO: 0.93 K/UL — LOW (ref 1.2–3.4)
LYMPHOCYTES # BLD AUTO: 0.98 K/UL — LOW (ref 1–3.3)
LYMPHOCYTES # BLD AUTO: 1.02 K/UL — SIGNIFICANT CHANGE UP (ref 1–3.3)
LYMPHOCYTES # BLD AUTO: 1.05 K/UL — SIGNIFICANT CHANGE UP (ref 1–3.3)
LYMPHOCYTES # BLD AUTO: 1.05 K/UL — SIGNIFICANT CHANGE UP (ref 1–3.3)
LYMPHOCYTES # BLD AUTO: 1.06 K/UL — LOW (ref 1.2–3.4)
LYMPHOCYTES # BLD AUTO: 1.06 K/UL — LOW (ref 1.2–3.4)
LYMPHOCYTES # BLD AUTO: 1.07 K/UL — SIGNIFICANT CHANGE UP (ref 1–3.3)
LYMPHOCYTES # BLD AUTO: 1.11 K/UL — SIGNIFICANT CHANGE UP (ref 1–3.3)
LYMPHOCYTES # BLD AUTO: 1.18 K/UL — SIGNIFICANT CHANGE UP (ref 1–3.3)
LYMPHOCYTES # BLD AUTO: 1.23 K/UL — SIGNIFICANT CHANGE UP (ref 1–3.3)
LYMPHOCYTES # BLD AUTO: 1.51 K/UL — SIGNIFICANT CHANGE UP (ref 1.2–3.4)
LYMPHOCYTES # BLD AUTO: 1.64 K/UL — SIGNIFICANT CHANGE UP (ref 1–3.3)
LYMPHOCYTES # BLD AUTO: 1.71 K/UL — SIGNIFICANT CHANGE UP (ref 1–3.3)
LYMPHOCYTES # BLD AUTO: 11 % — LOW (ref 13–44)
LYMPHOCYTES # BLD AUTO: 12.1 % — LOW (ref 13–44)
LYMPHOCYTES # BLD AUTO: 13.5 % — LOW (ref 20.5–51.1)
LYMPHOCYTES # BLD AUTO: 14.4 % — LOW (ref 20.5–51.1)
LYMPHOCYTES # BLD AUTO: 14.9 % — SIGNIFICANT CHANGE UP (ref 13–44)
LYMPHOCYTES # BLD AUTO: 15.6 % — SIGNIFICANT CHANGE UP (ref 13–44)
LYMPHOCYTES # BLD AUTO: 16.2 % — SIGNIFICANT CHANGE UP (ref 13–44)
LYMPHOCYTES # BLD AUTO: 16.6 % — LOW (ref 20.5–51.1)
LYMPHOCYTES # BLD AUTO: 16.8 % — SIGNIFICANT CHANGE UP (ref 13–44)
LYMPHOCYTES # BLD AUTO: 17.2 % — LOW (ref 20.5–51.1)
LYMPHOCYTES # BLD AUTO: 3.5 % — LOW (ref 13–44)
LYMPHOCYTES # BLD AUTO: 4.1 % — LOW (ref 13–44)
LYMPHOCYTES # BLD AUTO: 5.7 % — LOW (ref 13–44)
LYMPHOCYTES # BLD AUTO: 5.8 % — LOW (ref 13–44)
LYMPHOCYTES # BLD AUTO: 6.3 % — LOW (ref 13–44)
LYMPHOCYTES # BLD AUTO: 6.5 % — LOW (ref 13–44)
LYMPHOCYTES # BLD AUTO: 7.8 % — LOW (ref 13–44)
LYMPHOCYTES # BLD AUTO: 8.7 % — LOW (ref 13–44)
LYMPHOCYTES # BLD AUTO: 8.8 % — LOW (ref 13–44)
LYMPHOCYTES # BLD AUTO: 9.2 % — LOW (ref 13–44)
LYMPHOCYTES # BLD AUTO: 9.2 % — LOW (ref 13–44)
LYMPHOCYTES # BLD AUTO: 9.4 % — LOW (ref 13–44)
LYMPHOCYTES # BLD AUTO: 9.4 % — LOW (ref 20.5–51.1)
LYMPHOCYTES # BLD AUTO: 9.5 % — LOW (ref 13–44)
LYMPHOCYTES # BLD AUTO: 9.5 % — LOW (ref 13–44)
LYMPHOCYTES # BLD AUTO: 9.9 % — LOW (ref 20.5–51.1)
MAGNESIUM SERPL-MCNC: 1.8 MG/DL — SIGNIFICANT CHANGE UP (ref 1.6–2.6)
MAGNESIUM SERPL-MCNC: 1.9 MG/DL — SIGNIFICANT CHANGE UP (ref 1.6–2.6)
MAGNESIUM SERPL-MCNC: 2 MG/DL — SIGNIFICANT CHANGE UP (ref 1.6–2.6)
MAGNESIUM SERPL-MCNC: 2.1 MG/DL — SIGNIFICANT CHANGE UP (ref 1.6–2.6)
MAGNESIUM SERPL-MCNC: 2.2 MG/DL — SIGNIFICANT CHANGE UP (ref 1.6–2.6)
MAGNESIUM SERPL-MCNC: 2.2 MG/DL — SIGNIFICANT CHANGE UP (ref 1.8–2.4)
MAGNESIUM SERPL-MCNC: 2.3 MG/DL — SIGNIFICANT CHANGE UP (ref 1.6–2.6)
MAGNESIUM SERPL-MCNC: 2.3 MG/DL — SIGNIFICANT CHANGE UP (ref 1.8–2.4)
MAGNESIUM SERPL-MCNC: 2.3 MG/DL — SIGNIFICANT CHANGE UP (ref 1.8–2.4)
MAGNESIUM SERPL-MCNC: 2.4 MG/DL — SIGNIFICANT CHANGE UP (ref 1.6–2.6)
MAGNESIUM SERPL-MCNC: 2.5 MG/DL — HIGH (ref 1.8–2.4)
MAGNESIUM SERPL-MCNC: 2.5 MG/DL — SIGNIFICANT CHANGE UP (ref 1.6–2.6)
MAGNESIUM SERPL-MCNC: 2.6 MG/DL — SIGNIFICANT CHANGE UP (ref 1.6–2.6)
MAGNESIUM SERPL-MCNC: 2.7 MG/DL — HIGH (ref 1.6–2.6)
MAGNESIUM SERPL-MCNC: 2.8 MG/DL — HIGH (ref 1.6–2.6)
MANUAL SMEAR VERIFICATION: SIGNIFICANT CHANGE UP
MCHC RBC-ENTMCNC: 28.2 PG — SIGNIFICANT CHANGE UP (ref 27–34)
MCHC RBC-ENTMCNC: 28.3 PG — SIGNIFICANT CHANGE UP (ref 27–34)
MCHC RBC-ENTMCNC: 28.3 PG — SIGNIFICANT CHANGE UP (ref 27–34)
MCHC RBC-ENTMCNC: 28.4 PG — SIGNIFICANT CHANGE UP (ref 27–34)
MCHC RBC-ENTMCNC: 28.5 PG — SIGNIFICANT CHANGE UP (ref 27–34)
MCHC RBC-ENTMCNC: 28.6 PG — SIGNIFICANT CHANGE UP (ref 27–34)
MCHC RBC-ENTMCNC: 28.6 PG — SIGNIFICANT CHANGE UP (ref 27–34)
MCHC RBC-ENTMCNC: 28.7 PG — SIGNIFICANT CHANGE UP (ref 27–34)
MCHC RBC-ENTMCNC: 28.8 PG — SIGNIFICANT CHANGE UP (ref 27–31)
MCHC RBC-ENTMCNC: 28.8 PG — SIGNIFICANT CHANGE UP (ref 27–34)
MCHC RBC-ENTMCNC: 28.8 PG — SIGNIFICANT CHANGE UP (ref 27–34)
MCHC RBC-ENTMCNC: 28.9 PG — SIGNIFICANT CHANGE UP (ref 27–34)
MCHC RBC-ENTMCNC: 29 PG — SIGNIFICANT CHANGE UP (ref 27–31)
MCHC RBC-ENTMCNC: 29.1 PG — SIGNIFICANT CHANGE UP (ref 27–31)
MCHC RBC-ENTMCNC: 29.1 PG — SIGNIFICANT CHANGE UP (ref 27–34)
MCHC RBC-ENTMCNC: 29.3 PG — SIGNIFICANT CHANGE UP (ref 27–34)
MCHC RBC-ENTMCNC: 29.4 PG — SIGNIFICANT CHANGE UP (ref 27–31)
MCHC RBC-ENTMCNC: 29.4 PG — SIGNIFICANT CHANGE UP (ref 27–34)
MCHC RBC-ENTMCNC: 29.5 GM/DL — LOW (ref 32–36)
MCHC RBC-ENTMCNC: 29.5 PG — SIGNIFICANT CHANGE UP (ref 27–31)
MCHC RBC-ENTMCNC: 29.5 PG — SIGNIFICANT CHANGE UP (ref 27–31)
MCHC RBC-ENTMCNC: 29.6 PG — SIGNIFICANT CHANGE UP (ref 27–34)
MCHC RBC-ENTMCNC: 29.7 PG — SIGNIFICANT CHANGE UP (ref 27–31)
MCHC RBC-ENTMCNC: 29.7 PG — SIGNIFICANT CHANGE UP (ref 27–34)
MCHC RBC-ENTMCNC: 29.8 PG — SIGNIFICANT CHANGE UP (ref 27–34)
MCHC RBC-ENTMCNC: 29.9 PG — SIGNIFICANT CHANGE UP (ref 27–34)
MCHC RBC-ENTMCNC: 30 PG — SIGNIFICANT CHANGE UP (ref 27–31)
MCHC RBC-ENTMCNC: 30 PG — SIGNIFICANT CHANGE UP (ref 27–34)
MCHC RBC-ENTMCNC: 30.1 PG — SIGNIFICANT CHANGE UP (ref 27–34)
MCHC RBC-ENTMCNC: 30.2 PG — SIGNIFICANT CHANGE UP (ref 27–31)
MCHC RBC-ENTMCNC: 30.2 PG — SIGNIFICANT CHANGE UP (ref 27–34)
MCHC RBC-ENTMCNC: 30.2 PG — SIGNIFICANT CHANGE UP (ref 27–34)
MCHC RBC-ENTMCNC: 30.3 PG — SIGNIFICANT CHANGE UP (ref 27–31)
MCHC RBC-ENTMCNC: 30.3 PG — SIGNIFICANT CHANGE UP (ref 27–34)
MCHC RBC-ENTMCNC: 30.4 PG — SIGNIFICANT CHANGE UP (ref 27–34)
MCHC RBC-ENTMCNC: 30.4 PG — SIGNIFICANT CHANGE UP (ref 27–34)
MCHC RBC-ENTMCNC: 30.5 PG — SIGNIFICANT CHANGE UP (ref 27–34)
MCHC RBC-ENTMCNC: 30.7 GM/DL — LOW (ref 32–36)
MCHC RBC-ENTMCNC: 30.7 PG — SIGNIFICANT CHANGE UP (ref 27–34)
MCHC RBC-ENTMCNC: 30.7 PG — SIGNIFICANT CHANGE UP (ref 27–34)
MCHC RBC-ENTMCNC: 31 GM/DL — LOW (ref 32–36)
MCHC RBC-ENTMCNC: 31.1 GM/DL — LOW (ref 32–36)
MCHC RBC-ENTMCNC: 31.3 GM/DL — LOW (ref 32–36)
MCHC RBC-ENTMCNC: 31.4 GM/DL — LOW (ref 32–36)
MCHC RBC-ENTMCNC: 31.4 GM/DL — LOW (ref 32–36)
MCHC RBC-ENTMCNC: 31.5 GM/DL — LOW (ref 32–36)
MCHC RBC-ENTMCNC: 31.5 GM/DL — LOW (ref 32–36)
MCHC RBC-ENTMCNC: 31.6 GM/DL — LOW (ref 32–36)
MCHC RBC-ENTMCNC: 31.7 GM/DL — LOW (ref 32–36)
MCHC RBC-ENTMCNC: 31.8 G/DL — LOW (ref 32–37)
MCHC RBC-ENTMCNC: 31.8 GM/DL — LOW (ref 32–36)
MCHC RBC-ENTMCNC: 31.8 GM/DL — LOW (ref 32–36)
MCHC RBC-ENTMCNC: 31.9 GM/DL — LOW (ref 32–36)
MCHC RBC-ENTMCNC: 31.9 GM/DL — LOW (ref 32–36)
MCHC RBC-ENTMCNC: 32 G/DL — SIGNIFICANT CHANGE UP (ref 32–37)
MCHC RBC-ENTMCNC: 32 GM/DL — SIGNIFICANT CHANGE UP (ref 32–36)
MCHC RBC-ENTMCNC: 32 GM/DL — SIGNIFICANT CHANGE UP (ref 32–36)
MCHC RBC-ENTMCNC: 32.1 G/DL — SIGNIFICANT CHANGE UP (ref 32–37)
MCHC RBC-ENTMCNC: 32.1 G/DL — SIGNIFICANT CHANGE UP (ref 32–37)
MCHC RBC-ENTMCNC: 32.1 GM/DL — SIGNIFICANT CHANGE UP (ref 32–36)
MCHC RBC-ENTMCNC: 32.1 GM/DL — SIGNIFICANT CHANGE UP (ref 32–36)
MCHC RBC-ENTMCNC: 32.2 GM/DL — SIGNIFICANT CHANGE UP (ref 32–36)
MCHC RBC-ENTMCNC: 32.3 G/DL — SIGNIFICANT CHANGE UP (ref 32–37)
MCHC RBC-ENTMCNC: 32.3 G/DL — SIGNIFICANT CHANGE UP (ref 32–37)
MCHC RBC-ENTMCNC: 32.4 GM/DL — SIGNIFICANT CHANGE UP (ref 32–36)
MCHC RBC-ENTMCNC: 32.5 GM/DL — SIGNIFICANT CHANGE UP (ref 32–36)
MCHC RBC-ENTMCNC: 32.6 G/DL — SIGNIFICANT CHANGE UP (ref 32–37)
MCHC RBC-ENTMCNC: 32.6 GM/DL — SIGNIFICANT CHANGE UP (ref 32–36)
MCHC RBC-ENTMCNC: 32.7 G/DL — SIGNIFICANT CHANGE UP (ref 32–37)
MCHC RBC-ENTMCNC: 32.7 GM/DL — SIGNIFICANT CHANGE UP (ref 32–36)
MCHC RBC-ENTMCNC: 32.8 GM/DL — SIGNIFICANT CHANGE UP (ref 32–36)
MCHC RBC-ENTMCNC: 32.9 G/DL — SIGNIFICANT CHANGE UP (ref 32–37)
MCHC RBC-ENTMCNC: 33.3 G/DL — SIGNIFICANT CHANGE UP (ref 32–37)
MCHC RBC-ENTMCNC: 33.5 GM/DL — SIGNIFICANT CHANGE UP (ref 32–36)
MCHC RBC-ENTMCNC: 33.8 GM/DL — SIGNIFICANT CHANGE UP (ref 32–36)
MCHC RBC-ENTMCNC: 33.8 GM/DL — SIGNIFICANT CHANGE UP (ref 32–36)
MCV RBC AUTO: 101.8 FL — HIGH (ref 80–100)
MCV RBC AUTO: 86.7 FL — SIGNIFICANT CHANGE UP (ref 80–100)
MCV RBC AUTO: 87.3 FL — SIGNIFICANT CHANGE UP (ref 80–100)
MCV RBC AUTO: 87.3 FL — SIGNIFICANT CHANGE UP (ref 80–100)
MCV RBC AUTO: 87.9 FL — SIGNIFICANT CHANGE UP (ref 80–100)
MCV RBC AUTO: 88 FL — SIGNIFICANT CHANGE UP (ref 80–100)
MCV RBC AUTO: 88.4 FL — SIGNIFICANT CHANGE UP (ref 80–100)
MCV RBC AUTO: 88.5 FL — SIGNIFICANT CHANGE UP (ref 80–100)
MCV RBC AUTO: 88.6 FL — SIGNIFICANT CHANGE UP (ref 81–99)
MCV RBC AUTO: 89 FL — SIGNIFICANT CHANGE UP (ref 80–100)
MCV RBC AUTO: 89.1 FL — SIGNIFICANT CHANGE UP (ref 80–100)
MCV RBC AUTO: 89.1 FL — SIGNIFICANT CHANGE UP (ref 80–100)
MCV RBC AUTO: 89.1 FL — SIGNIFICANT CHANGE UP (ref 81–99)
MCV RBC AUTO: 89.2 FL — SIGNIFICANT CHANGE UP (ref 81–99)
MCV RBC AUTO: 89.3 FL — SIGNIFICANT CHANGE UP (ref 80–100)
MCV RBC AUTO: 89.3 FL — SIGNIFICANT CHANGE UP (ref 80–100)
MCV RBC AUTO: 89.6 FL — SIGNIFICANT CHANGE UP (ref 80–100)
MCV RBC AUTO: 90 FL — SIGNIFICANT CHANGE UP (ref 80–100)
MCV RBC AUTO: 90.2 FL — SIGNIFICANT CHANGE UP (ref 80–100)
MCV RBC AUTO: 90.2 FL — SIGNIFICANT CHANGE UP (ref 80–100)
MCV RBC AUTO: 90.4 FL — SIGNIFICANT CHANGE UP (ref 80–100)
MCV RBC AUTO: 90.4 FL — SIGNIFICANT CHANGE UP (ref 81–99)
MCV RBC AUTO: 90.5 FL — SIGNIFICANT CHANGE UP (ref 80–100)
MCV RBC AUTO: 90.6 FL — SIGNIFICANT CHANGE UP (ref 81–99)
MCV RBC AUTO: 90.7 FL — SIGNIFICANT CHANGE UP (ref 80–100)
MCV RBC AUTO: 90.9 FL — SIGNIFICANT CHANGE UP (ref 80–100)
MCV RBC AUTO: 91 FL — SIGNIFICANT CHANGE UP (ref 80–100)
MCV RBC AUTO: 91 FL — SIGNIFICANT CHANGE UP (ref 80–100)
MCV RBC AUTO: 91.1 FL — SIGNIFICANT CHANGE UP (ref 80–100)
MCV RBC AUTO: 91.4 FL — SIGNIFICANT CHANGE UP (ref 80–100)
MCV RBC AUTO: 91.5 FL — SIGNIFICANT CHANGE UP (ref 80–100)
MCV RBC AUTO: 91.7 FL — SIGNIFICANT CHANGE UP (ref 80–100)
MCV RBC AUTO: 91.8 FL — SIGNIFICANT CHANGE UP (ref 80–100)
MCV RBC AUTO: 91.8 FL — SIGNIFICANT CHANGE UP (ref 81–99)
MCV RBC AUTO: 92 FL — SIGNIFICANT CHANGE UP (ref 80–100)
MCV RBC AUTO: 92.1 FL — SIGNIFICANT CHANGE UP (ref 80–100)
MCV RBC AUTO: 92.1 FL — SIGNIFICANT CHANGE UP (ref 81–99)
MCV RBC AUTO: 92.3 FL — SIGNIFICANT CHANGE UP (ref 80–100)
MCV RBC AUTO: 92.6 FL — SIGNIFICANT CHANGE UP (ref 80–100)
MCV RBC AUTO: 92.7 FL — SIGNIFICANT CHANGE UP (ref 80–100)
MCV RBC AUTO: 92.7 FL — SIGNIFICANT CHANGE UP (ref 80–100)
MCV RBC AUTO: 93 FL — SIGNIFICANT CHANGE UP (ref 80–100)
MCV RBC AUTO: 93 FL — SIGNIFICANT CHANGE UP (ref 80–100)
MCV RBC AUTO: 93 FL — SIGNIFICANT CHANGE UP (ref 81–99)
MCV RBC AUTO: 93 FL — SIGNIFICANT CHANGE UP (ref 81–99)
MCV RBC AUTO: 93.4 FL — SIGNIFICANT CHANGE UP (ref 80–100)
MCV RBC AUTO: 93.5 FL — SIGNIFICANT CHANGE UP (ref 80–100)
MCV RBC AUTO: 93.6 FL — SIGNIFICANT CHANGE UP (ref 80–100)
MCV RBC AUTO: 93.7 FL — SIGNIFICANT CHANGE UP (ref 80–100)
MCV RBC AUTO: 93.8 FL — SIGNIFICANT CHANGE UP (ref 80–100)
MCV RBC AUTO: 93.9 FL — SIGNIFICANT CHANGE UP (ref 80–100)
MCV RBC AUTO: 94.4 FL — SIGNIFICANT CHANGE UP (ref 80–100)
MCV RBC AUTO: 94.4 FL — SIGNIFICANT CHANGE UP (ref 81–99)
MCV RBC AUTO: 94.9 FL — SIGNIFICANT CHANGE UP (ref 80–100)
MCV RBC AUTO: 95.3 FL — SIGNIFICANT CHANGE UP (ref 80–100)
MCV RBC AUTO: 95.4 FL — SIGNIFICANT CHANGE UP (ref 80–100)
MCV RBC AUTO: 95.5 FL — SIGNIFICANT CHANGE UP (ref 80–100)
MCV RBC AUTO: 96.7 FL — SIGNIFICANT CHANGE UP (ref 80–100)
MCV RBC AUTO: 98 FL — SIGNIFICANT CHANGE UP (ref 80–100)
METHOD TYPE: SIGNIFICANT CHANGE UP
METHOD TYPE: SIGNIFICANT CHANGE UP
MONOCYTES # BLD AUTO: 0.08 K/UL — SIGNIFICANT CHANGE UP (ref 0–0.9)
MONOCYTES # BLD AUTO: 0.26 K/UL — SIGNIFICANT CHANGE UP (ref 0–0.9)
MONOCYTES # BLD AUTO: 0.32 K/UL — SIGNIFICANT CHANGE UP (ref 0–0.9)
MONOCYTES # BLD AUTO: 0.42 K/UL — SIGNIFICANT CHANGE UP (ref 0.1–0.6)
MONOCYTES # BLD AUTO: 0.43 K/UL — SIGNIFICANT CHANGE UP (ref 0–0.9)
MONOCYTES # BLD AUTO: 0.51 K/UL — SIGNIFICANT CHANGE UP (ref 0.1–0.6)
MONOCYTES # BLD AUTO: 0.54 K/UL — SIGNIFICANT CHANGE UP (ref 0–0.9)
MONOCYTES # BLD AUTO: 0.56 K/UL — SIGNIFICANT CHANGE UP (ref 0–0.9)
MONOCYTES # BLD AUTO: 0.58 K/UL — SIGNIFICANT CHANGE UP (ref 0.1–0.6)
MONOCYTES # BLD AUTO: 0.58 K/UL — SIGNIFICANT CHANGE UP (ref 0.1–0.6)
MONOCYTES # BLD AUTO: 0.59 K/UL — SIGNIFICANT CHANGE UP (ref 0.1–0.6)
MONOCYTES # BLD AUTO: 0.59 K/UL — SIGNIFICANT CHANGE UP (ref 0.1–0.6)
MONOCYTES # BLD AUTO: 0.59 K/UL — SIGNIFICANT CHANGE UP (ref 0–0.9)
MONOCYTES # BLD AUTO: 0.61 K/UL — SIGNIFICANT CHANGE UP (ref 0–0.9)
MONOCYTES # BLD AUTO: 0.61 K/UL — SIGNIFICANT CHANGE UP (ref 0–0.9)
MONOCYTES # BLD AUTO: 0.65 K/UL — SIGNIFICANT CHANGE UP (ref 0–0.9)
MONOCYTES # BLD AUTO: 0.7 K/UL — SIGNIFICANT CHANGE UP (ref 0–0.9)
MONOCYTES # BLD AUTO: 0.7 K/UL — SIGNIFICANT CHANGE UP (ref 0–0.9)
MONOCYTES # BLD AUTO: 0.71 K/UL — SIGNIFICANT CHANGE UP (ref 0–0.9)
MONOCYTES # BLD AUTO: 0.72 K/UL — SIGNIFICANT CHANGE UP (ref 0–0.9)
MONOCYTES # BLD AUTO: 0.73 K/UL — SIGNIFICANT CHANGE UP (ref 0–0.9)
MONOCYTES # BLD AUTO: 0.74 K/UL — SIGNIFICANT CHANGE UP (ref 0–0.9)
MONOCYTES # BLD AUTO: 0.74 K/UL — SIGNIFICANT CHANGE UP (ref 0–0.9)
MONOCYTES # BLD AUTO: 0.79 K/UL — SIGNIFICANT CHANGE UP (ref 0–0.9)
MONOCYTES # BLD AUTO: 0.81 K/UL — SIGNIFICANT CHANGE UP (ref 0–0.9)
MONOCYTES # BLD AUTO: 0.82 K/UL — SIGNIFICANT CHANGE UP (ref 0–0.9)
MONOCYTES NFR BLD AUTO: 0.9 % — LOW (ref 2–14)
MONOCYTES NFR BLD AUTO: 3.6 % — SIGNIFICANT CHANGE UP (ref 2–14)
MONOCYTES NFR BLD AUTO: 4.3 % — SIGNIFICANT CHANGE UP (ref 2–14)
MONOCYTES NFR BLD AUTO: 4.4 % — SIGNIFICANT CHANGE UP (ref 2–14)
MONOCYTES NFR BLD AUTO: 5.3 % — SIGNIFICANT CHANGE UP (ref 2–14)
MONOCYTES NFR BLD AUTO: 5.3 % — SIGNIFICANT CHANGE UP (ref 2–14)
MONOCYTES NFR BLD AUTO: 5.4 % — SIGNIFICANT CHANGE UP (ref 1.7–9.3)
MONOCYTES NFR BLD AUTO: 5.4 % — SIGNIFICANT CHANGE UP (ref 2–14)
MONOCYTES NFR BLD AUTO: 5.5 % — SIGNIFICANT CHANGE UP (ref 2–14)
MONOCYTES NFR BLD AUTO: 5.7 % — SIGNIFICANT CHANGE UP (ref 2–14)
MONOCYTES NFR BLD AUTO: 5.9 % — SIGNIFICANT CHANGE UP (ref 2–14)
MONOCYTES NFR BLD AUTO: 6.3 % — SIGNIFICANT CHANGE UP (ref 1.7–9.3)
MONOCYTES NFR BLD AUTO: 6.3 % — SIGNIFICANT CHANGE UP (ref 2–14)
MONOCYTES NFR BLD AUTO: 6.4 % — SIGNIFICANT CHANGE UP (ref 2–14)
MONOCYTES NFR BLD AUTO: 6.5 % — SIGNIFICANT CHANGE UP (ref 1.7–9.3)
MONOCYTES NFR BLD AUTO: 6.5 % — SIGNIFICANT CHANGE UP (ref 2–14)
MONOCYTES NFR BLD AUTO: 6.9 % — SIGNIFICANT CHANGE UP (ref 1.7–9.3)
MONOCYTES NFR BLD AUTO: 6.9 % — SIGNIFICANT CHANGE UP (ref 2–14)
MONOCYTES NFR BLD AUTO: 7.1 % — SIGNIFICANT CHANGE UP (ref 2–14)
MONOCYTES NFR BLD AUTO: 7.3 % — SIGNIFICANT CHANGE UP (ref 2–14)
MONOCYTES NFR BLD AUTO: 7.4 % — SIGNIFICANT CHANGE UP (ref 2–14)
MONOCYTES NFR BLD AUTO: 7.4 % — SIGNIFICANT CHANGE UP (ref 2–14)
MONOCYTES NFR BLD AUTO: 7.6 % — SIGNIFICANT CHANGE UP (ref 2–14)
MONOCYTES NFR BLD AUTO: 8 % — SIGNIFICANT CHANGE UP (ref 1.7–9.3)
MONOCYTES NFR BLD AUTO: 8.4 % — SIGNIFICANT CHANGE UP (ref 2–14)
MONOCYTES NFR BLD AUTO: 9.4 % — HIGH (ref 1.7–9.3)
MRSA PCR RESULT.: NEGATIVE — SIGNIFICANT CHANGE UP
MRSA PCR RESULT.: NEGATIVE — SIGNIFICANT CHANGE UP
NEUTROPHILS # BLD AUTO: 10.08 K/UL — HIGH (ref 1.8–7.4)
NEUTROPHILS # BLD AUTO: 10.13 K/UL — HIGH (ref 1.8–7.4)
NEUTROPHILS # BLD AUTO: 11.3 K/UL — HIGH (ref 1.8–7.4)
NEUTROPHILS # BLD AUTO: 11.49 K/UL — HIGH (ref 1.8–7.4)
NEUTROPHILS # BLD AUTO: 3.59 K/UL — SIGNIFICANT CHANGE UP (ref 1.8–7.4)
NEUTROPHILS # BLD AUTO: 4.27 K/UL — SIGNIFICANT CHANGE UP (ref 1.4–6.5)
NEUTROPHILS # BLD AUTO: 5.12 K/UL — SIGNIFICANT CHANGE UP (ref 1.4–6.5)
NEUTROPHILS # BLD AUTO: 5.46 K/UL — SIGNIFICANT CHANGE UP (ref 1.4–6.5)
NEUTROPHILS # BLD AUTO: 6.31 K/UL — SIGNIFICANT CHANGE UP (ref 1.8–7.4)
NEUTROPHILS # BLD AUTO: 6.43 K/UL — SIGNIFICANT CHANGE UP (ref 1.8–7.4)
NEUTROPHILS # BLD AUTO: 6.52 K/UL — SIGNIFICANT CHANGE UP (ref 1.8–7.4)
NEUTROPHILS # BLD AUTO: 6.64 K/UL — HIGH (ref 1.4–6.5)
NEUTROPHILS # BLD AUTO: 6.85 K/UL — HIGH (ref 1.4–6.5)
NEUTROPHILS # BLD AUTO: 7.4 K/UL — SIGNIFICANT CHANGE UP (ref 1.8–7.4)
NEUTROPHILS # BLD AUTO: 7.72 K/UL — HIGH (ref 1.4–6.5)
NEUTROPHILS # BLD AUTO: 8.25 K/UL — HIGH (ref 1.8–7.4)
NEUTROPHILS # BLD AUTO: 8.3 K/UL — HIGH (ref 1.8–7.4)
NEUTROPHILS # BLD AUTO: 8.33 K/UL — HIGH (ref 1.8–7.4)
NEUTROPHILS # BLD AUTO: 8.5 K/UL — HIGH (ref 1.8–7.4)
NEUTROPHILS # BLD AUTO: 8.91 K/UL — HIGH (ref 1.8–7.4)
NEUTROPHILS # BLD AUTO: 9.06 K/UL — HIGH (ref 1.8–7.4)
NEUTROPHILS # BLD AUTO: 9.13 K/UL — HIGH (ref 1.8–7.4)
NEUTROPHILS # BLD AUTO: 9.13 K/UL — HIGH (ref 1.8–7.4)
NEUTROPHILS # BLD AUTO: 9.32 K/UL — HIGH (ref 1.8–7.4)
NEUTROPHILS # BLD AUTO: 9.44 K/UL — HIGH (ref 1.8–7.4)
NEUTROPHILS # BLD AUTO: 9.75 K/UL — HIGH (ref 1.8–7.4)
NEUTROPHILS NFR BLD AUTO: 69.5 % — SIGNIFICANT CHANGE UP (ref 42.2–75.2)
NEUTROPHILS NFR BLD AUTO: 70.2 % — SIGNIFICANT CHANGE UP (ref 43–77)
NEUTROPHILS NFR BLD AUTO: 72.9 % — SIGNIFICANT CHANGE UP (ref 43–77)
NEUTROPHILS NFR BLD AUTO: 73 % — SIGNIFICANT CHANGE UP (ref 42.2–75.2)
NEUTROPHILS NFR BLD AUTO: 74.5 % — SIGNIFICANT CHANGE UP (ref 42.2–75.2)
NEUTROPHILS NFR BLD AUTO: 75.7 % — SIGNIFICANT CHANGE UP (ref 43–77)
NEUTROPHILS NFR BLD AUTO: 76.2 % — SIGNIFICANT CHANGE UP (ref 43–77)
NEUTROPHILS NFR BLD AUTO: 76.8 % — HIGH (ref 42.2–75.2)
NEUTROPHILS NFR BLD AUTO: 77.2 % — HIGH (ref 43–77)
NEUTROPHILS NFR BLD AUTO: 79.2 % — HIGH (ref 43–77)
NEUTROPHILS NFR BLD AUTO: 81.2 % — HIGH (ref 42.2–75.2)
NEUTROPHILS NFR BLD AUTO: 81.5 % — HIGH (ref 43–77)
NEUTROPHILS NFR BLD AUTO: 82.1 % — HIGH (ref 43–77)
NEUTROPHILS NFR BLD AUTO: 82.2 % — HIGH (ref 42.2–75.2)
NEUTROPHILS NFR BLD AUTO: 82.3 % — HIGH (ref 43–77)
NEUTROPHILS NFR BLD AUTO: 83.5 % — HIGH (ref 43–77)
NEUTROPHILS NFR BLD AUTO: 83.8 % — HIGH (ref 43–77)
NEUTROPHILS NFR BLD AUTO: 84.2 % — HIGH (ref 43–77)
NEUTROPHILS NFR BLD AUTO: 84.5 % — HIGH (ref 43–77)
NEUTROPHILS NFR BLD AUTO: 84.7 % — HIGH (ref 43–77)
NEUTROPHILS NFR BLD AUTO: 86.4 % — HIGH (ref 43–77)
NEUTROPHILS NFR BLD AUTO: 87.2 % — HIGH (ref 43–77)
NEUTROPHILS NFR BLD AUTO: 87.7 % — HIGH (ref 43–77)
NEUTROPHILS NFR BLD AUTO: 89.4 % — HIGH (ref 43–77)
NEUTROPHILS NFR BLD AUTO: 89.9 % — HIGH (ref 43–77)
NEUTROPHILS NFR BLD AUTO: 94.8 % — HIGH (ref 43–77)
NIGHT BLUE STAIN TISS: SIGNIFICANT CHANGE UP
NITRITE UR-MCNC: NEGATIVE — SIGNIFICANT CHANGE UP
NITRITE UR-MCNC: POSITIVE
NON HDL CHOLESTEROL: 117 MG/DL — SIGNIFICANT CHANGE UP
NRBC # BLD: 0 /100 WBCS — SIGNIFICANT CHANGE UP (ref 0–0)
NT-PROBNP SERPL-SCNC: 5595 PG/ML — HIGH (ref 0–300)
NT-PROBNP SERPL-SCNC: 7133 PG/ML — HIGH (ref 0–300)
NT-PROBNP SERPL-SCNC: HIGH PG/ML (ref 0–300)
ORGANISM # SPEC MICROSCOPIC CNT: SIGNIFICANT CHANGE UP
OVALOCYTES BLD QL SMEAR: SLIGHT — SIGNIFICANT CHANGE UP
PCO2 BLDA: 41 MMHG — HIGH (ref 32–35)
PCO2 BLDV: 43 MMHG — HIGH (ref 39–42)
PCO2 BLDV: 45 MMHG — HIGH (ref 39–42)
PF4 HEPARIN CMPLX AB SER-ACNC: NEGATIVE — SIGNIFICANT CHANGE UP
PH BLDA: 7.41 — SIGNIFICANT CHANGE UP (ref 7.35–7.45)
PH BLDV: 7.36 — SIGNIFICANT CHANGE UP (ref 7.32–7.43)
PH BLDV: 7.49 — HIGH (ref 7.32–7.43)
PH UR: 6 — SIGNIFICANT CHANGE UP (ref 5–8)
PH UR: 6 — SIGNIFICANT CHANGE UP (ref 5–8)
PH UR: 6.5 — SIGNIFICANT CHANGE UP (ref 5–8)
PH UR: 7 — SIGNIFICANT CHANGE UP (ref 5–8)
PH UR: 7 — SIGNIFICANT CHANGE UP (ref 5–8)
PHOSPHATE SERPL-MCNC: 2.1 MG/DL — LOW (ref 2.5–4.5)
PHOSPHATE SERPL-MCNC: 2.3 MG/DL — LOW (ref 2.5–4.5)
PHOSPHATE SERPL-MCNC: 2.4 MG/DL — LOW (ref 2.5–4.5)
PHOSPHATE SERPL-MCNC: 2.5 MG/DL — SIGNIFICANT CHANGE UP (ref 2.5–4.5)
PHOSPHATE SERPL-MCNC: 2.6 MG/DL — SIGNIFICANT CHANGE UP (ref 2.5–4.5)
PHOSPHATE SERPL-MCNC: 2.6 MG/DL — SIGNIFICANT CHANGE UP (ref 2.5–4.5)
PHOSPHATE SERPL-MCNC: 2.7 MG/DL — SIGNIFICANT CHANGE UP (ref 2.5–4.5)
PHOSPHATE SERPL-MCNC: 2.8 MG/DL — SIGNIFICANT CHANGE UP (ref 2.5–4.5)
PHOSPHATE SERPL-MCNC: 2.9 MG/DL — SIGNIFICANT CHANGE UP (ref 2.5–4.5)
PHOSPHATE SERPL-MCNC: 2.9 MG/DL — SIGNIFICANT CHANGE UP (ref 2.5–4.5)
PHOSPHATE SERPL-MCNC: 3.1 MG/DL — SIGNIFICANT CHANGE UP (ref 2.5–4.5)
PHOSPHATE SERPL-MCNC: 3.3 MG/DL — SIGNIFICANT CHANGE UP (ref 2.5–4.5)
PHOSPHATE SERPL-MCNC: 3.3 MG/DL — SIGNIFICANT CHANGE UP (ref 2.5–4.5)
PHOSPHATE SERPL-MCNC: 3.5 MG/DL — SIGNIFICANT CHANGE UP (ref 2.5–4.5)
PHOSPHATE SERPL-MCNC: 3.6 MG/DL — SIGNIFICANT CHANGE UP (ref 2.5–4.5)
PHOSPHATE SERPL-MCNC: 3.7 MG/DL — SIGNIFICANT CHANGE UP (ref 2.5–4.5)
PHOSPHATE SERPL-MCNC: 3.7 MG/DL — SIGNIFICANT CHANGE UP (ref 2.5–4.5)
PHOSPHATE SERPL-MCNC: 4 MG/DL — SIGNIFICANT CHANGE UP (ref 2.5–4.5)
PHOSPHATE SERPL-MCNC: 4.3 MG/DL — SIGNIFICANT CHANGE UP (ref 2.5–4.5)
PHOSPHATE SERPL-MCNC: 5 MG/DL — HIGH (ref 2.5–4.5)
PLAT MORPH BLD: NORMAL — SIGNIFICANT CHANGE UP
PLATELET # BLD AUTO: 101 K/UL — LOW (ref 150–400)
PLATELET # BLD AUTO: 105 K/UL — LOW (ref 150–400)
PLATELET # BLD AUTO: 112 K/UL — LOW (ref 150–400)
PLATELET # BLD AUTO: 118 K/UL — LOW (ref 150–400)
PLATELET # BLD AUTO: 121 K/UL — LOW (ref 150–400)
PLATELET # BLD AUTO: 124 K/UL — LOW (ref 150–400)
PLATELET # BLD AUTO: 125 K/UL — LOW (ref 150–400)
PLATELET # BLD AUTO: 128 K/UL — LOW (ref 150–400)
PLATELET # BLD AUTO: 129 K/UL — LOW (ref 150–400)
PLATELET # BLD AUTO: 129 K/UL — LOW (ref 150–400)
PLATELET # BLD AUTO: 131 K/UL — LOW (ref 150–400)
PLATELET # BLD AUTO: 132 K/UL — LOW (ref 150–400)
PLATELET # BLD AUTO: 134 K/UL — LOW (ref 150–400)
PLATELET # BLD AUTO: 136 K/UL — LOW (ref 150–400)
PLATELET # BLD AUTO: 140 K/UL — LOW (ref 150–400)
PLATELET # BLD AUTO: 141 K/UL — LOW (ref 150–400)
PLATELET # BLD AUTO: 144 K/UL — LOW (ref 150–400)
PLATELET # BLD AUTO: 148 K/UL — LOW (ref 150–400)
PLATELET # BLD AUTO: 151 K/UL — SIGNIFICANT CHANGE UP (ref 150–400)
PLATELET # BLD AUTO: 151 K/UL — SIGNIFICANT CHANGE UP (ref 150–400)
PLATELET # BLD AUTO: 152 K/UL — SIGNIFICANT CHANGE UP (ref 150–400)
PLATELET # BLD AUTO: 153 K/UL — SIGNIFICANT CHANGE UP (ref 150–400)
PLATELET # BLD AUTO: 154 K/UL — SIGNIFICANT CHANGE UP (ref 150–400)
PLATELET # BLD AUTO: 155 K/UL — SIGNIFICANT CHANGE UP (ref 150–400)
PLATELET # BLD AUTO: 156 K/UL — SIGNIFICANT CHANGE UP (ref 130–400)
PLATELET # BLD AUTO: 156 K/UL — SIGNIFICANT CHANGE UP (ref 150–400)
PLATELET # BLD AUTO: 157 K/UL — SIGNIFICANT CHANGE UP (ref 130–400)
PLATELET # BLD AUTO: 165 K/UL — SIGNIFICANT CHANGE UP (ref 150–400)
PLATELET # BLD AUTO: 167 K/UL — SIGNIFICANT CHANGE UP (ref 130–400)
PLATELET # BLD AUTO: 169 K/UL — SIGNIFICANT CHANGE UP (ref 130–400)
PLATELET # BLD AUTO: 169 K/UL — SIGNIFICANT CHANGE UP (ref 150–400)
PLATELET # BLD AUTO: 170 K/UL — SIGNIFICANT CHANGE UP (ref 150–400)
PLATELET # BLD AUTO: 177 K/UL — SIGNIFICANT CHANGE UP (ref 150–400)
PLATELET # BLD AUTO: 180 K/UL — SIGNIFICANT CHANGE UP (ref 150–400)
PLATELET # BLD AUTO: 185 K/UL — SIGNIFICANT CHANGE UP (ref 130–400)
PLATELET # BLD AUTO: 186 K/UL — SIGNIFICANT CHANGE UP (ref 150–400)
PLATELET # BLD AUTO: 187 K/UL — SIGNIFICANT CHANGE UP (ref 150–400)
PLATELET # BLD AUTO: 188 K/UL — SIGNIFICANT CHANGE UP (ref 150–400)
PLATELET # BLD AUTO: 189 K/UL — SIGNIFICANT CHANGE UP (ref 130–400)
PLATELET # BLD AUTO: 194 K/UL — SIGNIFICANT CHANGE UP (ref 130–400)
PLATELET # BLD AUTO: 202 K/UL — SIGNIFICANT CHANGE UP (ref 150–400)
PLATELET # BLD AUTO: 206 K/UL — SIGNIFICANT CHANGE UP (ref 130–400)
PLATELET # BLD AUTO: 206 K/UL — SIGNIFICANT CHANGE UP (ref 150–400)
PLATELET # BLD AUTO: 208 K/UL — SIGNIFICANT CHANGE UP (ref 150–400)
PLATELET # BLD AUTO: 209 K/UL — SIGNIFICANT CHANGE UP (ref 130–400)
PLATELET # BLD AUTO: 211 K/UL — SIGNIFICANT CHANGE UP (ref 130–400)
PLATELET # BLD AUTO: 212 K/UL — SIGNIFICANT CHANGE UP (ref 150–400)
PLATELET # BLD AUTO: 220 K/UL — SIGNIFICANT CHANGE UP (ref 150–400)
PLATELET # BLD AUTO: 221 K/UL — SIGNIFICANT CHANGE UP (ref 150–400)
PLATELET # BLD AUTO: 224 K/UL — SIGNIFICANT CHANGE UP (ref 150–400)
PLATELET # BLD AUTO: 225 K/UL — SIGNIFICANT CHANGE UP (ref 150–400)
PLATELET # BLD AUTO: 240 K/UL — SIGNIFICANT CHANGE UP (ref 150–400)
PLATELET # BLD AUTO: 242 K/UL — SIGNIFICANT CHANGE UP (ref 150–400)
PLATELET # BLD AUTO: 259 K/UL — SIGNIFICANT CHANGE UP (ref 150–400)
PLATELET # BLD AUTO: 75 K/UL — LOW (ref 150–400)
PLATELET # BLD AUTO: 81 K/UL — LOW (ref 150–400)
PLATELET # BLD AUTO: 83 K/UL — LOW (ref 150–400)
PLATELET # BLD AUTO: 87 K/UL — LOW (ref 150–400)
PLATELET # BLD AUTO: 89 K/UL — LOW (ref 150–400)
PLATELET # BLD AUTO: 90 K/UL — LOW (ref 150–400)
PLATELET # BLD AUTO: 92 K/UL — LOW (ref 150–400)
PO2 BLDA: 70 MMHG — LOW (ref 83–108)
PO2 BLDV: 20 MMHG — SIGNIFICANT CHANGE UP
PO2 BLDV: 32 MMHG — SIGNIFICANT CHANGE UP
POTASSIUM BLDV-SCNC: 3.1 MMOL/L — LOW (ref 3.5–5.1)
POTASSIUM BLDV-SCNC: 3.2 MMOL/L — LOW (ref 3.5–5.1)
POTASSIUM SERPL-MCNC: 3.1 MMOL/L — LOW (ref 3.5–5.3)
POTASSIUM SERPL-MCNC: 3.1 MMOL/L — LOW (ref 3.5–5.3)
POTASSIUM SERPL-MCNC: 3.2 MMOL/L — LOW (ref 3.5–5.3)
POTASSIUM SERPL-MCNC: 3.3 MMOL/L — LOW (ref 3.5–5.3)
POTASSIUM SERPL-MCNC: 3.4 MMOL/L — LOW (ref 3.5–5)
POTASSIUM SERPL-MCNC: 3.4 MMOL/L — LOW (ref 3.5–5)
POTASSIUM SERPL-MCNC: 3.4 MMOL/L — LOW (ref 3.5–5.3)
POTASSIUM SERPL-MCNC: 3.4 MMOL/L — LOW (ref 3.5–5.3)
POTASSIUM SERPL-MCNC: 3.5 MMOL/L — SIGNIFICANT CHANGE UP (ref 3.5–5.3)
POTASSIUM SERPL-MCNC: 3.6 MMOL/L — SIGNIFICANT CHANGE UP (ref 3.5–5)
POTASSIUM SERPL-MCNC: 3.6 MMOL/L — SIGNIFICANT CHANGE UP (ref 3.5–5.3)
POTASSIUM SERPL-MCNC: 3.7 MMOL/L — SIGNIFICANT CHANGE UP (ref 3.5–5.3)
POTASSIUM SERPL-MCNC: 3.7 MMOL/L — SIGNIFICANT CHANGE UP (ref 3.5–5.3)
POTASSIUM SERPL-MCNC: 3.8 MMOL/L — SIGNIFICANT CHANGE UP (ref 3.5–5)
POTASSIUM SERPL-MCNC: 3.8 MMOL/L — SIGNIFICANT CHANGE UP (ref 3.5–5.3)
POTASSIUM SERPL-MCNC: 3.9 MMOL/L — SIGNIFICANT CHANGE UP (ref 3.5–5)
POTASSIUM SERPL-MCNC: 3.9 MMOL/L — SIGNIFICANT CHANGE UP (ref 3.5–5.3)
POTASSIUM SERPL-MCNC: 4 MMOL/L — SIGNIFICANT CHANGE UP (ref 3.5–5)
POTASSIUM SERPL-MCNC: 4 MMOL/L — SIGNIFICANT CHANGE UP (ref 3.5–5.3)
POTASSIUM SERPL-MCNC: 4.1 MMOL/L — SIGNIFICANT CHANGE UP (ref 3.5–5.3)
POTASSIUM SERPL-MCNC: 4.1 MMOL/L — SIGNIFICANT CHANGE UP (ref 3.5–5.3)
POTASSIUM SERPL-MCNC: 4.2 MMOL/L — SIGNIFICANT CHANGE UP (ref 3.5–5)
POTASSIUM SERPL-MCNC: 4.2 MMOL/L — SIGNIFICANT CHANGE UP (ref 3.5–5.3)
POTASSIUM SERPL-MCNC: 4.2 MMOL/L — SIGNIFICANT CHANGE UP (ref 3.5–5.3)
POTASSIUM SERPL-MCNC: 4.3 MMOL/L — SIGNIFICANT CHANGE UP (ref 3.5–5.3)
POTASSIUM SERPL-MCNC: 4.3 MMOL/L — SIGNIFICANT CHANGE UP (ref 3.5–5.3)
POTASSIUM SERPL-MCNC: 4.4 MMOL/L — SIGNIFICANT CHANGE UP (ref 3.5–5.3)
POTASSIUM SERPL-MCNC: 4.4 MMOL/L — SIGNIFICANT CHANGE UP (ref 3.5–5.3)
POTASSIUM SERPL-MCNC: 4.6 MMOL/L — SIGNIFICANT CHANGE UP (ref 3.5–5)
POTASSIUM SERPL-MCNC: 4.6 MMOL/L — SIGNIFICANT CHANGE UP (ref 3.5–5.3)
POTASSIUM SERPL-MCNC: 4.7 MMOL/L — SIGNIFICANT CHANGE UP (ref 3.5–5.3)
POTASSIUM SERPL-MCNC: 4.7 MMOL/L — SIGNIFICANT CHANGE UP (ref 3.5–5.3)
POTASSIUM SERPL-SCNC: 3.1 MMOL/L — LOW (ref 3.5–5.3)
POTASSIUM SERPL-SCNC: 3.1 MMOL/L — LOW (ref 3.5–5.3)
POTASSIUM SERPL-SCNC: 3.2 MMOL/L — LOW (ref 3.5–5.3)
POTASSIUM SERPL-SCNC: 3.3 MMOL/L — LOW (ref 3.5–5.3)
POTASSIUM SERPL-SCNC: 3.4 MMOL/L — LOW (ref 3.5–5)
POTASSIUM SERPL-SCNC: 3.4 MMOL/L — LOW (ref 3.5–5)
POTASSIUM SERPL-SCNC: 3.4 MMOL/L — LOW (ref 3.5–5.3)
POTASSIUM SERPL-SCNC: 3.4 MMOL/L — LOW (ref 3.5–5.3)
POTASSIUM SERPL-SCNC: 3.5 MMOL/L — SIGNIFICANT CHANGE UP (ref 3.5–5.3)
POTASSIUM SERPL-SCNC: 3.6 MMOL/L — SIGNIFICANT CHANGE UP (ref 3.5–5)
POTASSIUM SERPL-SCNC: 3.6 MMOL/L — SIGNIFICANT CHANGE UP (ref 3.5–5.3)
POTASSIUM SERPL-SCNC: 3.7 MMOL/L — SIGNIFICANT CHANGE UP (ref 3.5–5.3)
POTASSIUM SERPL-SCNC: 3.7 MMOL/L — SIGNIFICANT CHANGE UP (ref 3.5–5.3)
POTASSIUM SERPL-SCNC: 3.8 MMOL/L — SIGNIFICANT CHANGE UP (ref 3.5–5)
POTASSIUM SERPL-SCNC: 3.8 MMOL/L — SIGNIFICANT CHANGE UP (ref 3.5–5.3)
POTASSIUM SERPL-SCNC: 3.9 MMOL/L — SIGNIFICANT CHANGE UP (ref 3.5–5)
POTASSIUM SERPL-SCNC: 3.9 MMOL/L — SIGNIFICANT CHANGE UP (ref 3.5–5.3)
POTASSIUM SERPL-SCNC: 4 MMOL/L — SIGNIFICANT CHANGE UP (ref 3.5–5)
POTASSIUM SERPL-SCNC: 4 MMOL/L — SIGNIFICANT CHANGE UP (ref 3.5–5.3)
POTASSIUM SERPL-SCNC: 4.1 MMOL/L — SIGNIFICANT CHANGE UP (ref 3.5–5.3)
POTASSIUM SERPL-SCNC: 4.1 MMOL/L — SIGNIFICANT CHANGE UP (ref 3.5–5.3)
POTASSIUM SERPL-SCNC: 4.2 MMOL/L — SIGNIFICANT CHANGE UP (ref 3.5–5)
POTASSIUM SERPL-SCNC: 4.2 MMOL/L — SIGNIFICANT CHANGE UP (ref 3.5–5.3)
POTASSIUM SERPL-SCNC: 4.2 MMOL/L — SIGNIFICANT CHANGE UP (ref 3.5–5.3)
POTASSIUM SERPL-SCNC: 4.3 MMOL/L — SIGNIFICANT CHANGE UP (ref 3.5–5.3)
POTASSIUM SERPL-SCNC: 4.3 MMOL/L — SIGNIFICANT CHANGE UP (ref 3.5–5.3)
POTASSIUM SERPL-SCNC: 4.4 MMOL/L — SIGNIFICANT CHANGE UP (ref 3.5–5.3)
POTASSIUM SERPL-SCNC: 4.4 MMOL/L — SIGNIFICANT CHANGE UP (ref 3.5–5.3)
POTASSIUM SERPL-SCNC: 4.6 MMOL/L — SIGNIFICANT CHANGE UP (ref 3.5–5)
POTASSIUM SERPL-SCNC: 4.6 MMOL/L — SIGNIFICANT CHANGE UP (ref 3.5–5.3)
POTASSIUM SERPL-SCNC: 4.7 MMOL/L — SIGNIFICANT CHANGE UP (ref 3.5–5.3)
POTASSIUM SERPL-SCNC: 4.7 MMOL/L — SIGNIFICANT CHANGE UP (ref 3.5–5.3)
PREALB SERPL-MCNC: 15 MG/DL — LOW (ref 20–40)
PROCALCITONIN SERPL-MCNC: 0.08 NG/ML — SIGNIFICANT CHANGE UP (ref 0.02–0.1)
PROCALCITONIN SERPL-MCNC: 0.26 NG/ML — HIGH (ref 0.02–0.1)
PROT SERPL-MCNC: 5.6 G/DL — LOW (ref 6–8.3)
PROT SERPL-MCNC: 5.7 G/DL — LOW (ref 6–8.3)
PROT SERPL-MCNC: 5.7 G/DL — LOW (ref 6–8.3)
PROT SERPL-MCNC: 5.8 G/DL — LOW (ref 6–8.3)
PROT SERPL-MCNC: 5.8 G/DL — LOW (ref 6–8.3)
PROT SERPL-MCNC: 5.9 G/DL — LOW (ref 6–8.3)
PROT SERPL-MCNC: 5.9 G/DL — LOW (ref 6–8.3)
PROT SERPL-MCNC: 6 G/DL — SIGNIFICANT CHANGE UP (ref 6–8.3)
PROT SERPL-MCNC: 6.1 G/DL — SIGNIFICANT CHANGE UP (ref 6–8.3)
PROT SERPL-MCNC: 6.2 G/DL — SIGNIFICANT CHANGE UP (ref 6–8.3)
PROT SERPL-MCNC: 6.3 G/DL — SIGNIFICANT CHANGE UP (ref 6–8.3)
PROT SERPL-MCNC: 6.3 G/DL — SIGNIFICANT CHANGE UP (ref 6–8.3)
PROT SERPL-MCNC: 6.4 G/DL — SIGNIFICANT CHANGE UP (ref 6–8.3)
PROT SERPL-MCNC: 6.6 G/DL — SIGNIFICANT CHANGE UP (ref 6–8.3)
PROT SERPL-MCNC: 6.9 G/DL — SIGNIFICANT CHANGE UP (ref 6–8.3)
PROT SERPL-MCNC: 7.1 G/DL — SIGNIFICANT CHANGE UP (ref 6–8.3)
PROT SERPL-MCNC: 7.2 G/DL — SIGNIFICANT CHANGE UP (ref 6–8)
PROT SERPL-MCNC: 7.2 G/DL — SIGNIFICANT CHANGE UP (ref 6–8.3)
PROT SERPL-MCNC: 7.2 G/DL — SIGNIFICANT CHANGE UP (ref 6–8.3)
PROT SERPL-MCNC: 7.3 G/DL — SIGNIFICANT CHANGE UP (ref 6–8)
PROT SERPL-MCNC: 7.3 G/DL — SIGNIFICANT CHANGE UP (ref 6–8.3)
PROT SERPL-MCNC: 7.4 G/DL — SIGNIFICANT CHANGE UP (ref 6–8.3)
PROT SERPL-MCNC: 7.5 G/DL — SIGNIFICANT CHANGE UP (ref 6–8)
PROT SERPL-MCNC: 7.6 G/DL — SIGNIFICANT CHANGE UP (ref 6–8.3)
PROT SERPL-MCNC: 7.9 G/DL — SIGNIFICANT CHANGE UP (ref 6–8)
PROT UR-MCNC: ABNORMAL
PROT UR-MCNC: ABNORMAL
PROT UR-MCNC: ABNORMAL MG/DL
PROT UR-MCNC: NEGATIVE MG/DL — SIGNIFICANT CHANGE UP
PROT UR-MCNC: NEGATIVE MG/DL — SIGNIFICANT CHANGE UP
PROTHROM AB SERPL-ACNC: 12.2 SEC — SIGNIFICANT CHANGE UP (ref 9.95–12.87)
PROTHROM AB SERPL-ACNC: 12.4 SEC — SIGNIFICANT CHANGE UP (ref 9.95–12.87)
PROTHROM AB SERPL-ACNC: 13.1 SEC — SIGNIFICANT CHANGE UP (ref 10.5–13.4)
PROTHROM AB SERPL-ACNC: 13.8 SEC — HIGH (ref 10.5–13.4)
PROTHROM AB SERPL-ACNC: 13.9 SEC — HIGH (ref 10.5–13.4)
PROTHROM AB SERPL-ACNC: 14 SEC — HIGH (ref 10.5–13.4)
PROTHROM AB SERPL-ACNC: 14.2 SEC — HIGH (ref 10.5–13.4)
PROTHROM AB SERPL-ACNC: 15 SEC — HIGH (ref 10.5–13.4)
PROTHROM AB SERPL-ACNC: 15 SEC — HIGH (ref 10.5–13.4)
PROTHROM AB SERPL-ACNC: 15.1 SEC — HIGH (ref 10.5–13.4)
PROTHROM AB SERPL-ACNC: 15.4 SEC — HIGH (ref 10.5–13.4)
PROTHROM AB SERPL-ACNC: 15.5 SEC — HIGH (ref 10.5–13.4)
PROTHROM AB SERPL-ACNC: 15.7 SEC — HIGH (ref 10.5–13.4)
PROTHROM AB SERPL-ACNC: 15.9 SEC — HIGH (ref 10.5–13.4)
PROTHROM AB SERPL-ACNC: 15.9 SEC — HIGH (ref 10.5–13.4)
PROTHROM AB SERPL-ACNC: 16 SEC — HIGH (ref 10.5–13.4)
PROTHROM AB SERPL-ACNC: 16.2 SEC — HIGH (ref 10.5–13.4)
PROTHROM AB SERPL-ACNC: 16.3 SEC — HIGH (ref 10.5–13.4)
PROTHROM AB SERPL-ACNC: 16.4 SEC — HIGH (ref 10.5–13.4)
PROTHROM AB SERPL-ACNC: 17.2 SEC — HIGH (ref 10.5–13.4)
PROTHROM AB SERPL-ACNC: 17.7 SEC — HIGH (ref 10.5–13.4)
PROTHROM AB SERPL-ACNC: 18.1 SEC — HIGH (ref 10.5–13.4)
PROTHROM AB SERPL-ACNC: 20.4 SEC — HIGH (ref 10.5–13.4)
PROTHROM AB SERPL-ACNC: 20.8 SEC — HIGH (ref 9.95–12.87)
PROTHROM AB SERPL-ACNC: 21 SEC — HIGH (ref 10.5–13.4)
PROTHROM AB SERPL-ACNC: 24.9 SEC — HIGH (ref 10.5–13.4)
PROTHROM AB SERPL-ACNC: 26.4 SEC — HIGH (ref 10.5–13.4)
PROTHROM AB SERPL-ACNC: 30.2 SEC — HIGH (ref 10.5–13.4)
RBC # BLD: 2.3 M/UL — LOW (ref 3.8–5.2)
RBC # BLD: 2.31 M/UL — LOW (ref 3.8–5.2)
RBC # BLD: 2.33 M/UL — LOW (ref 3.8–5.2)
RBC # BLD: 2.36 M/UL — LOW (ref 3.8–5.2)
RBC # BLD: 2.38 M/UL — LOW (ref 3.8–5.2)
RBC # BLD: 2.4 M/UL — LOW (ref 3.8–5.2)
RBC # BLD: 2.43 M/UL — LOW (ref 3.8–5.2)
RBC # BLD: 2.5 M/UL — LOW (ref 3.8–5.2)
RBC # BLD: 2.51 M/UL — LOW (ref 3.8–5.2)
RBC # BLD: 2.51 M/UL — LOW (ref 4.2–5.4)
RBC # BLD: 2.54 M/UL — LOW (ref 3.8–5.2)
RBC # BLD: 2.54 M/UL — LOW (ref 4.2–5.4)
RBC # BLD: 2.56 M/UL — LOW (ref 3.8–5.2)
RBC # BLD: 2.59 M/UL — LOW (ref 3.8–5.2)
RBC # BLD: 2.61 M/UL — LOW (ref 3.8–5.2)
RBC # BLD: 2.62 M/UL — LOW (ref 3.8–5.2)
RBC # BLD: 2.65 M/UL — LOW (ref 3.8–5.2)
RBC # BLD: 2.65 M/UL — LOW (ref 3.8–5.2)
RBC # BLD: 2.68 M/UL — LOW (ref 3.8–5.2)
RBC # BLD: 2.69 M/UL — LOW (ref 3.8–5.2)
RBC # BLD: 2.69 M/UL — LOW (ref 3.8–5.2)
RBC # BLD: 2.71 M/UL — LOW (ref 3.8–5.2)
RBC # BLD: 2.72 M/UL — LOW (ref 3.8–5.2)
RBC # BLD: 2.73 M/UL — LOW (ref 3.8–5.2)
RBC # BLD: 2.73 M/UL — LOW (ref 4.2–5.4)
RBC # BLD: 2.76 M/UL — LOW (ref 3.8–5.2)
RBC # BLD: 2.8 M/UL — LOW (ref 3.8–5.2)
RBC # BLD: 2.8 M/UL — LOW (ref 3.8–5.2)
RBC # BLD: 2.87 M/UL — LOW (ref 3.8–5.2)
RBC # BLD: 2.88 M/UL — LOW (ref 4.2–5.4)
RBC # BLD: 2.89 M/UL — LOW (ref 3.8–5.2)
RBC # BLD: 2.92 M/UL — LOW (ref 3.8–5.2)
RBC # BLD: 2.93 M/UL — LOW (ref 3.8–5.2)
RBC # BLD: 2.94 M/UL — LOW (ref 3.8–5.2)
RBC # BLD: 2.97 M/UL — LOW (ref 3.8–5.2)
RBC # BLD: 2.98 M/UL — LOW (ref 3.8–5.2)
RBC # BLD: 2.98 M/UL — LOW (ref 4.2–5.4)
RBC # BLD: 3.01 M/UL — LOW (ref 3.8–5.2)
RBC # BLD: 3.03 M/UL — LOW (ref 3.8–5.2)
RBC # BLD: 3.06 M/UL — LOW (ref 3.8–5.2)
RBC # BLD: 3.06 M/UL — LOW (ref 3.8–5.2)
RBC # BLD: 3.06 M/UL — LOW (ref 4.2–5.4)
RBC # BLD: 3.08 M/UL — LOW (ref 3.8–5.2)
RBC # BLD: 3.09 M/UL — LOW (ref 3.8–5.2)
RBC # BLD: 3.12 M/UL — LOW (ref 3.8–5.2)
RBC # BLD: 3.13 M/UL — LOW (ref 3.8–5.2)
RBC # BLD: 3.14 M/UL — LOW (ref 3.8–5.2)
RBC # BLD: 3.16 M/UL — LOW (ref 4.2–5.4)
RBC # BLD: 3.17 M/UL — LOW (ref 4.2–5.4)
RBC # BLD: 3.22 M/UL — LOW (ref 3.8–5.2)
RBC # BLD: 3.33 M/UL — LOW (ref 4.2–5.4)
RBC # BLD: 3.36 M/UL — LOW (ref 3.8–5.2)
RBC # BLD: 3.38 M/UL — LOW (ref 3.8–5.2)
RBC # BLD: 3.4 M/UL — LOW (ref 4.2–5.4)
RBC # BLD: 3.56 M/UL — LOW (ref 3.8–5.2)
RBC # BLD: 3.67 M/UL — LOW (ref 3.8–5.2)
RBC # FLD: 15.5 % — HIGH (ref 11.5–14.5)
RBC # FLD: 15.8 % — HIGH (ref 11.5–14.5)
RBC # FLD: 15.9 % — HIGH (ref 10.3–14.5)
RBC # FLD: 16 % — HIGH (ref 10.3–14.5)
RBC # FLD: 16.2 % — HIGH (ref 10.3–14.5)
RBC # FLD: 16.3 % — HIGH (ref 10.3–14.5)
RBC # FLD: 16.7 % — HIGH (ref 10.3–14.5)
RBC # FLD: 16.7 % — HIGH (ref 10.3–14.5)
RBC # FLD: 16.8 % — HIGH (ref 10.3–14.5)
RBC # FLD: 17 % — HIGH (ref 10.3–14.5)
RBC # FLD: 17.1 % — HIGH (ref 10.3–14.5)
RBC # FLD: 17.1 % — HIGH (ref 11.5–14.5)
RBC # FLD: 17.2 % — HIGH (ref 10.3–14.5)
RBC # FLD: 17.2 % — HIGH (ref 11.5–14.5)
RBC # FLD: 17.3 % — HIGH (ref 10.3–14.5)
RBC # FLD: 17.4 % — HIGH (ref 10.3–14.5)
RBC # FLD: 17.5 % — HIGH (ref 10.3–14.5)
RBC # FLD: 17.5 % — HIGH (ref 11.5–14.5)
RBC # FLD: 17.7 % — HIGH (ref 10.3–14.5)
RBC # FLD: 17.7 % — HIGH (ref 10.3–14.5)
RBC # FLD: 17.8 % — HIGH (ref 10.3–14.5)
RBC # FLD: 17.8 % — HIGH (ref 11.5–14.5)
RBC # FLD: 17.9 % — HIGH (ref 10.3–14.5)
RBC # FLD: 17.9 % — HIGH (ref 10.3–14.5)
RBC # FLD: 17.9 % — HIGH (ref 11.5–14.5)
RBC # FLD: 18 % — HIGH (ref 10.3–14.5)
RBC # FLD: 18 % — HIGH (ref 10.3–14.5)
RBC # FLD: 18.1 % — HIGH (ref 10.3–14.5)
RBC # FLD: 18.2 % — HIGH (ref 11.5–14.5)
RBC # FLD: 18.3 % — HIGH (ref 10.3–14.5)
RBC # FLD: 18.3 % — HIGH (ref 10.3–14.5)
RBC # FLD: 18.4 % — HIGH (ref 10.3–14.5)
RBC # FLD: 18.5 % — HIGH (ref 10.3–14.5)
RBC # FLD: 18.6 % — HIGH (ref 10.3–14.5)
RBC # FLD: 18.7 % — HIGH (ref 10.3–14.5)
RBC # FLD: 18.8 % — HIGH (ref 10.3–14.5)
RBC # FLD: 19 % — HIGH (ref 10.3–14.5)
RBC # FLD: 19.1 % — HIGH (ref 10.3–14.5)
RBC # FLD: 19.1 % — HIGH (ref 10.3–14.5)
RBC # FLD: 19.6 % — HIGH (ref 10.3–14.5)
RBC BLD AUTO: ABNORMAL
RBC CASTS # UR COMP ASSIST: 3 /HPF — SIGNIFICANT CHANGE UP (ref 0–4)
RBC CASTS # UR COMP ASSIST: 4 /HPF — SIGNIFICANT CHANGE UP (ref 0–4)
RBC CASTS # UR COMP ASSIST: < 5 /HPF — SIGNIFICANT CHANGE UP
RBC CASTS # UR COMP ASSIST: ABNORMAL /HPF
RBC CASTS # UR COMP ASSIST: ABNORMAL /HPF
RH IG SCN BLD-IMP: POSITIVE — SIGNIFICANT CHANGE UP
S AUREUS DNA NOSE QL NAA+PROBE: NEGATIVE — SIGNIFICANT CHANGE UP
S AUREUS DNA NOSE QL NAA+PROBE: NEGATIVE — SIGNIFICANT CHANGE UP
SAO2 % BLDA: 95.6 % — SIGNIFICANT CHANGE UP (ref 94–98)
SAO2 % BLDV: 24.8 % — SIGNIFICANT CHANGE UP
SAO2 % BLDV: 53.7 % — SIGNIFICANT CHANGE UP
SARS-COV-2 RNA SPEC QL NAA+PROBE: NEGATIVE — SIGNIFICANT CHANGE UP
SARS-COV-2 RNA SPEC QL NAA+PROBE: NEGATIVE — SIGNIFICANT CHANGE UP
SARS-COV-2 RNA SPEC QL NAA+PROBE: SIGNIFICANT CHANGE UP
SODIUM SERPL-SCNC: 131 MMOL/L — LOW (ref 135–145)
SODIUM SERPL-SCNC: 132 MMOL/L — LOW (ref 135–145)
SODIUM SERPL-SCNC: 132 MMOL/L — LOW (ref 135–145)
SODIUM SERPL-SCNC: 133 MMOL/L — LOW (ref 135–145)
SODIUM SERPL-SCNC: 133 MMOL/L — LOW (ref 135–145)
SODIUM SERPL-SCNC: 133 MMOL/L — LOW (ref 135–146)
SODIUM SERPL-SCNC: 133 MMOL/L — LOW (ref 135–146)
SODIUM SERPL-SCNC: 134 MMOL/L — LOW (ref 135–145)
SODIUM SERPL-SCNC: 135 MMOL/L — SIGNIFICANT CHANGE UP (ref 135–145)
SODIUM SERPL-SCNC: 136 MMOL/L — SIGNIFICANT CHANGE UP (ref 135–145)
SODIUM SERPL-SCNC: 136 MMOL/L — SIGNIFICANT CHANGE UP (ref 135–146)
SODIUM SERPL-SCNC: 136 MMOL/L — SIGNIFICANT CHANGE UP (ref 135–146)
SODIUM SERPL-SCNC: 137 MMOL/L — SIGNIFICANT CHANGE UP (ref 135–145)
SODIUM SERPL-SCNC: 138 MMOL/L — SIGNIFICANT CHANGE UP (ref 135–145)
SODIUM SERPL-SCNC: 139 MMOL/L — SIGNIFICANT CHANGE UP (ref 135–145)
SODIUM SERPL-SCNC: 139 MMOL/L — SIGNIFICANT CHANGE UP (ref 135–146)
SODIUM SERPL-SCNC: 139 MMOL/L — SIGNIFICANT CHANGE UP (ref 135–146)
SODIUM SERPL-SCNC: 140 MMOL/L — SIGNIFICANT CHANGE UP (ref 135–146)
SODIUM SERPL-SCNC: 141 MMOL/L — SIGNIFICANT CHANGE UP (ref 135–146)
SP GR SPEC: 1.01 — SIGNIFICANT CHANGE UP (ref 1.01–1.03)
SP GR SPEC: 1.01 — SIGNIFICANT CHANGE UP (ref 1.01–1.03)
SP GR SPEC: 1.01 — SIGNIFICANT CHANGE UP (ref 1–1.03)
SP GR SPEC: 1.02 — SIGNIFICANT CHANGE UP (ref 1–1.03)
SP GR SPEC: <=1.005 — SIGNIFICANT CHANGE UP (ref 1–1.03)
SPECIMEN SOURCE: SIGNIFICANT CHANGE UP
SRA INTERP SER-IMP: SIGNIFICANT CHANGE UP
T3 SERPL-MCNC: 64 NG/DL — LOW (ref 80–200)
T4 AB SER-ACNC: 3.84 UG/DL — LOW (ref 4.5–11.7)
T4 AB SER-ACNC: 5.76 UG/DL — SIGNIFICANT CHANGE UP (ref 4.5–11.7)
T4 AB SER-ACNC: 5.8 UG/DL — SIGNIFICANT CHANGE UP (ref 4.5–11.7)
T4 FREE SERPL-MCNC: 0.74 NG/DL — LOW (ref 0.93–1.7)
TIBC SERPL-MCNC: 135 UG/DL — LOW (ref 220–430)
TRANSFERRIN SERPL-MCNC: 125 MG/DL — LOW (ref 200–360)
TRIGL SERPL-MCNC: 225 MG/DL — HIGH
TROPONIN T SERPL-MCNC: <0.01 NG/ML — SIGNIFICANT CHANGE UP
TSH SERPL-MCNC: 10.38 UIU/ML — HIGH (ref 0.27–4.2)
TSH SERPL-MCNC: 12.71 UIU/ML — HIGH (ref 0.27–4.2)
TSH SERPL-MCNC: 3.66 UIU/ML — SIGNIFICANT CHANGE UP (ref 0.27–4.2)
TSH SERPL-MCNC: 5.22 UIU/ML — HIGH (ref 0.27–4.2)
UIBC SERPL-MCNC: 79 UG/DL — LOW (ref 110–370)
UROBILINOGEN FLD QL: 0.2 E.U./DL — SIGNIFICANT CHANGE UP
UROBILINOGEN FLD QL: SIGNIFICANT CHANGE UP
UROBILINOGEN FLD QL: SIGNIFICANT CHANGE UP
VANCOMYCIN TROUGH SERPL-MCNC: 12.5 UG/ML — SIGNIFICANT CHANGE UP (ref 10–20)
VANCOMYCIN TROUGH SERPL-MCNC: 18.5 UG/ML — SIGNIFICANT CHANGE UP (ref 10–20)
VANCOMYCIN TROUGH SERPL-MCNC: 18.5 UG/ML — SIGNIFICANT CHANGE UP (ref 10–20)
VANCOMYCIN TROUGH SERPL-MCNC: 20.2 UG/ML — HIGH (ref 10–20)
VANCOMYCIN TROUGH SERPL-MCNC: 21.1 UG/ML — HIGH (ref 10–20)
VANCOMYCIN TROUGH SERPL-MCNC: 25.4 UG/ML — CRITICAL HIGH (ref 10–20)
VIT B12 SERPL-MCNC: 552 PG/ML — SIGNIFICANT CHANGE UP (ref 232–1245)
WBC # BLD: 10.14 K/UL — SIGNIFICANT CHANGE UP (ref 3.8–10.5)
WBC # BLD: 10.26 K/UL — SIGNIFICANT CHANGE UP (ref 4.8–10.8)
WBC # BLD: 10.73 K/UL — HIGH (ref 3.8–10.5)
WBC # BLD: 10.8 K/UL — HIGH (ref 3.8–10.5)
WBC # BLD: 10.92 K/UL — HIGH (ref 3.8–10.5)
WBC # BLD: 10.97 K/UL — HIGH (ref 3.8–10.5)
WBC # BLD: 10.99 K/UL — HIGH (ref 3.8–10.5)
WBC # BLD: 11.05 K/UL — HIGH (ref 3.8–10.5)
WBC # BLD: 11.1 K/UL — HIGH (ref 3.8–10.5)
WBC # BLD: 11.12 K/UL — HIGH (ref 3.8–10.5)
WBC # BLD: 11.21 K/UL — HIGH (ref 3.8–10.5)
WBC # BLD: 11.32 K/UL — HIGH (ref 3.8–10.5)
WBC # BLD: 11.44 K/UL — HIGH (ref 3.8–10.5)
WBC # BLD: 11.66 K/UL — HIGH (ref 4.8–10.8)
WBC # BLD: 11.9 K/UL — HIGH (ref 3.8–10.5)
WBC # BLD: 12.13 K/UL — HIGH (ref 3.8–10.5)
WBC # BLD: 12.57 K/UL — HIGH (ref 3.8–10.5)
WBC # BLD: 13.18 K/UL — HIGH (ref 3.8–10.5)
WBC # BLD: 4.35 K/UL — SIGNIFICANT CHANGE UP (ref 3.8–10.5)
WBC # BLD: 4.52 K/UL — SIGNIFICANT CHANGE UP (ref 3.8–10.5)
WBC # BLD: 4.79 K/UL — SIGNIFICANT CHANGE UP (ref 3.8–10.5)
WBC # BLD: 5.11 K/UL — SIGNIFICANT CHANGE UP (ref 3.8–10.5)
WBC # BLD: 5.17 K/UL — SIGNIFICANT CHANGE UP (ref 3.8–10.5)
WBC # BLD: 5.17 K/UL — SIGNIFICANT CHANGE UP (ref 3.8–10.5)
WBC # BLD: 5.23 K/UL — SIGNIFICANT CHANGE UP (ref 3.8–10.5)
WBC # BLD: 5.49 K/UL — SIGNIFICANT CHANGE UP (ref 3.8–10.5)
WBC # BLD: 5.69 K/UL — SIGNIFICANT CHANGE UP (ref 3.8–10.5)
WBC # BLD: 5.71 K/UL — SIGNIFICANT CHANGE UP (ref 4.8–10.8)
WBC # BLD: 6.02 K/UL — SIGNIFICANT CHANGE UP (ref 3.8–10.5)
WBC # BLD: 6.09 K/UL — SIGNIFICANT CHANGE UP (ref 3.8–10.5)
WBC # BLD: 6.15 K/UL — SIGNIFICANT CHANGE UP (ref 4.8–10.8)
WBC # BLD: 6.33 K/UL — SIGNIFICANT CHANGE UP (ref 3.8–10.5)
WBC # BLD: 6.37 K/UL — SIGNIFICANT CHANGE UP (ref 3.8–10.5)
WBC # BLD: 6.41 K/UL — SIGNIFICANT CHANGE UP (ref 3.8–10.5)
WBC # BLD: 6.46 K/UL — SIGNIFICANT CHANGE UP (ref 3.8–10.5)
WBC # BLD: 6.47 K/UL — SIGNIFICANT CHANGE UP (ref 3.8–10.5)
WBC # BLD: 6.49 K/UL — SIGNIFICANT CHANGE UP (ref 3.8–10.5)
WBC # BLD: 6.66 K/UL — SIGNIFICANT CHANGE UP (ref 4.8–10.8)
WBC # BLD: 7.15 K/UL — SIGNIFICANT CHANGE UP (ref 3.8–10.5)
WBC # BLD: 7.19 K/UL — SIGNIFICANT CHANGE UP (ref 3.8–10.5)
WBC # BLD: 7.34 K/UL — SIGNIFICANT CHANGE UP (ref 4.8–10.8)
WBC # BLD: 7.53 K/UL — SIGNIFICANT CHANGE UP (ref 3.8–10.5)
WBC # BLD: 7.64 K/UL — SIGNIFICANT CHANGE UP (ref 3.8–10.5)
WBC # BLD: 7.72 K/UL — SIGNIFICANT CHANGE UP (ref 3.8–10.5)
WBC # BLD: 7.95 K/UL — SIGNIFICANT CHANGE UP (ref 3.8–10.5)
WBC # BLD: 8.07 K/UL — SIGNIFICANT CHANGE UP (ref 3.8–10.5)
WBC # BLD: 8.19 K/UL — SIGNIFICANT CHANGE UP (ref 3.8–10.5)
WBC # BLD: 8.28 K/UL — SIGNIFICANT CHANGE UP (ref 3.8–10.5)
WBC # BLD: 8.41 K/UL — SIGNIFICANT CHANGE UP (ref 3.8–10.5)
WBC # BLD: 8.43 K/UL — SIGNIFICANT CHANGE UP (ref 4.8–10.8)
WBC # BLD: 8.44 K/UL — SIGNIFICANT CHANGE UP (ref 3.8–10.5)
WBC # BLD: 8.64 K/UL — SIGNIFICANT CHANGE UP (ref 3.8–10.5)
WBC # BLD: 8.77 K/UL — SIGNIFICANT CHANGE UP (ref 3.8–10.5)
WBC # BLD: 8.87 K/UL — SIGNIFICANT CHANGE UP (ref 3.8–10.5)
WBC # BLD: 9.09 K/UL — SIGNIFICANT CHANGE UP (ref 4.8–10.8)
WBC # BLD: 9.4 K/UL — SIGNIFICANT CHANGE UP (ref 3.8–10.5)
WBC # BLD: 9.4 K/UL — SIGNIFICANT CHANGE UP (ref 4.8–10.8)
WBC # BLD: 9.77 K/UL — SIGNIFICANT CHANGE UP (ref 3.8–10.5)
WBC # BLD: 9.79 K/UL — SIGNIFICANT CHANGE UP (ref 4.8–10.8)
WBC # BLD: 9.8 K/UL — SIGNIFICANT CHANGE UP (ref 3.8–10.5)
WBC # BLD: 9.82 K/UL — SIGNIFICANT CHANGE UP (ref 3.8–10.5)
WBC # FLD AUTO: 10.14 K/UL — SIGNIFICANT CHANGE UP (ref 3.8–10.5)
WBC # FLD AUTO: 10.26 K/UL — SIGNIFICANT CHANGE UP (ref 4.8–10.8)
WBC # FLD AUTO: 10.73 K/UL — HIGH (ref 3.8–10.5)
WBC # FLD AUTO: 10.8 K/UL — HIGH (ref 3.8–10.5)
WBC # FLD AUTO: 10.92 K/UL — HIGH (ref 3.8–10.5)
WBC # FLD AUTO: 10.97 K/UL — HIGH (ref 3.8–10.5)
WBC # FLD AUTO: 10.99 K/UL — HIGH (ref 3.8–10.5)
WBC # FLD AUTO: 11.05 K/UL — HIGH (ref 3.8–10.5)
WBC # FLD AUTO: 11.1 K/UL — HIGH (ref 3.8–10.5)
WBC # FLD AUTO: 11.12 K/UL — HIGH (ref 3.8–10.5)
WBC # FLD AUTO: 11.21 K/UL — HIGH (ref 3.8–10.5)
WBC # FLD AUTO: 11.32 K/UL — HIGH (ref 3.8–10.5)
WBC # FLD AUTO: 11.44 K/UL — HIGH (ref 3.8–10.5)
WBC # FLD AUTO: 11.66 K/UL — HIGH (ref 4.8–10.8)
WBC # FLD AUTO: 11.9 K/UL — HIGH (ref 3.8–10.5)
WBC # FLD AUTO: 12.13 K/UL — HIGH (ref 3.8–10.5)
WBC # FLD AUTO: 12.57 K/UL — HIGH (ref 3.8–10.5)
WBC # FLD AUTO: 13.18 K/UL — HIGH (ref 3.8–10.5)
WBC # FLD AUTO: 4.35 K/UL — SIGNIFICANT CHANGE UP (ref 3.8–10.5)
WBC # FLD AUTO: 4.52 K/UL — SIGNIFICANT CHANGE UP (ref 3.8–10.5)
WBC # FLD AUTO: 4.79 K/UL — SIGNIFICANT CHANGE UP (ref 3.8–10.5)
WBC # FLD AUTO: 5.11 K/UL — SIGNIFICANT CHANGE UP (ref 3.8–10.5)
WBC # FLD AUTO: 5.17 K/UL — SIGNIFICANT CHANGE UP (ref 3.8–10.5)
WBC # FLD AUTO: 5.17 K/UL — SIGNIFICANT CHANGE UP (ref 3.8–10.5)
WBC # FLD AUTO: 5.23 K/UL — SIGNIFICANT CHANGE UP (ref 3.8–10.5)
WBC # FLD AUTO: 5.49 K/UL — SIGNIFICANT CHANGE UP (ref 3.8–10.5)
WBC # FLD AUTO: 5.69 K/UL — SIGNIFICANT CHANGE UP (ref 3.8–10.5)
WBC # FLD AUTO: 5.71 K/UL — SIGNIFICANT CHANGE UP (ref 4.8–10.8)
WBC # FLD AUTO: 6.02 K/UL — SIGNIFICANT CHANGE UP (ref 3.8–10.5)
WBC # FLD AUTO: 6.09 K/UL — SIGNIFICANT CHANGE UP (ref 3.8–10.5)
WBC # FLD AUTO: 6.15 K/UL — SIGNIFICANT CHANGE UP (ref 4.8–10.8)
WBC # FLD AUTO: 6.33 K/UL — SIGNIFICANT CHANGE UP (ref 3.8–10.5)
WBC # FLD AUTO: 6.37 K/UL — SIGNIFICANT CHANGE UP (ref 3.8–10.5)
WBC # FLD AUTO: 6.41 K/UL — SIGNIFICANT CHANGE UP (ref 3.8–10.5)
WBC # FLD AUTO: 6.46 K/UL — SIGNIFICANT CHANGE UP (ref 3.8–10.5)
WBC # FLD AUTO: 6.47 K/UL — SIGNIFICANT CHANGE UP (ref 3.8–10.5)
WBC # FLD AUTO: 6.49 K/UL — SIGNIFICANT CHANGE UP (ref 3.8–10.5)
WBC # FLD AUTO: 6.66 K/UL — SIGNIFICANT CHANGE UP (ref 4.8–10.8)
WBC # FLD AUTO: 7.15 K/UL — SIGNIFICANT CHANGE UP (ref 3.8–10.5)
WBC # FLD AUTO: 7.19 K/UL — SIGNIFICANT CHANGE UP (ref 3.8–10.5)
WBC # FLD AUTO: 7.34 K/UL — SIGNIFICANT CHANGE UP (ref 4.8–10.8)
WBC # FLD AUTO: 7.53 K/UL — SIGNIFICANT CHANGE UP (ref 3.8–10.5)
WBC # FLD AUTO: 7.64 K/UL — SIGNIFICANT CHANGE UP (ref 3.8–10.5)
WBC # FLD AUTO: 7.72 K/UL — SIGNIFICANT CHANGE UP (ref 3.8–10.5)
WBC # FLD AUTO: 7.95 K/UL — SIGNIFICANT CHANGE UP (ref 3.8–10.5)
WBC # FLD AUTO: 8.07 K/UL — SIGNIFICANT CHANGE UP (ref 3.8–10.5)
WBC # FLD AUTO: 8.19 K/UL — SIGNIFICANT CHANGE UP (ref 3.8–10.5)
WBC # FLD AUTO: 8.28 K/UL — SIGNIFICANT CHANGE UP (ref 3.8–10.5)
WBC # FLD AUTO: 8.41 K/UL — SIGNIFICANT CHANGE UP (ref 3.8–10.5)
WBC # FLD AUTO: 8.43 K/UL — SIGNIFICANT CHANGE UP (ref 4.8–10.8)
WBC # FLD AUTO: 8.44 K/UL — SIGNIFICANT CHANGE UP (ref 3.8–10.5)
WBC # FLD AUTO: 8.64 K/UL — SIGNIFICANT CHANGE UP (ref 3.8–10.5)
WBC # FLD AUTO: 8.77 K/UL — SIGNIFICANT CHANGE UP (ref 3.8–10.5)
WBC # FLD AUTO: 8.87 K/UL — SIGNIFICANT CHANGE UP (ref 3.8–10.5)
WBC # FLD AUTO: 9.09 K/UL — SIGNIFICANT CHANGE UP (ref 4.8–10.8)
WBC # FLD AUTO: 9.4 K/UL — SIGNIFICANT CHANGE UP (ref 3.8–10.5)
WBC # FLD AUTO: 9.4 K/UL — SIGNIFICANT CHANGE UP (ref 4.8–10.8)
WBC # FLD AUTO: 9.77 K/UL — SIGNIFICANT CHANGE UP (ref 3.8–10.5)
WBC # FLD AUTO: 9.79 K/UL — SIGNIFICANT CHANGE UP (ref 4.8–10.8)
WBC # FLD AUTO: 9.8 K/UL — SIGNIFICANT CHANGE UP (ref 3.8–10.5)
WBC # FLD AUTO: 9.82 K/UL — SIGNIFICANT CHANGE UP (ref 3.8–10.5)
WBC UR QL: 3 /HPF — SIGNIFICANT CHANGE UP (ref 0–5)
WBC UR QL: 3 /HPF — SIGNIFICANT CHANGE UP (ref 0–5)
WBC UR QL: < 5 /HPF — SIGNIFICANT CHANGE UP

## 2022-01-01 PROCEDURE — 34713 PERQ ACCESS & CLSR FEM ART: CPT | Mod: 62,RT

## 2022-01-01 PROCEDURE — 80048 BASIC METABOLIC PNL TOTAL CA: CPT

## 2022-01-01 PROCEDURE — 99233 SBSQ HOSP IP/OBS HIGH 50: CPT

## 2022-01-01 PROCEDURE — 71275 CT ANGIOGRAPHY CHEST: CPT | Mod: MA

## 2022-01-01 PROCEDURE — 93970 EXTREMITY STUDY: CPT | Mod: 26

## 2022-01-01 PROCEDURE — 86922 COMPATIBILITY TEST ANTIGLOB: CPT

## 2022-01-01 PROCEDURE — 71046 X-RAY EXAM CHEST 2 VIEWS: CPT | Mod: 26

## 2022-01-01 PROCEDURE — 99291 CRITICAL CARE FIRST HOUR: CPT

## 2022-01-01 PROCEDURE — 99231 SBSQ HOSP IP/OBS SF/LOW 25: CPT

## 2022-01-01 PROCEDURE — 71275 CT ANGIOGRAPHY CHEST: CPT | Mod: 26,MA

## 2022-01-01 PROCEDURE — 84480 ASSAY TRIIODOTHYRONINE (T3): CPT

## 2022-01-01 PROCEDURE — 99232 SBSQ HOSP IP/OBS MODERATE 35: CPT

## 2022-01-01 PROCEDURE — 80053 COMPREHEN METABOLIC PANEL: CPT

## 2022-01-01 PROCEDURE — 93010 ELECTROCARDIOGRAM REPORT: CPT

## 2022-01-01 PROCEDURE — 87635 SARS-COV-2 COVID-19 AMP PRB: CPT

## 2022-01-01 PROCEDURE — 82553 CREATINE MB FRACTION: CPT

## 2022-01-01 PROCEDURE — 99292 CRITICAL CARE ADDL 30 MIN: CPT

## 2022-01-01 PROCEDURE — U0003: CPT

## 2022-01-01 PROCEDURE — 87205 SMEAR GRAM STAIN: CPT

## 2022-01-01 PROCEDURE — 95819 EEG AWAKE AND ASLEEP: CPT | Mod: 26

## 2022-01-01 PROCEDURE — 87640 STAPH A DNA AMP PROBE: CPT

## 2022-01-01 PROCEDURE — 99254 IP/OBS CNSLTJ NEW/EST MOD 60: CPT | Mod: GC,25

## 2022-01-01 PROCEDURE — 83036 HEMOGLOBIN GLYCOSYLATED A1C: CPT

## 2022-01-01 PROCEDURE — 94640 AIRWAY INHALATION TREATMENT: CPT

## 2022-01-01 PROCEDURE — 71045 X-RAY EXAM CHEST 1 VIEW: CPT | Mod: 26

## 2022-01-01 PROCEDURE — 76942 ECHO GUIDE FOR BIOPSY: CPT

## 2022-01-01 PROCEDURE — 71260 CT THORAX DX C+: CPT

## 2022-01-01 PROCEDURE — 82962 GLUCOSE BLOOD TEST: CPT

## 2022-01-01 PROCEDURE — 83880 ASSAY OF NATRIURETIC PEPTIDE: CPT

## 2022-01-01 PROCEDURE — 84443 ASSAY THYROID STIM HORMONE: CPT

## 2022-01-01 PROCEDURE — 74174 CTA ABD&PLVS W/CONTRAST: CPT

## 2022-01-01 PROCEDURE — 87102 FUNGUS ISOLATION CULTURE: CPT

## 2022-01-01 PROCEDURE — 86850 RBC ANTIBODY SCREEN: CPT

## 2022-01-01 PROCEDURE — 36556 INSERT NON-TUNNEL CV CATH: CPT

## 2022-01-01 PROCEDURE — 71045 X-RAY EXAM CHEST 1 VIEW: CPT | Mod: 26,77

## 2022-01-01 PROCEDURE — 86902 BLOOD TYPE ANTIGEN DONOR EA: CPT

## 2022-01-01 PROCEDURE — 73206 CT ANGIO UPR EXTRM W/O&W/DYE: CPT | Mod: 26,LT,MA

## 2022-01-01 PROCEDURE — 70450 CT HEAD/BRAIN W/O DYE: CPT | Mod: 26,59

## 2022-01-01 PROCEDURE — 70498 CT ANGIOGRAPHY NECK: CPT | Mod: 26

## 2022-01-01 PROCEDURE — 99223 1ST HOSP IP/OBS HIGH 75: CPT

## 2022-01-01 PROCEDURE — 87206 SMEAR FLUORESCENT/ACID STAI: CPT

## 2022-01-01 PROCEDURE — 36415 COLL VENOUS BLD VENIPUNCTURE: CPT

## 2022-01-01 PROCEDURE — 71045 X-RAY EXAM CHEST 1 VIEW: CPT | Mod: 26,76

## 2022-01-01 PROCEDURE — 83605 ASSAY OF LACTIC ACID: CPT

## 2022-01-01 PROCEDURE — C1874: CPT

## 2022-01-01 PROCEDURE — 85347 COAGULATION TIME ACTIVATED: CPT

## 2022-01-01 PROCEDURE — 93321 DOPPLER ECHO F-UP/LMTD STD: CPT | Mod: 26

## 2022-01-01 PROCEDURE — 99024 POSTOP FOLLOW-UP VISIT: CPT

## 2022-01-01 PROCEDURE — 76942 ECHO GUIDE FOR BIOPSY: CPT | Mod: 26

## 2022-01-01 PROCEDURE — 70496 CT ANGIOGRAPHY HEAD: CPT

## 2022-01-01 PROCEDURE — 83735 ASSAY OF MAGNESIUM: CPT

## 2022-01-01 PROCEDURE — 85610 PROTHROMBIN TIME: CPT

## 2022-01-01 PROCEDURE — G0296 VISIT TO DETERM LDCT ELIG: CPT

## 2022-01-01 PROCEDURE — 85730 THROMBOPLASTIN TIME PARTIAL: CPT

## 2022-01-01 PROCEDURE — 85025 COMPLETE CBC W/AUTO DIFF WBC: CPT

## 2022-01-01 PROCEDURE — 71260 CT THORAX DX C+: CPT | Mod: 26

## 2022-01-01 PROCEDURE — 73206 CT ANGIO UPR EXTRM W/O&W/DYE: CPT | Mod: 26

## 2022-01-01 PROCEDURE — 71275 CT ANGIOGRAPHY CHEST: CPT | Mod: 26

## 2022-01-01 PROCEDURE — 99232 SBSQ HOSP IP/OBS MODERATE 35: CPT | Mod: GC

## 2022-01-01 PROCEDURE — 36430 TRANSFUSION BLD/BLD COMPNT: CPT

## 2022-01-01 PROCEDURE — 93930 UPPER EXTREMITY STUDY: CPT | Mod: 26

## 2022-01-01 PROCEDURE — C1753: CPT

## 2022-01-01 PROCEDURE — 86900 BLOOD TYPING SEROLOGIC ABO: CPT

## 2022-01-01 PROCEDURE — 93308 TTE F-UP OR LMTD: CPT | Mod: 26

## 2022-01-01 PROCEDURE — 99239 HOSP IP/OBS DSCHRG MGMT >30: CPT

## 2022-01-01 PROCEDURE — 80061 LIPID PANEL: CPT

## 2022-01-01 PROCEDURE — 10060 I&D ABSCESS SIMPLE/SINGLE: CPT

## 2022-01-01 PROCEDURE — 99285 EMERGENCY DEPT VISIT HI MDM: CPT

## 2022-01-01 PROCEDURE — 0042T: CPT

## 2022-01-01 PROCEDURE — 83690 ASSAY OF LIPASE: CPT

## 2022-01-01 PROCEDURE — 86860 RBC ANTIBODY ELUTION: CPT

## 2022-01-01 PROCEDURE — 97535 SELF CARE MNGMENT TRAINING: CPT

## 2022-01-01 PROCEDURE — 93005 ELECTROCARDIOGRAM TRACING: CPT

## 2022-01-01 PROCEDURE — 97161 PT EVAL LOW COMPLEX 20 MIN: CPT

## 2022-01-01 PROCEDURE — 84145 PROCALCITONIN (PCT): CPT

## 2022-01-01 PROCEDURE — 82550 ASSAY OF CK (CPK): CPT

## 2022-01-01 PROCEDURE — 10160 PNXR ASPIR ABSC HMTMA BULLA: CPT

## 2022-01-01 PROCEDURE — 86901 BLOOD TYPING SEROLOGIC RH(D): CPT

## 2022-01-01 PROCEDURE — 93306 TTE W/DOPPLER COMPLETE: CPT

## 2022-01-01 PROCEDURE — 82140 ASSAY OF AMMONIA: CPT

## 2022-01-01 PROCEDURE — 84436 ASSAY OF TOTAL THYROXINE: CPT

## 2022-01-01 PROCEDURE — 93000 ELECTROCARDIOGRAM COMPLETE: CPT

## 2022-01-01 PROCEDURE — 93926 LOWER EXTREMITY STUDY: CPT | Mod: 26,RT

## 2022-01-01 PROCEDURE — 85014 HEMATOCRIT: CPT

## 2022-01-01 PROCEDURE — 86140 C-REACTIVE PROTEIN: CPT

## 2022-01-01 PROCEDURE — 97110 THERAPEUTIC EXERCISES: CPT

## 2022-01-01 PROCEDURE — 96374 THER/PROPH/DIAG INJ IV PUSH: CPT

## 2022-01-01 PROCEDURE — U0005: CPT

## 2022-01-01 PROCEDURE — 84100 ASSAY OF PHOSPHORUS: CPT

## 2022-01-01 PROCEDURE — 76000 FLUOROSCOPY <1 HR PHYS/QHP: CPT

## 2022-01-01 PROCEDURE — 36573 INSJ PICC RS&I 5 YR+: CPT

## 2022-01-01 PROCEDURE — 99284 EMERGENCY DEPT VISIT MOD MDM: CPT

## 2022-01-01 PROCEDURE — 70498 CT ANGIOGRAPHY NECK: CPT

## 2022-01-01 PROCEDURE — 97530 THERAPEUTIC ACTIVITIES: CPT

## 2022-01-01 PROCEDURE — 86022 PLATELET ANTIBODIES: CPT

## 2022-01-01 PROCEDURE — 86880 COOMBS TEST DIRECT: CPT

## 2022-01-01 PROCEDURE — 99252 IP/OBS CONSLTJ NEW/EST SF 35: CPT

## 2022-01-01 PROCEDURE — 87070 CULTURE OTHR SPECIMN AEROBIC: CPT

## 2022-01-01 PROCEDURE — C1894: CPT

## 2022-01-01 PROCEDURE — 86891 AUTOLOGOUS BLOOD OP SALVAGE: CPT

## 2022-01-01 PROCEDURE — P9016: CPT

## 2022-01-01 PROCEDURE — 71045 X-RAY EXAM CHEST 1 VIEW: CPT

## 2022-01-01 PROCEDURE — 93880 EXTRACRANIAL BILAT STUDY: CPT

## 2022-01-01 PROCEDURE — 82947 ASSAY GLUCOSE BLOOD QUANT: CPT

## 2022-01-01 PROCEDURE — 99238 HOSP IP/OBS DSCHRG MGMT 30/<: CPT

## 2022-01-01 PROCEDURE — 86077 PHYS BLOOD BANK SERV XMATCH: CPT

## 2022-01-01 PROCEDURE — 70450 CT HEAD/BRAIN W/O DYE: CPT | Mod: 26

## 2022-01-01 PROCEDURE — 87040 BLOOD CULTURE FOR BACTERIA: CPT

## 2022-01-01 PROCEDURE — 82533 TOTAL CORTISOL: CPT

## 2022-01-01 PROCEDURE — 84132 ASSAY OF SERUM POTASSIUM: CPT

## 2022-01-01 PROCEDURE — 35256 REPAIR BLVSL VN GRF LXTR: CPT

## 2022-01-01 PROCEDURE — C1760: CPT

## 2022-01-01 PROCEDURE — C9399: CPT

## 2022-01-01 PROCEDURE — 97162 PT EVAL MOD COMPLEX 30 MIN: CPT

## 2022-01-01 PROCEDURE — 99214 OFFICE O/P EST MOD 30 MIN: CPT

## 2022-01-01 PROCEDURE — 99291 CRITICAL CARE FIRST HOUR: CPT | Mod: 25

## 2022-01-01 PROCEDURE — 93880 EXTRACRANIAL BILAT STUDY: CPT | Mod: 26

## 2022-01-01 PROCEDURE — 85652 RBC SED RATE AUTOMATED: CPT

## 2022-01-01 PROCEDURE — 81001 URINALYSIS AUTO W/SCOPE: CPT

## 2022-01-01 PROCEDURE — 70450 CT HEAD/BRAIN W/O DYE: CPT | Mod: 26,MA

## 2022-01-01 PROCEDURE — 86905 BLOOD TYPING RBC ANTIGENS: CPT

## 2022-01-01 PROCEDURE — 88305 TISSUE EXAM BY PATHOLOGIST: CPT | Mod: 26

## 2022-01-01 PROCEDURE — 97605 NEG PRS WND THER DME<=50SQCM: CPT

## 2022-01-01 PROCEDURE — 70496 CT ANGIOGRAPHY HEAD: CPT | Mod: 26

## 2022-01-01 PROCEDURE — 76604 US EXAM CHEST: CPT | Mod: 26

## 2022-01-01 PROCEDURE — 99496 TRANSJ CARE MGMT HIGH F2F 7D: CPT

## 2022-01-01 PROCEDURE — 70450 CT HEAD/BRAIN W/O DYE: CPT

## 2022-01-01 PROCEDURE — 80202 ASSAY OF VANCOMYCIN: CPT

## 2022-01-01 PROCEDURE — 99291 CRITICAL CARE FIRST HOUR: CPT | Mod: 57

## 2022-01-01 PROCEDURE — 97116 GAIT TRAINING THERAPY: CPT

## 2022-01-01 PROCEDURE — 99254 IP/OBS CNSLTJ NEW/EST MOD 60: CPT | Mod: GC

## 2022-01-01 PROCEDURE — 99292 CRITICAL CARE ADDL 30 MIN: CPT | Mod: 57

## 2022-01-01 PROCEDURE — 36200 PLACE CATHETER IN AORTA: CPT | Mod: 50

## 2022-01-01 PROCEDURE — 75959: CPT | Mod: 26

## 2022-01-01 PROCEDURE — 33886 DELAYED PLMT DSTL XTN PROSTH: CPT | Mod: 62

## 2022-01-01 PROCEDURE — C1887: CPT

## 2022-01-01 PROCEDURE — 71275 CT ANGIOGRAPHY CHEST: CPT

## 2022-01-01 PROCEDURE — 76604 US EXAM CHEST: CPT

## 2022-01-01 PROCEDURE — 89051 BODY FLUID CELL COUNT: CPT

## 2022-01-01 PROCEDURE — 87015 SPECIMEN INFECT AGNT CONCNTJ: CPT

## 2022-01-01 PROCEDURE — 87641 MR-STAPH DNA AMP PROBE: CPT

## 2022-01-01 PROCEDURE — 87075 CULTR BACTERIA EXCEPT BLOOD: CPT

## 2022-01-01 PROCEDURE — C2628: CPT

## 2022-01-01 PROCEDURE — 99213 OFFICE O/P EST LOW 20 MIN: CPT

## 2022-01-01 PROCEDURE — 96376 TX/PRO/DX INJ SAME DRUG ADON: CPT

## 2022-01-01 PROCEDURE — 73206 CT ANGIO UPR EXTRM W/O&W/DYE: CPT | Mod: MA

## 2022-01-01 PROCEDURE — 93970 EXTREMITY STUDY: CPT

## 2022-01-01 PROCEDURE — 84134 ASSAY OF PREALBUMIN: CPT

## 2022-01-01 PROCEDURE — 84439 ASSAY OF FREE THYROXINE: CPT

## 2022-01-01 PROCEDURE — 76937 US GUIDE VASCULAR ACCESS: CPT | Mod: 26

## 2022-01-01 PROCEDURE — 88313 SPECIAL STAINS GROUP 2: CPT | Mod: 26

## 2022-01-01 PROCEDURE — 85027 COMPLETE CBC AUTOMATED: CPT

## 2022-01-01 PROCEDURE — 87186 SC STD MICRODIL/AGAR DIL: CPT

## 2022-01-01 PROCEDURE — C1769: CPT

## 2022-01-01 PROCEDURE — 86923 COMPATIBILITY TEST ELECTRIC: CPT

## 2022-01-01 PROCEDURE — 73206 CT ANGIO UPR EXTRM W/O&W/DYE: CPT

## 2022-01-01 PROCEDURE — 74174 CTA ABD&PLVS W/CONTRAST: CPT | Mod: 26

## 2022-01-01 PROCEDURE — 93321 DOPPLER ECHO F-UP/LMTD STD: CPT

## 2022-01-01 PROCEDURE — 36620 INSERTION CATHETER ARTERY: CPT

## 2022-01-01 PROCEDURE — C1889: CPT

## 2022-01-01 PROCEDURE — 82150 ASSAY OF AMYLASE: CPT

## 2022-01-01 PROCEDURE — 34111 REMOVAL OF ARM ARTERY CLOT: CPT

## 2022-01-01 PROCEDURE — P9045: CPT

## 2022-01-01 PROCEDURE — 93306 TTE W/DOPPLER COMPLETE: CPT | Mod: 26

## 2022-01-01 PROCEDURE — 93279 PRGRMG DEV EVAL PM/LDLS PM: CPT

## 2022-01-01 PROCEDURE — 93926 LOWER EXTREMITY STUDY: CPT

## 2022-01-01 PROCEDURE — 71046 X-RAY EXAM CHEST 2 VIEWS: CPT

## 2022-01-01 PROCEDURE — 87116 MYCOBACTERIA CULTURE: CPT

## 2022-01-01 PROCEDURE — 82330 ASSAY OF CALCIUM: CPT

## 2022-01-01 PROCEDURE — 86870 RBC ANTIBODY IDENTIFICATION: CPT

## 2022-01-01 PROCEDURE — 99292 CRITICAL CARE ADDL 30 MIN: CPT | Mod: 25

## 2022-01-01 PROCEDURE — 84295 ASSAY OF SERUM SODIUM: CPT

## 2022-01-01 PROCEDURE — C1751: CPT

## 2022-01-01 PROCEDURE — 87077 CULTURE AEROBIC IDENTIFY: CPT

## 2022-01-01 PROCEDURE — 99204 OFFICE O/P NEW MOD 45 MIN: CPT | Mod: 25

## 2022-01-01 PROCEDURE — 88312 SPECIAL STAINS GROUP 1: CPT | Mod: 26

## 2022-01-01 PROCEDURE — 82803 BLOOD GASES ANY COMBINATION: CPT

## 2022-01-01 DEVICE — TORQ DVC GWIRE 0.009-0.018IN: Type: IMPLANTABLE DEVICE | Status: FUNCTIONAL

## 2022-01-01 DEVICE — IMPLANTABLE DEVICE: Type: IMPLANTABLE DEVICE | Status: FUNCTIONAL

## 2022-01-01 DEVICE — CATH SOFTVU BERENSTN 5FRX100: Type: IMPLANTABLE DEVICE | Status: FUNCTIONAL

## 2022-01-01 DEVICE — INTRO MICROPUNC 4FRX10CM SS: Type: IMPLANTABLE DEVICE | Status: FUNCTIONAL

## 2022-01-01 DEVICE — PATCH PERICARDIAL PHOTOFIX 6X8CM: Type: IMPLANTABLE DEVICE | Status: FUNCTIONAL

## 2022-01-01 DEVICE — LIGATING CLIPS WECK HORIZON MEDIUM (BLUE) 24: Type: IMPLANTABLE DEVICE | Status: FUNCTIONAL

## 2022-01-01 DEVICE — GUIDEWIRE LUNDERQUIST EXTRA-STIFF DOUBLE CURVED .035" X 260CM: Type: IMPLANTABLE DEVICE | Status: FUNCTIONAL

## 2022-01-01 DEVICE — CATH NAVICROSS ANGLED 0.035" X 135CM: Type: IMPLANTABLE DEVICE | Status: FUNCTIONAL

## 2022-01-01 DEVICE — SHEATH INTRODUCER TERUMO PINNACLE PERIPHERAL 5FR X 10CM X 0.035" MINI WIRE: Type: IMPLANTABLE DEVICE | Status: FUNCTIONAL

## 2022-01-01 DEVICE — INTRODUCER SHEATH 0.038" X 14FR X 30CM: Type: IMPLANTABLE DEVICE | Status: FUNCTIONAL

## 2022-01-01 DEVICE — SHEATH INTRO DRYSEAL FLEX 26FR 33CM: Type: IMPLANTABLE DEVICE | Status: FUNCTIONAL

## 2022-01-01 DEVICE — GWIRE SUPRACORE .035X370: Type: IMPLANTABLE DEVICE | Status: FUNCTIONAL

## 2022-01-01 DEVICE — SHEATH INTRO DRYSEAL FLEX 22FR 33CM: Type: IMPLANTABLE DEVICE | Status: FUNCTIONAL

## 2022-01-01 DEVICE — GUIDEWIRE RADIFOCUS GLIDEWIRE STANDARD ANGLED TIP 0.035" X 260CM: Type: IMPLANTABLE DEVICE | Status: FUNCTIONAL

## 2022-01-01 DEVICE — SHEATH INTRODUCER TERUMO PINNACLE CORONARY 8FR X 10CM X 0.038" MINI WIRE: Type: IMPLANTABLE DEVICE | Status: FUNCTIONAL

## 2022-01-01 DEVICE — SHEATH INTRODUCER TERUMO PINNACLE CORONARY 9FR X 10CM X 0.038" MINI WIRE: Type: IMPLANTABLE DEVICE | Status: FUNCTIONAL

## 2022-01-01 DEVICE — KIT CVC 3LUM SPECTRUM 9FR: Type: IMPLANTABLE DEVICE | Status: FUNCTIONAL

## 2022-01-01 DEVICE — SURGIFLO HEMOSTATIC MATRIX KIT: Type: IMPLANTABLE DEVICE | Status: FUNCTIONAL

## 2022-01-01 DEVICE — CATH AUROUS PIGTAIL 35CM 5FR: Type: IMPLANTABLE DEVICE | Status: FUNCTIONAL

## 2022-01-01 DEVICE — CATH BALLOON CODA  0.035" X 12FR X 30CM: Type: IMPLANTABLE DEVICE | Status: FUNCTIONAL

## 2022-01-01 DEVICE — CATH IVUS T/A PU .035CM: Type: IMPLANTABLE DEVICE | Status: FUNCTIONAL

## 2022-01-01 DEVICE — SUT PERCLOSE PROGLIDE 6FR: Type: IMPLANTABLE DEVICE | Status: FUNCTIONAL

## 2022-01-01 DEVICE — SHEATH INTRODUCER TERUMO PINNACLE CORONARY 6FR X 10CM X 0.038" MINI WIRE: Type: IMPLANTABLE DEVICE | Status: FUNCTIONAL

## 2022-01-01 RX ORDER — POLYETHYLENE GLYCOL 3350 17 G/17G
17 POWDER, FOR SOLUTION ORAL DAILY
Refills: 0 | Status: DISCONTINUED | OUTPATIENT
Start: 2022-01-01 | End: 2022-01-01

## 2022-01-01 RX ORDER — POTASSIUM CHLORIDE 20 MEQ
40 PACKET (EA) ORAL ONCE
Refills: 0 | Status: COMPLETED | OUTPATIENT
Start: 2022-01-01 | End: 2022-01-01

## 2022-01-01 RX ORDER — ENOXAPARIN SODIUM 100 MG/ML
40 INJECTION SUBCUTANEOUS EVERY 24 HOURS
Refills: 0 | Status: DISCONTINUED | OUTPATIENT
Start: 2022-01-01 | End: 2022-01-01

## 2022-01-01 RX ORDER — ERTAPENEM SODIUM 1 G/1
1000 INJECTION, POWDER, LYOPHILIZED, FOR SOLUTION INTRAMUSCULAR; INTRAVENOUS EVERY 24 HOURS
Refills: 0 | Status: DISCONTINUED | OUTPATIENT
Start: 2022-01-01 | End: 2022-01-01

## 2022-01-01 RX ORDER — CEPHALEXIN 500 MG
500 CAPSULE ORAL EVERY 12 HOURS
Refills: 0 | Status: DISCONTINUED | OUTPATIENT
Start: 2022-01-01 | End: 2022-01-01

## 2022-01-01 RX ORDER — CEFEPIME 1 G/1
2 INJECTION, POWDER, FOR SOLUTION INTRAMUSCULAR; INTRAVENOUS
Qty: 0 | Refills: 0 | DISCHARGE
Start: 2022-01-01

## 2022-01-01 RX ORDER — ASPIRIN/CALCIUM CARB/MAGNESIUM 324 MG
81 TABLET ORAL DAILY
Refills: 0 | Status: DISCONTINUED | OUTPATIENT
Start: 2022-01-01 | End: 2022-01-01

## 2022-01-01 RX ORDER — METHOCARBAMOL 500 MG/1
750 TABLET, FILM COATED ORAL ONCE
Refills: 0 | Status: COMPLETED | OUTPATIENT
Start: 2022-01-01 | End: 2022-01-01

## 2022-01-01 RX ORDER — POTASSIUM CHLORIDE 20 MEQ
20 PACKET (EA) ORAL DAILY
Refills: 0 | Status: DISCONTINUED | OUTPATIENT
Start: 2022-01-01 | End: 2022-01-01

## 2022-01-01 RX ORDER — GABAPENTIN 400 MG/1
100 CAPSULE ORAL THREE TIMES A DAY
Refills: 0 | Status: DISCONTINUED | OUTPATIENT
Start: 2022-01-01 | End: 2022-01-01

## 2022-01-01 RX ORDER — INSULIN LISPRO 100/ML
VIAL (ML) SUBCUTANEOUS
Refills: 0 | Status: DISCONTINUED | OUTPATIENT
Start: 2022-01-01 | End: 2022-01-01

## 2022-01-01 RX ORDER — POTASSIUM CHLORIDE 20 MEQ
20 PACKET (EA) ORAL ONCE
Refills: 0 | Status: COMPLETED | OUTPATIENT
Start: 2022-01-01 | End: 2022-01-01

## 2022-01-01 RX ORDER — BUDESONIDE AND FORMOTEROL FUMARATE DIHYDRATE 160; 4.5 UG/1; UG/1
2 AEROSOL RESPIRATORY (INHALATION)
Refills: 0 | Status: DISCONTINUED | OUTPATIENT
Start: 2022-01-01 | End: 2022-01-01

## 2022-01-01 RX ORDER — FAMOTIDINE 10 MG/ML
20 INJECTION INTRAVENOUS DAILY
Refills: 0 | Status: DISCONTINUED | OUTPATIENT
Start: 2022-01-01 | End: 2022-01-01

## 2022-01-01 RX ORDER — POTASSIUM CHLORIDE 750 MG/1
10 TABLET, FILM COATED, EXTENDED RELEASE ORAL DAILY
Qty: 30 | Refills: 0 | Status: ACTIVE | COMMUNITY
Start: 2022-01-01 | End: 1900-01-01

## 2022-01-01 RX ORDER — NYSTATIN 100000 1/G
100000 POWDER TOPICAL 3 TIMES DAILY
Qty: 1 | Refills: 1 | Status: ACTIVE | COMMUNITY
Start: 2021-10-05 | End: 1900-01-01

## 2022-01-01 RX ORDER — PIPERACILLIN AND TAZOBACTAM 4; .5 G/20ML; G/20ML
3.38 INJECTION, POWDER, LYOPHILIZED, FOR SOLUTION INTRAVENOUS EVERY 6 HOURS
Refills: 0 | Status: DISCONTINUED | OUTPATIENT
Start: 2022-01-01 | End: 2022-01-01

## 2022-01-01 RX ORDER — APIXABAN 2.5 MG/1
5 TABLET, FILM COATED ORAL EVERY 12 HOURS
Refills: 0 | Status: DISCONTINUED | OUTPATIENT
Start: 2022-01-01 | End: 2022-01-01

## 2022-01-01 RX ORDER — VANCOMYCIN HCL 1 G
VIAL (EA) INTRAVENOUS
Refills: 0 | Status: DISCONTINUED | OUTPATIENT
Start: 2022-01-01 | End: 2022-01-01

## 2022-01-01 RX ORDER — DAPTOMYCIN 500 MG/10ML
500 INJECTION, POWDER, LYOPHILIZED, FOR SOLUTION INTRAVENOUS ONCE
Refills: 0 | Status: COMPLETED | OUTPATIENT
Start: 2022-01-01 | End: 2022-01-01

## 2022-01-01 RX ORDER — PANTOPRAZOLE SODIUM 20 MG/1
40 TABLET, DELAYED RELEASE ORAL
Refills: 0 | Status: DISCONTINUED | OUTPATIENT
Start: 2022-01-01 | End: 2022-01-01

## 2022-01-01 RX ORDER — HEPARIN SODIUM 5000 [USP'U]/ML
650 INJECTION INTRAVENOUS; SUBCUTANEOUS
Qty: 25000 | Refills: 0 | Status: DISCONTINUED | OUTPATIENT
Start: 2022-01-01 | End: 2022-01-01

## 2022-01-01 RX ORDER — FUROSEMIDE 20 MG/1
20 TABLET ORAL
Refills: 0 | Status: ACTIVE | COMMUNITY

## 2022-01-01 RX ORDER — ALBUMIN HUMAN 25 %
250 VIAL (ML) INTRAVENOUS
Refills: 0 | Status: COMPLETED | OUTPATIENT
Start: 2022-01-01 | End: 2022-01-01

## 2022-01-01 RX ORDER — METOPROLOL TARTRATE 50 MG
12.5 TABLET ORAL EVERY 6 HOURS
Refills: 0 | Status: DISCONTINUED | OUTPATIENT
Start: 2022-01-01 | End: 2022-01-01

## 2022-01-01 RX ORDER — BUDESONIDE AND FORMOTEROL FUMARATE DIHYDRATE 160; 4.5 UG/1; UG/1
2 AEROSOL RESPIRATORY (INHALATION) EVERY 12 HOURS
Refills: 0 | Status: DISCONTINUED | OUTPATIENT
Start: 2022-01-01 | End: 2022-01-01

## 2022-01-01 RX ORDER — FUROSEMIDE 40 MG/1
40 TABLET ORAL DAILY
Qty: 90 | Refills: 0 | Status: DISCONTINUED | COMMUNITY
End: 2022-01-01

## 2022-01-01 RX ORDER — BUDESONIDE AND FORMOTEROL FUMARATE DIHYDRATE 160; 4.5 UG/1; UG/1
2 AEROSOL RESPIRATORY (INHALATION)
Qty: 0 | Refills: 0 | DISCHARGE
Start: 2022-01-01

## 2022-01-01 RX ORDER — PANTOPRAZOLE SODIUM 20 MG/1
1 TABLET, DELAYED RELEASE ORAL
Qty: 0 | Refills: 0 | DISCHARGE
Start: 2022-01-01

## 2022-01-01 RX ORDER — POLYETHYLENE GLYCOL 3350 17 G/17G
17 POWDER, FOR SOLUTION ORAL
Qty: 0 | Refills: 0 | DISCHARGE
Start: 2022-01-01

## 2022-01-01 RX ORDER — AMIODARONE HYDROCHLORIDE 200 MG/1
200 TABLET ORAL
Qty: 30 | Refills: 0 | Status: DISCONTINUED | COMMUNITY
Start: 2021-09-16 | End: 2022-01-01

## 2022-01-01 RX ORDER — DEXTROSE 50 % IN WATER 50 %
50 SYRINGE (ML) INTRAVENOUS
Refills: 0 | Status: DISCONTINUED | OUTPATIENT
Start: 2022-01-01 | End: 2022-01-01

## 2022-01-01 RX ORDER — FLUCONAZOLE 150 MG/1
150 TABLET ORAL DAILY
Refills: 0 | Status: DISCONTINUED | OUTPATIENT
Start: 2022-01-01 | End: 2022-01-01

## 2022-01-01 RX ORDER — DAPTOMYCIN 500 MG/10ML
500 INJECTION, POWDER, LYOPHILIZED, FOR SOLUTION INTRAVENOUS
Qty: 0 | Refills: 0 | DISCHARGE
Start: 2022-01-01

## 2022-01-01 RX ORDER — SODIUM CHLORIDE 9 MG/ML
3 INJECTION INTRAMUSCULAR; INTRAVENOUS; SUBCUTANEOUS EVERY 8 HOURS
Refills: 0 | Status: DISCONTINUED | OUTPATIENT
Start: 2022-01-01 | End: 2022-01-01

## 2022-01-01 RX ORDER — NITROGLYCERIN 6.5 MG
10 CAPSULE, EXTENDED RELEASE ORAL
Qty: 50 | Refills: 0 | Status: DISCONTINUED | OUTPATIENT
Start: 2022-01-01 | End: 2022-01-01

## 2022-01-01 RX ORDER — HEPARIN SODIUM 5000 [USP'U]/ML
5000 INJECTION INTRAVENOUS; SUBCUTANEOUS EVERY 8 HOURS
Refills: 0 | Status: DISCONTINUED | OUTPATIENT
Start: 2022-01-01 | End: 2022-01-01

## 2022-01-01 RX ORDER — FENTANYL CITRATE 50 UG/ML
12.5 INJECTION INTRAVENOUS ONCE
Refills: 0 | Status: DISCONTINUED | OUTPATIENT
Start: 2022-01-01 | End: 2022-01-01

## 2022-01-01 RX ORDER — GABAPENTIN 400 MG/1
300 CAPSULE ORAL EVERY 8 HOURS
Refills: 0 | Status: DISCONTINUED | OUTPATIENT
Start: 2022-01-01 | End: 2022-01-01

## 2022-01-01 RX ORDER — POTASSIUM CHLORIDE 20 MEQ
40 PACKET (EA) ORAL DAILY
Refills: 0 | Status: DISCONTINUED | OUTPATIENT
Start: 2022-01-01 | End: 2022-01-01

## 2022-01-01 RX ORDER — METOPROLOL TARTRATE 50 MG
25 TABLET ORAL
Refills: 0 | Status: DISCONTINUED | OUTPATIENT
Start: 2022-01-01 | End: 2022-01-01

## 2022-01-01 RX ORDER — CASPOFUNGIN ACETATE 7 MG/ML
INJECTION, POWDER, LYOPHILIZED, FOR SOLUTION INTRAVENOUS
Refills: 0 | Status: DISCONTINUED | OUTPATIENT
Start: 2022-01-01 | End: 2022-01-01

## 2022-01-01 RX ORDER — METOPROLOL TARTRATE 50 MG
50 TABLET ORAL
Refills: 0 | Status: DISCONTINUED | OUTPATIENT
Start: 2022-01-01 | End: 2022-01-01

## 2022-01-01 RX ORDER — HYDRALAZINE HCL 50 MG
1 TABLET ORAL
Qty: 90 | Refills: 0
Start: 2022-01-01 | End: 2022-01-01

## 2022-01-01 RX ORDER — TIZANIDINE 4 MG/1
1 TABLET ORAL
Qty: 15 | Refills: 0
Start: 2022-01-01 | End: 2022-01-01

## 2022-01-01 RX ORDER — DEXTROSE 50 % IN WATER 50 %
25 SYRINGE (ML) INTRAVENOUS ONCE
Refills: 0 | Status: DISCONTINUED | OUTPATIENT
Start: 2022-01-01 | End: 2022-01-01

## 2022-01-01 RX ORDER — HYDRALAZINE HCL 50 MG
10 TABLET ORAL ONCE
Refills: 0 | Status: COMPLETED | OUTPATIENT
Start: 2022-01-01 | End: 2022-01-01

## 2022-01-01 RX ORDER — SENNA PLUS 8.6 MG/1
2 TABLET ORAL
Qty: 0 | Refills: 0 | DISCHARGE
Start: 2022-01-01

## 2022-01-01 RX ORDER — HYDRALAZINE HCL 50 MG
10 TABLET ORAL EVERY 8 HOURS
Refills: 0 | Status: DISCONTINUED | OUTPATIENT
Start: 2022-01-01 | End: 2022-01-01

## 2022-01-01 RX ORDER — LEVOTHYROXINE SODIUM 125 MCG
50 TABLET ORAL DAILY
Refills: 0 | Status: DISCONTINUED | OUTPATIENT
Start: 2022-01-01 | End: 2022-01-01

## 2022-01-01 RX ORDER — OXYCODONE AND ACETAMINOPHEN 5; 325 MG/1; MG/1
1 TABLET ORAL EVERY 4 HOURS
Refills: 0 | Status: DISCONTINUED | OUTPATIENT
Start: 2022-01-01 | End: 2022-01-01

## 2022-01-01 RX ORDER — FENTANYL CITRATE 50 UG/ML
25 INJECTION INTRAVENOUS ONCE
Refills: 0 | Status: DISCONTINUED | OUTPATIENT
Start: 2022-01-01 | End: 2022-01-01

## 2022-01-01 RX ORDER — CEFAZOLIN SODIUM 1 G
2000 VIAL (EA) INJECTION EVERY 8 HOURS
Refills: 0 | Status: COMPLETED | OUTPATIENT
Start: 2022-01-01 | End: 2022-01-01

## 2022-01-01 RX ORDER — AMLODIPINE BESYLATE 2.5 MG/1
2.5 TABLET ORAL DAILY
Refills: 0 | Status: DISCONTINUED | OUTPATIENT
Start: 2022-01-01 | End: 2022-01-01

## 2022-01-01 RX ORDER — DAPTOMYCIN 500 MG/10ML
500 INJECTION, POWDER, LYOPHILIZED, FOR SOLUTION INTRAVENOUS
Qty: 42 | Refills: 0
Start: 2022-01-01 | End: 2022-12-21

## 2022-01-01 RX ORDER — SODIUM CHLORIDE 9 MG/ML
500 INJECTION, SOLUTION INTRAVENOUS
Refills: 0 | Status: DISCONTINUED | OUTPATIENT
Start: 2022-01-01 | End: 2022-01-01

## 2022-01-01 RX ORDER — CEFAZOLIN SODIUM 1 G
1000 VIAL (EA) INJECTION EVERY 8 HOURS
Refills: 0 | Status: DISCONTINUED | OUTPATIENT
Start: 2022-01-01 | End: 2022-01-01

## 2022-01-01 RX ORDER — HEPARIN SODIUM 5000 [USP'U]/ML
600 INJECTION INTRAVENOUS; SUBCUTANEOUS
Qty: 25000 | Refills: 0 | Status: DISCONTINUED | OUTPATIENT
Start: 2022-01-01 | End: 2022-01-01

## 2022-01-01 RX ORDER — APIXABAN 2.5 MG/1
10 TABLET, FILM COATED ORAL
Refills: 0 | Status: DISCONTINUED | OUTPATIENT
Start: 2022-01-01 | End: 2022-01-01

## 2022-01-01 RX ORDER — METOPROLOL TARTRATE 50 MG
12.5 TABLET ORAL
Refills: 0 | Status: DISCONTINUED | OUTPATIENT
Start: 2022-01-01 | End: 2022-01-01

## 2022-01-01 RX ORDER — PHENYLEPHRINE HYDROCHLORIDE 10 MG/ML
0.9 INJECTION INTRAVENOUS
Qty: 40 | Refills: 0 | Status: DISCONTINUED | OUTPATIENT
Start: 2022-01-01 | End: 2022-01-01

## 2022-01-01 RX ORDER — TRAMADOL HYDROCHLORIDE 50 MG/1
25 TABLET ORAL DAILY
Refills: 0 | Status: DISCONTINUED | OUTPATIENT
Start: 2022-01-01 | End: 2022-01-01

## 2022-01-01 RX ORDER — LEVOTHYROXINE SODIUM 125 MCG
1 TABLET ORAL
Qty: 0 | Refills: 0 | DISCHARGE
Start: 2022-01-01

## 2022-01-01 RX ORDER — ACETAMINOPHEN 500 MG
1000 TABLET ORAL ONCE
Refills: 0 | Status: COMPLETED | OUTPATIENT
Start: 2022-01-01 | End: 2022-01-01

## 2022-01-01 RX ORDER — PIPERACILLIN AND TAZOBACTAM 4; .5 G/20ML; G/20ML
3.38 INJECTION, POWDER, LYOPHILIZED, FOR SOLUTION INTRAVENOUS ONCE
Refills: 0 | Status: COMPLETED | OUTPATIENT
Start: 2022-01-01 | End: 2022-01-01

## 2022-01-01 RX ORDER — ACETAMINOPHEN 500 MG
650 TABLET ORAL EVERY 6 HOURS
Refills: 0 | Status: DISCONTINUED | OUTPATIENT
Start: 2022-01-01 | End: 2022-01-01

## 2022-01-01 RX ORDER — KRILL/OM-3/DHA/EPA/PHOSPHO/AST 1000-230MG
81 CAPSULE ORAL
Qty: 30 | Refills: 5 | Status: DISCONTINUED | COMMUNITY
Start: 2021-04-21 | End: 2022-01-01

## 2022-01-01 RX ORDER — DAPTOMYCIN 500 MG/10ML
500 INJECTION, POWDER, LYOPHILIZED, FOR SOLUTION INTRAVENOUS
Qty: 21000 | Refills: 0
Start: 2022-01-01 | End: 2022-12-21

## 2022-01-01 RX ORDER — ATORVASTATIN CALCIUM 80 MG/1
10 TABLET, FILM COATED ORAL AT BEDTIME
Refills: 0 | Status: DISCONTINUED | OUTPATIENT
Start: 2022-01-01 | End: 2022-01-01

## 2022-01-01 RX ORDER — FLUCONAZOLE 150 MG/1
400 TABLET ORAL EVERY 24 HOURS
Refills: 0 | Status: DISCONTINUED | OUTPATIENT
Start: 2022-01-01 | End: 2022-01-01

## 2022-01-01 RX ORDER — HEPARIN SODIUM 5000 [USP'U]/ML
500 INJECTION INTRAVENOUS; SUBCUTANEOUS
Qty: 25000 | Refills: 0 | Status: DISCONTINUED | OUTPATIENT
Start: 2022-01-01 | End: 2022-01-01

## 2022-01-01 RX ORDER — DAPTOMYCIN 500 MG/10ML
500 INJECTION, POWDER, LYOPHILIZED, FOR SOLUTION INTRAVENOUS EVERY 24 HOURS
Refills: 0 | Status: DISCONTINUED | OUTPATIENT
Start: 2022-01-01 | End: 2022-01-01

## 2022-01-01 RX ORDER — KETOROLAC TROMETHAMINE 30 MG/ML
15 SYRINGE (ML) INJECTION ONCE
Refills: 0 | Status: DISCONTINUED | OUTPATIENT
Start: 2022-01-01 | End: 2022-01-01

## 2022-01-01 RX ORDER — FUROSEMIDE 40 MG
20 TABLET ORAL ONCE
Refills: 0 | Status: COMPLETED | OUTPATIENT
Start: 2022-01-01 | End: 2022-01-01

## 2022-01-01 RX ORDER — INFLUENZA VIRUS VACCINE 15; 15; 15; 15 UG/.5ML; UG/.5ML; UG/.5ML; UG/.5ML
0.7 SUSPENSION INTRAMUSCULAR ONCE
Refills: 0 | Status: DISCONTINUED | OUTPATIENT
Start: 2022-01-01 | End: 2022-01-01

## 2022-01-01 RX ORDER — FUROSEMIDE 40 MG
40 TABLET ORAL DAILY
Refills: 0 | Status: DISCONTINUED | OUTPATIENT
Start: 2022-01-01 | End: 2022-01-01

## 2022-01-01 RX ORDER — MIDODRINE HYDROCHLORIDE 2.5 MG/1
10 TABLET ORAL EVERY 8 HOURS
Refills: 0 | Status: DISCONTINUED | OUTPATIENT
Start: 2022-01-01 | End: 2022-01-01

## 2022-01-01 RX ORDER — METOPROLOL TARTRATE 50 MG
1 TABLET ORAL
Qty: 0 | Refills: 0 | DISCHARGE
Start: 2022-01-01

## 2022-01-01 RX ORDER — OXYCODONE HYDROCHLORIDE 5 MG/1
1 TABLET ORAL
Qty: 0 | Refills: 0 | DISCHARGE
Start: 2022-01-01

## 2022-01-01 RX ORDER — TRAMADOL HYDROCHLORIDE 50 MG/1
25 TABLET ORAL EVERY 8 HOURS
Refills: 0 | Status: DISCONTINUED | OUTPATIENT
Start: 2022-01-01 | End: 2022-01-01

## 2022-01-01 RX ORDER — DEXTROSE 50 % IN WATER 50 %
12.5 SYRINGE (ML) INTRAVENOUS ONCE
Refills: 0 | Status: DISCONTINUED | OUTPATIENT
Start: 2022-01-01 | End: 2022-01-01

## 2022-01-01 RX ORDER — ONDANSETRON 8 MG/1
4 TABLET, FILM COATED ORAL EVERY 6 HOURS
Refills: 0 | Status: DISCONTINUED | OUTPATIENT
Start: 2022-01-01 | End: 2022-01-01

## 2022-01-01 RX ORDER — TRAMADOL HYDROCHLORIDE 50 MG/1
25 TABLET ORAL ONCE
Refills: 0 | Status: DISCONTINUED | OUTPATIENT
Start: 2022-01-01 | End: 2022-01-01

## 2022-01-01 RX ORDER — CHLORHEXIDINE GLUCONATE 213 G/1000ML
1 SOLUTION TOPICAL ONCE
Refills: 0 | Status: COMPLETED | OUTPATIENT
Start: 2022-01-01 | End: 2022-01-01

## 2022-01-01 RX ORDER — VANCOMYCIN HCL 1 G
750 VIAL (EA) INTRAVENOUS EVERY 12 HOURS
Refills: 0 | Status: DISCONTINUED | OUTPATIENT
Start: 2022-01-01 | End: 2022-01-01

## 2022-01-01 RX ORDER — SODIUM CHLORIDE 9 MG/ML
1000 INJECTION, SOLUTION INTRAVENOUS
Refills: 0 | Status: DISCONTINUED | OUTPATIENT
Start: 2022-01-01 | End: 2022-01-01

## 2022-01-01 RX ORDER — ERTAPENEM SODIUM 1 G/1
1000 INJECTION, POWDER, LYOPHILIZED, FOR SOLUTION INTRAMUSCULAR; INTRAVENOUS ONCE
Refills: 0 | Status: COMPLETED | OUTPATIENT
Start: 2022-01-01 | End: 2022-01-01

## 2022-01-01 RX ORDER — MULTIVIT WITH MIN/MFOLATE/K2 340-15/3 G
1 POWDER (GRAM) ORAL ONCE
Refills: 0 | Status: COMPLETED | OUTPATIENT
Start: 2022-01-01 | End: 2022-01-01

## 2022-01-01 RX ORDER — POTASSIUM CHLORIDE 20 MEQ
40 PACKET (EA) ORAL EVERY 4 HOURS
Refills: 0 | Status: COMPLETED | OUTPATIENT
Start: 2022-01-01 | End: 2022-01-01

## 2022-01-01 RX ORDER — AZELASTINE HYDROCHLORIDE 205.5 UG/1
0.15 SPRAY, METERED NASAL
Qty: 1 | Refills: 2 | Status: ACTIVE | COMMUNITY
Start: 2022-01-01 | End: 1900-01-01

## 2022-01-01 RX ORDER — FUROSEMIDE 40 MG
40 TABLET ORAL ONCE
Refills: 0 | Status: COMPLETED | OUTPATIENT
Start: 2022-01-01 | End: 2022-01-01

## 2022-01-01 RX ORDER — SENNA PLUS 8.6 MG/1
2 TABLET ORAL AT BEDTIME
Refills: 0 | Status: DISCONTINUED | OUTPATIENT
Start: 2022-01-01 | End: 2022-01-01

## 2022-01-01 RX ORDER — CEPHALEXIN 500 MG
1 CAPSULE ORAL
Qty: 0 | Refills: 0 | DISCHARGE
Start: 2022-01-01

## 2022-01-01 RX ORDER — OXYCODONE HYDROCHLORIDE 5 MG/1
5 TABLET ORAL EVERY 4 HOURS
Refills: 0 | Status: DISCONTINUED | OUTPATIENT
Start: 2022-01-01 | End: 2022-01-01

## 2022-01-01 RX ORDER — CEPHALEXIN 500 MG
1 CAPSULE ORAL
Qty: 0 | Refills: 0 | DISCHARGE
Start: 2022-01-01 | End: 2022-01-01

## 2022-01-01 RX ORDER — LIDOCAINE 4 G/100G
1 CREAM TOPICAL DAILY
Refills: 0 | Status: DISCONTINUED | OUTPATIENT
Start: 2022-01-01 | End: 2022-01-01

## 2022-01-01 RX ORDER — AMLODIPINE BESYLATE 2.5 MG/1
1 TABLET ORAL
Qty: 0 | Refills: 0 | DISCHARGE
Start: 2022-01-01

## 2022-01-01 RX ORDER — POTASSIUM CHLORIDE 20 MEQ
40 PACKET (EA) ORAL EVERY 4 HOURS
Refills: 0 | Status: DISCONTINUED | OUTPATIENT
Start: 2022-01-01 | End: 2022-01-01

## 2022-01-01 RX ORDER — POTASSIUM CHLORIDE 20 MEQ
10 PACKET (EA) ORAL ONCE
Refills: 0 | Status: COMPLETED | OUTPATIENT
Start: 2022-01-01 | End: 2022-01-01

## 2022-01-01 RX ORDER — GABAPENTIN 400 MG/1
300 CAPSULE ORAL ONCE
Refills: 0 | Status: DISCONTINUED | OUTPATIENT
Start: 2022-01-01 | End: 2022-01-01

## 2022-01-01 RX ORDER — FOLIC ACID 0.8 MG
1 TABLET ORAL
Qty: 0 | Refills: 0 | DISCHARGE
Start: 2022-01-01

## 2022-01-01 RX ORDER — LISINOPRIL 2.5 MG/1
10 TABLET ORAL DAILY
Refills: 0 | Status: DISCONTINUED | OUTPATIENT
Start: 2022-01-01 | End: 2022-01-01

## 2022-01-01 RX ORDER — CASPOFUNGIN ACETATE 7 MG/ML
50 INJECTION, POWDER, LYOPHILIZED, FOR SOLUTION INTRAVENOUS EVERY 24 HOURS
Refills: 0 | Status: DISCONTINUED | OUTPATIENT
Start: 2022-01-01 | End: 2022-01-01

## 2022-01-01 RX ORDER — HYDROMORPHONE HYDROCHLORIDE 2 MG/ML
0.5 INJECTION INTRAMUSCULAR; INTRAVENOUS; SUBCUTANEOUS DAILY
Refills: 0 | Status: DISCONTINUED | OUTPATIENT
Start: 2022-01-01 | End: 2022-01-01

## 2022-01-01 RX ORDER — AMLODIPINE BESYLATE 2.5 MG/1
2.5 TABLET ORAL DAILY
Qty: 90 | Refills: 0 | Status: ACTIVE | COMMUNITY
Start: 2022-01-01 | End: 1900-01-01

## 2022-01-01 RX ORDER — POTASSIUM CHLORIDE 20 MEQ
10 PACKET (EA) ORAL DAILY
Refills: 0 | Status: DISCONTINUED | OUTPATIENT
Start: 2022-01-01 | End: 2022-01-01

## 2022-01-01 RX ORDER — LIDOCAINE 4 G/100G
1 CREAM TOPICAL ONCE
Refills: 0 | Status: COMPLETED | OUTPATIENT
Start: 2022-01-01 | End: 2022-01-01

## 2022-01-01 RX ORDER — HEPARIN SODIUM 5000 [USP'U]/ML
5500 INJECTION INTRAVENOUS; SUBCUTANEOUS ONCE
Refills: 0 | Status: COMPLETED | OUTPATIENT
Start: 2022-01-01 | End: 2022-01-01

## 2022-01-01 RX ORDER — ACETAMINOPHEN 500 MG
2 TABLET ORAL
Qty: 240 | Refills: 0
Start: 2022-01-01 | End: 2022-01-01

## 2022-01-01 RX ORDER — POTASSIUM CHLORIDE 20 MEQ
20 PACKET (EA) ORAL ONCE
Refills: 0 | Status: DISCONTINUED | OUTPATIENT
Start: 2022-01-01 | End: 2022-01-01

## 2022-01-01 RX ORDER — FOLIC ACID 0.8 MG
1 TABLET ORAL DAILY
Refills: 0 | Status: DISCONTINUED | OUTPATIENT
Start: 2022-01-01 | End: 2022-01-01

## 2022-01-01 RX ORDER — CEFAZOLIN SODIUM 1 G
1000 VIAL (EA) INJECTION ONCE
Refills: 0 | Status: COMPLETED | OUTPATIENT
Start: 2022-01-01 | End: 2022-01-01

## 2022-01-01 RX ORDER — FUROSEMIDE 40 MG
2 TABLET ORAL
Qty: 60 | Refills: 0
Start: 2022-01-01 | End: 2022-01-01

## 2022-01-01 RX ORDER — HEPARIN SODIUM 5000 [USP'U]/ML
INJECTION INTRAVENOUS; SUBCUTANEOUS
Qty: 25000 | Refills: 0 | Status: DISCONTINUED | OUTPATIENT
Start: 2022-01-01 | End: 2022-01-01

## 2022-01-01 RX ORDER — DEXTROSE 10 % IN WATER 10 %
1000 INTRAVENOUS SOLUTION INTRAVENOUS
Refills: 0 | Status: DISCONTINUED | OUTPATIENT
Start: 2022-01-01 | End: 2022-01-01

## 2022-01-01 RX ORDER — PHENYLEPHRINE HYDROCHLORIDE 10 MG/ML
0.1 INJECTION INTRAVENOUS
Qty: 40 | Refills: 0 | Status: DISCONTINUED | OUTPATIENT
Start: 2022-01-01 | End: 2022-01-01

## 2022-01-01 RX ORDER — FUROSEMIDE 40 MG
20 TABLET ORAL ONCE
Refills: 0 | Status: DISCONTINUED | OUTPATIENT
Start: 2022-01-01 | End: 2022-01-01

## 2022-01-01 RX ORDER — FLUCONAZOLE 150 MG/1
400 TABLET ORAL DAILY
Refills: 0 | Status: DISCONTINUED | OUTPATIENT
Start: 2022-01-01 | End: 2022-01-01

## 2022-01-01 RX ORDER — MAGNESIUM SULFATE 500 MG/ML
2 VIAL (ML) INJECTION ONCE
Refills: 0 | Status: COMPLETED | OUTPATIENT
Start: 2022-01-01 | End: 2022-01-01

## 2022-01-01 RX ORDER — HEPARIN SODIUM 5000 [USP'U]/ML
900 INJECTION INTRAVENOUS; SUBCUTANEOUS
Qty: 25000 | Refills: 0 | Status: DISCONTINUED | OUTPATIENT
Start: 2022-01-01 | End: 2022-01-01

## 2022-01-01 RX ORDER — MAGNESIUM OXIDE 400 MG ORAL TABLET 241.3 MG
400 TABLET ORAL
Refills: 0 | Status: COMPLETED | OUTPATIENT
Start: 2022-01-01 | End: 2022-01-01

## 2022-01-01 RX ORDER — ACETAMINOPHEN 500 MG
650 TABLET ORAL ONCE
Refills: 0 | Status: COMPLETED | OUTPATIENT
Start: 2022-01-01 | End: 2022-01-01

## 2022-01-01 RX ORDER — CEFEPIME 1 G/1
2000 INJECTION, POWDER, FOR SOLUTION INTRAMUSCULAR; INTRAVENOUS EVERY 8 HOURS
Refills: 0 | Status: DISCONTINUED | OUTPATIENT
Start: 2022-01-01 | End: 2022-01-01

## 2022-01-01 RX ORDER — SODIUM CHLORIDE 9 MG/ML
1000 INJECTION INTRAMUSCULAR; INTRAVENOUS; SUBCUTANEOUS ONCE
Refills: 0 | Status: COMPLETED | OUTPATIENT
Start: 2022-01-01 | End: 2022-01-01

## 2022-01-01 RX ORDER — FAMOTIDINE 10 MG/ML
1 INJECTION INTRAVENOUS
Qty: 30 | Refills: 0
Start: 2022-01-01 | End: 2022-01-01

## 2022-01-01 RX ORDER — METOPROLOL TARTRATE 50 MG/1
50 TABLET, FILM COATED ORAL
Qty: 180 | Refills: 0 | Status: ACTIVE | COMMUNITY
Start: 1900-01-01 | End: 1900-01-01

## 2022-01-01 RX ORDER — FUROSEMIDE 40 MG
20 TABLET ORAL DAILY
Refills: 0 | Status: DISCONTINUED | OUTPATIENT
Start: 2022-01-01 | End: 2022-01-01

## 2022-01-01 RX ORDER — HYDROMORPHONE HYDROCHLORIDE 2 MG/ML
0.25 INJECTION INTRAMUSCULAR; INTRAVENOUS; SUBCUTANEOUS ONCE
Refills: 0 | Status: DISCONTINUED | OUTPATIENT
Start: 2022-01-01 | End: 2022-01-01

## 2022-01-01 RX ORDER — METOPROLOL TARTRATE 50 MG
12.5 TABLET ORAL EVERY 12 HOURS
Refills: 0 | Status: DISCONTINUED | OUTPATIENT
Start: 2022-01-01 | End: 2022-01-01

## 2022-01-01 RX ORDER — ALBUMIN HUMAN 25 %
250 VIAL (ML) INTRAVENOUS ONCE
Refills: 0 | Status: COMPLETED | OUTPATIENT
Start: 2022-01-01 | End: 2022-01-01

## 2022-01-01 RX ORDER — FLUCONAZOLE 150 MG/1
2 TABLET ORAL
Qty: 0 | Refills: 0 | DISCHARGE
Start: 2022-01-01

## 2022-01-01 RX ORDER — MAGNESIUM OXIDE 400 MG ORAL TABLET 241.3 MG
800 TABLET ORAL ONCE
Refills: 0 | Status: COMPLETED | OUTPATIENT
Start: 2022-01-01 | End: 2022-01-01

## 2022-01-01 RX ORDER — LEVOTHYROXINE SODIUM 125 MCG
25 TABLET ORAL DAILY
Refills: 0 | Status: DISCONTINUED | OUTPATIENT
Start: 2022-01-01 | End: 2022-01-01

## 2022-01-01 RX ORDER — FLUCONAZOLE 150 MG/1
200 TABLET ORAL EVERY 24 HOURS
Refills: 0 | Status: DISCONTINUED | OUTPATIENT
Start: 2022-01-01 | End: 2022-01-01

## 2022-01-01 RX ORDER — HYDROMORPHONE HYDROCHLORIDE 2 MG/ML
0.5 INJECTION INTRAMUSCULAR; INTRAVENOUS; SUBCUTANEOUS
Refills: 0 | Status: DISCONTINUED | OUTPATIENT
Start: 2022-01-01 | End: 2022-01-01

## 2022-01-01 RX ORDER — DEXMEDETOMIDINE HYDROCHLORIDE IN 0.9% SODIUM CHLORIDE 4 UG/ML
0.3 INJECTION INTRAVENOUS
Qty: 400 | Refills: 0 | Status: DISCONTINUED | OUTPATIENT
Start: 2022-01-01 | End: 2022-01-01

## 2022-01-01 RX ORDER — CHLORHEXIDINE GLUCONATE 213 G/1000ML
1 SOLUTION TOPICAL
Refills: 0 | Status: DISCONTINUED | OUTPATIENT
Start: 2022-01-01 | End: 2022-01-01

## 2022-01-01 RX ORDER — METHOCARBAMOL 500 MG/1
2 TABLET, FILM COATED ORAL
Qty: 20 | Refills: 0
Start: 2022-01-01 | End: 2022-01-01

## 2022-01-01 RX ORDER — APIXABAN 5 MG/1
5 TABLET, FILM COATED ORAL
Qty: 60 | Refills: 1 | Status: ACTIVE | COMMUNITY
Start: 1900-01-01 | End: 1900-01-01

## 2022-01-01 RX ORDER — ENOXAPARIN SODIUM 100 MG/ML
80 INJECTION SUBCUTANEOUS EVERY 12 HOURS
Refills: 0 | Status: DISCONTINUED | OUTPATIENT
Start: 2022-01-01 | End: 2022-01-01

## 2022-01-01 RX ORDER — HYDRALAZINE HCL 50 MG
5 TABLET ORAL ONCE
Refills: 0 | Status: COMPLETED | OUTPATIENT
Start: 2022-01-01 | End: 2022-01-01

## 2022-01-01 RX ORDER — SULFAMETHOXAZOLE AND TRIMETHOPRIM 800; 160 MG/1; MG/1
800-160 TABLET ORAL TWICE DAILY
Qty: 14 | Refills: 0 | Status: ACTIVE | COMMUNITY
Start: 2022-01-01 | End: 1900-01-01

## 2022-01-01 RX ORDER — CEFEPIME 1 G/1
2000 INJECTION, POWDER, FOR SOLUTION INTRAMUSCULAR; INTRAVENOUS EVERY 12 HOURS
Refills: 0 | Status: DISCONTINUED | OUTPATIENT
Start: 2022-01-01 | End: 2022-01-01

## 2022-01-01 RX ORDER — GLUCAGON INJECTION, SOLUTION 0.5 MG/.1ML
1 INJECTION, SOLUTION SUBCUTANEOUS ONCE
Refills: 0 | Status: DISCONTINUED | OUTPATIENT
Start: 2022-01-01 | End: 2022-01-01

## 2022-01-01 RX ORDER — APIXABAN 2.5 MG/1
2 TABLET, FILM COATED ORAL
Qty: 74 | Refills: 0 | DISCHARGE
Start: 2022-01-01

## 2022-01-01 RX ORDER — DEXAMETHASONE 0.5 MG/5ML
10 ELIXIR ORAL ONCE
Refills: 0 | Status: COMPLETED | OUTPATIENT
Start: 2022-01-01 | End: 2022-01-01

## 2022-01-01 RX ORDER — HYDROCORTISONE 20 MG
50 TABLET ORAL EVERY 12 HOURS
Refills: 0 | Status: DISCONTINUED | OUTPATIENT
Start: 2022-01-01 | End: 2022-01-01

## 2022-01-01 RX ORDER — PIPERACILLIN AND TAZOBACTAM 4; .5 G/20ML; G/20ML
3.38 INJECTION, POWDER, LYOPHILIZED, FOR SOLUTION INTRAVENOUS EVERY 8 HOURS
Refills: 0 | Status: DISCONTINUED | OUTPATIENT
Start: 2022-01-01 | End: 2022-01-01

## 2022-01-01 RX ORDER — POTASSIUM CHLORIDE 20 MEQ
40 PACKET (EA) ORAL
Refills: 0 | Status: DISCONTINUED | OUTPATIENT
Start: 2022-01-01 | End: 2022-01-01

## 2022-01-01 RX ORDER — HYDRALAZINE HCL 50 MG
1 TABLET ORAL
Qty: 0 | Refills: 0 | DISCHARGE
Start: 2022-01-01

## 2022-01-01 RX ORDER — BUDESONIDE AND FORMOTEROL FUMARATE DIHYDRATE 160; 4.5 UG/1; UG/1
2 AEROSOL RESPIRATORY (INHALATION)
Qty: 0 | Refills: 0 | DISCHARGE

## 2022-01-01 RX ORDER — HYDRALAZINE HCL 50 MG
10 TABLET ORAL EVERY 6 HOURS
Refills: 0 | Status: DISCONTINUED | OUTPATIENT
Start: 2022-01-01 | End: 2022-01-01

## 2022-01-01 RX ORDER — SENNA PLUS 8.6 MG/1
2 TABLET ORAL
Qty: 60 | Refills: 0
Start: 2022-01-01 | End: 2022-01-01

## 2022-01-01 RX ORDER — METOPROLOL TARTRATE 50 MG
12.5 TABLET ORAL ONCE
Refills: 0 | Status: COMPLETED | OUTPATIENT
Start: 2022-01-01 | End: 2022-01-01

## 2022-01-01 RX ORDER — FENTANYL CITRATE 50 UG/ML
25 INJECTION INTRAVENOUS
Refills: 0 | Status: DISCONTINUED | OUTPATIENT
Start: 2022-01-01 | End: 2022-01-01

## 2022-01-01 RX ORDER — AMLODIPINE BESYLATE 2.5 MG/1
1 TABLET ORAL
Qty: 30 | Refills: 0
Start: 2022-01-01 | End: 2022-01-01

## 2022-01-01 RX ORDER — POTASSIUM CHLORIDE 20 MEQ
1 PACKET (EA) ORAL
Qty: 0 | Refills: 0 | DISCHARGE
Start: 2022-01-01

## 2022-01-01 RX ORDER — ACETAMINOPHEN 500 MG
2 TABLET ORAL
Qty: 0 | Refills: 0 | DISCHARGE
Start: 2022-01-01

## 2022-01-01 RX ORDER — HEPARIN SODIUM 5000 [USP'U]/ML
5000 INJECTION INTRAVENOUS; SUBCUTANEOUS ONCE
Refills: 0 | Status: COMPLETED | OUTPATIENT
Start: 2022-01-01 | End: 2022-01-01

## 2022-01-01 RX ORDER — SENNA PLUS 8.6 MG/1
1 TABLET ORAL
Qty: 30 | Refills: 0
Start: 2022-01-01 | End: 2022-01-01

## 2022-01-01 RX ORDER — ATORVASTATIN CALCIUM 80 MG/1
1 TABLET, FILM COATED ORAL
Qty: 0 | Refills: 0 | DISCHARGE
Start: 2022-01-01

## 2022-01-01 RX ORDER — CEFAZOLIN SODIUM 1 G
2000 VIAL (EA) INJECTION EVERY 8 HOURS
Refills: 0 | Status: DISCONTINUED | OUTPATIENT
Start: 2022-01-01 | End: 2022-01-01

## 2022-01-01 RX ORDER — LISINOPRIL 2.5 MG/1
1 TABLET ORAL
Qty: 0 | Refills: 0 | DISCHARGE

## 2022-01-01 RX ORDER — DEXTROSE 50 % IN WATER 50 %
15 SYRINGE (ML) INTRAVENOUS ONCE
Refills: 0 | Status: DISCONTINUED | OUTPATIENT
Start: 2022-01-01 | End: 2022-01-01

## 2022-01-01 RX ORDER — MAGNESIUM OXIDE 400 MG ORAL TABLET 241.3 MG
400 TABLET ORAL ONCE
Refills: 0 | Status: COMPLETED | OUTPATIENT
Start: 2022-01-01 | End: 2022-01-01

## 2022-01-01 RX ORDER — METOPROLOL TARTRATE 50 MG
50 TABLET ORAL EVERY 12 HOURS
Refills: 0 | Status: DISCONTINUED | OUTPATIENT
Start: 2022-01-01 | End: 2022-01-01

## 2022-01-01 RX ORDER — PROPOFOL 10 MG/ML
24.51 INJECTION, EMULSION INTRAVENOUS
Qty: 1000 | Refills: 0 | Status: DISCONTINUED | OUTPATIENT
Start: 2022-01-01 | End: 2022-01-01

## 2022-01-01 RX ORDER — PANTOPRAZOLE 40 MG/1
40 TABLET, DELAYED RELEASE ORAL DAILY
Qty: 1 | Refills: 1 | Status: ACTIVE | COMMUNITY
Start: 2022-01-01

## 2022-01-01 RX ORDER — MEPERIDINE HYDROCHLORIDE 50 MG/ML
12.5 INJECTION INTRAMUSCULAR; INTRAVENOUS; SUBCUTANEOUS
Refills: 0 | Status: DISCONTINUED | OUTPATIENT
Start: 2022-01-01 | End: 2022-01-01

## 2022-01-01 RX ORDER — LEVOTHYROXINE SODIUM 125 MCG
1 TABLET ORAL
Qty: 0 | Refills: 0 | DISCHARGE

## 2022-01-01 RX ORDER — LACTULOSE 10 G/15ML
20 SOLUTION ORAL
Refills: 0 | Status: COMPLETED | OUTPATIENT
Start: 2022-01-01 | End: 2022-01-01

## 2022-01-01 RX ORDER — POTASSIUM CHLORIDE 20 MEQ
20 PACKET (EA) ORAL
Refills: 0 | Status: COMPLETED | OUTPATIENT
Start: 2022-01-01 | End: 2022-01-01

## 2022-01-01 RX ORDER — FUROSEMIDE 40 MG
1 TABLET ORAL
Qty: 0 | Refills: 0 | DISCHARGE
Start: 2022-01-01

## 2022-01-01 RX ORDER — GABAPENTIN 400 MG/1
600 CAPSULE ORAL EVERY 8 HOURS
Refills: 0 | Status: DISCONTINUED | OUTPATIENT
Start: 2022-01-01 | End: 2022-01-01

## 2022-01-01 RX ORDER — CEFAZOLIN SODIUM 1 G
VIAL (EA) INJECTION
Refills: 0 | Status: DISCONTINUED | OUTPATIENT
Start: 2022-01-01 | End: 2022-01-01

## 2022-01-01 RX ORDER — METOPROLOL TARTRATE 50 MG
12.5 TABLET ORAL
Qty: 0 | Refills: 0 | DISCHARGE

## 2022-01-01 RX ORDER — SODIUM CHLORIDE 9 MG/ML
1000 INJECTION, SOLUTION INTRAVENOUS ONCE
Refills: 0 | Status: COMPLETED | OUTPATIENT
Start: 2022-01-01 | End: 2022-01-01

## 2022-01-01 RX ORDER — ATORVASTATIN CALCIUM 80 MG/1
1 TABLET, FILM COATED ORAL
Qty: 30 | Refills: 0
Start: 2022-01-01 | End: 2022-01-01

## 2022-01-01 RX ORDER — LIDOCAINE 4 G/100G
1 CREAM TOPICAL EVERY 24 HOURS
Refills: 0 | Status: DISCONTINUED | OUTPATIENT
Start: 2022-01-01 | End: 2022-01-01

## 2022-01-01 RX ORDER — ASPIRIN/CALCIUM CARB/MAGNESIUM 324 MG
1 TABLET ORAL
Qty: 30 | Refills: 0
Start: 2022-01-01 | End: 2022-01-01

## 2022-01-01 RX ORDER — SENNOSIDES 8.6 MG/1
8.6 TABLET ORAL
Refills: 0 | Status: DISCONTINUED | COMMUNITY
Start: 2021-09-28 | End: 2022-01-01

## 2022-01-01 RX ORDER — INSULIN HUMAN 100 [IU]/ML
1 INJECTION, SOLUTION SUBCUTANEOUS
Qty: 50 | Refills: 0 | Status: DISCONTINUED | OUTPATIENT
Start: 2022-01-01 | End: 2022-01-01

## 2022-01-01 RX ORDER — MORPHINE SULFATE 50 MG/1
4 CAPSULE, EXTENDED RELEASE ORAL ONCE
Refills: 0 | Status: DISCONTINUED | OUTPATIENT
Start: 2022-01-01 | End: 2022-01-01

## 2022-01-01 RX ORDER — SODIUM CHLORIDE 9 MG/ML
1000 INJECTION, SOLUTION INTRAVENOUS
Refills: 0 | Status: COMPLETED | OUTPATIENT
Start: 2022-01-01 | End: 2022-01-01

## 2022-01-01 RX ORDER — ERTAPENEM SODIUM 1 G/1
1 INJECTION, POWDER, LYOPHILIZED, FOR SOLUTION INTRAMUSCULAR; INTRAVENOUS
Qty: 42 | Refills: 0
Start: 2022-01-01 | End: 2022-12-21

## 2022-01-01 RX ORDER — POTASSIUM CHLORIDE 20 MEQ
40 PACKET (EA) ORAL ONCE
Refills: 0 | Status: DISCONTINUED | OUTPATIENT
Start: 2022-01-01 | End: 2022-01-01

## 2022-01-01 RX ORDER — TRAMADOL HYDROCHLORIDE 50 MG/1
1 TABLET ORAL
Qty: 7 | Refills: 0
Start: 2022-01-01 | End: 2022-01-01

## 2022-01-01 RX ORDER — BUDESONIDE AND FORMOTEROL FUMARATE DIHYDRATE 80; 4.5 UG/1; UG/1
80-4.5 AEROSOL RESPIRATORY (INHALATION)
Qty: 1 | Refills: 2 | Status: ACTIVE | COMMUNITY
Start: 2022-01-01 | End: 1900-01-01

## 2022-01-01 RX ORDER — METOPROLOL TARTRATE 50 MG
25 TABLET ORAL EVERY 12 HOURS
Refills: 0 | Status: DISCONTINUED | OUTPATIENT
Start: 2022-01-01 | End: 2022-01-01

## 2022-01-01 RX ORDER — VANCOMYCIN HCL 1 G
750 VIAL (EA) INTRAVENOUS ONCE
Refills: 0 | Status: COMPLETED | OUTPATIENT
Start: 2022-01-01 | End: 2022-01-01

## 2022-01-01 RX ORDER — LISINOPRIL 10 MG/1
10 TABLET ORAL DAILY
Qty: 90 | Refills: 3 | Status: DISCONTINUED | COMMUNITY
End: 2022-01-01

## 2022-01-01 RX ORDER — CHLORHEXIDINE GLUCONATE 213 G/1000ML
5 SOLUTION TOPICAL
Refills: 0 | Status: DISCONTINUED | OUTPATIENT
Start: 2022-01-01 | End: 2022-01-01

## 2022-01-01 RX ORDER — LEVOTHYROXINE SODIUM 50 UG/1
50 TABLET ORAL DAILY
Refills: 0 | Status: ACTIVE | COMMUNITY
Start: 2021-09-16

## 2022-01-01 RX ORDER — SODIUM CHLORIDE 9 MG/ML
1000 INJECTION INTRAMUSCULAR; INTRAVENOUS; SUBCUTANEOUS
Refills: 0 | Status: DISCONTINUED | OUTPATIENT
Start: 2022-01-01 | End: 2022-01-01

## 2022-01-01 RX ORDER — KETOROLAC TROMETHAMINE 30 MG/ML
30 SYRINGE (ML) INJECTION ONCE
Refills: 0 | Status: DISCONTINUED | OUTPATIENT
Start: 2022-01-01 | End: 2022-01-01

## 2022-01-01 RX ORDER — SODIUM CHLORIDE 9 MG/ML
500 INJECTION, SOLUTION INTRAVENOUS ONCE
Refills: 0 | Status: COMPLETED | OUTPATIENT
Start: 2022-01-01 | End: 2022-01-01

## 2022-01-01 RX ORDER — POTASSIUM CHLORIDE 20 MEQ
2 PACKET (EA) ORAL
Qty: 60 | Refills: 0
Start: 2022-01-01 | End: 2022-01-01

## 2022-01-01 RX ORDER — FLUDROCORTISONE ACETATE 0.1 MG/1
0.1 TABLET ORAL DAILY
Refills: 0 | Status: DISCONTINUED | OUTPATIENT
Start: 2022-01-01 | End: 2022-01-01

## 2022-01-01 RX ORDER — METOPROLOL TARTRATE 50 MG
0.5 TABLET ORAL
Qty: 30 | Refills: 0
Start: 2022-01-01 | End: 2023-01-04

## 2022-01-01 RX ORDER — TRAMADOL HYDROCHLORIDE 50 MG/1
0.5 TABLET ORAL
Qty: 0 | Refills: 0 | DISCHARGE
Start: 2022-01-01

## 2022-01-01 RX ORDER — HYDRALAZINE HYDROCHLORIDE 10 MG/1
10 TABLET ORAL
Qty: 90 | Refills: 2 | Status: ACTIVE | COMMUNITY
Start: 2022-01-01 | End: 1900-01-01

## 2022-01-01 RX ORDER — DEXTROSE 50 % IN WATER 50 %
50 SYRINGE (ML) INTRAVENOUS
Refills: 0 | Status: COMPLETED | OUTPATIENT
Start: 2022-01-01 | End: 2022-01-01

## 2022-01-01 RX ORDER — DAPTOMYCIN 500 MG/10ML
400 INJECTION, POWDER, LYOPHILIZED, FOR SOLUTION INTRAVENOUS EVERY 24 HOURS
Refills: 0 | Status: DISCONTINUED | OUTPATIENT
Start: 2022-01-01 | End: 2022-01-01

## 2022-01-01 RX ORDER — DAPTOMYCIN 500 MG/10ML
500 INJECTION, POWDER, LYOPHILIZED, FOR SOLUTION INTRAVENOUS EVERY 24 HOURS
Refills: 0 | Status: COMPLETED | OUTPATIENT
Start: 2022-01-01 | End: 2022-01-01

## 2022-01-01 RX ORDER — DEXTROSE 50 % IN WATER 50 %
25 SYRINGE (ML) INTRAVENOUS ONCE
Refills: 0 | Status: COMPLETED | OUTPATIENT
Start: 2022-01-01 | End: 2022-01-01

## 2022-01-01 RX ORDER — ONDANSETRON 8 MG/1
4 TABLET, FILM COATED ORAL ONCE
Refills: 0 | Status: DISCONTINUED | OUTPATIENT
Start: 2022-01-01 | End: 2022-01-01

## 2022-01-01 RX ORDER — GABAPENTIN 400 MG/1
100 CAPSULE ORAL AT BEDTIME
Refills: 0 | Status: DISCONTINUED | OUTPATIENT
Start: 2022-01-01 | End: 2022-01-01

## 2022-01-01 RX ORDER — GABAPENTIN 400 MG/1
1 CAPSULE ORAL
Qty: 0 | Refills: 0 | DISCHARGE
Start: 2022-01-01

## 2022-01-01 RX ORDER — TRAMADOL HYDROCHLORIDE 50 MG/1
50 TABLET ORAL EVERY 6 HOURS
Refills: 0 | Status: DISCONTINUED | OUTPATIENT
Start: 2022-01-01 | End: 2022-01-01

## 2022-01-01 RX ORDER — LEVOTHYROXINE SODIUM 125 MCG
1 TABLET ORAL
Qty: 30 | Refills: 0
Start: 2022-01-01 | End: 2022-01-01

## 2022-01-01 RX ORDER — FERROUS SULFATE 325(65) MG
325 TABLET ORAL DAILY
Refills: 0 | Status: DISCONTINUED | OUTPATIENT
Start: 2022-01-01 | End: 2022-01-01

## 2022-01-01 RX ORDER — CHLORHEXIDINE GLUCONATE 213 G/1000ML
12.5 SOLUTION TOPICAL ONCE
Refills: 0 | Status: COMPLETED | OUTPATIENT
Start: 2022-01-01 | End: 2022-01-01

## 2022-01-01 RX ORDER — ACETAMINOPHEN 500 MG
975 TABLET ORAL ONCE
Refills: 0 | Status: COMPLETED | OUTPATIENT
Start: 2022-01-01 | End: 2022-01-01

## 2022-01-01 RX ORDER — HEPARIN SODIUM 5000 [USP'U]/ML
1100 INJECTION INTRAVENOUS; SUBCUTANEOUS
Qty: 25000 | Refills: 0 | Status: DISCONTINUED | OUTPATIENT
Start: 2022-01-01 | End: 2022-01-01

## 2022-01-01 RX ORDER — FLUCONAZOLE 150 MG/1
400 TABLET ORAL DAILY
Refills: 0 | Status: ACTIVE | OUTPATIENT
Start: 2022-01-01 | End: 2023-10-06

## 2022-01-01 RX ORDER — CASPOFUNGIN ACETATE 7 MG/ML
70 INJECTION, POWDER, LYOPHILIZED, FOR SOLUTION INTRAVENOUS ONCE
Refills: 0 | Status: COMPLETED | OUTPATIENT
Start: 2022-01-01 | End: 2022-01-01

## 2022-01-01 RX ORDER — FLUCONAZOLE 150 MG/1
2 TABLET ORAL
Qty: 60 | Refills: 1
Start: 2022-01-01 | End: 2023-01-09

## 2022-01-01 RX ORDER — HYDROCORTISONE 20 MG
75 TABLET ORAL EVERY 8 HOURS
Refills: 0 | Status: DISCONTINUED | OUTPATIENT
Start: 2022-01-01 | End: 2022-01-01

## 2022-01-01 RX ORDER — METOPROLOL TARTRATE 50 MG
1 TABLET ORAL
Qty: 60 | Refills: 0
Start: 2022-01-01 | End: 2022-01-01

## 2022-01-01 RX ORDER — SILVER SULFADIAZINE 10 MG/G
1 CREAM TOPICAL TWICE DAILY
Qty: 1 | Refills: 1 | Status: ACTIVE | COMMUNITY
Start: 2022-01-01 | End: 1900-01-01

## 2022-01-01 RX ORDER — HEPARIN SODIUM 5000 [USP'U]/ML
750 INJECTION INTRAVENOUS; SUBCUTANEOUS
Qty: 25000 | Refills: 0 | Status: DISCONTINUED | OUTPATIENT
Start: 2022-01-01 | End: 2022-01-01

## 2022-01-01 RX ORDER — APIXABAN 2.5 MG/1
1 TABLET, FILM COATED ORAL
Qty: 0 | Refills: 0 | DISCHARGE
Start: 2022-01-01

## 2022-01-01 RX ORDER — APIXABAN 2.5 MG/1
2 TABLET, FILM COATED ORAL
Qty: 74 | Refills: 0
Start: 2022-01-01

## 2022-01-01 RX ORDER — APIXABAN 2.5 MG/1
1 TABLET, FILM COATED ORAL
Qty: 60 | Refills: 0
Start: 2022-01-01 | End: 2022-01-01

## 2022-01-01 RX ORDER — GABAPENTIN 400 MG/1
300 CAPSULE ORAL ONCE
Refills: 0 | Status: COMPLETED | OUTPATIENT
Start: 2022-01-01 | End: 2022-01-01

## 2022-01-01 RX ORDER — FERROUS SULFATE 325(65) MG
1 TABLET ORAL
Qty: 0 | Refills: 0 | DISCHARGE
Start: 2022-01-01

## 2022-01-01 RX ORDER — HYDROMORPHONE HYDROCHLORIDE 2 MG/ML
1 INJECTION INTRAMUSCULAR; INTRAVENOUS; SUBCUTANEOUS
Refills: 0 | Status: DISCONTINUED | OUTPATIENT
Start: 2022-01-01 | End: 2022-01-01

## 2022-01-01 RX ORDER — ASPIRIN/CALCIUM CARB/MAGNESIUM 324 MG
1 TABLET ORAL
Qty: 0 | Refills: 0 | DISCHARGE
Start: 2022-01-01

## 2022-01-01 RX ORDER — ASPIRIN 81 MG/1
81 TABLET, CHEWABLE ORAL DAILY
Qty: 90 | Refills: 1 | Status: ACTIVE | COMMUNITY
Start: 2022-01-01

## 2022-01-01 RX ORDER — HEPARIN SODIUM 5000 [USP'U]/ML
5000 INJECTION INTRAVENOUS; SUBCUTANEOUS EVERY 8 HOURS
Refills: 0 | Status: COMPLETED | OUTPATIENT
Start: 2022-01-01 | End: 2022-01-01

## 2022-01-01 RX ORDER — LABETALOL HCL 100 MG
200 TABLET ORAL THREE TIMES A DAY
Refills: 0 | Status: DISCONTINUED | OUTPATIENT
Start: 2022-01-01 | End: 2022-01-01

## 2022-01-01 RX ADMIN — SODIUM CHLORIDE 3 MILLILITER(S): 9 INJECTION INTRAMUSCULAR; INTRAVENOUS; SUBCUTANEOUS at 05:42

## 2022-01-01 RX ADMIN — SODIUM CHLORIDE 3 MILLILITER(S): 9 INJECTION INTRAMUSCULAR; INTRAVENOUS; SUBCUTANEOUS at 14:01

## 2022-01-01 RX ADMIN — Medication 50 MICROGRAM(S): at 05:41

## 2022-01-01 RX ADMIN — Medication 650 MILLIGRAM(S): at 07:44

## 2022-01-01 RX ADMIN — Medication 10 MILLIGRAM(S): at 15:24

## 2022-01-01 RX ADMIN — PIPERACILLIN AND TAZOBACTAM 200 GRAM(S): 4; .5 INJECTION, POWDER, LYOPHILIZED, FOR SOLUTION INTRAVENOUS at 10:31

## 2022-01-01 RX ADMIN — BUDESONIDE AND FORMOTEROL FUMARATE DIHYDRATE 2 PUFF(S): 160; 4.5 AEROSOL RESPIRATORY (INHALATION) at 05:48

## 2022-01-01 RX ADMIN — Medication 10 MILLIGRAM(S): at 06:07

## 2022-01-01 RX ADMIN — SODIUM CHLORIDE 3 MILLILITER(S): 9 INJECTION INTRAMUSCULAR; INTRAVENOUS; SUBCUTANEOUS at 06:07

## 2022-01-01 RX ADMIN — BUDESONIDE AND FORMOTEROL FUMARATE DIHYDRATE 2 PUFF(S): 160; 4.5 AEROSOL RESPIRATORY (INHALATION) at 06:17

## 2022-01-01 RX ADMIN — Medication 10 MILLIGRAM(S): at 19:04

## 2022-01-01 RX ADMIN — OXYCODONE AND ACETAMINOPHEN 1 TABLET(S): 5; 325 TABLET ORAL at 10:00

## 2022-01-01 RX ADMIN — LIDOCAINE 1 PATCH: 4 CREAM TOPICAL at 18:09

## 2022-01-01 RX ADMIN — Medication 10 MILLIEQUIVALENT(S): at 12:04

## 2022-01-01 RX ADMIN — FLUCONAZOLE 400 MILLIGRAM(S): 150 TABLET ORAL at 11:30

## 2022-01-01 RX ADMIN — LIDOCAINE 1 PATCH: 4 CREAM TOPICAL at 12:20

## 2022-01-01 RX ADMIN — Medication 50 MILLILITER(S): at 22:00

## 2022-01-01 RX ADMIN — Medication 100 MILLIEQUIVALENT(S): at 05:05

## 2022-01-01 RX ADMIN — PIPERACILLIN AND TAZOBACTAM 25 GRAM(S): 4; .5 INJECTION, POWDER, LYOPHILIZED, FOR SOLUTION INTRAVENOUS at 07:13

## 2022-01-01 RX ADMIN — FLUCONAZOLE 400 MILLIGRAM(S): 150 TABLET ORAL at 11:39

## 2022-01-01 RX ADMIN — Medication 4 MILLIGRAM(S): at 13:30

## 2022-01-01 RX ADMIN — Medication 10 MILLIGRAM(S): at 05:41

## 2022-01-01 RX ADMIN — APIXABAN 5 MILLIGRAM(S): 2.5 TABLET, FILM COATED ORAL at 09:00

## 2022-01-01 RX ADMIN — CEFEPIME 100 MILLIGRAM(S): 1 INJECTION, POWDER, FOR SOLUTION INTRAMUSCULAR; INTRAVENOUS at 02:36

## 2022-01-01 RX ADMIN — FENTANYL CITRATE 12.5 MICROGRAM(S): 50 INJECTION INTRAVENOUS at 01:15

## 2022-01-01 RX ADMIN — FLUDROCORTISONE ACETATE 0.1 MILLIGRAM(S): 0.1 TABLET ORAL at 06:34

## 2022-01-01 RX ADMIN — AMLODIPINE BESYLATE 2.5 MILLIGRAM(S): 2.5 TABLET ORAL at 06:14

## 2022-01-01 RX ADMIN — BUDESONIDE AND FORMOTEROL FUMARATE DIHYDRATE 2 PUFF(S): 160; 4.5 AEROSOL RESPIRATORY (INHALATION) at 21:17

## 2022-01-01 RX ADMIN — ATORVASTATIN CALCIUM 10 MILLIGRAM(S): 80 TABLET, FILM COATED ORAL at 22:26

## 2022-01-01 RX ADMIN — TRAMADOL HYDROCHLORIDE 25 MILLIGRAM(S): 50 TABLET ORAL at 12:35

## 2022-01-01 RX ADMIN — Medication 650 MILLIGRAM(S): at 21:42

## 2022-01-01 RX ADMIN — Medication 12.5 MILLIGRAM(S): at 23:53

## 2022-01-01 RX ADMIN — AMLODIPINE BESYLATE 2.5 MILLIGRAM(S): 2.5 TABLET ORAL at 06:05

## 2022-01-01 RX ADMIN — BUDESONIDE AND FORMOTEROL FUMARATE DIHYDRATE 2 PUFF(S): 160; 4.5 AEROSOL RESPIRATORY (INHALATION) at 18:40

## 2022-01-01 RX ADMIN — OXYCODONE HYDROCHLORIDE 5 MILLIGRAM(S): 5 TABLET ORAL at 00:50

## 2022-01-01 RX ADMIN — DAPTOMYCIN 120 MILLIGRAM(S): 500 INJECTION, POWDER, LYOPHILIZED, FOR SOLUTION INTRAVENOUS at 21:47

## 2022-01-01 RX ADMIN — Medication 125 MILLILITER(S): at 18:39

## 2022-01-01 RX ADMIN — Medication 50 MILLIGRAM(S): at 05:41

## 2022-01-01 RX ADMIN — PANTOPRAZOLE SODIUM 40 MILLIGRAM(S): 20 TABLET, DELAYED RELEASE ORAL at 06:24

## 2022-01-01 RX ADMIN — FENTANYL CITRATE 25 MICROGRAM(S): 50 INJECTION INTRAVENOUS at 19:33

## 2022-01-01 RX ADMIN — Medication 650 MILLIGRAM(S): at 12:00

## 2022-01-01 RX ADMIN — Medication 650 MILLIGRAM(S): at 08:30

## 2022-01-01 RX ADMIN — TRAMADOL HYDROCHLORIDE 50 MILLIGRAM(S): 50 TABLET ORAL at 17:17

## 2022-01-01 RX ADMIN — APIXABAN 5 MILLIGRAM(S): 2.5 TABLET, FILM COATED ORAL at 09:15

## 2022-01-01 RX ADMIN — Medication 12.5 MILLIGRAM(S): at 00:28

## 2022-01-01 RX ADMIN — Medication 10 MILLIGRAM(S): at 06:58

## 2022-01-01 RX ADMIN — LIDOCAINE 1 PATCH: 4 CREAM TOPICAL at 18:21

## 2022-01-01 RX ADMIN — Medication 650 MILLIGRAM(S): at 08:32

## 2022-01-01 RX ADMIN — SODIUM CHLORIDE 3 MILLILITER(S): 9 INJECTION INTRAMUSCULAR; INTRAVENOUS; SUBCUTANEOUS at 21:43

## 2022-01-01 RX ADMIN — HYDROMORPHONE HYDROCHLORIDE 0.5 MILLIGRAM(S): 2 INJECTION INTRAMUSCULAR; INTRAVENOUS; SUBCUTANEOUS at 08:56

## 2022-01-01 RX ADMIN — Medication 15 MILLIGRAM(S): at 10:57

## 2022-01-01 RX ADMIN — Medication 12.5 MILLIGRAM(S): at 22:08

## 2022-01-01 RX ADMIN — OXYCODONE HYDROCHLORIDE 5 MILLIGRAM(S): 5 TABLET ORAL at 00:10

## 2022-01-01 RX ADMIN — Medication 81 MILLIGRAM(S): at 12:13

## 2022-01-01 RX ADMIN — CEFEPIME 100 MILLIGRAM(S): 1 INJECTION, POWDER, FOR SOLUTION INTRAMUSCULAR; INTRAVENOUS at 13:48

## 2022-01-01 RX ADMIN — GABAPENTIN 600 MILLIGRAM(S): 400 CAPSULE ORAL at 22:24

## 2022-01-01 RX ADMIN — Medication 400 MILLIGRAM(S): at 18:12

## 2022-01-01 RX ADMIN — Medication 200 MILLIGRAM(S): at 01:00

## 2022-01-01 RX ADMIN — SENNA PLUS 2 TABLET(S): 8.6 TABLET ORAL at 21:47

## 2022-01-01 RX ADMIN — Medication 10 MILLIGRAM(S): at 14:42

## 2022-01-01 RX ADMIN — AMLODIPINE BESYLATE 2.5 MILLIGRAM(S): 2.5 TABLET ORAL at 06:34

## 2022-01-01 RX ADMIN — SENNA PLUS 2 TABLET(S): 8.6 TABLET ORAL at 22:06

## 2022-01-01 RX ADMIN — APIXABAN 5 MILLIGRAM(S): 2.5 TABLET, FILM COATED ORAL at 07:05

## 2022-01-01 RX ADMIN — FAMOTIDINE 20 MILLIGRAM(S): 10 INJECTION INTRAVENOUS at 12:13

## 2022-01-01 RX ADMIN — Medication 4 MILLIGRAM(S): at 18:39

## 2022-01-01 RX ADMIN — APIXABAN 5 MILLIGRAM(S): 2.5 TABLET, FILM COATED ORAL at 18:44

## 2022-01-01 RX ADMIN — PIPERACILLIN AND TAZOBACTAM 200 GRAM(S): 4; .5 INJECTION, POWDER, LYOPHILIZED, FOR SOLUTION INTRAVENOUS at 20:26

## 2022-01-01 RX ADMIN — CHLORHEXIDINE GLUCONATE 5 MILLILITER(S): 213 SOLUTION TOPICAL at 21:36

## 2022-01-01 RX ADMIN — BUDESONIDE AND FORMOTEROL FUMARATE DIHYDRATE 2 PUFF(S): 160; 4.5 AEROSOL RESPIRATORY (INHALATION) at 17:33

## 2022-01-01 RX ADMIN — Medication 650 MILLIGRAM(S): at 23:18

## 2022-01-01 RX ADMIN — Medication 10 MILLIGRAM(S): at 13:30

## 2022-01-01 RX ADMIN — Medication 50 MILLIGRAM(S): at 17:08

## 2022-01-01 RX ADMIN — FENTANYL CITRATE 25 MICROGRAM(S): 50 INJECTION INTRAVENOUS at 18:24

## 2022-01-01 RX ADMIN — Medication 12.5 MILLIGRAM(S): at 17:32

## 2022-01-01 RX ADMIN — GABAPENTIN 300 MILLIGRAM(S): 400 CAPSULE ORAL at 10:50

## 2022-01-01 RX ADMIN — Medication 20 MILLIEQUIVALENT(S): at 09:21

## 2022-01-01 RX ADMIN — TRAMADOL HYDROCHLORIDE 25 MILLIGRAM(S): 50 TABLET ORAL at 06:07

## 2022-01-01 RX ADMIN — Medication 40 MILLIEQUIVALENT(S): at 18:15

## 2022-01-01 RX ADMIN — Medication 1000 MILLIGRAM(S): at 19:49

## 2022-01-01 RX ADMIN — SODIUM CHLORIDE 3 MILLILITER(S): 9 INJECTION INTRAMUSCULAR; INTRAVENOUS; SUBCUTANEOUS at 08:59

## 2022-01-01 RX ADMIN — HEPARIN SODIUM 9 UNIT(S)/HR: 5000 INJECTION INTRAVENOUS; SUBCUTANEOUS at 14:32

## 2022-01-01 RX ADMIN — Medication 10 MILLIGRAM(S): at 21:20

## 2022-01-01 RX ADMIN — Medication 25 MILLIGRAM(S): at 05:47

## 2022-01-01 RX ADMIN — Medication 81 MILLIGRAM(S): at 11:15

## 2022-01-01 RX ADMIN — LIDOCAINE 1 PATCH: 4 CREAM TOPICAL at 23:42

## 2022-01-01 RX ADMIN — GABAPENTIN 100 MILLIGRAM(S): 400 CAPSULE ORAL at 21:52

## 2022-01-01 RX ADMIN — CEFEPIME 100 MILLIGRAM(S): 1 INJECTION, POWDER, FOR SOLUTION INTRAMUSCULAR; INTRAVENOUS at 18:41

## 2022-01-01 RX ADMIN — Medication 4 MILLIGRAM(S): at 11:50

## 2022-01-01 RX ADMIN — GABAPENTIN 100 MILLIGRAM(S): 400 CAPSULE ORAL at 22:06

## 2022-01-01 RX ADMIN — Medication 2: at 22:15

## 2022-01-01 RX ADMIN — LIDOCAINE 1 PATCH: 4 CREAM TOPICAL at 11:49

## 2022-01-01 RX ADMIN — Medication 250 MILLIGRAM(S): at 21:01

## 2022-01-01 RX ADMIN — BUDESONIDE AND FORMOTEROL FUMARATE DIHYDRATE 2 PUFF(S): 160; 4.5 AEROSOL RESPIRATORY (INHALATION) at 07:14

## 2022-01-01 RX ADMIN — Medication 400 MILLIGRAM(S): at 12:13

## 2022-01-01 RX ADMIN — Medication 12.5 MILLIGRAM(S): at 18:50

## 2022-01-01 RX ADMIN — LIDOCAINE 1 PATCH: 4 CREAM TOPICAL at 01:05

## 2022-01-01 RX ADMIN — CHLORHEXIDINE GLUCONATE 1 APPLICATION(S): 213 SOLUTION TOPICAL at 06:50

## 2022-01-01 RX ADMIN — FAMOTIDINE 20 MILLIGRAM(S): 10 INJECTION INTRAVENOUS at 12:00

## 2022-01-01 RX ADMIN — BUDESONIDE AND FORMOTEROL FUMARATE DIHYDRATE 2 PUFF(S): 160; 4.5 AEROSOL RESPIRATORY (INHALATION) at 05:51

## 2022-01-01 RX ADMIN — CHLORHEXIDINE GLUCONATE 1 APPLICATION(S): 213 SOLUTION TOPICAL at 05:41

## 2022-01-01 RX ADMIN — Medication 20 MILLIEQUIVALENT(S): at 11:02

## 2022-01-01 RX ADMIN — CEFEPIME 100 MILLIGRAM(S): 1 INJECTION, POWDER, FOR SOLUTION INTRAMUSCULAR; INTRAVENOUS at 18:14

## 2022-01-01 RX ADMIN — GABAPENTIN 300 MILLIGRAM(S): 400 CAPSULE ORAL at 08:04

## 2022-01-01 RX ADMIN — APIXABAN 5 MILLIGRAM(S): 2.5 TABLET, FILM COATED ORAL at 06:16

## 2022-01-01 RX ADMIN — PANTOPRAZOLE SODIUM 40 MILLIGRAM(S): 20 TABLET, DELAYED RELEASE ORAL at 06:16

## 2022-01-01 RX ADMIN — Medication 650 MILLIGRAM(S): at 09:25

## 2022-01-01 RX ADMIN — MIDODRINE HYDROCHLORIDE 10 MILLIGRAM(S): 2.5 TABLET ORAL at 13:50

## 2022-01-01 RX ADMIN — OXYCODONE HYDROCHLORIDE 5 MILLIGRAM(S): 5 TABLET ORAL at 22:31

## 2022-01-01 RX ADMIN — ATORVASTATIN CALCIUM 10 MILLIGRAM(S): 80 TABLET, FILM COATED ORAL at 22:24

## 2022-01-01 RX ADMIN — Medication 40 MILLIEQUIVALENT(S): at 08:04

## 2022-01-01 RX ADMIN — DAPTOMYCIN 120 MILLIGRAM(S): 500 INJECTION, POWDER, LYOPHILIZED, FOR SOLUTION INTRAVENOUS at 07:46

## 2022-01-01 RX ADMIN — Medication 100 MILLIGRAM(S): at 23:20

## 2022-01-01 RX ADMIN — HEPARIN SODIUM 5000 UNIT(S): 5000 INJECTION INTRAVENOUS; SUBCUTANEOUS at 05:33

## 2022-01-01 RX ADMIN — Medication 10 MILLIGRAM(S): at 06:05

## 2022-01-01 RX ADMIN — FLUCONAZOLE 400 MILLIGRAM(S): 150 TABLET ORAL at 15:14

## 2022-01-01 RX ADMIN — Medication 400 MILLIGRAM(S): at 16:17

## 2022-01-01 RX ADMIN — Medication 81 MILLIGRAM(S): at 11:54

## 2022-01-01 RX ADMIN — Medication 50 MILLIGRAM(S): at 17:56

## 2022-01-01 RX ADMIN — Medication 650 MILLIGRAM(S): at 08:50

## 2022-01-01 RX ADMIN — Medication 650 MILLIGRAM(S): at 06:03

## 2022-01-01 RX ADMIN — SODIUM CHLORIDE 3 MILLILITER(S): 9 INJECTION INTRAMUSCULAR; INTRAVENOUS; SUBCUTANEOUS at 21:30

## 2022-01-01 RX ADMIN — FAMOTIDINE 20 MILLIGRAM(S): 10 INJECTION INTRAVENOUS at 11:04

## 2022-01-01 RX ADMIN — APIXABAN 5 MILLIGRAM(S): 2.5 TABLET, FILM COATED ORAL at 18:18

## 2022-01-01 RX ADMIN — Medication 650 MILLIGRAM(S): at 19:53

## 2022-01-01 RX ADMIN — TRAMADOL HYDROCHLORIDE 25 MILLIGRAM(S): 50 TABLET ORAL at 21:54

## 2022-01-01 RX ADMIN — CEFEPIME 100 MILLIGRAM(S): 1 INJECTION, POWDER, FOR SOLUTION INTRAMUSCULAR; INTRAVENOUS at 02:19

## 2022-01-01 RX ADMIN — OXYCODONE AND ACETAMINOPHEN 1 TABLET(S): 5; 325 TABLET ORAL at 17:00

## 2022-01-01 RX ADMIN — Medication 250 MILLIGRAM(S): at 11:47

## 2022-01-01 RX ADMIN — Medication 1 MILLIGRAM(S): at 11:20

## 2022-01-01 RX ADMIN — BUDESONIDE AND FORMOTEROL FUMARATE DIHYDRATE 2 PUFF(S): 160; 4.5 AEROSOL RESPIRATORY (INHALATION) at 18:15

## 2022-01-01 RX ADMIN — Medication 650 MILLIGRAM(S): at 21:05

## 2022-01-01 RX ADMIN — PANTOPRAZOLE SODIUM 40 MILLIGRAM(S): 20 TABLET, DELAYED RELEASE ORAL at 06:23

## 2022-01-01 RX ADMIN — SODIUM CHLORIDE 3 MILLILITER(S): 9 INJECTION INTRAMUSCULAR; INTRAVENOUS; SUBCUTANEOUS at 21:50

## 2022-01-01 RX ADMIN — ATORVASTATIN CALCIUM 10 MILLIGRAM(S): 80 TABLET, FILM COATED ORAL at 22:14

## 2022-01-01 RX ADMIN — BUDESONIDE AND FORMOTEROL FUMARATE DIHYDRATE 2 PUFF(S): 160; 4.5 AEROSOL RESPIRATORY (INHALATION) at 00:58

## 2022-01-01 RX ADMIN — FLUCONAZOLE 400 MILLIGRAM(S): 150 TABLET ORAL at 12:19

## 2022-01-01 RX ADMIN — Medication 125 MILLILITER(S): at 17:45

## 2022-01-01 RX ADMIN — Medication 100 MILLIGRAM(S): at 22:01

## 2022-01-01 RX ADMIN — GABAPENTIN 100 MILLIGRAM(S): 400 CAPSULE ORAL at 07:01

## 2022-01-01 RX ADMIN — Medication 12.5 MILLIGRAM(S): at 06:04

## 2022-01-01 RX ADMIN — Medication 250 MILLIGRAM(S): at 22:03

## 2022-01-01 RX ADMIN — Medication 1000 MILLIGRAM(S): at 12:30

## 2022-01-01 RX ADMIN — Medication 25 MICROGRAM(S): at 07:28

## 2022-01-01 RX ADMIN — LIDOCAINE 1 PATCH: 4 CREAM TOPICAL at 16:16

## 2022-01-01 RX ADMIN — SODIUM CHLORIDE 3 MILLILITER(S): 9 INJECTION INTRAMUSCULAR; INTRAVENOUS; SUBCUTANEOUS at 06:03

## 2022-01-01 RX ADMIN — CHLORHEXIDINE GLUCONATE 1 APPLICATION(S): 213 SOLUTION TOPICAL at 08:33

## 2022-01-01 RX ADMIN — OXYCODONE AND ACETAMINOPHEN 1 TABLET(S): 5; 325 TABLET ORAL at 16:44

## 2022-01-01 RX ADMIN — Medication 25 MILLIGRAM(S): at 17:17

## 2022-01-01 RX ADMIN — Medication 20 MILLIEQUIVALENT(S): at 13:07

## 2022-01-01 RX ADMIN — BUDESONIDE AND FORMOTEROL FUMARATE DIHYDRATE 2 PUFF(S): 160; 4.5 AEROSOL RESPIRATORY (INHALATION) at 18:44

## 2022-01-01 RX ADMIN — HEPARIN SODIUM 5000 UNIT(S): 5000 INJECTION INTRAVENOUS; SUBCUTANEOUS at 21:38

## 2022-01-01 RX ADMIN — FLUCONAZOLE 400 MILLIGRAM(S): 150 TABLET ORAL at 17:44

## 2022-01-01 RX ADMIN — LIDOCAINE 1 PATCH: 4 CREAM TOPICAL at 19:00

## 2022-01-01 RX ADMIN — CHLORHEXIDINE GLUCONATE 1 APPLICATION(S): 213 SOLUTION TOPICAL at 06:14

## 2022-01-01 RX ADMIN — OXYCODONE HYDROCHLORIDE 5 MILLIGRAM(S): 5 TABLET ORAL at 01:40

## 2022-01-01 RX ADMIN — Medication 50 MILLIGRAM(S): at 07:12

## 2022-01-01 RX ADMIN — APIXABAN 5 MILLIGRAM(S): 2.5 TABLET, FILM COATED ORAL at 05:43

## 2022-01-01 RX ADMIN — LIDOCAINE 1 PATCH: 4 CREAM TOPICAL at 18:02

## 2022-01-01 RX ADMIN — Medication 50 MILLIGRAM(S): at 09:46

## 2022-01-01 RX ADMIN — LIDOCAINE 1 PATCH: 4 CREAM TOPICAL at 23:49

## 2022-01-01 RX ADMIN — AMLODIPINE BESYLATE 2.5 MILLIGRAM(S): 2.5 TABLET ORAL at 07:14

## 2022-01-01 RX ADMIN — APIXABAN 5 MILLIGRAM(S): 2.5 TABLET, FILM COATED ORAL at 17:44

## 2022-01-01 RX ADMIN — TRAMADOL HYDROCHLORIDE 25 MILLIGRAM(S): 50 TABLET ORAL at 20:49

## 2022-01-01 RX ADMIN — Medication 650 MILLIGRAM(S): at 18:00

## 2022-01-01 RX ADMIN — Medication 50 MICROGRAM(S): at 05:15

## 2022-01-01 RX ADMIN — PANTOPRAZOLE SODIUM 40 MILLIGRAM(S): 20 TABLET, DELAYED RELEASE ORAL at 07:21

## 2022-01-01 RX ADMIN — LIDOCAINE 1 PATCH: 4 CREAM TOPICAL at 12:14

## 2022-01-01 RX ADMIN — SODIUM CHLORIDE 50 MILLILITER(S): 9 INJECTION, SOLUTION INTRAVENOUS at 21:49

## 2022-01-01 RX ADMIN — SENNA PLUS 2 TABLET(S): 8.6 TABLET ORAL at 21:56

## 2022-01-01 RX ADMIN — HEPARIN SODIUM 1100 UNIT(S)/HR: 5000 INJECTION INTRAVENOUS; SUBCUTANEOUS at 03:40

## 2022-01-01 RX ADMIN — APIXABAN 5 MILLIGRAM(S): 2.5 TABLET, FILM COATED ORAL at 05:41

## 2022-01-01 RX ADMIN — BUDESONIDE AND FORMOTEROL FUMARATE DIHYDRATE 2 PUFF(S): 160; 4.5 AEROSOL RESPIRATORY (INHALATION) at 18:50

## 2022-01-01 RX ADMIN — AMLODIPINE BESYLATE 2.5 MILLIGRAM(S): 2.5 TABLET ORAL at 06:07

## 2022-01-01 RX ADMIN — Medication 81 MILLIGRAM(S): at 20:47

## 2022-01-01 RX ADMIN — Medication 10 MILLIGRAM(S): at 22:42

## 2022-01-01 RX ADMIN — Medication 50 MILLIGRAM(S): at 17:29

## 2022-01-01 RX ADMIN — BUDESONIDE AND FORMOTEROL FUMARATE DIHYDRATE 2 PUFF(S): 160; 4.5 AEROSOL RESPIRATORY (INHALATION) at 05:27

## 2022-01-01 RX ADMIN — AMLODIPINE BESYLATE 2.5 MILLIGRAM(S): 2.5 TABLET ORAL at 05:15

## 2022-01-01 RX ADMIN — Medication 12.5 MILLIGRAM(S): at 05:33

## 2022-01-01 RX ADMIN — APIXABAN 5 MILLIGRAM(S): 2.5 TABLET, FILM COATED ORAL at 17:05

## 2022-01-01 RX ADMIN — SODIUM CHLORIDE 3 MILLILITER(S): 9 INJECTION INTRAMUSCULAR; INTRAVENOUS; SUBCUTANEOUS at 16:43

## 2022-01-01 RX ADMIN — SODIUM CHLORIDE 3 MILLILITER(S): 9 INJECTION INTRAMUSCULAR; INTRAVENOUS; SUBCUTANEOUS at 13:48

## 2022-01-01 RX ADMIN — Medication 40 MILLIEQUIVALENT(S): at 11:00

## 2022-01-01 RX ADMIN — ATORVASTATIN CALCIUM 10 MILLIGRAM(S): 80 TABLET, FILM COATED ORAL at 22:10

## 2022-01-01 RX ADMIN — OXYCODONE AND ACETAMINOPHEN 1 TABLET(S): 5; 325 TABLET ORAL at 11:16

## 2022-01-01 RX ADMIN — Medication 25 MICROGRAM(S): at 07:21

## 2022-01-01 RX ADMIN — Medication 400 MILLIGRAM(S): at 08:14

## 2022-01-01 RX ADMIN — CEFEPIME 100 MILLIGRAM(S): 1 INJECTION, POWDER, FOR SOLUTION INTRAMUSCULAR; INTRAVENOUS at 02:18

## 2022-01-01 RX ADMIN — OXYCODONE AND ACETAMINOPHEN 1 TABLET(S): 5; 325 TABLET ORAL at 16:31

## 2022-01-01 RX ADMIN — APIXABAN 5 MILLIGRAM(S): 2.5 TABLET, FILM COATED ORAL at 18:21

## 2022-01-01 RX ADMIN — Medication 50 MICROGRAM(S): at 06:23

## 2022-01-01 RX ADMIN — Medication 10 MILLIGRAM(S): at 21:55

## 2022-01-01 RX ADMIN — Medication 12.5 MILLIGRAM(S): at 05:27

## 2022-01-01 RX ADMIN — CEFEPIME 100 MILLIGRAM(S): 1 INJECTION, POWDER, FOR SOLUTION INTRAMUSCULAR; INTRAVENOUS at 07:20

## 2022-01-01 RX ADMIN — Medication 20 MILLIEQUIVALENT(S): at 12:40

## 2022-01-01 RX ADMIN — FAMOTIDINE 20 MILLIGRAM(S): 10 INJECTION INTRAVENOUS at 11:59

## 2022-01-01 RX ADMIN — SODIUM CHLORIDE 50 MILLILITER(S): 9 INJECTION, SOLUTION INTRAVENOUS at 06:11

## 2022-01-01 RX ADMIN — POLYETHYLENE GLYCOL 3350 17 GRAM(S): 17 POWDER, FOR SOLUTION ORAL at 11:39

## 2022-01-01 RX ADMIN — LIDOCAINE 1 PATCH: 4 CREAM TOPICAL at 12:11

## 2022-01-01 RX ADMIN — Medication 100 MILLIGRAM(S): at 20:49

## 2022-01-01 RX ADMIN — AMLODIPINE BESYLATE 2.5 MILLIGRAM(S): 2.5 TABLET ORAL at 05:48

## 2022-01-01 RX ADMIN — BUDESONIDE AND FORMOTEROL FUMARATE DIHYDRATE 2 PUFF(S): 160; 4.5 AEROSOL RESPIRATORY (INHALATION) at 18:21

## 2022-01-01 RX ADMIN — APIXABAN 5 MILLIGRAM(S): 2.5 TABLET, FILM COATED ORAL at 09:01

## 2022-01-01 RX ADMIN — DAPTOMYCIN 120 MILLIGRAM(S): 500 INJECTION, POWDER, LYOPHILIZED, FOR SOLUTION INTRAVENOUS at 11:05

## 2022-01-01 RX ADMIN — BUDESONIDE AND FORMOTEROL FUMARATE DIHYDRATE 2 PUFF(S): 160; 4.5 AEROSOL RESPIRATORY (INHALATION) at 11:18

## 2022-01-01 RX ADMIN — ATORVASTATIN CALCIUM 10 MILLIGRAM(S): 80 TABLET, FILM COATED ORAL at 21:29

## 2022-01-01 RX ADMIN — PANTOPRAZOLE SODIUM 40 MILLIGRAM(S): 20 TABLET, DELAYED RELEASE ORAL at 06:38

## 2022-01-01 RX ADMIN — Medication 650 MILLIGRAM(S): at 12:55

## 2022-01-01 RX ADMIN — Medication 10 MILLIGRAM(S): at 19:50

## 2022-01-01 RX ADMIN — Medication 250 MILLIGRAM(S): at 23:16

## 2022-01-01 RX ADMIN — FENTANYL CITRATE 25 MICROGRAM(S): 50 INJECTION INTRAVENOUS at 13:15

## 2022-01-01 RX ADMIN — Medication 50 MILLIGRAM(S): at 17:47

## 2022-01-01 RX ADMIN — Medication 81 MILLIGRAM(S): at 15:08

## 2022-01-01 RX ADMIN — OXYCODONE HYDROCHLORIDE 5 MILLIGRAM(S): 5 TABLET ORAL at 10:29

## 2022-01-01 RX ADMIN — HEPARIN SODIUM 12 UNIT(S)/HR: 5000 INJECTION INTRAVENOUS; SUBCUTANEOUS at 14:25

## 2022-01-01 RX ADMIN — BUDESONIDE AND FORMOTEROL FUMARATE DIHYDRATE 2 PUFF(S): 160; 4.5 AEROSOL RESPIRATORY (INHALATION) at 20:48

## 2022-01-01 RX ADMIN — ATORVASTATIN CALCIUM 10 MILLIGRAM(S): 80 TABLET, FILM COATED ORAL at 21:56

## 2022-01-01 RX ADMIN — Medication 400 MILLIGRAM(S): at 08:00

## 2022-01-01 RX ADMIN — Medication 40 MILLIEQUIVALENT(S): at 06:24

## 2022-01-01 RX ADMIN — Medication 4 MILLIGRAM(S): at 07:00

## 2022-01-01 RX ADMIN — SODIUM CHLORIDE 3 MILLILITER(S): 9 INJECTION INTRAMUSCULAR; INTRAVENOUS; SUBCUTANEOUS at 14:11

## 2022-01-01 RX ADMIN — Medication 50 MICROGRAM(S): at 07:20

## 2022-01-01 RX ADMIN — Medication 650 MILLIGRAM(S): at 00:03

## 2022-01-01 RX ADMIN — Medication 250 MILLIGRAM(S): at 18:22

## 2022-01-01 RX ADMIN — PHENYLEPHRINE HYDROCHLORIDE 23 MICROGRAM(S)/KG/MIN: 10 INJECTION INTRAVENOUS at 09:05

## 2022-01-01 RX ADMIN — FENTANYL CITRATE 12.5 MICROGRAM(S): 50 INJECTION INTRAVENOUS at 00:26

## 2022-01-01 RX ADMIN — LIDOCAINE 1 PATCH: 4 CREAM TOPICAL at 22:18

## 2022-01-01 RX ADMIN — BUDESONIDE AND FORMOTEROL FUMARATE DIHYDRATE 2 PUFF(S): 160; 4.5 AEROSOL RESPIRATORY (INHALATION) at 17:23

## 2022-01-01 RX ADMIN — DAPTOMYCIN 120 MILLIGRAM(S): 500 INJECTION, POWDER, LYOPHILIZED, FOR SOLUTION INTRAVENOUS at 11:30

## 2022-01-01 RX ADMIN — LIDOCAINE 1 PATCH: 4 CREAM TOPICAL at 19:03

## 2022-01-01 RX ADMIN — Medication 20 MILLIGRAM(S): at 07:12

## 2022-01-01 RX ADMIN — Medication 10 MILLIGRAM(S): at 23:21

## 2022-01-01 RX ADMIN — Medication 10 MILLIGRAM(S): at 06:47

## 2022-01-01 RX ADMIN — GABAPENTIN 300 MILLIGRAM(S): 400 CAPSULE ORAL at 12:26

## 2022-01-01 RX ADMIN — DAPTOMYCIN 120 MILLIGRAM(S): 500 INJECTION, POWDER, LYOPHILIZED, FOR SOLUTION INTRAVENOUS at 06:32

## 2022-01-01 RX ADMIN — Medication 50 MILLIGRAM(S): at 05:49

## 2022-01-01 RX ADMIN — Medication 20 MILLIGRAM(S): at 06:12

## 2022-01-01 RX ADMIN — SENNA PLUS 2 TABLET(S): 8.6 TABLET ORAL at 21:17

## 2022-01-01 RX ADMIN — GABAPENTIN 100 MILLIGRAM(S): 400 CAPSULE ORAL at 21:55

## 2022-01-01 RX ADMIN — Medication 200 MILLIGRAM(S): at 22:29

## 2022-01-01 RX ADMIN — Medication 10 MILLIGRAM(S): at 22:32

## 2022-01-01 RX ADMIN — ATORVASTATIN CALCIUM 10 MILLIGRAM(S): 80 TABLET, FILM COATED ORAL at 22:42

## 2022-01-01 RX ADMIN — Medication 50 MICROGRAM(S): at 06:48

## 2022-01-01 RX ADMIN — TRAMADOL HYDROCHLORIDE 50 MILLIGRAM(S): 50 TABLET ORAL at 18:10

## 2022-01-01 RX ADMIN — HEPARIN SODIUM 5000 UNIT(S): 5000 INJECTION INTRAVENOUS; SUBCUTANEOUS at 21:39

## 2022-01-01 RX ADMIN — ERTAPENEM SODIUM 120 MILLIGRAM(S): 1 INJECTION, POWDER, LYOPHILIZED, FOR SOLUTION INTRAMUSCULAR; INTRAVENOUS at 21:28

## 2022-01-01 RX ADMIN — Medication 650 MILLIGRAM(S): at 17:46

## 2022-01-01 RX ADMIN — Medication 650 MILLIGRAM(S): at 12:54

## 2022-01-01 RX ADMIN — PANTOPRAZOLE SODIUM 40 MILLIGRAM(S): 20 TABLET, DELAYED RELEASE ORAL at 06:04

## 2022-01-01 RX ADMIN — LIDOCAINE 1 PATCH: 4 CREAM TOPICAL at 20:13

## 2022-01-01 RX ADMIN — Medication 100 MILLIGRAM(S): at 06:01

## 2022-01-01 RX ADMIN — Medication 50 MILLIGRAM(S): at 05:35

## 2022-01-01 RX ADMIN — LIDOCAINE 1 PATCH: 4 CREAM TOPICAL at 19:25

## 2022-01-01 RX ADMIN — Medication 10 MILLIGRAM(S): at 02:43

## 2022-01-01 RX ADMIN — Medication 40 MILLIEQUIVALENT(S): at 09:09

## 2022-01-01 RX ADMIN — Medication 1000 MILLIGRAM(S): at 16:48

## 2022-01-01 RX ADMIN — BUDESONIDE AND FORMOTEROL FUMARATE DIHYDRATE 2 PUFF(S): 160; 4.5 AEROSOL RESPIRATORY (INHALATION) at 17:32

## 2022-01-01 RX ADMIN — PANTOPRAZOLE SODIUM 40 MILLIGRAM(S): 20 TABLET, DELAYED RELEASE ORAL at 07:28

## 2022-01-01 RX ADMIN — BUDESONIDE AND FORMOTEROL FUMARATE DIHYDRATE 2 PUFF(S): 160; 4.5 AEROSOL RESPIRATORY (INHALATION) at 08:54

## 2022-01-01 RX ADMIN — Medication 50 MILLIGRAM(S): at 21:42

## 2022-01-01 RX ADMIN — Medication 50 MICROGRAM(S): at 05:13

## 2022-01-01 RX ADMIN — LIDOCAINE 1 PATCH: 4 CREAM TOPICAL at 17:27

## 2022-01-01 RX ADMIN — Medication 25 MILLIGRAM(S): at 06:23

## 2022-01-01 RX ADMIN — Medication 1000 MILLIGRAM(S): at 08:15

## 2022-01-01 RX ADMIN — Medication 250 MILLIGRAM(S): at 06:26

## 2022-01-01 RX ADMIN — DAPTOMYCIN 120 MILLIGRAM(S): 500 INJECTION, POWDER, LYOPHILIZED, FOR SOLUTION INTRAVENOUS at 07:41

## 2022-01-01 RX ADMIN — CHLORHEXIDINE GLUCONATE 1 APPLICATION(S): 213 SOLUTION TOPICAL at 06:52

## 2022-01-01 RX ADMIN — CHLORHEXIDINE GLUCONATE 1 APPLICATION(S): 213 SOLUTION TOPICAL at 05:33

## 2022-01-01 RX ADMIN — GABAPENTIN 100 MILLIGRAM(S): 400 CAPSULE ORAL at 21:30

## 2022-01-01 RX ADMIN — SODIUM CHLORIDE 100 MILLILITER(S): 9 INJECTION, SOLUTION INTRAVENOUS at 22:26

## 2022-01-01 RX ADMIN — SODIUM CHLORIDE 3 MILLILITER(S): 9 INJECTION INTRAMUSCULAR; INTRAVENOUS; SUBCUTANEOUS at 06:21

## 2022-01-01 RX ADMIN — HEPARIN SODIUM 5000 UNIT(S): 5000 INJECTION INTRAVENOUS; SUBCUTANEOUS at 22:02

## 2022-01-01 RX ADMIN — ATORVASTATIN CALCIUM 10 MILLIGRAM(S): 80 TABLET, FILM COATED ORAL at 21:54

## 2022-01-01 RX ADMIN — Medication 650 MILLIGRAM(S): at 05:35

## 2022-01-01 RX ADMIN — Medication 75 MILLIGRAM(S): at 23:54

## 2022-01-01 RX ADMIN — MIDODRINE HYDROCHLORIDE 10 MILLIGRAM(S): 2.5 TABLET ORAL at 21:07

## 2022-01-01 RX ADMIN — BUDESONIDE AND FORMOTEROL FUMARATE DIHYDRATE 2 PUFF(S): 160; 4.5 AEROSOL RESPIRATORY (INHALATION) at 17:36

## 2022-01-01 RX ADMIN — Medication 40 MILLIGRAM(S): at 07:02

## 2022-01-01 RX ADMIN — BUDESONIDE AND FORMOTEROL FUMARATE DIHYDRATE 2 PUFF(S): 160; 4.5 AEROSOL RESPIRATORY (INHALATION) at 05:17

## 2022-01-01 RX ADMIN — HEPARIN SODIUM 5000 UNIT(S): 5000 INJECTION INTRAVENOUS; SUBCUTANEOUS at 06:08

## 2022-01-01 RX ADMIN — Medication 81 MILLIGRAM(S): at 11:47

## 2022-01-01 RX ADMIN — Medication 10 MILLIGRAM(S): at 05:44

## 2022-01-01 RX ADMIN — Medication 4 MILLIGRAM(S): at 11:17

## 2022-01-01 RX ADMIN — Medication 50 MILLIGRAM(S): at 05:16

## 2022-01-01 RX ADMIN — Medication 25 MILLIGRAM(S): at 06:16

## 2022-01-01 RX ADMIN — Medication 10 MILLIGRAM(S): at 07:05

## 2022-01-01 RX ADMIN — TRAMADOL HYDROCHLORIDE 25 MILLIGRAM(S): 50 TABLET ORAL at 15:17

## 2022-01-01 RX ADMIN — Medication 50 MICROGRAM(S): at 07:46

## 2022-01-01 RX ADMIN — LIDOCAINE 1 PATCH: 4 CREAM TOPICAL at 18:46

## 2022-01-01 RX ADMIN — Medication 50 MICROGRAM(S): at 05:44

## 2022-01-01 RX ADMIN — APIXABAN 5 MILLIGRAM(S): 2.5 TABLET, FILM COATED ORAL at 06:14

## 2022-01-01 RX ADMIN — Medication 5 MILLIGRAM(S): at 15:42

## 2022-01-01 RX ADMIN — FLUCONAZOLE 400 MILLIGRAM(S): 150 TABLET ORAL at 11:16

## 2022-01-01 RX ADMIN — CEFEPIME 100 MILLIGRAM(S): 1 INJECTION, POWDER, FOR SOLUTION INTRAMUSCULAR; INTRAVENOUS at 18:50

## 2022-01-01 RX ADMIN — CEFEPIME 100 MILLIGRAM(S): 1 INJECTION, POWDER, FOR SOLUTION INTRAMUSCULAR; INTRAVENOUS at 14:21

## 2022-01-01 RX ADMIN — ATORVASTATIN CALCIUM 10 MILLIGRAM(S): 80 TABLET, FILM COATED ORAL at 22:38

## 2022-01-01 RX ADMIN — CHLORHEXIDINE GLUCONATE 1 APPLICATION(S): 213 SOLUTION TOPICAL at 05:48

## 2022-01-01 RX ADMIN — Medication 650 MILLIGRAM(S): at 11:27

## 2022-01-01 RX ADMIN — Medication 10 MILLIGRAM(S): at 13:04

## 2022-01-01 RX ADMIN — ATORVASTATIN CALCIUM 10 MILLIGRAM(S): 80 TABLET, FILM COATED ORAL at 21:35

## 2022-01-01 RX ADMIN — PIPERACILLIN AND TAZOBACTAM 25 GRAM(S): 4; .5 INJECTION, POWDER, LYOPHILIZED, FOR SOLUTION INTRAVENOUS at 09:34

## 2022-01-01 RX ADMIN — CEFEPIME 100 MILLIGRAM(S): 1 INJECTION, POWDER, FOR SOLUTION INTRAMUSCULAR; INTRAVENOUS at 13:19

## 2022-01-01 RX ADMIN — ATORVASTATIN CALCIUM 10 MILLIGRAM(S): 80 TABLET, FILM COATED ORAL at 22:06

## 2022-01-01 RX ADMIN — OXYCODONE HYDROCHLORIDE 5 MILLIGRAM(S): 5 TABLET ORAL at 09:25

## 2022-01-01 RX ADMIN — LIDOCAINE 1 PATCH: 4 CREAM TOPICAL at 17:23

## 2022-01-01 RX ADMIN — Medication 81 MILLIGRAM(S): at 11:20

## 2022-01-01 RX ADMIN — Medication 10 MILLIGRAM(S): at 22:01

## 2022-01-01 RX ADMIN — LIDOCAINE 1 PATCH: 4 CREAM TOPICAL at 19:38

## 2022-01-01 RX ADMIN — Medication 125 MILLILITER(S): at 13:00

## 2022-01-01 RX ADMIN — FAMOTIDINE 20 MILLIGRAM(S): 10 INJECTION INTRAVENOUS at 11:23

## 2022-01-01 RX ADMIN — DAPTOMYCIN 116 MILLIGRAM(S): 500 INJECTION, POWDER, LYOPHILIZED, FOR SOLUTION INTRAVENOUS at 21:22

## 2022-01-01 RX ADMIN — PIPERACILLIN AND TAZOBACTAM 200 GRAM(S): 4; .5 INJECTION, POWDER, LYOPHILIZED, FOR SOLUTION INTRAVENOUS at 21:46

## 2022-01-01 RX ADMIN — Medication 20 MILLIEQUIVALENT(S): at 05:12

## 2022-01-01 RX ADMIN — CEFEPIME 100 MILLIGRAM(S): 1 INJECTION, POWDER, FOR SOLUTION INTRAMUSCULAR; INTRAVENOUS at 14:00

## 2022-01-01 RX ADMIN — Medication 40 MILLIEQUIVALENT(S): at 23:28

## 2022-01-01 RX ADMIN — Medication 12.5 MILLIGRAM(S): at 17:29

## 2022-01-01 RX ADMIN — Medication 1 MILLIGRAM(S): at 11:15

## 2022-01-01 RX ADMIN — SENNA PLUS 2 TABLET(S): 8.6 TABLET ORAL at 21:38

## 2022-01-01 RX ADMIN — MAGNESIUM OXIDE 400 MG ORAL TABLET 400 MILLIGRAM(S): 241.3 TABLET ORAL at 11:39

## 2022-01-01 RX ADMIN — FLUCONAZOLE 400 MILLIGRAM(S): 150 TABLET ORAL at 12:30

## 2022-01-01 RX ADMIN — Medication 200 MILLIGRAM(S): at 14:22

## 2022-01-01 RX ADMIN — Medication 650 MILLIGRAM(S): at 12:02

## 2022-01-01 RX ADMIN — Medication 400 MILLIGRAM(S): at 02:11

## 2022-01-01 RX ADMIN — SODIUM CHLORIDE 1000 MILLILITER(S): 9 INJECTION INTRAMUSCULAR; INTRAVENOUS; SUBCUTANEOUS at 00:22

## 2022-01-01 RX ADMIN — ATORVASTATIN CALCIUM 10 MILLIGRAM(S): 80 TABLET, FILM COATED ORAL at 20:49

## 2022-01-01 RX ADMIN — SENNA PLUS 2 TABLET(S): 8.6 TABLET ORAL at 21:42

## 2022-01-01 RX ADMIN — Medication 650 MILLIGRAM(S): at 03:00

## 2022-01-01 RX ADMIN — FLUDROCORTISONE ACETATE 0.1 MILLIGRAM(S): 0.1 TABLET ORAL at 06:49

## 2022-01-01 RX ADMIN — HEPARIN SODIUM 5000 UNIT(S): 5000 INJECTION INTRAVENOUS; SUBCUTANEOUS at 14:42

## 2022-01-01 RX ADMIN — MAGNESIUM OXIDE 400 MG ORAL TABLET 400 MILLIGRAM(S): 241.3 TABLET ORAL at 08:42

## 2022-01-01 RX ADMIN — Medication 50 MICROGRAM(S): at 06:50

## 2022-01-01 RX ADMIN — FENTANYL CITRATE 25 MICROGRAM(S): 50 INJECTION INTRAVENOUS at 12:46

## 2022-01-01 RX ADMIN — SODIUM CHLORIDE 3 MILLILITER(S): 9 INJECTION INTRAMUSCULAR; INTRAVENOUS; SUBCUTANEOUS at 05:25

## 2022-01-01 RX ADMIN — Medication 81 MILLIGRAM(S): at 11:08

## 2022-01-01 RX ADMIN — SODIUM CHLORIDE 3 MILLILITER(S): 9 INJECTION INTRAMUSCULAR; INTRAVENOUS; SUBCUTANEOUS at 22:18

## 2022-01-01 RX ADMIN — BUDESONIDE AND FORMOTEROL FUMARATE DIHYDRATE 2 PUFF(S): 160; 4.5 AEROSOL RESPIRATORY (INHALATION) at 06:12

## 2022-01-01 RX ADMIN — CHLORHEXIDINE GLUCONATE 5 MILLILITER(S): 213 SOLUTION TOPICAL at 05:54

## 2022-01-01 RX ADMIN — MAGNESIUM OXIDE 400 MG ORAL TABLET 400 MILLIGRAM(S): 241.3 TABLET ORAL at 16:35

## 2022-01-01 RX ADMIN — FAMOTIDINE 20 MILLIGRAM(S): 10 INJECTION INTRAVENOUS at 20:47

## 2022-01-01 RX ADMIN — Medication 81 MILLIGRAM(S): at 12:27

## 2022-01-01 RX ADMIN — Medication 100 MILLIGRAM(S): at 06:22

## 2022-01-01 RX ADMIN — Medication 12.5 MILLIGRAM(S): at 17:11

## 2022-01-01 RX ADMIN — FENTANYL CITRATE 25 MICROGRAM(S): 50 INJECTION INTRAVENOUS at 20:00

## 2022-01-01 RX ADMIN — Medication 10 MILLIGRAM(S): at 05:47

## 2022-01-01 RX ADMIN — OXYCODONE HYDROCHLORIDE 5 MILLIGRAM(S): 5 TABLET ORAL at 18:39

## 2022-01-01 RX ADMIN — Medication 10 MILLIGRAM(S): at 05:12

## 2022-01-01 RX ADMIN — Medication 125 MILLILITER(S): at 20:05

## 2022-01-01 RX ADMIN — PANTOPRAZOLE SODIUM 40 MILLIGRAM(S): 20 TABLET, DELAYED RELEASE ORAL at 06:49

## 2022-01-01 RX ADMIN — LIDOCAINE 1 PATCH: 4 CREAM TOPICAL at 17:34

## 2022-01-01 RX ADMIN — Medication 12.5 MILLIGRAM(S): at 18:56

## 2022-01-01 RX ADMIN — BUDESONIDE AND FORMOTEROL FUMARATE DIHYDRATE 2 PUFF(S): 160; 4.5 AEROSOL RESPIRATORY (INHALATION) at 07:11

## 2022-01-01 RX ADMIN — BUDESONIDE AND FORMOTEROL FUMARATE DIHYDRATE 2 PUFF(S): 160; 4.5 AEROSOL RESPIRATORY (INHALATION) at 17:57

## 2022-01-01 RX ADMIN — Medication 4 MILLIGRAM(S): at 18:28

## 2022-01-01 RX ADMIN — Medication 400 MILLIGRAM(S): at 19:13

## 2022-01-01 RX ADMIN — Medication 81 MILLIGRAM(S): at 13:30

## 2022-01-01 RX ADMIN — Medication 40 MILLIGRAM(S): at 06:36

## 2022-01-01 RX ADMIN — Medication 20 MILLIGRAM(S): at 06:23

## 2022-01-01 RX ADMIN — AMLODIPINE BESYLATE 2.5 MILLIGRAM(S): 2.5 TABLET ORAL at 05:50

## 2022-01-01 RX ADMIN — FENTANYL CITRATE 25 MICROGRAM(S): 50 INJECTION INTRAVENOUS at 16:51

## 2022-01-01 RX ADMIN — APIXABAN 5 MILLIGRAM(S): 2.5 TABLET, FILM COATED ORAL at 17:07

## 2022-01-01 RX ADMIN — ATORVASTATIN CALCIUM 10 MILLIGRAM(S): 80 TABLET, FILM COATED ORAL at 22:31

## 2022-01-01 RX ADMIN — BUDESONIDE AND FORMOTEROL FUMARATE DIHYDRATE 2 PUFF(S): 160; 4.5 AEROSOL RESPIRATORY (INHALATION) at 22:38

## 2022-01-01 RX ADMIN — ATORVASTATIN CALCIUM 10 MILLIGRAM(S): 80 TABLET, FILM COATED ORAL at 21:05

## 2022-01-01 RX ADMIN — Medication 10 MILLIEQUIVALENT(S): at 10:03

## 2022-01-01 RX ADMIN — Medication 40 MILLIGRAM(S): at 06:55

## 2022-01-01 RX ADMIN — SODIUM CHLORIDE 3 MILLILITER(S): 9 INJECTION INTRAMUSCULAR; INTRAVENOUS; SUBCUTANEOUS at 14:25

## 2022-01-01 RX ADMIN — FLUCONAZOLE 400 MILLIGRAM(S): 150 TABLET ORAL at 11:59

## 2022-01-01 RX ADMIN — PIPERACILLIN AND TAZOBACTAM 200 GRAM(S): 4; .5 INJECTION, POWDER, LYOPHILIZED, FOR SOLUTION INTRAVENOUS at 18:36

## 2022-01-01 RX ADMIN — LACTULOSE 20 GRAM(S): 10 SOLUTION ORAL at 14:19

## 2022-01-01 RX ADMIN — CEFEPIME 100 MILLIGRAM(S): 1 INJECTION, POWDER, FOR SOLUTION INTRAMUSCULAR; INTRAVENOUS at 14:26

## 2022-01-01 RX ADMIN — Medication 4 MILLIGRAM(S): at 11:49

## 2022-01-01 RX ADMIN — Medication 12.5 MILLIGRAM(S): at 17:31

## 2022-01-01 RX ADMIN — Medication 650 MILLIGRAM(S): at 21:45

## 2022-01-01 RX ADMIN — LIDOCAINE 1 PATCH: 4 CREAM TOPICAL at 18:22

## 2022-01-01 RX ADMIN — HEPARIN SODIUM 5000 UNIT(S): 5000 INJECTION INTRAVENOUS; SUBCUTANEOUS at 14:56

## 2022-01-01 RX ADMIN — GABAPENTIN 100 MILLIGRAM(S): 400 CAPSULE ORAL at 22:05

## 2022-01-01 RX ADMIN — Medication 15 MILLIGRAM(S): at 07:20

## 2022-01-01 RX ADMIN — OXYCODONE HYDROCHLORIDE 5 MILLIGRAM(S): 5 TABLET ORAL at 17:09

## 2022-01-01 RX ADMIN — Medication 10 MILLIGRAM(S): at 21:42

## 2022-01-01 RX ADMIN — Medication 10 MILLIGRAM(S): at 13:01

## 2022-01-01 RX ADMIN — PIPERACILLIN AND TAZOBACTAM 200 GRAM(S): 4; .5 INJECTION, POWDER, LYOPHILIZED, FOR SOLUTION INTRAVENOUS at 04:55

## 2022-01-01 RX ADMIN — HEPARIN SODIUM 11 UNIT(S)/HR: 5000 INJECTION INTRAVENOUS; SUBCUTANEOUS at 10:24

## 2022-01-01 RX ADMIN — Medication 10 MILLIEQUIVALENT(S): at 13:04

## 2022-01-01 RX ADMIN — Medication 25 MICROGRAM(S): at 06:50

## 2022-01-01 RX ADMIN — LIDOCAINE 1 PATCH: 4 CREAM TOPICAL at 00:00

## 2022-01-01 RX ADMIN — Medication 650 MILLIGRAM(S): at 12:10

## 2022-01-01 RX ADMIN — OXYCODONE HYDROCHLORIDE 5 MILLIGRAM(S): 5 TABLET ORAL at 13:30

## 2022-01-01 RX ADMIN — AMLODIPINE BESYLATE 2.5 MILLIGRAM(S): 2.5 TABLET ORAL at 06:48

## 2022-01-01 RX ADMIN — SODIUM CHLORIDE 3 MILLILITER(S): 9 INJECTION INTRAMUSCULAR; INTRAVENOUS; SUBCUTANEOUS at 13:58

## 2022-01-01 RX ADMIN — Medication 100 MILLIGRAM(S): at 07:21

## 2022-01-01 RX ADMIN — Medication 500 MILLIGRAM(S): at 08:45

## 2022-01-01 RX ADMIN — BUDESONIDE AND FORMOTEROL FUMARATE DIHYDRATE 2 PUFF(S): 160; 4.5 AEROSOL RESPIRATORY (INHALATION) at 06:06

## 2022-01-01 RX ADMIN — SENNA PLUS 2 TABLET(S): 8.6 TABLET ORAL at 21:13

## 2022-01-01 RX ADMIN — PANTOPRAZOLE SODIUM 40 MILLIGRAM(S): 20 TABLET, DELAYED RELEASE ORAL at 07:20

## 2022-01-01 RX ADMIN — APIXABAN 5 MILLIGRAM(S): 2.5 TABLET, FILM COATED ORAL at 09:40

## 2022-01-01 RX ADMIN — POLYETHYLENE GLYCOL 3350 17 GRAM(S): 17 POWDER, FOR SOLUTION ORAL at 11:28

## 2022-01-01 RX ADMIN — Medication 50 MICROGRAM(S): at 05:42

## 2022-01-01 RX ADMIN — Medication 650 MILLIGRAM(S): at 11:41

## 2022-01-01 RX ADMIN — Medication 650 MILLIGRAM(S): at 12:06

## 2022-01-01 RX ADMIN — Medication 650 MILLIGRAM(S): at 19:50

## 2022-01-01 RX ADMIN — Medication 10 MILLIGRAM(S): at 13:20

## 2022-01-01 RX ADMIN — Medication 30 MILLIGRAM(S): at 20:40

## 2022-01-01 RX ADMIN — ATORVASTATIN CALCIUM 10 MILLIGRAM(S): 80 TABLET, FILM COATED ORAL at 21:39

## 2022-01-01 RX ADMIN — Medication 10 MILLIGRAM(S): at 20:42

## 2022-01-01 RX ADMIN — AMLODIPINE BESYLATE 2.5 MILLIGRAM(S): 2.5 TABLET ORAL at 18:43

## 2022-01-01 RX ADMIN — SODIUM CHLORIDE 3 MILLILITER(S): 9 INJECTION INTRAMUSCULAR; INTRAVENOUS; SUBCUTANEOUS at 21:48

## 2022-01-01 RX ADMIN — ATORVASTATIN CALCIUM 10 MILLIGRAM(S): 80 TABLET, FILM COATED ORAL at 21:48

## 2022-01-01 RX ADMIN — Medication 81 MILLIGRAM(S): at 11:32

## 2022-01-01 RX ADMIN — PANTOPRAZOLE SODIUM 40 MILLIGRAM(S): 20 TABLET, DELAYED RELEASE ORAL at 06:48

## 2022-01-01 RX ADMIN — FENTANYL CITRATE 25 MICROGRAM(S): 50 INJECTION INTRAVENOUS at 23:20

## 2022-01-01 RX ADMIN — Medication 650 MILLIGRAM(S): at 14:05

## 2022-01-01 RX ADMIN — BUDESONIDE AND FORMOTEROL FUMARATE DIHYDRATE 2 PUFF(S): 160; 4.5 AEROSOL RESPIRATORY (INHALATION) at 07:50

## 2022-01-01 RX ADMIN — SENNA PLUS 2 TABLET(S): 8.6 TABLET ORAL at 22:04

## 2022-01-01 RX ADMIN — BUDESONIDE AND FORMOTEROL FUMARATE DIHYDRATE 2 PUFF(S): 160; 4.5 AEROSOL RESPIRATORY (INHALATION) at 21:30

## 2022-01-01 RX ADMIN — AMLODIPINE BESYLATE 2.5 MILLIGRAM(S): 2.5 TABLET ORAL at 06:23

## 2022-01-01 RX ADMIN — HEPARIN SODIUM 6 UNIT(S)/HR: 5000 INJECTION INTRAVENOUS; SUBCUTANEOUS at 09:01

## 2022-01-01 RX ADMIN — LIDOCAINE 1 PATCH: 4 CREAM TOPICAL at 10:56

## 2022-01-01 RX ADMIN — DAPTOMYCIN 120 MILLIGRAM(S): 500 INJECTION, POWDER, LYOPHILIZED, FOR SOLUTION INTRAVENOUS at 06:58

## 2022-01-01 RX ADMIN — LIDOCAINE 1 PATCH: 4 CREAM TOPICAL at 11:59

## 2022-01-01 RX ADMIN — APIXABAN 5 MILLIGRAM(S): 2.5 TABLET, FILM COATED ORAL at 06:04

## 2022-01-01 RX ADMIN — TRAMADOL HYDROCHLORIDE 25 MILLIGRAM(S): 50 TABLET ORAL at 05:48

## 2022-01-01 RX ADMIN — Medication 4 MILLIGRAM(S): at 15:08

## 2022-01-01 RX ADMIN — HEPARIN SODIUM 5000 UNIT(S): 5000 INJECTION INTRAVENOUS; SUBCUTANEOUS at 17:33

## 2022-01-01 RX ADMIN — CEFEPIME 100 MILLIGRAM(S): 1 INJECTION, POWDER, FOR SOLUTION INTRAMUSCULAR; INTRAVENOUS at 02:49

## 2022-01-01 RX ADMIN — Medication 50 MILLIGRAM(S): at 20:48

## 2022-01-01 RX ADMIN — TRAMADOL HYDROCHLORIDE 25 MILLIGRAM(S): 50 TABLET ORAL at 14:09

## 2022-01-01 RX ADMIN — BUDESONIDE AND FORMOTEROL FUMARATE DIHYDRATE 2 PUFF(S): 160; 4.5 AEROSOL RESPIRATORY (INHALATION) at 18:20

## 2022-01-01 RX ADMIN — Medication 50 MICROGRAM(S): at 07:11

## 2022-01-01 RX ADMIN — BUDESONIDE AND FORMOTEROL FUMARATE DIHYDRATE 2 PUFF(S): 160; 4.5 AEROSOL RESPIRATORY (INHALATION) at 06:07

## 2022-01-01 RX ADMIN — Medication 40 MILLIGRAM(S): at 05:49

## 2022-01-01 RX ADMIN — Medication 81 MILLIGRAM(S): at 11:23

## 2022-01-01 RX ADMIN — FENTANYL CITRATE 25 MICROGRAM(S): 50 INJECTION INTRAVENOUS at 22:55

## 2022-01-01 RX ADMIN — Medication 12.5 MILLIGRAM(S): at 06:35

## 2022-01-01 RX ADMIN — Medication 650 MILLIGRAM(S): at 06:59

## 2022-01-01 RX ADMIN — Medication 12.5 MILLIGRAM(S): at 17:36

## 2022-01-01 RX ADMIN — BUDESONIDE AND FORMOTEROL FUMARATE DIHYDRATE 2 PUFF(S): 160; 4.5 AEROSOL RESPIRATORY (INHALATION) at 06:09

## 2022-01-01 RX ADMIN — Medication 20 MILLIGRAM(S): at 05:15

## 2022-01-01 RX ADMIN — OXYCODONE AND ACETAMINOPHEN 1 TABLET(S): 5; 325 TABLET ORAL at 21:07

## 2022-01-01 RX ADMIN — ATORVASTATIN CALCIUM 10 MILLIGRAM(S): 80 TABLET, FILM COATED ORAL at 21:46

## 2022-01-01 RX ADMIN — OXYCODONE HYDROCHLORIDE 5 MILLIGRAM(S): 5 TABLET ORAL at 14:31

## 2022-01-01 RX ADMIN — LIDOCAINE 1 PATCH: 4 CREAM TOPICAL at 00:56

## 2022-01-01 RX ADMIN — Medication 650 MILLIGRAM(S): at 18:09

## 2022-01-01 RX ADMIN — Medication 975 MILLIGRAM(S): at 17:44

## 2022-01-01 RX ADMIN — BUDESONIDE AND FORMOTEROL FUMARATE DIHYDRATE 2 PUFF(S): 160; 4.5 AEROSOL RESPIRATORY (INHALATION) at 22:29

## 2022-01-01 RX ADMIN — Medication 50 MILLIGRAM(S): at 18:15

## 2022-01-01 RX ADMIN — PANTOPRAZOLE SODIUM 40 MILLIGRAM(S): 20 TABLET, DELAYED RELEASE ORAL at 07:03

## 2022-01-01 RX ADMIN — BUDESONIDE AND FORMOTEROL FUMARATE DIHYDRATE 2 PUFF(S): 160; 4.5 AEROSOL RESPIRATORY (INHALATION) at 23:18

## 2022-01-01 RX ADMIN — TRAMADOL HYDROCHLORIDE 25 MILLIGRAM(S): 50 TABLET ORAL at 20:48

## 2022-01-01 RX ADMIN — Medication 650 MILLIGRAM(S): at 17:06

## 2022-01-01 RX ADMIN — OXYCODONE AND ACETAMINOPHEN 1 TABLET(S): 5; 325 TABLET ORAL at 09:37

## 2022-01-01 RX ADMIN — SODIUM CHLORIDE 3 MILLILITER(S): 9 INJECTION INTRAMUSCULAR; INTRAVENOUS; SUBCUTANEOUS at 05:15

## 2022-01-01 RX ADMIN — CEFEPIME 100 MILLIGRAM(S): 1 INJECTION, POWDER, FOR SOLUTION INTRAMUSCULAR; INTRAVENOUS at 13:37

## 2022-01-01 RX ADMIN — Medication 10 MILLIGRAM(S): at 16:28

## 2022-01-01 RX ADMIN — BUDESONIDE AND FORMOTEROL FUMARATE DIHYDRATE 2 PUFF(S): 160; 4.5 AEROSOL RESPIRATORY (INHALATION) at 18:24

## 2022-01-01 RX ADMIN — CEFEPIME 100 MILLIGRAM(S): 1 INJECTION, POWDER, FOR SOLUTION INTRAMUSCULAR; INTRAVENOUS at 02:00

## 2022-01-01 RX ADMIN — Medication 250 MILLIGRAM(S): at 14:59

## 2022-01-01 RX ADMIN — Medication 50 MILLIGRAM(S): at 15:04

## 2022-01-01 RX ADMIN — GABAPENTIN 100 MILLIGRAM(S): 400 CAPSULE ORAL at 21:39

## 2022-01-01 RX ADMIN — Medication 20 MILLIGRAM(S): at 18:03

## 2022-01-01 RX ADMIN — FENTANYL CITRATE 25 MICROGRAM(S): 50 INJECTION INTRAVENOUS at 18:00

## 2022-01-01 RX ADMIN — Medication 650 MILLIGRAM(S): at 10:19

## 2022-01-01 RX ADMIN — SODIUM CHLORIDE 50 MILLILITER(S): 9 INJECTION INTRAMUSCULAR; INTRAVENOUS; SUBCUTANEOUS at 05:18

## 2022-01-01 RX ADMIN — ATORVASTATIN CALCIUM 10 MILLIGRAM(S): 80 TABLET, FILM COATED ORAL at 21:37

## 2022-01-01 RX ADMIN — Medication 10 MILLIGRAM(S): at 05:35

## 2022-01-01 RX ADMIN — Medication 40 MILLIEQUIVALENT(S): at 10:30

## 2022-01-01 RX ADMIN — BUDESONIDE AND FORMOTEROL FUMARATE DIHYDRATE 2 PUFF(S): 160; 4.5 AEROSOL RESPIRATORY (INHALATION) at 07:04

## 2022-01-01 RX ADMIN — APIXABAN 5 MILLIGRAM(S): 2.5 TABLET, FILM COATED ORAL at 17:55

## 2022-01-01 RX ADMIN — Medication 125 MILLILITER(S): at 15:15

## 2022-01-01 RX ADMIN — Medication 10 MILLIGRAM(S): at 06:23

## 2022-01-01 RX ADMIN — Medication 650 MILLIGRAM(S): at 18:15

## 2022-01-01 RX ADMIN — SODIUM CHLORIDE 3 MILLILITER(S): 9 INJECTION INTRAMUSCULAR; INTRAVENOUS; SUBCUTANEOUS at 21:32

## 2022-01-01 RX ADMIN — ATORVASTATIN CALCIUM 10 MILLIGRAM(S): 80 TABLET, FILM COATED ORAL at 21:50

## 2022-01-01 RX ADMIN — GABAPENTIN 100 MILLIGRAM(S): 400 CAPSULE ORAL at 22:31

## 2022-01-01 RX ADMIN — Medication 100 MILLIGRAM(S): at 21:10

## 2022-01-01 RX ADMIN — GABAPENTIN 100 MILLIGRAM(S): 400 CAPSULE ORAL at 21:23

## 2022-01-01 RX ADMIN — ERTAPENEM SODIUM 120 MILLIGRAM(S): 1 INJECTION, POWDER, LYOPHILIZED, FOR SOLUTION INTRAMUSCULAR; INTRAVENOUS at 07:52

## 2022-01-01 RX ADMIN — ERTAPENEM SODIUM 120 MILLIGRAM(S): 1 INJECTION, POWDER, LYOPHILIZED, FOR SOLUTION INTRAMUSCULAR; INTRAVENOUS at 16:07

## 2022-01-01 RX ADMIN — Medication 1 BOTTLE: at 05:02

## 2022-01-01 RX ADMIN — OXYCODONE HYDROCHLORIDE 5 MILLIGRAM(S): 5 TABLET ORAL at 00:39

## 2022-01-01 RX ADMIN — SENNA PLUS 2 TABLET(S): 8.6 TABLET ORAL at 22:14

## 2022-01-01 RX ADMIN — Medication 25 MICROGRAM(S): at 07:01

## 2022-01-01 RX ADMIN — Medication 50 MICROGRAM(S): at 05:35

## 2022-01-01 RX ADMIN — Medication 81 MILLIGRAM(S): at 11:02

## 2022-01-01 RX ADMIN — Medication 400 MILLIGRAM(S): at 18:41

## 2022-01-01 RX ADMIN — MORPHINE SULFATE 4 MILLIGRAM(S): 50 CAPSULE, EXTENDED RELEASE ORAL at 08:25

## 2022-01-01 RX ADMIN — Medication 10 MILLIGRAM(S): at 13:16

## 2022-01-01 RX ADMIN — SODIUM CHLORIDE 3 MILLILITER(S): 9 INJECTION INTRAMUSCULAR; INTRAVENOUS; SUBCUTANEOUS at 21:31

## 2022-01-01 RX ADMIN — Medication 50 MICROGRAM(S): at 06:04

## 2022-01-01 RX ADMIN — SODIUM CHLORIDE 3 MILLILITER(S): 9 INJECTION INTRAMUSCULAR; INTRAVENOUS; SUBCUTANEOUS at 22:26

## 2022-01-01 RX ADMIN — OXYCODONE HYDROCHLORIDE 5 MILLIGRAM(S): 5 TABLET ORAL at 19:21

## 2022-01-01 RX ADMIN — SENNA PLUS 2 TABLET(S): 8.6 TABLET ORAL at 22:42

## 2022-01-01 RX ADMIN — GABAPENTIN 300 MILLIGRAM(S): 400 CAPSULE ORAL at 16:31

## 2022-01-01 RX ADMIN — SODIUM CHLORIDE 3 MILLILITER(S): 9 INJECTION INTRAMUSCULAR; INTRAVENOUS; SUBCUTANEOUS at 21:20

## 2022-01-01 RX ADMIN — OXYCODONE AND ACETAMINOPHEN 1 TABLET(S): 5; 325 TABLET ORAL at 07:46

## 2022-01-01 RX ADMIN — Medication 10 MILLIGRAM(S): at 09:45

## 2022-01-01 RX ADMIN — CEFEPIME 100 MILLIGRAM(S): 1 INJECTION, POWDER, FOR SOLUTION INTRAMUSCULAR; INTRAVENOUS at 21:49

## 2022-01-01 RX ADMIN — Medication 12.5 MILLIGRAM(S): at 11:50

## 2022-01-01 RX ADMIN — SODIUM CHLORIDE 3 MILLILITER(S): 9 INJECTION INTRAMUSCULAR; INTRAVENOUS; SUBCUTANEOUS at 07:09

## 2022-01-01 RX ADMIN — Medication 40 MILLIGRAM(S): at 05:40

## 2022-01-01 RX ADMIN — LIDOCAINE 1 PATCH: 4 CREAM TOPICAL at 16:45

## 2022-01-01 RX ADMIN — Medication 81 MILLIGRAM(S): at 11:13

## 2022-01-01 RX ADMIN — Medication 1000 MILLIGRAM(S): at 03:14

## 2022-01-01 RX ADMIN — Medication 50 MILLIGRAM(S): at 22:13

## 2022-01-01 RX ADMIN — Medication 10 MILLIGRAM(S): at 14:05

## 2022-01-01 RX ADMIN — OXYCODONE HYDROCHLORIDE 5 MILLIGRAM(S): 5 TABLET ORAL at 13:28

## 2022-01-01 RX ADMIN — Medication 10 MILLIEQUIVALENT(S): at 12:33

## 2022-01-01 RX ADMIN — LIDOCAINE 1 PATCH: 4 CREAM TOPICAL at 18:04

## 2022-01-01 RX ADMIN — Medication 1000 MILLIGRAM(S): at 04:55

## 2022-01-01 RX ADMIN — LIDOCAINE 1 PATCH: 4 CREAM TOPICAL at 00:49

## 2022-01-01 RX ADMIN — LIDOCAINE 1 PATCH: 4 CREAM TOPICAL at 22:36

## 2022-01-01 RX ADMIN — FLUCONAZOLE 100 MILLIGRAM(S): 150 TABLET ORAL at 17:12

## 2022-01-01 RX ADMIN — Medication 50 MILLIGRAM(S): at 06:06

## 2022-01-01 RX ADMIN — BUDESONIDE AND FORMOTEROL FUMARATE DIHYDRATE 2 PUFF(S): 160; 4.5 AEROSOL RESPIRATORY (INHALATION) at 06:14

## 2022-01-01 RX ADMIN — SODIUM CHLORIDE 3 MILLILITER(S): 9 INJECTION INTRAMUSCULAR; INTRAVENOUS; SUBCUTANEOUS at 13:35

## 2022-01-01 RX ADMIN — Medication 81 MILLIGRAM(S): at 12:39

## 2022-01-01 RX ADMIN — APIXABAN 5 MILLIGRAM(S): 2.5 TABLET, FILM COATED ORAL at 06:13

## 2022-01-01 RX ADMIN — Medication 650 MILLIGRAM(S): at 01:00

## 2022-01-01 RX ADMIN — Medication 650 MILLIGRAM(S): at 11:14

## 2022-01-01 RX ADMIN — OXYCODONE AND ACETAMINOPHEN 1 TABLET(S): 5; 325 TABLET ORAL at 10:48

## 2022-01-01 RX ADMIN — Medication 81 MILLIGRAM(S): at 16:15

## 2022-01-01 RX ADMIN — AMLODIPINE BESYLATE 2.5 MILLIGRAM(S): 2.5 TABLET ORAL at 05:35

## 2022-01-01 RX ADMIN — Medication 300 UNIT(S): at 12:00

## 2022-01-01 RX ADMIN — Medication 81 MILLIGRAM(S): at 12:00

## 2022-01-01 RX ADMIN — Medication 25 MICROGRAM(S): at 05:52

## 2022-01-01 RX ADMIN — Medication 50 MICROGRAM(S): at 06:28

## 2022-01-01 RX ADMIN — APIXABAN 5 MILLIGRAM(S): 2.5 TABLET, FILM COATED ORAL at 21:35

## 2022-01-01 RX ADMIN — Medication 650 MILLIGRAM(S): at 15:28

## 2022-01-01 RX ADMIN — Medication 500 MILLIGRAM(S): at 13:20

## 2022-01-01 RX ADMIN — CEFEPIME 100 MILLIGRAM(S): 1 INJECTION, POWDER, FOR SOLUTION INTRAMUSCULAR; INTRAVENOUS at 02:23

## 2022-01-01 RX ADMIN — TRAMADOL HYDROCHLORIDE 25 MILLIGRAM(S): 50 TABLET ORAL at 21:42

## 2022-01-01 RX ADMIN — Medication 81 MILLIGRAM(S): at 11:30

## 2022-01-01 RX ADMIN — ERTAPENEM SODIUM 120 MILLIGRAM(S): 1 INJECTION, POWDER, LYOPHILIZED, FOR SOLUTION INTRAMUSCULAR; INTRAVENOUS at 14:24

## 2022-01-01 RX ADMIN — SENNA PLUS 2 TABLET(S): 8.6 TABLET ORAL at 22:24

## 2022-01-01 RX ADMIN — FAMOTIDINE 20 MILLIGRAM(S): 10 INJECTION INTRAVENOUS at 11:06

## 2022-01-01 RX ADMIN — BUDESONIDE AND FORMOTEROL FUMARATE DIHYDRATE 2 PUFF(S): 160; 4.5 AEROSOL RESPIRATORY (INHALATION) at 08:16

## 2022-01-01 RX ADMIN — Medication 100 MILLIGRAM(S): at 06:17

## 2022-01-01 RX ADMIN — Medication 650 MILLIGRAM(S): at 17:58

## 2022-01-01 RX ADMIN — APIXABAN 10 MILLIGRAM(S): 2.5 TABLET, FILM COATED ORAL at 10:29

## 2022-01-01 RX ADMIN — Medication 10 MILLIEQUIVALENT(S): at 11:48

## 2022-01-01 RX ADMIN — LIDOCAINE 1 PATCH: 4 CREAM TOPICAL at 10:23

## 2022-01-01 RX ADMIN — Medication 25 MILLILITER(S): at 15:16

## 2022-01-01 RX ADMIN — Medication 40 MILLIGRAM(S): at 06:51

## 2022-01-01 RX ADMIN — Medication 10 MILLIGRAM(S): at 07:13

## 2022-01-01 RX ADMIN — Medication 50 MICROGRAM(S): at 05:19

## 2022-01-01 RX ADMIN — Medication 10 MILLIGRAM(S): at 21:38

## 2022-01-01 RX ADMIN — LIDOCAINE 1 PATCH: 4 CREAM TOPICAL at 18:19

## 2022-01-01 RX ADMIN — Medication 81 MILLIGRAM(S): at 11:19

## 2022-01-01 RX ADMIN — ATORVASTATIN CALCIUM 10 MILLIGRAM(S): 80 TABLET, FILM COATED ORAL at 22:21

## 2022-01-01 RX ADMIN — Medication 10 MILLIGRAM(S): at 03:10

## 2022-01-01 RX ADMIN — DAPTOMYCIN 120 MILLIGRAM(S): 500 INJECTION, POWDER, LYOPHILIZED, FOR SOLUTION INTRAVENOUS at 21:05

## 2022-01-01 RX ADMIN — FENTANYL CITRATE 25 MICROGRAM(S): 50 INJECTION INTRAVENOUS at 18:05

## 2022-01-01 RX ADMIN — FENTANYL CITRATE 25 MICROGRAM(S): 50 INJECTION INTRAVENOUS at 17:30

## 2022-01-01 RX ADMIN — Medication 40 MILLIEQUIVALENT(S): at 06:14

## 2022-01-01 RX ADMIN — Medication 25 MILLIGRAM(S): at 18:37

## 2022-01-01 RX ADMIN — TRAMADOL HYDROCHLORIDE 25 MILLIGRAM(S): 50 TABLET ORAL at 21:09

## 2022-01-01 RX ADMIN — Medication 50 MILLIGRAM(S): at 18:38

## 2022-01-01 RX ADMIN — MORPHINE SULFATE 4 MILLIGRAM(S): 50 CAPSULE, EXTENDED RELEASE ORAL at 01:53

## 2022-01-01 RX ADMIN — ERTAPENEM SODIUM 120 MILLIGRAM(S): 1 INJECTION, POWDER, LYOPHILIZED, FOR SOLUTION INTRAMUSCULAR; INTRAVENOUS at 17:13

## 2022-01-01 RX ADMIN — Medication 12.5 MILLIGRAM(S): at 17:33

## 2022-01-01 RX ADMIN — ATORVASTATIN CALCIUM 10 MILLIGRAM(S): 80 TABLET, FILM COATED ORAL at 21:07

## 2022-01-01 RX ADMIN — HEPARIN SODIUM 5000 UNIT(S): 5000 INJECTION INTRAVENOUS; SUBCUTANEOUS at 05:12

## 2022-01-01 RX ADMIN — AMLODIPINE BESYLATE 2.5 MILLIGRAM(S): 2.5 TABLET ORAL at 05:34

## 2022-01-01 RX ADMIN — SENNA PLUS 2 TABLET(S): 8.6 TABLET ORAL at 21:46

## 2022-01-01 RX ADMIN — Medication 20 MILLIGRAM(S): at 19:41

## 2022-01-01 RX ADMIN — LISINOPRIL 10 MILLIGRAM(S): 2.5 TABLET ORAL at 05:39

## 2022-01-01 RX ADMIN — Medication 81 MILLIGRAM(S): at 11:39

## 2022-01-01 RX ADMIN — TRAMADOL HYDROCHLORIDE 25 MILLIGRAM(S): 50 TABLET ORAL at 19:22

## 2022-01-01 RX ADMIN — Medication 650 MILLIGRAM(S): at 18:21

## 2022-01-01 RX ADMIN — Medication 650 MILLIGRAM(S): at 08:40

## 2022-01-01 RX ADMIN — Medication 10 MILLIGRAM(S): at 13:48

## 2022-01-01 RX ADMIN — Medication 10 MILLIGRAM(S): at 14:31

## 2022-01-01 RX ADMIN — HEPARIN SODIUM 5000 UNIT(S): 5000 INJECTION INTRAVENOUS; SUBCUTANEOUS at 22:31

## 2022-01-01 RX ADMIN — APIXABAN 5 MILLIGRAM(S): 2.5 TABLET, FILM COATED ORAL at 17:12

## 2022-01-01 RX ADMIN — AMLODIPINE BESYLATE 2.5 MILLIGRAM(S): 2.5 TABLET ORAL at 06:11

## 2022-01-01 RX ADMIN — Medication 100 MILLIGRAM(S): at 21:47

## 2022-01-01 RX ADMIN — APIXABAN 5 MILLIGRAM(S): 2.5 TABLET, FILM COATED ORAL at 05:15

## 2022-01-01 RX ADMIN — FLUCONAZOLE 400 MILLIGRAM(S): 150 TABLET ORAL at 11:13

## 2022-01-01 RX ADMIN — Medication 20 MILLIEQUIVALENT(S): at 07:45

## 2022-01-01 RX ADMIN — CEFEPIME 100 MILLIGRAM(S): 1 INJECTION, POWDER, FOR SOLUTION INTRAMUSCULAR; INTRAVENOUS at 02:09

## 2022-01-01 RX ADMIN — SODIUM CHLORIDE 3 MILLILITER(S): 9 INJECTION INTRAMUSCULAR; INTRAVENOUS; SUBCUTANEOUS at 06:08

## 2022-01-01 RX ADMIN — OXYCODONE HYDROCHLORIDE 5 MILLIGRAM(S): 5 TABLET ORAL at 23:30

## 2022-01-01 RX ADMIN — Medication 10 MILLIGRAM(S): at 14:19

## 2022-01-01 RX ADMIN — CEFEPIME 100 MILLIGRAM(S): 1 INJECTION, POWDER, FOR SOLUTION INTRAMUSCULAR; INTRAVENOUS at 14:03

## 2022-01-01 RX ADMIN — TRAMADOL HYDROCHLORIDE 25 MILLIGRAM(S): 50 TABLET ORAL at 07:15

## 2022-01-01 RX ADMIN — Medication 50 MICROGRAM(S): at 06:11

## 2022-01-01 RX ADMIN — Medication 50 MICROGRAM(S): at 05:43

## 2022-01-01 RX ADMIN — DAPTOMYCIN 120 MILLIGRAM(S): 500 INJECTION, POWDER, LYOPHILIZED, FOR SOLUTION INTRAVENOUS at 06:56

## 2022-01-01 RX ADMIN — Medication 40 MILLIGRAM(S): at 06:07

## 2022-01-01 RX ADMIN — ATORVASTATIN CALCIUM 10 MILLIGRAM(S): 80 TABLET, FILM COATED ORAL at 21:13

## 2022-01-01 RX ADMIN — GABAPENTIN 300 MILLIGRAM(S): 400 CAPSULE ORAL at 00:02

## 2022-01-01 RX ADMIN — APIXABAN 5 MILLIGRAM(S): 2.5 TABLET, FILM COATED ORAL at 07:20

## 2022-01-01 RX ADMIN — Medication 650 MILLIGRAM(S): at 21:21

## 2022-01-01 RX ADMIN — Medication 200 MILLIGRAM(S): at 16:12

## 2022-01-01 RX ADMIN — FAMOTIDINE 20 MILLIGRAM(S): 10 INJECTION INTRAVENOUS at 11:37

## 2022-01-01 RX ADMIN — Medication 100 MILLIEQUIVALENT(S): at 06:30

## 2022-01-01 RX ADMIN — CEFEPIME 100 MILLIGRAM(S): 1 INJECTION, POWDER, FOR SOLUTION INTRAMUSCULAR; INTRAVENOUS at 05:44

## 2022-01-01 RX ADMIN — SODIUM CHLORIDE 3 MILLILITER(S): 9 INJECTION INTRAMUSCULAR; INTRAVENOUS; SUBCUTANEOUS at 14:13

## 2022-01-01 RX ADMIN — OXYCODONE AND ACETAMINOPHEN 1 TABLET(S): 5; 325 TABLET ORAL at 12:02

## 2022-01-01 RX ADMIN — Medication 81 MILLIGRAM(S): at 11:58

## 2022-01-01 RX ADMIN — PANTOPRAZOLE SODIUM 40 MILLIGRAM(S): 20 TABLET, DELAYED RELEASE ORAL at 06:15

## 2022-01-01 RX ADMIN — Medication 12.5 MILLIGRAM(S): at 06:00

## 2022-01-01 RX ADMIN — Medication 12.5 MILLIGRAM(S): at 05:39

## 2022-01-01 RX ADMIN — Medication 81 MILLIGRAM(S): at 12:31

## 2022-01-01 RX ADMIN — Medication 650 MILLIGRAM(S): at 14:33

## 2022-01-01 RX ADMIN — FLUCONAZOLE 400 MILLIGRAM(S): 150 TABLET ORAL at 21:42

## 2022-01-01 RX ADMIN — APIXABAN 5 MILLIGRAM(S): 2.5 TABLET, FILM COATED ORAL at 09:49

## 2022-01-01 RX ADMIN — Medication 50 MILLIGRAM(S): at 17:05

## 2022-01-01 RX ADMIN — CASPOFUNGIN ACETATE 70 MILLIGRAM(S): 7 INJECTION, POWDER, LYOPHILIZED, FOR SOLUTION INTRAVENOUS at 14:59

## 2022-01-01 RX ADMIN — ATORVASTATIN CALCIUM 10 MILLIGRAM(S): 80 TABLET, FILM COATED ORAL at 21:26

## 2022-01-01 RX ADMIN — Medication 125 MILLILITER(S): at 19:06

## 2022-01-01 RX ADMIN — FLUCONAZOLE 400 MILLIGRAM(S): 150 TABLET ORAL at 11:05

## 2022-01-01 RX ADMIN — Medication 50 MILLIGRAM(S): at 11:06

## 2022-01-01 RX ADMIN — BUDESONIDE AND FORMOTEROL FUMARATE DIHYDRATE 2 PUFF(S): 160; 4.5 AEROSOL RESPIRATORY (INHALATION) at 07:15

## 2022-01-01 RX ADMIN — Medication 650 MILLIGRAM(S): at 11:15

## 2022-01-01 RX ADMIN — Medication 400 MILLIGRAM(S): at 04:36

## 2022-01-01 RX ADMIN — GABAPENTIN 100 MILLIGRAM(S): 400 CAPSULE ORAL at 21:50

## 2022-01-01 RX ADMIN — OXYCODONE AND ACETAMINOPHEN 1 TABLET(S): 5; 325 TABLET ORAL at 22:28

## 2022-01-01 RX ADMIN — Medication 650 MILLIGRAM(S): at 05:40

## 2022-01-01 RX ADMIN — Medication 650 MILLIGRAM(S): at 06:13

## 2022-01-01 RX ADMIN — PANTOPRAZOLE SODIUM 40 MILLIGRAM(S): 20 TABLET, DELAYED RELEASE ORAL at 06:58

## 2022-01-01 RX ADMIN — APIXABAN 5 MILLIGRAM(S): 2.5 TABLET, FILM COATED ORAL at 18:36

## 2022-01-01 RX ADMIN — Medication 10 MILLIGRAM(S): at 10:57

## 2022-01-01 RX ADMIN — Medication 1000 MILLIGRAM(S): at 19:38

## 2022-01-01 RX ADMIN — Medication 50 MILLIGRAM(S): at 17:44

## 2022-01-01 RX ADMIN — Medication 1000 MILLIGRAM(S): at 20:00

## 2022-01-01 RX ADMIN — CEFEPIME 100 MILLIGRAM(S): 1 INJECTION, POWDER, FOR SOLUTION INTRAMUSCULAR; INTRAVENOUS at 17:08

## 2022-01-01 RX ADMIN — Medication 50 MILLIGRAM(S): at 06:07

## 2022-01-01 RX ADMIN — ATORVASTATIN CALCIUM 10 MILLIGRAM(S): 80 TABLET, FILM COATED ORAL at 21:10

## 2022-01-01 RX ADMIN — BUDESONIDE AND FORMOTEROL FUMARATE DIHYDRATE 2 PUFF(S): 160; 4.5 AEROSOL RESPIRATORY (INHALATION) at 05:23

## 2022-01-01 RX ADMIN — Medication 12.5 MILLIGRAM(S): at 12:39

## 2022-01-01 RX ADMIN — DAPTOMYCIN 120 MILLIGRAM(S): 500 INJECTION, POWDER, LYOPHILIZED, FOR SOLUTION INTRAVENOUS at 23:09

## 2022-01-01 RX ADMIN — Medication 50 MICROGRAM(S): at 06:17

## 2022-01-01 RX ADMIN — CEFEPIME 100 MILLIGRAM(S): 1 INJECTION, POWDER, FOR SOLUTION INTRAMUSCULAR; INTRAVENOUS at 02:59

## 2022-01-01 RX ADMIN — Medication 12.5 MILLIGRAM(S): at 11:49

## 2022-01-01 RX ADMIN — Medication 250 MILLIGRAM(S): at 09:35

## 2022-01-01 RX ADMIN — FENTANYL CITRATE 25 MICROGRAM(S): 50 INJECTION INTRAVENOUS at 17:41

## 2022-01-01 RX ADMIN — FLUDROCORTISONE ACETATE 0.1 MILLIGRAM(S): 0.1 TABLET ORAL at 06:47

## 2022-01-01 RX ADMIN — Medication 50 MILLIGRAM(S): at 06:11

## 2022-01-01 RX ADMIN — CASPOFUNGIN ACETATE 260 MILLIGRAM(S): 7 INJECTION, POWDER, LYOPHILIZED, FOR SOLUTION INTRAVENOUS at 15:45

## 2022-01-01 RX ADMIN — DAPTOMYCIN 120 MILLIGRAM(S): 500 INJECTION, POWDER, LYOPHILIZED, FOR SOLUTION INTRAVENOUS at 07:12

## 2022-01-01 RX ADMIN — Medication 20 MILLIGRAM(S): at 06:06

## 2022-01-01 RX ADMIN — BUDESONIDE AND FORMOTEROL FUMARATE DIHYDRATE 2 PUFF(S): 160; 4.5 AEROSOL RESPIRATORY (INHALATION) at 18:00

## 2022-01-01 RX ADMIN — HEPARIN SODIUM 5000 UNIT(S): 5000 INJECTION INTRAVENOUS; SUBCUTANEOUS at 05:35

## 2022-01-01 RX ADMIN — BUDESONIDE AND FORMOTEROL FUMARATE DIHYDRATE 2 PUFF(S): 160; 4.5 AEROSOL RESPIRATORY (INHALATION) at 06:29

## 2022-01-01 RX ADMIN — AMLODIPINE BESYLATE 2.5 MILLIGRAM(S): 2.5 TABLET ORAL at 12:03

## 2022-01-01 RX ADMIN — HEPARIN SODIUM 5000 UNIT(S): 5000 INJECTION INTRAVENOUS; SUBCUTANEOUS at 15:16

## 2022-01-01 RX ADMIN — Medication 20 MILLIEQUIVALENT(S): at 14:56

## 2022-01-01 RX ADMIN — BUDESONIDE AND FORMOTEROL FUMARATE DIHYDRATE 2 PUFF(S): 160; 4.5 AEROSOL RESPIRATORY (INHALATION) at 19:03

## 2022-01-01 RX ADMIN — LIDOCAINE 1 PATCH: 4 CREAM TOPICAL at 23:33

## 2022-01-01 RX ADMIN — BUDESONIDE AND FORMOTEROL FUMARATE DIHYDRATE 2 PUFF(S): 160; 4.5 AEROSOL RESPIRATORY (INHALATION) at 06:00

## 2022-01-01 RX ADMIN — PANTOPRAZOLE SODIUM 40 MILLIGRAM(S): 20 TABLET, DELAYED RELEASE ORAL at 05:19

## 2022-01-01 RX ADMIN — Medication 12.5 MILLIGRAM(S): at 11:47

## 2022-01-01 RX ADMIN — Medication 50 MILLILITER(S): at 20:40

## 2022-01-01 RX ADMIN — LIDOCAINE 1 PATCH: 4 CREAM TOPICAL at 00:01

## 2022-01-01 RX ADMIN — Medication 81 MILLIGRAM(S): at 10:51

## 2022-01-01 RX ADMIN — OXYCODONE HYDROCHLORIDE 5 MILLIGRAM(S): 5 TABLET ORAL at 11:00

## 2022-01-01 RX ADMIN — Medication 100 MILLIGRAM(S): at 22:24

## 2022-01-01 RX ADMIN — Medication 6: at 21:19

## 2022-01-01 RX ADMIN — APIXABAN 5 MILLIGRAM(S): 2.5 TABLET, FILM COATED ORAL at 10:15

## 2022-01-01 RX ADMIN — Medication 50 MICROGRAM(S): at 06:14

## 2022-01-01 RX ADMIN — Medication 20 MILLIGRAM(S): at 05:47

## 2022-01-01 RX ADMIN — CHLORHEXIDINE GLUCONATE 12.5 MILLILITER(S): 213 SOLUTION TOPICAL at 09:09

## 2022-01-01 RX ADMIN — Medication 20 MILLIGRAM(S): at 06:17

## 2022-01-01 RX ADMIN — APIXABAN 5 MILLIGRAM(S): 2.5 TABLET, FILM COATED ORAL at 18:16

## 2022-01-01 RX ADMIN — Medication 12.5 MILLIGRAM(S): at 13:30

## 2022-01-01 RX ADMIN — FLUCONAZOLE 400 MILLIGRAM(S): 150 TABLET ORAL at 11:24

## 2022-01-01 RX ADMIN — Medication 12.5 MILLIGRAM(S): at 18:37

## 2022-01-01 RX ADMIN — Medication 20 MILLIGRAM(S): at 06:02

## 2022-01-01 RX ADMIN — Medication 40 MILLIEQUIVALENT(S): at 09:54

## 2022-01-01 RX ADMIN — DAPTOMYCIN 116 MILLIGRAM(S): 500 INJECTION, POWDER, LYOPHILIZED, FOR SOLUTION INTRAVENOUS at 21:27

## 2022-01-01 RX ADMIN — Medication 10 MILLIGRAM(S): at 15:39

## 2022-01-01 RX ADMIN — Medication 50 MICROGRAM(S): at 06:49

## 2022-01-01 RX ADMIN — Medication 50 MICROGRAM(S): at 05:33

## 2022-01-01 RX ADMIN — OXYCODONE HYDROCHLORIDE 5 MILLIGRAM(S): 5 TABLET ORAL at 12:22

## 2022-01-01 RX ADMIN — DAPTOMYCIN 120 MILLIGRAM(S): 500 INJECTION, POWDER, LYOPHILIZED, FOR SOLUTION INTRAVENOUS at 12:30

## 2022-01-01 RX ADMIN — FAMOTIDINE 20 MILLIGRAM(S): 10 INJECTION INTRAVENOUS at 12:39

## 2022-01-01 RX ADMIN — Medication 81 MILLIGRAM(S): at 13:07

## 2022-01-01 RX ADMIN — Medication 325 MILLIGRAM(S): at 11:20

## 2022-01-01 RX ADMIN — Medication 81 MILLIGRAM(S): at 11:37

## 2022-01-01 RX ADMIN — POLYETHYLENE GLYCOL 3350 17 GRAM(S): 17 POWDER, FOR SOLUTION ORAL at 11:15

## 2022-01-01 RX ADMIN — BUDESONIDE AND FORMOTEROL FUMARATE DIHYDRATE 2 PUFF(S): 160; 4.5 AEROSOL RESPIRATORY (INHALATION) at 06:36

## 2022-01-01 RX ADMIN — Medication 75 MILLIGRAM(S): at 07:04

## 2022-01-01 RX ADMIN — TRAMADOL HYDROCHLORIDE 50 MILLIGRAM(S): 50 TABLET ORAL at 18:15

## 2022-01-01 RX ADMIN — Medication 250 MILLIGRAM(S): at 10:37

## 2022-01-01 RX ADMIN — OXYCODONE HYDROCHLORIDE 5 MILLIGRAM(S): 5 TABLET ORAL at 22:04

## 2022-01-01 RX ADMIN — BUDESONIDE AND FORMOTEROL FUMARATE DIHYDRATE 2 PUFF(S): 160; 4.5 AEROSOL RESPIRATORY (INHALATION) at 05:41

## 2022-01-01 RX ADMIN — TRAMADOL HYDROCHLORIDE 25 MILLIGRAM(S): 50 TABLET ORAL at 04:40

## 2022-01-01 RX ADMIN — Medication 5 MILLIGRAM(S): at 08:38

## 2022-01-01 RX ADMIN — APIXABAN 5 MILLIGRAM(S): 2.5 TABLET, FILM COATED ORAL at 22:31

## 2022-01-01 RX ADMIN — HYDROMORPHONE HYDROCHLORIDE 0.25 MILLIGRAM(S): 2 INJECTION INTRAMUSCULAR; INTRAVENOUS; SUBCUTANEOUS at 21:16

## 2022-01-01 RX ADMIN — Medication 650 MILLIGRAM(S): at 10:54

## 2022-01-01 RX ADMIN — BUDESONIDE AND FORMOTEROL FUMARATE DIHYDRATE 2 PUFF(S): 160; 4.5 AEROSOL RESPIRATORY (INHALATION) at 05:47

## 2022-01-01 RX ADMIN — BUDESONIDE AND FORMOTEROL FUMARATE DIHYDRATE 2 PUFF(S): 160; 4.5 AEROSOL RESPIRATORY (INHALATION) at 06:10

## 2022-01-01 RX ADMIN — Medication 650 MILLIGRAM(S): at 05:20

## 2022-01-01 RX ADMIN — ATORVASTATIN CALCIUM 10 MILLIGRAM(S): 80 TABLET, FILM COATED ORAL at 21:42

## 2022-01-01 RX ADMIN — HEPARIN SODIUM 1100 UNIT(S)/HR: 5000 INJECTION INTRAVENOUS; SUBCUTANEOUS at 15:20

## 2022-01-01 RX ADMIN — OXYCODONE HYDROCHLORIDE 5 MILLIGRAM(S): 5 TABLET ORAL at 15:08

## 2022-01-01 RX ADMIN — CEFEPIME 100 MILLIGRAM(S): 1 INJECTION, POWDER, FOR SOLUTION INTRAMUSCULAR; INTRAVENOUS at 18:56

## 2022-01-01 RX ADMIN — LIDOCAINE 1 PATCH: 4 CREAM TOPICAL at 04:00

## 2022-01-01 RX ADMIN — Medication 50 MICROGRAM(S): at 06:05

## 2022-01-01 RX ADMIN — LIDOCAINE 1 PATCH: 4 CREAM TOPICAL at 18:24

## 2022-01-01 RX ADMIN — Medication 400 MILLIGRAM(S): at 11:44

## 2022-01-01 RX ADMIN — Medication 10 MILLIGRAM(S): at 21:09

## 2022-01-01 RX ADMIN — SENNA PLUS 2 TABLET(S): 8.6 TABLET ORAL at 21:23

## 2022-01-01 RX ADMIN — AMLODIPINE BESYLATE 2.5 MILLIGRAM(S): 2.5 TABLET ORAL at 06:36

## 2022-01-01 RX ADMIN — Medication 50 MILLIGRAM(S): at 06:17

## 2022-01-01 RX ADMIN — Medication 81 MILLIGRAM(S): at 11:09

## 2022-01-01 RX ADMIN — Medication 650 MILLIGRAM(S): at 13:45

## 2022-01-01 RX ADMIN — CHLORHEXIDINE GLUCONATE 1 APPLICATION(S): 213 SOLUTION TOPICAL at 05:42

## 2022-01-01 RX ADMIN — Medication 500 MILLIGRAM(S): at 22:38

## 2022-01-01 RX ADMIN — LIDOCAINE 1 PATCH: 4 CREAM TOPICAL at 21:31

## 2022-01-01 RX ADMIN — FLUDROCORTISONE ACETATE 0.1 MILLIGRAM(S): 0.1 TABLET ORAL at 05:34

## 2022-01-01 RX ADMIN — Medication 50 MILLIGRAM(S): at 21:22

## 2022-01-01 RX ADMIN — Medication 75 MILLIGRAM(S): at 17:00

## 2022-01-01 RX ADMIN — ATORVASTATIN CALCIUM 10 MILLIGRAM(S): 80 TABLET, FILM COATED ORAL at 21:47

## 2022-01-01 RX ADMIN — Medication 12.5 MILLIGRAM(S): at 00:47

## 2022-01-01 RX ADMIN — Medication 650 MILLIGRAM(S): at 10:30

## 2022-01-01 RX ADMIN — TRAMADOL HYDROCHLORIDE 25 MILLIGRAM(S): 50 TABLET ORAL at 06:30

## 2022-01-01 RX ADMIN — Medication 10 MILLIGRAM(S): at 06:24

## 2022-01-01 RX ADMIN — FENTANYL CITRATE 25 MICROGRAM(S): 50 INJECTION INTRAVENOUS at 17:26

## 2022-01-01 RX ADMIN — PANTOPRAZOLE SODIUM 40 MILLIGRAM(S): 20 TABLET, DELAYED RELEASE ORAL at 07:06

## 2022-01-01 RX ADMIN — PIPERACILLIN AND TAZOBACTAM 200 GRAM(S): 4; .5 INJECTION, POWDER, LYOPHILIZED, FOR SOLUTION INTRAVENOUS at 15:00

## 2022-01-01 RX ADMIN — PIPERACILLIN AND TAZOBACTAM 25 GRAM(S): 4; .5 INJECTION, POWDER, LYOPHILIZED, FOR SOLUTION INTRAVENOUS at 00:59

## 2022-01-01 RX ADMIN — FAMOTIDINE 20 MILLIGRAM(S): 10 INJECTION INTRAVENOUS at 11:48

## 2022-01-01 RX ADMIN — Medication 650 MILLIGRAM(S): at 00:25

## 2022-01-01 RX ADMIN — CHLORHEXIDINE GLUCONATE 1 APPLICATION(S): 213 SOLUTION TOPICAL at 05:54

## 2022-01-01 RX ADMIN — DAPTOMYCIN 120 MILLIGRAM(S): 500 INJECTION, POWDER, LYOPHILIZED, FOR SOLUTION INTRAVENOUS at 07:06

## 2022-01-01 RX ADMIN — POLYETHYLENE GLYCOL 3350 17 GRAM(S): 17 POWDER, FOR SOLUTION ORAL at 15:27

## 2022-01-01 RX ADMIN — OXYCODONE HYDROCHLORIDE 5 MILLIGRAM(S): 5 TABLET ORAL at 15:47

## 2022-01-01 RX ADMIN — Medication 50 MICROGRAM(S): at 05:17

## 2022-01-01 RX ADMIN — PANTOPRAZOLE SODIUM 40 MILLIGRAM(S): 20 TABLET, DELAYED RELEASE ORAL at 06:50

## 2022-01-01 RX ADMIN — Medication 50 MICROGRAM(S): at 06:00

## 2022-01-01 RX ADMIN — Medication 81 MILLIGRAM(S): at 11:16

## 2022-01-01 RX ADMIN — PANTOPRAZOLE SODIUM 40 MILLIGRAM(S): 20 TABLET, DELAYED RELEASE ORAL at 06:14

## 2022-01-01 RX ADMIN — Medication 650 MILLIGRAM(S): at 10:29

## 2022-01-01 RX ADMIN — Medication 10 MILLIEQUIVALENT(S): at 12:13

## 2022-01-01 RX ADMIN — GABAPENTIN 300 MILLIGRAM(S): 400 CAPSULE ORAL at 18:04

## 2022-01-01 RX ADMIN — Medication 10 MILLIGRAM(S): at 21:47

## 2022-01-01 RX ADMIN — Medication 81 MILLIGRAM(S): at 12:29

## 2022-01-01 RX ADMIN — FAMOTIDINE 20 MILLIGRAM(S): 10 INJECTION INTRAVENOUS at 12:06

## 2022-01-01 RX ADMIN — Medication 50 MICROGRAM(S): at 05:23

## 2022-01-01 RX ADMIN — SODIUM CHLORIDE 3 MILLILITER(S): 9 INJECTION INTRAMUSCULAR; INTRAVENOUS; SUBCUTANEOUS at 13:15

## 2022-01-01 RX ADMIN — SODIUM CHLORIDE 3 MILLILITER(S): 9 INJECTION INTRAMUSCULAR; INTRAVENOUS; SUBCUTANEOUS at 13:30

## 2022-01-01 RX ADMIN — Medication 81 MILLIGRAM(S): at 12:33

## 2022-01-01 RX ADMIN — Medication 10 MILLIGRAM(S): at 06:34

## 2022-01-01 RX ADMIN — PANTOPRAZOLE SODIUM 40 MILLIGRAM(S): 20 TABLET, DELAYED RELEASE ORAL at 06:01

## 2022-01-01 RX ADMIN — BUDESONIDE AND FORMOTEROL FUMARATE DIHYDRATE 2 PUFF(S): 160; 4.5 AEROSOL RESPIRATORY (INHALATION) at 07:01

## 2022-01-01 RX ADMIN — APIXABAN 5 MILLIGRAM(S): 2.5 TABLET, FILM COATED ORAL at 17:17

## 2022-01-01 RX ADMIN — Medication 81 MILLIGRAM(S): at 13:03

## 2022-01-01 RX ADMIN — Medication 650 MILLIGRAM(S): at 14:35

## 2022-01-01 RX ADMIN — LIDOCAINE 1 PATCH: 4 CREAM TOPICAL at 08:00

## 2022-01-01 RX ADMIN — OXYCODONE AND ACETAMINOPHEN 1 TABLET(S): 5; 325 TABLET ORAL at 07:30

## 2022-01-01 RX ADMIN — FLUCONAZOLE 400 MILLIGRAM(S): 150 TABLET ORAL at 11:15

## 2022-01-01 RX ADMIN — Medication 10 MILLIGRAM(S): at 22:02

## 2022-01-01 RX ADMIN — Medication 650 MILLIGRAM(S): at 07:05

## 2022-01-01 RX ADMIN — Medication 10 MILLIGRAM(S): at 21:18

## 2022-01-01 RX ADMIN — BUDESONIDE AND FORMOTEROL FUMARATE DIHYDRATE 2 PUFF(S): 160; 4.5 AEROSOL RESPIRATORY (INHALATION) at 08:07

## 2022-01-01 RX ADMIN — APIXABAN 5 MILLIGRAM(S): 2.5 TABLET, FILM COATED ORAL at 06:23

## 2022-01-01 RX ADMIN — Medication 12.5 MILLIGRAM(S): at 18:18

## 2022-01-01 RX ADMIN — LIDOCAINE 1 PATCH: 4 CREAM TOPICAL at 11:20

## 2022-01-01 RX ADMIN — OXYCODONE HYDROCHLORIDE 5 MILLIGRAM(S): 5 TABLET ORAL at 14:34

## 2022-01-01 RX ADMIN — Medication 81 MILLIGRAM(S): at 11:59

## 2022-01-01 RX ADMIN — SODIUM CHLORIDE 3 MILLILITER(S): 9 INJECTION INTRAMUSCULAR; INTRAVENOUS; SUBCUTANEOUS at 06:27

## 2022-01-01 RX ADMIN — Medication 650 MILLIGRAM(S): at 13:31

## 2022-01-01 RX ADMIN — CHLORHEXIDINE GLUCONATE 1 APPLICATION(S): 213 SOLUTION TOPICAL at 07:00

## 2022-01-01 RX ADMIN — Medication 650 MILLIGRAM(S): at 02:03

## 2022-01-01 RX ADMIN — Medication 40 MILLIEQUIVALENT(S): at 09:44

## 2022-01-01 RX ADMIN — ATORVASTATIN CALCIUM 10 MILLIGRAM(S): 80 TABLET, FILM COATED ORAL at 22:04

## 2022-01-01 RX ADMIN — Medication 10 MILLIGRAM(S): at 06:17

## 2022-01-01 RX ADMIN — HEPARIN SODIUM 500 UNIT(S)/HR: 5000 INJECTION INTRAVENOUS; SUBCUTANEOUS at 00:25

## 2022-01-01 RX ADMIN — Medication 50 MILLIGRAM(S): at 10:31

## 2022-01-01 RX ADMIN — ATORVASTATIN CALCIUM 10 MILLIGRAM(S): 80 TABLET, FILM COATED ORAL at 21:09

## 2022-01-01 RX ADMIN — BUDESONIDE AND FORMOTEROL FUMARATE DIHYDRATE 2 PUFF(S): 160; 4.5 AEROSOL RESPIRATORY (INHALATION) at 17:09

## 2022-01-01 RX ADMIN — CEFEPIME 100 MILLIGRAM(S): 1 INJECTION, POWDER, FOR SOLUTION INTRAMUSCULAR; INTRAVENOUS at 13:29

## 2022-01-01 RX ADMIN — Medication 100 MILLIGRAM(S): at 14:10

## 2022-01-01 RX ADMIN — Medication 400 MILLIGRAM(S): at 22:08

## 2022-01-01 RX ADMIN — PANTOPRAZOLE SODIUM 40 MILLIGRAM(S): 20 TABLET, DELAYED RELEASE ORAL at 05:40

## 2022-01-01 RX ADMIN — HEPARIN SODIUM 5000 UNIT(S): 5000 INJECTION INTRAVENOUS; SUBCUTANEOUS at 13:20

## 2022-01-01 RX ADMIN — FAMOTIDINE 20 MILLIGRAM(S): 10 INJECTION INTRAVENOUS at 11:03

## 2022-01-01 RX ADMIN — Medication 20 MILLIEQUIVALENT(S): at 11:24

## 2022-01-01 RX ADMIN — Medication 4 MILLIGRAM(S): at 11:32

## 2022-01-01 RX ADMIN — LIDOCAINE 1 PATCH: 4 CREAM TOPICAL at 19:20

## 2022-01-01 RX ADMIN — Medication 50 MICROGRAM(S): at 05:47

## 2022-01-01 RX ADMIN — PANTOPRAZOLE SODIUM 40 MILLIGRAM(S): 20 TABLET, DELAYED RELEASE ORAL at 06:05

## 2022-01-01 RX ADMIN — Medication 20 MILLIGRAM(S): at 05:49

## 2022-01-01 RX ADMIN — Medication 40 MILLIEQUIVALENT(S): at 12:12

## 2022-01-01 RX ADMIN — HYDROMORPHONE HYDROCHLORIDE 0.25 MILLIGRAM(S): 2 INJECTION INTRAMUSCULAR; INTRAVENOUS; SUBCUTANEOUS at 21:35

## 2022-01-01 RX ADMIN — Medication 12.5 MILLIGRAM(S): at 07:03

## 2022-01-01 RX ADMIN — Medication 650 MILLIGRAM(S): at 15:03

## 2022-01-01 RX ADMIN — Medication 650 MILLIGRAM(S): at 06:33

## 2022-01-01 RX ADMIN — Medication 400 MILLIGRAM(S): at 18:14

## 2022-01-01 RX ADMIN — HEPARIN SODIUM 5000 UNIT(S): 5000 INJECTION INTRAVENOUS; SUBCUTANEOUS at 13:30

## 2022-01-01 RX ADMIN — BUDESONIDE AND FORMOTEROL FUMARATE DIHYDRATE 2 PUFF(S): 160; 4.5 AEROSOL RESPIRATORY (INHALATION) at 06:33

## 2022-01-01 RX ADMIN — Medication 10 MILLIEQUIVALENT(S): at 10:37

## 2022-01-01 RX ADMIN — Medication 650 MILLIGRAM(S): at 05:58

## 2022-01-01 RX ADMIN — Medication 650 MILLIGRAM(S): at 17:31

## 2022-01-01 RX ADMIN — AMLODIPINE BESYLATE 2.5 MILLIGRAM(S): 2.5 TABLET ORAL at 06:16

## 2022-01-01 RX ADMIN — Medication 650 MILLIGRAM(S): at 16:42

## 2022-01-01 RX ADMIN — FLUCONAZOLE 100 MILLIGRAM(S): 150 TABLET ORAL at 17:09

## 2022-01-01 RX ADMIN — CHLORHEXIDINE GLUCONATE 5 MILLILITER(S): 213 SOLUTION TOPICAL at 06:35

## 2022-01-01 RX ADMIN — Medication 4 MILLIGRAM(S): at 07:47

## 2022-01-01 RX ADMIN — Medication 20 MILLIGRAM(S): at 06:05

## 2022-01-01 RX ADMIN — Medication 12.5 MILLIGRAM(S): at 13:20

## 2022-01-01 RX ADMIN — AMLODIPINE BESYLATE 2.5 MILLIGRAM(S): 2.5 TABLET ORAL at 07:21

## 2022-01-01 RX ADMIN — Medication 10 MILLIGRAM(S): at 22:21

## 2022-01-01 RX ADMIN — TRAMADOL HYDROCHLORIDE 25 MILLIGRAM(S): 50 TABLET ORAL at 22:34

## 2022-01-01 RX ADMIN — Medication 650 MILLIGRAM(S): at 11:06

## 2022-01-01 RX ADMIN — SODIUM CHLORIDE 3 MILLILITER(S): 9 INJECTION INTRAMUSCULAR; INTRAVENOUS; SUBCUTANEOUS at 14:27

## 2022-01-01 RX ADMIN — Medication 81 MILLIGRAM(S): at 11:12

## 2022-01-01 RX ADMIN — AMLODIPINE BESYLATE 2.5 MILLIGRAM(S): 2.5 TABLET ORAL at 06:00

## 2022-01-01 RX ADMIN — Medication 650 MILLIGRAM(S): at 11:20

## 2022-01-01 RX ADMIN — BUDESONIDE AND FORMOTEROL FUMARATE DIHYDRATE 2 PUFF(S): 160; 4.5 AEROSOL RESPIRATORY (INHALATION) at 17:42

## 2022-01-01 RX ADMIN — SODIUM CHLORIDE 3 MILLILITER(S): 9 INJECTION INTRAMUSCULAR; INTRAVENOUS; SUBCUTANEOUS at 06:00

## 2022-01-01 RX ADMIN — Medication 100 MILLIGRAM(S): at 07:03

## 2022-01-01 RX ADMIN — SODIUM CHLORIDE 3 MILLILITER(S): 9 INJECTION INTRAMUSCULAR; INTRAVENOUS; SUBCUTANEOUS at 22:04

## 2022-01-01 RX ADMIN — Medication 650 MILLIGRAM(S): at 11:54

## 2022-01-01 RX ADMIN — Medication 50 MICROGRAM(S): at 07:08

## 2022-01-01 RX ADMIN — Medication 12.5 MILLIGRAM(S): at 06:48

## 2022-01-01 RX ADMIN — Medication 10 MILLIGRAM(S): at 02:30

## 2022-01-01 RX ADMIN — Medication 50 MILLIGRAM(S): at 06:14

## 2022-01-01 RX ADMIN — Medication 100 MILLIGRAM(S): at 06:00

## 2022-01-01 RX ADMIN — Medication 4 MILLIGRAM(S): at 06:33

## 2022-01-01 RX ADMIN — Medication 12.5 MILLIGRAM(S): at 17:40

## 2022-01-01 RX ADMIN — ERTAPENEM SODIUM 120 MILLIGRAM(S): 1 INJECTION, POWDER, LYOPHILIZED, FOR SOLUTION INTRAMUSCULAR; INTRAVENOUS at 17:08

## 2022-01-01 RX ADMIN — OXYCODONE AND ACETAMINOPHEN 1 TABLET(S): 5; 325 TABLET ORAL at 22:00

## 2022-01-01 RX ADMIN — OXYCODONE AND ACETAMINOPHEN 1 TABLET(S): 5; 325 TABLET ORAL at 23:00

## 2022-01-01 RX ADMIN — Medication 10 MILLIGRAM(S): at 22:14

## 2022-01-01 RX ADMIN — PANTOPRAZOLE SODIUM 40 MILLIGRAM(S): 20 TABLET, DELAYED RELEASE ORAL at 07:46

## 2022-01-01 RX ADMIN — DAPTOMYCIN 120 MILLIGRAM(S): 500 INJECTION, POWDER, LYOPHILIZED, FOR SOLUTION INTRAVENOUS at 08:52

## 2022-01-01 RX ADMIN — ERTAPENEM SODIUM 120 MILLIGRAM(S): 1 INJECTION, POWDER, LYOPHILIZED, FOR SOLUTION INTRAMUSCULAR; INTRAVENOUS at 17:59

## 2022-01-01 RX ADMIN — CHLORHEXIDINE GLUCONATE 1 APPLICATION(S): 213 SOLUTION TOPICAL at 23:10

## 2022-01-01 RX ADMIN — SODIUM CHLORIDE 3 MILLILITER(S): 9 INJECTION INTRAMUSCULAR; INTRAVENOUS; SUBCUTANEOUS at 06:28

## 2022-01-01 RX ADMIN — ATORVASTATIN CALCIUM 10 MILLIGRAM(S): 80 TABLET, FILM COATED ORAL at 21:23

## 2022-01-01 RX ADMIN — AMLODIPINE BESYLATE 2.5 MILLIGRAM(S): 2.5 TABLET ORAL at 07:13

## 2022-01-01 RX ADMIN — Medication 40 MILLIEQUIVALENT(S): at 06:50

## 2022-01-01 RX ADMIN — LIDOCAINE 1 PATCH: 4 CREAM TOPICAL at 17:36

## 2022-01-01 RX ADMIN — Medication 4 MILLIGRAM(S): at 12:39

## 2022-01-01 RX ADMIN — SODIUM CHLORIDE 3 MILLILITER(S): 9 INJECTION INTRAMUSCULAR; INTRAVENOUS; SUBCUTANEOUS at 06:38

## 2022-01-01 RX ADMIN — DAPTOMYCIN 120 MILLIGRAM(S): 500 INJECTION, POWDER, LYOPHILIZED, FOR SOLUTION INTRAVENOUS at 06:18

## 2022-01-01 RX ADMIN — Medication 650 MILLIGRAM(S): at 22:17

## 2022-01-01 RX ADMIN — Medication 50 MICROGRAM(S): at 06:37

## 2022-01-01 RX ADMIN — DAPTOMYCIN 120 MILLIGRAM(S): 500 INJECTION, POWDER, LYOPHILIZED, FOR SOLUTION INTRAVENOUS at 12:21

## 2022-01-01 RX ADMIN — Medication 650 MILLIGRAM(S): at 18:54

## 2022-01-01 RX ADMIN — CHLORHEXIDINE GLUCONATE 1 APPLICATION(S): 213 SOLUTION TOPICAL at 06:22

## 2022-01-01 RX ADMIN — Medication 12.5 MILLIGRAM(S): at 23:06

## 2022-01-01 RX ADMIN — ERTAPENEM SODIUM 120 MILLIGRAM(S): 1 INJECTION, POWDER, LYOPHILIZED, FOR SOLUTION INTRAMUSCULAR; INTRAVENOUS at 23:23

## 2022-01-01 RX ADMIN — BUDESONIDE AND FORMOTEROL FUMARATE DIHYDRATE 2 PUFF(S): 160; 4.5 AEROSOL RESPIRATORY (INHALATION) at 06:22

## 2022-01-01 RX ADMIN — TRAMADOL HYDROCHLORIDE 25 MILLIGRAM(S): 50 TABLET ORAL at 07:03

## 2022-01-01 RX ADMIN — LIDOCAINE 1 PATCH: 4 CREAM TOPICAL at 00:09

## 2022-01-01 RX ADMIN — METHOCARBAMOL 750 MILLIGRAM(S): 500 TABLET, FILM COATED ORAL at 10:57

## 2022-01-01 RX ADMIN — Medication 50 MICROGRAM(S): at 06:56

## 2022-01-01 RX ADMIN — LIDOCAINE 1 PATCH: 4 CREAM TOPICAL at 11:04

## 2022-01-01 RX ADMIN — HYDROMORPHONE HYDROCHLORIDE 0.25 MILLIGRAM(S): 2 INJECTION INTRAMUSCULAR; INTRAVENOUS; SUBCUTANEOUS at 16:30

## 2022-01-01 RX ADMIN — MAGNESIUM OXIDE 400 MG ORAL TABLET 400 MILLIGRAM(S): 241.3 TABLET ORAL at 07:46

## 2022-01-01 RX ADMIN — Medication 12.5 MILLIGRAM(S): at 07:11

## 2022-01-01 RX ADMIN — HEPARIN SODIUM 5000 UNIT(S): 5000 INJECTION INTRAVENOUS; SUBCUTANEOUS at 22:42

## 2022-01-01 RX ADMIN — FLUCONAZOLE 400 MILLIGRAM(S): 150 TABLET ORAL at 11:32

## 2022-01-01 RX ADMIN — CEFEPIME 100 MILLIGRAM(S): 1 INJECTION, POWDER, FOR SOLUTION INTRAMUSCULAR; INTRAVENOUS at 06:05

## 2022-01-01 RX ADMIN — Medication 50 MILLIGRAM(S): at 17:28

## 2022-01-01 RX ADMIN — LACTULOSE 20 GRAM(S): 10 SOLUTION ORAL at 11:49

## 2022-01-01 RX ADMIN — CHLORHEXIDINE GLUCONATE 1 APPLICATION(S): 213 SOLUTION TOPICAL at 06:01

## 2022-01-01 RX ADMIN — Medication 10 MILLIGRAM(S): at 18:56

## 2022-01-01 RX ADMIN — Medication 650 MILLIGRAM(S): at 05:43

## 2022-01-01 RX ADMIN — BUDESONIDE AND FORMOTEROL FUMARATE DIHYDRATE 2 PUFF(S): 160; 4.5 AEROSOL RESPIRATORY (INHALATION) at 07:29

## 2022-01-01 RX ADMIN — LIDOCAINE 1 PATCH: 4 CREAM TOPICAL at 12:03

## 2022-01-01 RX ADMIN — GABAPENTIN 600 MILLIGRAM(S): 400 CAPSULE ORAL at 05:33

## 2022-01-01 RX ADMIN — Medication 125 MILLILITER(S): at 07:01

## 2022-01-01 RX ADMIN — Medication 12.5 MILLIGRAM(S): at 04:30

## 2022-01-01 RX ADMIN — MORPHINE SULFATE 4 MILLIGRAM(S): 50 CAPSULE, EXTENDED RELEASE ORAL at 00:08

## 2022-01-01 RX ADMIN — ATORVASTATIN CALCIUM 10 MILLIGRAM(S): 80 TABLET, FILM COATED ORAL at 21:18

## 2022-01-01 RX ADMIN — Medication 125 MILLILITER(S): at 10:58

## 2022-01-01 RX ADMIN — Medication 50 MICROGRAM(S): at 05:56

## 2022-01-01 RX ADMIN — TRAMADOL HYDROCHLORIDE 25 MILLIGRAM(S): 50 TABLET ORAL at 07:09

## 2022-01-01 RX ADMIN — HEPARIN SODIUM 5000 UNIT(S): 5000 INJECTION INTRAVENOUS; SUBCUTANEOUS at 13:16

## 2022-01-01 RX ADMIN — CEFEPIME 100 MILLIGRAM(S): 1 INJECTION, POWDER, FOR SOLUTION INTRAMUSCULAR; INTRAVENOUS at 02:12

## 2022-01-01 RX ADMIN — OXYCODONE AND ACETAMINOPHEN 1 TABLET(S): 5; 325 TABLET ORAL at 17:15

## 2022-01-01 RX ADMIN — APIXABAN 5 MILLIGRAM(S): 2.5 TABLET, FILM COATED ORAL at 21:30

## 2022-01-01 RX ADMIN — BUDESONIDE AND FORMOTEROL FUMARATE DIHYDRATE 2 PUFF(S): 160; 4.5 AEROSOL RESPIRATORY (INHALATION) at 17:03

## 2022-01-01 RX ADMIN — Medication 650 MILLIGRAM(S): at 16:59

## 2022-01-01 RX ADMIN — CHLORHEXIDINE GLUCONATE 1 APPLICATION(S): 213 SOLUTION TOPICAL at 06:19

## 2022-01-01 RX ADMIN — HYDROMORPHONE HYDROCHLORIDE 0.5 MILLIGRAM(S): 2 INJECTION INTRAMUSCULAR; INTRAVENOUS; SUBCUTANEOUS at 09:35

## 2022-01-01 RX ADMIN — SODIUM CHLORIDE 3 MILLILITER(S): 9 INJECTION INTRAMUSCULAR; INTRAVENOUS; SUBCUTANEOUS at 14:40

## 2022-01-01 RX ADMIN — OXYCODONE HYDROCHLORIDE 5 MILLIGRAM(S): 5 TABLET ORAL at 14:06

## 2022-01-01 RX ADMIN — Medication 75 MILLIGRAM(S): at 07:28

## 2022-01-01 RX ADMIN — PANTOPRAZOLE SODIUM 40 MILLIGRAM(S): 20 TABLET, DELAYED RELEASE ORAL at 06:34

## 2022-01-01 RX ADMIN — OXYCODONE AND ACETAMINOPHEN 1 TABLET(S): 5; 325 TABLET ORAL at 05:45

## 2022-01-01 RX ADMIN — Medication 100 MILLIGRAM(S): at 13:05

## 2022-01-01 RX ADMIN — Medication 50 MILLIGRAM(S): at 17:07

## 2022-01-01 RX ADMIN — APIXABAN 5 MILLIGRAM(S): 2.5 TABLET, FILM COATED ORAL at 05:35

## 2022-01-01 RX ADMIN — Medication 650 MILLIGRAM(S): at 11:09

## 2022-01-01 RX ADMIN — Medication 50 MILLIGRAM(S): at 18:21

## 2022-01-01 RX ADMIN — FAMOTIDINE 20 MILLIGRAM(S): 10 INJECTION INTRAVENOUS at 12:14

## 2022-01-01 RX ADMIN — APIXABAN 5 MILLIGRAM(S): 2.5 TABLET, FILM COATED ORAL at 17:11

## 2022-01-01 RX ADMIN — APIXABAN 5 MILLIGRAM(S): 2.5 TABLET, FILM COATED ORAL at 17:30

## 2022-01-01 RX ADMIN — PANTOPRAZOLE SODIUM 40 MILLIGRAM(S): 20 TABLET, DELAYED RELEASE ORAL at 06:28

## 2022-01-01 RX ADMIN — SODIUM CHLORIDE 3 MILLILITER(S): 9 INJECTION INTRAMUSCULAR; INTRAVENOUS; SUBCUTANEOUS at 13:33

## 2022-01-01 RX ADMIN — SODIUM CHLORIDE 3 MILLILITER(S): 9 INJECTION INTRAMUSCULAR; INTRAVENOUS; SUBCUTANEOUS at 05:44

## 2022-01-01 RX ADMIN — LIDOCAINE 1 PATCH: 4 CREAM TOPICAL at 23:00

## 2022-01-01 RX ADMIN — CHLORHEXIDINE GLUCONATE 1 APPLICATION(S): 213 SOLUTION TOPICAL at 05:13

## 2022-01-01 RX ADMIN — CEFEPIME 100 MILLIGRAM(S): 1 INJECTION, POWDER, FOR SOLUTION INTRAMUSCULAR; INTRAVENOUS at 01:20

## 2022-01-01 RX ADMIN — AMLODIPINE BESYLATE 2.5 MILLIGRAM(S): 2.5 TABLET ORAL at 05:47

## 2022-01-01 RX ADMIN — Medication 81 MILLIGRAM(S): at 11:27

## 2022-01-01 RX ADMIN — Medication 10 MILLIGRAM(S): at 11:06

## 2022-01-01 RX ADMIN — Medication 650 MILLIGRAM(S): at 15:27

## 2022-01-01 RX ADMIN — SODIUM CHLORIDE 3 MILLILITER(S): 9 INJECTION INTRAMUSCULAR; INTRAVENOUS; SUBCUTANEOUS at 21:34

## 2022-01-01 RX ADMIN — APIXABAN 5 MILLIGRAM(S): 2.5 TABLET, FILM COATED ORAL at 20:17

## 2022-01-01 RX ADMIN — Medication 975 MILLIGRAM(S): at 18:44

## 2022-01-01 RX ADMIN — ATORVASTATIN CALCIUM 10 MILLIGRAM(S): 80 TABLET, FILM COATED ORAL at 21:21

## 2022-01-01 RX ADMIN — AMLODIPINE BESYLATE 2.5 MILLIGRAM(S): 2.5 TABLET ORAL at 06:13

## 2022-01-01 RX ADMIN — Medication 81 MILLIGRAM(S): at 11:31

## 2022-01-01 RX ADMIN — CEFEPIME 100 MILLIGRAM(S): 1 INJECTION, POWDER, FOR SOLUTION INTRAMUSCULAR; INTRAVENOUS at 14:16

## 2022-01-01 RX ADMIN — BUDESONIDE AND FORMOTEROL FUMARATE DIHYDRATE 2 PUFF(S): 160; 4.5 AEROSOL RESPIRATORY (INHALATION) at 18:41

## 2022-01-01 RX ADMIN — Medication 20 MILLIEQUIVALENT(S): at 10:53

## 2022-01-01 RX ADMIN — HEPARIN SODIUM 5000 UNIT(S): 5000 INJECTION INTRAVENOUS; SUBCUTANEOUS at 03:40

## 2022-01-01 RX ADMIN — DAPTOMYCIN 120 MILLIGRAM(S): 500 INJECTION, POWDER, LYOPHILIZED, FOR SOLUTION INTRAVENOUS at 07:37

## 2022-01-01 RX ADMIN — APIXABAN 5 MILLIGRAM(S): 2.5 TABLET, FILM COATED ORAL at 06:00

## 2022-01-01 RX ADMIN — TRAMADOL HYDROCHLORIDE 50 MILLIGRAM(S): 50 TABLET ORAL at 18:59

## 2022-01-01 RX ADMIN — FAMOTIDINE 20 MILLIGRAM(S): 10 INJECTION INTRAVENOUS at 11:27

## 2022-01-01 RX ADMIN — Medication 50 MILLIGRAM(S): at 18:49

## 2022-01-01 RX ADMIN — LIDOCAINE 1 PATCH: 4 CREAM TOPICAL at 11:14

## 2022-01-01 RX ADMIN — HEPARIN SODIUM 5500 UNIT(S): 5000 INJECTION INTRAVENOUS; SUBCUTANEOUS at 23:30

## 2022-01-01 RX ADMIN — TRAMADOL HYDROCHLORIDE 25 MILLIGRAM(S): 50 TABLET ORAL at 12:56

## 2022-01-01 RX ADMIN — Medication 12.5 MILLIGRAM(S): at 17:23

## 2022-01-01 RX ADMIN — Medication 50 MICROGRAM(S): at 06:29

## 2022-01-01 RX ADMIN — Medication 81 MILLIGRAM(S): at 11:05

## 2022-01-01 RX ADMIN — CEFEPIME 100 MILLIGRAM(S): 1 INJECTION, POWDER, FOR SOLUTION INTRAMUSCULAR; INTRAVENOUS at 05:43

## 2022-01-01 RX ADMIN — APIXABAN 5 MILLIGRAM(S): 2.5 TABLET, FILM COATED ORAL at 08:45

## 2022-01-01 RX ADMIN — Medication 25 MILLIGRAM(S): at 17:11

## 2022-01-01 RX ADMIN — Medication 81 MILLIGRAM(S): at 11:40

## 2022-01-01 RX ADMIN — FLUCONAZOLE 400 MILLIGRAM(S): 150 TABLET ORAL at 11:28

## 2022-01-01 RX ADMIN — CHLORHEXIDINE GLUCONATE 1 APPLICATION(S): 213 SOLUTION TOPICAL at 21:00

## 2022-01-01 RX ADMIN — Medication 12.5 MILLIGRAM(S): at 17:01

## 2022-01-01 RX ADMIN — PANTOPRAZOLE SODIUM 40 MILLIGRAM(S): 20 TABLET, DELAYED RELEASE ORAL at 07:02

## 2022-01-01 RX ADMIN — SODIUM CHLORIDE 3 MILLILITER(S): 9 INJECTION INTRAMUSCULAR; INTRAVENOUS; SUBCUTANEOUS at 14:30

## 2022-01-01 RX ADMIN — Medication 5 MILLIGRAM(S): at 09:08

## 2022-01-01 RX ADMIN — AMLODIPINE BESYLATE 2.5 MILLIGRAM(S): 2.5 TABLET ORAL at 05:41

## 2022-01-01 RX ADMIN — Medication 100 MILLIGRAM(S): at 14:39

## 2022-01-01 RX ADMIN — BUDESONIDE AND FORMOTEROL FUMARATE DIHYDRATE 2 PUFF(S): 160; 4.5 AEROSOL RESPIRATORY (INHALATION) at 19:08

## 2022-01-01 RX ADMIN — CEFEPIME 100 MILLIGRAM(S): 1 INJECTION, POWDER, FOR SOLUTION INTRAMUSCULAR; INTRAVENOUS at 02:31

## 2022-01-01 RX ADMIN — Medication 4 MILLIGRAM(S): at 12:45

## 2022-01-01 RX ADMIN — Medication 1000 MILLIGRAM(S): at 18:44

## 2022-01-01 RX ADMIN — Medication 100 MILLIGRAM(S): at 21:55

## 2022-01-01 RX ADMIN — Medication 40 MILLIEQUIVALENT(S): at 12:30

## 2022-01-01 RX ADMIN — Medication 40 MILLIGRAM(S): at 16:28

## 2022-01-01 RX ADMIN — Medication 10 MILLIEQUIVALENT(S): at 11:12

## 2022-01-01 RX ADMIN — SODIUM CHLORIDE 3 MILLILITER(S): 9 INJECTION INTRAMUSCULAR; INTRAVENOUS; SUBCUTANEOUS at 22:00

## 2022-01-01 RX ADMIN — TRAMADOL HYDROCHLORIDE 25 MILLIGRAM(S): 50 TABLET ORAL at 14:01

## 2022-01-01 RX ADMIN — HEPARIN SODIUM 5000 UNIT(S): 5000 INJECTION INTRAVENOUS; SUBCUTANEOUS at 23:20

## 2022-01-01 RX ADMIN — FAMOTIDINE 20 MILLIGRAM(S): 10 INJECTION INTRAVENOUS at 13:07

## 2022-01-01 RX ADMIN — CEFEPIME 100 MILLIGRAM(S): 1 INJECTION, POWDER, FOR SOLUTION INTRAMUSCULAR; INTRAVENOUS at 01:55

## 2022-01-01 RX ADMIN — Medication 50 MILLIGRAM(S): at 22:37

## 2022-01-01 RX ADMIN — HEPARIN SODIUM 5000 UNIT(S): 5000 INJECTION INTRAVENOUS; SUBCUTANEOUS at 22:14

## 2022-01-01 RX ADMIN — PANTOPRAZOLE SODIUM 40 MILLIGRAM(S): 20 TABLET, DELAYED RELEASE ORAL at 07:04

## 2022-01-01 RX ADMIN — BUDESONIDE AND FORMOTEROL FUMARATE DIHYDRATE 2 PUFF(S): 160; 4.5 AEROSOL RESPIRATORY (INHALATION) at 19:38

## 2022-01-01 RX ADMIN — FENTANYL CITRATE 12.5 MICROGRAM(S): 50 INJECTION INTRAVENOUS at 00:55

## 2022-01-01 RX ADMIN — Medication 1000 MILLIGRAM(S): at 05:30

## 2022-01-01 RX ADMIN — Medication 20 MILLIGRAM(S): at 06:16

## 2022-01-01 RX ADMIN — Medication 650 MILLIGRAM(S): at 17:07

## 2022-01-01 RX ADMIN — Medication 650 MILLIGRAM(S): at 10:57

## 2022-01-01 RX ADMIN — Medication 125 MILLILITER(S): at 08:00

## 2022-01-01 RX ADMIN — Medication 650 MILLIGRAM(S): at 00:09

## 2022-01-01 RX ADMIN — OXYCODONE HYDROCHLORIDE 5 MILLIGRAM(S): 5 TABLET ORAL at 09:23

## 2022-01-01 RX ADMIN — HEPARIN SODIUM 5000 UNIT(S): 5000 INJECTION INTRAVENOUS; SUBCUTANEOUS at 21:47

## 2022-01-01 RX ADMIN — ERTAPENEM SODIUM 120 MILLIGRAM(S): 1 INJECTION, POWDER, LYOPHILIZED, FOR SOLUTION INTRAMUSCULAR; INTRAVENOUS at 22:17

## 2022-01-01 RX ADMIN — Medication 650 MILLIGRAM(S): at 19:00

## 2022-01-01 RX ADMIN — APIXABAN 5 MILLIGRAM(S): 2.5 TABLET, FILM COATED ORAL at 06:11

## 2022-01-01 RX ADMIN — LIDOCAINE 1 PATCH: 4 CREAM TOPICAL at 11:32

## 2022-01-01 RX ADMIN — CHLORHEXIDINE GLUCONATE 1 APPLICATION(S): 213 SOLUTION TOPICAL at 06:28

## 2022-01-01 RX ADMIN — Medication 12.5 MILLIGRAM(S): at 11:31

## 2022-01-01 RX ADMIN — Medication 81 MILLIGRAM(S): at 12:04

## 2022-01-01 RX ADMIN — POLYETHYLENE GLYCOL 3350 17 GRAM(S): 17 POWDER, FOR SOLUTION ORAL at 12:00

## 2022-01-01 RX ADMIN — LIDOCAINE 1 PATCH: 4 CREAM TOPICAL at 13:08

## 2022-01-01 RX ADMIN — Medication 20 MILLIEQUIVALENT(S): at 11:17

## 2022-01-01 RX ADMIN — Medication 100 MILLIGRAM(S): at 05:49

## 2022-01-01 RX ADMIN — PANTOPRAZOLE SODIUM 40 MILLIGRAM(S): 20 TABLET, DELAYED RELEASE ORAL at 05:48

## 2022-01-01 RX ADMIN — Medication 1000 MILLIGRAM(S): at 22:30

## 2022-01-01 RX ADMIN — Medication 400 MILLIGRAM(S): at 05:10

## 2022-01-01 RX ADMIN — POLYETHYLENE GLYCOL 3350 17 GRAM(S): 17 POWDER, FOR SOLUTION ORAL at 19:14

## 2022-01-01 RX ADMIN — HEPARIN SODIUM 5000 UNIT(S): 5000 INJECTION INTRAVENOUS; SUBCUTANEOUS at 14:18

## 2022-01-01 RX ADMIN — AMLODIPINE BESYLATE 2.5 MILLIGRAM(S): 2.5 TABLET ORAL at 05:44

## 2022-01-01 RX ADMIN — CEFEPIME 100 MILLIGRAM(S): 1 INJECTION, POWDER, FOR SOLUTION INTRAMUSCULAR; INTRAVENOUS at 14:18

## 2022-01-01 RX ADMIN — Medication 650 MILLIGRAM(S): at 19:57

## 2022-01-01 RX ADMIN — SODIUM CHLORIDE 3 MILLILITER(S): 9 INJECTION INTRAMUSCULAR; INTRAVENOUS; SUBCUTANEOUS at 13:25

## 2022-01-01 RX ADMIN — FLUCONAZOLE 400 MILLIGRAM(S): 150 TABLET ORAL at 11:20

## 2022-01-01 RX ADMIN — HEPARIN SODIUM 5000 UNIT(S): 5000 INJECTION INTRAVENOUS; SUBCUTANEOUS at 22:24

## 2022-01-01 RX ADMIN — Medication 20 MILLIEQUIVALENT(S): at 09:35

## 2022-01-01 RX ADMIN — Medication 1000 MILLIGRAM(S): at 13:39

## 2022-01-01 RX ADMIN — POLYETHYLENE GLYCOL 3350 17 GRAM(S): 17 POWDER, FOR SOLUTION ORAL at 11:50

## 2022-01-01 RX ADMIN — BUDESONIDE AND FORMOTEROL FUMARATE DIHYDRATE 2 PUFF(S): 160; 4.5 AEROSOL RESPIRATORY (INHALATION) at 21:45

## 2022-01-01 RX ADMIN — Medication 200 MILLIGRAM(S): at 07:02

## 2022-01-01 RX ADMIN — Medication 25 GRAM(S): at 17:25

## 2022-01-01 RX ADMIN — Medication 81 MILLIGRAM(S): at 11:50

## 2022-01-01 RX ADMIN — AMLODIPINE BESYLATE 2.5 MILLIGRAM(S): 2.5 TABLET ORAL at 06:56

## 2022-01-01 RX ADMIN — Medication 81 MILLIGRAM(S): at 12:19

## 2022-01-01 RX ADMIN — Medication 250 MILLIGRAM(S): at 21:20

## 2022-01-01 RX ADMIN — Medication 4 MILLIGRAM(S): at 07:12

## 2022-01-01 RX ADMIN — GABAPENTIN 100 MILLIGRAM(S): 400 CAPSULE ORAL at 22:25

## 2022-01-01 RX ADMIN — SENNA PLUS 2 TABLET(S): 8.6 TABLET ORAL at 22:32

## 2022-01-01 RX ADMIN — Medication 50 MICROGRAM(S): at 06:34

## 2022-01-01 RX ADMIN — APIXABAN 5 MILLIGRAM(S): 2.5 TABLET, FILM COATED ORAL at 21:47

## 2022-01-01 RX ADMIN — GABAPENTIN 100 MILLIGRAM(S): 400 CAPSULE ORAL at 13:19

## 2022-01-01 RX ADMIN — Medication 650 MILLIGRAM(S): at 22:40

## 2022-01-01 RX ADMIN — PIPERACILLIN AND TAZOBACTAM 25 GRAM(S): 4; .5 INJECTION, POWDER, LYOPHILIZED, FOR SOLUTION INTRAVENOUS at 06:09

## 2022-01-01 RX ADMIN — PIPERACILLIN AND TAZOBACTAM 200 GRAM(S): 4; .5 INJECTION, POWDER, LYOPHILIZED, FOR SOLUTION INTRAVENOUS at 17:00

## 2022-01-01 RX ADMIN — PIPERACILLIN AND TAZOBACTAM 25 GRAM(S): 4; .5 INJECTION, POWDER, LYOPHILIZED, FOR SOLUTION INTRAVENOUS at 22:03

## 2022-01-01 RX ADMIN — Medication 650 MILLIGRAM(S): at 20:23

## 2022-01-01 RX ADMIN — GABAPENTIN 100 MILLIGRAM(S): 400 CAPSULE ORAL at 21:35

## 2022-01-01 RX ADMIN — Medication 10 MILLIGRAM(S): at 00:51

## 2022-01-01 RX ADMIN — Medication 650 MILLIGRAM(S): at 10:03

## 2022-01-01 RX ADMIN — DAPTOMYCIN 116 MILLIGRAM(S): 500 INJECTION, POWDER, LYOPHILIZED, FOR SOLUTION INTRAVENOUS at 19:04

## 2022-01-01 RX ADMIN — TRAMADOL HYDROCHLORIDE 25 MILLIGRAM(S): 50 TABLET ORAL at 10:58

## 2022-01-01 RX ADMIN — MAGNESIUM OXIDE 400 MG ORAL TABLET 800 MILLIGRAM(S): 241.3 TABLET ORAL at 08:05

## 2022-01-01 RX ADMIN — DAPTOMYCIN 120 MILLIGRAM(S): 500 INJECTION, POWDER, LYOPHILIZED, FOR SOLUTION INTRAVENOUS at 06:34

## 2022-01-01 RX ADMIN — OXYCODONE HYDROCHLORIDE 5 MILLIGRAM(S): 5 TABLET ORAL at 10:00

## 2022-01-01 RX ADMIN — ATORVASTATIN CALCIUM 10 MILLIGRAM(S): 80 TABLET, FILM COATED ORAL at 21:55

## 2022-01-01 RX ADMIN — SODIUM CHLORIDE 1000 MILLILITER(S): 9 INJECTION, SOLUTION INTRAVENOUS at 22:30

## 2022-01-01 RX ADMIN — PIPERACILLIN AND TAZOBACTAM 25 GRAM(S): 4; .5 INJECTION, POWDER, LYOPHILIZED, FOR SOLUTION INTRAVENOUS at 22:09

## 2022-01-01 RX ADMIN — BUDESONIDE AND FORMOTEROL FUMARATE DIHYDRATE 2 PUFF(S): 160; 4.5 AEROSOL RESPIRATORY (INHALATION) at 19:17

## 2022-01-01 RX ADMIN — Medication 40 MILLIEQUIVALENT(S): at 08:43

## 2022-01-01 RX ADMIN — Medication 75 MILLIGRAM(S): at 00:26

## 2022-01-01 RX ADMIN — Medication 10 MILLIGRAM(S): at 22:04

## 2022-01-01 RX ADMIN — PANTOPRAZOLE SODIUM 40 MILLIGRAM(S): 20 TABLET, DELAYED RELEASE ORAL at 06:11

## 2022-01-01 RX ADMIN — AMLODIPINE BESYLATE 2.5 MILLIGRAM(S): 2.5 TABLET ORAL at 06:49

## 2022-01-01 RX ADMIN — CEFEPIME 100 MILLIGRAM(S): 1 INJECTION, POWDER, FOR SOLUTION INTRAMUSCULAR; INTRAVENOUS at 06:07

## 2022-01-01 RX ADMIN — Medication 325 MILLIGRAM(S): at 11:16

## 2022-01-01 RX ADMIN — FENTANYL CITRATE 12.5 MICROGRAM(S): 50 INJECTION INTRAVENOUS at 00:40

## 2022-01-01 RX ADMIN — Medication 650 MILLIGRAM(S): at 13:16

## 2022-01-01 RX ADMIN — GABAPENTIN 300 MILLIGRAM(S): 400 CAPSULE ORAL at 04:11

## 2022-01-01 RX ADMIN — Medication 81 MILLIGRAM(S): at 12:11

## 2022-01-01 RX ADMIN — BUDESONIDE AND FORMOTEROL FUMARATE DIHYDRATE 2 PUFF(S): 160; 4.5 AEROSOL RESPIRATORY (INHALATION) at 09:35

## 2022-01-01 RX ADMIN — DAPTOMYCIN 120 MILLIGRAM(S): 500 INJECTION, POWDER, LYOPHILIZED, FOR SOLUTION INTRAVENOUS at 16:28

## 2022-01-01 RX ADMIN — Medication 81 MILLIGRAM(S): at 11:48

## 2022-01-01 RX ADMIN — HEPARIN SODIUM 5000 UNIT(S): 5000 INJECTION INTRAVENOUS; SUBCUTANEOUS at 21:13

## 2022-01-01 RX ADMIN — MIDODRINE HYDROCHLORIDE 10 MILLIGRAM(S): 2.5 TABLET ORAL at 05:52

## 2022-01-01 RX ADMIN — Medication 10 MILLIGRAM(S): at 21:13

## 2022-01-01 RX ADMIN — CASPOFUNGIN ACETATE 260 MILLIGRAM(S): 7 INJECTION, POWDER, LYOPHILIZED, FOR SOLUTION INTRAVENOUS at 14:24

## 2022-01-01 RX ADMIN — CHLORHEXIDINE GLUCONATE 1 APPLICATION(S): 213 SOLUTION TOPICAL at 06:49

## 2022-01-01 RX ADMIN — CEFEPIME 100 MILLIGRAM(S): 1 INJECTION, POWDER, FOR SOLUTION INTRAMUSCULAR; INTRAVENOUS at 13:03

## 2022-01-01 RX ADMIN — CHLORHEXIDINE GLUCONATE 1 APPLICATION(S): 213 SOLUTION TOPICAL at 05:30

## 2022-01-01 RX ADMIN — GABAPENTIN 100 MILLIGRAM(S): 400 CAPSULE ORAL at 21:42

## 2022-01-01 RX ADMIN — Medication 10 MILLIGRAM(S): at 00:48

## 2022-01-01 RX ADMIN — Medication 4 MILLIGRAM(S): at 18:38

## 2022-01-01 RX ADMIN — HEPARIN SODIUM 9.5 UNIT(S)/HR: 5000 INJECTION INTRAVENOUS; SUBCUTANEOUS at 07:26

## 2022-01-01 RX ADMIN — APIXABAN 5 MILLIGRAM(S): 2.5 TABLET, FILM COATED ORAL at 05:49

## 2022-01-01 RX ADMIN — Medication 10 MILLIGRAM(S): at 15:12

## 2022-01-01 RX ADMIN — TRAMADOL HYDROCHLORIDE 25 MILLIGRAM(S): 50 TABLET ORAL at 18:14

## 2022-01-01 RX ADMIN — SODIUM CHLORIDE 3 MILLILITER(S): 9 INJECTION INTRAMUSCULAR; INTRAVENOUS; SUBCUTANEOUS at 21:40

## 2022-01-01 RX ADMIN — HYDROMORPHONE HYDROCHLORIDE 0.25 MILLIGRAM(S): 2 INJECTION INTRAMUSCULAR; INTRAVENOUS; SUBCUTANEOUS at 17:33

## 2022-01-01 RX ADMIN — METHOCARBAMOL 750 MILLIGRAM(S): 500 TABLET, FILM COATED ORAL at 20:39

## 2022-01-01 RX ADMIN — ERTAPENEM SODIUM 120 MILLIGRAM(S): 1 INJECTION, POWDER, LYOPHILIZED, FOR SOLUTION INTRAMUSCULAR; INTRAVENOUS at 19:15

## 2022-01-01 RX ADMIN — HEPARIN SODIUM 5000 UNIT(S): 5000 INJECTION INTRAVENOUS; SUBCUTANEOUS at 07:13

## 2022-01-01 RX ADMIN — Medication 40 MILLIGRAM(S): at 05:45

## 2022-01-01 RX ADMIN — Medication 50 MILLIGRAM(S): at 18:44

## 2022-01-01 RX ADMIN — OXYCODONE AND ACETAMINOPHEN 1 TABLET(S): 5; 325 TABLET ORAL at 20:15

## 2022-01-01 RX ADMIN — BUDESONIDE AND FORMOTEROL FUMARATE DIHYDRATE 2 PUFF(S): 160; 4.5 AEROSOL RESPIRATORY (INHALATION) at 18:35

## 2022-01-01 RX ADMIN — SENNA PLUS 2 TABLET(S): 8.6 TABLET ORAL at 21:26

## 2022-01-01 RX ADMIN — BUDESONIDE AND FORMOTEROL FUMARATE DIHYDRATE 2 PUFF(S): 160; 4.5 AEROSOL RESPIRATORY (INHALATION) at 06:32

## 2022-01-01 RX ADMIN — Medication 1000 MILLIGRAM(S): at 08:42

## 2022-01-01 RX ADMIN — PIPERACILLIN AND TAZOBACTAM 25 GRAM(S): 4; .5 INJECTION, POWDER, LYOPHILIZED, FOR SOLUTION INTRAVENOUS at 14:17

## 2022-01-01 RX ADMIN — Medication 40 MILLIEQUIVALENT(S): at 05:20

## 2022-01-01 RX ADMIN — Medication 50 MILLIGRAM(S): at 06:03

## 2022-01-01 RX ADMIN — LIDOCAINE 1 PATCH: 4 CREAM TOPICAL at 12:39

## 2022-01-01 RX ADMIN — BUDESONIDE AND FORMOTEROL FUMARATE DIHYDRATE 2 PUFF(S): 160; 4.5 AEROSOL RESPIRATORY (INHALATION) at 17:12

## 2022-01-01 RX ADMIN — CEFEPIME 100 MILLIGRAM(S): 1 INJECTION, POWDER, FOR SOLUTION INTRAMUSCULAR; INTRAVENOUS at 13:53

## 2022-01-01 RX ADMIN — APIXABAN 5 MILLIGRAM(S): 2.5 TABLET, FILM COATED ORAL at 21:56

## 2022-01-01 RX ADMIN — ATORVASTATIN CALCIUM 10 MILLIGRAM(S): 80 TABLET, FILM COATED ORAL at 21:30

## 2022-01-01 RX ADMIN — ATORVASTATIN CALCIUM 10 MILLIGRAM(S): 80 TABLET, FILM COATED ORAL at 22:01

## 2022-01-01 RX ADMIN — OXYCODONE AND ACETAMINOPHEN 1 TABLET(S): 5; 325 TABLET ORAL at 22:29

## 2022-01-01 RX ADMIN — Medication 40 MILLIEQUIVALENT(S): at 01:34

## 2022-01-01 RX ADMIN — ATORVASTATIN CALCIUM 10 MILLIGRAM(S): 80 TABLET, FILM COATED ORAL at 22:05

## 2022-01-01 RX ADMIN — APIXABAN 5 MILLIGRAM(S): 2.5 TABLET, FILM COATED ORAL at 17:28

## 2022-01-01 RX ADMIN — FAMOTIDINE 20 MILLIGRAM(S): 10 INJECTION INTRAVENOUS at 10:53

## 2022-01-01 RX ADMIN — FENTANYL CITRATE 25 MICROGRAM(S): 50 INJECTION INTRAVENOUS at 14:16

## 2022-01-01 RX ADMIN — GABAPENTIN 100 MILLIGRAM(S): 400 CAPSULE ORAL at 14:22

## 2022-01-01 RX ADMIN — HEPARIN SODIUM 5000 UNIT(S): 5000 INJECTION INTRAVENOUS; SUBCUTANEOUS at 06:50

## 2022-01-01 RX ADMIN — Medication 50 MILLIGRAM(S): at 06:13

## 2022-01-01 RX ADMIN — Medication 100 MILLIGRAM(S): at 13:09

## 2022-01-01 RX ADMIN — LIDOCAINE 1 PATCH: 4 CREAM TOPICAL at 22:28

## 2022-01-01 RX ADMIN — BUDESONIDE AND FORMOTEROL FUMARATE DIHYDRATE 2 PUFF(S): 160; 4.5 AEROSOL RESPIRATORY (INHALATION) at 17:56

## 2022-01-01 RX ADMIN — FLUCONAZOLE 100 MILLIGRAM(S): 150 TABLET ORAL at 16:57

## 2022-01-01 RX ADMIN — LIDOCAINE 1 PATCH: 4 CREAM TOPICAL at 11:30

## 2022-01-01 RX ADMIN — Medication 100 MILLIGRAM(S): at 14:54

## 2022-01-01 RX ADMIN — PIPERACILLIN AND TAZOBACTAM 25 GRAM(S): 4; .5 INJECTION, POWDER, LYOPHILIZED, FOR SOLUTION INTRAVENOUS at 14:48

## 2022-01-01 RX ADMIN — LIDOCAINE 1 PATCH: 4 CREAM TOPICAL at 02:13

## 2022-01-01 RX ADMIN — FLUCONAZOLE 400 MILLIGRAM(S): 150 TABLET ORAL at 12:11

## 2022-01-01 RX ADMIN — Medication 20 MILLIGRAM(S): at 05:56

## 2022-01-01 RX ADMIN — CHLORHEXIDINE GLUCONATE 1 APPLICATION(S): 213 SOLUTION TOPICAL at 07:30

## 2022-01-01 RX ADMIN — APIXABAN 5 MILLIGRAM(S): 2.5 TABLET, FILM COATED ORAL at 16:53

## 2022-01-01 RX ADMIN — SENNA PLUS 2 TABLET(S): 8.6 TABLET ORAL at 22:31

## 2022-01-01 RX ADMIN — MIDODRINE HYDROCHLORIDE 10 MILLIGRAM(S): 2.5 TABLET ORAL at 00:02

## 2022-01-01 RX ADMIN — PANTOPRAZOLE SODIUM 40 MILLIGRAM(S): 20 TABLET, DELAYED RELEASE ORAL at 07:09

## 2022-01-01 RX ADMIN — PANTOPRAZOLE SODIUM 40 MILLIGRAM(S): 20 TABLET, DELAYED RELEASE ORAL at 07:29

## 2022-01-01 RX ADMIN — BUDESONIDE AND FORMOTEROL FUMARATE DIHYDRATE 2 PUFF(S): 160; 4.5 AEROSOL RESPIRATORY (INHALATION) at 06:49

## 2022-01-01 RX ADMIN — Medication 100 MILLIGRAM(S): at 14:38

## 2022-01-01 RX ADMIN — BUDESONIDE AND FORMOTEROL FUMARATE DIHYDRATE 2 PUFF(S): 160; 4.5 AEROSOL RESPIRATORY (INHALATION) at 18:08

## 2022-01-01 RX ADMIN — LIDOCAINE 1 PATCH: 4 CREAM TOPICAL at 14:13

## 2022-01-01 RX ADMIN — HYDROMORPHONE HYDROCHLORIDE 0.25 MILLIGRAM(S): 2 INJECTION INTRAMUSCULAR; INTRAVENOUS; SUBCUTANEOUS at 19:00

## 2022-01-01 RX ADMIN — APIXABAN 5 MILLIGRAM(S): 2.5 TABLET, FILM COATED ORAL at 17:46

## 2022-01-01 RX ADMIN — CEFEPIME 100 MILLIGRAM(S): 1 INJECTION, POWDER, FOR SOLUTION INTRAMUSCULAR; INTRAVENOUS at 06:37

## 2022-01-01 RX ADMIN — OXYCODONE HYDROCHLORIDE 5 MILLIGRAM(S): 5 TABLET ORAL at 23:40

## 2022-01-01 RX ADMIN — LIDOCAINE 1 PATCH: 4 CREAM TOPICAL at 11:25

## 2022-01-01 RX ADMIN — LIDOCAINE 1 PATCH: 4 CREAM TOPICAL at 23:35

## 2022-01-01 RX ADMIN — CEFEPIME 100 MILLIGRAM(S): 1 INJECTION, POWDER, FOR SOLUTION INTRAMUSCULAR; INTRAVENOUS at 13:43

## 2022-01-01 RX ADMIN — Medication 25 MICROGRAM(S): at 05:39

## 2022-01-01 RX ADMIN — Medication 50 MICROGRAM(S): at 05:50

## 2022-01-01 RX ADMIN — BUDESONIDE AND FORMOTEROL FUMARATE DIHYDRATE 2 PUFF(S): 160; 4.5 AEROSOL RESPIRATORY (INHALATION) at 19:50

## 2022-01-01 RX ADMIN — BUDESONIDE AND FORMOTEROL FUMARATE DIHYDRATE 2 PUFF(S): 160; 4.5 AEROSOL RESPIRATORY (INHALATION) at 05:44

## 2022-01-01 RX ADMIN — PANTOPRAZOLE SODIUM 40 MILLIGRAM(S): 20 TABLET, DELAYED RELEASE ORAL at 07:11

## 2022-01-01 RX ADMIN — HEPARIN SODIUM 5000 UNIT(S): 5000 INJECTION INTRAVENOUS; SUBCUTANEOUS at 06:49

## 2022-01-01 RX ADMIN — Medication 40 MILLIGRAM(S): at 05:41

## 2022-01-01 RX ADMIN — ATORVASTATIN CALCIUM 10 MILLIGRAM(S): 80 TABLET, FILM COATED ORAL at 21:53

## 2022-01-01 RX ADMIN — Medication 50 MICROGRAM(S): at 05:27

## 2022-01-01 RX ADMIN — Medication 81 MILLIGRAM(S): at 11:04

## 2022-01-01 RX ADMIN — PANTOPRAZOLE SODIUM 40 MILLIGRAM(S): 20 TABLET, DELAYED RELEASE ORAL at 06:29

## 2022-01-01 RX ADMIN — AMLODIPINE BESYLATE 2.5 MILLIGRAM(S): 2.5 TABLET ORAL at 05:56

## 2022-01-01 RX ADMIN — Medication 650 MILLIGRAM(S): at 10:00

## 2022-01-01 RX ADMIN — HEPARIN SODIUM 7.5 UNIT(S)/HR: 5000 INJECTION INTRAVENOUS; SUBCUTANEOUS at 23:00

## 2022-01-01 RX ADMIN — OXYCODONE AND ACETAMINOPHEN 1 TABLET(S): 5; 325 TABLET ORAL at 16:12

## 2022-01-01 RX ADMIN — APIXABAN 5 MILLIGRAM(S): 2.5 TABLET, FILM COATED ORAL at 09:21

## 2022-01-01 RX ADMIN — DAPTOMYCIN 120 MILLIGRAM(S): 500 INJECTION, POWDER, LYOPHILIZED, FOR SOLUTION INTRAVENOUS at 07:03

## 2022-01-01 RX ADMIN — Medication 40 MILLIEQUIVALENT(S): at 16:09

## 2022-01-01 RX ADMIN — SENNA PLUS 2 TABLET(S): 8.6 TABLET ORAL at 21:05

## 2022-01-01 RX ADMIN — Medication 100 MILLIGRAM(S): at 22:21

## 2022-01-01 RX ADMIN — AMLODIPINE BESYLATE 2.5 MILLIGRAM(S): 2.5 TABLET ORAL at 06:04

## 2022-01-01 RX ADMIN — BUDESONIDE AND FORMOTEROL FUMARATE DIHYDRATE 2 PUFF(S): 160; 4.5 AEROSOL RESPIRATORY (INHALATION) at 10:56

## 2022-01-01 RX ADMIN — CASPOFUNGIN ACETATE 260 MILLIGRAM(S): 7 INJECTION, POWDER, LYOPHILIZED, FOR SOLUTION INTRAVENOUS at 16:14

## 2022-01-01 RX ADMIN — Medication 81 MILLIGRAM(S): at 11:49

## 2022-01-01 RX ADMIN — Medication 12.5 MILLIGRAM(S): at 06:28

## 2022-01-01 RX ADMIN — GABAPENTIN 100 MILLIGRAM(S): 400 CAPSULE ORAL at 21:47

## 2022-01-01 RX ADMIN — OXYCODONE HYDROCHLORIDE 5 MILLIGRAM(S): 5 TABLET ORAL at 10:31

## 2022-01-01 RX ADMIN — OXYCODONE HYDROCHLORIDE 5 MILLIGRAM(S): 5 TABLET ORAL at 18:50

## 2022-01-01 RX ADMIN — APIXABAN 5 MILLIGRAM(S): 2.5 TABLET, FILM COATED ORAL at 21:22

## 2022-01-01 RX ADMIN — Medication 81 MILLIGRAM(S): at 17:33

## 2022-01-01 RX ADMIN — BUDESONIDE AND FORMOTEROL FUMARATE DIHYDRATE 2 PUFF(S): 160; 4.5 AEROSOL RESPIRATORY (INHALATION) at 21:10

## 2022-01-01 RX ADMIN — Medication 12.5 MILLIGRAM(S): at 06:07

## 2022-01-01 RX ADMIN — Medication 650 MILLIGRAM(S): at 14:38

## 2022-01-01 RX ADMIN — ATORVASTATIN CALCIUM 10 MILLIGRAM(S): 80 TABLET, FILM COATED ORAL at 22:02

## 2022-01-01 RX ADMIN — SODIUM CHLORIDE 500 MILLILITER(S): 9 INJECTION, SOLUTION INTRAVENOUS at 17:32

## 2022-01-01 RX ADMIN — PHENYLEPHRINE HYDROCHLORIDE 2.55 MICROGRAM(S)/KG/MIN: 10 INJECTION INTRAVENOUS at 23:44

## 2022-01-01 RX ADMIN — APIXABAN 5 MILLIGRAM(S): 2.5 TABLET, FILM COATED ORAL at 05:47

## 2022-01-01 RX ADMIN — Medication 650 MILLIGRAM(S): at 20:10

## 2022-01-01 RX ADMIN — LIDOCAINE 1 PATCH: 4 CREAM TOPICAL at 23:31

## 2022-01-01 RX ADMIN — HEPARIN SODIUM 5000 UNIT(S): 5000 INJECTION INTRAVENOUS; SUBCUTANEOUS at 06:34

## 2022-01-01 RX ADMIN — GABAPENTIN 100 MILLIGRAM(S): 400 CAPSULE ORAL at 21:56

## 2022-01-01 RX ADMIN — Medication 10 MILLIGRAM(S): at 18:14

## 2022-01-01 RX ADMIN — Medication 10 MILLIGRAM(S): at 01:03

## 2022-01-01 RX ADMIN — Medication 100 MILLIGRAM(S): at 06:04

## 2022-01-01 RX ADMIN — SODIUM CHLORIDE 3 MILLILITER(S): 9 INJECTION INTRAMUSCULAR; INTRAVENOUS; SUBCUTANEOUS at 22:45

## 2022-01-01 RX ADMIN — OXYCODONE HYDROCHLORIDE 5 MILLIGRAM(S): 5 TABLET ORAL at 19:45

## 2022-01-01 RX ADMIN — LIDOCAINE 1 PATCH: 4 CREAM TOPICAL at 11:16

## 2022-01-01 RX ADMIN — FLUCONAZOLE 400 MILLIGRAM(S): 150 TABLET ORAL at 11:06

## 2022-01-01 RX ADMIN — Medication 50 MILLIGRAM(S): at 07:20

## 2022-01-01 RX ADMIN — LIDOCAINE 1 PATCH: 4 CREAM TOPICAL at 11:46

## 2022-01-01 RX ADMIN — Medication 20 MILLIGRAM(S): at 07:09

## 2022-01-01 RX ADMIN — HYDROMORPHONE HYDROCHLORIDE 0.25 MILLIGRAM(S): 2 INJECTION INTRAMUSCULAR; INTRAVENOUS; SUBCUTANEOUS at 19:35

## 2022-01-01 RX ADMIN — Medication 50 MILLIGRAM(S): at 05:56

## 2022-01-01 RX ADMIN — BUDESONIDE AND FORMOTEROL FUMARATE DIHYDRATE 2 PUFF(S): 160; 4.5 AEROSOL RESPIRATORY (INHALATION) at 17:17

## 2022-01-01 RX ADMIN — SODIUM CHLORIDE 3 MILLILITER(S): 9 INJECTION INTRAMUSCULAR; INTRAVENOUS; SUBCUTANEOUS at 21:37

## 2022-01-01 RX ADMIN — Medication 40 MILLIEQUIVALENT(S): at 18:19

## 2022-01-01 RX ADMIN — Medication 12.5 MILLIGRAM(S): at 00:19

## 2022-01-01 RX ADMIN — Medication 12.5 MILLIGRAM(S): at 06:29

## 2022-01-01 RX ADMIN — Medication 12.5 MILLIGRAM(S): at 00:14

## 2022-01-01 RX ADMIN — Medication 50 MILLIGRAM(S): at 05:43

## 2022-01-01 RX ADMIN — LIDOCAINE 1 PATCH: 4 CREAM TOPICAL at 10:19

## 2022-01-01 RX ADMIN — Medication 650 MILLIGRAM(S): at 07:59

## 2022-01-01 RX ADMIN — ATORVASTATIN CALCIUM 10 MILLIGRAM(S): 80 TABLET, FILM COATED ORAL at 23:21

## 2022-01-01 RX ADMIN — Medication 100 MILLIGRAM(S): at 14:01

## 2022-01-01 RX ADMIN — GABAPENTIN 100 MILLIGRAM(S): 400 CAPSULE ORAL at 06:50

## 2022-01-01 RX ADMIN — CEFEPIME 100 MILLIGRAM(S): 1 INJECTION, POWDER, FOR SOLUTION INTRAMUSCULAR; INTRAVENOUS at 05:48

## 2022-01-01 RX ADMIN — BUDESONIDE AND FORMOTEROL FUMARATE DIHYDRATE 2 PUFF(S): 160; 4.5 AEROSOL RESPIRATORY (INHALATION) at 05:36

## 2022-01-01 RX ADMIN — Medication 50 MILLIGRAM(S): at 18:17

## 2022-01-01 RX ADMIN — FLUCONAZOLE 100 MILLIGRAM(S): 150 TABLET ORAL at 17:36

## 2022-01-01 RX ADMIN — Medication 81 MILLIGRAM(S): at 13:19

## 2022-01-01 RX ADMIN — SODIUM CHLORIDE 3 MILLILITER(S): 9 INJECTION INTRAMUSCULAR; INTRAVENOUS; SUBCUTANEOUS at 07:39

## 2022-01-01 RX ADMIN — Medication 20 MILLIEQUIVALENT(S): at 05:33

## 2022-01-01 RX ADMIN — SODIUM CHLORIDE 3 MILLILITER(S): 9 INJECTION INTRAMUSCULAR; INTRAVENOUS; SUBCUTANEOUS at 05:26

## 2022-01-01 RX ADMIN — OXYCODONE HYDROCHLORIDE 5 MILLIGRAM(S): 5 TABLET ORAL at 22:42

## 2022-01-01 RX ADMIN — Medication 50 MILLIGRAM(S): at 17:12

## 2022-01-01 RX ADMIN — Medication 20 MILLIGRAM(S): at 16:09

## 2022-01-01 RX ADMIN — Medication 650 MILLIGRAM(S): at 23:23

## 2022-01-01 RX ADMIN — SODIUM CHLORIDE 3 MILLILITER(S): 9 INJECTION INTRAMUSCULAR; INTRAVENOUS; SUBCUTANEOUS at 22:30

## 2022-01-01 RX ADMIN — OXYCODONE HYDROCHLORIDE 5 MILLIGRAM(S): 5 TABLET ORAL at 19:16

## 2022-01-01 RX ADMIN — ERTAPENEM SODIUM 120 MILLIGRAM(S): 1 INJECTION, POWDER, LYOPHILIZED, FOR SOLUTION INTRAMUSCULAR; INTRAVENOUS at 14:06

## 2022-01-01 RX ADMIN — BUDESONIDE AND FORMOTEROL FUMARATE DIHYDRATE 2 PUFF(S): 160; 4.5 AEROSOL RESPIRATORY (INHALATION) at 05:13

## 2022-01-01 RX ADMIN — Medication 650 MILLIGRAM(S): at 23:17

## 2022-01-01 RX ADMIN — ATORVASTATIN CALCIUM 10 MILLIGRAM(S): 80 TABLET, FILM COATED ORAL at 21:17

## 2022-01-01 RX ADMIN — DAPTOMYCIN 120 MILLIGRAM(S): 500 INJECTION, POWDER, LYOPHILIZED, FOR SOLUTION INTRAVENOUS at 21:17

## 2022-01-01 RX ADMIN — TRAMADOL HYDROCHLORIDE 25 MILLIGRAM(S): 50 TABLET ORAL at 13:01

## 2022-01-01 NOTE — ED ADULT TRIAGE NOTE - NS ED TRIAGE HISTORIAN
Date of Birth: [birth year]	  Admission Weight (g): 789    Admission Date and Time:  22 @ 04:40         Gestational Age: 24.6     Source of admission [ __ ] Inborn     [ x ]Transport from [facility]    HPI: Dr. Bright requested Dr Hernandez, neontaologist to attend emergent C/S at 24+ weeks due to heavy vaginal bleeding. The mom is 33y/o, , O Neg, HIV NR, Covid19 Neg, other labs are pending. She is oral hypoglycemic  L & D: Abruptio placenta, STAT C/S, cord clamp in 15 sec after milking cord x1. The baby was placed in plastic bag, bulb and deep suctioned, HR<100, PPV started, serosanguinous secretion from pharynx /trachea, suctioned and intubated with 2.5 ETT taped at 6cm after checking B/L equal breath sound, and changing color ( to yellow) of CO2 detector. The baby was transported to NICU on radiant warmer with cont PPV.  Asst in DR: [facility]  24.6 weeks, with respiratory failure due to RDS, Presumed sepsis, at risk for hypoglycemia, thermoregulation impairment, IVH, ROP,  IDM?    Social History: No history of alcohol/tobacco exposure obtained  FHx: non-contributory to the condition being treated or details of FH documented here  ROS: unable to obtain ()      Patient

## 2022-02-28 NOTE — DIETITIAN INITIAL EVALUATION ADULT. - REASON INDICATOR FOR ASSESSMENT
General Sunscreen Counseling: I recommend a broad spectrum sunscreen with a SPF of 30 or higher, and should be reapplied every 2-3 hours after that as long as sun exposure continues. I also recommend a lip balm with a sunscreen as well. Sun protective clothing and hats can be used but must be worn the entire time you are exposed to the sun's rays. Detail Level: Detailed LOS

## 2022-03-14 PROBLEM — I51.7 CARDIAC ENLARGEMENT: Status: ACTIVE | Noted: 2021-03-18

## 2022-03-14 NOTE — DISCUSSION/SUMMARY
[FreeTextEntry1] : Maintain present medications\par She was advised on her test results.\par Patient is s/p a  2D echo doppler.Results were reviewed with her and a copy provided to her.\par Maintain metoprolol,ASA lipitor, and furosemide as ordered.\par She was placed on amiodarone post operatively by EP.\par She may benefit from outpatient cardiac rehab given all that she has endured ora and post operatively, but the patient declined to be enrolled at this time.\par the results of her lab test was reviewed with her in detail and a copy was provided to her.\par RV in 6 months.

## 2022-03-14 NOTE — ASSESSMENT
[FreeTextEntry1] : S/P AVR, for  Severe AS, 2v CABG, ascending aortic hemiarch replacement left aorto subclavian bypass complicated by a sternal wound infection requiring skilled nursing facility care and rehabilitation at [facility]\par Abnormal Lexiscan nuclear test c/w myocardial ischemia\par Moderate  MR\par Moderate TR\par CAD as outlined in cardiac catheterization report\par Hypertensive heart disease\par Cigarette smoker with possible COPD\par Hyperlipidemia\par Possible DM

## 2022-03-14 NOTE — PHYSICAL EXAM
[Well Developed] : well developed [Well Nourished] : well nourished [No Acute Distress] : no acute distress [Normal Venous Pressure] : normal venous pressure [No Carotid Bruit] : no carotid bruit [Normal S1, S2] : normal S1, S2 [No Murmur] : no murmur [No Rub] : no rub [No Gallop] : no gallop [Clear Lung Fields] : clear lung fields [Good Air Entry] : good air entry [No Respiratory Distress] : no respiratory distress  [Soft] : abdomen soft [Non Tender] : non-tender [No Masses/organomegaly] : no masses/organomegaly [Normal Bowel Sounds] : normal bowel sounds [Normal Gait] : normal gait [No Edema] : no edema [No Cyanosis] : no cyanosis [No Clubbing] : no clubbing [No Varicosities] : no varicosities [No Rash] : no rash [No Skin Lesions] : no skin lesions [Moves all extremities] : moves all extremities [No Focal Deficits] : no focal deficits [Normal Speech] : normal speech [Alert and Oriented] : alert and oriented [Normal memory] : normal memory [General Appearance - Well Developed] : well developed [Normal Appearance] : normal appearance [Well Groomed] : well groomed [General Appearance - Well Nourished] : well nourished [No Deformities] : no deformities [General Appearance - In No Acute Distress] : no acute distress [Heart Rate And Rhythm] : heart rate and rhythm were normal [Heart Sounds] : normal S1 and S2 [Edema] : no peripheral edema present [Respiration, Rhythm And Depth] : normal respiratory rhythm and effort [Exaggerated Use Of Accessory Muscles For Inspiration] : no accessory muscle use [Auscultation Breath Sounds / Voice Sounds] : lungs were clear to auscultation bilaterally [Normal Conjunctiva] : the conjunctiva exhibited no abnormalities [Normal Oropharynx] : normal oropharynx [Normal Jugular Venous V Waves Present] : normal jugular venous V waves present [Abdomen Soft] : soft [Abdomen Tenderness] : non-tender [Abnormal Walk] : normal gait [Nail Clubbing] : no clubbing of the fingernails [Cyanosis, Localized] : no localized cyanosis [Skin Color & Pigmentation] : normal skin color and pigmentation [Skin Turgor] : normal skin turgor [] : no rash [Oriented To Time, Place, And Person] : oriented to person, place, and time [Affect] : the affect was normal [de-identified] : Sternal wound infection with presence of a drain [FreeTextEntry1] : Grade III/VI systolic ejectionmurmur at RSB, grade II/VI systolic murmur at LSB [FreeTextEntry2] : groin with no hematoma

## 2022-03-14 NOTE — REASON FOR VISIT
[FreeTextEntry1] : Patient is here for follow up and to review her echo doppler and lab test results.

## 2022-04-07 PROBLEM — Z09 SURGICAL FOLLOWUP VISIT: Status: ACTIVE | Noted: 2021-11-17

## 2022-04-23 NOTE — PHYSICAL EXAM
[General Appearance - Well Developed] : well developed [Normal Appearance] : normal appearance [Well Groomed] : well groomed [General Appearance - Well Nourished] : well nourished [No Deformities] : no deformities [General Appearance - In No Acute Distress] : no acute distress [Heart Rate And Rhythm] : heart rate and rhythm were normal [Heart Sounds] : normal S1 and S2 [] : no respiratory distress [Respiration, Rhythm And Depth] : normal respiratory rhythm and effort [Exaggerated Use Of Accessory Muscles For Inspiration] : no accessory muscle use [Auscultation Breath Sounds / Voice Sounds] : lungs were clear to auscultation bilaterally [FreeTextEntry2] : groin with no hematoma

## 2022-04-23 NOTE — PROCEDURE
[No] : not [NSR] : normal sinus rhythm [Pacemaker] : pacemaker [Lead Imp:  ___ohms] : lead impedance was [unfilled] ohms [Sensing Amplitude ___mv] : sensing amplitude was [unfilled] mv [___V @] : [unfilled] V [___ ms] : [unfilled] ms [Pace ___ %] : Pace [unfilled]% [None] : none [de-identified] : medtronic [de-identified] : GODWIN [de-identified] : 2021

## 2022-04-23 NOTE — DISCUSSION/SUMMARY
[Pacemaker Function Normal] : normal pacemaker function [FreeTextEntry1] : 76 year old female with HTN, bicuspid aortic valve with severe aortic stenosis s/p AVR, ascending/hemiarch replacement, L aorto-subclavian bypass and CABG x2 8/2021 with post operative atrial fibrillation with pauses requiring epicardial pacing with wound debridement s/p MICRA, who presents for follow up. Interrogation reveals normal function and all measured data is within normal limits.  Will likely stop amiodarone on her next visit and defer oral anticoagulation to her surgeon.  WE will give her an event monitor to assess for recurrent afib.  She will follow up in 3 months or sooner if needed and knows to call with any questions or concerns

## 2022-04-23 NOTE — HISTORY OF PRESENT ILLNESS
[de-identified] : 76 year old female with HTN, bicuspid aortic valve with severe aortic stenosis s/p AVR, ascending/hemiarch replacement, L aorto-subclavian bypass and CABG x2 8/2021 with post operative atrial fibrillation with pauses requiring epicardial pacing with wound debridement s/p MICRA, who presents for follow up.\par \par She is doing well with no complaints.   No issues by groin site.  No palpitations, chest pain, SOB or syncope.  She is on amiodarone but not on AC as per CT surgery discharge.

## 2022-06-22 PROBLEM — Z87.39 HISTORY OF ARTHRITIS: Status: RESOLVED | Noted: 2021-07-22 | Resolved: 2022-01-01

## 2022-06-22 PROBLEM — R06.00 DYSPNEA ON EXERTION: Status: ACTIVE | Noted: 2021-03-18

## 2022-06-22 PROBLEM — Z86.79 HISTORY OF CORONARY ARTERY DISEASE: Status: RESOLVED | Noted: 2022-01-01 | Resolved: 2022-01-01

## 2022-06-22 PROBLEM — Z87.891 FORMER SMOKER: Status: ACTIVE | Noted: 2022-01-01

## 2022-06-22 PROBLEM — Z78.9 NO SIGNIFICANT FAMILY HISTORY: Status: ACTIVE | Noted: 2021-06-17

## 2022-06-22 PROBLEM — Z87.39 HISTORY OF SPINAL STENOSIS: Status: RESOLVED | Noted: 2021-07-22 | Resolved: 2022-01-01

## 2022-06-22 PROBLEM — Z71.6 ENCOUNTER FOR SMOKING CESSATION COUNSELING: Status: ACTIVE | Noted: 2022-01-01

## 2022-06-22 PROBLEM — R09.82 POST-NASAL DRIP: Status: ACTIVE | Noted: 2022-01-01

## 2022-06-22 PROBLEM — Z86.79 HISTORY OF HYPERTENSION: Status: RESOLVED | Noted: 2021-06-17 | Resolved: 2022-01-01

## 2022-06-22 NOTE — REVIEW OF SYSTEMS
[Nasal Congestion] : nasal congestion [Postnasal Drip] : postnasal drip [Cough] : cough [Dyspnea] : dyspnea [SOB on Exertion] : sob on exertion [Negative] : Endocrine

## 2022-06-23 NOTE — ED PROVIDER NOTE - PHYSICAL EXAMINATION
Vital Signs: I have reviewed the initial vital signs.  Constitutional: well-nourished, no acute distress, normocephalic  Cardiovascular: regular rate, regular rhythm, no murmur appreciated  Respiratory: unlabored respiratory effort, clear to auscultation bilaterally  Gastrointestinal: soft, non-tender, non-distended  abdomen, no pulsatile mass  Musculoskeletal: supple neck, no vertebral tenderness, +tenderness left sciatic notch, no foot drop, no bony tenderness  Integumentary: warm, dry, no rash  Neurologic: awake, alert, cranial nerves II-XII grossly intact, extremities’ motor and sensory functions grossly intact, no focal deficits, GCS 15

## 2022-06-23 NOTE — ED PROVIDER NOTE - PATIENT PORTAL LINK FT
You can access the FollowMyHealth Patient Portal offered by [facility] by registering at the following website: [email or web address]. By joining [facility]’s FollowMyHealth portal, you will also be able to view your health information using other applications (apps) compatible with our system.

## 2022-06-23 NOTE — ED PROVIDER NOTE - NSFOLLOWUPINSTRUCTIONS_ED_ALL_ED_FT
Our Emergency Department Referral Coordinators will be reaching out ot you in the next 24-48 hours from 9:00am to 5:00pm (Monday to Friday) with a follow up appointment. Please expect a phone call from the hospital in that time frame. If you do not receive a call or if you have any questions or concerns, you can reach them at [phone] or [phone].          Back Pain    Back pain is very common in adults. The cause of back pain is rarely dangerous and the pain often gets better over time. The cause of your back pain may not be known and may include strain of muscles or ligaments, degeneration of the spinal disks, or arthritis. Occasionally the pain may radiate down your leg(s). Over-the-counter medicines to reduce pain and inflammation are often the most helpful. Stretching and remaining active frequently helps the healing process.     SEEK IMMEDIATE MEDICAL CARE IF YOU HAVE ANY OF THE FOLLOWING SYMPTOMS: bowel or bladder control problems, unusual weakness or numbness in your arms or legs, nausea or vomiting, abdominal pain, fever, dizziness/lightheadedness.

## 2022-06-23 NOTE — ED PROVIDER NOTE - NS ED ROS FT
Review of Systems    Constitutional: (-) fever or chills  respiratory: (-) cough (-) shortness of breath  Cardiovascular: (-) syncope, palpitations or chest pain  GI: (-) no abdominal pain, vomiting or diarrhea  msk: no joint pain or painful ROM +back pain  skin: no laceration , swelling or bruising   Neurological: (-) headache or head injury

## 2022-06-23 NOTE — ED PROVIDER NOTE - OBJECTIVE STATEMENT
77 y/o female presents with left lower back pain radiating down leg x 2 weeks. 75 y/o female presents with left lower back pain radiating down leg x 2 weeks. patient with hx of CAD, valve replacement with complications in past. patient denies any chest pain, sob, weakness to extremities. no rashes. patient with previous hx of back pain. patient denies any recent heavy lifting or falls. patient ambulatory . patient has been taking ibuprofen with minimal relief and states symptoms are worse at night when lying down. no bowel/bladder incontinence. patient c/o shooting pain.

## 2022-06-23 NOTE — ED PROVIDER NOTE - CLINICAL SUMMARY MEDICAL DECISION MAKING FREE TEXT BOX
76-year-old female history as above presenting for evaluation of left lower back pain radiating to the left lower extremity intermittent for 2 weeks.  States she had similar symptoms prior, self resolved.  No trauma.  No numbness/focal weakness.  No bowel/bladder dysfunction.  Ambulatory.  Well appearing, NAD, non toxic. NCAT PERRLA EOMI neck supple non tender normal wob cta bl rrr abdomen s nt nd no rebound no guarding WWPx4 neuro non focal no midline CTL spine tenderness to palpation throughout, no saddle anesthesia or step-offs.  Tenderness palpation left sciatic notch.  2+ equal pulses, less than 2-second capillary refill.  NVI.  Patient feeling improved, ambulatory.  Lives at home with multiple family members.  Comfortable with discharge and follow-up outpatient, strict return precautions given. Endorses understanding of all of this and aware that they can return at any time for new or concerning symptoms. No further questions or concerns at this time

## 2022-06-28 NOTE — ED ADULT NURSE NOTE - PAIN RATING/NUMBER SCALE (0-10): ACTIVITY
9 [Please see my note below.] : Please see my note below. [Consult Closing:] : Thank you very much for allowing me to participate in the care of this patient.  If you have any questions, please do not hesitate to contact me. [Consult Letter:] : I had the pleasure of evaluating your patient, [unfilled]. [Sincerely,] : Sincerely, [Dear  ___] : Dear ~TELMA, [FreeTextEntry2] : Dr. Kyle Scott [FreeTextEntry3] : Dennis Smith MD

## 2022-06-28 NOTE — ED PROVIDER NOTE - PATIENT PORTAL LINK FT
You can access the FollowMyHealth Patient Portal offered by [facility] by registering at the following website: [email or web address]. By joining [facility]’s FollowMyHealth portal, you will also be able to view your health information using other applications (apps) compatible with our system.

## 2022-06-28 NOTE — ED PROVIDER NOTE - OBJECTIVE STATEMENT
76 y f PMHx CAD, valve replacement with complications in past c/o LLE pain. pt was recently seen here for similar problem, dc w/ pain meds and [facility] referral. pt was seen by PT and has f/u this friday. pt states that the pain meds are not helping her at home and wants to be evaluated for pain.   patient denies any chest pain, sob, weakness to extremities. patient denies any recent heavy lifting or falls. patient ambulatory . patient has been taking naproxen with minimal relief and states symptoms are worse at night when lying down. no bowel/bladder incontinence. patient c/o shooting pain.

## 2022-06-28 NOTE — ED PROVIDER NOTE - NSFOLLOWUPINSTRUCTIONS_ED_ALL_ED_FT
Sciatica    WHAT YOU NEED TO KNOW:    What is sciatica? Sciatica is a condition that causes pain along your sciatic nerve. The sciatic nerve runs from your spine through both sides of your buttocks. It then runs down the back of your thigh, into your lower leg and foot. Any place along your sciatic nerve may be compressed, inflamed, irritated, or stretched and cause symptoms.    What causes sciatica? Sciatica may be related to certain activities, poor posture, and physical or psychological stress. Any of the following may cause or increase your risk of sciatica:     Disc problems: A slipped disc (soft cushion in between the bones of the spine) is the most common cause of sciatica. The disc may press on the sciatic nerve. One bone in your spine may slip over another, or you may have narrowing of the spinal column.     Muscle injury: This may happen after you twist or lift a heavy object. Swelling from sprained or irritated muscles in the buttocks, thighs, or legs press on the sciatic nerve.    Obesity or pregnancy: Extra weight increases pressure on your back and legs.    Trauma: Direct blows on the buttocks, thighs, or legs, car accidents, or falls may injure the sciatic nerve.    Diseases of the spine: Arthritis, osteoporosis, cancer, or infection of the spine may also affect the sciatic nerve.    What are the signs and symptoms of sciatica? The symptoms of sciatic may be short-term or long-term:    - Pain that goes from the lower back into your buttocks and down the back of your thigh  - Numbness or tingling in your buttocks and legs  - Muscle weakness, difficulty moving or controlling your leg or foot  - Leg pain that increases with standing, sitting, or squatting     How is sciatica diagnosed? Your healthcare provider will ask about other health conditions you may have. He may ask you about your job, history of back pain, diseases, or surgeries you have had. He will examine you and move your legs to see what increases pain. You may also need any of the following:    X-rays: This is a picture of the bones and tissues in your back, hip, thigh, or leg. This test may show other problems, such as fractures (broken bones).     CT scan: This test is also called a CAT scan. An x-ray machine uses a computer to take pictures of your hips, thighs, and legs. The pictures may show your sciatic nerve, muscles, and blood vessels. You may be given a dye before the pictures are taken to help healthcare providers see the pictures better. Tell the healthcare provider if you have ever had an allergic reaction to contrast dye.     MRI: This scan uses powerful magnets and a computer to take pictures of your hips, thighs, and legs. An MRI may show damaged nerves, muscles, bones, and blood vessels. You may be given dye to help the pictures show up better. Tell the healthcare provider if you have ever had an allergic reaction to contrast dye. Do not enter the MRI room with anything metal. Metal can cause serious injury. Tell the healthcare provider if you have any metal in or on your body.     An electromyography (EMG) test measures the electrical activity of your muscles at rest and with movement.    Nerve conduction tests: These tests check how surface nerves and related muscles respond to stimulation. Electrodes with wires or tiny needles are placed on certain areas, such as the buttocks and legs.    How is sciatica treated?     NSAIDs: These medicines decrease swelling and pain. NSAIDs are available without a doctor's order. Ask your healthcare provider which medicine is right for you. Ask how much to take and when to take it. Take as directed. NSAIDs can cause stomach bleeding or kidney problems if not taken correctly.    Acetaminophen: This medicine decreases pain. Acetaminophen is available without a doctor's order. Ask how much to take and how often to take it. Follow directions. Acetaminophen can cause liver damage if not taken correctly.    Muscle relaxers help decrease pain and muscle spasms.    Epidural steroid medicine: This may include both an anesthetic (numbing medicine) and a steroid, which may decrease swelling and relieve pain. It is given as a shot close to the spine in the area where you have pain.    Chemonucleolysis: This is an injection given into the damaged disc to soften or shrink the disc.    Surgery: This may be done to correct problems such as a damaged disc, or a tumor in your spine. It may be done to decrease the pressure on the sciatic nerve. Healthcare providers may also release the muscle that may be pressing into your sciatic nerve.    How can I help manage sciatica?     Ultrasound therapy: This is a machine that uses sound waves to decrease pain. Topical medicines may be added to help decrease pain and inflammation.    Physical therapy: A physical therapist teaches you exercises to help improve movement and strength, and to decrease pain. An occupational therapist teaches you skills to help with your daily activities.     Assistive devices: You may need to wear back support, such as a back brace. You may need crutches, a cane, or a walker to decrease stress on your lower back and leg muscles. Ask your healthcare provider for more information about assistive devices and how to use them correctly.    How can sciatica be prevented?     Avoid pressure on your back and legs: Do not lift heavy objects, or stand or sit for long periods of time.    Lift objects safely: Keep your back straight and bend your knees when you  an object. Do not bend or twist your back when you lift.    Maintain a healthy weight: Ask your healthcare provider how much you should weigh. Ask him to help you create a weight loss plan if you are overweight.     Exercise: Ask your healthcare provider about the best stretching, warmup, and exercise plan for you.     What are the risks of sciatica? An epidural steroid injection can lead to pain disorders or paralysis if it is placed incorrectly. It may also cause headaches, leg pain, and blockage of blood flow to the spinal cord. Surgery may cause you to bleed or get an infection. If not treated, your muscles and nerves may become damaged permanently. You may have decreased strength. You may not be able to move your leg or control when you urinate or have bowel movements.     When should I contact my healthcare provider?   - You have pain in your lower back at night or when resting.  - You have pain in your lower back with numbness below the knee.  - You have weakness in one leg only.  - You have questions or concerns about your condition or care.    When should I seek immediate care or call 911?   - You have trouble holding back your urine or bowel movements.  - You have weakness in both legs.  - You have numbness in your groin or buttocks.    CARE AGREEMENT:    You have the right to help plan your care. Learn about your health condition and how it may be treated. Discuss treatment options with your healthcare providers to decide what care you want to receive. You always have the right to refuse treatment.      © Copyright [employer] 2020

## 2022-06-28 NOTE — ED ADULT NURSE NOTE - NS_NURSE_DISC_TEACHING_YN_ED_ALL_ED
Advised to wear day and night until symptoms gone or follow-up with rheumatology.  Please let her know I referred her to rheumatology and she should be hearing from them in a few days. If not call clinic   Yes

## 2022-06-28 NOTE — ED PROVIDER NOTE - CARE PROVIDER_API CALL
Dru Tejada)  Geriatric Medicine; Internal Medicine  [address] [place] [address]  Phone: [phone]  Fax: [phone]  Follow Up Time: 7-10 Days

## 2022-06-28 NOTE — ED PROVIDER NOTE - PHYSICAL EXAMINATION
CONSTITUTIONAL: Well-appearing; well-nourished; in no apparent distress.   HEAD: Normocephalic; atraumatic.   EYES: PERRL; EOM intact. Conjunctiva normal B/L.   ENT: Normal pharynx with no tonsillar hypertrophy. MMM.  NECK: Supple; non-tender; no cervical lymphadenopathy.   CHEST: Normal chest excursion with respiration.   CARDIOVASCULAR: Normal S1, S2; no murmurs, rubs, or gallops.   RESPIRATORY: Normal chest excursion with respiration; breath sounds clear and equal bilaterally; no wheezes, rhonchi, or rales.  GI/: Normal bowel sounds; non-distended; non-tender.  BACK: No evidence of trauma or deformity. Non-tender to palpation. No CVA tenderness. + straight leg raise, pelvis stable   EXT: Normal ROM in all four extremities; non-tender to palpation; distal pulses are normal. No leg edema B/L.   SKIN: Normal for age and race; warm; dry; good turgor.  NEURO: A & O x 4; CN 2-12 intact. Grossly unremarkable.

## 2022-06-28 NOTE — ED PROVIDER NOTE - CLINICAL SUMMARY MEDICAL DECISION MAKING FREE TEXT BOX
back pain NVI will d/c supportive care, rehab f/u. Patient counseled regarding conditions which should prompt return.

## 2022-06-28 NOTE — ED PROVIDER NOTE - CARE PROVIDERS DIRECT ADDRESSES
[email or web address]

## 2022-06-28 NOTE — ED PROVIDER NOTE - WET READ LAUNCH FT
9/10/21 status update 2nd request- Please update the EPA team on the status of this prior authorization.    Please see the denial letter from 9/8/21, please advise   
Insulin Glargine, 2 Unit Dial, (Toujeo Max SoloStar) 300 UNIT/ML pen-injector 4.5 mL 3 9/10/2021     Sig: Inject 56 Units into the skin daily. Prime 4 units before each dose.    Sent to pharmacy as: Toujeo Max SoloStar 300 UNIT/ML       Medication changed at this time per Dr. Treadwell, patient informed. No further questions at this time  
Message to be discussed with Dr. Treadwell  
Per insurance, [place] for tresiba denied, patient must try and fail lantus and toujeo prior to authorization of tresiba    On Dr. Treadwell's desk for reivew   
Tresiba FlexTouch has been denied    Contacted insurance for denial/appeal information.  If received at MD office, please fax to EPA team at [phone] and update this encounter with how you would like to proceed. If received by EPA team will update provider with this information.    Denial reason-      
Tresiba FlexTpuch Pending    Insurance response  Prescription Drug Insurance: [employer]  Notes: Prior authorization submitted to patient insurance company and will update provider when decision has been made      
There are no Wet Read(s) to document.

## 2022-07-18 NOTE — PROGRESS NOTE ADULT - SUBJECTIVE AND OBJECTIVE BOX
CTICU  CRITICAL  CARE  attending     Hand off received 					   Pertinent clinical, laboratory, radiographic, hemodynamic, echocardiographic, respiratory data, microbiologic data and chart were reviewed and analyzed frequently throughout the course of the day and night  Patient seen and examined with CTS/ SH attending at bedside  Pt is a 76y , Female, HEALTH ISSUES - PROBLEM Dx:      , FAMILY HISTORY:  FH: CAD (coronary artery disease) (Sibling)  CABG    Family history of CKD (chronic kidney disease) (Sibling)  ESRD    Family history of diabetes mellitus (DM) (Sibling)    PAST MEDICAL & SURGICAL HISTORY:  HTN (hypertension)    H/O aortic valve stenosis    CAD (coronary artery disease)    No significant past surgical history      Patient is a 76y old  Female who presents with a chief complaint of     14 system review was unremarkable    Vital signs, hemodynamic and respiratory parameters were reviewed from the bedside nursing flowsheet.  ICU Vital Signs Last 24 Hrs  T(C): 36.3 (11 Aug 2021 17:12), Max: 36.4 (11 Aug 2021 07:01)  T(F): 97.4 (11 Aug 2021 17:12), Max: 97.6 (11 Aug 2021 07:01)  HR: 84 (11 Aug 2021 18:00) (63 - 99)  BP: --  BP(mean): --  ABP: 113/59 (11 Aug 2021 18:00) (93/55 - 150/80)  ABP(mean): 74 (11 Aug 2021 18:00) (68 - 101)  RR: 14 (11 Aug 2021 18:00) (14 - 21)  SpO2: 97% (11 Aug 2021 18:00) (96% - 100%)    Adult Advanced Hemodynamics Last 24 Hrs  CVP(mm Hg): 15 (11 Aug 2021 18:00) (0 - 141)  CVP(cm H2O): --  CO: --  CI: --  PA: 33/22 (11 Aug 2021 18:00) (33/22 - 37/26)  PA(mean): 28 (11 Aug 2021 18:00) (28 - 33)  PCWP: --  SVR: --  SVRI: --  PVR: --  PVRI: --, ABG - ( 11 Aug 2021 16:07 )  pH, Arterial: 7.45  pH, Blood: x     /  pCO2: 32    /  pO2: 109   / HCO3: 22    / Base Excess: -1.0  /  SaO2: 99.4              Mode: AC/ CMV (Assist Control/ Continuous Mandatory Ventilation)  RR (machine): 14  TV (machine): 550  FiO2: 50  PEEP: 5  ITime: 1  MAP: 14  PIP: 23    Intake and output was reviewed and the fluid balance was calculated  Daily     Daily   I&O's Summary    10 Aug 2021 07:01  -  11 Aug 2021 07:00  --------------------------------------------------------  IN: 1332 mL / OUT: 2465 mL / NET: -1133 mL    11 Aug 2021 07:01  -  11 Aug 2021 19:52  --------------------------------------------------------  IN: 2376.6 mL / OUT: 2900 mL / NET: -523.4 mL        All lines and drain sites were assessed  [facility]      POCT Blood Glucose.: 146 mg/dL (11 Aug 2021 17:05)    No acute change in mental status  Auscultation of the chest reveals equal bs  Abdomen is soft  Extremities are warm and well perfused  Wounds appear clean and unremarkable  Antibiotics are periop    labs  CBC Full  -  ( 11 Aug 2021 16:10 )  WBC Count : 11.89 K/uL  RBC Count : 2.92 M/uL  Hemoglobin : 9.0 g/dL  Hematocrit : 26.4 %  Platelet Count - Automated : 37 K/uL  Mean Cell Volume : 90.4 fl  Mean Cell Hemoglobin : 30.8 pg  Mean Cell Hemoglobin Concentration : 34.1 gm/dL  Auto Neutrophil # : x  Auto Lymphocyte # : x  Auto Monocyte # : x  Auto Eosinophil # : x  Auto Basophil # : x  Auto Neutrophil % : x  Auto Lymphocyte % : x  Auto Monocyte % : x  Auto Eosinophil % : x  Auto Basophil % : x    08-11    142  |  103  |  33<H>  ----------------------------<  178<H>  3.8   |  25  |  1.28    Ca    8.5      11 Aug 2021 16:10  Phos  5.5     08-11  Mg     2.7     08-11    TPro  5.5<L>  /  Alb  4.1  /  TBili  0.7  /  DBili  x   /  AST  76<H>  /  ALT  27  /  AlkPhos  79  08-11    PT/INR - ( 11 Aug 2021 16:10 )   PT: 15.1 sec;   INR: 1.27          PTT - ( 11 Aug 2021 16:10 )  PTT:65.9 sec  The current medications were reviewed   MEDICATIONS  (STANDING):  albumin human  5% IVPB 250 milliLiter(s) IV Intermittent every 1 hour  albumin human 25% IVPB 50 milliLiter(s) IV Intermittent every 10 minutes  aMIOdarone Infusion 0.5 mG/Min (16.7 mL/Hr) IV Continuous <Continuous>  aspirin  chewable 81 milliGRAM(s) Enteral Tube daily  atorvastatin 10 milliGRAM(s) Oral at bedtime  chlorhexidine 0.12% Liquid 5 milliLiter(s) Oral Mucosa two times a day  dexMEDEtomidine Infusion 0.2 MICROgram(s)/kG/Hr (3.88 mL/Hr) IV Continuous <Continuous>  dextrose 40% Gel 15 Gram(s) Oral once  dextrose 5%. 1000 milliLiter(s) (50 mL/Hr) IV Continuous <Continuous>  dextrose 5%. 1000 milliLiter(s) (100 mL/Hr) IV Continuous <Continuous>  dextrose 50% Injectable 50 milliLiter(s) IV Push every 15 minutes  dextrose 50% Injectable 25 milliLiter(s) IV Push every 15 minutes  DOBUTamine Infusion 3 MICROgram(s)/kG/Min (6.98 mL/Hr) IV Continuous <Continuous>  EPINEPHrine    Infusion 0.03 MICROgram(s)/kG/Min (8.73 mL/Hr) IV Continuous <Continuous>  furosemide Infusion 10 mG/Hr (5 mL/Hr) IV Continuous <Continuous>  glucagon  Injectable 1 milliGRAM(s) IntraMuscular once  heparin   Injectable 5000 Unit(s) SubCutaneous every 8 hours  insulin lispro (ADMELOG) corrective regimen sliding scale   SubCutaneous every 6 hours  milrinone Infusion 0.25 MICROgram(s)/kG/Min (5.82 mL/Hr) IV Continuous <Continuous>  pantoprazole  Injectable 40 milliGRAM(s) IV Push daily  propofol Infusion 5 MICROgram(s)/kG/Min (2.33 mL/Hr) IV Continuous <Continuous>  sodium chloride 0.9%. 1000 milliLiter(s) (10 mL/Hr) IV Continuous <Continuous>  vasopressin Infusion 0.015 Unit(s)/Min (0.9 mL/Hr) IV Continuous <Continuous>    MEDICATIONS  (PRN):  ALBUTerol    90 MICROgram(s) HFA Inhaler 2 Puff(s) Inhalation every 6 hours PRN Wheezing  fentaNYL    Injectable 25 MICROGram(s) IV Push every 3 hours PRN Severe Pain (7 - 10)       PROBLEM LIST/ ASSESSMENT:  HEALTH ISSUES - PROBLEM Dx:      ,   Patient is a 76y old  Female who presents with a chief complaint of    s/p cardiac surgery    Acute systolic and diastolic heart failure evidenced by SOB and parenchymal infiltrates; will treat with diuresis    Cardiogenic shock on ionotropy    Vasogenic shock due to hypotension in the cticu , will keep on pressors    Hypovolemic shock - > 20% intravascular depletion will replete volume    Acute blood loss anemia with relative hypotension treated with > 1 unit PC      Acute respiratory failure ruled in due to prolonged mechanical ventilation > 24 hrs on the vent due to failure to wean due to weak respiratory mechanics    Toxic metabolic encephalopathy ; sundowning due to anesthesia pain medications    Acidosis evidenced by anion gap and negative base excess    Acute kidney injury - creatinine > 0.3 due to combined prerenal and intrarenal factors can presume ATN        My plan includes :  close hemodynamic, ventilatory and drain monitoring and management per post op routine    will reinsert swan    ionotropy prn    dc cvn performed for afib rvr  Monitor for arrhythmias and monitor parameters for organ perfusion  beta blockade not administered due to hemodynamic instability and bradycardia  monitor neurologic status  Head of the bed should remain elevated to 45 deg .   chest PT and IS will be encouraged  monitor adequacy of oxygenation and ventilation and attempt to wean oxygen  antibiotic regimen will be tailored to the clinical, laboratory and microbiologic data  Nutritional goals will be met using po eventually , ensure adequate caloric intake and montior the same  Stress ulcer and VTE prophylaxis will be achieved    Glycemic control is satisfactory  Electrolytes have been repleted as necessary and wound care has been carried out. Pain control has been achieved.   agressive physical therapy and early mobility and ambulation goals will be met   The family was updated about the course and plan  CRITICAL CARE TIME personally provided by me  in evaluation and management, reassessments, review and interpretation of labs and x-rays, ventilator and hemodynamic management, formulating a plan and coordinating care: ___90____ MIN.  Time does not include procedural time.  CTICU ATTENDING     					    Jesu Garcia MD                        	 UE There ex, Bed mobility, Transfers, ADL training, Adaptative equipment training, Therapeutic activity, Neuromuscular re-education, Patient education

## 2022-07-20 NOTE — ED ADULT TRIAGE NOTE - CHIEF COMPLAINT QUOTE
pt c/o lower abdominal pain and has been "constipated for weeks"; also c/o worsening chronic left arm pain

## 2022-07-21 NOTE — ED ADULT NURSE REASSESSMENT NOTE - NS ED NURSE REASSESS COMMENT FT1
BELONGINGS GIVEN TO SECURITY BECAUSE PT IS BEING TAKEN TO SURGERY. PT TAKEN AT THIS TIME     1 CANE,  1 SHIRT  1 PANT  1 BAG WITH 1 PHONE  1 PAIR OF SHOES BELONGINGS GIVEN TO SECURITY BECAUSE PT IS BEING TAKEN TO SURGERY. PT TAKEN AT THIS TIME. REPORT GIVEN TO RN IN SX    1 CANE,  1 SHIRT  1 PANT  1 BAG WITH 1 PHONE  1 PAIR OF SHOES

## 2022-07-21 NOTE — BRIEF OPERATIVE NOTE - NSICDXBRIEFPROCEDURE_GEN_ALL_CORE_FT
PROCEDURES:  Embolectomy, artery, brachial 21-Jul-2022 10:29:34  Silvio George  Endarterectomy, brachial artery 21-Jul-2022 10:31:51  Silvio George

## 2022-07-21 NOTE — ED ADULT NURSE NOTE - OBJECTIVE STATEMENT
pt c/o lower abdominal pain and has been "constipated for weeks"; also c/o worsening chronic left arm pain.

## 2022-07-21 NOTE — CHART NOTE - NSCHARTNOTEFT_GEN_A_CORE
Vascular Surgery Post-Op Note,  SPECTRA [id number]    CC: left arm numbness and pain  Pre-Op Dx: BRACHIAL ARTERY STENOSIS LEFT    BRACHIAL ARTERY STENOSIS LEFT ...    Limb ischemia    Brachial artery stenosis, left      Procedure: Embolectomy, artery, brachial    Endarterectomy, brachial artery      Surgeon: Dr. Villanueva    Subjective: seen at [facility]. Doing well. Currently experiencing no pain    Vital Signs Last 24 Hrs  T(C): 36.4 (21 Jul 2022 11:00), Max: 36.8 (21 Jul 2022 00:01)  T(F): 97.6 (21 Jul 2022 11:00), Max: 98.3 (21 Jul 2022 00:01)  HR: 92 (21 Jul 2022 12:30) (78 - 95)  BP: 136/72 (21 Jul 2022 12:30) (132/74 - 198/88)  BP(mean): 98 (21 Jul 2022 12:00) (96 - 106)  RR: 16 (21 Jul 2022 12:30) (13 - 19)  SpO2: 95% (21 Jul 2022 12:30) (95% - 100%)    Parameters below as of 21 Jul 2022 12:30  Patient On (Oxygen Delivery Method): nasal cannula  O2 Flow (L/min): 2      Physical Exam:  General: NAD, resting comfortably in bed  Pulmonary: Nonlabored breathing, no respiratory distress  Cardiovascular: NSR  Abdominal: soft, NT/ND  Extremities: WWP, normal strength  Left upper extremity incision medial above ACF with clean/dry dressing in place  Skin: no hematoma, rash, ecchymosis  Neuro: A/O x 3, CNs II-XII grossly intact, normal motor/sensation, no focal deficits  Pulses:   Left radial: strong to palpation        LABS:                        9.9    11.66 )-----------( 206      ( 21 Jul 2022 10:50 )             30.1     07-20    141  |  103  |  24<H>  ----------------------------<  132<H>  4.2   |  23  |  1.2    Ca    9.2      20 Jul 2022 23:19  Mg     2.2     07-20    TPro  7.2  /  Alb  3.7  /  TBili  0.3  /  DBili  x   /  AST  17  /  ALT  8   /  AlkPhos  97  07-20    PT/INR - ( 20 Jul 2022 23:20 )   PT: 12.40 sec;   INR: 1.08 ratio         PTT - ( 21 Jul 2022 10:50 )  PTT:109.2 sec  CAPILLARY BLOOD GLUCOSE        LIVER FUNCTIONS - ( 20 Jul 2022 23:19 )  Alb: 3.7 g/dL / Pro: 7.2 g/dL / ALK PHOS: 97 U/L / ALT: 8 U/L / AST: 17 U/L / GGT: x               Radiology and Additional Studies:    Assessment:77y Female s/p above procedure    Plan:  Pain/nausea control PRN  Restart Heparin drip @ 3 PM, first PTT in 6 hours  Resume home meds  Incentive spirometer/OOB to chair  Vitals per protocol  Neurovascular checks every 4 hours  IVF x 4 hours, Diet: regular  AM labs  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

## 2022-07-21 NOTE — PATIENT PROFILE ADULT - FALL HARM RISK - HARM RISK INTERVENTIONS

## 2022-07-21 NOTE — H&P ADULT - HISTORY OF PRESENT ILLNESS
78 y/o F w/ mhx of HTN, CAD/CABG, thyroid disease, bicuspid aortic valve w/aortic stenosis s/p AVR & aorto-subclavian bypass by CTSx Dr. Muller in Aug 2021 complicated by post-op sternatomy infection & pericardial effusion presents to ED c/o acute LUE pain, numbness/decreased sensation and blue fingers. Patient states that she has a hx of constipation and was straining while sitting down in bathroom when she felt a sharp/sudden pain along her neck and left upper extremity. She reports whitening of the arm with increased paresthesia most notable in the thenar eminence and digits 3-5. Patient says that she has had paresthesia and minor motor deficit in the left upper extremity since her procedure in August 2021 but that her symptoms acutely worsened after straining earlier in the evening. Patient denies shortness of breath, chest pain, palpitations.     8/9/21 - Yohana - AVR, ascending/hemiarch replacement with frozen elephant trunk and left aorto-subclavian bypass, CABG x2   9/29/21 - Vitaliy - Sternal wound debridement, bilateral muscle flaps, incisional WV

## 2022-07-21 NOTE — ED PROVIDER NOTE - PHYSICAL EXAMINATION
CONSTITUTIONAL: Well-appearing; well-nourished; in no apparent distress.   NECK: Supple; non-tender; no cervical lymphadenopathy  CARDIOVASCULAR: Normal S1, S2; no murmurs, rubs, or gallops.   RESPIRATORY: Normal chest excursion with respiration; breath sounds clear and equal bilaterally; no wheezes, rhonchi, or rales.  GI/: non-distended; non-tender; no palpable organomegaly.   MS: Normal ROM in all four extremities; non-tender to palpation; distal pulses are normal.   SKIN: L hand digits cyanotic/cool with no palpable pulses distally and decreased sensation; otherwise normal for age and race  NEURO/PSYCH: A & O x 4; grossly unremarkable. mood and manner are appropriate.

## 2022-07-21 NOTE — BRIEF OPERATIVE NOTE - OPERATION/FINDINGS
Preop; left upper extremity ischemia   Operation: left brachial artery embolectomy, endarterectomy of brachial artery with patch angioplasty

## 2022-07-21 NOTE — BRIEF OPERATIVE NOTE - DISPOSITION
(Temporal)   Resp 17   Ht 5' 9\" (1.753 m)   Wt 152 lb 5.4 oz (69.1 kg)   SpO2 96%   BMI 22.50 kg/m²   Appearance: Well appearing, well nourished and in no distress  Mental Status Exam: Patient is alert, oriented to person, place and time. Recent and remote memory is normal  Fund of Knowledge is normal  Attention/concentration is normal.   Speech : No dysarthria  Language : No aphasia  Funduscopic Exam: sharp disc margins  Cranial Nerves:   II: Visual fields:  Full to confrontation  III: Pupils:  equal, round, reactive to light  III,IV,VI: Extra Ocular Movements are intact. No nystagmus  V: Facial sensation is intact to pin prick and light touch  VII: Facial strength and movements: intact and symmetric smile,cheek puffing and eyebrow elevation  VIII: Hearing:  Intact to finger rub bilaterally  IX: Palate  elevation is symmetric  XI: Shoulder shrug is intact  XII: Tongue movements are normal  Motor:  Muscle tone and bulk are normal.   Strength is symmetrical 5/5 in all four extremities. Mild tremors in both outstretched hands  Sensory: Intact to light touch and  pin prick in all four extremities  Coordination:  Slightly impaired Finger to Nose and Heel to Shin bilaterally    . Reflexes:  DTR 1 and symmetric bilaterally  Plantar response: Flexor bilaterally  Gait: Gait is slightly impaired.   Romberg: negative  Vascular: No carotid bruit bilaterally        DATA:  LABS:  General Labs:    CBC:   Lab Results   Component Value Date    WBC 5.1 12/14/2018    RBC 3.54 12/14/2018    HGB 12.4 12/14/2018    HCT 36.1 12/14/2018    .8 12/14/2018    MCH 34.9 12/14/2018    MCHC 34.3 12/14/2018    RDW 13.0 12/14/2018     12/14/2018    MPV 7.8 12/14/2018     BMP:    Lab Results   Component Value Date     12/14/2018    K 3.3 12/14/2018     12/14/2018    CO2 25 12/14/2018    BUN 7 12/14/2018    LABALBU 3.9 12/11/2018    CREATININE 0.6 12/14/2018    CALCIUM 8.9 12/14/2018    GFRAA >60 12/14/2018    LABGLOM >60 12/14/2018    GLUCOSE 136 12/14/2018     RADIOLOGY REVIEW:  I have reviewed radiology image(s) and reports(s) of:  CT scan of the head    IMPRESSION :  Seizure probably related to alcohol   MRI brain done last year had shown some chronic white matter changes. EEG showed fast background activity due to sedative medication  Nonfocal neurological exam  Tremors probably related to alcohol withdrawal  Patient Active Problem List   Diagnosis    Closed fracture of left ankle with routine healing    Alcohol withdrawal seizure without complication (HCC)    Alcohol withdrawal syndrome, with delirium ([address]    Alcohol dependence ([address]    Seizure ([address]    Acute metabolic encephalopathy    Hypokalemia    Electrolyte disorder    Seizure-like activity ([address]    Alcoholic encephalopathy ([address]     RECOMMENDATIONS :  Discussed at length with patient and his father  Discussed with the patient's nurse in the ICU  Discussed with Dr. Elizabeth Roles  No need for antiepileptic medication  Strongly urged him to quit drinking alcohol  Would strongly recommended alcohol rehabilitation program  Patient states that he will think about it  Thank you for this consultation        Please note a portion of this chart was generated using dragon dictation software. Although every effort was made to ensure the accuracy of this automated transcription, some errors in transcription may have occurred. pacu

## 2022-07-21 NOTE — ED PROVIDER NOTE - PROGRESS NOTE DETAILS
alex on board, awaiting CTA results multiple to calls to CT Sx/Vasc, delay to admission - CT Sx Dr. Galeana aware will follow, Vasc fellow Dr. Tang aware, rec heparin bolus/drip, Dr. Galeana in agreement, per Vasc fellow CT Sx Dr. Muller's team was contacted in attempt to transfer pt to [facility] but they refused transfer, Dr. Villanueva will come to ED to eval pt but does not want pt on his service as of yet - c/s MICU fellow, rec surgical admission - awaiting further recs from Dr. Villanueva Authored by Dr. Mary Limon: d/w Vasc Dr. Villanueva, requesting stat vas arterial duplex of LUE, if flow issue will manage pt, if no active flow issues will still pursue trx to [facility] for CT Surgical mgmt of aortic graft issues - left msg w/US for vasc tech arriving at 7am to perform study ASAP Authored by Dr. Mary Limon: d/w Vasc Dr. Villanueva, requesting stat vasc arterial duplex of [facility], admit to his service for further mgmt

## 2022-07-21 NOTE — H&P ADULT - ASSESSMENT
76 y/o F w/ mhx of HTN, CAD/CABG, thyroid disease, bicuspid aortic valve w/aortic stenosis s/p AVR & aorto-subclavian bypass by CTSx Dr. Muller in Aug 2021 complicated by post-op sternatomy infection & pericardial effusion presents to ED c/o acute LUE pain, numbness/decreased sensation and blue fingers. Patient states that she has a hx of constipation and was straining while sitting down in bathroom when she felt a sharp/sudden pain along her neck and left upper extremity. She reports whitening of the arm with increased paresthesia most notable in the thenar eminence and digits 3-5. Patient says that she has had paresthesia and minor motor deficit in the left upper extremity since her procedure in August 2021 but that her symptoms acutely worsened after straining earlier in the evening. Patient denies shortness of breath, chest pain, palpitations.    Plan:  Admite under vascular surgery  - Heparin drip for AC  - NPO except medications for surgery today  - IV fluids  - Resume all home medications post operatively  - f/u labs, EKG  - pain control    - I reviewed today's labs  - I reviewed and personally visualized all the radiology imagings  - I discussed the plan with attending : for left brachial artery embolectomy, angiogram    SPECTRA [id number]

## 2022-07-21 NOTE — ED PROVIDER NOTE - CLINICAL SUMMARY MEDICAL DECISION MAKING FREE TEXT BOX
LUE ischemia, h/o AVR/aortic arch graft - CTA imaging as resulted, CT Surg & Vasc consulted, requested heparin bolus/drop, Dr. Villanueva evaluated pt @ bedside and accepted admission to his [facility] for operative mgmt

## 2022-07-21 NOTE — ED PROVIDER NOTE - ATTENDING CONTRIBUTION TO CARE
77F pmh cad/cabg, hypothyroidism, bicuspid aortic valve w/aortic stenosis s/p AVR & aorto-subclavian bypass by CTSx Dr. Muller Oct 2021 c/b post-op inf & pericardial effusion, on asa, p/w LUE pain, numbness & coolness. Pt rpts constipation x 1 week, was straining to have BM tonight when she felt sudden sharp pain over LUE radiating down arm. Rpts pallor, numbness & weakness to distal LUE, followed by return of some sensation and now distal digits cyanotic/cool. Denies ha, dizziness, neck pain, current cp/sob, abd pain, nv, focal numbness or weakness.    PE:  elderly f nad  skin warm, dry  ncat  neck supple  rrr nl s1s2 no mrg  ctab no wrr  abd soft ntnd no palpable masses no rgr  back non-tender no cvat  ext- LUE digits cyanotic & cool, non-palpable radial pulse, cr>2 sec, rom/strength/sensation intact throughout; remainder of ext exam nl  neuro aaox3 grossly nf exam

## 2022-07-21 NOTE — CONSULT NOTE ADULT - SUBJECTIVE AND OBJECTIVE BOX
GENERAL SURGERY CONSULT NOTE    Patient: LYUBOV BAHENA , 77y ([birth year])Female   MRN: [id number]  Location: [facility] ED  Visit: 07-20-22 Emergency  Date: 07-21-22 @ 02:50    HPI: 76 y/o F w/ mhx of HTN, CAD/CABG, thyroid disease, bicuspid aortic valve w/aortic stenosis s/p AVR & aorto-subclavian bypass by CTSx Dr. Muller in Aug 2021 complicated by post-op infection & pericardial effusion presents to ED c/o LUE pain, numbness/decreased sensation and blue fingers. Patient states that she has a hx of constipation and was straining while sitting down in bathroom when she felt a sharp/sudden pain along her neck and left upper extremity. She reports whitening of the arm with increased paresthesia most notable in the thenar eminence and digits 3-5. Patient says that she has had paresthesia and minor motor deficit in the left upper extremity since her procedure in August 2021 but that her symptoms acutely worsened after straining earlier in the evening. Patient denies shortness of breath, chest pain, palpitations.     PAST MEDICAL & SURGICAL HISTORY:  HTN (hypertension)  H/O aortic valve stenosis  CAD (coronary artery disease)  S/P aortic valve replacement  S/P CABG (coronary artery bypass graft)  H/O aortic arch replacement    Home Medications:    VITALS:  T(F): 98.3 (07-21-22 @ 00:01), Max: 98.3 (07-21-22 @ 00:01)  HR: 93 (07-21-22 @ 00:01) (93 - 95)  BP: 149/68 (07-21-22 @ 00:01) (149/68 - 198/88)  RR: 18 (07-21-22 @ 00:01) (18 - 18)  SpO2: 98% (07-21-22 @ 00:01) (97% - 98%)    PHYSICAL EXAM:  General: AAOx3, calm and cooperative, resting comfortably in chair   HEENT: NCAT, neck supple, no cervical spine tenderness, normal cervical spine ROM   Cardiac: RRR S1, S2, midline sternal surgical scar   Respiratory: Non-labored breathing, equal chest rise bilaterally   Abdomen: Soft, non-distended, non-tender  Musculoskeletal: Strength 5/5 in all extremities other than LUE where motor strength 4/5, ROM intact, compartments soft  Neuro: At baseline, no focal deficits  Vascular: LUE with doppler brachial artery, no signals in LUE radial and ulnar arteries, LUE cooler than RUE with mild bluish discoloration in digits 3-5.   Skin: Warm/dry, normal color, no jaundice, LUE cool to touch     MEDICATIONS  (STANDING):    MEDICATIONS  (PRN):      LAB/STUDIES:                        9.8    9.09  )-----------( 211      ( 20 Jul 2022 23:19 )             30.3     07-20    141  |  103  |  24<H>  ----------------------------<  132<H>  4.2   |  23  |  1.2    Ca    9.2      20 Jul 2022 23:19  Mg     2.2     07-20    TPro  7.2  /  Alb  3.7  /  TBili  0.3  /  DBili  x   /  AST  17  /  ALT  8   /  AlkPhos  97  07-20    PT/INR - ( 20 Jul 2022 23:20 )   PT: 12.40 sec;   INR: 1.08 ratio         PTT - ( 20 Jul 2022 23:20 )  PTT:32.9 sec  LIVER FUNCTIONS - ( 20 Jul 2022 23:19 )  Alb: 3.7 g/dL / Pro: 7.2 g/dL / ALK PHOS: 97 U/L / ALT: 8 U/L / AST: 17 U/L / GGT: x             CARDIAC MARKERS ( 20 Jul 2022 23:20 )  x     / <0.01 ng/mL / x     / x     / x        IMAGING:     CT Angio Chest and LUE preliminary report:     1.  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 Hyperdensity within the aneurysm   consistent with endoleak without definite source visualized on this exam.  2.  Overall patency of the intrathoracic and left upper extremity   vasculature to the level of the mid humerus.  3.  Severe stenosis within the left aortic to axillary graft at the level   of the distal anastomosis. Severe stenosis within the left brachial   artery distal to its origin within the upper extremity.  4.  Soft tissue thickening within the anterior cutaneous soft tissues and   at the origin of the axillary graft consistent with history of prior   infection.

## 2022-07-21 NOTE — H&P ADULT - NSHPPHYSICALEXAM_GEN_ALL_CORE
General: patient is in no acute distress  Neck: supple, no JVD  Lungs: CTA A/P B/L  Heart S1 S2 RRR, midline sternal surgical scar   Abd: + BS, soft, NT/ND  Ext: Left upper extremity cooler to touch, no radial/ulnar pulse appreciated, + brachial dopplerable  Right upper extremity warm to touch, radial pulse palpable, bluish discoloration of 3-4-5 digits  Lower extremities no edema b/l

## 2022-07-21 NOTE — CONSULT NOTE ADULT - ASSESSMENT
76 y/o F w/ mhx of HTN, CAD/CABG, thyroid disease, bicuspid aortic valve w/ aortic stenosis s/p AVR and aorto-subclavian bypass by CTsx Dr. Muller in August 2021, complicated by post-op infection and pericardial effusion presents to ED c/o LUE pain, numbness/decreased sensation and blue fingers. Patient presented with sensation changes in LUE following straining episode at home. In ED, patient had absent doppler pulses, with present LUE Brachial doppler signal. CT angiogram of the chest with LUE runoff revealed the above findings.     Plan     - Recommend Heparin gtt in setting of imaging/physical exam findings   - Consult Cardiac Surgery   - Strict BP Control   - Neurovascular checks   - Recommend VBG (lactate)   - Vascular Surgery Following

## 2022-07-21 NOTE — ED PROVIDER NOTE - OBJECTIVE STATEMENT
pt with pmhx HTN, CAD/CABG, thyroid disease, bicuspid aortic valve w/aortic stenosis s/p AVR & aorto-subclavian bypass by CTSx Dr. Muller in 10/2021 complicated by post-op infection & pericardial effusion presents to ED c/o LUE pain, numbness/decreased sensation and blue fingers. this started after she was straining to have a BM and felt a sharp pain from her neck down her L arm. Denies fever/chill/HA/dizziness/chest pain/palpitation/sob/abd pain/n/v/d/ black stool/bloody stool/urinary sxs pt with pmhx HTN, CAD/CABG, thyroid disease, bicuspid aortic valve w/aortic stenosis s/p AVR & aorto-subclavian bypass by CTSx Dr. Muller in Aug 2021 complicated by post-op infection & pericardial effusion presents to ED c/o LUE pain, numbness/decreased sensation and blue fingers. this started after she was straining to have a BM and felt a sharp pain from her neck down her L arm. Denies fever/chill/HA/dizziness/chest pain/palpitation/sob/abd pain/n/v/d/ black stool/bloody stool/urinary sxs

## 2022-07-21 NOTE — ED ADULT NURSE NOTE - NS ED NOTE  TALK SOMEONE YN
General





- Admit/Disc Date/PCP


Admission Date/Primary Care Provider: 


  08/12/18 20:13





  





Discharge Date: 08/15/18





- Discharge Diagnosis


(1) DKA (diabetic ketoacidoses)


Is this a current diagnosis for this admission?: Yes   





(2) Poorly controlled diabetes mellitus


Is this a current diagnosis for this admission?: Yes   





(3) Chest pain, rule out acute myocardial infarction


Is this a current diagnosis for this admission?: Yes   





- Additional Information


Resuscitation Status: Full Code


Discharge Diet: Diabetic


Discharge Activity: Activity As Tolerated


Prescriptions: 


Calcium Carbonate/Vitamin D3 [Os-James 250 mg with Vitamin D 125 Units] 1 tab PO 

DAILY #30 tablet


Insulin Glargine,Hum.rec.anlog [Lantus Insulin 100 Unit/mL] 10 unit SUBCUT 

DAILY #1 insuln.pen


Insulin Lispro [Humalog Kwikpen U-100] See Protocol SQ AC #1 insuln.pen


Insulin Lispro [Humalog Kwikpen] 0 - 12 unit SQ ACHS #1 unit


Home Medications: 








Acetaminophen [Tylenol 325 mg Tablet] 650 mg PO Q4HP PRN  tablet 08/15/18 


Calcium Carbonate/Vitamin D3 [Os-James 250 mg with Vitamin D 125 Units] 1 tab PO 

DAILY #30 tablet 08/15/18 


Insulin Glargine,Hum.rec.anlog [Lantus Insulin 100 Unit/mL] 10 unit SUBCUT 

DAILY #1 insuln.pen 08/15/18 


Insulin Lispro [Humalog Kwikpen U-100] See Protocol SQ AC #1 insuln.pen 08/15/

18 


Insulin Lispro [Humalog Kwikpen] 0 - 12 unit SQ ACHS #1 unit 08/15/18 











History of Present Illness


History of Present Illness: 


Per H&P by Dr. Olivo: MIGUEL VILLALPANDO is a 45 year old female with a known 

PMH of DM on insulin which she states she stopped taking 5 months ago secondary 

to lack of insurance presenting to ER with a burning sensation in her chest and 

hyperglycemia. States she has been having intermittent chest burning associated 

with shortness of breath x 1 month with worsening over the past 4 days. She was 

diagnosed with DM in 2012 and was on an insulin pump which she discontinued 5 

months ago given she can not afford it and has no insurance. Patient also has 

no PCP. 








Hospital Course


Hospital Course: 


The patient presented with a complaint of atypical chest pain described as 

burning without associated symptoms and found to be in DKA.  Low suspicion for 

ACS.


Chest x-ray is benign.


CT of the abdomen and chest was negative for PE and acute pulmonary processes; 

did incidentally find cholelithiasis.


EKG demonstrated normal sinus rhythm.


Troponin is negative.


Glucose of 332, bicarb of 6, and anion gap of 27.


A1c 10.1%





She was initially placed on an insulin drip with aggressive IV fluid 

rehydration.


Electrolytes were monitored every 6 hours and replaced as needed.


Her anion gap subsequently closed and bicarb improved to 25.


The patient is transitioned to a consistent carb diet.


She was placed on Lantus 10 units daily.


She met with the diabetic educator who recommended Humalog 1 unit per 20 g of 

carb at meals with sliding scale ACHS to closely match her bolus regiment when 

previously on an insulin pump.


She was observed overnight and the patient successfully dosed and administered 

her own insulin.





She was discharged to home in stable condition.


She was provided prescriptions for Lantus, Humalog, and Calcium supplements.


[facility] is [facility] 

to establish with a local PCP.








Physical Exam


Vital Signs: 


 











Temp Pulse Resp BP Pulse Ox


 


 97.9 F   76   18   101/59 L  99 


 


 08/15/18 11:30  08/15/18 11:30  08/15/18 11:30  08/15/18 11:30  08/15/18 11:30








 Intake & Output











 08/15/18 08/16/18 08/17/18





 06:59 06:59 06:59


 


Intake Total 1401  


 


Output Total 2300  


 


Balance -899  


 


Weight 48.4 kg  











General appearance: PRESENT: no acute distress, cooperative, thin, well-

developed, well-nourished


Head exam: PRESENT: atraumatic, normocephalic


Eye exam: PRESENT: conjunctiva pink, EOMI, PERRLA.  ABSENT: scleral icterus


Ear exam: PRESENT: normal external ear exam


Mouth exam: PRESENT: moist, tongue midline


Neck exam: ABSENT: carotid bruit, JVD, lymphadenopathy, thyromegaly


Respiratory exam: PRESENT: clear to auscultation sara.  ABSENT: rales, rhonchi, 

wheezes


Cardiovascular exam: PRESENT: RRR.  ABSENT: diastolic murmur, rubs, systolic 

murmur


Pulses: PRESENT: normal dorsalis pedis pul


Vascular exam: PRESENT: normal capillary refill


GI/Abdominal exam: PRESENT: normal bowel sounds, soft.  ABSENT: distended, 

guarding, mass, organolmegaly, rebound, tenderness


Rectal exam: PRESENT: deferred


Extremities exam: PRESENT: full ROM.  ABSENT: calf tenderness, clubbing, pedal 

edema


Neurological exam: PRESENT: alert, awake, oriented to person, oriented to place

, oriented to time, oriented to situation, CN II-XII grossly intact.  ABSENT: 

motor sensory deficit


Psychiatric exam: PRESENT: appropriate affect, normal mood.  ABSENT: homicidal 

ideation, suicidal ideation


Skin exam: PRESENT: dry, intact, warm.  ABSENT: cyanosis, rash





Results


Laboratory Results: 


 





 08/13/18 03:55 





 08/14/18 18:24 








Impressions: 


 





Chest X-Ray  08/12/18 17:48


IMPRESSION:  NO ACUTE RADIOGRAPHIC FINDING IN THE CHEST.


 








Chest/Abdomen CTA  08/12/18 18:12


IMPRESSION:  1. No pulmonary emboli.  No acute findings in the chest.


2. Cholelithiasis.


 














Qualifiers





- *


PATIENT BEING DISCHARGED WITH ANY OF THE FOLLOWING DIAGNOSIS: No





Plan


Discharge Plan: 





Discharge to home.


Follow up with primary care provider within 1 week.


Time Spent: Greater than 30 Minutes
No

## 2022-07-22 NOTE — DISCHARGE NOTE PROVIDER - CARE PROVIDER_API CALL
Giovany Villanueva)  Surgery; Vascular Surgery  [address] [place] [id number]  Phone: [phone]  Fax: [phone]  Follow Up Time: 2 weeks    Bladimir Muller)  Surgery; Thoracic and Cardiac Surgery  [address] [place] [address]  Phone: [phone]  Fax: [phone]  Follow Up Time: 1-3 days

## 2022-07-22 NOTE — DISCHARGE NOTE PROVIDER - CARE PROVIDERS DIRECT ADDRESSES
[email or web address] [place] [facility]\""[email or web address].\[facility] [place] [facility]\""[email or web address]

## 2022-07-22 NOTE — OCCUPATIONAL THERAPY INITIAL EVALUATION ADULT - PERTINENT HX OF CURRENT PROBLEM, REHAB EVAL
76 y/o F w/ mhx of HTN, CAD/CABG, thyroid disease, bicuspid aortic valve w/aortic stenosis s/p AVR & aorto-subclavian bypass by CTSx Dr. Muller in Aug 2021 complicated by post-op sternatomy infection & pericardial effusion presents to ED c/o acute LUE pain, numbness/decreased sensation and blue fingers

## 2022-07-22 NOTE — DISCHARGE NOTE NURSING/CASE MANAGEMENT/SOCIAL WORK - NSDCPEFALRISK_GEN_ALL_CORE
For information on Fall & Injury Prevention, visit: [email or web address] OR  [email or web address] OR  https://www.cdc.gov/steadi/patient.html

## 2022-07-22 NOTE — PROGRESS NOTE ADULT - ASSESSMENT
76 y/o F w/ mhx of HTN, CAD/CABG, thyroid disease, bicuspid aortic valve w/aortic stenosis s/p AVR & aorto-subclavian bypass by CTSx Dr. Muller in Aug 2021 complicated by post-op sternatomy infection & pericardial effusion presents to ED c/o acute LUE pain, numbness/decreased sensation and blue fingers. Patient states that she has a hx of constipation and was straining while sitting down in bathroom when she felt a sharp/sudden pain along her neck and left upper extremity. She reports whitening of the arm with increased paresthesia most notable in the thenar eminence and digits 3-5. Patient says that she has had paresthesia and minor motor deficit in the left upper extremity since her procedure in August 2021 but that her symptoms acutely worsened after straining earlier in the evening.  Patient was found acute left brachial artery clot    CT left arm: patient is status post post vascular stent graft   involving the aortic arch with similar in size aneurysm. Hyperdensity   within the aneurysm consistent with endoleak without definite source   visualized on this exam.Severe stenosis within the left brachial artery distal to its origin   within the upper extremity.    s/p embolectomy    Plan:  - I reviewed labs  - I reviewed radiology imagings  - I personally visualized the imagings  - I discussed the findings and imagings with Dr. Villanueva: switch heparin to Eliquis  Eliquis 10 mg po BID x 7 days then 5 mg po BID x at least 3 months  Occupational therapy to evaluate for strengthening  Anticipate for discharge home later today  Pain control  CTS contacted a few times to review CT findings of endoleak. Patient verbalized understanding to see CTS Dr. Muller at [facility] as soon as next week.   Patient has been recommended to call and/or go to ED with any arm numbness, chest pain, shortness of breath, cold extremity    SPECTRA [id number]

## 2022-07-22 NOTE — DISCHARGE NOTE PROVIDER - PROVIDER TOKENS
PROVIDER:[TOKEN:[[id number]],FOLLOWUP:[2 weeks]],PROVIDER:[TOKEN:[[id number]],FOLLOWUP:[1-3 days]]

## 2022-07-22 NOTE — DISCHARGE NOTE PROVIDER - NSDCCPCAREPLAN_GEN_ALL_CORE_FT
PRINCIPAL DISCHARGE DIAGNOSIS  Diagnosis: Brachial artery stenosis, left  Assessment and Plan of Treatment: s/p embolectomy      SECONDARY DISCHARGE DIAGNOSES  Diagnosis: Type I endoleak of aortic graft  Assessment and Plan of Treatment: follow up with the CT surgeon Dr. Muller who did  Aorto-subclavian bypass in 2021

## 2022-07-22 NOTE — DISCHARGE NOTE PROVIDER - HOSPITAL COURSE
76 y/o F w/ mhx of HTN, CAD/CABG, thyroid disease, bicuspid aortic valve w/aortic stenosis s/p AVR & aorto-subclavian bypass by CTSx Dr. Muller in Aug 2021 complicated by post-op sternatomy infection & pericardial effusion presents to ED c/o acute LUE pain, numbness/decreased sensation and blue fingers. Patient states that she has a hx of constipation and was straining while sitting down in bathroom when she felt a sharp/sudden pain along her neck and left upper extremity. She reports whitening of the arm with increased paresthesia most notable in the thenar eminence and digits 3-5. Patient says that she has had paresthesia and minor motor deficit in the left upper extremity since her procedure in August 2021 but that her symptoms acutely worsened after straining earlier in the evening.  Patient was found acute left brachial artery clot    CT left arm: patient is status post post vascular stent graft   involving the aortic arch with similar in size aneurysm. Hyperdensity   within the aneurysm consistent with endoleak without definite source   visualized on this exam.Severe stenosis within the left brachial artery distal to its origin   within the upper extremity.    CTS was contacted by ED and by vascular team to review CT scan.     s/p left brachial artery embolectomy    Post op she was re-started on heparin drip. On POD 1 on assessment: left radial pulse strong to palpation. Patient's numbness and pain have improved.    Heparin switched to Eliquis. Occupational therapy requested to evaluate and treat left upper extremity  Patient has been explained the CT scan results and recommended to see Dr. Muller as soon as early next week. Also, to return to ED if she develops any acute arm pain/swelling, cold hand, chest pain, dizziness, shortness of breath, or looses consciousness

## 2022-07-22 NOTE — DISCHARGE NOTE PROVIDER - NSDCMRMEDTOKEN_GEN_ALL_CORE_FT
apixaban 5 mg oral tablet: 2 tab(s) orally 2 times a day x 7 days, then 1 tab orally 2 times a day  Aspirin Enteric Coated 81 mg oral delayed release tablet: 1 tab(s) orally once a day   atorvastatin 10 mg oral tablet: 1 tab(s) orally once a day (at bedtime)  furosemide 40 mg oral tablet: 1 tab(s) orally once a day  levothyroxine 25 mcg (0.025 mg) oral tablet: 1 tab(s) orally once a day  lisinopril 10 mg oral tablet: 1 tab(s) orally once a day  metoprolol tartrate 25 mg oral tablet: 12.5 milligram(s) orally 2 times a day  oxycodone-acetaminophen 5 mg-325 mg oral tablet: 1 tab(s) orally every 4 hours, As Needed -Moderate Pain (4 - 6) - for moderate pain MDD:4   potassium chloride 20 mEq oral tablet, extended release: 1 tab(s) orally once a day  senna (sennosides) 8.6 mg oral tablet: 1 tab(s) orally 2 times a day   Symbicort 80 mcg-4.5 mcg/inh inhalation aerosol: 2 puff(s) inhaled 2 times a day

## 2022-07-22 NOTE — DISCHARGE NOTE PROVIDER - NSDCFUADDINST_GEN_ALL_CORE_FT
See Dr. Muller as soon as early next week. Please, return to ED if you develop any acute arm pain/swelling, cold hand, chest pain, dizziness, shortness of breath, or looses consciousness  Left arm dressing: remove dressing Sunday. May shower Sunday. Keep sterri strips on. Let water run over the wound, pat dry, keep open to air

## 2022-07-22 NOTE — OCCUPATIONAL THERAPY INITIAL EVALUATION ADULT - ASR WT BEARING STATUS EVAL
Spoke with Alan Gamino MD that patient rating his pain 8/10 after receiving Percocet  Dodson Suazo does not want to give anything more than one Percocet q4h for pain for this patient at this time  Patient is one week post op and was given an ice pack to also help with pain  no weight-bearing restrictions

## 2022-07-22 NOTE — PROGRESS NOTE ADULT - SUBJECTIVE AND OBJECTIVE BOX
VASCULAR SURGERY PROGRESS NOTE    CC:   Hospital Day #  Post-Op Day #    Procedure:    Events of past 24 hours:          ROS otherwise negative except per subjective and HPI      PAST MEDICAL & SURGICAL HISTORY:  HTN (hypertension)      H/O aortic valve stenosis      CAD (coronary artery disease)      S/P aortic valve replacement      S/P CABG (coronary artery bypass graft)      H/O aortic arch replacement      Pacemaker  medtronic micra pacemaker          Vital Signs Last 24 Hrs  T(C): 35.9 (22 Jul 2022 04:52), Max: 36.7 (21 Jul 2022 10:15)  T(F): 96.7 (22 Jul 2022 04:52), Max: 98 (21 Jul 2022 10:15)  HR: 75 (22 Jul 2022 04:52) (73 - 100)  BP: 170/78 (22 Jul 2022 04:52) (129/65 - 171/75)  BP(mean): 94 (21 Jul 2022 14:40) (94 - 106)  RR: 18 (22 Jul 2022 04:52) (13 - 21)  SpO2: 98% (22 Jul 2022 04:52) (90% - 99%)    Parameters below as of 22 Jul 2022 04:52  Patient On (Oxygen Delivery Method): nasal cannula        Pain (0-10):            Pain Control Adequate: [] YES [] N    Diet:    I&O's Detail    21 Jul 2022 07:01  -  22 Jul 2022 07:00  --------------------------------------------------------  IN:    Heparin Infusion: 11 mL    Heparin Infusion: 22 mL  Total IN: 33 mL    OUT:  Total OUT: 0 mL    Total NET: 33 mL        PHYSICAL EXAM    Appearance: Normal	  HEENT:   Normal oral mucosa, PERRL, EOMI	  Neck: Supple, - JVD;   Cardiovascular: Normal S1 S2, No JVD, No murmurs,   Respiratory: Lungs clear to auscultation/No Rales, Rhonchi, Wheezing	  Gastrointestinal:  Soft, Non-tender, + BS	  Skin: No rashes, No ecchymoses, No cyanosis  Extremities: Normal range of motion, No clubbing, cyanosis or edema  Left upper extremity: no edema, has full ROM and sensation. + incision medial above ACF dressing changed  Neurologic: Non-focal  Psychiatry: A & O x 3, Mood & affect appropriate    PULSES:  left radial: palpable    MEDICATIONS:   MEDICATIONS  (STANDING):  apixaban 10 milliGRAM(s) Oral two times a day  aspirin enteric coated 81 milliGRAM(s) Oral daily  atorvastatin 10 milliGRAM(s) Oral at bedtime  budesonide  80 MICROgram(s)/formoterol 4.5 MICROgram(s) Inhaler 2 Puff(s) Inhalation two times a day  furosemide    Tablet 40 milliGRAM(s) Oral daily  heparin  Infusion.  Unit(s)/Hr (11 mL/Hr) IV Continuous <Continuous>  levothyroxine 25 MICROGram(s) Oral daily  lisinopril 10 milliGRAM(s) Oral daily  metoprolol tartrate 12.5 milliGRAM(s) Oral two times a day  pantoprazole    Tablet 40 milliGRAM(s) Oral before breakfast    MEDICATIONS  (PRN):  acetaminophen     Tablet .. 650 milliGRAM(s) Oral every 6 hours PRN Temp greater or equal to 38C (100.4F), Moderate Pain (4 - 6)  bisacodyl 5 milliGRAM(s) Oral daily PRN Constipation  ondansetron Injectable 4 milliGRAM(s) IV Push every 6 hours PRN Nausea  oxycodone    5 mG/acetaminophen 325 mG 1 Tablet(s) Oral every 4 hours PRN Moderate Pain (4 - 6)      DVT PROPHYLAXIS: [] YES [x] NO  GI PROPHYLAXIS: [x] YES [] NO  ANTIPLATELETS: [] YES [x] NO  ANTICOAGULATION: [x] YES [] NO  ANTIBIOTICS: [] YES [x] NO    LAB/STUDIES:                        9.2    10.26 )-----------( 167      ( 22 Jul 2022 06:47 )             28.2     07-22    139  |  105  |  23<H>  ----------------------------<  110<H>  4.6   |  23  |  0.8    Ca    9.1      22 Jul 2022 06:47  Mg     2.5     07-22    TPro  7.2  /  Alb  3.7  /  TBili  0.3  /  DBili  x   /  AST  17  /  ALT  8   /  AlkPhos  97  07-20    PT/INR - ( 22 Jul 2022 06:47 )   PT: 12.20 sec;   INR: 1.06 ratio         PTT - ( 22 Jul 2022 06:47 )  PTT:62.8 sec  LIVER FUNCTIONS - ( 20 Jul 2022 23:19 )  Alb: 3.7 g/dL / Pro: 7.2 g/dL / ALK PHOS: 97 U/L / ALT: 8 U/L / AST: 17 U/L / GGT: x               CARDIAC MARKERS ( 20 Jul 2022 23:20 )  x     / <0.01 ng/mL / x     / x     / x                    IMAGING:      < from: CT Angio Upper Extremity w/ IV Cont, Left (07.21.22 @ 02:01) >  1.  Again seen, patient is status post post vascular stent graft   involving the aortic arch with similar in size aneurysm. Hyperdensity   within the aneurysm consistent with endoleak without definite source   visualized on this exam.  2.  Overall patency of the intrathoracic and left upper extremity   vasculature to the level of the mid humerus with severe stenosis within   the leftaortic to axillary graft at the level of the distal anastomosis.   Severe stenosis within the left brachial artery distal to its origin   within the upper extremity.  3.  Soft tissue thickening within the anterior cutaneous soft tissues and   at the origin of the axillary graft consistent with history of prior   infection. Soft tissue prominence and mottled appearance of the sternum   is compatible with sequela of prior treatment and probable superimposed   infection.    < end of copied text >

## 2022-07-22 NOTE — CHART NOTE - NSCHARTNOTEFT_GEN_A_CORE
seen and examined daily   palpable pulses in the LUE after surgery   states that the pain has improved significantly   with hx of complex arotic repair and aortic valve repair and TEVAR at [facility]  now with endoleak   Durable repair require open operation which she refuses to have   At this time she wishes to have her follow up with [facility].   She understood the risk of rupture and still would like to proceed to do outpatient follow up with [facility]   will be discharged with anticoagulation

## 2022-07-22 NOTE — DISCHARGE NOTE NURSING/CASE MANAGEMENT/SOCIAL WORK - PATIENT PORTAL LINK FT
You can access the FollowMyHealth Patient Portal offered by [facility] by registering at the following website: [email or web address]. By joining [facility]’s FollowMyHealth portal, you will also be able to view your health information using other applications (apps) compatible with our system.

## 2022-07-22 NOTE — DISCHARGE NOTE NURSING/CASE MANAGEMENT/SOCIAL WORK - NSDCPEELIQUIS_GEN_ALL_CORE
Apixaban/Eliquis - Compliance/Apixaban/Eliquis - Dietary Advice/Apixaban/Eliquis - Follow up monitoring/Apixaban/Eliquis - Potential for adverse drug reactions and interactions
CAD (coronary artery disease)

## 2022-07-26 NOTE — ED ADULT TRIAGE NOTE - CHIEF COMPLAINT QUOTE
left arm pain after post op surgery on 07/21, aortic graft placed in left arm. Pt with increased pain in the left arm since, pt told to come to ED to evaluate for complications and pain management. left arm pain after  surgery on 07/21, aortic graft placed in left arm. Pt with increased pain in the left arm since, pt told to come to ED to evaluate for complications and pain management.

## 2022-07-27 NOTE — H&P ADULT - HISTORY OF PRESENT ILLNESS
76 year old female with a history of previous smoker, HTN, spinal stenosis, arthritis, bicuspid aortic valve s/p AVR, ascending/hemiarch replacement with frozen elephant trunk and left aorto-subclavian bypass, CABG x2 with Dr. Muller on 8/9/2021 (post-op course complicated by prolonged intubation, SSS s/p PPM, poor wound healing s/p pec flap and closure on 9/29/21 who recently presented to [facility] with acute LUE pain, found to have left brachial occlusion s/p left brachial artery embolectomy, CT scans at that time showed endoleak and she was scheduled to see Dr. Muller in clinic today, however, patient was experiencing persistent left arm and chest pain that radiated to her left shoulder blade and she presented to the ED for evaluation. Presently, she reports her pain is constant and has been the same since last week when she had her embolectomy. She denies left arm swelling or numbness. She reports her incision is healing well, but  at the inferior pole. She denies fever, chills, N/V/D, abd pain, syncope, or palpitations.

## 2022-07-27 NOTE — H&P ADULT - ASSESSMENT
76 year old female with a history of previous smoker, HTN, spinal stenosis, arthritis, bicuspid aortic valve s/p AVR, ascending/hemiarch replacement with frozen elephant trunk and left aorto-subclavian bypass, CABG x2 with Dr. Muller on 8/9/2021 (post-op course complicated by prolonged intubation, SSS s/p PPM), poor MSI wound healing s/p pec flap and closure on 9/29/21 who recently presented to [facility] with acute LUE pain, found to have left brachial occlusion s/p left brachial artery embolectomy, CT scans at that time showed endoleak and she was scheduled to see Dr. Muller in clinic today for persistent pain. Left arm neurovascularly intact. 7/21/22 CT scan with endoleak. Plan for TEVAR on Friday with Dr. Muller    Neurovascular:  - persistent left shoulder/chest pain radiating to shoulder blade    - Take percocet at home    - gabapentin 100mg TID started today    Cardiovascular:   #complex aortic disease s/p AVR, ascending aorta/hemiarch replacement with frozen elephant trunk and left aorto-subclavian bypass, CABGx2 with DB on 8/9/21. (post-op course complicated by prolonged intubation, SSS s/p PPM), poor MSI wound healing s/p pec flap and closure on 9/29/21  - Continue ASA, Statin  - Hypertensive in ED - lisinopril and lopressor replaced with labetalol 200mg TID, increase as able  - SBP 170s-200, HR 90s SR   - Continue telemetry      Vascular:  - recent left brachial embolectomy on 7/21 at [facility]  - Eliquis held, last dose yesterday evening.   - Heparin gtt ptt goal 60-80, to start at 10am  - PTT due at 4pm    Respiratory:   - On room air  - Encourage C+DB and Use of IS 10x / hr while awake.  - home symbicort    GI:   - PO diet  - GI PPX with Protonix    Renal / :  - BUN/CR 23/0.96  - Monitor I/O's.  - Replace electrolytes as needed    Endocrine:    - Hypothyroidism - continue synthroid    Hematologic:  - H/H 9/28  - Eliquis held, last dose yesterday evening.   - Heparin gtt ptt goal 60-80, to start at 10am    ID:  -Afebrile, WBC 10.99    Prophylaxis:  -DVT prophylaxis with heparin.  -SCD's    Disposition:  - Telemetry   - TEVAR Friday

## 2022-07-27 NOTE — ED PROVIDER NOTE - CLINICAL SUMMARY MEDICAL DECISION MAKING FREE TEXT BOX
77F PMH HTN, CAD/CABG, thyroid disease, bicuspid aortic valve w/ aortic stenosis s/p AVR & aorto-subclavian bypass by [facility] Dr. Muller in Aug 2021 c/b post-op sternotomy infection & pericardial effusion p/w pain. Pt has pain to L trapezius region radiating to LUE, intermittent.   Pt dc'd a few days ago after admission for LUE pain/numbness/decreased sensation and blue fingers, found to have L brachial artery clot s/p brachial artery embolectomy. Pt states that pain was present prior to the clot. States she was told she has to f/u w/ CT surgery and has appt tomorrow. Notes improved numbness to her LUE since the embolectomy. No other systemic symptoms.   Mild HTN, other vitals wnl. Exam as above.  ddx: Possibly related to endoleak vs. stenosis vs. recent procedure   Labs, CTs, symptom control, reassess.

## 2022-07-27 NOTE — ED PROVIDER NOTE - PHYSICAL EXAMINATION
normal equal distal pules, normal cap refill  LUE arm incision site healing well, no surrounding erythema.   mild swelling to proximal LUE.   normal cap refill, no bony ttp. FROM all fingers/wrist. mildly decreased sensation.  Normal adduction/abduction. Normal finger opposition. Strength 5/5. No crepitus, firmness, induration, fluctuance. Skin is normal temp. No erythema/warmth. No obvious skin breaks.

## 2022-07-27 NOTE — H&P ADULT - NSHPLABSRESULTS_GEN_ALL_CORE
LABS:  07-27 @ 00:08 Creatine 36 U/L [25 - 170]  cret                        9.3    10.99 )-----------( 259      ( 27 Jul 2022 00:08 )             28.4     07-27    138  |  100  |  23  ----------------------------<  118<H>  4.6   |  29  |  0.96    Ca    9.3      27 Jul 2022 00:08    TPro  7.1  /  Alb  3.6  /  TBili  0.4  /  DBili  x   /  AST  10  /  ALT  8<L>  /  AlkPhos  76  07-27    PT/INR - ( 27 Jul 2022 00:08 )   PT: 21.0 sec;   INR: 1.75          PTT - ( 27 Jul 2022 00:08 )  PTT:43.3 sec

## 2022-07-27 NOTE — ED PROVIDER NOTE - OBJECTIVE STATEMENT
77F PMH HTN, CAD/CABG, thyroid disease, bicuspid aortic valve w/ aortic stenosis s/p AVR & aorto-subclavian bypass by [facility]TS[facility] Dr. Muller in Aug 2021 c/b post-op sternotomy infection & pericardial effusion p/w pain. Pt has pain to L trapezius region radiating to LUE, intermittent.   Pt dc'd a few days ago after admission for LUE pain/numbness/decreased sensation and blue fingers, found to have L brachial artery clot s/p brachial artery embolectomy. Pt states that pain was present prior to the clot. States she was told she has to f/u w/ CT surgery and has appt tomorrow. Notes improved numbness to her LUE since the embolectomy. No other systemic symptoms.   CT from  showin.  Again seen, patient is status post post vascular stent graft   involving the aortic arch with similar in size aneurysm. Hyperdensity   within the aneurysm consistent with endoleak without definite source   visualized on this exam.  2.  Overall patency of the intrathoracic and left upper extremity   vasculature to the level of the mid humerus with severe stenosis within   the left aortic to axillary graft at the level of the distal anastomosis.   Severe stenosis within the left brachial artery distal to its origin   within the upper extremity.  3.  Soft tissue thickening within the anterior cutaneous soft tissues and   at the origin of the axillary graft consistent with history of prior   infection. Soft tissue prominence and mottled appearance of the sternum   is compatible with sequela of prior treatment and probable superimposed   infection.  Denies f/c, SOb/CP, NVD, abd pain, urinary complaints, new weakness/numbness, black/bloody stool, HA.

## 2022-07-27 NOTE — H&P ADULT - NSHPPHYSICALEXAM_GEN_ALL_CORE
Vital Signs Last 24 Hrs  T(C): 36.7 (27 Jul 2022 06:12), Max: 36.8 (26 Jul 2022 21:41)  T(F): 98 (27 Jul 2022 06:12), Max: 98.2 (26 Jul 2022 21:41)  HR: 78 (27 Jul 2022 06:12) (78 - 98)  BP: 177/80 (27 Jul 2022 06:12) (165/78 - 180/84)  BP(mean): --  RR: 18 (27 Jul 2022 06:12) (18 - 18)  SpO2: 99% (27 Jul 2022 06:12) (98% - 100%)    Parameters below as of 27 Jul 2022 06:12  Patient On (Oxygen Delivery Method): room air    Appearance: No acute distress.  Neurologic: AAOx3, no AMS or focal deficits.  Responds appropriately to verbal and physical stimuli; exhibits purposeful movement in all extremities.  Cardiovascular: RRR, no murmur  Respiratory: No acute respiratory distress. CTAB, no crackles or rhonchi   Gastrointestinal:  Soft, non-tender, non-distended, + BS.	  Extremities: warm, well perfused. trace edema in bilateral lower extremities. Left upper arm with incision, healing well, CDI, steristrips in place. radial and brachial pulse intact, warm, well perfused. sensation intact. Strength 5/5 in UE  Incisions: sternal incision CDI, mild erythema   Groins: soft, palpalable pulses bilaterally

## 2022-07-27 NOTE — ED ADULT NURSE NOTE - CHIEF COMPLAINT QUOTE
left arm pain after  surgery on 07/21, aortic graft placed in left arm. Pt with increased pain in the left arm since, pt told to come to ED to evaluate for complications and pain management.

## 2022-07-27 NOTE — ED PROVIDER NOTE - PROGRESS NOTE DETAILS
[place]: labs grossly at baseline. CT results pending. Pain improved. Will reassess. [place]: CT results still pending. d/w rad resident - was sent to [facility] and just backed up. Pt sleeping comfortably. Has appt w/ CT surg later today. Dispo likely based on CT results. adeline: pt received at sign out from dr hall as pending ct results, seen by ct surg team -- will admit

## 2022-07-27 NOTE — CONSULT NOTE ADULT - SUBJECTIVE AND OBJECTIVE BOX
Vascular Attending: Charli      HPI:  76 year old female with a history of previous smoker, HTN, spinal stenosis, arthritis, bicuspid aortic valve s/p AVR, ascending/hemiarch replacement with frozen elephant trunk and left aorto-subclavian bypass, CABG x2 with Dr. Muller on 8/9/2021 (post-op course complicated by prolonged intubation, SSS s/p PPM, poor wound healing s/p pec flap and closure on 9/29/21 who recently presented to [facility] with acute LUE pain, found to have left brachial occlusion s/p left brachial artery embolectomy, CT scans at that time showed endoleak and she was scheduled to see Dr. Muller in clinic today, however, patient was experiencing persistent left arm and chest pain that radiated to her left shoulder blade and she presented to the ED for evaluation. Presently, she reports her pain is constant and has been the same since last week when she had her embolectomy. She denies left arm swelling or numbness. She reports her incision is healing well, but  at the inferior pole. She denies fever, chills, N/V/D, abd pain, syncope, or palpitations.   (27 Jul 2022 07:55)      PAST MEDICAL & SURGICAL HISTORY:  HTN (hypertension)      H/O aortic valve stenosis      CAD (coronary artery disease)      S/P aortic valve replacement      S/P CABG (coronary artery bypass graft)      H/O aortic arch replacement      Pacemaker  medtronic micra pacemaker          REVIEW OF SYSTEMS  Negative except per HPI    MEDICATIONS  (STANDING):  aspirin enteric coated 81 milliGRAM(s) Oral daily  atorvastatin 10 milliGRAM(s) Oral at bedtime  budesonide  80 MICROgram(s)/formoterol 4.5 MICROgram(s) Inhaler 2 Puff(s) Inhalation two times a day  furosemide    Tablet 40 milliGRAM(s) Oral daily  gabapentin 100 milliGRAM(s) Oral three times a day  heparin  Infusion 1100 Unit(s)/Hr (11 mL/Hr) IV Continuous <Continuous>  labetalol 200 milliGRAM(s) Oral three times a day  levothyroxine 25 MICROGram(s) Oral daily  pantoprazole    Tablet 40 milliGRAM(s) Oral before breakfast  senna 2 Tablet(s) Oral at bedtime  sodium chloride 0.9% lock flush 3 milliLiter(s) IV Push every 8 hours    MEDICATIONS  (PRN):  oxycodone    5 mG/acetaminophen 325 mG 1 Tablet(s) Oral every 4 hours PRN Moderate Pain (4 - 6)      Allergies    No Known Allergies    Intolerances        SOCIAL HISTORY: Former smoker    FAMILY HISTORY:  FH: CAD (coronary artery disease) (Sibling)  CABG    Family history of CKD (chronic kidney disease) (Sibling)  ESRD    Family history of diabetes mellitus (DM) (Sibling)        Vital Signs Last 24 Hrs  T(C): 36.4 (27 Jul 2022 09:25), Max: 36.8 (26 Jul 2022 21:41)  T(F): 97.6 (27 Jul 2022 09:25), Max: 98.2 (26 Jul 2022 21:41)  HR: 101 (27 Jul 2022 09:25) (78 - 101)  BP: 157/84 (27 Jul 2022 09:25) (157/84 - 180/84)  BP(mean): 116 (27 Jul 2022 09:25) (116 - 116)  RR: 22 (27 Jul 2022 09:25) (18 - 22)  SpO2: 97% (27 Jul 2022 09:25) (97% - 100%)    Parameters below as of 27 Jul 2022 09:25  Patient On (Oxygen Delivery Method): room air        PHYSICAL EXAM:  General: No acute distress.  Cardiovascular: RRR, no murmur, sternal incision CDI, mild erythema  Respiratory: No acute respiratory distress. CTAB, no crackles or rhonchi   Gastrointestinal:  Soft, non-tender, non-distended, + BS.	  Extremities: warm, well perfused. trace edema in bilateral lower extremities. Left upper arm with incision, healing well, CDI, steristrips in place. radial and brachial pulse intact, warm, well perfused. sensation intact. Strength 5/5 in UE. NO skin lesions.   Vascular: Palpable radial arteries bilaterally. Palpable femoral, popliteal, DP + PT bilaterally  Neurologic: AAOx3, no AMS or focal deficits.  Responds appropriately to verbal and physical stimuli; exhibits purposeful movement in all extremities.          LABS:                        9.3    10.99 )-----------( 259      ( 27 Jul 2022 00:08 )             28.4     07-27    138  |  100  |  23  ----------------------------<  118<H>  4.6   |  29  |  0.96    Ca    9.3      27 Jul 2022 00:08    TPro  7.1  /  Alb  3.6  /  TBili  0.4  /  DBili  x   /  AST  10  /  ALT  8<L>  /  AlkPhos  76  07-27    PT/INR - ( 27 Jul 2022 00:08 )   PT: 21.0 sec;   INR: 1.75          PTT - ( 27 Jul 2022 00:08 )  PTT:43.3 sec      RADIOLOGY & ADDITIONAL STUDIES Vascular Attending: Charli      HPI:  76 year old female with a history of previous smoker, HTN, spinal stenosis, arthritis, bicuspid aortic valve, severe aortic stenosis, thoracic aortic aneurysm, s/p AVR, ascending/hemiarch replacement with frozen elephant trunk and left aorto-subclavian bypass, CABG x2 with Dr. Muller on 8/9/2021 (post-op course complicated by prolonged intubation, SSS s/p PPM, poor wound healing s/p pec flap and closure on 9/29/21 who recently presented to [facility] with acute LUE pain, found to have left brachial occlusion s/p left brachial artery embolectomy, CT scans at that time showed endoleak and she was scheduled to see Dr. Muller in clinic today, however, patient was experiencing persistent left arm and chest pain that radiated to her left shoulder blade and she presented to the ED for evaluation. Presently, she reports her pain is constant and has been the same since last week when she had her embolectomy. She denies left arm swelling or numbness. She reports her incision is healing well, but  at the inferior pole. She denies fever, chills, N/V/D, abd pain, syncope, or palpitations.   (27 Jul 2022 07:55)      PAST MEDICAL & SURGICAL HISTORY:  HTN (hypertension)      H/O aortic valve stenosis      CAD (coronary artery disease)      S/P aortic valve replacement      S/P CABG (coronary artery bypass graft)      H/O aortic arch replacement      Pacemaker  medtronic micra pacemaker          REVIEW OF SYSTEMS  Negative except per HPI    MEDICATIONS  (STANDING):  aspirin enteric coated 81 milliGRAM(s) Oral daily  atorvastatin 10 milliGRAM(s) Oral at bedtime  budesonide  80 MICROgram(s)/formoterol 4.5 MICROgram(s) Inhaler 2 Puff(s) Inhalation two times a day  furosemide    Tablet 40 milliGRAM(s) Oral daily  gabapentin 100 milliGRAM(s) Oral three times a day  heparin  Infusion 1100 Unit(s)/Hr (11 mL/Hr) IV Continuous <Continuous>  labetalol 200 milliGRAM(s) Oral three times a day  levothyroxine 25 MICROGram(s) Oral daily  pantoprazole    Tablet 40 milliGRAM(s) Oral before breakfast  senna 2 Tablet(s) Oral at bedtime  sodium chloride 0.9% lock flush 3 milliLiter(s) IV Push every 8 hours    MEDICATIONS  (PRN):  oxycodone    5 mG/acetaminophen 325 mG 1 Tablet(s) Oral every 4 hours PRN Moderate Pain (4 - 6)      Allergies    No Known Allergies    Intolerances        SOCIAL HISTORY: Former smoker    FAMILY HISTORY:  FH: CAD (coronary artery disease) (Sibling)  CABG    Family history of CKD (chronic kidney disease) (Sibling)  ESRD    Family history of diabetes mellitus (DM) (Sibling)        Vital Signs Last 24 Hrs  T(C): 36.4 (27 Jul 2022 09:25), Max: 36.8 (26 Jul 2022 21:41)  T(F): 97.6 (27 Jul 2022 09:25), Max: 98.2 (26 Jul 2022 21:41)  HR: 101 (27 Jul 2022 09:25) (78 - 101)  BP: 157/84 (27 Jul 2022 09:25) (157/84 - 180/84)  BP(mean): 116 (27 Jul 2022 09:25) (116 - 116)  RR: 22 (27 Jul 2022 09:25) (18 - 22)  SpO2: 97% (27 Jul 2022 09:25) (97% - 100%)    Parameters below as of 27 Jul 2022 09:25  Patient On (Oxygen Delivery Method): room air        PHYSICAL EXAM:  General: No acute distress.  Cardiovascular: RRR, no murmur, sternal incision CDI, mild erythema  Respiratory: No acute respiratory distress. CTAB, no crackles or rhonchi   Gastrointestinal:  Soft, non-tender, non-distended, + BS.	  Extremities: warm, well perfused. trace edema in bilateral lower extremities. Left upper arm with incision, healing well, CDI, steristrips in place. radial and brachial pulse intact, warm, well perfused. sensation intact. Strength 5/5 in UE. NO skin lesions.   Vascular: Palpable radial arteries bilaterally. Palpable femoral, popliteal, DP + PT bilaterally  Neurologic: AAOx3, no AMS or focal deficits.  Responds appropriately to verbal and physical stimuli; exhibits purposeful movement in all extremities.          LABS:                        9.3    10.99 )-----------( 259      ( 27 Jul 2022 00:08 )             28.4     07-27    138  |  100  |  23  ----------------------------<  118<H>  4.6   |  29  |  0.96    Ca    9.3      27 Jul 2022 00:08    TPro  7.1  /  Alb  3.6  /  TBili  0.4  /  DBili  x   /  AST  10  /  ALT  8<L>  /  AlkPhos  76  07-27    PT/INR - ( 27 Jul 2022 00:08 )   PT: 21.0 sec;   INR: 1.75          PTT - ( 27 Jul 2022 00:08 )  PTT:43.3 sec      RADIOLOGY & ADDITIONAL STUDIES Vascular Attending: Charli      HPI:  76 year old female with a history of previous smoker, HTN, spinal stenosis, arthritis, bicuspid aortic valve, severe aortic stenosis, thoracic aortic aneurysm, s/p AVR, ascending/hemiarch replacement with frozen elephant trunk and left aorto-subclavian bypass, CABG x2 with Dr. Muller on 8/9/2021 (post-op course complicated by prolonged intubation, SSS s/p PPM, poor wound healing s/p pec flap and closure on 9/29/21 who recently presented to [facility] with acute LUE pain, found to have left brachial occlusion s/p left brachial artery embolectomy, CT scans at that time showed endoleak and she was scheduled to see Dr. Muller in clinic today, however, patient was experiencing persistent left arm and chest pain that radiated to her left shoulder blade and she presented to the ED for evaluation. Presently, she reports her pain is constant and has been the same since last week when she had her embolectomy. She denies left arm swelling or numbness. She reports her incision is healing well, but  at the inferior pole. She denies fever, chills, N/V/D, abd pain, syncope, or palpitations.   (27 Jul 2022 07:55)      PAST MEDICAL & SURGICAL HISTORY:  HTN (hypertension)      H/O aortic valve stenosis      CAD (coronary artery disease)      S/P aortic valve replacement      S/P CABG (coronary artery bypass graft)      H/O aortic arch replacement      Pacemaker  medtronic micra pacemaker          REVIEW OF SYSTEMS  Negative except per HPI    MEDICATIONS  (STANDING):  aspirin enteric coated 81 milliGRAM(s) Oral daily  atorvastatin 10 milliGRAM(s) Oral at bedtime  budesonide  80 MICROgram(s)/formoterol 4.5 MICROgram(s) Inhaler 2 Puff(s) Inhalation two times a day  furosemide    Tablet 40 milliGRAM(s) Oral daily  gabapentin 100 milliGRAM(s) Oral three times a day  heparin  Infusion 1100 Unit(s)/Hr (11 mL/Hr) IV Continuous <Continuous>  labetalol 200 milliGRAM(s) Oral three times a day  levothyroxine 25 MICROGram(s) Oral daily  pantoprazole    Tablet 40 milliGRAM(s) Oral before breakfast  senna 2 Tablet(s) Oral at bedtime  sodium chloride 0.9% lock flush 3 milliLiter(s) IV Push every 8 hours    MEDICATIONS  (PRN):  oxycodone    5 mG/acetaminophen 325 mG 1 Tablet(s) Oral every 4 hours PRN Moderate Pain (4 - 6)      Allergies    No Known Allergies    Intolerances        SOCIAL HISTORY: Former smoker    FAMILY HISTORY:  FH: CAD (coronary artery disease) (Sibling)  CABG    Family history of CKD (chronic kidney disease) (Sibling)  ESRD    Family history of diabetes mellitus (DM) (Sibling)        Vital Signs Last 24 Hrs  T(C): 36.4 (27 Jul 2022 09:25), Max: 36.8 (26 Jul 2022 21:41)  T(F): 97.6 (27 Jul 2022 09:25), Max: 98.2 (26 Jul 2022 21:41)  HR: 101 (27 Jul 2022 09:25) (78 - 101)  BP: 157/84 (27 Jul 2022 09:25) (157/84 - 180/84)  BP(mean): 116 (27 Jul 2022 09:25) (116 - 116)  RR: 22 (27 Jul 2022 09:25) (18 - 22)  SpO2: 97% (27 Jul 2022 09:25) (97% - 100%)    Parameters below as of 27 Jul 2022 09:25  Patient On (Oxygen Delivery Method): room air        PHYSICAL EXAM:  General: No acute distress.  Cardiovascular: RRR, no murmur, sternal incision CDI, mild erythema  Respiratory: No acute respiratory distress. CTAB, no crackles or rhonchi   Gastrointestinal:  Soft, non-tender, non-distended, + BS.	  Extremities: warm, well perfused. trace edema in bilateral lower extremities. Left upper arm with incision, healing well, CDI, steristrips in place. radial and brachial pulse intact, warm, well perfused. sensation intact. Strength 5/5 in UE. NO skin lesions.   Vascular: Palpable radial arteries bilaterally. Palpable femoral, popliteal, DP + PT bilaterally  Neurologic: AAOx3, no AMS or focal deficits.  Responds appropriately to verbal and physical stimuli; exhibits purposeful movement in all extremities.          LABS:                        9.3    10.99 )-----------( 259      ( 27 Jul 2022 00:08 )             28.4     07-27    138  |  100  |  23  ----------------------------<  118<H>  4.6   |  29  |  0.96    Ca    9.3      27 Jul 2022 00:08    TPro  7.1  /  Alb  3.6  /  TBili  0.4  /  DBili  x   /  AST  10  /  ALT  8<L>  /  AlkPhos  76  07-27    PT/INR - ( 27 Jul 2022 00:08 )   PT: 21.0 sec;   INR: 1.75          PTT - ( 27 Jul 2022 00:08 )  PTT:43.3 sec      RADIOLOGY & ADDITIONAL STUDIES  PROCEDURE DATE: 07/27/2022        INTERPRETATION: Initial report created on 7/27/2022 8:25:01 AM EDT  PROCEDURE INFORMATION:  Exam: CTA Left Upper Extremity With Contrast  Exam date and time: 7/27/2022 2:49 AM  Age: 77 years old  Clinical indication: History of aortic L subclavian bypass. Recent brachial embolectomy, now with lue shoulder pain. Evaluate for leak, clot, and aneurysm.    TECHNIQUE:  Imaging protocol: Computed tomographic angiography of the Left upper extremity with contrast, including non-contrast images if performed. 3D rendering (Not supervised by radiologist): MIP and/or 3D reconstructed images were created by the technologist. 97 cc of Isovue-370 were administered intravenously. 1 cc was discarded.    COMPARISON:  Most recent CTA CHEST and upper extremity WITH IV CONTRAST 8/28/2021 11:07 AM and multiple additional prior imaging studies dating back to 5/5/2016.    FINDINGS:    Again post aortic valve replacement and replacement of the ascending aorta. Endovascular stent graft extends from aortic arch, just distal to the origin of common origin of right brachiocephalic artery and left common carotid artery, through the mid descending aorta, covering the origin of the left subclavian artery. Bypass graft extends from distal ascending aortic graft to distal left subclavian artery. There is a moderate stenosis of the left axillary artery at its communication with the bypass graft. Retrograde filling of the more proximal left subclavian artery and left vertebral artery. Mild residual stenosis and 2 mm outpouching of contrast mid left brachial artery (series 10, image 41). Mild fusiform dilatation of distal left brachial artery. Proximal left radial artery patent. Mild stenosis at origin of left ulnar artery. There is enhancement of a saccular aneurysm arising from lateral aspect of proximal descending aorta, consistent with endoleak. 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 No extravasation of contrast. No clot. The saccular aneurysm is similar in size to prior study, measuring 4.2 x 3.6 cm. Descending aorta is tortuous. The aortic root measures 3.5 x 3.3 cm, the ascending aorta 2.8 x 2.8 cm, the aortic arch 3.2 x 3.5 cm, the descending aorta 3.4 x 3.2 cm in its midportion, and a partially visualized infrarenal abdominal aortic aneurysm 4.0 x 3.6 cm.    Heart size within normal limits. Heavily calcified mitral valve annulus. Leadless Micra pacemaker in right ventricle. Coronary artery bypass graft surgery. Heavy coronary artery calcification.    No pleural effusion. No pericardial effusion.    Mild smooth interlobular septal thickening and groundglass opacity in both lungs, consistent with mild pulmonary edema. There is also likely centrilobular emphysema. Mild paraseptal emphysema. Cylindrical bronchiectasis. Small amount of dependent atelectasis both lower lobes. Linear atelectasis or scar lingula and left lower lobe.    Mild mediastinal lymphadenopathy, with 1.4 cm lymph node station 4R, 1.2 cm lymph node station 4L, and 1.1 cm lymph node station 7.    Upper abdomen: At least one large gallstone in a distended gallbladder.    Bones/soft tissues: Nonunion of sternotomy defect. No sternotomy wires. Infiltration of subcutaneous fat in presternal region, could be postoperative change versus infection. 2.2 x 1.5 x 1.2 cm dense (48 Hounsfield units), nonenhancing collection in soft tissues of distal portion of left upper arm, consistent with small hematoma. Small focus of subcutaneous emphysema in soft tissues of distal portion of left upper arm, consistent with postoperative change.    IMPRESSION:  1. Since 8/28/2021, there is mild residual stenosis in the midportion of the left brachial artery, with a 2 mm outpouching of contrast which could represent a tiny pseudoaneurysm.    2. Moderate stenosis at communication of left axillary artery with distal left subclavian artery bypass graft.    3. 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    4. Mild pulmonary edema.    5. Large gallstone within distended gallbladder. Recommend clinical correlation for cholecystitis.    6. Infiltration of subcutaneous fat in presternal region. Recommend clinical correlation for soft tissue infection.    7. Small hematoma left arm.    --- End of Report ---

## 2022-07-27 NOTE — ED ADULT NURSE REASSESSMENT NOTE - NS ED NURSE REASSESS COMMENT FT1
Pt A&Ox4 in NAD, resting comfortably in stretcher, pt offered Labetalol as ordered but refused, states "I normally take Lisinopril." Attempted to page team, no answer, VSS. Report given to 9 Lachman RN, pending transport.

## 2022-07-27 NOTE — ED ADULT NURSE NOTE - OBJECTIVE STATEMENT
77Y Female A&ox4 from home c/o left arm pain. s/p surgery aortic graft to left arm on 07/21/22. Denies chest pain, fever, chills, cough, n/v/d. Ambulates with a steady gait.

## 2022-07-27 NOTE — CONSULT NOTE ADULT - ASSESSMENT
76 year old female with a history of previous smoker, HTN, spinal stenosis, arthritis, bicuspid aortic valve s/p AVR, ascending/hemiarch replacement with frozen elephant trunk and left aorto-subclavian bypass, CABG x2 with Dr. Muller on 8/9/2021. Recently  admitted to [facility] with acute LUE pain, found to have left brachial occlusion s/p left brachial artery embolectomy, CTA scans done during that admission now showing endoleak. She presented to [facility] ED with worsening LUE pain radiating to shoulder/neck/chest. Plan for TEVAR Friday 7/29 with CTSx and Vascular surgery 76 year old female with a history of previous smoker, HTN, spinal stenosis, arthritis, bicuspid aortic valve s/p AVR, ascending/hemiarch replacement with frozen elephant trunk and left aorto-subclavian bypass, CABG x2 with Dr. Muller on 8/9/2021. Recently  admitted to [facility] with acute LUE pain, found to have left brachial occlusion s/p left brachial artery embolectomy, CTA scans done during that admission now showing endoleak. She presented to [facility] ED with worsening LUE pain radiating to shoulder/neck/chest.     Plan:  TEVAR Friday 7/29 with Cardiothoracic and Vascular surgery.     76 year old female with a history of previous smoker, HTN, spinal stenosis, arthritis, bicuspid aortic valve s/p AVR, ascending/hemiarch replacement with frozen elephant trunk and left aorto-subclavian bypass, CABG x2 with Dr. Muller on 8/9/2021. Recently  admitted to [facility] with acute LUE pain, found to have left brachial occlusion s/p left brachial artery embolectomy, CTA scans done during that admission now showing endoleak. She presented to [facility] ED with worsening LUE pain radiating to shoulder/neck/chest.     Plan:  Repeat CTA showing endoleak and some residual stenosis and small hematoma in left arm likely from recent surgery  TEVAR Friday 7/29 with Cardiothoracic and Vascular surgery for endoleak repair     Attending and PA/NP shared services statement (NON-critical care):

## 2022-07-27 NOTE — ED ADULT NURSE NOTE - NSIMPLEMENTINTERV_GEN_ALL_ED
Implemented All Universal Safety Interventions:  [place] to call system. Call bell, personal items and telephone within reach. Instruct patient to call for assistance. Room bathroom lighting operational. Non-slip footwear when patient is off stretcher. Physically safe environment: no spills, clutter or unnecessary equipment. Stretcher in lowest position, wheels locked, appropriate side rails in place.

## 2022-07-28 NOTE — PROGRESS NOTE ADULT - SUBJECTIVE AND OBJECTIVE BOX
Operation / Date: Plan for TEVAR tomorrow 7/29/22     SUBJECTIVE ASSESSMENT: Patient seen and examined at bedside. Patient c/o some pain on her R upper arm, at the site where midline was attempted. Otherwise offers no complaints. Tolerating diet, denies chills, chest pain, SOB, palpitations, N/V.     Vital Signs Last 24 Hrs  T(C): 36.4 (28 Jul 2022 13:29), Max: 36.9 (28 Jul 2022 09:58)  T(F): 97.6 (28 Jul 2022 13:29), Max: 98.4 (28 Jul 2022 09:58)  HR: 59 (28 Jul 2022 09:58) (59 - 94)  BP: 111/54 (28 Jul 2022 09:58) (98/55 - 187/88)  BP(mean): 78 (28 Jul 2022 09:58) (73 - 127)  RR: 16 (28 Jul 2022 09:58) (15 - 20)  SpO2: 95% (28 Jul 2022 09:58) (93% - 98%)    Parameters below as of 28 Jul 2022 09:58  Patient On (Oxygen Delivery Method): room air    I&O's Detail    27 Jul 2022 07:01  -  28 Jul 2022 07:00  --------------------------------------------------------  IN:    Heparin: 207 mL  Total IN: 207 mL    OUT:    Voided (mL): 600 mL  Total OUT: 600 mL    Total NET: -393 mL      28 Jul 2022 07:01  -  28 Jul 2022 14:20  --------------------------------------------------------  IN:    Heparin: 12 mL  Total IN: 12 mL    OUT:  Total OUT: 0 mL    Total NET: 12 mL    CHEST TUBE: No  FRANCHESKA DRAIN:  No  EPICARDIAL WIRES: No  TIE DOWNS: No  REGALADO: No    PHYSICAL EXAM:  GENERAL: NAD, lying in bed comfortably  HEAD:  Atraumatic, Normocephalic  EYES: EOMI, PERRLA, conjunctiva and sclera clear  ENT: Moist mucous membranes  CHEST/LUNG: Clear to auscultation bilaterally; No rales, rhonchi, wheezing, or rubs. Unlabored respirations  HEART: Regular rate and rhythm; No murmurs, rubs, or gallops  ABDOMEN: Bowel sounds present; Soft, Nontender, Nondistended. No hepatomegally  EXTREMITIES:  2+ Peripheral Pulses, brisk capillary refill. No clubbing, cyanosis, or edema. Ecchymosis noted on R medial upper arm, no hematoma noted, mildly ttp.   NERVOUS SYSTEM:  Alert & Oriented X3, speech clear. No deficits     LABS:                        8.3    10.14 )-----------( 242      ( 28 Jul 2022 06:34 )             26.3     PT/INR - ( 28 Jul 2022 06:34 )   PT: 15.0 sec;   INR: 1.26       PTT - ( 28 Jul 2022 06:34 )  PTT:68.5 sec    07-28    135  |  102  |  24<H>  ----------------------------<  99  4.6   |  21<L>  |  0.96    Ca    8.9      28 Jul 2022 06:34  Mg     2.7     07-28    TPro  6.2  /  Alb  2.9<L>  /  TBili  0.4  /  DBili  x   /  AST  22  /  ALT  13  /  AlkPhos  81  07-28    MEDICATIONS  (STANDING):  aspirin enteric coated 81 milliGRAM(s) Oral daily  atorvastatin 10 milliGRAM(s) Oral at bedtime  budesonide  80 MICROgram(s)/formoterol 4.5 MICROgram(s) Inhaler 2 Puff(s) Inhalation two times a day  chlorhexidine 0.12% Liquid 12.5 milliLiter(s) Swish and Spit once  chlorhexidine 4% Liquid 1 Application(s) Topical once  chlorhexidine 4% Liquid 1 Application(s) Topical once  furosemide    Tablet 40 milliGRAM(s) Oral daily  gabapentin 100 milliGRAM(s) Oral three times a day  heparin  Infusion 1100 Unit(s)/Hr (12 mL/Hr) IV Continuous <Continuous>  labetalol 200 milliGRAM(s) Oral three times a day  levothyroxine 25 MICROGram(s) Oral daily  pantoprazole    Tablet 40 milliGRAM(s) Oral before breakfast  senna 2 Tablet(s) Oral at bedtime  sodium chloride 0.9% lock flush 3 milliLiter(s) IV Push every 8 hours    MEDICATIONS  (PRN):  oxycodone 5 mG/acetaminophen 325 mG 1 Tablet(s) Oral every 4 hours PRN Moderate Pain (4 - 6)    RADIOLOGY & ADDITIONAL TESTS:  < from: CT Angio Chest Aorta w/wo IV Cont (07.27.22 @ 04:13) >  FINDINGS:    Again post aortic valve replacement and replacement of the ascending   aorta. Endovascular stent graft extends from aortic arch, just distal to   the origin of common origin of right brachiocephalic artery and left   common carotid artery, through the mid descending aorta, covering the   origin of the left subclavian artery. Bypass graft extends from distal   ascending aortic graft to distal left subclavian artery. There is a  moderate stenosis of the left axillary artery at its communication with   the bypass graft. Retrograde filling of the more proximal left subclavian   artery and left vertebral artery. Mild residual stenosis and 2 mm   outpouching of contrast mid left brachial artery (series 10, image 41).   Mild fusiform dilatation of distal left brachial artery. Proximal left   radial artery patent. Mild stenosis at origin of left ulnar artery. There   is enhancement of a saccular aneurysm arising from lateralaspect of   proximal descending aorta, consistent with endoleak. 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 No extravasation of contrast. No clot. The saccular   aneurysm is similar in size to prior study, measuring 4.2 x 3.6 cm.   Descending aorta is tortuous. The aortic root measures 3.5 x 3.3 cm, the   ascending aorta 2.8 x 2.8 cm, the aortic arch 3.2 x 3.5 cm, the   descending aorta 3.4 x 3.2 cm in its midportion, and a partially   visualized infrarenal abdominal aortic aneurysm 4.0 x 3.6 cm.    Heart size within normal limits. Heavily calcified mitral valve annulus.   Leadless Micra pacemaker in right ventricle. Coronary artery bypass graft   surgery. Heavy coronary artery calcification.    No pleural effusion. No pericardial effusion.    Mild smooth interlobular septal thickening and groundglass opacity in   both lungs, consistent with mild pulmonary edema. There is also likely   centrilobular emphysema. Mild paraseptal emphysema. Cylindrical   bronchiectasis. Small amount of dependentatelectasis both lower lobes.   Linear atelectasis or scar lingula and left lower lobe.    Mild mediastinal lymphadenopathy, with 1.4 cm lymph node station 4R, 1.2   cm lymph node station 4L, and 1.1 cm lymph node station 7.    Upper abdomen: At least one large gallstone in a distended gallbladder.    Bones/soft tissues: Nonunion of sternotomy defect. No sternotomy wires.   Infiltration of subcutaneous fat in presternal region, could be   postoperative change versus infection. 2.2 x 1.5 x 1.2 cm dense (48   Hounsfield units), nonenhancing collection in soft tissues of distal   portion of left upper arm, consistent with small hematoma. Small focus of   subcutaneous emphysema in soft tissues of distal portion of left upper   arm, consistent with postoperative change.    IMPRESSION:  1. Since 8/28/2021, there is mild residual stenosis in the midportion of   the left brachial artery, with a 2 mm outpouching of contrast which could   represent a tiny pseudoaneurysm.    2. Moderate stenosis at communication of left axillary artery with distal   left subclavian artery bypass graft.    3. 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    4. Mild pulmonary edema.    5. Large gallstone within distended gallbladder. Recommend clinical   correlation for cholecystitis.    6. Infiltration of subcutaneous fat in presternal region. Recommend   clinical correlation for soft tissue infection.    7. Small hematoma left arm.           Operation / Date: Plan for TEVAR tomorrow 7/29/22     SUBJECTIVE ASSESSMENT: Patient seen and examined at bedside. Patient c/o some pain on her R upper arm, at the site where midline was attempted. Otherwise offers no complaints. Tolerating diet, denies chills, chest pain, SOB, palpitations, N/V.     Vital Signs Last 24 Hrs  T(C): 36.4 (28 Jul 2022 13:29), Max: 36.9 (28 Jul 2022 09:58)  T(F): 97.6 (28 Jul 2022 13:29), Max: 98.4 (28 Jul 2022 09:58)  HR: 59 (28 Jul 2022 09:58) (59 - 94)  BP: 111/54 (28 Jul 2022 09:58) (98/55 - 187/88)  BP(mean): 78 (28 Jul 2022 09:58) (73 - 127)  RR: 16 (28 Jul 2022 09:58) (15 - 20)  SpO2: 95% (28 Jul 2022 09:58) (93% - 98%)    Parameters below as of 28 Jul 2022 09:58  Patient On (Oxygen Delivery Method): room air    I&O's Detail    27 Jul 2022 07:01  -  28 Jul 2022 07:00  --------------------------------------------------------  IN:    Heparin: 207 mL  Total IN: 207 mL    OUT:    Voided (mL): 600 mL  Total OUT: 600 mL    Total NET: -393 mL      28 Jul 2022 07:01  -  28 Jul 2022 14:20  --------------------------------------------------------  IN:    Heparin: 12 mL  Total IN: 12 mL    OUT:  Total OUT: 0 mL    Total NET: 12 mL    CHEST TUBE: No  FRANCHESKA DRAIN:  No  EPICARDIAL WIRES: No  TIE DOWNS: No  REGALADO: No    PHYSICAL EXAM:  GENERAL: NAD, lying in bed comfortably  HEAD:  Atraumatic, Normocephalic  EYES: EOMI, PERRLA, conjunctiva and sclera clear  ENT: Moist mucous membranes  CHEST/LUNG: Clear to auscultation bilaterally; No rales, rhonchi, wheezing, or rubs. Unlabored respirations  HEART: Regular rate and rhythm; No murmurs, rubs, or gallops  ABDOMEN: Bowel sounds present; Soft, Nontender, Nondistended. No hepatomegally  EXTREMITIES:  2+ Peripheral Pulses, brisk capillary refill. No clubbing, cyanosis, or edema. Ecchymosis noted on R medial upper arm, no hematoma noted, mildly ttp.   NERVOUS SYSTEM:  Alert & Oriented X3, speech clear. No deficits     LABS:                        8.3    10.14 )-----------( 242      ( 28 Jul 2022 06:34 )             26.3     PT/INR - ( 28 Jul 2022 06:34 )   PT: 15.0 sec;   INR: 1.26       PTT - ( 28 Jul 2022 06:34 )  PTT:68.5 sec    07-28    135  |  102  |  24<H>  ----------------------------<  99  4.6   |  21<L>  |  0.96    Ca    8.9      28 Jul 2022 06:34  Mg     2.7     07-28    TPro  6.2  /  Alb  2.9<L>  /  TBili  0.4  /  DBili  x   /  AST  22  /  ALT  13  /  AlkPhos  81  07-28    MEDICATIONS  (STANDING):  aspirin enteric coated 81 milliGRAM(s) Oral daily  atorvastatin 10 milliGRAM(s) Oral at bedtime  budesonide  80 MICROgram(s)/formoterol 4.5 MICROgram(s) Inhaler 2 Puff(s) Inhalation two times a day  chlorhexidine 0.12% Liquid 12.5 milliLiter(s) Swish and Spit once  chlorhexidine 4% Liquid 1 Application(s) Topical once  chlorhexidine 4% Liquid 1 Application(s) Topical once  furosemide    Tablet 40 milliGRAM(s) Oral daily  gabapentin 100 milliGRAM(s) Oral three times a day  heparin  Infusion 1100 Unit(s)/Hr (12 mL/Hr) IV Continuous <Continuous>  labetalol 200 milliGRAM(s) Oral three times a day  levothyroxine 25 MICROGram(s) Oral daily  pantoprazole    Tablet 40 milliGRAM(s) Oral before breakfast  senna 2 Tablet(s) Oral at bedtime  sodium chloride 0.9% lock flush 3 milliLiter(s) IV Push every 8 hours    MEDICATIONS  (PRN):  oxycodone 5 mG/acetaminophen 325 mG 1 Tablet(s) Oral every 4 hours PRN Moderate Pain (4 - 6)    RADIOLOGY & ADDITIONAL TESTS:  < from: CT Angio Chest Aorta w/wo IV Cont (07.27.22 @ 04:13) >  FINDINGS:    Again post aortic valve replacement and replacement of the ascending   aorta. Endovascular stent graft extends from aortic arch, just distal to   the origin of common origin of right brachiocephalic artery and left   common carotid artery, through the mid descending aorta, covering the   origin of the left subclavian artery. Bypass graft extends from distal   ascending aortic graft to distal left subclavian artery. There is a  moderate stenosis of the left axillary artery at its communication with   the bypass graft. Retrograde filling of the more proximal left subclavian   artery and left vertebral artery. Mild residual stenosis and 2 mm   outpouching of contrast mid left brachial artery (series 10, image 41).   Mild fusiform dilatation of distal left brachial artery. Proximal left   radial artery patent. Mild stenosis at origin of left ulnar artery. There   is enhancement of a saccular aneurysm arising from lateralaspect of   proximal descending aorta, consistent with endoleak. 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 No extravasation of contrast. No clot. The saccular   aneurysm is similar in size to prior study, measuring 4.2 x 3.6 cm.   Descending aorta is tortuous. The aortic root measures 3.5 x 3.3 cm, the   ascending aorta 2.8 x 2.8 cm, the aortic arch 3.2 x 3.5 cm, the   descending aorta 3.4 x 3.2 cm in its midportion, and a partially   visualized infrarenal abdominal aortic aneurysm 4.0 x 3.6 cm.    Heart size within normal limits. Heavily calcified mitral valve annulus.   Leadless Micra pacemaker in right ventricle. Coronary artery bypass graft   surgery. Heavy coronary artery calcification.    No pleural effusion. No pericardial effusion.    Mild smooth interlobular septal thickening and groundglass opacity in   both lungs, consistent with mild pulmonary edema. There is also likely   centrilobular emphysema. Mild paraseptal emphysema. Cylindrical   bronchiectasis. Small amount of dependentatelectasis both lower lobes.   Linear atelectasis or scar lingula and left lower lobe.    Mild mediastinal lymphadenopathy, with 1.4 cm lymph node station 4R, 1.2   cm lymph node station 4L, and 1.1 cm lymph node station 7.    Upper abdomen: At least one large gallstone in a distended gallbladder.    Bones/soft tissues: Nonunion of sternotomy defect. No sternotomy wires.   Infiltration of subcutaneous fat in presternal region, could be   postoperative change versus infection. 2.2 x 1.5 x 1.2 cm dense (48   Hounsfield units), nonenhancing collection in soft tissues of distal   portion of left upper arm, consistent with small hematoma. Small focus of   subcutaneous emphysema in soft tissues of distal portion of left upper   arm, consistent with postoperative change.    IMPRESSION:  1. Since 8/28/2021, there is mild residual stenosis in the midportion of   the left brachial artery, with a 2 mm outpouching of contrast which could   represent a tiny pseudoaneurysm.    2. Moderate stenosis at communication of left axillary artery with distal   left subclavian artery bypass graft.    3. 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    4. Mild pulmonary edema.    5. Large gallstone within distended gallbladder. Recommend clinical   correlation for cholecystitis.    6. Infiltration of subcutaneous fat in presternal region. Recommend   clinical correlation for soft tissue infection.    7. Small hematoma left arm.    < from: US Duplex Carotid Arteries Complete, Bilateral (07.28.22 @ 14:09) >  FINDINGS:    There is again intimal thickening and soft plaque throughout the right   common carotid artery. Moderate amount of calcified plaque at origin of   right internal carotid artery. Mild intimal thickening left common   carotid artery and moderate amount of calcified plaque proximal left   internal carotid artery.    Peak systolic velocities are as follows:    RIGHT:  PROX CCA = 103 cm/s  DIST CCA = 98 cm/s  PROX ICA =96 cm/s  DIST ICA = 64 cm/s  ECA = 196 cm/s    LEFT:  PROX CCA = 91 cm/s  DIST CCA = 78 cm/s  PROX ICA = 98 cm/s  DIST ICA = 86 cm/s  ECA = 158 cm/s    Antegrade flow is noted within both vertebral arteries.    IMPRESSION: No significant change from 6/4/2021. Moderate plaque   bilaterally without evidence of hemodynamically significant stenosis.    Measurement of carotid stenosis is based on velocity parameters that   correlate the residual internal carotid diameter with that of the more   distalvessel in accordance with a method such as the [place] American   Symptomatic Carotid Endarterectomy Trial (NASCET).    < end of copied text >           Planned Date of Surgery: 7/29/22                                                                                                                 Surgeon: Dr. Muller    Procedure: TEVAR    HPI: 76 year old female with a history of previous smoker, HTN, spinal stenosis, arthritis, bicuspid aortic valve s/p AVR, ascending/hemiarch replacement with frozen elephant trunk and left aorto-subclavian bypass, CABG x2 with Dr. Muller on 8/9/2021 (post-op course complicated by prolonged intubation, SSS s/p PPM, poor wound healing s/p pec flap and closure on 9/29/21 who recently presented to [facility] with acute LUE pain, found to have left brachial occlusion s/p left brachial artery embolectomy, CT scans at that time showed endoleak and she was scheduled to see Dr. Muller in clinic today, however, patient was experiencing persistent left arm and chest pain that radiated to her left shoulder blade and she presented to the ED for evaluation. Presently, she reports her pain is constant and has been the same since last week when she had her embolectomy. She denies left arm swelling or numbness. She reports her incision is healing well, but  at the inferior pole. She denies fever, chills, N/V/D, abd pain, syncope, or palpitations.    PAST MEDICAL & SURGICAL HISTORY:  HTN (hypertension)      H/O aortic valve stenosis      CAD (coronary artery disease)      S/P aortic valve replacement      S/P CABG (coronary artery bypass graft)      H/O aortic arch replacement      Pacemaker  medtronic micra pacemaker    No Known Allergies    Physical Exam  T(C): 36.7 (07-28-22 @ 14:06), Max: 36.9 (07-28-22 @ 09:58)  HR: 79 (07-28-22 @ 14:06) (59 - 94)  BP: 132/60 (07-28-22 @ 14:06) (98/55 - 187/88)  RR: 18 (07-28-22 @ 14:06) (15 - 20)  SpO2: 94% (07-28-22 @ 14:06) (93% - 98%)    PHYSICAL EXAM:  GENERAL: NAD, sitting upright in chair comfortably  HEAD:  Atraumatic, Normocephalic  EYES: EOMI, PERRLA, conjunctiva and sclera clear  ENT: Moist mucous membranes  CHEST/LUNG: Clear to auscultation bilaterally; No rales, rhonchi, wheezing, or rubs. Unlabored respirations  HEART: Regular rate and rhythm; No murmurs, rubs, or gallops  ABDOMEN: Bowel sounds present; Soft, Nontender, Nondistended. No hepatomegally  EXTREMITIES:  2+ Peripheral Pulses, brisk capillary refill. No clubbing, cyanosis, or edema. Ecchymosis noted on R medial upper arm, no hematoma noted, mildly ttp.  NERVOUS SYSTEM:  Alert & Oriented X3, speech clear. No deficits     MEDICATIONS  (STANDING):  aspirin enteric coated 81 milliGRAM(s) Oral daily  atorvastatin 10 milliGRAM(s) Oral at bedtime  budesonide  80 MICROgram(s)/formoterol 4.5 MICROgram(s) Inhaler 2 Puff(s) Inhalation two times a day  chlorhexidine 0.12% Liquid 12.5 milliLiter(s) Swish and Spit once  chlorhexidine 4% Liquid 1 Application(s) Topical once  chlorhexidine 4% Liquid 1 Application(s) Topical once  furosemide    Tablet 40 milliGRAM(s) Oral daily  gabapentin 100 milliGRAM(s) Oral three times a day  heparin  Infusion 1100 Unit(s)/Hr (12 mL/Hr) IV Continuous <Continuous>  labetalol 200 milliGRAM(s) Oral three times a day  levothyroxine 25 MICROGram(s) Oral daily  pantoprazole    Tablet 40 milliGRAM(s) Oral before breakfast  senna 2 Tablet(s) Oral at bedtime  sodium chloride 0.9% lock flush 3 milliLiter(s) IV Push every 8 hours    MEDICATIONS  (PRN):  oxycodone    5 mG/acetaminophen 325 mG 1 Tablet(s) Oral every 4 hours PRN Moderate Pain (4 - 6)    On Beta Blocker? YES    Labs:                        8.3    10.14 )-----------( 242      ( 28 Jul 2022 06:34 )             26.3     07-28    135  |  102  |  24<H>  ----------------------------<  99  4.6   |  21<L>  |  0.96    Ca    8.9      28 Jul 2022 06:34  Mg     2.7     07-28    TPro  6.2  /  Alb  2.9<L>  /  TBili  0.4  /  DBili  x   /  AST  22  /  ALT  13  /  AlkPhos  81  07-28    PT/INR - ( 28 Jul 2022 06:34 )   PT: 15.0 sec;   INR: 1.26       PTT - ( 28 Jul 2022 06:34 )  PTT:68.5 sec    ABO Interpretation: O (07-28-22 @ 06:41)    CARDIAC MARKERS ( 27 Jul 2022 00:08 )  x     / x     / 36 U/L / x     / 1.2 ng/mL    Hgb A1C:  5.6    EKG:    CXR:     CT Scans:  < from: CT Angio Chest Aorta w/wo IV Cont (07.27.22 @ 04:13) >  1. Since 8/28/2021, there is mild residual stenosis in the midportion of   the left brachial artery, with a 2 mm outpouching of contrast which could   represent a tiny pseudoaneurysm.    2. Moderate stenosis at communication of left axillary artery with distal   left subclavian artery bypass graft.    3. 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    4. Mild pulmonary edema.    5. Large gallstone within distended gallbladder. Recommend clinical   correlation for cholecystitis.    6. Infiltration of subcutaneous fat in presternal region. Recommend   clinical correlation for soft tissue infection.    7. Small hematoma left arm.    Cath Report:    Echo:    Carotid Duplex:  < from: US Duplex Carotid Arteries Complete, Bilateral (07.28.22 @ 14:09) >  RIGHT:  PROX CCA = 103 cm/s  DIST CCA = 98 cm/s  PROX ICA =96 cm/s  DIST ICA = 64 cm/s  ECA = 196 cm/s    LEFT:  PROX CCA = 91 cm/s  DIST CCA = 78 cm/s  PROX ICA = 98 cm/s  DIST ICA = 86 cm/s  ECA = 158 cm/s    Antegrade flow is noted within both vertebral arteries.    IMPRESSION: No significant change from 6/4/2021. Moderate plaque   bilaterally without evidence of hemodynamically significant stenosis.    Measurement of carotid stenosis is based on velocity parameters that   correlate the residual internal carotid diameter with that of the more   distalvessel in accordance with a method such as the [place] American   Symptomatic Carotid Endarterectomy Trial (NASCET).    Consult in Chart?  YES   Consent in Chart? YES  Pre-op Orders Placed? YES  Blood Prodeucts Ordered? YES  NPO ordered? YES Planned Date of Surgery: 7/29/22                                                                                                                 Surgeon: Dr. Muller    Procedure: TEVAR    HPI: 76 year old female with a history of previous smoker, HTN, spinal stenosis, arthritis, bicuspid aortic valve s/p AVR, ascending/hemiarch replacement with frozen elephant trunk and left aorto-subclavian bypass, CABG x2 with Dr. Muller on 8/9/2021 (post-op course complicated by prolonged intubation, SSS s/p PPM, poor wound healing s/p pec flap and closure on 9/29/21 who recently presented to [facility] with acute LUE pain, found to have left brachial occlusion s/p left brachial artery embolectomy, CT scans at that time showed endoleak and she was scheduled to see Dr. Muller in clinic today, however, patient was experiencing persistent left arm and chest pain that radiated to her left shoulder blade and she presented to the ED for evaluation. Presently, she reports her pain is constant and has been the same since last week when she had her embolectomy. She denies left arm swelling or numbness. She reports her incision is healing well, but  at the inferior pole. She denies fever, chills, N/V/D, abd pain, syncope, or palpitations.    PAST MEDICAL & SURGICAL HISTORY:  HTN (hypertension)      H/O aortic valve stenosis      CAD (coronary artery disease)      S/P aortic valve replacement      S/P CABG (coronary artery bypass graft)      H/O aortic arch replacement      Pacemaker  medtronic micra pacemaker    No Known Allergies    Physical Exam  T(C): 36.7 (07-28-22 @ 14:06), Max: 36.9 (07-28-22 @ 09:58)  HR: 79 (07-28-22 @ 14:06) (59 - 94)  BP: 132/60 (07-28-22 @ 14:06) (98/55 - 187/88)  RR: 18 (07-28-22 @ 14:06) (15 - 20)  SpO2: 94% (07-28-22 @ 14:06) (93% - 98%)    PHYSICAL EXAM:  GENERAL: NAD, sitting upright in chair comfortably  HEAD:  Atraumatic, Normocephalic  EYES: EOMI, PERRLA, conjunctiva and sclera clear  ENT: Moist mucous membranes  CHEST/LUNG: Clear to auscultation bilaterally; No rales, rhonchi, wheezing, or rubs. Unlabored respirations  HEART: Regular rate and rhythm; No murmurs, rubs, or gallops  ABDOMEN: Bowel sounds present; Soft, Nontender, Nondistended. No hepatomegally  EXTREMITIES:  2+ Peripheral Pulses, brisk capillary refill. No clubbing, cyanosis, or edema. Ecchymosis noted on R medial upper arm, no hematoma noted, mildly ttp.  NERVOUS SYSTEM:  Alert & Oriented X3, speech clear. No deficits     MEDICATIONS  (STANDING):  aspirin enteric coated 81 milliGRAM(s) Oral daily  atorvastatin 10 milliGRAM(s) Oral at bedtime  budesonide  80 MICROgram(s)/formoterol 4.5 MICROgram(s) Inhaler 2 Puff(s) Inhalation two times a day  chlorhexidine 0.12% Liquid 12.5 milliLiter(s) Swish and Spit once  chlorhexidine 4% Liquid 1 Application(s) Topical once  chlorhexidine 4% Liquid 1 Application(s) Topical once  furosemide    Tablet 40 milliGRAM(s) Oral daily  gabapentin 100 milliGRAM(s) Oral three times a day  heparin  Infusion 1100 Unit(s)/Hr (12 mL/Hr) IV Continuous <Continuous>  labetalol 200 milliGRAM(s) Oral three times a day  levothyroxine 25 MICROGram(s) Oral daily  pantoprazole    Tablet 40 milliGRAM(s) Oral before breakfast  senna 2 Tablet(s) Oral at bedtime  sodium chloride 0.9% lock flush 3 milliLiter(s) IV Push every 8 hours    MEDICATIONS  (PRN):  oxycodone    5 mG/acetaminophen 325 mG 1 Tablet(s) Oral every 4 hours PRN Moderate Pain (4 - 6)    On Beta Blocker? YES    Labs:                        8.3    10.14 )-----------( 242      ( 28 Jul 2022 06:34 )             26.3     07-28    135  |  102  |  24<H>  ----------------------------<  99  4.6   |  21<L>  |  0.96    Ca    8.9      28 Jul 2022 06:34  Mg     2.7     07-28    TPro  6.2  /  Alb  2.9<L>  /  TBili  0.4  /  DBili  x   /  AST  22  /  ALT  13  /  AlkPhos  81  07-28    PT/INR - ( 28 Jul 2022 06:34 )   PT: 15.0 sec;   INR: 1.26       PTT - ( 28 Jul 2022 06:34 )  PTT:68.5 sec    ABO Interpretation: O (07-28-22 @ 06:41)    CARDIAC MARKERS ( 27 Jul 2022 00:08 )  x     / x     / 36 U/L / x     / 1.2 ng/mL    Hgb A1C:  5.6    CXR:   < from: Xray Chest 1 View-PORTABLE IMMEDIATE (07.21.22 @ 04:12) >  Findings:    Support devices: None.    Cardiac/mediastinum/hilum: Status post aortic stent placement.    Lung parenchyma/Pleura: Within normal limits.    Skeleton/soft tissues: Unremarkable.    Impression:    No radiographic evidence of acute cardiopulmonary disease.    CT Scans:  < from: CT Angio Chest Aorta w/wo IV Cont (07.27.22 @ 04:13) >  1. Since 8/28/2021, there is mild residual stenosis in the midportion of   the left brachial artery, with a 2 mm outpouching of contrast which could   represent a tiny pseudoaneurysm.    2. Moderate stenosis at communication of left axillary artery with distal   left subclavian artery bypass graft.    3. 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    4. Mild pulmonary edema.    5. Large gallstone within distended gallbladder. Recommend clinical   correlation for cholecystitis.    6. Infiltration of subcutaneous fat in presternal region. Recommend   clinical correlation for soft tissue infection.    7. Small hematoma left arm.    Echo:    Carotid Duplex:  < from: US Duplex Carotid Arteries Complete, Bilateral (07.28.22 @ 14:09) >  RIGHT:  PROX CCA = 103 cm/s  DIST CCA = 98 cm/s  PROX ICA =96 cm/s  DIST ICA = 64 cm/s  ECA = 196 cm/s    LEFT:  PROX CCA = 91 cm/s  DIST CCA = 78 cm/s  PROX ICA = 98 cm/s  DIST ICA = 86 cm/s  ECA = 158 cm/s    Antegrade flow is noted within both vertebral arteries.    IMPRESSION: No significant change from 6/4/2021. Moderate plaque   bilaterally without evidence of hemodynamically significant stenosis.    Measurement of carotid stenosis is based on velocity parameters that   correlate the residual internal carotid diameter with that of the more   distalvessel in accordance with a method such as the [place] American   Symptomatic Carotid Endarterectomy Trial (NASCET).    Consult in Chart?  YES   Consent in Chart? YES  Pre-op Orders Placed? YES  Blood Prodeucts Ordered? YES  NPO ordered? YES

## 2022-07-28 NOTE — PROGRESS NOTE ADULT - ASSESSMENT
75 YO Female previous smoker w/ PMHx of HTN, spinal stenosis, arthritis, bicuspid aortic valve s/p AVR, ascending/hemiarch replacement with frozen elephant trunk and left aorto-subclavian bypass, CABG x2 with Dr. Muller on 8/9/2021 (post-op course c/b prolonged intubation, SSS s/p PPM, poor wound healing s/p pec flap and closure on 9/29/21) presented to [facility] w/ acute LUE pain, found to have left brachial occlusion s/p left brachial artery embolectomy, CT scans revealed endoleak. Pt was scheduled to see Dr. Muller in clinic, but pt went to ED d/t persistent left arm and chest pain that radiated to her left shoulder blade. At time of presentation, pain was constant and the same since when she had her embolectomy. Pt evaluated by Dr. Muller for TEVAR. CT angio chest performed on day of admission, significant for moderate stenosis at communication of L axillary artery w/ distal L subclavian bypass graft. On 7/28 carotid ultrasound performed.     Plan:    Neurovascular:   -Pain well controlled with current regimen. PRN's: Percocet 5/325mg PO Q4H PRN moderate pain  -Cont Gabapentin 100mg PO TID    Cardiovascular:   -Hx HTN  -Cont Labetalol 200mg PO TID     Hx of complex aortic disease s/p AVR, ascending aorta/hemiarch replacement with frozen elephant trunk and left aorto-subclavian bypass, CABGx2 with DB on 8/9/21. (post-op course complicated by prolonged intubation, SSS s/p PPM), poor MSI wound healing s/p pec flap and closure on 9/29/21)  -F/u carotid duplex  -Cont Lipitor 10mg PO QD  -Hemodynamically stable.   -Monitor: BP, HR, tele    Respiratory:   -Cont home Symbicort 80mcg 2 puffs BID  -Oxygenating well on room air  -Encourage continued use of IS 10x/hr and frequent ambulation    GI:  -GI PPX: Protonix  -PO Diet- DASH diet, NPOM  -Bowel Regimen: Senna    Renal / :  -Continue to monitor renal function: BUN/Cr: 24/0.96  -Monitor I/O's daily     Endocrine:    -Hx of hypothyroidism  -Cont Synthroid 25mcg PO QD  -No hx of DM  -A1c: 5.6    Hematologic:  -L brachial artery s/p L brachial artery embolectomy. Vascular consulted, appreciate recs  -Cont Hep gtt. F/u repeat PTT, goal 60-80  -Cont ASA 81mg PO QD  -Eliquis held, last dose 7/26 evening  -CBC: H/H- 8.3/26.3  -Coagulation Panel.    ID:  -Temperature: 98.1F  -CBC: WBC-10.14  -Continue to observe for SIRS/Sepsis Syndrome.    Prophylaxis:  -DVT prophylaxis with 5000 SubQ Heparin q8h.  -Continue with SCD's b/l while patient is at rest     Disposition:  -TEVAR tomorrow (7/29/22)   75 YO Female previous smoker w/ PMHx of HTN, spinal stenosis, arthritis, bicuspid aortic valve s/p AVR, ascending/hemiarch replacement with frozen elephant trunk and left aorto-subclavian bypass, CABG x2 with Dr. Muller on 8/9/2021 (post-op course c/b prolonged intubation, SSS s/p PPM, poor wound healing s/p pec flap and closure on 9/29/21) presented to [facility] w/ acute LUE pain, found to have left brachial occlusion s/p left brachial artery embolectomy, CT scans revealed endoleak. Pt was scheduled to see Dr. Muller in clinic, but pt went to ED d/t persistent left arm and chest pain that radiated to her left shoulder blade. At time of presentation, pain was constant and the same since when she had her embolectomy. Pt evaluated by Dr. Muller for TEVAR. CT angio chest performed on day of admission, significant for moderate stenosis at communication of L axillary artery w/ distal L subclavian bypass graft. On 7/28 carotid ultrasound revealed no significant change, moderate plaque bilaterally w/o significant stenosis.     Plan:    Neurovascular:   -Pain well controlled with current regimen. PRN's: Percocet 5/325mg PO Q4H PRN moderate pain  -Cont Gabapentin 100mg PO TID    Cardiovascular:   -Hx HTN  -Cont Labetalol 200mg PO TID     Hx of complex aortic disease s/p AVR, ascending aorta/hemiarch replacement with frozen elephant trunk and left aorto-subclavian bypass, CABGx2 with DB on 8/9/21. (post-op course complicated by prolonged intubation, SSS s/p PPM), poor MSI wound healing s/p pec flap and closure on 9/29/21)  -Carotid duplex: no significant carotid stenosis  -Cont Lipitor 10mg PO QD  -Hemodynamically stable.   -Monitor: BP, HR, tele    Respiratory:   -Cont home Symbicort 80mcg 2 puffs BID  -Oxygenating well on room air  -Encourage continued use of IS 10x/hr and frequent ambulation    GI:  -GI PPX: Protonix  -PO Diet- DASH diet, NPOM  -Bowel Regimen: Senna    Renal / :  -Continue to monitor renal function: BUN/Cr: 24/0.96  -Monitor I/O's daily     Endocrine:    -Hx of hypothyroidism  -Cont Synthroid 25mcg PO QD  -No hx of DM  -A1c: 5.6    Hematologic:  -L brachial artery s/p L brachial artery embolectomy. Vascular consulted, appreciate recs  -Cont Hep gtt. F/u repeat PTT, goal 60-80  -Cont ASA 81mg PO QD  -Eliquis held, last dose 7/26 evening  -CBC: H/H- 8.3/26.3  -Coagulation Panel.    ID:  -Temperature: 98.1F  -CBC: WBC-10.14  -Continue to observe for SIRS/Sepsis Syndrome.    Prophylaxis:  -DVT prophylaxis with 5000 SubQ Heparin q8h.  -Continue with SCD's b/l while patient is at rest     Disposition:  -TEVAR tomorrow (7/29/22)

## 2022-07-29 NOTE — PROGRESS NOTE ADULT - SUBJECTIVE AND OBJECTIVE BOX
CTICU  CRITICAL  CARE  attending     Hand off received 					   Pertinent clinical, laboratory, radiographic, hemodynamic, echocardiographic, respiratory data, microbiologic data and chart were reviewed and analyzed frequently throughout the course of the day  Patient seen and examined with CTS/ SH attending at bedside  Pt is a 76 yr old female previous smoker with PMH HTN, spinal stenosis, arthritis, biscupid AV sp AVR, ascending & hemiarch replacement with frozen elephant trunk and L aort-subclavian bypass, CABG x 2 with Dr. Muller 8/9/21. Post op course cb prolonged intubation, SSS sp PPM, poor wound healing with pec flap closure 9/29/21. Presented to [facility] 7/21 with LUE pain and found to have thrombus sp embolectomy L brachial artery 7/21. Also found to have endoleak prompting todays OR.      FAMILY HISTORY:  FH: CAD (coronary artery disease) (Sibling)  CABG  Family history of CKD (chronic kidney disease) (Sibling)  ESRD  Family history of diabetes mellitus (DM) (Sibling)  PAST MEDICAL & SURGICAL HISTORY:  HTN (hypertension)  H/O aortic valve stenosis  CAD (coronary artery disease)  S/P aortic valve replacement  S/P CABG (coronary artery bypass graft)  H/O aortic arch replacement  Pacemaker  medtronic micra pacemaker        Patient is a 77y old  Female who presents with a chief complaint of Endoleak (28 Jul 2022 14:20)      14 system review was unremarkable    Vital signs, hemodynamic and respiratory parameters were reviewed from the bedside nursing flowsheet.  ICU Vital Signs Last 24 Hrs  T(C): 36.4 (29 Jul 2022 09:01), Max: 36.4 (29 Jul 2022 08:47)  T(F): 97.6 (29 Jul 2022 09:01), Max: 97.6 (29 Jul 2022 08:47)  HR: 57 (29 Jul 2022 09:01) (52 - 64)  BP: 116/58 (29 Jul 2022 09:01) (100/57 - 144/64)  BP(mean): 83 (29 Jul 2022 08:47) (72 - 92)  ABP: --  ABP(mean): --  RR: 18 (29 Jul 2022 09:01) (16 - 20)  SpO2: 100% (29 Jul 2022 09:01) (93% - 100%)    O2 Parameters below as of 29 Jul 2022 08:47  Patient On (Oxygen Delivery Method): room air      Intake and output was reviewed and the fluid balance was calculated  Daily Height in cm: 157.48 (29 Jul 2022 09:01)    Daily   I&O's Summary    28 Jul 2022 07:01  -  29 Jul 2022 07:00  --------------------------------------------------------  IN: 156 mL / OUT: 400 mL / NET: -244 mL        All lines and drain sites were assessed  Glycemic trend was reviewed      Neuro: intubated, sedated  Heart: s1, s2 bradycardic  Lungs: clear bl  Abdomen: soft, nt/nd  Extremities: R groin with staples, soft, L groin firm; 2+ dp      labs  CBC Full  -  ( 29 Jul 2022 06:56 )  WBC Count : 8.19 K/uL  RBC Count : 2.71 M/uL  Hemoglobin : 7.8 g/dL  Hematocrit : 25.2 %  Platelet Count - Automated : 212 K/uL  Mean Cell Volume : 93.0 fl  Mean Cell Hemoglobin : 28.8 pg  Mean Cell Hemoglobin Concentration : 31.0 gm/dL  Auto Neutrophil # : x  Auto Lymphocyte # : x  Auto Monocyte # : x  Auto Eosinophil # : x  Auto Basophil # : x  Auto Neutrophil % : x  Auto Lymphocyte % : x  Auto Monocyte % : x  Auto Eosinophil % : x  Auto Basophil % : x    07-29    134<L>  |  99  |  22  ----------------------------<  100<H>  4.0   |  25  |  1.02    Ca    9.1      29 Jul 2022 06:56  Mg     2.5     07-29    TPro  6.2  /  Alb  2.6<L>  /  TBili  0.2  /  DBili  x   /  AST  15  /  ALT  12  /  AlkPhos  79  07-29    PT/INR - ( 29 Jul 2022 06:56 )   PT: 13.8 sec;   INR: 1.16          PTT - ( 29 Jul 2022 06:56 )  PTT:80.6 sec  The current medications were reviewed   MEDICATIONS  (STANDING):  atorvastatin 10 milliGRAM(s) Oral at bedtime  budesonide  80 MICROgram(s)/formoterol 4.5 MICROgram(s) Inhaler 2 Puff(s) Inhalation two times a day  ceFAZolin   IVPB 2000 milliGRAM(s) IV Intermittent every 8 hours  chlorhexidine 0.12% Liquid 5 milliLiter(s) Oral Mucosa two times a day  dextrose 50% Injectable 50 milliLiter(s) IV Push every 15 minutes  insulin regular Infusion 1 Unit(s)/Hr (1 mL/Hr) IV Continuous <Continuous>  levothyroxine 25 MICROGram(s) Oral daily  pantoprazole    Tablet 40 milliGRAM(s) Oral before breakfast  senna 2 Tablet(s) Oral at bedtime  sodium chloride 0.9%. 1000 milliLiter(s) (10 mL/Hr) IV Continuous <Continuous>    MEDICATIONS  (PRN):      Assessment/Plan:  76 yr old female previous smoker with PMH HTN, spinal stenosis, arthritis, biscupid AV sp AVR, ascending & hemiarch replacement with frozen elephant trunk and L aort-subclavian bypass, CABG x 2 with Dr. Muller 8/9/21. Post op course cb prolonged intubation, SSS sp PPM, poor wound healing with pec flap closure 9/29/21. Presented to [facility] 7/21 with LUE pain and found to have thrombus sp embolectomy L brachial artery 7/21. Also found to have endoleak prompting todays OR.      Sp TEVAR with R CFA repair (Dr. Muller, 7/29)  Given 2 units pRBCs  L femoral venous sheath, removed, groin hard, likely hematoma-will monitor  Acute Respiratory Failure requiring mechanical ventilation-wean to extubate  PPM  HTN  Replete lytes prn  Monitor CT output  GI/DVT PPX  Bowel Regimen  Pain control    Close hemodynamic, ventilatory and drain monitoring and management per post op routine  Monitor for arrhythmias and monitor parameters for organ perfusion  Beta blockade not administered due to hemodynamic instability and bradycardia  Monitor neurologic status  Head of the bed should remain elevated to 45 deg   Chest PT and IS will be encouraged  Monitor adequacy of oxygenation and ventilation and attempt to wean oxygen  Antibiotic regimen will be tailored to the clinical, laboratory and microbiologic data  Nutritional goals will be met using po eventually, ensure adequate caloric intake and monitor the same  Stress ulcer and VTE prophylaxis will be achieved    Glycemic control is satisfactory  Electrolytes have been repleted as necessary and wound care has been carried out   Pain control has been achieved.   Aggressive physical therapy and early mobility and ambulation goals will be met   The family was updated about the course and plan.    CRITICAL CARE TIME personally provided by me  in evaluation and management, reassessments, review and interpretation of labs and x-rays, ventilator and hemodynamic management, formulating a plan and coordinating care: ___90____ MIN.  Time does not include procedural time.    CTICU ATTENDING     					  Pari Manrique MD

## 2022-07-29 NOTE — BRIEF OPERATIVE NOTE - NSICDXBRIEFPROCEDURE_GEN_ALL_CORE_FT
PROCEDURES:  Second stage thoracic endovascular aortic repair (TEVAR) 29-Jul-2022 10:32:43  Jens Baker  
PROCEDURES:  Second stage thoracic endovascular aortic repair (TEVAR) 29-Jul-2022 10:32:43  Jens Baker

## 2022-07-29 NOTE — BRIEF OPERATIVE NOTE - NSICDXBRIEFPREOP_GEN_ALL_CORE_FT
PRE-OP DIAGNOSIS:  Aortic aneurysm, thoracic 29-Jul-2022 10:37:44  Jens Baker  
PRE-OP DIAGNOSIS:  Aortic aneurysm, thoracic 29-Jul-2022 10:37:44  Jens Baker

## 2022-07-29 NOTE — BRIEF OPERATIVE NOTE - OPERATION/FINDINGS
TEVAR TEVAR  Femoral artery injury requiring cut down and patch repair  Distal pulses in tact post op

## 2022-07-29 NOTE — BRIEF OPERATIVE NOTE - OPERATION/FINDINGS
Procedure: TEVAR, Right femoral cutdown and patch angioplasty  Indication: Type 1 endoleak of thoracic aortic aneurysm  Description: b/l groin access obtained, unable to get good support to deploy device with wire in ascending aorta. Wire parked in right brachial artery and [id number] Cook stent graft deployed with 50% coverage of L carotid artery and ballooned. Completion showed no endoleak remaining. Right groin perclosed x2 howevere there was excessive bleeding after tying knots down. Right femoral cutdown performed and right common femoral artery repaired with patch angioplasty. Left groin 5 Fr sheath removed with manual pressure held.  Contrast: 220cc  IVF: 2L, 1uPRBC  UOP: 1100  Pulses: right dp monophasic, right PT biphasic, left dp palpable, left PT biphasic

## 2022-07-29 NOTE — BRIEF OPERATIVE NOTE - NSICDXBRIEFPOSTOP_GEN_ALL_CORE_FT
POST-OP DIAGNOSIS:  Thoracic aortic aneurysm 29-Jul-2022 10:38:12  Jens Baker  
POST-OP DIAGNOSIS:  Thoracic aortic aneurysm 29-Jul-2022 10:38:12  Jens Baker

## 2022-07-29 NOTE — PRE-OP CHECKLIST - HAND OFF
Unit RN to OR RN
Normal vision: sees adequately in most situations; can see medication labels, newsprint/with eye glasses

## 2022-07-30 NOTE — PROGRESS NOTE ADULT - SUBJECTIVE AND OBJECTIVE BOX
CTICU  CRITICAL  CARE  attending     Hand off received 					   Pertinent clinical, laboratory, radiographic, hemodynamic, echocardiographic, respiratory data, microbiologic data and chart were reviewed and analyzed frequently throughout the course of the day and night  Patient seen and examined with CTS/ SH attending at bedside  Pt is a 77y , Female, HEALTH ISSUES - PROBLEM Dx:      , FAMILY HISTORY:  FH: CAD (coronary artery disease) (Sibling)  CABG    Family history of CKD (chronic kidney disease) (Sibling)  ESRD    Family history of diabetes mellitus (DM) (Sibling)    PAST MEDICAL & SURGICAL HISTORY:  HTN (hypertension)      H/O aortic valve stenosis      CAD (coronary artery disease)      S/P aortic valve replacement      S/P CABG (coronary artery bypass graft)      H/O aortic arch replacement      Pacemaker  medtronic micra pacemaker        Patient is a 77y old  Female who presents with a chief complaint of Endoleak (30 Jul 2022 20:30)      14 system review limited by mentation and multiorgan morbidity     Vital signs, hemodynamic and respiratory parameters were reviewed from the bedside nursing flowsheet.  ICU Vital Signs Last 24 Hrs  T(C): 37.6 (30 Jul 2022 17:58), Max: 37.6 (30 Jul 2022 17:58)  T(F): 99.7 (30 Jul 2022 17:58), Max: 99.7 (30 Jul 2022 17:58)  HR: 85 (30 Jul 2022 19:00) (53 - 87)  BP: 138/62 (30 Jul 2022 18:15) (103/51 - 162/70)  BP(mean): 89 (30 Jul 2022 18:15) (74 - 100)  ABP: 144/47 (30 Jul 2022 19:00) (105/38 - 175/60)  ABP(mean): 81 (30 Jul 2022 19:00) (60 - 103)  RR: 20 (30 Jul 2022 19:00) (12 - 33)  SpO2: 94% (30 Jul 2022 19:00) (93% - 100%)    O2 Parameters below as of 30 Jul 2022 16:00  Patient On (Oxygen Delivery Method): nasal cannula w/ humidification  O2 Flow (L/min): 6        Adult Advanced Hemodynamics Last 24 Hrs  CVP(mm Hg): --  CVP(cm H2O): --  CO: --  CI: --  PA: --  PA(mean): --  PCWP: --  SVR: --  SVRI: --  PVR: --  PVRI: --, ABG - ( 30 Jul 2022 12:50 )  pH, Arterial: 7.41  pH, Blood: x     /  pCO2: 41    /  pO2: 70    / HCO3: 26    / Base Excess: 1.2   /  SaO2: 95.6              Mode: standby    Intake and output was reviewed and the fluid balance was calculated  Daily     Daily   I&O's Summary    29 Jul 2022 07:01  -  30 Jul 2022 07:00  --------------------------------------------------------  IN: 569.4 mL / OUT: 995 mL / NET: -425.6 mL    30 Jul 2022 07:01  -  30 Jul 2022 20:37  --------------------------------------------------------  IN: 1375.6 mL / OUT: 525 mL / NET: 850.6 mL        All lines and drain sites were assessed  [facility]      POCT Blood Glucose.: 130 mg/dL (29 Jul 2022 22:15)    No acute change in focality  Auscultation of the chest reveals equal bs  Abdomen is soft  Extremities are warm and well perfused  Wounds appear clean and unremarkable  Antibiotics are periop    labs  CBC Full  -  ( 30 Jul 2022 12:42 )  WBC Count : 12.13 K/uL  RBC Count : 2.73 M/uL  Hemoglobin : 7.8 g/dL  Hematocrit : 24.0 %  Platelet Count - Automated : 225 K/uL  Mean Cell Volume : 87.9 fl  Mean Cell Hemoglobin : 28.6 pg  Mean Cell Hemoglobin Concentration : 32.5 gm/dL  Auto Neutrophil # : 10.13 K/uL  Auto Lymphocyte # : 1.11 K/uL  Auto Monocyte # : 0.72 K/uL  Auto Eosinophil # : 0.08 K/uL  Auto Basophil # : 0.04 K/uL  Auto Neutrophil % : 83.5 %  Auto Lymphocyte % : 9.2 %  Auto Monocyte % : 5.9 %  Auto Eosinophil % : 0.7 %  Auto Basophil % : 0.3 %    07-30    136  |  102  |  17  ----------------------------<  132<H>  3.8   |  25  |  0.99    Ca    8.4      30 Jul 2022 12:42  Phos  4.0     07-30  Mg     2.6     07-30    TPro  5.7<L>  /  Alb  3.3  /  TBili  0.3  /  DBili  x   /  AST  14  /  ALT  7<L>  /  AlkPhos  77  07-30    PT/INR - ( 30 Jul 2022 12:42 )   PT: 13.9 sec;   INR: 1.17          PTT - ( 30 Jul 2022 12:42 )  PTT:31.3 sec  The current medications were reviewed   MEDICATIONS  (STANDING):  atorvastatin 10 milliGRAM(s) Oral at bedtime  budesonide  80 MICROgram(s)/formoterol 4.5 MICROgram(s) Inhaler 2 Puff(s) Inhalation two times a day  ceFAZolin   IVPB 2000 milliGRAM(s) IV Intermittent every 8 hours  chlorhexidine 0.12% Liquid 5 milliLiter(s) Oral Mucosa two times a day  chlorhexidine 2% Cloths 1 Application(s) Topical <User Schedule>  dextrose 50% Injectable 50 milliLiter(s) IV Push every 15 minutes  gabapentin 300 milliGRAM(s) Oral every 8 hours  insulin regular Infusion 1 Unit(s)/Hr (1 mL/Hr) IV Continuous <Continuous>  levothyroxine 25 MICROGram(s) Oral daily  midodrine 10 milliGRAM(s) Oral every 8 hours  pantoprazole    Tablet 40 milliGRAM(s) Oral before breakfast  phenylephrine    Infusion 0.9 MICROgram(s)/kG/Min (23 mL/Hr) IV Continuous <Continuous>  senna 2 Tablet(s) Oral at bedtime  sodium chloride 0.9%. 1000 milliLiter(s) (10 mL/Hr) IV Continuous <Continuous>    MEDICATIONS  (PRN):       PROBLEM LIST/ ASSESSMENT:  HEALTH ISSUES - PROBLEM Dx:      ,   Patient is a 77y old  Female who presents with a chief complaint of Endoleak (30 Jul 2022 20:30)     s/p cardiac surgery                My plan includes :  close hemodynamic, ventilatory and drain monitoring and management per post op routine    Monitor for arrhythmias and monitor parameters for organ perfusion  beta blockade not administered due to hemodynamic instability and bradycardia  monitor neurologic status  Head of the bed should remain elevated to 45 deg .   chest PT and IS will be encouraged  monitor adequacy of oxygenation and ventilation and attempt to wean oxygen  antibiotic regimen will be tailored to the clinical, laboratory and microbiologic data  Nutritional goals will be met using po eventually , ensure adequate caloric intake and montior the same  Stress ulcer and VTE prophylaxis will be achieved    Glycemic control is satisfactory  Electrolytes have been repleted as necessary and wound care has been carried out. Pain control has been achieved.   agressive physical therapy and early mobility and ambulation goals will be met   The family was updated about the course and plan  CRITICAL CARE TIME personally provided by me  in evaluation and management, reassessments, review and interpretation of labs and x-rays, ventilator and hemodynamic management, formulating a plan and coordinating care: ___90____ MIN.  Time does not include procedural time. Time spent was non routine post-operarive caRE and included multiple and repeated evaluations at the bedside  CTICU ATTENDING     					    Jesu Garcia MD

## 2022-07-30 NOTE — PROGRESS NOTE ADULT - ASSESSMENT
76 year old female with a history of previous smoker, HTN, spinal stenosis, arthritis, bicuspid aortic valve s/p AVR, ascending/hemiarch replacement with frozen elephant trunk and left aorto-subclavian bypass, CABG x2 with Dr. Muller on 8/9/2021. Recently  admitted to [facility] with acute LUE pain, found to have left brachial occlusion s/p left brachial artery embolectomy, CTA scans done during that admission now showing endoleak. She presented to [facility] ED with worsening LUE pain radiating to shoulder/neck/chest.     s/p TEVAR on 7/29   -pain control  -Q4h neurovascular checks  -monitor groins  Vascular will continue to follow    Remainder of care per primary team

## 2022-07-30 NOTE — PROGRESS NOTE ADULT - SUBJECTIVE AND OBJECTIVE BOX
CTICU  CRITICAL  CARE  attending     Hand off received 					   Pertinent clinical, laboratory, radiographic, hemodynamic, echocardiographic, respiratory data, microbiologic data and chart were reviewed and analyzed frequently throughout the course of the day  Patient seen and examined with CTS/ SH attending at bedside  Pt is a 77y , Female, HEALTH ISSUES - PROBLEM Dx:      , FAMILY HISTORY:  FH: CAD (coronary artery disease) (Sibling)  CABG    Family history of CKD (chronic kidney disease) (Sibling)  ESRD    Family history of diabetes mellitus (DM) (Sibling)    PAST MEDICAL & SURGICAL HISTORY:  HTN (hypertension)      H/O aortic valve stenosis      CAD (coronary artery disease)      S/P aortic valve replacement      S/P CABG (coronary artery bypass graft)      H/O aortic arch replacement      Pacemaker  medtronic micra pacemaker        Patient is a 77y old  Female who presents with a chief complaint of Endoleak (30 Jul 2022 15:33)      14 system review was unremarkable    Vital signs, hemodynamic and respiratory parameters were reviewed from the bedside nursing flowsheet.  ICU Vital Signs Last 24 Hrs  T(C): 37.6 (30 Jul 2022 17:58), Max: 37.6 (30 Jul 2022 17:58)  T(F): 99.7 (30 Jul 2022 17:58), Max: 99.7 (30 Jul 2022 17:58)  HR: 85 (30 Jul 2022 19:00) (53 - 87)  BP: 138/62 (30 Jul 2022 18:15) (103/51 - 162/70)  BP(mean): 89 (30 Jul 2022 18:15) (74 - 100)  ABP: 144/47 (30 Jul 2022 19:00) (105/38 - 175/60)  ABP(mean): 81 (30 Jul 2022 19:00) (60 - 103)  RR: 20 (30 Jul 2022 19:00) (12 - 33)  SpO2: 94% (30 Jul 2022 19:00) (93% - 100%)    O2 Parameters below as of 30 Jul 2022 16:00  Patient On (Oxygen Delivery Method): nasal cannula w/ humidification  O2 Flow (L/min): 6        Adult Advanced Hemodynamics Last 24 Hrs  CVP(mm Hg): --  CVP(cm H2O): --  CO: --  CI: --  PA: --  PA(mean): --  PCWP: --  SVR: --  SVRI: --  PVR: --  PVRI: --, ABG - ( 30 Jul 2022 12:50 )  pH, Arterial: 7.41  pH, Blood: x     /  pCO2: 41    /  pO2: 70    / HCO3: 26    / Base Excess: 1.2   /  SaO2: 95.6              Mode: standby    Intake and output was reviewed and the fluid balance was calculated  Daily     Daily   I&O's Summary    29 Jul 2022 07:01  -  30 Jul 2022 07:00  --------------------------------------------------------  IN: 569.4 mL / OUT: 995 mL / NET: -425.6 mL    30 Jul 2022 07:01  -  30 Jul 2022 20:30  --------------------------------------------------------  IN: 1375.6 mL / OUT: 525 mL / NET: 850.6 mL        All lines and drain sites were assessed  Glycemic trend was reviewedCAPILLARY BLOOD GLUCOSE      POCT Blood Glucose.: 130 mg/dL (29 Jul 2022 22:15)      Neuro:  HEENT:  Heart:  Lungs:  Abdomen:  Extremities:      labs  CBC Full  -  ( 30 Jul 2022 12:42 )  WBC Count : 12.13 K/uL  RBC Count : 2.73 M/uL  Hemoglobin : 7.8 g/dL  Hematocrit : 24.0 %  Platelet Count - Automated : 225 K/uL  Mean Cell Volume : 87.9 fl  Mean Cell Hemoglobin : 28.6 pg  Mean Cell Hemoglobin Concentration : 32.5 gm/dL  Auto Neutrophil # : 10.13 K/uL  Auto Lymphocyte # : 1.11 K/uL  Auto Monocyte # : 0.72 K/uL  Auto Eosinophil # : 0.08 K/uL  Auto Basophil # : 0.04 K/uL  Auto Neutrophil % : 83.5 %  Auto Lymphocyte % : 9.2 %  Auto Monocyte % : 5.9 %  Auto Eosinophil % : 0.7 %  Auto Basophil % : 0.3 %    07-30    136  |  102  |  17  ----------------------------<  132<H>  3.8   |  25  |  0.99    Ca    8.4      30 Jul 2022 12:42  Phos  4.0     07-30  Mg     2.6     07-30    TPro  5.7<L>  /  Alb  3.3  /  TBili  0.3  /  DBili  x   /  AST  14  /  ALT  7<L>  /  AlkPhos  77  07-30    PT/INR - ( 30 Jul 2022 12:42 )   PT: 13.9 sec;   INR: 1.17          PTT - ( 30 Jul 2022 12:42 )  PTT:31.3 sec  The current medications were reviewed   MEDICATIONS  (STANDING):  atorvastatin 10 milliGRAM(s) Oral at bedtime  budesonide  80 MICROgram(s)/formoterol 4.5 MICROgram(s) Inhaler 2 Puff(s) Inhalation two times a day  ceFAZolin   IVPB 2000 milliGRAM(s) IV Intermittent every 8 hours  chlorhexidine 0.12% Liquid 5 milliLiter(s) Oral Mucosa two times a day  chlorhexidine 2% Cloths 1 Application(s) Topical <User Schedule>  dextrose 50% Injectable 50 milliLiter(s) IV Push every 15 minutes  gabapentin 300 milliGRAM(s) Oral every 8 hours  insulin regular Infusion 1 Unit(s)/Hr (1 mL/Hr) IV Continuous <Continuous>  levothyroxine 25 MICROGram(s) Oral daily  midodrine 10 milliGRAM(s) Oral every 8 hours  pantoprazole    Tablet 40 milliGRAM(s) Oral before breakfast  phenylephrine    Infusion 0.9 MICROgram(s)/kG/Min (23 mL/Hr) IV Continuous <Continuous>  senna 2 Tablet(s) Oral at bedtime  sodium chloride 0.9%. 1000 milliLiter(s) (10 mL/Hr) IV Continuous <Continuous>    MEDICATIONS  (PRN):      Assessment/Plan:         s/p cardiac surgery    Acute systolic and diastolic heart failure evidenced by SOB and parenchymal infiltrates; will treat with diuresis    Cardiogenic shock on ionotropy    Vasogenic shock due to hypotension in the cticu , will keep on pressors    Hypovolemic shock - > 20% intravascular depletion will replete volume    Acute blood loss anemia with relative hypotension treated with > 1 unit PC    Acute respiratory failure ruled in due to hypoxemia, O2 sats < 91% on RA treated with HFNC    Acute respiratory failure ruled in due to hypercapnea on abg will treat with mechanical ventilation    Acute respiratory failure ruled in due to prolonged mechanical ventilation > 24 hrs on the vent due to failure to wean due to weak respiratory mechanics    Toxic metabolic encephalopathy ; sundowning due to anesthesia pain medications    Acidosis evidenced by anion gap and negative base excess    Acute kidney injury - creatinine > 0.3 due to combined prerenal and intrarenal factors can presume ATN    ESRD    Acute ora-operative ischemic stroke    Moderate protein calorie malutrition    Diet as tolerated  Replete lytes prn  Monitor CT output  GI/DVT PPX  Bowel Regimen  Pain control  OOB with PT      Close hemodynamic, ventilatory and drain monitoring and management per post op routine  Monitor for arrhythmias and monitor parameters for organ perfusion  Beta blockade not administered due to hemodynamic instability and bradycardia  Monitor neurologic status  Head of the bed should remain elevated to 45 deg   Chest PT and IS will be encouraged  Monitor adequacy of oxygenation and ventilation and attempt to wean oxygen  Antibiotic regimen will be tailored to the clinical, laboratory and microbiologic data  Nutritional goals will be met using po eventually, ensure adequate caloric intake and monitor the same  Stress ulcer and VTE prophylaxis will be achieved    Glycemic control is satisfactory  Electrolytes have been repleted as necessary and wound care has been carried out   Pain control has been achieved.   Aggressive physical therapy and early mobility and ambulation goals will be met   The family was updated about the course and plan.    CRITICAL CARE TIME personally provided by me  in evaluation and management, reassessments, review and interpretation of labs and x-rays, ventilator and hemodynamic management, formulating a plan and coordinating care: ___90____ MIN.  Time does not include procedural time.    CTICU ATTENDING     					  Pari Manrique MD CTICU  CRITICAL  CARE  attending     Hand off received 					   Pertinent clinical, laboratory, radiographic, hemodynamic, echocardiographic, respiratory data, microbiologic data and chart were reviewed and analyzed frequently throughout the course of the day  Patient seen and examined with CTS/ SH attending at bedside  Pt is a 77 yr old female previous smoker with PMH HTN, spinal stenosis, arthritis, biscupid AV sp AVR, ascending & hemiarch replacement with frozen elephant trunk and L aorto-subclavian bypass, CABG x 2 with Dr. Muller 8/9/21. Post op course cb prolonged intubation, SSS sp PPM, poor wound healing with pec flap closure 9/29/21. Presented to [facility] 7/21 with LUE pain and found to have thrombus sp embolectomy L brachial artery 7/21. Also found to have endoleak prompting TEVAR 7/29. Extubated that night. Today required 2 units pRBCs. C/o pain, started on gabapentin.     FAMILY HISTORY:  FH: CAD (coronary artery disease) (Sibling)  CABG  Family history of CKD (chronic kidney disease) (Sibling)  ESRD  Family history of diabetes mellitus (DM) (Sibling)    PAST MEDICAL & SURGICAL HISTORY:  HTN (hypertension)  H/O aortic valve stenosis  CAD (coronary artery disease)  S/P aortic valve replacement  S/P CABG (coronary artery bypass graft)  H/O aortic arch replacement  Pacemaker medtronic micra pacemaker        Patient is a 77y old  Female who presents with a chief complaint of Endoleak    14 system review was unremarkable    Vital signs, hemodynamic and respiratory parameters were reviewed from the bedside nursing flowsheet.  ICU Vital Signs Last 24 Hrs  T(C): 37.6 (30 Jul 2022 17:58), Max: 37.6 (30 Jul 2022 17:58)  T(F): 99.7 (30 Jul 2022 17:58), Max: 99.7 (30 Jul 2022 17:58)  HR: 85 (30 Jul 2022 19:00) (53 - 87)  BP: 138/62 (30 Jul 2022 18:15) (103/51 - 162/70)  BP(mean): 89 (30 Jul 2022 18:15) (74 - 100)  ABP: 144/47 (30 Jul 2022 19:00) (105/38 - 175/60)  ABP(mean): 81 (30 Jul 2022 19:00) (60 - 103)  RR: 20 (30 Jul 2022 19:00) (12 - 33)  SpO2: 94% (30 Jul 2022 19:00) (93% - 100%)    O2 Parameters below as of 30 Jul 2022 16:00  Patient On (Oxygen Delivery Method): nasal cannula w/ humidification  O2 Flow (L/min): 6      Intake and output was reviewed and the fluid balance was calculated  Daily     Daily   I&O's Summary    29 Jul 2022 07:01  -  30 Jul 2022 07:00  --------------------------------------------------------  IN: 569.4 mL / OUT: 995 mL / NET: -425.6 mL    30 Jul 2022 07:01  -  30 Jul 2022 20:30  --------------------------------------------------------  IN: 1375.6 mL / OUT: 525 mL / NET: 850.6 mL        All lines and drain sites were assessed  Glycemic trend was reviewed      POCT Blood Glucose.: 130 mg/dL (29 Jul 2022 22:15)      Neuro: sitting up in nad  HEENT: mmm  Heart: s1, s2  Lungs: clear bl  Abdomen: soft, nt/nd  Extremities: soft compartments      labs  CBC Full  -  ( 30 Jul 2022 12:42 )  WBC Count : 12.13 K/uL  RBC Count : 2.73 M/uL  Hemoglobin : 7.8 g/dL  Hematocrit : 24.0 %  Platelet Count - Automated : 225 K/uL  Mean Cell Volume : 87.9 fl  Mean Cell Hemoglobin : 28.6 pg  Mean Cell Hemoglobin Concentration : 32.5 gm/dL  Auto Neutrophil # : 10.13 K/uL  Auto Lymphocyte # : 1.11 K/uL  Auto Monocyte # : 0.72 K/uL  Auto Eosinophil # : 0.08 K/uL  Auto Basophil # : 0.04 K/uL  Auto Neutrophil % : 83.5 %  Auto Lymphocyte % : 9.2 %  Auto Monocyte % : 5.9 %  Auto Eosinophil % : 0.7 %  Auto Basophil % : 0.3 %    07-30    136  |  102  |  17  ----------------------------<  132<H>  3.8   |  25  |  0.99    Ca    8.4      30 Jul 2022 12:42  Phos  4.0     07-30  Mg     2.6     07-30    TPro  5.7<L>  /  Alb  3.3  /  TBili  0.3  /  DBili  x   /  AST  14  /  ALT  7<L>  /  AlkPhos  77  07-30    PT/INR - ( 30 Jul 2022 12:42 )   PT: 13.9 sec;   INR: 1.17          PTT - ( 30 Jul 2022 12:42 )  PTT:31.3 sec  The current medications were reviewed   MEDICATIONS  (STANDING):  atorvastatin 10 milliGRAM(s) Oral at bedtime  budesonide  80 MICROgram(s)/formoterol 4.5 MICROgram(s) Inhaler 2 Puff(s) Inhalation two times a day  ceFAZolin   IVPB 2000 milliGRAM(s) IV Intermittent every 8 hours  chlorhexidine 0.12% Liquid 5 milliLiter(s) Oral Mucosa two times a day  chlorhexidine 2% Cloths 1 Application(s) Topical <User Schedule>  dextrose 50% Injectable 50 milliLiter(s) IV Push every 15 minutes  gabapentin 300 milliGRAM(s) Oral every 8 hours  insulin regular Infusion 1 Unit(s)/Hr (1 mL/Hr) IV Continuous <Continuous>  levothyroxine 25 MICROGram(s) Oral daily  midodrine 10 milliGRAM(s) Oral every 8 hours  pantoprazole    Tablet 40 milliGRAM(s) Oral before breakfast  phenylephrine    Infusion 0.9 MICROgram(s)/kG/Min (23 mL/Hr) IV Continuous <Continuous>  senna 2 Tablet(s) Oral at bedtime  sodium chloride 0.9%. 1000 milliLiter(s) (10 mL/Hr) IV Continuous <Continuous>    MEDICATIONS  (PRN):      Assessment/Plan:  76 yr old female previous smoker with PMH HTN, spinal stenosis, arthritis, biscupid AV sp AVR, ascending & hemiarch replacement with frozen elephant trunk and L aort-subclavian bypass, CABG x 2 with Dr. Muller 8/9/21. Post op course cb prolonged intubation, SSS sp PPM, poor wound healing with pec flap closure 9/29/21. Presented to [facility] 7/21 with LUE pain and found to have thrombus sp embolectomy L brachial artery 7/21. Also found to have endoleak prompting todays OR    POD 1 TEVAR with R CFA repair (Dr. Muller, 7/29)  Post operative anemia sp 2 units pRBCs today, repeat CBC  PPM  HTN  Pain control  Diet as tolerated  Replete lytes prn  Monitor CT output  GI/DVT PPX  Bowel Regimen  Pain control  OOB with PT  Close hemodynamic, ventilatory and drain monitoring and management per post op routine  Monitor for arrhythmias and monitor parameters for organ perfusion  Monitor neurologic status  Head of the bed should remain elevated to 45 deg   Chest PT and IS will be encouraged  Monitor adequacy of oxygenation and ventilation and attempt to wean oxygen  Antibiotic regimen will be tailored to the clinical, laboratory and microbiologic data  Nutritional goals will be met using po eventually, ensure adequate caloric intake and monitor the same  Stress ulcer and VTE prophylaxis will be achieved    Glycemic control is satisfactory  Electrolytes have been repleted as necessary and wound care has been carried out   Pain control has been achieved.   Aggressive physical therapy and early mobility and ambulation goals will be met   The family was updated about the course and plan.    CRITICAL CARE TIME personally provided by me  in evaluation and management, reassessments, review and interpretation of labs and x-rays, ventilator and hemodynamic management, formulating a plan and coordinating care: ___30___ MIN.  Time does not include procedural time.    CTICU ATTENDING     					  Pari Manrique MD

## 2022-07-31 NOTE — PROGRESS NOTE ADULT - SUBJECTIVE AND OBJECTIVE BOX
Subjective: PT was laying comfortable in bed. no complaints. PT states that she is passing gas. no BM.     ROS:   Denies Headache, blurred vision, Chest Pain, SOB, Abdominal pain, nausea or vomiting     Social   midodrine 10  phenylephrine    Infusion 0.9      Allergies    No Known Allergies    Intolerances        Vital Signs Last 24 Hrs  T(C): 36.9 (31 Jul 2022 09:41), Max: 37.7 (31 Jul 2022 01:01)  T(F): 98.4 (31 Jul 2022 09:41), Max: 99.9 (31 Jul 2022 01:01)  HR: 80 (31 Jul 2022 12:00) (68 - 90)  BP: 145/68 (30 Jul 2022 20:00) (138/62 - 162/70)  BP(mean): 98 (30 Jul 2022 20:00) (89 - 100)  RR: 28 (31 Jul 2022 12:00) (16 - 33)  SpO2: 97% (31 Jul 2022 12:00) (86% - 99%)    Parameters below as of 31 Jul 2022 12:00  Patient On (Oxygen Delivery Method): nasal cannula, high flow  O2 Flow (L/min): 40  O2 Concentration (%): 40  I&O's Summary    30 Jul 2022 07:01  -  31 Jul 2022 07:00  --------------------------------------------------------  IN: 1525.6 mL / OUT: 920 mL / NET: 605.6 mL    31 Jul 2022 07:01  -  31 Jul 2022 12:25  --------------------------------------------------------  IN: 50 mL / OUT: 235 mL / NET: -185 mL        Physical Exam:  General: Asleep, easily aroused. Alert and oriented.  Neuro: Intact. Symmetric strength. Face symmetric. No neuro deficits.   Cardio: RR  GI: Abdomen soft. TTP RLQ into right groin. Remainder of abdominal exam normal. Nondistended.  Vascular: Palpable DP bilaterally, left PT palpable.       LABS:                        8.3    11.10 )-----------( 180      ( 31 Jul 2022 02:17 )             25.4     07-31    136  |  101  |  15  ----------------------------<  96  3.8   |  24  |  0.89    Ca    8.4      31 Jul 2022 02:17  Phos  3.3     07-31  Mg     2.5     07-31    TPro  5.8<L>  /  Alb  2.9<L>  /  TBili  0.4  /  DBili  x   /  AST  16  /  ALT  <5<L>  /  AlkPhos  78  07-31    PT/INR - ( 31 Jul 2022 02:17 )   PT: 15.5 sec;   INR: 1.30          PTT - ( 31 Jul 2022 02:17 )  PTT:32.9 sec    Radiology and Additional Studies:      · Assessment	  76 year old female with a history of previous smoker, HTN, spinal stenosis, arthritis, bicuspid aortic valve s/p AVR, ascending/hemiarch replacement with frozen elephant trunk and left aorto-subclavian bypass, CABG x2 with Dr. Muller on 8/9/2021. Recently  admitted to [facility] with acute LUE pain, found to have left brachial occlusion s/p left brachial artery embolectomy, CTA scans done during that admission now showing endoleak. She presented to [facility] ED with worsening LUE pain radiating to shoulder/neck/chest.     s/p TEVAR on 7/29   -pain control  -Q4h neurovascular checks  -monitor groins  Vascular will continue to follow    Remainder of care per primary team

## 2022-07-31 NOTE — PROGRESS NOTE ADULT - SUBJECTIVE AND OBJECTIVE BOX
CRITICAL CARE ATTENDING - CTICU    MEDICATIONS  (STANDING):  atorvastatin 10 milliGRAM(s) Oral at bedtime  budesonide  80 MICROgram(s)/formoterol 4.5 MICROgram(s) Inhaler 2 Puff(s) Inhalation two times a day  chlorhexidine 0.12% Liquid 5 milliLiter(s) Oral Mucosa two times a day  chlorhexidine 2% Cloths 1 Application(s) Topical <User Schedule>  dextrose 50% Injectable 50 milliLiter(s) IV Push every 15 minutes  gabapentin 300 milliGRAM(s) Oral every 8 hours  insulin regular Infusion 1 Unit(s)/Hr (1 mL/Hr) IV Continuous <Continuous>  levothyroxine 25 MICROGram(s) Oral daily  midodrine 10 milliGRAM(s) Oral every 8 hours  pantoprazole    Tablet 40 milliGRAM(s) Oral before breakfast  phenylephrine    Infusion 0.9 MICROgram(s)/kG/Min (23 mL/Hr) IV Continuous <Continuous>  senna 2 Tablet(s) Oral at bedtime  sodium chloride 0.9%. 1000 milliLiter(s) (10 mL/Hr) IV Continuous <Continuous>                                    8.3    11.10 )-----------( 180      ( 31 Jul 2022 02:17 )             25.4       07-31    136  |  101  |  15  ----------------------------<  96  3.8   |  24  |  0.89    Ca    8.4      31 Jul 2022 02:17  Phos  3.3     07-31  Mg     2.5     07-31    TPro  5.8<L>  /  Alb  2.9<L>  /  TBili  0.4  /  DBili  x   /  AST  16  /  ALT  <5<L>  /  AlkPhos  78  07-31      PT/INR - ( 31 Jul 2022 02:17 )   PT: 15.5 sec;   INR: 1.30          PTT - ( 31 Jul 2022 02:17 )  PTT:32.9 sec        Daily     Daily       07-30 @ 07:01  -  07-31 @ 07:00  --------------------------------------------------------  IN: 1525.6 mL / OUT: 920 mL / NET: 605.6 mL    07-31 @ 07:01  -  07-31 @ 12:14  --------------------------------------------------------  IN: 0 mL / OUT: 135 mL / NET: -135 mL        Critically Ill patient  : [ ] preoperative ,   [x ] post operative    Requires :  [x ] Arterial Line   [x ] Central Line  [ ] PA catheter  [ ] IABP  [ ] ECMO  [ ] LVAD  [ ] Ventilator  [ ] pacemaker [ ] Impella. [x] HF O2                      [x ] ABG's     [x ] Pulse Oxymetry Monitoring  Bedside evaluation , monitoring , treatment of hemodynamics , fluids , IVP/ IVCD meds.        Diagnosis:     POD 2 - TEVAR    R - Cut Down     L - vascular stick     Hypotension     Hypovolemia     Hemodynamic lability,  instability. Requires IVCD [x ] vasopressors / midodrine  [ ] inotropes  [ ] vasodilator  [x ]IVSS fluid  to maintain MAP, perfusion, C.I.     Obesity OHS / IKE     Hypoxemia - Requires [ ] BIPAP  [ x] HF O2     Atelectasis     R groin / incisional area pain     Immobility R leg / pulses intact     Requires chest PT, pulmonary toilet, suctioning to maintain SaO2,  patent airway and treat atelectasis.     Requires bedside physical therapy, mobilization and total longterm care.     ? imaging R groin / incisional area ?    IVCD Insulin                       Discussed with CT surgeon, Physician's Assistant - Nurse Practitioner- Critical care medicine team.   Discussed at  AM / PM rounds.   Chart, labs , films reviewed.    Cumulative Critical Care Time Given Today : 40 min

## 2022-07-31 NOTE — PROGRESS NOTE ADULT - SUBJECTIVE AND OBJECTIVE BOX
CTICU  CRITICAL  CARE  attending     Hand off received 					   Pertinent clinical, laboratory, radiographic, hemodynamic, echocardiographic, respiratory data, microbiologic data and chart were reviewed and analyzed frequently throughout the course of the day  Patient seen and examined with CTS/ SH attending at bedside  Pt is a 77 yr old female previous smoker with PMH HTN, spinal stenosis, arthritis, biscupid AV sp AVR, ascending & hemiarch replacement with frozen elephant trunk and L aorto-subclavian bypass, CABG x 2 with Dr. Muller 8/9/21. Post op course cb prolonged intubation, SSS sp PPM, poor wound healing with pec flap closure 9/29/21. Presented to [facility] 7/21 with LUE pain and found to have thrombus sp embolectomy L brachial artery 7/21. Also found to have endoleak prompting TEVAR 7/29. Extubated that night. 7/30 required 2 units pRBCs. C/o pain, started on gabapentin.     FAMILY HISTORY:  FH: CAD (coronary artery disease) (Sibling)  CABG  Family history of CKD (chronic kidney disease) (Sibling)  ESRD  Family history of diabetes mellitus (DM) (Sibling)    PAST MEDICAL & SURGICAL HISTORY:  HTN (hypertension)  H/O aortic valve stenosis  CAD (coronary artery disease)  S/P aortic valve replacement  S/P CABG (coronary artery bypass graft)  H/O aortic arch replacement  Pacemaker  medtronic micra pacemaker        Patient is a 77y old  Female who presents with a chief complaint of Endoleak.      14 system review was unremarkable    Vital signs, hemodynamic and respiratory parameters were reviewed from the bedside nursing flowsheet.  ICU Vital Signs Last 24 Hrs  T(C): 37 (31 Jul 2022 17:07), Max: 37.7 (31 Jul 2022 01:01)  T(F): 98.6 (31 Jul 2022 17:07), Max: 99.9 (31 Jul 2022 01:01)  HR: 58 (31 Jul 2022 19:00) (58 - 90)  BP: 153/65 (31 Jul 2022 16:00) (145/68 - 153/65)  BP(mean): 94 (31 Jul 2022 16:00) (94 - 98)  ABP: 114/39 (31 Jul 2022 19:00) (79/79 - 162/59)  ABP(mean): 62 (31 Jul 2022 19:00) (54 - 96)  RR: 18 (31 Jul 2022 19:00) (15 - 28)  SpO2: 95% (31 Jul 2022 19:00) (86% - 98%)    O2 Parameters below as of 31 Jul 2022 19:00  Patient On (Oxygen Delivery Method): nasal cannula w/ humidification  O2 Flow (L/min): 40  O2 Concentration (%): 30    Intake and output was reviewed and the fluid balance was calculated  Daily     Daily   I&O's Summary    30 Jul 2022 07:01  -  31 Jul 2022 07:00  --------------------------------------------------------  IN: 1525.6 mL / OUT: 920 mL / NET: 605.6 mL    31 Jul 2022 07:01  -  31 Jul 2022 19:46  --------------------------------------------------------  IN: 320 mL / OUT: 580 mL / NET: -260 mL        All lines and drain sites were assessed  Glycemic trend was reviewed      POCT Blood Glucose.: 130 mg/dL (29 Jul 2022 22:15)        Neuro: sitting up in nad  HEENT: mmm  Heart: s1, s2  Lungs: clear bl  Abdomen: soft, nt/nd  Extremities: soft compartments        labs  CBC Full  -  ( 31 Jul 2022 02:17 )  WBC Count : 11.10 K/uL  RBC Count : 2.87 M/uL  Hemoglobin : 8.3 g/dL  Hematocrit : 25.4 %  Platelet Count - Automated : 180 K/uL  Mean Cell Volume : 88.5 fl  Mean Cell Hemoglobin : 28.9 pg  Mean Cell Hemoglobin Concentration : 32.7 gm/dL  Auto Neutrophil # : 9.13 K/uL  Auto Lymphocyte # : 1.05 K/uL  Auto Monocyte # : 0.79 K/uL  Auto Eosinophil # : 0.05 K/uL  Auto Basophil # : 0.03 K/uL  Auto Neutrophil % : 82.1 %  Auto Lymphocyte % : 9.5 %  Auto Monocyte % : 7.1 %  Auto Eosinophil % : 0.5 %  Auto Basophil % : 0.3 %    07-31    136  |  101  |  15  ----------------------------<  96  3.8   |  24  |  0.89    Ca    8.4      31 Jul 2022 02:17  Phos  3.3     07-31  Mg     2.5     07-31    TPro  5.8<L>  /  Alb  2.9<L>  /  TBili  0.4  /  DBili  x   /  AST  16  /  ALT  <5<L>  /  AlkPhos  78  07-31    PT/INR - ( 31 Jul 2022 02:17 )   PT: 15.5 sec;   INR: 1.30          PTT - ( 31 Jul 2022 02:17 )  PTT:32.9 sec  The current medications were reviewed   MEDICATIONS  (STANDING):  atorvastatin 10 milliGRAM(s) Oral at bedtime  budesonide  80 MICROgram(s)/formoterol 4.5 MICROgram(s) Inhaler 2 Puff(s) Inhalation two times a day  chlorhexidine 0.12% Liquid 5 milliLiter(s) Oral Mucosa two times a day  chlorhexidine 2% Cloths 1 Application(s) Topical <User Schedule>  dextrose 50% Injectable 50 milliLiter(s) IV Push every 15 minutes  gabapentin 300 milliGRAM(s) Oral every 8 hours  levothyroxine 25 MICROGram(s) Oral daily  midodrine 10 milliGRAM(s) Oral every 8 hours  pantoprazole    Tablet 40 milliGRAM(s) Oral before breakfast  senna 2 Tablet(s) Oral at bedtime  sodium chloride 0.9%. 1000 milliLiter(s) (10 mL/Hr) IV Continuous <Continuous>    MEDICATIONS  (PRN):  oxycodone    5 mG/acetaminophen 325 mG 1 Tablet(s) Oral every 4 hours PRN Moderate Pain (4 - 6)      Assessment/Plan:  76 yr old female previous smoker with PMH HTN, spinal stenosis, arthritis, biscupid AV sp AVR, ascending & hemiarch replacement with frozen elephant trunk and L aort-subclavian bypass, CABG x 2 with Dr. Muller 8/9/21. Post op course cb prolonged intubation, SSS sp PPM, poor wound healing with pec flap closure 9/29/21. Presented to [facility] 7/21 with LUE pain and found to have thrombus sp embolectomy L brachial artery 7/21.    POD 2 TEVAR with R CFA repair (Dr. Muller, 7/29)  Post operative anemia-trend H/H  PPM  HTN  Pain control  Diet as tolerated  Replete lytes prn  Monitor CT output  GI/DVT PPX  Bowel Regimen  Pain control  OOB with PT  Close hemodynamic, ventilatory and drain monitoring and management per post op routine  Monitor for arrhythmias and monitor parameters for organ perfusion  Monitor neurologic status  Head of the bed should remain elevated to 45 deg   Chest PT and IS will be encouraged  Monitor adequacy of oxygenation and ventilation and attempt to wean oxygen  Antibiotic regimen will be tailored to the clinical, laboratory and microbiologic data  Nutritional goals will be met using po eventually, ensure adequate caloric intake and monitor the same  Stress ulcer and VTE prophylaxis will be achieved    Glycemic control is satisfactory  Electrolytes have been repleted as necessary and wound care has been carried out   Pain control has been achieved.   Aggressive physical therapy and early mobility and ambulation goals will be met   The family was updated about the course and plan.    CRITICAL CARE TIME personally provided by me  in evaluation and management, reassessments, review and interpretation of labs and x-rays, ventilator and hemodynamic management, formulating a plan and coordinating care: ___30____ MIN.  Time does not include procedural time.    CTICU ATTENDING     					  Pari Manrique MD

## 2022-08-01 NOTE — DIETITIAN INITIAL EVALUATION ADULT - OTHER INFO
76 yoF with HTN, spinal stenosis, arthritis, bicuspid aortic valve s/p AVR, ascending/hemiarch replacement and left aorto-subclavian bypass, CABG x2 (8/9/2021), recent admission at [facility] for left brachial occlusion s/p left brachial artery embolectomy who presented with worsening LUE pain radiating to shoulder/neck/chest now on POD3 following TEVAR on 7/29/22.    Pt seen at bedside for initial assessment- on NC w/ humidification. Last documented bowel movement pre op. Confirms NKFA. Denies change in appetite PTA; endorses having Ensure ONS at home. Reports usual body weight 150 pounds (relatively consistent with dosing weight 149 pounds). Generalized edema 1+. No pressure ulcers documented at this time. Surgical incisions per chart. Yousif score=14. Labs reviewed 8/1; pt hyponatremic. Observed pt with no overt signs of muscle or fat wasting. Based on ASPEN guidelines, pt does not meet criteria for malnutrition at this time. Provided pt with diet education regarding importance of meeting nutritional needs post operatively ; amenable to education. Discussed current diet order DASH/TLC; briefly, pt reports feeling hungry during hospital stay, able to complete ~50% of meals. Discussed trial of Ensure 1x/day; pt interested in trial. No cultural, ethnic, Jainism food preferences noted. Made aware RD remains available. RD to follow up per protocol. See nutrition recommendations below.

## 2022-08-01 NOTE — DIETITIAN INITIAL EVALUATION ADULT - OTHER CALCULATIONS
Fluids per team. Based on Standards of Care pt >% IBW thus ideal body weight used for all calculations. Needs adjusted for advanced age and post op.

## 2022-08-01 NOTE — DIETITIAN INITIAL EVALUATION ADULT - NSICDXPASTSURGICALHX_GEN_ALL_CORE_FT
PAST SURGICAL HISTORY:  H/O aortic arch replacement     Pacemaker medtronic micra pacemaker    S/P aortic valve replacement     S/P CABG (coronary artery bypass graft)

## 2022-08-01 NOTE — PROGRESS NOTE ADULT - SUBJECTIVE AND OBJECTIVE BOX
CTICU  CRITICAL  CARE  attending     Hand off received 					   Pertinent clinical, laboratory, radiographic, hemodynamic, echocardiographic, respiratory data, microbiologic data and chart were reviewed and analyzed frequently throughout the course of the day and night  Patient seen and examined with CTS/ SH attending at bedside  Pt is a 77y , Female, HEALTH ISSUES - PROBLEM Dx:      , FAMILY HISTORY:  FH: CAD (coronary artery disease) (Sibling)  CABG    Family history of CKD (chronic kidney disease) (Sibling)  ESRD    Family history of diabetes mellitus (DM) (Sibling)    PAST MEDICAL & SURGICAL HISTORY:  HTN (hypertension)      H/O aortic valve stenosis      CAD (coronary artery disease)      S/P aortic valve replacement      S/P CABG (coronary artery bypass graft)      H/O aortic arch replacement      Pacemaker  medtronic micra pacemaker        Patient is a 77y old  Female who presents with a chief complaint of PAIN, LOWER EXREMITY     (01 Aug 2022 14:34)      14 system review limited by mentation and multiorgan morbidity     Vital signs, hemodynamic and respiratory parameters were reviewed from the bedside nursing flowsheet.  ICU Vital Signs Last 24 Hrs  T(C): 36.9 (01 Aug 2022 14:27), Max: 36.9 (01 Aug 2022 14:27)  T(F): 98.4 (01 Aug 2022 14:27), Max: 98.4 (01 Aug 2022 14:27)  HR: 74 (01 Aug 2022 17:00) (53 - 75)  BP: 117/58 (01 Aug 2022 07:00) (117/58 - 167/72)  BP(mean): 83 (01 Aug 2022 07:00) (83 - 104)  ABP: 179/66 (01 Aug 2022 17:00) (96/34 - 186/67)  ABP(mean): 108 (01 Aug 2022 17:00) (54 - 111)  RR: 19 (01 Aug 2022 17:00) (13 - 34)  SpO2: 97% (01 Aug 2022 17:00) (93% - 100%)    O2 Parameters below as of 01 Aug 2022 18:00  Patient On (Oxygen Delivery Method): nasal cannula w/ humidification  O2 Flow (L/min): 5        Adult Advanced Hemodynamics Last 24 Hrs  CVP(mm Hg): --  CVP(cm H2O): --  CO: --  CI: --  PA: --  PA(mean): --  PCWP: --  SVR: --  SVRI: --  PVR: --  PVRI: --, ABG - ( 01 Aug 2022 09:10 )  pH, Arterial: 7.40  pH, Blood: x     /  pCO2: 40    /  pO2: 85    / HCO3: 25    / Base Excess: 0.0   /  SaO2: 98.9                Intake and output was reviewed and the fluid balance was calculated  Daily     Daily   I&O's Summary    31 Jul 2022 07:01  -  01 Aug 2022 07:00  --------------------------------------------------------  IN: 1590 mL / OUT: 985 mL / NET: 605 mL    01 Aug 2022 07:01  -  01 Aug 2022 17:54  --------------------------------------------------------  IN: 125 mL / OUT: 75 mL / NET: 50 mL        All lines and drain sites were assessed  Glycemic trend was reviewedCAPILLARY BLOOD GLUCOSE        No acute change in focality  Auscultation of the chest reveals equal bs  Abdomen is soft  Extremities are warm and well perfused  Wounds appear clean and unremarkable  Antibiotics are periop    labs  CBC Full  -  ( 01 Aug 2022 09:10 )  WBC Count : 11.32 K/uL  RBC Count : 3.01 M/uL  Hemoglobin : 8.7 g/dL  Hematocrit : 27.5 %  Platelet Count - Automated : 156 K/uL  Mean Cell Volume : 91.4 fl  Mean Cell Hemoglobin : 28.9 pg  Mean Cell Hemoglobin Concentration : 31.6 gm/dL  Auto Neutrophil # : 9.32 K/uL  Auto Lymphocyte # : 1.07 K/uL  Auto Monocyte # : 0.61 K/uL  Auto Eosinophil # : 0.23 K/uL  Auto Basophil # : 0.05 K/uL  Auto Neutrophil % : 82.3 %  Auto Lymphocyte % : 9.5 %  Auto Monocyte % : 5.4 %  Auto Eosinophil % : 2.0 %  Auto Basophil % : 0.4 %    08-01    134<L>  |  103  |  18  ----------------------------<  141<H>  4.0   |  24  |  0.78    Ca    8.7      01 Aug 2022 09:10  Phos  2.6     08-01  Mg     2.5     08-01    TPro  6.0  /  Alb  3.1<L>  /  TBili  0.5  /  DBili  x   /  AST  29  /  ALT  6<L>  /  AlkPhos  98  08-01    PT/INR - ( 01 Aug 2022 09:10 )   PT: 15.5 sec;   INR: 1.30          PTT - ( 01 Aug 2022 09:10 )  PTT:35.4 sec  The current medications were reviewed   MEDICATIONS  (STANDING):  aspirin enteric coated 81 milliGRAM(s) Oral daily  atorvastatin 10 milliGRAM(s) Oral at bedtime  budesonide  80 MICROgram(s)/formoterol 4.5 MICROgram(s) Inhaler 2 Puff(s) Inhalation two times a day  chlorhexidine 2% Cloths 1 Application(s) Topical <User Schedule>  dextrose 50% Injectable 50 milliLiter(s) IV Push every 15 minutes  gabapentin 600 milliGRAM(s) Oral every 8 hours  heparin   Injectable 5000 Unit(s) SubCutaneous every 8 hours  levothyroxine 25 MICROGram(s) Oral daily  metoprolol tartrate 12.5 milliGRAM(s) Oral every 12 hours  pantoprazole    Tablet 40 milliGRAM(s) Oral before breakfast  polyethylene glycol 3350 17 Gram(s) Oral daily  senna 2 Tablet(s) Oral at bedtime  sodium chloride 0.9%. 1000 milliLiter(s) (10 mL/Hr) IV Continuous <Continuous>    MEDICATIONS  (PRN):  oxycodone    5 mG/acetaminophen 325 mG 1 Tablet(s) Oral every 4 hours PRN Moderate Pain (4 - 6)       PROBLEM LIST/ ASSESSMENT:  HEALTH ISSUES - PROBLEM Dx:      ,   Patient is a 77y old  Female who presents with a chief complaint of PAIN, LOWER EXREMITY     (01 Aug 2022 14:34)     s/p cardiac surgery                My plan includes :  close hemodynamic, ventilatory and drain monitoring and management per post op routine    Monitor for arrhythmias and monitor parameters for organ perfusion  beta blockade not administered due to hemodynamic instability and bradycardia  monitor neurologic status  Head of the bed should remain elevated to 45 deg .   chest PT and IS will be encouraged  monitor adequacy of oxygenation and ventilation and attempt to wean oxygen  antibiotic regimen will be tailored to the clinical, laboratory and microbiologic data  Nutritional goals will be met using po eventually , ensure adequate caloric intake and montior the same  Stress ulcer and VTE prophylaxis will be achieved    Glycemic control is satisfactory  Electrolytes have been repleted as necessary and wound care has been carried out. Pain control has been achieved.   agressive physical therapy and early mobility and ambulation goals will be met   The family was updated about the course and plan  CRITICAL CARE TIME personally provided by me  in evaluation and management, reassessments, review and interpretation of labs and x-rays, ventilator and hemodynamic management, formulating a plan and coordinating care: ___90____ MIN.  Time does not include procedural time. Time spent was non routine post-operarive caRE and included multiple and repeated evaluations at the bedside  CTICU ATTENDING     					    Jesu Garcia MD

## 2022-08-01 NOTE — PROGRESS NOTE ADULT - ASSESSMENT
This is a 76 yoF with HTN, spinal stenosis, arthritis, bicuspid aortic valve s/p AVR, ascending/hemiarch replacement and left aorto-subclavian bypass, CABG x2 (8/9/2021), recent admission at [facility] for left brachial occlusion s/p left brachial artery embolectomy who presented with worsening LUE pain radiating to shoulder/neck/chest now on POD3 following TEVAR on 7/29/22.    Thinking: Doing well without acute changes needed from our perspective.    - Continue regular neurovascular checks  - Appreciate excellent care per primary team and defer to them regarding major aspects of care.   - Vascular Surgery (Team 3C) following

## 2022-08-01 NOTE — PHYSICAL THERAPY INITIAL EVALUATION ADULT - MANUAL MUSCLE TESTING RESULTS, REHAB EVAL
BUE at least 3/5 throughout, L ankle DF 4/5, L knee extension 3-/5, L hip flexion 3-/5, R hip flexion 1+/5. R knee extension 3-/5, R ankle DF 2/5

## 2022-08-01 NOTE — DIETITIAN INITIAL EVALUATION ADULT - ADD RECOMMEND
1. Continue with current diet order 2. Encourage pt to meet nutritional needs as able 3. Encourage adherence to diet education (reinforce as able) 4. Pain and bowel regimen per team 5. Will continue to assess/honor preferences as able 6. Monitor electrolytes; replete PRN

## 2022-08-01 NOTE — DIETITIAN INITIAL EVALUATION ADULT - PERTINENT LABORATORY DATA
08-01    134<L>  |  103  |  18  ----------------------------<  141<H>  4.0   |  24  |  0.78    Ca    8.7      01 Aug 2022 09:10  Phos  2.6     08-01  Mg     2.5     08-01    TPro  6.0  /  Alb  3.1<L>  /  TBili  0.5  /  DBili  x   /  AST  29  /  ALT  6<L>  /  AlkPhos  98  08-01  A1C with Estimated Average Glucose Result: 5.6 % (07-28-22 @ 06:34)  A1C with Estimated Average Glucose Result: 4.8 % (09-29-21 @ 07:38)  A1C with Estimated Average Glucose Result: 5.6 % (08-08-21 @ 15:27)

## 2022-08-01 NOTE — PROGRESS NOTE ADULT - SUBJECTIVE AND OBJECTIVE BOX
IDENTIFICATION: This is a 76 yoF with HTN, spinal stenosis, arthritis, bicuspid aortic valve s/p AVR, ascending/hemiarch replacement and left aorto-subclavian bypass, CABG x2 (8/9/2021), recent admission at [facility] for left brachial occlusion s/p left brachial artery embolectomy who presented with worsening LUE pain radiating to shoulder/neck/chest now on POD3 following TEVAR on 7/29/22.    EVENTS:   - No significant events in last 24 hr    SUBJECTIVE:   - Notes no new pain in extremities  - Denies CP, SOB  - Denies new concerns    MEDICATIONS  (STANDING):  atorvastatin 10 milliGRAM(s) Oral at bedtime  budesonide  80 MICROgram(s)/formoterol 4.5 MICROgram(s) Inhaler 2 Puff(s) Inhalation two times a day  chlorhexidine 2% Cloths 1 Application(s) Topical <User Schedule>  dextrose 50% Injectable 50 milliLiter(s) IV Push every 15 minutes  gabapentin 300 milliGRAM(s) Oral every 8 hours  gabapentin 600 milliGRAM(s) Oral every 8 hours  levothyroxine 25 MICROGram(s) Oral daily  metoprolol tartrate 12.5 milliGRAM(s) Oral every 12 hours  pantoprazole    Tablet 40 milliGRAM(s) Oral before breakfast  senna 2 Tablet(s) Oral at bedtime  sodium chloride 0.9%. 1000 milliLiter(s) (10 mL/Hr) IV Continuous <Continuous>    MEDICATIONS  (PRN):  oxycodone    5 mG/acetaminophen 325 mG 1 Tablet(s) Oral every 4 hours PRN Moderate Pain (4 - 6)      Vital Signs Last 24 Hrs  T(C): 36.6 (01 Aug 2022 09:49), Max: 37 (31 Jul 2022 17:07)  T(F): 97.8 (01 Aug 2022 09:49), Max: 98.6 (31 Jul 2022 17:07)  HR: 67 (01 Aug 2022 13:00) (53 - 79)  BP: 117/58 (01 Aug 2022 07:00) (117/58 - 167/72)  BP(mean): 83 (01 Aug 2022 07:00) (83 - 104)  RR: 17 (01 Aug 2022 13:00) (13 - 24)  SpO2: 99% (01 Aug 2022 11:00) (93% - 100%)    Parameters below as of 01 Aug 2022 13:00  Patient On (Oxygen Delivery Method): nasal cannula w/ humidification        Neurologic: AAOx3  Cardiac: Normal rate, regular rhythm  Vascular: Doppler signals multiphasic in DP/PT bilaterally  Pulm: Breathing comfortably  Abd: Soft, non-distended; No TTP throughout.   Incision: Well approximated without erythema/edema/induration.   Skin: No rashes  Extremities: No edema.  Psychiatric: Interacting normally.     I&O's Detail    31 Jul 2022 07:01  -  01 Aug 2022 07:00  --------------------------------------------------------  IN:    Albumin 5%  - 250 mL: 250 mL    Lactated Ringers Bolus: 1000 mL    Oral Fluid: 200 mL    sodium chloride 0.9%: 140 mL  Total IN: 1590 mL    OUT:    Indwelling Catheter - Urethral (mL): 985 mL  Total OUT: 985 mL    Total NET: 605 mL      01 Aug 2022 07:01  -  01 Aug 2022 13:11  --------------------------------------------------------  IN:  Total IN: 0 mL    OUT:    Indwelling Catheter - Urethral (mL): 35 mL  Total OUT: 35 mL    Total NET: -35 mL          LABS:                        8.7    11.32 )-----------( 156      ( 01 Aug 2022 09:10 )             27.5     08-01    134<L>  |  103  |  18  ----------------------------<  141<H>  4.0   |  24  |  0.78    Ca    8.7      01 Aug 2022 09:10  Phos  2.6     08-01  Mg     2.5     08-01    TPro  6.0  /  Alb  3.1<L>  /  TBili  0.5  /  DBili  x   /  AST  29  /  ALT  6<L>  /  AlkPhos  98  08-01    PT/INR - ( 01 Aug 2022 09:10 )   PT: 15.5 sec;   INR: 1.30          PTT - ( 01 Aug 2022 09:10 )  PTT:35.4 sec

## 2022-08-01 NOTE — PHYSICAL THERAPY INITIAL EVALUATION ADULT - ADDITIONAL COMMENTS
Patient reports she lives on first floor of private home with her 2 daughters and grandaughter. PTA patient states she was ambulating in dependently without AD and was independent with ADLs. Patient owns RW. Of note, patient seems to be mildly confused today, unsure of reliability of PLOF.

## 2022-08-01 NOTE — PHYSICAL THERAPY INITIAL EVALUATION ADULT - PERTINENT HX OF CURRENT PROBLEM, REHAB EVAL
Patient is a 77 year old female with PMH CABG x2 with Dr. Muller on 8/9/2021 presented to [facility] with acute LUE pain, found to have left brachial occlusion s/p left brachial artery embolectomy, CT scans at that time showed endoleak and she was scheduled to see Dr. Muller in clinic today for persistent pain. Left arm neurovascularly intact. 7/21/22 CT scan with endoleak. Plan for TEVAR on Friday with Dr. Muller

## 2022-08-01 NOTE — PROGRESS NOTE ADULT - SUBJECTIVE AND OBJECTIVE BOX
CTICU  CRITICAL  CARE  attending     Hand off received 					   Pertinent clinical, laboratory, radiographic, hemodynamic, echocardiographic, respiratory data, microbiologic data and chart were reviewed and analyzed frequently throughout the course of the day and night    76 year old female with HTN, spinal stenosis, arthritis, bicuspid aortic valve s/p AVR, ascending/hemiarch replacement with frozen elephant trunk and left aorto-subclavian bypass, CABG x 2.  Dr Muller operated on her in August 2021.   She had a long post-op course complicated by prolonged intubation, SSS s/p PPM. She had poor wound healing post op. She needed  bilateral pectoral  flap and closure on 9/29/21.  She was admitted to [facility] with acute LUE pain  Work up: Left brachial occlusion.  Left brachial artery embolectomy was performed by vascular surgery.  CT scans at that time showed endoleak.    S/P Second Stage TEVAR.  S/P Repair of Right Femoral Artery.        FAMILY HISTORY:  FH: CAD (coronary artery disease) (Sibling)  CABG    Family history of CKD (chronic kidney disease) (Sibling)  ESRD    Family history of diabetes mellitus (DM) (Sibling)    PAST MEDICAL & SURGICAL HISTORY:  HTN (hypertension)  H/O aortic valve stenosis  CAD (coronary artery disease)  S/P aortic valve replacement  S/P CABG (coronary artery bypass graft)  H/O aortic arch replacement  Pacemaker (Medtronic micra pacemaker)        14 system review was unremarkable    Vital signs, hemodynamic and respiratory parameters were reviewed from the bedside nursing flow sheet.  ICU Vital Signs Last 24 Hrs  T(C): 37.1 (01 Aug 2022 21:19), Max: 37.1 (01 Aug 2022 21:19)  T(F): 98.7 (01 Aug 2022 21:19), Max: 98.7 (01 Aug 2022 21:19)  HR: 72 (01 Aug 2022 21:57) (53 - 91)  BP: 117/58 (01 Aug 2022 07:00) (117/58 - 167/72)  BP(mean): 83 (01 Aug 2022 07:00) (83 - 104)  ABP: 140/49 (01 Aug 2022 21:00) (119/41 - 186/67)  ABP(mean): 79 (01 Aug 2022 21:00) (65 - 111)  RR: 24 (01 Aug 2022 21:57) (13 - 34)  SpO2: 96% (01 Aug 2022 21:57) (94% - 100%)    O2 Parameters below as of 01 Aug 2022 22:00  Patient On (Oxygen Delivery Method): nasal cannula w/ humidification  O2 Flow (L/min): 4        Adult Advanced Hemodynamics Last 24 Hrs  CVP(mm Hg): --  CVP(cm H2O): --  CO: --  CI: --  PA: --  PA(mean): --  PCWP: --  SVR: --  SVRI: --  PVR: --  PVRI: --, ABG - ( 01 Aug 2022 09:10 )  pH, Arterial: 7.40  pH, Blood: x     /  pCO2: 40    /  pO2: 85    / HCO3: 25    / Base Excess: 0.0   /  SaO2: 98.9                Intake and output was reviewed and the fluid balance was calculated  Daily     Daily   I&O's Summary    31 Jul 2022 07:01  -  01 Aug 2022 07:00  --------------------------------------------------------  IN: 1590 mL / OUT: 985 mL / NET: 605 mL    01 Aug 2022 07:01  -  01 Aug 2022 22:24  --------------------------------------------------------  IN: 520 mL / OUT: 375 mL / NET: 145 mL        All lines and drain sites were assessed    Neuro: No change in the mental status from the baseline. Moves all 4 extremities.  Neck: No JVD.  CVS: S1, S2, No S3.  Lungs: Good air entry bilaterally.  Abd: Soft. No tenderness. + Bowel sounds.  Vascular: + DP/PT.  Extremities: No edema. Both lower extremities are warm and well perfused.   Lymphatic: Normal.  Skin: No abnormalities.      labs  CBC Full  -  ( 01 Aug 2022 09:10 )  WBC Count : 11.32 K/uL  RBC Count : 3.01 M/uL  Hemoglobin : 8.7 g/dL  Hematocrit : 27.5 %  Platelet Count - Automated : 156 K/uL  Mean Cell Volume : 91.4 fl  Mean Cell Hemoglobin : 28.9 pg  Mean Cell Hemoglobin Concentration : 31.6 gm/dL  Auto Neutrophil # : 9.32 K/uL  Auto Lymphocyte # : 1.07 K/uL  Auto Monocyte # : 0.61 K/uL  Auto Eosinophil # : 0.23 K/uL  Auto Basophil # : 0.05 K/uL  Auto Neutrophil % : 82.3 %  Auto Lymphocyte % : 9.5 %  Auto Monocyte % : 5.4 %  Auto Eosinophil % : 2.0 %  Auto Basophil % : 0.4 %    08-01    134<L>  |  103  |  18  ----------------------------<  141<H>  4.0   |  24  |  0.78    Ca    8.7      01 Aug 2022 09:10  Phos  2.6     08-01  Mg     2.5     08-01    TPro  6.0  /  Alb  3.1<L>  /  TBili  0.5  /  DBili  x   /  AST  29  /  ALT  6<L>  /  AlkPhos  98  08-01    PT/INR - ( 01 Aug 2022 09:10 )   PT: 15.5 sec;   INR: 1.30          PTT - ( 01 Aug 2022 09:10 )  PTT:35.4 sec  The current medications were reviewed   MEDICATIONS  (STANDING):  aspirin enteric coated 81 milliGRAM(s) Oral daily  atorvastatin 10 milliGRAM(s) Oral at bedtime  budesonide  80 MICROgram(s)/formoterol 4.5 MICROgram(s) Inhaler 2 Puff(s) Inhalation two times a day  chlorhexidine 2% Cloths 1 Application(s) Topical <User Schedule>  dextrose 50% Injectable 50 milliLiter(s) IV Push every 15 minutes  gabapentin 600 milliGRAM(s) Oral every 8 hours  heparin   Injectable 5000 Unit(s) SubCutaneous every 8 hours  levothyroxine 50 MICROGram(s) Oral daily  metoprolol tartrate 12.5 milliGRAM(s) Oral every 12 hours  pantoprazole    Tablet 40 milliGRAM(s) Oral before breakfast  polyethylene glycol 3350 17 Gram(s) Oral daily  senna 2 Tablet(s) Oral at bedtime  sodium chloride 0.9%. 1000 milliLiter(s) (10 mL/Hr) IV Continuous <Continuous>    MEDICATIONS  (PRN):  acetaminophen     Tablet .. 650 milliGRAM(s) Oral every 6 hours PRN Mild Pain (1 - 3)  oxyCODONE    IR 5 milliGRAM(s) Oral every 4 hours PRN Moderate Pain (4 - 6)        77 years old  Female admitted with Endoleak  S/P Second Stage TEVAR.  S/P Repair of Right Femoral Artery with patch angioplasty.   Hypothyroidism (Not well controlled on home dose of levothyroxine.    Hemodynamically stable.  Good oxygenation.  Fair urine out put.  Overall doing well.      My plan includes :  Increase the dose of Levothyroxine.  Statin and Betablocker.  Bronchodilator Rx.   PO gabapentin for neuropathy.   Close hemodynamic, ventilatory and drain monitoring and management  Monitor for arrhythmias and monitor parameters for organ perfusion  Monitor neurologic status  Monitor renal function.  Head of the bed should remain elevated to 45 deg .   Chest PT and IS will be encouraged  Monitor adequacy of oxygenation and ventilation and attempt to wean oxygen  Nutritional goals will be met using po eventually , ensure adequate caloric intake and monitor the same  Stress ulcer and VTE prophylaxis will be achieved    Glycemic control is satisfactory  Electrolytes have been repleted as necessary and wound care has been carried out. Pain control has been achieved.   Aggressive physical therapy and early mobility and ambulation goals will be met   The family was updated about the course and plan  CRITICAL CARE TIME SPENT in evaluation and management, reassessments, review and interpretation of labs and x-rays, ventilator and hemodynamic management, formulating a plan and coordinating care: ___30____ MIN.  Time does not include procedural time.  CTICU ATTENDING     					    Lex Gudino MD

## 2022-08-01 NOTE — PHYSICAL THERAPY INITIAL EVALUATION ADULT - GENERAL OBSERVATIONS, REHAB EVAL
Spoke with ANDRE Baez who cleared for OOB. Patient received sitting in chair in NAD with +ECG +heplock +a-line +5L O2 NC +purewick +SCD x 2.

## 2022-08-02 NOTE — OCCUPATIONAL THERAPY INITIAL EVALUATION ADULT - STANDING BALANCE: DYNAMIC, REHAB EVAL
Pt performed 3 sit to stand transfers and able to maintain for 10-20 seconds. Pt requiring cuing for postural awareness and 3rd person assist to block knees 2/2 R knee buckling./poor minus

## 2022-08-02 NOTE — PROGRESS NOTE ADULT - ASSESSMENT
This is a 76 yoF with HTN, spinal stenosis, arthritis, bicuspid aortic valve s/p AVR, ascending/hemiarch replacement and left aorto-subclavian bypass, CABG x2 (8/9/2021), recent admission at [facility] for left brachial occlusion s/p left brachial artery embolectomy who presented with worsening LUE pain radiating to shoulder/neck/chest now on POD3 following TEVAR on 7/29/22.    Thinking: No acute changes needed from vascular perspective.    - Continue regular neurovascular checks  - Appreciate excellent care per primary team and defer to them regarding major aspects of care.   - Vascular Surgery (Team 3C) following

## 2022-08-02 NOTE — PROVIDER CONTACT NOTE (CHANGE IN STATUS NOTIFICATION) - ASSESSMENT
A&O to all questions regarding orientation. Able to recognize medical team but lethargic and sometimes confused thinking that her daughters were at bedside. Neuro exam WNL.

## 2022-08-02 NOTE — OCCUPATIONAL THERAPY INITIAL EVALUATION ADULT - PERTINENT HX OF CURRENT PROBLEM, REHAB EVAL
Patient is a 77 year old female with PMH CABG x2 with Dr. Muller on 8/9/2021 presented to [facility] with acute LUE pain, found to have left brachial occlusion s/p left brachial artery embolectomy, CT scans at that time showed endoleak and she was scheduled to see Dr. Muller in clinic today for persistent pain. Left arm neurovascularly intact. 7/21/22 CT scan with endoleak. S/p TEVAR 7/29/22.

## 2022-08-02 NOTE — PROGRESS NOTE ADULT - SUBJECTIVE AND OBJECTIVE BOX
CTICU  CRITICAL  CARE  attending     Hand off received 					   Pertinent clinical, laboratory, radiographic, hemodynamic, echocardiographic, respiratory data, microbiologic data and chart were reviewed and analyzed frequently throughout the course of the day and night      76 year old female with HTN, spinal stenosis, arthritis, bicuspid aortic valve s/p AVR, ascending/hemiarch replacement with frozen elephant trunk and left aorto-subclavian bypass, CABG x 2.  Dr Muller operated on her in August 2021.   She had a long post-op course complicated by prolonged intubation, SSS s/p PPM. She had poor wound healing post op. She needed  bilateral pectoral  flap and closure on 9/29/21.  She was admitted to [facility] with acute LUE pain  Work up: Left brachial occlusion.  Left brachial artery embolectomy was performed by vascular surgery.  CT scans at that time showed endoleak.    S/P Second Stage TEVAR.  S/P Repair of Right Femoral Artery.          FAMILY HISTORY:  FH: CAD (coronary artery disease) (Sibling)  CABG    Family history of CKD (chronic kidney disease) (Sibling)  ESRD    Family history of diabetes mellitus (DM) (Sibling)    PAST MEDICAL & SURGICAL HISTORY:  HTN (hypertension)  H/O aortic valve stenosis  CAD (coronary artery disease)  S/P aortic valve replacement  S/P CABG (coronary artery bypass graft)  H/O aortic arch replacement  Pacemaker (Medtronic micra pacemaker)          14 system review was unremarkable    Vital signs, hemodynamic and respiratory parameters were reviewed from the bedside nursing flow sheet.  ICU Vital Signs Last 24 Hrs  T(C): 36.6 (03 Aug 2022 01:01), Max: 37.9 (02 Aug 2022 15:00)  T(F): 97.8 (03 Aug 2022 01:01), Max: 100.3 (02 Aug 2022 15:00)  HR: 56 (03 Aug 2022 00:00) (56 - 76)  BP: 153/65 (02 Aug 2022 20:51) (146/67 - 170/75)  BP(mean): 94 (02 Aug 2022 20:51) (94 - 115)  ABP: 140/50 (03 Aug 2022 00:00) (116/45 - 164/63)  ABP(mean): 80 (03 Aug 2022 00:00) (69 - 100)  RR: 14 (03 Aug 2022 00:00) (14 - 20)  SpO2: 98% (03 Aug 2022 00:00) (91% - 98%)    O2 Parameters below as of 03 Aug 2022 01:00  Patient On (Oxygen Delivery Method): nasal cannula w/ humidification  O2 Flow (L/min): 2        Adult Advanced Hemodynamics Last 24 Hrs  CVP(mm Hg): --  CVP(cm H2O): --  CO: --  CI: --  PA: --  PA(mean): --  PCWP: --  SVR: --  SVRI: --  PVR: --  PVRI: --, ABG - ( 02 Aug 2022 22:42 )  pH, Arterial: 7.40  pH, Blood: x     /  pCO2: 42    /  pO2: 110   / HCO3: 26    / Base Excess: 1.0   /  SaO2: 99.1                Intake and output was reviewed and the fluid balance was calculated  Daily     Daily   I&O's Summary    01 Aug 2022 07:01  -  02 Aug 2022 07:00  --------------------------------------------------------  IN: 840 mL / OUT: 600 mL / NET: 240 mL    02 Aug 2022 07:01  -  03 Aug 2022 01:03  --------------------------------------------------------  IN: 2315.1 mL / OUT: 2150 mL / NET: 165.1 mL        All lines and drain sites were assessed    Neuro: Lethargy and slight confusion with monoarticular monoparesis noted.  Neck: No JVD.  CVS: S1, S2, No S3.  Lungs: Good air entry bilaterally.  Abd: Soft. No tenderness. + Bowel sounds.  Vascular: + DP/PT.  Extremities: No edema.  Lymphatic: Normal.  Skin: No abnormalities.      labs  CBC Full  -  ( 02 Aug 2022 22:44 )  WBC Count : 7.53 K/uL  RBC Count : 2.61 M/uL  Hemoglobin : 7.5 g/dL  Hematocrit : 23.5 %  Platelet Count - Automated : 134 K/uL  Mean Cell Volume : 90.0 fl  Mean Cell Hemoglobin : 28.7 pg  Mean Cell Hemoglobin Concentration : 31.9 gm/dL  Auto Neutrophil # : x  Auto Lymphocyte # : x  Auto Monocyte # : x  Auto Eosinophil # : x  Auto Basophil # : x  Auto Neutrophil % : x  Auto Lymphocyte % : x  Auto Monocyte % : x  Auto Eosinophil % : x  Auto Basophil % : x    08-02    135  |  103  |  16  ----------------------------<  144<H>  4.3   |  23  |  0.91    Ca    9.1      02 Aug 2022 22:44  Phos  3.3     08-02  Mg     2.5     08-02    TPro  5.9<L>  /  Alb  3.2<L>  /  TBili  0.5  /  DBili  x   /  AST  35  /  ALT  14  /  AlkPhos  82  08-02    PT/INR - ( 02 Aug 2022 22:44 )   PT: 16.3 sec;   INR: 1.37          PTT - ( 02 Aug 2022 22:44 )  PTT:32.2 sec  The current medications were reviewed   MEDICATIONS  (STANDING):  aspirin enteric coated 81 milliGRAM(s) Oral daily  atorvastatin 10 milliGRAM(s) Oral at bedtime  budesonide  80 MICROgram(s)/formoterol 4.5 MICROgram(s) Inhaler 2 Puff(s) Inhalation two times a day  chlorhexidine 2% Cloths 1 Application(s) Topical <User Schedule>  dextrose 10%. 1000 milliLiter(s) (50 mL/Hr) IV Continuous <Continuous>  dextrose 50% Injectable 50 milliLiter(s) IV Push every 15 minutes  heparin  Infusion. 500 Unit(s)/Hr (5 mL/Hr) IV Continuous <Continuous>  hydrocortisone sodium succinate Injectable 75 milliGRAM(s) IV Push every 8 hours  levothyroxine 50 MICROGram(s) Oral daily  pantoprazole    Tablet 40 milliGRAM(s) Oral before breakfast  phenylephrine    Infusion 0.1 MICROgram(s)/kG/Min (2.55 mL/Hr) IV Continuous <Continuous>  piperacillin/tazobactam IVPB.. 3.375 Gram(s) IV Intermittent every 6 hours  polyethylene glycol 3350 17 Gram(s) Oral daily  senna 2 Tablet(s) Oral at bedtime  sodium chloride 0.9%. 1000 milliLiter(s) (10 mL/Hr) IV Continuous <Continuous>    MEDICATIONS  (PRN):  acetaminophen     Tablet .. 650 milliGRAM(s) Oral every 6 hours PRN Mild Pain (1 - 3)  oxyCODONE    IR 5 milliGRAM(s) Oral every 4 hours PRN Moderate Pain (4 - 6)          77 years old  Female admitted with Endoleak  S/P Second Stage TEVAR.  S/P Repair of Right Femoral Artery with patch angioplasty.   Postoperative course complicated by altered mental status and hypoxia.   CT Head: No acute infarct or bleed.   CT Chest: Left Lower Lobe subsegmental pulmonary embolism.   Hemodynamically stable.  Good oxygenation.  Fair urine out put.        My plan includes :  IV heparin for subsegmental pulmonary embolism.   Statin and Betablocker.  Taper Steroid Rx.  IV antibiotic Rx.   Close hemodynamic, ventilatory and drain monitoring and management  Monitor for arrhythmias and monitor parameters for organ perfusion  Monitor neurologic status  Monitor renal function.  Head of the bed should remain elevated to 45 deg .   Chest PT and IS will be encouraged  Monitor adequacy of oxygenation and ventilation and attempt to wean oxygen  Nutritional goals will be met using po eventually , ensure adequate caloric intake and monitor the same  Stress ulcer and VTE prophylaxis will be achieved    Glycemic control is satisfactory  Electrolytes have been repleted as necessary and wound care has been carried out. Pain control has been achieved.   Aggressive physical therapy and early mobility and ambulation goals will be met   The family was updated about the course and plan  CRITICAL CARE TIME SPENT in evaluation and management, reassessments, review and interpretation of labs and x-rays, ventilator and hemodynamic management, formulating a plan and coordinating care: ___30____ MIN.  Time does not include procedural time.  CTICU ATTENDING     					    Lex Gudino MD

## 2022-08-02 NOTE — OCCUPATIONAL THERAPY INITIAL EVALUATION ADULT - GENERAL OBSERVATIONS, REHAB EVAL
Pt cleared for OT by ANDRE Viramontes. Pt received seated in bedside chair, NAD, +2L/min NC, +tele, +bl scd, +IV, +purewick.

## 2022-08-02 NOTE — PATIENT PROFILE ADULT - FUNCTIONAL ASSESSMENT - BASIC MOBILITY 6.
1-calculated by average/Not able to assess (calculate score using [facility] averaging method)

## 2022-08-02 NOTE — PROGRESS NOTE ADULT - SUBJECTIVE AND OBJECTIVE BOX
IDENTIFICATION: This is a 76 yoF with HTN, spinal stenosis, arthritis, bicuspid aortic valve s/p AVR, ascending/hemiarch replacement and left aorto-subclavian bypass, CABG x2 (8/9/2021), recent admission at [facility] for left brachial occlusion s/p left brachial artery embolectomy who presented with worsening LUE pain radiating to shoulder/neck/chest now on POD3 following TEVAR on 7/29/22.    EVENTS:   - No significant events in last 24 hr    SUBJECTIVE:   - Notes no new pain in extremities  - Denies CP, SOB  - Denies new concerns    MEDICATIONS  (STANDING):  albumin human  5% IVPB 250 milliLiter(s) IV Intermittent every 1 hour  aspirin enteric coated 81 milliGRAM(s) Oral daily  atorvastatin 10 milliGRAM(s) Oral at bedtime  budesonide  80 MICROgram(s)/formoterol 4.5 MICROgram(s) Inhaler 2 Puff(s) Inhalation two times a day  chlorhexidine 2% Cloths 1 Application(s) Topical <User Schedule>  dextrose 50% Injectable 50 milliLiter(s) IV Push every 15 minutes  dextrose 50% Injectable 25 milliLiter(s) IV Push once  gabapentin 300 milliGRAM(s) Oral every 8 hours  heparin   Injectable 5000 Unit(s) SubCutaneous every 8 hours  levothyroxine 50 MICROGram(s) Oral daily  metoprolol tartrate 12.5 milliGRAM(s) Oral every 12 hours  pantoprazole    Tablet 40 milliGRAM(s) Oral before breakfast  polyethylene glycol 3350 17 Gram(s) Oral daily  senna 2 Tablet(s) Oral at bedtime  sodium chloride 0.9%. 1000 milliLiter(s) (10 mL/Hr) IV Continuous <Continuous>    MEDICATIONS  (PRN):  acetaminophen     Tablet .. 650 milliGRAM(s) Oral every 6 hours PRN Mild Pain (1 - 3)  oxyCODONE    IR 5 milliGRAM(s) Oral every 4 hours PRN Moderate Pain (4 - 6)      Vital Signs Last 24 Hrs  T(C): 37.1 (02 Aug 2022 08:43), Max: 37.1 (01 Aug 2022 21:19)  T(F): 98.7 (02 Aug 2022 08:43), Max: 98.7 (01 Aug 2022 21:19)  HR: 74 (02 Aug 2022 13:00) (56 - 91)  BP: 154/55 (02 Aug 2022 13:00) (152/66 - 180/77)  BP(mean): 97 (02 Aug 2022 13:00) (95 - 115)  RR: 18 (02 Aug 2022 13:00) (13 - 26)  SpO2: 94% (02 Aug 2022 13:00) (92% - 97%)    Parameters below as of 02 Aug 2022 14:00  Patient On (Oxygen Delivery Method): nasal cannula w/ humidification  O2 Flow (L/min): 2      Neurologic: AAOx3  Cardiac: Normal rate, regular rhythm  Vascular: Doppler signals multiphasic in DP/PT bilaterally  Pulm: Breathing comfortably  Abd: Soft, non-distended; No TTP throughout.   Incision: Well approximated without erythema/edema/induration.   Skin: No rashes  Extremities: No edema.  Psychiatric: Interacting normally.     I&O's Detail    01 Aug 2022 07:01  -  02 Aug 2022 07:00  --------------------------------------------------------  IN:    Oral Fluid: 840 mL  Total IN: 840 mL    OUT:    Indwelling Catheter - Urethral (mL): 75 mL    Voided (mL): 525 mL  Total OUT: 600 mL    Total NET: 240 mL      02 Aug 2022 07:01  -  02 Aug 2022 13:48  --------------------------------------------------------  IN:    Albumin 5%  - 250 mL: 250 mL    IV PiggyBack: 100 mL    Oral Fluid: 310 mL  Total IN: 660 mL    OUT:    Voided (mL): 100 mL  Total OUT: 100 mL    Total NET: 560 mL          LABS:                        8.7    11.21 )-----------( 165      ( 02 Aug 2022 01:20 )             26.8     08-02    134<L>  |  101  |  15  ----------------------------<  144<H>  3.9   |  23  |  0.77    Ca    8.8      02 Aug 2022 01:20  Phos  2.5     08-02  Mg     2.3     08-02    TPro  6.2  /  Alb  2.9<L>  /  TBili  0.5  /  DBili  x   /  AST  27  /  ALT  9<L>  /  AlkPhos  92  08-02    PT/INR - ( 02 Aug 2022 01:20 )   PT: 15.9 sec;   INR: 1.33          PTT - ( 02 Aug 2022 01:20 )  PTT:37.3 sec

## 2022-08-02 NOTE — OCCUPATIONAL THERAPY INITIAL EVALUATION ADULT - RANGE OF MOTION EXAMINATION, UPPER EXTREMITY
slight decreased ROM in LUE shoulder flexion, although WNL./bilateral UE Active ROM was WNL (within normal limits)

## 2022-08-02 NOTE — CONSULT NOTE ADULT - ASSESSMENT
77y Female with PMHx of HTN, spinal stenosis, arthritis, CABGx2, bicuspid aortic valve s/p AVR, ascending/hemiarch replacement and left aorto-subclavian bypass, recent admission at [facility] for left brachial occlusion s/p left brachial artery embolectomy who presented with worsening left UE pain, now s/p TEVAR and right femoral cutdown and patch angioplasty done 7/29/22. Post op patient's right leg movement severely limited by pain, though noted to have been able to move the leg. Neurontin was uptitrated over the weekend and pt's pain is much improved. Stroke team consulted because pt noted to have right leg weakness, notably in the hip flexion, which is possibly new though prior exams limited by pain.    -Recommend CT head, and CTA head/neck, and CT perfusion if possible  -exam more consistent with peripheral process, but will follow up above scans    Discussed with stroke fellow Dr. Chauhan to be discussed with Dr. Brewster

## 2022-08-02 NOTE — OCCUPATIONAL THERAPY INITIAL EVALUATION ADULT - FINE MOTOR COORDINATION, RIGHT HAND, MANIPULATE OBJECTS, OT EVAL
pt able to comb hair, open/close toothpaste tube/normal performance Pt reports difficulty feeding self, although demonstrates no issues performing tasks during evaluation. Pt able to comb hair, open/close toothpaste tube./normal performance

## 2022-08-02 NOTE — OCCUPATIONAL THERAPY INITIAL EVALUATION ADULT - FINE MOTOR COORDINATION, LEFT HAND, MANIPULATION OF OBJECTS, OT EVAL
pt able to comb hair, open/close toothpaste tube/normal performance Pt reports difficulty feeding self, although demonstrates no issues performing tasks during evaluation. Pt able to comb hair, open/close toothpaste tube/normal performance

## 2022-08-02 NOTE — OCCUPATIONAL THERAPY INITIAL EVALUATION ADULT - MANUAL MUSCLE TESTING RESULTS, REHAB EVAL
RUE 5/5 throughout. LUE shoulder flex 4/5, elbow flex/ext 4/5, wrist flex/ext 5/5, grasp 5/5. See PT noted for BLE MMT (R hip flexion decreased). CN Testing:  II: Visual fields are full to confrontation.  III, IV, VI: Extraocular movements are intact without nystagmus. Pupils equally round and reactive to light  V:  Facial sensation V1-V3 equal and intact   VII: Face is symmetric with normal eye closure and smile  XI: Head turning and shoulder shrug are intact.  XII: Tongue protrudes midline.

## 2022-08-02 NOTE — OCCUPATIONAL THERAPY INITIAL EVALUATION ADULT - PLANNED THERAPY INTERVENTIONS, OT EVAL
ADL retraining/IADL retraining/balance training/bed mobility training/neuromuscular re-education/strengthening/transfer training

## 2022-08-02 NOTE — PROVIDER CONTACT NOTE (CHANGE IN STATUS NOTIFICATION) - SITUATION
Notified HUMA Snow that pt seemed slightly confused at time around 1030am. Notified PA again around 1130 that the pt's daughter expressed concern that pt was confused. IV tylenol given to patient as she was expressing severe pain to RLE from incision and patient fell asleep. Around 1230 PT to bedside to assess patient and Dr. Garcia notified of the same assessment. MD became concerned that patient had mottling to RLE and weakness from the Right hip.

## 2022-08-02 NOTE — CONSULT NOTE ADULT - SUBJECTIVE AND OBJECTIVE BOX
**STROKE CONSULT NOTE**    Last known well time/Time of onset of symptoms: unclear, either yesterday or pre-surgery    HPI: 77y Female with PMHx of HTN, spinal stenosis, arthritis, CABGx2, bicuspid aortic valve s/p AVR, ascending/hemiarch replacement and left aorto-subclavian bypass, recent admission at [facility] for left brachial occlusion s/p left brachial artery embolectomy who presented with worsening left UE pain, now s/p TEVAR and right femoral cutdown and patch angioplasty done 7/29/22. Post op patient's right leg movement severely limited by pain, though noted to have been able to move the leg. Neurontin was uptitrated over the weekend and pt's pain is much improved. Stroke team consulted because pt noted to have right leg weakness, notably in the hip flexion, which is possibly new though prior exams limited by pain. Pt states that she actually feels like both the pain and the weakness of the right leg are better than before, and that she noticed it starting after the surgery.     T(C): 37.1 (08-02-22 @ 08:43), Max: 37.1 (08-01-22 @ 21:19)  HR: 74 (08-02-22 @ 13:00) (56 - 91)  BP: 154/55 (08-02-22 @ 13:00) (152/66 - 180/77)  RR: 18 (08-02-22 @ 13:00) (13 - 28)  SpO2: 94% (08-02-22 @ 13:00) (92% - 97%)    PAST MEDICAL & SURGICAL HISTORY:  HTN (hypertension)      H/O aortic valve stenosis      CAD (coronary artery disease)      S/P aortic valve replacement      S/P CABG (coronary artery bypass graft)      H/O aortic arch replacement      Pacemaker  medtronic micra pacemaker          FAMILY HISTORY:  FH: CAD (coronary artery disease) (Sibling)  CABG    Family history of CKD (chronic kidney disease) (Sibling)  ESRD    Family history of diabetes mellitus (DM) (Sibling)        ROS:   Constitutional: No fever, weight loss or fatigue  Eyes: No eye pain, visual disturbances, or discharge  ENMT:  No difficulty hearing, tinnitus; No sinus or throat pain  Neck: No pain or stiffness  Respiratory: No cough, wheezing, chills or hemoptysis  Cardiovascular: No chest pain      MEDICATIONS  (STANDING):  albumin human  5% IVPB 250 milliLiter(s) IV Intermittent every 1 hour  aspirin enteric coated 81 milliGRAM(s) Oral daily  atorvastatin 10 milliGRAM(s) Oral at bedtime  budesonide  80 MICROgram(s)/formoterol 4.5 MICROgram(s) Inhaler 2 Puff(s) Inhalation two times a day  chlorhexidine 2% Cloths 1 Application(s) Topical <User Schedule>  dextrose 50% Injectable 50 milliLiter(s) IV Push every 15 minutes  gabapentin 600 milliGRAM(s) Oral every 8 hours  heparin   Injectable 5000 Unit(s) SubCutaneous every 8 hours  levothyroxine 50 MICROGram(s) Oral daily  metoprolol tartrate 12.5 milliGRAM(s) Oral every 12 hours  pantoprazole    Tablet 40 milliGRAM(s) Oral before breakfast  polyethylene glycol 3350 17 Gram(s) Oral daily  senna 2 Tablet(s) Oral at bedtime  sodium chloride 0.9%. 1000 milliLiter(s) (10 mL/Hr) IV Continuous <Continuous>    MEDICATIONS  (PRN):  acetaminophen     Tablet .. 650 milliGRAM(s) Oral every 6 hours PRN Mild Pain (1 - 3)  oxyCODONE    IR 5 milliGRAM(s) Oral every 4 hours PRN Moderate Pain (4 - 6)    Allergies    No Known Allergies    Intolerances      Vital Signs Last 24 Hrs  T(C): 37.1 (02 Aug 2022 08:43), Max: 37.1 (01 Aug 2022 21:19)  T(F): 98.7 (02 Aug 2022 08:43), Max: 98.7 (01 Aug 2022 21:19)  HR: 74 (02 Aug 2022 13:00) (56 - 91)  BP: 154/55 (02 Aug 2022 13:00) (152/66 - 180/77)  BP(mean): 97 (02 Aug 2022 13:00) (95 - 115)  RR: 18 (02 Aug 2022 13:00) (13 - 28)  SpO2: 94% (02 Aug 2022 13:00) (92% - 97%)    Parameters below as of 02 Aug 2022 14:00  Patient On (Oxygen Delivery Method): nasal cannula w/ humidification  O2 Flow (L/min): 2      Physical exam:  Constitutional: No acute distress, conversant  Eyes: Anicteric sclerae, moist conjunctivae, see below for CNs  Neck: trachea midline    Neurologic:  -Mental status: Awake, alert, oriented to person, age, and month. Speech is fluent with intact naming, repetition, and comprehension, slight dysarthria. Recent and remote memory intact. Follows commands. Attention/concentration intact.   -Cranial nerves:   II: Visual fields are full to confrontation.  III, IV, VI: Extraocular movements are intact without nystagmus. Pupils equally round and reactive to light  V:  Facial sensation V1-V3 equal and intact   VII: Face is symmetric with normal eye closure and smile  VIII: Hearing is bilaterally intact to finger rub  IX, X: Uvula is midline and soft palate rises symmetrically  XI: Head turning and shoulder shrug are intact.  XII: Tongue protrudes midline  Motor: Normal bulk and tone. No pronator drift. Strength bilateral upper extremity 5/5. Left LE 5/5. Right LE weaker: 1/5 hip flexion, 2+/5 knee flexion and extension, 2/5 dorsi and plantarflexion,   Sensation: Decreased sensation of left leg. Otherwise intact  Coordination: No dysmetria on finger-to-nose   Reflexes: Upgoing toes bilaterally    NIHSS: 3    Fingerstick Blood Glucose: CAPILLARY BLOOD GLUCOSE      POCT Blood Glucose.: 112 mg/dL (02 Aug 2022 12:52)    LABS:                        8.7    11.21 )-----------( 165      ( 02 Aug 2022 01:20 )             26.8     08-02    134<L>  |  101  |  15  ----------------------------<  144<H>  3.9   |  23  |  0.77    Ca    8.8      02 Aug 2022 01:20  Phos  2.5     08-02  Mg     2.3     08-02    TPro  6.2  /  Alb  2.9<L>  /  TBili  0.5  /  DBili  x   /  AST  27  /  ALT  9<L>  /  AlkPhos  92  08-02    PT/INR - ( 02 Aug 2022 01:20 )   PT: 15.9 sec;   INR: 1.33          PTT - ( 02 Aug 2022 01:20 )  PTT:37.3 sec          RADIOLOGY & ADDITIONAL STUDIES:      -----------------------------------------------------------------------------------------------------------------  IV-tPA (Y/N):   No  Reason IV-tPA not given: recent surgery, outside window

## 2022-08-02 NOTE — CONSULT NOTE ADULT - CONSULT REQUESTED BY NAME
Colorectal Surgery Progress Note  04/15/2021     S:  No acute events overnight. Stable. Doing well. Pain adequately controlled. Ambulated yesterday. Tolerating LRD with no IVF. Voiding adequately without Echavarria.     O:  Temp:  [98.4  F (36.9  C)-99  F (37.2  C)] 99  F (37.2  C)  Pulse:  [64-76] 72  Resp:  [16-20] 16  BP: (148-155)/(75-84) 152/84  SpO2:  [92 %-95 %] 92 %  I/O last 3 completed shifts:  In: 1100 [P.O.:500; I.V.:600]  Out: 985 [Urine:985]    Physical Exam   General: alert, oriented, no acute distress  HEENT: normocephalic, atraumatic  Resp: Non-labored breathing on room air  Abd: Soft, non-distended, appropriately tender to palpation RLQ  Incision: Clean, dry, intact with dermabond    A/P: 73M with multiple right-sided colonic polyps s/p laparoscopic right hemicolectomy on 4/13/21, progressing well postoperatively    --Neuro/Pain: Continue scheduled tylenol and gabapentin with prn oxycodone for pain  --CV: Restarted PTA statin and beta blocker for history of CAD with perioperative beta blockade  --Resp: IS, Encourage ambulation  --GI/FEN: Continue LRD, advanced yesterday, awaiting ROBF, follow up BMP  --Heme: Hgb stable  - ID: follow up CBC, WBC 15.2 4/15  - PPx: Lovenox for DVT PPX  --Dispo: Here until ROBF    Will discuss with staff surgeon Dr. Marcos Bacon MD  General Surgery PGY-1  [facility]  Pager [phone]         Dr. Garcia

## 2022-08-02 NOTE — PROGRESS NOTE ADULT - SUBJECTIVE AND OBJECTIVE BOX
CTICU  CRITICAL  CARE  attending     Hand off received 					   Pertinent clinical, laboratory, radiographic, hemodynamic, echocardiographic, respiratory data, microbiologic data and chart were reviewed and analyzed frequently throughout the course of the day and night  Patient seen and examined with CTS/ SH attending at bedside  Pt is a 77y , Female, HEALTH ISSUES - PROBLEM Dx:      , FAMILY HISTORY:  FH: CAD (coronary artery disease) (Sibling)  CABG    Family history of CKD (chronic kidney disease) (Sibling)  ESRD    Family history of diabetes mellitus (DM) (Sibling)    PAST MEDICAL & SURGICAL HISTORY:  HTN (hypertension)      H/O aortic valve stenosis      CAD (coronary artery disease)      S/P aortic valve replacement      S/P CABG (coronary artery bypass graft)      H/O aortic arch replacement      Pacemaker  medtronic micra pacemaker        Patient is a 77y old  Female who presents with a chief complaint of Endoleak (02 Aug 2022 13:47)      14 system review limited by mentation and multiorgan morbidity     Vital signs, hemodynamic and respiratory parameters were reviewed from the bedside nursing flowsheet.  ICU Vital Signs Last 24 Hrs  T(C): 36.9 (02 Aug 2022 20:53), Max: 37.9 (02 Aug 2022 15:00)  T(F): 98.5 (02 Aug 2022 20:53), Max: 100.3 (02 Aug 2022 15:00)  HR: 58 (02 Aug 2022 23:00) (56 - 79)  BP: 153/65 (02 Aug 2022 20:51) (146/67 - 180/77)  BP(mean): 94 (02 Aug 2022 20:51) (94 - 115)  ABP: 145/52 (02 Aug 2022 23:00) (116/45 - 189/69)  ABP(mean): 84 (02 Aug 2022 23:00) (69 - 114)  RR: 16 (02 Aug 2022 23:00) (14 - 20)  SpO2: 96% (02 Aug 2022 23:00) (91% - 98%)    O2 Parameters below as of 02 Aug 2022 23:00  Patient On (Oxygen Delivery Method): nasal cannula w/ humidification  O2 Flow (L/min): 2        Adult Advanced Hemodynamics Last 24 Hrs  CVP(mm Hg): --  CVP(cm H2O): --  CO: --  CI: --  PA: --  PA(mean): --  PCWP: --  SVR: --  SVRI: --  PVR: --  PVRI: --, ABG - ( 02 Aug 2022 22:42 )  pH, Arterial: 7.40  pH, Blood: x     /  pCO2: 42    /  pO2: 110   / HCO3: 26    / Base Excess: 1.0   /  SaO2: 99.1                Intake and output was reviewed and the fluid balance was calculated  Daily     Daily   I&O's Summary    01 Aug 2022 07:01  -  02 Aug 2022 07:00  --------------------------------------------------------  IN: 840 mL / OUT: 600 mL / NET: 240 mL    02 Aug 2022 07:01  -  03 Aug 2022 00:04  --------------------------------------------------------  IN: 1960 mL / OUT: 2070 mL / NET: -110 mL        All lines and drain sites were assessed  Glycemic trend was reviewedCAPILLARY BLOOD GLUCOSE      POCT Blood Glucose.: 103 mg/dL (02 Aug 2022 20:34)    No acute change in focality  Auscultation of the chest reveals equal bs  Abdomen is soft  Extremities are warm and well perfused  Wounds appear clean and unremarkable  Antibiotics are periop    labs  CBC Full  -  ( 02 Aug 2022 22:44 )  WBC Count : 7.53 K/uL  RBC Count : 2.61 M/uL  Hemoglobin : 7.5 g/dL  Hematocrit : 23.5 %  Platelet Count - Automated : 134 K/uL  Mean Cell Volume : 90.0 fl  Mean Cell Hemoglobin : 28.7 pg  Mean Cell Hemoglobin Concentration : 31.9 gm/dL  Auto Neutrophil # : x  Auto Lymphocyte # : x  Auto Monocyte # : x  Auto Eosinophil # : x  Auto Basophil # : x  Auto Neutrophil % : x  Auto Lymphocyte % : x  Auto Monocyte % : x  Auto Eosinophil % : x  Auto Basophil % : x    08-02    135  |  103  |  16  ----------------------------<  144<H>  4.3   |  23  |  0.91    Ca    9.1      02 Aug 2022 22:44  Phos  3.3     08-02  Mg     2.5     08-02    TPro  5.9<L>  /  Alb  3.2<L>  /  TBili  0.5  /  DBili  x   /  AST  35  /  ALT  14  /  AlkPhos  82  08-02    PT/INR - ( 02 Aug 2022 22:44 )   PT: 16.3 sec;   INR: 1.37          PTT - ( 02 Aug 2022 22:44 )  PTT:32.2 sec  The current medications were reviewed   MEDICATIONS  (STANDING):  aspirin enteric coated 81 milliGRAM(s) Oral daily  atorvastatin 10 milliGRAM(s) Oral at bedtime  budesonide  80 MICROgram(s)/formoterol 4.5 MICROgram(s) Inhaler 2 Puff(s) Inhalation two times a day  chlorhexidine 2% Cloths 1 Application(s) Topical <User Schedule>  dextrose 10%. 1000 milliLiter(s) (50 mL/Hr) IV Continuous <Continuous>  dextrose 50% Injectable 50 milliLiter(s) IV Push every 15 minutes  heparin   Injectable 5000 Unit(s) SubCutaneous every 8 hours  heparin  Infusion. 500 Unit(s)/Hr (5 mL/Hr) IV Continuous <Continuous>  hydrocortisone sodium succinate Injectable 75 milliGRAM(s) IV Push every 8 hours  levothyroxine 50 MICROGram(s) Oral daily  pantoprazole    Tablet 40 milliGRAM(s) Oral before breakfast  phenylephrine    Infusion 0.1 MICROgram(s)/kG/Min (2.55 mL/Hr) IV Continuous <Continuous>  piperacillin/tazobactam IVPB.. 3.375 Gram(s) IV Intermittent every 6 hours  polyethylene glycol 3350 17 Gram(s) Oral daily  senna 2 Tablet(s) Oral at bedtime  sodium chloride 0.9%. 1000 milliLiter(s) (10 mL/Hr) IV Continuous <Continuous>    MEDICATIONS  (PRN):  acetaminophen     Tablet .. 650 milliGRAM(s) Oral every 6 hours PRN Mild Pain (1 - 3)  oxyCODONE    IR 5 milliGRAM(s) Oral every 4 hours PRN Moderate Pain (4 - 6)       PROBLEM LIST/ ASSESSMENT:  HEALTH ISSUES - PROBLEM Dx:      ,   Patient is a 77y old  Female who presents with a chief complaint of Endoleak (02 Aug 2022 13:47)     s/p cardiac surgery        ams  mottling  r/o stroke  r/o sepsis         My plan includes :  close hemodynamic, ventilatory and drain monitoring and management per post op routine    Monitor for arrhythmias and monitor parameters for organ perfusion  beta blockade not administered due to hemodynamic instability and bradycardia  monitor neurologic status  Head of the bed should remain elevated to 45 deg .   chest PT and IS will be encouraged  monitor adequacy of oxygenation and ventilation and attempt to wean oxygen  antibiotic regimen will be tailored to the clinical, laboratory and microbiologic data  Nutritional goals will be met using po eventually , ensure adequate caloric intake and montior the same  Stress ulcer and VTE prophylaxis will be achieved    Glycemic control is satisfactory  Electrolytes have been repleted as necessary and wound care has been carried out. Pain control has been achieved.   agressive physical therapy and early mobility and ambulation goals will be met   The family was updated about the course and plan  CRITICAL CARE TIME personally provided by me  in evaluation and management, reassessments, review and interpretation of labs and x-rays, ventilator and hemodynamic management, formulating a plan and coordinating care: ___90____ MIN.  Time does not include procedural time. Time spent was non routine post-operarive caRE and included multiple and repeated evaluations at the bedside  CTICU ATTENDING     					    Jesu Garcia MD

## 2022-08-03 NOTE — PROGRESS NOTE ADULT - SUBJECTIVE AND OBJECTIVE BOX
CTICU  CRITICAL  CARE  attending     Hand off received 					   Pertinent clinical, laboratory, radiographic, hemodynamic, echocardiographic, respiratory data, microbiologic data and chart were reviewed and analyzed frequently throughout the course of the day and night      76 year old female with HTN, spinal stenosis, arthritis, bicuspid aortic valve s/p AVR, ascending/hemiarch replacement with frozen elephant trunk and left aorto-subclavian bypass, CABG x 2.  Dr Muller operated on her in August 2021.   She had a long post-op course complicated by prolonged intubation, SSS s/p PPM. She had poor wound healing post op. She needed  bilateral pectoral  flap and closure on 9/29/21.  She was [facility] to [facility] with acute LUE pain  Work up: Left brachial occlusion.  Left brachial artery embolectomy was performed by vascular surgery.  CT scans at that time showed endoleak.    S/P Second Stage TEVAR.  S/P Repair of Right Femoral Artery.      FAMILY HISTORY:  FH: CAD (coronary artery disease) (Sibling)  CABG  Family history of CKD (chronic kidney disease) (Sibling)  ESRD  Family history of diabetes mellitus (DM) (Sibling)  PAST MEDICAL & SURGICAL HISTORY:  HTN (hypertension)  H/O aortic valve stenosis  CAD (coronary artery disease)  S/P aortic valve replacement  S/P CABG (coronary artery bypass graft)  H/O aortic arch replacement  Pacemaker (Medtronic micra pacemaker)            14 system review was unremarkable    Vital signs, hemodynamic and respiratory parameters were reviewed from the bedside nursing flow sheet.  ICU Vital Signs Last 24 Hrs  T(C): 36.6 (04 Aug 2022 01:13), Max: 36.8 (03 Aug 2022 09:36)  T(F): 97.9 (04 Aug 2022 01:13), Max: 98.3 (03 Aug 2022 17:17)  HR: 56 (04 Aug 2022 02:00) (52 - 79)  BP: 202/95 (04 Aug 2022 02:00) (162/76 - 206/88)  BP(mean): 137 (04 Aug 2022 02:00) (102 - 137)  ABP: 167/76 (03 Aug 2022 11:00) (152/57 - 185/72)  ABP(mean): 109 (03 Aug 2022 11:00) (90 - 118)  RR: 16 (04 Aug 2022 02:00) (10 - 24)  SpO2: 96% (04 Aug 2022 02:00) (92% - 98%)    O2 Parameters below as of 04 Aug 2022 03:00  Patient On (Oxygen Delivery Method): nasal cannula w/ humidification  O2 Flow (L/min): 2        Adult Advanced Hemodynamics Last 24 Hrs  CVP(mm Hg): --  CVP(cm H2O): --  CO: --  CI: --  PA: --  PA(mean): --  PCWP: --  SVR: --  SVRI: --  PVR: --  PVRI: --, ABG - ( 03 Aug 2022 03:55 )  pH, Arterial: 7.41  pH, Blood: x     /  pCO2: 38    /  pO2: 97    / HCO3: 24    / Base Excess: -0.3  /  SaO2: 99.5                Intake and output was reviewed and the fluid balance was calculated  Daily     Daily   I&O's Summary    02 Aug 2022 07:01  -  03 Aug 2022 07:00  --------------------------------------------------------  IN: 2840 mL / OUT: 2645 mL / NET: 195 mL    03 Aug 2022 07:01  -  04 Aug 2022 02:11  --------------------------------------------------------  IN: 2157 mL / OUT: 675 mL / NET: 1482 mL        All lines and drain sites were assessed    Neuro: No change in the mental status from the baseline. Follows commands. Moves all 4 extremities.  Neck: No JVD.  CVS: S1, S2, No S3.  Lungs: Good air entry bilaterally.  Abd: Soft. No tenderness. + Bowel sounds.  Vascular: + DP/PT.  Extremities: No edema.  Lymphatic: Normal.  Skin: No abnormalities.      labs  CBC Full  -  ( 03 Aug 2022 03:56 )  WBC Count : 9.40 K/uL  RBC Count : 3.09 M/uL  Hemoglobin : 8.9 g/dL  Hematocrit : 27.6 %  Platelet Count - Automated : 148 K/uL  Mean Cell Volume : 89.3 fl  Mean Cell Hemoglobin : 28.8 pg  Mean Cell Hemoglobin Concentration : 32.2 gm/dL  Auto Neutrophil # : 8.91 K/uL  Auto Lymphocyte # : 0.33 K/uL  Auto Monocyte # : 0.08 K/uL  Auto Eosinophil # : 0.08 K/uL  Auto Basophil # : 0.00 K/uL  Auto Neutrophil % : 94.8 %  Auto Lymphocyte % : 3.5 %  Auto Monocyte % : 0.9 %  Auto Eosinophil % : 0.8 %  Auto Basophil % : 0.0 %    08-03    136  |  102  |  16  ----------------------------<  143<H>  4.6   |  23  |  0.91    Ca    8.8      03 Aug 2022 03:56  Phos  3.6     08-03  Mg     2.3     08-03    TPro  6.3  /  Alb  3.4  /  TBili  0.8  /  DBili  x   /  AST  36  /  ALT  15  /  AlkPhos  102  08-03    PT/INR - ( 03 Aug 2022 12:33 )   PT: 15.1 sec;   INR: 1.27          PTT - ( 03 Aug 2022 12:33 )  PTT:42.7 sec  The current medications were reviewed   MEDICATIONS  (STANDING):  aspirin enteric coated 81 milliGRAM(s) Oral daily  atorvastatin 10 milliGRAM(s) Oral at bedtime  bisacodyl Suppository 10 milliGRAM(s) Rectal once  budesonide  80 MICROgram(s)/formoterol 4.5 MICROgram(s) Inhaler 2 Puff(s) Inhalation two times a day  chlorhexidine 2% Cloths 1 Application(s) Topical <User Schedule>  dextrose 10%. 1000 milliLiter(s) (50 mL/Hr) IV Continuous <Continuous>  dextrose 50% Injectable 50 milliLiter(s) IV Push every 15 minutes  heparin   Injectable 5000 Unit(s) SubCutaneous every 8 hours  hydrocortisone sodium succinate Injectable 75 milliGRAM(s) IV Push every 8 hours  levothyroxine 50 MICROGram(s) Oral daily  metoprolol tartrate 12.5 milliGRAM(s) Oral every 6 hours  pantoprazole    Tablet 40 milliGRAM(s) Oral before breakfast  polyethylene glycol 3350 17 Gram(s) Oral daily  senna 2 Tablet(s) Oral at bedtime  sodium chloride 0.9%. 1000 milliLiter(s) (10 mL/Hr) IV Continuous <Continuous>    MEDICATIONS  (PRN):  acetaminophen     Tablet .. 650 milliGRAM(s) Oral every 6 hours PRN Mild Pain (1 - 3)  oxyCODONE    IR 5 milliGRAM(s) Oral every 4 hours PRN Moderate Pain (4 - 6)          77 years old  Female admitted with Endoleak  S/P Second Stage TEVAR.  S/P Repair of Right Femoral Artery with patch angioplasty.   Postoperative course complicated by altered mental status.  CT Head: No acute infarct or bleed.   Hemodynamically stable.  Good oxygenation.  Fair urine out put.  Overall doing well.      My plan includes :  Statin and Betablocker.  Close hemodynamic, ventilatory and drain monitoring and management  Monitor for arrhythmias and monitor parameters for organ perfusion  Monitor neurologic status  Monitor renal function.  Thyroid Replacement Rx.  Taper Steroids.   Head of the bed should remain elevated to 45 deg .   Chest PT and IS will be encouraged  Monitor adequacy of oxygenation and ventilation and attempt to wean oxygen  Nutritional goals will be met using po eventually , ensure adequate caloric intake and monitor the same  Stress ulcer and VTE prophylaxis will be achieved    Glycemic control is satisfactory  Electrolytes have been repleted as necessary and wound care has been carried out. Pain control has been achieved.   Aggressive physical therapy and early mobility and ambulation goals will be met   The family was updated about the course and plan  CRITICAL CARE TIME SPENT in evaluation and management, reassessments, review and interpretation of labs and x-rays, ventilator and hemodynamic management, formulating a plan and coordinating care: ___30____ MIN.  Time does not include procedural time.  CTICU ATTENDING     					    Lex Gudino MD

## 2022-08-03 NOTE — PROGRESS NOTE ADULT - SUBJECTIVE AND OBJECTIVE BOX
IDENTIFICATION: This is a 76 yoF with HTN, spinal stenosis, arthritis, bicuspid aortic valve s/p AVR, ascending/hemiarch replacement and left aorto-subclavian bypass, CABG x2 (8/9/2021), recent admission at [facility] for left brachial occlusion s/p left brachial artery embolectomy who presented with worsening LUE pain radiating to shoulder/neck/chest now on POD5 following TEVAR on 7/29/22.    EVENTS:   - Had right hip flexor weakness and was taken for CT head, brain (perfusion), and C/A/P without identified neurologic cause. Incidentally noticed LLL segmental PE and was started on heparin gtt.  - Planning for MRI Today to further elucidate cause of weakness    SUBJECTIVE:   - Notes no new pain in extremities  - Denies CP, SOB  - Denies new concerns    MEDICATIONS  (STANDING):  aspirin enteric coated 81 milliGRAM(s) Oral daily  atorvastatin 10 milliGRAM(s) Oral at bedtime  budesonide  80 MICROgram(s)/formoterol 4.5 MICROgram(s) Inhaler 2 Puff(s) Inhalation two times a day  chlorhexidine 2% Cloths 1 Application(s) Topical <User Schedule>  dextrose 10%. 1000 milliLiter(s) (50 mL/Hr) IV Continuous <Continuous>  dextrose 50% Injectable 50 milliLiter(s) IV Push every 15 minutes  heparin  Infusion. 500 Unit(s)/Hr (5 mL/Hr) IV Continuous <Continuous>  hydrocortisone sodium succinate Injectable 75 milliGRAM(s) IV Push every 8 hours  levothyroxine 50 MICROGram(s) Oral daily  metoprolol tartrate 12.5 milliGRAM(s) Oral every 12 hours  pantoprazole    Tablet 40 milliGRAM(s) Oral before breakfast  piperacillin/tazobactam IVPB.. 3.375 Gram(s) IV Intermittent every 6 hours  polyethylene glycol 3350 17 Gram(s) Oral daily  senna 2 Tablet(s) Oral at bedtime  sodium chloride 0.9%. 1000 milliLiter(s) (10 mL/Hr) IV Continuous <Continuous>    MEDICATIONS  (PRN):  acetaminophen     Tablet .. 650 milliGRAM(s) Oral every 6 hours PRN Mild Pain (1 - 3)  oxyCODONE    IR 5 milliGRAM(s) Oral every 4 hours PRN Moderate Pain (4 - 6)      Vital Signs Last 24 Hrs  T(C): 36.8 (03 Aug 2022 09:36), Max: 37.9 (02 Aug 2022 15:00)  T(F): 98.2 (03 Aug 2022 09:36), Max: 100.3 (02 Aug 2022 15:00)  HR: 69 (03 Aug 2022 11:00) (53 - 77)  BP: 153/65 (02 Aug 2022 20:51) (146/67 - 154/55)  BP(mean): 94 (02 Aug 2022 20:51) (94 - 97)  RR: 15 (03 Aug 2022 11:00) (14 - 24)  SpO2: 93% (03 Aug 2022 11:00) (92% - 98%)    Parameters below as of 03 Aug 2022 11:00  Patient On (Oxygen Delivery Method): nasal cannula w/ humidification  O2 Flow (L/min): 1      Neurologic: AAOx3  Cardiac: Normal rate, regular rhythm  Vascular: Doppler signals multiphasic in DP/PT bilaterally  Pulm: Breathing comfortably  Abd: Soft, non-distended; No TTP throughout.   Incision: Well approximated without erythema/edema/induration.   : No Arerola  Skin: No rashes  Extremities: No edema.  Psychiatric: Interacting normally.     I&O's Detail    02 Aug 2022 07:01  -  03 Aug 2022 07:00  --------------------------------------------------------  IN:    Albumin 5%  - 250 mL: 1000 mL    dextrose 10%: 500 mL    Heparin Infusion: 42 mL    IV PiggyBack: 400 mL    Oral Fluid: 490 mL    Phenylephrine: 18 mL    PRBCs (Packed Red Blood Cells): 300 mL    sodium chloride 0.9%: 90 mL  Total IN: 2840 mL    OUT:    Indwelling Catheter - Urethral (mL): 2545 mL    Voided (mL): 100 mL  Total OUT: 2645 mL    Total NET: 195 mL      03 Aug 2022 07:01  -  03 Aug 2022 12:46  --------------------------------------------------------  IN:    dextrose 10%: 150 mL    Heparin Infusion: 18 mL    IV PiggyBack: 100 mL    Oral Fluid: 240 mL    sodium chloride 0.9%: 5 mL  Total IN: 513 mL    OUT:    Indwelling Catheter - Urethral (mL): 250 mL  Total OUT: 250 mL    Total NET: 263 mL          LABS:                        8.9    9.40  )-----------( 148      ( 03 Aug 2022 03:56 )             27.6     08-03    136  |  102  |  16  ----------------------------<  143<H>  4.6   |  23  |  0.91    Ca    8.8      03 Aug 2022 03:56  Phos  3.6     08-03  Mg     2.3     08-03    TPro  6.3  /  Alb  3.4  /  TBili  0.8  /  DBili  x   /  AST  36  /  ALT  15  /  AlkPhos  102  08-03    PT/INR - ( 03 Aug 2022 11:31 )   PT: 15.5 sec;   INR: 1.30          PTT - ( 03 Aug 2022 11:31 )  PTT:128.5 sec  Urinalysis Basic - ( 02 Aug 2022 16:11 )    Color: Yellow / Appearance: Clear / SG: <=1.005 / pH: x  Gluc: x / Ketone: NEGATIVE  / Bili: Negative / Urobili: 0.2 E.U./dL   Blood: x / Protein: NEGATIVE mg/dL / Nitrite: NEGATIVE   Leuk Esterase: NEGATIVE / RBC: Many /HPF / WBC < 5 /HPF   Sq Epi: x / Non Sq Epi: 5-10 /HPF / Bacteria: Present /HPF        RADIOLOGY & ADDITIONAL STUDIES:

## 2022-08-03 NOTE — CHART NOTE - NSCHARTNOTEFT_GEN_A_CORE
Anti-infective Withdrawal Note    Antimicrobial:  piperacillin-tazobactam    Reason for withdrawal:  no evidence of bacterial infection     Communicated to: RENETTA FINN at 4:03pm    The primary team will have 24 hours (or in the case of after hours, holiday or weekend, the next business day) to appeal the decision to the Antimicrobial Stewardship Physician Leader. If the Antimicrobial Stewardship Physician Leader determines that use of the antimicrobial in question is still unjustified, a formal ID consultation will be recommended.    THIS IS NOT AN INFECTIOUS DISEASES CONSULTATION

## 2022-08-03 NOTE — PROGRESS NOTE ADULT - ASSESSMENT
This is a 76 yoF with HTN, spinal stenosis, arthritis, bicuspid aortic valve s/p AVR, ascending/hemiarch replacement and left aorto-subclavian bypass, CABG x2 (8/9/2021), recent admission at [facility] for left brachial occlusion s/p left brachial artery embolectomy who presented with worsening LUE pain radiating to shoulder/neck/chest now on POD5 following TEVAR on 7/29/22.    Thinking: No acute changes needed from vascular perspective; will follow-up further imaging for weakness if pursued.    - Continue regular neurovascular checks  - Agree with heparin gtt for now for PE.  - Appreciate excellent care per primary team  - Vascular Surgery (Team 3C) following

## 2022-08-03 NOTE — PROGRESS NOTE ADULT - ASSESSMENT
77y Female with PMHx of HTN, spinal stenosis, arthritis, CABGx2, bicuspid aortic valve s/p AVR, ascending/hemiarch replacement and left aorto-subclavian bypass, recent admission at [facility] for left brachial occlusion s/p left brachial artery embolectomy who presented with worsening left UE pain, now s/p TEVAR and right femoral cutdown and patch angioplasty done 7/29/22. Post op patient's right leg movement severely limited by pain, though noted to have been able to move the leg. Neurontin was uptitrated over the weekend and pt's pain is much improved. Stroke team consulted because pt noted to have right leg weakness, notably in the hip flexion, which is possibly new though prior exams limited by pain. Improvement in right leg weakness 8/3.     Exam more consistent with peripheral process, pending imaging.     Plan:  - CTH negative  - CTA H/N with b/l near fetal PCAs, small right vert intracranially, diminutive caliber of vertebrobasilar system, right vert colluded at origin with intermittent filling of V2 and V3 segments from deep cervical collateral supply.   - CTP negative  - CTAP - acute LLL segmental PE, no hematoma compressing on spine.   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  - may require general neurology consult pending results from MR.      3) Further management  - obtain MRI brain without  - recommend SBP goal <180  - recommend q4hr stroke neuro checks  - may need outpt neurology follow up  - provide stroke education    DVT prophylaxis   -Lovenox SQ and SCDs    Discussed with Stroke fellow Dr. Chauhan to be discussed with Dr. Brewster 77y Female with PMHx of HTN, spinal stenosis, arthritis, CABGx2, bicuspid aortic valve s/p AVR, ascending/hemiarch replacement and left aorto-subclavian bypass, recent admission at [facility] for left brachial occlusion s/p left brachial artery embolectomy who presented with worsening left UE pain, now s/p TEVAR and right femoral cutdown and patch angioplasty done 7/29/22. Post op patient's right leg movement severely limited by pain, though noted to have been able to move the leg. Neurontin was uptitrated over the weekend and pt's pain is much improved. Stroke team consulted because pt noted to have right leg weakness, notably in the hip flexion, which is possibly new though prior exams limited by pain.     Plan:  - CTH negative  - CTA H/N with b/l near fetal PCAs, small right vert intracranially, diminutive caliber of vertebrobasilar system, right vert colluded at origin with intermittent filling of V2 and V3 segments from deep cervical collateral supply.   - CTP negative  - CTAP - acute LLL segmental PE, no hematoma compressing on spine.   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  - may require general neurology consult pending results from MR.      3) Further management  - obtain MRI brain without  - recommend SBP goal <180  - recommend q4hr stroke neuro checks  - may need outpt neurology follow up  - provide stroke education    DVT prophylaxis   -Lovenox SQ and SCDs    Discussed with Stroke fellow Dr. Chauhan to be discussed with Dr. Brewster 77y Female with PMHx of HTN, spinal stenosis, arthritis, CABGx2, bicuspid aortic valve s/p AVR, ascending/hemiarch replacement and left aorto-subclavian bypass, recent admission at [facility] for left brachial occlusion s/p left brachial artery embolectomy who presented with worsening left UE pain, now s/p TEVAR and right femoral cutdown and patch angioplasty done 7/29/22. Post op patient's right leg movement severely limited by pain, though noted to have been able to move the leg. Neurontin was uptitrated over the weekend and pt's pain is much improved. Stroke team consulted because pt noted to have right leg weakness, notably in the hip flexion, which is possibly new though prior exams limited by pain.     Plan:  - CTH negative  - CTA H/N with b/l near fetal PCAs, small right vert intracranially, diminutive caliber of vertebrobasilar system, right vert colluded at origin with intermittent filling of V2 and V3 segments from deep cervical collateral supply.   - CTP negative  - CTAP - acute LLL segmental PE, no hematoma compressing on spine.   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  - may require general neurology consult pending results from MR.    - No neurovascular indication for aspirin while pt is on heparin but also no contraindication for ASA if d/t other medical reasoning  - recommend SBP goal <180  - recommend q4hr stroke neuro checks  - may need outpt neurology follow up  - provide stroke education    Discussed with Stroke fellow Dr. Chauhan and Attending Dr. Brewster

## 2022-08-03 NOTE — PROGRESS NOTE ADULT - SUBJECTIVE AND OBJECTIVE BOX
Neurology Stroke Progress Note    INTERVAL HPI/OVERNIGHT EVENTS:  No acute overnight events. Patient seen and examined. States her right leg feels stronger to her since yesterday. Endorsing right knee pain, some pain in left leg as well. Pending MR brain and spine.     MEDICATIONS  (STANDING):  aspirin enteric coated 81 milliGRAM(s) Oral daily  atorvastatin 10 milliGRAM(s) Oral at bedtime  budesonide  80 MICROgram(s)/formoterol 4.5 MICROgram(s) Inhaler 2 Puff(s) Inhalation two times a day  chlorhexidine 2% Cloths 1 Application(s) Topical <User Schedule>  dextrose 10%. 1000 milliLiter(s) (50 mL/Hr) IV Continuous <Continuous>  dextrose 50% Injectable 50 milliLiter(s) IV Push every 15 minutes  heparin  Infusion. 500 Unit(s)/Hr (5 mL/Hr) IV Continuous <Continuous>  hydrocortisone sodium succinate Injectable 75 milliGRAM(s) IV Push every 8 hours  levothyroxine 50 MICROGram(s) Oral daily  pantoprazole    Tablet 40 milliGRAM(s) Oral before breakfast  phenylephrine    Infusion 0.1 MICROgram(s)/kG/Min (2.55 mL/Hr) IV Continuous <Continuous>  piperacillin/tazobactam IVPB.. 3.375 Gram(s) IV Intermittent every 6 hours  polyethylene glycol 3350 17 Gram(s) Oral daily  senna 2 Tablet(s) Oral at bedtime  sodium chloride 0.9%. 1000 milliLiter(s) (10 mL/Hr) IV Continuous <Continuous>    MEDICATIONS  (PRN):  acetaminophen     Tablet .. 650 milliGRAM(s) Oral every 6 hours PRN Mild Pain (1 - 3)  oxyCODONE    IR 5 milliGRAM(s) Oral every 4 hours PRN Moderate Pain (4 - 6)      Allergies    No Known Allergies    Intolerances        Vital Signs Last 24 Hrs  T(C): 36.4 (03 Aug 2022 05:01), Max: 37.9 (02 Aug 2022 15:00)  T(F): 97.6 (03 Aug 2022 05:01), Max: 100.3 (02 Aug 2022 15:00)  HR: 61 (03 Aug 2022 09:00) (53 - 77)  BP: 153/65 (02 Aug 2022 20:51) (146/67 - 170/75)  BP(mean): 94 (02 Aug 2022 20:51) (94 - 115)  RR: 14 (03 Aug 2022 09:00) (14 - 24)  SpO2: 93% (03 Aug 2022 09:00) (91% - 98%)    Parameters below as of 03 Aug 2022 10:00  Patient On (Oxygen Delivery Method): nasal cannula w/ humidification  O2 Flow (L/min): 1      Physical exam:  Constitutional: No acute distress, conversant  Eyes: Anicteric sclerae, moist conjunctivae, see below for CNs  Neck: trachea midline    Neurologic:  -Mental status: Awake, alert, oriented to person, age, and month. Speech is fluent with intact naming, repetition, and comprehension, slight dysarthria. Recent and remote memory intact. Follows commands. Attention/concentration intact.   -Cranial nerves:   II: Visual fields are full to confrontation.  III, IV, VI: Extraocular movements are intact without nystagmus. Pupils equally round and reactive to light  V:  Facial sensation V1-V3 equal and intact   VII: Face is symmetric with normal eye closure and smile  XII: Tongue protrudes midline  Motor: Normal bulk and tone. No pronator drift. Strength bilateral upper extremity 5/5. Left LE 5/5. Right LE weaker: 2/5 hip flexion, 3/5 knee flexion and extension, 3/5 dorsi and plantarflexion,   Sensation: Sensation equal to light touch b/l today.   Coordination: No dysmetria on finger-to-nose   Reflexes: Upgoing toes bilaterally    LABS:                        8.9    9.40  )-----------( 148      ( 03 Aug 2022 03:56 )             27.6     08-03    136  |  102  |  16  ----------------------------<  143<H>  4.6   |  23  |  0.91    Ca    8.8      03 Aug 2022 03:56  Phos  3.6     08-03  Mg     2.3     08-03    TPro  6.3  /  Alb  3.4  /  TBili  0.8  /  DBili  x   /  AST  36  /  ALT  15  /  AlkPhos  102  08-03    PT/INR - ( 03 Aug 2022 05:31 )   PT: 15.4 sec;   INR: 1.29          PTT - ( 03 Aug 2022 05:31 )  PTT:42.1 sec  Urinalysis Basic - ( 02 Aug 2022 16:11 )    Color: Yellow / Appearance: Clear / SG: <=1.005 / pH: x  Gluc: x / Ketone: NEGATIVE  / Bili: Negative / Urobili: 0.2 E.U./dL   Blood: x / Protein: NEGATIVE mg/dL / Nitrite: NEGATIVE   Leuk Esterase: NEGATIVE / RBC: Many /HPF / WBC < 5 /HPF   Sq Epi: x / Non Sq Epi: 5-10 /HPF / Bacteria: Present /HPF    RADIOLOGY & ADDITIONAL TESTS:    < from: CT Angio Abdomen and Pelvis w/ IV Cont (08.02.22 @ 17:55) >  IMPRESSION:  1. Acute pulmonary embolism involving the left lower lobe segmental   pulmonary  artery.  2. RV to LV ratio measures 0.7 cm. No evidence for right ventricular   strain.  3. Abdominal aortic aneurysm measuring 3.6 cm.  4. Diffuse anasarca.    < from: CT Head No Cont (08.02.22 @ 17:34) >  IMPRESSION:  No acute intracranial hemorrhage, mass effect or CT evidence of recent  transcortical infarct at this time.    < from: CT Angio Head w/ IV Cont (08.02.22 @ 17:36) >  IMPRESSION:    Intracranial CTA: No proximal arterial occlusion or high-grade stenosis.   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 Right vertebral artery   is small but patent intracranially.    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 Left vertebral artery is patent   throughout the neck. Right vertebral artery appears occludedat origin,   with intermittent filling of V2 and V3 segments from apparent deep   cervical collateral supply.    No significant carotid stenosis on either side despite atherosclerotic   plaque.    < from: CT Perfusion w/ Maps w/ IV Cont (08.02.22 @ 17:43) >  IMPRESSION:  Negative CT perfusion of the brain.   Neurology Stroke Progress Note    INTERVAL HPI/OVERNIGHT EVENTS:  No acute overnight events. Patient seen and examined. States her right leg feels stronger to her since yesterday. Endorsing right knee pain, some pain in left leg as well. Pending MR brain and spine.     MEDICATIONS  (STANDING):  aspirin enteric coated 81 milliGRAM(s) Oral daily  atorvastatin 10 milliGRAM(s) Oral at bedtime  budesonide  80 MICROgram(s)/formoterol 4.5 MICROgram(s) Inhaler 2 Puff(s) Inhalation two times a day  chlorhexidine 2% Cloths 1 Application(s) Topical <User Schedule>  dextrose 10%. 1000 milliLiter(s) (50 mL/Hr) IV Continuous <Continuous>  dextrose 50% Injectable 50 milliLiter(s) IV Push every 15 minutes  heparin  Infusion. 500 Unit(s)/Hr (5 mL/Hr) IV Continuous <Continuous>  hydrocortisone sodium succinate Injectable 75 milliGRAM(s) IV Push every 8 hours  levothyroxine 50 MICROGram(s) Oral daily  pantoprazole    Tablet 40 milliGRAM(s) Oral before breakfast  phenylephrine    Infusion 0.1 MICROgram(s)/kG/Min (2.55 mL/Hr) IV Continuous <Continuous>  piperacillin/tazobactam IVPB.. 3.375 Gram(s) IV Intermittent every 6 hours  polyethylene glycol 3350 17 Gram(s) Oral daily  senna 2 Tablet(s) Oral at bedtime  sodium chloride 0.9%. 1000 milliLiter(s) (10 mL/Hr) IV Continuous <Continuous>    MEDICATIONS  (PRN):  acetaminophen     Tablet .. 650 milliGRAM(s) Oral every 6 hours PRN Mild Pain (1 - 3)  oxyCODONE    IR 5 milliGRAM(s) Oral every 4 hours PRN Moderate Pain (4 - 6)      Allergies    No Known Allergies    Intolerances        Vital Signs Last 24 Hrs  T(C): 36.4 (03 Aug 2022 05:01), Max: 37.9 (02 Aug 2022 15:00)  T(F): 97.6 (03 Aug 2022 05:01), Max: 100.3 (02 Aug 2022 15:00)  HR: 61 (03 Aug 2022 09:00) (53 - 77)  BP: 153/65 (02 Aug 2022 20:51) (146/67 - 170/75)  BP(mean): 94 (02 Aug 2022 20:51) (94 - 115)  RR: 14 (03 Aug 2022 09:00) (14 - 24)  SpO2: 93% (03 Aug 2022 09:00) (91% - 98%)    Parameters below as of 03 Aug 2022 10:00  Patient On (Oxygen Delivery Method): nasal cannula w/ humidification  O2 Flow (L/min): 1      Physical exam:  Constitutional: No acute distress, conversant  Eyes: Anicteric sclerae, moist conjunctivae, see below for CNs  Neck: trachea midline    Neurologic:  -Mental status: Awake, alert, oriented to person, age, and month. Speech is fluent with intact naming, repetition, and comprehension, slight dysarthria. Recent and remote memory intact. Follows commands. Attention/concentration intact.   -Cranial nerves:   II: Visual fields are full to confrontation.  III, IV, VI: Extraocular movements are intact without nystagmus. Pupils equally round and reactive to light  V:  Facial sensation V1-V3 equal and intact   VII: Face is symmetric with normal eye closure and smile  XII: Tongue protrudes midline  Motor: Normal bulk and tone. No pronator drift. Strength bilateral upper extremity 5/5. Left LE 5/5. Right LE weaker: 1/5 hip flexion, 2/5 knee flexion and extension, 2/5 dorsi and plantarflexion,   Sensation: Sensation equal to light touch b/l today.   Coordination: No dysmetria on finger-to-nose   Reflexes: Upgoing toes bilaterally    LABS:                        8.9    9.40  )-----------( 148      ( 03 Aug 2022 03:56 )             27.6     08-03    136  |  102  |  16  ----------------------------<  143<H>  4.6   |  23  |  0.91    Ca    8.8      03 Aug 2022 03:56  Phos  3.6     08-03  Mg     2.3     08-03    TPro  6.3  /  Alb  3.4  /  TBili  0.8  /  DBili  x   /  AST  36  /  ALT  15  /  AlkPhos  102  08-03    PT/INR - ( 03 Aug 2022 05:31 )   PT: 15.4 sec;   INR: 1.29          PTT - ( 03 Aug 2022 05:31 )  PTT:42.1 sec  Urinalysis Basic - ( 02 Aug 2022 16:11 )    Color: Yellow / Appearance: Clear / SG: <=1.005 / pH: x  Gluc: x / Ketone: NEGATIVE  / Bili: Negative / Urobili: 0.2 E.U./dL   Blood: x / Protein: NEGATIVE mg/dL / Nitrite: NEGATIVE   Leuk Esterase: NEGATIVE / RBC: Many /HPF / WBC < 5 /HPF   Sq Epi: x / Non Sq Epi: 5-10 /HPF / Bacteria: Present /HPF    RADIOLOGY & ADDITIONAL TESTS:    < from: CT Angio Abdomen and Pelvis w/ IV Cont (08.02.22 @ 17:55) >  IMPRESSION:  1. Acute pulmonary embolism involving the left lower lobe segmental   pulmonary  artery.  2. RV to LV ratio measures 0.7 cm. No evidence for right ventricular   strain.  3. Abdominal aortic aneurysm measuring 3.6 cm.  4. Diffuse anasarca.    < from: CT Head No Cont (08.02.22 @ 17:34) >  IMPRESSION:  No acute intracranial hemorrhage, mass effect or CT evidence of recent  transcortical infarct at this time.    < from: CT Angio Head w/ IV Cont (08.02.22 @ 17:36) >  IMPRESSION:    Intracranial CTA: No proximal arterial occlusion or high-grade stenosis.   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 Right vertebral artery   is small but patent intracranially.    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 Left vertebral artery is patent   throughout the neck. Right vertebral artery appears occludedat origin,   with intermittent filling of V2 and V3 segments from apparent deep   cervical collateral supply.    No significant carotid stenosis on either side despite atherosclerotic   plaque.    < from: CT Perfusion w/ Maps w/ IV Cont (08.02.22 @ 17:43) >  IMPRESSION:  Negative CT perfusion of the brain.

## 2022-08-04 NOTE — PROGRESS NOTE ADULT - SUBJECTIVE AND OBJECTIVE BOX
CTICU  CRITICAL  CARE  attending     Hand off received 					   Pertinent clinical, laboratory, radiographic, hemodynamic, echocardiographic, respiratory data, microbiologic data and chart were reviewed and analyzed frequently throughout the course of the day  Patient seen and examined with CTS/ SH attending at bedside  Pt is a 77y , Female, HEALTH ISSUES - PROBLEM Dx:      , FAMILY HISTORY:  FH: CAD (coronary artery disease) (Sibling)  CABG    Family history of CKD (chronic kidney disease) (Sibling)  ESRD    Family history of diabetes mellitus (DM) (Sibling)    PAST MEDICAL & SURGICAL HISTORY:  HTN (hypertension)      H/O aortic valve stenosis      CAD (coronary artery disease)      S/P aortic valve replacement      S/P CABG (coronary artery bypass graft)      H/O aortic arch replacement      Pacemaker  medtronic micra pacemaker        Patient is a 77y old  Female who presents with a chief complaint of Endoleak (04 Aug 2022 12:05)      14 system review was unremarkable    Vital signs, hemodynamic and respiratory parameters were reviewed from the bedside nursing flowsheet.  ICU Vital Signs Last 24 Hrs  T(C): 36.7 (04 Aug 2022 17:16), Max: 36.7 (04 Aug 2022 17:16)  T(F): 98 (04 Aug 2022 17:16), Max: 98 (04 Aug 2022 17:16)  HR: 71 (04 Aug 2022 19:00) (52 - 84)  BP: 180/80 (04 Aug 2022 19:00) (155/70 - 224/95)  BP(mean): 115 (04 Aug 2022 19:00) (101 - 137)  ABP: --  ABP(mean): --  RR: 23 (04 Aug 2022 19:00) (10 - 23)  SpO2: 96% (04 Aug 2022 19:00) (95% - 98%)    O2 Parameters below as of 04 Aug 2022 20:00  Patient On (Oxygen Delivery Method): room air          Adult Advanced Hemodynamics Last 24 Hrs  CVP(mm Hg): 18 (04 Aug 2022 10:00) (15 - 21)  CVP(cm H2O): --  CO: --  CI: --  PA: --  PA(mean): --  PCWP: --  SVR: --  SVRI: --  PVR: --  PVRI: --, ABG - ( 03 Aug 2022 03:55 )  pH, Arterial: 7.41  pH, Blood: x     /  pCO2: 38    /  pO2: 97    / HCO3: 24    / Base Excess: -0.3  /  SaO2: 99.5                Intake and output was reviewed and the fluid balance was calculated  Daily     Daily   I&O's Summary    03 Aug 2022 07:01  -  04 Aug 2022 07:00  --------------------------------------------------------  IN: 2387 mL / OUT: 725 mL / NET: 1662 mL    04 Aug 2022 07:01  -  04 Aug 2022 20:22  --------------------------------------------------------  IN: 480 mL / OUT: 550 mL / NET: -70 mL        All lines and drain sites were assessed  Glycemic trend was reviewedCAPILLARY BLOOD GLUCOSE      POCT Blood Glucose.: 103 mg/dL (02 Aug 2022 20:34)      Neuro:  HEENT:  Heart:  Lungs:  Abdomen:  Extremities:      labs  CBC Full  -  ( 04 Aug 2022 13:26 )  WBC Count : 11.12 K/uL  RBC Count : 3.67 M/uL  Hemoglobin : 10.4 g/dL  Hematocrit : 32.3 %  Platelet Count - Automated : 221 K/uL  Mean Cell Volume : 88.0 fl  Mean Cell Hemoglobin : 28.3 pg  Mean Cell Hemoglobin Concentration : 32.2 gm/dL  Auto Neutrophil # : 9.75 K/uL  Auto Lymphocyte # : 0.65 K/uL  Auto Monocyte # : 0.59 K/uL  Auto Eosinophil # : 0.00 K/uL  Auto Basophil # : 0.01 K/uL  Auto Neutrophil % : 87.7 %  Auto Lymphocyte % : 5.8 %  Auto Monocyte % : 5.3 %  Auto Eosinophil % : 0.0 %  Auto Basophil % : 0.1 %    08-04    139  |  102  |  15  ----------------------------<  185<H>  3.8   |  25  |  0.65    Ca    10.2      04 Aug 2022 13:26  Phos  2.1     08-04  Mg     2.5     08-04    TPro  6.9  /  Alb  3.7  /  TBili  0.4  /  DBili  x   /  AST  25  /  ALT  19  /  AlkPhos  116  08-04    PT/INR - ( 04 Aug 2022 03:36 )   PT: 15.0 sec;   INR: 1.26          PTT - ( 04 Aug 2022 13:26 )  PTT:33.3 sec  The current medications were reviewed   MEDICATIONS  (STANDING):  amLODIPine   Tablet 2.5 milliGRAM(s) Oral daily  aspirin enteric coated 81 milliGRAM(s) Oral daily  atorvastatin 10 milliGRAM(s) Oral at bedtime  budesonide  80 MICROgram(s)/formoterol 4.5 MICROgram(s) Inhaler 2 Puff(s) Inhalation two times a day  chlorhexidine 2% Cloths 1 Application(s) Topical <User Schedule>  dextrose 10%. 1000 milliLiter(s) (50 mL/Hr) IV Continuous <Continuous>  dextrose 50% Injectable 50 milliLiter(s) IV Push every 15 minutes  heparin   Injectable 5000 Unit(s) SubCutaneous every 8 hours  hydrALAZINE 10 milliGRAM(s) Oral every 8 hours  levothyroxine 50 MICROGram(s) Oral daily  metoprolol tartrate 12.5 milliGRAM(s) Oral every 6 hours  pantoprazole    Tablet 40 milliGRAM(s) Oral before breakfast  polyethylene glycol 3350 17 Gram(s) Oral daily  senna 2 Tablet(s) Oral at bedtime  sodium chloride 0.9%. 1000 milliLiter(s) (10 mL/Hr) IV Continuous <Continuous>  testosterone patch 4 mG/24 Hr(s) 4 milliGRAM(s) Transdermal daily    MEDICATIONS  (PRN):  acetaminophen     Tablet .. 650 milliGRAM(s) Oral every 6 hours PRN Mild Pain (1 - 3)  oxyCODONE    IR 5 milliGRAM(s) Oral every 4 hours PRN Moderate Pain (4 - 6)      Assessment/Plan:         s/p cardiac surgery    Acute systolic and diastolic heart failure evidenced by SOB and parenchymal infiltrates; will treat with diuresis    Cardiogenic shock on ionotropy    Vasogenic shock due to hypotension in the cticu , will keep on pressors    Hypovolemic shock - > 20% intravascular depletion will replete volume    Acute blood loss anemia with relative hypotension treated with > 1 unit PC    Acute respiratory failure ruled in due to hypoxemia, O2 sats < 91% on RA treated with HFNC    Acute respiratory failure ruled in due to hypercapnea on abg will treat with mechanical ventilation    Acute respiratory failure ruled in due to prolonged mechanical ventilation > 24 hrs on the vent due to failure to wean due to weak respiratory mechanics    Toxic metabolic encephalopathy ; sundowning due to anesthesia pain medications    Acidosis evidenced by anion gap and negative base excess    Acute kidney injury - creatinine > 0.3 due to combined prerenal and intrarenal factors can presume ATN    ESRD    Acute ora-operative ischemic stroke    Moderate protein calorie malutrition    Diet as tolerated  Replete lytes prn  Monitor CT output  GI/DVT PPX  Bowel Regimen  Pain control  OOB with PT      Close hemodynamic, ventilatory and drain monitoring and management per post op routine  Monitor for arrhythmias and monitor parameters for organ perfusion  Beta blockade not administered due to hemodynamic instability and bradycardia  Monitor neurologic status  Head of the bed should remain elevated to 45 deg   Chest PT and IS will be encouraged  Monitor adequacy of oxygenation and ventilation and attempt to wean oxygen  Antibiotic regimen will be tailored to the clinical, laboratory and microbiologic data  Nutritional goals will be met using po eventually, ensure adequate caloric intake and monitor the same  Stress ulcer and VTE prophylaxis will be achieved    Glycemic control is satisfactory  Electrolytes have been repleted as necessary and wound care has been carried out   Pain control has been achieved.   Aggressive physical therapy and early mobility and ambulation goals will be met   The family was updated about the course and plan.    CRITICAL CARE TIME personally provided by me  in evaluation and management, reassessments, review and interpretation of labs and x-rays, ventilator and hemodynamic management, formulating a plan and coordinating care: ___30____ MIN.  Time does not include procedural time.    CTICU ATTENDING     					  Pari Manrique MD CTICU  CRITICAL  CARE  attending     Hand off received 					   Pertinent clinical, laboratory, radiographic, hemodynamic, echocardiographic, respiratory data, microbiologic data and chart were reviewed and analyzed frequently throughout the course of the day  Patient seen and examined with CTS/ SH attending at bedside  Pt is a 77 yr old female previous smoker with PMH HTN, spinal stenosis, arthritis, biscupid AV sp AVR, ascending & hemiarch replacement with frozen elephant trunk and L aorto-subclavian bypass, CABG x 2 with Dr. Muller 8/9/21. Post op course cb prolonged intubation, SSS sp PPM, poor wound healing with pec flap closure 9/29/21. Presented to [facility] 7/21 with LUE pain and found to have thrombus sp embolectomy L brachial artery 7/21. Also found to have endoleak prompting TEVAR 7/29. Extubated that night. 7/30 required 2 units pRBCs. C/o pain, started on gabapentin.   On 8/2 was increasingly lethargic, went for CTH which was unremarkable however CT chest found LLL subsegmental PE, started on heparin. Improving. CT chest re-read and diagnosed as leak with no PE. Heparin stopped. Neurology recommended repeat CTH which was done 8/4. No acute infarct. Also underwent doppler of LE, pending read.     FAMILY HISTORY:  FH: CAD (coronary artery disease) (Sibling)  CABG  Family history of CKD (chronic kidney disease) (Sibling)  ESRD  Family history of diabetes mellitus (DM) (Sibling)    PAST MEDICAL & SURGICAL HISTORY:  HTN (hypertension)  H/O aortic valve stenosis  CAD (coronary artery disease)  S/P aortic valve replacement  S/P CABG (coronary artery bypass graft)  H/O aortic arch replacement  Pacemaker  medtronic micra pacemaker        Patient is a 77y old  Female who presents with a chief complaint of Endoleak.      14 system review was unremarkable    Vital signs, hemodynamic and respiratory parameters were reviewed from the bedside nursing flowsheet.  ICU Vital Signs Last 24 Hrs  T(C): 36.7 (04 Aug 2022 17:16), Max: 36.7 (04 Aug 2022 17:16)  T(F): 98 (04 Aug 2022 17:16), Max: 98 (04 Aug 2022 17:16)  HR: 71 (04 Aug 2022 19:00) (52 - 84)  BP: 180/80 (04 Aug 2022 19:00) (155/70 - 224/95)  BP(mean): 115 (04 Aug 2022 19:00) (101 - 137)  ABP: --  ABP(mean): --  RR: 23 (04 Aug 2022 19:00) (10 - 23)  SpO2: 96% (04 Aug 2022 19:00) (95% - 98%)    O2 Parameters below as of 04 Aug 2022 20:00  Patient On (Oxygen Delivery Method): room air      Intake and output was reviewed and the fluid balance was calculated  Daily     Daily   I&O's Summary    03 Aug 2022 07:01  -  04 Aug 2022 07:00  --------------------------------------------------------  IN: 2387 mL / OUT: 725 mL / NET: 1662 mL    04 Aug 2022 07:01  -  04 Aug 2022 20:22  --------------------------------------------------------  IN: 480 mL / OUT: 550 mL / NET: -70 mL        All lines and drain sites were assessed  [facility]      POCT Blood Glucose.: 103 mg/dL (02 Aug 2022 20:34)      Neuro:  HEENT:  Heart:  Lungs:  Abdomen:  Extremities:      labs  CBC Full  -  ( 04 Aug 2022 13:26 )  WBC Count : 11.12 K/uL  RBC Count : 3.67 M/uL  Hemoglobin : 10.4 g/dL  Hematocrit : 32.3 %  Platelet Count - Automated : 221 K/uL  Mean Cell Volume : 88.0 fl  Mean Cell Hemoglobin : 28.3 pg  Mean Cell Hemoglobin Concentration : 32.2 gm/dL  Auto Neutrophil # : 9.75 K/uL  Auto Lymphocyte # : 0.65 K/uL  Auto Monocyte # : 0.59 K/uL  Auto Eosinophil # : 0.00 K/uL  Auto Basophil # : 0.01 K/uL  Auto Neutrophil % : 87.7 %  Auto Lymphocyte % : 5.8 %  Auto Monocyte % : 5.3 %  Auto Eosinophil % : 0.0 %  Auto Basophil % : 0.1 %    08-04    139  |  102  |  15  ----------------------------<  185<H>  3.8   |  25  |  0.65    Ca    10.2      04 Aug 2022 13:26  Phos  2.1     08-04  Mg     2.5     08-04    TPro  6.9  /  Alb  3.7  /  TBili  0.4  /  DBili  x   /  AST  25  /  ALT  19  /  AlkPhos  116  08-04    PT/INR - ( 04 Aug 2022 03:36 )   PT: 15.0 sec;   INR: 1.26          PTT - ( 04 Aug 2022 13:26 )  PTT:33.3 sec  The current medications were reviewed   MEDICATIONS  (STANDING):  amLODIPine   Tablet 2.5 milliGRAM(s) Oral daily  aspirin enteric coated 81 milliGRAM(s) Oral daily  atorvastatin 10 milliGRAM(s) Oral at bedtime  budesonide  80 MICROgram(s)/formoterol 4.5 MICROgram(s) Inhaler 2 Puff(s) Inhalation two times a day  chlorhexidine 2% Cloths 1 Application(s) Topical <User Schedule>  dextrose 10%. 1000 milliLiter(s) (50 mL/Hr) IV Continuous <Continuous>  dextrose 50% Injectable 50 milliLiter(s) IV Push every 15 minutes  heparin   Injectable 5000 Unit(s) SubCutaneous every 8 hours  hydrALAZINE 10 milliGRAM(s) Oral every 8 hours  levothyroxine 50 MICROGram(s) Oral daily  metoprolol tartrate 12.5 milliGRAM(s) Oral every 6 hours  pantoprazole    Tablet 40 milliGRAM(s) Oral before breakfast  polyethylene glycol 3350 17 Gram(s) Oral daily  senna 2 Tablet(s) Oral at bedtime  sodium chloride 0.9%. 1000 milliLiter(s) (10 mL/Hr) IV Continuous <Continuous>  testosterone patch 4 mG/24 Hr(s) 4 milliGRAM(s) Transdermal daily    MEDICATIONS  (PRN):  acetaminophen     Tablet .. 650 milliGRAM(s) Oral every 6 hours PRN Mild Pain (1 - 3)  oxyCODONE    IR 5 milliGRAM(s) Oral every 4 hours PRN Moderate Pain (4 - 6)      Assessment/Plan:  77 yr old female with HTN, spinal stenosis, arthritis, biscupid AV sp AVR, ascending & hemiarch replacement with frozen elephant trunk and L aort-subclavian bypass, CABG x 2 with Dr. Muller 8/9/21. Post op course cb prolonged intubation, SSS sp PPM, poor wound healing with pec flap closure 9/29/21. Presented to [facility] 7/21 with LUE pain and found to have thrombus sp embolectomy L brachial artery 7/21.    POD 6 TEVAR with R CFA repair (Dr. Muller, 7/29)  Post operative anemia-trend H/H  PPM  HTN  Metoprolol  RLE weakness-repeat CTH performed with no infarct, neuro recommending additional studies  Pain control  Diet as tolerated  Replete lytes prn   GI/DVT PPX  Bowel Regimen  Pain control  OOB with PT  Close hemodynamic, ventilatory and drain monitoring and management per post op routine  Monitor for arrhythmias and monitor parameters for organ perfusion  Monitor neurologic status  Head of the bed should remain elevated to 45 deg   Chest PT and IS will be encouraged  Monitor adequacy of oxygenation and ventilation and attempt to wean oxygen  Antibiotic regimen will be tailored to the clinical, laboratory and microbiologic data  Nutritional goals will be met using po eventually, ensure adequate caloric intake and monitor the same  Stress ulcer and VTE prophylaxis will be achieved    Glycemic control is satisfactory  Electrolytes have been repleted as necessary and wound care has been carried out   Pain control has been achieved.   Aggressive physical therapy and early mobility and ambulation goals will be met   The family was updated about the course and plan.    CRITICAL CARE TIME personally provided by me  in evaluation and management, reassessments, review and interpretation of labs and x-rays, ventilator and hemodynamic management, formulating a plan and coordinating care: ___30____ MIN.  Time does not include procedural time.    CTICU ATTENDING     					  Pari Manrique MD CTICU  CRITICAL  CARE  attending     Hand off received 					   Pertinent clinical, laboratory, radiographic, hemodynamic, echocardiographic, respiratory data, microbiologic data and chart were reviewed and analyzed frequently throughout the course of the day  Patient seen and examined with CTS/ SH attending at bedside  Pt is a 77 yr old female previous smoker with PMH HTN, spinal stenosis, arthritis, biscupid AV sp AVR, ascending & hemiarch replacement with frozen elephant trunk and L aorto-subclavian bypass, CABG x 2 with Dr. Muller 8/9/21. Post op course cb prolonged intubation, SSS sp PPM, poor wound healing with pec flap closure 9/29/21. Presented to [facility] 7/21 with LUE pain and found to have thrombus sp embolectomy L brachial artery 7/21. Also found to have endoleak prompting TEVAR 7/29. Extubated that night. 7/30 required 2 units pRBCs. C/o pain, started on gabapentin.   On 8/2 was increasingly lethargic, went for CTH which was unremarkable however CT chest found LLL subsegmental PE, started on heparin. Improving. CT chest re-read and diagnosed as leak with no PE. Heparin stopped. Neurology recommended repeat CTH which was done 8/4. No acute infarct. Also underwent doppler of LE, pending read.     FAMILY HISTORY:  FH: CAD (coronary artery disease) (Sibling)  CABG  Family history of CKD (chronic kidney disease) (Sibling)  ESRD  Family history of diabetes mellitus (DM) (Sibling)    PAST MEDICAL & SURGICAL HISTORY:  HTN (hypertension)  H/O aortic valve stenosis  CAD (coronary artery disease)  S/P aortic valve replacement  S/P CABG (coronary artery bypass graft)  H/O aortic arch replacement  Pacemaker  medtronic micra pacemaker        Patient is a 77y old  Female who presents with a chief complaint of Endoleak.      14 system review was unremarkable    Vital signs, hemodynamic and respiratory parameters were reviewed from the bedside nursing flowsheet.  ICU Vital Signs Last 24 Hrs  T(C): 36.7 (04 Aug 2022 17:16), Max: 36.7 (04 Aug 2022 17:16)  T(F): 98 (04 Aug 2022 17:16), Max: 98 (04 Aug 2022 17:16)  HR: 71 (04 Aug 2022 19:00) (52 - 84)  BP: 180/80 (04 Aug 2022 19:00) (155/70 - 224/95)  BP(mean): 115 (04 Aug 2022 19:00) (101 - 137)  ABP: --  ABP(mean): --  RR: 23 (04 Aug 2022 19:00) (10 - 23)  SpO2: 96% (04 Aug 2022 19:00) (95% - 98%)    O2 Parameters below as of 04 Aug 2022 20:00  Patient On (Oxygen Delivery Method): room air      Intake and output was reviewed and the fluid balance was calculated  Daily     Daily   I&O's Summary    03 Aug 2022 07:01  -  04 Aug 2022 07:00  --------------------------------------------------------  IN: 2387 mL / OUT: 725 mL / NET: 1662 mL    04 Aug 2022 07:01  -  04 Aug 2022 20:22  --------------------------------------------------------  IN: 480 mL / OUT: 550 mL / NET: -70 mL        All lines and drain sites were assessed  Glycemic trend was reviewedCAPILLARY BLOOD GLUCOSE      POCT Blood Glucose.: 103 mg/dL (02 Aug 2022 20:34)        Neuro: sitting comfortably in chair, speaking clearly  Heart: s1, s2  Lungs: clear bl  Abdomen: soft, ntnd  Extremities: moves both le though limited strength rle      labs  CBC Full  -  ( 04 Aug 2022 13:26 )  WBC Count : 11.12 K/uL  RBC Count : 3.67 M/uL  Hemoglobin : 10.4 g/dL  Hematocrit : 32.3 %  Platelet Count - Automated : 221 K/uL  Mean Cell Volume : 88.0 fl  Mean Cell Hemoglobin : 28.3 pg  Mean Cell Hemoglobin Concentration : 32.2 gm/dL  Auto Neutrophil # : 9.75 K/uL  Auto Lymphocyte # : 0.65 K/uL  Auto Monocyte # : 0.59 K/uL  Auto Eosinophil # : 0.00 K/uL  Auto Basophil # : 0.01 K/uL  Auto Neutrophil % : 87.7 %  Auto Lymphocyte % : 5.8 %  Auto Monocyte % : 5.3 %  Auto Eosinophil % : 0.0 %  Auto Basophil % : 0.1 %    08-04    139  |  102  |  15  ----------------------------<  185<H>  3.8   |  25  |  0.65    Ca    10.2      04 Aug 2022 13:26  Phos  2.1     08-04  Mg     2.5     08-04    TPro  6.9  /  Alb  3.7  /  TBili  0.4  /  DBili  x   /  AST  25  /  ALT  19  /  AlkPhos  116  08-04    PT/INR - ( 04 Aug 2022 03:36 )   PT: 15.0 sec;   INR: 1.26          PTT - ( 04 Aug 2022 13:26 )  PTT:33.3 sec  The current medications were reviewed   MEDICATIONS  (STANDING):  amLODIPine   Tablet 2.5 milliGRAM(s) Oral daily  aspirin enteric coated 81 milliGRAM(s) Oral daily  atorvastatin 10 milliGRAM(s) Oral at bedtime  budesonide  80 MICROgram(s)/formoterol 4.5 MICROgram(s) Inhaler 2 Puff(s) Inhalation two times a day  chlorhexidine 2% Cloths 1 Application(s) Topical <User Schedule>  dextrose 10%. 1000 milliLiter(s) (50 mL/Hr) IV Continuous <Continuous>  dextrose 50% Injectable 50 milliLiter(s) IV Push every 15 minutes  heparin   Injectable 5000 Unit(s) SubCutaneous every 8 hours  hydrALAZINE 10 milliGRAM(s) Oral every 8 hours  levothyroxine 50 MICROGram(s) Oral daily  metoprolol tartrate 12.5 milliGRAM(s) Oral every 6 hours  pantoprazole    Tablet 40 milliGRAM(s) Oral before breakfast  polyethylene glycol 3350 17 Gram(s) Oral daily  senna 2 Tablet(s) Oral at bedtime  sodium chloride 0.9%. 1000 milliLiter(s) (10 mL/Hr) IV Continuous <Continuous>  testosterone patch 4 mG/24 Hr(s) 4 milliGRAM(s) Transdermal daily    MEDICATIONS  (PRN):  acetaminophen     Tablet .. 650 milliGRAM(s) Oral every 6 hours PRN Mild Pain (1 - 3)  oxyCODONE    IR 5 milliGRAM(s) Oral every 4 hours PRN Moderate Pain (4 - 6)      Assessment/Plan:  77 yr old female with HTN, spinal stenosis, arthritis, biscupid AV sp AVR, ascending & hemiarch replacement with frozen elephant trunk and L aort-subclavian bypass, CABG x 2 with Dr. Muller 8/9/21. Post op course cb prolonged intubation, SSS sp PPM, poor wound healing with pec flap closure 9/29/21. Presented to [facility] 7/21 with LUE pain and found to have thrombus sp embolectomy L brachial artery 7/21.    POD 6 TEVAR with R CFA repair (Dr. Muller, 7/29)  Post operative anemia-trend H/H  PPM  HTN  Metoprolol  RLE weakness-repeat CTH performed with no infarct, neuro recommending additional studies  Pain control  Diet as tolerated  Replete lytes prn   GI/DVT PPX  Bowel Regimen  Pain control  OOB with PT  Close hemodynamic, ventilatory and drain monitoring and management per post op routine  Monitor for arrhythmias and monitor parameters for organ perfusion  Monitor neurologic status  Head of the bed should remain elevated to 45 deg   Chest PT and IS will be encouraged  Monitor adequacy of oxygenation and ventilation and attempt to wean oxygen  Antibiotic regimen will be tailored to the clinical, laboratory and microbiologic data  Nutritional goals will be met using po eventually, ensure adequate caloric intake and monitor the same  Stress ulcer and VTE prophylaxis will be achieved    Glycemic control is satisfactory  Electrolytes have been repleted as necessary and wound care has been carried out   Pain control has been achieved.   Aggressive physical therapy and early mobility and ambulation goals will be met   The family was updated about the course and plan.    CRITICAL CARE TIME personally provided by me  in evaluation and management, reassessments, review and interpretation of labs and x-rays, ventilator and hemodynamic management, formulating a plan and coordinating care: ___30____ MIN.  Time does not include procedural time.    CTICU ATTENDING     					  Pari Manrique MD

## 2022-08-04 NOTE — PROGRESS NOTE ADULT - SUBJECTIVE AND OBJECTIVE BOX
Neurology Stroke Progress Note    INTERVAL HPI/OVERNIGHT EVENTS:  No acute overnight events. Patient seen and examined.     MEDICATIONS  (STANDING):  aspirin enteric coated 81 milliGRAM(s) Oral daily  atorvastatin 10 milliGRAM(s) Oral at bedtime  budesonide  80 MICROgram(s)/formoterol 4.5 MICROgram(s) Inhaler 2 Puff(s) Inhalation two times a day  chlorhexidine 2% Cloths 1 Application(s) Topical <User Schedule>  dextrose 10%. 1000 milliLiter(s) (50 mL/Hr) IV Continuous <Continuous>  dextrose 50% Injectable 50 milliLiter(s) IV Push every 15 minutes  heparin   Injectable 5000 Unit(s) SubCutaneous every 8 hours  hydrocortisone sodium succinate Injectable 75 milliGRAM(s) IV Push every 8 hours  levothyroxine 50 MICROGram(s) Oral daily  metoprolol tartrate 12.5 milliGRAM(s) Oral every 6 hours  pantoprazole    Tablet 40 milliGRAM(s) Oral before breakfast  polyethylene glycol 3350 17 Gram(s) Oral daily  senna 2 Tablet(s) Oral at bedtime  sodium chloride 0.9%. 1000 milliLiter(s) (10 mL/Hr) IV Continuous <Continuous>    MEDICATIONS  (PRN):  acetaminophen     Tablet .. 650 milliGRAM(s) Oral every 6 hours PRN Mild Pain (1 - 3)  oxyCODONE    IR 5 milliGRAM(s) Oral every 4 hours PRN Moderate Pain (4 - 6)      Allergies    No Known Allergies    Intolerances        Vital Signs Last 24 Hrs  T(C): 36.1 (04 Aug 2022 05:30), Max: 36.8 (03 Aug 2022 09:36)  T(F): 97 (04 Aug 2022 05:30), Max: 98.3 (03 Aug 2022 17:17)  HR: 73 (04 Aug 2022 07:00) (52 - 79)  BP: 188/84 (04 Aug 2022 07:00) (155/70 - 206/88)  BP(mean): 121 (04 Aug 2022 07:00) (101 - 137)  RR: 21 (04 Aug 2022 07:00) (10 - 24)  SpO2: 96% (04 Aug 2022 07:00) (93% - 98%)    Parameters below as of 04 Aug 2022 07:00  Patient On (Oxygen Delivery Method): room air        Physical exam:  General: No acute distress, awake and alert    Neurologic:  -Mental status: Awake, alert, oriented to person, age, and month. Speech is fluent with intact naming, repetition, and comprehension, slight dysarthria. Recent and remote memory intact. Follows commands. Attention/concentration intact.   -Cranial nerves:   II: Visual fields are full to confrontation.  III, IV, VI: Extraocular movements are intact without nystagmus. Pupils equally round and reactive to light  V:  Facial sensation V1-V3 equal and intact   VII: Face is symmetric with normal eye closure and smile  XII: Tongue protrudes midline  Motor: Normal bulk and tone. No pronator drift. Strength bilateral upper extremity 5/5. Left LE 5/5. Right LE weaker: 1/5 hip flexion, 2/5 knee flexion and extension, 2/5 dorsi and plantarflexion,   Sensation: Sensation equal to light touch b/l today.   Coordination: No dysmetria on finger-to-nose   Reflexes: Upgoing toes bilaterally    LABS:                        8.9    7.19  )-----------( 154      ( 04 Aug 2022 03:36 )             27.8     08-04    135  |  103  |  15  ----------------------------<  217<H>  3.8   |  22  |  0.80    Ca    9.2      04 Aug 2022 03:36  Phos  2.8     08-04  Mg     2.3     08-04    TPro  6.3  /  Alb  3.3  /  TBili  0.4  /  DBili  x   /  AST  26  /  ALT  17  /  AlkPhos  103  08-04    PT/INR - ( 04 Aug 2022 03:36 )   PT: 15.0 sec;   INR: 1.26          PTT - ( 04 Aug 2022 03:36 )  PTT:33.3 sec  Urinalysis Basic - ( 02 Aug 2022 16:11 )    Color: Yellow / Appearance: Clear / SG: <=1.005 / pH: x  Gluc: x / Ketone: NEGATIVE  / Bili: Negative / Urobili: 0.2 E.U./dL   Blood: x / Protein: NEGATIVE mg/dL / Nitrite: NEGATIVE   Leuk Esterase: NEGATIVE / RBC: Many /HPF / WBC < 5 /HPF   Sq Epi: x / Non Sq Epi: 5-10 /HPF / Bacteria: Present /HPF      RADIOLOGY & ADDITIONAL TESTS:     Neurology Stroke Progress Note    INTERVAL HPI/OVERNIGHT EVENTS:  No acute overnight events. Patient seen and examined. States she is doing well, no complaints at this time.     MEDICATIONS  (STANDING):  aspirin enteric coated 81 milliGRAM(s) Oral daily  atorvastatin 10 milliGRAM(s) Oral at bedtime  budesonide  80 MICROgram(s)/formoterol 4.5 MICROgram(s) Inhaler 2 Puff(s) Inhalation two times a day  chlorhexidine 2% Cloths 1 Application(s) Topical <User Schedule>  dextrose 10%. 1000 milliLiter(s) (50 mL/Hr) IV Continuous <Continuous>  dextrose 50% Injectable 50 milliLiter(s) IV Push every 15 minutes  heparin   Injectable 5000 Unit(s) SubCutaneous every 8 hours  hydrocortisone sodium succinate Injectable 75 milliGRAM(s) IV Push every 8 hours  levothyroxine 50 MICROGram(s) Oral daily  metoprolol tartrate 12.5 milliGRAM(s) Oral every 6 hours  pantoprazole    Tablet 40 milliGRAM(s) Oral before breakfast  polyethylene glycol 3350 17 Gram(s) Oral daily  senna 2 Tablet(s) Oral at bedtime  sodium chloride 0.9%. 1000 milliLiter(s) (10 mL/Hr) IV Continuous <Continuous>    MEDICATIONS  (PRN):  acetaminophen     Tablet .. 650 milliGRAM(s) Oral every 6 hours PRN Mild Pain (1 - 3)  oxyCODONE    IR 5 milliGRAM(s) Oral every 4 hours PRN Moderate Pain (4 - 6)      Allergies    No Known Allergies    Intolerances        Vital Signs Last 24 Hrs  T(C): 36.1 (04 Aug 2022 05:30), Max: 36.8 (03 Aug 2022 09:36)  T(F): 97 (04 Aug 2022 05:30), Max: 98.3 (03 Aug 2022 17:17)  HR: 73 (04 Aug 2022 07:00) (52 - 79)  BP: 188/84 (04 Aug 2022 07:00) (155/70 - 206/88)  BP(mean): 121 (04 Aug 2022 07:00) (101 - 137)  RR: 21 (04 Aug 2022 07:00) (10 - 24)  SpO2: 96% (04 Aug 2022 07:00) (93% - 98%)    Parameters below as of 04 Aug 2022 07:00  Patient On (Oxygen Delivery Method): room air        Physical exam:  General: No acute distress, awake and alert    Neurologic:  -Mental status: Awake, alert, oriented to person, age, and month. Speech is fluent with intact naming, repetition, and comprehension, ?trace dysarthria. Recent and remote memory intact. Follows commands. Attention/concentration intact.   -Cranial nerves:   II: Visual fields are full to confrontation.  III, IV, VI: Extraocular movements are intact without nystagmus. Pupils equally round and reactive to light  V:  Facial sensation V1-V3 equal and intact   VII: Face is symmetric with normal eye closure and smile  XII: Tongue protrudes midline  Motor: Normal bulk and tone. No pronator drift. Strength bilateral upper extremity 5/5. Left LE 5/5. Right LE weaker: 1/5 hip flexion, 2/5 knee flexion and extension, 2/5 dorsi and plantarflexion,   Sensation: Sensation equal to light touch b/l today.   Coordination: No dysmetria on finger-to-nose   Reflexes: Upgoing toes bilaterally    LABS:                        8.9    7.19  )-----------( 154      ( 04 Aug 2022 03:36 )             27.8     08-04    135  |  103  |  15  ----------------------------<  217<H>  3.8   |  22  |  0.80    Ca    9.2      04 Aug 2022 03:36  Phos  2.8     08-04  Mg     2.3     08-04    TPro  6.3  /  Alb  3.3  /  TBili  0.4  /  DBili  x   /  AST  26  /  ALT  17  /  AlkPhos  103  08-04    PT/INR - ( 04 Aug 2022 03:36 )   PT: 15.0 sec;   INR: 1.26          PTT - ( 04 Aug 2022 03:36 )  PTT:33.3 sec  Urinalysis Basic - ( 02 Aug 2022 16:11 )    Color: Yellow / Appearance: Clear / SG: <=1.005 / pH: x  Gluc: x / Ketone: NEGATIVE  / Bili: Negative / Urobili: 0.2 E.U./dL   Blood: x / Protein: NEGATIVE mg/dL / Nitrite: NEGATIVE   Leuk Esterase: NEGATIVE / RBC: Many /HPF / WBC < 5 /HPF   Sq Epi: x / Non Sq Epi: 5-10 /HPF / Bacteria: Present /HPF      RADIOLOGY & ADDITIONAL TESTS:

## 2022-08-04 NOTE — PROGRESS NOTE ADULT - ASSESSMENT
77y Female with PMHx of HTN, spinal stenosis, arthritis, CABGx2, bicuspid aortic valve s/p AVR, ascending/hemiarch replacement and left aorto-subclavian bypass, recent admission at [facility] for left brachial occlusion s/p left brachial artery embolectomy who presented with worsening left UE pain, now s/p TEVAR and right femoral cutdown and patch angioplasty done 7/29/22. Post op patient's right leg movement severely limited by pain, though noted to have been able to move the leg. Neurontin was uptitrated over the weekend and pt's pain is much improved. Stroke team consulted because pt noted to have right leg weakness, notably in the hip flexion, which is possibly new though prior exams limited by pain. incomplete    Plan:  - CTH negative  - CTA H/N with b/l near fetal PCAs, small right vert intracranially, diminutive caliber of vertebrobasilar system, right vert colluded at origin with intermittent filling of V2 and V3 segments from deep cervical collateral supply.   - CTP negative  - CTAP - acute LLL segmental PE, no hematoma compressing on spine.   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  - may require general neurology consult pending results from MR.    - No neurovascular indication for aspirin while pt is on heparin but also no contraindication for ASA if d/t other medical reasoning  - recommend SBP goal <180  - recommend q4hr stroke neuro checks  - may need outpt neurology follow up  - provide stroke education    Discussed with Stroke fellow Dr. Chauhan and Attending Dr. Brewster   77y Female with PMHx of HTN, spinal stenosis, arthritis, CABGx2, bicuspid aortic valve s/p AVR, ascending/hemiarch replacement and left aorto-subclavian bypass, recent admission at [facility] for left brachial occlusion s/p left brachial artery embolectomy who presented with worsening left UE pain, now s/p TEVAR and right femoral cutdown and patch angioplasty done 7/29/22. Post op patient's right leg movement severely limited by pain, though noted to have been able to move the leg. Neurontin was uptitrated over the weekend and pt's pain is much improved. Stroke team consulted because pt noted to have right leg weakness, notably in the hip flexion, which is possibly new though prior exams limited by pain.    Plan:  - CTH negative  - CTA H/N with b/l near fetal PCAs, small right vert intracranially, diminutive caliber of vertebrobasilar system, right vert colluded at origin with intermittent filling of V2 and V3 segments from deep cervical collateral supply.   - CTP negative  - CTAP - acute LLL segmental PE, no hematoma compressing on spine.   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  - may require general neurology consult pending results from MR.    - No neurovascular indication for aspirin while pt is on heparin but also no contraindication for ASA if d/t other medical reasoning  - recommend SBP goal <180  - recommend q4hr stroke neuro checks  - may need outpt neurology follow up  - provide stroke education    Discussed with Stroke Attending Dr. Brewster

## 2022-08-04 NOTE — PROGRESS NOTE ADULT - SUBJECTIVE AND OBJECTIVE BOX
CTICU  CRITICAL  CARE  attending     Hand off received 					   Pertinent clinical, laboratory, radiographic, hemodynamic, echocardiographic, respiratory data, microbiologic data and chart were reviewed and analyzed frequently throughout the course of the day and night  Patient seen and examined with CTS/ SH attending at bedside  Pt is a 77y , Female, HEALTH ISSUES - PROBLEM Dx:      , FAMILY HISTORY:  FH: CAD (coronary artery disease) (Sibling)  CABG    Family history of CKD (chronic kidney disease) (Sibling)  ESRD    Family history of diabetes mellitus (DM) (Sibling)    PAST MEDICAL & SURGICAL HISTORY:  HTN (hypertension)      H/O aortic valve stenosis      CAD (coronary artery disease)      S/P aortic valve replacement      S/P CABG (coronary artery bypass graft)      H/O aortic arch replacement      Pacemaker  medtronic micra pacemaker        Patient is a 77y old  Female who presents with a chief complaint of Endoleak (04 Aug 2022 20:22)      14 system review limited by mentation and multiorgan morbidity     Vital signs, hemodynamic and respiratory parameters were reviewed from the bedside nursing flowsheet.  ICU Vital Signs Last 24 Hrs  T(C): 36.9 (04 Aug 2022 20:52), Max: 36.9 (04 Aug 2022 20:52)  T(F): 98.5 (04 Aug 2022 20:52), Max: 98.5 (04 Aug 2022 20:52)  HR: 79 (04 Aug 2022 21:00) (52 - 84)  BP: 186/81 (04 Aug 2022 21:00) (155/70 - 224/95)  BP(mean): 117 (04 Aug 2022 21:00) (101 - 137)  ABP: --  ABP(mean): --  RR: 22 (04 Aug 2022 21:00) (10 - 23)  SpO2: 95% (04 Aug 2022 21:00) (95% - 98%)    O2 Parameters below as of 04 Aug 2022 21:00  Patient On (Oxygen Delivery Method): room air          Adult Advanced Hemodynamics Last 24 Hrs  CVP(mm Hg): 18 (04 Aug 2022 10:00) (15 - 21)  CVP(cm H2O): --  CO: --  CI: --  PA: --  PA(mean): --  PCWP: --  SVR: --  SVRI: --  PVR: --  PVRI: --, ABG - ( 03 Aug 2022 03:55 )  pH, Arterial: 7.41  pH, Blood: x     /  pCO2: 38    /  pO2: 97    / HCO3: 24    / Base Excess: -0.3  /  SaO2: 99.5                Intake and output was reviewed and the fluid balance was calculated  Daily     Daily   I&O's Summary    03 Aug 2022 07:01  -  04 Aug 2022 07:00  --------------------------------------------------------  IN: 2387 mL / OUT: 725 mL / NET: 1662 mL    04 Aug 2022 07:01  -  04 Aug 2022 21:52  --------------------------------------------------------  IN: 480 mL / OUT: 550 mL / NET: -70 mL        All lines and drain sites were assessed  Glycemic trend was reviewedCAPILLARY BLOOD GLUCOSE        No acute change in focality  Auscultation of the chest reveals equal bs  Abdomen is soft  Extremities are warm and well perfused  Wounds appear clean and unremarkable  Antibiotics are periop    labs  CBC Full  -  ( 04 Aug 2022 13:26 )  WBC Count : 11.12 K/uL  RBC Count : 3.67 M/uL  Hemoglobin : 10.4 g/dL  Hematocrit : 32.3 %  Platelet Count - Automated : 221 K/uL  Mean Cell Volume : 88.0 fl  Mean Cell Hemoglobin : 28.3 pg  Mean Cell Hemoglobin Concentration : 32.2 gm/dL  Auto Neutrophil # : 9.75 K/uL  Auto Lymphocyte # : 0.65 K/uL  Auto Monocyte # : 0.59 K/uL  Auto Eosinophil # : 0.00 K/uL  Auto Basophil # : 0.01 K/uL  Auto Neutrophil % : 87.7 %  Auto Lymphocyte % : 5.8 %  Auto Monocyte % : 5.3 %  Auto Eosinophil % : 0.0 %  Auto Basophil % : 0.1 %    08-04    139  |  102  |  15  ----------------------------<  185<H>  3.8   |  25  |  0.65    Ca    10.2      04 Aug 2022 13:26  Phos  2.1     08-04  Mg     2.5     08-04    TPro  6.9  /  Alb  3.7  /  TBili  0.4  /  DBili  x   /  AST  25  /  ALT  19  /  AlkPhos  116  08-04    PT/INR - ( 04 Aug 2022 03:36 )   PT: 15.0 sec;   INR: 1.26          PTT - ( 04 Aug 2022 13:26 )  PTT:33.3 sec  The current medications were reviewed   MEDICATIONS  (STANDING):  amLODIPine   Tablet 2.5 milliGRAM(s) Oral daily  aspirin enteric coated 81 milliGRAM(s) Oral daily  atorvastatin 10 milliGRAM(s) Oral at bedtime  budesonide  80 MICROgram(s)/formoterol 4.5 MICROgram(s) Inhaler 2 Puff(s) Inhalation two times a day  chlorhexidine 2% Cloths 1 Application(s) Topical <User Schedule>  dextrose 10%. 1000 milliLiter(s) (50 mL/Hr) IV Continuous <Continuous>  dextrose 50% Injectable 50 milliLiter(s) IV Push every 15 minutes  heparin   Injectable 5000 Unit(s) SubCutaneous every 8 hours  hydrALAZINE 10 milliGRAM(s) Oral every 8 hours  levothyroxine 50 MICROGram(s) Oral daily  metoprolol tartrate 12.5 milliGRAM(s) Oral every 6 hours  pantoprazole    Tablet 40 milliGRAM(s) Oral before breakfast  polyethylene glycol 3350 17 Gram(s) Oral daily  senna 2 Tablet(s) Oral at bedtime  sodium chloride 0.9%. 1000 milliLiter(s) (10 mL/Hr) IV Continuous <Continuous>  testosterone patch 4 mG/24 Hr(s) 4 milliGRAM(s) Transdermal daily    MEDICATIONS  (PRN):  acetaminophen     Tablet .. 650 milliGRAM(s) Oral every 6 hours PRN Mild Pain (1 - 3)  oxyCODONE    IR 5 milliGRAM(s) Oral every 4 hours PRN Moderate Pain (4 - 6)       PROBLEM LIST/ ASSESSMENT:  HEALTH ISSUES - PROBLEM Dx:      ,   Patient is a 77y old  Female who presents with a chief complaint of Endoleak (04 Aug 2022 20:22)     s/p cardiac surgery                My plan includes :  close hemodynamic, ventilatory and drain monitoring and management per post op routine    Monitor for arrhythmias and monitor parameters for organ perfusion  beta blockade not administered due to hemodynamic instability and bradycardia  monitor neurologic status  Head of the bed should remain elevated to 45 deg .   chest PT and IS will be encouraged  monitor adequacy of oxygenation and ventilation and attempt to wean oxygen  antibiotic regimen will be tailored to the clinical, laboratory and microbiologic data  Nutritional goals will be met using po eventually , ensure adequate caloric intake and montior the same  Stress ulcer and VTE prophylaxis will be achieved    Glycemic control is satisfactory  Electrolytes have been repleted as necessary and wound care has been carried out. Pain control has been achieved.   agressive physical therapy and early mobility and ambulation goals will be met   The family was updated about the course and plan  CRITICAL CARE TIME personally provided by me  in evaluation and management, reassessments, review and interpretation of labs and x-rays, ventilator and hemodynamic management, formulating a plan and coordinating care: ___90____ MIN.  Time does not include procedural time. Time spent was non routine post-operarive caRE and included multiple and repeated evaluations at the bedside  CTICU ATTENDING     					    Jesu Garcia MD

## 2022-08-04 NOTE — PROGRESS NOTE ADULT - SUBJECTIVE AND OBJECTIVE BOX
IDENTIFICATION: This is a 76 yoF with HTN, spinal stenosis, arthritis, bicuspid aortic valve s/p AVR, ascending/hemiarch replacement and left aorto-subclavian bypass, CABG x2 (8/9/2021), recent admission at [facility] for left brachial occlusion s/p left brachial artery embolectomy who presented with worsening LUE pain radiating to shoulder/neck/chest now on POD6 following TEVAR on 7/29/22.    EVENTS:   - No acute events  - Going to repeat CT-H instead of MRI today.    SUBJECTIVE:   - Notes no new pain in extremities  - Denies CP, SOB  - Denies new concerns    MEDICATIONS  (STANDING):  aspirin enteric coated 81 milliGRAM(s) Oral daily  atorvastatin 10 milliGRAM(s) Oral at bedtime  budesonide  80 MICROgram(s)/formoterol 4.5 MICROgram(s) Inhaler 2 Puff(s) Inhalation two times a day  chlorhexidine 2% Cloths 1 Application(s) Topical <User Schedule>  dextrose 10%. 1000 milliLiter(s) (50 mL/Hr) IV Continuous <Continuous>  dextrose 50% Injectable 50 milliLiter(s) IV Push every 15 minutes  heparin   Injectable 5000 Unit(s) SubCutaneous every 8 hours  hydrocortisone sodium succinate Injectable 50 milliGRAM(s) IV Push every 12 hours  levothyroxine 50 MICROGram(s) Oral daily  metoprolol tartrate 12.5 milliGRAM(s) Oral every 6 hours  pantoprazole    Tablet 40 milliGRAM(s) Oral before breakfast  polyethylene glycol 3350 17 Gram(s) Oral daily  senna 2 Tablet(s) Oral at bedtime  sodium chloride 0.9%. 1000 milliLiter(s) (10 mL/Hr) IV Continuous <Continuous>  testosterone patch 4 mG/24 Hr(s) 4 milliGRAM(s) Transdermal daily    MEDICATIONS  (PRN):  acetaminophen     Tablet .. 650 milliGRAM(s) Oral every 6 hours PRN Mild Pain (1 - 3)  oxyCODONE    IR 5 milliGRAM(s) Oral every 4 hours PRN Moderate Pain (4 - 6)      Vital Signs Last 24 Hrs  T(C): 36.1 (04 Aug 2022 09:58), Max: 36.8 (03 Aug 2022 17:17)  T(F): 97 (04 Aug 2022 09:58), Max: 98.3 (03 Aug 2022 17:17)  HR: 64 (04 Aug 2022 11:00) (52 - 79)  BP: 165/76 (04 Aug 2022 11:00) (155/70 - 224/95)  BP(mean): 109 (04 Aug 2022 11:00) (101 - 137)  RR: 18 (04 Aug 2022 11:00) (10 - 24)  SpO2: 96% (04 Aug 2022 11:00) (95% - 98%)    Parameters below as of 04 Aug 2022 12:00  Patient On (Oxygen Delivery Method): room air    Neurologic: AAOx3  Cardiac: Normal rate, regular rhythm  Vascular: Doppler signals multiphasic in DP/PT bilaterally  Pulm: Breathing comfortably  Abd: Soft, non-distended; No TTP throughout.   Incision: Well approximated without erythema/edema/induration.   : No Arreola  Skin: No rashes  Extremities: No edema.  Psychiatric: Interacting normally.       I&O's Detail    03 Aug 2022 07:01  -  04 Aug 2022 07:00  --------------------------------------------------------  IN:    dextrose 10%: 1080 mL    Heparin Infusion: 42 mL    IV PiggyBack: 300 mL    Oral Fluid: 960 mL    sodium chloride 0.9%: 5 mL  Total IN: 2387 mL    OUT:    Indwelling Catheter - Urethral (mL): 525 mL    Voided (mL): 200 mL  Total OUT: 725 mL    Total NET: 1662 mL      04 Aug 2022 07:01  -  04 Aug 2022 12:06  --------------------------------------------------------  IN:    dextrose 10%: 50 mL    Oral Fluid: 360 mL  Total IN: 410 mL    OUT:  Total OUT: 0 mL    Total NET: 410 mL          LABS:                        8.9    7.19  )-----------( 154      ( 04 Aug 2022 03:36 )             27.8     08-04    135  |  103  |  15  ----------------------------<  217<H>  3.8   |  22  |  0.80    Ca    9.2      04 Aug 2022 03:36  Phos  2.8     08-04  Mg     2.3     08-04    TPro  6.3  /  Alb  3.3  /  TBili  0.4  /  DBili  x   /  AST  26  /  ALT  17  /  AlkPhos  103  08-04    PT/INR - ( 04 Aug 2022 03:36 )   PT: 15.0 sec;   INR: 1.26          PTT - ( 04 Aug 2022 03:36 )  PTT:33.3 sec  Urinalysis Basic - ( 02 Aug 2022 16:11 )    Color: Yellow / Appearance: Clear / SG: <=1.005 / pH: x  Gluc: x / Ketone: NEGATIVE  / Bili: Negative / Urobili: 0.2 E.U./dL   Blood: x / Protein: NEGATIVE mg/dL / Nitrite: NEGATIVE   Leuk Esterase: NEGATIVE / RBC: Many /HPF / WBC < 5 /HPF   Sq Epi: x / Non Sq Epi: 5-10 /HPF / Bacteria: Present /HPF        RADIOLOGY & ADDITIONAL STUDIES:

## 2022-08-04 NOTE — PROGRESS NOTE ADULT - ASSESSMENT
This is a 76 yoF with HTN, spinal stenosis, arthritis, bicuspid aortic valve s/p AVR, ascending/hemiarch replacement and left aorto-subclavian bypass, CABG x2 (8/9/2021), recent admission at [facility] for left brachial occlusion s/p left brachial artery embolectomy who presented with worsening LUE pain radiating to shoulder/neck/chest now on POD6 following TEVAR on 7/29/22.    Thinking: No acute changes needed from vascular perspective; will follow-up further imaging for weakness if pursued.    - Continue regular neurovascular checks  - Agree with AC for PE.  - Appreciate excellent care per primary team  - Vascular Surgery (Team 3C) following

## 2022-08-05 NOTE — PROGRESS NOTE ADULT - ASSESSMENT
This is a 76 yoF with HTN, spinal stenosis, arthritis, bicuspid aortic valve s/p AVR, ascending/hemiarch replacement and left aorto-subclavian bypass, CABG x2 (8/9/2021), recent admission at [facility] for left brachial occlusion s/p left brachial artery embolectomy who presented with worsening LUE pain radiating to shoulder/neck/chest now on POD7 following TEVAR on 7/29/22.    Thinking: No acute changes needed from vascular perspective    - Continue regular neurovascular checks  - Appreciate excellent care per primary team  - Vascular Surgery (Team 3C) following

## 2022-08-05 NOTE — PROGRESS NOTE ADULT - SUBJECTIVE AND OBJECTIVE BOX
Patient discussed on morning rounds with Dr. Muller    Operation / Date: 7/29: TEVAR, R groin Cutdown and femoral artery repair EF 75%    SUBJECTIVE ASSESSMENT:  77y Female seen and examined. Patient      Vital Signs Last 24 Hrs  T(C): 36.1 (05 Aug 2022 09:41), Max: 36.9 (04 Aug 2022 20:52)  T(F): 97 (05 Aug 2022 09:41), Max: 98.5 (04 Aug 2022 20:52)  HR: 73 (05 Aug 2022 12:10) (58 - 84)  BP: 152/72 (05 Aug 2022 12:10) (149/69 - 207/90)  BP(mean): 103 (05 Aug 2022 12:10) (98 - 136)  RR: 20 (05 Aug 2022 12:10) (11 - 25)  SpO2: 99% (05 Aug 2022 12:10) (92% - 99%)    Parameters below as of 05 Aug 2022 12:10  Patient On (Oxygen Delivery Method): room air      I&O's Detail    04 Aug 2022 07:01  -  05 Aug 2022 07:00  --------------------------------------------------------  IN:    dextrose 10%: 240 mL    IV PiggyBack: 200 mL    Oral Fluid: 580 mL  Total IN: 1020 mL    OUT:    Voided (mL): 750 mL  Total OUT: 750 mL    Total NET: 270 mL      05 Aug 2022 07:01  -  05 Aug 2022 12:47  --------------------------------------------------------  IN:    dextrose 10%: 10 mL  Total IN: 10 mL    OUT:    Voided (mL): 150 mL  Total OUT: 150 mL    Total NET: -140 mL        CHEST TUBE:  Yes/No. AIR LEAKS: Yes/No. Suction / H2O SEAL.   FRANCHESKA DRAIN:  Yes/No.  EPICARDIAL WIRES: Yes/No.  TIE DOWNS: Yes/No.  REGALADO: Yes/No.    PHYSICAL EXAM:    General: NAD, sitting comfortably in chair, conversing appropriately  Neurological: alert and oriented, UE and LE strength equal b/l, facial symmetry present  Cardiovascular: RRR, Clear S1 and S2, no murmurs appreciated  Respiratory: chest expansion symmetrical, CTA b/l, no wheezing noted  Gastrointestinal: +BS, soft, NT, ND  Extremities: moving spontaneously, no calf tenderness or edema.  Vascular: warm, well perfused. DP/PT pulses dopplerable  Incisions: R femoral incision C/D/I no drainage or purulence      LABS:                        9.6    8.28  )-----------( 224      ( 05 Aug 2022 03:32 )             30.2       COUMADIN:  no    PT/INR - ( 05 Aug 2022 03:32 )   PT: 14.0 sec;   INR: 1.17          PTT - ( 05 Aug 2022 03:32 )  PTT:33.1 sec    08-05    139  |  103  |  16  ----------------------------<  97  3.5   |  26  |  0.70    Ca    9.7      05 Aug 2022 03:32  Phos  2.8     08-05  Mg     2.2     08-05    TPro  6.1  /  Alb  3.1<L>  /  TBili  0.4  /  DBili  x   /  AST  18  /  ALT  15  /  AlkPhos  95  08-05          MEDICATIONS  (STANDING):  amLODIPine   Tablet 2.5 milliGRAM(s) Oral daily  aspirin enteric coated 81 milliGRAM(s) Oral daily  atorvastatin 10 milliGRAM(s) Oral at bedtime  budesonide  80 MICROgram(s)/formoterol 4.5 MICROgram(s) Inhaler 2 Puff(s) Inhalation two times a day  chlorhexidine 2% Cloths 1 Application(s) Topical <User Schedule>  fludroCORTISONE 0.1 milliGRAM(s) Oral daily  heparin   Injectable 5000 Unit(s) SubCutaneous every 8 hours  hydrALAZINE 10 milliGRAM(s) Oral every 8 hours  levothyroxine 50 MICROGram(s) Oral daily  metoprolol tartrate 12.5 milliGRAM(s) Oral every 6 hours  pantoprazole    Tablet 40 milliGRAM(s) Oral before breakfast  polyethylene glycol 3350 17 Gram(s) Oral daily  senna 2 Tablet(s) Oral at bedtime  testosterone patch 4 mG/24 Hr(s) 4 milliGRAM(s) Transdermal daily    MEDICATIONS  (PRN):  acetaminophen     Tablet .. 650 milliGRAM(s) Oral every 6 hours PRN Mild Pain (1 - 3)  oxyCODONE    IR 5 milliGRAM(s) Oral every 4 hours PRN Moderate Pain (4 - 6)        RADIOLOGY & ADDITIONAL TESTS:  < from: Xray Chest 1 View- PORTABLE-Routine (Xray Chest 1 View- PORTABLE-Routine in AM.) (08.05.22 @ 05:48) >  Findings/  impression: Support device in satisfactory position. Leadless pacemaker.   Left basilar focal atelectasis, pleural effusion.. Heart size within   normal limits, aortic endovascular stent, aortic valve replacement,   CABG.. Mediastinal clips. Stable bony structures.Left axillary surgical   clips.    < end of copied text >     Patient discussed on morning rounds with Dr. Muller    Operation / Date: 7/29: TEVAR, R groin Cutdown and femoral artery repair EF 75%    SUBJECTIVE ASSESSMENT:  77y Female seen and examined. Patient states she has some right thigh pain, but still was able to ambulate down the amin. Otherwise feels well, has no complaints. Tolerating PO diet, satting well on room air. Denies lightheadedness, headache, CP, palpitations, SOB, abdominal pain, nausea, vomiting, fever, chills.      Vital Signs Last 24 Hrs  T(C): 36.1 (05 Aug 2022 09:41), Max: 36.9 (04 Aug 2022 20:52)  T(F): 97 (05 Aug 2022 09:41), Max: 98.5 (04 Aug 2022 20:52)  HR: 73 (05 Aug 2022 12:10) (58 - 84)  BP: 152/72 (05 Aug 2022 12:10) (149/69 - 207/90)  BP(mean): 103 (05 Aug 2022 12:10) (98 - 136)  RR: 20 (05 Aug 2022 12:10) (11 - 25)  SpO2: 99% (05 Aug 2022 12:10) (92% - 99%)    Parameters below as of 05 Aug 2022 12:10  Patient On (Oxygen Delivery Method): room air      I&O's Detail    04 Aug 2022 07:01  -  05 Aug 2022 07:00  --------------------------------------------------------  IN:    dextrose 10%: 240 mL    IV PiggyBack: 200 mL    Oral Fluid: 580 mL  Total IN: 1020 mL    OUT:    Voided (mL): 750 mL  Total OUT: 750 mL    Total NET: 270 mL      05 Aug 2022 07:01  -  05 Aug 2022 12:47  --------------------------------------------------------  IN:    dextrose 10%: 10 mL  Total IN: 10 mL    OUT:    Voided (mL): 150 mL  Total OUT: 150 mL    Total NET: -140 mL        CHEST TUBE:  Yes/No. AIR LEAKS: Yes/No. Suction / H2O SEAL.   FRANCHESKA DRAIN:  Yes/No.  EPICARDIAL WIRES: Yes/No.  TIE DOWNS: Yes/No.  REGALADO: Yes/No.    PHYSICAL EXAM:    General: NAD, sitting comfortably in chair, conversing appropriately  Neurological: alert and oriented, UE and LE strength equal b/l, facial symmetry present  Cardiovascular: RRR, Clear S1 and S2, no murmurs appreciated  Respiratory: chest expansion symmetrical, CTA b/l, no wheezing noted  Gastrointestinal: +BS, soft, NT, ND  Extremities: moving spontaneously, no calf tenderness or edema.  Vascular: warm, well perfused. DP/PT pulses dopplerable  Incisions: R femoral incision C/D/I no drainage or purulence      LABS:                        9.6    8.28  )-----------( 224      ( 05 Aug 2022 03:32 )             30.2       COUMADIN:  no    PT/INR - ( 05 Aug 2022 03:32 )   PT: 14.0 sec;   INR: 1.17          PTT - ( 05 Aug 2022 03:32 )  PTT:33.1 sec    08-05    139  |  103  |  16  ----------------------------<  97  3.5   |  26  |  0.70    Ca    9.7      05 Aug 2022 03:32  Phos  2.8     08-05  Mg     2.2     08-05    TPro  6.1  /  Alb  3.1<L>  /  TBili  0.4  /  DBili  x   /  AST  18  /  ALT  15  /  AlkPhos  95  08-05          MEDICATIONS  (STANDING):  amLODIPine   Tablet 2.5 milliGRAM(s) Oral daily  aspirin enteric coated 81 milliGRAM(s) Oral daily  atorvastatin 10 milliGRAM(s) Oral at bedtime  budesonide  80 MICROgram(s)/formoterol 4.5 MICROgram(s) Inhaler 2 Puff(s) Inhalation two times a day  chlorhexidine 2% Cloths 1 Application(s) Topical <User Schedule>  fludroCORTISONE 0.1 milliGRAM(s) Oral daily  heparin   Injectable 5000 Unit(s) SubCutaneous every 8 hours  hydrALAZINE 10 milliGRAM(s) Oral every 8 hours  levothyroxine 50 MICROGram(s) Oral daily  metoprolol tartrate 12.5 milliGRAM(s) Oral every 6 hours  pantoprazole    Tablet 40 milliGRAM(s) Oral before breakfast  polyethylene glycol 3350 17 Gram(s) Oral daily  senna 2 Tablet(s) Oral at bedtime  testosterone patch 4 mG/24 Hr(s) 4 milliGRAM(s) Transdermal daily    MEDICATIONS  (PRN):  acetaminophen     Tablet .. 650 milliGRAM(s) Oral every 6 hours PRN Mild Pain (1 - 3)  oxyCODONE    IR 5 milliGRAM(s) Oral every 4 hours PRN Moderate Pain (4 - 6)        RADIOLOGY & ADDITIONAL TESTS:  < from: Xray Chest 1 View- PORTABLE-Routine (Xray Chest 1 View- PORTABLE-Routine in AM.) (08.05.22 @ 05:48) >  Findings/  impression: Support device in satisfactory position. Leadless pacemaker.   Left basilar focal atelectasis, pleural effusion.. Heart size within   normal limits, aortic endovascular stent, aortic valve replacement,   CABG.. Mediastinal clips. Stable bony structures.Left axillary surgical   clips.    < end of copied text >     Patient discussed on morning rounds with Dr. Muller    Operation / Date: 7/29: TEVAR, R groin Cutdown and femoral artery repair EF 75%    SUBJECTIVE ASSESSMENT:  77y Female seen and examined. Patient states she has some right thigh pain, but still was able to ambulate down the amin. Otherwise feels well, has no complaints. Tolerating PO diet, satting well on room air. Denies lightheadedness, headache, CP, palpitations, SOB, abdominal pain, nausea, vomiting, fever, chills.      Vital Signs Last 24 Hrs  T(C): 36.1 (05 Aug 2022 09:41), Max: 36.9 (04 Aug 2022 20:52)  T(F): 97 (05 Aug 2022 09:41), Max: 98.5 (04 Aug 2022 20:52)  HR: 73 (05 Aug 2022 12:10) (58 - 84)  BP: 152/72 (05 Aug 2022 12:10) (149/69 - 207/90)  BP(mean): 103 (05 Aug 2022 12:10) (98 - 136)  RR: 20 (05 Aug 2022 12:10) (11 - 25)  SpO2: 99% (05 Aug 2022 12:10) (92% - 99%)    Parameters below as of 05 Aug 2022 12:10  Patient On (Oxygen Delivery Method): room air      I&O's Detail    04 Aug 2022 07:01  -  05 Aug 2022 07:00  --------------------------------------------------------  IN:    dextrose 10%: 240 mL    IV PiggyBack: 200 mL    Oral Fluid: 580 mL  Total IN: 1020 mL    OUT:    Voided (mL): 750 mL  Total OUT: 750 mL    Total NET: 270 mL      05 Aug 2022 07:01  -  05 Aug 2022 12:47  --------------------------------------------------------  IN:    dextrose 10%: 10 mL  Total IN: 10 mL    OUT:    Voided (mL): 150 mL  Total OUT: 150 mL    Total NET: -140 mL        CHEST TUBE:  no  FRANCHESKA DRAIN:  No.  EPICARDIAL WIRES: No.  TIE DOWNS: No.  REGALADO: No.    PHYSICAL EXAM:    General: NAD, sitting comfortably in chair, conversing appropriately  Neurological: alert and oriented, UE and LE strength equal b/l, facial symmetry present  Cardiovascular: RRR, Clear S1 and S2, no murmurs appreciated  Respiratory: chest expansion symmetrical, CTA b/l, no wheezing noted  Gastrointestinal: +BS, soft, NT, ND  Extremities: moving spontaneously, no calf tenderness, nonpitting b/l LE edema.  Vascular: warm, well perfused. DP/PT pulses dopplerable  Incisions: R femoral incision C/D/I no drainage or purulence      LABS:                        9.6    8.28  )-----------( 224      ( 05 Aug 2022 03:32 )             30.2       COUMADIN:  no    PT/INR - ( 05 Aug 2022 03:32 )   PT: 14.0 sec;   INR: 1.17          PTT - ( 05 Aug 2022 03:32 )  PTT:33.1 sec    08-05    139  |  103  |  16  ----------------------------<  97  3.5   |  26  |  0.70    Ca    9.7      05 Aug 2022 03:32  Phos  2.8     08-05  Mg     2.2     08-05    TPro  6.1  /  Alb  3.1<L>  /  TBili  0.4  /  DBili  x   /  AST  18  /  ALT  15  /  AlkPhos  95  08-05          MEDICATIONS  (STANDING):  amLODIPine   Tablet 2.5 milliGRAM(s) Oral daily  aspirin enteric coated 81 milliGRAM(s) Oral daily  atorvastatin 10 milliGRAM(s) Oral at bedtime  budesonide  80 MICROgram(s)/formoterol 4.5 MICROgram(s) Inhaler 2 Puff(s) Inhalation two times a day  chlorhexidine 2% Cloths 1 Application(s) Topical <User Schedule>  fludroCORTISONE 0.1 milliGRAM(s) Oral daily  heparin   Injectable 5000 Unit(s) SubCutaneous every 8 hours  hydrALAZINE 10 milliGRAM(s) Oral every 8 hours  levothyroxine 50 MICROGram(s) Oral daily  metoprolol tartrate 12.5 milliGRAM(s) Oral every 6 hours  pantoprazole    Tablet 40 milliGRAM(s) Oral before breakfast  polyethylene glycol 3350 17 Gram(s) Oral daily  senna 2 Tablet(s) Oral at bedtime  testosterone patch 4 mG/24 Hr(s) 4 milliGRAM(s) Transdermal daily    MEDICATIONS  (PRN):  acetaminophen     Tablet .. 650 milliGRAM(s) Oral every 6 hours PRN Mild Pain (1 - 3)  oxyCODONE    IR 5 milliGRAM(s) Oral every 4 hours PRN Moderate Pain (4 - 6)        RADIOLOGY & ADDITIONAL TESTS:  < from: Xray Chest 1 View- PORTABLE-Routine (Xray Chest 1 View- PORTABLE-Routine in AM.) (08.05.22 @ 05:48) >  Findings/  impression: Support device in satisfactory position. Leadless pacemaker.   Left basilar focal atelectasis, pleural effusion.. Heart size within   normal limits, aortic endovascular stent, aortic valve replacement,   CABG.. Mediastinal clips. Stable bony structures.Left axillary surgical   clips.    < end of copied text >

## 2022-08-05 NOTE — PROGRESS NOTE ADULT - ASSESSMENT
76 year old female, previous smoker, HTN, spinal stenosis, arthritis, bicuspid aortic valve s/p AVR, ascending/hemiarch replacement with frozen elephant trunk and left aorto-subclavian bypass, CABG x2 with Dr. Muller on 8/9/2021 (post-op course complicated by prolonged intubation, SSS s/p PPM, poor wound healing s/p pec flap and closure on 9/29/21 who recently presented to [facility] with acute LUE pain, found to have left brachial occlusion s/p left brachial artery embolectomy, 7/21/22. CT scans at that time showed endoleak and she was scheduled to see Dr. Muller in clinic 7/27/22, however, patient was experiencing persistent left arm and chest pain that radiated to her left shoulder blade and she presented to the ED for evaluation. Repeat CTA chest revealed endoleak into saccular aneurysm proximal descending aorta and significant for moderate stenosis at communication of L axillary artery w/ distal L subclavian bypass graft and endoleak. On 7/29/22 she underwent a TEVAR with R groin cutdown and femoral artery repair, EF 75% with Dr. Muller and Dr. Augustin (vascular sx). She arrived to the CTICU intubated and hypothermic, received 2L IVF and 2u pRBCs. On POD1 she was extubated. On POD2 BB started. On POD3 noted to be lethargic, stroke code called, CT head negative. Ct chest done with questionable subsegmental PEs and heparin gtt started. Official CT read came back with no evidence of PE and heparin gtt stopped. POD 4-6 stayed in CTICU for continued monitoring of lethargy. On POD7 transferred to stepdown unit.    Plan:    Neurovascular:   -Pain well controlled with current regimen. PRN's:    Cardiovascular:   -Hemodynamically stable.   -Monitor: BP, HR, tele    Respiratory:   -Oxygenating well on room air  -Encourage continued use of IS 10x/hr and frequent ambulation  -CXR:    GI:  -GI PPX:  -PO Diet  -Bowel Regimen:     Renal / :  -Continue to monitor renal function: BUN/Cr  -Monitor I/O's daily     Endocrine:    -No hx of DM or thyroid dx  -A1c:  -TSH:    Hematologic:  -CBC: H/H-  -Coagulation Panel.    ID:  -Temperature:   -CBC: WBC-  -Continue to observe for SIRS/Sepsis Syndrome.    Prophylaxis:  -DVT prophylaxis with 5000 SubQ Heparin q8h.  -Continue with SCD's b/l while patient is at rest     Disposition:  -Discharge home once patient is medically ready  76 year old female, previous smoker, HTN, spinal stenosis, arthritis, bicuspid aortic valve s/p AVR, ascending/hemiarch replacement with frozen elephant trunk and left aorto-subclavian bypass, CABG x2 with Dr. Muller on 8/9/2021 (post-op course complicated by prolonged intubation, SSS s/p PPM, poor wound healing s/p pec flap and closure on 9/29/21 who recently presented to [facility] with acute LUE pain, found to have left brachial occlusion s/p left brachial artery embolectomy, 7/21/22. CT scans at that time showed endoleak and she was scheduled to see Dr. Muller in clinic 7/27/22, however, patient was experiencing persistent left arm and chest pain that radiated to her left shoulder blade and she presented to the ED for evaluation. Repeat CTA chest in [facility] ED revealed endoleak into saccular aneurysm proximal descending aorta and significant for moderate stenosis at communication of L axillary artery w/ distal L subclavian bypass graft and endoleak. On 7/29/22 she underwent a TEVAR with R groin cutdown and femoral artery repair, EF 75% with Dr. Muller and Dr. Augustin (vascular sx). She arrived to the CTICU intubated and hypothermic, received 2L IVF and 2u pRBCs. On POD1 she was extubated. On POD2 BB started. On POD3 noted to be lethargic, stroke code called, CT head negative. Ct chest done with questionable subsegmental PEs and heparin gtt started. Official CT read came back with no evidence of PE and heparin gtt stopped. POD 4-6 stayed in CTICU for continued monitoring of lethargy. On POD7 transferred to stepdown unit. LE dopplers negative for DVTs.    Plan:    Neurovascular:   -Pain well controlled with current regimen.   -conitnue tylenol/oxy5 as needed  lethargic  -stroke code called 8/1  -CT head negative   -continue androderm patch for deconditioning    Cardiovascular:   POD7 TEVAR with R groin cutdown and femoral artery repair, EF 75%  -continue ASA  HTN  -continue amlodipine  -continue hydralazine  -continue BB  HLD  -continue atorvastatin  -Hemodynamically stable.   -Monitor: BP, HR, tele    Respiratory:   -Oxygenating well on room air  -Encourage continued use of IS 10x/hr and frequent ambulation  -continue symbicort     GI:  -GI PPX: continue protonix  -PO Diet  -Bowel Regimen: continue miralax and senna    Renal / :  -Continue to monitor renal function: BUN/Cr: 16/0.70  -Monitor I/O's daily     Endocrine:    CIRCI  -florinef taper   No hx of DM   -A1c: 5.6  hx hypothyroidism  -continue synthroid  -TSH: 12.710, thyroxine: 0.738  -repeat TSH/T4 in AM    Hematologic:  -CBC: H/H- 9.6/30.2, pending repeat at 6pm  -Coagulation Panel.    ID:  -Temperature: afebrile  -CBC: WBC- 8.28  -Continue to observe for SIRS/Sepsis Syndrome.    Prophylaxis:  -DVT prophylaxis with 5000 SubQ Heparin q8h.  -Continue with SCD's b/l while patient is at rest     Disposition:  -Discharge home once patient is medically ready

## 2022-08-05 NOTE — PROGRESS NOTE ADULT - SUBJECTIVE AND OBJECTIVE BOX
IDENTIFICATION: This is a 76 yoF with HTN, spinal stenosis, arthritis, bicuspid aortic valve s/p AVR, ascending/hemiarch replacement and left aorto-subclavian bypass, CABG x2 (8/9/2021), recent admission at [facility] for left brachial occlusion s/p left brachial artery embolectomy who presented with worsening LUE pain radiating to shoulder/neck/chest now on POD7 following TEVAR on 7/29/22.    EVENTS:   - Repeat CTH 8/4 NEG for new pathology  - Duplex US 8/4 NEG for DVT (work-up for PE).   - Moved to step down today.    SUBJECTIVE:   - Notes no new pain in extremities  - Denies CP, SOB  - Denies new concerns  - Otherwise doing well; was able to walk yesterday without difficulty.      MEDICATIONS  (STANDING):  amLODIPine   Tablet 2.5 milliGRAM(s) Oral daily  aspirin enteric coated 81 milliGRAM(s) Oral daily  atorvastatin 10 milliGRAM(s) Oral at bedtime  budesonide  80 MICROgram(s)/formoterol 4.5 MICROgram(s) Inhaler 2 Puff(s) Inhalation two times a day  chlorhexidine 2% Cloths 1 Application(s) Topical <User Schedule>  fludroCORTISONE 0.1 milliGRAM(s) Oral daily  heparin   Injectable 5000 Unit(s) SubCutaneous every 8 hours  hydrALAZINE 10 milliGRAM(s) Oral every 8 hours  levothyroxine 50 MICROGram(s) Oral daily  metoprolol tartrate 12.5 milliGRAM(s) Oral every 6 hours  pantoprazole    Tablet 40 milliGRAM(s) Oral before breakfast  polyethylene glycol 3350 17 Gram(s) Oral daily  senna 2 Tablet(s) Oral at bedtime  testosterone patch 4 mG/24 Hr(s) 4 milliGRAM(s) Transdermal daily    MEDICATIONS  (PRN):  acetaminophen     Tablet .. 650 milliGRAM(s) Oral every 6 hours PRN Mild Pain (1 - 3)  oxyCODONE    IR 5 milliGRAM(s) Oral every 4 hours PRN Moderate Pain (4 - 6)      Vital Signs Last 24 Hrs  T(C): 36.1 (05 Aug 2022 09:41), Max: 36.9 (04 Aug 2022 20:52)  T(F): 97 (05 Aug 2022 09:41), Max: 98.5 (04 Aug 2022 20:52)  HR: 73 (05 Aug 2022 12:10) (58 - 84)  BP: 152/72 (05 Aug 2022 12:10) (149/69 - 207/90)  BP(mean): 103 (05 Aug 2022 12:10) (98 - 136)  RR: 20 (05 Aug 2022 12:10) (11 - 25)  SpO2: 99% (05 Aug 2022 12:10) (92% - 99%)    Parameters below as of 05 Aug 2022 12:10  Patient On (Oxygen Delivery Method): room air      Neurologic: AAOx3  Cardiac: Normal rate, regular rhythm  Vascular: Doppler signals multiphasic in DP/PT bilaterally  Pulm: Breathing comfortably  Abd: Soft, non-distended; No TTP throughout.   Incision: Well approximated without erythema/edema/induration.   : No Arreola  Skin: No rashes  Extremities: No edema.  Psychiatric: Interacting normally.       I&O's Detail    04 Aug 2022 07:01  -  05 Aug 2022 07:00  --------------------------------------------------------  IN:    dextrose 10%: 240 mL    IV PiggyBack: 200 mL    Oral Fluid: 580 mL  Total IN: 1020 mL    OUT:    Voided (mL): 750 mL  Total OUT: 750 mL    Total NET: 270 mL      05 Aug 2022 07:01  -  05 Aug 2022 12:35  --------------------------------------------------------  IN:    dextrose 10%: 10 mL  Total IN: 10 mL    OUT:    Voided (mL): 150 mL  Total OUT: 150 mL    Total NET: -140 mL          LABS:                        9.6    8.28  )-----------( 224      ( 05 Aug 2022 03:32 )             30.2     08-05    139  |  103  |  16  ----------------------------<  97  3.5   |  26  |  0.70    Ca    9.7      05 Aug 2022 03:32  Phos  2.8     08-05  Mg     2.2     08-05    TPro  6.1  /  Alb  3.1<L>  /  TBili  0.4  /  DBili  x   /  AST  18  /  ALT  15  /  AlkPhos  95  08-05    PT/INR - ( 05 Aug 2022 03:32 )   PT: 14.0 sec;   INR: 1.17          PTT - ( 05 Aug 2022 03:32 )  PTT:33.1 sec

## 2022-08-05 NOTE — PROGRESS NOTE ADULT - SUBJECTIVE AND OBJECTIVE BOX
CTICU  CRITICAL  CARE  attending     Hand off received 					   Pertinent clinical, laboratory, radiographic, hemodynamic, echocardiographic, respiratory data, microbiologic data and chart were reviewed and analyzed frequently throughout the course of the day and night  Patient seen and examined with CTS/ SH attending at bedside  Pt is a 77y , Female, HEALTH ISSUES - PROBLEM Dx:      , FAMILY HISTORY:  FH: CAD (coronary artery disease) (Sibling)  CABG    Family history of CKD (chronic kidney disease) (Sibling)  ESRD    Family history of diabetes mellitus (DM) (Sibling)    PAST MEDICAL & SURGICAL HISTORY:  HTN (hypertension)      H/O aortic valve stenosis      CAD (coronary artery disease)      S/P aortic valve replacement      S/P CABG (coronary artery bypass graft)      H/O aortic arch replacement      Pacemaker  medtronic micra pacemaker        Patient is a 77y old  Female who presents with a chief complaint of Endoleak (05 Aug 2022 12:46)      14 system review limited by mentation and multiorgan morbidity     Vital signs, hemodynamic and respiratory parameters were reviewed from the bedside nursing flowsheet.  ICU Vital Signs Last 24 Hrs  T(C): 36.4 (05 Aug 2022 17:37), Max: 36.7 (05 Aug 2022 14:00)  T(F): 97.6 (05 Aug 2022 17:37), Max: 98 (05 Aug 2022 14:00)  HR: 82 (05 Aug 2022 20:41) (58 - 82)  BP: 145/65 (05 Aug 2022 20:41) (145/65 - 195/82)  BP(mean): 93 (05 Aug 2022 20:41) (93 - 122)  ABP: --  ABP(mean): --  RR: 16 (05 Aug 2022 20:41) (11 - 25)  SpO2: 98% (05 Aug 2022 20:41) (92% - 100%)    O2 Parameters below as of 05 Aug 2022 20:41  Patient On (Oxygen Delivery Method): room air          Adult Advanced Hemodynamics Last 24 Hrs  CVP(mm Hg): --  CVP(cm H2O): --  CO: --  CI: --  PA: --  PA(mean): --  PCWP: --  SVR: --  SVRI: --  PVR: --  PVRI: --,     Intake and output was reviewed and the fluid balance was calculated  Daily     Daily   I&O's Summary    04 Aug 2022 07:01  -  05 Aug 2022 07:00  --------------------------------------------------------  IN: 1020 mL / OUT: 750 mL / NET: 270 mL    05 Aug 2022 07:01  -  05 Aug 2022 20:54  --------------------------------------------------------  IN: 10 mL / OUT: 150 mL / NET: -140 mL        All lines and drain sites were assessed  [facility]      POCT Blood Glucose.: 89 mg/dL (05 Aug 2022 09:24)    No acute change in focality  Auscultation of the chest reveals equal bs  Abdomen is soft  Extremities are warm and well perfused  Wounds appear clean and unremarkable  Antibiotics are periop    labs  CBC Full  -  ( 05 Aug 2022 03:32 )  WBC Count : 8.28 K/uL  RBC Count : 3.38 M/uL  Hemoglobin : 9.6 g/dL  Hematocrit : 30.2 %  Platelet Count - Automated : 224 K/uL  Mean Cell Volume : 89.3 fl  Mean Cell Hemoglobin : 28.4 pg  Mean Cell Hemoglobin Concentration : 31.8 gm/dL  Auto Neutrophil # : 6.31 K/uL  Auto Lymphocyte # : 1.23 K/uL  Auto Monocyte # : 0.61 K/uL  Auto Eosinophil # : 0.05 K/uL  Auto Basophil # : 0.02 K/uL  Auto Neutrophil % : 76.2 %  Auto Lymphocyte % : 14.9 %  Auto Monocyte % : 7.4 %  Auto Eosinophil % : 0.6 %  Auto Basophil % : 0.2 %    08-05    139  |  103  |  16  ----------------------------<  97  3.5   |  26  |  0.70    Ca    9.7      05 Aug 2022 03:32  Phos  2.8     08-05  Mg     2.2     08-05    TPro  6.1  /  Alb  3.1<L>  /  TBili  0.4  /  DBili  x   /  AST  18  /  ALT  15  /  AlkPhos  95  08-05    PT/INR - ( 05 Aug 2022 03:32 )   PT: 14.0 sec;   INR: 1.17          PTT - ( 05 Aug 2022 03:32 )  PTT:33.1 sec  The current medications were reviewed   MEDICATIONS  (STANDING):  amLODIPine   Tablet 2.5 milliGRAM(s) Oral daily  aspirin enteric coated 81 milliGRAM(s) Oral daily  atorvastatin 10 milliGRAM(s) Oral at bedtime  budesonide  80 MICROgram(s)/formoterol 4.5 MICROgram(s) Inhaler 2 Puff(s) Inhalation two times a day  chlorhexidine 2% Cloths 1 Application(s) Topical <User Schedule>  fludroCORTISONE 0.1 milliGRAM(s) Oral daily  heparin   Injectable 5000 Unit(s) SubCutaneous every 8 hours  hydrALAZINE 10 milliGRAM(s) Oral every 8 hours  levothyroxine 50 MICROGram(s) Oral daily  metoprolol tartrate 12.5 milliGRAM(s) Oral every 6 hours  pantoprazole    Tablet 40 milliGRAM(s) Oral before breakfast  polyethylene glycol 3350 17 Gram(s) Oral daily  senna 2 Tablet(s) Oral at bedtime  testosterone patch 4 mG/24 Hr(s) 4 milliGRAM(s) Transdermal daily    MEDICATIONS  (PRN):  acetaminophen     Tablet .. 650 milliGRAM(s) Oral every 6 hours PRN Mild Pain (1 - 3)  oxyCODONE    IR 5 milliGRAM(s) Oral every 4 hours PRN Moderate Pain (4 - 6)       PROBLEM LIST/ ASSESSMENT:  HEALTH ISSUES - PROBLEM Dx:      ,   Patient is a 77y old  Female who presents with a chief complaint of Endoleak (05 Aug 2022 12:46)     s/p cardiac surgery                My plan includes :  close hemodynamic, ventilatory and drain monitoring and management per post op routine    Monitor for arrhythmias and monitor parameters for organ perfusion  beta blockade not administered due to hemodynamic instability and bradycardia  monitor neurologic status  Head of the bed should remain elevated to 45 deg .   chest PT and IS will be encouraged  monitor adequacy of oxygenation and ventilation and attempt to wean oxygen  antibiotic regimen will be tailored to the clinical, laboratory and microbiologic data  Nutritional goals will be met using po eventually , ensure adequate caloric intake and montior the same  Stress ulcer and VTE prophylaxis will be achieved    Glycemic control is satisfactory  Electrolytes have been repleted as necessary and wound care has been carried out. Pain control has been achieved.   agressive physical therapy and early mobility and ambulation goals will be met   The family was updated about the course and plan  CRITICAL CARE TIME personally provided by me  in evaluation and management, reassessments, review and interpretation of labs and x-rays, ventilator and hemodynamic management, formulating a plan and coordinating care: ___90____ MIN.  Time does not include procedural time. Time spent was non routine post-operarive caRE and included multiple and repeated evaluations at the bedside  CTICU ATTENDING     					    Jesu Garcia MD

## 2022-08-06 NOTE — PROGRESS NOTE ADULT - SUBJECTIVE AND OBJECTIVE BOX
Neurology Stroke Progress Note    INTERVAL HPI/OVERNIGHT EVENTS:  Patient seen and examined.  number ______ used.    MEDICATIONS  (STANDING):  amLODIPine   Tablet 2.5 milliGRAM(s) Oral daily  aspirin enteric coated 81 milliGRAM(s) Oral daily  atorvastatin 10 milliGRAM(s) Oral at bedtime  budesonide  80 MICROgram(s)/formoterol 4.5 MICROgram(s) Inhaler 2 Puff(s) Inhalation two times a day  chlorhexidine 2% Cloths 1 Application(s) Topical <User Schedule>  fludroCORTISONE 0.1 milliGRAM(s) Oral daily  heparin   Injectable 5000 Unit(s) SubCutaneous every 8 hours  hydrALAZINE 10 milliGRAM(s) Oral every 8 hours  levothyroxine 50 MICROGram(s) Oral daily  metoprolol tartrate 12.5 milliGRAM(s) Oral every 6 hours  pantoprazole    Tablet 40 milliGRAM(s) Oral before breakfast  polyethylene glycol 3350 17 Gram(s) Oral daily  senna 2 Tablet(s) Oral at bedtime  testosterone patch 4 mG/24 Hr(s) 4 milliGRAM(s) Transdermal daily    MEDICATIONS  (PRN):  acetaminophen     Tablet .. 650 milliGRAM(s) Oral every 6 hours PRN Mild Pain (1 - 3)  oxyCODONE    IR 5 milliGRAM(s) Oral every 4 hours PRN Moderate Pain (4 - 6)      Allergies    No Known Allergies    Intolerances        Vital Signs Last 24 Hrs  T(C): 36.3 (06 Aug 2022 17:30), Max: 36.8 (06 Aug 2022 14:39)  T(F): 97.3 (06 Aug 2022 17:30), Max: 98.2 (06 Aug 2022 14:39)  HR: 86 (06 Aug 2022 13:50) (62 - 86)  BP: 151/72 (06 Aug 2022 13:50) (143/63 - 152/68)  BP(mean): 103 (06 Aug 2022 13:50) (91 - 103)  RR: 17 (06 Aug 2022 13:50) (16 - 17)  SpO2: 98% (06 Aug 2022 13:50) (91% - 98%)    Parameters below as of 06 Aug 2022 13:50  Patient On (Oxygen Delivery Method): room air        Physical exam:      Neurologic:  -Mental status: Awake, alert, oriented to person, place, and time. Speech is fluent with intact naming, repetition, and comprehension, no dysarthria. Recent and remote memory intact. Follows commands. Attention/concentration intact. Fund of knowledge appropriate.  -Cranial nerves:   II: Visual fields are full to confrontation.  III, IV, VI: Extraocular movements are intact without nystagmus. Pupils equally round and reactive to light  VII: Face is symmetric with normal eye closure and smile  VIII: Hearing is bilaterally intact to conversation   IX, X: Uvula is midline and soft palate rises symmetrically  XI: Head turning and shoulder shrug are intact.  XII: Tongue protrudes midline  Motor:  Normal bulk and tone. No pronator drift. Strength bilateral upper extremity 5/5. Left LE 5/5. Right LE 4/5 hip flexion, 4/5 knee flexion and extension,4/5 dorsi and plantarflexion  Sensation: Intact to light touch bilaterally. No neglect or extinction on double simultaneous testing.  Coordination: No dysmetria on finger-to-nose and heel-to-shin bilaterally  Gait: Narrow gait and steady    LABS:                        10.2   9.77  )-----------( 240      ( 06 Aug 2022 07:31 )             32.3     08-06    136  |  105  |  24<H>  ----------------------------<  86  4.3   |  22  |  0.86    Ca    9.4      06 Aug 2022 07:31  Phos  2.8     08-05  Mg     2.2     08-06    TPro  6.1  /  Alb  3.1<L>  /  TBili  0.4  /  DBili  x   /  AST  18  /  ALT  15  /  AlkPhos  95  08-05    PT/INR - ( 05 Aug 2022 03:32 )   PT: 14.0 sec;   INR: 1.17          PTT - ( 05 Aug 2022 03:32 )  PTT:33.1 sec      RADIOLOGY & ADDITIONAL TESTS:

## 2022-08-06 NOTE — PROGRESS NOTE ADULT - ASSESSMENT
76F with PMHx HTN, spinal stenosis, arthritis, bicuspid aortic valve s/p AVR, ascending/hemiarch replacement and left aorto-subclavian bypass, CABG x2 (8/9/2021), recent admission at [facility] for left brachial occlusion s/p left brachial artery embolectomy who presented with worsening LUE pain radiating to shoulder/neck/chest now on POD8 following TEVAR on 7/29/22. Patient clinically improving and feel welling. Vitally stable. Labs this AM wnl.    Recommendations:  - No additional vascular surgery intervention at this time.  - Continue regular neurovascular checks  - Rest of care per primary team  - Vascular Surgery (Team 3C) following

## 2022-08-06 NOTE — PROGRESS NOTE ADULT - ASSESSMENT
77y Female with PMHx of HTN, spinal stenosis, arthritis, CABGx2, bicuspid aortic valve s/p AVR, ascending/hemiarch replacement and left aorto-subclavian bypass, recent admission at [facility] for left brachial occlusion s/p left brachial artery embolectomy who presented with worsening left UE pain, now s/p TEVAR and right femoral cutdown and patch angioplasty done 7/29/22. Post op patient's right leg movement severely limited by pain, though noted to have been able to move the leg. Neurontin was uptitrated over the weekend and pt's pain is much improved. Stroke team consulted because pt noted to have right leg weakness, notably in the hip flexion, which was possibly new though prior exams limited by pain. When seen again patient was complaining or more pain and exam was inconsistent without unequal weakness.      Plan  -Repeat CTH or if PPM is compatible do MRI brain   - CTAP - acute LLL segmental PE, no hematoma compressing on spine.   - No neurovascular indication for aspirin while pt is on heparin but also no contraindication for ASA if d/t other medical reasoning  - recommend SBP goal <180  - recommend q4hr stroke neuro checks  - may need outpt neurology follow up  - provide stroke education      Discussed with stroke Dr. Rogers

## 2022-08-06 NOTE — PROGRESS NOTE ADULT - SUBJECTIVE AND OBJECTIVE BOX
IDENTIFICATION: 76F with HTN, spinal stenosis, arthritis, bicuspid aortic valve s/p AVR, ascending/hemiarch replacement and left aorto-subclavian bypass, CABG x2 (8/9/2021), recent admission at [facility] for left brachial occlusion s/p left brachial artery embolectomy who presented with worsening LUE pain radiating to shoulder/neck/chest now on POD8 following TEVAR on 7/29/22.    EVENTS:   -No acute overnight events noted. Patient has been vitally stable    SUBJECTIVE: Patient seen and examined at bedside. Patient out of bed to chair and reports she is feeling well.  IDENTIFICATION: 76F with HTN, spinal stenosis, arthritis, bicuspid aortic valve s/p AVR, ascending/hemiarch replacement and left aorto-subclavian bypass, CABG x2 (8/9/2021), recent admission at [facility] for left brachial occlusion s/p left brachial artery embolectomy who presented with worsening LUE pain radiating to shoulder/neck/chest now on POD8 following TEVAR on 7/29/22.    EVENTS:   -No acute overnight events noted. Patient has been vitally stable    SUBJECTIVE: Patient seen and examined at bedside. Patient out of bed to chair and reports she is "feeling better." Denies CP, SOB, N, V.     ROS:   Denies Headache, blurred vision, Chest Pain, SOB, Abdominal pain, nausea or vomiting     Social   amLODIPine   Tablet 2.5  aspirin enteric coated 81  heparin   Injectable 5000  hydrALAZINE 10  metoprolol tartrate 12.5      Allergies    No Known Allergies    Intolerances    Vital Signs Last 24 Hrs  T(C): 36.2 (06 Aug 2022 08:44), Max: 36.7 (05 Aug 2022 14:00)  T(F): 97.1 (06 Aug 2022 08:44), Max: 98 (05 Aug 2022 14:00)  HR: 79 (06 Aug 2022 09:30) (60 - 82)  BP: 152/68 (06 Aug 2022 09:30) (143/63 - 163/91)  BP(mean): 98 (06 Aug 2022 09:30) (91 - 119)  RR: 17 (06 Aug 2022 09:30) (16 - 20)  SpO2: 96% (06 Aug 2022 09:30) (91% - 100%)    Parameters below as of 06 Aug 2022 09:30  Patient On (Oxygen Delivery Method): room air      I&O's Summary    05 Aug 2022 07:01  -  06 Aug 2022 07:00  --------------------------------------------------------  IN: 110 mL / OUT: 150 mL / NET: -40 mL    06 Aug 2022 07:01  -  06 Aug 2022 11:16  --------------------------------------------------------  IN: 100 mL / OUT: 0 mL / NET: 100 mL        Physical Exam:  General: Resting comfortably in chair, NAD  Pulmonary: Breathing comfortably, no accessory muscle use  Cardiovascular: NSR  Abdominal: soft, nondistended, nontender  Extremities: WWP, No significant edema noted. Groin soft without hematoma  Vascular: B/L palpable PT and DP      LABS:                        10.2   9.77  )-----------( 240      ( 06 Aug 2022 07:31 )             32.3     08-06    136  |  105  |  24<H>  ----------------------------<  86  4.3   |  22  |  0.86    Ca    9.4      06 Aug 2022 07:31  Phos  2.8     08-05  Mg     2.2     08-06    TPro  6.1  /  Alb  3.1<L>  /  TBili  0.4  /  DBili  x   /  AST  18  /  ALT  15  /  AlkPhos  95  08-05    PT/INR - ( 05 Aug 2022 03:32 )   PT: 14.0 sec;   INR: 1.17          PTT - ( 05 Aug 2022 03:32 )  PTT:33.1 sec

## 2022-08-06 NOTE — PROGRESS NOTE ADULT - SUBJECTIVE AND OBJECTIVE BOX
Operation / Date: 7/29: TEVAR, R groin Cutdown and femoral artery repair EF 75%    SUBJECTIVE ASSESSMENT: Patient seen and examined at bedside. Patient admits to some occasional RLE weakness and pain. Denies chest pain, SOB, palpitations, N/V.     Vital Signs Last 24 Hrs  T(C): 36.2 (06 Aug 2022 08:44), Max: 36.7 (05 Aug 2022 14:00)  T(F): 97.1 (06 Aug 2022 08:44), Max: 98 (05 Aug 2022 14:00)  HR: 79 (06 Aug 2022 09:30) (60 - 82)  BP: 152/68 (06 Aug 2022 09:30) (143/63 - 166/73)  BP(mean): 98 (06 Aug 2022 09:30) (91 - 119)  RR: 17 (06 Aug 2022 09:30) (16 - 20)  SpO2: 96% (06 Aug 2022 09:30) (91% - 100%)    Parameters below as of 06 Aug 2022 09:30  Patient On (Oxygen Delivery Method): room air    I&O's Detail    05 Aug 2022 07:01  -  06 Aug 2022 07:00  --------------------------------------------------------  IN:    dextrose 10%: 10 mL    Oral Fluid: 100 mL  Total IN: 110 mL    OUT:    Voided (mL): 150 mL  Total OUT: 150 mL    Total NET: -40 mL    06 Aug 2022 07:01  -  06 Aug 2022 10:32  --------------------------------------------------------  IN:    Oral Fluid: 100 mL  Total IN: 100 mL    OUT:  Total OUT: 0 mL    Total NET: 100 mL    CHEST TUBE: None  FRANCHESKA DRAIN:   None  EPICARDIAL WIRES:  None  TIE DOWNS:  None  REGALADO:  None    PHYSICAL EXAM:  GENERAL: NAD, sitting upright in chair comfortably.   HEAD:  Atraumatic, Normocephalic  EYES: EOMI, PERRLA, conjunctiva and sclera clear  ENT: Moist mucous membranes  NECK: Supple, No JVD. TLC removed from R neck, dressing C/D/I  CHEST/LUNG: Clear to auscultation bilaterally; No rales, rhonchi, wheezing, or rubs. Unlabored respirations. Previously well-healed sternotomy incision noted on midline chest.   HEART: Regular rate and rhythm; No murmurs, rubs, or gallops  ABDOMEN: Bowel sounds present; Soft, Nontender, Nondistended. No hepatomegally  GROIN: R groin incision slightly erythematous   EXTREMITIES: Bilateral edema to LEs, +tender to light palpation bilaterally.  NERVOUS SYSTEM:  Alert & Oriented X3, speech clear. No deficits     LABS:                        10.2   9.77  )-----------( 240      ( 06 Aug 2022 07:31 )             32.3       PT/INR - ( 05 Aug 2022 03:32 )   PT: 14.0 sec;   INR: 1.17          PTT - ( 05 Aug 2022 03:32 )  PTT:33.1 sec    08-06    136  |  105  |  24<H>  ----------------------------<  86  4.3   |  22  |  0.86    Ca    9.4      06 Aug 2022 07:31  Phos  2.8     08-05  Mg     2.2     08-06    TPro  6.1  /  Alb  3.1<L>  /  TBili  0.4  /  DBili  x   /  AST  18  /  ALT  15  /  AlkPhos  95  08-05    MEDICATIONS  (STANDING):  amLODIPine   Tablet 2.5 milliGRAM(s) Oral daily  aspirin enteric coated 81 milliGRAM(s) Oral daily  atorvastatin 10 milliGRAM(s) Oral at bedtime  budesonide  80 MICROgram(s)/formoterol 4.5 MICROgram(s) Inhaler 2 Puff(s) Inhalation two times a day  chlorhexidine 2% Cloths 1 Application(s) Topical <User Schedule>  fludroCORTISONE 0.1 milliGRAM(s) Oral daily  heparin   Injectable 5000 Unit(s) SubCutaneous every 8 hours  hydrALAZINE 10 milliGRAM(s) Oral every 8 hours  levothyroxine 50 MICROGram(s) Oral daily  metoprolol tartrate 12.5 milliGRAM(s) Oral every 6 hours  pantoprazole    Tablet 40 milliGRAM(s) Oral before breakfast  polyethylene glycol 3350 17 Gram(s) Oral daily  senna 2 Tablet(s) Oral at bedtime  testosterone patch 4 mG/24 Hr(s) 4 milliGRAM(s) Transdermal daily    MEDICATIONS  (PRN):  acetaminophen     Tablet .. 650 milliGRAM(s) Oral every 6 hours PRN Mild Pain (1 - 3)  oxyCODONE    IR 5 milliGRAM(s) Oral every 4 hours PRN Moderate Pain (4 - 6)    RADIOLOGY & ADDITIONAL TESTS:  < from: Xray Chest 1 View- PORTABLE-Routine (Xray Chest 1 View- PORTABLE-Routine in AM.) (08.05.22 @ 05:48) >  Findings/  impression: Support device in satisfactory position. Leadless pacemaker.   Left basilar focal atelectasis, pleural effusion.. Heart size within   normal limits, aortic endovascular stent, aortic valve replacement,   CABG.. Mediastinal clips. Stable bony structures.Left axillary surgical   clips.

## 2022-08-06 NOTE — PROGRESS NOTE ADULT - ASSESSMENT
75 YO Female, previous smoker, w/ PMHx of HTN, spinal stenosis, arthritis, bicuspid aortic valve s/p AVR, ascending/hemiarch replacement with frozen elephant trunk and left aorto-subclavian bypass, CABG x2 with Dr. Muller on 8/9/2021 (post-op course c/b prolonged intubation, SSS s/p PPM, poor wound healing s/p pec flap and closure on 9/29/21 who recently presented to [facility] with acute LUE pain, found to have left brachial occlusion s/p left brachial artery embolectomy, 7/21/22. CT scans at that time showed endoleak and she was scheduled to see Dr. Muller in clinic 7/27/22, however, patient was experiencing persistent left arm and chest pain that radiated to her left shoulder and she presented to [facility] ED for evaluation. 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 On 7/29/22 she underwent a TEVAR with R groin cutdown and femoral artery repair, EF 75% with Dr. Muller and Dr. Augustin (vascular sx). She arrived to the CTICU intubated and hypothermic, received 2L IVF and 2u pRBCs. On POD1 she was extubated. On POD2 BB started. On POD3 noted to be lethargic w/ RLE weakness, stroke code called, CT head negative. CT chest suggestive questionable subsegmental PEs and heparin gtt started. Official CT read revealed no evidence of PE and heparin gtt was thus discontinued. POD 4-6 stayed in CTICU for continued monitoring of lethargy. On POD7 transferred to stepdown unit. LE dopplers negative for DVTs. PT consulted and recommended VERNA upon discharge due to RLE weakness. POD8 TLC removed. Final neuro recommendations obtained.     Plan:    Neurovascular:   -RLE weakness  -CT Head negative, CT A/P: acute LLL segmental PE, no hematoma compressing on spine.   -F/u final neuro recommendations  -Hx of arthritis, spinal stenosis   -Pain well controlled with current regimen. PRN's: Tylenol 650mg Q6H PRN for mild pain, Oxycodone 5mg Q6H PRN moderate pain    Cardiovascular:   -S/P TEVAR with R groin cutdown and femoral artery repair  -Hx of HTN  -Cont Lopressor 12.5mg BID  -Cont Amlodipine 2.5mg   -Cont Hydralazine 10mg Q8H   -Home med: Lisinopril 10mg QD held  -Hx of HLD  -Cont Lipitor 10mg QD (home dose)  -Hemodynamically stable.   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    ID:  -Temperature: 97.1  -CBC: WBC-9.77  -Continue to observe for SIRS/Sepsis Syndrome.    Prophylaxis:  -DVT prophylaxis with 5000 SubQ Heparin q8h.  -Continue with SCD's b/l while patient is at rest     Disposition:  -Discharge to [facility] per PT recommendations once accepted.  77 YO Female, previous smoker, w/ PMHx of HTN, spinal stenosis, arthritis, bicuspid aortic valve s/p AVR, ascending/hemiarch replacement with frozen elephant trunk and left aorto-subclavian bypass, CABG x2 with Dr. Muller on 8/9/2021 (post-op course c/b prolonged intubation, SSS s/p PPM, poor wound healing s/p pec flap and closure on 9/29/21 who recently presented to [facility] with acute LUE pain, found to have left brachial occlusion s/p left brachial artery embolectomy, 7/21/22. CT scans at that time showed endoleak and she was scheduled to see Dr. Muller in clinic 7/27/22, however, patient was experiencing persistent left arm and chest pain that radiated to her left shoulder and she presented to [facility] ED for evaluation. 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 On 7/29/22 she underwent a TEVAR with R groin cutdown and femoral artery repair, EF 75% with Dr. Muller and Dr. Augustin (vascular sx). She arrived to the CTICU intubated and hypothermic, received 2L IVF and 2u pRBCs. On POD1 she was extubated. On POD2 BB started. On POD3 noted to be lethargic w/ RLE weakness, stroke code called, CT head negative. CT chest suggestive questionable subsegmental PEs and heparin gtt started. Official CT read revealed no evidence of PE and heparin gtt was thus discontinued. POD 4-6 stayed in CTICU for continued monitoring of lethargy. On POD7 transferred to stepdown unit. LE dopplers negative for DVTs. PT consulted and recommended VERNA upon discharge due to RLE weakness. POD8 TLC removed. Final neuro recommendations obtained.     Plan:    Neurovascular:   -RLE weakness  -CT Head negative, CT A/P: acute LLL segmental PE, no hematoma compressing on spine.   -F/u final neuro recommendations  -Hx of arthritis, spinal stenosis   -Pain well controlled with current regimen. PRN's: Tylenol 650mg Q6H PRN for mild pain, Oxycodone 5mg Q6H PRN moderate pain    Cardiovascular:   -S/P TEVAR with R groin cutdown and femoral artery repair  - Cont BB/statin  -Hx of HTN  -Cont Lopressor 12.5mg BID  -Cont Amlodipine 2.5mg   -Cont Hydralazine 10mg Q8H   -Home med: Lisinopril 10mg QD held  -Hx of HLD  -Cont Lipitor 10mg QD (home dose)  -Hemodynamically stable.   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    ID:  -Temperature: 97.1  -CBC: WBC-9.77  -Continue to observe for SIRS/Sepsis Syndrome.    Prophylaxis:  -DVT prophylaxis with 5000 SubQ Heparin q8h.  -Continue with SCD's b/l while patient is at rest     Disposition:  -Discharge to [facility] per PT recommendations once accepted.

## 2022-08-07 NOTE — PROGRESS NOTE ADULT - ASSESSMENT
77y Female with PMHx of HTN, spinal stenosis, arthritis, CABGx2, bicuspid aortic valve s/p AVR, ascending/hemiarch replacement and left aorto-subclavian bypass, recent admission at [facility] for left brachial occlusion s/p left brachial artery embolectomy who presented with worsening left UE pain, now s/p TEVAR and right femoral cutdown and patch angioplasty done 7/29/22. Post op patient's right leg movement severely limited by pain, though noted to have been able to move the leg. Neurontin was uptitrated over the weekend and pt's pain is much improved. Stroke team consulted because pt noted to have right leg weakness, notably in the hip flexion, which is possibly new though prior exams limited by pain.        When seen and examined with Dr. Rogers   Patient's exam was highly effort based and limited by pain, with positive kramer sign. Unlikely to be true neurological weakness. CTH reviewed, no new infarctions. It is possible that patient is experiencing recrudesce of left internal capsule infarction due to hospitalization. Stroke to sign off no further recs

## 2022-08-07 NOTE — PROGRESS NOTE ADULT - SUBJECTIVE AND OBJECTIVE BOX
Neurology Stroke Progress Note    INTERVAL HPI/OVERNIGHT EVENTS:  Patient seen and examined.  number ______ used.    MEDICATIONS  (STANDING):  amLODIPine   Tablet 2.5 milliGRAM(s) Oral daily  aspirin enteric coated 81 milliGRAM(s) Oral daily  atorvastatin 10 milliGRAM(s) Oral at bedtime  budesonide  80 MICROgram(s)/formoterol 4.5 MICROgram(s) Inhaler 2 Puff(s) Inhalation two times a day  cephalexin 500 milliGRAM(s) Oral every 12 hours  chlorhexidine 2% Cloths 1 Application(s) Topical <User Schedule>  fludroCORTISONE 0.1 milliGRAM(s) Oral daily  heparin   Injectable 5000 Unit(s) SubCutaneous every 8 hours  hydrALAZINE 10 milliGRAM(s) Oral every 8 hours  levothyroxine 50 MICROGram(s) Oral daily  metoprolol tartrate 12.5 milliGRAM(s) Oral every 6 hours  pantoprazole    Tablet 40 milliGRAM(s) Oral before breakfast  testosterone patch 4 mG/24 Hr(s) 4 milliGRAM(s) Transdermal daily    MEDICATIONS  (PRN):  acetaminophen     Tablet .. 650 milliGRAM(s) Oral every 6 hours PRN Mild Pain (1 - 3)  oxyCODONE    IR 5 milliGRAM(s) Oral every 4 hours PRN Moderate Pain (4 - 6)  polyethylene glycol 3350 17 Gram(s) Oral daily PRN Constipation      Allergies    No Known Allergies    Intolerances        Vital Signs Last 24 Hrs  T(C): 36.4 (07 Aug 2022 17:11), Max: 37.1 (06 Aug 2022 22:43)  T(F): 97.6 (07 Aug 2022 17:11), Max: 98.7 (06 Aug 2022 22:43)  HR: 63 (07 Aug 2022 16:40) (56 - 87)  BP: 127/58 (07 Aug 2022 16:40) (127/58 - 158/67)  BP(mean): 84 (07 Aug 2022 16:40) (84 - 96)  RR: 16 (07 Aug 2022 16:40) (14 - 18)  SpO2: 95% (07 Aug 2022 16:40) (95% - 99%)    Parameters below as of 07 Aug 2022 16:40  Patient On (Oxygen Delivery Method): room air        Physical exam:  General: No acute distress, awake and alert      Neurologic:  -Mental status: Awake, alert, oriented to person, place, and time. Speech is fluent with intact naming, repetition, and comprehension, no dysarthria. Recent and remote memory intact. Follows commands. Attention/concentration intact. Fund of knowledge appropriate.  -Cranial nerves:   II: Visual fields are full to confrontation.  III, IV, VI: Extraocular movements are intact without nystagmus. Pupils equally round and reactive to light  V:  Facial sensation V1-V3 equal and intact   VII: Face is symmetric with normal eye closure and smile  VIII: Hearing is bilaterally intact to finger rub  IX, X: Uvula is midline and soft palate rises symmetrically  XI: Head turning and shoulder shrug are intact.  XII: Tongue protrudes midline  Motor: Normal bulk and tone. No pronator drift. Strength bilateral upper extremity 5/5,  Lower extremity exam is limited by effort and pain positive hover sign for right lower extremity 4/5 through out  Rapid alternating movements intact and symmetric  Sensation: Intact to light touch bilaterally. No neglect or extinction on double simultaneous testing.  Coordination: No dysmetria on finger-to-nose and heel-to-shin bilaterally  Reflexes: Downgoing toes bilaterally   Gait: Narrow gait and steady    LABS:                        10.2   9.77  )-----------( 240      ( 06 Aug 2022 07:31 )             32.3     08-06    136  |  105  |  24<H>  ----------------------------<  86  4.3   |  22  |  0.86    Ca    9.4      06 Aug 2022 07:31  Mg     2.2     08-06            RADIOLOGY & ADDITIONAL TESTS:    Impression: No acute intracranial injury. Microvascular disease. Tiny right posterior cerebellar chronic infarction. Left internal capsule and tiny bilateral external capsule chronic lacunar infarcts

## 2022-08-07 NOTE — PROGRESS NOTE ADULT - ASSESSMENT
77 YO Female, previous smoker, w/ PMHx of HTN, spinal stenosis, arthritis, bicuspid aortic valve s/p AVR, ascending/hemiarch replacement with frozen elephant trunk and left aorto-subclavian bypass, CABG x2 with Dr. Muller on 8/9/2021 (post-op course c/b prolonged intubation, SSS s/p PPM, poor wound healing s/p pec flap and closure on 9/29/21 who recently presented to [facility] with acute LUE pain, found to have left brachial occlusion s/p left brachial artery embolectomy, 7/21/22. CT scans at that time showed endoleak and she was scheduled to see Dr. Muller in clinic 7/27/22, however, patient was experiencing persistent left arm and chest pain that radiated to her left shoulder and she presented to [facility] ED for evaluation. 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 On 7/29/22 she underwent a TEVAR with R groin cutdown and femoral artery repair, EF 75% with Dr. Muller and Dr. Augustin (vascular sx). She arrived to the CTICU intubated and hypothermic, received 2L IVF and 2u pRBCs. On POD1 she was extubated. On POD2 BB started. On POD3 noted to be lethargic w/ RLE weakness, stroke code called, CT head negative. CT chest suggestive questionable subsegmental PEs and heparin gtt started. Official CT read revealed no evidence of PE and heparin gtt was thus discontinued. POD 4-6 stayed in CTICU for continued monitoring of lethargy. On POD7 transferred to stepdown unit. LE dopplers negative for DVTs. PT consulted and recommended VERNA upon discharge due to RLE weakness. POD8 TLC removed. Final neuro recommendations obtained.     Plan:    Neurovascular:   -RLE weakness  -CT Head negative, CT A/P: acute LLL segmental PE, no hematoma compressing on spine.   -F/u final neuro recommendations  -Hx of arthritis, spinal stenosis   -Pain well controlled with current regimen. PRN's: Tylenol 650mg Q6H PRN for mild pain, Oxycodone 5mg Q6H PRN moderate pain    Cardiovascular:   -S/P TEVAR with R groin cutdown and femoral artery repair  - Cont BB/statin  -Hx of HTN  -Cont Lopressor 12.5mg BID  -Cont Amlodipine 2.5mg   -Cont Hydralazine 10mg Q8H   -Home med: Lisinopril 10mg QD held  -Hx of HLD  -Cont Lipitor 10mg QD (home dose)  -Hemodynamically stable.   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    ID:  -Temperature: 97.1  -CBC: WBC-9.77  -Continue to observe for SIRS/Sepsis Syndrome.    Prophylaxis:  -DVT prophylaxis with 5000 SubQ Heparin q8h.  -Continue with SCD's b/l while patient is at rest     Disposition:  -Discharge to [facility] per PT recommendations once accepted.      77 YO Female, previous smoker, w/ PMHx of HTN, spinal stenosis, arthritis, bicuspid aortic valve s/p AVR, ascending/hemiarch replacement with frozen elephant trunk and left aorto-subclavian bypass, CABG x2 with Dr. Muller on 8/9/2021 (post-op course c/b prolonged intubation, SSS s/p PPM, poor wound healing s/p pec flap and closure on 9/29/21 who recently presented to [facility] with acute LUE pain, found to have left brachial occlusion s/p left brachial artery embolectomy, 7/21/22. CT scans at that time showed endoleak and she was scheduled to see Dr. Muller in clinic 7/27/22, however, patient was experiencing persistent left arm and chest pain that radiated to her left shoulder and she presented to [facility] ED for evaluation. 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 On 7/29/22 she underwent a TEVAR with R groin cutdown and femoral artery repair, EF 75% with Dr. Muller and Dr. Augustin (vascular sx). She arrived to the CTICU intubated and hypothermic, received 2L IVF and 2u pRBCs. On POD1 she was extubated. On POD2 BB started. On POD3 noted to be lethargic w/ RLE weakness, stroke code called, CT head negative. CT chest suggestive questionable subsegmental PEs and heparin gtt started. Official CT read revealed no evidence of PE and heparin gtt was thus discontinued. POD 4-6 stayed in CTICU for continued monitoring of lethargy. On POD7 transferred to stepdown unit. LE dopplers negative for DVTs. PT consulted and recommended VERNA upon discharge due to RLE weakness. POD8 TLC removed. Neuro rec repeat CTHead vs MR brain. POD9     Plan:    Neurovascular:   -RLE weakness  -CT Head negative, CT A/P: acute LLL segmental PE, no hematoma compressing on spine.   -F/u final neuro recommendations  -Hx of arthritis, spinal stenosis   -Pain well controlled with current regimen. PRN's: Tylenol 650mg Q6H PRN for mild pain, Oxycodone 5mg Q6H PRN moderate pain    Cardiovascular:   -S/P TEVAR with R groin cutdown and femoral artery repair  - Cont BB/statin  -Hx of HTN  -Cont Lopressor 12.5mg BID  -Cont Amlodipine 2.5mg   -Cont Hydralazine 10mg Q8H   -Home med: Lisinopril 10mg QD held  -Hx of HLD  -Cont Lipitor 10mg QD (home dose)  -Hemodynamically stable.   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    ID:  -Temperature: 97.1  -CBC: WBC-9.77  -Continue to observe for SIRS/Sepsis Syndrome.    Prophylaxis:  -DVT prophylaxis with 5000 SubQ Heparin q8h.  -Continue with SCD's b/l while patient is at rest     Disposition:  -Discharge to [facility] per PT recommendations once accepted.      75 YO Female, previous smoker, w/ PMHx of HTN, spinal stenosis, arthritis, bicuspid aortic valve s/p AVR, ascending/hemiarch replacement with frozen elephant trunk and left aorto-subclavian bypass, CABG x2 with Dr. Muller on 8/9/2021 (post-op course c/b prolonged intubation, SSS s/p PPM, poor wound healing s/p pec flap and closure on 9/29/21 who recently presented to [facility] with acute LUE pain, found to have left brachial occlusion s/p left brachial artery embolectomy, 7/21/22. CT scans at that time showed endoleak and she was scheduled to see Dr. Muller in clinic 7/27/22, however, patient was experiencing persistent left arm and chest pain that radiated to her left shoulder and she presented to [facility] ED for evaluation. 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 On 7/29/22 she underwent a TEVAR with R groin cutdown and femoral artery repair, EF 75% with Dr. Muller and Dr. Augustin (vascular sx). She arrived to the CTICU intubated and hypothermic, received 2L IVF and 2u pRBCs. On POD1 she was extubated. On POD2 BB started. On POD3 noted to be lethargic w/ RLE weakness, stroke code called, CT head negative. CT chest suggestive questionable subsegmental PEs and heparin gtt started. Official CT read revealed no evidence of PE and heparin gtt was thus discontinued. POD 4-6 stayed in CTICU for continued monitoring of lethargy. On POD7 transferred to stepdown unit. LE dopplers negative for DVTs. PT consulted and recommended VERNA upon discharge due to RLE weakness. POD8 TLC removed. Neuro rec repeat CT Head vs MR brain depending on PPM compatibility. POD9 Repeat CT Head obtained per stroke team recs. Keflex started for possible R groin incision infection.     Plan:    Neurovascular:   -RLE weakness  -Repeat CT Head ordered per stroke team recs (PPM not compatible w/ MR). Will f/u results  -Initial CT Head negative, CT A/P: acute LLL segmental PE, no hematoma compressing on spine.   -Hx of arthritis, spinal stenosis   -Pain well controlled with current regimen. PRN's: Tylenol 650mg Q6H PRN for mild pain, Oxycodone 5mg Q6H PRN moderate pain    Cardiovascular:   -S/P TEVAR with R groin cutdown and femoral artery repair  - Cont BB/statin  -Hx of HTN  -Cont Lopressor 12.5mg BID  -Cont Amlodipine 2.5mg   -Cont Hydralazine 10mg Q8H   -Home med: Lisinopril 10mg QD held  -Hx of HLD  -Cont Lipitor 10mg QD (home dose)  -Hemodynamically stable.   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    ID:  -R groin incision purulence  -Keflex 500mg PO Q12H started  -Vascular called to assess  -Temperature: 97  -CBC: WBC-9.77  -Continue to observe for SIRS/Sepsis Syndrome.    Prophylaxis:  -DVT prophylaxis with 5000 SubQ Heparin q8h.  -Continue with SCD's b/l while patient is at rest     Disposition:  -Discharge to [facility] per PT recommendations once accepted.

## 2022-08-08 NOTE — DISCHARGE NOTE PROVIDER - CARE PROVIDERS DIRECT ADDRESSES
[email or web address]

## 2022-08-08 NOTE — PROGRESS NOTE ADULT - SUBJECTIVE AND OBJECTIVE BOX
Patient discussed on morning rounds with Dr. Muller     Operation / Date: 7/29: TEVAR, R groin Cutdown and femoral artery repair EF 75%    SUBJECTIVE ASSESSMENT:  77y Female assessed this AM, OOB to chair, endorses pain and weakness to right leg 2/2 groin incision. Denies chest pain, sob, n/v, fever/chills.         Vital Signs Last 24 Hrs  T(C): 36.1 (08 Aug 2022 13:49), Max: 37.3 (07 Aug 2022 21:53)  T(F): 97 (08 Aug 2022 13:49), Max: 99.2 (07 Aug 2022 21:53)  HR: 71 (08 Aug 2022 12:41) (59 - 82)  BP: 134/65 (08 Aug 2022 12:41) (125/57 - 160/69)  BP(mean): 93 (08 Aug 2022 12:41) (82 - 100)  RR: 14 (08 Aug 2022 09:12) (14 - 16)  SpO2: 100% (08 Aug 2022 14:21) (93% - 100%)    Parameters below as of 08 Aug 2022 09:25  Patient On (Oxygen Delivery Method): room air      I&O's Detail    07 Aug 2022 07:01  -  08 Aug 2022 07:00  --------------------------------------------------------  IN:  Total IN: 0 mL    OUT:    Voided (mL): 900 mL  Total OUT: 900 mL    Total NET: -900 mL      08 Aug 2022 07:01  -  08 Aug 2022 16:08  --------------------------------------------------------  IN:    IV PiggyBack: 50 mL    Oral Fluid: 175 mL  Total IN: 225 mL    OUT:    Voided (mL): 140 mL  Total OUT: 140 mL    Total NET: 85 mL          CHEST TUBE:  No  FRANCHESKA DRAIN:  No.  EPICARDIAL WIRES: No.  TIE DOWNS: No.  REGALADO: No.    PHYSICAL EXAM:    Appearance: No acute distress.  Neurologic: AAOx3, no AMS or focal deficits.  Responds appropriately to verbal and physical stimuli; exhibits purposeful movement in all extremities.  Neck: Supple  Cardiovascular: RRR, S1 S2. No m/r/g.  Respiratory: No acute respiratory distress. CTA b/l, no w/r/r.   Gastrointestinal:  Soft, non-tender, non-distended, + BS.	  Skin: No rashes. No ecchymoses. No cyanosis.  Extremities: Exhibits normal range of motion, no clubbing, cyanosis or edema.  Vascular: Peripheral pulses palpable 2+ bilaterally.   Incision Sites: Right groin incision erythematous with purulent drainage.     LABS:                        9.6    7.95  )-----------( 208      ( 08 Aug 2022 14:41 )             30.3       COUMADIN:  No.         08-08    136  |  102  |  16  ----------------------------<  86  3.2<L>   |  24  |  0.85    Ca    8.9      08 Aug 2022 14:41  Phos  3.7     08-08  Mg     2.2     08-08            MEDICATIONS  (STANDING):  amLODIPine   Tablet 2.5 milliGRAM(s) Oral daily  apixaban 5 milliGRAM(s) Oral every 12 hours  aspirin enteric coated 81 milliGRAM(s) Oral daily  atorvastatin 10 milliGRAM(s) Oral at bedtime  budesonide  80 MICROgram(s)/formoterol 4.5 MICROgram(s) Inhaler 2 Puff(s) Inhalation two times a day  ceFAZolin   IVPB 1000 milliGRAM(s) IV Intermittent every 8 hours  chlorhexidine 2% Cloths 1 Application(s) Topical <User Schedule>  fludroCORTISONE 0.1 milliGRAM(s) Oral daily  furosemide    Tablet 20 milliGRAM(s) Oral daily  hydrALAZINE 10 milliGRAM(s) Oral every 8 hours  levothyroxine 50 MICROGram(s) Oral daily  metoprolol tartrate 12.5 milliGRAM(s) Oral every 6 hours  pantoprazole    Tablet 40 milliGRAM(s) Oral before breakfast  potassium chloride    Tablet ER 40 milliEquivalent(s) Oral every 4 hours    MEDICATIONS  (PRN):  acetaminophen     Tablet .. 650 milliGRAM(s) Oral every 6 hours PRN Mild Pain (1 - 3)  oxyCODONE    IR 5 milliGRAM(s) Oral every 4 hours PRN Moderate Pain (4 - 6)  polyethylene glycol 3350 17 Gram(s) Oral daily PRN Constipation        RADIOLOGY & ADDITIONAL TESTS:  < from: Xray Chest 1 View- PORTABLE-Routine (Xray Chest 1 View- PORTABLE-Routine in AM.) (08.05.22 @ 05:48) >  Findings/  impression: Support device in satisfactory position. Leadless pacemaker.   Left basilar focal atelectasis, pleural effusion.. Heart size within   normal limits, aortic endovascular stent, aortic valve replacement,   CABG.. Mediastinal clips. Stable bony structures.Left axillary surgical   clips.    < end of copied text >  < from: CT Head No Cont (08.07.22 @ 12:40) >    Impression: No acute intracranial injury. Microvascular disease. Tiny   right posterior cerebellar chronic infarction. Left internal capsule and   tiny bilateral external capsule chronic lacunar infarcts    < end of copied text >

## 2022-08-08 NOTE — DISCHARGE NOTE PROVIDER - PROVIDER TOKENS
PROVIDER:[TOKEN:[[id number]],SCHEDULEDAPPT:[08/23/2022],SCHEDULEDAPPTTIME:[01:00 PM]],PROVIDER:[TOKEN:[[id number]]],PROVIDER:[TOKEN:[[id number]]],PROVIDER:[TOKEN:[[id number]],SCHEDULEDAPPT:[08/23/2022],SCHEDULEDAPPTTIME:[11:00 AM]]

## 2022-08-08 NOTE — DISCHARGE NOTE PROVIDER - NSDCCPTREATMENT_GEN_ALL_CORE_FT
PRINCIPAL PROCEDURE  Procedure: Second stage thoracic endovascular aortic repair (TEVAR)  Findings and Treatment: including right groin cutdown and femoral artery repair

## 2022-08-08 NOTE — DISCHARGE NOTE PROVIDER - CARE PROVIDER_API CALL
Bladimir Muller)  Surgery; Thoracic and Cardiac Surgery  [address] [place] [address]  Phone: [phone]  Fax: [phone]  Scheduled Appointment: 08/23/2022 01:00 PM    Aron Blank)  Cardiovascular Disease; Internal Medicine; Interventional Cardiology  [address] [place] [address]  Phone: [phone]  Fax: [phone]  Follow Up Time:     Awa Brewster)  Neurology; Vascular Neurology  [address], [address] [place] [address]  Phone: [phone]  Fax: [phone]  Follow Up Time:     Hugo Augustin)  Surgery; Vascular Surgery  [address] [place] [address]  Phone: [phone]  Fax: [phone]  Scheduled Appointment: 08/23/2022 11:00 AM

## 2022-08-08 NOTE — DISCHARGE NOTE PROVIDER - NSDCMRMEDTOKEN_GEN_ALL_CORE_FT
acetaminophen 325 mg oral tablet: 2 tab(s) orally every 6 hours, As needed, Mild Pain (1 - 3)  amLODIPine 2.5 mg oral tablet: 1 tab(s) orally once a day  apixaban 5 mg oral tablet: 1 tab orally 2 times a day  Aspirin Enteric Coated 81 mg oral delayed release tablet: 1 tab(s) orally once a day   atorvastatin 10 mg oral tablet: 1 tab(s) orally once a day (at bedtime)  cephalexin 500 mg oral capsule: 1 cap(s) orally every 12 hours  hydrALAZINE 10 mg oral tablet: 1 tab(s) orally every 8 hours  levothyroxine 25 mcg (0.025 mg) oral tablet: 1 tab(s) orally once a day  metoprolol tartrate 25 mg oral tablet: 12.5 milligram(s) orally 2 times a day  oxyCODONE 5 mg oral tablet: 1 tab(s) orally every 4 hours, As needed, Moderate Pain (4 - 6)  pantoprazole 40 mg oral delayed release tablet: 1 tab(s) orally once a day (before a meal)  polyethylene glycol 3350 oral powder for reconstitution: 17 gram(s) orally once a day, As needed, Constipation  senna (sennosides) 8.6 mg oral tablet: 1 tab(s) orally 2 times a day   Symbicort 80 mcg-4.5 mcg/inh inhalation aerosol: 2 puff(s) inhaled 2 times a day   acetaminophen 325 mg oral tablet: 2 tab(s) orally every 6 hours, As needed, Mild Pain (1 - 3)  amLODIPine 2.5 mg oral tablet: 1 tab(s) orally once a day  apixaban 5 mg oral tablet: 1 tab orally 2 times a day  Aspirin Enteric Coated 81 mg oral delayed release tablet: 1 tab(s) orally once a day   atorvastatin 10 mg oral tablet: 1 tab(s) orally once a day (at bedtime)  cephalexin 500 mg oral capsule: 1 cap(s) orally every 12 hours  furosemide 20 mg oral tablet: 1 tab(s) orally once a day  hydrALAZINE 10 mg oral tablet: 1 tab(s) orally every 8 hours  levothyroxine 25 mcg (0.025 mg) oral tablet: 1 tab(s) orally once a day  metoprolol tartrate 25 mg oral tablet: 1 tab(s) orally every 12 hours  pantoprazole 40 mg oral delayed release tablet: 1 tab(s) orally once a day (before a meal)  polyethylene glycol 3350 oral powder for reconstitution: 17 gram(s) orally once a day, As needed, Constipation  potassium chloride 10 mEq oral tablet, extended release: 1 tab(s) orally once a day  Symbicort 80 mcg-4.5 mcg/inh inhalation aerosol: 2 puff(s) inhaled 2 times a day   acetaminophen 325 mg oral tablet: 2 tab(s) orally every 6 hours, As needed, Mild Pain (1 - 3)  amLODIPine 2.5 mg oral tablet: 1 tab(s) orally once a day  apixaban 5 mg oral tablet: 1 tab orally 2 times a day  Aspirin Enteric Coated 81 mg oral delayed release tablet: 1 tab(s) orally once a day   atorvastatin 10 mg oral tablet: 1 tab(s) orally once a day (at bedtime)  cephalexin 500 mg oral capsule: 1 cap(s) orally every 12 hours  furosemide 20 mg oral tablet: 1 tab(s) orally once a day  hydrALAZINE 10 mg oral tablet: 1 tab(s) orally every 8 hours  levothyroxine 50 mcg (0.05 mg) oral tablet: 1 tab(s) orally once a day  metoprolol tartrate 25 mg oral tablet: 1 tab(s) orally every 12 hours  pantoprazole 40 mg oral delayed release tablet: 1 tab(s) orally once a day (before a meal)  polyethylene glycol 3350 oral powder for reconstitution: 17 gram(s) orally once a day, As needed, Constipation  potassium chloride 10 mEq oral tablet, extended release: 1 tab(s) orally once a day  Symbicort 80 mcg-4.5 mcg/inh inhalation aerosol: 2 puff(s) inhaled 2 times a day  traMADol 50 mg oral tablet: 0.5 tab(s) orally every 8 hours, As needed, Moderate Pain (4 - 6)   acetaminophen 325 mg oral tablet: 2 tab(s) orally every 6 hours, As needed, Mild Pain (1 - 3)  amLODIPine 2.5 mg oral tablet: 1 tab(s) orally once a day  apixaban 5 mg oral tablet: 1 tab orally 2 times a day  Aspirin Enteric Coated 81 mg oral delayed release tablet: 1 tab(s) orally once a day   atorvastatin 10 mg oral tablet: 1 tab(s) orally once a day (at bedtime)  cephalexin 500 mg oral capsule: 1 cap(s) orally every 12 hours till August 15th  furosemide 20 mg oral tablet: 1 tab(s) orally once a day  hydrALAZINE 10 mg oral tablet: 1 tab(s) orally every 8 hours  levothyroxine 50 mcg (0.05 mg) oral tablet: 1 tab(s) orally once a day  metoprolol tartrate 25 mg oral tablet: 1 tab(s) orally every 12 hours  pantoprazole 40 mg oral delayed release tablet: 1 tab(s) orally once a day (before a meal)  polyethylene glycol 3350 oral powder for reconstitution: 17 gram(s) orally once a day, As needed, Constipation  potassium chloride 10 mEq oral tablet, extended release: 1 tab(s) orally once a day  Symbicort 80 mcg-4.5 mcg/inh inhalation aerosol: 2 puff(s) inhaled 2 times a day  traMADol 50 mg oral tablet: 0.5 tab(s) orally every 8 hours, As needed, Moderate Pain (4 - 6)

## 2022-08-08 NOTE — DISCHARGE NOTE PROVIDER - HOSPITAL COURSE
77 y/o Female, previous smoker, w/ PMHx of HTN, spinal stenosis, arthritis, bicuspid aortic valve s/p AVR, ascending/hemiarch replacement with frozen elephant trunk and left aorto-subclavian bypass, CABG x2 with Dr. 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 CT scans at that time showed endoleak and she was scheduled to see Dr. Muller in clinic 7/27/22, however, patient was experiencing persistent left arm and chest pain that radiated to her left shoulder and she presented to [facility] ED for evaluation. 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 On 7/29/22 she underwent a TEVAR with R groin cutdown and femoral artery repair, EF 75% with Dr. Muller and Dr. Augustin (vascular sx). She arrived to the CTICU intubated and hypothermic, received 2L IVF and 2u pRBCs. On POD1 she was extubated. On POD2 BB started. On POD3 noted to be lethargic w/ RLE weakness, stroke code called, CT head negative. CT chest suggestive questionable subsegmental PEs and heparin gtt started. Official CT read revealed no evidence of PE and heparin gtt was thus discontinued. POD 4-6 stayed in CTICU for continued monitoring of lethargy. On POD7 transferred to stepdown unit. LE dopplers negative for DVTs. PT consulted and recommended [facility] upon discharge due to RLE weakness. POD8 TLC removed. POD9 Repeat CT Head obtained per stroke team recs showing chronic small old CVA. Keflex started for possible R groin purulence/erythema. POD10 ________________    *Discharge summary is incomplete*  *discharge medication list is UNOFFICIAL, PLEASE UPDATE before discharge*     Over 35 minutes was spent with the patient reviewing the discharge material including medications, follow up appointments, recovery, concerning symptoms, and how to contact their health care providers if they have questions   75 y/o Female, previous smoker, w/ PMHx of HTN, spinal stenosis, arthritis, bicuspid aortic valve s/p AVR, ascending/hemiarch replacement with frozen elephant trunk and left aorto-subclavian bypass, CABG x2 with Dr. 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 CT scans at that time showed endoleak and she was scheduled to see Dr. Muller in clinic 7/27/22, however, patient was experiencing persistent left arm and chest pain that radiated to her left shoulder and she presented to [facility] ED for evaluation. 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 On 7/29/22 she underwent a TEVAR with R groin cutdown and femoral artery repair, EF 75% with Dr. Muller and Dr. Augustin (vascular sx). She arrived to the CTICU intubated and hypothermic, received 2L IVF and 2u pRBCs. On POD1 she was extubated. On POD2 BB started. On POD3 noted to be lethargic w/ RLE weakness, stroke code called, CT head negative. CT chest suggestive questionable subsegmental PEs and heparin gtt started. Official CT read revealed no evidence of PE and heparin gtt was thus discontinued. POD 4-6 stayed in CTICU for continued monitoring of lethargy. On POD7 transferred to stepdown unit. LE dopplers negative for DVTs. PT consulted and recommended [facility] upon discharge due to RLE weakness. POD8 TLC removed. POD9 Repeat CT Head obtained per stroke team recs showing chronic small old CVA. Keflex started for possible R groin purulence/erythema. POD10 ________________    *Discharge summary is incomplete*  *discharge medication list is UNOFFICIAL, PLEASE UPDATE before discharge*     Of Note: patient should follow up with her vascular surgeon as to the length of therapy she needs to continue to [facility] for left brachial artery occlusion. Their most recent note from previous admission states at least 3 months (discharged end of July) 77 y/o Female, previous smoker, w/ PMHx of HTN, spinal stenosis, arthritis, bicuspid aortic valve s/p AVR, ascending/hemiarch replacement with frozen elephant trunk and left aorto-subclavian bypass, CABG x2 with Dr. 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 CT scans at that time showed endoleak and she was scheduled to see Dr. Muller in clinic 7/27/22, however, patient was experiencing persistent left arm and chest pain that radiated to her left shoulder and she presented to [facility] ED for evaluation. 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 On 7/29/22 she underwent a TEVAR with R groin cutdown and femoral artery repair, EF 75% with Dr. Muller and Dr. Augustin (vascular sx). She arrived to the CTICU intubated and hypothermic, received 2L IVF and 2u pRBCs. On POD1 she was extubated. On POD2 BB started. On POD3 noted to be lethargic w/ RLE weakness, stroke code called, CT head negative. CT chest suggestive questionable subsegmental PEs and heparin gtt started. Official CT read revealed no evidence of PE and heparin gtt was thus discontinued. POD 4-6 stayed in CTICU for continued monitoring of lethargy. On POD7 transferred to stepdown unit. LE dopplers negative for DVTs. PT consulted and recommended [facility] upon discharge due to RLE weakness. POD8 TLC removed. POD9 Repeat CT Head obtained per stroke team recs showing chronic small old CVA. Keflex started for possible R groin purulence/erythema. POD 10-12 Patient is medically cleared for d/c, pending authorization for acute rehab. POD13 received auth, will discharge to [facility] acute rehab. Per vascular surgery, will go home with 7 days of PO abx. Per Dr. Muller she is medically clear for discharge to rehab today, 8/11/22.    Of Note: patient should follow up with her vascular surgeon as to the length of therapy she needs to continue to [facility] for left brachial artery occlusion. Their most recent note from previous admission states at least 3 months (discharged end of July).    Over 35 minutes was spent with the patient reviewing the discharge material including medications, follow up appointments, recovery, concerning symptoms, and how to contact their health care providers if they have questions. 75 y/o Female, previous smoker, w/ PMHx of HTN, spinal stenosis, arthritis, bicuspid aortic valve s/p AVR, ascending/hemiarch replacement with frozen elephant trunk and left aorto-subclavian bypass, CABG x2 with Dr. 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 CT scans at that time showed endoleak and she was scheduled to see Dr. Muller in clinic 7/27/22, however, patient was experiencing persistent left arm and chest pain that radiated to her left shoulder and she presented to [facility] ED for evaluation. 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 On 7/29/22 she underwent a TEVAR with R groin cutdown and femoral artery repair, EF 75% with Dr. Muller and Dr. Augustin (vascular sx). She arrived to the CTICU intubated and hypothermic, received 2L IVF and 2u pRBCs. On POD1 she was extubated. On POD2 BB started. On POD3 noted to be lethargic w/ RLE weakness, stroke code called, CT head negative. CT chest suggestive questionable subsegmental PEs and heparin gtt started. Official CT read revealed no evidence of PE and heparin gtt was thus discontinued. POD 4-6 stayed in CTICU for continued monitoring of lethargy. On POD7 transferred to stepdown unit. LE dopplers negative for DVTs. PT consulted and recommended [facility] upon discharge due to RLE weakness. POD8 TLC removed. POD9 Repeat CT Head obtained per stroke team recs showing chronic small old CVA. Keflex started for possible R groin purulence/erythema. POD 10-13 Patient is medically cleared for d/c, pending authorization for rehab. POD14 received auth, will discharge to [facility], [place]. Per vascular surgery, will go home to complete 7 days of PO abx for R groin erythema. Wound care instructions for R groin cutdown: Clean wound with betadine and redress with gauze and paper tape daily. Per Dr. Vernon she is medically clear for discharge to rehab today, 8/12/22.    Of Note: patient should follow up with her vascular surgeon as to the length of therapy she needs to continue to [facility] for left brachial artery occlusion. Their most recent note from previous admission states at least 3 months (discharged end of July).    Over 35 minutes was spent with the patient reviewing the discharge material including medications, follow up appointments, recovery, concerning symptoms, and how to contact their health care providers if they have questions. 75 y/o Female, previous smoker, w/ PMHx of HTN, spinal stenosis, arthritis, bicuspid aortic valve s/p AVR, ascending/hemiarch replacement with frozen elephant trunk and left aorto-subclavian bypass, CABG x2 with Dr. 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 CT scans at that time showed endoleak and she was scheduled to see Dr. Muller in clinic 7/27/22, however, patient was experiencing persistent left arm and chest pain that radiated to her left shoulder and she presented to [facility] ED for evaluation. 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 On 7/29/22 she underwent a TEVAR with R groin cutdown and femoral artery repair, EF 75% with Dr. Muller and Dr. Augustin (vascular sx). She arrived to the CTICU intubated and hypothermic, received 2L IVF and 2u pRBCs. On POD1 she was extubated. On POD2 BB started. On POD3 noted to be lethargic w/ RLE weakness, stroke code called, CT head negative. CT chest suggestive questionable subsegmental PEs and heparin gtt started. Official CT read revealed no evidence of PE and heparin gtt was thus discontinued. POD 4-6 stayed in CTICU for continued monitoring of lethargy. On POD7 transferred to stepdown unit. LE dopplers negative for DVTs. PT consulted and recommended [facility] upon discharge due to RLE weakness. POD8 TLC removed. POD9 Repeat CT Head obtained per stroke team recs showing chronic small old CVA. Keflex started for possible R groin purulence/erythema. POD 10-16 Patient is medically cleared for d/c, pending authorization for rehab. POD14 received auth, will discharge to [facility], [place]. Per vascular surgery, will go home to complete 7 days of PO abx for R groin erythema. Wound care instructions for R groin cutdown: Clean wound with betadine and redress with gauze and paper tape daily. Per Dr. Vernon she is medically clear for discharge to rehab today, 8/12/22.    Of Note: patient should follow up with her vascular surgeon as to the length of therapy she needs to continue to [facility] for left brachial artery occlusion. Their most recent note from previous admission states at least 3 months (discharged end of July).    Over 35 minutes was spent with the patient reviewing the discharge material including medications, follow up appointments, recovery, concerning symptoms, and how to contact their health care providers if they have questions. 75 y/o Female, previous smoker, w/ PMHx of HTN, spinal stenosis, arthritis, bicuspid aortic valve s/p AVR, ascending/hemiarch replacement with frozen elephant trunk and left aorto-subclavian bypass, CABG x2 with Dr. 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 CT scans at that time showed endoleak and she was scheduled to see Dr. Muller in clinic 7/27/22, however, patient was experiencing persistent left arm and chest pain that radiated to her left shoulder and she presented to [facility] ED for evaluation. 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 On 7/29/22 she underwent a TEVAR with R groin cutdown and femoral artery repair, EF 75% with Dr. Muller and Dr. Augustin (vascular sx). She arrived to the CTICU intubated and hypothermic, received 2L IVF and 2u pRBCs. On POD1 she was extubated. On POD2 BB started. On POD3 noted to be lethargic w/ RLE weakness, stroke code called, CT head negative. CT chest suggestive questionable subsegmental PEs and heparin gtt started. Official CT read revealed no evidence of PE and heparin gtt was thus discontinued. POD 4-6 stayed in CTICU for continued monitoring of lethargy. On POD7 transferred to stepdown unit. LE dopplers negative for DVTs. PT consulted and recommended [facility] upon discharge due to RLE weakness. POD8 TLC removed. POD9 Repeat CT Head obtained per stroke team recs showing chronic small old CVA. Keflex started for possible R groin purulence/erythema. POD 10-16 Patient is medically cleared for d/c, pending authorization for rehab. POD14 received auth, will discharge to [facility], [place]. Per vascular surgery, will go home to complete 7 days of PO abx for R groin erythema. Wound care instructions for R groin cutdown: Clean wound with betadine and redress with gauze and paper tape daily. Per Dr. Vernon she is medically clear for discharge to rehab today, 8/14/22.    Of Note: patient should follow up with her vascular surgeon as to the length of therapy she needs to continue to [facility] for left brachial artery occlusion. Their most recent note from previous admission states at least 3 months (discharged end of July).    Over 35 minutes was spent with the patient reviewing the discharge material including medications, follow up appointments, recovery, concerning symptoms, and how to contact their health care providers if they have questions.

## 2022-08-08 NOTE — DISCHARGE NOTE PROVIDER - NSDCFUADDINST_GEN_ALL_CORE_FT
-Walk daily as tolerated and use your incentive spirometer 10 times every hour while you are awake.     -Please weigh yourself daily. If you notice over a 3 pound weight gain in 3 days, this is a sign you are likely retaining too much fluid. It is imperative you call our right away with unexplained rapid weight gain.      -Please continue to wear the compression stockings given to you in the hospital at home. This is a way to prevent fluid from building up in your legs.     -No driving or strenuous activity/exercise until cleared by your surgeon.    -Gently clean your incisions with unscented/antibacterial soap and water, pat dry.  You may leave them open to air.    -Call your doctor if you have shortness of breath, chest pain not relieved by pain medication, dizziness, fever >100.5, or increased redness or drainage from incisions.   -Wound care instructions for R groin cutdown: Clean wound with betadine and redress with gauze and paper tape daily.    -Walk daily as tolerated and use your incentive spirometer 10 times every hour while you are awake.     -Please weigh yourself daily. If you notice over a 3 pound weight gain in 3 days, this is a sign you are likely retaining too much fluid. It is imperative you call our right away with unexplained rapid weight gain.      -Please continue to wear the compression stockings given to you in the hospital at home. This is a way to prevent fluid from building up in your legs.     -No driving or strenuous activity/exercise until cleared by your surgeon.    -Gently clean your incisions with unscented/antibacterial soap and water, pat dry.  You may leave them open to air.    -Call your doctor if you have shortness of breath, chest pain not relieved by pain medication, dizziness, fever >100.5, or increased redness or drainage from incisions.

## 2022-08-08 NOTE — CHART NOTE - NSCHARTNOTEFT_GEN_A_CORE
Admitting Diagnosis:   Patient is a 77y old  Female who presents with a chief complaint of Endoleak (08 Aug 2022 11:58)      PAST MEDICAL & SURGICAL HISTORY:  HTN (hypertension)      H/O aortic valve stenosis      CAD (coronary artery disease)      S/P aortic valve replacement      S/P CABG (coronary artery bypass graft)      H/O aortic arch replacement      Pacemaker  medtronic micra pacemaker    Current Nutrition Order: DASH/TLC Diet    PO Intake: Good (%) [   ]  Fair (50-75%) [  x ] Poor (<25%) [   ]    GI Issues: No nausea/vomiting documented at this time. Pt reports last bowel movement 8/7; notes diarrhea has subsided today.    Pain: No noted pain at time of RD interview.    Skin Integrity: Generalized edema 1+. Surgical incisions per chart. L forearm skin tear per chart. Yousif score: 18.    Labs:         CAPILLARY BLOOD GLUCOSE          Medications:  MEDICATIONS  (STANDING):  amLODIPine   Tablet 2.5 milliGRAM(s) Oral daily  apixaban 5 milliGRAM(s) Oral every 12 hours  aspirin enteric coated 81 milliGRAM(s) Oral daily  atorvastatin 10 milliGRAM(s) Oral at bedtime  budesonide  80 MICROgram(s)/formoterol 4.5 MICROgram(s) Inhaler 2 Puff(s) Inhalation two times a day  ceFAZolin   IVPB 1000 milliGRAM(s) IV Intermittent every 8 hours  chlorhexidine 2% Cloths 1 Application(s) Topical <User Schedule>  fludroCORTISONE 0.1 milliGRAM(s) Oral daily  hydrALAZINE 10 milliGRAM(s) Oral every 8 hours  levothyroxine 50 MICROGram(s) Oral daily  metoprolol tartrate 12.5 milliGRAM(s) Oral every 6 hours  pantoprazole    Tablet 40 milliGRAM(s) Oral before breakfast    MEDICATIONS  (PRN):  acetaminophen     Tablet .. 650 milliGRAM(s) Oral every 6 hours PRN Mild Pain (1 - 3)  oxyCODONE    IR 5 milliGRAM(s) Oral every 4 hours PRN Moderate Pain (4 - 6)  polyethylene glycol 3350 17 Gram(s) Oral daily PRN Constipation    Dosing Anthropometrics:  Height for BMI (FEET)	5 Feet  Height for BMI (INCHES)	2 Inch(s)  Height for BMI (CENTIMETERS)	157.48 Centimeter(s)  Weight for BMI (lbs)	149 lb  Weight for BMI (kg)	67.6 kg  Body Mass Index	27.2  IBW: 110 pounds  135% IBW    Weight Change: No new documented weights in EMR. Obtain biweekly weights to assess changes/trends.    Estimated energy needs: Fluids per team. Based on Standards of Care pt >% IBW thus ideal body weight used for all calculations. Needs adjusted for advanced age and post op.    Estimated Energy Needs From (db/kg) 22  Estimated Energy Needs To (db/kg)	27  Estimated Energy Needs Calculated From (db/kg) 1095  Estimated Energy Needs Calculated To (db/kg)	1344    Estimated Protein Needs From (g/kg)	1.1  Estimated Protein Needs To (g/kg)	1.3  Estimated Protein Needs Calculated From (g/kg)	54.78  Estimated Protein Needs Calculated To (g/kg)	64.74    Subjective: 75 YO Female, previous smoker, w/ PMHx of HTN, spinal stenosis, arthritis, bicuspid aortic valve s/p AVR, ascending/hemiarch replacement with frozen elephant trunk and left aorto-subclavian bypass, CABG x2 with Dr. Muller on 8/9/2021 (post-op course c/b prolonged intubation, SSS s/p PPM, poor wound healing s/p pec flap and closure on 9/29/21 who recently presented to [facility] with acute LUE pain, found to have left brachial occlusion s/p left brachial artery embolectomy, 7/21/22. CT scans at that time showed endoleak and she was scheduled to see Dr. Muller in clinic 7/27/22, however, patient was experiencing persistent left arm and chest pain that radiated to her left shoulder and she presented to [facility] ED for evaluation. 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 On 7/29/22 she underwent a TEVAR with R groin cutdown and femoral artery repair, EF 75% with Dr. Muller and Dr. Augustin (vascular sx). She arrived to the CTICU intubated and hypothermic, received 2L IVF and 2u pRBCs. On POD1 she was extubated. On POD2 BB started. On POD3 noted to be lethargic w/ RLE weakness, stroke code called, CT head negative. CT chest suggestive questionable subsegmental PEs and heparin gtt started. Official CT read revealed no evidence of PE and heparin gtt was thus discontinued. POD 4-6 stayed in CTICU for continued monitoring of lethargy. On POD7 transferred to stepdown unit. LE dopplers negative for DVTs. PT consulted and recommended VERNA upon discharge due to RLE weakness. POD8 TLC removed. Neuro rec repeat CT Head vs MR brain depending on PPM compatibility. POD9 Repeat CT Head obtained per stroke team recs. Keflex started for possible R groin incision infection.     Pt seen at bedside for follow up assessment-on room air. Labs reviewed 8/8; electrolytes within normal limits at this time. Pt reports fair PO intake since last RD interview, reports being able to complete ~50% of meals. Pt additionally requesting Ensure Enlive 1x/day (350 kcal, 20 g protein per serving); RD discussed w/ PA pending verification placed. RD reinforced importance of adequate PO intake; amenable to education. Additionally, pt noted improvement in diarrhea as of 8/8. Of note, per pt, difficulty meeting fluid needs PTA, notes disliking water. Discussed alternative sources of fluids to meet fluid needs per day; amenable to education. Made aware RD remains available. RD to follow up per protocol. See nutrition recommendations below.    Previous Nutrition Diagnosis: Increased protein/kcal needs r/t physiological demand for nutrient AEB post op    Active [ x  ]  Resolved [   ]    If resolved, new PES:     Goal: Pt to meet at least 75% of nutritional needs during hospital stay consistently    Recommendations:  1. Continue with current diet order + Ensure Enlive 1x/day (350 kcal, 20 g protein per serving)   2. Encourage pt to meet nutritional needs as able   3. Encourage adherence to diet education (reinforce as able)   4. Pain and bowel regimen per team   5. Will continue to assess/honor preferences as able   6. Monitor electrolytes; replete PRN    Education: Adequate PO intake; current diet order; types of fluids    Risk Level: High [   ] Moderate [ x  ] Low [   ] Admitting Diagnosis:   Patient is a 77y old  Female who presents with a chief complaint of Endoleak (08 Aug 2022 11:58)      PAST MEDICAL & SURGICAL HISTORY:  HTN (hypertension)      H/O aortic valve stenosis      CAD (coronary artery disease)      S/P aortic valve replacement      S/P CABG (coronary artery bypass graft)      H/O aortic arch replacement      Pacemaker  medtronic micra pacemaker    Current Nutrition Order: DASH/TLC Diet    PO Intake: Good (%) [   ]  Fair (50-75%) [  x ] Poor (<25%) [   ]    GI Issues: No nausea/vomiting documented at this time. Pt reports last bowel movement 8/7; notes diarrhea has subsided today.    Pain: No noted pain at time of RD interview.    Skin Integrity: Generalized edema 1+. Surgical incisions per chart. L forearm skin tear per chart. Yousif score: 18.    Labs:         CAPILLARY BLOOD GLUCOSE          Medications:  MEDICATIONS  (STANDING):  amLODIPine   Tablet 2.5 milliGRAM(s) Oral daily  apixaban 5 milliGRAM(s) Oral every 12 hours  aspirin enteric coated 81 milliGRAM(s) Oral daily  atorvastatin 10 milliGRAM(s) Oral at bedtime  budesonide  80 MICROgram(s)/formoterol 4.5 MICROgram(s) Inhaler 2 Puff(s) Inhalation two times a day  ceFAZolin   IVPB 1000 milliGRAM(s) IV Intermittent every 8 hours  chlorhexidine 2% Cloths 1 Application(s) Topical <User Schedule>  fludroCORTISONE 0.1 milliGRAM(s) Oral daily  hydrALAZINE 10 milliGRAM(s) Oral every 8 hours  levothyroxine 50 MICROGram(s) Oral daily  metoprolol tartrate 12.5 milliGRAM(s) Oral every 6 hours  pantoprazole    Tablet 40 milliGRAM(s) Oral before breakfast    MEDICATIONS  (PRN):  acetaminophen     Tablet .. 650 milliGRAM(s) Oral every 6 hours PRN Mild Pain (1 - 3)  oxyCODONE    IR 5 milliGRAM(s) Oral every 4 hours PRN Moderate Pain (4 - 6)  polyethylene glycol 3350 17 Gram(s) Oral daily PRN Constipation    Dosing Anthropometrics:  Height for BMI (FEET)	5 Feet  Height for BMI (INCHES)	2 Inch(s)  Height for BMI (CENTIMETERS)	157.48 Centimeter(s)  Weight for BMI (lbs)	149 lb  Weight for BMI (kg)	67.6 kg  Body Mass Index	27.2  IBW: 110 pounds  135% IBW    Weight Change: No new documented weights in EMR. Obtain biweekly weights to assess changes/trends.    Estimated energy needs: Fluids per team. Based on Standards of Care pt >% IBW thus ideal body weight used for all calculations. Needs adjusted for advanced age and post op.    Estimated Energy Needs From (db/kg) 22  Estimated Energy Needs To (db/kg)	27  Estimated Energy Needs Calculated From (db/kg) 1095  Estimated Energy Needs Calculated To (db/kg)	1344    Estimated Protein Needs From (g/kg)	1.1  Estimated Protein Needs To (g/kg)	1.3  Estimated Protein Needs Calculated From (g/kg)	54.78  Estimated Protein Needs Calculated To (g/kg)	64.74    Subjective: 75 YO Female, previous smoker, w/ PMHx of HTN, spinal stenosis, arthritis, bicuspid aortic valve s/p AVR, ascending/hemiarch replacement with frozen elephant trunk and left aorto-subclavian bypass, CABG x2 with Dr. Muller on 8/9/2021 (post-op course c/b prolonged intubation, SSS s/p PPM, poor wound healing s/p pec flap and closure on 9/29/21 who recently presented to [facility] with acute LUE pain, found to have left brachial occlusion s/p left brachial artery embolectomy, 7/21/22. CT scans at that time showed endoleak and she was scheduled to see Dr. Muller in clinic 7/27/22, however, patient was experiencing persistent left arm and chest pain that radiated to her left shoulder and she presented to [facility] ED for evaluation. 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 On 7/29/22 she underwent a TEVAR with R groin cutdown and femoral artery repair, EF 75% with Dr. Muller and Dr. Augustin (vascular sx). She arrived to the CTICU intubated and hypothermic, received 2L IVF and 2u pRBCs. On POD1 she was extubated. On POD2 BB started. On POD3 noted to be lethargic w/ RLE weakness, stroke code called, CT head negative. CT chest suggestive questionable subsegmental PEs and heparin gtt started. Official CT read revealed no evidence of PE and heparin gtt was thus discontinued. POD 4-6 stayed in CTICU for continued monitoring of lethargy. On POD7 transferred to stepdown unit. LE dopplers negative for DVTs. PT consulted and recommended VERNA upon discharge due to RLE weakness. POD8 TLC removed. Neuro rec repeat CT Head vs MR brain depending on PPM compatibility. POD9 Repeat CT Head obtained per stroke team recs. Keflex started for possible R groin incision infection.     Pt seen at bedside for follow up assessment-on room air. Labs reviewed 8/8; electrolytes within normal limits at this time. Pt reports fair PO intake since last RD interview, reports being able to complete ~50% of meals. Pt additionally requesting Ensure Enlive 1x/day (350 kcal, 20 g protein per serving); RD discussed w/ PA pending verification placed. RD reinforced importance of adequate PO intake; amenable to education. Additionally, pt noted improvement in diarrhea as of 8/8. Of note, per pt, difficulty meeting fluid needs PTA, notes disliking water. Discussed alternative sources of fluids to meet fluid needs per day; amenable to education. Made aware RD remains available. RD to follow up per protocol. See nutrition recommendations below.    Previous Nutrition Diagnosis: Increased protein/kcal needs r/t physiological demand for nutrient AEB post op    Active [ x  ]  Resolved [   ]    If resolved, new PES:     Goal: Pt to meet at least 75% of nutritional needs during hospital stay consistently    Recommendations:  1. Continue with current diet order + Ensure Enlive 1x/day (350 kcal, 20 g protein per serving)   2. Encourage pt to meet nutritional needs as able   3. Encourage adherence to diet education (reinforce as able)   4. Pain and bowel regimen per team   5. Will continue to assess/honor preferences as able   6. Monitor electrolytes; replete PRN  *pending verification placed*    Education: Adequate PO intake; current diet order; types of fluids    Risk Level: High [   ] Moderate [ x  ] Low [   ]

## 2022-08-08 NOTE — DISCHARGE NOTE PROVIDER - NSDCFUADDAPPT_GEN_ALL_CORE_FT
-please attend your discharge appointments.   -Please reach out to Dr. Brewster's office [phone] if you don't hear from them with an appointment time by Thursday 8/11/22 -Please attend your discharge appointments.   -Please reach out to Dr. Brewster's office [phone] if you don't hear from them with an appointment time by Thursday 8/11/22. -Please attend your discharge appointments.   -Please reach out to Dr. Brewster's office [phone] if you don't hear from them with an appointment time by Monday, 8/15/22.

## 2022-08-08 NOTE — PROGRESS NOTE ADULT - ASSESSMENT
77 YO Female, former smoker, PMH of HTN, spinal stenosis, arthritis, bicuspid aortic valve s/p AVR, ascending/hemiarch replacement with frozen elephant trunk and left aorto-subclavian bypass, CABG x2 with Dr. Muller on 8/9/2021 (post-op course c/b prolonged intubation, SSS s/p PPM, poor wound healing s/p pec flap and closure on 9/29/21 who recently presented to [facility] with acute LUE pain, found to have left brachial occlusion s/p left brachial artery embolectomy, 7/21/22. CT scans at that time showed endoleak and she was scheduled to see Dr. Muller in clinic 7/27/22, however, patient was experiencing persistent left arm and chest pain that radiated to her left shoulder and she presented to [facility] ED for evaluation. 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 On 7/29/22 she underwent a TEVAR with R groin cutdown and femoral artery repair, EF 75% with Dr. Muller and Dr. Augustin (vascular sx). She arrived to the CTICU intubated and hypothermic, received 2L IVF and 2u pRBCs. On POD1 she was extubated. On POD2 BB started. On POD3 noted to be lethargic w/ RLE weakness, stroke code called, CT head negative. CT chest suggestive questionable subsegmental PEs and heparin gtt started. Official CT read revealed no evidence of PE and heparin gtt was thus discontinued. POD 4-6 stayed in CTICU for continued monitoring of lethargy. On POD7 transferred to stepdown unit. LE dopplers negative for DVTs. PT consulted and recommended VERNA upon discharge due to RLE weakness. POD8 TLC removed. POD9 Repeat CT Head obtained per stroke team & Keflex started for possible R groin incision infection. Today, POD 10, patient is medically cleared for d/c, pending authorization for acute rehab.     Neurovascular: No delirium.   - RLE weakness   - Likely 2/2 right groin pain   - Appreciate stroke neuro recommendations   - Repeat CTH on 8/7 with no acute changes   - History of arthritis and spinal stenosis   -Pain regimen, PRN's: Tylenol & Oxycodone     Cardiovascular:   -POD 10 TEVAR with right groin cutdown and femoral artery repair  - HTN  - HLD  - Continue Aspirin 81mg, Metoprolol 12.5 q6hr, Norvasc 2.5mg, Hydralazine 12mg q8hr  - Furosemide 20mg PO daily  -Hemodynamically stable. HR controlled.  -Continue to monitor HR, BP, telemetry      Respiratory: Stable  - Continue Symbicort   -Oxygenating well on RA 94%  -Encourage coughing and use of IS 10x / hr while awake.      GI: Stable  -PPX: Protonix  -PO Diet: DASH/TLC  -Bowel regimen: Miralax    Renal / : Hypokalemia  - Furosemide 20mg po daily with potassium supplementation   -Monitor renal function.  -Monitor I/O's.  -BUN/Cr: 16 & 0.85    Endocrine:  Hypothyroidism   -A1c: 5.6  -TSH: 10.380; T4 5.8  - Continue synthroid     Hematologic: -L brachial occlusion s/p L brachial artery embolectomy (7/21/22) w/ vascular surgery  - Resume Eliquis 5mg BID   -H/H: 9.6 & 30.3    ID: Right groin incision infection   - Ancef 1g q8hr IVPB  - Chlorhexidine cleanse daily & maintain intra-dry in abdominal skin fold    -Temperature: 37.3  -WBC: 7.95  -Observe for SIRS/Sepsis Syndrome.    Prophylaxis:  - On Eliquis   -Continue with SCD's    Disposition:  -Discharge home when medically appropriate  75 YO Female, former smoker, PMH of HTN, spinal stenosis, arthritis, bicuspid aortic valve s/p AVR, ascending/hemiarch replacement with frozen elephant trunk and left aorto-subclavian bypass, CABG x2 with Dr. Muller on 8/9/2021 (post-op course c/b prolonged intubation, SSS s/p PPM, poor wound healing s/p pec flap and closure on 9/29/21 who recently presented to [facility] with acute LUE pain, found to have left brachial occlusion s/p left brachial artery embolectomy, 7/21/22. CT scans at that time showed endoleak and she was scheduled to see Dr. Muller in clinic 7/27/22, however, patient was experiencing persistent left arm and chest pain that radiated to her left shoulder and she presented to [facility] ED for evaluation. 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 On 7/29/22 she underwent a TEVAR with R groin cutdown and femoral artery repair, EF 75% with Dr. Muller and Dr. Augustin (vascular sx). She arrived to the CTICU intubated and hypothermic, received 2L IVF and 2u pRBCs. On POD1 she was extubated. On POD2 BB started. On POD3 noted to be lethargic w/ RLE weakness, stroke code called, CT head negative. CT chest suggestive questionable subsegmental PEs and heparin gtt started. Official CT read revealed no evidence of PE and heparin gtt was thus discontinued. POD 4-6 stayed in CTICU for continued monitoring of lethargy. On POD7 transferred to stepdown unit. LE dopplers negative for DVTs. PT consulted and recommended VERNA upon discharge due to RLE weakness. POD8 TLC removed. POD9 Repeat CT Head obtained per stroke team & Keflex started for possible R groin incision infection. Today, POD 10, patient is medically cleared for d/c, pending authorization for acute rehab.     Neurovascular: No delirium.   - RLE weakness   - Likely 2/2 right groin pain   - Appreciate stroke neuro recommendations   - Repeat CTH on 8/7 with no acute changes   - History of arthritis and spinal stenosis   -Pain regimen, PRN's: Tylenol & Oxycodone     Cardiovascular:   -POD 10 TEVAR with right groin cutdown and femoral artery repair  - HTN  - HLD  - Continue Aspirin 81mg, Metoprolol 12.5 q6hr, Norvasc 2.5mg, Hydralazine 12mg q8hr  - Furosemide 20mg PO daily  -Hemodynamically stable. HR controlled.  -Continue to monitor HR, BP, telemetry      Respiratory: Stable  - Continue Symbicort   -Oxygenating well on RA 94%  -Encourage coughing and use of IS 10x / hr while awake.      GI: Stable  -PPX: Protonix  -PO Diet: DASH/TLC  -Bowel regimen: Miralax    Renal / : Hypokalemia  - Furosemide 20mg po daily with potassium supplementation   -Monitor renal function.  -Monitor I/O's.  -BUN/Cr: 16 & 0.85    Endocrine:  Hypothyroidism   -A1c: 5.6  -TSH: 10.380; T4 5.8  - Continue synthroid     Hematologic: -L brachial occlusion s/p L brachial artery embolectomy (7/21/22) w/ vascular surgery  - Resume Eliquis 5mg BID   -H/H: 9.6 & 30.3    ID: Right groin incision infection   - Ancef 1g q8hr IVPB  - Chlorhexidine cleanse daily & maintain intra-dry in abdominal skin fold    - Right groin US pending; r/o hematoma, pseudoaneurysm   -Temperature: 37.3  -WBC: 7.95  -Observe for SIRS/Sepsis Syndrome.    Prophylaxis:  - On Eliquis   -Continue with SCD's    Disposition:  -Discharge home when medically appropriate

## 2022-08-08 NOTE — PROGRESS NOTE ADULT - ASSESSMENT
This is a 76 yoF with HTN, spinal stenosis, arthritis, bicuspid aortic valve s/p AVR, ascending/hemiarch replacement and left aorto-subclavian bypass, CABG x2 (8/9/2021), recent admission at [facility] for left brachial occlusion s/p left brachial artery embolectomy who presented with worsening LUE pain radiating to shoulder/neck/chest now on POD10 following TEVAR on 7/29/22.    Thinking: Agree with ABx for superficial surgical site infection of right groin.    - F/U CBC today to check for leukocytosis.   - Continue cephalexin.   - Appreciate excellent care per primary team  - Vascular Surgery (Team 3C) following

## 2022-08-08 NOTE — DISCHARGE NOTE PROVIDER - NSDCFUSCHEDAPPT_GEN_ALL_CORE_FT
Aron Blank Physician [facility]  Cardio [address]  Scheduled Appointment: 09/12/2022    Parvez Khanna  [place] Physician [facility]  Cardio Vasc [address]  Scheduled Appointment: 10/13/2022     Hugo Augustin  [facility] Physician [facility]  VASCULAR [address]  Scheduled Appointment: 08/23/2022    Aron Blank  [facility] Physician [facility]  Cardio [address]  Scheduled Appointment: 09/12/2022    Parvez Khanna  [facility] Physician [facility]  Cardio Vasc [address]  Scheduled Appointment: 10/13/2022     Hugo Augustin  [facility] Physician [facility]  VASCULAR [address]  Scheduled Appointment: 08/23/2022    Bladimir Muller  [facility]  CTSURG [address]  Scheduled Appointment: 08/23/2022    Aron Blank  [facility]  Cardio [address]  Scheduled Appointment: 09/12/2022    Parvez Khanna  [facility]  Cardio Vasc [address]  Scheduled Appointment: 10/13/2022

## 2022-08-08 NOTE — PROGRESS NOTE ADULT - SUBJECTIVE AND OBJECTIVE BOX
IDENTIFICATION: This is a 76 yoF with HTN, spinal stenosis, arthritis, bicuspid aortic valve s/p AVR, ascending/hemiarch replacement and left aorto-subclavian bypass, CABG x2 (8/9/2021), recent admission at [facility] for left brachial occlusion s/p left brachial artery embolectomy who presented with worsening LUE pain radiating to shoulder/neck/chest now on POD10 following TEVAR on 7/29/22.    EVENTS:   - Started on cephalexin over weekend for right groin erythema.  - Moved to step down today.    SUBJECTIVE:   - Notes no new pain in extremities but mild discomfort in right gorin.  - Denies CP, SOB  - Denies new concerns  - Otherwise doing well; was able to walk yesterday without difficulty.        MEDICATIONS  (STANDING):  amLODIPine   Tablet 2.5 milliGRAM(s) Oral daily  apixaban 5 milliGRAM(s) Oral every 12 hours  aspirin enteric coated 81 milliGRAM(s) Oral daily  atorvastatin 10 milliGRAM(s) Oral at bedtime  budesonide  80 MICROgram(s)/formoterol 4.5 MICROgram(s) Inhaler 2 Puff(s) Inhalation two times a day  ceFAZolin   IVPB 1000 milliGRAM(s) IV Intermittent every 8 hours  chlorhexidine 2% Cloths 1 Application(s) Topical <User Schedule>  fludroCORTISONE 0.1 milliGRAM(s) Oral daily  hydrALAZINE 10 milliGRAM(s) Oral every 8 hours  levothyroxine 50 MICROGram(s) Oral daily  metoprolol tartrate 12.5 milliGRAM(s) Oral every 6 hours  pantoprazole    Tablet 40 milliGRAM(s) Oral before breakfast  testosterone patch 4 mG/24 Hr(s) 4 milliGRAM(s) Transdermal daily    MEDICATIONS  (PRN):  acetaminophen     Tablet .. 650 milliGRAM(s) Oral every 6 hours PRN Mild Pain (1 - 3)  oxyCODONE    IR 5 milliGRAM(s) Oral every 4 hours PRN Moderate Pain (4 - 6)  polyethylene glycol 3350 17 Gram(s) Oral daily PRN Constipation      Vital Signs Last 24 Hrs  T(C): 36.3 (08 Aug 2022 09:23), Max: 37.3 (07 Aug 2022 21:53)  T(F): 97.4 (08 Aug 2022 09:23), Max: 99.2 (07 Aug 2022 21:53)  HR: 82 (08 Aug 2022 09:12) (59 - 82)  BP: 125/57 (08 Aug 2022 09:12) (125/57 - 160/69)  BP(mean): 82 (08 Aug 2022 09:12) (82 - 100)  RR: 14 (08 Aug 2022 09:12) (14 - 16)  SpO2: 100% (08 Aug 2022 09:12) (93% - 100%)    Parameters below as of 08 Aug 2022 09:25  Patient On (Oxygen Delivery Method): room air          Neurologic: AAOx3  Cardiac: Normal rate, regular rhythm  Vascular: Doppler signals multiphasic in DP/PT bilaterally  Pulm: Breathing comfortably  Abd: Soft, non-distended; No TTP throughout.   Incision: Right groin with mild induration and erythema surrounding incision. Mild mucoid discharge over incision.   : No Arreola  Skin: No rashes  Extremities: No edema.  Psychiatric: Interacting normally.     I&O's Detail    07 Aug 2022 07:01  -  08 Aug 2022 07:00  --------------------------------------------------------  IN:  Total IN: 0 mL    OUT:    Voided (mL): 900 mL  Total OUT: 900 mL    Total NET: -900 mL      08 Aug 2022 07:01  -  08 Aug 2022 11:59  --------------------------------------------------------  IN:    Oral Fluid: 175 mL  Total IN: 175 mL    OUT:    Voided (mL): 40 mL  Total OUT: 40 mL    Total NET: 135 mL          LABS:                RADIOLOGY & ADDITIONAL STUDIES:

## 2022-08-08 NOTE — DISCHARGE NOTE PROVIDER - NSDCCPCAREPLAN_GEN_ALL_CORE_FT
PRINCIPAL DISCHARGE DIAGNOSIS  Diagnosis: Type II endoleak of aortic graft  Assessment and Plan of Treatment: Type of endoleak is unspecified

## 2022-08-09 NOTE — PROGRESS NOTE ADULT - SUBJECTIVE AND OBJECTIVE BOX
Patient discussed on morning rounds with Dr. Vernon    Operation / Date: 7/29: TEVAR, Right groin cutdown and femoral artery repair, EF 75%    SUBJECTIVE ASSESSMENT:  77y Female seen and examined at bedside.  Patient stating right left pain improving.  Denies chest pain, shortness of breath, nausea, vomiting.        Vital Signs Last 24 Hrs  T(C): 37.1 (09 Aug 2022 09:05), Max: 37.1 (09 Aug 2022 09:05)  T(F): 98.7 (09 Aug 2022 09:05), Max: 98.7 (09 Aug 2022 09:05)  HR: 70 (09 Aug 2022 09:05) (68 - 78)  BP: 129/60 (09 Aug 2022 09:05) (129/60 - 149/66)  BP(mean): 86 (09 Aug 2022 09:05) (86 - 98)  RR: 16 (09 Aug 2022 09:05) (16 - 18)  SpO2: 97% (09 Aug 2022 09:05) (94% - 100%)    Parameters below as of 09 Aug 2022 09:05  Patient On (Oxygen Delivery Method): room air      I&O's Detail    08 Aug 2022 07:01  -  09 Aug 2022 07:00  --------------------------------------------------------  IN:    IV PiggyBack: 50 mL    Oral Fluid: 400 mL  Total IN: 450 mL    OUT:    Voided (mL): 140 mL  Total OUT: 140 mL    Total NET: 310 mL          CHEST TUBE:  No.   FRANCHESKA DRAIN:  No.  EPICARDIAL WIRES: No.  TIE DOWNS: No.  REGALADO: No.    PHYSICAL EXAM:  Appearance: No acute distress.  Neurologic: AAOx3, no AMS or focal deficits.  Responds appropriately to verbal and physical stimuli; exhibits purposeful movement in all extremities.  Neck: Supple  Cardiovascular: RRR, S1 S2. No m/r/g.  Respiratory: No acute respiratory distress. CTA b/l, no w/r/r.   Gastrointestinal:  Soft, non-tender, non-distended, + BS.	  Skin: No rashes. No ecchymoses. No cyanosis.  Extremities: Exhibits normal range of motion, no clubbing, cyanosis or edema.  Vascular: Peripheral pulses palpable 2+ bilaterally.   Incision Sites: Right groin incision erythematous with no expressible drainage    LABS:                        9.1    8.77  )-----------( 186      ( 09 Aug 2022 05:28 )             29.3       COUMADIN:  Yes/No. REASON: .        08-09    138  |  106  |  16  ----------------------------<  87  4.7   |  23  |  0.92    Ca    8.7      09 Aug 2022 05:28  Phos  3.5     08-09  Mg     2.2     08-09            MEDICATIONS  (STANDING):  amLODIPine   Tablet 2.5 milliGRAM(s) Oral daily  apixaban 5 milliGRAM(s) Oral every 12 hours  aspirin enteric coated 81 milliGRAM(s) Oral daily  atorvastatin 10 milliGRAM(s) Oral at bedtime  budesonide  80 MICROgram(s)/formoterol 4.5 MICROgram(s) Inhaler 2 Puff(s) Inhalation two times a day  ceFAZolin   IVPB 1000 milliGRAM(s) IV Intermittent every 8 hours  chlorhexidine 2% Cloths 1 Application(s) Topical <User Schedule>  furosemide    Tablet 20 milliGRAM(s) Oral daily  hydrALAZINE 10 milliGRAM(s) Oral every 8 hours  levothyroxine 50 MICROGram(s) Oral daily  metoprolol tartrate 12.5 milliGRAM(s) Oral every 6 hours  pantoprazole    Tablet 40 milliGRAM(s) Oral before breakfast  potassium chloride    Tablet ER 10 milliEquivalent(s) Oral daily  potassium chloride    Tablet ER 20 milliEquivalent(s) Oral once    MEDICATIONS  (PRN):  acetaminophen     Tablet .. 650 milliGRAM(s) Oral every 6 hours PRN Mild Pain (1 - 3)  polyethylene glycol 3350 17 Gram(s) Oral daily PRN Constipation        RADIOLOGY & ADDITIONAL TESTS:

## 2022-08-09 NOTE — PROGRESS NOTE ADULT - ASSESSMENT
This is a 76 yoF with HTN, spinal stenosis, arthritis, bicuspid aortic valve s/p AVR, ascending/hemiarch replacement and left aorto-subclavian bypass, CABG x2 (8/9/2021), recent admission at [facility] for left brachial occlusion s/p left brachial artery embolectomy who presented with worsening LUE pain radiating to shoulder/neck/chest now on POD11 following TEVAR on 7/29/22.    Thinking: Agree with ABx for superficial surgical site infection of right groin.    - Continue cefazolin.   - Cleaned wound with betadine and redressed today.  - Appreciate excellent care per primary team  - Vascular Surgery (Team 3C) following

## 2022-08-09 NOTE — PROGRESS NOTE ADULT - ASSESSMENT
75 YO Female, former smoker, PMH of HTN, spinal stenosis, arthritis, bicuspid aortic valve s/p AVR, ascending/hemiarch replacement with frozen elephant trunk and left aorto-subclavian bypass, CABG x2 with Dr. Muller on 8/9/2021 (post-op course c/b prolonged intubation, SSS s/p PPM, poor wound healing s/p pec flap and closure on 9/29/21 who recently presented to [facility] with acute LUE pain, found to have left brachial occlusion s/p left brachial artery embolectomy, 7/21/22. CT scans at that time showed endoleak and she was scheduled to see Dr. Muller in clinic 7/27/22, however, patient was experiencing persistent left arm and chest pain that radiated to her left shoulder and she presented to [facility] ED for evaluation. 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 On 7/29/22 she underwent a TEVAR with R groin cutdown and femoral artery repair, EF 75% with Dr. Muller and Dr. Augustin (vascular sx). She arrived to the CTICU intubated and hypothermic, received 2L IVF and 2u pRBCs. On POD1 she was extubated. On POD2 BB started. On POD3 noted to be lethargic w/ RLE weakness, stroke code called, CT head negative. CT chest suggestive questionable subsegmental PEs and heparin gtt started. Official CT read revealed no evidence of PE and heparin gtt was thus discontinued. POD 4-6 stayed in CTICU for continued monitoring of lethargy. On POD7 transferred to stepdown unit. LE dopplers negative for DVTs. PT consulted and recommended VERNA upon discharge due to RLE weakness. POD8 TLC removed. POD9 Repeat CT Head obtained per stroke team & Keflex started for possible R groin incision infection. POD 10-11, patient is medically cleared for d/c, pending authorization for acute rehab.     Neurovascular: No delirium.   - RLE weakness   - Likely 2/2 right groin pain   - Appreciate stroke neuro recommendations   - Repeat CTH on 8/7 with no acute changes   - History of arthritis and spinal stenosis   -Pain regimen, PRN's: Tylenol & Oxycodone     Cardiovascular:   -POD 11 TEVAR with right groin cutdown and femoral artery repair  - HTN  - HLD  - Continue Aspirin 81mg, Metoprolol 12.5 q6hr, Norvasc 2.5mg, Hydralazine 12mg q8hr  - Furosemide 20mg PO daily  -Hemodynamically stable. HR controlled.  -Continue to monitor HR, BP, telemetry      Respiratory: Stable  - Continue Symbicort   -Oxygenating well on RA 94%  -Encourage coughing and use of IS 10x / hr while awake.      GI: Stable  -PPX: Protonix  -PO Diet: DASH/TLC  -Bowel regimen: Miralax    Renal / : Hypokalemia  - Furosemide 20mg po daily with potassium supplementation   -Monitor renal function.  -Monitor I/O's.  -BUN/Cr: 16/0.92    Endocrine:  Hypothyroidism   -A1c: 5.6  -TSH: 10.380; T4 5.8  - Continue synthroid     Hematologic: -L brachial occlusion s/p L brachial artery embolectomy (7/21/22) w/ vascular surgery  - Resume Eliquis 5mg BID   -H/H: 9.1/29.3    ID: Right groin incision infection   - Ancef 1g q8hr IVPB  - Chlorhexidine cleanse daily & maintain intra-dry in abdominal skin fold    - Right groin US: negative for hematoma or pseudoanuerysm  -WBC: 8.77  -Observe for SIRS/Sepsis Syndrome.    Prophylaxis:  - On Eliquis   -Continue with SCD's    Disposition:  -Discharge to rehab, pending auth

## 2022-08-09 NOTE — PROGRESS NOTE ADULT - SUBJECTIVE AND OBJECTIVE BOX
IDENTIFICATION: This is a 76 yoF with HTN, spinal stenosis, arthritis, bicuspid aortic valve s/p AVR, ascending/hemiarch replacement and left aorto-subclavian bypass, CABG x2 (8/9/2021), recent admission at [facility] for left brachial occlusion s/p left brachial artery embolectomy who presented with worsening LUE pain radiating to shoulder/neck/chest now on POD11 following TEVAR on 7/29/22.    EVENTS:   - Switched to cefazolin last night for ongoing right groin erythema.  - US last night with no obvious fluid collection or pseudoaneurysm.   - WBC 8.77 from 7.95    SUBJECTIVE:   - Notes no new pain in extremities but mild discomfort in right groin.  - Denies CP, SOB  - Denies new concerns  - Otherwise doing well; has been up and ambulating without difficulty    MEDICATIONS  (STANDING):  amLODIPine   Tablet 2.5 milliGRAM(s) Oral daily  apixaban 5 milliGRAM(s) Oral every 12 hours  aspirin enteric coated 81 milliGRAM(s) Oral daily  atorvastatin 10 milliGRAM(s) Oral at bedtime  budesonide  80 MICROgram(s)/formoterol 4.5 MICROgram(s) Inhaler 2 Puff(s) Inhalation two times a day  ceFAZolin   IVPB 1000 milliGRAM(s) IV Intermittent every 8 hours  chlorhexidine 2% Cloths 1 Application(s) Topical <User Schedule>  furosemide    Tablet 20 milliGRAM(s) Oral daily  hydrALAZINE 10 milliGRAM(s) Oral every 8 hours  levothyroxine 50 MICROGram(s) Oral daily  metoprolol tartrate 12.5 milliGRAM(s) Oral every 6 hours  pantoprazole    Tablet 40 milliGRAM(s) Oral before breakfast  potassium chloride    Tablet ER 10 milliEquivalent(s) Oral daily  potassium chloride    Tablet ER 20 milliEquivalent(s) Oral once    MEDICATIONS  (PRN):  acetaminophen     Tablet .. 650 milliGRAM(s) Oral every 6 hours PRN Mild Pain (1 - 3)  polyethylene glycol 3350 17 Gram(s) Oral daily PRN Constipation      Vital Signs Last 24 Hrs  T(C): 36.3 (09 Aug 2022 06:00), Max: 36.3 (08 Aug 2022 09:23)  T(F): 97.3 (09 Aug 2022 06:00), Max: 97.4 (08 Aug 2022 09:23)  HR: 73 (09 Aug 2022 05:40) (68 - 82)  BP: 133/60 (09 Aug 2022 05:40) (125/57 - 149/66)  BP(mean): 86 (09 Aug 2022 05:40) (82 - 98)  RR: 17 (09 Aug 2022 05:40) (14 - 18)  SpO2: 94% (09 Aug 2022 05:40) (94% - 100%)    Parameters below as of 09 Aug 2022 05:40  Patient On (Oxygen Delivery Method): room air    Neurologic: AAOx3  Cardiac: Normal rate, regular rhythm  Vascular: Doppler signals multiphasic in DP/PT bilaterally  Pulm: Breathing comfortably  Abd: Soft, non-distended; No TTP throughout.   Incision: Right groin with mild induration and erythema surrounding incision. Mild fibrinous debris in inferior edge of incision.   : No Arreola  Skin: No rashes  Extremities: No edema.  Psychiatric: Interacting normally.     I&O's Detail    08 Aug 2022 07:01  -  09 Aug 2022 07:00  --------------------------------------------------------  IN:    IV PiggyBack: 50 mL    Oral Fluid: 400 mL  Total IN: 450 mL    OUT:    Voided (mL): 140 mL  Total OUT: 140 mL    Total NET: 310 mL          LABS:                        9.1    8.77  )-----------( 186      ( 09 Aug 2022 05:28 )             29.3     08-09    138  |  106  |  16  ----------------------------<  87  4.7   |  23  |  0.92    Ca    8.7      09 Aug 2022 05:28  Phos  3.5     08-09  Mg     2.2     08-09

## 2022-08-10 NOTE — PROGRESS NOTE ADULT - SUBJECTIVE AND OBJECTIVE BOX
Operation / Date: TEVAR, R groin Cutdown and femoral artery repair EF 75%    SUBJECTIVE ASSESSMENT: Patient seen and examined at bedside. Patient states that she feels better today w/ improving appetite, able to ambulate from bed to chair. Denies chills, chest pain, SOB, palpitations, N/V.     Vital Signs Last 24 Hrs  T(C): 36.4 (10 Aug 2022 09:29), Max: 36.4 (09 Aug 2022 21:31)  T(F): 97.5 (10 Aug 2022 09:29), Max: 97.6 (09 Aug 2022 21:31)  HR: 63 (10 Aug 2022 09:09) (63 - 85)  BP: 111/56 (10 Aug 2022 09:09) (110/56 - 145/65)  BP(mean): 80 (10 Aug 2022 09:09) (77 - 95)  RR: 18 (10 Aug 2022 09:09) (14 - 18)  SpO2: 96% (10 Aug 2022 09:09) (93% - 98%)    Parameters below as of 10 Aug 2022 09:09  Patient On (Oxygen Delivery Method): room air    I&O's Detail    09 Aug 2022 07:01  -  10 Aug 2022 07:00  --------------------------------------------------------  IN:    IV PiggyBack: 50 mL  Total IN: 50 mL    OUT:  Total OUT: 0 mL    Total NET: 50 mL    CHEST TUBE: None  FRANCHESKA DRAIN:  None  EPICARDIAL WIRES: None  TIE DOWNS: None  REGALADO: None    PHYSICAL EXAM:  GENERAL: NAD, sitting upright in bed comfortably  HEAD:  Atraumatic, Normocephalic  EYES: EOMI, PERRLA, conjunctiva and sclera clear  ENT: Moist mucous membranes  NECK: Supple, No JVD. Dressing on R side of neck C/D/I  CHEST/LUNG: Clear to auscultation bilaterally; No rales, rhonchi, wheezing, or rubs. Unlabored respirations  HEART: Regular rate and rhythm; No murmurs, rubs, or gallops  ABDOMEN: Bowel sounds present; Soft, Nontender, Nondistended. No hepatomegally  GROIN: R groin incision well approximated, minimal purulence present in medial aspect of incision, improving.  EXTREMITIES: Non-pitting edema bilaterally. 2+ Peripheral Pulses, brisk capillary refill. No clubbing or cyanosis  NERVOUS SYSTEM:  Alert & Oriented X3, speech clear. No deficits     LABS:                        9.1    8.77  )-----------( 186      ( 09 Aug 2022 05:28 )             29.3     08-09    138  |  106  |  16  ----------------------------<  87  4.7   |  23  |  0.92    Ca    8.7      09 Aug 2022 05:28  Phos  3.5     08-09  Mg     2.2     08-09    MEDICATIONS  (STANDING):  amLODIPine   Tablet 2.5 milliGRAM(s) Oral daily  apixaban 5 milliGRAM(s) Oral every 12 hours  aspirin enteric coated 81 milliGRAM(s) Oral daily  atorvastatin 10 milliGRAM(s) Oral at bedtime  budesonide  80 MICROgram(s)/formoterol 4.5 MICROgram(s) Inhaler 2 Puff(s) Inhalation two times a day  ceFAZolin   IVPB 1000 milliGRAM(s) IV Intermittent every 8 hours  chlorhexidine 2% Cloths 1 Application(s) Topical <User Schedule>  furosemide    Tablet 20 milliGRAM(s) Oral daily  hydrALAZINE 10 milliGRAM(s) Oral every 8 hours  levothyroxine 50 MICROGram(s) Oral daily  metoprolol tartrate 12.5 milliGRAM(s) Oral every 6 hours  pantoprazole    Tablet 40 milliGRAM(s) Oral before breakfast  potassium chloride    Tablet ER 10 milliEquivalent(s) Oral daily  potassium chloride    Tablet ER 20 milliEquivalent(s) Oral once  traMADol 25 milliGRAM(s) Oral every 8 hours    MEDICATIONS  (PRN):  acetaminophen     Tablet .. 650 milliGRAM(s) Oral every 6 hours PRN Mild Pain (1 - 3)  polyethylene glycol 3350 17 Gram(s) Oral daily PRN Constipation    RADIOLOGY & ADDITIONAL TESTS:  < from: Xray Chest 1 View- PORTABLE-Routine (Xray Chest 1 View- PORTABLE-Routine in AM.) (08.09.22 @ 05:59) >  FINDINGS:    Single frontal view of the chest demonstrates left basilar atelectasis.   The cardiomediastinal silhouette is enlarged. No acute osseous   abnormalities. Overlying EKG leads and wires are noted. Aortic stenting.   Leadless pacemaker.    IMPRESSION: No interval change.

## 2022-08-10 NOTE — PROGRESS NOTE ADULT - SUBJECTIVE AND OBJECTIVE BOX
IDENTIFICATION: This is a 76 yoF with HTN, spinal stenosis, arthritis, bicuspid aortic valve s/p AVR, ascending/hemiarch replacement and left aorto-subclavian bypass, CABG x2 (8/9/2021), recent admission at [facility] for left brachial occlusion s/p left brachial artery embolectomy who presented with worsening LUE pain radiating to shoulder/neck/chest now on POD11 following TEVAR on 7/29/22.    EVENTS:   - No acute events (pending auth for rehab placement)    SUBJECTIVE:   - Notes no new pain in extremities  - Denies CP, SOB  - Denies new concerns  - Otherwise doing well; has been up and ambulating without difficulty. Ready to leave hospital         MEDICATIONS  (STANDING):  amLODIPine   Tablet 2.5 milliGRAM(s) Oral daily  apixaban 5 milliGRAM(s) Oral every 12 hours  aspirin enteric coated 81 milliGRAM(s) Oral daily  atorvastatin 10 milliGRAM(s) Oral at bedtime  budesonide  80 MICROgram(s)/formoterol 4.5 MICROgram(s) Inhaler 2 Puff(s) Inhalation two times a day  ceFAZolin   IVPB 1000 milliGRAM(s) IV Intermittent every 8 hours  chlorhexidine 2% Cloths 1 Application(s) Topical <User Schedule>  furosemide    Tablet 20 milliGRAM(s) Oral daily  hydrALAZINE 10 milliGRAM(s) Oral every 8 hours  levothyroxine 50 MICROGram(s) Oral daily  metoprolol tartrate 12.5 milliGRAM(s) Oral every 6 hours  pantoprazole    Tablet 40 milliGRAM(s) Oral before breakfast  potassium chloride    Tablet ER 10 milliEquivalent(s) Oral daily  potassium chloride    Tablet ER 20 milliEquivalent(s) Oral once  traMADol 25 milliGRAM(s) Oral every 8 hours    MEDICATIONS  (PRN):  acetaminophen     Tablet .. 650 milliGRAM(s) Oral every 6 hours PRN Mild Pain (1 - 3)  polyethylene glycol 3350 17 Gram(s) Oral daily PRN Constipation      Vital Signs Last 24 Hrs  T(C): 36.6 (10 Aug 2022 17:03), Max: 36.8 (10 Aug 2022 14:27)  T(F): 97.8 (10 Aug 2022 17:03), Max: 98.2 (10 Aug 2022 14:27)  HR: 67 (10 Aug 2022 17:25) (63 - 77)  BP: 148/66 (10 Aug 2022 17:25) (111/56 - 148/66)  BP(mean): 95 (10 Aug 2022 17:25) (77 - 95)  RR: 18 (10 Aug 2022 17:25) (18 - 18)  SpO2: 98% (10 Aug 2022 17:25) (92% - 98%)    Parameters below as of 10 Aug 2022 17:25  Patient On (Oxygen Delivery Method): room air      Neurologic: AAOx3  Cardiac: Normal rate, regular rhythm  Vascular: Doppler signals multiphasic in DP/PT bilaterally  Pulm: Breathing comfortably  Abd: Soft, non-distended; No TTP throughout.   Incision: Right groin with improving, mild induration and erythema surrounding incision. Mild fibrinous debris in inferior edge of incision.   : No Arreola  Skin: No rashes  Extremities: No edema.  Psychiatric: Interacting normally.   I&O's Detail    09 Aug 2022 07:01  -  10 Aug 2022 07:00  --------------------------------------------------------  IN:    IV PiggyBack: 50 mL  Total IN: 50 mL    OUT:  Total OUT: 0 mL    Total NET: 50 mL      10 Aug 2022 07:01  -  10 Aug 2022 18:28  --------------------------------------------------------  IN:    IV PiggyBack: 50 mL  Total IN: 50 mL    OUT:    Voided (mL): 100 mL  Total OUT: 100 mL    Total NET: -50 mL          LABS:                        9.1    8.77  )-----------( 186      ( 09 Aug 2022 05:28 )             29.3     08-09    138  |  106  |  16  ----------------------------<  87  4.7   |  23  |  0.92    Ca    8.7      09 Aug 2022 05:28  Phos  3.5     08-09  Mg     2.2     08-09

## 2022-08-10 NOTE — PROGRESS NOTE ADULT - ASSESSMENT
77 YO Female, previous smoker, w/ PMHx of HTN, spinal stenosis, arthritis, bicuspid aortic valve s/p AVR, ascending/hemiarch replacement with frozen elephant trunk and left aorto-subclavian bypass, CABG x2 with Dr. 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 CT scans at that time showed endoleak and she was scheduled to see Dr. Muller in clinic 7/27/22, however, patient was experiencing persistent left arm and chest pain that radiated to her left shoulder and she presented to [facility] ED for evaluation. 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 On 7/29/22 she underwent a TEVAR with R groin cutdown and femoral artery repair, EF 75% with Dr. Muller and Dr. Augustin (vascular sx). She arrived to the CTICU intubated and hypothermic, received 2L IVF and 2u pRBCs. On POD1 she was extubated. On POD2 BB started. On POD3 noted to be lethargic w/ RLE weakness, stroke code called, CT head negative. CT chest suggestive questionable subsegmental PEs and heparin gtt started. Official CT read revealed no evidence of PE and heparin gtt was thus discontinued. POD 4-6 stayed in CTICU for continued monitoring of lethargy. On POD7 transferred to stepdown unit. LE dopplers negative for DVTs. PT consulted and recommended VERNA upon discharge due to RLE weakness. POD8 TLC removed. Neuro rec repeat CT Head vs MR brain depending on PPM compatibility. POD9 Repeat CT Head repeated per stroke team recs, no new infarctions identified. Keflex started for possible R groin incision infection. POD 10-12 Patient is medically cleared for d/c, pending authorization for acute rehab.    Plan:    Neurovascular:   -Pain well controlled with current regimen. PRN's:    Cardiovascular:   -Hemodynamically stable.   -Monitor: BP, HR, tele    Respiratory:   -Oxygenating well on room air  -Encourage continued use of IS 10x/hr and frequent ambulation  -CXR:    GI:  -GI PPX:  -PO Diet  -Bowel Regimen:     Renal / :  -Continue to monitor renal function: BUN/Cr  -Monitor I/O's daily     Endocrine:    -No hx of DM or thyroid dx  -A1c:  -TSH:    Hematologic:  -CBC: H/H-  -Coagulation Panel.    ID:  -Temperature:   -CBC: WBC-  -Continue to observe for SIRS/Sepsis Syndrome.    Prophylaxis:  -DVT prophylaxis with 5000 SubQ Heparin q8h.  -Continue with SCD's b/l while patient is at rest     Disposition:  -Discharge home once authorization    75 YO Female, previous smoker, w/ PMHx of HTN, spinal stenosis, arthritis, bicuspid aortic valve s/p AVR, ascending/hemiarch replacement with frozen elephant trunk and left aorto-subclavian bypass, CABG x2 with Dr. 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 CT scans at that time showed endoleak and she was scheduled to see Dr. Muller in clinic 7/27/22, however, patient was experiencing persistent left arm and chest pain that radiated to her left shoulder and she presented to [facility] ED for evaluation. 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 On 7/29/22 she underwent a TEVAR with R groin cutdown and femoral artery repair, EF 75% with Dr. Muller and Dr. Augustin (vascular sx). She arrived to the CTICU intubated and hypothermic, received 2L IVF and 2u pRBCs. On POD1 she was extubated. On POD2 BB started. On POD3 noted to be lethargic w/ RLE weakness, stroke code called, CT head negative. CT chest suggestive questionable subsegmental PEs and heparin gtt started. Official CT read revealed no evidence of PE and heparin gtt was thus discontinued. POD 4-6 stayed in CTICU for continued monitoring of lethargy. On POD7 transferred to stepdown unit. LE dopplers negative for DVTs. PT consulted and recommended VERNA upon discharge due to RLE weakness. POD8 TLC removed. Neuro rec repeat CT Head vs MR brain depending on PPM compatibility. POD9 Repeat CT Head repeated per stroke team recs, no new infarctions identified. Keflex started for possible R groin incision infection. POD 10-12 Patient is medically cleared for d/c, pending authorization for acute rehab.    Plan:    Neurovascular:   -RLE weakness  -Repeat CT Head negative for acute infarction. Stroke team recs appreciated.   -Initial CT Head negative, CT A/P: acute LLL segmental PE, no hematoma compressing on spine.   -Hx of arthritis, spinal stenosis   -Pain well controlled with current regimen. PRN's: Tylenol 650mg Q6H PRN for mild pain, Tramadol 25mg Q8H PRN moderate pain    Cardiovascular:   -S/P TEVAR with R groin cutdown and femoral artery repair  - Cont BB/statin  -Hx of HTN  -Cont Lopressor 12.5mg BID  -Cont Amlodipine 2.5mg QD  -Cont Hydralazine 10mg Q8H   -Home med: Lisinopril 10mg QD held  -Hx of HLD  -Cont Lipitor 10mg QD (home dose)  -Hemodynamically stable.   -Monitor: BP, HR, tele    Respiratory:   -Cont home med: Symbicort 80mcg 2 puffs BID  -Oxygenating well on room air  -Encourage continued use of IS 10x/hr and frequent ambulation  -CXR 8/9/22: stable    GI:  -GI PPX: Protonix  -Cont DASH/CC diet  -Bowel Regimen: Miralax    Renal / :  -Continue to monitor renal function: BUN/Cr: 16/0.92  -Monitor I/O's daily     Endocrine:    -Hx of hypothyroidism  -Cont Levothyroxine 50mcg PO QD  -TSH- 10.38  -No hx of DM   -A1c: 5.6    Hematologic:  -L brachial occlusion s/p L brachial artery embolectomy (7/21/22) w/ vascular surgery  -Cont Eliquis 5mg PO BID  -F/u recommendations for length of anticoagulation upon discharge  -Cont ASA 81mg PO QD   -CBC: H/H- 9.1/29.3  -Coagulation Panel.    ID:  -R groin incision purulence  -Keflex 500mg PO Q12H started  -Will switch to PO abx prior to discharge  -Temperature: Afebrile  -CBC: WBC-8.77  -Continue to observe for SIRS/Sepsis Syndrome.    Prophylaxis:  -Continue with SCD's b/l while patient is at rest     Disposition:  -Discharge home once authorization approved   77 YO Female, previous smoker, w/ PMHx of HTN, spinal stenosis, arthritis, bicuspid aortic valve s/p AVR, ascending/hemiarch replacement with frozen elephant trunk and left aorto-subclavian bypass, CABG x2 with Dr. 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 CT scans at that time showed endoleak and she was scheduled to see Dr. Muller in clinic 7/27/22, however, patient was experiencing persistent left arm and chest pain that radiated to her left shoulder and she presented to [facility] ED for evaluation. 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 On 7/29/22 she underwent a TEVAR with R groin cutdown and femoral artery repair, EF 75% with Dr. Muller and Dr. Augustin (vascular sx). She arrived to the CTICU intubated and hypothermic, received 2L IVF and 2u pRBCs. On POD1 she was extubated. On POD2 BB started. On POD3 noted to be lethargic w/ RLE weakness, stroke code called, CT head negative. CT chest suggestive questionable subsegmental PEs and heparin gtt started. Official CT read revealed no evidence of PE and heparin gtt was thus discontinued. POD 4-6 stayed in CTICU for continued monitoring of lethargy. On POD7 transferred to stepdown unit. LE dopplers negative for DVTs. PT consulted and recommended VERNA upon discharge due to RLE weakness. POD8 TLC removed. Neuro rec repeat CT Head vs MR brain depending on PPM compatibility. POD9 Repeat CT Head repeated per stroke team recs, no new infarctions identified. Keflex started for possible R groin incision infection. POD 10-12 Patient is medically cleared for d/c, pending authorization for acute rehab.    Plan:    Neurovascular:   -RLE weakness  -Repeat CT Head negative for acute infarction. Stroke team recs appreciated.   -Initial CT Head negative, CT A/P: acute LLL segmental PE, no hematoma compressing on spine.   -Hx of arthritis, spinal stenosis   -Pain well controlled with current regimen. PRN's: Tylenol 650mg Q6H PRN for mild pain, Tramadol 25mg Q8H PRN moderate pain    Cardiovascular:   -S/P TEVAR with R groin cutdown and femoral artery repair  - Cont BB/statin  -Hx of HTN  -Cont Lopressor 12.5mg BID  -Cont Amlodipine 2.5mg QD  -Cont Hydralazine 10mg Q8H   -Home med: Lisinopril 10mg QD held  -Hx of HLD  -Cont Lipitor 10mg QD (home dose)  -Hemodynamically stable.   -Monitor: BP, HR, tele    Respiratory:   -Cont home med: Symbicort 80mcg 2 puffs BID  -Oxygenating well on room air  -Encourage continued use of IS 10x/hr and frequent ambulation  -CXR 8/9/22: stable    GI:  -GI PPX: Protonix  -Cont DASH/CC diet  -Bowel Regimen: Miralax    Renal / :  -Furosemide 20mg po daily with potassium supplementation   -Continue to monitor renal function: BUN/Cr: 16/0.92  -Monitor I/O's daily     Endocrine:    -Hx of hypothyroidism  -Cont Levothyroxine 50mcg PO QD  -TSH- 10.38  -No hx of DM   -A1c: 5.6    Hematologic:  -L brachial occlusion s/p L brachial artery embolectomy (7/21/22) w/ vascular surgery  -Cont Eliquis 5mg PO BID  -F/u recommendations for length of anticoagulation upon discharge  -Cont ASA 81mg PO QD   -CBC: H/H- 9.1/29.3  -Coagulation Panel.    ID:  -R groin incision purulence  -Keflex 500mg PO Q12H started  -Will switch to PO abx prior to discharge  -8/8 US RLE Arterial negative for pseudoaneurysm   -Chlorhexidine cleanse daily & maintain intra-dry in abdominal skin fold    -Temperature: Afebrile  -CBC: WBC-8.77  -Continue to observe for SIRS/Sepsis Syndrome.    Prophylaxis:  -Cont Eliquis  -Continue with SCD's b/l while patient is at rest     Disposition:  -Discharge home once authorization approved   77 YO Female, previous smoker, w/ PMHx of HTN, spinal stenosis, arthritis, bicuspid aortic valve s/p AVR, ascending/hemiarch replacement with frozen elephant trunk and left aorto-subclavian bypass, CABG x2 with Dr. 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 CT scans at that time showed endoleak and she was scheduled to see Dr. Muller in clinic 7/27/22, however, patient was experiencing persistent left arm and chest pain that radiated to her left shoulder and she presented to [facility] ED for evaluation. 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 On 7/29/22 she underwent a TEVAR with R groin cutdown and femoral artery repair, EF 75% with Dr. Muller and Dr. Augustin (vascular sx). She arrived to the CTICU intubated and hypothermic, received 2L IVF and 2u pRBCs. On POD1 she was extubated. On POD2 BB started. On POD3 noted to be lethargic w/ RLE weakness, stroke code called, CT head negative. CT chest suggestive questionable subsegmental PEs and heparin gtt started. Official CT read revealed no evidence of PE and heparin gtt was thus discontinued. POD 4-6 stayed in CTICU for continued monitoring of lethargy. On POD7 transferred to stepdown unit. LE dopplers negative for DVTs. PT consulted and recommended VERNA upon discharge due to RLE weakness. POD8 TLC removed. Neuro rec repeat CT Head vs MR brain depending on PPM compatibility. POD9 Repeat CT Head repeated per stroke team recs, no new infarctions identified. Keflex started for possible R groin incision infection. POD 10-12 Patient is medically cleared for d/c, pending authorization for acute rehab.    Plan:    Neurovascular:   -RLE weakness  -Repeat CT Head negative for acute infarction. Stroke team recs appreciated.   -Initial CT Head negative, CT A/P: acute LLL segmental PE, no hematoma compressing on spine.   -Hx of arthritis, spinal stenosis   -Pain well controlled with current regimen. PRN's: Tylenol 650mg Q6H PRN for mild pain, Tramadol 25mg Q8H PRN moderate pain    Cardiovascular:   -S/P TEVAR with R groin cutdown and femoral artery repair  - Cont BB/statin  -Hx of HTN  -Cont Lopressor 12.5mg BID  -Cont Amlodipine 2.5mg QD  -Cont Hydralazine 10mg Q8H   -Home med: Lisinopril 10mg QD held  -Hx of HLD  -Cont Lipitor 10mg QD (home dose)  -Hemodynamically stable.   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 Giovany Villanueva), cont Eliquis 5mg PO BID x at least 3 months. Pt to f/u outpatient for final AC recommendations  -Cont ASA 81mg PO QD   -CBC: H/H- 9.1/29.3  -Coagulation Panel.    ID:  -R groin incision purulence  -Keflex 500mg PO Q12H started  -Will switch to PO abx prior to discharge  -8/8 US RLE Arterial negative for pseudoaneurysm   -Chlorhexidine cleanse daily & maintain intra-dry in abdominal skin fold    -Temperature: Afebrile  -CBC: WBC-8.77  -Continue to observe for SIRS/Sepsis Syndrome.    Prophylaxis:  -Cont Eliquis  -Continue with SCD's b/l while patient is at rest     Disposition:  -Discharge home once authorization approved

## 2022-08-10 NOTE — PROGRESS NOTE ADULT - ASSESSMENT
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    - Continue cefazolin.   - Cleaned wound with betadine and redressed today.  - Appreciate excellent care per primary team  - Vascular Surgery (Team 3C) following. When discharged, please have her follow-up with Dr. Augustin in 1-2 weeks:     Address: [address], [place] [address]  Phone: [phone]

## 2022-08-11 NOTE — PROGRESS NOTE ADULT - SUBJECTIVE AND OBJECTIVE BOX
Patient discussed on morning rounds with Dr. Muller    Operation / Date: 7/29 TEVAR, R groin cutdown and femoral artery repair, EF 75%    Surgeon: Dr. Muller    Referring Physician: Dr. Blank    SUBJECTIVE ASSESSMENT AND HOSPITAL COURSE:  77 y/o Female, previous smoker, w/ PMHx of HTN, spinal stenosis, arthritis, bicuspid aortic valve s/p AVR, ascending/hemiarch replacement with frozen elephant trunk and left aorto-subclavian bypass, CABG x2 with Dr. 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 CT scans at that time showed endoleak and she was scheduled to see Dr. Muller in clinic 7/27/22, however, patient was experiencing persistent left arm and chest pain that radiated to her left shoulder and she presented to [facility] ED for evaluation. 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 On 7/29/22 she underwent a TEVAR with R groin cutdown and femoral artery repair, EF 75% with Dr. Muller and Dr. Augustin (vascular sx). She arrived to the CTICU intubated and hypothermic, received 2L IVF and 2u pRBCs. On POD1 she was extubated. On POD2 BB started. On POD3 noted to be lethargic w/ RLE weakness, stroke code called, CT head negative. CT chest suggestive questionable subsegmental PEs and heparin gtt started. Official CT read revealed no evidence of PE and heparin gtt was thus discontinued. POD 4-6 stayed in CTICU for continued monitoring of lethargy. On POD7 transferred to stepdown unit. LE dopplers negative for DVTs. PT consulted and recommended VERNA upon discharge due to RLE weakness. POD8 TLC removed. POD9 Repeat CT Head obtained per stroke team recs showing chronic small old CVA. Keflex started for possible R groin purulence/erythema. POD 10-12 Patient is medically cleared for d/c, pending authorization for acute rehab. POD13 received auth, will discharge to [facility] acute rehab. Per vascular surgery, will go home with 7 days of PO abx. Toleraring PO diet, satting well on room air, ambulating with PT. Per Dr. Muller she is medically clear for discharge to rehab today, 8/11/22.    Of Note: patient should follow up with her vascular surgeon as to the length of therapy she needs to continue to NOAC for left brachial artery occlusion. Their most recent note from previous admission states at least 3 months (discharged end of July).    Over 35 minutes was spent with the patient reviewing the discharge material including medications, follow up appointments, recovery, concerning symptoms, and how to contact their health care providers if they have questions.      Vital Signs Last 24 Hrs  T(C): 36.7 (11 Aug 2022 09:21), Max: 36.8 (10 Aug 2022 14:27)  T(F): 98.1 (11 Aug 2022 09:21), Max: 98.2 (10 Aug 2022 14:27)  HR: 63 (11 Aug 2022 09:51) (59 - 74)  BP: 110/80 (11 Aug 2022 10:09) (110/80 - 148/66)  BP(mean): 89 (11 Aug 2022 10:09) (79 - 95)  RR: 16 (11 Aug 2022 09:14) (14 - 18)  SpO2: 95% (11 Aug 2022 09:14) (92% - 99%)    Parameters below as of 11 Aug 2022 09:14  Patient On (Oxygen Delivery Method): room air        EPICARDIAL WIRES REMOVED: n/a  TIE DOWNS REMOVED: Yes    PHYSICAL EXAM:    General: NAD, sitting comfortably in chair, conversing appropriately  Neurological: alert and oriented, UE and LE strength equal b/l, facial symmetry present  Cardiovascular: RRR, Clear S1 and S2, no murmurs appreciated  Respiratory: chest expansion symmetrical, CTA b/l, no wheezing noted  Gastrointestinal: +BS, soft, NT, ND  Extremities: moving spontaneously, no calf tenderness, trace b/l LE edema.  Vascular: warm, well perfused. DP/PT pulses palpable b/l.  Incisions: R femoral cutdown site with trace erythema, no drainage or purulence.      LABS:                        8.7    7.72  )-----------( 188      ( 11 Aug 2022 05:30 )             27.8       COUMADIN:  no        08-11    136  |  103  |  20  ----------------------------<  90  3.9   |  25  |  0.87    Ca    8.8      11 Aug 2022 05:30  Mg     2.2     08-11            Discharge CXR:  < from: Xray Chest 1 View- PORTABLE-Routine (Xray Chest 1 View- PORTABLE-Routine in AM.) (08.09.22 @ 05:59) >  FINDINGS:    Single frontal view of the chest demonstrates left basilar atelectasis.   The cardiomediastinal silhouette is enlarged. No acute osseous   abnormalities. Overlying EKG leads and wires are noted. Aortic stenting.   Leadless pacemaker.    IMPRESSION: No interval change.    < end of copied text >      Discharge ECHO:  < from: TTE Limited Echo w/o Cont (08.02.22 @ 14:15) >  CONCLUSIONS:     1. Limited study obtained for evaluation of pericardial effusion.   2. Normal left and right ventricular size and systolic function.   3. Mild mitral regurgitation.   4. Mild-to-moderate tricuspid regurgitation.   5. Pulmonary hypertension present, pulmonary artery systolic pressure is   54 mmHg.   6. No pericardial effusion.   7. Left pleural effusion.    < end of copied text >     Patient discussed on morning rounds with Dr. Muller    Operation / Date: 7/29 TEVAR, R groin cutdown and femoral artery repair, EF 75%    Surgeon: Dr. Muller    Referring Physician: Dr. Blank    SUBJECTIVE ASSESSMENT AND HOSPITAL COURSE:  75 y/o Female, previous smoker, w/ PMHx of HTN, spinal stenosis, arthritis, bicuspid aortic valve s/p AVR, ascending/hemiarch replacement with frozen elephant trunk and left aorto-subclavian bypass, CABG x2 with Dr. 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 CT scans at that time showed endoleak and she was scheduled to see Dr. Muller in clinic 7/27/22, however, patient was experiencing persistent left arm and chest pain that radiated to her left shoulder and she presented to [facility] ED for evaluation. 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 On 7/29/22 she underwent a TEVAR with R groin cutdown and femoral artery repair, EF 75% with Dr. Muller and Dr. Augustin (vascular sx). She arrived to the CTICU intubated and hypothermic, received 2L IVF and 2u pRBCs. On POD1 she was extubated. On POD2 BB started. On POD3 noted to be lethargic w/ RLE weakness, stroke code called, CT head negative. CT chest suggestive questionable subsegmental PEs and heparin gtt started. Official CT read revealed no evidence of PE and heparin gtt was thus discontinued. POD 4-6 stayed in CTICU for continued monitoring of lethargy. On POD7 transferred to stepdown unit. LE dopplers negative for DVTs. PT consulted and recommended VERNA upon discharge due to RLE weakness. POD8 TLC removed. POD9 Repeat CT Head obtained per stroke team recs showing chronic small old CVA. Keflex started for possible R groin purulence/erythema. POD 10-12 Patient is medically cleared for d/c, pending authorization for acute rehab. POD13 received auth, will discharge to [facility] acute rehab. Per vascular surgery, will go home with 7 days of PO abx. Toleraring PO diet, satting well on room air, ambulating with PT. Per Dr. Muller she is medically clear for discharge to rehab.    Of Note: patient should follow up with her vascular surgeon as to the length of therapy she needs to continue to NOAC for left brachial artery occlusion. Their most recent note from previous admission states at least 3 months (discharged end of July).    Over 35 minutes was spent with the patient reviewing the discharge material including medications, follow up appointments, recovery, concerning symptoms, and how to contact their health care providers if they have questions.      Vital Signs Last 24 Hrs  T(C): 36.7 (11 Aug 2022 09:21), Max: 36.8 (10 Aug 2022 14:27)  T(F): 98.1 (11 Aug 2022 09:21), Max: 98.2 (10 Aug 2022 14:27)  HR: 63 (11 Aug 2022 09:51) (59 - 74)  BP: 110/80 (11 Aug 2022 10:09) (110/80 - 148/66)  BP(mean): 89 (11 Aug 2022 10:09) (79 - 95)  RR: 16 (11 Aug 2022 09:14) (14 - 18)  SpO2: 95% (11 Aug 2022 09:14) (92% - 99%)    Parameters below as of 11 Aug 2022 09:14  Patient On (Oxygen Delivery Method): room air        EPICARDIAL WIRES REMOVED: n/a  TIE DOWNS REMOVED: Yes    PHYSICAL EXAM:    General: NAD, sitting comfortably in chair, conversing appropriately  Neurological: alert and oriented, UE and LE strength equal b/l, facial symmetry present  Cardiovascular: RRR, Clear S1 and S2, no murmurs appreciated  Respiratory: chest expansion symmetrical, CTA b/l, no wheezing noted  Gastrointestinal: +BS, soft, NT, ND  Extremities: moving spontaneously, no calf tenderness, trace b/l LE edema.  Vascular: warm, well perfused. DP/PT pulses palpable b/l.  Incisions: R femoral cutdown site with trace erythema, no drainage or purulence.      LABS:                        8.7    7.72  )-----------( 188      ( 11 Aug 2022 05:30 )             27.8       COUMADIN:  no        08-11    136  |  103  |  20  ----------------------------<  90  3.9   |  25  |  0.87    Ca    8.8      11 Aug 2022 05:30  Mg     2.2     08-11            Discharge CXR:  < from: Xray Chest 1 View- PORTABLE-Routine (Xray Chest 1 View- PORTABLE-Routine in AM.) (08.09.22 @ 05:59) >  FINDINGS:    Single frontal view of the chest demonstrates left basilar atelectasis.   The cardiomediastinal silhouette is enlarged. No acute osseous   abnormalities. Overlying EKG leads and wires are noted. Aortic stenting.   Leadless pacemaker.    IMPRESSION: No interval change.    < end of copied text >      Discharge ECHO:  < from: TTE Limited Echo w/o Cont (08.02.22 @ 14:15) >  CONCLUSIONS:     1. Limited study obtained for evaluation of pericardial effusion.   2. Normal left and right ventricular size and systolic function.   3. Mild mitral regurgitation.   4. Mild-to-moderate tricuspid regurgitation.   5. Pulmonary hypertension present, pulmonary artery systolic pressure is   54 mmHg.   6. No pericardial effusion.   7. Left pleural effusion.    < end of copied text >

## 2022-08-11 NOTE — PROGRESS NOTE ADULT - ASSESSMENT
This is a 76 yoF with HTN, spinal stenosis, arthritis, bicuspid aortic valve s/p AVR, ascending/hemiarch replacement and left aorto-subclavian bypass, CABG x2 (8/9/2021), recent admission at [facility] for left brachial occlusion s/p left brachial artery embolectomy who presented with worsening LUE pain radiating to shoulder/neck/chest now on POD13 following TEVAR on 7/29/22.    - Would have her finish 7-day course of ABx -- ok to transition to cephalexin on discharge today.  - Cleaned wound with betadine and redressed with gauze and paper tape today. These same wound care instructions can be provided to rehab facility staff.  - Appreciate excellent care per primary team  - Vascular Surgery (Team 3C) following. Appropriate for discharge to rehab per primary team. Please have her follow-up with Dr. Augustin in 1-2 weeks:     Address: [address] [place] [address] [address], [place] [address]  Phone: [phone]

## 2022-08-11 NOTE — PROGRESS NOTE ADULT - SUBJECTIVE AND OBJECTIVE BOX
IDENTIFICATION: This is a 76 yoF with HTN, spinal stenosis, arthritis, bicuspid aortic valve s/p AVR, ascending/hemiarch replacement and left aorto-subclavian bypass, CABG x2 (8/9/2021), recent admission at [facility] for left brachial occlusion s/p left brachial artery embolectomy who presented with worsening LUE pain radiating to shoulder/neck/chest now on POD13 following TEVAR on 7/29/22.    EVENTS:   - No acute events (pending auth for rehab placement)    SUBJECTIVE:   - Notes no new pain in extremities  - Denies CP, SOB  - Denies new concerns  - Otherwise doing well; has been up and ambulating without difficulty. Ready to leave for rehab.      MEDICATIONS  (STANDING):  amLODIPine   Tablet 2.5 milliGRAM(s) Oral daily  apixaban 5 milliGRAM(s) Oral every 12 hours  aspirin enteric coated 81 milliGRAM(s) Oral daily  atorvastatin 10 milliGRAM(s) Oral at bedtime  budesonide  80 MICROgram(s)/formoterol 4.5 MICROgram(s) Inhaler 2 Puff(s) Inhalation two times a day  cephalexin 500 milliGRAM(s) Oral every 12 hours  chlorhexidine 2% Cloths 1 Application(s) Topical <User Schedule>  furosemide    Tablet 20 milliGRAM(s) Oral daily  hydrALAZINE 10 milliGRAM(s) Oral every 8 hours  levothyroxine 50 MICROGram(s) Oral daily  metoprolol tartrate 25 milliGRAM(s) Oral every 12 hours  pantoprazole    Tablet 40 milliGRAM(s) Oral before breakfast  potassium chloride    Tablet ER 20 milliEquivalent(s) Oral once  potassium chloride    Tablet ER 10 milliEquivalent(s) Oral daily  traMADol 25 milliGRAM(s) Oral every 8 hours    MEDICATIONS  (PRN):  acetaminophen     Tablet .. 650 milliGRAM(s) Oral every 6 hours PRN Mild Pain (1 - 3)  polyethylene glycol 3350 17 Gram(s) Oral daily PRN Constipation      Vital Signs Last 24 Hrs  T(C): 36.7 (11 Aug 2022 09:21), Max: 36.8 (10 Aug 2022 14:27)  T(F): 98.1 (11 Aug 2022 09:21), Max: 98.2 (10 Aug 2022 14:27)  HR: 63 (11 Aug 2022 09:51) (59 - 74)  BP: 110/80 (11 Aug 2022 10:09) (110/80 - 148/66)  BP(mean): 89 (11 Aug 2022 10:09) (79 - 95)  RR: 16 (11 Aug 2022 09:14) (14 - 18)  SpO2: 95% (11 Aug 2022 09:14) (92% - 99%)    Parameters below as of 11 Aug 2022 09:14  Patient On (Oxygen Delivery Method): room air        Neurologic: AAOx3  Cardiac: Normal rate, regular rhythm  Vascular: Doppler signals multiphasic in DP/PT bilaterally  Pulm: Breathing comfortably  Abd: Soft, non-distended; No TTP throughout.   Incision: Well approximated without erythema/edema/induration.   : No Arreola  Skin: No rashes  Extremities: No edema.  Psychiatric: Interacting normally.     I&O's Detail    10 Aug 2022 07:01  -  11 Aug 2022 07:00  --------------------------------------------------------  IN:    IV PiggyBack: 50 mL  Total IN: 50 mL    OUT:    Voided (mL): 200 mL  Total OUT: 200 mL    Total NET: -150 mL          LABS:                        8.7    7.72  )-----------( 188      ( 11 Aug 2022 05:30 )             27.8     08-11    136  |  103  |  20  ----------------------------<  90  3.9   |  25  |  0.87    Ca    8.8      11 Aug 2022 05:30  Mg     2.2     08-11            RADIOLOGY & ADDITIONAL STUDIES:

## 2022-08-11 NOTE — PROGRESS NOTE ADULT - ASSESSMENT
Care Providers for Follow up (PCP/Outpatient Provider)	Bladimir Muller)  Surgery; Thoracic and Cardiac Surgery  [address] [place] [address]  Phone: [phone]  Fax: [phone]  Scheduled Appointment: 08/23/2022 01:00 PM    Aron Blank)  Cardiovascular Disease; Internal Medicine; Interventional Cardiology  [address] [place] [address]  Phone: [phone]  Fax: [phone]  Follow Up Time:     Awa Brewster)  Neurology; Vascular Neurology  [address] [place] [address]  Phone: [phone]  Fax: [phone]  Follow Up Time:     Hugo Augustin)  Surgery; Vascular Surgery  [address] [place] [address]  Phone: [phone]  Fax: [phone]  Scheduled Appointment: 08/23/2022 11:00 AM   77 y/o Female, previous smoker, w/ PMHx of HTN, spinal stenosis, arthritis, bicuspid aortic valve s/p AVR, ascending/hemiarch replacement with frozen elephant trunk and left aorto-subclavian bypass, CABG x2 with Dr. Muller on 8/9/2021 (post-op course c/b prolonged intubation, SSS s/p PPM, poor wound healing s/p pec flap and closure on 9/29/21 who recently presented to [facility] with acute LUE pain, found to have left brachial occlusion s/p left brachial artery embolectomy, 7/21/22. CT scans at that time showed endoleak and she was scheduled to see Dr. Muller in clinic 7/27/22, however, patient was experiencing persistent left arm and chest pain that radiated to her left shoulder and she presented to [facility] ED for evaluation. 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 On 7/29/22 she underwent a TEVAR with R groin cutdown and femoral artery repair, EF 75% with Dr. Muller and Dr. Augustin (vascular sx). She arrived to the CTICU intubated and hypothermic, received 2L IVF and 2u pRBCs. On POD1 she was extubated. On POD2 BB started. On POD3 noted to be lethargic w/ RLE weakness, stroke code called, CT head negative. CT chest suggestive questionable subsegmental PEs and heparin gtt started. Official CT read revealed no evidence of PE and heparin gtt was thus discontinued. POD 4-6 stayed in CTICU for continued monitoring of lethargy. On POD7 transferred to stepdown unit. LE dopplers negative for DVTs. PT consulted and recommended VERNA upon discharge due to RLE weakness. POD8 TLC removed. POD9 Repeat CT Head obtained per stroke team recs showing chronic small old CVA. Keflex started for possible R groin purulence/erythema. POD 10-12 Patient is medically cleared for d/c, pending authorization for acute rehab. POD13 received auth, will discharge to [facility] acute rehab, pending bed availability. Medically clear for rehab.    Plan:    Neurovascular:   -Pain well controlled with current regimen.   -continue tylenol as needed  RLE weakness  -Repeat CT Head negative for acute infarction. Stroke team recs appreciated.   -Initial CT Head negative, CT A/P: acute LLL segmental PE, no hematoma compressing on spine.   -stroke was following, signed off at this time    Cardiovascular:   POD 13 TEVAR with R groin cutdown and femoral artery repair, EF 75%   -continue ASA  HTN  -continue amlodipine and hydralazine  -continue metoprolol  HLD  -continue atorvastatin  -Hemodynamically stable.   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  gout  -continue allopurinol    ID:  -Temperature: afebrile  -CBC: WBC- 7.72  -Continue to observe for SIRS/Sepsis Syndrome.  R femoral cutdown w/erythema  -continue 7 full days of abx, cefazolin, started 8/7. Okay to switch to PO keflex on d/c    Prophylaxis:  -DVT prophylaxis with eliquis  -Continue with SCD's b/l while patient is at rest     Disposition:  -Discharge to rehab once bed available

## 2022-08-12 NOTE — PROGRESS NOTE ADULT - ASSESSMENT
This is a 76 yoF with HTN, spinal stenosis, arthritis, bicuspid aortic valve s/p AVR, ascending/hemiarch replacement and left aorto-subclavian bypass, CABG x2 (8/9/2021), recent admission at [facility] for left brachial occlusion s/p left brachial artery embolectomy who presented with worsening LUE pain radiating to shoulder/neck/chest now on POD14 following TEVAR on 7/29/22.    - Would have her finish 7-day course of ABx -- ok to transition to cephalexin on discharge today.  - Cleaned wound with betadine and redressed with gauze and paper tape today. These same wound care instructions can be provided to rehab facility staff.  - Appreciate excellent care per primary team  - Vascular Surgery (Team 3C) following. Appropriate for discharge to rehab per primary team. Please have her follow-up with Dr. Augustin in 1-2 weeks:     Address: [address] [place] [address] [address] [address]  Phone: [phone]

## 2022-08-12 NOTE — PROGRESS NOTE ADULT - SUBJECTIVE AND OBJECTIVE BOX
Patient discussed on morning rounds with Dr. Vernon    Operation / Date: 7/29/22 TEVAR, R groin cutdown and femoral artery repair, EF 75%     SUBJECTIVE ASSESSMENT:  77y Female seen and examined. Patient frustrated with not being accepted to acute rehab in [facility]. Agreeable to [facility] acute rehab. Otherwise no complaints. Tolerating PO diet, satting well on room air, ambulating in halls with assistance. Denies lightheadedness, headache, CP, palpitations, SOB, abdominal pain, nausea, vomiting, fever, chills.    Vital Signs Last 24 Hrs  T(C): 36.6 (12 Aug 2022 09:20), Max: 37.2 (11 Aug 2022 13:59)  T(F): 97.9 (12 Aug 2022 09:20), Max: 98.9 (11 Aug 2022 13:59)  HR: 56 (12 Aug 2022 08:35) (56 - 71)  BP: 164/69 (12 Aug 2022 08:35) (122/59 - 164/69)  BP(mean): 99 (12 Aug 2022 08:35) (83 - 99)  RR: 16 (12 Aug 2022 08:35) (16 - 18)  SpO2: 93% (12 Aug 2022 08:35) (93% - 98%)    Parameters below as of 12 Aug 2022 08:35  Patient On (Oxygen Delivery Method): room air      I&O's Detail    11 Aug 2022 07:01  -  12 Aug 2022 07:00  --------------------------------------------------------  IN:    IV PiggyBack: 100 mL  Total IN: 100 mL    OUT:    Voided (mL): 50 mL  Total OUT: 50 mL    Total NET: 50 mL      CHEST TUBE:  No  FRANCHESKA DRAIN:  No.  EPICARDIAL WIRES: No.  TIE DOWNS: No.  REGALADO: No.    PHYSICAL EXAM:    General: NAD, sitting comfortably in chair, conversing appropriately  Neurological: alert and oriented, UE and LE strength equal b/l, facial symmetry present  Cardiovascular: RRR, Clear S1 and S2, no murmurs appreciated  Respiratory: chest expansion symmetrical, CTA b/l, no wheezing noted  Gastrointestinal: +BS, soft, NT, ND  Extremities: moving spontaneously, no calf tenderness, trace b/l LE edema.  Vascular: warm, well perfused. DP/PT pulses dopplerable  Incisions: R femoral incision C/D/I no drainage or purulence    LABS:                        8.7    7.72  )-----------( 188      ( 11 Aug 2022 05:30 )             27.8       COUMADIN:  no        08-11    136  |  103  |  20  ----------------------------<  90  3.9   |  25  |  0.87    Ca    8.8      11 Aug 2022 05:30  Mg     2.2     08-11        MEDICATIONS  (STANDING):  amLODIPine   Tablet 2.5 milliGRAM(s) Oral daily  apixaban 5 milliGRAM(s) Oral every 12 hours  aspirin enteric coated 81 milliGRAM(s) Oral daily  atorvastatin 10 milliGRAM(s) Oral at bedtime  budesonide  80 MICROgram(s)/formoterol 4.5 MICROgram(s) Inhaler 2 Puff(s) Inhalation two times a day  ceFAZolin   IVPB      ceFAZolin   IVPB 1000 milliGRAM(s) IV Intermittent every 8 hours  chlorhexidine 2% Cloths 1 Application(s) Topical <User Schedule>  furosemide    Tablet 20 milliGRAM(s) Oral daily  hydrALAZINE 10 milliGRAM(s) Oral every 8 hours  levothyroxine 50 MICROGram(s) Oral daily  metoprolol tartrate 25 milliGRAM(s) Oral every 12 hours  pantoprazole    Tablet 40 milliGRAM(s) Oral before breakfast  potassium chloride    Tablet ER 10 milliEquivalent(s) Oral daily  traMADol 25 milliGRAM(s) Oral every 8 hours    MEDICATIONS  (PRN):  acetaminophen     Tablet .. 650 milliGRAM(s) Oral every 6 hours PRN Mild Pain (1 - 3)  polyethylene glycol 3350 17 Gram(s) Oral daily PRN Constipation        RADIOLOGY & ADDITIONAL TESTS:

## 2022-08-12 NOTE — PROGRESS NOTE ADULT - SUBJECTIVE AND OBJECTIVE BOX
IDENTIFICATION: This is a 76 yoF with HTN, spinal stenosis, arthritis, bicuspid aortic valve s/p AVR, ascending/hemiarch replacement and left aorto-subclavian bypass, CABG x2 (8/9/2021), recent admission at [facility] for left brachial occlusion s/p left brachial artery embolectomy who presented with worsening LUE pain radiating to shoulder/neck/chest now on POD14 following TEVAR on 7/29/22.    EVENTS:   - No acute events (pending rehab placement)    SUBJECTIVE:   - Notes no new pain in extremities  - Denies CP, SOB  - Denies new concerns  - Otherwise doing well; has been up and ambulating without difficulty. Remains ready to leave for rehab.        MEDICATIONS  (STANDING):  amLODIPine   Tablet 2.5 milliGRAM(s) Oral daily  apixaban 5 milliGRAM(s) Oral every 12 hours  aspirin enteric coated 81 milliGRAM(s) Oral daily  atorvastatin 10 milliGRAM(s) Oral at bedtime  budesonide  80 MICROgram(s)/formoterol 4.5 MICROgram(s) Inhaler 2 Puff(s) Inhalation two times a day  ceFAZolin   IVPB      ceFAZolin   IVPB 1000 milliGRAM(s) IV Intermittent every 8 hours  chlorhexidine 2% Cloths 1 Application(s) Topical <User Schedule>  furosemide    Tablet 20 milliGRAM(s) Oral daily  hydrALAZINE 10 milliGRAM(s) Oral every 8 hours  levothyroxine 50 MICROGram(s) Oral daily  metoprolol tartrate 25 milliGRAM(s) Oral every 12 hours  pantoprazole    Tablet 40 milliGRAM(s) Oral before breakfast  potassium chloride    Tablet ER 10 milliEquivalent(s) Oral daily    MEDICATIONS  (PRN):  acetaminophen     Tablet .. 650 milliGRAM(s) Oral every 6 hours PRN Mild Pain (1 - 3)  polyethylene glycol 3350 17 Gram(s) Oral daily PRN Constipation  traMADol 25 milliGRAM(s) Oral every 8 hours PRN Moderate Pain (4 - 6)      Vital Signs Last 24 Hrs  T(C): 36.6 (12 Aug 2022 09:20), Max: 37.2 (11 Aug 2022 13:59)  T(F): 97.9 (12 Aug 2022 09:20), Max: 98.9 (11 Aug 2022 13:59)  HR: 56 (12 Aug 2022 08:35) (56 - 71)  BP: 164/69 (12 Aug 2022 08:35) (122/59 - 164/69)  BP(mean): 99 (12 Aug 2022 08:35) (83 - 99)  RR: 16 (12 Aug 2022 08:35) (16 - 18)  SpO2: 93% (12 Aug 2022 08:35) (93% - 98%)    Parameters below as of 12 Aug 2022 08:35  Patient On (Oxygen Delivery Method): room air    Gen: Well appearing  Cardiac: Normal rate, regular rhythm  Vascular: warm/well perfused LE bilaterally  Pulm: Breathing comfortably  Abd: Soft, non-distended; No TTP throughout.   Incision: Right groin with improving, trace induration and erythema surrounding incision. Mild fibrinous debris in inferior edge of incision.   : No Arreola  Skin: No rashes  Extremities: No edema.  Psychiatric: Interacting normally.     I&O's Detail    11 Aug 2022 07:01  -  12 Aug 2022 07:00  --------------------------------------------------------  IN:    IV PiggyBack: 100 mL  Total IN: 100 mL    OUT:    Voided (mL): 50 mL  Total OUT: 50 mL    Total NET: 50 mL          LABS:                        8.7    7.72  )-----------( 188      ( 11 Aug 2022 05:30 )             27.8     08-11    136  |  103  |  20  ----------------------------<  90  3.9   |  25  |  0.87    Ca    8.8      11 Aug 2022 05:30  Mg     2.2     08-11            RADIOLOGY & ADDITIONAL STUDIES:

## 2022-08-12 NOTE — PROGRESS NOTE ADULT - ASSESSMENT
77 y/o Female, previous smoker, w/ PMHx of HTN, spinal stenosis, arthritis, bicuspid aortic valve s/p AVR, ascending/hemiarch replacement with frozen elephant trunk and left aorto-subclavian bypass, CABG x2 with Dr. Muller on 8/9/2021 (post-op course c/b prolonged intubation, SSS s/p PPM, poor wound healing s/p pec flap and closure on 9/29/21 who recently presented to [facility] with acute LUE pain, found to have left brachial occlusion s/p left brachial artery embolectomy, 7/21/22. CT scans at that time showed endoleak and she was scheduled to see Dr. Muller in clinic 7/27/22, however, patient was experiencing persistent left arm and chest pain that radiated to her left shoulder and she presented to [facility] ED for evaluation. 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 On 7/29/22 she underwent a TEVAR with R groin cutdown and femoral artery repair, EF 75% with Dr. Muller and Dr. Augustin (vascular sx). She arrived to the CTICU intubated and hypothermic, received 2L IVF and 2u pRBCs. On POD1 she was extubated. On POD2 BB started. On POD3 noted to be lethargic w/ RLE weakness, stroke code called, CT head negative. CT chest suggestive questionable subsegmental PEs and heparin gtt started. Official CT read revealed no evidence of PE and heparin gtt was thus discontinued. POD 4-6 stayed in CTICU for continued monitoring of lethargy. On POD7 transferred to stepdown unit. LE dopplers negative for DVTs. PT consulted and recommended acute rehab upon discharge due to RLE weakness. POD8 TLC removed. POD9 Repeat CT Head obtained per stroke team recs showing chronic small old CVA. Antibiotics started for possible R groin purulence/erythema. POD 10-14 Patient is medically cleared for d/c, pending acceptance and authorization for acute rehab.    Plan:    Neurovascular:   -Pain well controlled with current regimen.   -continue tylenol and tramadol as needed  RLE weakness  -Repeat CT Head negative for acute infarction.   -Initial CT Head negative, CT A/P: acute LLL segmental PE, no hematoma compressing on spine.   -stroke was following, signed off at this time    Cardiovascular:   POD 14 TEVAR with R groin cutdown and femoral artery repair, EF 75%   -continue ASA  HTN  -continue amlodipine and hydralazine  -continue metoprolol  HLD  -continue atorvastatin  -Hemodynamically stable.   -Monitor: BP, HR, tele    Respiratory:   -Oxygenating well on room air  -Encourage continued use of IS 10x/hr and frequent ambulation  -continue symbicort as needed for SOB    GI:  -GI PPX: continue protonix  -PO Diet  -Bowel Regimen: continue miralax    Renal / :  -Continue to monitor renal function: BUN/Cr: last 20/0.87, lab holiday 8/12  -Monitor I/O's daily     Endocrine:    -No hx of DM   -A1c: 5.6  hx hypothyroidism  -TSH: 10.380, T4: WNL  continue synthroid    Hematologic:  left brachial occlusion s/p left brachial artery embolectomy  -continue eliquis  -CBC: H/H- last 8.7/27.8, lab holiday 8/12  -Coagulation Panel.    ID:  -Temperature: afebrile  -CBC: WBC- last 7.72, lab holiday 8/12  -Continue to observe for SIRS/Sepsis Syndrome.  R femoral cutdown w/erythema  -continue 7 full days of abx, cefazolin, started 8/7. Okay to switch to PO keflex on d/c    Prophylaxis:  -DVT prophylaxis with eliquis  -Continue with SCD's b/l while patient is at rest     Disposition:  -Discharge to rehab once bed available

## 2022-08-13 NOTE — PROGRESS NOTE ADULT - SUBJECTIVE AND OBJECTIVE BOX
SUBJECTIVE: Patient seen at bedside in no acute distress. No CP, SOB, fevers or chills. Pain controlled.     Vital Signs Last 24 Hrs  T(C): 36.7 (13 Aug 2022 05:01), Max: 36.9 (12 Aug 2022 17:02)  T(F): 98 (13 Aug 2022 05:01), Max: 98.5 (12 Aug 2022 17:02)  HR: 58 (13 Aug 2022 08:35) (56 - 81)  BP: 133/62 (13 Aug 2022 08:35) (113/67 - 153/67)  BP(mean): 89 (13 Aug 2022 08:35) (80 - 98)  RR: 17 (13 Aug 2022 08:35) (16 - 18)  SpO2: 99% (13 Aug 2022 08:35) (86% - 99%)    Parameters below as of 13 Aug 2022 08:55  Patient On (Oxygen Delivery Method): room air        Physical Exam:  General: Well appearing  Cardiac: Normal rate, regular rhythm  Vascular: warm/well perfused LE bilaterally  Pulm: Breathing comfortably  Abd: Soft, non-distended; No TTP throughout.   Incision: Right groin with improving, trace induration and erythema surrounding incision. Mild fibrinous debris in inferior edge of incision.   : No Arreola  Skin: No rashes  Extremities: No edema.    Lines/drains/tubes:    I&O's Summary    12 Aug 2022 07:01  -  13 Aug 2022 07:00  --------------------------------------------------------  IN: 100 mL / OUT: 50 mL / NET: 50 mL        LABS:                        8.9    8.07  )-----------( 187      ( 13 Aug 2022 05:30 )             29.0     08-13    134<L>  |  101  |  15  ----------------------------<  98  4.1   |  25  |  0.80    Ca    9.2      13 Aug 2022 05:30  Mg     2.5     08-13          CAPILLARY BLOOD GLUCOSE            RADIOLOGY & ADDITIONAL STUDIES:

## 2022-08-13 NOTE — PROGRESS NOTE ADULT - ASSESSMENT
76 yoF with HTN, spinal stenosis, arthritis, bicuspid aortic valve s/p AVR, ascending/hemiarch replacement and left aorto-subclavian bypass, CABG x2 (8/9/2021), recent admission at [facility] for left brachial occlusion s/p left brachial artery embolectomy who presented with worsening LUE pain radiating to shoulder/neck/chest now on POD15 following TEVAR on 7/29/22.    Cleaned wound with betadine and redressed with gauze and paper tape today. These same wound care instructions can be provided to rehab facility staff.  - Vascular Surgery (Team 3C) following. Please have her follow-up with Dr. Augustin in 1-2 weeks:

## 2022-08-13 NOTE — PROGRESS NOTE ADULT - SUBJECTIVE AND OBJECTIVE BOX
Patient discussed on morning rounds with Dr. Vernon     Operation / Date: 7/29/22 TEVAR, R groin cutdown and femoral artery repair, EF 75%     SUBJECTIVE ASSESSMENT:  77y Female seen and examined at bedside.  Patient with no complaints.  Denies chest pain, shortness of breath, nausea, vomiting.        Vital Signs Last 24 Hrs  T(C): 36.7 (13 Aug 2022 05:01), Max: 36.9 (12 Aug 2022 17:02)  T(F): 98 (13 Aug 2022 05:01), Max: 98.5 (12 Aug 2022 17:02)  HR: 67 (13 Aug 2022 05:45) (56 - 81)  BP: 148/67 (13 Aug 2022 05:45) (113/67 - 164/69)  BP(mean): 96 (13 Aug 2022 05:45) (80 - 99)  RR: 18 (13 Aug 2022 05:45) (16 - 18)  SpO2: 98% (13 Aug 2022 05:45) (86% - 99%)    Parameters below as of 13 Aug 2022 05:45  Patient On (Oxygen Delivery Method): room air      I&O's Detail    12 Aug 2022 07:01  -  13 Aug 2022 07:00  --------------------------------------------------------  IN:    IV PiggyBack: 100 mL  Total IN: 100 mL    OUT:    Voided (mL): 50 mL  Total OUT: 50 mL    Total NET: 50 mL          CHEST TUBE:  No.  FRANCHESKA DRAIN:  No.  EPICARDIAL WIRES: No.  TIE DOWNS: No.  REGALADO: No.    PHYSICAL EXAM:    General: NAD, sitting comfortably in chair, conversing appropriately  Neurological: alert and oriented, UE and LE strength equal b/l, facial symmetry present  Cardiovascular: RRR, Clear S1 and S2, no murmurs appreciated  Respiratory: chest expansion symmetrical, CTA b/l, no wheezing noted  Gastrointestinal: +BS, soft, NT, ND  Extremities: moving spontaneously, no calf tenderness, trace b/l LE edema.  Vascular: warm, well perfused. DP/PT pulses dopplerable  Incisions: R femoral incision C/D/I no drainage or purulence      LABS:                        8.9    8.07  )-----------( 187      ( 13 Aug 2022 05:30 )             29.0       COUMADIN:  No. REASON: .        08-13    134<L>  |  101  |  15  ----------------------------<  98  4.1   |  25  |  0.80    Ca    9.2      13 Aug 2022 05:30  Mg     2.5     08-13            MEDICATIONS  (STANDING):  amLODIPine   Tablet 2.5 milliGRAM(s) Oral daily  apixaban 5 milliGRAM(s) Oral every 12 hours  aspirin enteric coated 81 milliGRAM(s) Oral daily  atorvastatin 10 milliGRAM(s) Oral at bedtime  budesonide  80 MICROgram(s)/formoterol 4.5 MICROgram(s) Inhaler 2 Puff(s) Inhalation two times a day  ceFAZolin   IVPB      ceFAZolin   IVPB 1000 milliGRAM(s) IV Intermittent every 8 hours  chlorhexidine 2% Cloths 1 Application(s) Topical <User Schedule>  furosemide    Tablet 20 milliGRAM(s) Oral daily  hydrALAZINE 10 milliGRAM(s) Oral every 8 hours  levothyroxine 50 MICROGram(s) Oral daily  metoprolol tartrate 25 milliGRAM(s) Oral every 12 hours  pantoprazole    Tablet 40 milliGRAM(s) Oral before breakfast  potassium chloride    Tablet ER 10 milliEquivalent(s) Oral daily    MEDICATIONS  (PRN):  acetaminophen     Tablet .. 650 milliGRAM(s) Oral every 6 hours PRN Mild Pain (1 - 3)  polyethylene glycol 3350 17 Gram(s) Oral daily PRN Constipation  traMADol 25 milliGRAM(s) Oral every 8 hours PRN Moderate Pain (4 - 6)        RADIOLOGY & ADDITIONAL TESTS:

## 2022-08-13 NOTE — PROGRESS NOTE ADULT - ASSESSMENT
77 y/o Female, previous smoker, w/ PMHx of HTN, spinal stenosis, arthritis, bicuspid aortic valve s/p AVR, ascending/hemiarch replacement with frozen elephant trunk and left aorto-subclavian bypass, CABG x2 with Dr. Muller on 8/9/2021 (post-op course c/b prolonged intubation, SSS s/p PPM, poor wound healing s/p pec flap and closure on 9/29/21 who recently presented to [facility] with acute LUE pain, found to have left brachial occlusion s/p left brachial artery embolectomy, 7/21/22. CT scans at that time showed endoleak and she was scheduled to see Dr. Muller in clinic 7/27/22, however, patient was experiencing persistent left arm and chest pain that radiated to her left shoulder and she presented to [facility] ED for evaluation. 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 On 7/29/22 she underwent a TEVAR with R groin cutdown and femoral artery repair, EF 75% with Dr. Muller and Dr. Augustin (vascular sx). She arrived to the CTICU intubated and hypothermic, received 2L IVF and 2u pRBCs. On POD1 she was extubated. On POD2 BB started. On POD3 noted to be lethargic w/ RLE weakness, stroke code called, CT head negative. CT chest suggestive questionable subsegmental PEs and heparin gtt started. Official CT read revealed no evidence of PE and heparin gtt was thus discontinued. POD 4-6 stayed in CTICU for continued monitoring of lethargy. On POD7 transferred to stepdown unit. LE dopplers negative for DVTs. PT consulted and recommended acute rehab upon discharge due to RLE weakness. POD8 TLC removed. POD9 Repeat CT Head obtained per stroke team recs showing chronic small old CVA. Antibiotics started for possible R groin purulence/erythema. POD 10-15 Patient is medically cleared for d/c, pending acceptance and authorization for acute rehab.    Plan:    Neurovascular:   -Pain well controlled with current regimen.   -continue tylenol and tramadol as needed  RLE weakness  -Repeat CT Head negative for acute infarction.   -Initial CT Head negative, CT A/P: acute LLL segmental PE, no hematoma compressing on spine.   -stroke was following, signed off at this time    Cardiovascular:   POD 14 TEVAR with R groin cutdown and femoral artery repair, EF 75%   -continue ASA  HTN  -continue amlodipine and hydralazine  -continue metoprolol  HLD  -continue atorvastatin  -Hemodynamically stable.   -Monitor: BP, HR, tele    Respiratory:   -Oxygenating well on room air  -Encourage continued use of IS 10x/hr and frequent ambulation  -continue symbicort as needed for SOB    GI:  -GI PPX: continue protonix  -PO Diet  -Bowel Regimen: continue miralax    Renal / :  -Continue to monitor renal function: BUN/Cr: stable  -Monitor I/O's daily     Endocrine:    -No hx of DM   -A1c: 5.6  hx hypothyroidism  -TSH: 10.380, T4: WNL  continue synthroid    Hematologic:  left brachial occlusion s/p left brachial artery embolectomy  -continue eliquis  -CBC: H/H- stable  -Coagulation Panel.    ID:  -Temperature: afebrile  -CBC: WBC- stable  -Continue to observe for SIRS/Sepsis Syndrome.  R femoral cutdown w/erythema, now improving  -continue 7 full days of abx, cefazolin, started 8/7. Okay to switch to PO keflex on d/c    Prophylaxis:  -DVT prophylaxis with eliquis  -Continue with SCD's b/l while patient is at rest     Disposition:  -Discharge to rehab once bed available

## 2022-08-14 NOTE — DISCHARGE NOTE NURSING/CASE MANAGEMENT/SOCIAL WORK - NSDCPEFALRISK_GEN_ALL_CORE
For information on Fall & Injury Prevention, visit: [email or web address] OR  [email or web address] OR  https://www.cdc.gov/steadi/patient.html

## 2022-08-14 NOTE — DISCHARGE NOTE NURSING/CASE MANAGEMENT/SOCIAL WORK - NSDCFUADDAPPT_GEN_ALL_CORE_FT
-Please attend your discharge appointments.   -Please reach out to Dr. Brewster's office [phone] if you don't hear from them with an appointment time by Monday, 8/15/22.

## 2022-08-14 NOTE — PROGRESS NOTE ADULT - REASON FOR ADMISSION
Endoleak

## 2022-08-14 NOTE — DISCHARGE NOTE NURSING/CASE MANAGEMENT/SOCIAL WORK - NSTRANSFEREYEGLASSESPAIRS_GEN_A_NUR
"    [facility]   Acute Rehabilitation Unit  Inpatient Progress Note     Subjective     24 Hours Summary  Erasto is doing well this morning. Wife is present in the room.  Is starting his first day of therapies today. Took his Veledimex medication this morning. Had a BM this morning which he reports was soft-hard in consistency. Has been voiding w/o arauz, ambulating with assistance, and tolerating his diet. Denies chest pain/SOB, n/v, and leg pain. He does not enjoy the bed seizure precautions at night, and reports his last seizure from his GBM was 6/22/19 - we said we could have the bed precautions removed.    Review of Systems  10 point review of systems was otherwise negative other     Objective     Vital signs:  Temp: 97.7  F (36.5  C) Temp src: Oral BP: 129/85 Pulse: 83   Resp: 16 SpO2: 94 % O2 Device: None (Room air)   Height: 177.8 cm (5' 10\") Weight: 72.7 kg (160 lb 3.4 oz)  Estimated body mass index is 22.99 kg/m  as calculated from the following:    Height as of this encounter: 1.778 m (5' 10\").    Weight as of this encounter: 72.7 kg (160 lb 3.4 oz).      Physical Examination  General: Awake, sitting up in chair  Cardiovascular: RRR, no extra heart sounds, no murmur  Pulmonary: Non-labored breathing, CTAB, no wheeze, no rhonci  Abdominal:  soft, non-tender, non-distended  Extremities: warm, well perfused, no edema in bilateral lower extremities, no tenderness in calves  NEURO: alert and oriented x3, continued left sided neglect.  Strength mildly weak in the left upper and lower extremity    LABS  CBC RESULTS:   Recent Labs   Lab Test 06/03/21  0659   WBC 3.5*   RBC 3.83*   HGB 11.8*   HCT 35.2*   MCV 92   MCH 30.8   MCHC 33.5   RDW 12.6        Last Comprehensive Metabolic Panel:  Sodium   Date Value Ref Range Status   06/03/2021 136 133 - 144 mmol/L Final     Potassium   Date Value Ref Range Status   06/03/2021 3.8 3.4 - 5.3 mmol/L Final     Chloride   Date Value " Ref Range Status   06/03/2021 105 94 - 109 mmol/L Final     Carbon Dioxide   Date Value Ref Range Status   06/03/2021 25 20 - 32 mmol/L Final     Anion Gap   Date Value Ref Range Status   06/03/2021 5 3 - 14 mmol/L Final     Glucose   Date Value Ref Range Status   06/03/2021 106 (H) 70 - 99 mg/dL Final     Urea Nitrogen   Date Value Ref Range Status   06/03/2021 11 7 - 30 mg/dL Final     Creatinine   Date Value Ref Range Status   06/03/2021 0.72 0.66 - 1.25 mg/dL Final     GFR Estimate   Date Value Ref Range Status   06/03/2021 >90 >60 mL/min/[1.73_m2] Final     Comment:     Non  GFR Calc  Starting 12/18/2018, serum creatinine based estimated GFR (eGFR) will be   calculated using the Chronic Kidney Disease Epidemiology Collaboration   (CKD-EPI) equation.       Calcium   Date Value Ref Range Status   06/03/2021 8.5 8.5 - 10.1 mg/dL Final         IMAGING  No new imaging.     Assessment     Erasto Maher is a 59 year old male with GBM s/p right craniotomy for tumor resection and ziopharm study virus injection (5/27/21). He has impairments in strength, mobility, performing ADLs.     Admission to acute inpatient rehab 06/03/2021.       Plan        Rehabilitation     1. PT, OT, SLP minutes of each on a daily basis, in addition to rehab nursing and close management of physiatrist.    2. Impairment of ADL's:  OT for 60 minutes daily to work on ADL re-training such as grooming, self cares and bathing.    3. Impairment of mobility:  PT for 60 minutes daily to work on neuromuscular re-education focusing on strength, balance, coordination, and endurance.    4. SLP for 60 min daily for higher level cognitive deficits and memory impairment. cognitive and linguistic deficits  5. Rehab RN for med administration, bowel regimen, wound care and patient education.   6. FEN: Regular diet thin liquids  7. Bowel: Scheduled Miralax and Senna/Docusate  8. Bladder: Continent, scan protocol  9. DVT Prophylaxis:  Mechanical.  10. GI Prophylaxis: Femotidine  11. 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 S/P Stealth guided resection 06/27/019 He completed chemoradiotherapy on 8/30/2019 and was managed on a clinical trial receiving atezolizumab (anti-PDL1) plus adjuvant temozolomide. He received his last dose of immunotherapy on 3/16/2020 when imaging on 3/28/2020 showed progression of disease. He underwent a repeat craniotomy for tumor resection with Dr. Chan on 4/9/2020 with placement of GammaTiles.   - Continue PTA keppra 1000 mg BID  - Continue Veledimex, discussed with pharmacy, will continue home meds (present in narcotic box)  - Has an infusion scheduled for 6/14, but anticipate he will be able to discharge before this        Wound Care  Ok to shower, however no scrubbing of the wound and no soaking of the wound, meaning no bathtubs or swimming pools. Pat dry only. Leave wound open to air.  Patient to have wound checked 2 weeks following surgery.    Wound location: cranial  Closure technique: monocryl  Dressing needs: none  Post-op care at follow-up: keep dry and clean     Chronic Medical Conditions  Hypertension: PTA Lisinopril  Depression: PTA Lexapro  Allergies: PTA Loratadine  Hypothyroid: PTA levothyroxine     Code: Full code  Disposition: Home with family cares  ELOS:  Tentative discharge date 6/8/21  Rehab prognosis:  Good     Follow Up After Discharge   1. Primary care physical, 1-2 weeks after discharge date  3. Dr. Marquise Chan on 6/11/2021 @ 2:45 pm  4. Dr. Ericka Avila on 6/14/2021 @ 7:30 am           Lana Morrell, MS4    Physician Attestation   I, Eusebio Blank, was present with the medical student who participated in the service and in the documentation of the note.  I have verified the history and personally performed the physical exam and medical  decision making.  I have reviewed the above note and made any necessary changes, and agree with the assessment and plan of care as documented in the note.      I personally reviewed vital signs, medications, labs and imaging.      Eusebio Blank MD  Date of Service (when I saw the patient): 06/04/21    Time Spent on this Encounter   I, Julio Blank, spent a total of 25 minutes face-to-face or managing the care of Erasto Maher. Over 50% of my time on the unit was spent counseling the patient and coordinating care. See note for details.        1 pair

## 2022-08-14 NOTE — PROGRESS NOTE ADULT - SUBJECTIVE AND OBJECTIVE BOX
Patient discussed on morning rounds with Dr. Vernon    Operation / Date:     Surgeon:    Referring Physician:    SUBJECTIVE ASSESSMENT:  77y Female     Hospital Course:    Vital Signs Last 24 Hrs  T(C): 36.3 (14 Aug 2022 05:01), Max: 36.9 (13 Aug 2022 14:47)  T(F): 97.4 (14 Aug 2022 05:01), Max: 98.4 (13 Aug 2022 14:47)  HR: 63 (14 Aug 2022 05:00) (58 - 87)  BP: 144/65 (14 Aug 2022 05:00) (119/59 - 176/76)  BP(mean): 94 (14 Aug 2022 05:00) (85 - 109)  RR: 18 (14 Aug 2022 05:00) (16 - 18)  SpO2: 93% (14 Aug 2022 05:00) (93% - 100%)    Parameters below as of 14 Aug 2022 05:00  Patient On (Oxygen Delivery Method): room air      I&O's Detail    13 Aug 2022 07:01  -  14 Aug 2022 07:00  --------------------------------------------------------  IN:    Oral Fluid: 250 mL  Total IN: 250 mL    OUT:    Voided (mL): 900 mL  Total OUT: 900 mL    Total NET: -650 mL          EPICARDIAL WIRES REMOVED: Yes/No.  TIE DOWNS REMOVED: Yes/No.    PHYSICAL EXAM:    General:     Neurological:    Cardiovascular:    Respiratory:    Gastrointestinal:    Extremities:    Vascular:    Incision Sites:    LABS:                        8.9    8.07  )-----------( 187      ( 13 Aug 2022 05:30 )             29.0       COUMADIN:  Yes/No.        DOSE:                  INDICATION:                GOAL INR:        08-13    134<L>  |  101  |  15  ----------------------------<  98  4.1   |  25  |  0.80    Ca    9.2      13 Aug 2022 05:30  Mg     2.5     08-13            MEDICATIONS  (STANDING):  amLODIPine   Tablet 2.5 milliGRAM(s) Oral daily  apixaban 5 milliGRAM(s) Oral every 12 hours  aspirin enteric coated 81 milliGRAM(s) Oral daily  atorvastatin 10 milliGRAM(s) Oral at bedtime  budesonide  80 MICROgram(s)/formoterol 4.5 MICROgram(s) Inhaler 2 Puff(s) Inhalation two times a day  ceFAZolin   IVPB      ceFAZolin   IVPB 1000 milliGRAM(s) IV Intermittent every 8 hours  chlorhexidine 2% Cloths 1 Application(s) Topical <User Schedule>  furosemide    Tablet 20 milliGRAM(s) Oral daily  hydrALAZINE 10 milliGRAM(s) Oral every 8 hours  levothyroxine 50 MICROGram(s) Oral daily  metoprolol tartrate 25 milliGRAM(s) Oral every 12 hours  pantoprazole    Tablet 40 milliGRAM(s) Oral before breakfast  potassium chloride    Tablet ER 10 milliEquivalent(s) Oral daily      Discharge CXR:    Discharge ECHO:     Patient discussed on morning rounds with Dr. Vernon    Operation / Date: 7/29/22 TEVAR, R groin cutdown and femoral artery repair EF 75%    Surgeon: Yohana    Referring Physician: Abhay    SUBJECTIVE ASSESSMENT:  77y Female seen and examined at bedside.  Patient with no complaints.  Denies chest pain, shortness of breath, nausea, vomiting.    Hospital Course:  75 y/o Female, previous smoker, w/ PMHx of HTN, spinal stenosis, arthritis, bicuspid aortic valve s/p AVR, ascending/hemiarch replacement with frozen elephant trunk and left aorto-subclavian bypass, CABG x2 with Dr. Muller on 8/9/2021 (post-op course c/b prolonged intubation, SSS s/p PPM, poor wound healing s/p pec flap and closure on 9/29/21 who recently presented to [facility] with acute LUE pain, found to have left brachial occlusion s/p left brachial artery embolectomy, 7/21/22. CT scans at that time showed endoleak and she was scheduled to see Dr. Muller in clinic 7/27/22, however, patient was experiencing persistent left arm and chest pain that radiated to her left shoulder and she presented to [facility] ED for evaluation. 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 On 7/29/22 she underwent a TEVAR with R groin cutdown and femoral artery repair, EF 75% with Dr. Muller and Dr. Augustin (vascular sx). She arrived to the CTICU intubated and hypothermic, received 2L IVF and 2u pRBCs. On POD1 she was extubated. On POD2 BB started. On POD3 noted to be lethargic w/ RLE weakness, stroke code called, CT head negative. CT chest suggestive questionable subsegmental PEs and heparin gtt started. Official CT read revealed no evidence of PE and heparin gtt was thus discontinued. POD 4-6 stayed in CTICU for continued monitoring of lethargy. On POD7 transferred to stepdown unit. LE dopplers negative for DVTs. PT consulted and recommended [facility] upon discharge due to RLE weakness. POD8 TLC removed. POD9 Repeat CT Head obtained per stroke team recs showing chronic small old CVA. Keflex started for possible R groin purulence/erythema. POD 10-16 Patient is medically cleared for d/c, pending authorization for rehab. POD14 received auth, will discharge to [facility]. Per vascular surgery, will go home to complete 7 days of PO abx for R groin erythema. Wound care instructions for R groin cutdown: Clean wound with betadine and redress with gauze and paper tape daily. Per Dr. Vernon she is medically clear for discharge to rehab today, 8/14/22.    Vital Signs Last 24 Hrs  T(C): 36.3 (14 Aug 2022 05:01), Max: 36.9 (13 Aug 2022 14:47)  T(F): 97.4 (14 Aug 2022 05:01), Max: 98.4 (13 Aug 2022 14:47)  HR: 63 (14 Aug 2022 05:00) (58 - 87)  BP: 144/65 (14 Aug 2022 05:00) (119/59 - 176/76)  BP(mean): 94 (14 Aug 2022 05:00) (85 - 109)  RR: 18 (14 Aug 2022 05:00) (16 - 18)  SpO2: 93% (14 Aug 2022 05:00) (93% - 100%)    Parameters below as of 14 Aug 2022 05:00  Patient On (Oxygen Delivery Method): room air      I&O's Detail    13 Aug 2022 07:01  -  14 Aug 2022 07:00  --------------------------------------------------------  IN:    Oral Fluid: 250 mL  Total IN: 250 mL    OUT:    Voided (mL): 900 mL  Total OUT: 900 mL    Total NET: -650 mL        PHYSICAL EXAM:    General: NAD, sitting comfortably in chair, conversing appropriately  Neurological: alert and oriented, UE and LE strength equal b/l, facial symmetry present  Cardiovascular: RRR, Clear S1 and S2, no murmurs appreciated  Respiratory: chest expansion symmetrical, CTA b/l, no wheezing noted  Gastrointestinal: +BS, soft, NT, ND  Extremities: moving spontaneously, no calf tenderness, trace b/l LE edema.  Vascular: warm, well perfused. DP/PT pulses dopplerable  Incisions: R femoral incision C/D/I no drainage or purulence    LABS:                        8.9    8.07  )-----------( 187      ( 13 Aug 2022 05:30 )             29.0       COUMADIN:  Yes/No.        DOSE:                  INDICATION:                GOAL INR:        08-13    134<L>  |  101  |  15  ----------------------------<  98  4.1   |  25  |  0.80    Ca    9.2      13 Aug 2022 05:30  Mg     2.5     08-13            MEDICATIONS  (STANDING):  amLODIPine   Tablet 2.5 milliGRAM(s) Oral daily  apixaban 5 milliGRAM(s) Oral every 12 hours  aspirin enteric coated 81 milliGRAM(s) Oral daily  atorvastatin 10 milliGRAM(s) Oral at bedtime  budesonide  80 MICROgram(s)/formoterol 4.5 MICROgram(s) Inhaler 2 Puff(s) Inhalation two times a day  ceFAZolin   IVPB      ceFAZolin   IVPB 1000 milliGRAM(s) IV Intermittent every 8 hours  chlorhexidine 2% Cloths 1 Application(s) Topical <User Schedule>  furosemide    Tablet 20 milliGRAM(s) Oral daily  hydrALAZINE 10 milliGRAM(s) Oral every 8 hours  levothyroxine 50 MICROGram(s) Oral daily  metoprolol tartrate 25 milliGRAM(s) Oral every 12 hours  pantoprazole    Tablet 40 milliGRAM(s) Oral before breakfast  potassium chloride    Tablet ER 10 milliEquivalent(s) Oral daily      Discharge CXR:    Discharge ECHO:

## 2022-08-14 NOTE — DISCHARGE NOTE NURSING/CASE MANAGEMENT/SOCIAL WORK - PATIENT PORTAL LINK FT
You can access the FollowMyHealth Patient Portal offered by [facility] by registering at the following website: [email or web address]. By joining [employer]’s FollowMyHealth portal, you will also be able to view your health information using other applications (apps) compatible with our system.

## 2022-08-14 NOTE — PROGRESS NOTE ADULT - ASSESSMENT
77 y/o Female, previous smoker, w/ PMHx of HTN, spinal stenosis, arthritis, bicuspid aortic valve s/p AVR, ascending/hemiarch replacement with frozen elephant trunk and left aorto-subclavian bypass, CABG x2 with Dr. Muller on 8/9/2021 (post-op course c/b prolonged intubation, SSS s/p PPM, poor wound healing s/p pec flap and closure on 9/29/21 who recently presented to [facility] with acute LUE pain, found to have left brachial occlusion s/p left brachial artery embolectomy, 7/21/22. CT scans at that time showed endoleak and she was scheduled to see Dr. Muller in clinic 7/27/22, however, patient was experiencing persistent left arm and chest pain that radiated to her left shoulder and she presented to [facility] ED for evaluation. 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 On 7/29/22 she underwent a TEVAR with R groin cutdown and femoral artery repair, EF 75% with Dr. Muller and Dr. Augustin (vascular sx). She arrived to the CTICU intubated and hypothermic, received 2L IVF and 2u pRBCs. On POD1 she was extubated. On POD2 BB started. On POD3 noted to be lethargic w/ RLE weakness, stroke code called, CT head negative. CT chest suggestive questionable subsegmental PEs and heparin gtt started. Official CT read revealed no evidence of PE and heparin gtt was thus discontinued. POD 4-6 stayed in CTICU for continued monitoring of lethargy. On POD7 transferred to stepdown unit. LE dopplers negative for DVTs. PT consulted and recommended [facility] upon discharge due to RLE weakness. POD8 TLC removed. POD9 Repeat CT Head obtained per stroke team recs showing chronic small old CVA. Keflex started for possible R groin purulence/erythema. POD 10-16 Patient is medically cleared for d/c, pending authorization for rehab. POD14 received auth, will discharge to [facility], [place]. Per vascular surgery, will go home to complete 7 days of PO abx for R groin erythema. Wound care instructions for R groin cutdown: Clean wound with betadine and redress with gauze and paper tape daily. Per Dr. Vernon she is medically clear for discharge to rehab today, 8/14/22.

## 2022-08-14 NOTE — PROGRESS NOTE ADULT - PROVIDER SPECIALTY LIST ADULT
CT Surgery
Critical Care
Neurology
Vascular Surgery
CT Surgery
Critical Care
Neurology
Vascular Surgery
CT Surgery
Critical Care
Neurology
Neurology
Vascular Surgery
CT Surgery
CT Surgery
Vascular Surgery

## 2022-08-16 PROBLEM — L89.309 PRESSURE ULCER, BUTTOCK: Status: ACTIVE | Noted: 2022-01-01

## 2022-08-23 PROBLEM — Z09 POSTOP CHECK: Status: ACTIVE | Noted: 2021-09-03

## 2022-08-26 NOTE — ADDENDUM
[FreeTextEntry1] : This note was written by Moises Franks, acting as a scribe for Dr. Hugo Augustin.  I, Dr. Hugo Augustin, have read and attest that all the information, medical decision-making, and discharge instructions within are true and accurate.\par \par I, Dr. Hugo Augustin, personally performed the evaluation and management (E/M) services for this new patient.  That E/M includes conducting the initial examination, assessing all conditions, and establishing the plan of care.  Today, my ACP, Moises Franks, was here to observe my evaluation and management services for this patient to be followed going forward.

## 2022-08-26 NOTE — REASON FOR VISIT
[de-identified] : TEVAR for thoracic pseudoaneurysm, patch angioplasty for repair of R CFA, preclose of R CFA [de-identified] : 07/29/2022 [de-identified] : 25 [de-identified] : 3 [de-identified] : 77yoF w/previous arch repair/frozen elephant trunk for pseudoaneurysm still being perfused, now s/p TEVAR for thoracic pseudoaneurysm, patch angioplasty for repair of R CFA, preclose of R CFA and coverage of the L SCA.  Currently, pt reports no new issues, states her operative sites are nearly fully-healed but w/o tenderness/pain.

## 2022-08-26 NOTE — PHYSICAL EXAM
[Normal Thyroid] : the thyroid was normal [Normal Breath Sounds] : Normal breath sounds [Respiratory Effort] : normal respiratory effort [Normal Heart Sounds] : normal heart sounds [Normal Rate and Rhythm] : normal rate and rhythm [2+] : left 2+ [No HSM] : no hepatosplenomegaly [No Rash or Lesion] : No rash or lesion [Alert] : alert [Calm] : calm [Skin Ulcer] : ulcer [JVD] : no jugular venous distention  [Carotid Bruits] : no carotid bruits [Right Carotid Bruit] : no bruit heard over the right carotid [Left Carotid Bruit] : no bruit heard over the left carotid [Ankle Swelling (On Exam)] : not present [Varicose Veins Of Lower Extremities] : not present [] : not present [Abdomen Masses] : No abdominal masses [Abdomen Tenderness] : ~T ~M No abdominal tenderness [Petechiae] : no petechiae [Skin Induration] : no induration [de-identified] : Well-nourished, NAD [de-identified] : NC/AT, anicteric [de-identified] : FROM throughout, strength 5/5x4 [de-identified] : R groin dehiscence nearly fully healed, clean, dry; R gluteal decubiti noted in the gluteal fold nearly full healed

## 2022-08-26 NOTE — DISCUSSION/SUMMARY
[FreeTextEntry1] : 77yoF w/previous arch repair/frozen elephant trunk for pseudoaneurysm still being perfused, now s/p TEVAR for thoracic pseudoaneurysm, patch angioplasty for repair of R CFA, preclose of R CFA and coverage of the L SCA.  Currently, pt reports no new issues, states her operative sites are nearly fully-healed but w/o tenderness/pain.\par \par R groin wound nearly fully-healed, one area of previous dehiscence nearly closed w/daily betadine application.  R gluteal decubitus healing, now stage 1 but nearly resolved, recommend to apply zinc oxide to the site daily, continue painting R groin dehiscence w/betadine until fully closed.  Pt will f/u w/[facility] today but no further vascular f/u's required.

## 2022-08-31 NOTE — ED PROVIDER NOTE - ATTENDING APP SHARED VISIT CONTRIBUTION OF CARE
76 yo F PMHx noted presents for evaluation of swelling to b/l LE since procedure 1 month ago.  Pt had Tevar July 2022 with rt femoral repair.  Was sent to [facility] on 8/14 for rehab and signed herself out.  Pt has since started rehab at [facility] who urged pt to come in for Duplex.  Pt currently on Eliquis. no SOB, no fever or chills.  On exam pt in NAD AAO x 3, abd soft nt nd, + bruising and healing wound to rt lower abdomen, + b/l LE swelling, R>L, no calf tenderness, + DP pulses

## 2022-08-31 NOTE — ED PROVIDER NOTE - PHYSICAL EXAMINATION
--EXAM--  VITAL SIGNS: I have reviewed vs documented at present.  CONSTITUTIONAL: Well-developed; well-nourished; in no acute distress.   SKIN: Warm and dry, no acute rash.       RESP: No wheezes, rales or rhonchi.  ABD: Normal bowel sounds; soft; non-distended; non-tender.  EXT: right leg positive leg swelling

## 2022-08-31 NOTE — ED PROVIDER NOTE - OBJECTIVE STATEMENT
This is a 77-year-old female presents to the ED for swelling to lower extremity.  Patient started physical therapy today at [facility].  They suggested she come to the ED for vascular studies to rule out DVT

## 2022-08-31 NOTE — ED PROVIDER NOTE - PATIENT PORTAL LINK FT
You can access the FollowMyHealth Patient Portal offered by [facility] by registering at the following website: [email or web address]. By joining [employer]’s FollowMyHealth portal, you will also be able to view your health information using other applications (apps) compatible with our system.

## 2022-08-31 NOTE — ED PROVIDER NOTE - CLINICAL SUMMARY MEDICAL DECISION MAKING FREE TEXT BOX
Prelim Duplex negative for DVT. Results d/w pt and grand daughter.  Pt will follow up with her Vascular surgeon.

## 2022-08-31 NOTE — ED ADULT NURSE NOTE - NS_NURSE_DISC_TEACHING_YN_ED_ALL_ED
Esophagogastroduodenoscopy Procedure Note    Place of Service: [facility]    Patient Name: Bryn Harkins    MRN# [id number]    Date of Procedure: 11/8/2018    Surgeon: Jorge Harley MD    Referring Physician: Jorge Harley MD    Primary Care Provider: [facility] P[facility]    Operative Procedure: EGD - esophagogastroduodenoscopy    Preoperative Diagnosis: History of esophageal varices with bleeding status post banding in the past here for surveillance as noted    Anesthesia Medications administered: MAC as per Anesthesia    Endoscope: [id number]     Accessories: Biopsy Forceps    Procedure Description: The patient was placed in the left lateral position and monitored continuously with automatic blood pressure, ECG tracing, pulse oximetry monitoring and direct observations. Bite block placed and oxygen administered by a nasal cannula as needed. Medications were administered incrementally over the course of the procedure to achieve an adequate level of conscious sedation. After adequate sedation, the endoscope was carefully introduced into the oropharynx and passed into the esophagus. There was normal pharyngeal and laryngeal structure.  The esophagus and GE junction were carefully examined. After advancement of the endoscope into the stomach, a careful examination was performed, including views of the antrum, incisura angularis, corpus and retroflexed views of the cardia and fundus.?   The pylorus was then intubated without any difficulty and the endoscope was advanced to the second portion of the duodenum. Careful examination of the second portion of the duodenum and the bulb was then performed. Findings and intervention are detailed below. The stomach was then decompressed and the endoscope withdrawn.  Overall, the patient tolerated the procedure well without undue discomfort, hypotension or desaturation. The patient recovered in the observation area and was discharged when adequately recovered.?    Events:    Event Tracking     Panel 1     Procedure : EGD      Event Time In    Procedure Start 1519    Procedure End 1534             Findings:   Esophagus:   The Z-line was measured at 34 cm, GEJ at 34 cm and hiatus at 34 cm from the incisors.?  Normal and scarring noted from previous banding no significant residual esophageal varices are noted    Stomach:  Normal, Hill grade III, mild erosive gastritis is noted, Portal hypertensive gastropathy and no gastric varices were noted    Duodenum/small bowel:   Normal    Other NA     Interventions:   Cytology\biopsy: Gastric antral mucosa biopsies were obtained as noted    Complications: no    Impression:   1. Moderate erosive gastritis is noted  2. Moderate portal hypertensive gastropathy is noted  3. No gastric varices are noted small remnant esophageal varices are noted  4. No blood or bleeding lesions were noted  5. Otherwise normal EGD to the second portion of the duodenum    Recommendations:   Continue antacid medications  Repeat EGD in 12 months       Yes

## 2022-08-31 NOTE — ED PROVIDER NOTE - NS ED ROS FT
Review of Systems:  	•	CONSTITUTIONAL - no fever, no diaphoresis, no chills  	•	SKIN - no rash    	•	RESPIRATORY - no shortness of breath, no cough  	•	CARDIAC - no chest pain, no palpitations    	•	MUSCULOSKELETAL - right leg swelling

## 2022-09-07 PROBLEM — Z98.890 S/P ASCENDING AORTIC ANEURYSM REPAIR: Status: ACTIVE | Noted: 2021-09-16

## 2022-09-23 NOTE — ED PROVIDER NOTE - NSFOLLOWUPINSTRUCTIONS_ED_ALL_ED_FT
SEE YOUR DOCTOR IN THE NEXT 24-48 HOURS   RETURN FOR ANY FURTHER CONCERNS     Anemia    Anemia is a condition in which the concentration of red blood cells or hemoglobin in the blood is below normal. Hemoglobin is a substance in red blood cells that carries oxygen to the tissues of the body. Anemia results in not enough oxygen reaching these tissues which can cause symptoms such as weakness, dizziness/lightheadedness, shortness of breath, chest pain, paleness, or nausea. The cause of your anemia may or may not be determined immediately. If your hemoglobin was dangerously low, you may have received a blood transfusion. Usually reactions to transfusions occur immediately but monitor yourself for any fevers, rash, or shortness of breath.    SEEK IMMEDIATE MEDICAL CARE IF YOU HAVE ANY OF THE FOLLOWING SYMPTOMS: extreme weakness/chest pain/shortness of breath, black or bloody stools, vomiting blood, fainting, fever, or any signs of dehydration.

## 2022-09-23 NOTE — ED PROVIDER NOTE - PROGRESS NOTE DETAILS
Given patient's symptoms Consent obtained for 1 unit PRBC .  Patient has multiple antibodies therefore blood is delayed.  Patient aware Signed out to Dr Cazares patient has completed transfusion at this time with no complications I will discharge with follow up to her pcp

## 2022-09-23 NOTE — ED ADULT NURSE NOTE - NSIMPLEMENTINTERV_GEN_ALL_ED
yes
Implemented All Universal Safety Interventions:  [place] to call system. Call bell, personal items and telephone within reach. Instruct patient to call for assistance. Room bathroom lighting operational. Non-slip footwear when patient is off stretcher. Physically safe environment: no spills, clutter or unnecessary equipment. Stretcher in lowest position, wheels locked, appropriate side rails in place.

## 2022-09-23 NOTE — ED ADULT TRIAGE NOTE - HEIGHT IN CM
157.48
Receive pt. in room 10 alert and oriented x 4, presenting to the ER sent by his primary doctor for treatment of left foot ulcer. Pt. have a history of DM, CAD with stents, MI, HTN, COPD, Smoker. Pt. stated " two weeks ago I noticed a blister on the side of my left foot it burst and drained and now I have this ulcer". Ulcer necrotic with fibrin slough no drainage noted, top of left foot with redness warm to touch. Labs sent will continue to monitor.

## 2022-09-23 NOTE — ED PROVIDER NOTE - PATIENT PORTAL LINK FT
You can access the FollowMyHealth Patient Portal offered by [facility] by registering at the following website: [email or web address]. By joining [employer]’s FollowMyHealth portal, you will also be able to view your health information using other applications (apps) compatible with our system.

## 2022-09-23 NOTE — ED PROVIDER NOTE - CLINICAL SUMMARY MEDICAL DECISION MAKING FREE TEXT BOX
patient has successfully completed transfusion and is ambulating in the department I will discharge at this time with follow up to her pcp

## 2022-09-23 NOTE — ED PROVIDER NOTE - ATTENDING APP SHARED VISIT CONTRIBUTION OF CARE
77-year-old female past medical history noted including TAVR, aortic valve repair, femoral artery dissection, on Eliquis presents for low hemoglobin.  Patient had blood work recently and was called by PMD for hemoglobin of 7.5.  Patient reports feeling weak but no chest pain or shortness of breath, no fever or chills.  No black or bloody stools.  On exam patient in NAD, AAOx3, pleasant, lungs CTA B/L, positive bilateral lower extremity swelling right greater than left, no calf tenderness, brown stool on exam

## 2022-09-27 PROBLEM — I10 ESSENTIAL HYPERTENSION: Status: ACTIVE | Noted: 2021-03-18

## 2022-09-27 PROBLEM — E78.5 HYPERLIPIDEMIA: Status: ACTIVE | Noted: 2021-04-21

## 2022-09-27 PROBLEM — Z95.1 S/P CABG X 2: Status: ACTIVE | Noted: 2021-09-16

## 2022-09-27 PROBLEM — Z98.890 S/P ENDOVASCULAR ANEURYSM REPAIR: Status: ACTIVE | Noted: 2022-01-01

## 2022-09-27 PROBLEM — Q23.1 BICUSPID AORTIC VALVE: Status: ACTIVE | Noted: 2021-07-22

## 2022-09-27 PROBLEM — Z98.890 S/P THORACIC AORTIC ANEURYSM REPAIR: Status: ACTIVE | Noted: 2021-09-16

## 2022-09-27 PROBLEM — I71.2 AORTIC ARCH ANEURYSM: Status: ACTIVE | Noted: 2021-07-22

## 2022-09-27 PROBLEM — I25.10 CAD, MULTIPLE VESSEL: Status: ACTIVE | Noted: 2021-05-27

## 2022-09-27 NOTE — REASON FOR VISIT
[FreeTextEntry1] : Patient presents to the office for follow up after I called her with her low Hg/HCt results. She was instructed to go to [facility] where she received a transfusion. In typical [facility] fashion she was sent to our office rather than to Dr. Tang her Internist or to see a Hematologist and gastroenterologist to evaluate and work up her anemia. She states that she feels better after the transfusion of packed RBC's.

## 2022-09-27 NOTE — PHYSICAL EXAM
[Well Developed] : well developed [Well Nourished] : well nourished [No Acute Distress] : no acute distress [Normal Venous Pressure] : normal venous pressure [No Carotid Bruit] : no carotid bruit [Normal S1, S2] : normal S1, S2 [No Murmur] : no murmur [No Rub] : no rub [No Gallop] : no gallop [Clear Lung Fields] : clear lung fields [Good Air Entry] : good air entry [No Respiratory Distress] : no respiratory distress  [Soft] : abdomen soft [Non Tender] : non-tender [No Masses/organomegaly] : no masses/organomegaly [Normal Bowel Sounds] : normal bowel sounds [Normal Gait] : normal gait [No Edema] : no edema [No Cyanosis] : no cyanosis [No Clubbing] : no clubbing [No Varicosities] : no varicosities [No Rash] : no rash [No Skin Lesions] : no skin lesions [Moves all extremities] : moves all extremities [No Focal Deficits] : no focal deficits [Normal Speech] : normal speech [Alert and Oriented] : alert and oriented [Normal memory] : normal memory [General Appearance - Well Developed] : well developed [Normal Appearance] : normal appearance [Well Groomed] : well groomed [General Appearance - Well Nourished] : well nourished [No Deformities] : no deformities [General Appearance - In No Acute Distress] : no acute distress [Heart Rate And Rhythm] : heart rate and rhythm were normal [Heart Sounds] : normal S1 and S2 [Edema] : no peripheral edema present [Respiration, Rhythm And Depth] : normal respiratory rhythm and effort [Exaggerated Use Of Accessory Muscles For Inspiration] : no accessory muscle use [Auscultation Breath Sounds / Voice Sounds] : lungs were clear to auscultation bilaterally [Normal Conjunctiva] : the conjunctiva exhibited no abnormalities [Normal Oropharynx] : normal oropharynx [Normal Jugular Venous V Waves Present] : normal jugular venous V waves present [Abdomen Soft] : soft [Abdomen Tenderness] : non-tender [Abnormal Walk] : normal gait [Nail Clubbing] : no clubbing of the fingernails [Cyanosis, Localized] : no localized cyanosis [Skin Color & Pigmentation] : normal skin color and pigmentation [Skin Turgor] : normal skin turgor [] : no rash [Oriented To Time, Place, And Person] : oriented to person, place, and time [Affect] : the affect was normal [de-identified] : Sternal wound infection with presence of a drain [FreeTextEntry1] : Grade III/VI systolic ejectionmurmur at RSB, grade II/VI systolic murmur at LSB [FreeTextEntry2] : groin with no hematoma

## 2022-09-27 NOTE — DISCUSSION/SUMMARY
[FreeTextEntry1] : Maintain present medications\par She was advised on her test results.\par Patient is s/p a  2D echo doppler.Results were reviewed with her and a copy provided to her previously.\par Maintain cardiac medications.\par She was placed on amiodarone post operatively by EP.\par She was advised to get a work up of her anemia and  for that to see her Internist, Dr. Tang and be referred to a Hematologist and Gastroenterologist for a work up of her anemia.\par The results of her lab test was reviewed with her in detail and a copy was provided to her.\par RV in 3 months.

## 2022-09-27 NOTE — ASSESSMENT
[FreeTextEntry1] : S/P AVR, for  Severe AS, 2v CABG, ascending aortic hemiarch replacement left aorto subclavian bypass complicated by a sternal wound infection requiring skilled nursing facility care and rehabilitation at [facility]\par Abnormal Lexiscan nuclear test c/w myocardial ischemia\par Moderate  MR\par Moderate TR\par CAD as outlined in cardiac catheterization report\par Hypertensive heart disease\par Cigarette smoker with possible COPD\par Hyperlipidemia\par Possible DM\par Anemia

## 2022-10-03 NOTE — ASU PATIENT PROFILE, ADULT - NS PRO LACT YNNA
Subjective:      Mary Ball is a 6 y.o. female here with mother. Patient brought in for Well Child (6 year old well check with mom and sibling)      History of Present Illness:  Well Child Exam  Diet - WNL - Diet includes cow's milk and solids   Growth, Elimination, Sleep - WNL -  Growth chart normal, voiding normal, stooling normal and sleeping normal  Physical Activity - WNL - active play time  Behavior - WNL -  Development - WNL -  School - abnormal (has not been at school, mom is looking for school) -  Household/Safety - WNL - appropriate carseat/belt use and safe environment    Not in school yet, mom will enrolled her soon, was moving to a new apartments.  Rash on scalp, scaly, hair loss, mom is using cream that is not helping,  Some dry patches on forehead and back.    Review of Systems   Constitutional:  Negative for activity change, appetite change and fever.   HENT:  Positive for congestion. Negative for ear pain, mouth sores, rhinorrhea and sore throat.    Eyes:  Negative for redness.   Respiratory:  Negative for cough.    Cardiovascular:  Negative for chest pain.   Gastrointestinal:  Negative for abdominal distention, diarrhea and vomiting.   Genitourinary:  Negative for dysuria.   Skin:  Positive for rash.     Objective:     Physical Exam  Vitals reviewed.   Constitutional:       General: She is active.   HENT:      Right Ear: Tympanic membrane normal.      Left Ear: Tympanic membrane normal.      Nose: Nose normal.      Mouth/Throat:      Mouth: Mucous membranes are moist.      Pharynx: Oropharynx is clear.   Eyes:      Conjunctiva/sclera: Conjunctivae normal.   Cardiovascular:      Rate and Rhythm: Normal rate.      Heart sounds: No murmur heard.  Pulmonary:      Effort: No respiratory distress.      Breath sounds: No wheezing or rales.   Abdominal:      General: There is no distension.      Palpations: Abdomen is soft. There is no mass.   Musculoskeletal:         General: Normal range of  motion.      Cervical back: Neck supple.   Lymphadenopathy:      Cervical: No cervical adenopathy.   Skin:     Findings: Rash present.          Neurological:      Mental Status: She is alert.       Assessment:        1. Encounter for well child check without abnormal findings    2. Encounter for screening for global developmental delays (milestones)    3. Behind on immunizations    4. Tinea capitis    5. Other eczema         Plan:       Mary was seen today for well child.    Diagnoses and all orders for this visit:    Encounter for well child check without abnormal findings  -     SWYC-Developmental Test    Encounter for screening for global developmental delays (milestones)  Comments:  needs to be enrolled in school  Orders:  -     SWYC-Developmental Test    Behind on immunizations  Comments:  RTC in 6 months for MMRV and Hep A    Tinea capitis    Other eczema    Other orders  -     (In Office Administered) MMR / Varicella Combined Vaccine (SQ)  -     (In Office Administered) DTaP / IPV Combined Vaccine (IM)  -     (In Office Administered) Hepatitis A Vaccine (Pediatric/Adolescent) (2 Dose) (IM)  -     griseofulvin microsize (GRIFULVIN V) 125 mg/5 mL suspension; Take 10 mLs (250 mg total) by mouth once daily.  -     triamcinolone acetonide 0.025% (KENALOG) 0.025 % cream; Apply topically 2 (two) times daily. for 5 days    Patient Instructions   Patient Education       Well Child Exam 6 Years   About this topic   Your child's 6-year well child exam is a visit with the doctor to check your child's health. The doctor measures your child's weight, height, and head size. The doctor plots these numbers on a growth curve. The growth curve gives a picture of your child's growth at each visit. The doctor may listen to your child's heart, lungs, and belly. Your doctor will do a full exam of your child from the head to the toes. The doctor may check your child's hearing and vision.  Your child may also need shots or blood  tests during this visit.  General   Growth and Development   Your doctor will ask you how your child is developing. The doctor will focus on the skills that most children your child's age are expected to do. During this time of your child's life, here are some things you can expect.  Movement ? Your child may:  Be able to skip  Hop and stand on one foot  Use fork and spoon well. May also be able to use a table knife.  Draw circles, squares, and some letters  Get dressed without help  Be able to swing and do a somersault  Hearing, seeing, and talking ? Your child will likely:  Be able to tell a simple story  Know name and address  Speak in longer sentence  Understand concepts of counting, same and different, and time  Know many letters and numbers  Feelings and behavior ? Your child will likely:  Like to sing, dance, and act  Know the difference between what is and is not real  Want to make friends happy  Have a good imagination  Work together with others  Be better at following rules. Help your child learn what the rules are by having rules that do not change. Make your rules the same all the time. Use a short time out to discipline your child.  Feeding ? Your child:  Can drink lowfat or fat-free milk. Limit your child to 2 to 3 cups (480 to 720 mL) of milk each day.  Will be eating 3 meals and 1 to 2 snacks a day. Make sure to give your child the right size portions and healthy choices.  Should be given a variety of healthy foods. Many children like to help cook and make food fun.  Should have no more than 4 to 6 ounces (120 to 180 mL) of fruit juice a day. Do not give your child soda.  Should eat meals as a part of the family. Turn the TV and cell phone off while eating. Talk about your day, rather than focusing on what your child is eating.  Sleep ? Your child:  Is likely sleeping about 10 hours in a row at night. Try to have the same routine before bedtime. Read to your child each night before bed. Have your  child brush teeth before going to bed as well.  May have bad dreams or wake up at night.  Shots ? It is important for your child to get shots on time. This protects your child from very serious illnesses like brain or lung infections.  Your child may need some shots if they were missed earlier.  Your child can get their last set of shots before they start school. This may include:  DTaP or diphtheria, tetanus, and pertussis vaccine  MMR vaccine or measles, mumps, and rubella  IPV or polio vaccine  Varicella or chickenpox vaccine  Flu or influenza vaccine  Your child may get some of these combined into one shot. This lowers the number of shots your child may get and yet keeps them protected.  Help for Parents   Play with your child.  Go outside as often as you can. Visit playgrounds. Give your child a tricycle or bicycle to ride. Make sure your child wears a helmet when using anything with wheels like skates, skateboard, bike, etc.  Play simple games. Teach your child how to take turns and share.  Make a game out of household chores. Sort clothes by color or size. Race to  toys.  Read to your child. Have your child tell the story back to you. Find word that rhyme or start with the same letter.  Give your child paper, safe scissors, glue, and other craft supplies. Help your child make a project.  Here are some things you can do to help keep your child safe and healthy.  Have your child brush teeth 2 to 3 times each day. Your child should also see a dentist 1 to 2 times each year for a cleaning and checkup.  Put sunscreen with a SPF30 or higher on your child at least 15 to 30 minutes before going outside. Put more sunscreen on after about 2 hours.  Do not allow anyone to smoke in your home or around your child.  Have the right size car seat for your child and use it every time your child is in the car. Seats with a harness are safer than just a booster seat with a belt.  Take extra care around water. Make sure  your child cannot get to pools or spas. Consider teaching your child to swim.  Never leave your child alone. Do not leave your child in the car or at home alone, even for a few minutes.  Protect your child from gun injuries. If you have a gun, use a trigger lock. Keep the gun locked up and the bullets kept in a separate place.  Limit screen time for children to 1 to 2 hours per day. This means TV, phones, computers, tablets, or video games.  Parents need to think about:  Enrolling your child in school  How to encourage your child to be physically active  Talking to your child about strangers, unwanted touch, and keeping private parts safe  Talking to your child in simple terms about differences between boys and girls and where babies come from  Having your child help with some family chores to encourage responsibility within the family  The next well child visit will most likely be when your child is 7 years old. At this visit your doctor may:  Do a full check up on your child  Talk about limiting screen time for your child, how well your child is eating, and how to promote physical activity  Talk about discipline and how to correct your child  Talk about getting your child ready for school  When do I need to call the doctor?   Fever of 100.4°F (38°C) or higher  Has trouble eating, sleeping, or using the toilet  Does not respond to others  You are worried about your child's development  Where can I learn more?   Centers for Disease Control and Prevention  http://www.cdc.gov/vaccines/parents/downloads/milestones-tracker.pdf   Centers for Disease Control and Prevention  https://www.cdc.gov/ncbddd/actearly/milestones/milestones-5yr.html   Kids Health  https://kidshealth.org/en/parents/checkup-5yrs.html?ref=search   Last Reviewed Date   2019-09-12  Consumer Information Use and Disclaimer   This information is not specific medical advice and does not replace information you receive from your health care provider. This is  only a brief summary of general information. It does NOT include all information about conditions, illnesses, injuries, tests, procedures, treatments, therapies, discharge instructions or life-style choices that may apply to you. You must talk with your health care provider for complete information about your health and treatment options. This information should not be used to decide whether or not to accept your health care providers advice, instructions or recommendations. Only your health care provider has the knowledge and training to provide advice that is right for you.  Copyright   Copyright © 2021 UpToDate, Inc. and its affiliates and/or licensors. All rights reserved.    A 4 year old child who has outgrown the forward facing, internal harness system shall be restrained in a belt positioning child booster seat.  If you have an active [facility] account, please look for your well child questionnaire to come to your [facility] account before your next well child visit.       no

## 2022-10-05 NOTE — BRIEF OPERATIVE NOTE - DISPOSITION
9E Oxybutynin Pregnancy And Lactation Text: This medication is Pregnancy Category B and is considered safe during pregnancy. It is unknown if it is excreted in breast milk.

## 2022-11-01 NOTE — H&P ADULT - ASSESSMENT
75 y/o Female, previous smoker, w/ PMHx of HTN, spinal stenosis, arthritis, bicuspid aortic valve s/p AVR, ascending/hemiarch replacement with frozen elephant trunk and left aorto-subclavian bypass, CABG x2 with Dr. 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 Muller and Dr. Augustin 7/29/22.  She was started on keflex for concern for infection from groin site.  8/14/22 she was discharged to [facility]. As an outpatient she was noted to have a small collection on her sternum that drained and self resolved. She was also planned for hematology follow up for chronic anemia but has not been to her appointment yet. She is admitted now for sternal collection.     Neuro: Pain well controlled with current regimen  - Tylenol 650 PRN for mild pain     Respiratory  - CXR showing pulmonary congestion, appears fluid overloaded on exam. Lasix 20IVP daily   - Continue Symbicort   - Spo2 98% on RA   - Encourage ambulation C+DB and use of IS 10x / hr while awake    Cardiac  - 8/9/2021: AVR, ascending/hemiarch replacement with frozen elephant trunk and left aorto-subclavian bypass, CABG x2  - 9/29/21:  pec flap and closure   - 7/21/22: TEVAR with R groin cutdown and femoral artery repair  - Readmitted with chest wall collection, CT chest with IV contrast  - F/u TTE   - Continue asa/statin/BB  - HTN: continue BB, norvasc, hydralazine   - HLD: Statin    GI:   - Diet: DASH   - GI PPX with protonix     Renal/: BUN/Cr 14/0.87  - Diuresis with lasix IV, monitor renal function, monitor I/Os  - Replete K<4.0, Mg<2.0    Heme:  - H&H 6.9/21.2, 1u PRBC ordered   - INR 2.5, no DVT ppx ordered. SCDs     Endocrinology  - A1C pending   - TSH pending      ID: afebrile WBC 8.4  - Chest wall collection, CT chest  - Blood cultures ordered  - UA   - Observe for SIRS/Sepsis Syndrome    Dispo: Home when medically cleared    75 y/o Female, previous smoker, w/ PMHx of HTN, spinal stenosis, arthritis, bicuspid aortic valve s/p AVR, ascending/hemiarch replacement with frozen elephant trunk and left aorto-subclavian bypass, CABG x2 with Dr. 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 Muller and Dr. Augustin 7/29/22.  She was started on keflex for concern for infection from groin site.  8/14/22 she was discharged to [facility]. As an outpatient she was noted to have a small collection on her sternum that drained and self resolved. She was also planned for hematology follow up for chronic anemia but has not been to her appointment yet. She is admitted now for sternal collection.     Neuro: Pain well controlled with current regimen  - Tylenol 650 PRN for mild pain     Respiratory  - CXR showing pulmonary congestion, appears fluid overloaded on exam. Lasix 20IVP daily   - Continue Symbicort   - Spo2 98% on RA   - Encourage ambulation C+DB and use of IS 10x / hr while awake    Cardiac  - 8/9/2021: AVR, ascending/hemiarch replacement with frozen elephant trunk and left aorto-subclavian bypass, CABG x2  - 9/29/21:  pec flap and closure   - 7/21/22: TEVAR with R groin cutdown and femoral artery repair  - Readmitted with chest wall collection, CT chest with IV contrast  - F/u TTE   - Continue asa/statin/BB  - HTN: continue BB, norvasc, hydralazine   - HLD: Statin    GI:   - Diet: DASH   - GI PPX with protonix     Renal/: BUN/Cr 14/0.87  - Diuresis with lasix IV, monitor renal function, monitor I/Os  - Replete K<4.0, Mg<2.0    Heme:  - H&H 6.9/21.2, 1u PRBC ordered   - Left brachial occlusion s/p left brachial artery embolectomy, on eliquis at home   - INR 2.5, no DVT ppx ordered. SCDs     Endocrinology  - A1C pending   - TSH pending      ID: afebrile WBC 8.4  - Chest wall collection, CT chest  - Blood cultures ordered  - UA   - Observe for SIRS/Sepsis Syndrome    Dispo: Home when medically cleared

## 2022-11-01 NOTE — H&P ADULT - NSHPREVIEWOFSYSTEMS_GEN_ALL_CORE
Review of Systems  CONSTITUTIONAL:  Denies Fevers / chills, sweats, fatigue, weight loss, weight gain                                      NEURO:  Denies parathesias, seizures, syncope, confusion                                                                                EYES:  Denies Blurry vision, discharge, pain, loss of vision                                                                                    ENMT:  Denies Difficulty hearing, vertigo, dysphagia, epistaxis, recent dental work                                       CV:  Denies Chest pain, palpitations, HERRERA, orthopnea                                                                                          RESPIRATORY:  +SOB Denies Wheezing, cough / sputum, hemoptysis                                                                GI:  Denies Nausea, vommiting, diarrhea, constipation, melena, difficulty swallowing                                               : Denies Hematuria, dysuria, urgency, incontinence                                                                                         MUSKULOSKELETAL:  Denies arthritis, joint swelling, muscle weakness                                                             SKIN/BREAST:  Denies rash, itching, lacey loss, masses                                                                                            PSYCH:  Denies depresion, anxiety, suicidal ideation                                                                                               HEME/LYMPH:  Denies bruises easily, enlarged lymph nodes, tender lymph nodes                                        ENDOCRINE:  Denies cold intolerance, heat intolerance, polydipsia

## 2022-11-01 NOTE — H&P ADULT - HISTORY OF PRESENT ILLNESS
77 y/o Female, previous smoker, w/ PMHx of HTN, spinal stenosis, arthritis, bicuspid aortic valve s/p AVR, ascending/hemiarch replacement with frozen elephant trunk and left aorto-subclavian bypass, CABG x2 with Dr. 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 Muller and Dr. Augustin 7/29/22.  She was started on keflex for concern for infection from groin site.  8/14/22 she was discharged to [facility]. As an outpatient she was noted to have a small collection on her sternum that drained and self resolved. She was also planned for hematology follow up for chronic anemia but has not been to her appointment yet For the last 2 weeks she has noted swelling and tenderness on the upper part of her sternum as well as shortness of breath and LE edema. Denies fevers, chill cp, abd pain, n/c She sent a photo of her sternum to CTS office and was advised to present to the ED. In the ED pt afebrile HR 71, 104/58, SpO2 98% on RA. Hgb 6.9, otherwise labs WNL. Discussed with Dr. Vernon, pt will be admitted to CTS for further work up.  77 y/o Female, pre76 y/o Female, previous smoker, w/ PMHx of HTN, spinal stenosis, arthritis, bicuspid aortic valve s/p AVR, ascending/hemiarch replacement with frozen elephant trunk and left aorto-subclavian bypass, CABG x2 with Dr. 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 Muller and Dr. Augustin 7/29/22.  She was started on keflex for concern for infection from groin site.  8/14/22 she was discharged to [facility]. As an outpatient she was noted to have a small collection on her sternum that drained and self resolved. She was also planned for hematology follow up for chronic anemia but has not been to her appointment yet For the last 2 weeks she has noted swelling and tenderness on the upper part of her sternum as well as shortness of breath and LE edema. Denies fevers, chill cp, abd pain, n/c She sent a photo of her sternum to CTS office and was advised to present to the ED. In the ED pt afebrile HR 71, 104/58, SpO2 98% on RA. Hgb 6.9, otherwise labs WNL. Discussed with Dr. Vernon, pt will be admitted to CTS for further work up.

## 2022-11-01 NOTE — ED PROVIDER NOTE - CLINICAL SUMMARY MEDICAL DECISION MAKING FREE TEXT BOX
76 y/o with small area of swelling at superior aspect of sternal wound. Abscess vs Seroma. Will send labs and consult CT surgery who is expecting her.

## 2022-11-01 NOTE — ED PROVIDER NOTE - PHYSICAL EXAMINATION
VITAL SIGNS: I have reviewed nursing notes and confirm.  CONSTITUTIONAL: Well-developed; well-nourished; in no acute distress.  SKIN: Agree with RN documentation regarding decubitus evaluation. Remainder of skin exam is warm and dry, no acute rash.  HEAD: Normocephalic; atraumatic. Small 2 cm area of nontender fluctuance at superior aspect of sternal wound. Mild overlying erythema.  EYES: PERRL, EOM intact; conjunctiva and sclera clear.  ENT: No nasal discharge; airway clear.  NECK: Supple; non tender.  CARD: S1, S2 normal; no murmurs, gallops, or rubs. Regular rate and rhythm. Loud systolic ejection murmur at L-sternal border.   RESP: No wheezes, rales or rhonchi.  ABD: Normal bowel sounds; soft; non-distended; non-tender; no hepatosplenomegaly.  EXT: Normal ROM. No clubbing, cyanosis or edema.  Pulses intact. L hand numbness (Chronic). B/l 1+ pitting edema in nontender calves.  LYMPH: No acute cervical adenopathy.  NEURO: Alert, oriented. Grossly unremarkable.  PSYCH: Cooperative, appropriate. VITAL SIGNS: I have reviewed nursing notes and confirm.  CONSTITUTIONAL: pale appearing, non toxic , generally weak   SKIN: Agree with RN documentation regarding decubitus evaluation. Remainder of skin exam is warm and dry, no acute rash.  HEAD: Normocephalic; atraumatic. Small 2 cm area of nontender fluctuance at superior aspect of sternal wound. Mild overlying erythema.  EYES: PERRL, EOM intact;  pale conjunctiva and sclera clear.  ENT: No nasal discharge; airway clear.  NECK: Supple; non tender.  CARD: S1, S2 normal; no murmurs, gallops, or rubs. Regular rate and rhythm. Loud systolic ejection murmur at L-sternal border.   RESP: No wheezes, rales or rhonchi.  ABD: Normal bowel sounds; soft; non-distended; non-tender; no hepatosplenomegaly.  EXT: Normal ROM. No clubbing, cyanosis or edema.  Pulses intact. L hand numbness (Chronic). B/l 1+ pitting edema in nontender calves.  LYMPH: No acute cervical adenopathy.  NEURO: Alert, oriented. Grossly unremarkable.  PSYCH: Cooperative, appropriate.

## 2022-11-01 NOTE — ED ADULT NURSE NOTE - OBJECTIVE STATEMENT
76 yo F presents to ED co anterior aspect of sternal incision site wound check. Had valve repair in august. Reports persistent erythema to area. Denies drainage from site, fevers, chills, pruritus.

## 2022-11-01 NOTE — ED ADULT TRIAGE NOTE - CHIEF COMPLAINT QUOTE
Pt has hx of open heart sx in Aug 2021, reports redness/swelling to incision site x 4 weeks. Sent by cardiologist for r/o abscess to incision site. Pt also reports sob on exertion x 4 days, denies chest pain, fevers, purulent drainage.

## 2022-11-01 NOTE — ED PROVIDER NOTE - OBJECTIVE STATEMENT
76 y/o Female with a PMHx of extensive cardiac surgery with bypass and valve surgery in August by Dr. Bradshaw. Pt here today with suspected abscess at superior aspect of sternal wound. Has had mild swelling and discomfort in the last few months which increased in the last few days with overlying erythema. No fevers or drainage. Persistent fatigue since having the surgery as well as b/l LE edema. No CP or SOB. 76 y/o Female with a PMHx of cardiac surgery with bypass and valve surgery in August by Dr. Muller. Pt here today with suspected abscess at superior aspect of sternal wound. Has had mild swelling and discomfort in the last few months which increased in the last few days with overlying erythema. No fevers or drainage. Persistent fatigue since having the surgery as well as b/l LE edema. No CP or SOB.

## 2022-11-01 NOTE — ED ADULT NURSE REASSESSMENT NOTE - NS ED NURSE REASSESS COMMENT FT1
Break coverage for RN Yeimi. Pt ambulated to bathroom with assist and became short of breath. Relocated to monitored space, placed on ccm and pulse oximetry, on 2L NC. O2 sat 100%. Pending bed placement.

## 2022-11-01 NOTE — ED ADULT TRIAGE NOTE - MODE OF ARRIVAL
Critical Care Team - Daily Progress Note      Date and time: 2019 5:43 AM  Patient's name:  Julio Young Record Number: [id number]  Patient's account/billing number: [de-identified]  Patient's YOB: 1965  Age: 47 y.o. Date of Admission: 2019  6:17 AM  Length of stay during current admission: 5      Primary Care Physician: Kylie Burt MD  ICU Attending Physician: Dr. Josefa Gonsalez    Code Status: Full Code    Reason for ICU admission: Sepsis      SUBJECTIVE:     OVERNIGHT EVENTS:     Requiring Levophed overnight, mentation has slowly improved, still slightly drowsy however more easily arousable. Low-grade fever. Mentation: Oriented to person, place, time     Fever:-  Temp (24hrs), Av.5 °F (36.9 °C), Min:97.4 °F (36.3 °C), Max:100.2 °F (37.9 °C)  Blood pressure: hypotensive  Vasopressors: Requiring levophed 5mcg  Systolic (78ETG), ZBB:41 , Min:62 , RTY:872     Diastolic (51CDS), OUE:53, Min:39, Max:101  Urine output in the last 24 hours:  In: 560 [P.O.:360; I.V.:200]  Out: 550 [Urine:550]  Net since admission:    Intake/Output Summary (Last 24 hours) at 2019 0545  Last data filed at 7/3/2019 2315  Gross per 24 hour   Intake 560 ml   Output 550 ml   Net 10 ml       Patient Vitals for the past 96 hrs (Last 3 readings):   Weight   19 0031 191 lb 12.8 oz (87 kg)   19 1244 189 lb 2.5 oz (85.8 kg)   19 0852 198 lb 13.7 oz (90.2 kg)                 AWAKE & FOLLOWING COMMANDS:  [] No   [] Yes    CURRENT VENTILATION STATUS:     [] Ventilator  [] BIPAP  [] Nasal Cannula [] Room Air        SECRETIONS Amount:  [] Small [] Moderate  [] Large  [] None  Color:     [] White [] Colored  [] Bloody    SEDATION:  RAAS Score:  [] Propofol gtt  [] Versed gtt  [] Ativan gtt   [] No Sedation    PARALYZED:  [] No    [] Yes    DIARRHEA:                [] No                [] Yes  (C. Difficile status: [] positive Private Auto appreciated. Try to control pain management with Toradol over opiates, wound dressings every other day aspirin, Plavix, Lipitor 80  3. Atrial fibrillation. Xarelto  4. End-stage renal disease. Nephrology recommendations appreciated  5. Low-grade fevers while on Ancef. Follow-up cultures. Switch to Zosyn  6. Diabetes with peripheral nephropathy. Insulin sliding scale, Neurontin decreased to 100 mg 3 times daily  7. Coronary artery disease. Coreg 3.125, aspirin, Plavix, lipitor        Rigo Lee M.D.              Critical care resident,  Department of Internal Medicine/ Critical care  [address] [place] [address])             7/4/2019, 5:43 AM Walk in

## 2022-11-02 NOTE — PROGRESS NOTE ADULT - SUBJECTIVE AND OBJECTIVE BOX
Patient discussed on morning rounds with Dr. Muller    Operation / Date: 22 suprasternal I&D and wound vac placement   2021 : AVR, ascending/hemiarch replacement with frozen elephant : pec flap and closure   22, TEVAR with R groin cutdown and femoral artery repair       SUBJECTIVE ASSESSMENT:  77y Female seen and examined at bedside.  Patient complaining of pain at incision site.  Denies chest pain, shortness of breath, nausea, vomiting.        Vital Signs Last 24 Hrs  T(C): 36.7 (2022 15:34), Max: 37 (2022 18:31)  T(F): 98.1 (2022 15:34), Max: 98.6 (2022 18:31)  HR: 78 (2022 17:08) (74 - 100)  BP: 105/54 (2022 17:08) (100/55 - 155/89)  BP(mean): 76 (2022 17:08) (74 - 90)  RR: 18 (2022 17:08) (16 - 30)  SpO2: 100% (2022 17:08) (94% - 100%)    Parameters below as of 2022 16:24  Patient On (Oxygen Delivery Method): nasal cannula  O2 Flow (L/min): 2    I&O's Detail    2022 07:01  -  2022 07:00  --------------------------------------------------------  IN:  Total IN: 0 mL    OUT:    Voided (mL): 600 mL  Total OUT: 600 mL    Total NET: -600 mL      2022 07:01  -  2022 17:52  --------------------------------------------------------  IN:    Lactated Ringers: 100 mL  Total IN: 100 mL    OUT:  Total OUT: 0 mL    Total NET: 100 mL          CHEST TUBE: No.   FRANCHESKA DRAIN:  No.  EPICARDIAL WIRES: No.  TIE DOWNS: No.  REGALADO: No.  Vac: Yes    PHYSICAL EXAM:    General: Sitting in stretcher NAD   HEENT: NCAT MMM EOMI neck supple   Neurological: A&O x3, no focal deficits, strength 5/5 throughout  Cardiovascular: RRR, normal s1 s2, no M/R/G  Respiratory: Short of breath after ambulating, otherwise CTA b/l, no W/R/R  Gastrointestinal: soft, non-tender to palpation, non-distended  Extremities: WWP, 2+ pitting edema, no calf tenderness  Vascular: peripheral pulses palpable   Incision: MSI with wound vac in superior pole    LABS:                        8.8    12.57 )-----------( 101      ( 2022 16:32 )             26.0       COUMADIN:  Yes/No. REASON: .    PT/INR - ( 2022 13:18 )   PT: 30.2 sec;   INR: 2.51          PTT - ( 2022 06:14 )  PTT:34.3 sec        137  |  101  |  15  ----------------------------<  130<H>  4.1   |  26  |  0.97    Ca    8.5      2022 16:32  Mg     2.3     11-    TPro  7.6  /  Alb  3.6  /  TBili  1.2  /  DBili  x   /  AST  28  /  ALT  13  /  AlkPhos  82  11-01      Urinalysis Basic - ( 2022 01:21 )    Color: Yellow / Appearance: Clear / S.010 / pH: x  Gluc: x / Ketone: NEGATIVE  / Bili: Negative / Urobili: 0.2 E.U./dL   Blood: x / Protein: NEGATIVE mg/dL / Nitrite: POSITIVE   Leuk Esterase: NEGATIVE / RBC: < 5 /HPF / WBC < 5 /HPF   Sq Epi: x / Non Sq Epi: 0-5 /HPF / Bacteria: None /HPF        MEDICATIONS  (STANDING):  aspirin  chewable 81 milliGRAM(s) Oral daily  atorvastatin 10 milliGRAM(s) Oral at bedtime  budesonide  80 MICROgram(s)/formoterol 4.5 MICROgram(s) Inhaler 2 Puff(s) Inhalation every 12 hours  dextrose 5%. 1000 milliLiter(s) (50 mL/Hr) IV Continuous <Continuous>  dextrose 5%. 1000 milliLiter(s) (100 mL/Hr) IV Continuous <Continuous>  dextrose 50% Injectable 25 Gram(s) IV Push once  dextrose 50% Injectable 12.5 Gram(s) IV Push once  dextrose 50% Injectable 25 Gram(s) IV Push once  famotidine Injectable 20 milliGRAM(s) IV Push daily  glucagon  Injectable 1 milliGRAM(s) IntraMuscular once  hydrALAZINE 10 milliGRAM(s) Oral every 8 hours  insulin lispro (ADMELOG) corrective regimen sliding scale   SubCutaneous Before meals and at bedtime  lactated ringers. 1000 milliLiter(s) (50 mL/Hr) IV Continuous <Continuous>  lidocaine   4% Patch 1 Patch Transdermal daily  metoprolol tartrate 50 milliGRAM(s) Oral two times a day  piperacillin/tazobactam IVPB. 3.375 Gram(s) IV Intermittent once  piperacillin/tazobactam IVPB.- 3.375 Gram(s) IV Intermittent once  vancomycin  IVPB 750 milliGRAM(s) IV Intermittent every 12 hours    MEDICATIONS  (PRN):  dextrose Oral Gel 15 Gram(s) Oral once PRN Blood Glucose LESS THAN 70 milliGRAM(s)/deciliter        RADIOLOGY & ADDITIONAL TESTS:

## 2022-11-02 NOTE — PROGRESS NOTE ADULT - ASSESSMENT
77 y/o Female, previous smoker, w/ PMHx of HTN, spinal stenosis, arthritis, bicuspid aortic valve s/p AVR, ascending/hemiarch replacement with frozen elephant trunk and left aorto-subclavian bypass, CABG x2 with Dr. 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 Muller and Dr. Augustin 7/29/22.  She was started on keflex for concern for infection from groin site.  8/14/22 she was discharged to [facility]. As an outpatient she was noted to have a small collection on her sternum that drained and self resolved. She was also planned for hematology follow up for chronic anemia but has not been to her appointment yet. On 11/2 patient underwent sternal I&D, wound vac placement    Neuro: Pain well controlled with current regimen  - Tylenol 650 PRN for mild pain     Respiratory  - CXR showing pulmonary congestion, appears fluid overloaded on exam. Lasix 20IVP daily   - Continue Symbicort   - Spo2 98% on RA   - Encourage ambulation C+DB and use of IS 10x / hr while awake    Cardiac  - 8/9/2021: AVR, ascending/hemiarch replacement with frozen elephant trunk and left aorto-subclavian bypass, CABG x2  - 9/29/21:  pec flap and closure   - 7/21/22: TEVAR with R groin cutdown and femoral artery repair  - 11/2/22: Sternal I&D, wound vac placement  - Continue asa/statin/BB  - HTN: continue BB, norvasc, hydralazine   - HLD: Statin    GI:   - Diet: DASH   - GI PPX with protonix     Renal/: BUN/Cr stable  - Diuresis with lasix PO, monitor renal function, monitor I/Os  - Replete K<4.0, Mg<2.0    Heme:  - H&H 8.8/26 transfused 2 units PRBC overnight  - Left brachial occlusion s/p left brachial artery embolectomy, on eliquis at home , on hold, resume when appriorate    Endocrinology  - No hx of DM  - NO hx of thyroid dz     ID: afebrile WBC 12.5  - F/u OR cultures  -Continue Vanco/zosyn  - Observe for SIRS/Sepsis Syndrome    Dispo: Home when medically cleared

## 2022-11-02 NOTE — PATIENT PROFILE ADULT - DOMESTIC TRAVEL HIGH RISK QUESTION
[Capillary Refill <2s] : capillary refill < 2s [NL] : warm [de-identified] : slight tenderness to palpation l anterior chest No

## 2022-11-02 NOTE — PATIENT PROFILE ADULT - FUNCTIONAL SCREEN CURRENT LEVEL: COMMUNICATION, MLM
Duane K Kelsey is an 70 year old male.  AWV  RAD- DO NOT NEED ALBUTEROL  LIPIDS- NOT ON LOW FAT DIET. WT SAME.  HTN- RENZO DIET.  DM- NO FBS. EYE EXAM 2018.  ?HOCM- ECHO 6/17 STABLE.  APNEA PER WIFE.  NANETTE- NO NSAIDS.  EDEMA BETTER.      Current Outpatient Prescriptions   Medication Sig Dispense Refill   • lisinopril (ZESTRIL) 10 MG tablet 1 qpm- lower dose 30 tablet 3   • carvedilol (COREG) 6.25 MG tablet TAKE 1 TABLET BY MOUTH TWICE DAILY WITH MEALS 180 tablet 0   • glimepiride (AMARYL) 4 MG tablet TAKE 1 TABLET BY MOUTH EVERY MORNING BEFORE A MEAL 90 tablet 0   • atorvastatin (LIPITOR) 40 MG tablet Take 1 tablet by mouth nightly. 90 tablet 3   • metformin (GLUCOPHAGE-XR) 750 MG 24 hr tablet Take 1 tablet by mouth 2 times daily. 180 tablet 1   • acarbose (PRECOSE) 50 MG tablet Take 1 tablet by mouth 3 times daily. 270 tablet 1   • pioglitazone (ACTOS) 30 MG tablet Take 1 tablet by mouth daily. 90 tablet 1   • triamterene-hydrochlorothiazide (MAXZIDE) 75-50 MG per tablet 1 QD- HIGHER DOSE 90 tablet 1   • aspirin-acetaminophen-caffeine (EXCEDRIN MIGRAINE) 250-250-65 MG per tablet Take 1 tablet by mouth every 6 hours as needed.     • aspirin 81 MG tablet Take 1 tablet by mouth daily.       No current facility-administered medications for this visit.        Past Medical History:   Diagnosis Date   • Cataract    • Chronic low back pain with sciatica    • Diabetes mellitus (CMS/HCC)     does not checks blood sugars at home   • Enlarged heart     states was told has an enlarged heart at one time/ heart murmur   • Essential (primary) hypertension    • Fracture     hx of fractured arm   • High cholesterol    • HOCM (hypertrophic obstructive cardiomyopathy) (CMS/HCC) 10/20/2016   • Ocular hypertension    • PONV (postoperative nausea and vomiting)     vomited after cataract surgery 8-18-16   • RAD (reactive airway disease)      Past Surgical History:   Procedure Laterality Date   • Cataract extraction w/  intraocular lens  implant Left 8-18-16    Dr. Pham   • Cataract extraction w/  intraocular lens implant Right 8/4/16    Dr Pham   • Cholecystectomy     • Eye surgery  8-4-16    right cataract with IOL   • Transurethral resection of prostate       Family History   Problem Relation Age of Onset   • Cancer Mother    • Diabetes Father    • Heart disease Father    • Cancer Sister    • Diabetes Sister    • Kidney disease Brother    • Diabetes Sister    • Diabetes Sister      Social History   Substance Use Topics   • Smoking status: Former Smoker     Quit date: 1/1/2011   • Smokeless tobacco: Never Used   • Alcohol use 0.0 oz/week      Comment: SELDOM       Prior to Admission Meds:  (Not in a hospital admission)  Scheduled Meds:  Continuous Infusions:  PRN Meds:    Allergies: ALLERGIES:  No Known Allergies    Active Problems:    * No active hospital problems. *    Blood pressure 194/61, pulse (!) 47, height 5' 8\" (1.727 m), weight 110.7 kg.    Review of Systems   Constitutional: Negative for malaise/fatigue and weight loss.   Respiratory: Negative for shortness of breath.    Cardiovascular: Negative for chest pain.   Gastrointestinal: Negative for abdominal pain, blood in stool and heartburn.   Genitourinary: Negative for frequency and urgency.   Neurological: Negative for dizziness.   Psychiatric/Behavioral: Negative for depression and substance abuse.       Physical Exam   Constitutional: He is oriented to person, place, and time. He appears well-developed and well-nourished. No distress.   HENT:   Head: Normocephalic and atraumatic.   Right Ear: External ear normal.   Left Ear: External ear normal.   Nose: Nose normal.   Mouth/Throat: Oropharynx is clear and moist. No oropharyngeal exudate.   Eyes: Conjunctivae and EOM are normal. Pupils are equal, round, and reactive to light. Right eye exhibits no discharge. Left eye exhibits no discharge. No scleral icterus.   Neck: Neck supple. Carotid bruit is not present. No thyromegaly present.    Cardiovascular: Normal rate, regular rhythm, normal heart sounds and intact distal pulses.  Exam reveals no gallop and no friction rub.    Pulmonary/Chest: Effort normal and breath sounds normal. No respiratory distress. He has no wheezes. He has no rales.   Abdominal: Soft. Bowel sounds are normal. He exhibits no distension and no mass. There is no tenderness. There is no rebound and no guarding.   Genitourinary: Rectum normal and prostate normal. Rectal exam shows guaiac negative stool.   Musculoskeletal: He exhibits edema. He exhibits no tenderness.   1+ BILAT LE EDEMA   Lymphadenopathy:     He has no cervical adenopathy.   Neurological: He is alert and oriented to person, place, and time. No cranial nerve deficit. Coordination normal.   Diabetic Foot Exam Documentation     Normal Bilateral Foot Exam: Skin integrity is normal. Dorsalis pedis and posterior tibial pulses are present.  Pressure sensation using the [place]-Morales monofilament is present.     Skin: Skin is warm and dry. No rash noted. He is not diaphoretic.   Psychiatric: He has a normal mood and affect. His behavior is normal.   Vitals reviewed.        Lab Services on 04/26/2018   Component Date Value Ref Range Status   • FASTING STATUS 04/26/2018 12  hrs Final   • CHOLESTEROL 04/26/2018 113  <200 mg/dL Final   • CALCULATED LDL 04/26/2018 47  <130 mg/dL Final   • HDL 04/26/2018 43  >39 mg/dL Final   • TRIGLYCERIDE 04/26/2018 114  <150 mg/dL Final   • CALCULATED NON HDL 04/26/2018 70  mg/dL Final   • CHOL/HDL 04/26/2018 2.6  <4.5 Final   • MICROALBUMIN, UA (TTL) 04/26/2018 2.49  mg/dL Final   • CREATININE, URINE (TOTAL) 04/26/2018 80.80  mg/dL Final   • MICROALBUMIN/CREATININE 04/26/2018 30.8* <30 mg/g Final   • PSA, Total 04/26/2018 1.12  <4.01 ng/mL Final   • Fasting Status 04/26/2018 12  hrs Final   • Sodium 04/26/2018 143  135 - 145 mmol/L Final   • Potassium 04/26/2018 6.4* 3.4 - 5.1 mmol/L Final   • Chloride 04/26/2018 110* 98 - 107 mmol/L  Final   • Carbon Dioxide 04/26/2018 23  21 - 32 mmol/L Final   • Anion Gap 04/26/2018 16  10 - 20 mmol/L Final   • Glucose 04/26/2018 69  65 - 99 mg/dL Final   • BUN 04/26/2018 33* 6 - 20 mg/dL Final   • Creatinine 04/26/2018 1.59* 0.67 - 1.17 mg/dL Final   • GFR Estimate,  04/26/2018 51   Final   • GFR Estimate, Non  04/26/2018 44   Final   • BUN/Creatinine Ratio 04/26/2018 21  7 - 25 Final   • CALCIUM 04/26/2018 8.8  8.4 - 10.2 mg/dL Final   • TOTAL BILIRUBIN 04/26/2018 0.3  0.2 - 1.0 mg/dL Final   • AST/SGOT 04/26/2018 18  <38 Units/L Final   • ALT/SGPT 04/26/2018 23  <79 Units/L Final   • ALK PHOSPHATASE 04/26/2018 79  45 - 117 Units/L Final   • TOTAL PROTEIN 04/26/2018 6.9  6.4 - 8.2 g/dL Final   • Albumin 04/26/2018 3.9  3.6 - 5.1 g/dL Final   • GLOBULIN 04/26/2018 3.0  2.0 - 4.0 g/dL Final   • A/G Ratio, Serum 04/26/2018 1.3  1.0 - 2.4 Final   • Hemoglobin A1C 04/26/2018 6.5* 4.5 - 5.6 % Final   • HEPATITIS C ANTIBODY 04/26/2018 NEGATIVE  NEGATIVE Final        ASSESSMENT:  1. Medicare annual wellness visit, subsequent    2. Need for pneumococcal vaccination    3. Body mass index (BMI) of 37.0-37.9 in adult    4. NANETTE (acute kidney injury) (CMS/[facility])    5. Uncontrolled type 2 diabetes mellitus without complication, without long-term current use of insulin (CMS/[facility])    6. Dyslipidemia (high LDL; low HDL)    7. Essential hypertension, benign    8. HOCM (hypertrophic obstructive cardiomyopathy) (CMS/[facility])    9. Venous insufficiency (chronic) (peripheral)      PLAN:  Orders Placed This Encounter   • PNEUMOCOCCAL CONJUGATE 13 VALENT VACC   • Basic Metabolic Panel   • Urinalysis with Micro & Culture if Indicated   • Sedimentation Rate Westergren   • triamterene-hydrochlorothiazide (MAXZIDE) 75-50 MG per tablet   • acarbose (PRECOSE) 50 MG tablet   • carvedilol (COREG) 3.125 MG tablet   • phentermine (ADIPEX-P) 37.5 MG tablet     D/C ACTOS, METFORMIN.  LOWER MAXZIDE  LOW PULSE- LOWER  COREG  DECLINED C-SCOPE, FOB AND AA U/S  BMP IN 2 WEEKS      Return in about 1 year (around 5/2/2019) for AWV in 12 months with labs prior, APPT 3 MOS.    Semaj Duncan MD  5/2/2018   0 = understands/communicates without difficulty

## 2022-11-02 NOTE — BRIEF OPERATIVE NOTE - NSICDXBRIEFPROCEDURE_GEN_ALL_CORE_FT
PROCEDURES:  Incision and drainage of sternum with debridement 02-Nov-2022 15:10:55 supersternal incision Jacques Gr

## 2022-11-02 NOTE — ED ADULT NURSE REASSESSMENT NOTE - NS ED NURSE REASSESS COMMENT FT1
Called for blood , spoke to Mark in blood bank, because patient has antibodies, blood will be ready between 0450 and 0500.

## 2022-11-02 NOTE — PATIENT PROFILE ADULT - FALL HARM RISK - RISK INTERVENTIONS
Assistance OOB with selected safe patient handling equipment/Assistance with ambulation/Communicate Fall Risk and Risk Factors to all staff, patient, and family/Discuss with provider need for PT consult/Monitor gait and stability/Provide patient with walking aids - walker, cane, crutches/Reinforce activity limits and safety measures with patient and family/Review medications for side effects contributing to fall risk/Sit up slowly, dangle for a short time, stand at bedside before walking/Toileting schedule using arm’s reach rule for commode and bathroom/Visual Cue: Yellow wristband/Bed in lowest position, wheels locked, appropriate side rails in place/Call bell, personal items and telephone in reach/Instruct patient to call for assistance before getting out of bed or chair/Non-slip footwear when patient is out of bed/[place] to call system/Physically safe environment - no spills, clutter or unnecessary equipment/Purposeful Proactive Rounding/Room/bathroom lighting operational, light cord in reach

## 2022-11-02 NOTE — ED ADULT NURSE REASSESSMENT NOTE - NS ED NURSE REASSESS COMMENT FT1
Called for blood , spoke to Mark, was told they are still working on preparing blood for patient and to give 30 more minutes, will call back at 0520.

## 2022-11-02 NOTE — BRIEF OPERATIVE NOTE - COMMENTS
Dr. Vernon was the first assistant for this case including but not limited to sternal debridement and wound vac placement     I was present for this procedure and participated as first assistant as described by the PA above, unless otherwise noted below.

## 2022-11-03 NOTE — PHYSICAL THERAPY INITIAL EVALUATION ADULT - GAIT DEVIATIONS NOTED, PT EVAL
decreased daniel/increased time in double stance/decreased step length/decreased weight-shifting ability

## 2022-11-03 NOTE — CONSULT NOTE ADULT - ATTENDING COMMENTS
C/o sore throat. Explained not a lot can do for sore throat-have to wait on ice/water d/t receiving numbing medication for procedure. Ice pack applied to throat area-pt asked if that would help. Fentanyl 50mcg IV given per pt's request.  Will continue to monitor pain. 76 yo F with HTN, bicuspid AV s/p AVR, ascending/hemiarch replacement with frozen elephant trunk and L aorto-subclavian bypass, CABG X 2 in 8/2021 c/b sternal wound infection with Klebsiella oxytoca/Raoutella ornithinolytica initially treated with ceftriaxone X 2 weeks, then readmitted for poor wound healing, underwent removal of sternal wires, sternal debridement and pec flap in 9/2021.  Wound did not appear to be infected at that time.  She was treated with ceftriaxone perioperatively.  OR cultures grew Staph epi and Candida, neither were targeted. She was d/jake off antibiotics.  She had L brachial occlusion and is s/p L brachial artery embolectomy 7/21/22, then TEVAR on 7/29.  She gives clear history of sudden onset of swelling over the manubrium with tenderness.  No fever, chills.  She was afebrile, WBC 8.44 with 77% PMNs, Hg 6.9.  She was started on vanc and pip-tazo.  On 11/2, she underwent I&D – per Dr. Muller, saw loretta purulence, did not extend to bone or mediastinum.  OR cultures are only growing yeast at this time.  Unclear why she has this infection now.  Would f/u blood cultures from 11/1, OR cultures from 11/2, continue vancomycin, pip-tazo for now, dosing and monitoring as above.  Will follow with you – team 1.

## 2022-11-03 NOTE — PHYSICAL THERAPY INITIAL EVALUATION ADULT - ADDITIONAL COMMENTS
pt lives w/ family in a private home and only has to do couple of stairs a day. ambulates w/ use of RW. Denies hx of recent falls. no home health aide

## 2022-11-03 NOTE — CONSULT NOTE ADULT - SUBJECTIVE AND OBJECTIVE BOX
**INCOMPLETE**    INFECTIOUS DISEASES INITIAL CONSULT NOTE    HPI:  75 y/o Female, pre76 y/o Female, previous smoker, w/ PMHx of HTN, spinal stenosis, arthritis, bicuspid aortic valve s/p AVR, ascending/hemiarch replacement with frozen elephant trunk and left aorto-subclavian bypass, CABG x2 with Dr. 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 Muller and Dr. Augustin 22.  She was started on keflex for concern for infection from groin site.  22 she was discharged to [facility]. As an outpatient she was noted to have a small collection on her sternum that drained and self resolved. She was also planned for hematology follow up for chronic anemia but has not been to her appointment yet For the last 2 weeks she has noted swelling and tenderness on the upper part of her sternum as well as shortness of breath and LE edema. Denies fevers, chill cp, abd pain, n/c She sent a photo of her sternum to CTS office and was advised to present to the ED. In the ED pt afebrile HR 71, 104/58, SpO2 98% on RA. Hgb 6.9, otherwise labs WNL. Discussed with Dr. Vernon, pt will be admitted to CTS for further work up.      (2022 15:12)      ID INTERVAL HPI:    PAST MEDICAL & SURGICAL HISTORY:  HTN (hypertension)      H/O aortic valve stenosis      CAD (coronary artery disease)      S/P aortic valve replacement      S/P CABG (coronary artery bypass graft)      H/O aortic arch replacement      Pacemaker  medtronic micra pacemaker          Review of Systems:   Constitutional, eyes, ENT, cardiovascular, respiratory, gastrointestinal, genitourinary, integumentary, neurological, psychiatric and heme/lymph are otherwise negative other than noted above       ANTIBIOTICS:  MEDICATIONS  (STANDING):  acetaminophen   IVPB .. 1000 milliGRAM(s) IV Intermittent once  amLODIPine   Tablet 2.5 milliGRAM(s) Oral daily  aspirin  chewable 81 milliGRAM(s) Oral daily  atorvastatin 10 milliGRAM(s) Oral at bedtime  budesonide  80 MICROgram(s)/formoterol 4.5 MICROgram(s) Inhaler 2 Puff(s) Inhalation every 12 hours  dextrose 5%. 1000 milliLiter(s) (50 mL/Hr) IV Continuous <Continuous>  dextrose 5%. 1000 milliLiter(s) (100 mL/Hr) IV Continuous <Continuous>  dextrose 50% Injectable 25 Gram(s) IV Push once  dextrose 50% Injectable 25 Gram(s) IV Push once  dextrose 50% Injectable 12.5 Gram(s) IV Push once  famotidine    Tablet 20 milliGRAM(s) Oral daily  furosemide    Tablet 20 milliGRAM(s) Oral daily  glucagon  Injectable 1 milliGRAM(s) IntraMuscular once  heparin   Injectable 5000 Unit(s) SubCutaneous every 8 hours  hydrALAZINE 10 milliGRAM(s) Oral every 8 hours  insulin lispro (ADMELOG) corrective regimen sliding scale   SubCutaneous Before meals and at bedtime  lactated ringers. 1000 milliLiter(s) (50 mL/Hr) IV Continuous <Continuous>  lidocaine   4% Patch 1 Patch Transdermal daily  metoprolol tartrate 50 milliGRAM(s) Oral two times a day  piperacillin/tazobactam IVPB.. 3.375 Gram(s) IV Intermittent every 8 hours  potassium chloride    Tablet ER 10 milliEquivalent(s) Oral daily  vancomycin  IVPB 750 milliGRAM(s) IV Intermittent every 12 hours    MEDICATIONS  (PRN):  dextrose Oral Gel 15 Gram(s) Oral once PRN Blood Glucose LESS THAN 70 milliGRAM(s)/deciliter      Allergies    No Known Allergies    Intolerances        SOCIAL HISTORY:    FAMILY HISTORY:  FH: CAD (coronary artery disease) (Sibling)  CABG    Family history of CKD (chronic kidney disease) (Sibling)  ESRD    Family history of diabetes mellitus (DM) (Sibling)     no FH leading to current infection    Vital Signs Last 24 Hrs  T(C): 36.8 (2022 10:10), Max: 36.8 (2022 18:00)  T(F): 98.2 (2022 10:10), Max: 98.2 (2022 18:00)  HR: 58 (2022 09:05) (54 - 660)  BP: 116/56 (2022 09:05) (95/54 - 136/63)  BP(mean): 80 (2022 09:05) (69 - 90)  RR: 16 (2022 09:05) (12 - 30)  SpO2: 93% (2022 09:05) (68% - 100%)    Parameters below as of 2022 09:05  Patient On (Oxygen Delivery Method): nasal cannula w/ humidification  O2 Flow (L/min): 4      22 @ 07:01  -  22 @ 07:00  --------------------------------------------------------  IN: 950 mL / OUT: 70 mL / NET: 880 mL    22 @ 07:01  -  22 @ 11:16  --------------------------------------------------------  IN: 450 mL / OUT: 0 mL / NET: 450 mL        PHYSICAL EXAM  Constitutional: alert, NAD  Eyes: the sclera and conjunctiva were normal.   ENT: the ears and nose were normal in appearance.   Neck: the appearance of the neck was normal and the neck was supple.   Pulmonary: no respiratory distress and lungs CTA bilaterally.   Heart: heart rate was normal and rhythm regular, normal S1 and S2  Vascular: no peripheral edema  Abdomen: normal bowel sounds, soft, non-tender  Neurological: no focal deficits  Psychiatric: the affect was normal      LABS:                        8.2    8.64  )-----------( 92       ( 2022 05:30 )             25.2         135  |  100  |  18  ----------------------------<  104<H>  3.9   |  25  |  1.09    Ca    8.5      2022 05:30  Phos  3.7       Mg     2.1         TPro  7.6  /  Alb  3.6  /  TBili  1.2  /  DBili  x   /  AST  28  /  ALT  13  /  AlkPhos  82      PT/INR - ( 2022 13:18 )   PT: 30.2 sec;   INR: 2.51          PTT - ( 2022 06:14 )  PTT:34.3 sec  Urinalysis Basic - ( 2022 01:21 )    Color: Yellow / Appearance: Clear / S.010 / pH: x  Gluc: x / Ketone: NEGATIVE  / Bili: Negative / Urobili: 0.2 E.U./dL   Blood: x / Protein: NEGATIVE mg/dL / Nitrite: POSITIVE   Leuk Esterase: NEGATIVE / RBC: < 5 /HPF / WBC < 5 /HPF   Sq Epi: x / Non Sq Epi: 0-5 /HPF / Bacteria: None /HPF        MICROBIOLOGY:    Culture - Tissue with Gram Stain (collected 2022 14:50)  Source: .Tissue Supra Sternal Tissue or spec  Gram Stain (2022 17:14):    No organisms seen    Rare WBC's    Culture - Body Fluid with Gram Stain (collected 2022 14:50)  Source: .Body Fluid Supra Sternal Fluid #2 or spec  Gram Stain (2022 17:14):    No organisms seen    Rare WBC's  Preliminary Report (2022 10:44):    Culture in progress    Culture - Body Fluid with Gram Stain (collected 2022 14:50)  Source: .Body Fluid Supra Sternal Fluid #1 or spec  Gram Stain (2022 17:14):    No organisms seen    Rare WBC's    Culture - Blood (collected 2022 18:20)  Source: .Blood Blood  Preliminary Report (2022 22:00):    No growth at 1 day.    Culture - Blood (collected 2022 18:15)  Source: .Blood Blood  Preliminary Report (2022 22:00):    No growth at 1 day.        RADIOLOGY & ADDITIONAL STUDIES: Reviewed.   **INCOMPLETE**    INFECTIOUS DISEASES INITIAL CONSULT NOTE    HPI:  77 y/o Female, pre76 y/o Female, previous smoker, w/ PMHx of HTN, spinal stenosis, arthritis, bicuspid aortic valve s/p AVR, ascending/hemiarch replacement with frozen elephant trunk and left aorto-subclavian bypass, CABG x2 with Dr. 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 Muller and Dr. Augustin 22.  She was started on keflex for concern for infection from groin site.  22 she was discharged to [facility]. As an outpatient she was noted to have a small collection on her sternum that drained and self resolved. She was also planned for hematology follow up for chronic anemia but has not been to her appointment yet For the last 2 weeks she has noted swelling and tenderness on the upper part of her sternum as well as shortness of breath and LE edema. Denies fevers, chill cp, abd pain, n/c She sent a photo of her sternum to CTS office and was advised to present to the ED. In the ED pt afebrile HR 71, 104/58, SpO2 98% on RA. Hgb 6.9, otherwise labs WNL. Discussed with Dr. Vernon, pt will be admitted to CTS for further work up.      (2022 15:12)      ID INTERVAL HPI:    PAST MEDICAL & SURGICAL HISTORY:  HTN (hypertension)      H/O aortic valve stenosis      CAD (coronary artery disease)      S/P aortic valve replacement      S/P CABG (coronary artery bypass graft)      H/O aortic arch replacement      Pacemaker  medtronic micra pacemaker          Review of Systems:   Constitutional, eyes, ENT, cardiovascular, respiratory, gastrointestinal, genitourinary, integumentary, neurological, psychiatric and heme/lymph are otherwise negative other than noted above       ANTIBIOTICS:  MEDICATIONS  (STANDING):  acetaminophen   IVPB .. 1000 milliGRAM(s) IV Intermittent once  amLODIPine   Tablet 2.5 milliGRAM(s) Oral daily  aspirin  chewable 81 milliGRAM(s) Oral daily  atorvastatin 10 milliGRAM(s) Oral at bedtime  budesonide  80 MICROgram(s)/formoterol 4.5 MICROgram(s) Inhaler 2 Puff(s) Inhalation every 12 hours  dextrose 5%. 1000 milliLiter(s) (50 mL/Hr) IV Continuous <Continuous>  dextrose 5%. 1000 milliLiter(s) (100 mL/Hr) IV Continuous <Continuous>  dextrose 50% Injectable 25 Gram(s) IV Push once  dextrose 50% Injectable 25 Gram(s) IV Push once  dextrose 50% Injectable 12.5 Gram(s) IV Push once  famotidine    Tablet 20 milliGRAM(s) Oral daily  furosemide    Tablet 20 milliGRAM(s) Oral daily  glucagon  Injectable 1 milliGRAM(s) IntraMuscular once  heparin   Injectable 5000 Unit(s) SubCutaneous every 8 hours  hydrALAZINE 10 milliGRAM(s) Oral every 8 hours  insulin lispro (ADMELOG) corrective regimen sliding scale   SubCutaneous Before meals and at bedtime  lactated ringers. 1000 milliLiter(s) (50 mL/Hr) IV Continuous <Continuous>  lidocaine   4% Patch 1 Patch Transdermal daily  metoprolol tartrate 50 milliGRAM(s) Oral two times a day  piperacillin/tazobactam IVPB.. 3.375 Gram(s) IV Intermittent every 8 hours  potassium chloride    Tablet ER 10 milliEquivalent(s) Oral daily  vancomycin  IVPB 750 milliGRAM(s) IV Intermittent every 12 hours    MEDICATIONS  (PRN):  dextrose Oral Gel 15 Gram(s) Oral once PRN Blood Glucose LESS THAN 70 milliGRAM(s)/deciliter      Allergies    No Known Allergies    Intolerances        SOCIAL HISTORY:    FAMILY HISTORY:  FH: CAD (coronary artery disease) (Sibling)  CABG    Family history of CKD (chronic kidney disease) (Sibling)  ESRD    Family history of diabetes mellitus (DM) (Sibling)     no FH leading to current infection    Vital Signs Last 24 Hrs  T(C): 36.8 (2022 10:10), Max: 36.8 (2022 18:00)  T(F): 98.2 (2022 10:10), Max: 98.2 (2022 18:00)  HR: 58 (2022 09:05) (54 - 660)  BP: 116/56 (2022 09:05) (95/54 - 136/63)  BP(mean): 80 (2022 09:05) (69 - 90)  RR: 16 (2022 09:05) (12 - 30)  SpO2: 93% (2022 09:05) (68% - 100%)    Parameters below as of 2022 09:05  Patient On (Oxygen Delivery Method): nasal cannula w/ humidification  O2 Flow (L/min): 4      22 @ 07:01  -  22 @ 07:00  --------------------------------------------------------  IN: 950 mL / OUT: 70 mL / NET: 880 mL    22 @ 07:01  -  22 @ 11:16  --------------------------------------------------------  IN: 450 mL / OUT: 0 mL / NET: 450 mL        PHYSICAL EXAM  Constitutional: alert, NAD  Eyes: the sclera and conjunctiva were normal.   ENT: the ears and nose were normal in appearance.   Neck: the appearance of the neck was normal and the neck was supple. Wound vac on her sternum, tender to palpation.  Pulmonary: no respiratory distress; b/l basilar crackles.  Heart: heart rate was normal and rhythm regular, normal S1 and S2; holosystolic murmur @ left sternal border  Vascular: 1+ b/l LE edema  Abdomen: normal bowel sounds, soft, non-tender  Neurological: no focal deficits  Psychiatric: the affect was normal      LABS:                        8.2    8.64  )-----------( 92       ( 2022 05:30 )             25.2     11    135  |  100  |  18  ----------------------------<  104<H>  3.9   |  25  |  1.09    Ca    8.5      2022 05:30  Phos  3.7     11-03  Mg     2.1     11-    TPro  7.6  /  Alb  3.6  /  TBili  1.2  /  DBili  x   /  AST  28  /  ALT  13  /  AlkPhos  82  11-    PT/INR - ( 2022 13:18 )   PT: 30.2 sec;   INR: 2.51          PTT - ( 2022 06:14 )  PTT:34.3 sec  Urinalysis Basic - ( 2022 01:21 )    Color: Yellow / Appearance: Clear / S.010 / pH: x  Gluc: x / Ketone: NEGATIVE  / Bili: Negative / Urobili: 0.2 E.U./dL   Blood: x / Protein: NEGATIVE mg/dL / Nitrite: POSITIVE   Leuk Esterase: NEGATIVE / RBC: < 5 /HPF / WBC < 5 /HPF   Sq Epi: x / Non Sq Epi: 0-5 /HPF / Bacteria: None /HPF        MICROBIOLOGY:    Culture - Tissue with Gram Stain (collected 2022 14:50)  Source: .Tissue Supra Sternal Tissue or spec  Gram Stain (2022 17:14):    No organisms seen    Rare WBC's    Culture - Body Fluid with Gram Stain (collected 2022 14:50)  Source: .Body Fluid Supra Sternal Fluid #2 or spec  Gram Stain (2022 17:14):    No organisms seen    Rare WBC's  Preliminary Report (2022 10:44):    Culture in progress    Culture - Body Fluid with Gram Stain (collected 2022 14:50)  Source: .Body Fluid Supra Sternal Fluid #1 or spec  Gram Stain (2022 17:14):    No organisms seen    Rare WBC's    Culture - Blood (collected 2022 18:20)  Source: .Blood Blood  Preliminary Report (2022 22:00):    No growth at 1 day.    Culture - Blood (collected 2022 18:15)  Source: .Blood Blood  Preliminary Report (2022 22:00):    No growth at 1 day.        RADIOLOGY & ADDITIONAL STUDIES: Reviewed.   INFECTIOUS DISEASES INITIAL CONSULT NOTE    HPI:  75 y/o Female, pre76 y/o Female, previous smoker, w/ PMHx of HTN, spinal stenosis, arthritis, bicuspid aortic valve s/p AVR, ascending/hemiarch replacement with frozen elephant trunk and left aorto-subclavian bypass, CABG x2 with Dr. 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 Muller and Dr. Augustin 22.  She was started on keflex for concern for infection from groin site.  22 she was discharged to [facility]. As an outpatient she was noted to have a small collection on her sternum that drained and self resolved. She was also planned for hematology follow up for chronic anemia but has not been to her appointment yet For the last 2 weeks she has noted swelling and tenderness on the upper part of her sternum as well as shortness of breath and LE edema. Denies fevers, chill cp, abd pain, n/c She sent a photo of her sternum to CTS office and was advised to present to the ED. In the ED pt afebrile HR 71, 104/58, SpO2 98% on RA. Hgb 6.9, otherwise labs WNL. Discussed with Dr. Vernon, pt will be admitted to CTS for further work up.      (2022 15:12)      ID INTERVAL HPI:    PAST MEDICAL & SURGICAL HISTORY:  HTN (hypertension)      H/O aortic valve stenosis      CAD (coronary artery disease)      S/P aortic valve replacement      S/P CABG (coronary artery bypass graft)      H/O aortic arch replacement      Pacemaker  medtronic micra pacemaker          Review of Systems:   Constitutional, eyes, ENT, cardiovascular, respiratory, gastrointestinal, genitourinary, integumentary, neurological, psychiatric and heme/lymph are otherwise negative other than noted above       ANTIBIOTICS:  MEDICATIONS  (STANDING):  acetaminophen   IVPB .. 1000 milliGRAM(s) IV Intermittent once  amLODIPine   Tablet 2.5 milliGRAM(s) Oral daily  aspirin  chewable 81 milliGRAM(s) Oral daily  atorvastatin 10 milliGRAM(s) Oral at bedtime  budesonide  80 MICROgram(s)/formoterol 4.5 MICROgram(s) Inhaler 2 Puff(s) Inhalation every 12 hours  dextrose 5%. 1000 milliLiter(s) (50 mL/Hr) IV Continuous <Continuous>  dextrose 5%. 1000 milliLiter(s) (100 mL/Hr) IV Continuous <Continuous>  dextrose 50% Injectable 25 Gram(s) IV Push once  dextrose 50% Injectable 25 Gram(s) IV Push once  dextrose 50% Injectable 12.5 Gram(s) IV Push once  famotidine    Tablet 20 milliGRAM(s) Oral daily  furosemide    Tablet 20 milliGRAM(s) Oral daily  glucagon  Injectable 1 milliGRAM(s) IntraMuscular once  heparin   Injectable 5000 Unit(s) SubCutaneous every 8 hours  hydrALAZINE 10 milliGRAM(s) Oral every 8 hours  insulin lispro (ADMELOG) corrective regimen sliding scale   SubCutaneous Before meals and at bedtime  lactated ringers. 1000 milliLiter(s) (50 mL/Hr) IV Continuous <Continuous>  lidocaine   4% Patch 1 Patch Transdermal daily  metoprolol tartrate 50 milliGRAM(s) Oral two times a day  piperacillin/tazobactam IVPB.. 3.375 Gram(s) IV Intermittent every 8 hours  potassium chloride    Tablet ER 10 milliEquivalent(s) Oral daily  vancomycin  IVPB 750 milliGRAM(s) IV Intermittent every 12 hours    MEDICATIONS  (PRN):  dextrose Oral Gel 15 Gram(s) Oral once PRN Blood Glucose LESS THAN 70 milliGRAM(s)/deciliter      Allergies    No Known Allergies    Intolerances        SOCIAL HISTORY:    FAMILY HISTORY:  FH: CAD (coronary artery disease) (Sibling)  CABG    Family history of CKD (chronic kidney disease) (Sibling)  ESRD    Family history of diabetes mellitus (DM) (Sibling)     no FH leading to current infection    Vital Signs Last 24 Hrs  T(C): 36.8 (2022 10:10), Max: 36.8 (2022 18:00)  T(F): 98.2 (2022 10:10), Max: 98.2 (2022 18:00)  HR: 58 (2022 09:05) (54 - 660)  BP: 116/56 (2022 09:05) (95/54 - 136/63)  BP(mean): 80 (2022 09:05) (69 - 90)  RR: 16 (2022 09:05) (12 - 30)  SpO2: 93% (2022 09:05) (68% - 100%)    Parameters below as of 2022 09:05  Patient On (Oxygen Delivery Method): nasal cannula w/ humidification  O2 Flow (L/min): 4      22 @ 07:01  -  22 @ 07:00  --------------------------------------------------------  IN: 950 mL / OUT: 70 mL / NET: 880 mL    22 @ 07:01  -  22 @ 11:16  --------------------------------------------------------  IN: 450 mL / OUT: 0 mL / NET: 450 mL        PHYSICAL EXAM  Constitutional: alert, NAD  Eyes: the sclera and conjunctiva were normal.   ENT: the ears and nose were normal in appearance.   Neck: the appearance of the neck was normal and the neck was supple. Wound vac on her sternum, tender to palpation.  Pulmonary: no respiratory distress; b/l basilar crackles.  Heart: heart rate was normal and rhythm regular, normal S1 and S2; holosystolic murmur @ left sternal border  Vascular: 1+ b/l LE edema  Abdomen: normal bowel sounds, soft, non-tender  Neurological: no focal deficits  Psychiatric: the affect was normal      LABS:                        8.2    8.64  )-----------( 92       ( 2022 05:30 )             25.2         135  |  100  |  18  ----------------------------<  104<H>  3.9   |  25  |  1.09    Ca    8.5      2022 05:30  Phos  3.7       Mg     2.1         TPro  7.6  /  Alb  3.6  /  TBili  1.2  /  DBili  x   /  AST  28  /  ALT  13  /  AlkPhos  82  11-    PT/INR - ( 2022 13:18 )   PT: 30.2 sec;   INR: 2.51          PTT - ( 2022 06:14 )  PTT:34.3 sec  Urinalysis Basic - ( 2022 01:21 )    Color: Yellow / Appearance: Clear / S.010 / pH: x  Gluc: x / Ketone: NEGATIVE  / Bili: Negative / Urobili: 0.2 E.U./dL   Blood: x / Protein: NEGATIVE mg/dL / Nitrite: POSITIVE   Leuk Esterase: NEGATIVE / RBC: < 5 /HPF / WBC < 5 /HPF   Sq Epi: x / Non Sq Epi: 0-5 /HPF / Bacteria: None /HPF        MICROBIOLOGY:    Culture - Tissue with Gram Stain (collected 2022 14:50)  Source: .Tissue Supra Sternal Tissue or spec  Gram Stain (2022 17:14):    No organisms seen    Rare WBC's    Culture - Body Fluid with Gram Stain (collected 2022 14:50)  Source: .Body Fluid Supra Sternal Fluid #2 or spec  Gram Stain (2022 17:14):    No organisms seen    Rare WBC's  Preliminary Report (2022 10:44):    Culture in progress    Culture - Body Fluid with Gram Stain (collected 2022 14:50)  Source: .Body Fluid Supra Sternal Fluid #1 or spec  Gram Stain (2022 17:14):    No organisms seen    Rare WBC's    Culture - Blood (collected 2022 18:20)  Source: .Blood Blood  Preliminary Report (2022 22:00):    No growth at 1 day.    Culture - Blood (collected 2022 18:15)  Source: .Blood Blood  Preliminary Report (2022 22:00):    No growth at 1 day.        RADIOLOGY & ADDITIONAL STUDIES: Reviewed.

## 2022-11-03 NOTE — PROGRESS NOTE ADULT - SUBJECTIVE AND OBJECTIVE BOX
Patient discussed on morning rounds with Dr. Muller      Operation / Date: 22 suprasternal I&D and wound vac placement   2021 : AVR, ascending/hemiarch replacement with frozen elephant : pec flap and closure   22, TEVAR with R groin cutdown and femoral artery repair     SUBJECTIVE ASSESSMENT:  77y Female seen and examined at bedside with no complaints at this time. Pt stated she will not go back to a rehab facility and "will figure out" how to manage her wound vac and potential PICC for Abx treatment. Case management and social work on board. Pt denies dizziness, vision changes, chest pain, palpitations, shortness of breath, cough, n/v/d, extremity swelling, calf tenderness.     Vital Signs Last 24 Hrs  T(C): 37.2 (2022 13:54), Max: 37.2 (2022 13:54)  T(F): 99 (2022 13:54), Max: 99 (2022 13:54)  HR: 70 (2022 12:30) (54 - 660)  BP: 124/58 (2022 12:30) (95/54 - 136/63)  BP(mean): 84 (2022 12:30) (69 - 90)  RR: 18 (2022 12:30) (12 - 30)  SpO2: 94% (2022 12:30) (68% - 100%)    Parameters below as of 2022 12:30  Patient On (Oxygen Delivery Method): nasal cannula w/ humidification  O2 Flow (L/min): 2    I&O's Detail    2022 07:01  -  2022 07:00  --------------------------------------------------------  IN:    IV PiggyBack: 50 mL    Lactated Ringers: 450 mL    Oral Fluid: 450 mL  Total IN: 950 mL    OUT:    VAC (Vacuum Assisted Closure) System (mL): 70 mL  Total OUT: 70 mL    Total NET: 880 mL      2022 07:01  -  2022 14:16  --------------------------------------------------------  IN:    IV PiggyBack: 250 mL    Lactated Ringers: 100 mL    Oral Fluid: 100 mL  Total IN: 450 mL    OUT:    Voided (mL): 150 mL  Total OUT: 150 mL    Total NET: 300 mL    CHEST TUBE:  none  FRANCHESKA DRAIN:  none  EPICARDIAL WIRES: none  TIE DOWNS: none  REGALADO: none  Has a wound vac in place at the sternal notch     PHYSICAL EXAM:  GEN: NAD, looks comfortable  Psych: Mood appropriate  Neuro: A&Ox3.  No focal deficits.  Moving all extremities.   HEENT: No obvious abnormalities  CV: S1S2, regular, no murmurs appreciated.  No carotid bruits.  No JVD  Lungs: Clear B/L.  No wheezing, rales or rhonchi  ABD: Soft, non-tender, non-distended.  +Bowel sounds  EXT: Warm and well perfused.  trace to 1+ peripheral edema noted (improved)  Musculoskeletal: Moving all extremities with normal ROM, no joint swelling  PV: Pedal pulses palpable  Incisions: Incision at superior sternal pole is clean and dry with a wound vac in place with adequate suction.     LABS:                        8.2    8.64  )-----------( 92       ( 2022 05:30 )             25.2       PTT - ( 2022 06:14 )  PTT:34.3 sec        135  |  100  |  18  ----------------------------<  104<H>  3.9   |  25  |  1.09    Ca    8.5      2022 05:30  Phos  3.7     11-03  Mg     2.1     -    TPro  7.6  /  Alb  3.6  /  TBili  1.2  /  DBili  x   /  AST  28  /  ALT  13  /  AlkPhos  82  11-      Urinalysis Basic - ( 2022 01:21 )    Color: Yellow / Appearance: Clear / S.010 / pH: x  Gluc: x / Ketone: NEGATIVE  / Bili: Negative / Urobili: 0.2 E.U./dL   Blood: x / Protein: NEGATIVE mg/dL / Nitrite: POSITIVE   Leuk Esterase: NEGATIVE / RBC: < 5 /HPF / WBC < 5 /HPF   Sq Epi: x / Non Sq Epi: 0-5 /HPF / Bacteria: None /HPF        MEDICATIONS  (STANDING):  amLODIPine   Tablet 2.5 milliGRAM(s) Oral daily  aspirin  chewable 81 milliGRAM(s) Oral daily  atorvastatin 10 milliGRAM(s) Oral at bedtime  budesonide  80 MICROgram(s)/formoterol 4.5 MICROgram(s) Inhaler 2 Puff(s) Inhalation every 12 hours  dextrose 5%. 1000 milliLiter(s) (50 mL/Hr) IV Continuous <Continuous>  dextrose 5%. 1000 milliLiter(s) (100 mL/Hr) IV Continuous <Continuous>  dextrose 50% Injectable 25 Gram(s) IV Push once  dextrose 50% Injectable 25 Gram(s) IV Push once  dextrose 50% Injectable 12.5 Gram(s) IV Push once  famotidine    Tablet 20 milliGRAM(s) Oral daily  furosemide    Tablet 20 milliGRAM(s) Oral daily  glucagon  Injectable 1 milliGRAM(s) IntraMuscular once  heparin   Injectable 5000 Unit(s) SubCutaneous every 8 hours  hydrALAZINE 10 milliGRAM(s) Oral every 8 hours  insulin lispro (ADMELOG) corrective regimen sliding scale   SubCutaneous Before meals and at bedtime  lactated ringers. 1000 milliLiter(s) (50 mL/Hr) IV Continuous <Continuous>  lidocaine   4% Patch 1 Patch Transdermal daily  metoprolol tartrate 50 milliGRAM(s) Oral two times a day  piperacillin/tazobactam IVPB.. 3.375 Gram(s) IV Intermittent every 8 hours  potassium chloride    Tablet ER 10 milliEquivalent(s) Oral daily  vancomycin  IVPB 750 milliGRAM(s) IV Intermittent every 12 hours    MEDICATIONS  (PRN):  dextrose Oral Gel 15 Gram(s) Oral once PRN Blood Glucose LESS THAN 70 milliGRAM(s)/deciliter    RADIOLOGY & ADDITIONAL TESTS:  < from: Xray Chest 1 View- PORTABLE-Urgent (22 @ 16:56) >  IMPRESSION: Mild congestive changes. Cardiomegaly..    --- End of Report ---    < end of copied text >  < from: TTE Echo Complete w/o Contrast w/ Doppler (22 @ 12:46) >  CONCLUSIONS:     1. Mild symmetric left ventricular hypertrophy.   2. Normal left and right ventricular size and systolic function.   3. Grade II left ventricular diastolic dysfunction.   4. Severely dilated left atrium.   5. The mitral valve is mildly thickened and calcified. There is severe   mitral annular calcification. The mean transvalvular gradient is 8.00   mmHg at a heart rate of 79 bpm. There is mild mitral regurgitation.   6. A bioprosthetic valve noted and appears well-seated. The peak   transvalvular velocity is 1.52 m/s, the mean transvalvular gradient is   6.00 mmHg, and the LVOT/AV velocity ratio is 0.46. The peak transaortic   gradient is 9.24 mmHg. There is no evidence of aortic regurgitation.   7. Mild-to-moderate tricuspid regurgitation.   8. Pulmonary hypertension present, pulmonary artery systolic pressure is   54 mmHg.   9. No pericardial effusion.  10. High velocities are seen in the suprasternal notch area. Correlation   with carotid ultrasound to evaluate for carotid / subclavian stenosis is   suggested, if clinically indicated.    < end of copied text >

## 2022-11-03 NOTE — CONSULT NOTE ADULT - ASSESSMENT
77 yo F w/ PMH of smoking, HTN, bicuspid aorta s/p AVR(bioprosthetic), ascending aorta replacement, CABGx2, L branchial artery embolectomy, TEVAR, presenting for swelling and tenderness on the upper sternum, SOB, and LE edema.  CT chest found a 2x3 subcutaneous soft-tissue density, early abscess vs focal phlegmonous changes.  I&D was done, cultures from wash out found rare yeast, incision was closed and wound vac was placed.    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  - CXR 11/2: mild congestion + cardiomegaly  - TTE 11/2: mitral (calcification of valve and mild MR), tricuspid (mild/moderate TR), aorta (bioprosthetic valve w/o echodensity or AR)  - BCx 11/1: NGTD  - tissue Cx 11/2: NGTD  - Sternal fluid Cx 11/2: rare yeast    Recommend:  -     Team 1 will continue to follow   77 yo F w/ PMH of smoking, HTN, bicuspid aorta s/p AVR(bioprosthetic), ascending aorta replacement, CABGx2, L branchial artery embolectomy, TEVAR, presenting for swelling and tenderness on the upper sternum, SOB, and LE edema.  CT chest found a 2x3 subcutaneous soft-tissue density, early abscess vs focal phlegmonous changes.  I&D was done, cultures from wash out found rare yeast, incision was closed and wound vac was placed.    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  - CXR 11/2: mild congestion + cardiomegaly  - TTE 11/2: mitral (calcification of valve and mild MR), tricuspid (mild/moderate TR), aorta (bioprosthetic valve w/o echodensity or AR)  - BCx 11/1: NGTD  - Tissue Cx 11/2: NGTD  - Sternal fluid Cx 11/2: rare yeast    Recommend:  - c/w Vanc 750mg q12  - c/w Zosyn 3.375mg q8  - f/u BCx  - f/u wound/tissue Cx      Team 1 will continue to follow   75 yo F w/ PMH of smoking, HTN, bicuspid aorta s/p AVR(bioprosthetic), ascending aorta replacement, CABGx2, L branchial artery embolectomy, TEVAR, presenting for swelling and tenderness on the upper sternum, SOB, and LE edema.  CT chest found a 2x3 subcutaneous soft-tissue density, early abscess vs focal phlegmonous changes.  I&D was done, cultures from wash out found rare yeast, incision was closed and wound vac was placed.    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  - CXR 11/2: mild congestion + cardiomegaly  - TTE 11/2: mitral (calcification of valve and mild MR), tricuspid (mild/moderate TR), aorta (bioprosthetic valve w/o echodensity or AR)  - BCx 11/1: NGTD  - Tissue Cx 11/2: NGTD  - Sternal fluid Cx 11/2: rare yeast    Recommend:  - c/w Vanc 750mg q12.  Target trough 11-16.  Monitor renal function.  - c/w Zosyn 3.375mg q8  - f/u BCx  - f/u wound/tissue Cx      Team 1 will continue to follow

## 2022-11-03 NOTE — PHYSICAL THERAPY INITIAL EVALUATION ADULT - GENERAL OBSERVATIONS, REHAB EVAL
pt received/returned semi-supine in bed +heplock, +tele, +sternal wound vac C/D/I, +3LPM O2 via NC, c/o general malaise

## 2022-11-03 NOTE — PHYSICAL THERAPY INITIAL EVALUATION ADULT - PERTINENT HX OF CURRENT PROBLEM, REHAB EVAL
75 yo F w/ PMH of smoking, HTN, bicuspid aorta s/p AVR(bioprosthetic), ascending aorta replacement, CABGx2, L branchial artery embolectomy, TEVAR, presenting for swelling and tenderness on the upper sternum, SOB, and LE edema.  CT chest found a 2x3 subcutaneous soft-tissue density, early abscess vs focal phlegmonous changes.  I&D was done, cultures from wash out found rare yeast, incision was closed and wound vac was placed.

## 2022-11-03 NOTE — PHYSICAL THERAPY INITIAL EVALUATION ADULT - IMPAIRMENTS FOUND, PT EVAL
aerobic capacity/endurance/gait, locomotion, and balance/gross motor/muscle strength/poor safety awareness/posture/ventilation and respiration/gas exchange

## 2022-11-03 NOTE — PROGRESS NOTE ADULT - ASSESSMENT
75 y/o Female, previous smoker, w/ PMHx of HTN, spinal stenosis, arthritis, bicuspid aortic valve s/p AVR, ascending/hemiarch replacement with frozen elephant trunk and left aorto-subclavian bypass, CABG x2 with Dr. 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 Muller and Dr. Augustin 7/29/22, with initiation of Keflex for concern over groin site infection. On 8/14/22 she was discharged to [facility]. As an outpatient she was noted to have a small collection on her sternum that drained and self resolved. She was also planned for hematology follow up for chronic anemia but had not been to her appointment yet. On 11/2 pt presented to [facility] ED regarding sternal wound infection. She underwent sternal I&D and wound vac placement with Dr. Muller/Dr. Vernon. On POD 1 ID was consulted regarding Abx regiment and need for PICC line. Case management and Social work were on board and beginning the process of home wound vac placement. There is concern that the pt and her daughters (whom she lives with) will not be able to manage the IV abx and wound vac. Pt is adamant on not returning to rehab. Case management has spoken to her daughters who will be speaking with the pt and having a family discussion to decide on disposition.     Neurovascular:   -No delirium. Pain well controlled with current regimen.    Cardiovascular:   #S/p aortic valve stenosis, now readmitted for I and D of sternal wound   -Hemodynamically stable. HR controlled  -continue aspirin 81 mg QD   -continue statin for long term graft patency  HR: 70 (54 - 660)  BP: 124/58 (95/54 - 136/63)  #HTN  -continue amLODIPine   Tablet 2.5 milliGRAM(s) daily  -furosemide    Tablet 20 milliGRAM(s) daily  -hydrALAZINE 10 milliGRAM(s) every 8 hours  -metoprolol tartrate 50 milliGRAM(s) two times a day    Respiratory:   #congestion on CXR,   -continue Lasix   -pt clinically appears better, less edema and breathing has improved   -continue Symbicort   02 Sat = 98% on RA.  RR: 18 (12 - 30)  SpO2: 94% (68% - 100%)  -Wean to RA from for O2 Sat > 93%.  -Encourage Cough, deep breathing and Use of IS 10x / hr while awake.  -Chest PT 4xdaily    GI:   Stable  -Continue famotidine    Tablet 20 milliGRAM(s) daily  -PO DASH diet    Renal / :   BUN/Cr Stable 18/1.09  -continue Lasix 20 mg QD and K10 QD  -Continue to monitor I/O's.    Endocrine:    Blood sugar stable   A1C: 5.5  TSH: 5.220    Hematologic:  H/H stable 8.2/25.2  -DVT prophylaxis with Heparin sq    ID:  #s/p I and D of sternal wound with vac placement   -process has been initiated regarding home wound vac delivery  -ID consulted, pending recommendations for abx course   -PICC team is consulted, will need ID final recommendations prior to placing PICC line   -Pt is not agreeable to acute rehab or [facility]  -Afebrile  -Continue to observe for SIRS/Sepsis Syndrome.  T(C): 37.2, Max: 37.2 (11-03-22 @ 13:54)    Disposition:  -dispo planning after ID recommendations    77 y/o Female, previous smoker, w/ PMHx of HTN, spinal stenosis, arthritis, bicuspid aortic valve s/p AVR, ascending/hemiarch replacement with frozen elephant trunk and left aorto-subclavian bypass, CABG x2 with Dr. 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 Muller and Dr. Augustin 7/29/22, with initiation of Keflex for concern over groin site infection. On 8/14/22 she was discharged to [facility]. As an outpatient she was noted to have a small collection on her sternum that drained and self resolved. She was also planned for hematology follow up for chronic anemia but had not been to her appointment yet. On 11/2 pt presented to [facility] ED regarding sternal wound infection. She underwent sternal I&D and wound vac placement with Dr. Muller/Dr. Vernon. On POD 1 ID was consulted regarding Abx regiment and need for PICC line. Case management and Social work were on board and beginning the process of home wound vac placement. There is concern that the pt and her daughters (whom she lives with) will not be able to manage the IV abx and wound vac. Pt is adamant on not returning to rehab. Case management has spoken to her daughters who will be speaking with the pt and having a family discussion to decide on disposition.     Neurovascular:   -No delirium. Pain well controlled with current regimen.    Cardiovascular:   #S/p aortic valve stenosis, now readmitted for I and D of sternal wound   -Hemodynamically stable. HR controlled  -continue aspirin 81 mg QD   -continue statin for long term graft patency  HR: 70 (54 - 660)  BP: 124/58 (95/54 - 136/63)  #HTN  -continue amLODIPine   Tablet 2.5 milliGRAM(s) daily  -furosemide    Tablet 20 milliGRAM(s) daily  -hydrALAZINE 10 milliGRAM(s) every 8 hours  -metoprolol tartrate 50 milliGRAM(s) two times a day    Respiratory:   #congestion on CXR,   -continue Lasix   -pt clinically appears better, less edema and breathing has improved   -continue Symbicort   02 Sat = 98% on RA.  RR: 18 (12 - 30)  SpO2: 94% (68% - 100%)  -Wean to RA from for O2 Sat > 93%.  -Encourage Cough, deep breathing and Use of IS 10x / hr while awake.  -Chest PT 4xdaily    GI:   Stable  -Continue famotidine    Tablet 20 milliGRAM(s) daily  -PO DASH diet    Renal / :   BUN/Cr Stable 18/1.09  -continue Lasix 20 mg QD and K10 QD  -Continue to monitor I/O's.    Endocrine:    Blood sugar stable   A1C: 5.5  TSH: 5.220    Hematologic:  H/H stable 8.2/25.2  -DVT prophylaxis with Heparin sq    ID:  #s/p I and D of sternal wound with vac placement   -process has been initiated regarding home wound vac delivery  -Per Dr. Vernon no need to change wound vac if the pt will have a home wound vac delivered soon. Better to change her to her home wound vac at that time, f/u in the AM if plan changes  -ID consulted, pending recommendations for abx course   -PICC team is consulted, will need ID final recommendations prior to placing PICC line   -Pt is not agreeable to acute rehab or [facility]  -Afebrile  -Continue to observe for SIRS/Sepsis Syndrome.  T(C): 37.2, Max: 37.2 (11-03-22 @ 13:54)    Disposition:  -dispo planning after ID recommendations    77 y/o Female, previous smoker, w/ PMHx of HTN, spinal stenosis, arthritis, bicuspid aortic valve s/p AVR, ascending/hemiarch replacement with frozen elephant trunk and left aorto-subclavian bypass, CABG x2 with Dr. 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 Muller and Dr. Augustin 7/29/22, with initiation of Keflex for concern over groin site infection. On 8/14/22 she was discharged to [facility]. As an outpatient she was noted to have a small collection on her sternum that drained and self resolved. She was also planned for hematology follow up for chronic anemia but had not been to her appointment yet. On 11/2 pt presented to [facility] ED regarding sternal wound infection. She underwent sternal I&D and wound vac placement with Dr. Muller/Dr. Vernon. On POD 1 ID was consulted regarding Abx regiment and need for PICC line. Case management and Social work were on board and beginning the process of home wound vac placement. There is concern that the pt and her daughters (whom she lives with) will not be able to manage the IV abx and wound vac. Pt is adamant on not returning to rehab. Case management has spoken to her daughters who will be speaking with the pt and having a family discussion to decide on disposition.     Neurovascular:   -No delirium. Pain well controlled with current regimen.    Cardiovascular:   #S/p aortic valve stenosis, now readmitted for I and D of sternal wound   -Hemodynamically stable. HR controlled  -continue aspirin 81 mg QD   -continue statin for long term graft patency  HR: 70 (54 - 660)  BP: 124/58 (95/54 - 136/63)  #HTN  -continue amLODIPine   Tablet 2.5 milliGRAM(s) daily  -furosemide    Tablet 20 milliGRAM(s) daily  -hydrALAZINE 10 milliGRAM(s) every 8 hours  -metoprolol tartrate 50 milliGRAM(s) two times a day    Respiratory:   #congestion on CXR,   -continue Lasix   -pt clinically appears better, less edema and breathing has improved   -continue Symbicort   02 Sat = 94% on NC.  -per pt she does not use O2 at home, on her prior DC she was sent home with home O2 but refused it when the company came to deliver it  RR: 18 (12 - 30)  SpO2: 94% (68% - 100%)  -Wean to RA from for O2 Sat > 93%.  -Encourage Cough, deep breathing and Use of IS 10x / hr while awake.  -Chest PT 4xdaily    GI:   Stable  -Continue famotidine    Tablet 20 milliGRAM(s) daily  -PO DASH diet    Renal / :   BUN/Cr Stable 18/1.09  -continue Lasix 20 mg QD and K10 QD  -Continue to monitor I/O's.    Endocrine:    Blood sugar stable   A1C: 5.5  TSH: 5.220    Hematologic:  H/H stable 8.2/25.2  -DVT prophylaxis with Heparin sq    ID:  #s/p I and D of sternal wound with vac placement   -process has been initiated regarding home wound vac delivery  -Per Dr. Vernon no need to change wound vac if the pt will have a home wound vac delivered soon. Better to change her to her home wound vac at that time, f/u in the AM if plan changes  -ID consulted, pending recommendations for abx course   -PICC team is consulted, will need ID final recommendations prior to placing PICC line   -Pt is not agreeable to acute rehab or [facility]  -Afebrile  -Continue to observe for SIRS/Sepsis Syndrome.  T(C): 37.2, Max: 37.2 (11-03-22 @ 13:54)    Disposition:  -dispo planning after ID recommendations

## 2022-11-03 NOTE — PHYSICAL THERAPY INITIAL EVALUATION ADULT - MD ORDER
Incision and drainage of sternum with debridement 02-Nov-2022 15:10:55 supersternal incision Jacques Gr.

## 2022-11-04 NOTE — PROGRESS NOTE ADULT - SUBJECTIVE AND OBJECTIVE BOX
Patient discussed on morning rounds with Dr. Muller       Operation / Date: 11/2/22 suprasternal I&D and wound vac placement   8/9/2021 : AVR, ascending/hemiarch replacement with frozen elephant 9/29: pec flap and closure   7/21/22, TEVAR with R groin cutdown and femoral artery repair     SUBJECTIVE ASSESSMENT:  77y Female seen and examined at bedside with no complaints.       Vital Signs Last 24 Hrs  T(C): 36.3 (04 Nov 2022 13:29), Max: 37.2 (03 Nov 2022 13:54)  T(F): 97.4 (04 Nov 2022 13:29), Max: 99 (03 Nov 2022 13:54)  HR: 57 (04 Nov 2022 09:00) (57 - 80)  BP: 101/53 (04 Nov 2022 09:00) (101/53 - 132/62)  BP(mean): 77 (04 Nov 2022 09:00) (77 - 89)  RR: 18 (04 Nov 2022 09:00) (16 - 18)  SpO2: 98% (04 Nov 2022 09:00) (95% - 99%)    Parameters below as of 04 Nov 2022 09:00  Patient On (Oxygen Delivery Method): nasal cannula w/ humidification  O2 Flow (L/min): 2    I&O's Detail    03 Nov 2022 07:01  -  04 Nov 2022 07:00  --------------------------------------------------------  IN:    IV PiggyBack: 250 mL    Lactated Ringers: 100 mL    Oral Fluid: 100 mL  Total IN: 450 mL    OUT:    VAC (Vacuum Assisted Closure) System (mL): 20 mL    Voided (mL): 350 mL  Total OUT: 370 mL    Total NET: 80 mL          CHEST TUBE:    FRANCHESKA DRAIN:    EPICARDIAL WIRES:   EZEQUIEL RODRIGUEZ:   FABRICIO:     PHYSICAL EXAM:  *****    LABS:                        7.8    8.87  )-----------( 83       ( 04 Nov 2022 05:30 )             23.1           11-04    134<L>  |  99  |  17  ----------------------------<  124<H>  3.9   |  26  |  1.01    Ca    8.5      04 Nov 2022 05:30  Phos  3.7     11-03  Mg     2.0     11-04            MEDICATIONS  (STANDING):  amLODIPine   Tablet 2.5 milliGRAM(s) Oral daily  aspirin  chewable 81 milliGRAM(s) Oral daily  atorvastatin 10 milliGRAM(s) Oral at bedtime  budesonide  80 MICROgram(s)/formoterol 4.5 MICROgram(s) Inhaler 2 Puff(s) Inhalation every 12 hours  dextrose 5%. 1000 milliLiter(s) (50 mL/Hr) IV Continuous <Continuous>  dextrose 5%. 1000 milliLiter(s) (100 mL/Hr) IV Continuous <Continuous>  dextrose 50% Injectable 25 Gram(s) IV Push once  dextrose 50% Injectable 25 Gram(s) IV Push once  dextrose 50% Injectable 12.5 Gram(s) IV Push once  famotidine    Tablet 20 milliGRAM(s) Oral daily  furosemide    Tablet 20 milliGRAM(s) Oral daily  glucagon  Injectable 1 milliGRAM(s) IntraMuscular once  heparin   Injectable 5000 Unit(s) SubCutaneous every 8 hours  hydrALAZINE 10 milliGRAM(s) Oral every 8 hours  insulin lispro (ADMELOG) corrective regimen sliding scale   SubCutaneous Before meals and at bedtime  lactated ringers. 1000 milliLiter(s) (50 mL/Hr) IV Continuous <Continuous>  lidocaine   4% Patch 1 Patch Transdermal daily  metoprolol tartrate 50 milliGRAM(s) Oral two times a day  piperacillin/tazobactam IVPB.. 3.375 Gram(s) IV Intermittent every 8 hours  potassium chloride    Tablet ER 10 milliEquivalent(s) Oral daily  vancomycin  IVPB 750 milliGRAM(s) IV Intermittent every 12 hours    MEDICATIONS  (PRN):  dextrose Oral Gel 15 Gram(s) Oral once PRN Blood Glucose LESS THAN 70 milliGRAM(s)/deciliter        RADIOLOGY & ADDITIONAL TESTS:     Patient discussed on morning rounds with Dr. Muller       Operation / Date: 11/2/22 suprasternal I&D and wound vac placement   8/9/2021 : AVR, ascending/hemiarch replacement with frozen elephant 9/29: pec flap and closure   7/21/22, TEVAR with R groin cutdown and femoral artery repair     SUBJECTIVE ASSESSMENT:  77y Female seen and examined at bedside with no complaints. She stated that her daughters would not be able to have a discussion regarding her abx and wound vac care until the weekend. She denies dizziness, vision changes, chest pain, palpitations, shortness of breath, cough, n/v/d, extremity swelling, calf tenderness.     Vital Signs Last 24 Hrs  T(C): 36.3 (04 Nov 2022 13:29), Max: 37.2 (03 Nov 2022 13:54)  T(F): 97.4 (04 Nov 2022 13:29), Max: 99 (03 Nov 2022 13:54)  HR: 57 (04 Nov 2022 09:00) (57 - 80)  BP: 101/53 (04 Nov 2022 09:00) (101/53 - 132/62)  BP(mean): 77 (04 Nov 2022 09:00) (77 - 89)  RR: 18 (04 Nov 2022 09:00) (16 - 18)  SpO2: 98% (04 Nov 2022 09:00) (95% - 99%)    Parameters below as of 04 Nov 2022 09:00  Patient On (Oxygen Delivery Method): nasal cannula w/ humidification  O2 Flow (L/min): 2    I&O's Detail    03 Nov 2022 07:01  -  04 Nov 2022 07:00  --------------------------------------------------------  IN:    IV PiggyBack: 250 mL    Lactated Ringers: 100 mL    Oral Fluid: 100 mL  Total IN: 450 mL    OUT:    VAC (Vacuum Assisted Closure) System (mL): 20 mL    Voided (mL): 350 mL  Total OUT: 370 mL    Total NET: 80 mL    CHEST TUBE:  none   FRANCHESKA DRAIN:  none   EPICARDIAL WIRES: none  TIE DOWNS: none  REGALADO: none  WOUND VAC: Yes    PHYSICAL EXAM:  GEN: NAD, looks comfortable  Psych: Mood appropriate  Neuro: A&Ox3.  No focal deficits.  Moving all extremities.   HEENT: No obvious abnormalities  CV: S1S2, regular, no murmurs appreciated.  No carotid bruits.  No JVD  Lungs: Clear B/L.  No wheezing, rales or rhonchi  ABD: Soft, non-tender, non-distended.  +Bowel sounds  EXT: Warm and well perfused.  trace to 1+ peripheral edema noted   Musculoskeletal: Moving all extremities with normal ROM, no joint swelling  PV: Pedal pulses palpable  Incisions: Incision at superior sternal pole is clean and dry with a wound vac in place with adequate suction.     LABS:                        7.8    8.87  )-----------( 83       ( 04 Nov 2022 05:30 )             23.1       11-04    134<L>  |  99  |  17  ----------------------------<  124<H>  3.9   |  26  |  1.01    Ca    8.5      04 Nov 2022 05:30  Phos  3.7     11-03  Mg     2.0     11-04      MEDICATIONS  (STANDING):  amLODIPine   Tablet 2.5 milliGRAM(s) Oral daily  aspirin  chewable 81 milliGRAM(s) Oral daily  atorvastatin 10 milliGRAM(s) Oral at bedtime  budesonide  80 MICROgram(s)/formoterol 4.5 MICROgram(s) Inhaler 2 Puff(s) Inhalation every 12 hours  dextrose 5%. 1000 milliLiter(s) (50 mL/Hr) IV Continuous <Continuous>  dextrose 5%. 1000 milliLiter(s) (100 mL/Hr) IV Continuous <Continuous>  dextrose 50% Injectable 25 Gram(s) IV Push once  dextrose 50% Injectable 25 Gram(s) IV Push once  dextrose 50% Injectable 12.5 Gram(s) IV Push once  famotidine    Tablet 20 milliGRAM(s) Oral daily  furosemide    Tablet 20 milliGRAM(s) Oral daily  glucagon  Injectable 1 milliGRAM(s) IntraMuscular once  heparin   Injectable 5000 Unit(s) SubCutaneous every 8 hours  hydrALAZINE 10 milliGRAM(s) Oral every 8 hours  insulin lispro (ADMELOG) corrective regimen sliding scale   SubCutaneous Before meals and at bedtime  lactated ringers. 1000 milliLiter(s) (50 mL/Hr) IV Continuous <Continuous>  lidocaine   4% Patch 1 Patch Transdermal daily  metoprolol tartrate 50 milliGRAM(s) Oral two times a day  piperacillin/tazobactam IVPB.. 3.375 Gram(s) IV Intermittent every 8 hours  potassium chloride    Tablet ER 10 milliEquivalent(s) Oral daily  vancomycin  IVPB 750 milliGRAM(s) IV Intermittent every 12 hours    MEDICATIONS  (PRN):  dextrose Oral Gel 15 Gram(s) Oral once PRN Blood Glucose LESS THAN 70 milliGRAM(s)/deciliter    RADIOLOGY & ADDITIONAL TESTS:  < from: Xray Chest 1 View- PORTABLE-Urgent (11.02.22 @ 16:56) >  IMPRESSION: Mild congestive changes. Cardiomegaly..    --- End of Report ---      < end of copied text >

## 2022-11-04 NOTE — DIETITIAN INITIAL EVALUATION ADULT - ADD RECOMMEND
1. Continue with current diet order 2. Encourage pt to meet nutritional needs as able 3. Encourage adherence to diet education (reinforce as able) 4. Pain and bowel regimen per team 5. Will continue to assess/honor preferences as able 6. Monitor electrolytes; replete PRN 7. Malnutrition notice placed

## 2022-11-04 NOTE — PROGRESS NOTE ADULT - SUBJECTIVE AND OBJECTIVE BOX
INFECTIOUS DISEASES CONSULT FOLLOW-UP NOTE    *INCOMPLETE*    INTERVAL HPI/OVERNIGHT EVENTS:    Subjective: Patient seen and examined at bedside. Denies fevers, chills, cough, abdominal pain, diarrhea.      ANTIBIOTICS/RELEVANT:    MEDICATIONS  (STANDING):  amLODIPine   Tablet 2.5 milliGRAM(s) Oral daily  aspirin  chewable 81 milliGRAM(s) Oral daily  atorvastatin 10 milliGRAM(s) Oral at bedtime  budesonide  80 MICROgram(s)/formoterol 4.5 MICROgram(s) Inhaler 2 Puff(s) Inhalation every 12 hours  dextrose 5%. 1000 milliLiter(s) (50 mL/Hr) IV Continuous <Continuous>  dextrose 5%. 1000 milliLiter(s) (100 mL/Hr) IV Continuous <Continuous>  dextrose 50% Injectable 25 Gram(s) IV Push once  dextrose 50% Injectable 25 Gram(s) IV Push once  dextrose 50% Injectable 12.5 Gram(s) IV Push once  famotidine    Tablet 20 milliGRAM(s) Oral daily  furosemide    Tablet 20 milliGRAM(s) Oral daily  glucagon  Injectable 1 milliGRAM(s) IntraMuscular once  heparin   Injectable 5000 Unit(s) SubCutaneous every 8 hours  hydrALAZINE 10 milliGRAM(s) Oral every 8 hours  insulin lispro (ADMELOG) corrective regimen sliding scale   SubCutaneous Before meals and at bedtime  lactated ringers. 1000 milliLiter(s) (50 mL/Hr) IV Continuous <Continuous>  lidocaine   4% Patch 1 Patch Transdermal daily  metoprolol tartrate 50 milliGRAM(s) Oral two times a day  piperacillin/tazobactam IVPB.. 3.375 Gram(s) IV Intermittent every 8 hours  potassium chloride    Tablet ER 10 milliEquivalent(s) Oral daily  vancomycin  IVPB 750 milliGRAM(s) IV Intermittent every 12 hours    MEDICATIONS  (PRN):  dextrose Oral Gel 15 Gram(s) Oral once PRN Blood Glucose LESS THAN 70 milliGRAM(s)/deciliter        Vital Signs Last 24 Hrs  T(C): 36.4 (04 Nov 2022 05:04), Max: 37.2 (03 Nov 2022 13:54)  T(F): 97.6 (04 Nov 2022 05:04), Max: 99 (03 Nov 2022 13:54)  HR: 57 (04 Nov 2022 09:00) (57 - 80)  BP: 101/53 (04 Nov 2022 09:00) (101/53 - 132/62)  BP(mean): 77 (04 Nov 2022 09:00) (77 - 89)  RR: 18 (04 Nov 2022 09:00) (16 - 18)  SpO2: 98% (04 Nov 2022 09:00) (94% - 99%)    Parameters below as of 04 Nov 2022 09:00  Patient On (Oxygen Delivery Method): nasal cannula w/ humidification  O2 Flow (L/min): 2      11-03-22 @ 07:01  -  11-04-22 @ 07:00  --------------------------------------------------------  IN: 450 mL / OUT: 370 mL / NET: 80 mL        PHYSICAL EXAM  Constitutional: alert, NAD  Eyes: the sclera and conjunctiva were normal.   ENT: the ears and nose were normal in appearance.   Neck: the appearance of the neck was normal and the neck was supple.   Pulmonary: no respiratory distress and lungs CTA bilaterally.   Heart: heart rate was normal and rhythm regular, normal S1 and S2  Vascular: no peripheral edema  Abdomen: normal bowel sounds, soft, non-tender  Neurological: no focal deficits  Psychiatric: the affect was normal      LABS:                        7.8    8.87  )-----------( 83       ( 04 Nov 2022 05:30 )             23.1     11-04    134<L>  |  99  |  17  ----------------------------<  124<H>  3.9   |  26  |  1.01    Ca    8.5      04 Nov 2022 05:30  Phos  3.7     11-03  Mg     2.0     11-04            MICROBIOLOGY:    Culture - Fungal, Tissue (collected 02 Nov 2022 14:50)  Source: .Tissue Supra Sternal Tissue or spec  Preliminary Report (04 Nov 2022 07:38):    Testing in progress    Culture - Acid Fast - Tissue w/Smear (collected 02 Nov 2022 14:50)  Source: .Tissue Supra Sternal Tissue or spec    Culture - Tissue with Gram Stain (collected 02 Nov 2022 14:50)  Source: .Tissue Supra Sternal Tissue or spec  Gram Stain (02 Nov 2022 17:14):    No organisms seen    Rare WBC's  Preliminary Report (03 Nov 2022 11:27):    No growth to date    Culture - Fungal, Body Fluid (collected 02 Nov 2022 14:50)  Source: .Body Fluid Supra Sternal Fluid #2 or spec  Preliminary Report (03 Nov 2022 11:37):    Testing in progress    Culture - Acid Fast - Body Fluid w/Smear (collected 02 Nov 2022 14:50)  Source: .Body Fluid Supra Sternal Fluid #2 or spec    Culture - Body Fluid with Gram Stain (collected 02 Nov 2022 14:50)  Source: .Body Fluid Supra Sternal Fluid #2 or spec  Gram Stain (02 Nov 2022 17:14):    No organisms seen    Rare WBC's  Preliminary Report (03 Nov 2022 11:19):    Rare Yeast    Culture in progress    Culture - Acid Fast - Body Fluid w/Smear (collected 02 Nov 2022 14:50)  Source: .Body Fluid Supra Sternal Fluid #1 or spec    Culture - Fungal, Body Fluid (collected 02 Nov 2022 14:50)  Source: .Body Fluid Supra Sternal Fluid #1 or spec  Preliminary Report (04 Nov 2022 07:48):    Testing in progress    Culture - Body Fluid with Gram Stain (collected 02 Nov 2022 14:50)  Source: .Body Fluid Supra Sternal Fluid #1 or spec  Gram Stain (02 Nov 2022 17:14):    No organisms seen    Rare WBC's  Preliminary Report (03 Nov 2022 11:19):    Rare Yeast    Culture in progress    Culture - Blood (collected 01 Nov 2022 18:20)  Source: .Blood Blood  Preliminary Report (03 Nov 2022 22:00):    No growth at 2 days.    Culture - Blood (collected 01 Nov 2022 18:15)  Source: .Blood Blood  Preliminary Report (03 Nov 2022 22:00):    No growth at 2 days.        RADIOLOGY & ADDITIONAL STUDIES:  Reviewed INFECTIOUS DISEASES CONSULT FOLLOW-UP NOTE    *INCOMPLETE*    INTERVAL HPI/OVERNIGHT EVENTS:    Subjective: Patient seen and examined at bedside. Denies fevers, chills, cough, abdominal pain, diarrhea.      ANTIBIOTICS/RELEVANT:    MEDICATIONS  (STANDING):  amLODIPine   Tablet 2.5 milliGRAM(s) Oral daily  aspirin  chewable 81 milliGRAM(s) Oral daily  atorvastatin 10 milliGRAM(s) Oral at bedtime  budesonide  80 MICROgram(s)/formoterol 4.5 MICROgram(s) Inhaler 2 Puff(s) Inhalation every 12 hours  dextrose 5%. 1000 milliLiter(s) (50 mL/Hr) IV Continuous <Continuous>  dextrose 5%. 1000 milliLiter(s) (100 mL/Hr) IV Continuous <Continuous>  dextrose 50% Injectable 25 Gram(s) IV Push once  dextrose 50% Injectable 25 Gram(s) IV Push once  dextrose 50% Injectable 12.5 Gram(s) IV Push once  famotidine    Tablet 20 milliGRAM(s) Oral daily  furosemide    Tablet 20 milliGRAM(s) Oral daily  glucagon  Injectable 1 milliGRAM(s) IntraMuscular once  heparin   Injectable 5000 Unit(s) SubCutaneous every 8 hours  hydrALAZINE 10 milliGRAM(s) Oral every 8 hours  insulin lispro (ADMELOG) corrective regimen sliding scale   SubCutaneous Before meals and at bedtime  lactated ringers. 1000 milliLiter(s) (50 mL/Hr) IV Continuous <Continuous>  lidocaine   4% Patch 1 Patch Transdermal daily  metoprolol tartrate 50 milliGRAM(s) Oral two times a day  piperacillin/tazobactam IVPB.. 3.375 Gram(s) IV Intermittent every 8 hours  potassium chloride    Tablet ER 10 milliEquivalent(s) Oral daily  vancomycin  IVPB 750 milliGRAM(s) IV Intermittent every 12 hours    MEDICATIONS  (PRN):  dextrose Oral Gel 15 Gram(s) Oral once PRN Blood Glucose LESS THAN 70 milliGRAM(s)/deciliter        Vital Signs Last 24 Hrs  T(C): 36.4 (04 Nov 2022 05:04), Max: 37.2 (03 Nov 2022 13:54)  T(F): 97.6 (04 Nov 2022 05:04), Max: 99 (03 Nov 2022 13:54)  HR: 57 (04 Nov 2022 09:00) (57 - 80)  BP: 101/53 (04 Nov 2022 09:00) (101/53 - 132/62)  BP(mean): 77 (04 Nov 2022 09:00) (77 - 89)  RR: 18 (04 Nov 2022 09:00) (16 - 18)  SpO2: 98% (04 Nov 2022 09:00) (94% - 99%)    Parameters below as of 04 Nov 2022 09:00  Patient On (Oxygen Delivery Method): nasal cannula w/ humidification  O2 Flow (L/min): 2      11-03-22 @ 07:01  -  11-04-22 @ 07:00  --------------------------------------------------------  IN: 450 mL / OUT: 370 mL / NET: 80 mL        PHYSICAL EXAM  Constitutional: alert, NAD  Eyes: the sclera and conjunctiva were normal.   ENT: the ears and nose were normal in appearance.   Neck: the appearance of the neck was normal and the neck was supple. Wound vac on her sternum, non-tender to palpation.  Pulmonary: no respiratory distress; b/l basilar crackles.  Heart: heart rate was normal and rhythm regular, normal S1 and S2; holosystolic murmur @ left sternal border  Vascular: 1+ b/l LE edema  Abdomen: normal bowel sounds, soft, non-tender  Neurological: no focal deficits  Psychiatric: the affect was normal      LABS:                        7.8    8.87  )-----------( 83       ( 04 Nov 2022 05:30 )             23.1     11-04    134<L>  |  99  |  17  ----------------------------<  124<H>  3.9   |  26  |  1.01    Ca    8.5      04 Nov 2022 05:30  Phos  3.7     11-03  Mg     2.0     11-04            MICROBIOLOGY:    Culture - Fungal, Tissue (collected 02 Nov 2022 14:50)  Source: .Tissue Supra Sternal Tissue or spec  Preliminary Report (04 Nov 2022 07:38):    Testing in progress    Culture - Acid Fast - Tissue w/Smear (collected 02 Nov 2022 14:50)  Source: .Tissue Supra Sternal Tissue or spec    Culture - Tissue with Gram Stain (collected 02 Nov 2022 14:50)  Source: .Tissue Supra Sternal Tissue or spec  Gram Stain (02 Nov 2022 17:14):    No organisms seen    Rare WBC's  Preliminary Report (03 Nov 2022 11:27):    No growth to date    Culture - Fungal, Body Fluid (collected 02 Nov 2022 14:50)  Source: .Body Fluid Supra Sternal Fluid #2 or spec  Preliminary Report (03 Nov 2022 11:37):    Testing in progress    Culture - Acid Fast - Body Fluid w/Smear (collected 02 Nov 2022 14:50)  Source: .Body Fluid Supra Sternal Fluid #2 or spec    Culture - Body Fluid with Gram Stain (collected 02 Nov 2022 14:50)  Source: .Body Fluid Supra Sternal Fluid #2 or spec  Gram Stain (02 Nov 2022 17:14):    No organisms seen    Rare WBC's  Preliminary Report (03 Nov 2022 11:19):    Rare Yeast    Culture in progress    Culture - Acid Fast - Body Fluid w/Smear (collected 02 Nov 2022 14:50)  Source: .Body Fluid Supra Sternal Fluid #1 or spec    Culture - Fungal, Body Fluid (collected 02 Nov 2022 14:50)  Source: .Body Fluid Supra Sternal Fluid #1 or spec  Preliminary Report (04 Nov 2022 07:48):    Testing in progress    Culture - Body Fluid with Gram Stain (collected 02 Nov 2022 14:50)  Source: .Body Fluid Supra Sternal Fluid #1 or spec  Gram Stain (02 Nov 2022 17:14):    No organisms seen    Rare WBC's  Preliminary Report (03 Nov 2022 11:19):    Rare Yeast    Culture in progress    Culture - Blood (collected 01 Nov 2022 18:20)  Source: .Blood Blood  Preliminary Report (03 Nov 2022 22:00):    No growth at 2 days.    Culture - Blood (collected 01 Nov 2022 18:15)  Source: .Blood Blood  Preliminary Report (03 Nov 2022 22:00):    No growth at 2 days.        RADIOLOGY & ADDITIONAL STUDIES:  Reviewed INFECTIOUS DISEASES CONSULT FOLLOW-UP NOTE    INTERVAL HPI/OVERNIGHT EVENTS: CARYL    Subjective: Patient seen and examined at bedside. Ongoing pain at surgical site, slightly improved since yesterday. Denies fevers, chills, cough, abdominal pain, diarrhea.    MEDICATIONS  (STANDING):  amLODIPine   Tablet 2.5 milliGRAM(s) Oral daily  aspirin  chewable 81 milliGRAM(s) Oral daily  atorvastatin 10 milliGRAM(s) Oral at bedtime  budesonide  80 MICROgram(s)/formoterol 4.5 MICROgram(s) Inhaler 2 Puff(s) Inhalation every 12 hours  dextrose 5%. 1000 milliLiter(s) (50 mL/Hr) IV Continuous <Continuous>  dextrose 5%. 1000 milliLiter(s) (100 mL/Hr) IV Continuous <Continuous>  dextrose 50% Injectable 25 Gram(s) IV Push once  dextrose 50% Injectable 25 Gram(s) IV Push once  dextrose 50% Injectable 12.5 Gram(s) IV Push once  famotidine    Tablet 20 milliGRAM(s) Oral daily  furosemide    Tablet 20 milliGRAM(s) Oral daily  glucagon  Injectable 1 milliGRAM(s) IntraMuscular once  heparin   Injectable 5000 Unit(s) SubCutaneous every 8 hours  hydrALAZINE 10 milliGRAM(s) Oral every 8 hours  insulin lispro (ADMELOG) corrective regimen sliding scale   SubCutaneous Before meals and at bedtime  lactated ringers. 1000 milliLiter(s) (50 mL/Hr) IV Continuous <Continuous>  lidocaine   4% Patch 1 Patch Transdermal daily  metoprolol tartrate 50 milliGRAM(s) Oral two times a day  piperacillin/tazobactam IVPB.. 3.375 Gram(s) IV Intermittent every 8 hours  potassium chloride    Tablet ER 10 milliEquivalent(s) Oral daily  vancomycin  IVPB 750 milliGRAM(s) IV Intermittent every 12 hours    MEDICATIONS  (PRN):  dextrose Oral Gel 15 Gram(s) Oral once PRN Blood Glucose LESS THAN 70 milliGRAM(s)/deciliter        Vital Signs Last 24 Hrs  T(C): 36.4 (04 Nov 2022 05:04), Max: 37.2 (03 Nov 2022 13:54)  T(F): 97.6 (04 Nov 2022 05:04), Max: 99 (03 Nov 2022 13:54)  HR: 57 (04 Nov 2022 09:00) (57 - 80)  BP: 101/53 (04 Nov 2022 09:00) (101/53 - 132/62)  BP(mean): 77 (04 Nov 2022 09:00) (77 - 89)  RR: 18 (04 Nov 2022 09:00) (16 - 18)  SpO2: 98% (04 Nov 2022 09:00) (94% - 99%)    Parameters below as of 04 Nov 2022 09:00  Patient On (Oxygen Delivery Method): nasal cannula w/ humidification  O2 Flow (L/min): 2      11-03-22 @ 07:01  -  11-04-22 @ 07:00  --------------------------------------------------------  IN: 450 mL / OUT: 370 mL / NET: 80 mL        PHYSICAL EXAM  Constitutional: alert, NAD  Eyes: the sclera and conjunctiva were normal.   ENT: the ears and nose were normal in appearance.   Neck: the appearance of the neck was normal and the neck was supple. Wound vac on her sternum, non-tender to palpation.  Pulmonary: no respiratory distress; b/l basilar crackles.  Heart: heart rate was normal and rhythm regular, normal S1 and S2; holosystolic murmur @ left sternal border  Vascular: 1+ b/l LE edema  Abdomen: normal bowel sounds, soft, non-tender  Neurological: no focal deficits  Psychiatric: the affect was normal      LABS:                        7.8    8.87  )-----------( 83       ( 04 Nov 2022 05:30 )             23.1     11-04    134<L>  |  99  |  17  ----------------------------<  124<H>  3.9   |  26  |  1.01    Ca    8.5      04 Nov 2022 05:30  Phos  3.7     11-03  Mg     2.0     11-04            MICROBIOLOGY:    Culture - Fungal, Tissue (collected 02 Nov 2022 14:50)  Source: .Tissue Supra Sternal Tissue or spec  Preliminary Report (04 Nov 2022 07:38):    Testing in progress    Culture - Acid Fast - Tissue w/Smear (collected 02 Nov 2022 14:50)  Source: .Tissue Supra Sternal Tissue or spec    Culture - Tissue with Gram Stain (collected 02 Nov 2022 14:50)  Source: .Tissue Supra Sternal Tissue or spec  Gram Stain (02 Nov 2022 17:14):    No organisms seen    Rare WBC's  Preliminary Report (03 Nov 2022 11:27):    No growth to date    Culture - Fungal, Body Fluid (collected 02 Nov 2022 14:50)  Source: .Body Fluid Supra Sternal Fluid #2 or spec  Preliminary Report (03 Nov 2022 11:37):    Testing in progress    Culture - Acid Fast - Body Fluid w/Smear (collected 02 Nov 2022 14:50)  Source: .Body Fluid Supra Sternal Fluid #2 or spec    Culture - Body Fluid with Gram Stain (collected 02 Nov 2022 14:50)  Source: .Body Fluid Supra Sternal Fluid #2 or spec  Gram Stain (02 Nov 2022 17:14):    No organisms seen    Rare WBC's  Preliminary Report (03 Nov 2022 11:19):    Rare Yeast    Culture in progress    Culture - Acid Fast - Body Fluid w/Smear (collected 02 Nov 2022 14:50)  Source: .Body Fluid Supra Sternal Fluid #1 or spec    Culture - Fungal, Body Fluid (collected 02 Nov 2022 14:50)  Source: .Body Fluid Supra Sternal Fluid #1 or spec  Preliminary Report (04 Nov 2022 07:48):    Testing in progress    Culture - Body Fluid with Gram Stain (collected 02 Nov 2022 14:50)  Source: .Body Fluid Supra Sternal Fluid #1 or spec  Gram Stain (02 Nov 2022 17:14):    No organisms seen    Rare WBC's  Preliminary Report (03 Nov 2022 11:19):    Rare Yeast    Culture in progress    Culture - Blood (collected 01 Nov 2022 18:20)  Source: .Blood Blood  Preliminary Report (03 Nov 2022 22:00):    No growth at 2 days.    Culture - Blood (collected 01 Nov 2022 18:15)  Source: .Blood Blood  Preliminary Report (03 Nov 2022 22:00):    No growth at 2 days.        RADIOLOGY & ADDITIONAL STUDIES:  Reviewed

## 2022-11-04 NOTE — DIETITIAN INITIAL EVALUATION ADULT - PERTINENT LABORATORY DATA
11-04    134<L>  |  99  |  17  ----------------------------<  124<H>  3.9   |  26  |  1.01    Ca    8.5      04 Nov 2022 05:30  Phos  3.7     11-03  Mg     2.0     11-04    POCT Blood Glucose.: 117 mg/dL (11-04-22 @ 11:45)  A1C with Estimated Average Glucose Result: 5.5 % (11-02-22 @ 06:14)  A1C with Estimated Average Glucose Result: 5.6 % (07-28-22 @ 06:34)

## 2022-11-04 NOTE — PROGRESS NOTE ADULT - ASSESSMENT
75 yo F w/ PMH of smoking, HTN, bicuspid aorta s/p AVR(bioprosthetic), ascending aorta replacement, CABGx2, L branchial artery embolectomy, TEVAR, presenting for swelling and tenderness on the upper sternum, SOB, and LE edema.  CT chest found a 2x3 subcutaneous soft-tissue density, early abscess vs focal phlegmonous changes.  I&D was done, cultures from wash out found rare yeast, incision was closed and wound vac was placed.    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  - CXR 11/2: mild congestion + cardiomegaly  - TTE 11/2: mitral (calcification of valve and mild MR), tricuspid (mild/moderate TR), aorta (bioprosthetic valve w/o echodensity or AR)  - BCx 11/1: NGTD  - Tissue Cx 11/2: NGTD  - Sternal fluid Cx 11/2: rare yeast    Recommend:  - c/w Vanc 750mg q12.  Target trough 11-16.  Monitor renal function. Get trough before 11pm dose tonight (11/4)  - Cefepime 2g q12 (DC Zosyn i/s/o downtrending platelets, most recently 83 on am labs)  - f/u BCx  - f/u wound/tissue Cx      Team 1 will continue to follow   77 yo F w/ PMH of smoking, HTN, bicuspid aorta s/p AVR(bioprosthetic), ascending aorta replacement, CABGx2, L branchial artery embolectomy, TEVAR, presenting for swelling and tenderness on the upper sternum, SOB, and LE edema.  CT chest found a 2x3 subcutaneous soft-tissue density, early abscess vs focal phlegmonous changes.  I&D was done, cultures from wash out found rare yeast, incision was closed and wound vac was placed.    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  - CXR 11/2: mild congestion + cardiomegaly  - TTE 11/2: mitral (calcification of valve and mild MR), tricuspid (mild/moderate TR), aorta (bioprosthetic valve w/o echodensity or AR)  - BCx 11/1: NGTD  - Tissue Cx 11/2: NGTD  - Sternal fluid Cx 11/2: rare yeast    Recommend:  - c/w Vanc 750mg q12.  Target trough 11-16.  Monitor renal function. Get trough before 11pm dose tonight (11/4)  - Cefepime 2g q12 (DC Zosyn i/s/o downtrending platelets, most recently 83 on am labs)  - f/u BCx  - f/u wound/tissue Cx  - Get EKG for QTC check (in case we end up needing to treat with an azole for yeast)      Team 1 will continue to follow

## 2022-11-04 NOTE — DIETITIAN INITIAL EVALUATION ADULT - PERTINENT MEDS FT
MEDICATIONS  (STANDING):  amLODIPine   Tablet 2.5 milliGRAM(s) Oral daily  aspirin  chewable 81 milliGRAM(s) Oral daily  atorvastatin 10 milliGRAM(s) Oral at bedtime  budesonide  80 MICROgram(s)/formoterol 4.5 MICROgram(s) Inhaler 2 Puff(s) Inhalation every 12 hours  dextrose 5%. 1000 milliLiter(s) (50 mL/Hr) IV Continuous <Continuous>  dextrose 5%. 1000 milliLiter(s) (100 mL/Hr) IV Continuous <Continuous>  dextrose 50% Injectable 25 Gram(s) IV Push once  dextrose 50% Injectable 25 Gram(s) IV Push once  dextrose 50% Injectable 12.5 Gram(s) IV Push once  famotidine    Tablet 20 milliGRAM(s) Oral daily  furosemide    Tablet 20 milliGRAM(s) Oral daily  glucagon  Injectable 1 milliGRAM(s) IntraMuscular once  heparin   Injectable 5000 Unit(s) SubCutaneous every 8 hours  hydrALAZINE 10 milliGRAM(s) Oral every 8 hours  insulin lispro (ADMELOG) corrective regimen sliding scale   SubCutaneous Before meals and at bedtime  lactated ringers. 1000 milliLiter(s) (50 mL/Hr) IV Continuous <Continuous>  lidocaine   4% Patch 1 Patch Transdermal daily  metoprolol tartrate 50 milliGRAM(s) Oral two times a day  piperacillin/tazobactam IVPB.. 3.375 Gram(s) IV Intermittent every 8 hours  potassium chloride    Tablet ER 10 milliEquivalent(s) Oral daily  vancomycin  IVPB 750 milliGRAM(s) IV Intermittent every 12 hours    MEDICATIONS  (PRN):  dextrose Oral Gel 15 Gram(s) Oral once PRN Blood Glucose LESS THAN 70 milliGRAM(s)/deciliter

## 2022-11-04 NOTE — DIETITIAN INITIAL EVALUATION ADULT - OTHER INFO
77 y/o Female, previous smoker, w/ PMHx of HTN, spinal stenosis, arthritis, bicuspid aortic valve s/p AVR, ascending/hemiarch replacement with frozen elephant trunk and left aorto-subclavian bypass, CABG x2 with Dr. 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 Muller and Dr. Augustin 7/29/22, with initiation of Keflex for concern over groin site infection. On 8/14/22 she was discharged to [facility]. As an outpatient she was noted to have a small collection on her sternum that drained and self resolved. She was also planned for hematology follow up for chronic anemia but had not been to her appointment yet. On 11/2 pt presented to [facility] ED regarding sternal wound infection. She underwent sternal I&D and wound vac placement with Dr. Muller/Dr. Vernon. On POD 1 ID was consulted regarding Abx regiment and need for PICC line. Case management and Social work were on board and beginning the process of home wound vac placement. There is concern that the pt and her daughters (whom she lives with) will not be able to manage the IV abx and wound vac. Pt is adamant on not returning to rehab. Case management has spoken to her daughters who will be speaking with the pt and having a family discussion to decide on disposition.     Pt seen at bedside for initial assessment- on NC w/ humidification. No n/v/d/c documented at this time. Last documented bowel movement 10/30. Confirms NKFA. Reports poor PO intake since August 2021; notes having 3 meals/day, however, typically finishing <50% of meals. Reports current body weight 140 pounds (relatively consistent with dosing weight 145 pounds). Pt reports  pounds in August 2021. Additionally, per EMR pt 155 pounds 6/22/2022, 6.4% weight loss x 5 months, not clinically significant. Generalized edema 1+, B/L foot, leg edema 2+. No pressure ulcers documented at this time. Yousif score=19. Labs reviewed 11/4; pt hyponatremic. Fingersticks 11/3-11/4: 105-150 mg/dL; insulin regimen ordered. Observed pt with severe wasting in temple region. Based on ASPEN guidelines, pt meets criteria for severe malnutrition. Provided pt with diet education regarding importance of meeting nutritional needs; amenable to education. Pt reports not feeling hungry during hospital stay, able to complete <50% of meals. Pt reports interest in Ensure Enlive 2x/day (350 kcal, 20 g protein per serving) and Ensure Pudding 1x/day (170 kcal, 4 g protein per serving). Additionally, pt noted interest in soup. RD to honor preferences as able. Encouraged pt to order per preferences. No cultural, ethnic, Druze food preferences noted. Made aware RD remains available. RD to follow up. See nutrition recommendations below.

## 2022-11-04 NOTE — PROGRESS NOTE ADULT - ATTENDING COMMENTS
Sternal wound infection, OR cultures pending, currently only rare Candida sp. in 2/3.  She is on vanc and pip-tazo, plts downtrending.  Would continue vanc, start cefepime, d/c pip-tazo.  Dosing and monitoring as above.  For final recommendations, will try to minimize number of antibiotic infusions needed per day.  Will follow with you – team 1.  Dr. Shepherd will cover from tonight through 11/6.  I will resume care on 11/7.

## 2022-11-04 NOTE — DIETITIAN INITIAL EVALUATION ADULT - OTHER CALCULATIONS
fluids per team. Based on Standards of Care pt >% IBW thus ideal body weight used for all calculations. Needs adjusted for advanced age and malnutrition.

## 2022-11-04 NOTE — DIETITIAN INITIAL EVALUATION ADULT - ORAL NUTRITION SUPPLEMENTS
Ensure Enlive 2x/day (350 kcal, 20 g protein per serving); Ensure Pudding 1x/day (170 kcal, 4 g protein per serving)

## 2022-11-04 NOTE — PROGRESS NOTE ADULT - ASSESSMENT
77 y/o Female, previous smoker, w/ PMHx of HTN, spinal stenosis, arthritis, bicuspid aortic valve s/p AVR, ascending/hemiarch replacement with frozen elephant trunk and left aorto-subclavian bypass, CABG x2 with Dr. 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 Muller and Dr. Augustin 7/29/22, with initiation of Keflex for concern over groin site infection. On 8/14/22 she was discharged to [facility] ([place] As an outpatient she was noted to have a small collection on her sternum that drained and self resolved. She was also planned for hematology follow up for chronic anemia but had not been to her appointment yet. On 11/2 pt presented to [facility] ED regarding sternal wound infection. She underwent sternal I&D and wound vac placement with Dr. Muller/Dr. Vernon. On POD 1 ID was consulted regarding Abx regiment and need for PICC line. Case management and Social work were on board and beginning the process of home wound vac placement. There is concern that the pt and her daughters (whom she lives with) will not be able to manage the IV abx and wound vac. Pt is adamant on not returning to rehab. Case management is involved with speaking to the patients family. They are available to talk over the weekend and would like to know the abx timing needed in order to coordinate care. PICC team would like to be reconsulted when ID recs are in place.     Neurovascular:   -No delirium. Pain well controlled with current regimen.    Cardiovascular:   #S/p aortic valve stenosis, now readmitted for I and D of sternal wound   -Hemodynamically stable. HR controlled  -continue aspirin 81 mg QD   -continue statin for long term graft patency  HR: 70 (54 - 660)  BP: 124/58 (95/54 - 136/63)  #HTN  -continue amLODIPine   Tablet 2.5 milliGRAM(s) daily  -furosemide    Tablet 20 milliGRAM(s) daily  -hydrALAZINE 10 milliGRAM(s) every 8 hours  -metoprolol tartrate 50 milliGRAM(s) two times a day    Respiratory:   #congestion on CXR,   -continue Lasix   -pt clinically appears better, less edema and breathing has improved   -continue Symbicort   02 Sat = 94% on NC.  -per pt she does not use O2 at home, on her prior DC she was sent home with home O2 but refused it when the company came to deliver it  RR: 18 (12 - 30)  SpO2: 94% (68% - 100%)  -Wean to RA from for O2 Sat > 93%.  -Encourage Cough, deep breathing and Use of IS 10x / hr while awake.  -Chest PT 4xdaily    GI:   Stable  -Continue famotidine    Tablet 20 milliGRAM(s) daily  -PO DASH diet    Renal / :   BUN/Cr Stable 18/1.09  -continue Lasix 20 mg QD and K10 QD  -Continue to monitor I/O's.    Endocrine:    Blood sugar stable   A1C: 5.5  TSH: 5.220    Hematologic:  H/H stable 8.2/25.2  -DVT prophylaxis with Heparin sq    ID:  #s/p I and D of sternal wound with vac placement   -process has been initiated regarding home wound vac delivery  -Per Dr. Vernon no need to change wound vac if the pt will have a home wound vac delivered soon. Better to change her to her home wound vac at that time, f/u in the AM if plan changes  -ID consulted, pending recommendations for abx course   -PICC team is consulted, will need ID final recommendations prior to placing PICC line   -Pt is not agreeable to acute rehab or VERNA  -Afebrile  -Continue to observe for SIRS/Sepsis Syndrome.  T(C): 37.2, Max: 37.2 (11-03-22 @ 13:54)    Disposition:  -dispo planning after ID recommendations

## 2022-11-05 NOTE — PROGRESS NOTE ADULT - SUBJECTIVE AND OBJECTIVE BOX
INTERVAL HPI/OVERNIGHT EVENTS: CARYL.    CONSTITUTIONAL:  Negative fever or chills, feels well, good appetite  EYES:  Negative  blurry vision or double vision  CARDIOVASCULAR:  Negative for chest pain or palpitations  RESPIRATORY:  Negative for cough, wheezing, or SOB   GASTROINTESTINAL:  Negative for nausea, vomiting, diarrhea, constipation, or abdominal pain  GENITOURINARY:  Negative frequency, urgency or dysuria  NEUROLOGIC:  No headache, confusion, dizziness, lightheadedness      ANTIBIOTICS/RELEVANT:    MEDICATIONS  (STANDING):  amLODIPine   Tablet 2.5 milliGRAM(s) Oral daily  aspirin  chewable 81 milliGRAM(s) Oral daily  atorvastatin 10 milliGRAM(s) Oral at bedtime  budesonide  80 MICROgram(s)/formoterol 4.5 MICROgram(s) Inhaler 2 Puff(s) Inhalation every 12 hours  caspofungin IVPB      cefepime   IVPB 2000 milliGRAM(s) IV Intermittent every 12 hours  dextrose 5%. 1000 milliLiter(s) (50 mL/Hr) IV Continuous <Continuous>  dextrose 5%. 1000 milliLiter(s) (100 mL/Hr) IV Continuous <Continuous>  dextrose 50% Injectable 25 Gram(s) IV Push once  dextrose 50% Injectable 25 Gram(s) IV Push once  dextrose 50% Injectable 12.5 Gram(s) IV Push once  famotidine    Tablet 20 milliGRAM(s) Oral daily  glucagon  Injectable 1 milliGRAM(s) IntraMuscular once  hydrALAZINE 10 milliGRAM(s) Oral every 8 hours  insulin lispro (ADMELOG) corrective regimen sliding scale   SubCutaneous Before meals and at bedtime  lactated ringers. 1000 milliLiter(s) (50 mL/Hr) IV Continuous <Continuous>  lidocaine   4% Patch 1 Patch Transdermal daily  metoprolol tartrate 50 milliGRAM(s) Oral two times a day  potassium chloride    Tablet ER 20 milliEquivalent(s) Oral daily  vancomycin  IVPB        MEDICATIONS  (PRN):  acetaminophen     Tablet .. 650 milliGRAM(s) Oral every 6 hours PRN Mild Pain (1 - 3)  dextrose Oral Gel 15 Gram(s) Oral once PRN Blood Glucose LESS THAN 70 milliGRAM(s)/deciliter        Vital Signs Last 24 Hrs  T(C): 37 (05 Nov 2022 13:54), Max: 37 (05 Nov 2022 13:54)  T(F): 98.6 (05 Nov 2022 13:54), Max: 98.6 (05 Nov 2022 13:54)  HR: 84 (05 Nov 2022 12:45) (64 - 103)  BP: 143/65 (05 Nov 2022 12:45) (131/60 - 171/83)  BP(mean): 93 (05 Nov 2022 12:45) (91 - 115)  RR: 20 (05 Nov 2022 12:45) (18 - 22)  SpO2: 93% (05 Nov 2022 12:45) (93% - 99%)    Parameters below as of 05 Nov 2022 12:45  Patient On (Oxygen Delivery Method): room air        PHYSICAL EXAM:  Constitutional: NAD  Eyes: MALIHA, EOMI  Ear/Nose/Throat: no oral lesion, no sinus tenderness on percussion	  Neck: no JVD, no lymphadenopathy, supple  Respiratory: CTA marcia  Cardiovascular: S1S2 RRR, no murmurs  Gastrointestinal:soft, (+) BS, no HSM  Extremities:no e/e/c  Vascular: DP Pulse:	right normal; left normal      LABS:                        7.7    8.41  )-----------( 75       ( 05 Nov 2022 06:51 )             23.7     11-05    135  |  100  |  15  ----------------------------<  179<H>  3.6   |  26  |  0.77    Ca    8.6      05 Nov 2022 06:51  Mg     2.0     11-05            MICROBIOLOGY: reviewed    RADIOLOGY & ADDITIONAL STUDIES: reviewed

## 2022-11-05 NOTE — PROGRESS NOTE ADULT - ASSESSMENT
77 y/o Female, previous smoker, w/ PMHx of HTN, spinal stenosis, arthritis, bicuspid aortic valve s/p AVR, ascending/hemiarch replacement with frozen elephant trunk and left aorto-subclavian bypass, CABG x2 with Dr. 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 Muller and Dr. Augustin 7/29/22, with initiation of Keflex for concern over groin site infection. On 8/14/22 she was discharged to [facility]. As an outpatient she was noted to have a small collection on her sternum that drained and self resolved. She was also planned for hematology follow up for chronic anemia but had not been to her appointment yet. On 11/2 pt presented to [facility] ED regarding sternal wound infection. She underwent sternal I&D and wound vac placement with Dr. Muller/Dr. Vernon. On POD 1 ID was consulted regarding Abx regiment and need for PICC line. Case management and Social work were on board and beginning the process of home wound vac placement. There is concern that the pt and her daughters (whom she lives with) will not be able to manage the IV abx and wound vac. Pt is adamant on not returning to rehab. Case management is involved with speaking to the patients family. They are available to talk over the weekend and would like to know the abx timing needed in order to coordinate care. PICC team would like to be reconsulted when ID recs are in place.     Neurovascular:   -No delirium. Pain well controlled with current regimen.    Cardiovascular:   #S/p aortic valve stenosis, now readmitted for I and D of sternal wound   -Hemodynamically stable. HR controlled  -continue aspirin 81 mg QD   -continue statin for long term graft patency  HR: 70 (54 - 660)  BP: 124/58 (95/54 - 136/63)  #HTN  -continue amLODIPine   Tablet 2.5 milliGRAM(s) daily  -furosemide    Tablet 20 milliGRAM(s) daily  -hydrALAZINE 10 milliGRAM(s) every 8 hours  -metoprolol tartrate 50 milliGRAM(s) two times a day    Respiratory:   #congestion on CXR,   -continue Lasix   -pt clinically appears better, less edema and breathing has improved   -continue Symbicort   02 Sat = 94% on NC.  RR: 18 (12 - 30)  SpO2: 94% (68% - 100%)  -Wean to RA from for O2 Sat > 93%.  -Encourage Cough, deep breathing and Use of IS 10x / hr while awake.  -Chest PT 4xdaily    GI:   Stable  -Continue famotidine    Tablet 20 milliGRAM(s) daily  -PO DASH diet    Renal / :   BUN/Cr Stable 15/0.77  -continue Lasix 20 mg QD and K10 QD  -Continue to monitor I/O's.    Endocrine:    Blood sugar stable   A1C: 5.5  TSH: 5.220    Hematologic:  H/H stable 7.7/23.7  -DVT prophylaxis with Heparin sq    ID:  #s/p I and D of sternal wound with vac placement   -process has been initiated regarding home wound vac delivery  -Wound vac changed today  -ID consulted, pending recommendations for abx course   -PICC team is consulted, will need ID final recommendations prior to placing PICC line   -Pt is not agreeable to acute rehab or VERNA  -Afebrile  -Continue to observe for SIRS/Sepsis Syndrome.  T(C): 37.2, Max: 37.2 (11-03-22 @ 13:54)    Disposition:  -dispo planning after ID recommendations    75 y/o Female, previous smoker, w/ PMHx of HTN, spinal stenosis, arthritis, bicuspid aortic valve s/p AVR, ascending/hemiarch replacement with frozen elephant trunk and left aorto-subclavian bypass, CABG x2 with Dr. 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 Muller and Dr. Augustin 7/29/22, with initiation of Keflex for concern over groin site infection. On 8/14/22 she was discharged to [facility]. As an outpatient she was noted to have a small collection on her sternum that drained and self resolved. She was also planned for hematology follow up for chronic anemia but had not been to her appointment yet. On 11/2 pt presented to [facility] ED regarding sternal wound infection. She underwent sternal I&D and wound vac placement with Dr. Muller/Dr. Vernon. On POD 1 ID was consulted regarding Abx regiment and need for PICC line. Case management and Social work were on board and beginning the process of home wound vac placement. There is concern that the pt and her daughters (whom she lives with) will not be able to manage the IV abx and wound vac. Pt is adamant on not returning to rehab. Case management is involved with speaking to the patients family. They are available to talk over the weekend and would like to know the abx timing needed in order to coordinate care. PICC team would like to be reconsulted when ID recs are in place.     Neurovascular:   -No delirium. Pain well controlled with current regimen.    Cardiovascular:   #S/p aortic valve stenosis, now readmitted for I and D of sternal wound   -Hemodynamically stable. HR controlled  -continue aspirin 81 mg QD   -continue statin for long term graft patency  HR: 70 (54 - 660)  BP: 124/58 (95/54 - 136/63)  #HTN  -continue amLODIPine   Tablet 2.5 milliGRAM(s) daily  -furosemide    Tablet 20 milliGRAM(s) daily  -hydrALAZINE 10 milliGRAM(s) every 8 hours  -metoprolol tartrate 50 milliGRAM(s) two times a day    Respiratory:   #congestion on CXR,   -continue Lasix   -pt clinically appears better, less edema and breathing has improved   -continue Symbicort   02 Sat = 94% on NC.  RR: 18 (12 - 30)  SpO2: 94% (68% - 100%)  -Wean to RA from for O2 Sat > 93%.  -Encourage Cough, deep breathing and Use of IS 10x / hr while awake.  -Chest PT 4xdaily    GI:   Stable  -Continue famotidine    Tablet 20 milliGRAM(s) daily  -PO DASH diet    Renal / :   BUN/Cr Stable 15/0.77  -continue Lasix 20 mg QD and K10 QD  -Continue to monitor I/O's.    Endocrine:    Blood sugar stable   A1C: 5.5  TSH: 5.220    Hematologic:  H/H stable 7.7/23.7  -DVT prophylaxis with Heparin sq    ID:  #s/p I and D of sternal wound with vac placement   -process has been initiated regarding home wound vac delivery  -Wound vac changed today  -ID consulted, pending recommendations for abx course, per ID continue vancomycin 750mg Q12hrs, cefepime 2g Q12hrs, and start caspofungin for fungal coverage (70mg today then 50mg Q24hrs)  -PICC team is consulted, will need ID final recommendations prior to placing PICC line   -Pt is not agreeable to acute rehab or VERNA  -Afebrile  -Continue to observe for SIRS/Sepsis Syndrome.  T(C): 37.2, Max: 37.2 (11-03-22 @ 13:54)    Disposition:  -dispo planning after ID recommendations

## 2022-11-05 NOTE — CHART NOTE - NSCHARTNOTEFT_GEN_A_CORE
Wound vac changed at bedside in sterile fashion. Wound approximately 0.5 cm x 1 cm x 1 cm with granulation tissue present and no obvious signs of infection or pus noted. Good seal achieved at end of change. Patient tolerated procedure well.

## 2022-11-05 NOTE — PROGRESS NOTE ADULT - ASSESSMENT
Sternal wound infection s/p RTOR 11/2 (purulence described in OR--superficial to bone)--cultures with growth of Candida species. Advise add caspofungin and continue vancomycin and cefepime; vancomycin trough stable at 18.5; would continue current dose and repeat trough prior to AM dose Monday; monitor for worsening thrombocytopenia--if worsens despite cessation of Zosyn, then may be attributable to vancomycin.   Dr. Pelayo ID Team 1 resumes care Monday

## 2022-11-06 NOTE — PROGRESS NOTE ADULT - SUBJECTIVE AND OBJECTIVE BOX
Patient discussed on morning rounds with Dr. Montero    Operation / Date: 11/2/22 Suprasternal I&D and wound vac placement  7/21/22 TEVAR with right groin cutdown and femoral artery  8/9/21 AVR, ascending/hemiarch replacement with frozen elephant  9/20/21 pec flap and closure    SUBJECTIVE ASSESSMENT:  77y Female seen and examined at bedside.  Patient with no complaints.  Denies chest pain, shortness of breath, nausea, vomiting.        Vital Signs Last 24 Hrs  T(C): 36.4 (06 Nov 2022 14:45), Max: 37.6 (05 Nov 2022 18:36)  T(F): 97.6 (06 Nov 2022 14:45), Max: 99.6 (05 Nov 2022 18:36)  HR: 70 (06 Nov 2022 16:06) (66 - 112)  BP: 117/58 (06 Nov 2022 16:06) (102/55 - 174/73)  BP(mean): 83 (06 Nov 2022 16:06) (77 - 105)  RR: 18 (06 Nov 2022 16:06) (17 - 20)  SpO2: 100% (06 Nov 2022 16:06) (96% - 100%)    Parameters below as of 06 Nov 2022 16:06  Patient On (Oxygen Delivery Method): nasal cannula w/ humidification  O2 Flow (L/min): 2    I&O's Detail    05 Nov 2022 08:01  -  06 Nov 2022 07:00  --------------------------------------------------------  IN:    IV PiggyBack: 500 mL    Lactated Ringers: 300 mL    Oral Fluid: 200 mL  Total IN: 1000 mL    OUT:    VAC (Vacuum Assisted Closure) System (mL): 50 mL    Voided (mL): 1050 mL  Total OUT: 1100 mL    Total NET: -100 mL      06 Nov 2022 07:01  -  06 Nov 2022 17:01  --------------------------------------------------------  IN:    IV PiggyBack: 260 mL  Total IN: 260 mL    OUT:    Voided (mL): 800 mL  Total OUT: 800 mL    Total NET: -540 mL          CHEST TUBE:  No.   FRANCHESKA DRAIN:  No.  EPICARDIAL WIRES: No.  TIE DOWNS: No.  REGALADO: No.  VAC: yes, last changed 11/5/22    PHYSICAL EXAM:    GEN: NAD, looks comfortable  Psych: Mood appropriate  Neuro: A&Ox3.  No focal deficits.  Moving all extremities.   HEENT: No obvious abnormalities  CV: S1S2, regular, no murmurs appreciated.  No carotid bruits.  No JVD  Lungs: Clear B/L.  No wheezing, rales or rhonchi  ABD: Soft, non-tender, non-distended.  +Bowel sounds  EXT: Warm and well perfused.  trace to 1+ peripheral edema noted   Musculoskeletal: Moving all extremities with normal ROM, no joint swelling  PV: Pedal pulses palpable  Incisions: Incision at superior sternal pole is clean and dry with a wound vac in place with adequate suction.     LABS:                        8.0    11.05 )-----------( 90       ( 06 Nov 2022 08:09 )             25.5       COUMADIN:  Yes/No. REASON: .        11-06    135  |  99  |  16  ----------------------------<  148<H>  3.9   |  27  |  0.76    Ca    8.9      06 Nov 2022 08:09  Phos  2.9     11-06  Mg     2.0     11-06            MEDICATIONS  (STANDING):  amLODIPine   Tablet 2.5 milliGRAM(s) Oral daily  aspirin  chewable 81 milliGRAM(s) Oral daily  atorvastatin 10 milliGRAM(s) Oral at bedtime  budesonide  80 MICROgram(s)/formoterol 4.5 MICROgram(s) Inhaler 2 Puff(s) Inhalation every 12 hours  caspofungin IVPB      caspofungin IVPB 50 milliGRAM(s) IV Intermittent every 24 hours  cefepime   IVPB 2000 milliGRAM(s) IV Intermittent every 12 hours  dextrose 5%. 1000 milliLiter(s) (100 mL/Hr) IV Continuous <Continuous>  dextrose 5%. 1000 milliLiter(s) (50 mL/Hr) IV Continuous <Continuous>  dextrose 50% Injectable 25 Gram(s) IV Push once  dextrose 50% Injectable 12.5 Gram(s) IV Push once  dextrose 50% Injectable 25 Gram(s) IV Push once  famotidine    Tablet 20 milliGRAM(s) Oral daily  furosemide    Tablet 40 milliGRAM(s) Oral daily  glucagon  Injectable 1 milliGRAM(s) IntraMuscular once  hydrALAZINE 10 milliGRAM(s) Oral every 8 hours  insulin lispro (ADMELOG) corrective regimen sliding scale   SubCutaneous Before meals and at bedtime  lactated ringers. 1000 milliLiter(s) (50 mL/Hr) IV Continuous <Continuous>  lidocaine   4% Patch 1 Patch Transdermal daily  metoprolol tartrate 50 milliGRAM(s) Oral two times a day  potassium chloride    Tablet ER 20 milliEquivalent(s) Oral daily  vancomycin  IVPB      vancomycin  IVPB 750 milliGRAM(s) IV Intermittent every 12 hours    MEDICATIONS  (PRN):  acetaminophen     Tablet .. 650 milliGRAM(s) Oral every 6 hours PRN Mild Pain (1 - 3)  dextrose Oral Gel 15 Gram(s) Oral once PRN Blood Glucose LESS THAN 70 milliGRAM(s)/deciliter        RADIOLOGY & ADDITIONAL TESTS:

## 2022-11-06 NOTE — PROGRESS NOTE ADULT - ASSESSMENT
75 y/o Female, previous smoker, w/ PMHx of HTN, spinal stenosis, arthritis, bicuspid aortic valve s/p AVR, ascending/hemiarch replacement with frozen elephant trunk and left aorto-subclavian bypass, CABG x2 with Dr. 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 Muller and Dr. Augustin 7/29/22, with initiation of Keflex for concern over groin site infection. On 8/14/22 she was discharged to [facility]. As an outpatient she was noted to have a small collection on her sternum that drained and self resolved. She was also planned for hematology follow up for chronic anemia but had not been to her appointment yet. On 11/2 pt presented to [facility] ED regarding sternal wound infection. She underwent sternal I&D and wound vac placement with Dr. Muller/Dr. Vernon. On POD 1 ID was consulted regarding Abx regiment and need for PICC line. Case management and Social work were on board and beginning the process of home wound vac placement. There is concern that the pt and her daughters (whom she lives with) will not be able to manage the IV abx and wound vac. Pt is adamant on not returning to rehab. Case management is involved with speaking to the patients family. They are available to talk over the weekend and would like to know the abx timing needed in order to coordinate care. PICC team would like to be reconsulted when ID recs are in place.     Neurovascular:   -No delirium. Pain well controlled with current regimen.    Cardiovascular:   #S/p aortic valve stenosis, now readmitted for I and D of sternal wound   -Hemodynamically stable. HR controlled  -continue aspirin 81 mg QD   -continue statin for long term graft patency  #HTN  -continue amLODIPine   Tablet 2.5 milliGRAM(s) daily  -furosemide    Tablet 20 milliGRAM(s) daily  -hydrALAZINE 10 milliGRAM(s) every 8 hours  -metoprolol tartrate 50 milliGRAM(s) two times a day    Respiratory:   #congestion on CXR,   -continue Lasix   -pt clinically appears better, less edema and breathing has improved   -continue Symbicort   02 Sat = 94% on NC.  -Wean to RA from for O2 Sat > 93%.  -Encourage Cough, deep breathing and Use of IS 10x / hr while awake.  -Chest PT 4xdaily    GI:   Stable  -Continue famotidine    Tablet 20 milliGRAM(s) daily  -PO DASH diet    Renal / :   BUN/Cr Stable 16/0.76  -continue Lasix 20 mg QD and K10 QD  -Continue to monitor I/O's.    Endocrine:    Blood sugar stable   A1C: 5.5  TSH: 5.220    Hematologic:  H/H stable 8/25  -DVT prophylaxis with Heparin sq    ID:  #s/p I and D of sternal wound with vac placement   -process has been initiated regarding home wound vac delivery  -Wound vac changed 11/5  -ID consulted, pending recommendations for abx course, per ID continue vancomycin 750mg Q12hrs, cefepime 2g Q12hrs, and caspofungin 50mg Q24hrs  -PICC team is consulted, will need ID final recommendations prior to placing PICC line   -Pt is not agreeable to acute rehab or VERNA, family not agreeable to home IV abx  -Afebrile  -Continue to observe for SIRS/Sepsis Syndrome.      Disposition:  -dispo planning after ID recommendations    75 y/o Female, previous smoker, w/ PMHx of HTN, spinal stenosis, arthritis, bicuspid aortic valve s/p AVR, ascending/hemiarch replacement with frozen elephant trunk and left aorto-subclavian bypass, CABG x2 with Dr. 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 Muller and Dr. Augustin 7/29/22, with initiation of Keflex for concern over groin site infection. On 8/14/22 she was discharged to [facility]. As an outpatient she was noted to have a small collection on her sternum that drained and self resolved. She was also planned for hematology follow up for chronic anemia but had not been to her appointment yet. On 11/2 pt presented to [facility] ED regarding sternal wound infection. She underwent sternal I&D and wound vac placement with Dr. Muller/Dr. Vernon. On POD 1 ID was consulted regarding Abx regiment and need for PICC line. Case management and Social work were on board and beginning the process of home wound vac placement. There is concern that the pt and her daughters (whom she lives with) will not be able to manage the IV abx and wound vac. Pt is adamant on not returning to rehab. Case management is involved with speaking to the patients family. They are available to talk over the weekend and would like to know the abx timing needed in order to coordinate care. PICC team would like to be reconsulted when ID recs are in place.     Neurovascular:   -No delirium. Pain well controlled with current regimen.    Cardiovascular:   #S/p aortic valve stenosis, now readmitted for I and D of sternal wound   -Hemodynamically stable. HR controlled  -continue aspirin 81 mg QD   -continue statin for long term graft patency  #HTN  -continue amLODIPine   Tablet 2.5 milliGRAM(s) daily  -furosemide    Tablet 20 milliGRAM(s) daily  -hydrALAZINE 10 milliGRAM(s) every 8 hours  -metoprolol tartrate 50 milliGRAM(s) two times a day    Respiratory:   #congestion on CXR,   -continue Lasix   -pt clinically appears better, less edema and breathing has improved   -continue Symbicort   02 Sat = 94% on NC.  -Wean to RA from for O2 Sat > 93%.  -Encourage Cough, deep breathing and Use of IS 10x / hr while awake.  -Chest PT 4xdaily    GI:   Stable  -Continue famotidine    Tablet 20 milliGRAM(s) daily  -PO DASH diet    Renal / :   BUN/Cr Stable 16/0.76  -continue Lasix 20 mg QD and K10 QD  -Continue to monitor I/O's.    Endocrine:    Blood sugar stable   A1C: 5.5  TSH: 5.220    Hematologic:  H/H stable 8/25  -DVT prophylaxis with Heparin sq  -Platelets now 90, improving, HIT negative    ID:  #s/p I and D of sternal wound with vac placement   -process has been initiated regarding home wound vac delivery  -Wound vac changed 11/5  -ID consulted, pending recommendations for abx course, per ID continue vancomycin 750mg Q12hrs, cefepime 2g Q12hrs, and caspofungin 50mg Q24hrs  -PICC team is consulted, will need ID final recommendations prior to placing PICC line   -Pt is not agreeable to acute rehab or VERNA, family not agreeable to home IV abx  -Afebrile  -Continue to observe for SIRS/Sepsis Syndrome.      Disposition:  -dispo planning after ID recommendations

## 2022-11-07 NOTE — PROGRESS NOTE ADULT - ATTENDING COMMENTS
Sternal wound infection, now with LUE ischemia.  There appears to be an area of fluctuance (and ongoing tenderness) at L upper portion of sternum, no overlying erythema.  Possible collection despite VAC?  Culture from OR debridement (on antibiotics) with only Candida albicans.  Thrombocytopenia possibly related to vancomycin - would change to dapto, check baseline CPK.  Continue cefepime for now.  Start fluconazole, monitor ECG in 48 h, d/c caspofungin.  Will follow with you - team 1. Sternal wound infection, now with LUE ischemia.  There appears to be an area of fluctuance (and ongoing tenderness) at L upper portion of sternum, no overlying erythema.  Possible collection despite VAC?  Culture from OR debridement (on antibiotics) with only Candida albicans.  Thrombocytopenia possibly related to vancomycin - would change to dapto, check baseline CPK.  Continue cefepime for now.  Start fluconazole, monitor ECG in 48 h, d/c caspofungin.  Recommendations discussed with primary team.  Will follow with you - team 1.

## 2022-11-07 NOTE — PROGRESS NOTE ADULT - ASSESSMENT
A/P: 76y.o female with LUE acute limb ischemia. Now on hep gtt with subjective improvement per patient. Continues to have absent radial/ulnar/guerra arch signals    -Continue hep gtt  -No Acute Vascular Surgery intervention  -Medical Management with AC  -Vascular surgery will continue to follow

## 2022-11-07 NOTE — CONSULT NOTE ADULT - ASSESSMENT
A/P: 76y.o female with LUE acute limb ischemia.    -HepGtt with Bolus  -No Acute Vascular Surgery intervention  -Medical Managment with AC  -Will re-eval in 2hrs and in am

## 2022-11-07 NOTE — PROGRESS NOTE ADULT - ASSESSMENT
77 yo F w/ PMH of smoking, HTN, bicuspid aorta s/p AVR(bioprosthetic), ascending aorta replacement, CABGx2, L branchial artery embolectomy, TEVAR, presenting for swelling and tenderness on the upper sternum, SOB, and LE edema.  CT chest found a 2x3 subcutaneous soft-tissue density, early abscess vs focal phlegmonous changes.  I&D was done, cultures from wash out found C. Albicans, incision was closed and wound vac was placed.    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  - CXR 11/2: mild congestion + cardiomegaly  - TTE 11/2: mitral (calcification of valve and mild MR), tricuspid (mild/moderate TR), aorta (bioprosthetic valve w/o echodensity or AR)  - BCx 11/1: NGTD  - Tissue Cx 11/2: NGTD  - Sternal fluid Cx 11/2: rare yeast    Recommend:  - get repeat Vanc level, if <20 DC Vanc and start Dapto 400g q24 i/s/o thrombocytopenia unresponsive to Zosyn DC  - Cefepime 2g q12  - f/u BCx  - f/u wound/tissue Cx  - Get EKG for QTC check (in case we end up needing to treat with an azole for yeast)      Team 1 will continue to follow 75 yo F w/ PMH of smoking, HTN, bicuspid aorta s/p AVR(bioprosthetic), ascending aorta replacement, CABGx2, L branchial artery embolectomy, TEVAR, presenting for swelling and tenderness on the upper sternum, SOB, and LE edema.  CT chest found a 2x3 subcutaneous soft-tissue density, early abscess vs focal phlegmonous changes.  I&D was done, cultures from wash out found C. Albicans, incision was closed and wound vac was placed.    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  - CT Chest Aorta w/wo cont 11/7: increased parasternal chest wall collection/phlegmon w/ mediastinal extension resulting in occlusion of aortic-left subclavian bypass.  Flow in L UE attenuates above the elbow, minimal flow in ulnar artery. increased pulmonary edema and small b/l pleural effusions.  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  - CT UE w/ cont 11/7:   - CXR 11/2: mild congestion + cardiomegaly  - TTE 11/2: mitral (calcification of valve and mild MR), tricuspid (mild/moderate TR), aorta (bioprosthetic valve w/o echodensity or AR)  - BCx 11/1: No growth @ 5 days; final  - Tissue Cx 11/2: Rare yeast  - Sternal fluid Cx 11/2: C. Albicans; final    Recommend:  - Repeat Vanc level, when <20 DC Vanc and start Dapto 400g q24 i/s/o thrombocytopenia unresponsive to Zosyn DC  - Cefepime 2g q12  - Fluconazole 400 q24 (DC caspofungin, -azole is OK b/c QTc is not prolonged on ED EKG)  - f/u BCx  - f/u wound/tissue Cx      Team 1 will continue to follow 75 yo F w/ PMH of smoking, HTN, bicuspid aorta s/p AVR(bioprosthetic), ascending aorta replacement, CABGx2, L branchial artery embolectomy, TEVAR, presenting for swelling and tenderness on the upper sternum, SOB, and LE edema.  CT chest found a 2x3 subcutaneous soft-tissue density, early abscess vs focal phlegmonous changes.  I&D was done, cultures from wash out found C. Albicans, incision was closed and wound vac was placed.    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  - CT Chest Aorta w/wo cont 11/7: increased parasternal chest wall collection/phlegmon w/ mediastinal extension resulting in occlusion of aortic-left subclavian bypass.  Flow in L UE attenuates above the elbow, minimal flow in ulnar artery. increased pulmonary edema and small b/l pleural effusions.  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  - CT UE w/ cont 11/7:   - CXR 11/2: mild congestion + cardiomegaly  - TTE 11/2: mitral (calcification of valve and mild MR), tricuspid (mild/moderate TR), aorta (bioprosthetic valve w/o echodensity or AR)  - BCx 11/1: No growth @ 5 days; final  - Tissue Cx 11/2: Rare yeast  - Sternal fluid Cx 11/2: C. Albicans; final    Recommend:  - Repeat Vanc level, when <20 DC Vanc and start Dapto 400 mg IV q24 i/s/o thrombocytopenia unresponsive to Zosyn DC  - Cefepime 2g IV q12  - Fluconazole 400 oi q24 (DC caspofungin, -azole is OK b/c QTc is not prolonged on ED EKG)  - f/u BCx  - f/u wound/tissue Cx      Team 1 will continue to follow

## 2022-11-07 NOTE — PROGRESS NOTE ADULT - ASSESSMENT
75 YO Female, previous smoker, w/ PMHx of CAD, HTN, HLD, spinal stenosis, arthritis, bicuspid aortic valve s/p AVR, ascending/hemiarch replacement with frozen elephant trunk and left aorto-subclavian bypass, CABG x2 with Dr. 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 Muller and Dr. Augustin 7/29/22, with initiation of Keflex for concern over groin site infection, pt discharged to [facility] on 8/14/22. As an outpatient she was noted to have a small collection on her sternum that drained and self resolved. On 11/2/22 pt presented to [facility] ED for further work-up of sternal wound infection. On 11/2/22 pt underwent sternal I&D and wound vac placement with Dr. Muller and Dr. Vernon. On POD1 ID was consulted regarding abx regimen and need for PICC line, started on Vanco and Zosyn. POD5 abx regimen changed per ID recs, vascular surgery consulted for r/o LUE limb ischemia, no surgical intervention at present.      Plan:    Neurovascular:   -Hx of spinal stenosis  -Hx of arthritis  -Pain well controlled with current regimen. 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 Muller on 8/9/2021 course c/b poor wound healing s/p pec flap and closure on 9/29/21)  -S/p TEVAR with R groin cutdown and femoral artery repair with Dr. Muller and Dr. Augustin 7/29/22   -Hx of CAD s/p CABG x2 on 8/9/21  -Cont Aspirin, statin, BB  -Hx of HTN  -Cont Lopressor 50mg PO QD  -Cont Norvasc 2.5mg PO QD  -Cont Hydralazine 10mg PO Q8H  -Hx of HLD  -Cont Atorvastatin  -Hx of PPM placement  -Cont Lasix and KCl  -Hemodynamically stable.   -Monitor: BP, HR, tele    Respiratory:   -Oxygenating well on room air  -Encourage continued use of IS 10x/hr and frequent ambulation  -CXR: stable from 11/4/22    GI:  -GI PPX: Pepcid  -PO Diet  -Bowel Regimen: Senna     Renal / :  -Continue to monitor renal function: BUN/Cr: 16/0.88  -Monitor I/O's daily     Endocrine:    -No hx of DM or thyroid dx  -A1c: 5.5  -TSH: 5.22    Hematologic:  -CBC: H/H- 7.1/21.9  -Coagulation Panel.    ID:  -Sternal wound collection s/p sternal I&D and wound vac placement on 11/2/22  -ID following. Final D/C recs pending  -Will need PICC prior to D/C once final recs obtained  -Cont Fluconazole  -Cont Daptomycin 400mg PO Q24H  -Cont Cefepime  -Temperature: Afebrile  -CBC: WBC- 6.49  -Continue to observe for SIRS/Sepsis Syndrome.    Prophylaxis:  -Cont hep gtt  -Continue with SCD's b/l while patient is at rest     Disposition:  -Discharge home once patient is medically ready   75 YO Female, previous smoker, w/ PMHx of CAD, HTN, HLD, spinal stenosis, arthritis, hypothyroidism, bicuspid aortic valve s/p AVR, ascending/hemiarch replacement with frozen elephant trunk and left aorto-subclavian bypass, CABG x2 with Dr. 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 Muller and Dr. Augustin 7/29/22, with initiation of Keflex for concern over groin site infection, pt discharged to [facility] on 8/14/22. As an outpatient she was noted to have a small collection on her sternum that drained and self resolved. On 11/2/22 pt presented to [facility] ED for further work-up of sternal wound infection. On 11/2/22 pt underwent sternal I&D and wound vac placement with Dr. Muller and Dr. Vernon. On POD1 ID was consulted regarding abx regimen and need for PICC line, started on Vanco and Zosyn. Rec hold off on PICC until final ID recs. POD2 CM and SW on board for home wound vac and IV abx. POD3 Caspofungin added to regimen, wound vac changed. POD4 pt refusing VERNA. 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 Abx regimen changed per ID recs, vascular surgery consulted for r/o LUE limb ischemia, no surgical intervention at present.      Plan:    Neurovascular:   -Hx of spinal stenosis  -Hx of arthritis  -Pain well controlled with current regimen. 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 Muller on 8/9/2021 course c/b poor wound healing s/p pec flap and closure on 9/29/21)  -Cont Aspirin, statin, BB   -Cont Lasix and KCl (home meds)  -S/p TEVAR with R groin cutdown and femoral artery repair with Dr. Muller and Dr. Augustin 7/29/22   -Hx of HTN  -Cont Lopressor 50mg PO QD (home med)  -Cont Norvasc 2.5mg PO QD (home med)  -Cont Hydralazine 10mg PO Q8H (home med)  -Hx of HLD  -Cont Atorvastatin  -Hx of PPM placement  -Hemodynamically stable.   -Monitor: BP, HR, tele    Respiratory:   -Oxygenating well on room air  -Encourage continued use of IS 10x/hr and frequent ambulation  -CXR: stable from 11/4/22    GI:  -GI PPX: Pepcid  -PO Diet  -Bowel Regimen: Senna     Renal / :  -Continue to monitor renal function: BUN/Cr: 16/0.88  -Monitor I/O's daily     Endocrine:    -No hx of DM   -A1c: 5.5  -Hx of hypothyroidism  -Cont Levothyroxine   -TSH: 5.22    Hematologic:  -CBC: H/H- 7.1/21.9  -Coagulation Panel.    ID:  -Sternal wound collection s/p sternal I&D and wound vac placement on 11/2/22  -ID following. Final D/C recs pending  -Will need PICC prior to D/C once final recs obtained  -Cont Fluconazole  -Cont Daptomycin 400mg PO Q24H  -Cont Cefepime  -Temperature: Afebrile  -CBC: WBC- 6.49  -Continue to observe for SIRS/Sepsis Syndrome.    Prophylaxis:  -Cont hep gtt  -Continue with SCD's b/l while patient is at rest     Disposition:  -Discharge home once patient is medically ready

## 2022-11-07 NOTE — PROGRESS NOTE ADULT - SUBJECTIVE AND OBJECTIVE BOX
SUBJECTIVE: Patient seen and examined bedside. Pt reports feeling well today. States that she continues to have left hand numbness but since starting heparin gtt it has improved. Denies acute hand pain. States continued motor function and sensation    amLODIPine   Tablet 2.5 milliGRAM(s) Oral daily  aspirin  chewable 81 milliGRAM(s) Oral daily  cefepime   IVPB 2000 milliGRAM(s) IV Intermittent every 12 hours  fluconAZOLE   Tablet 400 milliGRAM(s) Oral daily  furosemide    Tablet 40 milliGRAM(s) Oral daily  heparin  Infusion 650 Unit(s)/Hr IV Continuous <Continuous>  hydrALAZINE 10 milliGRAM(s) Oral every 8 hours  metoprolol tartrate 50 milliGRAM(s) Oral two times a day      Vital Signs Last 24 Hrs  T(C): 36.8 (07 Nov 2022 17:26), Max: 36.9 (07 Nov 2022 14:48)  T(F): 98.3 (07 Nov 2022 17:26), Max: 98.4 (07 Nov 2022 14:48)  HR: 66 (07 Nov 2022 16:25) (66 - 84)  BP: 115/52 (07 Nov 2022 16:25) (110/53 - 134/63)  BP(mean): 75 (07 Nov 2022 16:25) (71 - 90)  RR: 18 (07 Nov 2022 16:25) (15 - 18)  SpO2: 100% (07 Nov 2022 16:25) (94% - 100%)    Parameters below as of 07 Nov 2022 16:25  Patient On (Oxygen Delivery Method): nasal cannula  O2 Flow (L/min): 2    I&O's Detail    06 Nov 2022 07:01  -  07 Nov 2022 07:00  --------------------------------------------------------  IN:    Heparin: 60 mL    IV PiggyBack: 360 mL    Oral Fluid: 480 mL    sodium chloride 0.9%: 150 mL  Total IN: 1050 mL    OUT:    VAC (Vacuum Assisted Closure) System (mL): 0 mL    Voided (mL): 2100 mL  Total OUT: 2100 mL    Total NET: -1050 mL      07 Nov 2022 07:01  -  07 Nov 2022 17:39  --------------------------------------------------------  IN:    Heparin: 7.5 mL    Heparin: 24 mL    sodium chloride 0.9%: 250 mL  Total IN: 281.5 mL    OUT:    Voided (mL): 1500 mL  Total OUT: 1500 mL    Total NET: -1218.5 mL          General: NAD, resting comfortably in bed  C/V: NSR  Pulm: Nonlabored breathing, no respiratory distress  Abd: soft, NT/ND.  Vasc: left hand cool, absent radial/ulnar signals, no signal of guerra arch, motor and sensation intact  Extrem: WWP, no edema, SCDs in place        LABS:                        7.1    6.41  )-----------( 89       ( 07 Nov 2022 05:29 )             21.9     11-07    131<L>  |  97  |  16  ----------------------------<  95  3.6   |  26  |  0.88    Ca    8.7      07 Nov 2022 05:29  Phos  2.6     11-07  Mg     2.1     11-07    TPro  6.4  /  Alb  2.7<L>  /  TBili  0.6  /  DBili  x   /  AST  23  /  ALT  13  /  AlkPhos  63  11-07    PT/INR - ( 07 Nov 2022 03:26 )   PT: 17.7 sec;   INR: 1.48          PTT - ( 07 Nov 2022 15:19 )  PTT:45.0 sec      RADIOLOGY & ADDITIONAL STUDIES:

## 2022-11-07 NOTE — CONSULT NOTE ADULT - SUBJECTIVE AND OBJECTIVE BOX
Vascular Attending:  Charli      HPI:  75 y/o Female, pre76 y/o Female, previous smoker, w/ PMHx of HTN, spinal stenosis, arthritis, bicuspid aortic valve s/p AVR, ascending/hemiarch replacement with frozen elephant trunk and left aorto-subclavian bypass, CABG x2 with Dr. 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 Muller and Dr. Augustin 7/29/22.  She was started on keflex for concern for infection from groin site.  8/14/22 she was discharged to [facility]. As an outpatient she was noted to have a small collection on her sternum that drained and self resolved. She was also planned for hematology follow up for chronic anemia but has not been to her appointment yet For the last 2 weeks she has noted swelling and tenderness on the upper part of her sternum as well as shortness of breath and LE edema. Denies fevers, chill cp, abd pain, n/c She sent a photo of her sternum to CTS office and was advised to present to the ED. In the ED pt afebrile HR 71, 104/58, SpO2 98% on RA. Hgb 6.9, otherwise labs WNL. Discussed with Dr. Vernon, pt will be admitted to CTS for further work up.     Vascular consulted 2/2 to loss of signals in L radial & ulnar arteries.  Pt c/o an insidious onset of numbness and tingling in L hand.  Denies any precipitating events.       PAST MEDICAL & SURGICAL HISTORY:  HTN (hypertension)      H/O aortic valve stenosis      CAD (coronary artery disease)      S/P aortic valve replacement      S/P CABG (coronary artery bypass graft)      H/O aortic arch replacement    Pacemaker  medtronic micra pacemaker    REVIEW OF SYSTEMS    Allergic/Immunologic:	    MEDICATIONS  (STANDING):  amLODIPine   Tablet 2.5 milliGRAM(s) Oral daily  aspirin  chewable 81 milliGRAM(s) Oral daily  atorvastatin 10 milliGRAM(s) Oral at bedtime  budesonide  80 MICROgram(s)/formoterol 4.5 MICROgram(s) Inhaler 2 Puff(s) Inhalation every 12 hours  caspofungin IVPB      caspofungin IVPB 50 milliGRAM(s) IV Intermittent every 24 hours  cefepime   IVPB 2000 milliGRAM(s) IV Intermittent every 12 hours  famotidine    Tablet 20 milliGRAM(s) Oral daily  furosemide    Tablet 40 milliGRAM(s) Oral daily  heparin  Infusion 750 Unit(s)/Hr (7.5 mL/Hr) IV Continuous <Continuous>  hydrALAZINE 10 milliGRAM(s) Oral every 8 hours  lactated ringers. 1000 milliLiter(s) (50 mL/Hr) IV Continuous <Continuous>  lidocaine   4% Patch 1 Patch Transdermal daily  metoprolol tartrate 50 milliGRAM(s) Oral two times a day  potassium chloride    Tablet ER 20 milliEquivalent(s) Oral daily  sodium chloride 0.9%. 1000 milliLiter(s) (50 mL/Hr) IV Continuous <Continuous>  vancomycin  IVPB 750 milliGRAM(s) IV Intermittent every 12 hours  vancomycin  IVPB        MEDICATIONS  (PRN):  acetaminophen     Tablet .. 650 milliGRAM(s) Oral every 6 hours PRN Mild Pain (1 - 3)      Allergies    No Known Allergies    Intolerances        SOCIAL HISTORY:    FAMILY HISTORY:  FH: CAD (coronary artery disease) (Sibling)  CABG    Family history of CKD (chronic kidney disease) (Sibling)  ESRD    Family history of diabetes mellitus (DM) (Sibling)        Vital Signs Last 24 Hrs  T(C): 36.1 (06 Nov 2022 20:56), Max: 36.7 (06 Nov 2022 09:32)  T(F): 97 (06 Nov 2022 20:56), Max: 98 (06 Nov 2022 09:32)  HR: 76 (07 Nov 2022 01:07) (66 - 80)  BP: 110/53 (07 Nov 2022 01:07) (102/55 - 134/63)  BP(mean): 71 (07 Nov 2022 01:07) (71 - 90)  RR: 17 (07 Nov 2022 01:07) (15 - 18)  SpO2: 94% (07 Nov 2022 01:07) (94% - 100%)    Parameters below as of 07 Nov 2022 01:07  Patient On (Oxygen Delivery Method): nasal cannula w/ humidification  O2 Flow (L/min): 2      PHYSICAL EXAM:    Constitutional:  Pt AXoX3 in NAD     Eyes: PERRL     Neck: soft    Respiratory: Nonlabored    Cardiovascular: S1S2    Extremities: LUE no discoloration or wounds.  Mild coolness to hand     Vascular: no radial, ulnar, palmar arch or digitalis signals    Neurological: intact motor and sensory      LABS:                        7.3    6.49  )-----------( 81       ( 06 Nov 2022 22:57 )             22.3     11-06    131<L>  |  96  |  19  ----------------------------<  120<H>  3.6   |  29  |  0.92    Ca    8.8      06 Nov 2022 22:57  Phos  2.9     11-06  Mg     2.0     11-06    TPro  6.6  /  Alb  3.0<L>  /  TBili  0.7  /  DBili  x   /  AST  22  /  ALT  13  /  AlkPhos  71  11-06    PT/INR - ( 07 Nov 2022 03:26 )   PT: 17.7 sec;   INR: 1.48          PTT - ( 07 Nov 2022 03:26 )  PTT:151.3 sec      RADIOLOGY & ADDITIONAL STUDIES

## 2022-11-07 NOTE — PROGRESS NOTE ADULT - SUBJECTIVE AND OBJECTIVE BOX
INFECTIOUS DISEASES CONSULT FOLLOW-UP NOTE    *INCOMPLETE*    INTERVAL HPI/OVERNIGHT EVENTS:    Subjective: Patient seen and examined at bedside. Denies fevers, chills, cough, abdominal pain, diarrhea.      ANTIBIOTICS/RELEVANT:    MEDICATIONS  (STANDING):  amLODIPine   Tablet 2.5 milliGRAM(s) Oral daily  aspirin  chewable 81 milliGRAM(s) Oral daily  atorvastatin 10 milliGRAM(s) Oral at bedtime  budesonide  80 MICROgram(s)/formoterol 4.5 MICROgram(s) Inhaler 2 Puff(s) Inhalation every 12 hours  caspofungin IVPB      caspofungin IVPB 50 milliGRAM(s) IV Intermittent every 24 hours  cefepime   IVPB 2000 milliGRAM(s) IV Intermittent every 12 hours  famotidine    Tablet 20 milliGRAM(s) Oral daily  furosemide    Tablet 40 milliGRAM(s) Oral daily  heparin  Infusion 600 Unit(s)/Hr (6 mL/Hr) IV Continuous <Continuous>  hydrALAZINE 10 milliGRAM(s) Oral every 8 hours  lactated ringers. 1000 milliLiter(s) (50 mL/Hr) IV Continuous <Continuous>  lidocaine   4% Patch 1 Patch Transdermal daily  metoprolol tartrate 50 milliGRAM(s) Oral two times a day  potassium chloride    Tablet ER 20 milliEquivalent(s) Oral daily  sodium chloride 0.9%. 1000 milliLiter(s) (50 mL/Hr) IV Continuous <Continuous>  vancomycin  IVPB 750 milliGRAM(s) IV Intermittent every 12 hours  vancomycin  IVPB        MEDICATIONS  (PRN):  acetaminophen     Tablet .. 650 milliGRAM(s) Oral every 6 hours PRN Mild Pain (1 - 3)        Vital Signs Last 24 Hrs  T(C): 36.3 (07 Nov 2022 09:41), Max: 36.4 (06 Nov 2022 14:45)  T(F): 97.4 (07 Nov 2022 09:41), Max: 97.6 (06 Nov 2022 14:45)  HR: 84 (07 Nov 2022 08:42) (66 - 84)  BP: 111/52 (07 Nov 2022 08:42) (110/53 - 134/63)  BP(mean): 74 (07 Nov 2022 08:42) (71 - 90)  RR: 18 (07 Nov 2022 08:42) (15 - 18)  SpO2: 94% (07 Nov 2022 08:42) (94% - 100%)    Parameters below as of 07 Nov 2022 08:42  Patient On (Oxygen Delivery Method): nasal cannula  O2 Flow (L/min): 2      11-06-22 @ 07:01  -  11-07-22 @ 07:00  --------------------------------------------------------  IN: 1050 mL / OUT: 2100 mL / NET: -1050 mL    11-07-22 @ 07:01  -  11-07-22 @ 11:13  --------------------------------------------------------  IN: 57.5 mL / OUT: 0 mL / NET: 57.5 mL        PHYSICAL EXAM  Constitutional: alert, NAD  Eyes: the sclera and conjunctiva were normal.   ENT: the ears and nose were normal in appearance.   Neck: the appearance of the neck was normal and the neck was supple.   Pulmonary: no respiratory distress and lungs CTA bilaterally.   Heart: heart rate was normal and rhythm regular, normal S1 and S2  Vascular: no peripheral edema  Abdomen: normal bowel sounds, soft, non-tender  Neurological: no focal deficits  Psychiatric: the affect was normal      LABS:                        7.1    6.41  )-----------( 89       ( 07 Nov 2022 05:29 )             21.9     11-07    131<L>  |  97  |  16  ----------------------------<  95  3.6   |  26  |  0.88    Ca    8.7      07 Nov 2022 05:29  Phos  2.6     11-07  Mg     2.1     11-07    TPro  6.4  /  Alb  2.7<L>  /  TBili  0.6  /  DBili  x   /  AST  23  /  ALT  13  /  AlkPhos  63  11-07    PT/INR - ( 07 Nov 2022 03:26 )   PT: 17.7 sec;   INR: 1.48          PTT - ( 07 Nov 2022 03:26 )  PTT:151.3 sec      MICROBIOLOGY:    Culture - Fungal, Tissue (collected 02 Nov 2022 14:50)  Source: .Tissue Supra Sternal Tissue or spec  Preliminary Report (04 Nov 2022 07:38):    Testing in progress    Culture - Acid Fast - Tissue w/Smear (collected 02 Nov 2022 14:50)  Source: .Tissue Supra Sternal Tissue or spec  Preliminary Report (05 Nov 2022 15:05):    Culture is being performed.    Culture - Tissue with Gram Stain (collected 02 Nov 2022 14:50)  Source: .Tissue Supra Sternal Tissue or spec  Gram Stain (02 Nov 2022 17:14):    No organisms seen    Rare WBC's  Preliminary Report (03 Nov 2022 11:27):    No growth to date    Culture - Fungal, Body Fluid (collected 02 Nov 2022 14:50)  Source: .Body Fluid Supra Sternal Fluid #2 or spec  Preliminary Report (03 Nov 2022 11:37):    Testing in progress    Culture - Acid Fast - Body Fluid w/Smear (collected 02 Nov 2022 14:50)  Source: .Body Fluid Supra Sternal Fluid #2 or spec  Preliminary Report (05 Nov 2022 15:05):    Culture is being performed.    Culture - Body Fluid with Gram Stain (collected 02 Nov 2022 14:50)  Source: .Body Fluid Supra Sternal Fluid #2 or spec  Gram Stain (02 Nov 2022 17:14):    No organisms seen    Rare WBC's  Final Report (05 Nov 2022 16:24):    Rare Candida albicans    Culture - Acid Fast - Body Fluid w/Smear (collected 02 Nov 2022 14:50)  Source: .Body Fluid Supra Sternal Fluid #1 or spec  Preliminary Report (05 Nov 2022 15:05):    Culture is being performed.    Culture - Fungal, Body Fluid (collected 02 Nov 2022 14:50)  Source: .Body Fluid Supra Sternal Fluid #1 or spec  Preliminary Report (04 Nov 2022 07:48):    Testing in progress    Culture - Body Fluid with Gram Stain (collected 02 Nov 2022 14:50)  Source: .Body Fluid Supra Sternal Fluid #1 or spec  Gram Stain (02 Nov 2022 17:14):    No organisms seen    Rare WBC's  Final Report (05 Nov 2022 16:23):    Rare Candida albicans    Susceptibility to follow. Testing performed by:    [facility]    [address] [place] [address]        RADIOLOGY & ADDITIONAL STUDIES:  Reviewed INFECTIOUS DISEASES CONSULT FOLLOW-UP NOTE    *INCOMPLETE*    INTERVAL HPI/OVERNIGHT EVENTS:    Subjective: Patient seen and examined at bedside. Denies fevers, chills, cough, abdominal pain, diarrhea.      ANTIBIOTICS/RELEVANT:    MEDICATIONS  (STANDING):  amLODIPine   Tablet 2.5 milliGRAM(s) Oral daily  aspirin  chewable 81 milliGRAM(s) Oral daily  atorvastatin 10 milliGRAM(s) Oral at bedtime  budesonide  80 MICROgram(s)/formoterol 4.5 MICROgram(s) Inhaler 2 Puff(s) Inhalation every 12 hours  caspofungin IVPB      caspofungin IVPB 50 milliGRAM(s) IV Intermittent every 24 hours  cefepime   IVPB 2000 milliGRAM(s) IV Intermittent every 12 hours  famotidine    Tablet 20 milliGRAM(s) Oral daily  furosemide    Tablet 40 milliGRAM(s) Oral daily  heparin  Infusion 600 Unit(s)/Hr (6 mL/Hr) IV Continuous <Continuous>  hydrALAZINE 10 milliGRAM(s) Oral every 8 hours  lactated ringers. 1000 milliLiter(s) (50 mL/Hr) IV Continuous <Continuous>  lidocaine   4% Patch 1 Patch Transdermal daily  metoprolol tartrate 50 milliGRAM(s) Oral two times a day  potassium chloride    Tablet ER 20 milliEquivalent(s) Oral daily  sodium chloride 0.9%. 1000 milliLiter(s) (50 mL/Hr) IV Continuous <Continuous>  vancomycin  IVPB 750 milliGRAM(s) IV Intermittent every 12 hours  vancomycin  IVPB        MEDICATIONS  (PRN):  acetaminophen     Tablet .. 650 milliGRAM(s) Oral every 6 hours PRN Mild Pain (1 - 3)        Vital Signs Last 24 Hrs  T(C): 36.3 (07 Nov 2022 09:41), Max: 36.4 (06 Nov 2022 14:45)  T(F): 97.4 (07 Nov 2022 09:41), Max: 97.6 (06 Nov 2022 14:45)  HR: 84 (07 Nov 2022 08:42) (66 - 84)  BP: 111/52 (07 Nov 2022 08:42) (110/53 - 134/63)  BP(mean): 74 (07 Nov 2022 08:42) (71 - 90)  RR: 18 (07 Nov 2022 08:42) (15 - 18)  SpO2: 94% (07 Nov 2022 08:42) (94% - 100%)    Parameters below as of 07 Nov 2022 08:42  Patient On (Oxygen Delivery Method): nasal cannula  O2 Flow (L/min): 2      11-06-22 @ 07:01  -  11-07-22 @ 07:00  --------------------------------------------------------  IN: 1050 mL / OUT: 2100 mL / NET: -1050 mL    11-07-22 @ 07:01  -  11-07-22 @ 11:13  --------------------------------------------------------  IN: 57.5 mL / OUT: 0 mL / NET: 57.5 mL        PHYSICAL EXAM  Eyes: the sclera and conjunctiva were normal.   ENT: the ears and nose were normal in appearance.   Neck: the appearance of the neck was normal and the neck was supple. Wound vac on her sternum, tender to palpation L>R w/ an area of non-erythematous swelling.  Pulmonary: no respiratory distress;   Heart: heart rate was normal and rhythm regular, normal S1 and S2; holosystolic murmur @ left sternal border  Vascular: 1+ b/l LE edema  Abdomen: normal bowel sounds, soft, non-tender  Neurological: no focal deficits  Psychiatric: the affect was normal      LABS:                        7.1    6.41  )-----------( 89       ( 07 Nov 2022 05:29 )             21.9     11-07    131<L>  |  97  |  16  ----------------------------<  95  3.6   |  26  |  0.88    Ca    8.7      07 Nov 2022 05:29  Phos  2.6     11-07  Mg     2.1     11-07    TPro  6.4  /  Alb  2.7<L>  /  TBili  0.6  /  DBili  x   /  AST  23  /  ALT  13  /  AlkPhos  63  11-07    PT/INR - ( 07 Nov 2022 03:26 )   PT: 17.7 sec;   INR: 1.48          PTT - ( 07 Nov 2022 03:26 )  PTT:151.3 sec      MICROBIOLOGY:    Culture - Fungal, Tissue (collected 02 Nov 2022 14:50)  Source: .Tissue Supra Sternal Tissue or spec  Preliminary Report (04 Nov 2022 07:38):    Testing in progress    Culture - Acid Fast - Tissue w/Smear (collected 02 Nov 2022 14:50)  Source: .Tissue Supra Sternal Tissue or spec  Preliminary Report (05 Nov 2022 15:05):    Culture is being performed.    Culture - Tissue with Gram Stain (collected 02 Nov 2022 14:50)  Source: .Tissue Supra Sternal Tissue or spec  Gram Stain (02 Nov 2022 17:14):    No organisms seen    Rare WBC's  Preliminary Report (03 Nov 2022 11:27):    No growth to date    Culture - Fungal, Body Fluid (collected 02 Nov 2022 14:50)  Source: .Body Fluid Supra Sternal Fluid #2 or spec  Preliminary Report (03 Nov 2022 11:37):    Testing in progress    Culture - Acid Fast - Body Fluid w/Smear (collected 02 Nov 2022 14:50)  Source: .Body Fluid Supra Sternal Fluid #2 or spec  Preliminary Report (05 Nov 2022 15:05):    Culture is being performed.    Culture - Body Fluid with Gram Stain (collected 02 Nov 2022 14:50)  Source: .Body Fluid Supra Sternal Fluid #2 or spec  Gram Stain (02 Nov 2022 17:14):    No organisms seen    Rare WBC's  Final Report (05 Nov 2022 16:24):    Rare Candida albicans    Culture - Acid Fast - Body Fluid w/Smear (collected 02 Nov 2022 14:50)  Source: .Body Fluid Supra Sternal Fluid #1 or spec  Preliminary Report (05 Nov 2022 15:05):    Culture is being performed.    Culture - Fungal, Body Fluid (collected 02 Nov 2022 14:50)  Source: .Body Fluid Supra Sternal Fluid #1 or spec  Preliminary Report (04 Nov 2022 07:48):    Testing in progress    Culture - Body Fluid with Gram Stain (collected 02 Nov 2022 14:50)  Source: .Body Fluid Supra Sternal Fluid #1 or spec  Gram Stain (02 Nov 2022 17:14):    No organisms seen    Rare WBC's  Final Report (05 Nov 2022 16:23):    Rare Candida albicans    Susceptibility to follow. Testing performed by:    [facility]    [address] [place] [address]        RADIOLOGY & ADDITIONAL STUDIES:  Reviewed INFECTIOUS DISEASES CONSULT FOLLOW-UP NOTE    *INCOMPLETE*    INTERVAL HPI/OVERNIGHT EVENTS:    Subjective: Patient seen and examined at bedside. Denies fevers, chills, cough, abdominal pain, diarrhea.      ANTIBIOTICS/RELEVANT:    MEDICATIONS  (STANDING):  amLODIPine   Tablet 2.5 milliGRAM(s) Oral daily  aspirin  chewable 81 milliGRAM(s) Oral daily  atorvastatin 10 milliGRAM(s) Oral at bedtime  budesonide  80 MICROgram(s)/formoterol 4.5 MICROgram(s) Inhaler 2 Puff(s) Inhalation every 12 hours  caspofungin IVPB      caspofungin IVPB 50 milliGRAM(s) IV Intermittent every 24 hours  cefepime   IVPB 2000 milliGRAM(s) IV Intermittent every 12 hours  famotidine    Tablet 20 milliGRAM(s) Oral daily  furosemide    Tablet 40 milliGRAM(s) Oral daily  heparin  Infusion 600 Unit(s)/Hr (6 mL/Hr) IV Continuous <Continuous>  hydrALAZINE 10 milliGRAM(s) Oral every 8 hours  lactated ringers. 1000 milliLiter(s) (50 mL/Hr) IV Continuous <Continuous>  lidocaine   4% Patch 1 Patch Transdermal daily  metoprolol tartrate 50 milliGRAM(s) Oral two times a day  potassium chloride    Tablet ER 20 milliEquivalent(s) Oral daily  sodium chloride 0.9%. 1000 milliLiter(s) (50 mL/Hr) IV Continuous <Continuous>  vancomycin  IVPB 750 milliGRAM(s) IV Intermittent every 12 hours  vancomycin  IVPB        MEDICATIONS  (PRN):  acetaminophen     Tablet .. 650 milliGRAM(s) Oral every 6 hours PRN Mild Pain (1 - 3)        Vital Signs Last 24 Hrs  T(C): 36.3 (07 Nov 2022 09:41), Max: 36.4 (06 Nov 2022 14:45)  T(F): 97.4 (07 Nov 2022 09:41), Max: 97.6 (06 Nov 2022 14:45)  HR: 84 (07 Nov 2022 08:42) (66 - 84)  BP: 111/52 (07 Nov 2022 08:42) (110/53 - 134/63)  BP(mean): 74 (07 Nov 2022 08:42) (71 - 90)  RR: 18 (07 Nov 2022 08:42) (15 - 18)  SpO2: 94% (07 Nov 2022 08:42) (94% - 100%)    Parameters below as of 07 Nov 2022 08:42  Patient On (Oxygen Delivery Method): nasal cannula  O2 Flow (L/min): 2      11-06-22 @ 07:01  -  11-07-22 @ 07:00  --------------------------------------------------------  IN: 1050 mL / OUT: 2100 mL / NET: -1050 mL    11-07-22 @ 07:01  -  11-07-22 @ 11:13  --------------------------------------------------------  IN: 57.5 mL / OUT: 0 mL / NET: 57.5 mL        PHYSICAL EXAM  Eyes: the sclera and conjunctiva were normal.   ENT: the ears and nose were normal in appearance.   Neck: the appearance of the neck was normal and the neck was supple. Wound vac on her sternum, tender to palpation L>R w/ area of non-erythematous swelling.  Pulmonary: no respiratory distress;   Heart: heart rate was normal and rhythm regular, normal S1 and S2; holosystolic murmur @ left sternal border  Vascular: 1+ b/l LE edema  Abdomen: normal bowel sounds, soft, non-tender  Neurological: no focal deficits  Psychiatric: the affect was normal      LABS:                        7.1    6.41  )-----------( 89       ( 07 Nov 2022 05:29 )             21.9     11-07    131<L>  |  97  |  16  ----------------------------<  95  3.6   |  26  |  0.88    Ca    8.7      07 Nov 2022 05:29  Phos  2.6     11-07  Mg     2.1     11-07    TPro  6.4  /  Alb  2.7<L>  /  TBili  0.6  /  DBili  x   /  AST  23  /  ALT  13  /  AlkPhos  63  11-07    PT/INR - ( 07 Nov 2022 03:26 )   PT: 17.7 sec;   INR: 1.48          PTT - ( 07 Nov 2022 03:26 )  PTT:151.3 sec      MICROBIOLOGY:    Culture - Fungal, Tissue (collected 02 Nov 2022 14:50)  Source: .Tissue Supra Sternal Tissue or spec  Preliminary Report (04 Nov 2022 07:38):    Testing in progress    Culture - Acid Fast - Tissue w/Smear (collected 02 Nov 2022 14:50)  Source: .Tissue Supra Sternal Tissue or spec  Preliminary Report (05 Nov 2022 15:05):    Culture is being performed.    Culture - Tissue with Gram Stain (collected 02 Nov 2022 14:50)  Source: .Tissue Supra Sternal Tissue or spec  Gram Stain (02 Nov 2022 17:14):    No organisms seen    Rare WBC's  Preliminary Report (03 Nov 2022 11:27):    No growth to date    Culture - Fungal, Body Fluid (collected 02 Nov 2022 14:50)  Source: .Body Fluid Supra Sternal Fluid #2 or spec  Preliminary Report (03 Nov 2022 11:37):    Testing in progress    Culture - Acid Fast - Body Fluid w/Smear (collected 02 Nov 2022 14:50)  Source: .Body Fluid Supra Sternal Fluid #2 or spec  Preliminary Report (05 Nov 2022 15:05):    Culture is being performed.    Culture - Body Fluid with Gram Stain (collected 02 Nov 2022 14:50)  Source: .Body Fluid Supra Sternal Fluid #2 or spec  Gram Stain (02 Nov 2022 17:14):    No organisms seen    Rare WBC's  Final Report (05 Nov 2022 16:24):    Rare Candida albicans    Culture - Acid Fast - Body Fluid w/Smear (collected 02 Nov 2022 14:50)  Source: .Body Fluid Supra Sternal Fluid #1 or spec  Preliminary Report (05 Nov 2022 15:05):    Culture is being performed.    Culture - Fungal, Body Fluid (collected 02 Nov 2022 14:50)  Source: .Body Fluid Supra Sternal Fluid #1 or spec  Preliminary Report (04 Nov 2022 07:48):    Testing in progress    Culture - Body Fluid with Gram Stain (collected 02 Nov 2022 14:50)  Source: .Body Fluid Supra Sternal Fluid #1 or spec  Gram Stain (02 Nov 2022 17:14):    No organisms seen    Rare WBC's  Final Report (05 Nov 2022 16:23):    Rare Candida albicans    Susceptibility to follow. Testing performed by:    [facility]    [address] [place] [address]        RADIOLOGY & ADDITIONAL STUDIES:  Reviewed INFECTIOUS DISEASES CONSULT FOLLOW-UP NOTE    INTERVAL HPI/OVERNIGHT EVENTS:    Subjective: Patient seen and examined at bedside. Denies fevers, chills, cough, abdominal pain, diarrhea. Ongoing sternal pain.      MEDICATIONS  (STANDING):  amLODIPine   Tablet 2.5 milliGRAM(s) Oral daily  aspirin  chewable 81 milliGRAM(s) Oral daily  atorvastatin 10 milliGRAM(s) Oral at bedtime  budesonide  80 MICROgram(s)/formoterol 4.5 MICROgram(s) Inhaler 2 Puff(s) Inhalation every 12 hours  caspofungin IVPB      caspofungin IVPB 50 milliGRAM(s) IV Intermittent every 24 hours  cefepime   IVPB 2000 milliGRAM(s) IV Intermittent every 12 hours  famotidine    Tablet 20 milliGRAM(s) Oral daily  furosemide    Tablet 40 milliGRAM(s) Oral daily  heparin  Infusion 600 Unit(s)/Hr (6 mL/Hr) IV Continuous <Continuous>  hydrALAZINE 10 milliGRAM(s) Oral every 8 hours  lactated ringers. 1000 milliLiter(s) (50 mL/Hr) IV Continuous <Continuous>  lidocaine   4% Patch 1 Patch Transdermal daily  metoprolol tartrate 50 milliGRAM(s) Oral two times a day  potassium chloride    Tablet ER 20 milliEquivalent(s) Oral daily  sodium chloride 0.9%. 1000 milliLiter(s) (50 mL/Hr) IV Continuous <Continuous>  vancomycin  IVPB 750 milliGRAM(s) IV Intermittent every 12 hours  vancomycin  IVPB        MEDICATIONS  (PRN):  acetaminophen     Tablet .. 650 milliGRAM(s) Oral every 6 hours PRN Mild Pain (1 - 3)        Vital Signs Last 24 Hrs  T(C): 36.3 (07 Nov 2022 09:41), Max: 36.4 (06 Nov 2022 14:45)  T(F): 97.4 (07 Nov 2022 09:41), Max: 97.6 (06 Nov 2022 14:45)  HR: 84 (07 Nov 2022 08:42) (66 - 84)  BP: 111/52 (07 Nov 2022 08:42) (110/53 - 134/63)  BP(mean): 74 (07 Nov 2022 08:42) (71 - 90)  RR: 18 (07 Nov 2022 08:42) (15 - 18)  SpO2: 94% (07 Nov 2022 08:42) (94% - 100%)    Parameters below as of 07 Nov 2022 08:42  Patient On (Oxygen Delivery Method): nasal cannula  O2 Flow (L/min): 2      11-06-22 @ 07:01  -  11-07-22 @ 07:00  --------------------------------------------------------  IN: 1050 mL / OUT: 2100 mL / NET: -1050 mL    11-07-22 @ 07:01  -  11-07-22 @ 11:13  --------------------------------------------------------  IN: 57.5 mL / OUT: 0 mL / NET: 57.5 mL        PHYSICAL EXAM  Eyes: the sclera and conjunctiva were normal.   ENT: the ears and nose were normal in appearance.   Neck: the appearance of the neck was normal and the neck was supple. Wound vac on her sternum, tender to palpation L>R w/ area of non-erythematous swelling.  Pulmonary: no respiratory distress;   Heart: heart rate was normal and rhythm regular, normal S1 and S2; holosystolic murmur @ left sternal border  Vascular: 1+ b/l LE edema  Abdomen: normal bowel sounds, soft, non-tender  Neurological: no focal deficits  Psychiatric: the affect was normal  Wound vac in place      LABS:                        7.1    6.41  )-----------( 89       ( 07 Nov 2022 05:29 )             21.9     11-07    131<L>  |  97  |  16  ----------------------------<  95  3.6   |  26  |  0.88    Ca    8.7      07 Nov 2022 05:29  Phos  2.6     11-07  Mg     2.1     11-07    TPro  6.4  /  Alb  2.7<L>  /  TBili  0.6  /  DBili  x   /  AST  23  /  ALT  13  /  AlkPhos  63  11-07    PT/INR - ( 07 Nov 2022 03:26 )   PT: 17.7 sec;   INR: 1.48          PTT - ( 07 Nov 2022 03:26 )  PTT:151.3 sec      MICROBIOLOGY:    Culture - Fungal, Tissue (collected 02 Nov 2022 14:50)  Source: .Tissue Supra Sternal Tissue or spec  Preliminary Report (04 Nov 2022 07:38):    Testing in progress    Culture - Acid Fast - Tissue w/Smear (collected 02 Nov 2022 14:50)  Source: .Tissue Supra Sternal Tissue or spec  Preliminary Report (05 Nov 2022 15:05):    Culture is being performed.    Culture - Tissue with Gram Stain (collected 02 Nov 2022 14:50)  Source: .Tissue Supra Sternal Tissue or spec  Gram Stain (02 Nov 2022 17:14):    No organisms seen    Rare WBC's  Preliminary Report (03 Nov 2022 11:27):    No growth to date    Culture - Fungal, Body Fluid (collected 02 Nov 2022 14:50)  Source: .Body Fluid Supra Sternal Fluid #2 or spec  Preliminary Report (03 Nov 2022 11:37):    Testing in progress    Culture - Acid Fast - Body Fluid w/Smear (collected 02 Nov 2022 14:50)  Source: .Body Fluid Supra Sternal Fluid #2 or spec  Preliminary Report (05 Nov 2022 15:05):    Culture is being performed.    Culture - Body Fluid with Gram Stain (collected 02 Nov 2022 14:50)  Source: .Body Fluid Supra Sternal Fluid #2 or spec  Gram Stain (02 Nov 2022 17:14):    No organisms seen    Rare WBC's  Final Report (05 Nov 2022 16:24):    Rare Candida albicans    Culture - Acid Fast - Body Fluid w/Smear (collected 02 Nov 2022 14:50)  Source: .Body Fluid Supra Sternal Fluid #1 or spec  Preliminary Report (05 Nov 2022 15:05):    Culture is being performed.    Culture - Fungal, Body Fluid (collected 02 Nov 2022 14:50)  Source: .Body Fluid Supra Sternal Fluid #1 or spec  Preliminary Report (04 Nov 2022 07:48):    Testing in progress    Culture - Body Fluid with Gram Stain (collected 02 Nov 2022 14:50)  Source: .Body Fluid Supra Sternal Fluid #1 or spec  Gram Stain (02 Nov 2022 17:14):    No organisms seen    Rare WBC's  Final Report (05 Nov 2022 16:23):    Rare Candida albicans    Susceptibility to follow. Testing performed by:    [facility]    [address] [place] [address]        RADIOLOGY & ADDITIONAL STUDIES:  Reviewed

## 2022-11-07 NOTE — PROGRESS NOTE ADULT - SUBJECTIVE AND OBJECTIVE BOX
Patient discussed on morning rounds with       Operation / Date:     SUBJECTIVE ASSESSMENT:  77y Female       Vital Signs Last 24 Hrs  T(C): 36.8 (07 Nov 2022 17:26), Max: 36.9 (07 Nov 2022 14:48)  T(F): 98.3 (07 Nov 2022 17:26), Max: 98.4 (07 Nov 2022 14:48)  HR: 70 (07 Nov 2022 19:00) (66 - 84)  BP: 131/58 (07 Nov 2022 19:00) (110/53 - 134/63)  BP(mean): 84 (07 Nov 2022 19:00) (71 - 90)  RR: 18 (07 Nov 2022 16:25) (15 - 18)  SpO2: 97% (07 Nov 2022 19:00) (94% - 100%)    Parameters below as of 07 Nov 2022 16:25  Patient On (Oxygen Delivery Method): nasal cannula  O2 Flow (L/min): 2    I&O's Detail    06 Nov 2022 07:01  -  07 Nov 2022 07:00  --------------------------------------------------------  IN:    Heparin: 60 mL    IV PiggyBack: 360 mL    Oral Fluid: 480 mL    sodium chloride 0.9%: 150 mL  Total IN: 1050 mL    OUT:    VAC (Vacuum Assisted Closure) System (mL): 0 mL    Voided (mL): 2100 mL  Total OUT: 2100 mL    Total NET: -1050 mL      07 Nov 2022 07:01  -  07 Nov 2022 20:10  --------------------------------------------------------  IN:    Heparin: 7.5 mL    Heparin: 24 mL    sodium chloride 0.9%: 250 mL  Total IN: 281.5 mL    OUT:    Voided (mL): 2000 mL  Total OUT: 2000 mL    Total NET: -1718.5 mL          CHEST TUBE:    FRANCHESKA DRAIN:    EPICARDIAL WIRES:   TIE MICHAEL:   FABRICIO:     PHYSICAL EXAM:  *****    LABS:                        7.1    6.41  )-----------( 89       ( 07 Nov 2022 05:29 )             21.9       PT/INR - ( 07 Nov 2022 03:26 )   PT: 17.7 sec;   INR: 1.48          PTT - ( 07 Nov 2022 15:19 )  PTT:45.0 sec    11-07    131<L>  |  97  |  16  ----------------------------<  95  3.6   |  26  |  0.88    Ca    8.7      07 Nov 2022 05:29  Phos  2.6     11-07  Mg     2.1     11-07    TPro  6.4  /  Alb  2.7<L>  /  TBili  0.6  /  DBili  x   /  AST  23  /  ALT  13  /  AlkPhos  63  11-07          MEDICATIONS  (STANDING):  amLODIPine   Tablet 2.5 milliGRAM(s) Oral daily  aspirin  chewable 81 milliGRAM(s) Oral daily  atorvastatin 10 milliGRAM(s) Oral at bedtime  budesonide  80 MICROgram(s)/formoterol 4.5 MICROgram(s) Inhaler 2 Puff(s) Inhalation every 12 hours  cefepime   IVPB 2000 milliGRAM(s) IV Intermittent every 12 hours  DAPTOmycin IVPB 400 milliGRAM(s) IV Intermittent every 24 hours  famotidine    Tablet 20 milliGRAM(s) Oral daily  fluconAZOLE   Tablet 400 milliGRAM(s) Oral daily  furosemide    Tablet 40 milliGRAM(s) Oral daily  heparin  Infusion 650 Unit(s)/Hr (6.5 mL/Hr) IV Continuous <Continuous>  hydrALAZINE 10 milliGRAM(s) Oral every 8 hours  lactated ringers. 1000 milliLiter(s) (50 mL/Hr) IV Continuous <Continuous>  lidocaine   4% Patch 1 Patch Transdermal daily  metoprolol tartrate 50 milliGRAM(s) Oral two times a day  potassium chloride    Tablet ER 20 milliEquivalent(s) Oral daily  sodium chloride 0.9%. 1000 milliLiter(s) (50 mL/Hr) IV Continuous <Continuous>    MEDICATIONS  (PRN):  acetaminophen     Tablet .. 650 milliGRAM(s) Oral every 6 hours PRN Mild Pain (1 - 3)        RADIOLOGY & ADDITIONAL TESTS:     Patient discussed on morning rounds with       Operation / Date:     SUBJECTIVE ASSESSMENT:  77y Female       Vital Signs Last 24 Hrs  T(C): 36.8 (07 Nov 2022 17:26), Max: 36.9 (07 Nov 2022 14:48)  T(F): 98.3 (07 Nov 2022 17:26), Max: 98.4 (07 Nov 2022 14:48)  HR: 70 (07 Nov 2022 19:00) (66 - 84)  BP: 131/58 (07 Nov 2022 19:00) (110/53 - 134/63)  BP(mean): 84 (07 Nov 2022 19:00) (71 - 90)  RR: 18 (07 Nov 2022 16:25) (15 - 18)  SpO2: 97% (07 Nov 2022 19:00) (94% - 100%)    Parameters below as of 07 Nov 2022 16:25  Patient On (Oxygen Delivery Method): nasal cannula  O2 Flow (L/min): 2    I&O's Detail    06 Nov 2022 07:01  -  07 Nov 2022 07:00  --------------------------------------------------------  IN:    Heparin: 60 mL    IV PiggyBack: 360 mL    Oral Fluid: 480 mL    sodium chloride 0.9%: 150 mL  Total IN: 1050 mL    OUT:    VAC (Vacuum Assisted Closure) System (mL): 0 mL    Voided (mL): 2100 mL  Total OUT: 2100 mL    Total NET: -1050 mL      07 Nov 2022 07:01  -  07 Nov 2022 20:10  --------------------------------------------------------  IN:    Heparin: 7.5 mL    Heparin: 24 mL    sodium chloride 0.9%: 250 mL  Total IN: 281.5 mL    OUT:    Voided (mL): 2000 mL  Total OUT: 2000 mL    Total NET: -1718.5 mL          CHEST TUBE:    FRANCHESKA DRAIN:    EPICARDIAL WIRES:   TIE MICHAEL:   FABRICIO:     PHYSICAL EXAM:  *****    LABS:                        7.1    6.41  )-----------( 89       ( 07 Nov 2022 05:29 )             21.9       PT/INR - ( 07 Nov 2022 03:26 )   PT: 17.7 sec;   INR: 1.48          PTT - ( 07 Nov 2022 15:19 )  PTT:45.0 sec    11-07    131<L>  |  97  |  16  ----------------------------<  95  3.6   |  26  |  0.88    Ca    8.7      07 Nov 2022 05:29  Phos  2.6     11-07  Mg     2.1     11-07    TPro  6.4  /  Alb  2.7<L>  /  TBili  0.6  /  DBili  x   /  AST  23  /  ALT  13  /  AlkPhos  63  11-07          MEDICATIONS  (STANDING):  amLODIPine   Tablet 2.5 milliGRAM(s) Oral daily  aspirin  chewable 81 milliGRAM(s) Oral daily  atorvastatin 10 milliGRAM(s) Oral at bedtime  budesonide  80 MICROgram(s)/formoterol 4.5 MICROgram(s) Inhaler 2 Puff(s) Inhalation every 12 hours  cefepime   IVPB 2000 milliGRAM(s) IV Intermittent every 12 hours  DAPTOmycin IVPB 400 milliGRAM(s) IV Intermittent every 24 hours  famotidine    Tablet 20 milliGRAM(s) Oral daily  fluconAZOLE   Tablet 400 milliGRAM(s) Oral daily  furosemide    Tablet 40 milliGRAM(s) Oral daily  heparin  Infusion 650 Unit(s)/Hr (6.5 mL/Hr) IV Continuous <Continuous>  hydrALAZINE 10 milliGRAM(s) Oral every 8 hours  lactated ringers. 1000 milliLiter(s) (50 mL/Hr) IV Continuous <Continuous>  lidocaine   4% Patch 1 Patch Transdermal daily  metoprolol tartrate 50 milliGRAM(s) Oral two times a day  potassium chloride    Tablet ER 20 milliEquivalent(s) Oral daily  sodium chloride 0.9%. 1000 milliLiter(s) (50 mL/Hr) IV Continuous <Continuous>    MEDICATIONS  (PRN):  acetaminophen     Tablet .. 650 milliGRAM(s) Oral every 6 hours PRN Mild Pain (1 - 3)    RADIOLOGY & ADDITIONAL TESTS:  < from: CT Angio Upper Extremity w/ IV Cont, Bilateral (11.07.22 @ 00:46) >  IMPRESSION:      1. 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  2.Limited evaluation of the bilateral upper extremities vasculature. Flow   is seen down to the wrist in right arm. Flow is seen in the left   subclavian, axillary, brachial artery which attenuates just above the  elbow, reconstitutes at elbow with visualized radial artery and minimal   flow in ulnar artery on delayed images.  3. 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  4. Slightly increased mild interstitial pulmonary edema and increased   small bilateral pleural effusions.  5. 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           Patient discussed on morning rounds with Dr. Muller    Operation / Date: 11/2/22 Suprasternal I&D and wound vac placement  7/21/22 TEVAR with right groin cutdown and femoral artery  8/9/21 AVR, ascending/hemiarch replacement with frozen elephant  9/20/21 pec flap and closure    SUBJECTIVE ASSESSMENT: Patient seen and examined at bedside. Patient admits to persistent numbness and tingling to her L hand, which has slightly improved. Denies chills, chest pain, SOB, palpitations, N/V.     Vital Signs Last 24 Hrs  T(C): 36.8 (07 Nov 2022 17:26), Max: 36.9 (07 Nov 2022 14:48)  T(F): 98.3 (07 Nov 2022 17:26), Max: 98.4 (07 Nov 2022 14:48)  HR: 70 (07 Nov 2022 19:00) (66 - 84)  BP: 131/58 (07 Nov 2022 19:00) (110/53 - 134/63)  BP(mean): 84 (07 Nov 2022 19:00) (71 - 90)  RR: 18 (07 Nov 2022 16:25) (15 - 18)  SpO2: 97% (07 Nov 2022 19:00) (94% - 100%)    Parameters below as of 07 Nov 2022 16:25  Patient On (Oxygen Delivery Method): nasal cannula  O2 Flow (L/min): 2    I&O's Detail    06 Nov 2022 07:01  -  07 Nov 2022 07:00  --------------------------------------------------------  IN:    Heparin: 60 mL    IV PiggyBack: 360 mL    Oral Fluid: 480 mL    sodium chloride 0.9%: 150 mL  Total IN: 1050 mL    OUT:    VAC (Vacuum Assisted Closure) System (mL): 0 mL    Voided (mL): 2100 mL  Total OUT: 2100 mL    Total NET: -1050 mL    07 Nov 2022 07:01  -  07 Nov 2022 20:10  --------------------------------------------------------  IN:    Heparin: 7.5 mL    Heparin: 24 mL    sodium chloride 0.9%: 250 mL  Total IN: 281.5 mL    OUT:    Voided (mL): 2000 mL  Total OUT: 2000 mL    Total NET: -1718.5 mL    CHEST TUBE:  None  FRANCHESKA DRAIN:  NOne  EPICARDIAL WIRES: None  TIE DOWNS: None  REGALADO: None    PHYSICAL EXAM:  GENERAL: NAD, lying in bed comfortably  HEAD:  Atraumatic, Normocephalic  EYES: EOMI, PERRLA, conjunctiva and sclera clear  ENT: Moist mucous membranes  NECK: Supple, No JVD  CHEST/LUNG: Clear to auscultation bilaterally; No rales, rhonchi, wheezing, or rubs. Unlabored respirations. Wound vac in place over sternal wound, holding adequate suction.   HEART: Regular rate and rhythm; No murmurs, rubs, or gallops  ABDOMEN: Bowel sounds present; Soft, Nontender, Nondistended. No hepatomegally  EXTREMITIES: Radial and ulnar pulse nonpalpable on LUE, L hand cooler than R, sensation decrease. No clubbing, cyanosis, or edema  NERVOUS SYSTEM:  Alert & Oriented X3, speech clear. No deficits     LABS:                        7.1    6.41  )-----------( 89       ( 07 Nov 2022 05:29 )             21.9       PT/INR - ( 07 Nov 2022 03:26 )   PT: 17.7 sec;   INR: 1.48          PTT - ( 07 Nov 2022 15:19 )  PTT:45.0 sec    11-07    131<L>  |  97  |  16  ----------------------------<  95  3.6   |  26  |  0.88    Ca    8.7      07 Nov 2022 05:29  Phos  2.6     11-07  Mg     2.1     11-07    TPro  6.4  /  Alb  2.7<L>  /  TBili  0.6  /  DBili  x   /  AST  23  /  ALT  13  /  AlkPhos  63  11-07    MEDICATIONS  (STANDING):  amLODIPine   Tablet 2.5 milliGRAM(s) Oral daily  aspirin  chewable 81 milliGRAM(s) Oral daily  atorvastatin 10 milliGRAM(s) Oral at bedtime  budesonide  80 MICROgram(s)/formoterol 4.5 MICROgram(s) Inhaler 2 Puff(s) Inhalation every 12 hours  cefepime   IVPB 2000 milliGRAM(s) IV Intermittent every 12 hours  DAPTOmycin IVPB 400 milliGRAM(s) IV Intermittent every 24 hours  famotidine    Tablet 20 milliGRAM(s) Oral daily  fluconAZOLE   Tablet 400 milliGRAM(s) Oral daily  furosemide    Tablet 40 milliGRAM(s) Oral daily  heparin  Infusion 650 Unit(s)/Hr (6.5 mL/Hr) IV Continuous <Continuous>  hydrALAZINE 10 milliGRAM(s) Oral every 8 hours  lactated ringers. 1000 milliLiter(s) (50 mL/Hr) IV Continuous <Continuous>  lidocaine   4% Patch 1 Patch Transdermal daily  metoprolol tartrate 50 milliGRAM(s) Oral two times a day  potassium chloride    Tablet ER 20 milliEquivalent(s) Oral daily  sodium chloride 0.9%. 1000 milliLiter(s) (50 mL/Hr) IV Continuous <Continuous>    MEDICATIONS  (PRN):  acetaminophen     Tablet .. 650 milliGRAM(s) Oral every 6 hours PRN Mild Pain (1 - 3)    RADIOLOGY & ADDITIONAL TESTS:  < from: CT Angio Upper Extremity w/ IV Cont, Bilateral (11.07.22 @ 00:46) >  IMPRESSION:      1. 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  2.Limited evaluation of the bilateral upper extremities vasculature. Flow   is seen down to the wrist in right arm. Flow is seen in the left   subclavian, axillary, brachial artery which attenuates just above the  elbow, reconstitutes at elbow with visualized radial artery and minimal   flow in ulnar artery on delayed images.  3. 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  4. Slightly increased mild interstitial pulmonary edema and increased   small bilateral pleural effusions.  5. 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

## 2022-11-08 NOTE — PROGRESS NOTE ADULT - SUBJECTIVE AND OBJECTIVE BOX
INFECTIOUS DISEASES CONSULT FOLLOW-UP NOTE    *INCOMPLETE*    INTERVAL HPI/OVERNIGHT EVENTS:    Subjective: Patient seen and examined at bedside. Denies fevers, chills, cough, abdominal pain, diarrhea.      ANTIBIOTICS/RELEVANT:    MEDICATIONS  (STANDING):  amLODIPine   Tablet 2.5 milliGRAM(s) Oral daily  aspirin  chewable 81 milliGRAM(s) Oral daily  atorvastatin 10 milliGRAM(s) Oral at bedtime  budesonide  80 MICROgram(s)/formoterol 4.5 MICROgram(s) Inhaler 2 Puff(s) Inhalation every 12 hours  cefepime   IVPB 2000 milliGRAM(s) IV Intermittent every 12 hours  DAPTOmycin IVPB 400 milliGRAM(s) IV Intermittent every 24 hours  famotidine    Tablet 20 milliGRAM(s) Oral daily  fluconAZOLE   Tablet 400 milliGRAM(s) Oral daily  furosemide    Tablet 40 milliGRAM(s) Oral daily  heparin  Infusion 650 Unit(s)/Hr (9 mL/Hr) IV Continuous <Continuous>  hydrALAZINE 10 milliGRAM(s) Oral every 8 hours  lactated ringers. 1000 milliLiter(s) (50 mL/Hr) IV Continuous <Continuous>  levothyroxine 50 MICROGram(s) Oral daily  lidocaine   4% Patch 1 Patch Transdermal daily  metoprolol tartrate 50 milliGRAM(s) Oral two times a day  potassium chloride    Tablet ER 20 milliEquivalent(s) Oral daily  potassium chloride    Tablet ER 20 milliEquivalent(s) Oral once  senna 2 Tablet(s) Oral at bedtime  sodium chloride 0.9%. 1000 milliLiter(s) (50 mL/Hr) IV Continuous <Continuous>    MEDICATIONS  (PRN):  acetaminophen     Tablet .. 650 milliGRAM(s) Oral every 6 hours PRN Mild Pain (1 - 3)        Vital Signs Last 24 Hrs  T(C): 36.3 (08 Nov 2022 09:50), Max: 36.9 (07 Nov 2022 14:48)  T(F): 97.4 (08 Nov 2022 09:50), Max: 98.5 (07 Nov 2022 21:09)  HR: 80 (08 Nov 2022 09:03) (64 - 84)  BP: 111/54 (08 Nov 2022 09:03) (111/54 - 147/63)  BP(mean): 78 (08 Nov 2022 09:03) (72 - 91)  RR: 18 (08 Nov 2022 06:03) (18 - 18)  SpO2: 94% (08 Nov 2022 09:03) (94% - 100%)    Parameters below as of 08 Nov 2022 09:03  Patient On (Oxygen Delivery Method): nasal cannula w/ humidification  O2 Flow (L/min): 2      11-07-22 @ 07:01  -  11-08-22 @ 07:00  --------------------------------------------------------  IN: 525.5 mL / OUT: 2350 mL / NET: -1824.5 mL    11-08-22 @ 07:01  -  11-08-22 @ 10:44  --------------------------------------------------------  IN: 8.5 mL / OUT: 200 mL / NET: -191.5 mL        PHYSICAL EXAM  Constitutional: alert, NAD  Eyes: the sclera and conjunctiva were normal.   ENT: the ears and nose were normal in appearance.   Neck: the appearance of the neck was normal and the neck was supple.   Pulmonary: no respiratory distress and lungs CTA bilaterally.   Heart: heart rate was normal and rhythm regular, normal S1 and S2  Vascular: no peripheral edema  Abdomen: normal bowel sounds, soft, non-tender  Neurological: no focal deficits  Psychiatric: the affect was normal      LABS:                        7.4    6.33  )-----------( 125      ( 08 Nov 2022 05:52 )             23.6     11-08    133<L>  |  99  |  17  ----------------------------<  96  3.9   |  26  |  0.90    Ca    8.8      08 Nov 2022 05:52  Phos  2.6     11-07  Mg     2.2     11-08    TPro  6.4  /  Alb  2.7<L>  /  TBili  0.6  /  DBili  x   /  AST  23  /  ALT  13  /  AlkPhos  63  11-07    PT/INR - ( 08 Nov 2022 05:52 )   PT: 17.2 sec;   INR: 1.44          PTT - ( 08 Nov 2022 05:52 )  PTT:54.4 sec      MICROBIOLOGY:      RADIOLOGY & ADDITIONAL STUDIES:  Reviewed INFECTIOUS DISEASES CONSULT FOLLOW-UP NOTE    *INCOMPLETE*    INTERVAL HPI/OVERNIGHT EVENTS: No Acute events    Subjective: Patient seen and examined at bedside. Denies fevers, chills, cough, abdominal pain, diarrhea. Persistent constipation, pt has not stooled in many days which she attributes to decreased PO intake.  Sternal pain is improved since yesterday.      ANTIBIOTICS/RELEVANT:    MEDICATIONS  (STANDING):  amLODIPine   Tablet 2.5 milliGRAM(s) Oral daily  aspirin  chewable 81 milliGRAM(s) Oral daily  atorvastatin 10 milliGRAM(s) Oral at bedtime  budesonide  80 MICROgram(s)/formoterol 4.5 MICROgram(s) Inhaler 2 Puff(s) Inhalation every 12 hours  cefepime   IVPB 2000 milliGRAM(s) IV Intermittent every 12 hours  DAPTOmycin IVPB 400 milliGRAM(s) IV Intermittent every 24 hours  famotidine    Tablet 20 milliGRAM(s) Oral daily  fluconAZOLE   Tablet 400 milliGRAM(s) Oral daily  furosemide    Tablet 40 milliGRAM(s) Oral daily  heparin  Infusion 650 Unit(s)/Hr (9 mL/Hr) IV Continuous <Continuous>  hydrALAZINE 10 milliGRAM(s) Oral every 8 hours  lactated ringers. 1000 milliLiter(s) (50 mL/Hr) IV Continuous <Continuous>  levothyroxine 50 MICROGram(s) Oral daily  lidocaine   4% Patch 1 Patch Transdermal daily  metoprolol tartrate 50 milliGRAM(s) Oral two times a day  potassium chloride    Tablet ER 20 milliEquivalent(s) Oral daily  potassium chloride    Tablet ER 20 milliEquivalent(s) Oral once  senna 2 Tablet(s) Oral at bedtime  sodium chloride 0.9%. 1000 milliLiter(s) (50 mL/Hr) IV Continuous <Continuous>    MEDICATIONS  (PRN):  acetaminophen     Tablet .. 650 milliGRAM(s) Oral every 6 hours PRN Mild Pain (1 - 3)        Vital Signs Last 24 Hrs  T(C): 36.3 (08 Nov 2022 09:50), Max: 36.9 (07 Nov 2022 14:48)  T(F): 97.4 (08 Nov 2022 09:50), Max: 98.5 (07 Nov 2022 21:09)  HR: 80 (08 Nov 2022 09:03) (64 - 84)  BP: 111/54 (08 Nov 2022 09:03) (111/54 - 147/63)  BP(mean): 78 (08 Nov 2022 09:03) (72 - 91)  RR: 18 (08 Nov 2022 06:03) (18 - 18)  SpO2: 94% (08 Nov 2022 09:03) (94% - 100%)    Parameters below as of 08 Nov 2022 09:03  Patient On (Oxygen Delivery Method): nasal cannula w/ humidification  O2 Flow (L/min): 2      11-07-22 @ 07:01  -  11-08-22 @ 07:00  --------------------------------------------------------  IN: 525.5 mL / OUT: 2350 mL / NET: -1824.5 mL    11-08-22 @ 07:01  -  11-08-22 @ 10:44  --------------------------------------------------------  IN: 8.5 mL / OUT: 200 mL / NET: -191.5 mL        PHYSICAL EXAM  Eyes: the sclera and conjunctiva were normal.   ENT: the ears and nose were normal in appearance.   Neck: the appearance of the neck was normal and the neck was supple. Wound vac on her sternum, mildly tender to palpation L>R w/ area of non-erythematous swelling (worse than yesterday).  Pulmonary: no respiratory distress;  Heart: heart rate was normal and rhythm regular, normal S1 and S2; holosystolic murmur @ upper left sternal border radiating to carotid  Vascular: 1+ b/l LE edema  Abdomen: normal bowel sounds, soft, non-tender  Neurological: no focal deficits  Psychiatric: the affect was normal  Wound vac in place      LABS:                        7.4    6.33  )-----------( 125      ( 08 Nov 2022 05:52 )             23.6     11-08    133<L>  |  99  |  17  ----------------------------<  96  3.9   |  26  |  0.90    Ca    8.8      08 Nov 2022 05:52  Phos  2.6     11-07  Mg     2.2     11-08    TPro  6.4  /  Alb  2.7<L>  /  TBili  0.6  /  DBili  x   /  AST  23  /  ALT  13  /  AlkPhos  63  11-07    PT/INR - ( 08 Nov 2022 05:52 )   PT: 17.2 sec;   INR: 1.44          PTT - ( 08 Nov 2022 05:52 )  PTT:54.4 sec      MICROBIOLOGY:      RADIOLOGY & ADDITIONAL STUDIES:  Reviewed INFECTIOUS DISEASES CONSULT FOLLOW-UP NOTE    INTERVAL HPI/OVERNIGHT EVENTS: No Acute events    Subjective: Patient seen and examined at bedside. Denies fevers, chills, cough, abdominal pain, diarrhea. Persistent constipation, pt has not stooled in many days which she attributes to decreased PO intake.  Sternal pain is improved since yesterday.    ANTIBIOTICS/RELEVANT:  Daptomycin, Cefepime    MEDICATIONS  (STANDING):  amLODIPine   Tablet 2.5 milliGRAM(s) Oral daily  aspirin  chewable 81 milliGRAM(s) Oral daily  atorvastatin 10 milliGRAM(s) Oral at bedtime  budesonide  80 MICROgram(s)/formoterol 4.5 MICROgram(s) Inhaler 2 Puff(s) Inhalation every 12 hours  cefepime   IVPB 2000 milliGRAM(s) IV Intermittent every 12 hours  DAPTOmycin IVPB 400 milliGRAM(s) IV Intermittent every 24 hours  famotidine    Tablet 20 milliGRAM(s) Oral daily  fluconAZOLE   Tablet 400 milliGRAM(s) Oral daily  furosemide    Tablet 40 milliGRAM(s) Oral daily  heparin  Infusion 650 Unit(s)/Hr (9 mL/Hr) IV Continuous <Continuous>  hydrALAZINE 10 milliGRAM(s) Oral every 8 hours  lactated ringers. 1000 milliLiter(s) (50 mL/Hr) IV Continuous <Continuous>  levothyroxine 50 MICROGram(s) Oral daily  lidocaine   4% Patch 1 Patch Transdermal daily  metoprolol tartrate 50 milliGRAM(s) Oral two times a day  potassium chloride    Tablet ER 20 milliEquivalent(s) Oral daily  potassium chloride    Tablet ER 20 milliEquivalent(s) Oral once  senna 2 Tablet(s) Oral at bedtime  sodium chloride 0.9%. 1000 milliLiter(s) (50 mL/Hr) IV Continuous <Continuous>    MEDICATIONS  (PRN):  acetaminophen     Tablet .. 650 milliGRAM(s) Oral every 6 hours PRN Mild Pain (1 - 3)        Vital Signs Last 24 Hrs  T(C): 36.3 (08 Nov 2022 09:50), Max: 36.9 (07 Nov 2022 14:48)  T(F): 97.4 (08 Nov 2022 09:50), Max: 98.5 (07 Nov 2022 21:09)  HR: 80 (08 Nov 2022 09:03) (64 - 84)  BP: 111/54 (08 Nov 2022 09:03) (111/54 - 147/63)  BP(mean): 78 (08 Nov 2022 09:03) (72 - 91)  RR: 18 (08 Nov 2022 06:03) (18 - 18)  SpO2: 94% (08 Nov 2022 09:03) (94% - 100%)    Parameters below as of 08 Nov 2022 09:03  Patient On (Oxygen Delivery Method): nasal cannula w/ humidification  O2 Flow (L/min): 2      11-07-22 @ 07:01  -  11-08-22 @ 07:00  --------------------------------------------------------  IN: 525.5 mL / OUT: 2350 mL / NET: -1824.5 mL    11-08-22 @ 07:01  -  11-08-22 @ 10:44  --------------------------------------------------------  IN: 8.5 mL / OUT: 200 mL / NET: -191.5 mL        PHYSICAL EXAM  Eyes: the sclera and conjunctiva were normal.   ENT: the ears and nose were normal in appearance.   Neck: the appearance of the neck was normal and the neck was supple. Wound vac on her sternum, mildly tender to palpation L>R w/ area of non-erythematous swelling (worse than yesterday).  Pulmonary: no respiratory distress;  Heart: heart rate was normal and rhythm regular, normal S1 and S2; holosystolic murmur @ upper left sternal border radiating to carotid  Vascular: 1+ b/l LE edema  Abdomen: normal bowel sounds, soft, non-tender  Neurological: no focal deficits  Psychiatric: the affect was normal  Wound vac in place      LABS:                        7.4    6.33  )-----------( 125      ( 08 Nov 2022 05:52 )             23.6     11-08    133<L>  |  99  |  17  ----------------------------<  96  3.9   |  26  |  0.90    Ca    8.8      08 Nov 2022 05:52  Phos  2.6     11-07  Mg     2.2     11-08    TPro  6.4  /  Alb  2.7<L>  /  TBili  0.6  /  DBili  x   /  AST  23  /  ALT  13  /  AlkPhos  63  11-07    PT/INR - ( 08 Nov 2022 05:52 )   PT: 17.2 sec;   INR: 1.44          PTT - ( 08 Nov 2022 05:52 )  PTT:54.4 sec      MICROBIOLOGY:      RADIOLOGY & ADDITIONAL STUDIES:  Reviewed

## 2022-11-08 NOTE — PROGRESS NOTE ADULT - ATTENDING COMMENTS
Sternal wound infection, LUE ischemia.  OR cultures from 11/2 with rare Candida albicans.  CTA 11/7 with increased left parasternal chest wall collection/phlegmon with mediastinal extension, now resulted in occlusion of aortic-left subclavian bypass.  Would continue to f/u OR cultures, f/u susceptibility of C. albicans, restart caspofungin, d/c fluconazole, continue vanc and cefepime.  Recommendations discussed with primary team.  Will follow with you - team 1.

## 2022-11-08 NOTE — PROGRESS NOTE ADULT - SUBJECTIVE AND OBJECTIVE BOX
SUBJECTIVE: Patient seen and examined bedside. Pt reports feeling well today with no acute complaints. Continues to report interval improvement of the feeling in her left hand. Denies any hand pain. States it is still mildly numb but improving. Denies weakness.    amLODIPine   Tablet 2.5 milliGRAM(s) Oral daily  aspirin  chewable 81 milliGRAM(s) Oral daily  cefepime   IVPB 2000 milliGRAM(s) IV Intermittent every 12 hours  DAPTOmycin IVPB 400 milliGRAM(s) IV Intermittent every 24 hours  fluconAZOLE   Tablet 400 milliGRAM(s) Oral daily  furosemide    Tablet 40 milliGRAM(s) Oral daily  heparin  Infusion 650 Unit(s)/Hr IV Continuous <Continuous>  hydrALAZINE 10 milliGRAM(s) Oral every 8 hours  metoprolol tartrate 50 milliGRAM(s) Oral two times a day      Vital Signs Last 24 Hrs  T(C): 36.3 (08 Nov 2022 09:50), Max: 36.9 (07 Nov 2022 14:48)  T(F): 97.4 (08 Nov 2022 09:50), Max: 98.5 (07 Nov 2022 21:09)  HR: 80 (08 Nov 2022 09:03) (64 - 84)  BP: 111/54 (08 Nov 2022 09:03) (111/54 - 147/63)  BP(mean): 78 (08 Nov 2022 09:03) (72 - 91)  RR: 18 (08 Nov 2022 06:03) (18 - 18)  SpO2: 94% (08 Nov 2022 09:03) (94% - 100%)    Parameters below as of 08 Nov 2022 09:03  Patient On (Oxygen Delivery Method): nasal cannula w/ humidification  O2 Flow (L/min): 2    I&O's Detail    07 Nov 2022 07:01  -  08 Nov 2022 07:00  --------------------------------------------------------  IN:    Heparin: 7.5 mL    Heparin: 24 mL    Heparin: 94 mL    IV PiggyBack: 50 mL    sodium chloride 0.9%: 350 mL  Total IN: 525.5 mL    OUT:    Voided (mL): 2350 mL  Total OUT: 2350 mL    Total NET: -1824.5 mL      08 Nov 2022 07:01  -  08 Nov 2022 12:35  --------------------------------------------------------  IN:    Heparin: 8.5 mL  Total IN: 8.5 mL    OUT:    Voided (mL): 200 mL  Total OUT: 200 mL    Total NET: -191.5 mL        General: NAD, resting comfortably in bed  C/V: NSR  Pulm: Nonlabored breathing, no respiratory distress  Abd: soft, NT/ND.  Vasc: left hand cool (warmer than yesterday), absent radial/ulnar signals, no signal of guerra arch, motor and sensation intact  Extrem: WWP, no edema, SCDs in place        LABS:                        7.4    6.33  )-----------( 125      ( 08 Nov 2022 05:52 )             23.6     11-08    133<L>  |  99  |  17  ----------------------------<  96  3.9   |  26  |  0.90    Ca    8.8      08 Nov 2022 05:52  Phos  2.6     11-07  Mg     2.2     11-08    TPro  6.4  /  Alb  2.7<L>  /  TBili  0.6  /  DBili  x   /  AST  23  /  ALT  13  /  AlkPhos  63  11-07    PT/INR - ( 08 Nov 2022 05:52 )   PT: 17.2 sec;   INR: 1.44          PTT - ( 08 Nov 2022 05:52 )  PTT:54.4 sec      RADIOLOGY & ADDITIONAL STUDIES:

## 2022-11-08 NOTE — PROGRESS NOTE ADULT - ASSESSMENT
75 YO Female, previous smoker, w/ PMHx of CAD, HTN, HLD, spinal stenosis, arthritis, hypothyroidism, bicuspid aortic valve s/p AVR, ascending/hemiarch replacement with frozen elephant trunk and left aorto-subclavian bypass, CABG x2 with Dr. 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 Muller and Dr. Augustin 7/29/22, with initiation of Keflex for concern over groin site infection, pt discharged to [facility] on 8/14/22. As an outpatient she was noted to have a small collection on her sternum that drained and self resolved. On 11/2/22 pt presented to [facility] ED for further work-up of sternal wound infection. On 11/2/22 pt underwent sternal I&D and wound vac placement with Dr. Muller and Dr. Vernon. On POD1 ID was consulted regarding abx regimen and need for PICC line, started on Vanco and Zosyn. Rec hold off on PICC until final ID recs. POD2 CM and SW on board for home wound vac and IV abx. POD3 Caspofungin added to regimen, wound vac changed. POD4 pt refusing VERNA. 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 Abx regimen changed per ID recs, vascular surgery consulted for r/o LUE limb ischemia, no surgical intervention at present.      Plan:    Neurovascular:   -Hx of spinal stenosis  -Hx of arthritis  -Pain well controlled with current regimen. 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 Muller on 8/9/2021 course c/b poor wound healing s/p pec flap and closure on 9/29/21  -Cont Aspirin, statin, BB   -Cont Lasix and KCl (home meds)  -S/p TEVAR with R groin cutdown and femoral artery repair with Dr. Muller and Dr. Augustin 7/29/22   -Hx of HTN  -Cont Lopressor 50mg PO QD (home med)  -Cont Norvasc 2.5mg PO QD (home med)  -Cont Hydralazine 10mg PO Q8H (home med)  -Hx of HLD  -Cont Atorvastatin  -Hx of PPM placement  -Hemodynamically stable.   -Monitor: BP, HR, tele    Respiratory:   -Oxygenating well on room air  -Encourage continued use of IS 10x/hr and frequent ambulation  -CXR: stable from 11/4/22    GI:  -GI PPX: Pepcid  -PO Diet  -Bowel Regimen: Senna     Renal / :  -Continue to monitor renal function: BUN/Cr: 17/0.92  -Monitor I/O's daily     Endocrine:    -No hx of DM   -A1c: 5.5  -Hx of hypothyroidism  -Cont Levothyroxine   -TSH: 5.22    Hematologic:  -CBC: H/H- 7.4/23.6  -Coagulation Panel.    ID:  -Sternal wound collection s/p sternal I&D and wound vac placement on 11/2/22  -ID following. Final D/C recs pending  -Will need PICC prior to D/C once final recs obtained  -Dc'ed Fluconazole; restarted caspofungin  -Cont Daptomycin 400mg PO Q24H  -Cont Cefepime  -Temperature: Afebrile  -CBC: WBC- 6.33  -Continue to observe for SIRS/Sepsis Syndrome.    Prophylaxis:  -Cont hep gtt (until picc line placement, then start eliquis)  -Continue with SCD's b/l while patient is at rest     Disposition:  -Discharge home once patient is medically ready

## 2022-11-08 NOTE — PROGRESS NOTE ADULT - SUBJECTIVE AND OBJECTIVE BOX
Patient discussed on morning rounds with Dr. Muller      Operation / Date: 11/2/22 Suprasternal I&D and wound vac placement  7/21/22 TEVAR with right groin cutdown and femoral artery  8/9/21 AVR, ascending/hemiarch replacement with frozen elephant  9/20/21 pec flap and closure    SUBJECTIVE ASSESSMENT:  77y Female. NAEO. Patient seen and examined at bedside. Patient denies cp, sob, palpitaitons. Patient in good spirits with family visiting.    Vital Signs Last 24 Hrs  T(C): 37.2 (08 Nov 2022 13:59), Max: 37.2 (08 Nov 2022 13:59)  T(F): 99 (08 Nov 2022 13:59), Max: 99 (08 Nov 2022 13:59)  HR: 60 (08 Nov 2022 12:52) (60 - 84)  BP: 117/57 (08 Nov 2022 12:52) (111/54 - 147/63)  BP(mean): 82 (08 Nov 2022 12:52) (75 - 91)  RR: 20 (08 Nov 2022 12:52) (18 - 20)  SpO2: 92% (08 Nov 2022 12:52) (92% - 100%)    Parameters below as of 08 Nov 2022 12:52  Patient On (Oxygen Delivery Method): room air      I&O's Detail    07 Nov 2022 07:01  -  08 Nov 2022 07:00  --------------------------------------------------------  IN:    Heparin: 7.5 mL    Heparin: 24 mL    Heparin: 94 mL    IV PiggyBack: 50 mL    sodium chloride 0.9%: 350 mL  Total IN: 525.5 mL    OUT:    Voided (mL): 2350 mL  Total OUT: 2350 mL    Total NET: -1824.5 mL      08 Nov 2022 07:01  -  08 Nov 2022 15:43  --------------------------------------------------------  IN:    Heparin: 8.5 mL  Total IN: 8.5 mL    OUT:    Voided (mL): 200 mL  Total OUT: 200 mL    Total NET: -191.5 mL       CHEST TUBE:   No.   FRANCHESKA DRAIN:   No.  EPICARDIAL WIRES: No.  TIE DOWNS:  No.  REGALADO: No.  Wound Vac: to suction in place    PHYSICAL EXAM:  GEN: NAD, looks comfortable  Psych: Mood appropriate  Neuro: A&Ox3.  No focal deficits.  Moving all extremities. +LUE numbness in digits  HEENT: No obvious abnormalities  CV: S1S2, regular, no murmurs appreciated.  No carotid bruits.  No JVD  Lungs: Clear B/L.  No wheezing, rales or rhonchi  ABD: Soft, non-tender, non-distended.  +Bowel sounds  EXT: Warm and well perfused.  No peripheral edema noted  Musculoskeletal: Moving all extremities with normal ROM, no joint swelling  PV: Pedal pulses palpable  Incisions: wound vac to suction, c/d/i    LABS:                        7.4    6.33  )-----------( 125      ( 08 Nov 2022 05:52 )             23.6     COUMADIN:  No.      PT/INR - ( 08 Nov 2022 05:52 )   PT: 17.2 sec;   INR: 1.44          PTT - ( 08 Nov 2022 14:23 )  PTT:58.1 sec    11-08    133<L>  |  99  |  17  ----------------------------<  96  3.9   |  26  |  0.90    Ca    8.8      08 Nov 2022 05:52  Phos  2.6     11-07  Mg     2.2     11-08    TPro  6.4  /  Alb  2.7<L>  /  TBili  0.6  /  DBili  x   /  AST  23  /  ALT  13  /  AlkPhos  63  11-07    MEDICATIONS  (STANDING):  amLODIPine   Tablet 2.5 milliGRAM(s) Oral daily  aspirin  chewable 81 milliGRAM(s) Oral daily  atorvastatin 10 milliGRAM(s) Oral at bedtime  budesonide  80 MICROgram(s)/formoterol 4.5 MICROgram(s) Inhaler 2 Puff(s) Inhalation every 12 hours  caspofungin IVPB      caspofungin IVPB 70 milliGRAM(s) IV Intermittent once  cefepime   IVPB 2000 milliGRAM(s) IV Intermittent every 12 hours  DAPTOmycin IVPB 400 milliGRAM(s) IV Intermittent every 24 hours  famotidine    Tablet 20 milliGRAM(s) Oral daily  furosemide    Tablet 40 milliGRAM(s) Oral daily  heparin  Infusion 650 Unit(s)/Hr (9.5 mL/Hr) IV Continuous <Continuous>  hydrALAZINE 10 milliGRAM(s) Oral every 8 hours  lactated ringers. 1000 milliLiter(s) (50 mL/Hr) IV Continuous <Continuous>  levothyroxine 50 MICROGram(s) Oral daily  lidocaine   4% Patch 1 Patch Transdermal daily  metoprolol tartrate 50 milliGRAM(s) Oral two times a day  potassium chloride    Tablet ER 20 milliEquivalent(s) Oral daily  potassium chloride    Tablet ER 20 milliEquivalent(s) Oral once  senna 2 Tablet(s) Oral at bedtime  sodium chloride 0.9%. 1000 milliLiter(s) (50 mL/Hr) IV Continuous <Continuous>    MEDICATIONS  (PRN):  acetaminophen     Tablet .. 650 milliGRAM(s) Oral every 6 hours PRN Mild Pain (1 - 3)        RADIOLOGY & ADDITIONAL TESTS:  < from: CT Angio Upper Extremity w/ IV Cont, Bilateral (11.07.22 @ 00:46) >  FINDINGS:    Vascular:    CHEST AND UPPER EXTREMITIES: Again, post APR, ascending/ hemiarch   replacement and left aortic subclavian bypass. Since November 2, 2022,   unchanged proximal aortic arch repair mural thrombosis (5, 24) and   unchanged lateral aortic arch pseudoaneurysm with endoleak. New occlusion   aortic-left subclavian bypass. 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  Limited evaluation of the bilateral upper extremities vasculature. Flow   is seen down to the wrist  in right arm. Flow is seen in the left   subclavian, axillary, brachial artery which attenuates just above the   elbow, reconstitutes at elbow with visualized radial artery and minimal   flow in ulnar artery on delayed images.    ABDOMEN AND PELVIS: Unchanged infrarenal abdominal aortic aneurysm, 4 cm.   Unchanged nonflow limiting intimal flap in proximal right external   artery. Severe atherosclerosis.    NONVASCULAR:    CHEST: Slightly increased mild interstitial pulmonary edema. Emphysema.   Increased small bilateral pleural effusions with adjacent atelectasis.   Increased left parasternal chest wall collection/possible phlegmon with   extension to mediastinum and now occlusion of aortic-left subclavian   bypass, 5.3 x 4.2 cm, previously 3.7 x 2.3 cm.  ABDOMEN AND PELVIS: none  BONES AND SOFT TISSUES: No suspicious osseous lesion or erosion.   Parasternal collection with extension to mediastinum as above.      IMPRESSION:  1. 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  2.Limited evaluation of the bilateral upper extremities vasculature. Flow   is seen down to the wrist in right arm. Flow is seen in the left   subclavian, axillary, brachial artery which attenuates just above the  elbow, reconstitutes at elbow with visualized radial artery and minimal   flow in ulnar artery on delayed images.  3. 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  4. Slightly increased mild interstitial pulmonary edema and increased   small bilateral pleural effusions.  5. 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    < end of copied text >

## 2022-11-08 NOTE — PROGRESS NOTE ADULT - ASSESSMENT
75 yo F w/ PMH of smoking, HTN, bicuspid aorta s/p AVR(bioprosthetic), ascending aorta replacement, CABGx2, L branchial artery embolectomy, TEVAR, presenting for swelling and tenderness on the upper sternum, SOB, and LE edema.  CT chest found a 2x3 subcutaneous soft-tissue density, early abscess vs focal phlegmonous changes.  I&D was done, cultures from wash out found C. Albicans, incision was closed and wound vac was placed.    Culture Data & Imaging:  - BCx 11/1: No growth @ 5 days; final  - CT chest 11/2: 2 pseudoaneurysms along aortic graft, non-occlusive thrombosis on graft, [id number] soft tissue density anterior to manubrium, occlusion of L subclavian, thrombosed pseudoaneurysm @ L subclavian.  - CXR 11/2: mild congestion + cardiomegaly  - TTE 11/2: mitral (calcification of valve and mild MR), tricuspid (mild/moderate TR), aorta (bioprosthetic valve w/o echodensity or AR)  - Tissue Cx 11/2: Rare yeast  - Sternal fluid Cx 11/2: C. Albicans  - CT Chest Aorta w/wo cont 11/7: increased parasternal chest wall collection/phlegmon w/ mediastinal extension resulting in occlusion of aortic-left subclavian bypass.  Flow in L UE attenuates above the elbow, minimal flow in ulnar artery. increased pulmonary edema and small b/l pleural effusions.  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    Recommend:  - C/w Dapto 400 mg IV q24, thrombocytopenia is improving  - Cefepime 2g IV q24  - Start Caspofungin 70mg IV loading dose followed by 50mg IV q24 i/s/o CT chest from 11/7 showing phlegmon w/ mediastinal extension  - f/u wound/tissue from Cxs from 11/2 for susceptibility

## 2022-11-09 NOTE — PROGRESS NOTE ADULT - SUBJECTIVE AND OBJECTIVE BOX
SUBJECTIVE: Patient seen and examined bedside. Pt reports feeling well today with continued improvement of the feeling of her left hand. Motor and sensation remain intact.    amLODIPine   Tablet 2.5 milliGRAM(s) Oral daily  aspirin  chewable 81 milliGRAM(s) Oral daily  caspofungin IVPB      caspofungin IVPB 50 milliGRAM(s) IV Intermittent every 24 hours  DAPTOmycin IVPB 400 milliGRAM(s) IV Intermittent every 24 hours  furosemide    Tablet 40 milliGRAM(s) Oral daily  heparin  Infusion 650 Unit(s)/Hr IV Continuous <Continuous>  hydrALAZINE 10 milliGRAM(s) Oral every 8 hours  metoprolol tartrate 50 milliGRAM(s) Oral two times a day      Vital Signs Last 24 Hrs  T(C): 37.2 (09 Nov 2022 14:18), Max: 37.4 (09 Nov 2022 09:32)  T(F): 99 (09 Nov 2022 14:18), Max: 99.3 (09 Nov 2022 09:32)  HR: 66 (09 Nov 2022 11:57) (60 - 74)  BP: 120/69 (09 Nov 2022 11:57) (117/53 - 138/64)  BP(mean): 82 (09 Nov 2022 11:57) (77 - 93)  RR: 18 (09 Nov 2022 11:57) (18 - 20)  SpO2: 91% (09 Nov 2022 11:57) (91% - 99%)    Parameters below as of 09 Nov 2022 11:57  Patient On (Oxygen Delivery Method): room air      I&O's Detail    08 Nov 2022 07:01  -  09 Nov 2022 07:00  --------------------------------------------------------  IN:    Heparin: 214 mL    IV PiggyBack: 350 mL    sodium chloride 0.9%: 50 mL  Total IN: 614 mL    OUT:    VAC (Vacuum Assisted Closure) System (mL): 100 mL    Voided (mL): 1800 mL  Total OUT: 1900 mL    Total NET: -1286 mL      09 Nov 2022 07:01  -  09 Nov 2022 14:29  --------------------------------------------------------  IN:    Oral Fluid: 200 mL  Total IN: 200 mL    OUT:    Voided (mL): 300 mL  Total OUT: 300 mL    Total NET: -100 mL        General: NAD, resting comfortably in bed  C/V: NSR  Pulm: Nonlabored breathing, no respiratory distress  Abd: soft, NT/ND.  Vasc: left hand cool, absent radial/ulnar signals, no signal of guerra arch, motor and sensation intact  Extrem: WWP, no edema, SCDs in place        LABS:                        7.8    6.37  )-----------( 136      ( 09 Nov 2022 06:15 )             24.8     11-09    135  |  99  |  16  ----------------------------<  89  3.6   |  27  |  0.95    Ca    9.3      09 Nov 2022 06:15  Mg     2.4     11-09    TPro  7.2  /  Alb  2.9<L>  /  TBili  0.6  /  DBili  x   /  AST  19  /  ALT  13  /  AlkPhos  78  11-09    PT/INR - ( 09 Nov 2022 06:15 )   PT: 16.2 sec;   INR: 1.36          PTT - ( 09 Nov 2022 06:15 )  PTT:68.3 sec      RADIOLOGY & ADDITIONAL STUDIES:

## 2022-11-09 NOTE — CHART NOTE - NSCHARTNOTEFT_GEN_A_CORE
Admitting Diagnosis:   Patient is a 77y old  Female who presents with a chief complaint of Sternal wound collection (09 Nov 2022 14:23)      PAST MEDICAL & SURGICAL HISTORY:  HTN (hypertension)      H/O aortic valve stenosis      CAD (coronary artery disease)      S/P aortic valve replacement      S/P CABG (coronary artery bypass graft)      H/O aortic arch replacement      Pacemaker  medtronic micra pacemaker      Current Nutrition Order: DASH/TLC + Ensure Clear 1x/day (240 kcal, 8 g protein per serving) +Ensure Pudding 1x/day (170 kcal, 4 g protein per serving)    PO Intake: Good (%) [   ]  Fair (50-75%) [   ] Poor (<25%) [   ]    GI Issues:     Pain:    Skin Integrity:    Labs:   11-09    135  |  99  |  16  ----------------------------<  89  3.6   |  27  |  0.95    Ca    9.3      09 Nov 2022 06:15  Mg     2.4     11-09    TPro  7.2  /  Alb  2.9<L>  /  TBili  0.6  /  DBili  x   /  AST  19  /  ALT  13  /  AlkPhos  78  11-09    CAPILLARY BLOOD GLUCOSE          Medications:  MEDICATIONS  (STANDING):  amLODIPine   Tablet 2.5 milliGRAM(s) Oral daily  aspirin  chewable 81 milliGRAM(s) Oral daily  atorvastatin 10 milliGRAM(s) Oral at bedtime  budesonide  80 MICROgram(s)/formoterol 4.5 MICROgram(s) Inhaler 2 Puff(s) Inhalation every 12 hours  caspofungin IVPB      caspofungin IVPB 50 milliGRAM(s) IV Intermittent every 24 hours  DAPTOmycin IVPB 400 milliGRAM(s) IV Intermittent every 24 hours  famotidine    Tablet 20 milliGRAM(s) Oral daily  furosemide    Tablet 40 milliGRAM(s) Oral daily  heparin  Infusion 650 Unit(s)/Hr (9.5 mL/Hr) IV Continuous <Continuous>  hydrALAZINE 10 milliGRAM(s) Oral every 8 hours  lactated ringers. 1000 milliLiter(s) (50 mL/Hr) IV Continuous <Continuous>  levothyroxine 50 MICROGram(s) Oral daily  lidocaine   4% Patch 1 Patch Transdermal daily  metoprolol tartrate 50 milliGRAM(s) Oral two times a day  potassium chloride    Tablet ER 20 milliEquivalent(s) Oral daily  potassium chloride    Tablet ER 20 milliEquivalent(s) Oral once  senna 2 Tablet(s) Oral at bedtime  sodium chloride 0.9%. 1000 milliLiter(s) (50 mL/Hr) IV Continuous <Continuous>    MEDICATIONS  (PRN):  acetaminophen     Tablet .. 650 milliGRAM(s) Oral every 6 hours PRN Mild Pain (1 - 3)      Weight:  Daily     Daily     Weight Change:     Estimated energy needs:     Subjective:     Previous Nutrition Diagnosis:    Active [   ]  Resolved [   ]    If resolved, new PES:     Goal:    Recommendations:    Education:     Risk Level: High [   ] Moderate [   ] Low [   ] Admitting Diagnosis:   Patient is a 77y old  Female who presents with a chief complaint of Sternal wound collection (09 Nov 2022 14:23)      PAST MEDICAL & SURGICAL HISTORY:  HTN (hypertension)      H/O aortic valve stenosis      CAD (coronary artery disease)      S/P aortic valve replacement      S/P CABG (coronary artery bypass graft)      H/O aortic arch replacement      Pacemaker  medtronic micra pacemaker      Current Nutrition Order: DASH/TLC + Ensure Clear 1x/day (240 kcal, 8 g protein per serving) +Ensure Pudding 1x/day (170 kcal, 4 g protein per serving)    PO Intake: Good (%) [   ]  Fair (50-75%) [   ] Poor (<25%) [   ] - Unable to assess    GI Issues: No nausea/vomiting documented at this time. Last documented bowel movement 10/30.    Pain: Unable to assess.     Skin Integrity: Generalized edema 1+. Surgical incisions per chart. Yousif score: 17.    Labs:   11-09    135  |  99  |  16  ----------------------------<  89  3.6   |  27  |  0.95    Ca    9.3      09 Nov 2022 06:15  Mg     2.4     11-09    TPro  7.2  /  Alb  2.9<L>  /  TBili  0.6  /  DBili  x   /  AST  19  /  ALT  13  /  AlkPhos  78  11-09    CAPILLARY BLOOD GLUCOSE          Medications:  MEDICATIONS  (STANDING):  amLODIPine   Tablet 2.5 milliGRAM(s) Oral daily  aspirin  chewable 81 milliGRAM(s) Oral daily  atorvastatin 10 milliGRAM(s) Oral at bedtime  budesonide  80 MICROgram(s)/formoterol 4.5 MICROgram(s) Inhaler 2 Puff(s) Inhalation every 12 hours  caspofungin IVPB      caspofungin IVPB 50 milliGRAM(s) IV Intermittent every 24 hours  DAPTOmycin IVPB 400 milliGRAM(s) IV Intermittent every 24 hours  famotidine    Tablet 20 milliGRAM(s) Oral daily  furosemide    Tablet 40 milliGRAM(s) Oral daily  heparin  Infusion 650 Unit(s)/Hr (9.5 mL/Hr) IV Continuous <Continuous>  hydrALAZINE 10 milliGRAM(s) Oral every 8 hours  lactated ringers. 1000 milliLiter(s) (50 mL/Hr) IV Continuous <Continuous>  levothyroxine 50 MICROGram(s) Oral daily  lidocaine   4% Patch 1 Patch Transdermal daily  metoprolol tartrate 50 milliGRAM(s) Oral two times a day  potassium chloride    Tablet ER 20 milliEquivalent(s) Oral daily  potassium chloride    Tablet ER 20 milliEquivalent(s) Oral once  senna 2 Tablet(s) Oral at bedtime  sodium chloride 0.9%. 1000 milliLiter(s) (50 mL/Hr) IV Continuous <Continuous>    MEDICATIONS  (PRN):  acetaminophen     Tablet .. 650 milliGRAM(s) Oral every 6 hours PRN Mild Pain (1 - 3)    Dosing Anthropometrics:  Height for BMI (FEET)	5 Feet  Height for BMI (INCHES)	2 Inch(s)  Height for BMI (CENTIMETERS)	157.48 Centimeter(s)  Weight for BMI (lbs)	145 lb  Weight for BMI (kg)	65.8 kg  Body Mass Index	26.5  IBW: 110 pounds   %IBW: 132%    Weight Change: No new documented weights in EMR. Obtain biweekly weights to assess changes/trends.    Estimated energy needs: Fluids per team. Based on Standards of Care pt >% IBW thus ideal body weight used for all calculations. Needs adjusted for advanced age and malnutrition.  Estimated Energy Needs From (db/kg) 25  Estimated Energy Needs To (db/kg)	30  Estimated Energy Needs Calculated From (db/kg) 1245  Estimated Energy Needs Calculated To (db/kg)	1494    Estimated Protein Needs From (g/kg)	1.2  Estimated Protein Needs To (g/kg)	1.4  Estimated Protein Needs Calculated From (g/kg)	59.76  Estimated Protein Needs Calculated To (g/kg)	69.72    Subjective: 75 YO Female, previous smoker, w/ PMHx of CAD, HTN, HLD, spinal stenosis, arthritis, hypothyroidism, bicuspid aortic valve s/p AVR, ascending/hemiarch replacement with frozen elephant trunk and left aorto-subclavian bypass, CABG x2 with Dr. 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 Muller and Dr. Augustin 7/29/22, with initiation of Keflex for concern over groin site infection, pt discharged to [facility] on 8/14/22. As an outpatient she was noted to have a small collection on her sternum that drained and self resolved. On 11/2/22 pt presented to [facility] ED for further work-up of sternal wound infection. On 11/2/22 pt underwent sternal I&D and wound vac placement with Dr. Muller and Dr. Vernon. On POD1 ID was consulted regarding abx regimen and need for PICC line, started on Vanco and Zosyn. Rec hold off on PICC until final ID recs. POD2 CM and SW on board for home wound vac and IV abx. POD3 Caspofungin added to regimen, wound vac changed. POD4 pt refusing VERNA. 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 Abx regimen changed per ID recs, vascular surgery consulted for r/o LUE limb ischemia, no surgical intervention at present.      Attempted to visit pt at bedside, however, pt asleep unable to arouse upon multiple attempts thus unable to obtain subjective information at this time. Labs reviewed 11/9; electrolytes within normal limits at this time. Pt started on ONS (per previous request during initial RD assessment). RD observed no ONS at bedside thus ? if pt completing/receiving ONS. RD to follow up. See nutrition recommendations below.     Previous Nutrition Diagnosis: Severe malnutrition in the context of chronic illness r/t suspected AEB inadequate protein-energy intake to meet nutritional needs r/t severe muscle wasting, PO intake <50% EER x > 1 month    Active [  x ]  Resolved [   ]    If resolved, new PES:     Goal: Pt to meet at least 75% of nutritional needs during hospital stay consistently and show no further s/s of malnutrition     Recommendations:  1. Continue with current diet order + ONS  2. Encourage pt to meet nutritional needs as able   3. Encourage adherence to diet education (reinforce as able)   4. Pain and bowel regimen per team   5. Will continue to assess/honor preferences as able   6. Monitor electrolytes; replete PRN    Education: Deferred    Risk Level: High [ x  ] Moderate [   ] Low [   ] Admitting Diagnosis:   Patient is a 77y old  Female who presents with a chief complaint of Sternal wound collection (09 Nov 2022 14:23)      PAST MEDICAL & SURGICAL HISTORY:  HTN (hypertension)      H/O aortic valve stenosis      CAD (coronary artery disease)      S/P aortic valve replacement      S/P CABG (coronary artery bypass graft)      H/O aortic arch replacement      Pacemaker  medtronic micra pacemaker      Current Nutrition Order: DASH/TLC + Ensure Clear 1x/day (240 kcal, 8 g protein per serving) +Ensure Pudding 1x/day (170 kcal, 4 g protein per serving)    PO Intake: Good (%) [   ]  Fair (50-75%) [   ] Poor (<25%) [   ] - Unable to assess    GI Issues: No nausea/vomiting documented at this time. Last documented bowel movement 10/30.    Pain: Unable to assess.     Skin Integrity: Generalized edema 1+. Surgical incisions per chart. Yousif score: 17.    Labs:   11-09    135  |  99  |  16  ----------------------------<  89  3.6   |  27  |  0.95    Ca    9.3      09 Nov 2022 06:15  Mg     2.4     11-09    TPro  7.2  /  Alb  2.9<L>  /  TBili  0.6  /  DBili  x   /  AST  19  /  ALT  13  /  AlkPhos  78  11-09    CAPILLARY BLOOD GLUCOSE          Medications:  MEDICATIONS  (STANDING):  amLODIPine   Tablet 2.5 milliGRAM(s) Oral daily  aspirin  chewable 81 milliGRAM(s) Oral daily  atorvastatin 10 milliGRAM(s) Oral at bedtime  budesonide  80 MICROgram(s)/formoterol 4.5 MICROgram(s) Inhaler 2 Puff(s) Inhalation every 12 hours  caspofungin IVPB      caspofungin IVPB 50 milliGRAM(s) IV Intermittent every 24 hours  DAPTOmycin IVPB 400 milliGRAM(s) IV Intermittent every 24 hours  famotidine    Tablet 20 milliGRAM(s) Oral daily  furosemide    Tablet 40 milliGRAM(s) Oral daily  heparin  Infusion 650 Unit(s)/Hr (9.5 mL/Hr) IV Continuous <Continuous>  hydrALAZINE 10 milliGRAM(s) Oral every 8 hours  lactated ringers. 1000 milliLiter(s) (50 mL/Hr) IV Continuous <Continuous>  levothyroxine 50 MICROGram(s) Oral daily  lidocaine   4% Patch 1 Patch Transdermal daily  metoprolol tartrate 50 milliGRAM(s) Oral two times a day  potassium chloride    Tablet ER 20 milliEquivalent(s) Oral daily  potassium chloride    Tablet ER 20 milliEquivalent(s) Oral once  senna 2 Tablet(s) Oral at bedtime  sodium chloride 0.9%. 1000 milliLiter(s) (50 mL/Hr) IV Continuous <Continuous>    MEDICATIONS  (PRN):  acetaminophen     Tablet .. 650 milliGRAM(s) Oral every 6 hours PRN Mild Pain (1 - 3)    Dosing Anthropometrics:  Height for BMI (FEET)	5 Feet  Height for BMI (INCHES)	2 Inch(s)  Height for BMI (CENTIMETERS)	157.48 Centimeter(s)  Weight for BMI (lbs)	145 lb  Weight for BMI (kg)	65.8 kg  Body Mass Index	26.5  IBW: 110 pounds   %IBW: 132%    Weight Change: No new documented weights in EMR. Obtain biweekly weights to assess changes/trends.    Estimated energy needs: Fluids per team. Based on Standards of Care pt >% IBW thus ideal body weight used for all calculations. Needs adjusted for advanced age and malnutrition.  Estimated Energy Needs From (db/kg) 25  Estimated Energy Needs To (db/kg)	30  Estimated Energy Needs Calculated From (db/kg) 1245  Estimated Energy Needs Calculated To (db/kg)	1494    Estimated Protein Needs From (g/kg)	1.2  Estimated Protein Needs To (g/kg)	1.4  Estimated Protein Needs Calculated From (g/kg)	59.76  Estimated Protein Needs Calculated To (g/kg)	69.72    Subjective: 77 YO Female, previous smoker, w/ PMHx of CAD, HTN, HLD, spinal stenosis, arthritis, hypothyroidism, bicuspid aortic valve s/p AVR, ascending/hemiarch replacement with frozen elephant trunk and left aorto-subclavian bypass, CABG x2 with Dr. 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 Muller and Dr. Augustin 7/29/22, with initiation of Keflex for concern over groin site infection, pt discharged to [facility] on 8/14/22. As an outpatient she was noted to have a small collection on her sternum that drained and self resolved. On 11/2/22 pt presented to [facility] ED for further work-up of sternal wound infection. On 11/2/22 pt underwent sternal I&D and wound vac placement with Dr. Muller and Dr. Vernon. On POD1 ID was consulted regarding abx regimen and need for PICC line, started on Vanco and Zosyn. Rec hold off on PICC until final ID recs. POD2 CM and SW on board for home wound vac and IV abx. POD3 Caspofungin added to regimen, wound vac changed. POD4 pt refusing VERNA. 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 Abx regimen changed per ID recs, vascular surgery consulted for r/o LUE limb ischemia, no surgical intervention at present.      Attempted to visit pt at bedside, however, pt asleep unable to arouse upon multiple attempts thus unable to obtain subjective information at this time. Labs reviewed 11/9; electrolytes within normal limits at this time. Pt started on ONS (per previous request during initial RD assessment). RD observed no ONS at bedside thus ? if pt completing/receiving ONS. RD to follow up. See nutrition recommendations below.     Previous Nutrition Diagnosis: Severe malnutrition in the context of chronic illness r/t suspected AEB inadequate protein-energy intake to meet nutritional needs r/t severe muscle wasting, PO intake <50% EER x > 1 month    Active [  x ]  Resolved [   ]    If resolved, new PES:     Goal: Pt to meet at least 75% of nutritional needs during hospital stay consistently and show no further s/s of malnutrition     Recommendations:  1. Continue with current diet order + ONS  2. Encourage pt to meet nutritional needs as able   3. Encourage adherence to diet education (reinforce as able)   4. Pain and bowel regimen per team   >>consider advanced bowel regimen as pt appears constipated  5. Will continue to assess/honor preferences as able   6. Monitor electrolytes; replete PRN    Education: Deferred    Risk Level: High [ x  ] Moderate [   ] Low [   ]

## 2022-11-09 NOTE — PROGRESS NOTE ADULT - ASSESSMENT
77 yo F w/ PMH of smoking, HTN, bicuspid aorta s/p AVR(bioprosthetic), ascending aorta replacement, CABGx2, L branchial artery embolectomy, TEVAR, presenting for swelling and tenderness on the upper sternum, SOB, and LE edema.  CT chest found a 2x3 subcutaneous soft-tissue density, early abscess vs focal phlegmonous changes.  I&D was done, cultures from wash out found C. Albicans, incision was closed and wound vac was placed.    Culture Data & Imaging:  - BCx 11/1: No growth @ 5 days; final  - CT chest 11/2: 2 pseudoaneurysms along aortic graft, non-occlusive thrombosis on graft, [id number] soft tissue density anterior to manubrium, occlusion of L subclavian, thrombosed pseudoaneurysm @ L subclavian.  - CXR 11/2: mild congestion + cardiomegaly  - TTE 11/2: mitral (calcification of valve and mild MR), tricuspid (mild/moderate TR), aorta (bioprosthetic valve w/o echodensity or AR)  - Tissue Cx 11/2: Rare yeast  - Sternal fluid Cx 11/2: C. Albicans  - CT Chest Aorta w/wo cont 11/7: increased parasternal chest wall collection/phlegmon w/ mediastinal extension resulting in occlusion of aortic-left subclavian bypass.  Flow in L UE attenuates above the elbow, minimal flow in ulnar artery. increased pulmonary edema and small b/l pleural effusions.  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    Recommend:  - C/w Dapto 400 mg IV q24, thrombocytopenia is improving  - Stop cefepime, and start ertapenem 1g daily   - c/w caspofungin 50mg IV daily     ID team 1 will continue to follow

## 2022-11-09 NOTE — PROGRESS NOTE ADULT - ASSESSMENT
75 YO Female, previous smoker, w/ PMHx of CAD, HTN, HLD, spinal stenosis, arthritis, hypothyroidism, bicuspid aortic valve s/p AVR, ascending/hemiarch replacement with frozen elephant trunk and left aorto-subclavian bypass, CABG x2 with Dr. 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 Muller and Dr. Augustin 7/29/22, with initiation of Keflex for concern over groin site infection, pt discharged to [facility] on 8/14/22. As an outpatient she was noted to have a small collection on her sternum that drained and self resolved. On 11/2/22 pt presented to [facility] ED for further work-up of sternal wound infection. On 11/2/22 pt underwent sternal I&D and wound vac placement with Dr. Muller and Dr. Vernon. On POD1 ID was consulted regarding abx regimen and need for PICC line, started on Vanco and Zosyn. Rec hold off on PICC until final ID recs. POD2 CM and SW on board for home wound vac and IV abx. POD3 Caspofungin added to regimen, wound vac changed. POD4 pt refusing VERNA. 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 Abx regimen changed per ID recs, vascular surgery consulted for r/o LUE limb ischemia, no surgical intervention at present.  11/9/22 ID changed cefepime to ertapenem. Still awaiting final ID recs and  picc line prior to discharge.    Plan:    Neurovascular:   -Hx of spinal stenosis  -Hx of arthritis  -Pain well controlled with current regimen. 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 Muller on 8/9/2021 course c/b poor wound healing s/p pec flap and closure on 9/29/21  -Cont Aspirin, statin, BB   -Cont Lasix and KCl (home meds)  -S/p TEVAR with R groin cutdown and femoral artery repair with Dr. Muller and Dr. Augustin 7/29/22   -Hx of HTN  -Cont Lopressor 50mg PO QD (home med)  -Cont Norvasc 2.5mg PO QD (home med)  -Cont Hydralazine 10mg PO Q8H (home med)  -Hx of HLD  -Cont Atorvastatin  -Hx of PPM placement  -Hemodynamically stable.   -Monitor: BP, HR, tele    Respiratory:   -Oxygenating well on room air  -Encourage continued use of IS 10x/hr and frequent ambulation  -CXR: stable     GI:  -GI PPX: Pepcid  -PO Diet  -Bowel Regimen: Senna     Renal / :  -Continue to monitor renal function: BUN/Cr: 16/0.95  -Monitor I/O's daily     Endocrine:    -No hx of DM   -A1c: 5.5  -Hx of hypothyroidism  -Cont Levothyroxine   -TSH: 5.22    Hematologic:  -CBC: H/H- 7.8/24.8  -Coagulation Panel.    ID:  -Sternal wound collection s/p sternal I&D and wound vac placement on 11/2/22  -ID following. Final D/C recs pending  -Will need PICC prior to D/C once final recs obtained  -Dc'ed Fluconazole; restarted caspofungin  -Cont Daptomycin 400mg PO Q24H  -Dc'ed Cefepime; started ertapenem  -Temperature: Afebrile  -CBC: WBC- 6.37  -Continue to observe for SIRS/Sepsis Syndrome.    Prophylaxis:  -Cont hep gtt (until picc line placement, then start eliquis)  -Continue with SCD's b/l while patient is at rest     Disposition:  -Discharge home once patient is medically ready  -Discharge on home IV abx

## 2022-11-09 NOTE — PROGRESS NOTE ADULT - SUBJECTIVE AND OBJECTIVE BOX
Patient discussed on morning rounds with Dr. Muller     Operation / Date:   11/2/22 suprasternal I&D and wound vac placement   8/9/2021 : AVR, ascending/hemiarch replacement with frozen elephant 9/29: pec flap and closure   7/21/22, TEVAR with R groin cutdown and femoral artery repair   Readmitted with sternal collection     SUBJECTIVE ASSESSMENT:  77y Female NAEO. Patient doing well; no complaints. wound vac beeping from blockage, so changed at bedside.    Vital Signs Last 24 Hrs  T(C): 37.3 (09 Nov 2022 22:06), Max: 37.5 (09 Nov 2022 17:00)  T(F): 99.2 (09 Nov 2022 22:06), Max: 99.5 (09 Nov 2022 17:00)  HR: 74 (09 Nov 2022 19:06) (60 - 74)  BP: 120/58 (09 Nov 2022 19:06) (118/55 - 143/64)  BP(mean): 84 (09 Nov 2022 19:06) (79 - 92)  RR: 16 (09 Nov 2022 19:06) (16 - 20)  SpO2: 96% (09 Nov 2022 19:06) (91% - 98%)    Parameters below as of 09 Nov 2022 19:06  Patient On (Oxygen Delivery Method): room air      I&O's Detail    08 Nov 2022 07:01  -  09 Nov 2022 07:00  --------------------------------------------------------  IN:    Heparin: 214 mL    IV PiggyBack: 350 mL    sodium chloride 0.9%: 50 mL  Total IN: 614 mL    OUT:    VAC (Vacuum Assisted Closure) System (mL): 100 mL    Voided (mL): 1800 mL  Total OUT: 1900 mL    Total NET: -1286 mL      09 Nov 2022 07:01  -  09 Nov 2022 23:59  --------------------------------------------------------  IN:    Heparin: 76 mL    Oral Fluid: 200 mL  Total IN: 276 mL    OUT:    Voided (mL): 300 mL  Total OUT: 300 mL    Total NET: -24 mL    CHEST TUBE: No  FRANCHESKA DRAIN:  No.  EPICARDIAL WIRES: No.  TIE DOWNS: No.  REGALADO: No.  Wound Vac: Yes.    PHYSICAL EXAM:  GEN: NAD, looks comfortable  Psych: Mood appropriate  Neuro: A&Ox3.  No focal deficits.  Moving all extremities.   HEENT: No obvious abnormalities  CV: S1S2, regular, no murmurs appreciated.  No carotid bruits.  No JVD  Lungs: Clear B/L.  No wheezing, rales or rhonchi  ABD: Soft, non-tender, non-distended.  +Bowel sounds  EXT: Warm and well perfused.  No peripheral edema noted  Musculoskeletal: Moving all extremities with normal ROM, no joint swelling  PV: Pedal pulses palpable  Incision Sites: wound vac at superior pole of MSI, changed at bedside 2/2 blockage in tubing    LABS:                        7.8    6.37  )-----------( 136      ( 09 Nov 2022 06:15 )             24.8       COUMADIN: No.     PT/INR - ( 09 Nov 2022 06:15 )   PT: 16.2 sec;   INR: 1.36          PTT - ( 09 Nov 2022 15:22 )  PTT:62.3 sec    11-09    135  |  99  |  16  ----------------------------<  89  3.6   |  27  |  0.95    Ca    9.3      09 Nov 2022 06:15  Mg     2.4     11-09    TPro  7.2  /  Alb  2.9<L>  /  TBili  0.6  /  DBili  x   /  AST  19  /  ALT  13  /  AlkPhos  78  11-09    MEDICATIONS  (STANDING):  amLODIPine   Tablet 2.5 milliGRAM(s) Oral daily  aspirin  chewable 81 milliGRAM(s) Oral daily  atorvastatin 10 milliGRAM(s) Oral at bedtime  budesonide  80 MICROgram(s)/formoterol 4.5 MICROgram(s) Inhaler 2 Puff(s) Inhalation every 12 hours  caspofungin IVPB      caspofungin IVPB 50 milliGRAM(s) IV Intermittent every 24 hours  DAPTOmycin IVPB 400 milliGRAM(s) IV Intermittent every 24 hours  famotidine    Tablet 20 milliGRAM(s) Oral daily  furosemide    Tablet 40 milliGRAM(s) Oral daily  heparin  Infusion 650 Unit(s)/Hr (9.5 mL/Hr) IV Continuous <Continuous>  hydrALAZINE 10 milliGRAM(s) Oral every 8 hours  lactated ringers. 1000 milliLiter(s) (50 mL/Hr) IV Continuous <Continuous>  levothyroxine 50 MICROGram(s) Oral daily  lidocaine   4% Patch 1 Patch Transdermal daily  metoprolol tartrate 50 milliGRAM(s) Oral two times a day  potassium chloride    Tablet ER 20 milliEquivalent(s) Oral daily  potassium chloride    Tablet ER 20 milliEquivalent(s) Oral once  senna 2 Tablet(s) Oral at bedtime  sodium chloride 0.9%. 1000 milliLiter(s) (50 mL/Hr) IV Continuous <Continuous>    MEDICATIONS  (PRN):  acetaminophen     Tablet .. 650 milliGRAM(s) Oral every 6 hours PRN Mild Pain (1 - 3)    RADIOLOGY & ADDITIONAL TESTS:    < from: Xray Chest 1 View- PORTABLE-Routine (Xray Chest 1 View- PORTABLE-Routine in AM.) (11.09.22 @ 05:44) >    FINDINGS:    Single frontal view of the chest demonstrates mild congestive changes.   Lead less pacemaker. Aortic knob stenting. The cardiomediastinal   silhouette is enlarged. No acute osseous abnormalities. Overlying EKG   leads and wires are noted. Please refer to the recent chest CT for   further details.    IMPRESSION: No interval change.    < end of copied text >

## 2022-11-09 NOTE — PROGRESS NOTE ADULT - ATTENDING COMMENTS
Sternal wound infection, LUE ischemia.  OR cultures from 11/2 with rare Candida albicans.  CTA 11/7 with increased left parasternal chest wall collection/phlegmon with mediastinal extension, now resulted in occlusion of aortic-left subclavian bypass.  Per primary team, no surgical intervention at this time.  LUE is better perfused today.  Would continue to f/u OR cultures, f/u susceptibility of C. albicans.  Will try to give regimen with qd infusions.  Platelet count is improving after change from vanc to dapto.  Would continue daptomycin, start ertapenem, d/c cefepime, continue caspofungin.  Will follow with you - team 1.  Dr. Crystal will resume care tomorrow.

## 2022-11-09 NOTE — PROGRESS NOTE ADULT - SUBJECTIVE AND OBJECTIVE BOX
INFECTIOUS DISEASES CONSULT FOLLOW-UP NOTE    INTERVAL HPI/OVERNIGHT EVENTS: CARYL    Subjective: Patient seen and examined at bedside. Complaining of ongoing sternal wound pain, improved since yesterday.    ANTIBIOTICS/RELEVANT:  Daptomycin, Cefepime -> Ertapenem, Caspofungin    MEDICATIONS  (STANDING):  amLODIPine   Tablet 2.5 milliGRAM(s) Oral daily  aspirin  chewable 81 milliGRAM(s) Oral daily  atorvastatin 10 milliGRAM(s) Oral at bedtime  budesonide  80 MICROgram(s)/formoterol 4.5 MICROgram(s) Inhaler 2 Puff(s) Inhalation every 12 hours  caspofungin IVPB      caspofungin IVPB 50 milliGRAM(s) IV Intermittent every 24 hours  DAPTOmycin IVPB 400 milliGRAM(s) IV Intermittent every 24 hours  famotidine    Tablet 20 milliGRAM(s) Oral daily  furosemide    Tablet 40 milliGRAM(s) Oral daily  heparin  Infusion 650 Unit(s)/Hr (9.5 mL/Hr) IV Continuous <Continuous>  hydrALAZINE 10 milliGRAM(s) Oral every 8 hours  lactated ringers. 1000 milliLiter(s) (50 mL/Hr) IV Continuous <Continuous>  levothyroxine 50 MICROGram(s) Oral daily  lidocaine   4% Patch 1 Patch Transdermal daily  metoprolol tartrate 50 milliGRAM(s) Oral two times a day  potassium chloride    Tablet ER 20 milliEquivalent(s) Oral daily  potassium chloride    Tablet ER 20 milliEquivalent(s) Oral once  senna 2 Tablet(s) Oral at bedtime  sodium chloride 0.9%. 1000 milliLiter(s) (50 mL/Hr) IV Continuous <Continuous>    MEDICATIONS  (PRN):  acetaminophen     Tablet .. 650 milliGRAM(s) Oral every 6 hours PRN Mild Pain (1 - 3)        Vital Signs Last 24 Hrs  T(C): 37.5 (09 Nov 2022 17:00), Max: 37.5 (09 Nov 2022 17:00)  T(F): 99.5 (09 Nov 2022 17:00), Max: 99.5 (09 Nov 2022 17:00)  HR: 69 (09 Nov 2022 16:54) (60 - 74)  BP: 143/64 (09 Nov 2022 16:54) (117/53 - 143/64)  BP(mean): 92 (09 Nov 2022 16:54) (77 - 92)  RR: 16 (09 Nov 2022 16:54) (16 - 20)  SpO2: 97% (09 Nov 2022 16:54) (91% - 98%)    Parameters below as of 09 Nov 2022 16:54  Patient On (Oxygen Delivery Method): room air        11-08-22 @ 07:01  -  11-09-22 @ 07:00  --------------------------------------------------------  IN: 614 mL / OUT: 1900 mL / NET: -1286 mL    11-09-22 @ 07:01  -  11-09-22 @ 18:54  --------------------------------------------------------  IN: 200 mL / OUT: 300 mL / NET: -100 mL        PHYSICAL EXAM  Constitutional: alert, NAD  Eyes: the sclera and conjunctiva were normal.   ENT: the ears and nose were normal in appearance.   Neck: the appearance of the neck was normal and the neck was supple.   Pulmonary: no respiratory distress and lungs CTA bilaterally.   Heart: heart rate was normal and rhythm regular, normal S1 and S2. Sternal wound with wound vac in place. Mild tenderness around wound. No erythema. Edema present, but about the same as prior.  Vascular: no peripheral edema  Abdomen: normal bowel sounds, soft, non-tender  Neurological: No focal deficits.    LABS:                        7.8    6.37  )-----------( 136      ( 09 Nov 2022 06:15 )             24.8     11-09    135  |  99  |  16  ----------------------------<  89  3.6   |  27  |  0.95    Ca    9.3      09 Nov 2022 06:15  Mg     2.4     11-09    TPro  7.2  /  Alb  2.9<L>  /  TBili  0.6  /  DBili  x   /  AST  19  /  ALT  13  /  AlkPhos  78  11-09    PT/INR - ( 09 Nov 2022 06:15 )   PT: 16.2 sec;   INR: 1.36          PTT - ( 09 Nov 2022 15:22 )  PTT:62.3 sec      MICROBIOLOGY:      RADIOLOGY & ADDITIONAL STUDIES:  Reviewed

## 2022-11-10 NOTE — PROGRESS NOTE ADULT - ASSESSMENT
Neurovascular:   No delirium. Pain well controlled with current regimen.  acetaminophen     Tablet .. 650 milliGRAM(s) every 6 hours PRN      Cardiovascular:   Hemodynamically stable. HR controlled    CAD  -continue aspirin and Plavix for acute graft patency,  -continue statin for long term graft patency  BP    HR: 81 (64 - 83)  BP: 95/61 (95/61 - 143/64)  -continue amLODIPine   Tablet 2.5 milliGRAM(s) daily  furosemide    Tablet 40 milliGRAM(s) daily  hydrALAZINE 10 milliGRAM(s) every 8 hours  metoprolol tartrate 50 milliGRAM(s) two times a day  , for BP control      Respiratory:   02 Sat = 98% on RA.  RR: 20 (15 - 20)  SpO2: 96% (94% - 97%)  -Wean to RA from for O2 Sat > 93%.  -Encourage Cough, deep breathing and Use of IS 10x / hr while awake.  -Chest PT 4xdaily    GI:   Stable  Continue famotidine    Tablet 20 milliGRAM(s) daily  senna 2 Tablet(s) at bedtime    -PO DASH diet    Renal / :   BUN/Cr Stable 17  /0.98    -Continue to monitor I/O's.    Endocrine:    Blood sugar stable   A1C: 5.5    TSH: 5.220      Hematologic:  H/H stable 8.1  /25.5    -DVT prophylaxis with Heparin sq    ID:  -Afebrile  -Continue to observe for SIRS/Sepsis Syndrome.  T(C): 36.6, Max: 37.5 (11-09-22 @ 17:00)    Disposition:   75 YO Female, previous smoker, w/ PMHx of CAD, HTN, HLD, spinal stenosis, arthritis, hypothyroidism, bicuspid aortic valve s/p AVR, ascending/hemiarch replacement with frozen elephant trunk and left aorto-subclavian bypass, CABG x2 with Dr. 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 Muller and Dr. Augustin 7/29/22, with initiation of Keflex for concern over groin site infection, pt discharged to [facility], [place] on 8/14/22. As an outpatient she was noted to have a small collection on her sternum that drained and self resolved. On 11/2/22 pt presented to [facility] ED for further work-up of sternal wound infection. On 11/2/22 pt underwent sternal I&D and wound vac placement with Dr. Muller and Dr. Vernon. On POD1 ID was consulted regarding abx regimen and need for PICC line, started on Vanco and Zosyn. Rec hold off on PICC until final ID recs. POD2 CM and SW on board for home wound vac and IV abx. POD3 Caspofungin added to regimen, wound vac changed. POD4 pt refusing VERNA. 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 Abx regimen changed per ID recs, vascular surgery consulted for r/o LUE limb ischemia, no surgical intervention at present.  11/9/22 ID changed cefepime to ertapenem. On 11/10: final ID recs in place, Scripts for home IV abx completed and scanned in by Case management she it to have a PICC placed in the AM, possible DC home tomorrow.     Neurovascular:   -No delirium. Pain well controlled with current regimen.  -acetaminophen     Tablet .. 650 milliGRAM(s) every 6 hours PRN    Cardiovascular:   #Hx of CAD and bicuspid aortic valve s/p AVR, ascending/hemiarch replacement with frozen elephant trunk and left aorto-subclavian bypass, CABG x2 with Dr. Muller on 8/9/2021 course c/b poor wound healing s/p pec flap and closure on 9/29/21  -Cont Aspirin, statin, BB   -Cont Lasix and KCl (home meds)  #S/p TEVAR with R groin cutdown and femoral artery repair with Dr. Muller and Dr. Augustin 7/29/22   #Hx of HTN  -Cont Lopressor 50mg PO QD (home med)  -Cont Norvasc 2.5mg PO QD (home med)  -Cont Hydralazine 10mg PO Q8H (home med)  #Hx of HLD  -Cont Atorvastatin  #Hx of PPM placement  -Hemodynamically stable.   -Monitor: BP, HR, tele  HR: 81 (64 - 83)  BP: 95/61 (95/61 - 143/64)    Respiratory:   02 Sat = 98% on RA.  RR: 20 (15 - 20)  SpO2: 96% (94% - 97%)  -Wean to RA from for O2 Sat > 93%.  -Encourage Cough, deep breathing and Use of IS 10x / hr while awake.  -Chest PT 4xdaily  -Continue Symbicort (home medication)     GI:   Stable  -Continue famotidine    Tablet 20 milliGRAM(s) daily  -senna 2 Tablet(s) at bedtime  -PO DASH diet    Renal / :   BUN/Cr Stable 17/0.98  -Continue to monitor I/O's.    Endocrine:    Blood sugar stable   A1C: 5.5  TSH: 5.220    Hematologic:  H/H stable 8.1/25.5  -DVT prophylaxis with Heparin gtt    ID:  #sternal wound infection   -final ID recs: Daptomycin 500 mg IVPB Q24H, Invanz 1g IVPB Q24H, Fluconazole 400 mg QD PO. She is to have weekly lab draws of CBC/CMP/ESR/CRP/CK faxed to ID at [phone]  -all scripts are completed and submitted by Case management   -Pending PICC in AM, PICC team aware please call IR in am.   -Afebrile  -Continue to observe for SIRS/Sepsis Syndrome.  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 No revascularization recommended at this time due to prior surgeries and current infection. She is to f/u with Vascular surgery as an outpatient for future surgical planning  -Per vascular team, if limb begins to lose sensation or decrease in motor ability, contact vascular surgery immediately   -Cont hep gtt, no surgical intervention recommended at present  -Re-start home Eliquis as indicated (on hep gtt until picc line placed    Disposition:  -PICC tomorrow morning, possible DC home

## 2022-11-10 NOTE — PROGRESS NOTE ADULT - SUBJECTIVE AND OBJECTIVE BOX
Patient discussed on morning rounds with Dr. Muller     Operation / Date: 11/2/22 suprasternal I&D and wound vac placement   8/9/2021 : AVR, ascending/hemiarch replacement with frozen elephant 9/29: pec flap and closure   7/21/22, TEVAR with R groin cutdown and femoral artery repair   Readmitted with sternal collection     SUBJECTIVE ASSESSMENT:  77y Female seen and examined at bedside with no complaints. She stated that she feels tired from being in a hospital. She denies dizziness, vision changes, chest pain, palpitations, shortness of breath, cough, n/v/d, extremity swelling, calf tenderness.     Vital Signs Last 24 Hrs  T(C): 36.6 (10 Nov 2022 09:21), Max: 37.5 (09 Nov 2022 17:00)  T(F): 97.9 (10 Nov 2022 09:21), Max: 99.5 (09 Nov 2022 17:00)  HR: 81 (10 Nov 2022 10:42) (64 - 83)  BP: 95/61 (10 Nov 2022 10:42) (95/61 - 143/64)  BP(mean): 73 (10 Nov 2022 10:42) (73 - 92)  RR: 20 (10 Nov 2022 10:42) (15 - 20)  SpO2: 96% (10 Nov 2022 10:42) (94% - 97%)    Parameters below as of 10 Nov 2022 10:42  Patient On (Oxygen Delivery Method): nasal cannula  O2 Flow (L/min): 2    I&O's Detail    09 Nov 2022 07:01  -  10 Nov 2022 07:00  --------------------------------------------------------  IN:    Heparin: 76 mL    Oral Fluid: 200 mL  Total IN: 276 mL    OUT:    Voided (mL): 300 mL  Total OUT: 300 mL    Total NET: -24 mL      10 Nov 2022 07:01  -  10 Nov 2022 12:25  --------------------------------------------------------  IN:    Oral Fluid: 200 mL  Total IN: 200 mL    OUT:    VAC (Vacuum Assisted Closure) System (mL): 2 mL    Voided (mL): 500 mL  Total OUT: 502 mL    Total NET: -302 mL    CHEST TUBE:  None   FRANCHESKA DRAIN:  None   EPICARDIAL WIRES: None  TIE DOWNS: None  REGALADO: None   WOUND VAC: YES    PHYSICAL EXAM:  GEN: NAD, looks comfortable  Psych: Mood appropriate  Neuro: A&Ox3.  No focal deficits.  Moving all extremities. Right upper extremity is warm and well perfused, sensation and motor intact.   HEENT: No obvious abnormalities  CV: S1S2, regular, no murmurs appreciated.  No carotid bruits.  No JVD  Lungs: Clear B/L.  No wheezing, rales or rhonchi  ABD: Soft, non-tender, non-distended.  +Bowel sounds  EXT: Warm and well perfused.  Trace peripheral edema noted  Musculoskeletal: Moving all extremities with normal ROM, no joint swelling  PV: Pedal pulses palpable    LABS:                        8.1    7.15  )-----------( 169      ( 10 Nov 2022 05:30 )             25.5       PT/INR - ( 09 Nov 2022 06:15 )   PT: 16.2 sec;   INR: 1.36          PTT - ( 10 Nov 2022 09:30 )  PTT:80.4 sec    11-10    132<L>  |  94<L>  |  17  ----------------------------<  92  3.4<L>   |  31  |  0.98    Ca    8.9      10 Nov 2022 05:30  Mg     2.2     11-10    TPro  7.4  /  Alb  3.2<L>  /  TBili  0.7  /  DBili  x   /  AST  17  /  ALT  12  /  AlkPhos  77  11-1    MEDICATIONS  (STANDING):  amLODIPine   Tablet 2.5 milliGRAM(s) Oral daily  aspirin  chewable 81 milliGRAM(s) Oral daily  atorvastatin 10 milliGRAM(s) Oral at bedtime  budesonide  80 MICROgram(s)/formoterol 4.5 MICROgram(s) Inhaler 2 Puff(s) Inhalation every 12 hours  caspofungin IVPB      caspofungin IVPB 50 milliGRAM(s) IV Intermittent every 24 hours  DAPTOmycin IVPB 400 milliGRAM(s) IV Intermittent every 24 hours  ertapenem  IVPB 1000 milliGRAM(s) IV Intermittent every 24 hours  famotidine    Tablet 20 milliGRAM(s) Oral daily  furosemide    Tablet 40 milliGRAM(s) Oral daily  heparin  Infusion 900 Unit(s)/Hr (9 mL/Hr) IV Continuous <Continuous>  hydrALAZINE 10 milliGRAM(s) Oral every 8 hours  lactated ringers. 1000 milliLiter(s) (50 mL/Hr) IV Continuous <Continuous>  levothyroxine 50 MICROGram(s) Oral daily  lidocaine   4% Patch 1 Patch Transdermal daily  metoprolol tartrate 50 milliGRAM(s) Oral two times a day  potassium chloride    Tablet ER 20 milliEquivalent(s) Oral daily  potassium chloride    Tablet ER 20 milliEquivalent(s) Oral once  senna 2 Tablet(s) Oral at bedtime  sodium chloride 0.9%. 1000 milliLiter(s) (50 mL/Hr) IV Continuous <Continuous>    MEDICATIONS  (PRN):  acetaminophen     Tablet .. 650 milliGRAM(s) Oral every 6 hours PRN Mild Pain (1 - 3)    RADIOLOGY & ADDITIONAL TESTS:  < from: Xray Chest 1 View- PORTABLE-Routine (Xray Chest 1 View- PORTABLE-Routine in AM.) (11.09.22 @ 05:44) >  IMPRESSION: No interval change.    --- End of Report ---      < end of copied text >

## 2022-11-10 NOTE — PROGRESS NOTE ADULT - SUBJECTIVE AND OBJECTIVE BOX
INFECTIOUS DISEASES CONSULT FOLLOW-UP NOTE    *INCOMPLETE*    INTERVAL HPI/OVERNIGHT EVENTS:    Subjective: Patient seen and examined at bedside. Denies fevers, chills, cough, abdominal pain, diarrhea.      ANTIBIOTICS/RELEVANT:    MEDICATIONS  (STANDING):  amLODIPine   Tablet 2.5 milliGRAM(s) Oral daily  aspirin  chewable 81 milliGRAM(s) Oral daily  atorvastatin 10 milliGRAM(s) Oral at bedtime  budesonide  80 MICROgram(s)/formoterol 4.5 MICROgram(s) Inhaler 2 Puff(s) Inhalation every 12 hours  caspofungin IVPB      caspofungin IVPB 50 milliGRAM(s) IV Intermittent every 24 hours  DAPTOmycin IVPB 400 milliGRAM(s) IV Intermittent every 24 hours  ertapenem  IVPB 1000 milliGRAM(s) IV Intermittent every 24 hours  famotidine    Tablet 20 milliGRAM(s) Oral daily  furosemide    Tablet 40 milliGRAM(s) Oral daily  heparin  Infusion 900 Unit(s)/Hr (9 mL/Hr) IV Continuous <Continuous>  hydrALAZINE 10 milliGRAM(s) Oral every 8 hours  lactated ringers. 1000 milliLiter(s) (50 mL/Hr) IV Continuous <Continuous>  levothyroxine 50 MICROGram(s) Oral daily  lidocaine   4% Patch 1 Patch Transdermal daily  metoprolol tartrate 50 milliGRAM(s) Oral two times a day  potassium chloride    Tablet ER 20 milliEquivalent(s) Oral daily  potassium chloride    Tablet ER 20 milliEquivalent(s) Oral once  senna 2 Tablet(s) Oral at bedtime  sodium chloride 0.9%. 1000 milliLiter(s) (50 mL/Hr) IV Continuous <Continuous>    MEDICATIONS  (PRN):  acetaminophen     Tablet .. 650 milliGRAM(s) Oral every 6 hours PRN Mild Pain (1 - 3)        Vital Signs Last 24 Hrs  T(C): 36.6 (10 Nov 2022 09:21), Max: 37.5 (09 Nov 2022 17:00)  T(F): 97.9 (10 Nov 2022 09:21), Max: 99.5 (09 Nov 2022 17:00)  HR: 72 (10 Nov 2022 06:55) (64 - 74)  BP: 131/60 (10 Nov 2022 06:55) (118/56 - 143/64)  BP(mean): 86 (10 Nov 2022 06:55) (81 - 92)  RR: 17 (10 Nov 2022 06:55) (15 - 18)  SpO2: 96% (10 Nov 2022 06:55) (91% - 97%)    Parameters below as of 10 Nov 2022 06:55  Patient On (Oxygen Delivery Method): nasal cannula  O2 Flow (L/min): 2      11-09-22 @ 07:01  -  11-10-22 @ 07:00  --------------------------------------------------------  IN: 276 mL / OUT: 300 mL / NET: -24 mL    11-10-22 @ 07:01  -  11-10-22 @ 10:13  --------------------------------------------------------  IN: 200 mL / OUT: 502 mL / NET: -302 mL        PHYSICAL EXAM  Constitutional: alert, NAD  Eyes: the sclera and conjunctiva were normal.   ENT: the ears and nose were normal in appearance.   Neck: the appearance of the neck was normal and the neck was supple.   Pulmonary: no respiratory distress and lungs CTA bilaterally.   Heart: heart rate was normal and rhythm regular, normal S1 and S2  Vascular: no peripheral edema  Abdomen: normal bowel sounds, soft, non-tender  Neurological: no focal deficits  Psychiatric: the affect was normal      LABS:                        8.1    7.15  )-----------( 169      ( 10 Nov 2022 05:30 )             25.5     11-10    132<L>  |  94<L>  |  17  ----------------------------<  92  3.4<L>   |  31  |  0.98    Ca    8.9      10 Nov 2022 05:30  Mg     2.2     11-10    TPro  7.4  /  Alb  3.2<L>  /  TBili  0.7  /  DBili  x   /  AST  17  /  ALT  12  /  AlkPhos  77  11-10    PT/INR - ( 09 Nov 2022 06:15 )   PT: 16.2 sec;   INR: 1.36          PTT - ( 10 Nov 2022 09:30 )  PTT:80.4 sec      MICROBIOLOGY:      RADIOLOGY & ADDITIONAL STUDIES:  Reviewed INFECTIOUS DISEASES CONSULT FOLLOW-UP NOTE    *INCOMPLETE*    INTERVAL HPI/OVERNIGHT EVENTS:    Subjective: Patient seen and examined at bedside. Denies fevers, chills, cough, abdominal pain, diarrhea. Pt endorses improvement in sternal wound pain.  Pts main concern is her fatigue.      ANTIBIOTICS/RELEVANT:    MEDICATIONS  (STANDING):  amLODIPine   Tablet 2.5 milliGRAM(s) Oral daily  aspirin  chewable 81 milliGRAM(s) Oral daily  atorvastatin 10 milliGRAM(s) Oral at bedtime  budesonide  80 MICROgram(s)/formoterol 4.5 MICROgram(s) Inhaler 2 Puff(s) Inhalation every 12 hours  caspofungin IVPB      caspofungin IVPB 50 milliGRAM(s) IV Intermittent every 24 hours  DAPTOmycin IVPB 400 milliGRAM(s) IV Intermittent every 24 hours  ertapenem  IVPB 1000 milliGRAM(s) IV Intermittent every 24 hours  famotidine    Tablet 20 milliGRAM(s) Oral daily  furosemide    Tablet 40 milliGRAM(s) Oral daily  heparin  Infusion 900 Unit(s)/Hr (9 mL/Hr) IV Continuous <Continuous>  hydrALAZINE 10 milliGRAM(s) Oral every 8 hours  lactated ringers. 1000 milliLiter(s) (50 mL/Hr) IV Continuous <Continuous>  levothyroxine 50 MICROGram(s) Oral daily  lidocaine   4% Patch 1 Patch Transdermal daily  metoprolol tartrate 50 milliGRAM(s) Oral two times a day  potassium chloride    Tablet ER 20 milliEquivalent(s) Oral daily  potassium chloride    Tablet ER 20 milliEquivalent(s) Oral once  senna 2 Tablet(s) Oral at bedtime  sodium chloride 0.9%. 1000 milliLiter(s) (50 mL/Hr) IV Continuous <Continuous>    MEDICATIONS  (PRN):  acetaminophen     Tablet .. 650 milliGRAM(s) Oral every 6 hours PRN Mild Pain (1 - 3)        Vital Signs Last 24 Hrs  T(C): 36.6 (10 Nov 2022 09:21), Max: 37.5 (09 Nov 2022 17:00)  T(F): 97.9 (10 Nov 2022 09:21), Max: 99.5 (09 Nov 2022 17:00)  HR: 72 (10 Nov 2022 06:55) (64 - 74)  BP: 131/60 (10 Nov 2022 06:55) (118/56 - 143/64)  BP(mean): 86 (10 Nov 2022 06:55) (81 - 92)  RR: 17 (10 Nov 2022 06:55) (15 - 18)  SpO2: 96% (10 Nov 2022 06:55) (91% - 97%)    Parameters below as of 10 Nov 2022 06:55  Patient On (Oxygen Delivery Method): nasal cannula  O2 Flow (L/min): 2      11-09-22 @ 07:01  -  11-10-22 @ 07:00  --------------------------------------------------------  IN: 276 mL / OUT: 300 mL / NET: -24 mL    11-10-22 @ 07:01  -  11-10-22 @ 10:13  --------------------------------------------------------  IN: 200 mL / OUT: 502 mL / NET: -302 mL        PHYSICAL EXAM  Constitutional: alert, NAD  Eyes: the sclera and conjunctiva were normal.   ENT: the ears and nose were normal in appearance.   Neck: the appearance of the neck was normal and the neck was supple.   Pulmonary: no respiratory distress and lungs CTA bilaterally.   Heart: heart rate was normal and rhythm regular, normal S1 and S2. Sternal wound with wound vac in place. Mild tenderness around wound. No erythema. Edema present, slightly better than day prior.  Vascular: no peripheral edema  Abdomen: normal bowel sounds, soft, non-tender  Neurological: No focal deficits.      LABS:                        8.1    7.15  )-----------( 169      ( 10 Nov 2022 05:30 )             25.5     11-10    132<L>  |  94<L>  |  17  ----------------------------<  92  3.4<L>   |  31  |  0.98    Ca    8.9      10 Nov 2022 05:30  Mg     2.2     11-10    TPro  7.4  /  Alb  3.2<L>  /  TBili  0.7  /  DBili  x   /  AST  17  /  ALT  12  /  AlkPhos  77  11-10    PT/INR - ( 09 Nov 2022 06:15 )   PT: 16.2 sec;   INR: 1.36          PTT - ( 10 Nov 2022 09:30 )  PTT:80.4 sec      MICROBIOLOGY:      RADIOLOGY & ADDITIONAL STUDIES:  Reviewed

## 2022-11-10 NOTE — PROGRESS NOTE ADULT - ATTENDING COMMENTS
No event overnight. Patient feels ok, denied CP, SOB, cough, n/v/d.  Exam notable for sternum area with wound VAC, clear lung, RRR, s1/s2.  Discussed with her about abx regimen - ideally I would like to do cefepime to cover PsA; however patient says abx regimen has to be once daily since her daughter works and cannot do more than once/day dosing.  I don't think quinolone is a good option for her given her extensive cardiac/vascular surgery and she will be on fluconazole, QTc prolonging agent.  So will do ertapenem instead.  Switch caspo to fluconazole 400mg PO daily.  Increase dapto to 500mg IV q24h (8mg/kg).      - Place single lumen PICC line   - cont daptomycin 500mg IV q24h  - cont ertapenem 1g IV q24h  - cont fluconazole 400mg PO q24h   - Duration of antibiotics is tentatively 6 weeks ( 11/2 - 12/13)  - total duration of abx TBD as outpatient pending repeat CT chest to see the resolution of mediastinitis/abscess  - Weekly labs: CMP, CBC, ESR, CRP, CK faxed to ID office at [phone]  - Patient to follow up with Dr. crystal in 2 weeks ([address] [place] [address], [phone]), ID office will call patient to schedule     Thank you for your consult.  Please re-consult us or call us with questions.  Case d/w primary team.    Merissa Crystal MD, MS  Infectious Disease attending  work cell [phone]  For any questions during evening/weekend/holiday, please page ID on call

## 2022-11-10 NOTE — PROGRESS NOTE ADULT - ASSESSMENT
75 yo F w/ PMH of smoking, HTN, bicuspid aorta s/p AVR(bioprosthetic), ascending aorta replacement, CABGx2, L branchial artery embolectomy, TEVAR, presenting for swelling and tenderness on the upper sternum, SOB, and LE edema.  CT chest found a 2x3 subcutaneous soft-tissue density, early abscess vs focal phlegmonous changes.  I&D was done, cultures from wash out found C. Albicans, incision was closed and wound vac was placed.    Culture Data & Imaging:  - BCx 11/1: No growth @ 5 days; final  - CT chest 11/2: 2 pseudoaneurysms along aortic graft, non-occlusive thrombosis on graft, [id number] soft tissue density anterior to manubrium, occlusion of L subclavian, thrombosed pseudoaneurysm @ L subclavian.  - CXR 11/2: mild congestion + cardiomegaly  - TTE 11/2: mitral (calcification of valve and mild MR), tricuspid (mild/moderate TR), aorta (bioprosthetic valve w/o echodensity or AR)  - Tissue Cx 11/2: Rare yeast  - Sternal fluid Cx 11/2: C. Albicans  - CT Chest Aorta w/wo cont 11/7: increased parasternal chest wall collection/phlegmon w/ mediastinal extension resulting in occlusion of aortic-left subclavian bypass.  Flow in L UE attenuates above the elbow, minimal flow in ulnar artery. increased pulmonary edema and small b/l pleural effusions.  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    Recommend:  - C/w Dapto 400 mg IV q24, thrombocytopenia is improving  - Stop cefepime, and start ertapenem 1g daily   - c/w caspofungin 50mg IV daily     ID team 1 will continue to follow 75 yo F w/ PMH of smoking, HTN, bicuspid aorta s/p AVR(bioprosthetic), ascending aorta replacement, CABGx2, L branchial artery embolectomy, TEVAR, presenting for swelling and tenderness on the upper sternum, SOB, and LE edema.  CT chest found a 2x3 subcutaneous soft-tissue density, early abscess vs focal phlegmonous changes.  I&D was done, cultures from wash out found C. Albicans, incision was closed and wound vac was placed.    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  - CXR 11/2: mild congestion + cardiomegaly  - TTE 11/2: mitral (calcification of valve and mild MR), tricuspid (mild/moderate TR), aorta (bioprosthetic valve w/o echodensity or AR)  - Tissue Cx 11/2: Rare yeast  - Sternal fluid Cx 11/2: C. Albicans susceptible to fluconazole; final  - CT Chest Aorta w/wo cont 11/7: increased parasternal chest wall collection/phlegmon w/ mediastinal extension resulting in occlusion of aortic-left subclavian bypass.  Flow in L UE attenuates above the elbow, minimal flow in ulnar artery. increased pulmonary edema and small b/l pleural effusions.  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    Recommend:  - Check CK b/c pt is getting Dapto  - Increase Dapto to 500 mg IV q24, thrombocytopenia is improving  - Stop cefepime, and start ertapenem 1g daily   - Stop caspofungin and start fluconazole 400 PO q24 b/c cultures show C. albicans is susceptible    ID team 1 will continue to follow 77 yo F w/ PMH of smoking, HTN, bicuspid aorta s/p AVR(bioprosthetic), ascending aorta replacement, CABGx2, L branchial artery embolectomy, TEVAR, presenting for swelling and tenderness on the upper sternum, SOB, and LE edema.  CT chest found a 2x3 subcutaneous soft-tissue density, early abscess vs focal phlegmonous changes.  I&D was done, cultures from wash out found C. Albicans, incision was closed and wound vac was placed.    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  - CXR 11/2: mild congestion + cardiomegaly  - TTE 11/2: mitral (calcification of valve and mild MR), tricuspid (mild/moderate TR), aorta (bioprosthetic valve w/o echodensity or AR)  - Tissue Cx 11/2: Rare yeast  - Sternal fluid Cx 11/2: C. Albicans susceptible to fluconazole; final  - CT Chest Aorta w/wo cont 11/7: increased parasternal chest wall collection/phlegmon w/ mediastinal extension resulting in occlusion of aortic-left subclavian bypass.  Flow in L UE attenuates above the elbow, minimal flow in ulnar artery. increased pulmonary edema and small b/l pleural effusions.  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    Recommend:  - Check CK b/c pt is getting Dapto  - Increase Dapto to 500 mg IV q24, thrombocytopenia is improving  - c/w ertapenem 1g daily   - Stop caspofungin and start fluconazole 400 PO q24 b/c cultures show C. albicans is susceptible  - Repeat EKG to check QTC prior to discharge  - Fax outpatient labs as below    ID team 1 will sign off, re-consult with questions

## 2022-11-11 NOTE — PROGRESS NOTE ADULT - REASON FOR ADMISSION
Sternal wound collection

## 2022-11-11 NOTE — DISCHARGE NOTE PROVIDER - HOSPITAL COURSE
75 YO Female, previous smoker, w/ PMHx of CAD, HTN, HLD, spinal stenosis, arthritis, hypothyroidism, bicuspid aortic valve s/p AVR, ascending/hemiarch replacement with frozen elephant trunk and left aorto-subclavian bypass, CABG x2 with Dr. 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 Muller and Dr. Augustin 7/29/22, with initiation of Keflex for concern over groin site infection, pt discharged to [facility] on 8[facility]14[facility]. As an outpatient she was noted to have a small collection on her sternum that drained and self resolved. On 11/2/22 pt presented to [facility] ED for further work-up of sternal wound infection. On 11/2/22 pt underwent sternal I&D and wound vac placement with Dr. Muller and Dr. Vernon. On POD1 ID was consulted regarding abx regimen and need for PICC line, started on Vanco and Zosyn. Rec hold off on PICC until final ID recs. POD2 CM and SW on board for home wound vac and IV abx. POD3 Caspofungin added to regimen, wound vac changed. POD4 pt refusing VERNA. 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 Abx regimen changed per ID recs, vascular surgery consulted for r/o LUE limb ischemia, no surgical intervention at present.  11/9/22 ID changed cefepime to ertapenem. On 11/10: final ID recs in place, Scripts for home IV abx completed and scanned in by Case management, she had PICC line placed today and wound vac changed today.  Patient ambulating independently with room air, tolerating diet, pain controlled, urinating and having bowel movements.  Per Dr. Bell, Patient stable for discharge home.

## 2022-11-11 NOTE — CHART NOTE - NSCHARTNOTEFT_GEN_A_CORE
Discussed with bedside nurse patient to be discharged after patient received Daptomycin and Ertapenem dose today.  Patient was only given ertapenem prior to patient being discharged.  CTICU intensivist notified.  Patient confirmed to received Daptomycin and ertapenem at home tomorrow.

## 2022-11-11 NOTE — CHART NOTE - NSCHARTNOTEFT_GEN_A_CORE
The old dressing was removed. The remainder of the procedure was done in a sterile fashion. The wound bed had pink/red granulation tissue. There was not drainage, tunneling, or cavities.     Measurements were 2 cm wide, 1 cm deep, 3 cm long.     Wound vac placement: The foam was cut and shaped to fit the wound and placed in the wound.  The transparent drape was cut and applied to cover the foam leaving a 1 cm edge.  A 1 cm hole was cut in the center of the drape and the sensatrac pad was placed so that the tubing was arranged in a direction and position that was comfortable for the patient. The tubing was connected to the vac suction canister and the connectors locked together. The vac suction unit was activated and set at 125 mgHg.    The patient tolerated the procedure well.

## 2022-11-11 NOTE — DISCHARGE NOTE PROVIDER - NSDCMRMEDTOKEN_GEN_ALL_CORE_FT
acetaminophen 325 mg oral tablet: 2 tab(s) orally every 6 hours, As needed, Mild Pain (1 - 3)  amLODIPine 2.5 mg oral tablet: 1 tab(s) orally once a day  apixaban 5 mg oral tablet: 1 tab(s) orally every 12 hours  aspirin 81 mg oral tablet, chewable: 1 tab(s) orally once a day  atorvastatin 10 mg oral tablet: 1 tab(s) orally once a day (at bedtime)  DAPTOmycin 500 mg intravenous injection: 500 milligram(s) intravenously once a day until 12/13  ertapenem 1 g injection: 1 gram(s) intravenously once a day until 12/13  famotidine 20 mg oral tablet: 1 tab(s) orally once a day  fluconazole 200 mg oral tablet: 2 tab(s) orally once a day take till Dec 17th  furosemide 20 mg oral tablet: Take 2 tab(s) orally once a day till Nov 17th then take 1 tab once a day  Heparin 10 units/ML post infusion : Heparin 10 units/ML post infusion   hydrALAZINE 10 mg oral tablet: 1 tab(s) orally every 8 hours  levothyroxine 50 mcg (0.05 mg) oral tablet: 1 tab(s) orally once a day  metoprolol tartrate 50 mg oral tablet: 1 tab(s) orally 2 times a day  Normal saline 0.9% 10 ML pre and post infusion flushes : Normal saline 0.9% 10 ML pre and post infusion flushes   potassium chloride 10 mEq oral tablet, extended release: Take 2 tab(s) orally once a day till Nov 17th then take 1 tab once a day  senna leaf extract oral tablet: 2 tab(s) orally once a day (at bedtime), As Needed -for constipation   Symbicort 80 mcg-4.5 mcg/inh inhalation aerosol: 2 puff(s) inhaled 2 times a day  Weekly CBC, CMP, ESR, CRP, CK. Please fax to [phone]: 1 unit(s) once a day    acetaminophen 325 mg oral tablet: 2 tab(s) orally every 6 hours, As needed, Mild Pain (1 - 3)  amLODIPine 2.5 mg oral tablet: 1 tab(s) orally once a day  apixaban 5 mg oral tablet: 1 tab(s) orally every 12 hours  aspirin 81 mg oral tablet, chewable: 1 tab(s) orally once a day  atorvastatin 10 mg oral tablet: 1 tab(s) orally once a day (at bedtime)  DAPTOmycin 500 mg intravenous injection: 500 milligram(s) intravenously once a day until 12/13  ertapenem 1 g injection: 1 gram(s) intravenously once a day until 12/13  famotidine 20 mg oral tablet: 1 tab(s) orally once a day  fluconazole 200 mg oral tablet: 2 tab(s) orally once a day take till Dec 17th  furosemide 20 mg oral tablet: Take 2 tab(s) orally once a day till Nov 17th then take 1 tab once a day  Heparin 10 units/ML post infusion : Heparin 10 units/ML post infusion   hydrALAZINE 10 mg oral tablet: 1 tab(s) orally every 8 hours  levothyroxine 50 mcg (0.05 mg) oral tablet: 1 tab(s) orally once a day  metoprolol tartrate 50 mg oral tablet: 1 tab(s) orally 2 times a day  Normal saline 0.9% 10 ML pre and post infusion flushes : Normal saline 0.9% 10 ML pre and post infusion flushes   potassium chloride 10 mEq oral tablet, extended release: Take 2 tab(s) orally once a day till Nov 17th then take 1 tab once a day  senna leaf extract oral tablet: 2 tab(s) orally once a day (at bedtime), As Needed -for constipation   Symbicort 80 mcg-4.5 mcg/inh inhalation aerosol: 2 puff(s) inhaled 2 times a day  traMADol 50 mg oral tablet: 1 tab(s) orally once a day prior to wound vac change MDD:1  Weekly CBC, CMP, ESR, CRP, CK. Please fax to [phone]: 1 unit(s) once a day

## 2022-11-11 NOTE — DISCHARGE NOTE NURSING/CASE MANAGEMENT/SOCIAL WORK - NSDCPEFALRISK_GEN_ALL_CORE
For information on Fall & Injury Prevention, visit: [email or web address] OR  [email or web address] OR  https://www.cdc.gov/steadi/patient.html

## 2022-11-11 NOTE — DISCHARGE NOTE PROVIDER - NSDCFUSCHEDAPPT_GEN_ALL_CORE_FT
Hugo Augustin  [facility]  VASCULAR [address]  Scheduled Appointment: 11/22/2022    Bladimir Muller  [facility]  CTSURG [address]  Scheduled Appointment: 11/22/2022    Aron Blank  [facility]  CARDIOLOGY [address]  Scheduled Appointment: 11/23/2022    Merissa Crystal  [facility]  INFDISEASE [address]  Scheduled Appointment: 11/29/2022    Parvez Khanna  [facility]  HEARTVASC [address]  Scheduled Appointment: 12/22/2022    Aron Blank  [facility]  CARDIOLOGY [address]  Scheduled Appointment: 01/30/2023

## 2022-11-11 NOTE — DISCHARGE NOTE PROVIDER - PROVIDER TOKENS
PROVIDER:[TOKEN:[[id number]],SCHEDULEDAPPT:[11/22/2022],SCHEDULEDAPPTTIME:[01:30 PM]],PROVIDER:[TOKEN:[[id number]],SCHEDULEDAPPT:[11/29/2022],SCHEDULEDAPPTTIME:[10:20 AM]],PROVIDER:[TOKEN:[[id number]],SCHEDULEDAPPT:[11/22/2022],SCHEDULEDAPPTTIME:[10:30 AM]],PROVIDER:[TOKEN:[[id number]],SCHEDULEDAPPT:[11/23/2022],SCHEDULEDAPPTTIME:[10:45 AM]]

## 2022-11-11 NOTE — DISCHARGE NOTE PROVIDER - NSDCQMCOGNITION_NEU_ALL_CORE
May 23, 2021     Joe Patel  [address] [facility] [id number]    Eze Diaz had a colonoscopy performed at [facility] on May 4, 2021. A benign hyperplastic colon polyp was removed.    A repeat colonoscopy exam is recommended at age 45 for average risk colon cancer screening, unless indicated sooner.    Please keep in mind that many colon polyps are precancerous and should be removed to prevent a cancerous growth in the future. Please contact us or your primary care physician when it is time for your repeat colonoscopy, or sooner, if you are experiencing gastrointestinal problems.       If you have any questions or concerns, please contact the clinic at [phone] for an appointment with Ирина Puentes NP.    Thank you,                                                                 Linda Salinas MD   No difficulties

## 2022-11-11 NOTE — PROGRESS NOTE ADULT - PROVIDER SPECIALTY LIST ADULT
CT Surgery
CT Surgery
Infectious Disease
Vascular Surgery
Vascular Surgery
CT Surgery
Infectious Disease
Vascular Surgery
CT Surgery
Infectious Disease
Infectious Disease
CT Surgery

## 2022-11-11 NOTE — DISCHARGE NOTE PROVIDER - NSDCCPCAREPLAN_GEN_ALL_CORE_FT
PRINCIPAL DISCHARGE DIAGNOSIS  Diagnosis: Abscess of skin  Assessment and Plan of Treatment:       SECONDARY DISCHARGE DIAGNOSES  Diagnosis: Anemia  Assessment and Plan of Treatment:     Diagnosis: HTN (hypertension)  Assessment and Plan of Treatment:     Diagnosis: HLD (hyperlipidemia)  Assessment and Plan of Treatment:

## 2022-11-11 NOTE — DISCHARGE NOTE PROVIDER - NSDCCPTREATMENT_GEN_ALL_CORE_FT
PRINCIPAL PROCEDURE  Procedure: Incision and drainage of sternum with debridement  Findings and Treatment: supersternal incision

## 2022-11-11 NOTE — PROGRESS NOTE ADULT - SUBJECTIVE AND OBJECTIVE BOX
Patient discussed on morning rounds with Dr. Muller    Operation / Date: 11/2/22 suprasternal I&D and wound vac placement   8/9/2021 : AVR, ascending/hemiarch replacement with frozen elephant 9/29: pec flap and closure   7/21/22, TEVAR with R groin cutdown and femoral artery repair   Readmitted with sternal collection     SUBJECTIVE ASSESSMENT:  77y Female seen and examined at bedside.  Patient with no complaints.  She states wound vac was leaking earlier.  Denies chest pain, shortness of breath, nausea, vomiting.        Vital Signs Last 24 Hrs  T(C): 37.3 (11 Nov 2022 09:43), Max: 37.3 (11 Nov 2022 09:43)  T(F): 99.2 (11 Nov 2022 09:43), Max: 99.2 (11 Nov 2022 09:43)  HR: 80 (11 Nov 2022 08:38) (64 - 91)  BP: 128/59 (11 Nov 2022 08:38) (106/56 - 134/61)  BP(mean): 85 (11 Nov 2022 08:38) (77 - 88)  RR: 14 (11 Nov 2022 08:38) (12 - 19)  SpO2: 99% (11 Nov 2022 08:38) (92% - 99%)    Parameters below as of 11 Nov 2022 08:38  Patient On (Oxygen Delivery Method): room air      I&O's Detail    10 Nov 2022 07:01  -  11 Nov 2022 07:00  --------------------------------------------------------  IN:    Heparin: 171 mL    Oral Fluid: 537 mL  Total IN: 708 mL    OUT:    VAC (Vacuum Assisted Closure) System (mL): 2 mL    Voided (mL): 500 mL  Total OUT: 502 mL    Total NET: 206 mL      11 Nov 2022 07:01  -  11 Nov 2022 12:17  --------------------------------------------------------  IN:    Oral Fluid: 250 mL  Total IN: 250 mL    OUT:    Voided (mL): 1200 mL  Total OUT: 1200 mL    Total NET: -950 mL          CHEST TUBE:  No.   FRANCHESKA DRAIN:  No.  EPICARDIAL WIRES: No.  TIE DOWNS: No.  REGALADO: No.  Vac: Yes, no leak or blockage    PHYSICAL EXAM:    GEN: NAD, looks comfortable  Psych: Mood appropriate  Neuro: A&Ox3.  No focal deficits.  Moving all extremities. Right upper extremity is warm and well perfused, sensation and motor intact.   HEENT: No obvious abnormalities  CV: S1S2, regular, no murmurs appreciated.  No carotid bruits.  No JVD  Lungs: Clear B/L.  No wheezing, rales or rhonchi  ABD: Soft, non-tender, non-distended.  +Bowel sounds  EXT: Warm and well perfused.  Trace peripheral edema noted  Musculoskeletal: Moving all extremities with normal ROM, no joint swelling  PV: Pedal pulses palpable    LABS:                        8.9    7.64  )-----------( 177      ( 11 Nov 2022 05:30 )             27.5       COUMADIN:  Yes/No. REASON: .    PTT - ( 11 Nov 2022 05:30 )  PTT:87.1 sec    11-11    131<L>  |  93<L>  |  18  ----------------------------<  96  3.4<L>   |  30  |  1.00    Ca    9.4      11 Nov 2022 05:30  Mg     2.3     11-11    TPro  7.4  /  Alb  3.2<L>  /  TBili  0.7  /  DBili  x   /  AST  17  /  ALT  12  /  AlkPhos  77  11-10          MEDICATIONS  (STANDING):  amLODIPine   Tablet 2.5 milliGRAM(s) Oral daily  aspirin  chewable 81 milliGRAM(s) Oral daily  atorvastatin 10 milliGRAM(s) Oral at bedtime  budesonide  80 MICROgram(s)/formoterol 4.5 MICROgram(s) Inhaler 2 Puff(s) Inhalation every 12 hours  DAPTOmycin IVPB 500 milliGRAM(s) IV Intermittent every 24 hours  ertapenem  IVPB 1000 milliGRAM(s) IV Intermittent every 24 hours  famotidine    Tablet 20 milliGRAM(s) Oral daily  fluconAZOLE   Tablet 400 milliGRAM(s) Oral daily  furosemide    Tablet 40 milliGRAM(s) Oral daily  heparin  Infusion 900 Unit(s)/Hr (8.5 mL/Hr) IV Continuous <Continuous>  hydrALAZINE 10 milliGRAM(s) Oral every 8 hours  lactated ringers. 1000 milliLiter(s) (50 mL/Hr) IV Continuous <Continuous>  levothyroxine 50 MICROGram(s) Oral daily  lidocaine   4% Patch 1 Patch Transdermal daily  metoprolol tartrate 50 milliGRAM(s) Oral two times a day  potassium chloride    Tablet ER 20 milliEquivalent(s) Oral daily  senna 2 Tablet(s) Oral at bedtime  sodium chloride 0.9%. 1000 milliLiter(s) (50 mL/Hr) IV Continuous <Continuous>    MEDICATIONS  (PRN):  acetaminophen     Tablet .. 650 milliGRAM(s) Oral every 6 hours PRN Mild Pain (1 - 3)        RADIOLOGY & ADDITIONAL TESTS:

## 2022-11-11 NOTE — PROGRESS NOTE ADULT - NUTRITIONAL ASSESSMENT
This patient has been assessed with a concern for Malnutrition and has been determined to have a diagnosis/diagnoses of Severe protein-calorie malnutrition.    This patient is being managed with:   Diet DASH/TLC-  Sodium & Cholesterol Restricted  Supplement Feeding Modality:  Oral  Ensure Clear Cans or Servings Per Day:  1       Frequency:  Daily  Ensure Pudding Cans or Servings Per Day:  1       Frequency:  Daily  Entered: Nov 4 2022  1:20PM    
This patient has been assessed with a concern for Malnutrition and has been determined to have a diagnosis/diagnoses of Severe protein-calorie malnutrition.    This patient is being managed with:   Diet DASH/TLC-  Sodium & Cholesterol Restricted  Supplement Feeding Modality:  Oral  Ensure Clear Cans or Servings Per Day:  1       Frequency:  Daily  Ensure Pudding Cans or Servings Per Day:  1       Frequency:  Daily  Entered: Nov 4 2022  1:20PM    
This patient has been assessed with a concern for Malnutrition and has been determined to have a diagnosis/diagnoses of Severe protein-calorie malnutrition.    This patient is being managed with:   Diet DASH/TLC-  Sodium & Cholesterol Restricted  Supplement Feeding Modality:  Oral  Ensure Clear Cans or Servings Per Day:  1       Frequency:  Daily  Ensure Pudding Cans or Servings Per Day:  1       Frequency:  Daily  Entered: Nov 7 2022  1:04AM    
This patient has been assessed with a concern for Malnutrition and has been determined to have a diagnosis/diagnoses of Severe protein-calorie malnutrition.    This patient is being managed with:   Diet DASH/TLC-  Sodium & Cholesterol Restricted  Supplement Feeding Modality:  Oral  Ensure Clear Cans or Servings Per Day:  1       Frequency:  Daily  Ensure Pudding Cans or Servings Per Day:  1       Frequency:  Daily  Entered: Nov 4 2022  1:20PM    
This patient has been assessed with a concern for Malnutrition and has been determined to have a diagnosis/diagnoses of Severe protein-calorie malnutrition.    This patient is being managed with:   Diet DASH/TLC-  Sodium & Cholesterol Restricted  Supplement Feeding Modality:  Oral  Ensure Clear Cans or Servings Per Day:  1       Frequency:  Daily  Ensure Pudding Cans or Servings Per Day:  1       Frequency:  Daily  Entered: Nov 7 2022  1:04AM    
This patient has been assessed with a concern for Malnutrition and has been determined to have a diagnosis/diagnoses of Severe protein-calorie malnutrition.    This patient is being managed with:   Diet DASH/TLC-  Sodium & Cholesterol Restricted  Supplement Feeding Modality:  Oral  Ensure Clear Cans or Servings Per Day:  1       Frequency:  Daily  Ensure Pudding Cans or Servings Per Day:  1       Frequency:  Daily  Entered: Nov 7 2022  1:04AM

## 2022-11-11 NOTE — DISCHARGE NOTE PROVIDER - CARE PROVIDER_API CALL
Bladimir Muller)  Surgery; Thoracic and Cardiac Surgery  [address] [place] [address]  Phone: [phone]  Fax: [phone]  Scheduled Appointment: 11/22/2022 01:30 PM    Merissa Crystal)  Infectious Disease; Internal Medicine  [address], [address] [place] [address]  Phone: [phone]  Fax: [phone]  Scheduled Appointment: 11/29/2022 10:20 AM    Hugo Augustin)  Surgery; Vascular Surgery  [address], [address] [place] [address]  Phone: [phone]  Fax: [phone]  Scheduled Appointment: 11/22/2022 10:30 AM    Aron Blank)  Cardiovascular Disease; Internal Medicine; Interventional Cardiology  [address] [place] [address]  Phone: [phone]  Fax: [phone]  Scheduled Appointment: 11/23/2022 10:45 AM

## 2022-11-12 NOTE — ED PROVIDER NOTE - CARE PLAN
Principal Discharge DX:	Weakness  Secondary Diagnosis:	COPD with hypoxia   1 Principal Discharge DX:	Weakness  Secondary Diagnosis:	COPD with hypoxia  Secondary Diagnosis:	Inability to ambulate due to multiple joints

## 2022-11-12 NOTE — ED ADULT TRIAGE NOTE - CHIEF COMPLAINT QUOTE
As per pt's family, "She was just d/c from [facility], but when she got home she was very weak. The VNS told us to come."

## 2022-11-12 NOTE — ED PROVIDER NOTE - CLINICAL SUMMARY MEDICAL DECISION MAKING FREE TEXT BOX
77-year-old female presenting today with generalized weakness.  Patient is hemodynamically stable upon evaluation.  Patient on attempt to ambulate shows decreased pulse oximetry and is noted to be hypoxic and is unable to ambulate.  Patient was discharged home yesterday from [facility].  Patient will require admission for further evaluation management and continued IV antibiotics.

## 2022-11-12 NOTE — ED PROVIDER NOTE - PROGRESS NOTE DETAILS
spoke w/ family; pt is supposed to be ambulatory at home after dc, however pt was discharged w/ inability to ambulate 2/2 refusing SAH and deconditioning in admission. will admit for pt, placement, and inability to ambulate

## 2022-11-12 NOTE — ED PROVIDER NOTE - PHYSICAL EXAMINATION
CONSTITUTIONAL: Well-appearing; well-nourished; in no apparent distress.   HEAD: Normocephalic; atraumatic.   EYES: PERRL; EOM intact. Conjunctiva normal B/L.   ENT: Normal pharynx with no tonsillar hypertrophy. MMM. poor dentition  NECK: Supple; non-tender; no cervical lymphadenopathy.   CHEST: Normal chest excursion with respiration.   CARDIOVASCULAR: Normal S1, S2; wound vac in place, no edema, no erythema, functional,   RESPIRATORY: Normal chest excursion with respiration; breath sounds clear and equal bilaterally;  GI/: Normal bowel sounds; non-distended; non-tender. soft  BACK: No evidence of trauma or deformity. Non-tender to palpation. No CVA tenderness.   EXT: Normal ROM in BUE and LLE motor 5/5, RLE motor 2/5; non-tender to palpation; distal pulses are normal. No leg edema B/L. equal sensation b/l  SKIN: Normal for age and race; warm; dry; good turgor.  NEURO: A & O x 4; CN 2-12 intact. Grossly unremarkable. nonfocal

## 2022-11-12 NOTE — ED PROVIDER NOTE - OBJECTIVE STATEMENT
77 YO Female, previous smoker, w/ PMHx of CAD, HTN, HLD, spinal stenosis, arthritis, hypothyroidism, bicuspid aortic valve s/p AVR, ascending/hemiarch replacement with frozen elephant trunk and left aorto-subclavian bypass, CABG x2 with Dr. 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 ED for further work-up of sternal wound infection. On 11/2/22 pt underwent sternal I&D and wound vac placement with Dr. Muller and Dr. Vernon. 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  Pt was discharged yesterday from [facility] after refusing SAH x2, pt this morning felt weak and when sitting up she felt dizzy. pt unable to ambulate due to dizziness and RLE weakness. pt denies sob, cp, back pain, abd pain. pt states that her leg edema has resolved since admission in the hospital. pt denies n/v/f.

## 2022-11-13 NOTE — H&P ADULT - NSHPPHYSICALEXAM_GEN_ALL_CORE
GENERAL: NAD, well-developed, AAOx3  HEENT:  Atraumatic, Normocephalic. EOMI, PERRLA, conjunctiva and sclera clear, No JVD  PULMONARY: Clear to auscultation bilaterally; No wheeze  CARDIOVASCULAR: s/p AVR, regular rate and rhythm   GASTROINTESTINAL: Soft, Nontender, Nondistended; Bowel sounds present  MUSCULOSKELETAL:  2+ Peripheral Pulses in LE. LUE with 0+ ulnar and radial pulse but intact sensation. LE with no edema.   NEUROLOGY: non-focal, B/L LE weakness 3/5 R > L.   SKIN: No rashes or lesions. Sternal wound vanc, clean, dry, no drainage or erythema.

## 2022-11-13 NOTE — PATIENT PROFILE ADULT - FUNCTIONAL ASSESSMENT - BASIC MOBILITY 6.
2-calculated by average/Not able to assess (calculate score using [facility] averaging method)

## 2022-11-13 NOTE — H&P ADULT - HISTORY OF PRESENT ILLNESS
77 YO Female, former smoker, w/ PMHx of CAD, HTN, HLD, spinal stenosis, arthritis, hypothyroidism, bicuspid aortic valve s/p AVR, ascending/hemiarch replacement with frozen elephant trunk and left aorto-subclavian bypass, CABG x2 with Dr. 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 Muller and Dr. Augustin 7/29/22, course complicated by sternal wound infection. On 11/2/22 pt presented to [facility] ED for further work-up of sternal wound infection. On 11/2/22 pt underwent sternal I&D and wound vac placement with Dr. Muller and Dr. Vernon. 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 Vascular surgery consulted for r/o LUE limb ischemia, no surgical intervention at present. Patient was discharged to home on 11/9/22 on IV antibiotics via picc line (daptomycin, ertapenem, fluconazole from 11/2 - 12/13). Patient had refused STR. One day post discharge she presents to [facility] for weakness.     In the ED: Vitals T 96.4, /57, HR 60, 95% RA. Labs with CBC no leukocytosis, Hgb 8.7 at baseline, CMP with hypokalemia repleted. UA negative.  - Chest Xray: in comparison to [facility] chest xrays, appears to be increased size of opcaity adjacent to the thoracic aortic stent which relates to known pseudoaneurysm. May be projectional but further increase of pseudoaneurysm may not be excluded.   Patient to be admitted to medicine for further work up and placement.          75 YO Female, former smoker, w/ PMHx of CAD, HTN, HLD, spinal stenosis, arthritis, hypothyroidism, bicuspid aortic valve s/p AVR, ascending/hemiarch replacement with frozen elephant trunk and left aorto-subclavian bypass, CABG x2 with Dr. 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 Muller and Dr. Augustin 7/29/22, course complicated by sternal wound infection. On 11/2/22 pt presented to [facility] ED for further work-up of sternal wound infection. On 11/2/22 pt underwent sternal I&D and wound vac placement with Dr. Muller and Dr. Vernon. 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 Vascular surgery consulted for r/o LUE limb ischemia, no surgical intervention at present. Patient was discharged to home on 11/9/22 on IV antibiotics via picc line (daptomycin, ertapenem, fluconazole from 11/2 - 12/13). Patient had refused STR. One day post discharge she presents to [facility] for weakness. States that she felt "drained" when visiting nurse arrived has been unable to ambulate. VN services recommended coming to hospital. Denies headaches, dizziness, fall, LOC, sob, chest pain, palpitations, N/V/abdominal, diarrhea. Reports LUE pain is still painful from prior admission. B/L LE L>R are still weak, unchanged from [facility]. Patient still states that she does not want to go to rehab.     In the ED: Vitals T 96.4, /57, HR 60, 95% RA. Labs with CBC no leukocytosis, Hgb 8.7 at baseline, CMP with hypokalemia repleted. UA negative.  - Chest Xray: in comparison to [facility] chest xrays, appears to be increased size of opcaity adjacent to the thoracic aortic stent which relates to known pseudoaneurysm. May be projectional but further increase of pseudoaneurysm may not be excluded.   Patient to be admitted to medicine for further work up and placement.          77 YO Female, former smoker, w/ PMHx of CAD, HTN, HLD, spinal stenosis, arthritis, hypothyroidism, bicuspid aortic valve s/p AVR, ascending/hemiarch replacement with frozen elephant trunk and left aorto-subclavian bypass, CABG x2 with Dr. 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 Muller and Dr. Augustin 7/29/22, course complicated by sternal wound infection. On 11/2/22 pt presented to [facility] ED for further work-up of sternal wound infection. On 11/2/22 pt underwent sternal I&D and wound vac placement with Dr. Muller and Dr. Venron. 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 Vascular surgery consulted for r/o LUE limb ischemia, no surgical intervention at present. Patient was discharged to home on 11/9/22 on IV antibiotics via picc line (daptomycin, ertapenem, fluconazole from 11/2 - 12/13). Patient had refused STR. One day post discharge she presents to [facility] for weakness. States that she felt "drained" when visiting nurse arrived has been unable to ambulate. VN services recommended coming to hospital. Denies headaches, dizziness, fall, LOC, sob, chest pain, palpitations, N/V/abdominal, diarrhea. Reports LUE pain is still painful from prior admission. B/L LE L>R are still weak, unchanged from [facility]. Patient still states that she does not want to go to rehab.     In the ED: Vitals T 96.4, /57, HR 60, 95% RA. Labs with CBC no leukocytosis, Hgb 8.7 at baseline, CMP with hypokalemia repleted. UA negative.  - Chest Xray: in comparison to [facility] chest xrays, appears to be increased size of opacity adjacent to the thoracic aortic stent which relates to known pseudoaneurysm. May be projectional but further increase of pseudoaneurysm may not be excluded.   Patient to be admitted to medicine for further work up and placement.     Attd: notes from [facility] are excellent and I reviewed them; pt seemed well prepared for d/c from [facility]; better option was to go to SNF from [facility] (which pt declined); VNS RN sent pt back into hospital ([facility]) because she was too weak to be at home; family has now convinced pt to go to SNF for at least a few weeks; her chronic problems are stable; pt reports no BM for few weeks - she asked for a laxative; she also wants pain meds for wound vac dressing changes and pain in left arm; rest as above

## 2022-11-13 NOTE — H&P ADULT - ASSESSMENT
77 YO Female, former smoker, w/ PMHx of CAD, HTN, HLD, spinal stenosis, arthritis, hypothyroidism, bicuspid aortic valve s/p AVR, ascending/hemiarch replacement with frozen elephant trunk and left aorto-subclavian bypass, CABG x2 with Dr. 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 Muller and Dr. Augustin 7/29/22, course complicated by sternal wound infection s/p I+D. Wound cultures growing C. Albicans. Patient was discharged to home on ertapenem, fluconazole, and daptomycin to end 12/13 and with wound vac. Patient refused STR. Presents 1 day post-discharge from [facility] for weakness.     #Weakness  - Vitals stable on admission, no sepsis   -       - PT/OT  - /  for placement     #CAD and bicuspid aortic valve s/p AVR, ascending/hemiarch replacement with frozen elephant trunk and left aorto-subclavian bypass, CABG x2 on 8/9/2021 course c/b poor wound healing s/p pec flap and closure on 9/29/21  #S/p TEVAR with R groin cutdown and femoral artery repair on 7/29/22   #Hx of SSS s/p PPM placement  #HTN/ HLD  - c/w aspirin     #Sternal wound infection   - s/p I+D and wound vac at [facility]  - wound vac last changed on 11/11  - Wound cultures: C. 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 Muller and Dr. Augustin 7/29/22, course complicated by sternal wound infection s/p I+D. Wound cultures growing C. Albicans. Patient was discharged to home on ertapenem, fluconazole, and daptomycin to end 12/13 and with wound vac. Patient refused STR. Presents 1 day post-discharge from [facility] for weakness.     #Generalized Weakness likely 2/2 to Decondition   - Vitals stable on admission, no sepsis, no leukocytosis   - UA neg, trops neg   - Chest Xray with no pneumonia  - B/L LE weakness   - patient still refusing STR   - F/U CK, ESR, CRP, B12, folate  - F/U orthostatics   - F/u Stat EKG   - PT/OT  - /  for placement     #CAD and bicuspid aortic valve s/p AVR, ascending/hemiarch replacement with frozen elephant trunk and left aorto-subclavian bypass, CABG x2 on 8/9/2021 course c/b poor wound healing s/p pec flap and closure on 9/29/21  #S/p TEVAR with R groin cutdown and femoral artery repair on 7/29/22   #Hx of SSS s/p PPM placement  #HTN/ HLD  - No chest pain   - Last Echo: 11/2: Normal systolic, GD II Diastolic dysfunction, severely dilated LA, severe mitral calcification, High velocities are seen in the suprasternal notch area.   - Trops negative, BNP 7133  - c/w aspirin   - c/w statin   - Cont Lopressor 50mg PO QD (home med)  - Cont Norvasc 2.5mg PO QD (home med)  - Cont Hydralazine 10mg PO Q8H (home med)  - Hold home lasix 20mg, patient appears clinically euvolemic.     #Sternal wound infection   - s/p I+D and wound vac at [facility]  - wound vac last changed on 11/11  - Wound cultures: C. 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 No revascularization recommended at this time due to prior surgeries and current infection.   - She is to f/u with Vascular surgery as an outpatient for future surgical planning  -Per vascular team, if limb begins to lose sensation or decrease in motor ability, contact vascular surgery immediately   - Currently, patient has intact sensation, but no ulnar/ radial pulses in left arm consistent with prior eval at [facility]   - c/w eliquis 5mg BID   - F/U vascular surgery consult     #Hypothyroidism  - Last TSH 11/2: 5.22   - c/w Synthroid     #Misc  - GI ppx: famotidine  - DVT ppx: eliquis  - Activity: as tolerated, fall risk   - Diet: Dash   - Code status: FULL code  - Admit: acute, admit to medicine        77 YO Female, former smoker, w/ PMHx of CAD, HTN, HLD, spinal stenosis, arthritis, hypothyroidism, bicuspid aortic valve s/p AVR, ascending/hemiarch replacement with frozen elephant trunk and left aorto-subclavian bypass, CABG x2 with Dr. 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 Muller and Dr. Augustin 7/29/22, course complicated by sternal wound infection s/p I+D. Wound cultures growing C. Albicans. Patient was discharged to home on ertapenem, fluconazole, and daptomycin to end 12/13 and with wound vac. Patient refused STR. Presents 1 day post-discharge from [facility] for weakness.     #Generalized Weakness likely 2/2 to Decondition   - Vitals stable on admission, no sepsis, no leukocytosis   - UA neg, trops neg   - Chest Xray with no pneumonia  - B/L LE weakness   - F/U CK, ESR, CRP, B12, folate  - F/U orthostatics   - F/u Stat EKG   - PT/OT  - Patient still refusing STR even though she needs STR.   - /  for placement     #CAD and bicuspid aortic valve s/p AVR, ascending/hemiarch replacement with frozen elephant trunk and left aorto-subclavian bypass, CABG x2 on 8/9/2021 course c/b poor wound healing s/p pec flap and closure on 9/29/21  #S/p TEVAR with R groin cutdown and femoral artery repair on 7/29/22   #Hx of SSS s/p PPM placement  #HTN/ HLD  - No chest pain   - Last Echo: 11/2: Normal systolic, GD II Diastolic dysfunction, severely dilated LA, severe mitral calcification, High velocities are seen in the suprasternal notch area.   - Trops negative, BNP 7133  - c/w aspirin   - c/w statin   - Cont Lopressor 50mg PO QD (home med)  - Cont Norvasc 2.5mg PO QD (home med)  - Cont Hydralazine 10mg PO Q8H (home med)  - Hold home lasix 20mg, patient appears clinically euvolemic.     #Sternal wound infection   - s/p I+D and wound vac at [facility]  - wound vac last changed on 11/11  - Wound cultures: C. 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 No revascularization recommended at this time due to prior surgeries and current infection.   - She is to f/u with Vascular surgery as an outpatient for future surgical planning  -Per vascular team, if limb begins to lose sensation or decrease in motor ability, contact vascular surgery immediately   - Currently, patient has intact sensation, but no ulnar/ radial pulses in left arm consistent with prior eval at [facility]   - c/w eliquis 5mg BID     #Hypothyroidism  - Last TSH 11/2: 5.22   - c/w Synthroid     #Misc  - GI ppx: famotidine  - DVT ppx: eliquis  - Activity: as tolerated, fall risk   - Diet: Dash   - Code status: FULL code  - Admit: acute, admit to medicine, anticipate 24hrs        77 YO woman, former smoker, w/ PMHx of CAD, HTN, HLD, spinal stenosis, arthritis, hypothyroidism, bicuspid aortic valve s/p AVR (biopros), ascending/hemiarch replacement with frozen elephant trunk and left aorto-subclavian bypass, CABG x2 with Dr. 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 Muller and Dr. Augustin 7/29/22, course complicated by sternal wound infection s/p I+D. Wound cultures growing C. Albicans. Patient was discharged to home on ertapenem, fluconazole, and daptomycin to end 12/13 and with wound vac. Patient refused STR. Presents 1 day post-discharge from [facility] for weakness.     # Generalized Weakness likely 2/2 to Deconditioning  VSS, Afeb  UA neg, trops neg   Chest Xray: no pneumonia  F/U EKG   PT/OT    # Sternal wound infection   s/p I+D and wound vac at [facility]  wound vac last changed on 11/11 -> CT Sx eval for change of this harrison; dilaudid 0.5 mg iv for dressing change  Wound cultures: C. Albicans  s/p PICC Line single lumen   As per ID at [facility]:  cont daptomycin 500mg IV q24h  cont ertapenem 1g IV q24h  cont fluconazole 400mg PO q24h   Duration of antibiotics is tentatively 6 weeks (11/2 - 12/13)  tramadol 50mg po q6 prn pain  F/u ID consult  F/u CT surgery consult   F/u ESR, CRP, CK     # constipation  lactulose 20gm po q3 x3 doses, then reassess  c/w senna    # CAD and bicuspid aortic valve s/p AVR (biopros), ascending/hemiarch replacement with frozen elephant trunk and left aorto-subclavian bypass, CABG x2 on 8/9/2021 course c/b poor wound healing s/p pec flap and closure on 9/29/21; s/p TEVAR with R groin cutdown and femoral artery repair on 7/29/22; Hx of SSS s/p PPM placement; HTN  Last Echo: 11/2: Normal systolic, GD II Diastolic dysfunction, severely dilated LA, severe mitral calcification, High velocities are seen in the suprasternal notch area.   Trops negative, BNP 7133  c/w aspirin   c/w Lopressor 50mg PO QD (home med)  c/w Norvasc 2.5mg PO QD (home med)  c/w Hydralazine 10mg PO Q8H (home med)  HOLD home lasix 20mg, patient appears clinically euvolemic.     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 No revascularization recommended at this time due to prior surgeries and current infection.   She is to f/u with Vascular surgery as an outpatient for future surgical planning  Per vascular team, if lt arm begins to lose sensation or decrease in motor ability, contact vascular surgery immediately   Currently, patient has intact sensation, but no ulnar/ radial pulses in left arm consistent with prior eval at [facility]   c/w eliquis 5mg BID     # Hypothyroidism  Last TSH 11/2: 5.22   c/w Synthroid     # COPD; former tob  c/w Symbicort  stable    # GI ppx: famotidine    # DVT ppx: eliquis    # Activity: as tolerated, fall risk     # Code status: FULL code    Dispo: f/u labs above; iv abx; tx pain; tx constipation; f/u ID/CT Sx; PT eval  eventually, pt will go to SNF for iv abx, wound care and rehab - f/u CM - stable for d/c

## 2022-11-13 NOTE — PATIENT PROFILE ADULT - FALL HARM RISK - HARM RISK INTERVENTIONS

## 2022-11-13 NOTE — CONSULT NOTE ADULT - SUBJECTIVE AND OBJECTIVE BOX
Surgeon: /Dick/Ibis/Darian    Consult requesting by: medical team    HISTORY OF PRESENT ILLNESS:  75 YO Female, former smoker, w/ PMHx of CAD, HTN, HLD, spinal stenosis, arthritis, hypothyroidism, bicuspid aortic valve s/p AVR, ascending/hemiarch replacement with frozen elephant trunk and left aorto-subclavian bypass, CABG x2 with Dr. 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 Muller and Dr. Augustin 22, course complicated by sternal wound infection. On 22 pt presented to [facility] ED for further work-up of sternal wound infection. On 22 pt underwent sternal I&D and wound vac placement with Dr. Muller and Dr. Vernon. 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 Vascular surgery consulted for r/o LUE limb ischemia, no surgical intervention at present. Patient was discharged to home on 22 on IV antibiotics via picc line (daptomycin, ertapenem, fluconazole from  - ). Patient had refused STR. One day post discharge she presents to [facility] for weakness. States that she felt "drained" when visiting nurse arrived has been unable to ambulate. VN services recommended coming to hospital. Denies headaches, dizziness, fall, LOC, sob, chest pain, palpitations, N/V/abdominal, diarrhea. Reports LUE pain is still painful from prior admission. B/L LE L>R are still weak, unchanged from [facility]. Patient still states that she does not want to go to rehab.     In the ED: Vitals T 96.4, /57, HR 60, 95% RA. Labs with CBC no leukocytosis, Hgb 8.7 at baseline, CMP with hypokalemia repleted. UA negative.  - Chest Xray: in comparison to [facility] chest xrays, appears to be increased size of opacity adjacent to the thoracic aortic stent which relates to known pseudoaneurysm. May be projectional but further increase of pseudoaneurysm may not be excluded.   Patient to be admitted to medicine for further work up and placement.     Attd: notes from [facility] are excellent and I reviewed them; pt seemed well prepared for d/c from [facility]; better option was to go to SNF from [facility] (which pt declined); VNS RN sent pt back into hospital ([facility]) because she was too weak to be at home; family has now convinced pt to go to SNF for at least a few weeks; her chronic problems are stable; pt reports no BM for few weeks - she asked for a laxative; she also wants pain meds for wound vac dressing changes and pain in left arm; rest as above    CTS was called to change the VAC dressing; VAC dressing was changed on  at [facility] as documented in the chart    NYHA functional class    [ ] Class I (no limitation) [ ] Class II (slight limitation) [ ] Class III (marked limitation) [ ] Class IV (symptoms at rest)    PAST MEDICAL & SURGICAL HISTORY:  HTN (hypertension)      H/O aortic valve stenosis      CAD (coronary artery disease)      S/P aortic valve replacement      S/P CABG (coronary artery bypass graft)      H/O aortic arch replacement      Pacemaker  medtronic micra pacemaker    MEDICATIONS  (STANDING):  acetaminophen     Tablet .. 650 milliGRAM(s) Oral every 6 hours  amLODIPine   Tablet 2.5 milliGRAM(s) Oral daily  apixaban 5 milliGRAM(s) Oral every 12 hours  aspirin  chewable 81 milliGRAM(s) Oral daily  atorvastatin 10 milliGRAM(s) Oral at bedtime  budesonide  80 MICROgram(s)/formoterol 4.5 MICROgram(s) Inhaler 2 Puff(s) Inhalation two times a day  chlorhexidine 2% Cloths 1 Application(s) Topical <User Schedule>  DAPTOmycin IVPB 500 milliGRAM(s) IV Intermittent every 24 hours  ertapenem  IVPB 1000 milliGRAM(s) IV Intermittent every 24 hours  famotidine    Tablet 20 milliGRAM(s) Oral daily  fluconAZOLE   Tablet 400 milliGRAM(s) Oral daily  hydrALAZINE 10 milliGRAM(s) Oral every 8 hours  influenza  Vaccine (HIGH DOSE) 0.7 milliLiter(s) IntraMuscular once  lactulose Syrup 20 Gram(s) Oral every 3 hours  levothyroxine 50 MICROGram(s) Oral daily  metoprolol tartrate 50 milliGRAM(s) Oral two times a day    MEDICATIONS  (PRN):  HYDROmorphone  Injectable 0.5 milliGRAM(s) IV Push daily PRN Severe Pain (7 - 10)  senna 2 Tablet(s) Oral at bedtime PRN Constipation  traMADol 50 milliGRAM(s) Oral every 6 hours PRN Severe Pain (7 - 10)    Antiplatelet therapy:                           Last dose/amt:    Allergies    No Known Allergies    Intolerances    SOCIAL HISTORY:  Smoker: ex-smoker  ETOH use: [ ] Yes  [x ] No              FREQUENCY / QUANTITY:  Ilicit Drug use:  [ ] Yes  [ x] No  Occupation: retired    Family history of CKD (chronic kidney disease) (Sibling)  ESRD    Family history of diabetes mellitus (DM) (Sibling)    Review of Systems  CONSTITUTIONAL:  Fevers[ ] chills[ ] sweats[ ] fatigue[ ] weight loss[ ] weight gain [ ]                                     NEGATIVE [X ]   NEURO:  parathesias[ ] seizures [ ]  syncope [ ]  confusion [ ]                                                                                NEGATIVE[ X]   EYES: glasses[ ]  blurry vision[ ]  discharge[ ] pain[ ] glaucoma [ ]                                                                          NEGATIVE[X ]   ENMT:  difficulty hearing [ ]  vertigo[ ]  dysphagia[ ] epistaxis[ ] recent dental work [ ]                                    NEGATIVE[ X]   CV:  chest pain[ ] palpitations[ ] HERRERA [ ] diaphoresis [ ]                                                                                           NEGATIVE[ X]   RESPIRATORY:  wheezing[ ] SOB[ ] cough [ ] sputum[ ] hemoptysis[ ]                                                                  NEGATIVE[ ]   GI:  nausea[ ]  vommiting [ ]  diarrhea[ ] constipation [ ] melena [ ]                                                                      NEGATIVE[ X]   : hematuria[ ]  dysuria[ ] urgency[ ] incontinence[ ]                                                                                            NEGATIVE[ X]   MUSKULOSKELETAL:  arthritis[ ]  joint swelling [ ] muscle weakness [ ] Hx vein stripping [ ]                             NEGATIVE[X ]   SKIN/BREAST:  rash[ ] itching [ ]  hair loss[ ] masses[ ]                                                                                              NEGATIVE[ X]   PSYCH:  dementia [ ] depresion [ ] anxiety[ ]                                                                                                               NEGATIVE[X ]   HEME/LYMPH:  bruises easily[ ] enlarged lymph nodes[ ] tender lymph nodes[ ]                                               NEGATIVE[ X]   ENDOCRINE:  cold intolerance[ ] heat intolerance[ ] polydipsia[ ]                                                                          NEGATIVE[ X]     PHYSICAL EXAM  Vital Signs Last 24 Hrs  T(C): 36.7 (2022 04:11), Max: 36.8 (2022 23:55)  T(F): 98.1 (2022 04:11), Max: 98.2 (2022 23:55)  HR: 80 (2022 04:11) (60 - 88)  BP: 137/61 (2022 04:11) (119/55 - 137/61)  BP(mean): --  RR: 18 (2022 04:11) (18 - 18)  SpO2: 98% (2022 04:11) (94% - 98%)    Parameters below as of 2022 02:33  Patient On (Oxygen Delivery Method): room air      Right arm bp: Left arm bp;    CONSTITUTIONAL:                                                                          WNL[ ]   Neuro: WNL[ ] Normal exam oriented to person/place & time with no focal motor or sensory  deficits. Other                     Eyes: WNL[ ]   Normal exam of conjunctiva & lids, pupils equally reactive. Other     ENT: WNL[ ]    Normal exam of nasal/oral mucosa with absence of cyanosis. Other  Neck: WNL[ ]  Normal exam of jugular veins, trachea & thyroid. Other  Chest: WNL[ ] Normal lung exam with good air movement absence of wheezes, rales, or rhonchi: Other  -- small vac dressing in place overlying the proximal sternal wound                                                                               CV:  Auscultation: normal [ ] S3[ ] S4[ ] Irregular [ ] Rub[ ] Clicks[ ]    Murmurs none:[ ]systolic [ ]  diastolic [ ] holosystolic [ ]  Carotids: No Bruits[ ] Other                 Abdominal Aorta: normal [ ] nonpalpable[ ]Other                                                                                      GI:           WNL[ ] Normal exam of abdomen, liver & spleen with no noted masses or tenderness. Other                                                                                                        Extremities: WNL[ ] Normal no evidence of cyanosis or deformity Edema: none[ ]trace[ ]1+[ ]2+[ ]3+[ ]4+[ ]  Lower Extremity Pulses: Right[ ] Left[ ]Varicosities[ ]  SKIN :WNL[ ] Normal exam to inspection & palation. Other:                                                          LABS:                        9.0    8.43  )-----------( 194      ( 2022 08:04 )             28.1     11-13    136  |  94<L>  |  21<H>  ----------------------------<  83  3.9   |  28  |  0.9    Ca    9.3      2022 08:04  Mg     2.2     11-13    TPro  7.5  /  Alb  3.2<L>  /  TBili  0.7  /  DBili  x   /  AST  18  /  ALT  11  /  AlkPhos  91  11-      Urinalysis Basic - ( 2022 17:00 )    Color: Yellow / Appearance: Clear / S.013 / pH: x  Gluc: x / Ketone: Negative  / Bili: Negative / Urobili: <2 mg/dL   Blood: x / Protein: 30 mg/dL / Nitrite: Negative   Leuk Esterase: Negative / RBC: 3 /HPF / WBC 3 /HPF   Sq Epi: x / Non Sq Epi: 9 /HPF / Bacteria: Negative      CARDIAC MARKERS ( 2022 20:02 )  x     / <0.01 ng/mL / x     / x     / x        ACC: [id number] EXAM:  CT ANGIO CHEST AORTA [facility]                          *** ADDENDUM***    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    --- End of Report ---    *** END OF ADDENDUM***      PROCEDURE DATE:  2022      INTERPRETATION:  CT Angiogram of the abdomen and pelvis was performed   with intravenous contrast. Delayed images of the chest abdomen and pelvis   are obtained.  100ml of Omnipaque 350 was injected intravenously. None was discarded.   Coronal, Saggital and 3 plane maximum intensity projection (MIP) images   were submitted.    Clinical information: Aortic runoff visualization of bilateral upper   extremity arterial vasculature.    Comparison: 2022    FINDINGS:    Vascular:    CHEST AND UPPER EXTREMITIES: Again, post APR, ascending/ hemiarch   replacement and left aortic subclavian bypass. 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 New occlusion   aortic-left subclavian bypass. 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  Limited evaluation of the bilateral upper extremities vasculature. Flow   is seen down to the wrist  in right arm. Flow is seen in the left   subclavian, axillary, brachial artery which attenuates just above the   elbow, reconstitutes at elbow with visualized radial artery and minimal   flow in ulnar artery on delayed images.    ABDOMEN AND PELVIS: Unchanged infrarenal abdominal aortic aneurysm, 4 cm.   Unchanged nonflow limiting intimal flap in proximal right external   artery. Severe atherosclerosis.    NONVASCULAR:    CHEST: Slightly increased mild interstitial pulmonary edema. Emphysema.   Increased small bilateral pleural effusions with adjacent atelectasis.   Increased left parasternal chest wall collection/possible phlegmon with   extension to mediastinum and now occlusion of aortic-left subclavian   bypass, 5.3 x 4.2 cm, previously 3.7 x 2.3 cm.  ABDOMEN AND PELVIS: none  BONES AND SOFT TISSUES: No suspicious osseous lesion or erosion.   Parasternal collection with extension to mediastinum as above.      IMPRESSION:      1. Since 2022, increased left parasternal chest wall   collection/phlegmon with mediastinal extension, now resulted in occlusion   of aortic-left subclavian bypass.  2.Limited evaluation of the bilateral upper extremities vasculature. Flow   is seen down to the arm wrist in right arm. Flow is seen in the left   subclavian, axillary, brachial artery which attenuates just above the   elbow, reconstitutes at elbow with visualized radial artery and minimal   flow in ulnar artery on delayed images.  3. 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  4. Slightly increased mild interstitial pulmonary edema and increased   small bilateral pleural effusions.    --- End of Report ---    ***Please see the addendum at the top of this report. It may contain   additional important information or changes.****      SANTANA SANON MD; Attending Radiologist  This document has been electronically signed.  SANTANA SANON MD; Attending Radiologist  This document has been electronically signed. 2022 11:37AM  Addend:SANTANA SANON MD; Attending Radiologist  This addendum was electronically signed on: 2022 12:04PM.    Assessment/ Plan:75 YO woman, former smoker, w/ PMHx of CAD, HTN, HLD, spinal stenosis, arthritis, hypothyroidism, bicuspid aortic valve s/p AVR (biopros), ascending/hemiarch replacement with frozen elephant trunk and left aorto-subclavian bypass, CABG x2 with Dr. 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 Muller and Dr. Augustin 22, course complicated by sternal wound infection s/p I+D. Wound cultures growing C. Albicans. Patient was discharged to home on ertapenem, fluconazole, and daptomycin to end  and with wound vac. Patient refused STR. Presents 1 day post-discharge from [facility] for weakness.   cont present tx as per medicine / id/ Dr Muller's service  will most likely change VAC dressing tomorrow being it will be three days from when the dressing was changed tomorrow-- the patient's daughter is bringing in the  for the portable wound VAC  please obtain dressing supplies for wound vac  surgeon note to follow               Surgeon: /Dick/Ibis/Darian    Consult requesting by: medical team    HISTORY OF PRESENT ILLNESS:  75 YO Female, former smoker, w/ PMHx of CAD, HTN, HLD, spinal stenosis, arthritis, hypothyroidism, bicuspid aortic valve s/p AVR, ascending/hemiarch replacement with frozen elephant trunk and left aorto-subclavian bypass, CABG x2 with Dr. 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 Muller and Dr. Augustin 22, course complicated by sternal wound infection. On 22 pt presented to [facility] ED for further work-up of sternal wound infection. On 22 pt underwent sternal I&D and wound vac placement with Dr. Muller and Dr. Vernon. 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 Vascular surgery consulted for r/o LUE limb ischemia, no surgical intervention at present. Patient was discharged to home on 22 on IV antibiotics via picc line (daptomycin, ertapenem, fluconazole from  - ). Patient had refused STR. One day post discharge she presents to [facility] for weakness. States that she felt "drained" when visiting nurse arrived has been unable to ambulate. VN services recommended coming to hospital. Denies headaches, dizziness, fall, LOC, sob, chest pain, palpitations, N/V/abdominal, diarrhea. Reports LUE pain is still painful from prior admission. B/L LE L>R are still weak, unchanged from [facility]. Patient still states that she does not want to go to rehab.     In the ED: Vitals T 96.4, /57, HR 60, 95% RA. Labs with CBC no leukocytosis, Hgb 8.7 at baseline, CMP with hypokalemia repleted. UA negative.  - Chest Xray: in comparison to [facility] chest xrays, appears to be increased size of opacity adjacent to the thoracic aortic stent which relates to known pseudoaneurysm. May be projectional but further increase of pseudoaneurysm may not be excluded.   Patient to be admitted to medicine for further work up and placement.     Attd: notes from [facility] are excellent and I reviewed them; pt seemed well prepared for d/c from [facility]; better option was to go to SNF from [facility] (which pt declined); VNS RN sent pt back into hospital ([facility]) because she was too weak to be at home; family has now convinced pt to go to SNF for at least a few weeks; her chronic problems are stable; pt reports no BM for few weeks - she asked for a laxative; she also wants pain meds for wound vac dressing changes and pain in left arm; rest as above    CTS was called to change the VAC dressing; VAC dressing was changed on  at [facility] as documented in the chart    NYHA functional class    [ ] Class I (no limitation) [ ] Class II (slight limitation) [ ] Class III (marked limitation) [ ] Class IV (symptoms at rest)    PAST MEDICAL & SURGICAL HISTORY:  HTN (hypertension)      H/O aortic valve stenosis      CAD (coronary artery disease)      S/P aortic valve replacement      S/P CABG (coronary artery bypass graft)      H/O aortic arch replacement      Pacemaker  medtronic micra pacemaker    MEDICATIONS  (STANDING):  acetaminophen     Tablet .. 650 milliGRAM(s) Oral every 6 hours  amLODIPine   Tablet 2.5 milliGRAM(s) Oral daily  apixaban 5 milliGRAM(s) Oral every 12 hours  aspirin  chewable 81 milliGRAM(s) Oral daily  atorvastatin 10 milliGRAM(s) Oral at bedtime  budesonide  80 MICROgram(s)/formoterol 4.5 MICROgram(s) Inhaler 2 Puff(s) Inhalation two times a day  chlorhexidine 2% Cloths 1 Application(s) Topical <User Schedule>  DAPTOmycin IVPB 500 milliGRAM(s) IV Intermittent every 24 hours  ertapenem  IVPB 1000 milliGRAM(s) IV Intermittent every 24 hours  famotidine    Tablet 20 milliGRAM(s) Oral daily  fluconAZOLE   Tablet 400 milliGRAM(s) Oral daily  hydrALAZINE 10 milliGRAM(s) Oral every 8 hours  influenza  Vaccine (HIGH DOSE) 0.7 milliLiter(s) IntraMuscular once  lactulose Syrup 20 Gram(s) Oral every 3 hours  levothyroxine 50 MICROGram(s) Oral daily  metoprolol tartrate 50 milliGRAM(s) Oral two times a day    MEDICATIONS  (PRN):  HYDROmorphone  Injectable 0.5 milliGRAM(s) IV Push daily PRN Severe Pain (7 - 10)  senna 2 Tablet(s) Oral at bedtime PRN Constipation  traMADol 50 milliGRAM(s) Oral every 6 hours PRN Severe Pain (7 - 10)    Antiplatelet therapy:                           Last dose/amt:    Allergies    No Known Allergies    Intolerances    SOCIAL HISTORY:  Smoker: ex-smoker  ETOH use: [ ] Yes  [x ] No              FREQUENCY / QUANTITY:  Ilicit Drug use:  [ ] Yes  [ x] No  Occupation: retired    Family history of CKD (chronic kidney disease) (Sibling)  ESRD    Family history of diabetes mellitus (DM) (Sibling)    Review of Systems  CONSTITUTIONAL:  Fevers[ ] chills[ ] sweats[ ] fatigue[ ] weight loss[ ] weight gain [ ]                                     NEGATIVE [X ]   NEURO:  parathesias[ ] seizures [ ]  syncope [ ]  confusion [ ]                                                                                NEGATIVE[ X]   EYES: glasses[ ]  blurry vision[ ]  discharge[ ] pain[ ] glaucoma [ ]                                                                          NEGATIVE[X ]   ENMT:  difficulty hearing [ ]  vertigo[ ]  dysphagia[ ] epistaxis[ ] recent dental work [ ]                                    NEGATIVE[ X]   CV:  chest pain[ ] palpitations[ ] HERRERA [ ] diaphoresis [ ]                                                                                           NEGATIVE[ X]   RESPIRATORY:  wheezing[ ] SOB[ ] cough [ ] sputum[ ] hemoptysis[ ]                                                                  NEGATIVE[ ]   GI:  nausea[ ]  vommiting [ ]  diarrhea[ ] constipation [ ] melena [ ]                                                                      NEGATIVE[ X]   : hematuria[ ]  dysuria[ ] urgency[ ] incontinence[ ]                                                                                            NEGATIVE[ X]   MUSKULOSKELETAL:  arthritis[ ]  joint swelling [ ] muscle weakness [ ] Hx vein stripping [ ]                             NEGATIVE[X ]   SKIN/BREAST:  rash[ ] itching [ ]  hair loss[ ] masses[ ]                                                                                              NEGATIVE[ X]   PSYCH:  dementia [ ] depresion [ ] anxiety[ ]                                                                                                               NEGATIVE[X ]   HEME/LYMPH:  bruises easily[ ] enlarged lymph nodes[ ] tender lymph nodes[ ]                                               NEGATIVE[ X]   ENDOCRINE:  cold intolerance[ ] heat intolerance[ ] polydipsia[ ]                                                                          NEGATIVE[ X]     PHYSICAL EXAM  Vital Signs Last 24 Hrs  T(C): 36.7 (2022 04:11), Max: 36.8 (2022 23:55)  T(F): 98.1 (2022 04:11), Max: 98.2 (2022 23:55)  HR: 80 (2022 04:11) (60 - 88)  BP: 137/61 (2022 04:11) (119/55 - 137/61)  BP(mean): --  RR: 18 (2022 04:11) (18 - 18)  SpO2: 98% (2022 04:11) (94% - 98%)    Parameters below as of 2022 02:33  Patient On (Oxygen Delivery Method): room air      Right arm bp: Left arm bp;    CONSTITUTIONAL:                                                                          WNL[ ]   Neuro: WNL[ ] Normal exam oriented to person/place & time with no focal motor or sensory  deficits. Other                     Eyes: WNL[ ]   Normal exam of conjunctiva & lids, pupils equally reactive. Other     ENT: WNL[ ]    Normal exam of nasal/oral mucosa with absence of cyanosis. Other  Neck: WNL[ ]  Normal exam of jugular veins, trachea & thyroid. Other  Chest: WNL[ ] Normal lung exam with good air movement absence of wheezes, rales, or rhonchi: Other  -- small vac dressing in place overlying the proximal sternal wound                                                                               CV:  Auscultation: normal [ ] S3[ ] S4[ ] Irregular [ ] Rub[ ] Clicks[ ]    Murmurs none:[ ]systolic [ ]  diastolic [ ] holosystolic [ ]  Carotids: No Bruits[ ] Other                 Abdominal Aorta: normal [ ] nonpalpable[ ]Other                                                                                      GI:           WNL[ ] Normal exam of abdomen, liver & spleen with no noted masses or tenderness. Other                                                                                                        Extremities: WNL[ ] Normal no evidence of cyanosis or deformity Edema: none[ ]trace[ ]1+[ ]2+[ ]3+[ ]4+[ ]  Lower Extremity Pulses: Right[ ] Left[ ]Varicosities[ ]  SKIN :WNL[ ] Normal exam to inspection & palation. Other:                                                          LABS:                        9.0    8.43  )-----------( 194      ( 2022 08:04 )             28.1     11-13    136  |  94<L>  |  21<H>  ----------------------------<  83  3.9   |  28  |  0.9    Ca    9.3      2022 08:04  Mg     2.2     11-13    TPro  7.5  /  Alb  3.2<L>  /  TBili  0.7  /  DBili  x   /  AST  18  /  ALT  11  /  AlkPhos  91  11-      Urinalysis Basic - ( 2022 17:00 )    Color: Yellow / Appearance: Clear / S.013 / pH: x  Gluc: x / Ketone: Negative  / Bili: Negative / Urobili: <2 mg/dL   Blood: x / Protein: 30 mg/dL / Nitrite: Negative   Leuk Esterase: Negative / RBC: 3 /HPF / WBC 3 /HPF   Sq Epi: x / Non Sq Epi: 9 /HPF / Bacteria: Negative      CARDIAC MARKERS ( 2022 20:02 )  x     / <0.01 ng/mL / x     / x     / x        ACC: [id number] EXAM:  CT ANGIO CHEST AORTA [facility]                          *** ADDENDUM***    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    --- End of Report ---    *** END OF ADDENDUM***      PROCEDURE DATE:  2022      INTERPRETATION:  CT Angiogram of the abdomen and pelvis was performed   with intravenous contrast. Delayed images of the chest abdomen and pelvis   are obtained.  100ml of Omnipaque 350 was injected intravenously. None was discarded.   Coronal, Saggital and 3 plane maximum intensity projection (MIP) images   were submitted.    Clinical information: Aortic runoff visualization of bilateral upper   extremity arterial vasculature.    Comparison: 2022    FINDINGS:    Vascular:    CHEST AND UPPER EXTREMITIES: Again, post APR, ascending/ hemiarch   replacement and left aortic subclavian bypass. 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 New occlusion   aortic-left subclavian bypass. 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  Limited evaluation of the bilateral upper extremities vasculature. Flow   is seen down to the wrist  in right arm. Flow is seen in the left   subclavian, axillary, brachial artery which attenuates just above the   elbow, reconstitutes at elbow with visualized radial artery and minimal   flow in ulnar artery on delayed images.    ABDOMEN AND PELVIS: Unchanged infrarenal abdominal aortic aneurysm, 4 cm.   Unchanged nonflow limiting intimal flap in proximal right external   artery. Severe atherosclerosis.    NONVASCULAR:    CHEST: Slightly increased mild interstitial pulmonary edema. Emphysema.   Increased small bilateral pleural effusions with adjacent atelectasis.   Increased left parasternal chest wall collection/possible phlegmon with   extension to mediastinum and now occlusion of aortic-left subclavian   bypass, 5.3 x 4.2 cm, previously 3.7 x 2.3 cm.  ABDOMEN AND PELVIS: none  BONES AND SOFT TISSUES: No suspicious osseous lesion or erosion.   Parasternal collection with extension to mediastinum as above.      IMPRESSION:      1. Since 2022, increased left parasternal chest wall   collection/phlegmon with mediastinal extension, now resulted in occlusion   of aortic-left subclavian bypass.  2.Limited evaluation of the bilateral upper extremities vasculature. Flow   is seen down to the arm wrist in right arm. Flow is seen in the left   subclavian, axillary, brachial artery which attenuates just above the   elbow, reconstitutes at elbow with visualized radial artery and minimal   flow in ulnar artery on delayed images.  3. 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  4. Slightly increased mild interstitial pulmonary edema and increased   small bilateral pleural effusions.    --- End of Report ---    ***Please see the addendum at the top of this report. It may contain   additional important information or changes.****      SANTANA SANON MD; Attending Radiologist  This document has been electronically signed.  SANTANA SANON MD; Attending Radiologist  This document has been electronically signed. 2022 11:37AM  Addend:SANTANA SANON MD; Attending Radiologist  This addendum was electronically signed on: 2022 12:04PM.    Assessment/ Plan:75 YO woman, former smoker, w/ PMHx of CAD, HTN, HLD, spinal stenosis, arthritis, hypothyroidism, bicuspid aortic valve s/p AVR (biopros), ascending/hemiarch replacement with frozen elephant trunk and left aorto-subclavian bypass, CABG x2 with Dr. 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 Muller and Dr. Augustin 22, course complicated by sternal wound infection s/p I+D. Wound cultures growing C. Albicans. Patient was discharged to home on ertapenem, fluconazole, and daptomycin to end  and with wound vac. Patient refused STR. Presents 1 day post-discharge from [facility] for weakness.   cont present tx as per medicine / id/ Dr Muller's service  will most likely change VAC dressing tomorrow being it will be three days from when the dressing was changed tomorrow-- the patient's daughter is bringing in the  for the portable wound VAC  please obtain dressing supplies for wound vac  surgeon note to follow    CTS Attending  CT scan reviewed  wound care issues being resolved  if home care is not possible, pt my need SNF  no surgery is presently indicated.  -FMR

## 2022-11-13 NOTE — H&P ADULT - NSVTERISKASSESS_GEN_ALL_CORE FT
Patient unsure which option she would like to go with. She is interested in financing if available. Medical Assessment Completed on: 13-Nov-2022 08:25

## 2022-11-13 NOTE — ED ADULT NURSE NOTE - CHIEF COMPLAINT QUOTE
As per pt's family, "She was just d/c from [facility], but when she got home she was very weak. The VNS told us to come."

## 2022-11-13 NOTE — GOALS OF CARE CONVERSATION - ADVANCED CARE PLANNING - CONVERSATION DETAILS
Discussed life sustaining measures and goals of care with patient at bedside who is AOx3.   States that she would like to be full code and if needed would want chest compressions and mechanical ventilation.

## 2022-11-14 NOTE — CONSULT NOTE ADULT - ASSESSMENT
ASSESSMENT  75 YO Female, former smoker, w/ PMHx of CAD, HTN, HLD, spinal stenosis, arthritis, hypothyroidism, bicuspid aortic valve s/p AVR, ascending/hemiarch replacement with frozen elephant trunk and left aorto-subclavian bypass, CABG x2 with Dr. 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 Muller and Dr. Augustin 7/29/22, course complicated by sternal wound infection, s/p 11/2/22 sternal I&D and wound vac placement with CTA chest showing parasternal chest wall phlemon and thrombosed pseudoaneurysm     IMPRESSION  #Parasternal phlegmon, abscess  - CT chest 11/2: with 2cm x 3cm soft tissue density anterior to manubrium, occlusion of L subclavian, thrombosed pseudoaneurysm @ L subclavian.  - Sternal fluid Cx 11/2: C. Albicans   - CT Chest Aorta w/wo cont 11/7: increased parasternal chest wall collection/phlegmon w/ mediastinal extension resulting in occlusion of aortic-left subclavian bypass.  Flow in L UE attenuates above the elbow, minimal flow in ulnar artery. increased pulmonary edema and small b/l pleural effusions.  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  - planned for daptomycin, ertapenem , fluconazole for 6 week courcarey (11/2-12/13)     #Bicuspid AV s/p bioprosthetic AVR  #s/p Ascending Aorta ascending/hemiarch replacement with frozen elephant truck and aorto-subclavian bypass  #CAD s/p CABG x 2 complicated with sternal wound infection     #Obesity BMI (kg/m2): 26.5, 26.5  #DM   #Abx allergy:     RECOMMENDATIONS  This is a preliminary incomplete pended note, all final recommendations to follow after interview and examination of the patient.    Please call or message on [employer] Teams if with any questions.  Spectra [phone]   ASSESSMENT  75 YO Female, former smoker, w/ PMHx of CAD, HTN, HLD, spinal stenosis, arthritis, hypothyroidism, bicuspid aortic valve s/p AVR, ascending/hemiarch replacement with frozen elephant trunk and left aorto-subclavian bypass, CABG x2 with Dr. 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 Muller and Dr. 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  - Sternal fluid Cx 11/2: C. Albicans   - CT Chest Aorta w/wo cont 11/7: increased parasternal chest wall collection/phlegmon w/ mediastinal extension resulting in occlusion of aortic-left subclavian bypass.  Flow in L UE attenuates above the elbow, minimal flow in ulnar artery. increased pulmonary edema and small b/l pleural effusions.  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  - planned for daptomycin, ertapenem , fluconazole for 6 week cour (11/2-12/13)     #Bicuspid AV s/p bioprosthetic AVR  #s/p Ascending Aorta ascending/hemiarch replacement with frozen elephant truck and aorto-subclavian bypass  #CAD s/p CABG x 2 complicated with sternal wound infection     #Obesity BMI (kg/m2): 26.5, 26.5  #DM   #Abx allergy:     RECOMMENDATIONS  - continue daptomycin 500 mg q 24 hours  -- check CK weekly (12last checked 11/13)  - continue ertapenem 1g q 24 hours  - continue fluconazole 400 mg q 24 hours   - please repeat EKG to evaluate for QTC while on fluconazole -- previous EKG with prolonged QTC  - continue planned antibiotic duration of 6 weeks (11/2-12/13)    Please call or message on [employer] Teams if with any questions.  Spectra [phone]

## 2022-11-14 NOTE — CONSULT NOTE ADULT - SUBJECTIVE AND OBJECTIVE BOX
LYUBOV BAHENA  77y, Female  Allergy: No Known Allergies      CHIEF COMPLAINT: weakness/ inability to ambulate (2022 10:51)      LOS  2d    HPI:  75 YO Female, former smoker, w/ PMHx of CAD, HTN, HLD, spinal stenosis, arthritis, hypothyroidism, bicuspid aortic valve s/p AVR, ascending/hemiarch replacement with frozen elephant trunk and left aorto-subclavian bypass, CABG x2 with Dr. 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 Muller and Dr. Augustin 22, course complicated by sternal wound infection. On 22 pt presented to [facility] ED for further work-up of sternal wound infection. On 22 pt underwent sternal I&D and wound vac placement with Dr. Muller and Dr. Vernon. 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 Vascular surgery consulted for r/o LUE limb ischemia, no surgical intervention at present. Patient was discharged to home on 22 on IV antibiotics via picc line (daptomycin, ertapenem, fluconazole from  - ). Patient had refused STR. One day post discharge she presents to [facility] for weakness. States that she felt "drained" when visiting nurse arrived has been unable to ambulate. VN services recommended coming to hospital. Denies headaches, dizziness, fall, LOC, sob, chest pain, palpitations, N/V/abdominal, diarrhea. Reports LUE pain is still painful from prior admission. B/L LE L>R are still weak, unchanged from [facility]. Patient still states that she does not want to go to rehab.     In the ED: Vitals T 96.4, /57, HR 60, 95% RA. Labs with CBC no leukocytosis, Hgb 8.7 at baseline, CMP with hypokalemia repleted. UA negative.  - Chest Xray: in comparison to [facility] chest xrays, appears to be increased size of opacity adjacent to the thoracic aortic stent which relates to known pseudoaneurysm. May be projectional but further increase of pseudoaneurysm may not be excluded.   Patient to be admitted to medicine for further work up and placement.     Attd: notes from [facility] are excellent and I reviewed them; pt seemed well prepared for d/c from [facility]; better option was to go to SNF from [facility] (which pt declined); VNS RN sent pt back into hospital ([facility]) because she was too weak to be at home; family has now convinced pt to go to SNF for at least a few weeks; her chronic problems are stable; pt reports no BM for few weeks - she asked for a laxative; she also wants pain meds for wound vac dressing changes and pain in left arm; rest as above (2022 07:56)      INFECTIOUS DISEASE HISTORY:  History as above.    PAST MEDICAL & SURGICAL HISTORY:  HTN (hypertension)      H/O aortic valve stenosis      CAD (coronary artery disease)      S/P aortic valve replacement      S/P CABG (coronary artery bypass graft)      H/O aortic arch replacement      Pacemaker  medtronic micra pacemaker          FAMILY HISTORY  No pertinent family history in first degree relatives    FH: CAD (coronary artery disease) (Sibling)    Family history of CKD (chronic kidney disease) (Sibling)    Family history of diabetes mellitus (DM) (Sibling)        SOCIAL HISTORY  Social History:  Former smoker (2022 07:56)        ROS  General: Denies rigors, nightsweats  HEENT: Denies headache, rhinorrhea, sore throat, eye pain  CV: Denies CP, palpitations  PULM: Denies wheezing, hemoptysis  GI: Denies hematemesis, hematochezia, melena  : Denies discharge, hematuria  MSK: Denies arthralgias, myalgias  SKIN: Denies rash, lesions  NEURO: Denies paresthesias, weakness  PSYCH: Denies depression, anxiety    VITALS:  T(F): 97.4, Max: 98.1 (22 @ 21:26)  HR: 64  BP: 108/65  RR: 18Vital Signs Last 24 Hrs  T(C): 36.3 (2022 04:23), Max: 36.7 (2022 21:26)  T(F): 97.4 (2022 04:23), Max: 98.1 (2022 21:26)  HR: 64 (2022 04:23) (60 - 64)  BP: 108/65 (2022 04:23) (108/65 - 121/55)  BP(mean): --  RR: 18 (2022 04:23) (16 - 18)  SpO2: 96% (2022 04:23) (96% - 97%)        PHYSICAL EXAM:  Gen: NAD, resting in bed  HEENT: Normocephalic, atraumatic  Neck: supple, no lymphadenopathy  CV: Regular rate & regular rhythm  Lungs: decreased BS at bases, no fremitus  Abdomen: Soft, BS present  Ext: Warm, well perfused  Neuro: non focal, awake  Skin: no rash, no erythema  Lines: no phlebitis    TESTS & MEASUREMENTS:                        9.0    8.43  )-----------( 194      ( 2022 08:04 )             28.1     11-13    136  |  94<L>  |  21<H>  ----------------------------<  83  3.9   |  28  |  0.9    Ca    9.3      2022 08:04  Mg     2.3     11-13    TPro  7.5  /  Alb  3.2<L>  /  TBili  0.7  /  DBili  x   /  AST  18  /  ALT  11  /  AlkPhos  91  11-13      LIVER FUNCTIONS - ( 2022 08:04 )  Alb: 3.2 g/dL / Pro: 7.5 g/dL / ALK PHOS: 91 U/L / ALT: 11 U/L / AST: 18 U/L / GGT: x           Urinalysis Basic - ( 2022 17:00 )    Color: Yellow / Appearance: Clear / S.013 / pH: x  Gluc: x / Ketone: Negative  / Bili: Negative / Urobili: <2 mg/dL   Blood: x / Protein: 30 mg/dL / Nitrite: Negative   Leuk Esterase: Negative / RBC: 3 /HPF / WBC 3 /HPF   Sq Epi: x / Non Sq Epi: 9 /HPF / Bacteria: Negative        Culture - Fungal, Tissue (collected 22 @ 14:50)  Source: .Tissue Supra Sternal Tissue or spec  Preliminary Report (22 @ 12:32):    Rare Candida albicans Susceptibility to follow.  Organism: Candida albicans (22 @ 14:15)  Organism: Candida albicans (22 @ 14:15)      -  Fluconazole: S 0.25 Fluconazole: Susceptibility depends on achieving the maximum possible blood level. Doses higher than the standard dosing amount (6mg/kg/day) may be needed in adults with normal renal function and body habitus.  This test was developed and its performance characteristics determined by [facility], [place], [place]  It has not been cleared or approved by the U.S. Food and Drug Administration. S - Susceptible; SDD - Susceptible Dose Dependent; I - Intermediate; R - Resistant; N/I - No Interpretation Available      Method Type: YSTMIC    Culture - Acid Fast - Tissue w/Smear (collected 22 @ 14:50)  Source: .Tissue Supra Sternal Tissue or spec  Preliminary Report (22 @ 15:04):    No growth at 1 week.    Culture - Tissue with Gram Stain (collected 22 @ 14:50)  Source: .Tissue Supra Sternal Tissue or spec  Gram Stain (22 @ 17:14):    No organisms seen    Rare WBC's  Preliminary Report (22 @ 11:27):    No growth to date    Culture - Fungal, Body Fluid (collected 22 @ 14:50)  Source: .Body Fluid Supra Sternal Fluid #2 or spec  Preliminary Report (22 @ 15:02):    No growth    Culture - Acid Fast - Body Fluid w/Smear (collected 22 @ 14:50)  Source: .Body Fluid Supra Sternal Fluid #2 or spec  Preliminary Report (22 @ 15:04):    No growth at 1 week.    Culture - Body Fluid with Gram Stain (collected 22 @ 14:50)  Source: .Body Fluid Supra Sternal Fluid #2 or spec  Gram Stain (22 @ 17:14):    No organisms seen    Rare WBC's  Final Report (22 @ 16:24):    Rare Candida albicans    Culture - Acid Fast - Body Fluid w/Smear (collected 22 @ 14:50)  Source: .Body Fluid Supra Sternal Fluid #1 or spec  Preliminary Report (22 @ 15:04):    No growth at 1 week.    Culture - Fungal, Body Fluid (collected 22 @ 14:50)  Source: .Body Fluid Supra Sternal Fluid #1 or spec  Preliminary Report (22 @ 11:58):    Rare Candida albicans    Culture - Body Fluid with Gram Stain (collected 22 @ 14:50)  Source: .Body Fluid Supra Sternal Fluid #1 or spec  Gram Stain (22 @ 17:14):    No organisms seen    Rare WBC's  Final Report (22 @ 16:23):    Rare Candida albicans    Susceptibility to follow. Testing performed by:    [facility]    [address] [place] [address]    Culture - Blood (collected 22 @ 18:20)  Source: .Blood Blood  Final Report (22 @ 22:00):    No growth at 5 days.    Culture - Blood (collected 22 @ 18:15)  Source: .Blood Blood  Final Report (22 @ 22:00):    No growth at 5 days.    Culture - Blood (collected 22 @ 15:13)  Source: .Blood Blood-Arterial  Final Report (22 @ 17:00):    No growth at 5 days.        Blood Gas Venous - Lactate: 1.20 mmol/L (22 @ 17:45)      INFECTIOUS DISEASES TESTING  COVID-19 PCR: NotDetec (22 @ 21:40)  COVID-19 PCR: NotDetec (22 @ 10:12)  MRSA PCR Result.: Negative (22 @ 23:45)  COVID-19 PCR: Negative (22 @ 14:03)  COVID-19 PCR: NotDetec (22 @ 15:43)  COVID-19 PCR: NotDetec (22 @ 10:53)  COVID-19 PCR: NotDetec (22 @ 16:50)  MRSA PCR Result.: Negative (22 @ 15:58)  Procalcitonin, Serum: 0.26 ng/mL (22 @ 14:52)  COVID-19 PCR: NotDetec (22 @ 01:20)  COVID-19 PCR: Negative (22 @ 00:08)  COVID-19 PCR: NotDetec (22 @ 03:10)      RADIOLOGY & ADDITIONAL TESTS:  I have personally reviewed the last Chest xray  CXR  Xray Chest 1 View AP/PA:   ACC: [id number] EXAM:  XR CHEST 1 VIEW                          PROCEDURE DATE:  2022          INTERPRETATION:  Clinical History / Reason for exam: Chest pain.    Comparison : Chest radiograph 2022 from [facility]. Correlation   with CT angiogram chest 2022 also from [facility].    Technique/Positioning: Frontal view the chest.    Findings:    Support devices: Right PICC    Cardiac/mediastinum/hilum: Again seen is aortic valve replacement and   thoracic endovascular aneurysm repair. The opacity seen adjacent to the   stent which had been stable on prior radiographs and described as a known   pseudoaneurysm now appears larger in size which may potentially be   projectional however a increasing pseudoaneurysm cannot be excluded.    Lung parenchyma/Pleura: No definite acute consolidation. No large   effusion or pneumothorax    Skeleton/soft tissues: Stable bones    Impression:    In comparison to chest radiographs from [facility] dated  through   2022, there appears to be increased size of an opacity adjacent to   the thoracic aortic stent which relates to the known pseudoaneurysm.   While this may be projectional, further increased size of pseudoaneurysm   cannot be excluded.        --- End of Report ---            KIM OROSCO MD; Attending Radiologist  This document has been electronically signed. 2022  6:46AM (22 @ 19:15)      CT      CARDIOLOGY TESTING  12 Lead ECG:   Ventricular Rate 78 BPM    Atrial Rate 78 BPM    P-R Interval 152 ms    QRS Duration 82 ms    Q-T Interval 456 ms    QTC Calculation(Bazett) 519 ms    P Axis 41 degrees    R Axis 19 degrees    T Axis 61 degrees    Diagnosis Line Sinus rhythm with marked sinus arrhythmia  Possible Left atrial enlargement  Abnormal ECG    Confirmed by Raymundo Waggoner ([id number]) on 2022 4:16:38 PM (22 @ 11:13)  12 Lead ECG:   Ventricular Rate 71 BPM    Atrial Rate 71 BPM    P-R Interval 158 ms    QRS Duration 82 ms    Q-T Interval 450 ms    QTC Calculation(Bazett) 489 ms    P Axis 52 degrees    R Axis 34 degrees    T Axis 61 degrees    Diagnosis Line Normal sinus rhythm  Possible Left atrial enlargement  Borderline ECG    Confirmed by Ralf Acevedo ([id number]) on 2022 6:04:06 PM (22 @ 18:58)      MEDICATIONS  acetaminophen     Tablet .. 650 Oral every 6 hours  amLODIPine   Tablet 2.5 Oral daily  apixaban 5 Oral every 12 hours  aspirin  chewable 81 Oral daily  atorvastatin 10 Oral at bedtime  budesonide  80 MICROgram(s)/formoterol 4.5 MICROgram(s) Inhaler 2 Inhalation two times a day  chlorhexidine 2% Cloths 1 Topical <User Schedule>  DAPTOmycin IVPB 500 IV Intermittent every 24 hours  ertapenem  IVPB 1000 IV Intermittent every 24 hours  famotidine    Tablet 20 Oral daily  fluconAZOLE   Tablet 400 Oral daily  heparin  Lock Flush 100 Units/mL Injectable 300 IV Push once  hydrALAZINE 10 Oral every 8 hours  influenza  Vaccine (HIGH DOSE) 0.7 IntraMuscular once  levothyroxine 50 Oral daily  metoprolol tartrate 50 Oral two times a day  polyethylene glycol 3350 17 Oral daily      Weight  Weight (kg): 65.8 (22 @ 14:00)    ANTIBIOTICS:  DAPTOmycin IVPB 500 milliGRAM(s) IV Intermittent every 24 hours  ertapenem  IVPB 1000 milliGRAM(s) IV Intermittent every 24 hours  fluconAZOLE   Tablet 400 milliGRAM(s) Oral daily      ALLERGIES:  No Known Allergies      ASSESSMENT  75 YO Female, former smoker, w/ PMHx of CAD, HTN, HLD, spinal stenosis, arthritis, hypothyroidism, bicuspid aortic valve s/p AVR, ascending/hemiarch replacement with frozen elephant trunk and left aorto-subclavian bypass, CABG x2 with Dr. 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 Muller and Dr. Augustin 22, course complicated by sternal wound infection, s/p 22 sternal I&D and wound vac placement with CTA chest showing parasternal chest wall phlemon and thrombosed pseudoaneurysm     IMPRESSION  #Parasternal phlegmon, abscess  -  #Bicuspid aorta s/p A    #Obesity BMI (kg/m2): 26.5, 26.5  #DM   #Abx allergy:     RECOMMENDATIONS  This is a preliminary incomplete pended note, all final recommendations to follow after interview and examination of the patient.    Please call or message on Microsoft Teams if with any questions.  Spectra [id number]     LYUBOV BAHENA  77y, Female  Allergy: No Known Allergies      CHIEF COMPLAINT: weakness/ inability to ambulate (2022 10:51)      LOS  2d    HPI:  75 YO Female, former smoker, w/ PMHx of CAD, HTN, HLD, spinal stenosis, arthritis, hypothyroidism, bicuspid aortic valve s/p AVR, ascending/hemiarch replacement with frozen elephant trunk and left aorto-subclavian bypass, CABG x2 with Dr. 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 Muller and Dr. Augustin 22, course complicated by sternal wound infection. On 22 pt presented to [facility] ED for further work-up of sternal wound infection. On 22 pt underwent sternal I&D and wound vac placement with Dr. Muller and Dr. Vernon. 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 Vascular surgery consulted for r/o LUE limb ischemia, no surgical intervention at present. Patient was discharged to home on 22 on IV antibiotics via picc line (daptomycin, ertapenem, fluconazole from  - ). Patient had refused STR. One day post discharge she presents to [facility] for weakness. States that she felt "drained" when visiting nurse arrived has been unable to ambulate. VN services recommended coming to hospital. Denies headaches, dizziness, fall, LOC, sob, chest pain, palpitations, N/V/abdominal, diarrhea. Reports LUE pain is still painful from prior admission. B/L LE L>R are still weak, unchanged from [facility]. Patient still states that she does not want to go to rehab.     In the ED: Vitals T 96.4, /57, HR 60, 95% RA. Labs with CBC no leukocytosis, Hgb 8.7 at baseline, CMP with hypokalemia repleted. UA negative.  - Chest Xray: in comparison to [facility] chest xrays, appears to be increased size of opacity adjacent to the thoracic aortic stent which relates to known pseudoaneurysm. May be projectional but further increase of pseudoaneurysm may not be excluded.   Patient to be admitted to medicine for further work up and placement.     Attd: notes from [facility] are excellent and I reviewed them; pt seemed well prepared for d/c from [facility]; better option was to go to SNF from [facility] (which pt declined); VNS RN sent pt back into hospital ([facility]) because she was too weak to be at home; family has now convinced pt to go to SNF for at least a few weeks; her chronic problems are stable; pt reports no BM for few weeks - she asked for a laxative; she also wants pain meds for wound vac dressing changes and pain in left arm; rest as above (2022 07:56)      INFECTIOUS DISEASE HISTORY:  History as above.    PAST MEDICAL & SURGICAL HISTORY:  HTN (hypertension)      H/O aortic valve stenosis      CAD (coronary artery disease)      S/P aortic valve replacement      S/P CABG (coronary artery bypass graft)      H/O aortic arch replacement      Pacemaker  medtronic micra pacemaker          FAMILY HISTORY  No pertinent family history in first degree relatives    FH: CAD (coronary artery disease) (Sibling)    Family history of CKD (chronic kidney disease) (Sibling)    Family history of diabetes mellitus (DM) (Sibling)        SOCIAL HISTORY  Social History:  Former smoker (2022 07:56)        ROS  General: Denies rigors, nightsweats  HEENT: Denies headache, rhinorrhea, sore throat, eye pain  CV: Denies CP, palpitations  PULM: Denies wheezing, hemoptysis  GI: Denies hematemesis, hematochezia, melena  : Denies discharge, hematuria  MSK: Denies arthralgias, myalgias  SKIN: Denies rash, lesions  NEURO: Denies paresthesias, weakness  PSYCH: Denies depression, anxiety    VITALS:  T(F): 97.4, Max: 98.1 (22 @ 21:26)  HR: 64  BP: 108/65  RR: 18Vital Signs Last 24 Hrs  T(C): 36.3 (2022 04:23), Max: 36.7 (2022 21:26)  T(F): 97.4 (2022 04:23), Max: 98.1 (2022 21:26)  HR: 64 (2022 04:23) (60 - 64)  BP: 108/65 (2022 04:23) (108/65 - 121/55)  BP(mean): --  RR: 18 (2022 04:23) (16 - 18)  SpO2: 96% (2022 04:23) (96% - 97%)        PHYSICAL EXAM:  Gen: NAD, resting in bed  HEENT: Normocephalic, atraumatic  Neck: supple, no lymphadenopathy  CV: Regular rate & regular rhythm  Lungs: decreased BS at bases, no fremitus  Abdomen: Soft, BS present  Ext: Warm, well perfused  Neuro: non focal, awake  Skin: no rash, no erythema  Lines: no phlebitis    TESTS & MEASUREMENTS:                        9.0    8.43  )-----------( 194      ( 2022 08:04 )             28.1     11-13    136  |  94<L>  |  21<H>  ----------------------------<  83  3.9   |  28  |  0.9    Ca    9.3      2022 08:04  Mg     2.3     11-13    TPro  7.5  /  Alb  3.2<L>  /  TBili  0.7  /  DBili  x   /  AST  18  /  ALT  11  /  AlkPhos  91  11-13      LIVER FUNCTIONS - ( 2022 08:04 )  Alb: 3.2 g/dL / Pro: 7.5 g/dL / ALK PHOS: 91 U/L / ALT: 11 U/L / AST: 18 U/L / GGT: x           Urinalysis Basic - ( 2022 17:00 )    Color: Yellow / Appearance: Clear / S.013 / pH: x  Gluc: x / Ketone: Negative  / Bili: Negative / Urobili: <2 mg/dL   Blood: x / Protein: 30 mg/dL / Nitrite: Negative   Leuk Esterase: Negative / RBC: 3 /HPF / WBC 3 /HPF   Sq Epi: x / Non Sq Epi: 9 /HPF / Bacteria: Negative        Culture - Fungal, Tissue (collected 22 @ 14:50)  Source: .Tissue Supra Sternal Tissue or spec  Preliminary Report (22 @ 12:32):    Rare Candida albicans Susceptibility to follow.  Organism: Candida albicans (22 @ 14:15)  Organism: Candida albicans (22 @ 14:15)      -  Fluconazole: S 0.25 Fluconazole: Susceptibility depends on achieving the maximum possible blood level. Doses higher than the standard dosing amount (6mg/kg/day) may be needed in adults with normal renal function and body habitus.  This test was developed and its performance characteristics determined by [facility], [place], [place]  It has not been cleared or approved by the U.S. Food and Drug Administration. S - Susceptible; SDD - Susceptible Dose Dependent; I - Intermediate; R - Resistant; N/I - No Interpretation Available      Method Type: YSTMIC    Culture - Acid Fast - Tissue w/Smear (collected 22 @ 14:50)  Source: .Tissue Supra Sternal Tissue or spec  Preliminary Report (22 @ 15:04):    No growth at 1 week.    Culture - Tissue with Gram Stain (collected 22 @ 14:50)  Source: .Tissue Supra Sternal Tissue or spec  Gram Stain (22 @ 17:14):    No organisms seen    Rare WBC's  Preliminary Report (22 @ 11:27):    No growth to date    Culture - Fungal, Body Fluid (collected 22 @ 14:50)  Source: .Body Fluid Supra Sternal Fluid #2 or spec  Preliminary Report (22 @ 15:02):    No growth    Culture - Acid Fast - Body Fluid w/Smear (collected 22 @ 14:50)  Source: .Body Fluid Supra Sternal Fluid #2 or spec  Preliminary Report (22 @ 15:04):    No growth at 1 week.    Culture - Body Fluid with Gram Stain (collected 22 @ 14:50)  Source: .Body Fluid Supra Sternal Fluid #2 or spec  Gram Stain (22 @ 17:14):    No organisms seen    Rare WBC's  Final Report (22 @ 16:24):    Rare Candida albicans    Culture - Acid Fast - Body Fluid w/Smear (collected 22 @ 14:50)  Source: .Body Fluid Supra Sternal Fluid #1 or spec  Preliminary Report (22 @ 15:04):    No growth at 1 week.    Culture - Fungal, Body Fluid (collected 22 @ 14:50)  Source: .Body Fluid Supra Sternal Fluid #1 or spec  Preliminary Report (22 @ 11:58):    Rare Candida albicans    Culture - Body Fluid with Gram Stain (collected 22 @ 14:50)  Source: .Body Fluid Supra Sternal Fluid #1 or spec  Gram Stain (22 @ 17:14):    No organisms seen    Rare WBC's  Final Report (22 @ 16:23):    Rare Candida albicans    Susceptibility to follow. Testing performed by:    [facility]    [address] [place] [address]    Culture - Blood (collected 22 @ 18:20)  Source: .Blood Blood  Final Report (22 @ 22:00):    No growth at 5 days.    Culture - Blood (collected 22 @ 18:15)  Source: .Blood Blood  Final Report (22 @ 22:00):    No growth at 5 days.    Culture - Blood (collected 22 @ 15:13)  Source: .Blood Blood-Arterial  Final Report (22 @ 17:00):    No growth at 5 days.        Blood Gas Venous - Lactate: 1.20 mmol/L (22 @ 17:45)      INFECTIOUS DISEASES TESTING  COVID-19 PCR: NotDetec (22 @ 21:40)  COVID-19 PCR: NotDetec (22 @ 10:12)  MRSA PCR Result.: Negative (22 @ 23:45)  COVID-19 PCR: Negative (22 @ 14:03)  COVID-19 PCR: NotDetec (22 @ 15:43)  COVID-19 PCR: NotDetec (22 @ 10:53)  COVID-19 PCR: NotDetec (22 @ 16:50)  MRSA PCR Result.: Negative (22 @ 15:58)  Procalcitonin, Serum: 0.26 ng/mL (22 @ 14:52)  COVID-19 PCR: NotDetec (22 @ 01:20)  COVID-19 PCR: Negative (22 @ 00:08)  COVID-19 PCR: NotDetec (22 @ 03:10)      RADIOLOGY & ADDITIONAL TESTS:  I have personally reviewed the last Chest xray  CXR  Xray Chest 1 View AP/PA:   ACC: [id number] EXAM:  XR CHEST 1 VIEW                          PROCEDURE DATE:  2022          INTERPRETATION:  Clinical History / Reason for exam: Chest pain.    Comparison : Chest radiograph 2022 from [facility]. Correlation   with CT angiogram chest 2022 also from [facility].    Technique/Positioning: Frontal view the chest.    Findings:    Support devices: Right PICC    Cardiac/mediastinum/hilum: Again seen is aortic valve replacement and   thoracic endovascular aneurysm repair. The opacity seen adjacent to the   stent which had been stable on prior radiographs and described as a known   pseudoaneurysm now appears larger in size which may potentially be   projectional however a increasing pseudoaneurysm cannot be excluded.    Lung parenchyma/Pleura: No definite acute consolidation. No large   effusion or pneumothorax    Skeleton/soft tissues: Stable bones    Impression:    In comparison to chest radiographs from [facility] dated  through   2022, there appears to be increased size of an opacity adjacent to   the thoracic aortic stent which relates to the known pseudoaneurysm.   While this may be projectional, further increased size of pseudoaneurysm   cannot be excluded.        --- End of Report ---            KIM OROSCO MD; Attending Radiologist  This document has been electronically signed. 2022  6:46AM (22 @ 19:15)      CT      CARDIOLOGY TESTING  12 Lead ECG:   Ventricular Rate 78 BPM    Atrial Rate 78 BPM    P-R Interval 152 ms    QRS Duration 82 ms    Q-T Interval 456 ms    QTC Calculation(Bazett) 519 ms    P Axis 41 degrees    R Axis 19 degrees    T Axis 61 degrees    Diagnosis Line Sinus rhythm with marked sinus arrhythmia  Possible Left atrial enlargement  Abnormal ECG    Confirmed by Raymundo Waggoner ([id number]) on 2022 4:16:38 PM (22 @ 11:13)  12 Lead ECG:   Ventricular Rate 71 BPM    Atrial Rate 71 BPM    P-R Interval 158 ms    QRS Duration 82 ms    Q-T Interval 450 ms    QTC Calculation(Bazett) 489 ms    P Axis 52 degrees    R Axis 34 degrees    T Axis 61 degrees    Diagnosis Line Normal sinus rhythm  Possible Left atrial enlargement  Borderline ECG    Confirmed by Ralf Acevedo ([id number]) on 2022 6:04:06 PM (22 @ 18:58)      MEDICATIONS  acetaminophen     Tablet .. 650 Oral every 6 hours  amLODIPine   Tablet 2.5 Oral daily  apixaban 5 Oral every 12 hours  aspirin  chewable 81 Oral daily  atorvastatin 10 Oral at bedtime  budesonide  80 MICROgram(s)/formoterol 4.5 MICROgram(s) Inhaler 2 Inhalation two times a day  chlorhexidine 2% Cloths 1 Topical <User Schedule>  DAPTOmycin IVPB 500 IV Intermittent every 24 hours  ertapenem  IVPB 1000 IV Intermittent every 24 hours  famotidine    Tablet 20 Oral daily  fluconAZOLE   Tablet 400 Oral daily  heparin  Lock Flush 100 Units/mL Injectable 300 IV Push once  hydrALAZINE 10 Oral every 8 hours  influenza  Vaccine (HIGH DOSE) 0.7 IntraMuscular once  levothyroxine 50 Oral daily  metoprolol tartrate 50 Oral two times a day  polyethylene glycol 3350 17 Oral daily      Weight  Weight (kg): 65.8 (22 @ 14:00)    ANTIBIOTICS:  DAPTOmycin IVPB 500 milliGRAM(s) IV Intermittent every 24 hours  ertapenem  IVPB 1000 milliGRAM(s) IV Intermittent every 24 hours  fluconAZOLE   Tablet 400 milliGRAM(s) Oral daily      ALLERGIES:  No Known Allergies         LYUBOV BAHENA  77y, Female  Allergy: No Known Allergies      CHIEF COMPLAINT: weakness/ inability to ambulate (2022 10:51)      LOS  2d    HPI:  77 YO Female, former smoker, w/ PMHx of CAD, HTN, HLD, spinal stenosis, arthritis, hypothyroidism, bicuspid aortic valve s/p AVR, ascending/hemiarch replacement with frozen elephant trunk and left aorto-subclavian bypass, CABG x2 with Dr. 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 Muller and Dr. Augustin 22, course complicated by sternal wound infection. On 22 pt presented to [facility] ED for further work-up of sternal wound infection. On 22 pt underwent sternal I&D and wound vac placement with Dr. Muller and Dr. Vernon. 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 Vascular surgery consulted for r/o LUE limb ischemia, no surgical intervention at present. Patient was discharged to home on 22 on IV antibiotics via picc line (daptomycin, ertapenem, fluconazole from  - ). Patient had refused STR. One day post discharge she presents to [facility] for weakness. States that she felt "drained" when visiting nurse arrived has been unable to ambulate. VN services recommended coming to hospital. Denies headaches, dizziness, fall, LOC, sob, chest pain, palpitations, N/V/abdominal, diarrhea. Reports LUE pain is still painful from prior admission. B/L LE L>R are still weak, unchanged from [facility]. Patient still states that she does not want to go to rehab.     In the ED: Vitals T 96.4, /57, HR 60, 95% RA. Labs with CBC no leukocytosis, Hgb 8.7 at baseline, CMP with hypokalemia repleted. UA negative.  - Chest Xray: in comparison to [facility] chest xrays, appears to be increased size of opacity adjacent to the thoracic aortic stent which relates to known pseudoaneurysm. May be projectional but further increase of pseudoaneurysm may not be excluded.   Patient to be admitted to medicine for further work up and placement.     Attd: notes from [facility] are excellent and I reviewed them; pt seemed well prepared for d/c from [facility]; better option was to go to SNF from [facility] (which pt declined); VNS RN sent pt back into hospital ([facility]) because she was too weak to be at home; family has now convinced pt to go to SNF for at least a few weeks; her chronic problems are stable; pt reports no BM for few weeks - she asked for a laxative; she also wants pain meds for wound vac dressing changes and pain in left arm; rest as above (2022 07:56)      INFECTIOUS DISEASE HISTORY:  History as above.  Reports feeling fatigued.   Has bilateral thigh tenderness.   Denies any other nausea, vomiting, abdomial pain.   Wound vac in place  at surgical site.   Denies nausea, vomiting, coughing or shortness o fbreath.     PAST MEDICAL & SURGICAL HISTORY:  HTN (hypertension)      H/O aortic valve stenosis      CAD (coronary artery disease)      S/P aortic valve replacement      S/P CABG (coronary artery bypass graft)      H/O aortic arch replacement      Pacemaker  medtronic micra pacemaker          FAMILY HISTORY  No pertinent family history in first degree relatives    FH: CAD (coronary artery disease) (Sibling)    Family history of CKD (chronic kidney disease) (Sibling)    Family history of diabetes mellitus (DM) (Sibling)        SOCIAL HISTORY  Social History:  Former smoker (2022 07:56)        ROS  General: Denies rigors, nightsweats  HEENT: Denies headache, rhinorrhea, sore throat, eye pain  CV: Denies CP, palpitations  PULM: Denies wheezing, hemoptysis  GI: Denies hematemesis, hematochezia, melena  : Denies discharge, hematuria  MSK: Denies arthralgias, myalgias  SKIN: Denies rash, lesions  NEURO: Denies paresthesias, weakness  PSYCH: Denies depression, anxiety    VITALS:  T(F): 97.4, Max: 98.1 (- @ 21:26)  HR: 64  BP: 108/65  RR: 18Vital Signs Last 24 Hrs  T(C): 36.3 (2022 04:23), Max: 36.7 (2022 21:26)  T(F): 97.4 (2022 04:23), Max: 98.1 (2022 21:26)  HR: 64 (2022 04:23) (60 - 64)  BP: 108/65 (2022 04:23) (108/65 - 121/55)  BP(mean): --  RR: 18 (2022 04:23) (16 - 18)  SpO2: 96% (2022 04:23) (96% - 97%)        PHYSICAL EXAM:  Gen: NAD, resting in bed  HEENT: Normocephalic, atraumatic  Neck: supple, no lymphadenopathy  CV: Regular rate & regular rhythm  Lungs: decreased BS at bases, no fremitus  Abdomen: Soft, BS present  Ext: Warm, well perfused  Neuro: non focal, awake  Skin: no rash, no erythema; Parasternal surgical site with wound vac -- no erythema  Lines: no phlebitis == RUE PICC line without surrounding erythema     TESTS & MEASUREMENTS:                        9.0    8.43  )-----------( 194      ( 2022 08:04 )             28.1     11-13    136  |  94<L>  |  21<H>  ----------------------------<  83  3.9   |  28  |  0.9    Ca    9.3      2022 08:04  Mg     2.3     11-13    TPro  7.5  /  Alb  3.2<L>  /  TBili  0.7  /  DBili  x   /  AST  18  /  ALT  11  /  AlkPhos  91  11-13      LIVER FUNCTIONS - ( 2022 08:04 )  Alb: 3.2 g/dL / Pro: 7.5 g/dL / ALK PHOS: 91 U/L / ALT: 11 U/L / AST: 18 U/L / GGT: x           Urinalysis Basic - ( 2022 17:00 )    Color: Yellow / Appearance: Clear / S.013 / pH: x  Gluc: x / Ketone: Negative  / Bili: Negative / Urobili: <2 mg/dL   Blood: x / Protein: 30 mg/dL / Nitrite: Negative   Leuk Esterase: Negative / RBC: 3 /HPF / WBC 3 /HPF   Sq Epi: x / Non Sq Epi: 9 /HPF / Bacteria: Negative        Culture - Fungal, Tissue (collected 22 @ 14:50)  Source: .Tissue Supra Sternal Tissue or spec  Preliminary Report (22 @ 12:32):    Rare Candida albicans Susceptibility to follow.  Organism: Candida albicans (22 @ 14:15)  Organism: Candida albicans (22 @ 14:15)      -  Fluconazole: S 0.25 Fluconazole: Susceptibility depends on achieving the maximum possible blood level. Doses higher than the standard dosing amount (6mg/kg/day) may be needed in adults with normal renal function and body habitus.  This test was developed and its performance characteristics determined by [facility], [place]  It has not been cleared or approved by the U.[place]. Food and Drug Administration. S - Susceptible; SDD - Susceptible Dose Dependent; I - Intermediate; R - Resistant; N/I - No Interpretation Available      Method Type: YSTMIC    Culture - Acid Fast - Tissue w/Smear (collected 22 @ 14:50)  Source: .Tissue Supra Sternal Tissue or spec  Preliminary Report (22 @ 15:04):    No growth at 1 week.    Culture - Tissue with Gram Stain (collected 22 @ 14:50)  Source: .Tissue Supra Sternal Tissue or spec  Gram Stain (22 @ 17:14):    No organisms seen    Rare WBC's  Preliminary Report (22 @ 11:27):    No growth to date    Culture - Fungal, Body Fluid (collected 22 @ 14:50)  Source: .Body Fluid Supra Sternal Fluid #2 or spec  Preliminary Report (22 @ 15:02):    No growth    Culture - Acid Fast - Body Fluid w/Smear (collected 22 @ 14:50)  Source: .Body Fluid Supra Sternal Fluid #2 or spec  Preliminary Report (22 @ 15:04):    No growth at 1 week.    Culture - Body Fluid with Gram Stain (collected 22 @ 14:50)  Source: .Body Fluid Supra Sternal Fluid #2 or spec  Gram Stain (22 @ 17:14):    No organisms seen    Rare WBC's  Final Report (22 @ 16:24):    Rare Candida albicans    Culture - Acid Fast - Body Fluid w/Smear (collected 22 @ 14:50)  Source: .Body Fluid Supra Sternal Fluid #1 or spec  Preliminary Report (22 @ 15:04):    No growth at 1 week.    Culture - Fungal, Body Fluid (collected 22 @ 14:50)  Source: .Body Fluid Supra Sternal Fluid #1 or spec  Preliminary Report (22 @ 11:58):    Rare Candida albicans    Culture - Body Fluid with Gram Stain (collected 22 @ 14:50)  Source: .Body Fluid Supra Sternal Fluid #1 or spec  Gram Stain (22 @ 17:14):    No organisms seen    Rare WBC's  Final Report (22 @ 16:23):    Rare Candida albicans    Susceptibility to follow. Testing performed by:    [facility]    [address] [place] [address]    Culture - Blood (collected 22 @ 18:20)  Source: .Blood Blood  Final Report (22 @ 22:00):    No growth at 5 days.    Culture - Blood (collected 22 @ 18:15)  Source: .Blood Blood  Final Report (22 @ 22:00):    No growth at 5 days.    Culture - Blood (collected 22 @ 15:13)  Source: .Blood Blood-Arterial  Final Report (22 @ 17:00):    No growth at 5 days.        Blood Gas Venous - Lactate: 1.20 mmol/L (22 @ 17:45)      INFECTIOUS DISEASES TESTING  COVID-19 PCR: NotDetec (22 @ 21:40)  COVID-19 PCR: NotDetec (22 @ 10:12)  MRSA PCR Result.: Negative (22 @ 23:45)  COVID-19 PCR: Negative (22 @ 14:03)  COVID-19 PCR: NotDetec (22 @ 15:43)  COVID-19 PCR: NotDetec (22 @ 10:53)  COVID-19 PCR: NotDetec (22 @ 16:50)  MRSA PCR Result.: Negative (22 @ 15:58)  Procalcitonin, Serum: 0.26 ng/mL (22 @ 14:52)  COVID-19 PCR: NotDetec (22 @ 01:20)  COVID-19 PCR: Negative (22 @ 00:08)  COVID-19 PCR: NotDetec (22 @ 03:10)      RADIOLOGY & ADDITIONAL TESTS:  I have personally reviewed the last Chest xray  CXR  Xray Chest 1 View AP/PA:   ACC: [id number] EXAM:  XR CHEST 1 VIEW                          PROCEDURE DATE:  2022          INTERPRETATION:  Clinical History / Reason for exam: Chest pain.    Comparison : Chest radiograph 2022 from [facility]. Correlation   with CT angiogram chest 2022 also from [facility].    Technique/Positioning: Frontal view the chest.    Findings:    Support devices: Right PICC    Cardiac/mediastinum/hilum: Again seen is aortic valve replacement and   thoracic endovascular aneurysm repair. The opacity seen adjacent to the   stent which had been stable on prior radiographs and described as a known   pseudoaneurysm now appears larger in size which may potentially be   projectional however a increasing pseudoaneurysm cannot be excluded.    Lung parenchyma/Pleura: No definite acute consolidation. No large   effusion or pneumothorax    Skeleton/soft tissues: Stable bones    Impression:    In comparison to chest radiographs from [facility] dated  through   2022, there appears to be increased size of an opacity adjacent to   the thoracic aortic stent which relates to the known pseudoaneurysm.   While this may be projectional, further increased size of pseudoaneurysm   cannot be excluded.        --- End of Report ---            KIM OROSCO MD; Attending Radiologist  This document has been electronically signed. 2022  6:46AM (22 @ 19:15)      CT      CARDIOLOGY TESTING  12 Lead ECG:   Ventricular Rate 78 BPM    Atrial Rate 78 BPM    P-R Interval 152 ms    QRS Duration 82 ms    Q-T Interval 456 ms    QTC Calculation(Bazett) 519 ms    P Axis 41 degrees    R Axis 19 degrees    T Axis 61 degrees    Diagnosis Line Sinus rhythm with marked sinus arrhythmia  Possible Left atrial enlargement  Abnormal ECG    Confirmed by Raymundo Waggoner ([id number]) on 2022 4:16:38 PM (22 @ 11:13)  12 Lead ECG:   Ventricular Rate 71 BPM    Atrial Rate 71 BPM    P-R Interval 158 ms    QRS Duration 82 ms    Q-T Interval 450 ms    QTC Calculation(Bazett) 489 ms    P Axis 52 degrees    R Axis 34 degrees    T Axis 61 degrees    Diagnosis Line Normal sinus rhythm  Possible Left atrial enlargement  Borderline ECG    Confirmed by Ralf Acevedo ([id number]) on 2022 6:04:06 PM (22 @ 18:58)      MEDICATIONS  acetaminophen     Tablet .. 650 Oral every 6 hours  amLODIPine   Tablet 2.5 Oral daily  apixaban 5 Oral every 12 hours  aspirin  chewable 81 Oral daily  atorvastatin 10 Oral at bedtime  budesonide  80 MICROgram(s)/formoterol 4.5 MICROgram(s) Inhaler 2 Inhalation two times a day  chlorhexidine 2% Cloths 1 Topical <User Schedule>  DAPTOmycin IVPB 500 IV Intermittent every 24 hours  ertapenem  IVPB 1000 IV Intermittent every 24 hours  famotidine    Tablet 20 Oral daily  fluconAZOLE   Tablet 400 Oral daily  heparin  Lock Flush 100 Units/mL Injectable 300 IV Push once  hydrALAZINE 10 Oral every 8 hours  influenza  Vaccine (HIGH DOSE) 0.7 IntraMuscular once  levothyroxine 50 Oral daily  metoprolol tartrate 50 Oral two times a day  polyethylene glycol 3350 17 Oral daily      Weight  Weight (kg): 65.8 (22 @ 14:00)    ANTIBIOTICS:  DAPTOmycin IVPB 500 milliGRAM(s) IV Intermittent every 24 hours  ertapenem  IVPB 1000 milliGRAM(s) IV Intermittent every 24 hours  fluconAZOLE   Tablet 400 milliGRAM(s) Oral daily      ALLERGIES:  No Known Allergies

## 2022-11-14 NOTE — PHYSICAL THERAPY INITIAL EVALUATION ADULT - MANUAL MUSCLE TESTING RESULTS, REHAB EVAL
Discharge As      Date of Admisssion:  3/20/2020  Date of Death:  3/23/2020  Time of Death:  9:35 PM    Patient Care Team:  Raffi Wooten MD as PCP - General (Emergency Medicine)  Edwardo Loera MD as Consulting Physician (Hospice and Palliative Medicine)    Final Diagnosis:     SAH (subarachnoid hemorrhage) (CMS/[facility])    Hospice care patient    Acquired obstructive hydrocephalus (CMS/[facility])    Ruptured aneurysm of intracranial region (CMS/[facility])    Oropharyngeal dysphagia    COPD (chronic obstructive pulmonary disease) (CMS/[facility])    Hypertension    Chronic respiratory failure with hypoxia (CMS/[facility])      Hospital Course  Patient was a 87 y.o. male who presented to the ED 0709 3/16/2020 with complaints of a headache.  Per girlfriend at bedside, patient went to the bathroom at approximately 4 AM.  When he did not return, she went to the bathroom and found him laying on the floor.  Patient had lost control of bowels. Patient was complaining of severe headache and seemed confused.  Girlfriend stated patient was at baseline when he went to bed, no complaints.  However, the night before he was somewhat restless. Patient is not on a blood thinner or aspirin.     CT head 3/16/2020 at 6:30 AM:  IMPRESSION:  1. There is very extensive acute subarachnoid hemorrhage filling the upper cervical spinal canal, the perimesencephalic and prepontine and interpeduncular cistern and suprasellar cistern, as well as the sylvian  fissures, and extending into the frontoparietal sulci, hemorrhage in lateral ventricles partially opacifying the third ventricle, aqueduct Sylvius, fourth ventricle, foramina of Luschka and Magendie, and there is some dilatation lateral and third ventricles, some of which may be due to central volume loss, although I suspect there is developing hydrocephalus. Some early transependymal extension of CSF in the periventricular white matter is also suspected. This is highly suspicious for ruptured  aneurysm.     Neurosurgery reviewed the patient and films.  I discussed with the patient's son and daughter who is POA.  They were in agreement that no heroic measures be done.  They elected for comfort care only and I was called. Hospice evaluated the patient and he was discharged from acute care and readmitted as a hospice scattered bed.     The patient demonstrated decline. Please see my note from earlier in the day on 3/23/2020. Subsequently, I was called that the patient's respirations ceased and no pulse palpable. I pronounced the patient at 2135 hours.    Edwardo Loera MD  Hospice and Palliative Medicine  03/24/20  07:33                      BLE = 3/5/grossly assessed due to

## 2022-11-14 NOTE — CHART NOTE - NSCHARTNOTEFT_GEN_A_CORE
I changed the patient's VAC dressing to his anterior sternal chest. The last dressing was done 11/11/22 at [facility].  The patient has VAC dressing supplies in his room in his closet.    The old dressing was removed. The remainder of the procedure was done in a sterile fashion. The wound bed had pink/red granulation tissue. There was not drainage, tunneling, or cavities.     Measurements were 2 cm wide, 1 cm deep, 3 cm long.     Wound vac placement: The foam was cut and shaped to fit the wound and placed in the wound.  The transparent drape was cut and applied to cover the foam leaving a 1 cm edge.  A 1 cm hole was cut in the center of the drape and the sensatrac pad was placed so that the tubing was arranged in a direction and position that was comfortable for the patient. The tubing was connected to the vac suction canister and the connectors locked together. The vac suction unit was activated and set at 125 mgHg.    The patient tolerated the procedure well.
Spoke to Joe Romero from CT Surgery team.   - At this time no intervention from CT Surgery team. Pseudoaneurysm is known and at this time are not recommending further imaging. Plan to change wound vac tomorrow.    Attempted to contact patient's daughter but was unable to. Will try again at a later time.
Called by RN for clotted PICC line and pt due to Abx, pt right arm precaution for PICC line and left arm precaution for brachial artery embolectomy. Called IR resident and vascular that informed me that it is fine to place a peripheral IV in the right arm.

## 2022-11-14 NOTE — PHYSICAL THERAPY INITIAL EVALUATION ADULT - GAIT DEVIATIONS NOTED, PT EVAL
decreased daniel/increased time in double stance/increased stride width/decreased swing-to-stance ratio

## 2022-11-14 NOTE — CONSULT NOTE ADULT - SUBJECTIVE AND OBJECTIVE BOX
INTERVENTIONAL RADIOLOGY CONSULT:     Procedure Requested: PICC line evaluation    HPI:  77 YO Female, former smoker, w/ PMHx of CAD, HTN, HLD, spinal stenosis, arthritis, hypothyroidism, bicuspid aortic valve s/p AVR, ascending/hemiarch replacement with frozen elephant trunk and left aorto-subclavian bypass, CABG x2 with Dr. 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 Muller and Dr. Augustin 7/29/22, course complicated by sternal wound infection. On 11/2/22 pt presented to [facility] ED for further work-up of sternal wound infection. On 11/2/22 pt underwent sternal I&D and wound vac placement with Dr. Muller and Dr. Vernon. 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 Vascular surgery consulted for r/o LUE limb ischemia, no surgical intervention at present. Patient was discharged to home on 11/9/22 on IV antibiotics via picc line (daptomycin, ertapenem, fluconazole from 11/2 - 12/13). Patient had refused STR. One day post discharge she presents to [facility] for weakness. States that she felt "drained" when visiting nurse arrived has been unable to ambulate. VN services recommended coming to hospital. Denies headaches, dizziness, fall, LOC, sob, chest pain, palpitations, N/V/abdominal, diarrhea. Reports LUE pain is still painful from prior admission. B/L LE L>R are still weak, unchanged from [facility]. Patient still states that she does not want to go to rehab.     In the ED: Vitals T 96.4, /57, HR 60, 95% RA. Labs with CBC no leukocytosis, Hgb 8.7 at baseline, CMP with hypokalemia repleted. UA negative.  - Chest Xray: in comparison to [facility] chest xrays, appears to be increased size of opacity adjacent to the thoracic aortic stent which relates to known pseudoaneurysm. May be projectional but further increase of pseudoaneurysm may not be excluded.   Patient to be admitted to medicine for further work up and placement.     Attd: notes from [facility] are excellent and I reviewed them; pt seemed well prepared for d/c from [facility]; better option was to go to SNF from [facility] (which pt declined); VNS RN sent pt back into hospital ([facility]) because she was too weak to be at home; family has now convinced pt to go to SNF for at least a few weeks; her chronic problems are stable; pt reports no BM for few weeks - she asked for a laxative; she also wants pain meds for wound vac dressing changes and pain in left arm; rest as above (13 Nov 2022 07:56)      PAST MEDICAL & SURGICAL HISTORY:  HTN (hypertension)      H/O aortic valve stenosis      CAD (coronary artery disease)      S/P aortic valve replacement      S/P CABG (coronary artery bypass graft)      H/O aortic arch replacement      Pacemaker  medtronic micra pacemaker          MEDICATIONS  (STANDING):  acetaminophen     Tablet .. 650 milliGRAM(s) Oral every 6 hours  amLODIPine   Tablet 2.5 milliGRAM(s) Oral daily  apixaban 5 milliGRAM(s) Oral every 12 hours  aspirin  chewable 81 milliGRAM(s) Oral daily  atorvastatin 10 milliGRAM(s) Oral at bedtime  budesonide  80 MICROgram(s)/formoterol 4.5 MICROgram(s) Inhaler 2 Puff(s) Inhalation two times a day  chlorhexidine 2% Cloths 1 Application(s) Topical <User Schedule>  DAPTOmycin IVPB 500 milliGRAM(s) IV Intermittent every 24 hours  ertapenem  IVPB 1000 milliGRAM(s) IV Intermittent every 24 hours  famotidine    Tablet 20 milliGRAM(s) Oral daily  fluconAZOLE   Tablet 400 milliGRAM(s) Oral daily  hydrALAZINE 10 milliGRAM(s) Oral every 8 hours  influenza  Vaccine (HIGH DOSE) 0.7 milliLiter(s) IntraMuscular once  levothyroxine 50 MICROGram(s) Oral daily  metoprolol tartrate 50 milliGRAM(s) Oral two times a day  polyethylene glycol 3350 17 Gram(s) Oral daily    MEDICATIONS  (PRN):  HYDROmorphone  Injectable 0.5 milliGRAM(s) IV Push daily PRN Severe Pain (7 - 10)  senna 2 Tablet(s) Oral at bedtime PRN Constipation  traMADol 50 milliGRAM(s) Oral every 6 hours PRN Severe Pain (7 - 10)      Allergies    No Known Allergies    Intolerances    FAMILY HISTORY:  FH: CAD (coronary artery disease) (Sibling)  CABG    Family history of CKD (chronic kidney disease) (Sibling)  ESRD    Family history of diabetes mellitus (DM) (Sibling)        Physical Exam:   Vital Signs Last 24 Hrs  T(C): 36.3 (14 Nov 2022 04:23), Max: 36.7 (13 Nov 2022 21:26)  T(F): 97.4 (14 Nov 2022 04:23), Max: 98.1 (13 Nov 2022 21:26)  HR: 64 (14 Nov 2022 04:23) (60 - 64)  BP: 108/65 (14 Nov 2022 04:23) (108/65 - 121/55)  BP(mean): --  RR: 18 (14 Nov 2022 04:23) (16 - 18)  SpO2: 96% (14 Nov 2022 04:23) (96% - 97%)      Labs:                         9.0    8.43  )-----------( 194      ( 13 Nov 2022 08:04 )             28.1     11-13    136  |  94<L>  |  21<H>  ----------------------------<  83  3.9   |  28  |  0.9    Ca    9.3      13 Nov 2022 08:04  Mg     2.3     11-13    TPro  7.5  /  Alb  3.2<L>  /  TBili  0.7  /  DBili  x   /  AST  18  /  ALT  11  /  AlkPhos  91  11-13        Pertinent labs:                      9.0    8.43  )-----------( 194      ( 13 Nov 2022 08:04 )             28.1       11-13    136  |  94<L>  |  21<H>  ----------------------------<  83  3.9   |  28  |  0.9    Ca    9.3      13 Nov 2022 08:04  Mg     2.3     11-13    TPro  7.5  /  Alb  3.2<L>  /  TBili  0.7  /  DBili  x   /  AST  18  /  ALT  11  /  AlkPhos  91  11-13      Radiology & Additional Studies:   Impression: Placement of 4 [place] single lumen PICC line via the right basilic vein as described above under ultrasound and fluoroscopic guidance. The catheter may be used immediately.    Dr. Gustabo Peace was present during the entire examination.    --- End of Report ---    Radiology imaging reviewed.       ASSESSMENT/ PLAN:   77 YO Female hx provided above, was discharged to home on 11/9/22 on IV antibiotics via picc line (daptomycin, ertapenem, fluconazole from 11/2 - 12/13). PICC line stated to be clogged, IR consulted for evaluation/possible replacement.  - patient seen at bedside - no pain, swelling, erythema or tenderness to insertion site or surrounding area  - catheter flushed with 10cc NS - able to flush and aspirate with minimal resistance  - catheter placed on 11/11 at [facility]   - being this PICC line does not include closure clasp, recommend flushing with 10cc NS daily to avoid blood clotting within the tube  - no IR intervention warranted at this time         Risks, benefits, and alternatives to treatment discussed. All questions answered with understanding.    Thank you for the courtesy of this consult, please call [phone] with any further questions.

## 2022-11-14 NOTE — CONSULT NOTE ADULT - TIME BILLING
I have personally seen and examined this patient.    I have reviewed all pertinent clinical information and reviewed all relevant imaging and diagnostic studies personally.   I counseled the patient about diagnostic testing and treatment plan. All questions were answered.   I discussed recommendations with the primary team.
CTS Attending  CT scan reviewed  wound care issues being resolved  if home care is not possible, pt my need SNF  no surgery is presently indicated.  -FMR

## 2022-11-14 NOTE — PHYSICAL THERAPY INITIAL EVALUATION ADULT - PERTINENT HX OF CURRENT PROBLEM, REHAB EVAL
75 YO Female, former smoker, w/ PMHx of CAD, HTN, HLD, spinal stenosis, arthritis, hypothyroidism, bicuspid aortic valve s/p AVR, ascending/hemiarch replacement with frozen elephant trunk and left aorto-subclavian bypass, CABG x2 with Dr. 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 Muller and Dr. Augustin 7/29/22, course complicated by sternal wound infection. On 11/2/22 pt presented to [facility] ED for further work-up of sternal wound infection. On 11/2/22 pt underwent sternal I&D and wound vac placement with Dr. Muller and Dr. Vernon. 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 Vascular surgery consulted for r/o LUE limb ischemia, no surgical intervention at present. Patient was discharged to home on 11/9/22 on IV antibiotics via picc line (daptomycin, ertapenem, fluconazole from 11/2 - 12/13). Patient had refused STR. One day post discharge she presents to [facility] for weakness. States that she felt "drained" when visiting nurse arrived has been unable to ambulate. VN services recommended coming to hospital. Denies headaches, dizziness, fall, LOC, sob, chest pain, palpitations, N/V/abdominal, diarrhea. Reports LUE pain is still painful from prior admission. B/L LE L>R are still weak, unchanged from [facility]. Patient still states that she does not want to go to rehab.

## 2022-11-14 NOTE — PHYSICAL THERAPY INITIAL EVALUATION ADULT - GENERAL OBSERVATIONS, REHAB EVAL
PT evaluation attempted, conflicting information regarding WB status and which particular extremity from PT consult and patient's family. on call resident Dr. Vidales was notified, hold PT evaluation until orders are clarified.
patient encountered supine in bed, NAD, agreeable to PT evaluation. Time in: 10:00 Time out: 10:30

## 2022-11-15 NOTE — DISCHARGE NOTE PROVIDER - NSDCCPCAREPLAN_GEN_ALL_CORE_FT
PRINCIPAL DISCHARGE DIAGNOSIS  Diagnosis: Weakness  Assessment and Plan of Treatment: Weakness is a lack of strength. You may feel weak all over your body (generalized), or you may feel weak in one specific part of your body (focal). There are many potential causes of weakness. Sometimes, the cause of your weakness may not be known. Some causes of weakness can be serious, so it is important to see your doctor.  Follow these instructions at home:  Rest as needed.  Try to get enough sleep. Talk to your doctor about how much sleep you need each night.  Take over-the-counter and prescription medicines only as told by your doctor.  Eat a healthy, well-balanced diet. This includes:  Proteins to build muscles, such as lean meats and fish.  Fresh fruits and vegetables.  Carbohydrates to boost energy, such as whole grains.  Drink enough fluid to keep your pee (urine) clear or pale yellow.  Do strength exercises, such as arm curls and leg raises, for 30 minutes at least 2 days a week or as told by your doctor.  Think about working with a physical therapist or  to help you get stronger.  Keep all follow-up visits as told by your doctor. This is important.  Contact a doctor if:  Your weakness does not get better or it gets worse.  Your weakness affects your ability to:  Think clearly.  Do your normal daily activities.  Get help right away if:  You have sudden weakness.  You have trouble breathing or shortness of breath.  You have problems with your vision.  You have trouble talking or swallowing.  You have trouble standing or walking.  You have chest pain.  You are light-headed.  You pass out (lose consciousness).  This information is not intended to replace advice given to you by your health care provider. Make sure you discuss any questions you have with your health care provider.

## 2022-11-15 NOTE — PROGRESS NOTE ADULT - SUBJECTIVE AND OBJECTIVE BOX
DAILY PROGRESS NOTE  ===========================================================    Patient Information:  LYUBOV BAHENA  /  77y  /  Female  /  MRN#: [id number]    Patient is a 77y old Female who presents with a chief complaint of weakness/ inability to ambulate (14 Nov 2022 18:24)      Hospital Day: 3d     |:::::::::::::::::::::::::::| SUBJECTIVE |:::::::::::::::::::::::::::|    OVERNIGHT EVENTS: none.  TODAY: Patient was seen today at bedside. Review of systems is otherwise negative.    |:::::::::::::::::::::::::::| OBJECTIVE |:::::::::::::::::::::::::::|    VITAL SIGNS: Last 24 Hours  T(C): 36 (15 Nov 2022 04:22), Max: 36.6 (14 Nov 2022 13:11)  T(F): 96.8 (15 Nov 2022 04:22), Max: 97.8 (14 Nov 2022 13:11)  HR: 56 (15 Nov 2022 04:22) (56 - 61)  BP: 130/66 (15 Nov 2022 04:22) (103/55 - 130/66)  BP(mean): --  RR: 18 (15 Nov 2022 04:22) (18 - 18)  SpO2: 97% (15 Nov 2022 04:22) (96% - 97%)    PHYSICAL EXAM:  GENERAL: Awake, alert; NAD.  HEENT: Head NC/AT; Conjunctivae pink, Sclera anicteric; Oral mucosa moist.  CARDIO: Regular rate; Regular rhythm; S1 & S2.  RESP: No rales, wheezing, or rhonchi appreciated.  GI: Soft; NT/ND; BS; No guarding; No rebound tenderness.  EXT: Strength UE 5/5; Strength LE 5/5; No edema.   SKIN: Intact, no rashes, no erythema    LAB RESULTS:      Mg     2.3     11-13              CARDIAC MARKERS ( 13 Nov 2022 18:22 )  x     / x     / 22 U/L / x     / x          MICROBIOLOGY:    RADIOLOGY:    ALLERGIES:  No Known Allergies    MEDICATIONS:  acetaminophen     Tablet .. 650 milliGRAM(s) Oral every 6 hours  amLODIPine   Tablet 2.5 milliGRAM(s) Oral daily  apixaban 5 milliGRAM(s) Oral every 12 hours  aspirin  chewable 81 milliGRAM(s) Oral daily  atorvastatin 10 milliGRAM(s) Oral at bedtime  budesonide  80 MICROgram(s)/formoterol 4.5 MICROgram(s) Inhaler 2 Puff(s) Inhalation two times a day  chlorhexidine 2% Cloths 1 Application(s) Topical <User Schedule>  DAPTOmycin IVPB 500 milliGRAM(s) IV Intermittent every 24 hours  ertapenem  IVPB 1000 milliGRAM(s) IV Intermittent every 24 hours  famotidine    Tablet 20 milliGRAM(s) Oral daily  fluconAZOLE   Tablet 400 milliGRAM(s) Oral daily  hydrALAZINE 10 milliGRAM(s) Oral every 8 hours  HYDROmorphone  Injectable 0.5 milliGRAM(s) IV Push daily PRN  influenza  Vaccine (HIGH DOSE) 0.7 milliLiter(s) IntraMuscular once  levothyroxine 50 MICROGram(s) Oral daily  lidocaine   4% Patch 1 Patch Transdermal every 24 hours  metoprolol tartrate 50 milliGRAM(s) Oral two times a day  polyethylene glycol 3350 17 Gram(s) Oral daily  senna 2 Tablet(s) Oral at bedtime PRN  traMADol 50 milliGRAM(s) Oral every 6 hours PRN    ===========================================================  
  DAILY PROGRESS NOTE  ===========================================================    Patient Information:  LYUBOV BAHENA  /  77y  /  Female  /  MRN#: [id number]    Patient is a 77y old Female who presents with a chief complaint of weakness/ inability to ambulate (2022 14:48)      Hospital Day: 2d     |:::::::::::::::::::::::::::| SUBJECTIVE |:::::::::::::::::::::::::::|    OVERNIGHT EVENTS:   TODAY: Patient was seen today at bedside. Review of systems is otherwise negative.    |:::::::::::::::::::::::::::| OBJECTIVE |:::::::::::::::::::::::::::|    VITAL SIGNS: Last 24 Hours  T(C): 36.3 (2022 04:23), Max: 36.7 (2022 21:26)  T(F): 97.4 (2022 04:23), Max: 98.1 (2022 21:26)  HR: 64 (2022 04:23) (60 - 64)  BP: 108/65 (2022 04:23) (108/65 - 121/55)  BP(mean): --  RR: 18 (2022 04:23) (16 - 18)  SpO2: 96% (2022 04:23) (96% - 97%)    PHYSICAL EXAM:  GENERAL: Awake, alert; NAD.  HEENT: Head NC/AT; Conjunctivae pink, Sclera anicteric; Oral mucosa moist.  CARDIO: Regular rate; Regular rhythm; S1 & S2.  RESP: No rales, wheezing, or rhonchi appreciated.  GI: Soft; NT/ND; BS; No guarding; No rebound tenderness.  EXT: No edema.   SKIN: Intact, no rashes, no erythema, sternal wound vac c/d/i    LAB RESULTS:                        9.0    8.43  )-----------( 194      ( 2022 08:04 )             28.1         136  |  94<L>  |  21<H>  ----------------------------<  83  3.9   |  28  |  0.9    Ca    9.3      2022 08:04  Mg     2.3         TPro  7.5  /  Alb  3.2<L>  /  TBili  0.7  /  DBili  x   /  AST  18  /  ALT  11  /  AlkPhos  91        Urinalysis Basic - ( 2022 17:00 )    Color: Yellow / Appearance: Clear / S.013 / pH: x  Gluc: x / Ketone: Negative  / Bili: Negative / Urobili: <2 mg/dL   Blood: x / Protein: 30 mg/dL / Nitrite: Negative   Leuk Esterase: Negative / RBC: 3 /HPF / WBC 3 /HPF   Sq Epi: x / Non Sq Epi: 9 /HPF / Bacteria: Negative        Creatine Kinase, Serum: 22 U/L (22 @ 18:22)  Sedimentation Rate, Erythrocyte: 94 mm/Hr *H* (22 @ 18:22)    CARDIAC MARKERS ( 2022 18:22 )  x     / x     / 22 U/L / x     / x      CARDIAC MARKERS ( 2022 20:02 )  x     / <0.01 ng/mL / x     / x     / x          MICROBIOLOGY:    RADIOLOGY:    ALLERGIES:  No Known Allergies    MEDICATIONS:  acetaminophen     Tablet .. 650 milliGRAM(s) Oral every 6 hours  amLODIPine   Tablet 2.5 milliGRAM(s) Oral daily  apixaban 5 milliGRAM(s) Oral every 12 hours  aspirin  chewable 81 milliGRAM(s) Oral daily  atorvastatin 10 milliGRAM(s) Oral at bedtime  budesonide  80 MICROgram(s)/formoterol 4.5 MICROgram(s) Inhaler 2 Puff(s) Inhalation two times a day  chlorhexidine 2% Cloths 1 Application(s) Topical <User Schedule>  DAPTOmycin IVPB 500 milliGRAM(s) IV Intermittent every 24 hours  ertapenem  IVPB 1000 milliGRAM(s) IV Intermittent every 24 hours  famotidine    Tablet 20 milliGRAM(s) Oral daily  fluconAZOLE   Tablet 400 milliGRAM(s) Oral daily  hydrALAZINE 10 milliGRAM(s) Oral every 8 hours  HYDROmorphone  Injectable 0.5 milliGRAM(s) IV Push daily PRN  influenza  Vaccine (HIGH DOSE) 0.7 milliLiter(s) IntraMuscular once  levothyroxine 50 MICROGram(s) Oral daily  metoprolol tartrate 50 milliGRAM(s) Oral two times a day  senna 2 Tablet(s) Oral at bedtime PRN  traMADol 50 milliGRAM(s) Oral every 6 hours PRN    ===========================================================  
  SHRUTILYUBOV  77y  Female  ***My note supersedes ALL resident notes that I sign.  My corrections for their notes are in my note.***    I can be reached directly on [email or web address]. My office number is [phone]. My personal cell number is [phone].    INTERVAL EVENTS: Here for f/u of chest wound. Pt had vac dressing changed. Pt keyshawn well w/ dilaudid. Pt had BM. Pt walked 20' w/ walker and PT. Agrees to SNF.    T(F): 97.8 (11-14-22 @ 13:11), Max: 98.1 (11-13-22 @ 21:26)  HR: 61 (11-14-22 @ 13:11) (60 - 64)  BP: 103/55 (11-14-22 @ 13:11) (103/55 - 121/55)  RR: 18 (11-14-22 @ 13:11) (18 - 18)  SpO2: 96% (11-14-22 @ 04:23) (96% - 97%)    Gen: NAD  HEENT: PERRL, EOMI, mouth clr, nose clr  Neck: no nodes, no JVD, thyroid nl  lungs: clr  hrt: s1 s2 rrr no murmur  abd: soft, NT/ND, no HS megaly  ext: no edema, no c/c  neuro: aa ox3, cn intact, can move all 4 ext    LABS:                      9.0     (    91.8   8.43  )-----------( ---------      194      ( 13 Nov 2022 08:04 )             28.1    (    17.2     CARDIAC MARKERS ( 13 Nov 2022 18:22 )  x     / x     / 22 U/L / x     / x      CARDIAC MARKERS ( 12 Nov 2022 20:02 )  x     / <0.01 ng/mL / x     / x     / x        CAPILLARY BLOOD GLUCOSE  POCT Blood Glucose.: 95 (11-14-22 @ 16:21)  POCT Blood Glucose.: 123 (11-14-22 @ 11:20)  POCT Blood Glucose.: 84 (11-14-22 @ 07:47)  POCT Blood Glucose.: 107 (11-13-22 @ 21:05)  POCT Blood Glucose.: 102 (11-13-22 @ 16:43)  POCT Blood Glucose.: 97 (11-13-22 @ 12:08)  POCT Blood Glucose.: 97 (11-13-22 @ 08:16)    RADIOLOGY & ADDITIONAL TESTS:      MEDICATIONS:  DAPTOmycin IVPB 500 milliGRAM(s) IV Intermittent every 24 hours  ertapenem  IVPB 1000 milliGRAM(s) IV Intermittent every 24 hours  fluconAZOLE   Tablet 400 milliGRAM(s) Oral daily    acetaminophen     Tablet .. 650 milliGRAM(s) Oral every 6 hours  amLODIPine   Tablet 2.5 milliGRAM(s) Oral daily  apixaban 5 milliGRAM(s) Oral every 12 hours  aspirin  chewable 81 milliGRAM(s) Oral daily  atorvastatin 10 milliGRAM(s) Oral at bedtime  budesonide  80 MICROgram(s)/formoterol 4.5 MICROgram(s) Inhaler 2 Puff(s) Inhalation two times a day  chlorhexidine 2% Cloths 1 Application(s) Topical <User Schedule>  famotidine    Tablet 20 milliGRAM(s) Oral daily  hydrALAZINE 10 milliGRAM(s) Oral every 8 hours  HYDROmorphone  Injectable 0.5 milliGRAM(s) IV Push daily PRN  influenza  Vaccine (HIGH DOSE) 0.7 milliLiter(s) IntraMuscular once  levothyroxine 50 MICROGram(s) Oral daily  metoprolol tartrate 50 milliGRAM(s) Oral two times a day  polyethylene glycol 3350 17 Gram(s) Oral daily  senna 2 Tablet(s) Oral at bedtime PRN  traMADol 50 milliGRAM(s) Oral every 6 hours PRN

## 2022-11-15 NOTE — DISCHARGE NOTE PROVIDER - HOSPITAL COURSE
77 YO Female, former smoker, w/ PMHx of CAD, HTN, HLD, spinal stenosis, arthritis, hypothyroidism, bicuspid aortic valve s/p AVR, ascending/hemiarch replacement with frozen elephant trunk and left aorto-subclavian bypass, CABG x2 with Dr. 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 Muller and Dr. Augustin 7/29/22, course complicated by sternal wound infection. On 11/2/22 pt presented to [facility] ED for further work-up of sternal wound infection. On 11/2/22 pt underwent sternal I&D and wound vac placement with Dr. Muller and Dr. Vernon. 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 Vascular surgery consulted for r/o LUE limb ischemia, no surgical intervention at present. Patient was discharged to home on 11/9/22 on IV antibiotics via picc line (daptomycin, ertapenem, fluconazole from 11/2 - 12/13). Patient had refused STR. One day post discharge she presents to [facility] for weakness. States that she felt "drained" when visiting nurse arrived has been unable to ambulate. VN services recommended coming to hospital. Denies headaches, dizziness, fall, LOC, sob, chest pain, palpitations, N/V/abdominal, diarrhea. Reports LUE pain is still painful from prior admission. B/L LE L>R are still weak, unchanged from [facility]. Patient still states that she does not want to go to rehab.     In the ED: Vitals T 96.4, /57, HR 60, 95% RA. Labs with CBC no leukocytosis, Hgb 8.7 at baseline, CMP with hypokalemia repleted. UA negative.  - Chest Xray: in comparison to [facility] chest xrays, appears to be increased size of opacity adjacent to the thoracic aortic stent which relates to known pseudoaneurysm. May be projectional but further increase of pseudoaneurysm may not be excluded.   Patient to be admitted to medicine for further work up and placement.     Hospital Course:  Patient had her wound vac dressing changed via CT Surgery, her antibiotic regimen was continued as per her previous admission. She worked with PT during her stay. She is agreeable to SNF. She is currently AAOx3, in NAD, HDS. She is approved for discharge to Nursing Home.

## 2022-11-15 NOTE — DISCHARGE NOTE PROVIDER - NSDCFUSCHEDAPPT_GEN_ALL_CORE_FT
Hugo Augustin  [facility]  VASCULAR [address]  Scheduled Appointment: 11/22/2022    Bladimir Muller  [facility]  CTSURG [address]  Scheduled Appointment: 11/22/2022    Aron Blank  [facility]  CARDIOLOGY [address]  Scheduled Appointment: 11/23/2022    Merissa Crystal  [facility]  INFDISEASE [address]  Scheduled Appointment: 11/29/2022    Parvez Khanna  [facility]  HEARTVASC [address]  Scheduled Appointment: 12/22/2022    Aron Blank  [facility]  CARDIOLOGY [address]  Scheduled Appointment: 01/30/2023

## 2022-11-15 NOTE — PROGRESS NOTE ADULT - ATTENDING COMMENTS
patient seen and examined independently on morning rounds for the first time today, chart reviewed and discussed with medicine resident and agree with the above progress note with the following addendum:    pending placement (& authorization) for STR ([facility])- patient is stable for discharge once auth and bed obtained--sternal wound dressing changed yesterday by CT surgery (11/14)    no overnight events---minimal ambulation with PT (appx 20 ft with RW with assistance)    covid 11/12 negative---should be ok for discharge when bed availble    DVT/GI ppx   guarded prognosis    pending dispo plan to STR when bed avail

## 2022-11-15 NOTE — DISCHARGE NOTE NURSING/CASE MANAGEMENT/SOCIAL WORK - NSDCPEFALRISK_GEN_ALL_CORE
For information on Fall & Injury Prevention, visit: [email or web address] OR  [email or web address] OR  https://www.cdc.gov/steadi/patient.html

## 2022-11-15 NOTE — PROGRESS NOTE ADULT - ASSESSMENT
77 YO woman, former smoker, w/ PMHx of CAD, HTN, HLD, spinal stenosis, arthritis, hypothyroidism, bicuspid aortic valve s/p AVR (biopros), ascending/hemiarch replacement with frozen elephant trunk and left aorto-subclavian bypass, CABG x2 with Dr. 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 Muller and Dr. Augustin 7/29/22, course complicated by sternal wound infection s/p I+D. Wound cultures growing C. Albicans. Patient was discharged to home on ertapenem, fluconazole, and daptomycin to end 12/13 and with wound vac. Patient refused STR. Presents 1 day post-discharge from [facility] for weakness.     # Generalized Weakness likely 2/2 to Deconditioning  VSS, Afeb  UA neg, trops neg   Chest Xray: no pneumonia  F/U EKG   PT/OT    # Sternal wound infection   s/p I+D and wound vac at [facility]  wound vac last changed on 11/11 -> CT Sx eval for change of this harrison; dilaudid 0.5 mg iv for dressing change  Wound cultures: C. Albicans  s/p PICC Line single lumen   As per ID at [facility]:  cont daptomycin 500mg IV q24h  cont ertapenem 1g IV q24h  cont fluconazole 400mg PO q24h   Duration of antibiotics is tentatively 6 weeks (11/2 - 12/13)  tramadol 50mg po q6 prn pain  F/u ID consult  F/u CT surgery consult- will change wound vac dressing today  F/u ESR, CRP, CK     # constipation  pt endorsed 3 weeks of constipation  lactulose 20gm po q3 x3 doses, +bm today  c/w senna and miralax, will d/c on bowel regimen    # CAD and bicuspid aortic valve s/p AVR (biopros), ascending/hemiarch replacement with frozen elephant trunk and left aorto-subclavian bypass, CABG x2 on 8/9/2021 course c/b poor wound healing s/p pec flap and closure on 9/29/21; s/p TEVAR with R groin cutdown and femoral artery repair on 7/29/22; Hx of SSS s/p PPM placement; HTN  Last Echo: 11/2: Normal systolic, GD II Diastolic dysfunction, severely dilated LA, severe mitral calcification, High velocities are seen in the suprasternal notch area.   Trops negative, BNP 7133  c/w aspirin   c/w Lopressor 50mg PO QD (home med)  c/w Norvasc 2.5mg PO QD (home med)  c/w Hydralazine 10mg PO Q8H (home med)  HOLD home lasix 20mg, patient appears clinically euvolemic.     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 No revascularization recommended at this time due to prior surgeries and current infection.   She is to f/u with Vascular surgery as an outpatient for future surgical planning  Per vascular team, if lt arm begins to lose sensation or decrease in motor ability, contact vascular surgery immediately   Currently, patient has intact sensation, but no ulnar/ radial pulses in left arm consistent with prior eval at [facility]   c/w eliquis 5mg BID     # Hypothyroidism  Last TSH 11/2: 5.22   c/w Synthroid     # COPD; former tob  c/w Symbicort  stable    # GI ppx: famotidine    # DVT ppx: eliquis    # Activity: as tolerated, fall risk     # Code status: FULL code    Dispo: f/u labs above; iv abx; tx pain; tx constipation; f/u ID/CT Sx; PT eval  eventually, pt will go to SNF for iv abx, wound care and rehab - f/u CM - stable for d/c
77 YO woman, former smoker, w/ PMHx of CAD, HTN, HLD, spinal stenosis, arthritis, hypothyroidism, bicuspid aortic valve s/p AVR (biopros), ascending/hemiarch replacement with frozen elephant trunk and left aorto-subclavian bypass, CABG x2 with Dr. 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 Muller and Dr. Augustin 7/29/22, course complicated by sternal wound infection s/p I+D. Wound cultures growing C. Albicans. Patient was discharged to home on ertapenem, fluconazole, and daptomycin to end 12/13 and with wound vac. Patient refused STR. Presents 1 day post-discharge from [facility] for weakness.     # LIMIT LABS    # Generalized Weakness likely 2/2 to Deconditioning  VSS, Afeb  UA neg, trops neg   Chest Xray: no pneumonia  EKG sinus arrhy; QTc 519  PT/OT    # Sternal wound infection - size of a quarter, but 1cm deep; healing well (spoke w/ CT Sx)  s/p I+D and wound vac at [facility]  wound vac last changed on 11/11 and 11/14 -> CT Sx eval for changes q72 hrs; dilaudid 0.5 mg iv for dressing change  Wound cultures: C. Albicans  s/p PICC Line - was clotted; fixed by IR at bedside; flush w/ 10cc NS q24 to prevent future clotting  As per ID at [facility]:  cont daptomycin 500mg IV q24h  cont ertapenem 1g IV q24h  cont fluconazole 400mg PO q24h   Duration of antibiotics is tentatively 6 weeks (11/2 - 12/13)  tramadol 50mg po q6 prn pain  F/u ID consult  F/u CT surgery consult   F/u ESR 94, CRP 45, CK 2    # constipation - better  s/p lactulose  c/w senna + miralax    # CAD and bicuspid aortic valve s/p AVR (biopros), ascending/hemiarch replacement with frozen elephant trunk and left aorto-subclavian bypass, CABG x2 on 8/9/2021 course c/b poor wound healing s/p pec flap and closure on 9/29/21; s/p TEVAR with R groin cutdown and femoral artery repair on 7/29/22; Hx of SSS s/p PPM placement; HTN  Last Echo: 11/2: Normal systolic, GD II Diastolic dysfunction, severely dilated LA, severe mitral calcification, High velocities are seen in the suprasternal notch area.   Trops negative, BNP 7133  c/w aspirin   c/w Lopressor 50mg PO QD (home med)  c/w Norvasc 2.5mg PO QD (home med)  c/w Hydralazine 10mg PO Q8H (home med)  HOLD home lasix 20mg, patient appears clinically euvolemic.     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 No revascularization recommended at this time due to prior surgeries and current infection.   She is to f/u with Vascular surgery as an outpatient for future surgical planning  Per vascular team, if lt arm begins to lose sensation or decrease in motor ability, contact vascular surgery immediately   Currently, patient has intact sensation, but no ulnar/ radial pulses in left arm consistent with prior eval at [facility]   c/w eliquis 5mg BID     # Hypothyroidism  Last TSH 11/2: 5.22   c/w Synthroid     # COPD; former tob  c/w Symbicort  stable    # GI ppx: famotidine    # DVT ppx: eliquis    # Activity: as tolerated, fall risk     # Code status: FULL code    Dispo: flush PICC 10cc NS q24; iv abx; tx pain; f/u CT Sx re wnd vac dress chg q72; PT eval; LIMIT LABS  eventually, pt will go to SNF for iv abx, wound care and rehab - f/u CM - stable for d/c      
75 YO woman, former smoker, w/ PMHx of CAD, HTN, HLD, spinal stenosis, arthritis, hypothyroidism, bicuspid aortic valve s/p AVR (biopros), ascending/hemiarch replacement with frozen elephant trunk and left aorto-subclavian bypass, CABG x2 with Dr. 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 Muller and Dr. Augustin 7/29/22, course complicated by sternal wound infection s/p I+D. Wound cultures growing C. Albicans. Patient was discharged to home on ertapenem, fluconazole, and daptomycin to end 12/13 and with wound vac. Patient refused STR. Presents 1 day post-discharge from [facility] for weakness.     # Generalized Weakness likely 2/2 to Deconditioning  VSS, Afeb  UA neg, trops neg   Chest Xray: no pneumonia  F/U EKG   PT/OT    # Sternal wound infection   s/p I+D and wound vac at [facility]  wound vac last changed on 11/11 -> CT Sx eval for change of this harrison; dilaudid 0.5 mg iv for dressing change  Wound cultures: C. Albicans  s/p PICC Line single lumen   As per ID at [facility]:  cont daptomycin 500mg IV q24h  cont ertapenem 1g IV q24h  cont fluconazole 400mg PO q24h   Duration of antibiotics is tentatively 6 weeks (11/2 - 12/13)  tramadol 50mg po q6 prn pain  F/u ID consult  F/u CT surgery consult- will change wound vac dressing today  F/u ESR, CRP, CK     # constipation  pt endorsed 3 weeks of constipation  lactulose 20gm po q3 x3 doses, +bm today  c/w senna and miralax, will d/c on bowel regimen    # CAD and bicuspid aortic valve s/p AVR (biopros), ascending/hemiarch replacement with frozen elephant trunk and left aorto-subclavian bypass, CABG x2 on 8/9/2021 course c/b poor wound healing s/p pec flap and closure on 9/29/21; s/p TEVAR with R groin cutdown and femoral artery repair on 7/29/22; Hx of SSS s/p PPM placement; HTN  Last Echo: 11/2: Normal systolic, GD II Diastolic dysfunction, severely dilated LA, severe mitral calcification, High velocities are seen in the suprasternal notch area.   Trops negative, BNP 7133  c/w aspirin   c/w Lopressor 50mg PO QD (home med)  c/w Norvasc 2.5mg PO QD (home med)  c/w Hydralazine 10mg PO Q8H (home med)  HOLD home lasix 20mg, patient appears clinically euvolemic.     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 No revascularization recommended at this time due to prior surgeries and current infection.   She is to f/u with Vascular surgery as an outpatient for future surgical planning  Per vascular team, if lt arm begins to lose sensation or decrease in motor ability, contact vascular surgery immediately   Currently, patient has intact sensation, but no ulnar/ radial pulses in left arm consistent with prior eval at [facility]   c/w eliquis 5mg BID     # Hypothyroidism  Last TSH 11/2: 5.22   c/w Synthroid     # COPD; former tob  c/w Symbicort  stable    # GI ppx: famotidine    # DVT ppx: eliquis    # Activity: as tolerated, fall risk     # Code status: FULL code    Dispo: f/u labs above; iv abx; tx pain; tx constipation; f/u ID/CT Sx; PT eval  eventually, pt will go to SNF for iv abx, wound care and rehab - f/u CM - stable for d/c

## 2022-11-15 NOTE — DISCHARGE NOTE NURSING/CASE MANAGEMENT/SOCIAL WORK - PATIENT PORTAL LINK FT
You can access the FollowMyHealth Patient Portal offered by [facility] by registering at the following website: [email or web address]. By joining [facility]’s FollowMyHealth portal, you will also be able to view your health information using other applications (apps) compatible with our system.

## 2022-11-15 NOTE — DISCHARGE NOTE PROVIDER - NSDCMRMEDTOKEN_GEN_ALL_CORE_FT
acetaminophen 325 mg oral tablet: 2 tab(s) orally every 6 hours, As needed, Mild Pain (1 - 3)  amLODIPine 2.5 mg oral tablet: 1 tab(s) orally once a day  apixaban 5 mg oral tablet: 1 tab(s) orally every 12 hours  aspirin 81 mg oral tablet, chewable: 1 tab(s) orally once a day  atorvastatin 10 mg oral tablet: 1 tab(s) orally once a day (at bedtime)  DAPTOmycin 500 mg intravenous injection: 500 milligram(s) intravenously once a day until 12/13  ertapenem 1 g injection: 1 gram(s) intravenously once a day until 12/13  famotidine 20 mg oral tablet: 1 tab(s) orally once a day  fluconazole 200 mg oral tablet: 2 tab(s) orally once a day take till Dec 17th  furosemide 20 mg oral tablet: Take 2 tab(s) orally once a day till Nov 17th then take 1 tab once a day  Heparin 10 units/ML post infusion : Heparin 10 units/ML post infusion   hydrALAZINE 10 mg oral tablet: 1 tab(s) orally every 8 hours  levothyroxine 50 mcg (0.05 mg) oral tablet: 1 tab(s) orally once a day  metoprolol tartrate 50 mg oral tablet: 1 tab(s) orally 2 times a day  Normal saline 0.9% 10 ML pre and post infusion flushes : Normal saline 0.9% 10 ML pre and post infusion flushes   potassium chloride 10 mEq oral tablet, extended release: Take 2 tab(s) orally once a day till Nov 17th then take 1 tab once a day  senna leaf extract oral tablet: 2 tab(s) orally once a day (at bedtime), As Needed -for constipation   Symbicort 80 mcg-4.5 mcg/inh inhalation aerosol: 2 puff(s) inhaled 2 times a day  traMADol 50 mg oral tablet: 1 tab(s) orally once a day prior to wound vac change MDD:1  Weekly CBC, CMP, ESR, CRP, CK. Please fax to [phone]: 1 unit(s) once a day

## 2022-11-18 NOTE — ED PROVIDER NOTE - NS ED ROS FT
GEN:  no fever, no chills, no generalized weakness  NEURO:  no headache, no dizziness, +confused  ENT: no sore throat, no runny nose  CV:  no chest pain, no palpitations  RESP:  no sob, no cough  GI:  no nausea, no vomiting, no abdominal pain, no diarrhea  :  no dysuria, no urinary frequency, no hematuria  MSK:  no joint pain, no edema  SKIN:  no rash, no bruising

## 2022-11-18 NOTE — ED PROVIDER NOTE - CARE PLAN
Principal Discharge DX:	Acute UTI  Secondary Diagnosis:	Confusion   1 Principal Discharge DX:	Confusion   Principal Discharge DX:	Delirium  Secondary Diagnosis:	Decreased appetite

## 2022-11-18 NOTE — ED PROVIDER NOTE - OBJECTIVE STATEMENT
78 yo female, PMHx of CAD, HTN, HLD, spinal stenosis, arthritis, hypothyroidism, bicuspid aortic valve s/p AVR, ascending/hemiarch replacement with frozen elephant trunk and left aorto-subclavian bypass, CABG x2 with Dr. 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 Muller and Dr. Augustin 7/29/22, course complicated by sternal wound infection, presents with AMS. Per daughter, patientw as discharged to rehab 3 days ago and has been getting progressively confused with poor oral intake. Denies fevers, chills, chest pain, shortness of breath, nausea, vomiting, abdominal pain. At rehab she is pending UA.

## 2022-11-18 NOTE — ED ADULT NURSE NOTE - OBJECTIVE STATEMENT
FAMILY HISTORY:  Family history of hypertension in mother    
pt is a 76 yo female, pw AMS. Per daughter, patient was discharged to rehab 3 days ago and has been getting progressively confused with poor oral intake. Denies fevers, chills, chest pain, shortness of breath, nausea, vomiting, abdominal pain. At rehab she is pending UA.

## 2022-11-18 NOTE — ED ADULT NURSE NOTE - CHIEF COMPLAINT QUOTE
BIBA from [facility] for AMS . Patient is alert,oriented x 2 now . Patient recently had open heart surgery
19-Mar-2022 09:18

## 2022-11-18 NOTE — ED PROVIDER NOTE - PHYSICAL EXAMINATION
CONSTITUTIONAL: Well-developed; well-nourished; in no acute distress.   SKIN: warm, dry  HEAD: Normocephalic  EYES: no conjunctival injection  ENT: No nasal discharge  NECK: Supple; non tender.  CARD: S1, S2 normal; Regular rate and rhythm. +chest wound vac  RESP: No wheezes, rales or rhonchi.  ABD: soft ntnd  EXT: Normal ROM.  No clubbing, cyanosis or edema.   NEURO: Alert, oriented, grossly unremarkable. AAOx3  PSYCH: Cooperative, appropriate.

## 2022-11-18 NOTE — ED ADULT TRIAGE NOTE - CHIEF COMPLAINT QUOTE
Pt alert and able to make needs known. Pt with c/o pain to BLE. Pain med given. Pt cont to be NPO at midnight. Pt with no acute distress noted call light in reach. BIBA from [facility] for AMS . Patient is alert,oriented x 2 now . Patient recently had open heart surgery

## 2022-11-18 NOTE — ED PROVIDER NOTE - CLINICAL SUMMARY MEDICAL DECISION MAKING FREE TEXT BOX
ed w/u without acute findings, pt delirious in ED. will admit to medicine for further w/u, monitoring, treatment given complex medical hx, recent procedures

## 2022-11-18 NOTE — ED PROVIDER NOTE - ATTENDING CONTRIBUTION TO CARE
Patient presenting with decreased oral intake and increased episodes of delirium for the past 3 days.  She was recently discharged from rehab.  Patient denies any fevers dysuria vomiting abdominal pain or recent falls.  At baseline patient is ANO x3 and well-functioning.  Currently she is ANO x3 and follows commands but answers questions inappropriately at times.  Her exam shows that she is with normal range of motion.  Lungs are clear.  Heart is normal.  Abdomen is soft.  Chest has a wound VAC.  Extremities without edema.  Plan is to obtain labs head CT and UA

## 2022-11-19 NOTE — H&P ADULT - NSHPLABSRESULTS_GEN_ALL_CORE
.                          8.8    9.40  )-----------( 185      ( 2022 23:40 )             27.7     11-18    133<L>  |  95<L>  |  17  ----------------------------<  99  3.8   |  23  |  0.9    Ca    9.0      2022 23:40  Mg     2.2     11-18    TPro  7.9  /  Alb  3.7  /  TBili  0.7  /  DBili  x   /  AST  30  /  ALT  14  /  AlkPhos  105  11-18        CARDIAC MARKERS ( 2022 23:40 )  x     / <0.01 ng/mL / x     / x     / x          COVID-19 PCR: NotDetec (2022 23:40)  COVID-19 PCR: NotDetec (2022 21:40)        POCT glucose:   Urinalysis Basic - ( 2022 01:15 )    Color: Light Yellow / Appearance: Clear / S.015 / pH: x  Gluc: x / Ketone: Trace  / Bili: Negative / Urobili: <2 mg/dL   Blood: x / Protein: 30 mg/dL / Nitrite: Negative   Leuk Esterase: Small / RBC: 4 /HPF / WBC 3 /HPF   Sq Epi: x / Non Sq Epi: 3 /HPF / Bacteria: Negative      RADIOLOGY & ADDITIONAL STUDIES:  < from: CT Head No Cont (22 @ 23:18) >    No evidence of intracranial hemorrhage, territorial infarct, or mass   effect. No significant change since the prior exam.    < end of copied text >

## 2022-11-19 NOTE — H&P ADULT - HISTORY OF PRESENT ILLNESS
76 yo female, PMHx of CAD, HTN, HLD, spinal stenosis, arthritis, hypothyroidism, bicuspid aortic valve s/p AVR, ascending/hemiarch replacement with frozen elephant trunk and left aorto-subclavian bypass, CABG x2 with Dr. 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 Muller and Dr. Augustin 7/29/22, course complicated by sternal wound infection, presents with AMS. Per daughter, patientw as discharged to rehab 3 days ago and has been getting progressively confused with poor oral intake. Denies fevers, chills, chest pain, shortness of breath, nausea, vomiting, abdominal pain. At rehab she is pending UA. 78 yo female, PMHx of CAD, HTN, HLD, spinal stenosis, arthritis, hypothyroidism, bicuspid aortic valve s/p AVR, ascending/hemiarch replacement with frozen elephant trunk and left aorto-subclavian bypass, CABG x2 with Dr. 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 Muller and Dr. Augustin 7/29/22, course complicated by sternal wound infection, presents with AMS. Per daughter, patient was discharged to rehab 3 days ago and has been getting progressively confused with poor oral intake. Denies fevers, chills, chest pain, shortness of breath, nausea, vomiting, abdominal pain. At rehab she is pending UA. Patient is poor historian. Hx taken from chart.  76 yo female, PMHx of CAD, HTN, HLD, spinal stenosis, arthritis, hypothyroidism, bicuspid aortic valve s/p AVR, ascending/hemiarch replacement with frozen elephant trunk and left aorto-subclavian bypass, CABG x2 with Dr. 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 Muller and Dr. Augustin 7/29/22, course complicated by sternal wound infection, presents with AMS. Per daughter, patient was discharged to rehab 3 days ago and has been getting progressively confused with poor oral intake. Denies fevers, chills, chest pain, shortness of breath, nausea, vomiting, abdominal pain. At rehab she is pending UA.  In ED: Temp = 98.6; HR = 78; BP = 124/58; RR = 20; SaO2 = 99% on room air. Work up noted for WBC 9.4, hgb 8.8, Na 133, UA: LE small, CTH: negative.  Patient received NS 1L in ED.

## 2022-11-19 NOTE — H&P ADULT - ASSESSMENT
76 yo female, PMHx of CAD, HTN, HLD, spinal stenosis, arthritis, hypothyroidism, bicuspid aortic valve s/p AVR, ascending/hemiarch replacement with frozen elephant trunk and left aorto-subclavian bypass, CABG x2 with Dr. 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 Muller and Dr. Augustin 7/29/22, course complicated by sternal wound infection, presents with AMS.  Recently discharged from the hospital with IV antibiotics for parasternal phlegmon, abscess.    # AMS  # Generalized weakness  - ? metabolic encephalopathy  - AO x 2 on exam  - CTH negative  - check EEG  - 1:1 feeds  - aspiration precautions  - c/w home dose of antibiotics  - consider neurology eval    # Parasternal abscess  - seen by ID during last admission  - resume ertapenem, dapromycin, fluconazole  - follow cultures  - ID consult placed    # Anemia  - hgb stable around baseline  - check iron stores, b12, folate  - trend h/h. Tx PRN    # extensive cardiac history / bicuspid aortic valve s/p AVR, ascending/hemiarch replacement with frozen elephant trunk and left aorto-subclavian bypass, CABG x2  # HTN  - c/w home dose of Aspirin, Metoprolol, Amlodipine, Lasix, atorvastatin  - hold hydralazine for now, resume of BP uncontrolled    # L Brachial artery occlusion / s/p embolectomy  - c/w home dose of eliquis      DVT: eliquis  GI:   Diet:   Activity: Increase as tolerated  Dispo: Acute for now  76 yo female, PMHx of CAD, HTN, HLD, spinal stenosis, arthritis, hypothyroidism, bicuspid aortic valve s/p AVR, ascending/hemiarch replacement with frozen elephant trunk and left aorto-subclavian bypass, CABG x2 with Dr. 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 Muller and Dr. Augustin 7/29/22, course complicated by sternal wound infection, presents with AMS.  Recently discharged from the hospital with IV antibiotics for parasternal phlegmon, abscess.    # AMS  # Generalized weakness  - ? metabolic encephalopathy  - AO x 2 on exam  - CTH negative  - check EEG  - 1:1 feeds  - aspiration precautions  - c/w home dose of antibiotics  - consider neurology eval    # Parasternal abscess  - seen by ID during last admission  - resume ertapenem, dapromycin, fluconazole  - follow cultures  - ID consult placed    # Anemia  - hgb stable around baseline  - check iron stores, b12, folate  - trend h/h. Tx PRN    # extensive cardiac history / bicuspid aortic valve s/p AVR, ascending/hemiarch replacement with frozen elephant trunk and left aorto-subclavian bypass, CABG x2  # HTN  - c/w home dose of Aspirin, Metoprolol, Amlodipine, Lasix, atorvastatin  - hold hydralazine for now, resume of BP uncontrolled    # L Brachial artery occlusion / s/p embolectomy  - c/w home dose of eliquis    # hypothyroidism  - c/w home dose of Levothyroxine  - check TSH    DVT: eliquis  GI: pepcid  Diet: DASH / minced and moist.  Activity: Increase as tolerated  Dispo: Acute for now

## 2022-11-19 NOTE — H&P ADULT - NSHPPHYSICALEXAM_GEN_ALL_CORE
VITALS:  T(F): 98 (11-19-22 @ 04:05), Max: 98.6 (11-18-22 @ 20:17)  HR: 92 (11-19-22 @ 04:05) (78 - 92)  BP: 161/67 (11-19-22 @ 04:05) (124/58 - 161/67)  RR: 16 (11-19-22 @ 04:05) (16 - 20)  SpO2: 99% (11-19-22 @ 04:05) (99% - 99%)      PHYSICAL EXAM:  GENERAL: NAD, well-developed  CHEST/LUNG: CTAB; No wheeze  HEART: RRR; No murmurs, rubs, or gallops  ABDOMEN: Soft, NT/ND; BS present  EXTREMITIES:  No cyanosis, or edema  NEUROLOGY: AAOx3  SKIN: No rashes or lesions VITALS:  T(F): 98 (11-19-22 @ 04:05), Max: 98.6 (11-18-22 @ 20:17)  HR: 92 (11-19-22 @ 04:05) (78 - 92)  BP: 161/67 (11-19-22 @ 04:05) (124/58 - 161/67)  RR: 16 (11-19-22 @ 04:05) (16 - 20)  SpO2: 99% (11-19-22 @ 04:05) (99% - 99%)      PHYSICAL EXAM:  GENERAL: NAD, well-developed  CHEST/LUNG: CTAB; No wheeze, parasternal site wound vac +  HEART: RRR; No murmurs, rubs, or gallops  ABDOMEN: Soft, NT/ND; BS present  EXTREMITIES:  No cyanosis, or edema  NEUROLOGY: AAOx2. Patient is awake and alert. oriented to place and person, not oriented to date / time. She did not answer questions about reason for hospital visit and her living situation appropriately. Following simple commands. seems generalized weak.   SKIN: No rashes or lesions

## 2022-11-19 NOTE — H&P ADULT - ATTENDING COMMENTS
78 yo female, PMHx of CAD, HTN, HLD, spinal stenosis, arthritis, hypothyroidism, bicuspid aortic valve s/p AVR, ascending/hemiarch replacement with frozen elephant trunk and left aorto-subclavian bypass, CABG x2 with Dr. 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 Muller and Dr. Augustin 7/29/22, course complicated by sternal wound infection, presents with AMS. Recently discharged from the hospital with IV antibiotics for parasternal phlegmon, abscess.   Presenting with AMS and hallucinations at NH.     PHYSICAL EXAM:  GENERAL: NAD, well-developed  CHEST/LUNG: CTAB; No wheeze, parasternal site wound vac +  HEART: RRR; No murmurs, rubs, or gallops  ABDOMEN: Soft, NT/ND  EXTREMITIES:  No cyanosis, or edema  NEUROLOGY: AAOx2. Patient is awake and alert. oriented to place and person, not oriented to date / time.  SKIN: No rashes or lesions    # AMS  # Generalized weakness  - metabolic encephalopathy vs fluctuating delirium/ general deconditioning from   - AO x 2 on exam  - CTH negative, CXR and UA unremarkable   - check EEG  - follow up blood and urine cultures   - no focal neurological defecits at this time, will defer neuro and MRI, unless clinical change or lack of improvement     # Parasternal abscess w/ Wound Vac   - seen by ID during last admission  - resume ertapenem, dapromycin, fluconazole  - follow cultures  - ID consult placed    # Normocytic Anemia  - hgb stable around baseline  - check iron stores, b12, folate    # Extensive cardiac history / bicuspid aortic valve s/p AVR, ascending/hemiarch replacement with frozen elephant trunk and left aorto-subclavian bypass, CABG x2  # HTN  - c/w home dose of Aspirin, Metoprolol, Amlodipine, Lasix, atorvastatin  - hold hydralazine for now, resume of BP uncontrolled    # L Brachial artery occlusion / s/p embolectomy  - c/w home dose of eliquis    # Hypothyroidism  - c/w home dose of Levothyroxine    DVT: eliquis  Diet: DASH / minced and moist.    Dispo: acute for AMS workup     Total time spent to complete patient's bedside assessment, review medical chart, discuss medical plan of care with covering medical team was more than 70 minutes  with >50% of time spent face to face with patient, discussion with patient/family and/or coordination of care    Nataliia Hammond DO

## 2022-11-19 NOTE — PATIENT PROFILE ADULT - FALL HARM RISK - HARM RISK INTERVENTIONS

## 2022-11-20 NOTE — PROGRESS NOTE ADULT - SUBJECTIVE AND OBJECTIVE BOX
LYUBOV BAHENA 77y Female  MRN#: [id number]   Hospital Day: 1d    SUBJECTIVE  Patient is a 77y old Female who presents with a chief complaint of Currently admitted to medicine with the primary diagnosis of Delirium      INTERVAL HPI AND OVERNIGHT EVENTS:  Patient was examined and seen at bedside. This morning she is resting comfortably in bed and reports no issues or overnight events.    OBJECTIVE  PAST MEDICAL & SURGICAL HISTORY  HTN (hypertension)    H/O aortic valve stenosis    CAD (coronary artery disease)    S/P aortic valve replacement    S/P CABG (coronary artery bypass graft)    H/O aortic arch replacement    Pacemaker  medtronic micra pacemaker      ALLERGIES:  No Known Allergies    MEDICATIONS:  STANDING MEDICATIONS  amLODIPine   Tablet 2.5 milliGRAM(s) Oral daily  apixaban 5 milliGRAM(s) Oral every 12 hours  aspirin  chewable 81 milliGRAM(s) Oral daily  atorvastatin 10 milliGRAM(s) Oral at bedtime  budesonide  80 MICROgram(s)/formoterol 4.5 MICROgram(s) Inhaler 2 Puff(s) Inhalation two times a day  DAPTOmycin IVPB 500 milliGRAM(s) IV Intermittent every 24 hours  ertapenem  IVPB 1000 milliGRAM(s) IV Intermittent every 24 hours  famotidine    Tablet 20 milliGRAM(s) Oral daily  fluconAZOLE IVPB 200 milliGRAM(s) IV Intermittent every 24 hours  furosemide    Tablet 20 milliGRAM(s) Oral daily  influenza  Vaccine (HIGH DOSE) 0.7 milliLiter(s) IntraMuscular once  levothyroxine 50 MICROGram(s) Oral daily  metoprolol tartrate 50 milliGRAM(s) Oral two times a day  senna 2 Tablet(s) Oral at bedtime    PRN MEDICATIONS  acetaminophen     Tablet .. 650 milliGRAM(s) Oral every 6 hours PRN      VITAL SIGNS: Last 24 Hours  T(C): 36 (2022 20:41), Max: 37.2 (2022 13:54)  T(F): 96.8 (2022 20:41), Max: 98.9 (2022 13:54)  HR: 60 (2022 20:41) (60 - 98)  BP: 102/54 (2022 20:41) (102/54 - 161/67)  BP(mean): 83 (2022 18:15) (83 - 83)  RR: 16 (2022 20:41) (16 - 18)  SpO2: 98% (2022 20:41) (98% - 99%)    LABS:                        8.8    9.40  )-----------( 185      ( 2022 23:40 )             27.7     11-18    133<L>  |  95<L>  |  17  ----------------------------<  99  3.8   |  23  |  0.9    Ca    9.0      2022 23:40  Mg     2.2         TPro  7.9  /  Alb  3.7  /  TBili  0.7  /  DBili  x   /  AST  30  /  ALT  14  /  AlkPhos  105        Urinalysis Basic - ( 2022 01:15 )    Color: Light Yellow / Appearance: Clear / S.015 / pH: x  Gluc: x / Ketone: Trace  / Bili: Negative / Urobili: <2 mg/dL   Blood: x / Protein: 30 mg/dL / Nitrite: Negative   Leuk Esterase: Small / RBC: 4 /HPF / WBC 3 /HPF   Sq Epi: x / Non Sq Epi: 3 /HPF / Bacteria: Negative            CARDIAC MARKERS ( 2022 23:40 )  x     / <0.01 ng/mL / x     / x     / x          RADIOLOGY:       LYUBOV BAHENA 77y Female  MRN#: [id number]   Hospital Day: 1d    SUBJECTIVE  Patient is a 77y old Female who presents with a chief complaint of Currently admitted to medicine with the primary diagnosis of Delirium      INTERVAL HPI AND OVERNIGHT EVENTS:  Patient was examined and seen at bedside. This morning she is resting comfortably in bed, AAOx3. No events overnight.     OBJECTIVE  PAST MEDICAL & SURGICAL HISTORY  HTN (hypertension)    H/O aortic valve stenosis    CAD (coronary artery disease)    S/P aortic valve replacement    S/P CABG (coronary artery bypass graft)    H/O aortic arch replacement    Pacemaker  medtronic micra pacemaker      ALLERGIES:  No Known Allergies    MEDICATIONS:  STANDING MEDICATIONS  amLODIPine   Tablet 2.5 milliGRAM(s) Oral daily  apixaban 5 milliGRAM(s) Oral every 12 hours  aspirin  chewable 81 milliGRAM(s) Oral daily  atorvastatin 10 milliGRAM(s) Oral at bedtime  budesonide  80 MICROgram(s)/formoterol 4.5 MICROgram(s) Inhaler 2 Puff(s) Inhalation two times a day  DAPTOmycin IVPB 500 milliGRAM(s) IV Intermittent every 24 hours  ertapenem  IVPB 1000 milliGRAM(s) IV Intermittent every 24 hours  famotidine    Tablet 20 milliGRAM(s) Oral daily  fluconAZOLE IVPB 200 milliGRAM(s) IV Intermittent every 24 hours  furosemide    Tablet 20 milliGRAM(s) Oral daily  influenza  Vaccine (HIGH DOSE) 0.7 milliLiter(s) IntraMuscular once  levothyroxine 50 MICROGram(s) Oral daily  metoprolol tartrate 50 milliGRAM(s) Oral two times a day  senna 2 Tablet(s) Oral at bedtime    PRN MEDICATIONS  acetaminophen     Tablet .. 650 milliGRAM(s) Oral every 6 hours PRN      VITAL SIGNS: Last 24 Hours  T(C): 36 (2022 20:41), Max: 37.2 (2022 13:54)  T(F): 96.8 (2022 20:41), Max: 98.9 (2022 13:54)  HR: 60 (2022 20:41) (60 - 98)  BP: 102/54 (2022 20:41) (102/54 - 161/67)  BP(mean): 83 (2022 18:15) (83 - 83)  RR: 16 (2022 20:41) (16 - 18)  SpO2: 98% (2022 20:41) (98% - 99%)    LABS:                        8.8    9.40  )-----------( 185      ( 2022 23:40 )             27.7     11-18    133<L>  |  95<L>  |  17  ----------------------------<  99  3.8   |  23  |  0.9    Ca    9.0      2022 23:40  Mg     2.2         TPro  7.9  /  Alb  3.7  /  TBili  0.7  /  DBili  x   /  AST  30  /  ALT  14  /  AlkPhos  105        Urinalysis Basic - ( 2022 01:15 )    Color: Light Yellow / Appearance: Clear / S.015 / pH: x  Gluc: x / Ketone: Trace  / Bili: Negative / Urobili: <2 mg/dL   Blood: x / Protein: 30 mg/dL / Nitrite: Negative   Leuk Esterase: Small / RBC: 4 /HPF / WBC 3 /HPF   Sq Epi: x / Non Sq Epi: 3 /HPF / Bacteria: Negative            CARDIAC MARKERS ( 2022 23:40 )  x     / <0.01 ng/mL / x     / x     / x          RADIOLOGY:      PHYSICAL EXAM:  GENERAL: NAD, well-developed  CHEST/LUNG: CTAB; No wheeze, parasternal site wound vac +  HEART: RRR; No murmurs, rubs, or gallops  ABDOMEN: Soft, NT/ND  EXTREMITIES:  No cyanosis, or edema  NEUROLOGY: AAOx3 today   SKIN: No rashes or lesions       LYUBOV BAHENA 77y Female  MRN#: [id number]   Hospital Day: 1d    SUBJECTIVE  Patient is a 77y old Female who presents with a chief complaint of Currently admitted to medicine with the primary diagnosis of Delirium      INTERVAL HPI AND OVERNIGHT EVENTS:  Patient was examined and seen at bedside. This morning she is resting comfortably in bed, AAOx3. No events overnight.     OBJECTIVE  PAST MEDICAL & SURGICAL HISTORY     HTN (hypertension)    H/O aortic valve stenosis    CAD (coronary artery disease)    S/P aortic valve replacement    S/P CABG (coronary artery bypass graft)    H/O aortic arch replacement    Pacemaker  medtronic micra pacemaker      ALLERGIES:  No Known Allergies    MEDICATIONS:  STANDING MEDICATIONS  amLODIPine   Tablet 2.5 milliGRAM(s) Oral daily  apixaban 5 milliGRAM(s) Oral every 12 hours  aspirin  chewable 81 milliGRAM(s) Oral daily  atorvastatin 10 milliGRAM(s) Oral at bedtime  budesonide  80 MICROgram(s)/formoterol 4.5 MICROgram(s) Inhaler 2 Puff(s) Inhalation two times a day  DAPTOmycin IVPB 500 milliGRAM(s) IV Intermittent every 24 hours  ertapenem  IVPB 1000 milliGRAM(s) IV Intermittent every 24 hours  famotidine    Tablet 20 milliGRAM(s) Oral daily  fluconAZOLE IVPB 200 milliGRAM(s) IV Intermittent every 24 hours  furosemide    Tablet 20 milliGRAM(s) Oral daily  influenza  Vaccine (HIGH DOSE) 0.7 milliLiter(s) IntraMuscular once  levothyroxine 50 MICROGram(s) Oral daily  metoprolol tartrate 50 milliGRAM(s) Oral two times a day  senna 2 Tablet(s) Oral at bedtime    PRN MEDICATIONS  acetaminophen     Tablet .. 650 milliGRAM(s) Oral every 6 hours PRN      VITAL SIGNS: Last 24 Hours  T(C): 36 (2022 20:41), Max: 37.2 (2022 13:54)  T(F): 96.8 (2022 20:41), Max: 98.9 (2022 13:54)  HR: 60 (2022 20:41) (60 - 98)  BP: 102/54 (2022 20:41) (102/54 - 161/67)  BP(mean): 83 (2022 18:15) (83 - 83)  RR: 16 (2022 20:41) (16 - 18)  SpO2: 98% (2022 20:41) (98% - 99%)    LABS:                        8.8    9.40  )-----------( 185      ( 2022 23:40 )             27.7     11-18    133<L>  |  95<L>  |  17  ----------------------------<  99  3.8   |  23  |  0.9    Ca    9.0      2022 23:40  Mg     2.2         TPro  7.9  /  Alb  3.7  /  TBili  0.7  /  DBili  x   /  AST  30  /  ALT  14  /  AlkPhos  105        Urinalysis Basic - ( 2022 01:15 )    Color: Light Yellow / Appearance: Clear / S.015 / pH: x  Gluc: x / Ketone: Trace  / Bili: Negative / Urobili: <2 mg/dL   Blood: x / Protein: 30 mg/dL / Nitrite: Negative   Leuk Esterase: Small / RBC: 4 /HPF / WBC 3 /HPF   Sq Epi: x / Non Sq Epi: 3 /HPF / Bacteria: Negative            CARDIAC MARKERS ( 2022 23:40 )  x     / <0.01 ng/mL / x     / x     / x          RADIOLOGY:      PHYSICAL EXAM:  GENERAL: NAD, well-developed  CHEST/LUNG: CTAB; No wheeze, parasternal site wound vac +  HEART: RRR; No murmurs, rubs, or gallops  ABDOMEN: Soft, NT/ND  EXTREMITIES:  No cyanosis, or edema  NEUROLOGY: AAOx3 today   SKIN: No rashes or lesions

## 2022-11-20 NOTE — PROGRESS NOTE ADULT - ATTENDING COMMENTS
78 yo female, PMHx of CAD, HTN, HLD, spinal stenosis, arthritis, hypothyroidism, bicuspid aortic valve s/p AVR, ascending/hemiarch replacement with frozen elephant trunk and left aorto-subclavian bypass, CABG x2 with Dr. 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 Muller and Dr. Augustin 7/29/22, course complicated by sternal wound infection, presents with AMS. Recently discharged from the hospital with IV antibiotics for parasternal phlegmon, abscess.   Presenting with AMS and hallucinations at NH.     # Delirium- resolved   # Generalized weakness  - pt likely w/ fluctuating delirium, now resolved w/o intervention   - AAOx3 today   - CTH negative, CXR and UA unremarkable   - EEG performed- pending read   - follow up blood and urine cultures - negative   - no focal neurological defecits at this time, will defer neuro and MRI  - PT eval     # Parasternal abscess w/ Wound Vac   - seen by ID during last admission  - resume ertapenem, dapromycin, fluconazole  - follow cultures- negative     # Normocytic Anemia  - hgb stable around baseline  - f/u iron B12 studies, folate WNL     # Extensive cardiac history / bicuspid aortic valve s/p AVR, ascending/hemiarch replacement with frozen elephant trunk and left aorto-subclavian bypass, CABG x2  # HTN  - c/w home dose of Aspirin, Metoprolol, Amlodipine, Lasix, atorvastatin  - hold hydralazine for now, resume of BP uncontrolled    # L Brachial artery occlusion / s/p embolectomy  - c/w home dose of eliquis    # Hypothyroidism  - c/w home dose of Levothyroxine    DVT: eliquis  Diet: DASH / minced and moist.    Dispo: pending EEG result, ant dc tomorrow     Total time spent to complete patient's bedside assessment, review medical chart, discuss medical plan of care with covering medical team was more than 35 minutes  with >50% of time spent face to face with patient, discussion with patient/family and/or coordination of care    Nataliia Hammond DO .

## 2022-11-21 NOTE — PROGRESS NOTE ADULT - SUBJECTIVE AND OBJECTIVE BOX
Location: [address] ([address])  Patient Name: LYUBOV BAHENA  Age: 77y  Gender: Female    Past Medical and Surgical History:  HTN (hypertension)    H/O aortic valve stenosis    CAD (coronary artery disease)    S/P aortic valve replacement    S/P CABG (coronary artery bypass graft)    H/O aortic arch replacement    Pacemaker  medtronic micra pacemaker        Social History:  Social History:      Allergies:  No Known Allergies      Patient is a 77y old Female who presents with a chief complaint of   Primary diagnosis of DELIRIUM; DECREASED APPETITE        Progress Note  This morning patient was seen and examined at bedside.    Today is hospital day 2d.    Hospital Course      Overnight events  No overnight events    Vital Signs in the last 24 hours   Vitals Summary T(C): 35.8 (11-21-22 @ 13:48), Max: 36 (11-20-22 @ 20:11)  HR: 69 (11-21-22 @ 13:48) (51 - 69)  BP: 118/58 (11-21-22 @ 13:48) (94/46 - 118/58)  RR: 18 (11-21-22 @ 13:48) (17 - 18)  SpO2: --  Vent Data   Intake/ Output   11-20-22 @ 07:01  -  11-21-22 @ 07:00  --------------------------------------------------------  IN: 175 mL / OUT: 0 mL / NET: 175 mL    11-21-22 @ 07:01  -  11-21-22 @ 16:32  --------------------------------------------------------  IN: 0 mL / OUT: 1 mL / NET: -1 mL          Physical Exam  PHYSICAL EXAM:  GENERAL: NAD, well-developed  CHEST/LUNG: CTAB; No wheeze, parasternal site wound vac +  HEART: RRR; No murmurs, rubs, or gallops  ABDOMEN: Soft, NT/ND  EXTREMITIES:  No cyanosis, or edema  NEUROLOGY: AAOx3 today   SKIN: No rashes or lesions      Investigations   Laboratory Workup  - CBC:                        7.6    5.71  )-----------( 157      ( 20 Nov 2022 22:06 )             23.7     - Chemistry:  11-20    140  |  104  |  27<H>  ----------------------------<  125<H>  3.4<L>   |  21  |  1.0    Ca    8.5      20 Nov 2022 22:06      - Coagulation Studies:    - ABG:    - Cardiac Markers:        Microbiological Workup      Culture - Urine (collected 19 Nov 2022 01:15)  Source: Clean Catch Clean Catch (Midstream)  Final Report (20 Nov 2022 07:29):    No growth    Culture - Blood (collected 18 Nov 2022 23:46)  Source: .Blood Blood-Peripheral  Preliminary Report (20 Nov 2022 07:01):    No growth to date.    Culture - Blood (collected 18 Nov 2022 23:46)  Source: .Blood Blood-Peripheral  Preliminary Report (20 Nov 2022 07:01):    No growth to date.        Radiological Workup  *      Current Medications  Standing Medications  amLODIPine   Tablet 2.5 milliGRAM(s) Oral daily  apixaban 5 milliGRAM(s) Oral every 12 hours  aspirin  chewable 81 milliGRAM(s) Oral daily  atorvastatin 10 milliGRAM(s) Oral at bedtime  budesonide  80 MICROgram(s)/formoterol 4.5 MICROgram(s) Inhaler 2 Puff(s) Inhalation two times a day  DAPTOmycin IVPB 500 milliGRAM(s) IV Intermittent every 24 hours  ertapenem  IVPB 1000 milliGRAM(s) IV Intermittent every 24 hours  famotidine    Tablet 20 milliGRAM(s) Oral daily  fluconAZOLE IVPB 200 milliGRAM(s) IV Intermittent every 24 hours  furosemide    Tablet 20 milliGRAM(s) Oral daily  influenza  Vaccine (HIGH DOSE) 0.7 milliLiter(s) IntraMuscular once  levothyroxine 50 MICROGram(s) Oral daily  metoprolol tartrate 50 milliGRAM(s) Oral two times a day  senna 2 Tablet(s) Oral at bedtime    PRN Medications  acetaminophen     Tablet .. 650 milliGRAM(s) Oral every 6 hours PRN Temp greater or equal to 38C (100.4F), Mild Pain (1 - 3)    Singles Doses Administered  potassium chloride    Tablet ER 40 milliEquivalent(s) Oral once  sodium chloride 0.9% Bolus 1000 milliLiter(s) IV Bolus once

## 2022-11-21 NOTE — PROGRESS NOTE ADULT - ATTENDING COMMENTS
78 yo female, PMHx of CAD, HTN, HLD, spinal stenosis, arthritis, hypothyroidism, bicuspid aortic valve s/p AVR, ascending/hemiarch replacement with frozen elephant trunk and left aorto-subclavian bypass, CABG x2 with Dr. 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 Muller and Dr. Augustin 7/29/22, course complicated by sternal wound infection, presents with AMS. Recently discharged from the hospital with IV antibiotics for parasternal phlegmon, abscess.   Presenting with AMS and hallucinations at NH.     # Delirium- resolved   # Generalized weakness  - pt likely w/ fluctuating delirium, now resolved w/o intervention   - AAOx3 today   - CTH negative, CXR and UA unremarkable   - EEG performed- pending read   - follow up blood and urine cultures - negative   - no focal neurological defecits at this time, will defer neuro and MRI  - PT eval     # Parasternal abscess w/ Wound Vac   - seen by ID during last admission  - resume ertapenem, dapromycin, fluconazole  - follow cultures- negative   - appreciate wound vac eval, burn team ?     # Normocytic Anemia  - pending repeat hemoglobin   - f/u iron B12 studies, folate WNL     # Extensive cardiac history / bicuspid aortic valve s/p AVR, ascending/hemiarch replacement with frozen elephant trunk and left aorto-subclavian bypass, CABG x2  # HTN  - c/w home dose of Aspirin, Metoprolol, Amlodipine, Lasix, atorvastatin  - hold hydralazine for now, resume of BP uncontrolled    # L Brachial artery occlusion / s/p embolectomy  - c/w home dose of eliquis    # Hypothyroidism  - c/w home dose of Levothyroxine    DVT: eliquis  Diet: DASH / minced and moist.    Dispo: ant dc tomorrow, pt medically stable     Total time spent to complete patient's bedside assessment, review medical chart, discuss medical plan of care with covering medical team was more than 25 minutes  with >50% of time spent face to face with patient, discussion with patient/family and/or coordination of care    Nataliia Hmamond DO .

## 2022-11-21 NOTE — PROGRESS NOTE ADULT - ASSESSMENT
78 yo female, PMHx of CAD, HTN, HLD, spinal stenosis, arthritis, hypothyroidism, bicuspid aortic valve s/p AVR, ascending/hemiarch replacement with frozen elephant trunk and left aorto-subclavian bypass, CABG x2 with Dr. 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 Muller and Dr. Augustin 7/29/22, course complicated by sternal wound infection, presents with AMS.  Recently discharged from the hospital with IV antibiotics for parasternal phlegmon, abscess.      # AMS  # Generalized weakness  - Metabolic encephalopathy vs fluctuating delirium/ general deconditioning from   - AO x 2 on exam  - CTH negative, CXR and UA unremarkable   - F/u EEG result  - Follow up blood and urine cultures   - No focal neurological deficits at this time, will defer neuro and MRI, unless clinical change or lack of improvement     # Parasternal abscess w/ Wound Vac   - seen by ID during last admission  - resume ertapenem, dapromycin, fluconazole  - follow cultures  - ID consult placed    # Normocytic Anemia  - hgb stable around baseline  - F/u iron, ferritin, b12, folate in AM labs    # Extensive cardiac history / bicuspid aortic valve s/p AVR, ascending/hemiarch replacement with frozen elephant trunk and left aorto-subclavian bypass, CABG x2  # HTN  - c/w home dose of Aspirin, Metoprolol, Amlodipine, Lasix, atorvastatin  - Hold hydralazine for now, resume of BP uncontrolled    # L Brachial artery occlusion / s/p embolectomy  - c/w home dose of eliquis    # Hypothyroidism  - c/w home dose of Levothyroxine    DVT: eliquis  Diet: DASH / minced and moist.  
78 yo female, PMHx of CAD, HTN, HLD, spinal stenosis, arthritis, hypothyroidism, bicuspid aortic valve s/p AVR, ascending/hemiarch replacement with frozen elephant trunk and left aorto-subclavian bypass, CABG x2 with Dr. 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 Muller and Dr. Augustin 7/29/22, course complicated by sternal wound infection, presents with AMS.  Recently discharged from the hospital with IV antibiotics for parasternal phlegmon, abscess.      # AMS  # Generalized weakness  - Metabolic encephalopathy vs fluctuating delirium/ general deconditioning from   - AO x 3 on exam  - CTH negative, CXR and UA unremarkable   - F/u EEG result  - Follow up blood and urine cultures   - No focal neurological deficits at this time, will defer neuro and MRI, unless clinical change or lack of improvement   - PT eval    # Parasternal abscess w/ Wound Vac   - seen by ID during last admission  - Burn consult for wound vac mgmt  - resume ertapenem, daptomycin, fluconazole  - follow cultures- Neg   - ID consult placed    # Normocytic Anemia  - hgb stable around baseline  -Monitor CBC  - F/u iron, ferritin, b12, folate in AM labs    # Extensive cardiac history / bicuspid aortic valve s/p AVR, ascending/hemiarch replacement with frozen elephant trunk and left aorto-subclavian bypass, CABG x2  # HTN  - c/w home dose of Aspirin, Metoprolol, Amlodipine, Lasix, atorvastatin  - Hold hydralazine for now, resume of BP uncontrolled    # L Brachial artery occlusion / s/p embolectomy  - c/w home dose of eliquis    # Hypothyroidism  - c/w home dose of Levothyroxine    DVT: eliquis  Diet: DASH / minced and moist.

## 2022-11-21 NOTE — PHARMACOTHERAPY INTERVENTION NOTE - COMMENTS
As per policy, ordered a CPK for 11/20 with AM labs since patient is on daptomycin and is due for a weekly CPK.     Davis Abebe, PharmD, [facility]  Clinical Pharmacy Specialist, Infectious Diseases  Tele-Antimicrobial Stewardship Program (Tele-ASP)  Tele-ASP Phone: [phone]  
As per policy, ordered a CPK for 11/21 with AM labs since patient is on daptomycin and is due for a weekly CPK. Previous CPK order was cancelled.
As per policy, ordered a baseline creatine kinase level in the setting parasternal abscess requiring daptomycin therapy.     Roverto Adames, PharmD  Clinical Pharmacy Specialist, Infectious Diseases  Tele-Antimicrobial Stewardship Program (Tele-ASP)  Tele-ASP Phone: [phone]

## 2022-11-22 NOTE — DISCHARGE NOTE PROVIDER - NSDCCPCAREPLAN_GEN_ALL_CORE_FT
PRINCIPAL DISCHARGE DIAGNOSIS  Diagnosis: Delirium  Assessment and Plan of Treatment: You had altered mental status which may have been for toxic metabolites of from an infection. You had an abscess on you chest. You were given antibiotics for the abcsess. The wound vac for this abcsess will have to be changed frequently.      SECONDARY DISCHARGE DIAGNOSES  Diagnosis: Decreased appetite  Assessment and Plan of Treatment:      PRINCIPAL DISCHARGE DIAGNOSIS  Diagnosis: Delirium  Assessment and Plan of Treatment: EEG was negative, CTH negative, CXR and UA unremarkable.  Follow up with care team at [facility]      SECONDARY DISCHARGE DIAGNOSES  Diagnosis: Decreased appetite  Assessment and Plan of Treatment:

## 2022-11-22 NOTE — DISCHARGE NOTE NURSING/CASE MANAGEMENT/SOCIAL WORK - NSTOBACCONEVERSMOKERY/N_GEN_A
No Dermal Autograft Text: The defect edges were debeveled with a #15 scalpel blade.  Given the location of the defect, shape of the defect and the proximity to free margins a dermal autograft was deemed most appropriate.  Using a sterile surgical marker, the primary defect shape was transferred to the donor site. The area thus outlined was incised deep to adipose tissue with a #15 scalpel blade.  The harvested graft was then trimmed of adipose and epidermal tissue until only dermis was left.  The skin graft was then placed in the primary defect and oriented appropriately.

## 2022-11-22 NOTE — PHYSICAL THERAPY INITIAL EVALUATION ADULT - GENERAL OBSERVATIONS, REHAB EVAL
Pt encountered in the bed + wound vac, Prima fit, agreeable for b/s PT. Delfin. fairly to tx. Pt left seated with bed in chair mode all needs within reach. RN Daniela made aware.

## 2022-11-22 NOTE — DISCHARGE NOTE PROVIDER - CARE PROVIDERS DIRECT ADDRESSES
[email or web address]

## 2022-11-22 NOTE — DISCHARGE NOTE PROVIDER - ATTENDING DISCHARGE PHYSICAL EXAMINATION:
GENERAL: NAD, well-developed  CHEST/LUNG: CTAB; No wheeze, parasternal site wound vac +  HEART: RRR; No murmurs, rubs, or gallops  ABDOMEN: Soft, NT/ND  EXTREMITIES:  No cyanosis, or edema  NEUROLOGY: AAOx3 today   SKIN: No rashes or lesions

## 2022-11-22 NOTE — DISCHARGE NOTE NURSING/CASE MANAGEMENT/SOCIAL WORK - PATIENT PORTAL LINK FT
You can access the FollowMyHealth Patient Portal offered by [facility] by registering at the following website: [email or web address]. By joining [facility]’s FollowMyHealth portal, you will also be able to view your health information using other applications (apps) compatible with our system.

## 2022-11-22 NOTE — DISCHARGE NOTE PROVIDER - NSDCFUSCHEDAPPT_GEN_ALL_CORE_FT
Aron Blank  [facility]  CARDIOLOGY [address]  Scheduled Appointment: 11/23/2022    Merissa Crystal  [facility]  INFDISEASE [address]  Scheduled Appointment: 11/29/2022    Parvez Khanna  [facility]  HEARTVASC [address]  Scheduled Appointment: 12/22/2022    Aron Blank  [facility]  CARDIOLOGY [address]  Scheduled Appointment: 01/30/2023    Bladimir Muller  [facility]  CTSURG [address]  Scheduled Appointment: 02/15/2023

## 2022-11-22 NOTE — DISCHARGE NOTE PROVIDER - CARE PROVIDER_API CALL
Dru Tejada)  Geriatric Medicine; Internal Medicine  [address] [place] [address]  Phone: [phone]  Fax: [phone]  Follow Up Time: 2 weeks

## 2022-11-22 NOTE — DISCHARGE NOTE PROVIDER - HOSPITAL COURSE
Patient is poor historian. Hx taken from chart.  76 yo female, PMHx of CAD, HTN, HLD, spinal stenosis, arthritis, hypothyroidism, bicuspid aortic valve s/p AVR, ascending/hemiarch replacement with frozen elephant trunk and left aorto-subclavian bypass, CABG x2 with Dr. 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 Muller and Dr. Augustin 7/29/22, course complicated by sternal wound infection, presents with AMS. Per daughter, patient was discharged to rehab 3 days prior to presenting to ED and has been getting progressively confused with poor oral intake. Denies fevers, chills, chest pain, shortness of breath, nausea, vomiting, abdominal pain. At rehab she is pending UA.  In ED: Temp = 98.6; HR = 78; BP = 124/58; RR = 20; SaO2 = 99% on room air. Work up noted for WBC 9.4, hgb 8.8, Na 133, UA: LE small, CTH: negative.  Patient received NS 1L in ED.    Burn was consulted for parasternal abscess and resumed ertapenem, daptomycin, and fluconazole. Cultures remained negative. CK remained wnl.     Patient's mental status can be 2/2 metabolic encephalopathy vs. fluctuating delirium. EEG was negative, CTH negative, CXR and UA unremarkable. No focal neurologic deficits.    Patient is poor historian. Hx taken from chart.  78 yo female, PMHx of CAD, HTN, HLD, spinal stenosis, arthritis, hypothyroidism, bicuspid aortic valve s/p AVR, ascending/hemiarch replacement with frozen elephant trunk and left aorto-subclavian bypass, CABG x2 with Dr. 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 Muller and Dr. Augustin 7/29/22, course complicated by sternal wound infection, presents with AMS. Per daughter, patient was discharged to rehab 3 days prior to presenting to ED and has been getting progressively confused with poor oral intake. Denies fevers, chills, chest pain, shortness of breath, nausea, vomiting, abdominal pain. At rehab she is pending UA.  In ED: Temp = 98.6; HR = 78; BP = 124/58; RR = 20; SaO2 = 99% on room air. Work up noted for WBC 9.4, hgb 8.8, Na 133, UA: LE small, CTH: negative.  Patient received NS 1L in ED.    Burn was consulted for parasternal abscess and resumed ertapenem, daptomycin, and fluconazole. Cultures remained negative. CK remained wnl.     Patient's mental status can be 2/2 metabolic encephalopathy vs. fluctuating delirium. EEG was negative, CTH negative, CXR and UA unremarkable. No focal neurologic deficits.     Physicians at [facility] requesting transfer to [place] for continued care and close follow up. Stable for transportation to [facility].

## 2022-11-22 NOTE — DISCHARGE NOTE NURSING/CASE MANAGEMENT/SOCIAL WORK - NSDCPEPTCAREGIVEDUMATLIST _GEN_ALL_CORE
Mr Pruitt wants a phone call from Dr. Collado he wants to talk to him about the medications he is currently taking. He will be at work after 11A.M. And can be reached at [phone]   Apixaban/Eliquis

## 2022-11-22 NOTE — DISCHARGE NOTE NURSING/CASE MANAGEMENT/SOCIAL WORK - NURSING SECTION COMPLETE
Anginal pain    Enlarged prostate    HTN (hypertension)
Patient/Caregiver provided printed discharge information.

## 2022-11-23 NOTE — H&P ADULT - HISTORY OF PRESENT ILLNESS
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 Muller and Dr. Augustin 7/29/22, course complicated by sternal wound infection, presents with AMS. Per daughter,  patient was discharged to rehab 3 days prior to presenting to ED and has been getting progressively confused with poor oral intake. Denies fevers, chills,  chest pain, shortness of breath, nausea, vomiting, abdominal pain. At rehab she is pending UA. In ED: Temp = 98.6; HR = 78; BP = 124/58; RR = 20; SaO2 = 99% on room air. Work  up noted for WBC 9.4, hgb 8.8, Na 133, UA: LE small, CTH: negative. Patient received NS 1L in ED. Burn was consulted for parasternal abscess and resumed ertapenem, daptomycin,  and fluconazole. Cultures remained negative. CK remained wnl. Patient's mental status can be 2/2 metabolic encephalopathy vs. fluctuating  delirium. EEG was negative, CTH negative, CXR and UA unremarkable. No focal neurologic deficits. VAC in place at superior poll of sternotomy. Transferred to [facility] under Dr. Muller for management and evaluation.

## 2022-11-23 NOTE — H&P ADULT - PROBLEM SELECTOR PLAN 1
continue antibiotics  f/u cultures from [facility]  CXR  ID consult  increase nutrition intake

## 2022-11-23 NOTE — DIETITIAN INITIAL EVALUATION ADULT - OTHER CALCULATIONS
Ideal body weight used for calculations as pt >120% of IBW (150%). Adjusted for needs for pressure ulcer (Stage 1)

## 2022-11-23 NOTE — CONSULT NOTE ADULT - ATTENDING COMMENTS
76F h/o CAD, bicuspid AV s/p AVR, ascending/hemiarch replacement with frozen elephant trunk and L aorto-subclavian bypass, CABG x 2 with Dr. Muller on 8/9/21, s/p PPM and flap closure on 9/29/21, L brachial occlusion s/p L brachial artery embolectomy 7/21/22, TEVAR with R groin cutdown and femoral artery repair with Dr. Muller and Dr. Augustin 7/29/22 c/b sternal wound infection s/p I&D and wound vac placement (WCx grew C. albican), L parasternal chest wall collection/hlegmon with mediastinal extension, LUE limb ischemia but not a surgical candidate currently on empiric IV vanc/erta/fluc (11/2 - 12/13) p/w AMS.  Patient had extensive hospital stay as above and was last seen by me on 11/10 in-house. She was sent home with IV vanc/erta/fluc to treat mediastinal abscess. However she was readmitted to [facility] a day after discharge for weakness. She was discharged to [facility] on 11/15. However on 11/19, she was readmitted to [facility] for AMS and poor PO intake. Infectious work-up negative (UCx neg, BCx neg, CXR clear). She was then transferred to [facility] yesterday. Patient says she is hallucinating, able to appropriately answer questions mostly but sometimes she mumbles. Denied fever/chills, CP, cough, SOB, n/v/d, abdominal pain. She remains afebrile, WBC normal. . Cause of hallucination could be due to ertapenem.  Since patient had thrombocytopenia from zosyn during last admission, will switch to cefepime to see how she tolerate.  Cont dapto 500mg IV q24h. Stop erta. Start cefepime 2g IV q8h. Cont fluc 400mg PO q24h. Please note that end date of abx is tentative, and will need CT chest w/ IV cont to assess the resolution of mediastinal abscess.      Team 1 will follow you.  Dr. Pelayo will take over the care tomorrow.  Case d/w primary team.    Merissa Crystal MD, MS  Infectious Disease attending  work cell [phone]   For any questions during evening/weekend/holiday, please page ID on call

## 2022-11-23 NOTE — DIETITIAN INITIAL EVALUATION ADULT - ADD RECOMMEND
1. Continue regular diet, ONS TID  2. Monitor PO intake. Recommend appetite stimulant if medically feasible; PO<50% since admission.  -Consult RD if team wish to initiate EN/PN.  -Align nutrition with GOC at all times  3. Recommend adding MVI  4. Monitor labs, GI distress, skin integrity

## 2022-11-23 NOTE — CONSULT NOTE ADULT - ASSESSMENT
Patient is a 76 yo female with a past medical Hx of CAD, HTN, spinal stenosis, arthritis, hypothyroidism, bicuspid aortic valve s/p AVR, ascending/hemiarch replacement  with frozen elephant trunk and left aorto-subclavian bypass, CABG, SSS s/p PPM, left brachial occlusion s/p left brachial artery embolectomy, TEVAR with R groin cutdown and femoral artery repair, course complicated by mediastinal abscess s/p I and D, wound VAC, and empiric Tx with Daptomycin, Ertapenem, and Fluconazole, now presenting with visual hallucinations possibly 2/2 Ertapenem neurotoxicity.    Recommendations  - D/C Ertapenem  - Start Cefepime 2 g IV q8h  - C/W Daptomycin 500 mg IV q24h  - C/W Fluconazole 400 mg PO q24h    ID team 1 will continue to follow

## 2022-11-23 NOTE — CONSULT NOTE ADULT - SUBJECTIVE AND OBJECTIVE BOX
INFECTIOUS DISEASES INITIAL CONSULT NOTE    HPI:   76 yo female, PMHx of CAD, HTN, HLD, spinal stenosis, arthritis, hypothyroidism, bicuspid aortic valve s/p AVR, ascending/hemiarch replacement  with frozen elephant trunk and left aorto-subclavian bypass, CABG x2 with  on 8/9/2021 (post-op course c/b prolonged intubation, SSS s/p PPM,  poor wound healing s/p pec flap and closure on 9/29/21), left brachial occlusion s/p left brachial artery embolectomy, 7/21/22, TEVAR with R groin  cutdown and femoral artery repair with Dr. Muller and Dr. Augustin 7/29/22, course complicated by sternal wound infection, presents with AMS. Per daughter,  patient was discharged to rehab 3 days prior to presenting to ED and has been getting progressively confused with poor oral intake. Denies fevers, chills,  chest pain, shortness of breath, nausea, vomiting, abdominal pain. At rehab she is pending UA. In ED: Temp = 98.6; HR = 78; BP = 124/58; RR = 20; SaO2 = 99% on room air. Work  up noted for WBC 9.4, hgb 8.8, Na 133, UA: LE small, CTH: negative. Patient received NS 1L in ED. Burn was consulted for parasternal abscess and resumed ertapenem, daptomycin,  and fluconazole. Cultures remained negative. CK remained wnl. Patient's mental status can be 2/2 metabolic encephalopathy vs. fluctuating  delirium. EEG was negative, CTH negative, CXR and UA unremarkable. No focal neurologic deficits. VAC in place at superior poll of sternotomy. Transferred to [facility] under Dr. Muller for management and evaluation. (23 Nov 2022 05:13)      ID INTERVAL HPI:    PAST MEDICAL & SURGICAL HISTORY:  HTN (hypertension)      H/O aortic valve stenosis      CAD (coronary artery disease)      S/P aortic valve replacement      S/P CABG (coronary artery bypass graft)      H/O aortic arch replacement      Pacemaker  medtronic micra pacemaker          Review of Systems:   Constitutional, eyes, ENT, cardiovascular, respiratory, gastrointestinal, genitourinary, integumentary, neurological, psychiatric and heme/lymph are otherwise negative other than noted above       ANTIBIOTICS:  MEDICATIONS  (STANDING):  albumin human  5% IVPB 250 milliLiter(s) IV Intermittent once  amLODIPine   Tablet 2.5 milliGRAM(s) Oral daily  apixaban 5 milliGRAM(s) Oral every 12 hours  aspirin  chewable 81 milliGRAM(s) Oral daily  atorvastatin 10 milliGRAM(s) Oral at bedtime  budesonide  80 MICROgram(s)/formoterol 4.5 MICROgram(s) Inhaler 2 Puff(s) Inhalation two times a day  DAPTOmycin IVPB 500 milliGRAM(s) IV Intermittent every 24 hours  ertapenem  IVPB 1000 milliGRAM(s) IV Intermittent every 24 hours  fluconAZOLE   Tablet 400 milliGRAM(s) Oral daily  lactated ringers. 500 milliLiter(s) (50 mL/Hr) IV Continuous <Continuous>  levothyroxine 50 MICROGram(s) Oral daily  metoprolol tartrate 50 milliGRAM(s) Oral two times a day  pantoprazole    Tablet 40 milliGRAM(s) Oral before breakfast  potassium chloride   Powder 40 milliEquivalent(s) Oral every 4 hours  sodium chloride 0.9% lock flush 3 milliLiter(s) IV Push every 8 hours    MEDICATIONS  (PRN):      Allergies    No Known Allergies    Intolerances        SOCIAL HISTORY:    FAMILY HISTORY:  FH: CAD (coronary artery disease) (Sibling)  CABG    Family history of CKD (chronic kidney disease) (Sibling)  ESRD    Family history of diabetes mellitus (DM) (Sibling)     no FH leading to current infection    Vital Signs Last 24 Hrs  T(C): 36.6 (23 Nov 2022 13:52), Max: 36.7 (23 Nov 2022 09:38)  T(F): 97.8 (23 Nov 2022 13:52), Max: 98.1 (23 Nov 2022 09:38)  HR: 72 (23 Nov 2022 12:27) (65 - 92)  BP: 127/56 (23 Nov 2022 12:27) (101/52 - 140/58)  BP(mean): 80 (23 Nov 2022 12:27) (72 - 90)  RR: 18 (23 Nov 2022 12:27) (16 - 18)  SpO2: 96% (23 Nov 2022 12:27) (95% - 98%)    Parameters below as of 23 Nov 2022 12:27  Patient On (Oxygen Delivery Method): room air        11-23-22 @ 07:01  -  11-23-22 @ 16:45  --------------------------------------------------------  IN: 230 mL / OUT: 600 mL / NET: -370 mL        PHYSICAL EXAM  Constitutional: alert, NAD  Eyes: the sclera and conjunctiva were normal.   ENT: the ears and nose were normal in appearance.   Neck: the appearance of the neck was normal and the neck was supple.   Pulmonary: no respiratory distress and lungs CTA bilaterally.   Heart: heart rate was normal and rhythm regular, normal S1 and S2  Vascular: no peripheral edema  Abdomen: normal bowel sounds, soft, non-tender  Neurological: no focal deficits  Psychiatric: the affect was normal      LABS:                        8.7    6.09  )-----------( 151      ( 23 Nov 2022 15:19 )             27.8     11-23    137  |  102  |  18  ----------------------------<  94  3.3<L>   |  25  |  0.72    Ca    8.9      23 Nov 2022 02:19  Phos  2.4     11-23  Mg     1.9     11-23    TPro  7.3  /  Alb  3.1<L>  /  TBili  0.6  /  DBili  x   /  AST  26  /  ALT  13  /  AlkPhos  91  11-23    PT/INR - ( 23 Nov 2022 02:19 )   PT: 26.4 sec;   INR: 2.20          PTT - ( 23 Nov 2022 02:19 )  PTT:36.5 sec      MICROBIOLOGY:    Culture - Urine (collected 19 Nov 2022 01:15)  Source: Clean Catch Clean Catch (Midstream)  Final Report (20 Nov 2022 07:29):    No growth    Culture - Blood (collected 18 Nov 2022 23:46)  Source: .Blood Blood-Peripheral  Preliminary Report (20 Nov 2022 07:01):    No growth to date.    Culture - Blood (collected 18 Nov 2022 23:46)  Source: .Blood Blood-Peripheral  Preliminary Report (20 Nov 2022 07:01):    No growth to date.        RADIOLOGY & ADDITIONAL STUDIES: Reviewed.   INFECTIOUS DISEASES INITIAL CONSULT NOTE    HPI:  Patient is a 76 yo female with a past medical Hx of CAD, HTN, spinal stenosis, arthritis, hypothyroidism, bicuspid aortic valve s/p AVR, ascending/hemiarch replacement  with frozen elephant trunk and left aorto-subclavian bypass, CABG, SSS s/p PPM, left brachial occlusion s/p left brachial artery embolectomy, TEVAR with R groin cutdown and femoral artery repair with Dr. Muller and Dr. Augustin 7/29/22, course complicated by sternal wound infection s/p incision, drainage, and wound VAC placement. CT notable for left parasternal chest wall collection/phlegmon with mediastinal extension. Patient D/C on Daptomycin Ertapenem and Fluconazole. Per daughter, patient was discharged to rehab 3 days prior to presenting progressive altered mental status as well as poor oral intake. Patient endorsing visual hallucinations. Patient denies fevers, chills, chest pain, shortness of breath, nausea, vomiting, abdominal pain. So far, work-up notable for WBC 9.4, Hb 8.8, Na 133, UA negative, EEG was negative, CT Head negative, C X-ray and UA unremarkable. Transferred to [facility] under Dr. Muller for further management and evaluation.      PAST MEDICAL & SURGICAL HISTORY:  HTN (hypertension)      H/O aortic valve stenosis      CAD (coronary artery disease)      S/P aortic valve replacement      S/P CABG (coronary artery bypass graft)      H/O aortic arch replacement      Pacemaker  medtronic micra pacemaker          Review of Systems:   Constitutional, eyes, ENT, cardiovascular, respiratory, gastrointestinal, genitourinary, integumentary, neurological, psychiatric and heme/lymph are otherwise negative other than noted above       ANTIBIOTICS:  MEDICATIONS  (STANDING):  albumin human  5% IVPB 250 milliLiter(s) IV Intermittent once  amLODIPine   Tablet 2.5 milliGRAM(s) Oral daily  apixaban 5 milliGRAM(s) Oral every 12 hours  aspirin  chewable 81 milliGRAM(s) Oral daily  atorvastatin 10 milliGRAM(s) Oral at bedtime  budesonide  80 MICROgram(s)/formoterol 4.5 MICROgram(s) Inhaler 2 Puff(s) Inhalation two times a day  DAPTOmycin IVPB 500 milliGRAM(s) IV Intermittent every 24 hours  ertapenem  IVPB 1000 milliGRAM(s) IV Intermittent every 24 hours  fluconAZOLE   Tablet 400 milliGRAM(s) Oral daily  lactated ringers. 500 milliLiter(s) (50 mL/Hr) IV Continuous <Continuous>  levothyroxine 50 MICROGram(s) Oral daily  metoprolol tartrate 50 milliGRAM(s) Oral two times a day  pantoprazole    Tablet 40 milliGRAM(s) Oral before breakfast  potassium chloride   Powder 40 milliEquivalent(s) Oral every 4 hours  sodium chloride 0.9% lock flush 3 milliLiter(s) IV Push every 8 hours    MEDICATIONS  (PRN):      Allergies    No Known Allergies    Intolerances        SOCIAL HISTORY:    FAMILY HISTORY:  FH: CAD (coronary artery disease) (Sibling)  CABG    Family history of CKD (chronic kidney disease) (Sibling)  ESRD    Family history of diabetes mellitus (DM) (Sibling)     no FH leading to current infection    Vital Signs Last 24 Hrs  T(C): 36.6 (23 Nov 2022 13:52), Max: 36.7 (23 Nov 2022 09:38)  T(F): 97.8 (23 Nov 2022 13:52), Max: 98.1 (23 Nov 2022 09:38)  HR: 72 (23 Nov 2022 12:27) (65 - 92)  BP: 127/56 (23 Nov 2022 12:27) (101/52 - 140/58)  BP(mean): 80 (23 Nov 2022 12:27) (72 - 90)  RR: 18 (23 Nov 2022 12:27) (16 - 18)  SpO2: 96% (23 Nov 2022 12:27) (95% - 98%)    Parameters below as of 23 Nov 2022 12:27  Patient On (Oxygen Delivery Method): room air        11-23-22 @ 07:01  -  11-23-22 @ 16:45  --------------------------------------------------------  IN: 230 mL / OUT: 600 mL / NET: -370 mL        PHYSICAL EXAM  Constitutional: alert, NAD  Eyes: the sclera and conjunctiva were normal.   ENT: the ears and nose were normal in appearance.  Neck: the appearance of the neck was normal and the neck was supple.   Pulmonary: no respiratory distress and lungs CTA bilaterally. Wound VAC in place at superior pole of sternotomy  Heart: heart rate was normal and rhythm regular, normal S1 and S2  Vascular: no peripheral edema  Abdomen: normal bowel sounds, soft, non-tender  Neurological: no focal deficits, visual hallucinations        LABS:                        8.7    6.09  )-----------( 151      ( 23 Nov 2022 15:19 )             27.8     11-23    137  |  102  |  18  ----------------------------<  94  3.3<L>   |  25  |  0.72    Ca    8.9      23 Nov 2022 02:19  Phos  2.4     11-23  Mg     1.9     11-23    TPro  7.3  /  Alb  3.1<L>  /  TBili  0.6  /  DBili  x   /  AST  26  /  ALT  13  /  AlkPhos  91  11-23    PT/INR - ( 23 Nov 2022 02:19 )   PT: 26.4 sec;   INR: 2.20          PTT - ( 23 Nov 2022 02:19 )  PTT:36.5 sec      MICROBIOLOGY:    Culture - Urine (collected 19 Nov 2022 01:15)  Source: Clean Catch Clean Catch (Midstream)  Final Report (20 Nov 2022 07:29):    No growth    Culture - Blood (collected 18 Nov 2022 23:46)  Source: .Blood Blood-Peripheral  Preliminary Report (20 Nov 2022 07:01):    No growth to date.    Culture - Blood (collected 18 Nov 2022 23:46)  Source: .Blood Blood-Peripheral  Preliminary Report (20 Nov 2022 07:01):    No growth to date.        RADIOLOGY & ADDITIONAL STUDIES: Reviewed.

## 2022-11-23 NOTE — DIETITIAN INITIAL EVALUATION ADULT - OTHER INFO
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 Muller and Dr. Augustin 7/29/22, course complicated by sternal wound infection, presents with AMS. Per daughter,  patient was discharged to rehab 3 days prior to presenting to ED and has been getting progressively confused with poor oral intake.    RD consult received. Pt seen in 5UR. Low BP noted 111-56. Map 78. Room air. Pt is confuse. Pt remains confused, w/ garbled and illogical speech. Currently on regular diet with ONS TID. No height obtained on EMR. Per previous chart, pt is 62" tall. CBW is at 165 lbs (? accuracy). Pt weighted 145 lbs 2 months prior. Pt daughter reported poor oral intake since last d/c to rehab. Suspected <75% PO intake >7days. Observed pt with severe muscle wasting to temporal. Per ASPEN guidelines, pt meets criteria for severe malnutrition. SKin with stage 1 pressure injury to sacrum.  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 Muller and Dr. Augustin 7/29/22, course complicated by sternal wound infection, presents with AMS. Per daughter,  patient was discharged to rehab 3 days prior to presenting to ED and has been getting progressively confused with poor oral intake.    RD consult received. Pt seen in 5UR. Low BP noted 111/56. Map 78. Room air. Pt is confuse, w/ garbled and illogical speech. No family member present at bedside. Currently on regular diet with ONS TID. RN reports pt consumed 50-65% of her breakfast (eggs, cereal), NKFA noted. No height obtained on EMR. Per previous chart, pt is 62" tall. CBW is at 165 lbs (? accuracy). Pt weighted 145 lbs 2 months prior. Pt daughter reported poor oral intake since last d/c to rehab. Suspected <75% PO intake >7days. Observed pt with severe muscle wasting to temporal. Per ASPEN guidelines, pt meets criteria for severe malnutrition. Skin with stage 1 pressure injury to sacrum. Yousif 13. See additional nutrition recs below.

## 2022-11-23 NOTE — DIETITIAN NUTRITION RISK NOTIFICATION - TREATMENT: THE FOLLOWING DIET HAS BEEN RECOMMENDED
Diet, Regular:   Supplement Feeding Modality:  Oral  Ensure Enlive Cans or Servings Per Day:  1       Frequency:  Three Times a day (11-23-22 @ 09:09) [Active]       1. Continue regular diet,   -Continue Ensure Enlive TID (1050 kcal, 60g protein, 540mL free H2O)   2. Monitor PO intake. Recommend appetite stimulant if medically feasible; PO<50% since admission.  -Consult RD if team wish to initiate EN/PN.  -Align nutrition with GOC at all times  3. Recommend adding MVI  4. Monitor labs, GI distress, skin integrity

## 2022-11-23 NOTE — H&P ADULT - NSHPPHYSICALEXAM_GEN_ALL_CORE
Physical Exam  CONSTITUTIONAL:                                                              A&Ox1  NEURO:                                                                       no focal deficits                      EYES:                                                                                WNL  ENMT:                                                                               WNL  CV:                                                                                   WNL  RESPIRATORY:                                                                 WNL  GI:                                                                                     WNL  : REGALADO + / -                                                                  WNL  MUSCULOSKELETAL:                                                       1+ edema lower extremities  SKIN / BREAST:                                                                  VAC dressing upper pole of sternum    Vital Signs Last 24 Hrs  T(C): 36.4 (23 Nov 2022 05:22), Max: 36.4 (22 Nov 2022 20:03)  T(F): 97.6 (23 Nov 2022 05:22), Max: 97.6 (22 Nov 2022 20:03)  HR: 72 (23 Nov 2022 00:32) (65 - 92)  BP: 140/58 (23 Nov 2022 00:32) (115/76 - 140/58)  BP(mean): 84 (23 Nov 2022 00:32) (65 - 84)  RR: 16 (23 Nov 2022 00:32) (16 - 18)  SpO2: 97% (23 Nov 2022 00:32) (97% - 97%)    Parameters below as of 23 Nov 2022 00:32  Patient On (Oxygen Delivery Method): room air

## 2022-11-23 NOTE — DIETITIAN INITIAL EVALUATION ADULT - PERTINENT MEDS FT
MEDICATIONS  (STANDING):  amLODIPine   Tablet 2.5 milliGRAM(s) Oral daily  apixaban 5 milliGRAM(s) Oral every 12 hours  aspirin  chewable 81 milliGRAM(s) Oral daily  atorvastatin 10 milliGRAM(s) Oral at bedtime  budesonide  80 MICROgram(s)/formoterol 4.5 MICROgram(s) Inhaler 2 Puff(s) Inhalation two times a day  DAPTOmycin IVPB 500 milliGRAM(s) IV Intermittent every 24 hours  ertapenem  IVPB 1000 milliGRAM(s) IV Intermittent every 24 hours  fluconAZOLE   Tablet 400 milliGRAM(s) Oral daily  lactated ringers. 500 milliLiter(s) (50 mL/Hr) IV Continuous <Continuous>  levothyroxine 50 MICROGram(s) Oral daily  metoprolol tartrate 50 milliGRAM(s) Oral two times a day  pantoprazole    Tablet 40 milliGRAM(s) Oral before breakfast  potassium chloride   Powder 40 milliEquivalent(s) Oral every 4 hours  sodium chloride 0.9% lock flush 3 milliLiter(s) IV Push every 8 hours    MEDICATIONS  (PRN):

## 2022-11-23 NOTE — DIETITIAN INITIAL EVALUATION ADULT - ENERGY INTAKE
Progressively poor PO intake 1/Number of days NPO (enter number of days in comment field) Progressively poor PO intake per daughter statement.

## 2022-11-23 NOTE — PROGRESS NOTE ADULT - SUBJECTIVE AND OBJECTIVE BOX
Patient discussed on morning rounds with Dr. Muller     Operation / Date: 8/9/21 s/p AVR, ascending/hemiarch replacement with frozen elephant trunk and left aorto-subclavian bypass, CABG x2 with  on 8/9/2021 9/29/21 s/p pec flap and closure  7/21/22 left brachial occlusion s/p left brachial artery embolectomy  7/29/22 TEVAR with R groin cutdown and femoral artery repair with Dr. Muller and Dr. Augustin    SUBJECTIVE ASSESSMENT:  77y Female seen and examined at bedside.  Patient confused where she is.  Patient stating harm feels the same.  Denies chest pain, shortness of breath, nausea, vomiting.        Vital Signs Last 24 Hrs  T(C): 36.1 (23 Nov 2022 09:57), Max: 36.7 (23 Nov 2022 09:38)  T(F): 96.9 (23 Nov 2022 09:57), Max: 98.1 (23 Nov 2022 09:38)  HR: 70 (23 Nov 2022 09:57) (65 - 92)  BP: 111/56 (23 Nov 2022 09:57) (101/52 - 140/58)  BP(mean): 78 (23 Nov 2022 09:57) (72 - 90)  RR: 18 (23 Nov 2022 09:57) (16 - 18)  SpO2: 96% (23 Nov 2022 09:57) (95% - 98%)    Parameters below as of 23 Nov 2022 09:57  Patient On (Oxygen Delivery Method): room air      I&O's Detail    23 Nov 2022 07:01  -  23 Nov 2022 11:24  --------------------------------------------------------  IN:    Oral Fluid: 180 mL  Total IN: 180 mL    OUT:  Total OUT: 0 mL    Total NET: 180 mL          CHEST TUBE:  No.   FRANCHESKA DRAIN:  No.  EPICARDIAL WIRES: No.  TIE DOWNS: No.  REGALADO: No.  Vac: Yes    PHYSICAL EXAM:    GEN: NAD, looks comfortable  Psych: Mood appropriate  Neuro: A&Ox1.  No focal deficits.  Moving all extremities. Right upper extremity is warm and well perfused, sensation and motor intact.   HEENT: No obvious abnormalities  CV: S1S2, regular, no murmurs appreciated.  No carotid bruits.  No JVD  Lungs: Clear B/L.  No wheezing, rales or rhonchi  ABD: Soft, non-tender, non-distended.  +Bowel sounds  EXT: Warm and well perfused.  Trace peripheral edema noted  Musculoskeletal: Moving all extremities with normal ROM, no joint swelling  PV: Pedal pulses palpable      LABS:                        7.1    5.69  )-----------( 152      ( 23 Nov 2022 02:19 )             22.5       COUMADIN:  No. REASON: .    PT/INR - ( 23 Nov 2022 02:19 )   PT: 26.4 sec;   INR: 2.20          PTT - ( 23 Nov 2022 02:19 )  PTT:36.5 sec    11-23    137  |  102  |  18  ----------------------------<  94  3.3<L>   |  25  |  0.72    Ca    8.9      23 Nov 2022 02:19  Phos  2.4     11-23  Mg     1.9     11-23    TPro  7.3  /  Alb  3.1<L>  /  TBili  0.6  /  DBili  x   /  AST  26  /  ALT  13  /  AlkPhos  91  11-23          MEDICATIONS  (STANDING):  amLODIPine   Tablet 2.5 milliGRAM(s) Oral daily  apixaban 5 milliGRAM(s) Oral every 12 hours  aspirin  chewable 81 milliGRAM(s) Oral daily  atorvastatin 10 milliGRAM(s) Oral at bedtime  budesonide  80 MICROgram(s)/formoterol 4.5 MICROgram(s) Inhaler 2 Puff(s) Inhalation two times a day  DAPTOmycin IVPB 500 milliGRAM(s) IV Intermittent every 24 hours  ertapenem  IVPB 1000 milliGRAM(s) IV Intermittent every 24 hours  fluconAZOLE   Tablet 400 milliGRAM(s) Oral daily  lactated ringers. 500 milliLiter(s) (50 mL/Hr) IV Continuous <Continuous>  levothyroxine 50 MICROGram(s) Oral daily  metoprolol tartrate 50 milliGRAM(s) Oral two times a day  pantoprazole    Tablet 40 milliGRAM(s) Oral before breakfast  potassium chloride   Powder 40 milliEquivalent(s) Oral every 4 hours  sodium chloride 0.9% lock flush 3 milliLiter(s) IV Push every 8 hours    MEDICATIONS  (PRN):        RADIOLOGY & ADDITIONAL TESTS:

## 2022-11-23 NOTE — DIETITIAN INITIAL EVALUATION ADULT - PERTINENT LABORATORY DATA
11-23    137  |  102  |  18  ----------------------------<  94  3.3<L>   |  25  |  0.72    Ca    8.9      23 Nov 2022 02:19  Phos  2.4     11-23  Mg     1.9     11-23    TPro  7.3  /  Alb  3.1<L>  /  TBili  0.6  /  DBili  x   /  AST  26  /  ALT  13  /  AlkPhos  91  11-23  A1C with Estimated Average Glucose Result: 5.1 % (11-23-22 @ 02:19)  A1C with Estimated Average Glucose Result: 5.5 % (11-02-22 @ 06:14)  A1C with Estimated Average Glucose Result: 5.6 % (07-28-22 @ 06:34)

## 2022-11-23 NOTE — H&P ADULT - ASSESSMENT
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 Muller and Dr. Augustin 7/29/22, course complicated by sternal wound infection, presents with AMS. Per daughter,  patient was discharged to rehab 3 days prior to presenting to ED and has been getting progressively confused with poor oral intake. Denies fevers, chills,  chest pain, shortness of breath, nausea, vomiting, abdominal pain. At rehab she is pending UA. In ED: Temp = 98.6; HR = 78; BP = 124/58; RR = 20; SaO2 = 99% on room air. Work  up noted for WBC 9.4, hgb 8.8, Na 133, UA: LE small, CTH: negative. Patient received NS 1L in ED. Burn was consulted for parasternal abscess and resumed ertapenem, daptomycin,  and fluconazole. Cultures remained negative. CK remained wnl. Patient's mental status can be 2/2 metabolic encephalopathy vs. fluctuating  delirium. EEG was negative, CTH negative, CXR and UA unremarkable. No focal neurologic deficits. Head CT was negative. VAC in place at superior poll of sternotomy. Transferred to [facility] under Dr. Muller for management and evaluation.

## 2022-11-23 NOTE — PATIENT PROFILE ADULT - FUNCTIONAL ASSESSMENT - BASIC MOBILITY 6.
2-calculated by average/Not able to assess (calculate score using [facility] averaging method)

## 2022-11-23 NOTE — DIETITIAN INITIAL EVALUATION ADULT - NS FNS REASON FOR WEIGHT CHANG
noted weight gain on current admission (?accuracy) however pt is noted with decrease PO intake since last d/c to rehab 2 weeks ago./decreased po intake/other (specify)

## 2022-11-23 NOTE — CONSULT NOTE ADULT - ASSESSMENT
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 Muller and Dr. Augustin 7/29/22, course complicated by sternal wound infection, presents with AMS. Stroke team consulted due to patient's mental status not at baseline. EEG was negative, CTH negative, CXR and UA unremarkable.     INCOMPLETE  - LDL - 72  - A1C - 5.1%  - TSH - 3.660    - continue Eliquis 5mg BID and ASA 81mg QD  - can obtain MRI brain short stroke protocol to rule out CVA as cause of patient's AMS and subtle left sided weakness, will follow up    To be discussed with Neurology Attending Dr. Awa Brewster 78 yo female, PMHx of CAD, HTN, HLD, spinal stenosis, arthritis, hypothyroidism, bicuspid aortic valve s/p AVR, ascending/hemiarch replacement using frozen elephant trunk procedure and left aorto-subclavian bypass, CABG x2 with  on 8/9/2021 (post-op course c/b prolonged intubation, SSS s/p PPM,  poor wound healing s/p pec flap and closure on 9/29/21), 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 Muller and Dr. Augustin 7/29/22, course complicated by sternal wound infection, presents with AMS. Stroke team consulted due to patient's mental status not at baseline. EEG was negative, CTH negative, CXR and UA unremarkable.     - LDL - 72  - A1C - 5.1%  - TSH - 3.660    - continue Eliquis 5mg BID and ASA 81mg QD  - btain MRI brain short stroke protocol to rule out CVA as cause of patient's AMS and subtle left sided weakness, will follow up    Discussed with Neurology Attending Dr. Awa Brewster 78 yo female, PMHx of CAD, HTN, HLD, spinal stenosis, arthritis, hypothyroidism, bicuspid aortic valve s/p AVR, ascending/hemiarch replacement using frozen elephant trunk procedure and left aorto-subclavian bypass, CABG x2 with  on 8/9/2021 (post-op course c/b prolonged intubation, SSS s/p PPM,  poor wound healing s/p pec flap and closure on 9/29/21), 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 Muller and Dr. Augustin 7/29/22, course complicated by sternal wound infection, presents with AMS. Stroke team consulted due to patient's mental status not at baseline. EEG was negative, CTH negative, CXR and UA unremarkable.     - LDL - 72  - A1C - 5.1%  - TSH - 3.660    - continue Eliquis 5mg BID and ASA 81mg QD  - obtain MRI brain short stroke protocol to rule out CVA as cause of patient's AMS and subtle left sided weakness, will follow up. need to check if pacemaker compatible with MRI prior    Discussed with Neurology Attending Dr. Awa Brewster

## 2022-11-23 NOTE — CONSULT NOTE ADULT - SUBJECTIVE AND OBJECTIVE BOX
**STROKE CONSULT NOTE**    HPI: 77y Female with PMHx of     T(C): 36.6 (11-23-22 @ 13:52), Max: 36.7 (11-23-22 @ 09:38)  HR: 72 (11-23-22 @ 12:27) (68 - 92)  BP: 127/56 (11-23-22 @ 12:27) (101/52 - 140/58)  RR: 18 (11-23-22 @ 12:27) (16 - 18)  SpO2: 96% (11-23-22 @ 12:27) (95% - 98%)    PAST MEDICAL & SURGICAL HISTORY:  HTN (hypertension)      H/O aortic valve stenosis      CAD (coronary artery disease)      S/P aortic valve replacement      S/P CABG (coronary artery bypass graft)      H/O aortic arch replacement      Pacemaker  medtronic micra pacemaker          FAMILY HISTORY:  FH: CAD (coronary artery disease) (Sibling)  CABG    Family history of CKD (chronic kidney disease) (Sibling)  ESRD    Family history of diabetes mellitus (DM) (Sibling)        SOCIAL HISTORY:   Patient lives with *** at ***.   Smoking status:  Drinking:  Drug Use:     ROS: ***  Constitutional: No fever, weight loss or fatigue  Eyes: No eye pain, visual disturbances, or discharge  ENMT:  No difficulty hearing, tinnitus; No sinus or throat pain  Neck: No pain or stiffness  Respiratory: No cough, wheezing, chills or hemoptysis  Cardiovascular: No chest pain, palpitations, shortness of breath, or leg swelling  Gastrointestinal: No abdominal pain. No nausea, vomiting or hematemesis; No diarrhea or constipation. Nohematochezia.  Genitourinary: No dysuria, frequency, hematuria or incontinence  Neurological: As per HPI  Skin: No itching, burning, rashes or lesions   Endocrine: No heat or cold intolerance; No hair loss  Musculoskeletal: No joint pain or swelling; No muscle, back or extremity pain  Heme/Lymph: No easy bruising or bleeding gums    MEDICATIONS  (STANDING):  amLODIPine   Tablet 2.5 milliGRAM(s) Oral daily  apixaban 5 milliGRAM(s) Oral every 12 hours  aspirin  chewable 81 milliGRAM(s) Oral daily  atorvastatin 10 milliGRAM(s) Oral at bedtime  budesonide  80 MICROgram(s)/formoterol 4.5 MICROgram(s) Inhaler 2 Puff(s) Inhalation two times a day  DAPTOmycin IVPB 500 milliGRAM(s) IV Intermittent every 24 hours  ertapenem  IVPB 1000 milliGRAM(s) IV Intermittent every 24 hours  fluconAZOLE   Tablet 400 milliGRAM(s) Oral daily  lactated ringers. 500 milliLiter(s) (50 mL/Hr) IV Continuous <Continuous>  levothyroxine 50 MICROGram(s) Oral daily  metoprolol tartrate 50 milliGRAM(s) Oral two times a day  pantoprazole    Tablet 40 milliGRAM(s) Oral before breakfast  potassium chloride   Powder 40 milliEquivalent(s) Oral every 4 hours  sodium chloride 0.9% lock flush 3 milliLiter(s) IV Push every 8 hours    MEDICATIONS  (PRN):    Allergies    No Known Allergies    Intolerances      Vital Signs Last 24 Hrs  T(C): 36.6 (23 Nov 2022 13:52), Max: 36.7 (23 Nov 2022 09:38)  T(F): 97.8 (23 Nov 2022 13:52), Max: 98.1 (23 Nov 2022 09:38)  HR: 72 (23 Nov 2022 12:27) (65 - 92)  BP: 127/56 (23 Nov 2022 12:27) (101/52 - 140/58)  BP(mean): 80 (23 Nov 2022 12:27) (72 - 90)  RR: 18 (23 Nov 2022 12:27) (16 - 18)  SpO2: 96% (23 Nov 2022 12:27) (95% - 98%)    Parameters below as of 23 Nov 2022 12:27  Patient On (Oxygen Delivery Method): room air        Physical exam:  Constitutional: No acute distress, conversant  Eyes: Anicteric sclerae, moist conjunctivae, see below for CNs  Neck: trachea midline, FROM, supple, no thyromegaly or lymphadenopathy  Cardiovascular: Regular rate and rhythm, no murmurs, rubs, or gallops. No carotid bruits.   Pulmonary: Anterior breath sounds clear bilaterally, no crackles or wheezing. No use of accessory muscles  GI: Abdomen soft, non-distended, non-tender  Extremities: Radial and DP pulses +2, no edema    Neurologic:  -Mental status: Awake, alert, oriented to person, place, and time. Speech is fluent with intact naming, repetition, and comprehension, no dysarthria. Recent and remote memory intact. Follows commands. Attention/concentration intact. Fund of knowledge appropriate.  -Cranial nerves:   II: Visual fields are full to confrontation.  III, IV, VI: Extraocular movements are intact without nystagmus. Pupils equally round and reactive to light  V:  Facial sensation V1-V3 equal and intact   VII: Face is symmetric with normal eye closure and smile  VIII: Hearing is bilaterally intact to finger rub  IX, X: Uvula is midline and soft palate rises symmetrically  XI: Head turning and shoulder shrug are intact.  XII: Tongue protrudes midline  Motor: Normal bulk and tone. No pronator drift. Strength bilateral upper extremity 5/5, bilateral lower extremities 5/5.  Rapid alternating movements intact and symmetric  Sensation: Intact to light touch bilaterally. No neglect or extinction on double simultaneous testing.  Coordination: No dysmetria on finger-to-nose and heel-to-shin bilaterally  Reflexes: Downgoing toes bilaterally   Gait: Narrow gait and steady    NIHSS: **** ASPECT Score: ***** ICH Score: ****** (GCS)    Fingerstick Blood Glucose: CAPILLARY BLOOD GLUCOSE        LABS:                        7.1    5.69  )-----------( 152      ( 23 Nov 2022 02:19 )             22.5     11-23    137  |  102  |  18  ----------------------------<  94  3.3<L>   |  25  |  0.72    Ca    8.9      23 Nov 2022 02:19  Phos  2.4     11-23  Mg     1.9     11-23    TPro  7.3  /  Alb  3.1<L>  /  TBili  0.6  /  DBili  x   /  AST  26  /  ALT  13  /  AlkPhos  91  11-23    PT/INR - ( 23 Nov 2022 02:19 )   PT: 26.4 sec;   INR: 2.20          PTT - ( 23 Nov 2022 02:19 )  PTT:36.5 sec  CARDIAC MARKERS ( 22 Nov 2022 11:30 )  x     / x     / 134 U/L / x     / x              RADIOLOGY & ADDITIONAL STUDIES:      -----------------------------------------------------------------------------------------------------------------  IV-tPA (Y/N):    ***                              Bolus time:    Alteplase Dose Verification w/ RN:  Reason IV-tPA not given: ***    **STROKE CONSULT NOTE**    HPI: 76 yo female, PMHx of CAD, HTN, HLD, spinal stenosis, arthritis, hypothyroidism, bicuspid aortic valve s/p AVR, ascending/hemiarch replacement using frozen elephant trunk procedure and left aorto-subclavian bypass, CABG x2 with  on 8/9/2021 (post-op course c/b prolonged intubation, SSS s/p PPM,  poor wound healing s/p pec flap and closure on 9/29/21), 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 Muller and Dr. Augustin 7/29/22, course complicated by sternal wound infection, presents with AMS. Per daughter,  patient was discharged to rehab 3 days prior to presenting to ED and has been getting progressively confused with poor oral intake. In ED: Temp = 98.6; HR = 78; BP = 124/58; RR = 20; SaO2 = 99% on room air. Work up noted for WBC 9.4, hgb 8.8, Na 133, UA: LE small, CTH: negative. Burn was consulted for parasternal abscess and resumed ertapenem, daptomycin,and fluconazole. Cultures remained negative. Stroke team consulted due to patient's mental status not at baseline. EEG was negative, CTH negative, CXR and UA unremarkable.        T(C): 36.6 (11-23-22 @ 13:52), Max: 36.7 (11-23-22 @ 09:38)  HR: 72 (11-23-22 @ 12:27) (68 - 92)  BP: 127/56 (11-23-22 @ 12:27) (101/52 - 140/58)  RR: 18 (11-23-22 @ 12:27) (16 - 18)  SpO2: 96% (11-23-22 @ 12:27) (95% - 98%)    PAST MEDICAL & SURGICAL HISTORY:  HTN (hypertension)      H/O aortic valve stenosis      CAD (coronary artery disease)      S/P aortic valve replacement      S/P CABG (coronary artery bypass graft)      H/O aortic arch replacement      Pacemaker  medtronic micra pacemaker          FAMILY HISTORY:  FH: CAD (coronary artery disease) (Sibling)  CABG    Family history of CKD (chronic kidney disease) (Sibling)  ESRD    Family history of diabetes mellitus (DM) (Sibling)      ROS:   Constitutional: No fever, weight loss or fatigue  Eyes: No eye pain, visual disturbances, or discharge  ENMT:  No difficulty hearing, tinnitus; No sinus or throat pain  Neck: No pain or stiffness  Respiratory: No cough, wheezing, chills or hemoptysis  Cardiovascular: No chest pain, palpitations, shortness of breath, or leg swelling  Gastrointestinal: No abdominal pain. No nausea, vomiting or hematemesis; No diarrhea or constipation.  Genitourinary: No dysuria, frequency, hematuria or incontinence  Neurological: As per HPI  Skin: No itching, burning, rashes or lesions   Endocrine: No heat or cold intolerance; No hair loss  Musculoskeletal: No joint pain or swelling; No muscle, back or extremity pain  Heme/Lymph: No easy bruising or bleeding gums    MEDICATIONS  (STANDING):  amLODIPine   Tablet 2.5 milliGRAM(s) Oral daily  apixaban 5 milliGRAM(s) Oral every 12 hours  aspirin  chewable 81 milliGRAM(s) Oral daily  atorvastatin 10 milliGRAM(s) Oral at bedtime  budesonide  80 MICROgram(s)/formoterol 4.5 MICROgram(s) Inhaler 2 Puff(s) Inhalation two times a day  DAPTOmycin IVPB 500 milliGRAM(s) IV Intermittent every 24 hours  ertapenem  IVPB 1000 milliGRAM(s) IV Intermittent every 24 hours  fluconAZOLE   Tablet 400 milliGRAM(s) Oral daily  lactated ringers. 500 milliLiter(s) (50 mL/Hr) IV Continuous <Continuous>  levothyroxine 50 MICROGram(s) Oral daily  metoprolol tartrate 50 milliGRAM(s) Oral two times a day  pantoprazole    Tablet 40 milliGRAM(s) Oral before breakfast  potassium chloride   Powder 40 milliEquivalent(s) Oral every 4 hours  sodium chloride 0.9% lock flush 3 milliLiter(s) IV Push every 8 hours    MEDICATIONS  (PRN):    Allergies    No Known Allergies    Intolerances      Vital Signs Last 24 Hrs  T(C): 36.6 (23 Nov 2022 13:52), Max: 36.7 (23 Nov 2022 09:38)  T(F): 97.8 (23 Nov 2022 13:52), Max: 98.1 (23 Nov 2022 09:38)  HR: 72 (23 Nov 2022 12:27) (65 - 92)  BP: 127/56 (23 Nov 2022 12:27) (101/52 - 140/58)  BP(mean): 80 (23 Nov 2022 12:27) (72 - 90)  RR: 18 (23 Nov 2022 12:27) (16 - 18)  SpO2: 96% (23 Nov 2022 12:27) (95% - 98%)    Parameters below as of 23 Nov 2022 12:27  Patient On (Oxygen Delivery Method): room air    Neurologic:  -Mental status: Awake, alert, oriented to person, place, and time. Patient appears inattentive throughout conversation and easily distractable, however, remains AOX3. When initially asked what holiday is coming up, she states "labor day", but then corrects herself that it is "November and it will be Thanksgiving". Speech is dysarthric.  Follows commands. Able to correctly identify objects and follow commands.   -Cranial nerves:   II: Visual fields are full to confrontation.  III, IV, VI: Extraocular movements are intact without nystagmus. Pupils equally round and reactive to light  VII: L NLFF  Motor: Normal bulk and tone. Decreased  strength of left hand compared to right and subtle left arm drift. Bilateral lower extremities 3/5.   Sensation: Intact to light touch bilaterally. No neglect or extinction on double simultaneous testing.  Coordination: No dysmetria on finger-to-nose    NIHSS: 1    Fingerstick Blood Glucose: CAPILLARY BLOOD GLUCOSE        LABS:                        7.1    5.69  )-----------( 152      ( 23 Nov 2022 02:19 )             22.5     11-23    137  |  102  |  18  ----------------------------<  94  3.3<L>   |  25  |  0.72    Ca    8.9      23 Nov 2022 02:19  Phos  2.4     11-23  Mg     1.9     11-23    TPro  7.3  /  Alb  3.1<L>  /  TBili  0.6  /  DBili  x   /  AST  26  /  ALT  13  /  AlkPhos  91  11-23    PT/INR - ( 23 Nov 2022 02:19 )   PT: 26.4 sec;   INR: 2.20          PTT - ( 23 Nov 2022 02:19 )  PTT:36.5 sec  CARDIAC MARKERS ( 22 Nov 2022 11:30 )  x     / x     / 134 U/L / x     / x              RADIOLOGY & ADDITIONAL STUDIES:  < from: CT Head No Cont (11.18.22 @ 23:18) >  Impression:      No evidence of intracranial hemorrhage, territorial infarct, or mass   effect. No significant change since the prior exam.    < end of copied text >    CTA head and neck from 8/2022    IMPRESSION:    Intracranial CTA: No proximal arterial occlusion or high-grade stenosis.   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 Right vertebral artery   is small but patent intracranially.    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 Left vertebral artery is patent   throughout the neck. Right vertebral artery appears occludedat origin,   with intermittent filling of V2 and V3 segments from apparent deep   cervical collateral supply.    No significant carotid stenosis on either side despite atherosclerotic   plaque.     Size Of Lesion In Cm: 0.8

## 2022-11-23 NOTE — PROGRESS NOTE ADULT - ASSESSMENT
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 Muller and Dr. Augustin 7/29/22, course complicated by sternal wound infection, presents with AMS. Per phuong, patient was discharged to rehab 3 days prior to presenting to ED and has been getting progressively confused with poor oral intake. Denies fevers, chills, chest pain, shortness of breath, nausea, vomiting, abdominal pain. At rehab she is pending UA. In ED: Temp = 98.6; HR = 78; BP = 124/58; RR = 20; SaO2 = 99% on room air. Work up noted for WBC 9.4, hgb 8.8, Na 133, UA: LE small, CTH: negative. Patient received NS 1L in ED. Burn was consulted for parasternal abscess and resumed ertapenem, daptomycin,  and fluconazole. Cultures remained negative. CK remained wnl. Patient's mental status can be 2/2 metabolic encephalopathy vs. fluctuating delirium. EEG was negative, CTH negative, CXR and UA unremarkable. No focal neurologic deficits. Head CT was negative. VAC in place at superior poll of sternotomy. Transferred to [facility] under Dr. Muller for management and evaluation.    ==== Neurovascular ====  -No delirium, pain well managed on current regimen  -C/w PRNs for Pain control  -Monitor neuro status    ==== Respiratory ====  -Saturates well on RA     -AM CXR stable, repeat in AM  -Encourage IS 10x/hour while awake, Cough and deep breathing exercises  -Monitor respiratory status via SpO2    ==== Cardiovascular ====  Monitor on Tele  Continue aspirin 81mg QD  Continue Statin  Continue Norvasc and BB  ==== GI ====   -Tolerating PO  -Prophylaxis: Protonix  -C/w bowel regimen    ==== /Renal ====  -BUN/Cr: 18/0.72  -Trend Cr on AM labs  -Replete electrolytes as needed    ==== ID ====   Afebrile, asymptomatic  -WBC: 5.69  -Continue to monitor for SIRS/Sepsis syndrome while inpatient  -ID consulted, on dapto, erta and fluconazole     ==== Endocrine ====   -A1C: 5.4, no h/o DM  -TSH: 3.600, continue synthroid 50mg QD    ==== Hematologic ====   -H/H: 7.1/22.5, transfuse 1 unit prbc  -CBC, chem in AM  -DVT ppx: Eliquis 5mg bID and SCDs    ==== Disposition Planning ====   Telemetry  \

## 2022-11-23 NOTE — PATIENT PROFILE ADULT - FALL HARM RISK - HARM RISK INTERVENTIONS

## 2022-11-23 NOTE — DIETITIAN INITIAL EVALUATION ADULT - NS FNS DIET ORDER
Diet, Regular:   Supplement Feeding Modality:  Oral  Ensure Enlive Cans or Servings Per Day:  1       Frequency:  Three Times a day (11-23-22 @ 09:09)

## 2022-11-24 NOTE — OCCUPATIONAL THERAPY INITIAL EVALUATION ADULT - RANGE OF MOTION EXAMINATION, UPPER EXTREMITY
RUE AROM PROM at WFL, WNL; LUE AROM and PROM limited (pain and h/o contratctures to wrist and digits)

## 2022-11-24 NOTE — PROGRESS NOTE ADULT - ASSESSMENT
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 Muller and Dr. Augustin 7/29/22, course complicated by sternal wound infection, presents with AMS. Per phuong, patient was discharged to rehab 3 days prior to presenting to ED and has been getting progressively confused with poor oral intake. Denies fevers, chills, chest pain, shortness of breath, nausea, vomiting, abdominal pain. At rehab she is pending UA. In ED: Temp = 98.6; HR = 78; BP = 124/58; RR = 20; SaO2 = 99% on room air. Work up noted for WBC 9.4, hgb 8.8, Na 133, UA: LE small, CTH: negative. Patient received NS 1L in ED. Burn was consulted for parasternal abscess and resumed ertapenem, daptomycin,  and fluconazole. Cultures remained negative. CK remained wnl. Patient's mental status can be 2/2 metabolic encephalopathy vs. fluctuating delirium. EEG was negative, CTH negative, CXR and UA unremarkable. No focal neurologic deficits. Head CT was negative. VAC in place at superior poll of sternotomy. Transferred to [facility] under Dr. Muller for management and evaluation.  ID consulted, confusion likely from ertapenum toxicity switched to cefepime.  Neuro consulted, recommending MRI if possible.    ==== Neurovascular ====  -No delirium, pain well managed on current regimen  -C/w PRNs for Pain control  -Monitor neuro status    ==== Respiratory ====  -Saturates well on RA     -AM CXR stable, repeat in AM  -Encourage IS 10x/hour while awake, Cough and deep breathing exercises  -Monitor respiratory status via SpO2    ==== Cardiovascular ====  Monitor on Tele  Continue aspirin 81mg QD  Continue Statin  Continue Norvasc and BB    ==== GI ====   -Tolerating PO  -Prophylaxis: Protonix  -C/w bowel regimen    ==== /Renal ====  -BUN/Cr: 14/0.67  -Trend Cr on AM labs  -Replete electrolytes as needed    ==== ID ====   Afebrile, asymptomatic  -WBC: 6.02  -Continue to monitor for SIRS/Sepsis syndrome while inpatient  -ID consulted, on dapto, erta and fluconazole     ==== Endocrine ====   -A1C: 5.4, no h/o DM  -TSH: 3.600, continue synthroid 50mg QD    ==== Hematologic ====   -H/H: 7.9/24.4, last transfused 1 unit prbc on 11/23  -CBC, chem in AM  -DVT ppx: Eliquis 5mg bID and SCDs    ==== Disposition Planning ====   Telemetry

## 2022-11-24 NOTE — PROGRESS NOTE ADULT - ASSESSMENT
76F h/o CAD, bicuspid AV s/p AVR, ascending/hemiarch replacement with frozen elephant trunk and L aorto-subclavian bypass, CABG x 2 with Dr. Muller on 8/9/21, s/p PPM and flap closure on 9/29/21, L brachial occlusion s/p L brachial artery embolectomy 7/21/22, TEVAR with R groin cutdown and femoral artery repair with Dr. Muller and Dr. Augustin 7/29/22 c/b sternal wound infection s/p I&D and wound vac placement (WCx grew C. 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  Presently alert and oriented to time and place and denies hallucinations.  Would cautiously continue with cefepime 2g IV q8h (can cause delirium in elderly patients) plus daptomycin 500mg IV q24h plus fluconazole 400mg IV q24h    Dr. Pelayo ID Team 2 assumes care tomorrow

## 2022-11-24 NOTE — OCCUPATIONAL THERAPY INITIAL EVALUATION ADULT - ADDITIONAL COMMENTS
Pt transferred from outside hospital. Prior to, Pt was at rehab for x3 days. Pt Pt transferred from outside hospital. Prior to, Pt was at rehab for x3 days. Prior to initial hospitalization, Pt lived in private house w/ her daughters x2; one (1) stair to enter. Pt states that she was independent and used a RW for ambulation. Pt has a cane and grab bars.

## 2022-11-24 NOTE — PROGRESS NOTE ADULT - SUBJECTIVE AND OBJECTIVE BOX
INTERVAL HPI/OVERNIGHT EVENTS: CARYL.    CONSTITUTIONAL:  Negative fever or chills, feels well, good appetite  EYES:  Negative  blurry vision or double vision  CARDIOVASCULAR:  Negative for chest pain or palpitations  RESPIRATORY:  Negative for cough, wheezing, or SOB   GASTROINTESTINAL:  Negative for nausea, vomiting, diarrhea, constipation, or abdominal pain  GENITOURINARY:  Negative frequency, urgency or dysuria  NEUROLOGIC:  No headache, confusion, dizziness, lightheadedness      ANTIBIOTICS/RELEVANT:    MEDICATIONS  (STANDING):  amLODIPine   Tablet 2.5 milliGRAM(s) Oral daily  apixaban 5 milliGRAM(s) Oral every 12 hours  aspirin  chewable 81 milliGRAM(s) Oral daily  atorvastatin 10 milliGRAM(s) Oral at bedtime  budesonide  80 MICROgram(s)/formoterol 4.5 MICROgram(s) Inhaler 2 Puff(s) Inhalation two times a day  cefepime   IVPB 2000 milliGRAM(s) IV Intermittent every 8 hours  DAPTOmycin IVPB 500 milliGRAM(s) IV Intermittent every 24 hours  fluconAZOLE   Tablet 400 milliGRAM(s) Oral daily  lactated ringers. 500 milliLiter(s) (50 mL/Hr) IV Continuous <Continuous>  levothyroxine 50 MICROGram(s) Oral daily  metoprolol tartrate 50 milliGRAM(s) Oral two times a day  pantoprazole    Tablet 40 milliGRAM(s) Oral before breakfast  potassium chloride   Powder 40 milliEquivalent(s) Oral every 4 hours  potassium chloride   Powder 40 milliEquivalent(s) Oral every 4 hours  sodium chloride 0.9% lock flush 3 milliLiter(s) IV Push every 8 hours    MEDICATIONS  (PRN):        Vital Signs Last 24 Hrs  T(C): 37.4 (2022 09:59), Max: 37.6 (2022 05:02)  T(F): 99.4 (2022 09:59), Max: 99.6 (2022 05:02)  HR: 65 (2022 13:00) (61 - 94)  BP: 113/59 (2022 13:00) (99/51 - 145/61)  BP(mean): 82 (2022 13:00) (72 - 94)  RR: 16 (2022 13:00) (16 - 20)  SpO2: 98% (2022 13:00) (93% - 98%)    Parameters below as of 2022 13:00  Patient On (Oxygen Delivery Method): room air        PHYSICAL EXAM:  Constitutional: NAD  Eyes: MALIHA, EOMI  Ear/Nose/Throat: no oral lesion, no sinus tenderness on percussion	  Neck: no JVD, no lymphadenopathy, supple  Respiratory: CTA marcia  Cardiovascular: S1S2 RRR, no murmurs  Gastrointestinal:soft, (+) BS, no HSM  Extremities:no e/e/c  Vascular: DP Pulse:	right normal; left normal      LABS:                        7.9    6.02  )-----------( 140      ( 2022 07:02 )             24.4     11-24    137  |  102  |  14  ----------------------------<  88  3.1<L>   |  26  |  0.67    Ca    8.8      2022 07:02  Phos  2.7     11-24  Mg     1.8     11-24    TPro  7.3  /  Alb  3.1<L>  /  TBili  0.6  /  DBili  x   /  AST  26  /  ALT  13  /  AlkPhos  91  11-23    PT/INR - ( 2022 02:19 )   PT: 26.4 sec;   INR: 2.20          PTT - ( 2022 02:19 )  PTT:36.5 sec  Urinalysis Basic - ( 2022 01:39 )    Color: Yellow / Appearance: Clear / S.020 / pH: x  Gluc: x / Ketone: NEGATIVE  / Bili: Negative / Urobili: 0.2 E.U./dL   Blood: x / Protein: Trace mg/dL / Nitrite: NEGATIVE   Leuk Esterase: NEGATIVE / RBC: 5-10 /HPF / WBC < 5 /HPF   Sq Epi: x / Non Sq Epi: 0-5 /HPF / Bacteria: Present /HPF        MICROBIOLOGY: reviewed    RADIOLOGY & ADDITIONAL STUDIES: reviewed

## 2022-11-24 NOTE — PHYSICAL THERAPY INITIAL EVALUATION ADULT - PERTINENT HX OF CURRENT PROBLEM, REHAB EVAL
77F presents with AMS. Per daughter,  patient was discharged to rehab 3 days prior to presenting to ED and has been getting progressively confused with poor oral intake.

## 2022-11-24 NOTE — OCCUPATIONAL THERAPY INITIAL EVALUATION ADULT - GENERAL OBSERVATIONS, REHAB EVAL
Pt's RN Layo aware of intent to eval/tx; cleared Pt. PT Allis present. Pt received in supine - +telemetry, heplock, purewick, sternal dressing intact (S/P wound vac removal). Pt agreeable to eval.

## 2022-11-24 NOTE — OCCUPATIONAL THERAPY INITIAL EVALUATION ADULT - PLANNED THERAPY INTERVENTIONS, OT EVAL
ADL retraining/IADL retraining/balance training/bed mobility training/cognitive, visual perceptual/fine motor coordination training/motor coordination training/neuromuscular re-education/parent/caregiver training.../ROM/strengthening/stretching/transfer training no

## 2022-11-24 NOTE — OCCUPATIONAL THERAPY INITIAL EVALUATION ADULT - MD ORDER
AMS (altered mental status), S/P Stroke code  Subtle left sided weakness  EEG was negative, CTH negative, CXR and UA unremarkable  Pending Brain MRI and CVA w/u

## 2022-11-24 NOTE — PHYSICAL THERAPY INITIAL EVALUATION ADULT - ADDITIONAL COMMENTS
Pt transferred from outside hospital. Prior to, Pt was at rehab for x3 days. Prior to initial hospitalization, Pt lived in private house w/ her daughters x2; one (1) stair to enter. Pt states that she was independent and used a RW for ambulation. Pt has a cane and grab bars.

## 2022-11-24 NOTE — PHYSICAL THERAPY INITIAL EVALUATION ADULT - GAIT DEVIATIONS NOTED, PT EVAL
1 seated rest break x 1 minute, dec step clearance/increased time in double stance/decreased stride length/decreased weight-shifting ability

## 2022-11-24 NOTE — PROGRESS NOTE ADULT - SUBJECTIVE AND OBJECTIVE BOX
Patient discussed on morning rounds with Dr. Vernon    Operation / Date: 21 s/p AVR, ascending/hemiarch replacement with frozen elephant trunk and left aorto-subclavian bypass, CABG x2 with  on 2021 s/p pec flap and closure  22 left brachial occlusion s/p left brachial artery embolectomy  22 TEVAR with R groin cutdown and femoral artery repair with Dr. Muller and Dr. Augustin    SUBJECTIVE ASSESSMENT:  77y Female seen and examined at bedside.  Patient complaining of left arm discomfort.  Denies chest pain, shortness of breath, nausea, vomiting.        Vital Signs Last 24 Hrs  T(C): 37.4 (2022 09:59), Max: 37.6 (2022 05:02)  T(F): 99.4 (2022 09:59), Max: 99.6 (2022 05:02)  HR: 62 (2022 09:00) (62 - 94)  BP: 120/53 (2022 09:00) (110/51 - 145/61)  BP(mean): 77 (2022 09:00) (74 - 94)  RR: 17 (2022 09:00) (16 - 20)  SpO2: 98% (2022 09:00) (93% - 98%)    Parameters below as of 2022 09:00  Patient On (Oxygen Delivery Method): room air      I&O's Detail    2022 07:01  -  2022 07:00  --------------------------------------------------------  IN:    IV PiggyBack: 50 mL    Lactated Ringers: 400 mL    Oral Fluid: 230 mL  Total IN: 680 mL    OUT:    Voided (mL): 900 mL  Total OUT: 900 mL    Total NET: -220 mL      2022 07:01  -  2022 11:50  --------------------------------------------------------  IN:    IV PiggyBack: 100 mL  Total IN: 100 mL    OUT:  Total OUT: 0 mL    Total NET: 100 mL          CHEST TUBE:  No.   FRANCHESKA DRAIN:  No.  EPICARDIAL WIRES: No.  TIE DOWNS: No.  REGALADO: No.    PHYSICAL EXAM:    GEN: NAD, looks comfortable  Psych: Mood appropriate  Neuro: A&Ox1.  No focal deficits.  Moving all extremities. Right upper extremity is warm and well perfused, sensation and motor intact.   HEENT: No obvious abnormalities  CV: S1S2, regular, no murmurs appreciated.  No carotid bruits.  No JVD  Lungs: Clear B/L.  No wheezing, rales or rhonchi  ABD: Soft, non-tender, non-distended.  +Bowel sounds  EXT: Warm and well perfused.  Trace peripheral edema noted  Musculoskeletal: Moving all extremities with normal ROM, no joint swelling  PV: Pedal pulses palpable    LABS:                        7.9    6.02  )-----------( 140      ( 2022 07:02 )             24.4       COUMADIN:  No. REASON: .    PT/INR - ( 2022 02:19 )   PT: 26.4 sec;   INR: 2.20          PTT - ( 2022 02:19 )  PTT:36.5 sec    11    137  |  102  |  14  ----------------------------<  88  3.1<L>   |  26  |  0.67    Ca    8.8      2022 07:02  Phos  2.7     11-24  Mg     1.8     11-24    TPro  7.3  /  Alb  3.1<L>  /  TBili  0.6  /  DBili  x   /  AST  26  /  ALT  13  /  AlkPhos  91  11-23      Urinalysis Basic - ( 2022 01:39 )    Color: Yellow / Appearance: Clear / S.020 / pH: x  Gluc: x / Ketone: NEGATIVE  / Bili: Negative / Urobili: 0.2 E.U./dL   Blood: x / Protein: Trace mg/dL / Nitrite: NEGATIVE   Leuk Esterase: NEGATIVE / RBC: 5-10 /HPF / WBC < 5 /HPF   Sq Epi: x / Non Sq Epi: 0-5 /HPF / Bacteria: Present /HPF        MEDICATIONS  (STANDING):  amLODIPine   Tablet 2.5 milliGRAM(s) Oral daily  apixaban 5 milliGRAM(s) Oral every 12 hours  aspirin  chewable 81 milliGRAM(s) Oral daily  atorvastatin 10 milliGRAM(s) Oral at bedtime  budesonide  80 MICROgram(s)/formoterol 4.5 MICROgram(s) Inhaler 2 Puff(s) Inhalation two times a day  cefepime   IVPB 2000 milliGRAM(s) IV Intermittent every 8 hours  DAPTOmycin IVPB 500 milliGRAM(s) IV Intermittent every 24 hours  fluconAZOLE   Tablet 400 milliGRAM(s) Oral daily  lactated ringers. 500 milliLiter(s) (50 mL/Hr) IV Continuous <Continuous>  levothyroxine 50 MICROGram(s) Oral daily  metoprolol tartrate 50 milliGRAM(s) Oral two times a day  pantoprazole    Tablet 40 milliGRAM(s) Oral before breakfast  potassium chloride   Powder 40 milliEquivalent(s) Oral every 4 hours  potassium chloride   Powder 40 milliEquivalent(s) Oral every 4 hours  sodium chloride 0.9% lock flush 3 milliLiter(s) IV Push every 8 hours    MEDICATIONS  (PRN):        RADIOLOGY & ADDITIONAL TESTS:

## 2022-11-25 NOTE — PROGRESS NOTE ADULT - ASSESSMENT
76F h/o CAD, bicuspid AV s/p AVR, ascending/hemiarch replacement with frozen elephant trunk and L aorto-subclavian bypass, CABG x 2 with Dr. Muller on 8/9/21, s/p PPM and flap closure on 9/29/21, L brachial occlusion s/p L brachial artery embolectomy 7/21/22, TEVAR with R groin cutdown and femoral artery repair with Dr. Muller and Dr. Augustin 7/29/22 c/b sternal wound infection s/p I&D and wound vac placement (WCx grew C. 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  Ertapenem was changed to cefepime because of concern for ertapenem-induced hallucinations, which have now resolved.  Would continue broad-spectrum coverage as she had received vanc and pip-tazo prior to OR on 11/2.  She had ECG on 11/18 with QTc 497 - is concerning for prolongation due to fluconazole.  Suggest:  - Please repeat ECG.  If QTc >490, will need to change from fluconazole to caspofungin  - Continue daptomycin 500 mg IV q24h  - Decrease cefepime to 2 g IV q12h  - She will need CT chest w/wo IVC prior to completion of antibiotic course (tentatively 12/13)  Recommendations discussed with primary team.  Will follow with you – ID Team 2.  Dr. Awa Shepherd will cover from tonight through 11/27, I will return 11/28.

## 2022-11-25 NOTE — PROGRESS NOTE ADULT - SUBJECTIVE AND OBJECTIVE BOX
INTERVAL HPI/OVERNIGHT EVENTS:  Afebrile.  No CP, feels relatively well    CONSTITUTIONAL:  No fever, chills, night sweats  EYES:  No photophobia or visual changes  CARDIOVASCULAR:  No chest pain  RESPIRATORY:  No cough, wheezing, or SOB   GASTROINTESTINAL:  No nausea, vomiting, diarrhea, constipation, or abdominal pain  GENITOURINARY:  No frequency, urgency, dysuria or hematuria  NEUROLOGIC:  No headache, lightheadedness      ANTIBIOTICS/RELEVANT:          Vital Signs Last 24 Hrs  T(C): 36.6 (25 Nov 2022 13:52), Max: 37.1 (24 Nov 2022 22:36)  T(F): 97.8 (25 Nov 2022 13:52), Max: 98.7 (24 Nov 2022 22:36)  HR: 63 (25 Nov 2022 13:00) (60 - 67)  BP: 150/73 (25 Nov 2022 13:00) (129/60 - 150/73)  BP(mean): 105 (25 Nov 2022 13:00) (86 - 109)  RR: 18 (25 Nov 2022 13:00) (16 - 18)  SpO2: 95% (25 Nov 2022 13:00) (95% - 99%)    Parameters below as of 25 Nov 2022 13:00  Patient On (Oxygen Delivery Method): room air        PHYSICAL EXAM:  Constitutional:  Awake, conversant  Eyes:  Sclerae anicteric, conjunctivae clear, PERRL  Ear/Nose/Throat:  No nasal exudate or sinus tenderness;  No buccal mucosal lesions, no pharyngeal erythema or exudate	  Neck:  Supple, no adenopathy  Chest:  upper part of sternal incision open/packed with blood in packing  Respiratory:  Clear bilaterally  Cardiovascular:  RRR, S1S2, no murmur appreciated  Gastrointestinal:  Symmetric, normoactive BS, soft, NT, no masses, guarding or rebound.  No HSM  Extremities:  No edema.  LUE PICC      LABS:                        8.3    5.17  )-----------( 129      ( 25 Nov 2022 06:30 )             25.3         11-25    136  |  103  |  17  ----------------------------<  90  3.7   |  24  |  0.71    Ca    8.9      25 Nov 2022 06:30  Phos  2.8     11-25  Mg     1.8     11-25            MICROBIOLOGY:        RADIOLOGY & ADDITIONAL STUDIES: INTERVAL HPI/OVERNIGHT EVENTS:  Afebrile.  No CP, feels relatively well    CONSTITUTIONAL:  No fever, chills, night sweats  EYES:  No photophobia or visual changes  CARDIOVASCULAR:  No chest pain  RESPIRATORY:  No cough, wheezing, or SOB   GASTROINTESTINAL:  No nausea, vomiting, diarrhea, constipation, or abdominal pain  GENITOURINARY:  No frequency, urgency, dysuria or hematuria  NEUROLOGIC:  No headache, lightheadedness      ANTIBIOTICS/RELEVANT:          Vital Signs Last 24 Hrs  T(C): 36.6 (25 Nov 2022 13:52), Max: 37.1 (24 Nov 2022 22:36)  T(F): 97.8 (25 Nov 2022 13:52), Max: 98.7 (24 Nov 2022 22:36)  HR: 63 (25 Nov 2022 13:00) (60 - 67)  BP: 150/73 (25 Nov 2022 13:00) (129/60 - 150/73)  BP(mean): 105 (25 Nov 2022 13:00) (86 - 109)  RR: 18 (25 Nov 2022 13:00) (16 - 18)  SpO2: 95% (25 Nov 2022 13:00) (95% - 99%)    Parameters below as of 25 Nov 2022 13:00  Patient On (Oxygen Delivery Method): room air        PHYSICAL EXAM:  Constitutional:  Awake, conversant  Eyes:  Sclerae anicteric, conjunctivae clear, PERRL  Ear/Nose/Throat:  No nasal exudate or sinus tenderness;  No buccal mucosal lesions, no pharyngeal erythema or exudate	  Neck:  Supple, no adenopathy  Chest:  upper part of sternal incision open/packed with blood in packing  Respiratory:  Clear bilaterally  Cardiovascular:  RRR, S1S2, no murmur appreciated  Gastrointestinal:  Symmetric, normoactive BS, soft, NT, no masses, guarding or rebound.  No HSM  Extremities:  No edema.  RUE PICC      LABS:                        8.3    5.17  )-----------( 129      ( 25 Nov 2022 06:30 )             25.3         11-25    136  |  103  |  17  ----------------------------<  90  3.7   |  24  |  0.71    Ca    8.9      25 Nov 2022 06:30  Phos  2.8     11-25  Mg     1.8     11-25      Creatine Kinase, Serum: 149 U/L (11.24.22 @ 07:02)          MICROBIOLOGY:        RADIOLOGY & ADDITIONAL STUDIES:

## 2022-11-25 NOTE — PROGRESS NOTE ADULT - SUBJECTIVE AND OBJECTIVE BOX
EPS ppm interrogation:    Pt has a VVI Micra (leadless) pacemaker implanted in 2021.  Here with AMS. Pending MRI brain.     Her device is MRI compatible.    Baseline pacing Mode: VVI 40 bpm.     < 0.1%.    Underlying rhythm - NSR HR 60s.   R wave sense at 19 mV.    RV threshold at 0.38V at 0.24 ms.   RV impedance: 750 ohms     Observation:  normal VVI micra pacemaker function.   When she goes to MRI, needs device to be switched to MRI safe mode.  Please call cardio fellow on call when after hours.

## 2022-11-25 NOTE — PROGRESS NOTE ADULT - ASSESSMENT
76 yo female, PMHx of CAD, HTN, HLD, spinal stenosis, arthritis, hypothyroidism, bicuspid aortic valve s/p AVR, ascending/hemiarch replacement with frozen elephant trunk and left aorto-subclavian bypass, CABG x2 with  on 8/9/2021 (post-op course c/b prolonged intubation, SSS s/p PPM, poor wound healing s/p pec flap and closure on 9/29/21), 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 Muller and Dr. Augustin 7/29/22, course complicated by sternal wound infection, presents with AMS. Per phuong, patient was discharged to rehab 3 days prior to presenting to ED and has been getting progressively confused with poor oral intake. Work up noted for WBC 9.4, hgb 8.8, Na 133, UA: LE small, CTH: negative.Burn was consulted for parasternal abscess and resumed ertapenem, daptomycin, and fluconazole. Cultures remained negative. CK remained wnl. Patient's mental status can be 2/2 metabolic encephalopathy vs. fluctuating delirium. EEG was negative, CTH negative, CXR and UA unremarkable. No focal neurologic deficits. Head CT was negative. VAC in place at superior poll of sternotomy. Transferred to [facility] under Dr. Muller for management and evaluation. On 11/23-11/24 ID consulted, confusion likely from ertapenum toxicity switched to cefepime.  Neuro consulted, recommending MRI if possible, and wound vac removed with wet to dry dressings in place.     Neuro:   -No delirium, pain well managed on current regimen  -C/w PRNs for Pain control  -Monitor neuro status    Pulm:   -Saturates well on RA     -AM CXR stable, repeat in AM  -Encourage IS 10x/hour while awake, Cough and deep breathing exercises  -Monitor respiratory status via SpO2    CV:   #CAD, s/p cabg   #s/p aortic arch replacement   -Monitor on Tele  -Continue aspirin 81mg QD  -Continue Statin  -Continue Norvasc and BB    GI   -Tolerating PO  -Prophylaxis: Protonix  -C/w bowel regimen    /Renal   -BUN/Cr: 17/0.71  -Trend Cr on AM labs  -Replete electrolytes as needed    ID  Afebrile, asymptomatic  -WBC: 5.17  -Continue to monitor for SIRS/Sepsis syndrome while inpatient  -ID consulted, recs appreciated continue on Dapto and Fluconazole stopped ertapenem and started on Cefepime in exchange  -f/u with ID on if she needs the full course of ABX or can go home on PO fluconazole     Endocrine   -A1C: 5.4, no h/o DM  -TSH: 3.600, continue synthroid 50mg QD    Hematologic  -H/H: 8.3/25.3  -CBC, chem in AM  -DVT ppx: Eliquis 5mg BID and SCDs    Disposition Planning  -Social work and case managment to discuss dispo with pt and daughters, pt will be speaking to family today  -per PT recommending either [facility] or home PT wiht 24 Hr assist. Will re assess pt today.

## 2022-11-25 NOTE — PROGRESS NOTE ADULT - SUBJECTIVE AND OBJECTIVE BOX
Patient discussed on morning rounds with Dr. Vernon     Operation / Date: 8/9/21 s/p AVR, ascending/hemiarch replacement with frozen elephant trunk and left aorto-subclavian bypass, CABG x2 with  on 8/9/2021 9/29/21 s/p pec flap and closure  7/21/22 left brachial occlusion s/p left brachial artery embolectomy  7/29/22 TEVAR with R groin cutdown and femoral artery repair with Dr. Muller and Dr. Augustin    SUBJECTIVE ASSESSMENT:  77y Female seen and examined at bedside with no complaints, she stated that she does not want to go to [facility] and is going to speak with her family and social work today. She denies dizziness, vision changes, chest pain, palpitations, shortness of breath, cough, n/v/d, extremity swelling, calf tenderness.     Vital Signs Last 24 Hrs  T(C): 36.4 (25 Nov 2022 09:06), Max: 37.4 (24 Nov 2022 09:59)  T(F): 97.6 (25 Nov 2022 09:06), Max: 99.4 (24 Nov 2022 09:59)  HR: 62 (25 Nov 2022 08:54) (60 - 67)  BP: 131/75 (25 Nov 2022 08:54) (99/51 - 144/87)  BP(mean): 97 (25 Nov 2022 08:54) (72 - 109)  RR: 17 (25 Nov 2022 08:54) (16 - 18)  SpO2: 99% (25 Nov 2022 08:54) (95% - 99%)    Parameters below as of 25 Nov 2022 08:54  Patient On (Oxygen Delivery Method): room air      I&O's Detail    24 Nov 2022 07:01  -  25 Nov 2022 07:00  --------------------------------------------------------  IN:    IV PiggyBack: 100 mL  Total IN: 100 mL    OUT:    Voided (mL): 720 mL  Total OUT: 720 mL    Total NET: -620 mL      25 Nov 2022 07:01  -  25 Nov 2022 09:31  --------------------------------------------------------  IN:  Total IN: 0 mL    OUT:    Voided (mL): 20 mL  Total OUT: 20 mL    Total NET: -20 mL    CHEST TUBE:  None  FRANCHESKA DRAIN:  None   EPICARDIAL WIRES: None   TIE DOWNS: None   REGALADO: None   VAC: None   LINES: PICC right upper extremity     PHYSICAL EXAM:  GEN: NAD, looks comfortable  Psych: Mood appropriate  Neuro: A&Ox3.  No focal deficits.  Moving all extremities.   HEENT: No obvious abnormalities  CV: S1S2, regular, no murmurs appreciated.  No carotid bruits.  No JVD  Lungs: Clear B/L.  No wheezing, rales or rhonchi  ABD: Soft, non-tender, non-distended.  +Bowel sounds  EXT: Warm and well perfused.  Trace peripheral edema noted  Musculoskeletal: Moving all extremities with normal ROM, no joint swelling  PV: Pedal pulses palpable  Incisions: healing well, sternal site is clean dry and intact with no drainage or infectious processes noted.     LABS:                        8.3    5.17  )-----------( 129      ( 25 Nov 2022 06:30 )             25.3       11-25    136  |  103  |  17  ----------------------------<  90  3.7   |  24  |  0.71    Ca    8.9      25 Nov 2022 06:30  Phos  2.8     11-25  Mg     1.8     11-25    MEDICATIONS  (STANDING):  amLODIPine   Tablet 2.5 milliGRAM(s) Oral daily  apixaban 5 milliGRAM(s) Oral every 12 hours  aspirin  chewable 81 milliGRAM(s) Oral daily  atorvastatin 10 milliGRAM(s) Oral at bedtime  budesonide  80 MICROgram(s)/formoterol 4.5 MICROgram(s) Inhaler 2 Puff(s) Inhalation two times a day  cefepime   IVPB 2000 milliGRAM(s) IV Intermittent every 8 hours  DAPTOmycin IVPB 500 milliGRAM(s) IV Intermittent every 24 hours  fluconAZOLE   Tablet 400 milliGRAM(s) Oral daily  gabapentin 100 milliGRAM(s) Oral at bedtime  lactated ringers. 500 milliLiter(s) (50 mL/Hr) IV Continuous <Continuous>  levothyroxine 50 MICROGram(s) Oral daily  metoprolol tartrate 50 milliGRAM(s) Oral two times a day  pantoprazole    Tablet 40 milliGRAM(s) Oral before breakfast  potassium chloride   Powder 40 milliEquivalent(s) Oral every 4 hours  sodium chloride 0.9% lock flush 3 milliLiter(s) IV Push every 8 hours    MEDICATIONS  (PRN):  acetaminophen     Tablet .. 650 milliGRAM(s) Oral every 6 hours PRN Temp greater or equal to 38C (100.4F), Mild Pain (1 - 3)    RADIOLOGY & ADDITIONAL TESTS:  < from: CT Head No Cont (11.23.22 @ 15:47) >    IMPRESSION:    No acute intracranial hemorrhage or transcortical infarction.    --- End of Report ---    < end of copied text >

## 2022-11-26 NOTE — PROGRESS NOTE ADULT - SUBJECTIVE AND OBJECTIVE BOX
Patient discussed on morning rounds with       Operation / Date:     SUBJECTIVE ASSESSMENT:  77y Female         Vital Signs Last 24 Hrs  T(C): 36.7 (26 Nov 2022 13:16), Max: 36.8 (25 Nov 2022 21:38)  T(F): 98.1 (26 Nov 2022 13:16), Max: 98.3 (25 Nov 2022 21:38)  HR: 64 (26 Nov 2022 09:27) (58 - 75)  BP: 108/53 (26 Nov 2022 09:27) (108/53 - 138/64)  BP(mean): 77 (26 Nov 2022 09:27) (77 - 92)  RR: 12 (26 Nov 2022 09:27) (12 - 16)  SpO2: 97% (26 Nov 2022 09:27) (95% - 98%)    Parameters below as of 26 Nov 2022 09:27  Patient On (Oxygen Delivery Method): room air      I&O's Detail    25 Nov 2022 07:01  -  26 Nov 2022 07:00  --------------------------------------------------------  IN:    IV PiggyBack: 50 mL    Oral Fluid: 100 mL  Total IN: 150 mL    OUT:    Voided (mL): 1220 mL  Total OUT: 1220 mL    Total NET: -1070 mL    CHEST TUBE:  Yes/No. AIR LEAKS: Yes/No. Suction / H2O SEAL.   FRANCHESKA DRAIN:  Yes/No.  EPICARDIAL WIRES: Yes/No.  TIE DOWNS: Yes/No.  REGALADO: Yes/No.    PHYSICAL EXAM:    General:     Neurological:    Cardiovascular:    Respiratory:    Gastrointestinal:    Extremities:    Vascular:    Incision Sites:    LABS:                        7.7    4.79  )-----------( 112      ( 26 Nov 2022 06:03 )             23.7       COUMADIN:  No.     11-26    136  |  104  |  16  ----------------------------<  90  3.9   |  23  |  0.71    Ca    8.8      26 Nov 2022 06:03  Phos  2.8     11-25  Mg     1.8     11-26    MEDICATIONS  (STANDING):  amLODIPine   Tablet 2.5 milliGRAM(s) Oral daily  apixaban 5 milliGRAM(s) Oral every 12 hours  aspirin  chewable 81 milliGRAM(s) Oral daily  atorvastatin 10 milliGRAM(s) Oral at bedtime  budesonide  80 MICROgram(s)/formoterol 4.5 MICROgram(s) Inhaler 2 Puff(s) Inhalation two times a day  cefepime   IVPB 2000 milliGRAM(s) IV Intermittent every 12 hours  DAPTOmycin IVPB 500 milliGRAM(s) IV Intermittent every 24 hours  fluconAZOLE   Tablet 400 milliGRAM(s) Oral daily  gabapentin 100 milliGRAM(s) Oral at bedtime  lactated ringers. 500 milliLiter(s) (50 mL/Hr) IV Continuous <Continuous>  levothyroxine 50 MICROGram(s) Oral daily  lidocaine   4% Patch 1 Patch Transdermal once  lidocaine   4% Patch 1 Patch Transdermal daily  magnesium oxide 400 milliGRAM(s) Oral three times a day with meals  metoprolol tartrate 50 milliGRAM(s) Oral two times a day  pantoprazole    Tablet 40 milliGRAM(s) Oral before breakfast  potassium chloride   Powder 40 milliEquivalent(s) Oral every 4 hours  sodium chloride 0.9% lock flush 3 milliLiter(s) IV Push every 8 hours    MEDICATIONS  (PRN):  acetaminophen     Tablet .. 650 milliGRAM(s) Oral every 6 hours PRN Temp greater or equal to 38C (100.4F), Mild Pain (1 - 3)        RADIOLOGY & ADDITIONAL TESTS:     Patient discussed on morning rounds with Dr. Vernon    Reason for Admission: altered mental status/hypokalemia/poor nutrition/sternal wound VAC/anemia     SUBJECTIVE ASSESSMENT:  77y Female seen and examined at bedside. Patient denies cp, sob, palpitations this am. Wet to dry changed at bedside. Patient in good spirits.    Vital Signs Last 24 Hrs  T(C): 36.7 (26 Nov 2022 13:16), Max: 36.8 (25 Nov 2022 21:38)  T(F): 98.1 (26 Nov 2022 13:16), Max: 98.3 (25 Nov 2022 21:38)  HR: 64 (26 Nov 2022 09:27) (58 - 75)  BP: 108/53 (26 Nov 2022 09:27) (108/53 - 138/64)  BP(mean): 77 (26 Nov 2022 09:27) (77 - 92)  RR: 12 (26 Nov 2022 09:27) (12 - 16)  SpO2: 97% (26 Nov 2022 09:27) (95% - 98%)    Parameters below as of 26 Nov 2022 09:27  Patient On (Oxygen Delivery Method): room air      I&O's Detail    25 Nov 2022 07:01  -  26 Nov 2022 07:00  --------------------------------------------------------  IN:    IV PiggyBack: 50 mL    Oral Fluid: 100 mL  Total IN: 150 mL    OUT:    Voided (mL): 1220 mL  Total OUT: 1220 mL    Total NET: -1070 mL    CHEST TUBE: no.   FRANCHESKA DRAIN:  no.  EPICARDIAL WIRES: no.  TIE DOWNS: no.  REGALADO:  no.    PHYSICAL EXAM:    GEN: NAD, looks comfortable  Psych: Mood appropriate  Neuro: A&Ox3.  No focal deficits.  Moving all extremities.   HEENT: No obvious abnormalities  CV: S1S2, regular, no murmurs appreciated.  No carotid bruits.  No JVD  Lungs: Clear B/L.  No wheezing, rales or rhonchi  ABD: Soft, non-tender, non-distended.  +Bowel sounds  EXT: Warm and well perfused.  No peripheral edema noted  Musculoskeletal: Moving all extremities with normal ROM, no joint swelling  PV: Pedal pulses palpable  Incision Sites: superior pole of MSI superifical wound healing, packed with fresh wet to dry    LABS:                        7.7    4.79  )-----------( 112      ( 26 Nov 2022 06:03 )             23.7       COUMADIN:  No.     11-26    136  |  104  |  16  ----------------------------<  90  3.9   |  23  |  0.71    Ca    8.8      26 Nov 2022 06:03  Phos  2.8     11-25  Mg     1.8     11-26    MEDICATIONS  (STANDING):  amLODIPine   Tablet 2.5 milliGRAM(s) Oral daily  apixaban 5 milliGRAM(s) Oral every 12 hours  aspirin  chewable 81 milliGRAM(s) Oral daily  atorvastatin 10 milliGRAM(s) Oral at bedtime  budesonide  80 MICROgram(s)/formoterol 4.5 MICROgram(s) Inhaler 2 Puff(s) Inhalation two times a day  cefepime   IVPB 2000 milliGRAM(s) IV Intermittent every 12 hours  DAPTOmycin IVPB 500 milliGRAM(s) IV Intermittent every 24 hours  fluconAZOLE   Tablet 400 milliGRAM(s) Oral daily  gabapentin 100 milliGRAM(s) Oral at bedtime  lactated ringers. 500 milliLiter(s) (50 mL/Hr) IV Continuous <Continuous>  levothyroxine 50 MICROGram(s) Oral daily  lidocaine   4% Patch 1 Patch Transdermal once  lidocaine   4% Patch 1 Patch Transdermal daily  magnesium oxide 400 milliGRAM(s) Oral three times a day with meals  metoprolol tartrate 50 milliGRAM(s) Oral two times a day  pantoprazole    Tablet 40 milliGRAM(s) Oral before breakfast  potassium chloride   Powder 40 milliEquivalent(s) Oral every 4 hours  sodium chloride 0.9% lock flush 3 milliLiter(s) IV Push every 8 hours    MEDICATIONS  (PRN):  acetaminophen     Tablet .. 650 milliGRAM(s) Oral every 6 hours PRN Temp greater or equal to 38C (100.4F), Mild Pain (1 - 3)      RADIOLOGY & ADDITIONAL TESTS:  < from: CT Head No Cont (11.23.22 @ 15:47) >  FINDINGS:    VENTRICLES AND SULCI: Age-related parenchymal volume loss. No   hydrocephalus.  INTRA-AXIAL: No intracranial mass, acute hemorrhage, or midline shift is   present. No acute transcortical infarction. Empty sella. Patchy   periventricular and subcortical white matter hypodensities consistent   with microvascular ischemic change.  EXTRA-AXIAL: No extra-axial fluid collection is present.  VISUALIZED SINUSES: No air-fluid levels are identified.  VISUALIZED MASTOIDS: Opacification of selected air cells in the mastoid   processes.  CALVARIUM:  Normal.    IMPRESSION:    No acute intracranial hemorrhage or transcortical infarction.    < end of copied text >

## 2022-11-26 NOTE — PROGRESS NOTE ADULT - ASSESSMENT
76F h/o CAD, bicuspid AV s/p AVR, ascending/hemiarch replacement with frozen elephant trunk and L aorto-subclavian bypass, CABG x 2 with Dr. Muller on 8/9/21, s/p PPM and flap closure on 9/29/21, L brachial occlusion s/p L brachial artery embolectomy 7/21/22, TEVAR with R groin cutdown and femoral artery repair with Dr. Muller and Dr. Augustin 7/29/22 c/b sternal wound infection s/p I&D and wound vac placement (WCx grew C. 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  Ertapenem was changed to cefepime because of concern for ertapenem-induced hallucinations, which have now resolved.  Would continue broad-spectrum coverage as she had received vanc and pip-tazo prior to OR on 11/2. ECG 11/26 QTc 443.  - Continue daptomycin 500 mg IV q24h  - Continue cefepime to 2 g IV q12h  - Continue fluconazole 400mg IV q24h  - monitor for worsening thrombocytopenia; work up as per primary team    Dr. Pelayo ID Team 2 resumes care Monday

## 2022-11-26 NOTE — PROGRESS NOTE ADULT - ASSESSMENT
78 yo female, PMHx of CAD, HTN, HLD, spinal stenosis, arthritis, hypothyroidism, bicuspid aortic valve s/p AVR, ascending/hemiarch replacement with frozen elephant trunk and left aorto-subclavian bypass, CABG x2 with  on 8/9/2021 (post-op course c/b prolonged intubation, SSS s/p PPM, poor wound healing s/p pec flap and closure on 9/29/21), 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 Muller and Dr. Augustin 7/29/22, course complicated by sternal wound infection, presents with AMS. Per phuong, patient was discharged to rehab 3 days prior to presenting to ED and has been getting progressively confused with poor oral intake. Work up noted for WBC 9.4, hgb 8.8, Na 133, UA: LE small, CTH: negative.Burn was consulted for parasternal abscess and resumed ertapenem, daptomycin, and fluconazole. Cultures remained negative. CK remained wnl. Patient's mental status can be 2/2 metabolic encephalopathy vs. fluctuating delirium. EEG was negative, CTH negative, CXR and UA unremarkable. No focal neurologic deficits. Head CT was negative. VAC in place at superior poll of sternotomy. Transferred to [facility] under Dr. Muller for management and evaluation. On 11/23-11/24 ID consulted, confusion likely from ertapenum toxicity switched to cefepime.  Neuro consulted, recommending MRI if possible, and wound vac removed with wet to dry dressings in place.     Neuro:   -No delirium, pain well managed on current regimen  -C/w PRNs for Pain control  -Monitor neuro status    Pulm:   -Saturates well on RA     -AM CXR stable, repeat in AM  -Encourage IS 10x/hour while awake, Cough and deep breathing exercises  -Monitor respiratory status via SpO2    CV:   #CAD, s/p cabg   #s/p aortic arch replacement   -Monitor on Tele  -Continue aspirin 81mg QD  -Continue Statin  -Continue Norvasc and BB    GI   -Tolerating PO  -Prophylaxis: Protonix  -C/w bowel regimen    /Renal   -BUN/Cr: 16/0.71  -Trend Cr on AM labs  -Replete electrolytes as needed    ID  Afebrile, asymptomatic  -WBC: 4.79  -Continue to monitor for SIRS/Sepsis syndrome while inpatient  -ID consulted, recs appreciated continue on Dapto and Fluconazole stopped ertapenem and started on Cefepime in exchange  -f/u with ID on if she needs the full course of ABX or can go home on PO fluconazole     Endocrine   -A1C: 5.4, no h/o DM  -TSH: 3.600, continue synthroid 50mg QD    Hematologic  -H/H: 7.7/23.7  -CBC, chem in AM  -DVT ppx: Eliquis 5mg BID and SCDs    Disposition Planning  -Social work and case management to discuss dispo with pt and daughters, pt will be speaking to family today  -per PT recommending either VERNA or home PT wiht 24 Hr assist. Will re assess pt today.

## 2022-11-26 NOTE — PROGRESS NOTE ADULT - SUBJECTIVE AND OBJECTIVE BOX
INTERVAL HPI/OVERNIGHT EVENTS: Endorsing LUE numbness.     CONSTITUTIONAL:  Negative fever or chills, feels well, good appetite  EYES:  Negative  blurry vision or double vision  CARDIOVASCULAR:  Negative for chest pain or palpitations  RESPIRATORY:  Negative for cough, wheezing, or SOB   GASTROINTESTINAL:  Negative for nausea, vomiting, diarrhea, constipation, or abdominal pain  GENITOURINARY:  Negative frequency, urgency or dysuria  NEUROLOGIC:  No headache, confusion, dizziness, lightheadedness      ANTIBIOTICS/RELEVANT:    MEDICATIONS  (STANDING):  amLODIPine   Tablet 2.5 milliGRAM(s) Oral daily  apixaban 5 milliGRAM(s) Oral every 12 hours  aspirin  chewable 81 milliGRAM(s) Oral daily  atorvastatin 10 milliGRAM(s) Oral at bedtime  budesonide  80 MICROgram(s)/formoterol 4.5 MICROgram(s) Inhaler 2 Puff(s) Inhalation two times a day  cefepime   IVPB 2000 milliGRAM(s) IV Intermittent every 12 hours  DAPTOmycin IVPB 500 milliGRAM(s) IV Intermittent every 24 hours  fluconAZOLE   Tablet 400 milliGRAM(s) Oral daily  gabapentin 100 milliGRAM(s) Oral at bedtime  lactated ringers. 500 milliLiter(s) (50 mL/Hr) IV Continuous <Continuous>  levothyroxine 50 MICROGram(s) Oral daily  lidocaine   4% Patch 1 Patch Transdermal daily  magnesium oxide 400 milliGRAM(s) Oral three times a day with meals  metoprolol tartrate 50 milliGRAM(s) Oral two times a day  pantoprazole    Tablet 40 milliGRAM(s) Oral before breakfast  potassium chloride   Powder 40 milliEquivalent(s) Oral every 4 hours  sodium chloride 0.9% lock flush 3 milliLiter(s) IV Push every 8 hours    MEDICATIONS  (PRN):  acetaminophen     Tablet .. 650 milliGRAM(s) Oral every 6 hours PRN Temp greater or equal to 38C (100.4F), Mild Pain (1 - 3)        Vital Signs Last 24 Hrs  T(C): 36.7 (26 Nov 2022 13:16), Max: 36.8 (25 Nov 2022 21:38)  T(F): 98.1 (26 Nov 2022 13:16), Max: 98.3 (25 Nov 2022 21:38)  HR: 68 (26 Nov 2022 13:22) (58 - 75)  BP: 119/58 (26 Nov 2022 13:22) (108/53 - 138/64)  BP(mean): 83 (26 Nov 2022 13:22) (77 - 92)  RR: 12 (26 Nov 2022 13:22) (12 - 16)  SpO2: 96% (26 Nov 2022 13:22) (95% - 98%)    Parameters below as of 26 Nov 2022 13:22  Patient On (Oxygen Delivery Method): room air        PHYSICAL EXAM:  Constitutional: NAD  Eyes: MALIHA, EOMI  Ear/Nose/Throat: no oral lesion, no sinus tenderness on percussion	  Neck: no JVD, no lymphadenopathy, supple  Respiratory: CTA marcia  Cardiovascular: S1S2 RRR, no murmurs  Gastrointestinal:soft, (+) BS, no HSM  Extremities:no e/e/c  Vascular: DP Pulse:	right normal; left normal      LABS:                        7.7    4.79  )-----------( 112      ( 26 Nov 2022 06:03 )             23.7     11-26    136  |  104  |  16  ----------------------------<  90  3.9   |  23  |  0.71    Ca    8.8      26 Nov 2022 06:03  Phos  2.8     11-25  Mg     1.8     11-26            MICROBIOLOGY: reviewed    RADIOLOGY & ADDITIONAL STUDIES: reviewed

## 2022-11-27 NOTE — PROGRESS NOTE ADULT - ASSESSMENT
78 yo female, PMHx of CAD, HTN, HLD, spinal stenosis, arthritis, hypothyroidism, bicuspid aortic valve s/p AVR, ascending/hemiarch replacement with frozen elephant trunk and left aorto-subclavian bypass, CABG x2 with  on 8/9/2021 (post-op course c/b prolonged intubation, SSS s/p PPM, poor wound healing s/p pec flap and closure on 9/29/21), 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 Muller and Dr. Augustin 7/29/22, course complicated by sternal wound infection, presents with AMS. Per phuong, patient was discharged to rehab 3 days prior to presenting to ED and has been getting progressively confused with poor oral intake. Work up noted for WBC 9.4, hgb 8.8, Na 133, UA: LE small, CTH: negative.Burn was consulted for parasternal abscess and resumed ertapenem, daptomycin, and fluconazole. Cultures remained negative. CK remained wnl. Patient's mental status can be 2/2 metabolic encephalopathy vs. fluctuating delirium. EEG was negative, CTH negative, CXR and UA unremarkable. No focal neurologic deficits. Head CT was negative. VAC in place at superior poll of sternotomy. Transferred to [facility] under Dr. Muller for management and evaluation. On 11/23-11/24 ID consulted, confusion likely from ertapenum toxicity switched to cefepime.  Neuro consulted, recommending MRI if possible, and wound vac removed with wet to dry dressings in place. MSI wound with bleeding, granulating tissue; continuing wet to dries; very superficial at this point. QTc remains <490 so po fluconazole continued. Patient gearing up for discharge planning; may be able to go home (preferred by team and by patient) if transitioned to po abx or finishes abx course (At this time, the ID weekend coverage is refusing to make changes to primary ID attending's recs).     Neuro:   -No delirium, pain well managed on current regimen  -C/w PRNs for Pain control  -Monitor neuro status    Pulm:   -Saturates well on RA     -AM CXR stable, repeat in AM  -Encourage IS 10x/hour while awake, Cough and deep breathing exercises  -Monitor respiratory status via SpO2    CV:   #CAD, s/p cabg   #s/p aortic arch replacement   -Monitor on Tele  -Continue aspirin 81mg QD  -Continue Statin  -Continue Norvasc and BB    GI   -Tolerating PO  -Prophylaxis: Protonix  -C/w bowel regimen    /Renal   -BUN/Cr: 16/0.71  -Trend Cr on AM labs  -Replete electrolytes as needed    ID  Afebrile, asymptomatic  -WBC: 4.79  -Continue to monitor for SIRS/Sepsis syndrome while inpatient  -ID consulted, recs appreciated continue on Dapto and Fluconazole stopped ertapenem and started on Cefepime in exchange  -f/u with ID on if she needs the full course of ABX or can go home on PO fluconazole     Endocrine   -A1C: 5.4, no h/o DM  -TSH: 3.600, continue synthroid 50mg QD    Hematologic  -H/H: 7.7/23.7  -CBC, chem in AM  -DVT ppx: Eliquis 5mg BID and SCDs    Disposition Planning  -Social work and case management to discuss dispo with pt and daughters, pt will be speaking to family today  -per PT recommending either [facility] or home PT wiht 24 Hr assist. Will re assess pt today.      78 yo female, PMHx of CAD, HTN, HLD, spinal stenosis, arthritis, hypothyroidism, bicuspid aortic valve s/p AVR, ascending/hemiarch replacement with frozen elephant trunk and left aorto-subclavian bypass, CABGx2 with  on 8/9/2021 (post-op course c/b prolonged intubation, SSS s/p PPM, poor wound healing s/p pec flap and closure on 9/29/21), 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 Muller and Dr. Augustin 7/29/22, course complicated by sternal wound infection, presented with AMS. Per daughter, pt was discharged to rehab 3 days prior to presenting to ED and has been getting progressively confused with poor oral intake. Work up noted for WBC 9.4, hgb 8.8, Na 133, UA: LE small, CTH: negative. Burn was consulted for parasternal abscess and resumed ertapenem, daptomycin, and fluconazole. Cultures remained negative. CK remained wnl. Patient's mental status can be 2/2 metabolic encephalopathy vs. fluctuating delirium. EEG was negative, CTH negative, CXR and UA unremarkable. No focal neurologic deficits. Head CT was negative. VAC in place at superior poll of sternotomy. Pt transferred to [facility] under Dr. Muller for management and evaluation. On 11/23-11/24 ID consulted, confusion likely from ertapenum toxicity switched to cefepime.  Neuro consulted, recommending MRI if possible. Wound vac removed with wet to dry dressings in place. MSI wound with bleeding, granulating tissue; continuing wet to dries; very superficial at this point. QTc remains <490 so po fluconazole continued. Patient gearing up for discharge planning; may be able to go home (preferred by team and by patient) if transitioned to po abx or finishes abx course (At this time, the ID weekend coverage is refusing to make changes to primary ID attending's recs). Patient currently tolerating po antifungals.    Plan:  Neuro:   -No delirium, pain well managed on current regimen  -C/w PRNs for Pain control  -Monitor neuro status  -No recent AMS appreciated (c/f ertapenem toxicity)  -Pending brain MRI per neuro recs  -C/w lidocaine patch for left arm pain  -C/w gabapentin, tylenol    Pulm:   -Saturates well on RA     -AM CXR stable, repeat in AM  -Encourage IS 10x/hour while awake, cough and deep breathing exercises  -Monitor respiratory status via SpO2  -c/w budesonide      CV:   #CAD, s/p cabg   #s/p aortic arch replacement   -Monitor on Tele  -Continue aspirin 81mg QD  -Continue Statin  -Continue Norvasc and BB    GI   -Tolerating PO  -Prophylaxis: Protonix  -C/w bowel regimen    /Renal   -BUN/Cr: 16/0.71  -Trend Cr on AM labs  -Replete electrolytes as needed    ID  Afebrile, asymptomatic  -WBC: 4.79  -Continue to monitor for SIRS/Sepsis syndrome while inpatient  -ID consulted, recs appreciated continue on Dapto and Fluconazole -stopped ertapenem and started on Cefepime in exchange  -f/u with ID on if she needs the full course of ABX or can go home on PO fluconazole   -Appreciate ID recs  -PICC line in place    Endocrine   -A1C: 5.4, no h/o DM  -TSH: 3.600, continue synthroid 50mg QD    Hematologic  -H/H: 7.7/23.7  -On eliquis BID for left brachial occlusion  -CBC, chem in AM  -DVT ppx: Eliquis 5mg BID and SCDs    Disposition Planning  -social work and case management to discuss dispo with pt and daughters  -per PT recommending either [facility] or home PT with 24 hr assist  -if pt doesn't need IV abx, would prefer pt to go home with family

## 2022-11-27 NOTE — PROGRESS NOTE ADULT - SUBJECTIVE AND OBJECTIVE BOX
Patient discussed on morning rounds with Dr. Vernon     Admitted for: altered mental status/hypokalemia/poor nutrition/sternal wound VAC/anemia    SUBJECTIVE ASSESSMENT:  77y Female. NAEO. MSI wound looks good. Patient denies cp, sob, palpitations, n/v/d/c.     Vital Signs Last 24 Hrs  T(C): 36.8 (27 Nov 2022 08:42), Max: 37.1 (27 Nov 2022 01:01)  T(F): 98.2 (27 Nov 2022 08:42), Max: 98.7 (27 Nov 2022 01:01)  HR: 65 (27 Nov 2022 09:29) (65 - 98)  BP: 109/59 (27 Nov 2022 09:29) (106/55 - 171/86)  BP(mean): 80 (27 Nov 2022 09:29) (77 - 120)  RR: 12 (27 Nov 2022 09:29) (12 - 14)  SpO2: 97% (27 Nov 2022 09:29) (93% - 97%)    Parameters below as of 27 Nov 2022 09:29  Patient On (Oxygen Delivery Method): room air    I&O's Detail    26 Nov 2022 07:01  -  27 Nov 2022 07:00  --------------------------------------------------------  IN:    IV PiggyBack: 50 mL    Oral Fluid: 100 mL  Total IN: 150 mL    OUT:    Voided (mL): 1250 mL  Total OUT: 1250 mL    Total NET: -1100 mL      27 Nov 2022 07:01  -  27 Nov 2022 11:33  --------------------------------------------------------  IN:    IV PiggyBack: 50 mL    Oral Fluid: 100 mL  Total IN: 150 mL    OUT:    Voided (mL): 150 mL  Total OUT: 150 mL    Total NET: 0 mL    CHEST TUBE:  No.    FRANCHESKA DRAIN:  No.  EPICARDIAL WIRES: No.  TIE DOWNS:  No.  REGALADO: No.  No more Wound Vac: MSI superior pole wound is superficial with granulating tissue and wet to dry    PHYSICAL EXAM:  GEN: NAD, looks comfortable  Psych: Mood appropriate  Neuro: A&Ox3.  No focal deficits.  Moving all extremities.   HEENT: No obvious abnormalities  CV: S1S2, regular, no murmurs appreciated.  No carotid bruits.  No JVD  Lungs: Clear B/L.  No wheezing, rales or rhonchi  ABD: Soft, non-tender, non-distended.  +Bowel sounds  EXT: Warm and well perfused.  No peripheral edema noted  Musculoskeletal: Moving all extremities with normal ROM, no joint swelling  PV: Pedal pulses palpable  Incision Sites: MSI superior pole wound is superficial with granulating tissue and wet to dry; bleeding; healthy; no evidence of infection    LABS:                        7.7    4.79  )-----------( 112      ( 26 Nov 2022 06:03 )             23.7       COUMADIN:  No.    11-26    136  |  104  |  16  ----------------------------<  90  3.9   |  23  |  0.71    Ca    8.8      26 Nov 2022 06:03  Mg     1.8     11-26    MEDICATIONS  (STANDING):  amLODIPine   Tablet 2.5 milliGRAM(s) Oral daily  apixaban 5 milliGRAM(s) Oral every 12 hours  aspirin  chewable 81 milliGRAM(s) Oral daily  atorvastatin 10 milliGRAM(s) Oral at bedtime  budesonide  80 MICROgram(s)/formoterol 4.5 MICROgram(s) Inhaler 2 Puff(s) Inhalation two times a day  cefepime   IVPB 2000 milliGRAM(s) IV Intermittent every 12 hours  DAPTOmycin IVPB 500 milliGRAM(s) IV Intermittent every 24 hours  fluconAZOLE   Tablet 400 milliGRAM(s) Oral daily  gabapentin 100 milliGRAM(s) Oral at bedtime  lactated ringers. 500 milliLiter(s) (50 mL/Hr) IV Continuous <Continuous>  levothyroxine 50 MICROGram(s) Oral daily  lidocaine   4% Patch 1 Patch Transdermal daily  metoprolol tartrate 50 milliGRAM(s) Oral two times a day  pantoprazole    Tablet 40 milliGRAM(s) Oral before breakfast  potassium chloride   Powder 40 milliEquivalent(s) Oral every 4 hours  sodium chloride 0.9% lock flush 3 milliLiter(s) IV Push every 8 hours    MEDICATIONS  (PRN):  acetaminophen     Tablet .. 650 milliGRAM(s) Oral every 6 hours PRN Temp greater or equal to 38C (100.4F), Mild Pain (1 - 3)      RADIOLOGY & ADDITIONAL TESTS:  < from: CT Head No Cont (11.23.22 @ 15:47) >  FINDINGS:    VENTRICLES AND SULCI: Age-related parenchymal volume loss. No   hydrocephalus.  INTRA-AXIAL: No intracranial mass, acute hemorrhage, or midline shift is   present. No acute transcortical infarction. Empty sella. Patchy   periventricular and subcortical white matter hypodensities consistent   with microvascular ischemic change.  EXTRA-AXIAL: No extra-axial fluid collection is present.  VISUALIZED SINUSES: No air-fluid levels are identified.  VISUALIZED MASTOIDS: Opacification of selected air cells in the mastoid   processes.  CALVARIUM:  Normal.    IMPRESSION:    No acute intracranial hemorrhage or transcortical infarction.    < end of copied text >

## 2022-11-28 NOTE — PROGRESS NOTE ADULT - ASSESSMENT
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 Muller and Dr. Augustin 7/29/22, course complicated by sternal wound infection, presented with AMS. Per daughter, pt was discharged to rehab 3 days prior to presenting to ED and has been getting progressively confused with poor oral intake. Work up noted for WBC 9.4, hgb 8.8, Na 133, UA: LE small, CTH: negative. Burn was consulted for parasternal abscess and resumed ertapenem, daptomycin, and fluconazole. Cultures remained negative. CK remained wnl. Patient's mental status can be 2/2 metabolic encephalopathy vs. fluctuating delirium. EEG was negative, CTH negative, CXR and UA unremarkable. No focal neurologic deficits. Head CT was negative. VAC in place at superior poll of sternotomy. Pt transferred to [facility] under Dr. Muller for management and evaluation. On 11/23-11/24 ID consulted, confusion likely from ertapenum toxicity switched to cefepime.  Neuro consulted, recommending MRI if possible. Wound vac removed with wet to dry dressings in place. MSI wound with bleeding, granulating tissue; continuing wet to dries; very superficial at this point. QTc remains <490 so po fluconazole continued. Patient gearing up for discharge planning; may be able to go home (preferred by team and by patient) if transitioned to po abx or finishes abx course (At this time, the ID weekend coverage is refusing to make changes to primary ID attending's recs). Patient currently tolerating po antifungals. On 11/28 patient felt dizzy and lightheaded and noted a new fluctuant, non tender, non erythematous mass on the left chest.       Neuro: pain managed. Left sided weakness. Neuro following   - MRI per neuro when medically stable to leave floor.     CVS: hypotensive this morning   - Continue ASA, Statin for CAD   - continue BB for afib ppx     Respiratory: Saturating well on   - Wean off O2 sat > 92%  - IS and ambulation  - Chest PT.     GI: Last BM  - GI PPX   - Bowel regimen with Senna and Miralax     : BUN/ Creatinine. +/- Arreola   - monitor I/Os.     Endo: FS well contolled.   - ISS     ID:   - completed periop ABX   - continue to monitor fever curve     Heme: DVT PPX   - SQH     Dispo plan:     Arlet Arteaga PA-C   76 yo female, PMHx of CAD, HTN, HLD, spinal stenosis, arthritis, hypothyroidism, bicuspid aortic valve s/p AVR, ascending/hemiarch replacement with frozen elephant trunk and left aorto-subclavian bypass, CABGx2 with  on 8/9/2021 (post-op course c/b prolonged intubation, SSS s/p PPM, poor wound healing s/p pec flap and closure on 9/29/21), 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 Muller and Dr. Augustin 7/29/22, course complicated by sternal wound infection, presented with AMS. Per daughter, pt was discharged to rehab 3 days prior to presenting to ED and has been getting progressively confused with poor oral intake. Work up noted for WBC 9.4, hgb 8.8, Na 133, UA: LE small, CTH: negative. Burn was consulted for parasternal abscess and resumed ertapenem, daptomycin, and fluconazole. Cultures remained negative. CK remained wnl. Patient's mental status can be 2/2 metabolic encephalopathy vs. fluctuating delirium. EEG was negative, CTH negative, CXR and UA unremarkable. No focal neurologic deficits. Head CT was negative. VAC in place at superior poll of sternotomy. Pt transferred to [facility] under Dr. Muller for management and evaluation. On 11/23-11/24 ID consulted, confusion likely from ertapenum toxicity switched to cefepime.  Neuro consulted, recommending MRI if possible. Wound vac removed with wet to dry dressings in place. MSI wound with bleeding, granulating tissue; continuing wet to dries; very superficial at this point. QTc remains <490 so po fluconazole continued. Patient gearing up for discharge planning; may be able to go home (preferred by team and by patient) if transitioned to po abx or finishes abx course (At this time, the ID weekend coverage is refusing to make changes to primary ID attending's recs). Patient currently tolerating po antifungals. On 11/28 patient felt dizzy and lightheaded and noted a new fluctuant, non tender, non erythematous mass on the left chest. Pending CT chest with contrast for further evaluation.       Neuro: pain managed. Left sided weakness. Neuro following   - MRI per neuro when medically stable to leave floor.   - Gabapentin 100 for neuropathic pain     CVS: hypotensive this morning to SBP in 80s. NSR in the 60s.   - holding all antihypertensive meds.   - Continue ASA and Statin   - albumin for volume resuscitation. Hyperdynamic LV on echo from this admission with poor PO intake   - Dopplerable pulses on the Left hand. No     Respiratory: Saturating well on   - Wean off O2 sat > 92%  - IS and ambulation  - Chest PT.     GI: Last BM  - GI PPX   - Bowel regimen with Senna and Miralax     : BUN/ Creatinine. +/- Arreola   - monitor I/Os.     Endo: FS well contolled.   - ISS     ID:   - completed periop ABX   - continue to monitor fever curve     Heme: DVT PPX   - SQH     Dispo plan:     Arlet Arteaga PA-C   78 yo female, PMHx of CAD, HTN, HLD, spinal stenosis, arthritis, hypothyroidism, bicuspid aortic valve s/p AVR, ascending/hemiarch replacement with frozen elephant trunk and left aorto-subclavian bypass, CABGx2 with  on 8/9/2021 (post-op course c/b prolonged intubation, SSS s/p PPM, poor wound healing s/p pec flap and closure on 9/29/21), 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 Muller and Dr. Augustin 7/29/22, course complicated by sternal wound infection, presented with AMS. Per daughter, pt was discharged to rehab 3 days prior to presenting to ED and has been getting progressively confused with poor oral intake. Work up noted for WBC 9.4, hgb 8.8, Na 133, UA: LE small, CTH: negative. Burn was consulted for parasternal abscess and resumed ertapenem, daptomycin, and fluconazole. Cultures remained negative. CK remained wnl. Patient's mental status can be 2/2 metabolic encephalopathy vs. fluctuating delirium. EEG was negative, CTH negative, CXR and UA unremarkable. No focal neurologic deficits. Head CT was negative. VAC in place at superior poll of sternotomy. Pt transferred to [facility] under Dr. Muller for management and evaluation. On 11/23-11/24 ID consulted, confusion likely from ertapenum toxicity switched to cefepime.  Neuro consulted, recommending MRI if possible. Wound vac removed with wet to dry dressings in place. MSI wound with bleeding, granulating tissue; continuing wet to dries; very superficial at this point. QTc remains <490 so po fluconazole continued. Patient gearing up for discharge planning; may be able to go home (preferred by team and by patient) if transitioned to po abx or finishes abx course (At this time, the ID weekend coverage is refusing to make changes to primary ID attending's recs). Patient currently tolerating po antifungals. On 11/28 patient felt dizzy and lightheaded and noted a new fluctuant, non tender, non erythematous mass on the left chest. Pending CT chest with contrast for further evaluation.       Neuro: pain managed. Left sided weakness. Neuro following   - MRI per neuro when medically stable to leave floor.   - Gabapentin 100 for neuropathic pain     CVS: hypotensive this morning to SBP in 80s. NSR in the 60s. Dopplerable pulses on the Left hand,  - holding all antihypertensive meds.   - Continue ASA and Statin   - albumin for volume resuscitation. Hyperdynamic LV on echo from this admission with poor PO intake   - CTA of chest to evaluate fluctuation   - continue wet to dry dressing on superior pole of previous sternal incision     Respiratory: Saturating well on RA   - Wean off O2 sat > 92%  - IS and ambulation  - Chest PT  - continue budesonide     GI: has not had a BM since 11/23   - Dulcolax suppository   - GI PPX   - Bowel regimen with Senna and Miralax     : BUN/ Creatinine. 19/.74 - Arreola   - monitor I/Os.   - About to get contrast. Consider IV fluids for renal protection     Endo: H/o hypothyroidism.  FS well contolled.   - ISS   - continue synthroid 50mcg Daily, follow up t3 (t4 low from 11/23) If low follow up with Endocrine.     ID: Superior pole of sternal wound dehiscence s/p wound VAC now requiring wet to dry dressing. afebile with new fluctuation of left chest wall.   - Per ID continuing Fluconazole 400mg Daily. With Daily EKGs for QTc checks.   - continuing broad spectrum ABX with Dapto and cefepime (cultures negative but culture was taken while already on antibiotics)   - Follow up ID recs after CT chest.     Heme: afib ppx   - hold PM eliquis for possible need for intervention on Chest wall mass.     Dispo plan:     Arlet Arteaga PA-C

## 2022-11-28 NOTE — PROGRESS NOTE ADULT - ASSESSMENT
76F h/o CAD, bicuspid AV s/p AVR, ascending/hemiarch replacement with frozen elephant trunk and L aorto-subclavian bypass, CABG x 2 with Dr. Muller on 8/9/21, s/p PPM and flap closure on 9/29/21, L brachial occlusion s/p L brachial artery embolectomy 7/21/22, TEVAR with R groin cutdown and femoral artery repair with Dr. Muller and Dr. Augustin 7/29/22 c/b sternal wound infection s/p I&D and wound vac placement (WCx grew C. albican), L parasternal chest wall collection/phlegmon with mediastinal extension, LUE limb ischemia but not a surgical candidate discharged on IV dapto/erta/fluc (11/2 - 12/13) p/w AMS.  Ertapenem was changed to cefepime because of concern for ertapenem-induced hallucinations, which have now resolved.  Would continue broad-spectrum coverage as she had received vanc and pip-tazo prior to OR on 11/2.  Now with new L infraclavicular collection  Suggest:  - Agree with plan for CT chest w/wo IVC  - Continue daptomycin 500 mg IV q24h  - Continue cefepime 2 g IV q12h  - Continue fluconazole 400 mg po q24h  Recommendations discussed with primary team.  Will follow with you - team 2.

## 2022-11-28 NOTE — PROGRESS NOTE ADULT - SUBJECTIVE AND OBJECTIVE BOX
INTERVAL HPI/OVERNIGHT EVENTS:  Afebrile.  New L chest collection noted this morning - is not painful    CONSTITUTIONAL:  No fever, chills, night sweats  EYES:  No photophobia or visual changes  CARDIOVASCULAR:  No chest pain  RESPIRATORY:  No cough, wheezing, or SOB   GASTROINTESTINAL:  No nausea, vomiting, diarrhea, constipation, or abdominal pain  GENITOURINARY:  No frequency, urgency, dysuria or hematuria  NEUROLOGIC:  No headache, lightheadedness      ANTIBIOTICS/RELEVANT:    Daptomycin 500 mg IV q24h  Cefepime 2 g IV a12h  Fluconazole 400 mg po q24h      Vital Signs Last 24 Hrs  T(C): 36.5 (28 Nov 2022 14:22), Max: 36.7 (27 Nov 2022 17:49)  T(F): 97.7 (28 Nov 2022 14:22), Max: 98 (27 Nov 2022 17:49)  HR: 82 (28 Nov 2022 12:00) (64 - 88)  BP: 118/58 (28 Nov 2022 12:00) (89/51 - 134/60)  BP(mean): 83 (28 Nov 2022 12:00) (65 - 87)  RR: 16 (28 Nov 2022 12:00) (12 - 16)  SpO2: 96% (28 Nov 2022 12:00) (95% - 100%)    Parameters below as of 28 Nov 2022 12:00  Patient On (Oxygen Delivery Method): room air        PHYSICAL EXAM:  Constitutional:  Alert, oriented, appears weak  Eyes:  Sclerae anicteric, conjunctivae clear, PERRL  Ear/Nose/Throat:  No nasal exudate or sinus tenderness;  No buccal mucosal lesions, no pharyngeal erythema or exudate	  Neck:  Supple, no adenopathy  Chest:  ~2 cm area of edema and fluctuance below L clavicle without overlying erythema, nontender.  Upper pole of sternal incision dressed  Respiratory:  Clear bilaterally  Cardiovascular:  RRR, S1S2, IV/VI syst murmur max at LUSB  Gastrointestinal:  Symmetric, normoactive BS, soft, NT, no masses, guarding or rebound.  No HSM  Extremities:  No edema      LABS:                        8.1    6.47  )-----------( 141      ( 28 Nov 2022 10:13 )             25.2         11-28    137  |  104  |  19  ----------------------------<  98  3.7   |  24  |  0.74    Ca    9.0      28 Nov 2022 05:47  Mg     2.0     11-28    TPro  7.3  /  Alb  3.0<L>  /  TBili  0.6  /  DBili  x   /  AST  21  /  ALT  15  /  AlkPhos  95  11-28          MICROBIOLOGY:        RADIOLOGY & ADDITIONAL STUDIES:

## 2022-11-28 NOTE — PROGRESS NOTE ADULT - SUBJECTIVE AND OBJECTIVE BOX
Patient discussed on morning rounds with Dr. Vernon/ Yohana     Operation / Date:     SUBJECTIVE ASSESSMENT:  77y Female         Vital Signs Last 24 Hrs  T(C): 36.4 (28 Nov 2022 09:45), Max: 36.9 (27 Nov 2022 14:13)  T(F): 97.5 (28 Nov 2022 09:45), Max: 98.4 (27 Nov 2022 14:13)  HR: 64 (28 Nov 2022 08:30) (64 - 88)  BP: 101/54 (28 Nov 2022 08:30) (98/56 - 134/60)  BP(mean): 76 (28 Nov 2022 08:30) (72 - 87)  RR: 15 (28 Nov 2022 08:30) (12 - 16)  SpO2: 100% (28 Nov 2022 08:30) (95% - 100%)    Parameters below as of 28 Nov 2022 08:30  Patient On (Oxygen Delivery Method): room air      I&O's Detail    27 Nov 2022 07:01  -  28 Nov 2022 07:00  --------------------------------------------------------  IN:    IV PiggyBack: 100 mL    Oral Fluid: 300 mL  Total IN: 400 mL    OUT:    Voided (mL): 1350 mL  Total OUT: 1350 mL    Total NET: -950 mL          CHEST TUBE:  Yes/No. AIR LEAKS: Yes/No. Suction / H2O SEAL.   FRANCHESKA DRAIN:  Yes/No.  EPICARDIAL WIRES: Yes/No.  TIE DOWNS: Yes/No.  REGALADO: Yes/No.    PHYSICAL EXAM:    General:     Neurological:    Cardiovascular:    Respiratory:    Gastrointestinal:    Extremities:    Vascular:    Incision Sites:    LABS:                        7.9    5.23  )-----------( 128      ( 28 Nov 2022 05:47 )             24.5       COUMADIN:  Yes/No. REASON: .        11-28    137  |  104  |  19  ----------------------------<  98  3.7   |  24  |  0.74    Ca    9.0      28 Nov 2022 05:47  Mg     2.0     11-28    TPro  7.3  /  Alb  3.0<L>  /  TBili  0.6  /  DBili  x   /  AST  21  /  ALT  15  /  AlkPhos  95  11-28          MEDICATIONS  (STANDING):  amLODIPine   Tablet 2.5 milliGRAM(s) Oral daily  apixaban 5 milliGRAM(s) Oral every 12 hours  aspirin  chewable 81 milliGRAM(s) Oral daily  atorvastatin 10 milliGRAM(s) Oral at bedtime  bisacodyl Suppository 10 milliGRAM(s) Rectal once  budesonide  80 MICROgram(s)/formoterol 4.5 MICROgram(s) Inhaler 2 Puff(s) Inhalation two times a day  cefepime   IVPB 2000 milliGRAM(s) IV Intermittent every 12 hours  DAPTOmycin IVPB 500 milliGRAM(s) IV Intermittent every 24 hours  fluconAZOLE   Tablet 400 milliGRAM(s) Oral daily  gabapentin 100 milliGRAM(s) Oral at bedtime  lactated ringers. 500 milliLiter(s) (50 mL/Hr) IV Continuous <Continuous>  levothyroxine 50 MICROGram(s) Oral daily  lidocaine   4% Patch 1 Patch Transdermal daily  metoprolol tartrate 50 milliGRAM(s) Oral two times a day  pantoprazole    Tablet 40 milliGRAM(s) Oral before breakfast  potassium chloride   Powder 40 milliEquivalent(s) Oral every 4 hours  sodium chloride 0.9% lock flush 3 milliLiter(s) IV Push every 8 hours    MEDICATIONS  (PRN):  acetaminophen     Tablet .. 650 milliGRAM(s) Oral every 6 hours PRN Temp greater or equal to 38C (100.4F), Mild Pain (1 - 3)        RADIOLOGY & ADDITIONAL TESTS:     Patient discussed on morning rounds with Dr. Vernon/ Yohana     Operation / Date: altered mental status/hypokalemia/poor nutrition/sternal wound VAC/anemia    SUBJECTIVE ASSESSMENT:  77y Female complains of dizziness and nausea this morning. Endorses extreme thirst. Also reports that she noticed a bulging at her left shoulder over the weekend. Denies pain or redness at the site.         Vital Signs Last 24 Hrs  T(C): 36.4 (28 Nov 2022 09:45), Max: 36.9 (27 Nov 2022 14:13)  T(F): 97.5 (28 Nov 2022 09:45), Max: 98.4 (27 Nov 2022 14:13)  HR: 64 (28 Nov 2022 08:30) (64 - 88)  BP: 101/54 (28 Nov 2022 08:30) (98/56 - 134/60)  BP(mean): 76 (28 Nov 2022 08:30) (72 - 87)  RR: 15 (28 Nov 2022 08:30) (12 - 16)  SpO2: 100% (28 Nov 2022 08:30) (95% - 100%)    Parameters below as of 28 Nov 2022 08:30  Patient On (Oxygen Delivery Method): room air      I&O's Detail    27 Nov 2022 07:01  -  28 Nov 2022 07:00  --------------------------------------------------------  IN:    IV PiggyBack: 100 mL    Oral Fluid: 300 mL  Total IN: 400 mL    OUT:    Voided (mL): 1350 mL  Total OUT: 1350 mL    Total NET: -950 mL          CHEST TUBE:  No.FRANCHESKA DRAIN:  No.  EPICARDIAL WIRES: No.  TIE DOWNS: No.  REGALADO: No.    PHYSICAL EXAM:    General: Well appearing. comfortable     Neurological: AOx3 Slurred speech but missing front teeth. Left Arm 4/5 strength. Normal Strength on all other extremities.     Cardiovascular: S1, S2 no murmur    Respiratory:  CTA bilaterally     Gastrointestinal: soft non tender non distended.     Extremities: Extremities all warm and well perfused. Pulses 2+in all extremities.     Incision Sites: Superior pole of MSI with well granulated tissue.  No bleeding. No surrounding erythema. To the left of incision there is a new fluctuant well circumscribed bulge that is non tender and non erythematous.     LABS:                        7.9    5.23  )-----------( 128      ( 28 Nov 2022 05:47 )             24.5       COUMADIN:  No. On eliquis.         11-28    137  |  104  |  19  ----------------------------<  98  3.7   |  24  |  0.74    Ca    9.0      28 Nov 2022 05:47  Mg     2.0     11-28    TPro  7.3  /  Alb  3.0<L>  /  TBili  0.6  /  DBili  x   /  AST  21  /  ALT  15  /  AlkPhos  95  11-28          MEDICATIONS  (STANDING):  amLODIPine   Tablet 2.5 milliGRAM(s) Oral daily  apixaban 5 milliGRAM(s) Oral every 12 hours  aspirin  chewable 81 milliGRAM(s) Oral daily  atorvastatin 10 milliGRAM(s) Oral at bedtime  bisacodyl Suppository 10 milliGRAM(s) Rectal once  budesonide  80 MICROgram(s)/formoterol 4.5 MICROgram(s) Inhaler 2 Puff(s) Inhalation two times a day  cefepime   IVPB 2000 milliGRAM(s) IV Intermittent every 12 hours  DAPTOmycin IVPB 500 milliGRAM(s) IV Intermittent every 24 hours  fluconAZOLE   Tablet 400 milliGRAM(s) Oral daily  gabapentin 100 milliGRAM(s) Oral at bedtime  lactated ringers. 500 milliLiter(s) (50 mL/Hr) IV Continuous <Continuous>  levothyroxine 50 MICROGram(s) Oral daily  lidocaine   4% Patch 1 Patch Transdermal daily  metoprolol tartrate 50 milliGRAM(s) Oral two times a day  pantoprazole    Tablet 40 milliGRAM(s) Oral before breakfast  potassium chloride   Powder 40 milliEquivalent(s) Oral every 4 hours  sodium chloride 0.9% lock flush 3 milliLiter(s) IV Push every 8 hours    MEDICATIONS  (PRN):  acetaminophen     Tablet .. 650 milliGRAM(s) Oral every 6 hours PRN Temp greater or equal to 38C (100.4F), Mild Pain (1 - 3)        RADIOLOGY & ADDITIONAL TESTS:    < from: VA Duplex Upper Extrem Arterial, Bilat (11.20.22 @ 21:04) >  Impression:    Normal arterial flow in the right upper extremity    No flow visualized in left upper extremity arterial bypass. Monophasic   flow visualized in the left subclavian, axillary and brachial arteries.   No flow detected in the radial ulnar or palmar arch vessels.    < end of copied text >       Patient discussed on morning rounds with Dr. Vernon/ Yohana     Operation / Date: altered mental status/hypokalemia/poor nutrition/sternal wound VAC/anemia    SUBJECTIVE ASSESSMENT:  77y Female complains of dizziness and nausea this morning. Endorses extreme thirst. Also reports that she noticed a bulging at her left shoulder over the weekend. Denies pain or redness at the site.         Vital Signs Last 24 Hrs  T(C): 36.4 (28 Nov 2022 09:45), Max: 36.9 (27 Nov 2022 14:13)  T(F): 97.5 (28 Nov 2022 09:45), Max: 98.4 (27 Nov 2022 14:13)  HR: 64 (28 Nov 2022 08:30) (64 - 88)  BP: 101/54 (28 Nov 2022 08:30) (98/56 - 134/60)  BP(mean): 76 (28 Nov 2022 08:30) (72 - 87)  RR: 15 (28 Nov 2022 08:30) (12 - 16)  SpO2: 100% (28 Nov 2022 08:30) (95% - 100%)    Parameters below as of 28 Nov 2022 08:30  Patient On (Oxygen Delivery Method): room air      I&O's Detail    27 Nov 2022 07:01  -  28 Nov 2022 07:00  --------------------------------------------------------  IN:    IV PiggyBack: 100 mL    Oral Fluid: 300 mL  Total IN: 400 mL    OUT:    Voided (mL): 1350 mL  Total OUT: 1350 mL    Total NET: -950 mL          CHEST TUBE:  No.FRANCHESKA DRAIN:  No.  EPICARDIAL WIRES: No.  TIE DOWNS: No.  REGALADO: No.    PHYSICAL EXAM:    General: Well appearing. comfortable     Neurological: AOx3 Slurred speech but missing front teeth. Left Arm 4/5 strength. Normal Strength on all other extremities.     Cardiovascular: S1, S2 no murmur    Respiratory:  CTA bilaterally     Gastrointestinal: soft non tender non distended.     Extremities: Extremities all warm and well perfused.     Incision Sites: Superior pole of MSI with well granulated tissue.  No bleeding. No surrounding erythema. To the left of incision there is a new fluctuant well circumscribed bulge that is non tender and non erythematous.     LABS:                        7.9    5.23  )-----------( 128      ( 28 Nov 2022 05:47 )             24.5       COUMADIN:  No. On eliquis.         11-28    137  |  104  |  19  ----------------------------<  98  3.7   |  24  |  0.74    Ca    9.0      28 Nov 2022 05:47  Mg     2.0     11-28    TPro  7.3  /  Alb  3.0<L>  /  TBili  0.6  /  DBili  x   /  AST  21  /  ALT  15  /  AlkPhos  95  11-28          MEDICATIONS  (STANDING):  amLODIPine   Tablet 2.5 milliGRAM(s) Oral daily  apixaban 5 milliGRAM(s) Oral every 12 hours  aspirin  chewable 81 milliGRAM(s) Oral daily  atorvastatin 10 milliGRAM(s) Oral at bedtime  bisacodyl Suppository 10 milliGRAM(s) Rectal once  budesonide  80 MICROgram(s)/formoterol 4.5 MICROgram(s) Inhaler 2 Puff(s) Inhalation two times a day  cefepime   IVPB 2000 milliGRAM(s) IV Intermittent every 12 hours  DAPTOmycin IVPB 500 milliGRAM(s) IV Intermittent every 24 hours  fluconAZOLE   Tablet 400 milliGRAM(s) Oral daily  gabapentin 100 milliGRAM(s) Oral at bedtime  lactated ringers. 500 milliLiter(s) (50 mL/Hr) IV Continuous <Continuous>  levothyroxine 50 MICROGram(s) Oral daily  lidocaine   4% Patch 1 Patch Transdermal daily  metoprolol tartrate 50 milliGRAM(s) Oral two times a day  pantoprazole    Tablet 40 milliGRAM(s) Oral before breakfast  potassium chloride   Powder 40 milliEquivalent(s) Oral every 4 hours  sodium chloride 0.9% lock flush 3 milliLiter(s) IV Push every 8 hours    MEDICATIONS  (PRN):  acetaminophen     Tablet .. 650 milliGRAM(s) Oral every 6 hours PRN Temp greater or equal to 38C (100.4F), Mild Pain (1 - 3)        RADIOLOGY & ADDITIONAL TESTS:    < from: VA Duplex Upper Extrem Arterial, Bilat (11.20.22 @ 21:04) >  Impression:    Normal arterial flow in the right upper extremity    No flow visualized in left upper extremity arterial bypass. Monophasic   flow visualized in the left subclavian, axillary and brachial arteries.   No flow detected in the radial ulnar or palmar arch vessels.    < end of copied text >    < from: TTE Echo Complete w/o Contrast w/ Doppler (11.23.22 @ 14:28) >  CONCLUSIONS:     1. Hyperdynamic left ventricular systolic function with cavity   obliteration resulting in an intra-cavitary gradient of 68.00 mmHg.   2. Mild symmetric left ventricular hypertrophy.   3. Normal right ventricular size and systolic function.   4. Mildly dilated left atrium.   5. Bioprosthetic valve is seen in the aortic position with apparent   normal function. The peak transvalvular velocity is 1.91 m/s, the mean   transvalvular gradient is 10.50 mmHg, and the LVOT/AV velocity ratio is   0.78.   6. The mitral valve is mildly thickened and calcified. There is severe   mitral annular calcification. The mean transvalvular gradient is 4.50   mmHg at a heart rate of 88 bpm. There is mild mitral regurgitation.   7. Moderate tricuspid regurgitation.   8. Pulmonary hypertension present, pulmonary artery systolic pressure is   39 mmHg.   9. No pericardial effusion.  10. Compared to the previous TTE performed on 11/2/2022, the left   ventricle is hyperdynamic and intracavitary gradient is now noted.    < end of copied text >      < from: CT Angio Chest Aorta w/wo IV Cont (11.07.22 @ 00:45) >  IMPRESSION:      1. 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  2.Limited evaluation of the bilateral upper extremities vasculature. Flow   is seen down to the arm wrist in right arm. Flow is seen in the left   subclavian, axillary, brachial artery which attenuates just above the   elbow, reconstitutes at elbow with visualized radial artery and minimal   flow in ulnar artery on delayed images.  3. 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  4. Slightly increased mild interstitial pulmonary edema and increased   small bilateral pleural effusions.      < end of copied text >

## 2022-11-29 NOTE — PROGRESS NOTE ADULT - SUBJECTIVE AND OBJECTIVE BOX
Patient discussed on morning rounds with Dr. Muller    Operation / Date:     SUBJECTIVE ASSESSMENT:  77y Female         Vital Signs Last 24 Hrs  T(C): 36.4 (29 Nov 2022 17:01), Max: 37.2 (28 Nov 2022 21:21)  T(F): 97.5 (29 Nov 2022 17:01), Max: 98.9 (28 Nov 2022 21:21)  HR: 85 (29 Nov 2022 12:26) (85 - 102)  BP: 128/62 (29 Nov 2022 12:26) (103/56 - 136/62)  BP(mean): 87 (29 Nov 2022 12:26) (71 - 88)  RR: 16 (29 Nov 2022 12:26) (16 - 16)  SpO2: 96% (29 Nov 2022 12:26) (95% - 99%)    Parameters below as of 29 Nov 2022 12:26  Patient On (Oxygen Delivery Method): room air      I&O's Detail    28 Nov 2022 07:01  -  29 Nov 2022 07:00  --------------------------------------------------------  IN:    IV PiggyBack: 250 mL  Total IN: 250 mL    OUT:    Voided (mL): 500 mL  Total OUT: 500 mL    Total NET: -250 mL      29 Nov 2022 07:01  -  29 Nov 2022 17:24  --------------------------------------------------------  IN:    IV PiggyBack: 50 mL    Oral Fluid: 240 mL  Total IN: 290 mL    OUT:    Voided (mL): 100 mL  Total OUT: 100 mL    Total NET: 190 mL          CHEST TUBE:  Yes/No. AIR LEAKS: Yes/No. Suction / H2O SEAL.   FRANCHESKA DRAIN:  Yes/No.  EPICARDIAL WIRES: Yes/No.  TIE DOWNS: Yes/No.  REGALADO: Yes/No.    PHYSICAL EXAM:    General:     Neurological:    Cardiovascular:    Respiratory:    Gastrointestinal:    Extremities:    Vascular:    Incision Sites:    LABS:                        8.2    5.11  )-----------( 132      ( 29 Nov 2022 05:30 )             25.3       COUMADIN:  Yes/No. REASON: .    PT/INR - ( 29 Nov 2022 05:30 )   PT: 20.4 sec;   INR: 1.71          PTT - ( 29 Nov 2022 05:30 )  PTT:34.3 sec    11-29    134<L>  |  103  |  23  ----------------------------<  95  3.9   |  23  |  0.76    Ca    9.4      29 Nov 2022 05:30  Phos  2.3     11-29  Mg     2.1     11-29    TPro  7.1  /  Alb  3.3  /  TBili  0.6  /  DBili  x   /  AST  19  /  ALT  15  /  AlkPhos  93  11-29          MEDICATIONS  (STANDING):  aspirin  chewable 81 milliGRAM(s) Oral daily  atorvastatin 10 milliGRAM(s) Oral at bedtime  budesonide  80 MICROgram(s)/formoterol 4.5 MICROgram(s) Inhaler 2 Puff(s) Inhalation two times a day  cefepime   IVPB 2000 milliGRAM(s) IV Intermittent every 12 hours  DAPTOmycin IVPB 500 milliGRAM(s) IV Intermittent every 24 hours  fluconAZOLE   Tablet 400 milliGRAM(s) Oral daily  gabapentin 100 milliGRAM(s) Oral at bedtime  levothyroxine 50 MICROGram(s) Oral daily  lidocaine   4% Patch 1 Patch Transdermal daily  pantoprazole    Tablet 40 milliGRAM(s) Oral before breakfast  potassium chloride   Powder 40 milliEquivalent(s) Oral every 4 hours  sodium chloride 0.9% lock flush 3 milliLiter(s) IV Push every 8 hours    MEDICATIONS  (PRN):  acetaminophen     Tablet .. 650 milliGRAM(s) Oral every 6 hours PRN Temp greater or equal to 38C (100.4F), Mild Pain (1 - 3)        RADIOLOGY & ADDITIONAL TESTS:     Patient discussed on morning rounds with Dr. Muller    Chief complaint: altered mental status/hypokalemia/poor nutrition/sternal wound VAC/anemia    SUBJECTIVE ASSESSMENT:  77y Female seen and examined at bedside. Denies CP/SOB/N/V/D/dizziness/cough/fever/chills.      Vital Signs Last 24 Hrs  T(C): 36.4 (29 Nov 2022 17:01), Max: 37.2 (28 Nov 2022 21:21)  T(F): 97.5 (29 Nov 2022 17:01), Max: 98.9 (28 Nov 2022 21:21)  HR: 85 (29 Nov 2022 12:26) (85 - 102)  BP: 128/62 (29 Nov 2022 12:26) (103/56 - 136/62)  BP(mean): 87 (29 Nov 2022 12:26) (71 - 88)  RR: 16 (29 Nov 2022 12:26) (16 - 16)  SpO2: 96% (29 Nov 2022 12:26) (95% - 99%)    Parameters below as of 29 Nov 2022 12:26  Patient On (Oxygen Delivery Method): room air      I&O's Detail    28 Nov 2022 07:01  -  29 Nov 2022 07:00  --------------------------------------------------------  IN:    IV PiggyBack: 250 mL  Total IN: 250 mL    OUT:    Voided (mL): 500 mL  Total OUT: 500 mL    Total NET: -250 mL      29 Nov 2022 07:01  -  29 Nov 2022 17:24  --------------------------------------------------------  IN:    IV PiggyBack: 50 mL    Oral Fluid: 240 mL  Total IN: 290 mL    OUT:    Voided (mL): 100 mL  Total OUT: 100 mL    Total NET: 190 mL          CHEST TUBE:  Yes/No. AIR LEAKS: Yes/No. Suction / H2O SEAL.   FRANCHESKA DRAIN:  Yes/No.  EPICARDIAL WIRES: Yes/No.  TIE DOWNS: Yes/No.  REGALADO: Yes/No.    PHYSICAL EXAM:  GEN: NAD, looks comfortable  Psych: Mood appropriate  Neuro: A&Ox3.  No focal deficits.  Moving all extremities.   HEENT: No obvious abnormalities  CV: S1S2, regular, no murmurs appreciated.  No carotid bruits.  No JVD  Lungs: Clear B/L.  No wheezing, rales or rhonchi  ABD: Soft, non-tender, non-distended.  +Bowel sounds  EXT: Warm and well perfused.  No peripheral edema noted  Musculoskeletal: Moving all extremities with normal ROM, no joint swelling  PV: Pedal pulses palpable  Incision: Superior pole of MSI with well granulated tissue. No surrounding erythema. To the left of incision there is a fluctuant well circumscribed bulge that is non tender and non erythematous.     LABS:                        8.2    5.11  )-----------( 132      ( 29 Nov 2022 05:30 )             25.3       COUMADIN:  Yes/No. REASON: .    PT/INR - ( 29 Nov 2022 05:30 )   PT: 20.4 sec;   INR: 1.71          PTT - ( 29 Nov 2022 05:30 )  PTT:34.3 sec    11-29    134<L>  |  103  |  23  ----------------------------<  95  3.9   |  23  |  0.76    Ca    9.4      29 Nov 2022 05:30  Phos  2.3     11-29  Mg     2.1     11-29    TPro  7.1  /  Alb  3.3  /  TBili  0.6  /  DBili  x   /  AST  19  /  ALT  15  /  AlkPhos  93  11-29          MEDICATIONS  (STANDING):  aspirin  chewable 81 milliGRAM(s) Oral daily  atorvastatin 10 milliGRAM(s) Oral at bedtime  budesonide  80 MICROgram(s)/formoterol 4.5 MICROgram(s) Inhaler 2 Puff(s) Inhalation two times a day  cefepime   IVPB 2000 milliGRAM(s) IV Intermittent every 12 hours  DAPTOmycin IVPB 500 milliGRAM(s) IV Intermittent every 24 hours  fluconAZOLE   Tablet 400 milliGRAM(s) Oral daily  gabapentin 100 milliGRAM(s) Oral at bedtime  levothyroxine 50 MICROGram(s) Oral daily  lidocaine   4% Patch 1 Patch Transdermal daily  pantoprazole    Tablet 40 milliGRAM(s) Oral before breakfast  potassium chloride   Powder 40 milliEquivalent(s) Oral every 4 hours  sodium chloride 0.9% lock flush 3 milliLiter(s) IV Push every 8 hours    MEDICATIONS  (PRN):  acetaminophen     Tablet .. 650 milliGRAM(s) Oral every 6 hours PRN Temp greater or equal to 38C (100.4F), Mild Pain (1 - 3)        RADIOLOGY & ADDITIONAL TESTS:  CT Chest IV Contrast 11/28/22  IMPRESSION:  1.  Since November 2, 2022, new fluid collection in left upper   parasternal chest wall at level of second rib, possibly seroma, hematoma,   or phlegmon. Resolved midline anterior manubrial fluid collection.  2.  Redemonstrated ascending aorta/hemiarch replacement with unchanged   bulky nonocclusive mural thrombus within proximal aortic arch.  3.  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  4.  Increased partially thrombosed pseudoaneurysm at native left   subclavian artery/vertebral artery bifurcation. 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  5.  Decreased thrombosed pseudoaneurysm at right inferior aspect of   aortic arch graft.  6.  New small splenic infarct.  7.  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

## 2022-11-29 NOTE — PROGRESS NOTE ADULT - ASSESSMENT
78 yo female, PMHx of CAD, HTN, HLD, spinal stenosis, arthritis, hypothyroidism, bicuspid aortic valve s/p AVR, ascending/hemiarch replacement with frozen elephant trunk and left aorto-subclavian bypass, CABGx2 with  on 8/9/2021 (post-op course c/b prolonged intubation, SSS s/p PPM, poor wound healing s/p pec flap and closure on 9/29/21), 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 Muller and Dr. Augustin 7/29/22, course complicated by sternal wound infection, presented with AMS. Per daughter, pt was discharged to rehab 3 days prior to presenting to ED and has been getting progressively confused with poor oral intake. Work up noted for WBC 9.4, hgb 8.8, Na 133, UA: LE small, CTH: negative. Burn was consulted for parasternal abscess and resumed ertapenem, daptomycin, and fluconazole. Cultures remained negative. CK remained wnl. Patient's mental status can be 2/2 metabolic encephalopathy vs. fluctuating delirium. EEG was negative, CTH negative, CXR and UA unremarkable. No focal neurologic deficits. Head CT was negative. VAC in place at superior poll of sternotomy. Pt transferred to [facility] under Dr. Muller for management and evaluation. On 11/23-11/24 ID consulted, confusion likely from ertapenum toxicity switched to cefepime.  Neuro consulted, recommending MRI if possible. Wound vac removed with wet to dry dressings in place. MSI wound with bleeding, granulating tissue; continuing wet to dries; very superficial at this point. QTc remains <490 so po fluconazole continued. Patient gearing up for discharge planning; may be able to go home (preferred by team and by patient) if transitioned to po abx or finishes abx course (At this time, the ID weekend coverage is refusing to make changes to primary ID attending's recs). Patient currently tolerating po antifungals. On 11/28 patient felt dizzy and lightheaded and noted a new fluctuant, non tender, non erythematous mass on the left chest. 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 Vascular consulted, no intervention.      Neuro: pain managed. Left sided weakness. Neuro following   - MRI per neuro when medically stable to leave floor.   - Gabapentin 100 for neuropathic pain     CVS: hypotensive this morning to SBP in 80s. NSR in the 60s. Dopplerable pulses on the Left hand,  - holding all antihypertensive meds.   - Continue ASA and Statin   - albumin for volume resuscitation. 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 Per vascular, no intervention  - continue wet to dry dressing on superior pole of previous sternal incision     Respiratory: Saturating well on RA   - IS and ambulation  - Chest PT  - continue budesonide     GI: has not had a BM since 11/23   - Dulcolax suppository   - GI PPX   - Bowel regimen with Senna and Miralax     : BUN/ Creatinine stable  - monitor I/Os.   - About to get contrast. Consider IV fluids for renal protection     Endo: H/o hypothyroidism.  FS well contolled.   - ISS   - continue synthroid 50mcg Daily, follow up t3 (t4 low from 11/23) If low follow up with Endocrine.     ID: Superior pole of sternal wound dehiscence s/p wound VAC now requiring wet to dry dressing. afebile with new fluctuation of left chest wall.   - Per ID continuing Fluconazole 400mg Daily. With Daily EKGs for QTc checks.   - continuing broad spectrum ABX with Dapto and cefepime (cultures negative but culture was taken while already on antibiotics)     Heme: afib ppx   - Will ask Dr. Muller when/if OK to resume Eliquis    Dispo plan:     Pending further management   78 yo female, PMHx of CAD, HTN, HLD, spinal stenosis, arthritis, hypothyroidism, bicuspid aortic valve s/p AVR, ascending/hemiarch replacement with frozen elephant trunk and left aorto-subclavian bypass, CABGx2 with  on 8/9/2021 (post-op course c/b prolonged intubation, SSS s/p PPM, poor wound healing s/p pec flap and closure on 9/29/21), 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 Muller and Dr. Augustin 7/29/22, course complicated by sternal wound infection, presented with AMS. Per daughter, pt was discharged to rehab 3 days prior to presenting to ED and has been getting progressively confused with poor oral intake. Work up noted for WBC 9.4, hgb 8.8, Na 133, UA: LE small, CTH: negative. Burn was consulted for parasternal abscess and resumed ertapenem, daptomycin, and fluconazole. Cultures remained negative. CK remained wnl. Patient's mental status can be 2/2 metabolic encephalopathy vs. fluctuating delirium. EEG was negative, CTH negative, CXR and UA unremarkable. No focal neurologic deficits. Head CT was negative. VAC in place at superior poll of sternotomy. Pt transferred to [facility] under Dr. Muller for management and evaluation. On 11/23-11/24 ID consulted, confusion likely from ertapenum toxicity switched to cefepime.  Neuro consulted, recommending MRI if possible. Wound vac removed with wet to dry dressings in place. MSI wound with bleeding, granulating tissue; continuing wet to dries; very superficial at this point. QTc remains <490 so po fluconazole continued. Patient gearing up for discharge planning; may be able to go home (preferred by team and by patient) if transitioned to po abx or finishes abx course (At this time, the ID weekend coverage is refusing to make changes to primary ID attending's recs). Patient currently tolerating po antifungals. On 11/28 patient felt dizzy and lightheaded and noted a new fluctuant, non tender, non erythematous mass on the left chest. 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 Vascular consulted, no intervention.      Neuro: pain managed. Left sided weakness. Neuro following   - MRI per neuro when medically stable to leave floor.   - Gabapentin 100 for neuropathic pain     CVS: hypotensive this morning to SBP in 80s. NSR in the 60s. Dopplerable pulses on the Left hand,  - holding all antihypertensive meds.   - Continue ASA and Statin   - albumin for volume resuscitation. 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 Per vascular, no intervention  - continue wet to dry dressing on superior pole of previous sternal incision     Respiratory: Saturating well on RA   - IS and ambulation  - Chest PT  - continue budesonide     GI: has not had a BM since 11/23   - Dulcolax suppository   - GI PPX   - Bowel regimen with Senna and Miralax     : BUN/ Creatinine stable  - monitor I/Os.   - About to get contrast. Consider IV fluids for renal protection     Endo: H/o hypothyroidism.  FS well contolled.   - ISS   - continue synthroid 50mcg Daily, follow up t3 (t4 low from 11/23) If low follow up with Endocrine.     ID: Superior pole of sternal wound dehiscence s/p wound VAC now requiring wet to dry dressing. afebile with new fluctuation of left chest wall.   - Per ID continuing Fluconazole 400mg Daily. With Daily EKGs for QTc checks.   - continuing broad spectrum ABX with Dapto and cefepime (cultures negative but culture was taken while already on antibiotics)   - CT scan done, results above  - Will consult IR 11/30 for possible drainage of fluctuant mass      Heme: afib ppx   - Holding Eliquis in anticipation for possible IR drainage 11/30    Dispo plan:     Pending further management

## 2022-11-29 NOTE — PROGRESS NOTE ADULT - SUBJECTIVE AND OBJECTIVE BOX
INTERVAL HPI/OVERNIGHT EVENTS:  Afebrile, L shoulder/arm pain is worse - currently 8/10    CONSTITUTIONAL:  No fever, chills, night sweats  EYES:  No photophobia or visual changes  CARDIOVASCULAR:  No chest pain  RESPIRATORY:  No cough, wheezing, or SOB   GASTROINTESTINAL:  No nausea, vomiting, diarrhea, constipation, or abdominal pain  GENITOURINARY:  No frequency, urgency, dysuria or hematuria  NEUROLOGIC:  No headache, lightheadedness      ANTIBIOTICS/RELEVANT:    Daptomycin 500 mg IV q24h  Cefepime 2 g IV a12h  Fluconazole 400 mg po q24h      Vital Signs Last 24 Hrs  T(C): 36.9 (29 Nov 2022 14:16), Max: 37.2 (28 Nov 2022 21:21)  T(F): 98.4 (29 Nov 2022 14:16), Max: 98.9 (28 Nov 2022 21:21)  HR: 85 (29 Nov 2022 12:26) (70 - 102)  BP: 128/62 (29 Nov 2022 12:26) (103/56 - 136/62)  BP(mean): 87 (29 Nov 2022 12:26) (71 - 88)  RR: 16 (29 Nov 2022 12:26) (16 - 16)  SpO2: 96% (29 Nov 2022 12:26) (95% - 99%)    Parameters below as of 29 Nov 2022 12:26  Patient On (Oxygen Delivery Method): room air        PHYSICAL EXAM:  Constitutional:  Alert, oriented, appears weak  Eyes:  Sclerae anicteric, conjunctivae clear, PERRL  Ear/Nose/Throat:  No nasal exudate or sinus tenderness;  No buccal mucosal lesions, no pharyngeal erythema or exudate	  Neck:  Supple, no adenopathy  Chest:  ~2 cm area of edema and fluctuance below L clavicle without overlying erythema, nontender.  Upper pole of sternal incision dressed  Respiratory:  Clear bilaterally  Cardiovascular:  RRR, S1S2, IV/VI syst murmur max at LUSB  Gastrointestinal:  Symmetric, normoactive BS, soft, NT, no masses, guarding or rebound.  No HSM  Extremities:  No edema, L arm cool      LABS:                        8.2    5.11  )-----------( 132      ( 29 Nov 2022 05:30 )             25.3         11-29    134<L>  |  103  |  23  ----------------------------<  95  3.9   |  23  |  0.76    Ca    9.4      29 Nov 2022 05:30  Phos  2.3     11-29  Mg     2.1     11-29    TPro  7.1  /  Alb  3.3  /  TBili  0.6  /  DBili  x   /  AST  19  /  ALT  15  /  AlkPhos  93  11-29          MICROBIOLOGY:        RADIOLOGY & ADDITIONAL STUDIES:  < from: CT Chest w/ IV Cont (11.28.22 @ 16:54) >  FINDINGS:    LUNGS AND AIRWAYS: Patent central airways.  Centrilobular and paraseptal   emphysema. Resolved pulmonary edema.  PLEURA: No pleural effusion.  MEDIASTINUM AND ABNER: No lymphadenopathy.  VESSELS:  *  Redemonstrated ascending aorta/hemiarch replacement,, metallic graft   extends from proximal arch mid descending thoracic aorta with patent   Aortic-left subclavian bypass.  *  Unchanged bulky nonocclusive mural thrombus within proximal aortic   arch.  *  Increased partially thrombosed saccular pseudoaneurysm with endoleak   left margin of aortic arch graft, 3.7 cm, increased vascular component   2.5 cm.  *  Chronic occlusion origin left subclavian artery with patent aorto left   axillary bypass. Retrograde filling of subclavian artery and vertebral   artery with increased size partially thrombosed pseudoaneurysm at native   left subclavian artery/vertebral artery bifurcation, measures up to 2.7   cm (previously 2.2 x 2.0 cm); with increased vascular component 1.0 cm   previously 0.7 cm.  *  Decreased probable thrombosed pseudoaneurysm at right inferior aspect   of aortic arch graft 2.2 x 1.8 cm, previously 2.6 x 2.5 cm.  *  No pulmonary embolism.  HEART: Heart size is normal. No pericardial effusion.  CHEST WALL AND LOWER NECK: Resolved midline anterior manubrial fluid   collection. New fluid collection in upper left parasternal chest wall at   level of second rib, 3.7 x 2.0 cm (3:19), no rim enhancement, artifact   from overlying metallic structure limits evaluation, may represent   seroma, hematoma, or phlegmon.  VISUALIZED UPPER ABDOMEN: New small splenic infarct at inferior pole.   Partially imaged infrarenal abdominal aortic aneurysm.  BONES: Prior median sternotomy. Sternum and manubrium demonstrates patchy   areas of sclerosis similar to studies dating back to August 21, 2022,   following removal of medial sternotomy wires.    IMPRESSION:  1.  Since November 2, 2022, new fluid collection in left upper   parasternal chest wall at level of second rib, possibly seroma, hematoma,   or phlegmon. Resolved midline anterior manubrial fluid collection.  2.  Redemonstrated ascending aorta/hemiarch replacement with unchanged   bulky nonocclusive mural thrombus within proximal aortic arch.  3.  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  4.  Increased partially thrombosed pseudoaneurysm at native left   subclavian artery/vertebral artery bifurcation. 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  5.  Decreased thrombosed pseudoaneurysm at right inferior aspect of   aortic arch graft.  6.  New small splenic infarct.  7.  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    < end of copied text >

## 2022-11-29 NOTE — PROGRESS NOTE ADULT - SUBJECTIVE AND OBJECTIVE BOX
Attending:  Charli    HPI:   76 yo female, PMHx of CAD, HTN, HLD, spinal stenosis, arthritis, hypothyroidism, bicuspid aortic valve s/p AVR, ascending/hemiarch replacement  with frozen elephant trunk and left aorto-subclavian bypass, CABG x2 with  on 8/9/2021 (post-op course c/b prolonged intubation, SSS s/p PPM,  poor wound healing s/p pec flap and closure on 9/29/21), left brachial occlusion s/p left brachial artery embolectomy, 7/21/22, TEVAR with R groin  cutdown and femoral artery repair with Dr. Muller and Dr. Augustin 7/29/22, course complicated by sternal wound infection, presents with AMS. Per daughter,  patient was discharged to rehab 3 days prior to presenting to ED and has been getting progressively confused with poor oral intake. Denies fevers, chills,  chest pain, shortness of breath, nausea, vomiting, abdominal pain. At rehab she is pending UA. In ED: Temp = 98.6; HR = 78; BP = 124/58; RR = 20; SaO2 = 99% on room air. Work  up noted for WBC 9.4, hgb 8.8, Na 133, UA: LE small, CTH: negative. Patient received NS 1L in ED. Burn was consulted for parasternal abscess and resumed ertapenem, daptomycin,  and fluconazole. Cultures remained negative. CK remained wnl. Patient's mental status can be 2/2 metabolic encephalopathy vs. fluctuating  delirium. EEG was negative, CTH negative, CXR and UA unremarkable. No focal neurologic deficits. VAC in place at superior poll of sternotomy. Transferred to [facility] under Dr. Muller for management and evaluation. 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  Patient states her pain has not gotten worse and her ROM and sensation have not changed.  Patient denies SOB/CP/N/V.      PAST MEDICAL & SURGICAL HISTORY:  HTN (hypertension)      H/O aortic valve stenosis      CAD (coronary artery disease)      S/P aortic valve replacement      S/P CABG (coronary artery bypass graft)      H/O aortic arch replacement      Pacemaker  medtronic micra pacemaker          MEDICATIONS  (STANDING):  aspirin  chewable 81 milliGRAM(s) Oral daily  atorvastatin 10 milliGRAM(s) Oral at bedtime  budesonide  80 MICROgram(s)/formoterol 4.5 MICROgram(s) Inhaler 2 Puff(s) Inhalation two times a day  cefepime   IVPB 2000 milliGRAM(s) IV Intermittent every 12 hours  DAPTOmycin IVPB 500 milliGRAM(s) IV Intermittent every 24 hours  fluconAZOLE   Tablet 400 milliGRAM(s) Oral daily  gabapentin 100 milliGRAM(s) Oral at bedtime  levothyroxine 50 MICROGram(s) Oral daily  lidocaine   4% Patch 1 Patch Transdermal daily  pantoprazole    Tablet 40 milliGRAM(s) Oral before breakfast  potassium chloride   Powder 40 milliEquivalent(s) Oral every 4 hours  sodium chloride 0.9% lock flush 3 milliLiter(s) IV Push every 8 hours    MEDICATIONS  (PRN):  acetaminophen     Tablet .. 650 milliGRAM(s) Oral every 6 hours PRN Temp greater or equal to 38C (100.4F), Mild Pain (1 - 3)      Allergies    No Known Allergies    Intolerances        SOCIAL HISTORY:    FAMILY HISTORY:  FH: CAD (coronary artery disease) (Sibling)  CABG    Family history of CKD (chronic kidney disease) (Sibling)  ESRD    Family history of diabetes mellitus (DM) (Sibling)        Vital Signs Last 24 Hrs  T(C): 36.9 (29 Nov 2022 14:16), Max: 37.2 (28 Nov 2022 21:21)  T(F): 98.4 (29 Nov 2022 14:16), Max: 98.9 (28 Nov 2022 21:21)  HR: 85 (29 Nov 2022 12:26) (70 - 102)  BP: 128/62 (29 Nov 2022 12:26) (103/56 - 136/62)  BP(mean): 87 (29 Nov 2022 12:26) (71 - 88)  RR: 16 (29 Nov 2022 12:26) (16 - 16)  SpO2: 96% (29 Nov 2022 12:26) (95% - 99%)    Parameters below as of 29 Nov 2022 12:26  Patient On (Oxygen Delivery Method): room air        I&O's Summary    28 Nov 2022 07:01  -  29 Nov 2022 07:00  --------------------------------------------------------  IN: 250 mL / OUT: 500 mL / NET: -250 mL    29 Nov 2022 07:01  -  29 Nov 2022 14:27  --------------------------------------------------------  IN: 290 mL / OUT: 0 mL / NET: 290 mL        Physical Exam:  General: NAD, resting comfortably  Pulmonary: normal resp effort  Cardiovascular: NSR  Abdominal: soft, NT/ND  Extremities: LUE- no edema, slight decreased ROM, sensation- intact  Pulses: LUE- no radial/ulnar signal    LABS:                        8.2    5.11  )-----------( 132      ( 29 Nov 2022 05:30 )             25.3     11-29    134<L>  |  103  |  23  ----------------------------<  95  3.9   |  23  |  0.76    Ca    9.4      29 Nov 2022 05:30  Phos  2.3     11-29  Mg     2.1     11-29    TPro  7.1  /  Alb  3.3  /  TBili  0.6  /  DBili  x   /  AST  19  /  ALT  15  /  AlkPhos  93  11-29    PT/INR - ( 29 Nov 2022 05:30 )   PT: 20.4 sec;   INR: 1.71          PTT - ( 29 Nov 2022 05:30 )  PTT:34.3 sec    CAPILLARY BLOOD GLUCOSE        LIVER FUNCTIONS - ( 29 Nov 2022 05:30 )  Alb: 3.3 g/dL / Pro: 7.1 g/dL / ALK PHOS: 93 U/L / ALT: 15 U/L / AST: 19 U/L / GGT: x           Assessment: 77y Female PMHx of CAD, HTN, HLD, spinal stenosis, arthritis, hypothyroidism, bicuspid aortic valve s/p AVR, ascending/hemiarch replacement with frozen elephant trunk and left aorto-subclavian bypass, CABG x2 with  on 8/9/2021 (post-op course c/b prolonged intubation, SSS s/p PPM, poor wound healing s/p pec flap and closure on 9/29/21), 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 Muller and Dr. Augustin 7/29/22, course complicated by sternal wound infection, presents with AMS. 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    Recommendations:  - No vascular surgery intervention needed at this time as exam is stable and patient has active infection  - discussed with Chief on call  - call x [phone] with questions

## 2022-11-29 NOTE — PROGRESS NOTE ADULT - ASSESSMENT
76F h/o CAD, bicuspid AV s/p AVR, ascending/hemiarch replacement with frozen elephant trunk and L aorto-subclavian bypass, CABG x 2 with Dr. Muller on 8/9/21, s/p PPM and flap closure on 9/29/21, L brachial occlusion s/p L brachial artery embolectomy 7/21/22, TEVAR with R groin cutdown and femoral artery repair with Dr. Muller and Dr. Augustin 7/29/22 c/b sternal wound infection s/p I&D and wound vac placement (WCx grew C. albicans), L parasternal chest wall collection/phlegmon with possible mediastinal extension (vs.  erroneous read due to VAC sponge), LUE limb ischemia but not a surgical candidate discharged on IV dapto/erta/fluc (11/2 - 12/13) p/w AMS.  Ertapenem was changed to cefepime because of concern for ertapenem-induced hallucinations, which have now resolved.  Would continue broad-spectrum coverage as she had received vanc and pip-tazo prior to OR on 11/2.  New L infraclavicular collection 11/28 - is nonenhancing c/c seroma vs. hematoma vs. phlegmon.  CT findings c/c chronic OM sternomanubrium.  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  In terms of antibiotics, approach is dependent upon GOC.  If want aggressive management, would continue IV antibiotics until 12/13.  However, she has had difficulty with OPAT throughout.  Though there is no po equivalent of what she is on but in view of overall picture, could change to po if more aligned with GOC.  Suggest:  - Continue daptomycin 500 mg IV q24h.  CPK q Thursday  - Continue cefepime 2 g IV q12h  - Continue fluconazole 400 mg po q24h  - Please clarify GOC  Recommendations discussed with primary team.  Will follow with you - team 2.

## 2022-11-30 NOTE — PROGRESS NOTE ADULT - ASSESSMENT
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 Muller and Dr. Augustin 7/29/22, course complicated by sternal wound infection, presented with AMS. Per daughter, pt was discharged to rehab 3 days prior to presenting to ED and has been getting progressively confused with poor oral intake. Work up noted for WBC 9.4, hgb 8.8, Na 133, UA: LE small, CTH: negative. Burn was consulted for parasternal abscess and resumed ertapenem, daptomycin, and fluconazole. Cultures remained negative. CK remained wnl. Patient's mental status can be 2/2 metabolic encephalopathy vs. fluctuating delirium. EEG was negative, CTH negative, CXR and UA unremarkable. No focal neurologic deficits. Head CT was negative. VAC in place at superior poll of sternotomy. Pt transferred to [facility] under Dr. Muller for management and evaluation. On 11/23-11/24 ID consulted, confusion likely from ertapenum toxicity switched to cefepime.  Neuro consulted, recommending MRI if possible. Wound vac removed with wet to dry dressings in place. MSI wound with bleeding, granulating tissue; continuing wet to dries; very superficial at this point. QTc remains <490 so po fluconazole continued. Patient gearing up for discharge planning; may be able to go home (preferred by team and by patient) if transitioned to po abx or finishes abx course (At this time, the ID weekend coverage is refusing to make changes to primary ID attending's recs). Patient currently tolerating po antifungals. On 11/28 patient felt dizzy and lightheaded and noted a new fluctuant, non tender, non erythematous mass on the left chest. 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 Vascular consulted, no intervention. IR consulted on 11/30 for possible drainage      Neuro: pain managed. Left sided weakness.  - Gabapentin 100 for neuropathic pain     CVS: hypotensive this morning to SBP in 80s. NSR in the 60s. Dopplerable pulses on the Left hand,  - holding all antihypertensive meds.   - Continue ASA and Statin   - albumin for volume resuscitation. 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 Per vascular, no intervention  - continue wet to dry dressing on superior pole of previous sternal incision     Respiratory: Saturating well on RA   - IS and ambulation  - Chest PT  - continue budesonide     GI: has not had a BM since 11/23   - Dulcolax suppository   - GI PPX   - Bowel regimen with Senna and Miralax     : BUN/ Creatinine stable  - monitor I/Os.   - About to get contrast. Consider IV fluids for renal protection     Endo: H/o hypothyroidism.  FS well contolled.   - ISS   - continue synthroid 50mcg Daily, follow up t3 (t4 low from 11/23) If low follow up with Endocrine.     ID: Superior pole of sternal wound dehiscence s/p wound VAC now requiring wet to dry dressing. afebile with new fluctuation of left chest wall.   - Per ID continuing Fluconazole 400mg Daily. With Daily EKGs for QTc checks.   - continuing broad spectrum ABX with Dapto and cefepime (cultures negative but culture was taken while already on antibiotics)   - CT scan done, results above  - IR consulted 11/30 for fluctuant mass. Requested ultrasound, to be reviewed by their team. NPO midnight for possible drainage 12/1      Heme: afib ppx   - Holding Eliquis in anticipation for possible IR drainage 12/1  - 2 doses of SQH to be given, and to be stopped after night dose in anticipation for procedure with IR tomorrow    Dispo plan:   - Long tern IV antibiotics

## 2022-11-30 NOTE — PROGRESS NOTE ADULT - SUBJECTIVE AND OBJECTIVE BOX
Patient discussed on morning rounds with Dr. Muller    Chief complaint: altered mental status/hypokalemia/poor nutrition/sternal wound VAC/anemia    SUBJECTIVE ASSESSMENT:  77y Female seen and examined at bedside. Denies CP/SOB/N/V/D/dizziness/cough/fever/chills.      Vital Signs Last 24 Hrs  T(C): 36.7 (30 Nov 2022 14:02), Max: 36.8 (30 Nov 2022 09:37)  T(F): 98.1 (30 Nov 2022 14:02), Max: 98.3 (30 Nov 2022 09:37)  HR: 77 (30 Nov 2022 13:40) (77 - 98)  BP: 129/62 (30 Nov 2022 13:40) (99/54 - 140/64)  BP(mean): 89 (30 Nov 2022 13:40) (73 - 92)  RR: 18 (30 Nov 2022 13:40) (16 - 18)  SpO2: 97% (30 Nov 2022 13:40) (96% - 100%)    Parameters below as of 30 Nov 2022 13:40  Patient On (Oxygen Delivery Method): room air      I&O's Detail    29 Nov 2022 07:01  -  30 Nov 2022 07:00  --------------------------------------------------------  IN:    IV PiggyBack: 100 mL    Oral Fluid: 480 mL  Total IN: 580 mL    OUT:    Voided (mL): 800 mL  Total OUT: 800 mL    Total NET: -220 mL      30 Nov 2022 07:01  -  30 Nov 2022 15:59  --------------------------------------------------------  IN:    IV PiggyBack: 60 mL  Total IN: 60 mL    OUT:    Voided (mL): 150 mL  Total OUT: 150 mL    Total NET: -90 mL        CHEST TUBE:  no  FRANCHESKA DRAIN:  no  EPICARDIAL WIRES: no  TIE DOWNS: no  REGALADO: no        PHYSICAL EXAM:  GEN: NAD, looks comfortable  Psych: Mood appropriate  Neuro: A&Ox3.  No focal deficits.  Moving all extremities.   HEENT: No obvious abnormalities  CV: S1S2, regular, no murmurs appreciated.  No carotid bruits.  No JVD  Lungs: Clear B/L.  No wheezing, rales or rhonchi  ABD: Soft, non-tender, non-distended.  +Bowel sounds  EXT: Warm and well perfused.  No peripheral edema noted  Musculoskeletal: Moving all extremities with normal ROM, no joint swelling  PV: Pedal pulses palpable  Incision: Superior pole of MSI with well granulated tissue. No surrounding erythema. To the left of incision there is a fluctuant well circumscribed bulge that is non tender and non erythematous.       LABS:                        8.4    4.52  )-----------( 105      ( 30 Nov 2022 08:47 )             28.5       COUMADIN:  no    PT/INR - ( 30 Nov 2022 08:47 )   PT: 15.7 sec;   INR: 1.32          PTT - ( 30 Nov 2022 08:47 )  PTT:33.0 sec    11-30    136  |  105  |  24<H>  ----------------------------<  75  4.4   |  18<L>  |  0.84    Ca    9.2      30 Nov 2022 08:47  Phos  2.3     11-29  Mg     2.1     11-29    TPro  7.1  /  Alb  3.3  /  TBili  0.6  /  DBili  x   /  AST  19  /  ALT  15  /  AlkPhos  93  11-29          MEDICATIONS  (STANDING):  aspirin  chewable 81 milliGRAM(s) Oral daily  atorvastatin 10 milliGRAM(s) Oral at bedtime  budesonide  80 MICROgram(s)/formoterol 4.5 MICROgram(s) Inhaler 2 Puff(s) Inhalation two times a day  cefepime   IVPB 2000 milliGRAM(s) IV Intermittent every 12 hours  fluconAZOLE   Tablet 400 milliGRAM(s) Oral daily  gabapentin 100 milliGRAM(s) Oral at bedtime  heparin   Injectable 5000 Unit(s) SubCutaneous every 8 hours  levothyroxine 50 MICROGram(s) Oral daily  lidocaine   4% Patch 1 Patch Transdermal daily  pantoprazole    Tablet 40 milliGRAM(s) Oral before breakfast  potassium chloride   Powder 40 milliEquivalent(s) Oral every 4 hours  sodium chloride 0.9% lock flush 3 milliLiter(s) IV Push every 8 hours    MEDICATIONS  (PRN):  acetaminophen     Tablet .. 650 milliGRAM(s) Oral every 6 hours PRN Temp greater or equal to 38C (100.4F), Mild Pain (1 - 3)        RADIOLOGY & ADDITIONAL TESTS:

## 2022-12-01 NOTE — CONSULT NOTE ADULT - ASSESSMENT
Assessment: 76 yo female, PMHx of CAD, HTN, HLD, spinal stenosis, arthritis, hypothyroidism, bicuspid aortic valve s/p AVR, ascending/hemiarch replacement with frozen elephant trunk and left aorto-subclavian bypass, CABGx2 with  on 8/9/2021 (post-op course c/b prolonged intubation, SSS s/p PPM, poor wound healing s/p pec flap and closure on 9/29/21), 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 Muller and Dr. Augustin 7/29/22, course complicated by sternal wound infection, presented with AMS. Now with fluid collection in the left anterior chest wall surrounding the occluded aortic left subclavian artery bypass graft. Case reviewed with Dr. Peace, plan for aspiration of collection with local anesthesia.     Recommendations: Please ensure Covid swab is up to date (within last 72 hours).    Communicated with: RENETTA FINN

## 2022-12-01 NOTE — PROGRESS NOTE ADULT - SUBJECTIVE AND OBJECTIVE BOX
Patient discussed on morning rounds with Dr. Muller     Chief complaint: altered mental status/hypokalemia/poor nutrition/sternal wound VAC/anemia, s/p IR drainage of a left chest wall mass    SUBJECTIVE ASSESSMENT:  77y Female seen and examined at bedside with no complaints. She feels well and is in acceptance to go to [facility] due to family circumstances though she is not happy with the choice. She denies dizziness, vision changes, chest pain, palpitations, shortness of breath, cough, n/v/d, extremity swelling, calf tenderness.     Vital Signs Last 24 Hrs  T(C): 36.2 (01 Dec 2022 13:52), Max: 37.1 (30 Nov 2022 17:12)  T(F): 97.2 (01 Dec 2022 13:52), Max: 98.7 (30 Nov 2022 17:12)  HR: 95 (01 Dec 2022 13:37) (74 - 110)  BP: 97/53 (01 Dec 2022 13:37) (97/53 - 156/67)  BP(mean): 65 (01 Dec 2022 13:37) (65 - 97)  RR: 18 (01 Dec 2022 13:37) (16 - 18)  SpO2: 100% (01 Dec 2022 13:37) (97% - 100%)    Parameters below as of 01 Dec 2022 13:37  Patient On (Oxygen Delivery Method): room air      I&O's Detail    30 Nov 2022 07:01  -  01 Dec 2022 07:00  --------------------------------------------------------  IN:    IV PiggyBack: 210 mL    Oral Fluid: 250 mL  Total IN: 460 mL    OUT:    Voided (mL): 450 mL  Total OUT: 450 mL    Total NET: 10 mL      01 Dec 2022 07:01  -  01 Dec 2022 16:10  --------------------------------------------------------  IN:    Oral Fluid: 110 mL  Total IN: 110 mL    OUT:    Voided (mL): 50 mL  Total OUT: 50 mL    Total NET: 60 mL    CHEST TUBE:  none   FRANCHESKA DRAIN:  none   EPICARDIAL WIRES: none  TIE DOWNS: none   REGALADO: none    PHYSICAL EXAM:  GEN: NAD, looks comfortable  Psych: Mood appropriate  Neuro: A&Ox3.  No focal deficits.  Moving all extremities.   HEENT: No obvious abnormalities  CV: S1S2, regular, no murmurs appreciated.  No carotid bruits.  No JVD  Lungs: Clear B/L.  No wheezing, rales or rhonchi  ABD: Soft, non-tender, non-distended.  +Bowel sounds  EXT: Warm and well perfused.  No peripheral edema noted  Musculoskeletal: Moving all extremities with normal ROM, no joint swelling  PV: Pedal pulses palpable  Incisions: Superior pole of MSI with well granulated tissue. No surrounding erythema. The prior stated well circumscribed mass to the left of the incision is s/p drainage by IR with no drain attached, site is covered with tegaderm    LABS:                        8.4    4.52  )-----------( 105      ( 30 Nov 2022 08:47 )             28.5       PT/INR - ( 01 Dec 2022 05:37 )   PT: 16.0 sec;   INR: 1.34          PTT - ( 01 Dec 2022 05:37 )  PTT:38.4 sec    11-30    136  |  105  |  24<H>  ----------------------------<  75  4.4   |  18<L>  |  0.84    Ca    9.2      30 Nov 2022 08:47            MEDICATIONS  (STANDING):  apixaban 5 milliGRAM(s) Oral every 12 hours  aspirin  chewable 81 milliGRAM(s) Oral daily  atorvastatin 10 milliGRAM(s) Oral at bedtime  budesonide  80 MICROgram(s)/formoterol 4.5 MICROgram(s) Inhaler 2 Puff(s) Inhalation two times a day  cefepime   IVPB 2000 milliGRAM(s) IV Intermittent every 12 hours  DAPTOmycin IVPB 500 milliGRAM(s) IV Intermittent every 24 hours  fluconAZOLE   Tablet 400 milliGRAM(s) Oral daily  gabapentin 100 milliGRAM(s) Oral at bedtime  levothyroxine 50 MICROGram(s) Oral daily  lidocaine   4% Patch 1 Patch Transdermal daily  pantoprazole    Tablet 40 milliGRAM(s) Oral before breakfast  potassium chloride   Powder 40 milliEquivalent(s) Oral every 4 hours  sodium chloride 0.9% lock flush 3 milliLiter(s) IV Push every 8 hours    MEDICATIONS  (PRN):  acetaminophen     Tablet .. 650 milliGRAM(s) Oral every 6 hours PRN Temp greater or equal to 38C (100.4F), Mild Pain (1 - 3)        RADIOLOGY & ADDITIONAL TESTS:     Patient discussed on morning rounds with Dr. Muller     Chief complaint: altered mental status/hypokalemia/poor nutrition/sternal wound VAC/anemia, s/p IR drainage of a left chest wall mass    SUBJECTIVE ASSESSMENT:  77y Female seen and examined at bedside with no complaints. She feels well and is in acceptance to go to [facility] due to family circumstances though she is not happy with the choice. She denies dizziness, vision changes, chest pain, palpitations, shortness of breath, cough, n/v/d, extremity swelling, calf tenderness.     Vital Signs Last 24 Hrs  T(C): 36.2 (01 Dec 2022 13:52), Max: 37.1 (30 Nov 2022 17:12)  T(F): 97.2 (01 Dec 2022 13:52), Max: 98.7 (30 Nov 2022 17:12)  HR: 95 (01 Dec 2022 13:37) (74 - 110)  BP: 97/53 (01 Dec 2022 13:37) (97/53 - 156/67)  BP(mean): 65 (01 Dec 2022 13:37) (65 - 97)  RR: 18 (01 Dec 2022 13:37) (16 - 18)  SpO2: 100% (01 Dec 2022 13:37) (97% - 100%)    Parameters below as of 01 Dec 2022 13:37  Patient On (Oxygen Delivery Method): room air      I&O's Detail    30 Nov 2022 07:01  -  01 Dec 2022 07:00  --------------------------------------------------------  IN:    IV PiggyBack: 210 mL    Oral Fluid: 250 mL  Total IN: 460 mL    OUT:    Voided (mL): 450 mL  Total OUT: 450 mL    Total NET: 10 mL      01 Dec 2022 07:01  -  01 Dec 2022 16:10  --------------------------------------------------------  IN:    Oral Fluid: 110 mL  Total IN: 110 mL    OUT:    Voided (mL): 50 mL  Total OUT: 50 mL    Total NET: 60 mL    CHEST TUBE:  none   FRANCHESKA DRAIN:  none   EPICARDIAL WIRES: none  TIE DOWNS: none   REGALADO: none    PHYSICAL EXAM:  GEN: NAD, looks comfortable  Psych: Mood appropriate  Neuro: A&Ox3.  No focal deficits.  Moving all extremities.   HEENT: No obvious abnormalities  CV: S1S2, regular, no murmurs appreciated.  No carotid bruits.  No JVD  Lungs: Clear B/L.  No wheezing, rales or rhonchi  ABD: Soft, non-tender, non-distended.  +Bowel sounds  EXT: Warm and well perfused.  No peripheral edema noted  Musculoskeletal: Moving all extremities with normal ROM, no joint swelling  PV: Pedal pulses palpable  Incisions: Superior pole of MSI with well granulated tissue. No surrounding erythema. The prior stated well circumscribed mass to the left of the incision is s/p drainage by IR with no drain attached, site is covered with tegaderm    LABS:                        8.4    4.52  )-----------( 105      ( 30 Nov 2022 08:47 )             28.5       PT/INR - ( 01 Dec 2022 05:37 )   PT: 16.0 sec;   INR: 1.34          PTT - ( 01 Dec 2022 05:37 )  PTT:38.4 sec    11-30    136  |  105  |  24<H>  ----------------------------<  75  4.4   |  18<L>  |  0.84    Ca    9.2      30 Nov 2022 08:47    MEDICATIONS  (STANDING):  apixaban 5 milliGRAM(s) Oral every 12 hours  aspirin  chewable 81 milliGRAM(s) Oral daily  atorvastatin 10 milliGRAM(s) Oral at bedtime  budesonide  80 MICROgram(s)/formoterol 4.5 MICROgram(s) Inhaler 2 Puff(s) Inhalation two times a day  cefepime   IVPB 2000 milliGRAM(s) IV Intermittent every 12 hours  DAPTOmycin IVPB 500 milliGRAM(s) IV Intermittent every 24 hours  fluconAZOLE   Tablet 400 milliGRAM(s) Oral daily  gabapentin 100 milliGRAM(s) Oral at bedtime  levothyroxine 50 MICROGram(s) Oral daily  lidocaine   4% Patch 1 Patch Transdermal daily  pantoprazole    Tablet 40 milliGRAM(s) Oral before breakfast  potassium chloride   Powder 40 milliEquivalent(s) Oral every 4 hours  sodium chloride 0.9% lock flush 3 milliLiter(s) IV Push every 8 hours    MEDICATIONS  (PRN):  acetaminophen     Tablet .. 650 milliGRAM(s) Oral every 6 hours PRN Temp greater or equal to 38C (100.4F), Mild Pain (1 - 3)    RADIOLOGY & ADDITIONAL TESTS:  < from: US Chest (11.30.22 @ 16:30) >  IMPRESSION: As on the CT scan from 11/28/2022, there is a complex fluid   collection in the left anterior chest wall surrounding the occluded   aortic left subclavian artery bypass graft. The differential diagnosis   includes seroma, hematoma, or abscess. Clinical correlation recommended.    --- End of Report ---    < end of copied text >

## 2022-12-01 NOTE — PROGRESS NOTE ADULT - ASSESSMENT
76F h/o CAD, bicuspid AV s/p AVR, ascending/hemiarch replacement with frozen elephant trunk and L aorto-subclavian bypass, CABG x 2 with Dr. Muller on 8/9/21, s/p PPM and flap closure on 9/29/21, L brachial occlusion s/p L brachial artery embolectomy 7/21/22, TEVAR with R groin cutdown and femoral artery repair with Dr. Muller and Dr. Augustin 7/29/22 c/b sternal wound infection s/p I&D and wound vac placement (WCx grew C. albicans), L parasternal chest wall collection/phlegmon with possible mediastinal extension (vs.  erroneous read due to VAC sponge), LUE limb ischemia but not a surgical candidate discharged on IV dapto/erta/fluc (11/2 - 12/13) p/w AMS.  Ertapenem was changed to cefepime because of concern for ertapenem-induced hallucinations, which have now resolved.  Would continue broad-spectrum coverage as she had received vanc and pip-tazo prior to OR on 11/2.  New L infraclavicular collection 11/28 - is nonenhancing c/c seroma vs. hematoma vs. phlegmon.  She is s/p IR aspiration 12/1 with removal of 20 cc of bloody fluid.  CT findings c/c chronic OM sternomanubrium.  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  In terms of antibiotics, approach is dependent upon GOC.  If want aggressive management, would continue IV antibiotics until 12/13.    Suggest:  - F/U culture from IR drainage today  - Continue daptomycin 500 mg IV q24h.  CPK q Thursday  - Continue cefepime 2 g IV q12h  - Continue fluconazole 400 mg po q24h  Recommendations discussed with primary team.  Will follow with you - team 2.

## 2022-12-01 NOTE — PROGRESS NOTE ADULT - ASSESSMENT
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 Muller and Dr. Augustin 7/29/22, course complicated by sternal wound infection, presented with AMS. Per daughter, pt was discharged to rehab 3 days prior to presenting to ED and has been getting progressively confused with poor oral intake. Burn was consulted for parasternal abscess and resumed ertapenem, daptomycin, and fluconazole. Cultures remained negative. CK remained wnl. Patient's mental status can be 2/2 metabolic encephalopathy vs. fluctuating delirium. EEG was negative, CTH negative, CXR and UA unremarkable. No focal neurologic deficits. VAC in place at superior poll of sternotomy. Pt transferred to [facility] under Dr. Muller for management and evaluation. On 11/23-11/27 ID consulted, confusion likely from ertapenum toxicity switched to cefepime.  Neuro consulted, recommending MRI if possible. Wound vac removed with wet to dry dressings in place. QTc remains <490 so po fluconazole continued. On 11/28 patient felt dizzy and lightheaded and noted a new fluctuant, non tender, non erythematous mass on the left chest. 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 Vascular consulted, no intervention. IR consulted on 11/30 for possible drainage. On 12/1 he is s/p IR drainage of the left chest mass with about 10 cc with fluid cultures sent and pending. She is agreeable to [facility] due to family circumstances.     Neuro: pain managed. Left sided weakness.  - Gabapentin 100 for neuropathic pain     CVS:   #hypotensive   - Dopplerable pulses on the Left hand,  - holding all antihypertensive meds.   - Continue ASA and Statin   - albumin for volume resuscitation. 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 Per vascular, no intervention  - continue wet to dry dressing on superior pole of previous sternal incision     Respiratory: Saturating well on RA   - IS and ambulation  - Chest PT  - continue budesonide     GI: has not had a BM since 11/23   - Dulcolax suppository   - GI PPX   - Bowel regimen with Senna and Miralax     : BUN/ Creatinine stable  - monitor I/Os.   - About to get contrast. Consider IV fluids for renal protection     Endo: H/o hypothyroidism.  FS well contolled.   - ISS   - continue synthroid 50mcg Daily, follow up t3 (t4 low from 11/23) If low follow up with Endocrine.     ID: Superior pole of sternal wound dehiscence s/p wound VAC now requiring wet to dry dressing. afebile with new fluctuation of left chest wall.   - Per ID continuing Fluconazole 400mg Daily. With Daily EKGs for QTc checks.   - continuing broad spectrum ABX with Dapto and cefepime (cultures negative but culture was taken while already on antibiotics)   - CT scan done, results above  - IR consulted 11/30 for fluctuant mass. Requested ultrasound, to be reviewed by their team. NPO midnight for possible drainage 12/1      Heme: afib ppx   - Holding Eliquis in anticipation for possible IR drainage 12/1  - 2 doses of SQH to be given, and to be stopped after night dose in anticipation for procedure with IR tomorrow    Dispo plan:   - Long tern IV antibiotics   78 yo female, PMHx of CAD, HTN, HLD, spinal stenosis, arthritis, hypothyroidism, bicuspid aortic valve s/p AVR, ascending/hemiarch replacement with frozen elephant trunk and left aorto-subclavian bypass, CABGx2 with  on 8/9/2021 (post-op course c/b prolonged intubation, SSS s/p PPM, poor wound healing s/p pec flap and closure on 9/29/21), 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 Muller and Dr. Augustin 7/29/22, course complicated by sternal wound infection, presented with AMS. Per daughter, pt was discharged to rehab 3 days prior to presenting to ED and has been getting progressively confused with poor oral intake. Burn was consulted for parasternal abscess and resumed ertapenem, daptomycin, and fluconazole. Cultures remained negative. CK remained wnl. Patient's mental status can be 2/2 metabolic encephalopathy vs. fluctuating delirium. EEG was negative, CTH negative, CXR and UA unremarkable. No focal neurologic deficits. VAC in place at superior poll of sternotomy. Pt transferred to [facility] under Dr. Muller for management and evaluation. On 11/23-11/27 ID consulted, confusion likely from ertapenum toxicity switched to cefepime.  Neuro consulted, recommending MRI if possible. Wound vac removed with wet to dry dressings in place. QTc remains <490 so po fluconazole continued. On 11/28 patient felt dizzy and lightheaded and noted a new fluctuant, non tender, non erythematous mass on the left chest. 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 Vascular consulted, no intervention. IR consulted on 11/30 for possible drainage. On 12/1 he is s/p IR drainage of the left chest mass with about 10 cc with fluid cultures sent and pending. She is agreeable to [facility] due to family circumstances.     Neuro: pain managed. Left sided weakness.  - Gabapentin 100 for neuropathic pain     CVS:   #hypotensive   - Dopplerable pulses on the Left hand,  - holding all antihypertensive meds due to blood pressures being soft overnight and into the day, resume when able to   - albumin for volume resuscitation. Hyperdynamic LV on echo from this admission with poor PO intake   #CAD and aortic valve stenosis   - Continue ASA and Statin   - resumed pts home eliquis   - continue wet to dry dressing on superior pole of previous sternal incision     Respiratory: Saturating well on RA   - IS and ambulation  - Chest PT  - continue budesonide     GI: has not had a BM for three days   - pt has a po PO intake encouraged more PO intake as well as ambulation   - Dulcolax suppository given once more today   - GI PPX   - Bowel regimen with Senna and Miralax     : BUN/ Creatinine stable  - monitor I/Os.   - BUN/Cr: lab holiday     Endo:   #hypothyroidism   - ISS   - continue synthroid 50mcg Daily    ID:  #Superior pole of sternal wound dehiscence s/p wound VAC now requiring wet to dry dressing. afebile with new fluctuation of left chest wall.   - Per ID continuing Fluconazole 400mg Daily. With Daily EKGs for QTc checks.   - continuing broad spectrum ABX with Dapto and cefepime (cultures negative but culture was taken while already on antibiotics)   - CT scan done, results above  - IR drainage of fluctulant mass, no drain in place, drained about 10 cc of old blood.     Heme:    - Continue Eliquis for Afib and DVT ppx   - H and H: lab holiday     Dispo plan:   -pending [facility]

## 2022-12-01 NOTE — PROCEDURE NOTE - PROCEDURE FINDINGS AND DETAILS
pre procedural US again shows left chest wall small hypoechoic subq fluid collection with internal echoes about the bypass graft as noted on prior US of the chest. Aspiration of 10cc of liquified blood products with minimal residual fluid remaining on post procedural US.

## 2022-12-01 NOTE — PROGRESS NOTE ADULT - SUBJECTIVE AND OBJECTIVE BOX
INTERVAL HPI/OVERNIGHT EVENTS:  s/p IR aspiration of L infraclavicular fluid collection.  Afebrile    CONSTITUTIONAL:  No fever, chills, night sweats  EYES:  No photophobia or visual changes  CARDIOVASCULAR:  No chest pain  RESPIRATORY:  No cough, wheezing, or SOB   GASTROINTESTINAL:  No nausea, vomiting, diarrhea, constipation, or abdominal pain  GENITOURINARY:  No frequency, urgency, dysuria or hematuria  NEUROLOGIC:  No headache, lightheadedness      ANTIBIOTICS/RELEVANT:    Daptomycin 500 mg IV q24h  Cefepime 2 g IV a12h  Fluconazole 400 mg po q24h      Vital Signs Last 24 Hrs  T(C): 36.2 (01 Dec 2022 13:52), Max: 36.8 (30 Nov 2022 20:55)  T(F): 97.2 (01 Dec 2022 13:52), Max: 98.2 (30 Nov 2022 20:55)  HR: 80 (01 Dec 2022 16:32) (74 - 95)  BP: 113/56 (01 Dec 2022 16:32) (97/53 - 156/67)  BP(mean): 81 (01 Dec 2022 16:32) (65 - 97)  RR: 18 (01 Dec 2022 13:37) (16 - 18)  SpO2: 96% (01 Dec 2022 16:32) (96% - 100%)    Parameters below as of 01 Dec 2022 13:37  Patient On (Oxygen Delivery Method): room air        PHYSICAL EXAM:  Constitutional:  Alert, oriented, appears weak  Eyes:  Sclerae anicteric, conjunctivae clear, PERRL  Ear/Nose/Throat:  No nasal exudate or sinus tenderness;  No buccal mucosal lesions, no pharyngeal erythema or exudate	  Neck:  Supple, no adenopathy  Chest:  Upper pole of sternal incision and L infraclavicular drainage site dressed  Respiratory:  Clear bilaterally  Cardiovascular:  RRR, S1S2, IV/VI syst murmur max at LUSB  Gastrointestinal:  Symmetric, normoactive BS, soft, NT, no masses, guarding or rebound.  No HSM  Extremities:  No edema, L arm cool        LABS:                        8.4    4.52  )-----------( 105      ( 30 Nov 2022 08:47 )             28.5         11-30    136  |  105  |  24<H>  ----------------------------<  75  4.4   |  18<L>  |  0.84    Ca    9.2      30 Nov 2022 08:47      Creatine Kinase, Serum: 28 U/L (12.01.22 @ 05:37)      MICROBIOLOGY:    Body fluid 12/1 - few WBCs, no orgs seen    RADIOLOGY & ADDITIONAL STUDIES:

## 2022-12-01 NOTE — CONSULT NOTE ADULT - SUBJECTIVE AND OBJECTIVE BOX
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 Muller and Dr. Augustin 7/29/22, course complicated by sternal wound infection, presented with AMS. MSI wound with bleeding, granulating tissue; continuing wet to dries; very superficial at this point. On 11/28 patient felt dizzy and lightheaded and noted a new fluctuant, non tender, non erythematous mass on the left chest. 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 IR consulted for possible drainage, recommend US to further characterize area; showed complex fluid collection in the left anterior chest wall surrounding the occluded aortic left subclavian artery bypass graft (created 2001 per CTS).     Clinical History: POST-OP COMPLICATIONS    ABSCESS OF SKINANEMIA    No pertinent family history in first degree relatives    FH: CAD (coronary artery disease) (Sibling)    Family history of CKD (chronic kidney disease) (Sibling)    Family history of diabetes mellitus (DM) (Sibling)    Handoff    MEWS Score    No pertinent past medical history    HTN (hypertension)    H/O aortic valve stenosis    CAD (coronary artery disease)    AMS (altered mental status)    Acute hypokalemia    Sternal wound infection    HTN (hypertension)    No significant past surgical history    S/P aortic valve replacement    S/P CABG (coronary artery bypass graft)    H/O aortic arch replacement    Pacemaker    Room Service Assist    [email or web address]        Meds:acetaminophen     Tablet .. 650 milliGRAM(s) Oral every 6 hours PRN  aspirin  chewable 81 milliGRAM(s) Oral daily  atorvastatin 10 milliGRAM(s) Oral at bedtime  budesonide  80 MICROgram(s)/formoterol 4.5 MICROgram(s) Inhaler 2 Puff(s) Inhalation two times a day  cefepime   IVPB 2000 milliGRAM(s) IV Intermittent every 12 hours  DAPTOmycin IVPB 500 milliGRAM(s) IV Intermittent every 24 hours  fluconAZOLE   Tablet 400 milliGRAM(s) Oral daily  gabapentin 100 milliGRAM(s) Oral at bedtime  levothyroxine 50 MICROGram(s) Oral daily  lidocaine   4% Patch 1 Patch Transdermal daily  pantoprazole    Tablet 40 milliGRAM(s) Oral before breakfast  potassium chloride   Powder 40 milliEquivalent(s) Oral every 4 hours  sodium chloride 0.9% lock flush 3 milliLiter(s) IV Push every 8 hours      Allergies:No Known Allergies        Labs:                           8.4    4.52  )-----------( 105      ( 30 Nov 2022 08:47 )             28.5     PT/INR - ( 01 Dec 2022 05:37 )   PT: 16.0 sec;   INR: 1.34          PTT - ( 01 Dec 2022 05:37 )  PTT:38.4 sec  11-30    136  |  105  |  24<H>  ----------------------------<  76  4.4   |  18<L>  |  0.84    Ca    9.2      30 Nov 2022 08:47            Imaging Findings: complex fluid collection in the left anterior chest wall surrounding the occluded aortic left subclavian artery bypass graft

## 2022-12-02 NOTE — PROGRESS NOTE ADULT - ASSESSMENT
78 yo female, PMHx of CAD, HTN, HLD, spinal stenosis, arthritis, hypothyroidism, bicuspid aortic valve s/p AVR, ascending/hemiarch replacement with frozen elephant trunk and left aorto-subclavian bypass, CABGx2 with Dr. 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 Muller and Dr. Augustin 7/29/22, course complicated by sternal wound infection, presented with AMS. Per daughter, pt was discharged to rehab 3 days prior to presenting to [place] ED and has been getting progressively confused with poor oral intake. Consulted for parasternal abscess and resumed ertapenem, daptomycin, and fluconazole. Cultures remained negative. CK remained wnl. Patient's mental status can be 2/2 metabolic encephalopathy vs. fluctuating delirium. EEG was negative, CTH negative, CXR and UA unremarkable. No focal neurologic deficits. VAC in place at superior poll of sternotomy. Pt transferred to [facility] under Dr. Muller for management and evaluation. On 11/23-11/27 ID consulted, confusion likely from ertapenem toxicity switched to cefepime.  Neuro consulted, recommending MRI if possible. Wound vac removed with wet to dry dressings in place. QTc remains <490 so po fluconazole continued. On 11/28/22 patient felt dizzy and lightheaded and noted a new fluctuant, non tender, non erythematous mass on the left chest. 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 Vascular consulted, no intervention. IR consulted on 11/30/22 for possible drainage. On 12/1 she is s/p IR drainage of the left chest mass with about 10 cc with fluid cultures sent and pending, currently NGTD. She is agreeable to [facility] due to family circumstances.     Plan:    Neurovascular:   -Pain well controlled with current regimen. PRN's:    Cardiovascular:   -Hemodynamically stable.   -Monitor: BP, HR, tele    Respiratory:   -Oxygenating well on room air  -Encourage continued use of IS 10x/hr and frequent ambulation  -CXR:    GI:  -GI PPX:  -PO Diet  -Bowel Regimen:     Renal / :  -Continue to monitor renal function: BUN/Cr  -Monitor I/O's daily     Endocrine:    -No hx of DM or thyroid dx  -A1c:  -TSH:    Hematologic:  -CBC: H/H-  -Coagulation Panel.    ID:  -Temperature:   -CBC: WBC-  -Continue to observe for SIRS/Sepsis Syndrome.    Prophylaxis:  -DVT prophylaxis with 5000 SubQ Heparin q8h.  -Continue with SCD's b/l while patient is at rest     Disposition:  -Discharge to [facility] once accepted 76 yo female, PMHx of CAD, HTN, HLD, spinal stenosis, arthritis, hypothyroidism, bicuspid aortic valve s/p AVR, ascending/hemiarch replacement with frozen elephant trunk and left aorto-subclavian bypass, CABGx2 with Dr. 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 Muller and Dr. Augustin 7/29/22, course complicated by sternal wound infection, presented with AMS. Per daughter, pt was discharged to rehab 3 days prior to presenting to [place] ED and has been getting progressively confused with poor oral intake. Consulted for parasternal abscess and resumed ertapenem, daptomycin, and fluconazole. Cultures remained negative. CK remained wnl. Patient's mental status can be 2/2 metabolic encephalopathy vs. fluctuating delirium. EEG was negative, CTH negative, CXR and UA unremarkable. No focal neurologic deficits. VAC in place at superior poll of sternotomy. Pt transferred to [facility] under Dr. Muller for management and evaluation. On 11/23-11/27 ID consulted, confusion likely from ertapenem toxicity switched to cefepime.  Neuro consulted, recommending MRI if possible. Wound vac removed with wet to dry dressings in place. QTc remains <490 so po fluconazole continued. On 11/28/22 patient felt dizzy and lightheaded and noted a new fluctuant, non tender, non erythematous mass on the left chest. 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 Vascular consulted, no intervention. IR consulted on 11/30/22 for possible drainage. On 12/1 she is s/p IR drainage of the left chest mass with about 10 cc with fluid cultures sent and pending, currently NGTD. She is agreeable to [facility] due to family circumstances.     Plan:    Neurovascular:   -Pain well controlled with current regimen.   -continue lidocaine patch, tylenol, gabapentin  left sided weakness and AMS  -stroke following, appreciate recs    L brachial embolus  -continue eliquis    Cardiovascular:   AVR, CAD  -continue ASA  -continue atorvastatin  -continue wet to dry dressings for superior pole wound - healing well with granulation tissue    -continue IV abx and PO fluconazole  hypotension  -blood pressure meds held, restart when able, likely tomorrow  -Hemodynamically stable.   -Monitor: BP, HR, tele    Respiratory:   -Oxygenating well on room air  -Encourage continued use of IS 10x/hr and frequent ambulation  -continue symbicort daily    GI:  -GI PPX: continue protonix  -PO Diet  -Bowel Regimen: start senna/miralax    Renal / :  -Continue to monitor renal function: BUN/Cr: 25/0.86  -Monitor I/O's daily     Endocrine:    -No hx of DM   -A1c: 5.1  hypothyroidism  -TSH:3.660  -continue levo    Hematologic:  -CBC: H/H- 8.1/25.6  -Coagulation Panel.    ID:  -Temperature: afebrile  -CBC: WBC- 5.17  -Continue to observe for SIRS/Sepsis Syndrome.  superior pole wound dehiscence s/p wound vac  -continue wet to dry dressing  -ID following  -continue IV dapto and cefepime until 12/13 and PO fluconazole long term  L fluctuant mass  -IR drainage or 10cc fluid  -culture 12/1/22: NGTD    Prophylaxis:  -DVT prophylaxis with eliquis  -Continue with SCD's b/l while patient is at rest     Disposition:  -Discharge to [facility] once accepted

## 2022-12-02 NOTE — PROGRESS NOTE ADULT - SUBJECTIVE AND OBJECTIVE BOX
Patient discussed on morning rounds with Dr. Muller    Operation / Date: admitted for AMS, hypokalemia, poor nutrition, sternal wound VAC/anemia  7/21/22 TEVAR with R groin femoral cutdown    SUBJECTIVE ASSESSMENT:  77y Female seen and examined. Patient feels well, excited that she might be able to go home for [place]. Understands need to go to [facility] for IV abx until completed. Tolerating PO diet, satting well on room air, ambulating independently. Denies lightheadedness, headache, Cp, palpitations, SOB, abdominal pain, nausea, vomiting, fever, chills.      Vital Signs Last 24 Hrs  T(C): 36.7 (02 Dec 2022 14:02), Max: 37.3 (01 Dec 2022 18:00)  T(F): 98.1 (02 Dec 2022 14:02), Max: 99.1 (01 Dec 2022 18:00)  HR: 80 (02 Dec 2022 13:59) (78 - 101)  BP: 129/59 (02 Dec 2022 13:59) (110/61 - 136/60)  BP(mean): 89 (02 Dec 2022 13:59) (72 - 89)  RR: 12 (02 Dec 2022 13:59) (12 - 18)  SpO2: 99% (02 Dec 2022 13:59) (94% - 99%)    Parameters below as of 02 Dec 2022 13:59  Patient On (Oxygen Delivery Method): room air      I&O's Detail    01 Dec 2022 07:01  -  02 Dec 2022 07:00  --------------------------------------------------------  IN:    IV PiggyBack: 150 mL    Oral Fluid: 360 mL  Total IN: 510 mL    OUT:    Voided (mL): 800 mL  Total OUT: 800 mL    Total NET: -290 mL        CHEST TUBE:  No  FRANCHESKA DRAIN:  No.  EPICARDIAL WIRES: No.  TIE DOWNS: No.  REGALADO: No.    PHYSICAL EXAM:    General: NAD, sitting comfortably in chair, conversing appropriately  Neurological: alert and oriented, UE and LE strength equal b/l, facial symmetry present  Cardiovascular: RRR, Clear S1 and S2, no murmurs appreciated  Respiratory: chest expansion symmetrical, CTA b/l, no wheezing noted  Gastrointestinal: +BS, soft, NT, ND  Extremities: moving spontaneously, no calf tenderness or edema.  Vascular: warm, well perfused.  Incisions: MSI with superior pole opening with granulation tissue, no erythema or swelling    LABS:                        8.1    5.17  )-----------( 131      ( 02 Dec 2022 06:16 )             25.6       COUMADIN:  no    PT/INR - ( 01 Dec 2022 05:37 )   PT: 16.0 sec;   INR: 1.34          PTT - ( 01 Dec 2022 05:37 )  PTT:38.4 sec    12-02    133<L>  |  103  |  25<H>  ----------------------------<  85  4.0   |  21<L>  |  0.86    Ca    9.2      02 Dec 2022 06:16  Mg     2.2     12-02            MEDICATIONS  (STANDING):  apixaban 5 milliGRAM(s) Oral every 12 hours  aspirin  chewable 81 milliGRAM(s) Oral daily  atorvastatin 10 milliGRAM(s) Oral at bedtime  budesonide  80 MICROgram(s)/formoterol 4.5 MICROgram(s) Inhaler 2 Puff(s) Inhalation two times a day  cefepime   IVPB 2000 milliGRAM(s) IV Intermittent every 12 hours  DAPTOmycin IVPB 500 milliGRAM(s) IV Intermittent every 24 hours  fluconAZOLE   Tablet 400 milliGRAM(s) Oral daily  gabapentin 100 milliGRAM(s) Oral at bedtime  levothyroxine 50 MICROGram(s) Oral daily  lidocaine   4% Patch 1 Patch Transdermal daily  pantoprazole    Tablet 40 milliGRAM(s) Oral before breakfast  potassium chloride   Powder 40 milliEquivalent(s) Oral every 4 hours  sodium chloride 0.9% lock flush 3 milliLiter(s) IV Push every 8 hours    MEDICATIONS  (PRN):  acetaminophen     Tablet .. 650 milliGRAM(s) Oral every 6 hours PRN Temp greater or equal to 38C (100.4F), Mild Pain (1 - 3)        RADIOLOGY & ADDITIONAL TESTS:

## 2022-12-02 NOTE — PROGRESS NOTE ADULT - ASSESSMENT
76F h/o CAD, bicuspid AV s/p AVR, ascending/hemiarch replacement with frozen elephant trunk and L aorto-subclavian bypass, CABG x 2 with Dr. Muller on 8/9/21, s/p PPM and flap closure on 9/29/21, L brachial occlusion s/p L brachial artery embolectomy 7/21/22, TEVAR with R groin cutdown and femoral artery repair with Dr. Muller and Dr. Augustin 7/29/22 c/b sternal wound infection s/p I&D and wound vac placement (WCx grew C. albicans), L parasternal chest wall collection/phlegmon with possible mediastinal extension (vs.  erroneous read due to VAC sponge), LUE limb ischemia but not a surgical candidate discharged on IV dapto/erta/fluc (11/2 - 12/13) p/w AMS.  Ertapenem was changed to cefepime because of concern for ertapenem-induced hallucinations, which have now resolved.  Would continue broad-spectrum coverage as she had received vanc and pip-tazo prior to OR on 11/2.  New L infraclavicular collection 11/28 - is nonenhancing c/c seroma vs. hematoma vs. phlegmon.  She is s/p IR aspiration 12/1 with removal of 20 cc of bloody fluid.  CT findings c/c chronic OM sternomanubrium.  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  Would continue IV antibiotics until 12/13 then continue fluconazole for suppression.    Suggest:  - F/U culture from IR drainage 12/1  - Continue daptomycin 500 mg IV q24h.  CPK q Thursday  - Continue cefepime 2 g IV q12h  - Continue fluconazole 400 mg po q24h  Recommendations discussed with primary team.  Will follow with you - team 2.

## 2022-12-02 NOTE — PROGRESS NOTE ADULT - SUBJECTIVE AND OBJECTIVE BOX
INTERVAL HPI/OVERNIGHT EVENTS:  Remains afebrile.  Fingers numb but lidocaine patch is improving L shoulder pain    CONSTITUTIONAL:  No fever, chills, night sweats  EYES:  No photophobia or visual changes  CARDIOVASCULAR:  No chest pain  RESPIRATORY:  No cough, wheezing, or SOB   GASTROINTESTINAL:  No nausea, vomiting, diarrhea, constipation, or abdominal pain  GENITOURINARY:  No frequency, urgency, dysuria or hematuria  NEUROLOGIC:  No headache, lightheadedness      ANTIBIOTICS/RELEVANT:    Daptomycin 500 mg IV q24h  Cefepime 2 g IV a12h  Fluconazole 400 mg po q24h      Vital Signs Last 24 Hrs  T(C): 36.9 (02 Dec 2022 17:39), Max: 36.9 (01 Dec 2022 21:14)  T(F): 98.4 (02 Dec 2022 17:39), Max: 98.5 (01 Dec 2022 21:14)  HR: 80 (02 Dec 2022 17:37) (78 - 102)  BP: 133/63 (02 Dec 2022 17:37) (110/61 - 139/74)  BP(mean): 90 (02 Dec 2022 17:37) (72 - 95)  RR: 12 (02 Dec 2022 17:37) (12 - 18)  SpO2: 100% (02 Dec 2022 17:37) (94% - 100%)    Parameters below as of 02 Dec 2022 17:37  Patient On (Oxygen Delivery Method): room air        PHYSICAL EXAM:  Constitutional:  Alert, oriented, appears weak  Eyes:  Sclerae anicteric, conjunctivae clear, PERRL  Ear/Nose/Throat:  No nasal exudate or sinus tenderness;  No buccal mucosal lesions, no pharyngeal erythema or exudate	  Neck:  Supple, no adenopathy  Chest:  Upper pole of sternal incision and L infraclavicular drainage site dressed  Respiratory:  Clear bilaterally  Cardiovascular:  RRR, S1S2, IV/VI syst murmur max at LUSB  Gastrointestinal:  Symmetric, normoactive BS, soft, NT, no masses, guarding or rebound.  No HSM  Extremities:  No edema, L arm cool;  RUE PICC exit site dressed      LABS:                        8.1    5.17  )-----------( 131      ( 02 Dec 2022 06:16 )             25.6         12-02    133<L>  |  103  |  25<H>  ----------------------------<  85  4.0   |  21<L>  |  0.86    Ca    9.2      02 Dec 2022 06:16  Mg     2.2     12-02            MICROBIOLOGY:    Body fluid 12/1 - few WBCs, no orgs seen;  NGTD    RADIOLOGY & ADDITIONAL STUDIES:

## 2022-12-03 NOTE — PROGRESS NOTE ADULT - ASSESSMENT
76 yo female, PMHx of CAD, HTN, HLD, spinal stenosis, arthritis, hypothyroidism, bicuspid aortic valve s/p AVR, ascending/hemiarch replacement with frozen elephant trunk and left aorto-subclavian bypass, CABGx2 with Dr. 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 Muller and Dr. Augustin 7/29/22, course complicated by sternal wound infection, presented with AMS. Per daughter, pt was discharged to rehab 3 days prior to presenting to [place] ED and has been getting progressively confused with poor oral intake. Consulted for parasternal abscess and resumed ertapenem, daptomycin, and fluconazole. Cultures remained negative. CK remained wnl. Patient's mental status can be 2/2 metabolic encephalopathy vs. fluctuating delirium. EEG was negative, CTH negative, CXR and UA unremarkable. No focal neurologic deficits. VAC in place at superior poll of sternotomy. Pt transferred to [facility] under Dr. Muller for management and evaluation. On 11/23-11/27 ID consulted, confusion likely from ertapenem toxicity switched to cefepime.  Neuro consulted, recommending MRI if possible. Wound vac removed with wet to dry dressings in place. QTc remains <490 so po fluconazole continued. On 11/28/22 patient felt dizzy and lightheaded and noted a new fluctuant, non tender, non erythematous mass on the left chest. 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 Vascular consulted, no intervention. IR consulted on 11/30/22 for possible drainage. On 12/1 she is s/p IR drainage of the left chest mass with about 10 cc with fluid cultures sent, no growth to date.  She is agreeable to [facility] due to family circumstances.       Neuro: pain managed. Left sided weakness but likely from vascular source. Neuro following   - No need to for MRI at this time.   - Gabapentin 100 for neuropathic pain     CVS: Normotensive; HR in the 70s.  Palpable pulse in the left radial.   - Continue ASA and Statin   - Restarting low dose beta blocker this afternoon. Continue to hold amlodipine and hydralizine.   - continue wet to dry dressing on superior pole of previous sternal incision     Respiratory: Saturating well on RA   - Wean off O2 sat > 92%  - IS and ambulation  - Chest PT  - continue budesonide     GI: having BMs.   - Dulcolax suppository   - GI PPX   - Bowel regimen with Senna and Miralax     : BUN/ Creatinine. 25/.8 - Arreola   - monitor I/Os.   -     Endo: H/o hypothyroidism.  FS well contolled.   - ISS   - continue synthroid 50mcg Daily,      ID: Superior pole of sternal wound dehiscence s/p wound VAC now requiring wet to dry dressing. afebile with new fluctuation of left chest wall. No growth in cultures to date.   - Per ID continuing Fluconazole 400mg Daily. With Daily EKGs for QTc checks.   - continuing broad spectrum ABX with Dapto and cefepime (cultures negative but culture was taken while already on antibiotics)   - Follow up ID recs after CT chest.     Heme: afib ppx   - Eliquis 5mg BID    Dispo plan: [facility] pending acceptance     Arlet Arteaga PA-C

## 2022-12-03 NOTE — PROGRESS NOTE ADULT - SUBJECTIVE AND OBJECTIVE BOX
Patient discussed on morning rounds with Dr. Bell     Operation / Date: admitted for AMS, hypokalemia, poor nutrition, sternal wound VAC/anemia  7/21/22 TEVAR with R groin femoral cutdown    SUBJECTIVE ASSESSMENT:  77y Female reports that her left arm is still painful and that the pain is relieved by elevating it. No other complaints at this time.         Vital Signs Last 24 Hrs  T(C): 36.4 (03 Dec 2022 09:06), Max: 36.9 (02 Dec 2022 17:39)  T(F): 97.6 (03 Dec 2022 09:06), Max: 98.4 (02 Dec 2022 17:39)  HR: 81 (03 Dec 2022 13:13) (78 - 102)  BP: 120/57 (03 Dec 2022 13:13) (100/57 - 151/60)  BP(mean): 81 (03 Dec 2022 13:13) (72 - 98)  RR: 12 (03 Dec 2022 13:13) (12 - 17)  SpO2: 100% (03 Dec 2022 13:13) (95% - 100%)    Parameters below as of 03 Dec 2022 13:13  Patient On (Oxygen Delivery Method): room air      I&O's Detail    02 Dec 2022 07:01  -  03 Dec 2022 07:00  --------------------------------------------------------  IN:    IV PiggyBack: 100 mL    Oral Fluid: 150 mL  Total IN: 250 mL    OUT:    Voided (mL): 550 mL  Total OUT: 550 mL    Total NET: -300 mL      PHYSICAL EXAM:    General: Well appearing. comfortable     Neurological: AOx3 Slurred speech but missing front teeth. Left Arm 4/5 strength. Normal Strength on all other extremities.     Cardiovascular: S1, S2 no murmur    Respiratory:  Left sided crackles. right Lung CTA/     Gastrointestinal: soft non tender non distended.     Extremities: Extremities all warm and well perfused. bilateral radial pulses palpable.     Incision Sites: Superior pole of MSI with well granulated tissue.  To the left of the incision mass has decreased in size. No pain or erythema in the area.     LABS:                        8.1    5.17  )-----------( 131      ( 02 Dec 2022 06:16 )             25.6       COUMADIN:  No.    12-02    133<L>  |  103  |  25<H>  ----------------------------<  85  4.0   |  21<L>  |  0.86    Ca    9.2      02 Dec 2022 06:16  Mg     2.2     12-02            MEDICATIONS  (STANDING):  apixaban 5 milliGRAM(s) Oral every 12 hours  aspirin  chewable 81 milliGRAM(s) Oral daily  atorvastatin 10 milliGRAM(s) Oral at bedtime  budesonide  80 MICROgram(s)/formoterol 4.5 MICROgram(s) Inhaler 2 Puff(s) Inhalation two times a day  cefepime   IVPB 2000 milliGRAM(s) IV Intermittent every 12 hours  DAPTOmycin IVPB 500 milliGRAM(s) IV Intermittent every 24 hours  fluconAZOLE   Tablet 400 milliGRAM(s) Oral daily  gabapentin 100 milliGRAM(s) Oral at bedtime  levothyroxine 50 MICROGram(s) Oral daily  lidocaine   4% Patch 1 Patch Transdermal daily  pantoprazole    Tablet 40 milliGRAM(s) Oral before breakfast  potassium chloride   Powder 40 milliEquivalent(s) Oral every 4 hours  senna 2 Tablet(s) Oral at bedtime  sodium chloride 0.9% lock flush 3 milliLiter(s) IV Push every 8 hours    MEDICATIONS  (PRN):  acetaminophen     Tablet .. 650 milliGRAM(s) Oral every 6 hours PRN Temp greater or equal to 38C (100.4F), Mild Pain (1 - 3)  polyethylene glycol 3350 17 Gram(s) Oral daily PRN Constipation        RADIOLOGY & ADDITIONAL TESTS:

## 2022-12-04 NOTE — PROGRESS NOTE ADULT - SUBJECTIVE AND OBJECTIVE BOX
INTERVAL HPI/OVERNIGHT EVENTS:  Afebrile.  Ongoing L arm - shoulder pain    CONSTITUTIONAL:  No fever, chills, night sweats  EYES:  No photophobia or visual changes  CARDIOVASCULAR:  No chest pain  RESPIRATORY:  No cough, wheezing, or SOB   GASTROINTESTINAL:  No nausea, vomiting, diarrhea, constipation, or abdominal pain  GENITOURINARY:  No frequency, urgency, dysuria or hematuria  NEUROLOGIC:  No headache, lightheadedness      ANTIBIOTICS/RELEVANT:    Daptomycin 500 mg IV q24h  Cefepime 2 g IV a12h  Fluconazole 400 mg po q24h        Vital Signs Last 24 Hrs  T(C): 36.3 (04 Dec 2022 17:48), Max: 36.3 (04 Dec 2022 17:48)  T(F): 97.4 (04 Dec 2022 17:48), Max: 97.4 (04 Dec 2022 17:48)  HR: 72 (04 Dec 2022 16:35) (69 - 81)  BP: 128/61 (04 Dec 2022 16:35) (94/52 - 144/63)  BP(mean): 88 (04 Dec 2022 16:35) (67 - 90)  RR: 18 (04 Dec 2022 16:35) (16 - 18)  SpO2: 99% (04 Dec 2022 16:35) (95% - 99%)    Parameters below as of 04 Dec 2022 16:35  Patient On (Oxygen Delivery Method): room air        PHYSICAL EXAM:  Constitutional:  Alert, oriented, appears weak  Eyes:  Sclerae anicteric, conjunctivae clear, PERRL  Ear/Nose/Throat:  No nasal exudate or sinus tenderness;  No buccal mucosal lesions, no pharyngeal erythema or exudate	  Neck:  Supple, no adenopathy  Chest:  Upper pole of sternal incision and L infraclavicular drainage site dressed  Respiratory:  Clear bilaterally  Cardiovascular:  RRR, S1S2, IV/VI syst murmur max at LUSB  Gastrointestinal:  Symmetric, normoactive BS, soft, NT, no masses, guarding or rebound.  No HSM  Extremities:  No edema, L arm cool;  RUE PICC exit site dressed        LABS:                    MICROBIOLOGY:    Body fluid 12/1 - few WBCs, no orgs seen;  NGTD    RADIOLOGY & ADDITIONAL STUDIES:

## 2022-12-04 NOTE — PROGRESS NOTE ADULT - SUBJECTIVE AND OBJECTIVE BOX
Patient discussed on morning rounds with Dr. Bell     Operation / Date: admitted for AMS, hypokalemia, poor nutrition, sternal wound VAC/anemia  7/21/22 TEVAR with R groin femoral cutdown    SUBJECTIVE ASSESSMENT:  77y Female offers no complaint this am. Family in the room and discussed options for rehab center. States open to anywhere she can get into except for the place she came from in [place].       Vital Signs Last 24 Hrs  T(C): 36.1 (04 Dec 2022 13:24), Max: 36.1 (03 Dec 2022 21:25)  T(F): 97 (04 Dec 2022 13:24), Max: 97 (03 Dec 2022 21:25)  HR: 79 (04 Dec 2022 12:05) (69 - 81)  BP: 94/52 (04 Dec 2022 12:05) (94/52 - 144/63)  BP(mean): 67 (04 Dec 2022 12:05) (67 - 91)  RR: 16 (04 Dec 2022 12:05) (12 - 16)  SpO2: 99% (04 Dec 2022 12:05) (95% - 99%)    Parameters below as of 04 Dec 2022 12:05  Patient On (Oxygen Delivery Method): room air      I&O's Detail    03 Dec 2022 07:01  -  04 Dec 2022 07:00  --------------------------------------------------------  IN:    IV PiggyBack: 100 mL    Oral Fluid: 120 mL  Total IN: 220 mL    OUT:    Voided (mL): 250 mL  Total OUT: 250 mL    Total NET: -30 mL      PHYSICAL EXAM:    General: Well appearing. comfortable     Neurological: AOx3 Slurred speech but missing front teeth. Left Arm 4/5 strength. Normal Strength on all other extremities.     Cardiovascular: S1, S2 no murmur    Respiratory:  Left sided crackles. right Lung CTA/     Gastrointestinal: soft non tender non distended.     Extremities: Extremities all warm and well perfused. bilateral radial pulses palpable.     Incision Sites: Superior pole of MSI with well granulated tissue.  To the left of the incision mass has decreased in size. No pain or erythema in the area.       LABS:      COUMADIN:  No. REASON: .            MEDICATIONS  (STANDING):  apixaban 5 milliGRAM(s) Oral every 12 hours  aspirin  chewable 81 milliGRAM(s) Oral daily  atorvastatin 10 milliGRAM(s) Oral at bedtime  budesonide  80 MICROgram(s)/formoterol 4.5 MICROgram(s) Inhaler 2 Puff(s) Inhalation two times a day  cefepime   IVPB 2000 milliGRAM(s) IV Intermittent every 12 hours  DAPTOmycin IVPB 500 milliGRAM(s) IV Intermittent every 24 hours  fluconAZOLE   Tablet 400 milliGRAM(s) Oral daily  gabapentin 100 milliGRAM(s) Oral at bedtime  levothyroxine 50 MICROGram(s) Oral daily  lidocaine   4% Patch 1 Patch Transdermal daily  metoprolol tartrate 12.5 milliGRAM(s) Oral two times a day  pantoprazole    Tablet 40 milliGRAM(s) Oral before breakfast  potassium chloride   Powder 40 milliEquivalent(s) Oral every 4 hours  senna 2 Tablet(s) Oral at bedtime  sodium chloride 0.9% lock flush 3 milliLiter(s) IV Push every 8 hours    MEDICATIONS  (PRN):  acetaminophen     Tablet .. 650 milliGRAM(s) Oral every 6 hours PRN Temp greater or equal to 38C (100.4F), Mild Pain (1 - 3)  polyethylene glycol 3350 17 Gram(s) Oral daily PRN Constipation        RADIOLOGY & ADDITIONAL TESTS:    < from: Xray Chest 1 View- PORTABLE-Routine (Xray Chest 1 View- PORTABLE-Routine in AM.) (12.03.22 @ 05:55) >  INTERPRETATION:  Clinical History: Postop    Frontal examination of the chest demonstrates the heart to be within   normal limits in transverse diameter. Status post valvular replacement   aortic stent. No interval change this remaining support devices in   comparison to examination of the chest 11/28/2022. No interval change   cardiomediastinal silhouette. Change left lung base. No acute infiltrates.    IMPRESSION: No acute infiltrates    --- End of Report ---    < end of copied text >

## 2022-12-04 NOTE — PROGRESS NOTE ADULT - ASSESSMENT
76 yo female, PMHx of CAD, HTN, HLD, spinal stenosis, arthritis, hypothyroidism, bicuspid aortic valve s/p AVR, ascending/hemiarch replacement with frozen elephant trunk and left aorto-subclavian bypass, CABGx2 with Dr. 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 Muller and Dr. Augustin 7/29/22, course complicated by sternal wound infection, presented with AMS. Per daughter, pt was discharged to rehab 3 days prior to presenting to [place] ED and has been getting progressively confused with poor oral intake. Consulted for parasternal abscess and resumed ertapenem, daptomycin, and fluconazole. Cultures remained negative. CK remained wnl. Patient's mental status can be 2/2 metabolic encephalopathy vs. fluctuating delirium. EEG was negative, CTH negative, CXR and UA unremarkable. No focal neurologic deficits. VAC in place at superior poll of sternotomy. Pt transferred to [facility] under Dr. Muller for management and evaluation. On 11/23-11/27 ID consulted, confusion likely from ertapenem toxicity switched to cefepime.  Neuro consulted, recommending MRI if possible. Wound vac removed with wet to dry dressings in place. QTc remains <490 so po fluconazole continued. On 11/28/22 patient felt dizzy and lightheaded and noted a new fluctuant, non tender, non erythematous mass on the left chest. 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 Vascular consulted, no intervention. IR consulted on 11/30/22 for possible drainage. On 12/1 she is s/p IR drainage of the left chest mass with about 10 cc with fluid cultures sent, no growth to date.  She is agreeable to [facility] due to family circumstances.       Neuro: pain managed. Left sided weakness but likely from vascular source. Neuro following   - No need to for MRI at this time.   - Gabapentin 100 for neuropathic pain     CVS: ; HR in the 70s.  Palpable pulse in the left radial. hypotensive in afternoon.   - Continue ASA and Statin   - Holding Lopressor again this afternoon. Continue to hold amlodipine and hydralizine.   - continue wet to dry dressing on superior pole of previous sternal incision     Respiratory: Saturating well on RA   - Wean off O2 sat > 92%  - IS and ambulation  - Chest PT  - continue budesonide     GI: having BMs.   - Dulcolax suppository   - GI PPX   - Bowel regimen with Senna and Miralax     : BUN/ Creatinine. 25/.8 - Arreola   - monitor I/Os.   - Repeat Labs tomorrow.     Endo: H/o hypothyroidism.  FS well contolled.   - ISS   - continue synthroid 50mcg Daily,      ID: Superior pole of sternal wound dehiscence s/p wound VAC now requiring wet to dry dressing. afebile with new fluctuation of left chest wall. No growth in cultures to date.   - Per ID continuing Fluconazole 400mg Daily. With Daily EKGs for QTc checks.   - continuing broad spectrum ABX with Dapto and cefepime (cultures negative but culture was taken while already on antibiotics)   - Follow up ID recs after CT chest.     Heme: afib ppx   - Eliquis 5mg BID    Dispo plan: [facility] pending acceptance     Arlet Arteaga PA-C

## 2022-12-04 NOTE — PROGRESS NOTE ADULT - ASSESSMENT
76F h/o CAD, bicuspid AV s/p AVR, ascending/hemiarch replacement with frozen elephant trunk and L aorto-subclavian bypass, CABG x 2 with Dr. Muller on 8/9/21, s/p PPM and flap closure on 9/29/21, L brachial occlusion s/p L brachial artery embolectomy 7/21/22, TEVAR with R groin cutdown and femoral artery repair with Dr. Muller and Dr. Augustin 7/29/22 c/b sternal wound infection s/p I&D and wound vac placement (WCx grew C. albicans), L parasternal chest wall collection/phlegmon with possible mediastinal extension (vs.  erroneous read due to VAC sponge), LUE limb ischemia but not a surgical candidate discharged on IV dapto/erta/fluc (11/2 - 12/13) p/w AMS.  Ertapenem was changed to cefepime because of concern for ertapenem-induced hallucinations, which have now resolved.  Would continue broad-spectrum coverage as she had received vanc and pip-tazo prior to OR on 11/2.  New L infraclavicular collection 11/28 - is nonenhancing c/c seroma vs. hematoma vs. phlegmon.  She is s/p IR aspiration 12/1 with removal of 20 cc of bloody fluid.  CT findings c/c chronic OM sternomanubrium.  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  Would continue IV antibiotics until 12/13 then continue fluconazole for suppression.    Suggest:  - F/U culture from IR drainage 12/1  - Continue daptomycin 500 mg IV q24h through 12/13.  CPK q Thursday   - Continue cefepime 2 g IV q12h through 12/13  - Continue fluconazole 400 mg po q24h indefinitely  - If she is d/jake to [facility], weekly labs would be:  CBC, CMP, ESR, CRP - fax to Dr. Crystal at [phone]  - We will arrange for outpatient f/u with Dr. Crystal  Recommendations discussed with primary team.  Please recall if further ID input is desired - team 2.

## 2022-12-05 NOTE — CHART NOTE - NSCHARTNOTEFT_GEN_A_CORE
Admitting Diagnosis:   Patient is a 77y old  Female who presents with a chief complaint of altered mental status (05 Dec 2022 10:34)      PAST MEDICAL & SURGICAL HISTORY:  HTN (hypertension)      H/O aortic valve stenosis      CAD (coronary artery disease)      S/P aortic valve replacement      S/P CABG (coronary artery bypass graft)      H/O aortic arch replacement      Pacemaker  medtronic micra pacemaker    Current Nutrition Order: Regular Diet    PO Intake: Good (%) [   ]  Fair (50-75%) [ x  ] Poor (<25%) [   ]    GI Issues: No nausea/vomiting documented at this time. Last documented bowel movement 12/5.    Pain: No pain noted at time of RD interview.    Skin Integrity: Generalized edema 1+. Surgical incisions per chart. Yousif score: 17.    Labs:   12-05    135  |  106  |  20  ----------------------------<  89  3.9   |  20<L>  |  0.85    Ca    9.1      05 Dec 2022 07:04  Phos  2.8     12-05  Mg     2.1     12-05    TPro  7.1  /  Alb  3.2<L>  /  TBili  0.5  /  DBili  x   /  AST  19  /  ALT  14  /  AlkPhos  104  12-05    CAPILLARY BLOOD GLUCOSE          Medications:  MEDICATIONS  (STANDING):  apixaban 5 milliGRAM(s) Oral every 12 hours  aspirin  chewable 81 milliGRAM(s) Oral daily  atorvastatin 10 milliGRAM(s) Oral at bedtime  budesonide  80 MICROgram(s)/formoterol 4.5 MICROgram(s) Inhaler 2 Puff(s) Inhalation two times a day  cefepime   IVPB 2000 milliGRAM(s) IV Intermittent every 12 hours  DAPTOmycin IVPB 500 milliGRAM(s) IV Intermittent every 24 hours  fluconAZOLE   Tablet 400 milliGRAM(s) Oral daily  gabapentin 100 milliGRAM(s) Oral at bedtime  levothyroxine 50 MICROGram(s) Oral daily  lidocaine   4% Patch 1 Patch Transdermal daily  metoprolol tartrate 12.5 milliGRAM(s) Oral two times a day  pantoprazole    Tablet 40 milliGRAM(s) Oral before breakfast  senna 2 Tablet(s) Oral at bedtime  sodium chloride 0.9% lock flush 3 milliLiter(s) IV Push every 8 hours    MEDICATIONS  (PRN):  acetaminophen     Tablet .. 650 milliGRAM(s) Oral every 6 hours PRN Temp greater or equal to 38C (100.4F), Mild Pain (1 - 3)  polyethylene glycol 3350 17 Gram(s) Oral daily PRN Constipation      Dosing Anthropometrics:  Height for BMI (FEET)	5 Feet  Height for BMI (INCHES)	2 Inch(s)  Height for BMI (CENTIMETERS)	157.48 Centimeter(s)  Weight for BMI (lbs)	165 lb  Weight for BMI (kg)	74.8 kg  Body Mass Index	30.1      Weight Change: No new documented weights to assess at this time. Will continue to monitor weight changes/trends as able.    Estimated energy needs: Ideal body weight used for calculations as pt >120% of IBW (150%). Adjusted for needs for pressure ulcer (Stage 1)  Estimated Energy Needs From (db/kg) 30   Estimated Energy Needs To (db/kg)	35   Estimated Energy Needs Calculated From (db/kg) 1494   Estimated Energy Needs Calculated To (db/kg)	1743     Estimated Protein Needs From (g/kg)	1.25   Estimated Protein Needs To (g/kg)	1.5   Estimated Protein Needs Calculated From (g/kg)	62.25   Estimated Protein Needs Calculated To (g/kg)	74.7     Estimated Fluid Needs From (ml/kg)	30   Estimated Fluid Needs Calculated From (ml/kg)	1494     Subjective: 76 yo female, PMHx of CAD, HTN, HLD, spinal stenosis, arthritis, hypothyroidism, bicuspid aortic valve s/p AVR, ascending/hemiarch replacement with frozen elephant trunk and left aorto-subclavian bypass, CABGx2 with Dr. 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 Muller and Dr. Augustin 7/29/22, course complicated by sternal wound infection, presented with AMS. Per daughter, pt was discharged to rehab 3 days prior to presenting to [place] ED and has been getting progressively confused with poor oral intake. Consulted for parasternal abscess and resumed ertapenem, daptomycin, and fluconazole. Cultures remained negative. CK remained wnl. Patient's mental status can be 2/2 metabolic encephalopathy vs. fluctuating delirium. EEG was negative, CTH negative, CXR and UA unremarkable. No focal neurologic deficits. VAC in place at superior poll of sternotomy. Pt transferred to [facility] under Dr. Muller for management and evaluation. On 11/23-11/27 ID consulted, confusion likely from ertapenem toxicity switched to cefepime.  Neuro consulted, recommending MRI if possible. Wound vac removed with wet to dry dressings in place. QTc remains <490 so po fluconazole continued. On 11/28/22 patient felt dizzy and lightheaded and noted a new fluctuant, non tender, non erythematous mass on the left chest. 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 Vascular consulted, no intervention. IR consulted on 11/30/22 for possible drainage. On 12/1 she is s/p IR drainage of the left chest mass with about 10 cc with fluid cultures sent, no growth to date. 12/2-12/5 She is agreeable to [facility] due to family circumstances, pending auth.     Pt seen at bedside for follow up assessment. Labs reviewed 12/5; electrolytes within normal limits at this time. Pt reports improvement in appetite today 11/28; awaiting breakfast at time of RD interview. Discussed importance of adequate PO intake; amenable to education. Pt additionally notes she is drinking Ensure as much as possible, however, observed multiple Ensures at bedside. Made aware RD remains available. RD to follow up. See nutrition recommendations below.     Previous Nutrition Diagnosis: Severe malnutrition r/t acute on chronic CAD AEB <75%PO in >7days, NFPE finding.    Active [ x  ]  Resolved [   ]    If resolved, new PES:     Goal: Pt to meet >75% EER with good tolerance consistently.     Recommendations:  1. Continue regular diet + Ensure Enlive 3x/day (350 kcal, 20 g protein per serving)   >>provide assistance w/ all meals  2. Monitor PO intake  -Align nutrition with GOC at all times  3. Recommend adding MVI  4. Monitor labs, GI distress, skin integrity  5. Consult RD for additional recommendations    Education: Adequate PO intake    Risk Level: High [   ] Moderate [  x ] Low [   ].
Admitting Diagnosis:   Patient is a 77y old  Female who presents with a chief complaint of altered mental status (28 Nov 2022 10:04)      PAST MEDICAL & SURGICAL HISTORY:  HTN (hypertension)      H/O aortic valve stenosis      CAD (coronary artery disease)      S/P aortic valve replacement      S/P CABG (coronary artery bypass graft)      H/O aortic arch replacement      Pacemaker  medtronic micra pacemaker    Current Nutrition Order: Regular Diet + Ensure Enlive 3x/day (350 kcal, 20 g protein per serving)     PO Intake: Good (%) [   ]  Fair (50-75%) [ x  ] Poor (<25%) [   ]    GI Issues: No nausea/vomiting documented at this time. Last documented bowel movement 11/23; no bowel regimen ordered (consider advanced regimen).    Pain: No pain noted at time of RD interview.    Skin Integrity: B/L foot edema 1+. Surgical incisions per chart. Yousif score: 16.    Labs:   11-28    137  |  104  |  19  ----------------------------<  98  3.7   |  24  |  0.74    Ca    9.0      28 Nov 2022 05:47  Mg     2.0     11-28    TPro  7.3  /  Alb  3.0<L>  /  TBili  0.6  /  DBili  x   /  AST  21  /  ALT  15  /  AlkPhos  95  11-28    CAPILLARY BLOOD GLUCOSE      POCT Blood Glucose.: 160 mg/dL (28 Nov 2022 10:25)      Medications:  MEDICATIONS  (STANDING):  aspirin  chewable 81 milliGRAM(s) Oral daily  atorvastatin 10 milliGRAM(s) Oral at bedtime  budesonide  80 MICROgram(s)/formoterol 4.5 MICROgram(s) Inhaler 2 Puff(s) Inhalation two times a day  cefepime   IVPB 2000 milliGRAM(s) IV Intermittent every 12 hours  DAPTOmycin IVPB 500 milliGRAM(s) IV Intermittent every 24 hours  fluconAZOLE   Tablet 400 milliGRAM(s) Oral daily  gabapentin 100 milliGRAM(s) Oral at bedtime  levothyroxine 50 MICROGram(s) Oral daily  lidocaine   4% Patch 1 Patch Transdermal daily  pantoprazole    Tablet 40 milliGRAM(s) Oral before breakfast  potassium chloride   Powder 40 milliEquivalent(s) Oral every 4 hours  sodium chloride 0.9% lock flush 3 milliLiter(s) IV Push every 8 hours    MEDICATIONS  (PRN):  acetaminophen     Tablet .. 650 milliGRAM(s) Oral every 6 hours PRN Temp greater or equal to 38C (100.4F), Mild Pain (1 - 3)    Dosing Anthropometrics:  Height for BMI (FEET)	5 Feet  Height for BMI (INCHES)	2 Inch(s)  Height for BMI (CENTIMETERS)	157.48 Centimeter(s)  Weight for BMI (lbs)	165 lb  Weight for BMI (kg)	74.8 kg  Body Mass Index	30.1      Weight Change: No new documented weights to assess at this time. Will continue to monitor weight changes/trends as able.    Estimated energy needs: Ideal body weight used for calculations as pt >120% of IBW (150%). Adjusted for needs for pressure ulcer (Stage 1)  Estimated Energy Needs From (db/kg) 30   Estimated Energy Needs To (db/kg)	35   Estimated Energy Needs Calculated From (db/kg) 1494   Estimated Energy Needs Calculated To (db/kg)	1743     Estimated Protein Needs From (g/kg)	1.25   Estimated Protein Needs To (g/kg)	1.5   Estimated Protein Needs Calculated From (g/kg)	62.25   Estimated Protein Needs Calculated To (g/kg)	74.7     Estimated Fluid Needs From (ml/kg)	30   Estimated Fluid Needs Calculated From (ml/kg)	1494     Subjective: 78 yo female, PMHx of CAD, HTN, HLD, spinal stenosis, arthritis, hypothyroidism, bicuspid aortic valve s/p AVR, ascending/hemiarch replacement with frozen elephant trunk and left aorto-subclavian bypass, CABGx2 with  on 8/9/2021 (post-op course c/b prolonged intubation, SSS s/p PPM, poor wound healing s/p pec flap and closure on 9/29/21), 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 Muller and Dr. Augustin 7/29/22, course complicated by sternal wound infection, presented with AMS. Per daughter, pt was discharged to rehab 3 days prior to presenting to ED and has been getting progressively confused with poor oral intake. Work up noted for WBC 9.4, hgb 8.8, Na 133, UA: LE small, CTH: negative. Burn was consulted for parasternal abscess and resumed ertapenem, daptomycin, and fluconazole. Cultures remained negative. CK remained wnl. Patient's mental status can be 2/2 metabolic encephalopathy vs. fluctuating delirium. EEG was negative, CTH negative, CXR and UA unremarkable. No focal neurologic deficits. Head CT was negative. VAC in place at superior poll of sternotomy. Pt transferred to [facility] under Dr. Muller for management and evaluation. On 11/23-11/24 ID consulted, confusion likely from ertapenum toxicity switched to cefepime.  Neuro consulted, recommending MRI if possible. Wound vac removed with wet to dry dressings in place. MSI wound with bleeding, granulating tissue; continuing wet to dries; very superficial at this point. QTc remains <490 so po fluconazole continued. Patient gearing up for discharge planning; may be able to go home (preferred by team and by patient) if transitioned to po abx or finishes abx course (At this time, the ID weekend coverage is refusing to make changes to primary ID attending's recs). Patient currently tolerating po antifungals. On 11/28 patient felt dizzy and lightheaded and noted a new fluctuant, non tender, non erythematous mass on the left chest. Pending CT chest with contrast for further evaluation.     Pt seen at bedside for follow up assessment. Labs reviewed 11/28; electrolytes within normal limits at this time. Pt well known to RD from previous admission. Pt reports improvement in appetite today 11/28; eating breakfast at time of RD interview. Discussed importance of adequate PO intake; amenable to education. Pt additionally notes she is drinking Ensure as much as possible, however, observed multiple Ensures at bedside. Reinforced importance of adequate PO intake; pt notes it is difficult for her to open some food items thus limited intake when having difficulty. RD opened pt lunch for her and encouraged pt to ask for assistance as needed. Made aware RD remains available. RD to follow up. See nutrition recommendations below.     Previous Nutrition Diagnosis: Severe malnutrition r/t acute on chronic CAD AEB <75%PO in >7days, NFPE finding.    Active [ x  ]  Resolved [   ]    If resolved, new PES:     Goal: Pt to meet >75% EER with good tolerance consistently.     Recommendations:  1. Continue regular diet, ONS TID  >>provide assistance w/ all meals  2. Monitor PO intake  -Align nutrition with GOC at all times  3. Recommend adding MVI  4. Monitor labs, GI distress, skin integrity  5. Consult RD for additional recommendations    Education: Adequate PO intake; role of RD    Risk Level: High [   ] Moderate [  x ] Low [   ]
Attempted to see Ms. Wong several times during the day- was off the floor.  Continue present anti-infectives.  Will see tomorrow.  Discussed with primary team.

## 2022-12-05 NOTE — PROGRESS NOTE ADULT - SUBJECTIVE AND OBJECTIVE BOX
Patient discussed on morning rounds with Dr. Muller    Operation / Date: admitted for AMS, hypokalemia, poor nutrition, sternal wound VAC/anemia  7/21/22 TEVAR with R groin femoral cutdown    SUBJECTIVE ASSESSMENT:  77y Female seen and examined at bedside with no complaints. Wet to dry dressing changed. She denies dizziness, vision changes, chest pain, palpitations, shortness of breath, cough, n/v/d, extremity swelling, calf tenderness.     Vital Signs Last 24 Hrs  T(C): 36.3 (05 Dec 2022 09:08), Max: 36.5 (05 Dec 2022 05:08)  T(F): 97.4 (05 Dec 2022 09:08), Max: 97.7 (05 Dec 2022 05:08)  HR: 83 (05 Dec 2022 08:11) (72 - 100)  BP: 91/54 (05 Dec 2022 08:11) (91/54 - 128/62)  BP(mean): 67 (05 Dec 2022 08:11) (67 - 89)  RR: 16 (05 Dec 2022 06:38) (16 - 18)  SpO2: 98% (05 Dec 2022 08:11) (97% - 100%)    Parameters below as of 05 Dec 2022 08:11  Patient On (Oxygen Delivery Method): room air      I&O's Detail    04 Dec 2022 07:01  -  05 Dec 2022 07:00  --------------------------------------------------------  IN:    IV PiggyBack: 100 mL    Oral Fluid: 120 mL  Total IN: 220 mL    OUT:    Voided (mL): 600 mL  Total OUT: 600 mL    Total NET: -380 mL      CHEST TUBE:  none  FRANCHESKA DRAIN:  none  EPICARDIAL WIRES: none  TIE DOWNS: none  REGALADO: none    PHYSICAL EXAM:  GEN: NAD, looks comfortable  Psych: Mood appropriate  Neuro: A&Ox3.  No focal deficits.  Moving all extremities.   HEENT: No obvious abnormalities  CV: S1S2, regular, no murmurs appreciated.  No carotid bruits.  No JVD  Lungs: right lung is clear, left lung has mild crackles at the base.  No wheezing, rales or rhonchi  ABD: Soft, non-tender, non-distended.  +Bowel sounds  EXT: Warm and well perfused.  No peripheral edema noted  Musculoskeletal: Moving all extremities with normal ROM, no joint swelling  PV: Pedal pulses palpable  Incisions: Superior pole of MSI with well granulated tissue.  To the left of the incision mass has decreased in size. No pain or erythema in the area.       LABS:                        7.3    4.35  )-----------( 124      ( 05 Dec 2022 07:04 )             22.9           12-05    135  |  106  |  20  ----------------------------<  89  3.9   |  20<L>  |  0.85    Ca    9.1      05 Dec 2022 07:04  Phos  2.8     12-05  Mg     2.1     12-05    TPro  7.1  /  Alb  3.2<L>  /  TBili  0.5  /  DBili  x   /  AST  19  /  ALT  14  /  AlkPhos  104  12-05    MEDICATIONS  (STANDING):  apixaban 5 milliGRAM(s) Oral every 12 hours  aspirin  chewable 81 milliGRAM(s) Oral daily  atorvastatin 10 milliGRAM(s) Oral at bedtime  budesonide  80 MICROgram(s)/formoterol 4.5 MICROgram(s) Inhaler 2 Puff(s) Inhalation two times a day  cefepime   IVPB 2000 milliGRAM(s) IV Intermittent every 12 hours  DAPTOmycin IVPB 500 milliGRAM(s) IV Intermittent every 24 hours  fluconAZOLE   Tablet 400 milliGRAM(s) Oral daily  gabapentin 100 milliGRAM(s) Oral at bedtime  levothyroxine 50 MICROGram(s) Oral daily  lidocaine   4% Patch 1 Patch Transdermal daily  metoprolol tartrate 12.5 milliGRAM(s) Oral two times a day  pantoprazole    Tablet 40 milliGRAM(s) Oral before breakfast  senna 2 Tablet(s) Oral at bedtime  sodium chloride 0.9% lock flush 3 milliLiter(s) IV Push every 8 hours    MEDICATIONS  (PRN):  acetaminophen     Tablet .. 650 milliGRAM(s) Oral every 6 hours PRN Temp greater or equal to 38C (100.4F), Mild Pain (1 - 3)  polyethylene glycol 3350 17 Gram(s) Oral daily PRN Constipation    RADIOLOGY & ADDITIONAL TESTS:  < from: Xray Chest 1 View- PORTABLE-Routine (Xray Chest 1 View- PORTABLE-Routine in AM.) (12.03.22 @ 05:55) >  IMPRESSION: No acute infiltrates    --- End of Report ---      < end of copied text >

## 2022-12-05 NOTE — PROGRESS NOTE ADULT - ASSESSMENT
78 yo female, PMHx of CAD, HTN, HLD, spinal stenosis, arthritis, hypothyroidism, bicuspid aortic valve s/p AVR, ascending/hemiarch replacement with frozen elephant trunk and left aorto-subclavian bypass, CABGx2 with Dr. 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 Muller and Dr. Augustin 7/29/22, course complicated by sternal wound infection, presented with AMS. Per daughter, pt was discharged to rehab 3 days prior to presenting to [place] ED and has been getting progressively confused with poor oral intake. Consulted for parasternal abscess and resumed ertapenem, daptomycin, and fluconazole. Cultures remained negative. CK remained wnl. Patient's mental status can be 2/2 metabolic encephalopathy vs. fluctuating delirium. EEG was negative, CTH negative, CXR and UA unremarkable. No focal neurologic deficits. VAC in place at superior poll of sternotomy. Pt transferred to [facility] under Dr. Muller for management and evaluation. On 11/23-11/27 ID consulted, confusion likely from ertapenem toxicity switched to cefepime.  Neuro consulted, recommending MRI if possible. Wound vac removed with wet to dry dressings in place. QTc remains <490 so po fluconazole continued. On 11/28/22 patient felt dizzy and lightheaded and noted a new fluctuant, non tender, non erythematous mass on the left chest. 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 Vascular consulted, no intervention. IR consulted on 11/30/22 for possible drainage. On 12/1 she is s/p IR drainage of the left chest mass with about 10 cc with fluid cultures sent, no growth to date.  She is agreeable to [facility] due to family circumstances.       Neuro: pain managed. Left sided weakness but likely from vascular source. Neuro following   - No need to for MRI at this time.   - Gabapentin 100 for neuropathic pain     CVS: ; HR in the 70s.  Palpable pulse in the left radial. hypotensive in afternoon.   - Continue ASA and Statin   - Holding Lopressor again this afternoon. Continue to hold amlodipine and hydralizine.   - continue wet to dry dressing on superior pole of previous sternal incision     Respiratory: Saturating well on RA   - Wean off O2 sat > 92%  - IS and ambulation  - Chest PT  - continue budesonide     GI: having BMs.   - Dulcolax suppository   - GI PPX   - Bowel regimen with Senna and Miralax     : BUN/ Creatinine. 25/.8 - Arreola   - monitor I/Os.   - Repeat Labs tomorrow.     Endo: H/o hypothyroidism.  FS well contolled.   - ISS   - continue synthroid 50mcg Daily,      ID: Superior pole of sternal wound dehiscence s/p wound VAC now requiring wet to dry dressing. afebile with new fluctuation of left chest wall. No growth in cultures to date.   - Per ID continuing Fluconazole 400mg Daily. With Daily EKGs for QTc checks.   - continuing broad spectrum ABX with Dapto and cefepime (cultures negative but culture was taken while already on antibiotics)   - Follow up ID recs after CT chest.     Heme: afib ppx   - Eliquis 5mg BID    Dispo plan: [facility] pending acceptance    78 yo female, PMHx of CAD, HTN, HLD, spinal stenosis, arthritis, hypothyroidism, bicuspid aortic valve s/p AVR, ascending/hemiarch replacement with frozen elephant trunk and left aorto-subclavian bypass, CABGx2 with Dr. 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 Muller and Dr. Augustin 7/29/22, course complicated by sternal wound infection, presented with AMS. Per daughter, pt was discharged to rehab 3 days prior to presenting to [place] ED and has been getting progressively confused with poor oral intake. Consulted for parasternal abscess and resumed ertapenem, daptomycin, and fluconazole. Cultures remained negative. CK remained wnl. Patient's mental status can be 2/2 metabolic encephalopathy vs. fluctuating delirium. EEG was negative, CTH negative, CXR and UA unremarkable. No focal neurologic deficits. VAC in place at superior poll of sternotomy. Pt transferred to [facility] under Dr. Muller for management and evaluation. On 11/23-11/27 ID consulted, confusion likely from ertapenem toxicity switched to cefepime.  Neuro consulted, recommending MRI if possible. Wound vac removed with wet to dry dressings in place. QTc remains <490 so po fluconazole continued. On 11/28/22 patient felt dizzy and lightheaded and noted a new fluctuant, non tender, non erythematous mass on the left chest. 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 Vascular consulted, no intervention. IR consulted on 11/30/22 for possible drainage. On 12/1 she is s/p IR drainage of the left chest mass with about 10 cc with fluid cultures sent, no growth to date. 12/2-12/5 She is agreeable to [facility] due to family circumstances, pending auth.     Neuro:  - pain managed.  - Left sided weakness but likely from vascular source. Vascular following   - No need to for MRI at this time.   - Gabapentin 100 for neuropathic pain     CVS:   #CAD  - Palpable pulse in the left radial. hypotensive in afternoon.   - Continue ASA and Statin   - due to hypotension, holding Lopressor again this afternoon. Continue to hold amlodipine and hydralazine   - continue wet to dry dressing on superior pole of previous sternal incision   - continue home Eliquis 5 mg q12H    Respiratory:   - Saturating well on RA   - Wean off O2 sat > 92%  - IS and ambulation  - Chest PT  - continue budesonide     GI:  - having BMs.   - Dulcolax suppository   - GI PPX   - Bowel regimen with Senna and Miralax     :   - BUN/ Creatinine 20/0.85   - monitor I/Os.   - Repeat Labs tomorrow.     Endo:  # H/o hypothyroidism.    - Synthroid 50 MCG QD   - FS well contolled.   - ISS   - A1C: 5.1  - TSH: 3.66      ID:   #Superior pole of sternal wound dehiscence s/p wound VAC now requiring wet to dry dressing. afebile with new fluctuation of left chest wall. No growth in cultures to date.   - Per ID continuing Fluconazole 400mg Daily. With Daily EKGs for QTc checks.   - continuing broad spectrum ABX with Dapto and cefepime (cultures negative but culture was taken while already on antibiotics)   - Follow up ID recs after CT chest.     Heme:   -DVT ppx with Eliquis 5 Q12H  -H/H 7.3/22.9, pending repeat CBC via lab draw at 12 pm to evaluate drop in Hb    Dispo plan:  - [facility] pending acceptance    78 yo female, PMHx of CAD, HTN, HLD, spinal stenosis, arthritis, hypothyroidism, bicuspid aortic valve s/p AVR, ascending/hemiarch replacement with frozen elephant trunk and left aorto-subclavian bypass, CABGx2 with Dr. 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 Muller and Dr. Augustin 7/29/22, course complicated by sternal wound infection, presented with AMS. Per daughter, pt was discharged to rehab 3 days prior to presenting to [place] ED and has been getting progressively confused with poor oral intake. Consulted for parasternal abscess and resumed ertapenem, daptomycin, and fluconazole. Cultures remained negative. CK remained wnl. Patient's mental status can be 2/2 metabolic encephalopathy vs. fluctuating delirium. EEG was negative, CTH negative, CXR and UA unremarkable. No focal neurologic deficits. VAC in place at superior poll of sternotomy. Pt transferred to [facility] under Dr. Muller for management and evaluation. On 11/23-11/27 ID consulted, confusion likely from ertapenem toxicity switched to cefepime.  Neuro consulted, recommending MRI if possible. Wound vac removed with wet to dry dressings in place. QTc remains <490 so po fluconazole continued. On 11/28/22 patient felt dizzy and lightheaded and noted a new fluctuant, non tender, non erythematous mass on the left chest. 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 Vascular consulted, no intervention. IR consulted on 11/30/22 for possible drainage. On 12/1 she is s/p IR drainage of the left chest mass with about 10 cc with fluid cultures sent, no growth to date. 12/2-12/5 She is agreeable to [facility] due to family circumstances, pending auth.     Neuro:  #Hx of encephalopathy   -due to Ertapenem now Discontinued and pt is now at baseline mental status   - pain managed.  - Left sided weakness but likely from vascular source. Vascular following   - No need to for MRI at this time.   - Gabapentin 100 for neuropathic pain     CVS:   #CAD  - Palpable pulse in the left radial. hypotensive in afternoon.   - Continue ASA and Statin   - due to hypotension, holding Lopressor again this afternoon. Continue to hold amlodipine and hydralazine   - continue wet to dry dressing on superior pole of previous sternal incision   - continue home Eliquis 5 mg q12H    Respiratory:   - Saturating well on RA   - Wean off O2 sat > 92%  - IS and ambulation  - Chest PT  - continue budesonide     GI:  - having BMs.   - Dulcolax suppository   - GI PPX   - Bowel regimen with Senna and Miralax     :   - BUN/ Creatinine 20/0.85   - monitor I/Os.   - Repeat Labs tomorrow.     Endo:  # H/o hypothyroidism.    - Synthroid 50 MCG QD   - FS well contolled.   - ISS   - A1C: 5.1  - TSH: 3.66      ID:   #Superior pole of sternal wound dehiscence s/p wound VAC now requiring wet to dry dressing. afebile with new fluctuation of left chest wall. No growth in cultures to date.   - Per ID continuing Fluconazole 400mg Daily. With Daily EKGs for QTc checks.   - continuing broad spectrum ABX with Dapto and cefepime (cultures negative but culture was taken while already on antibiotics)   - Follow up ID recs after CT chest.     Heme:   -DVT ppx with Eliquis 5 Q12H  -H/H 7.3/22.9, pending repeat CBC via lab draw at 12 pm to evaluate drop in Hb    Dispo plan:  - [facility] pending acceptance

## 2022-12-06 NOTE — DISCHARGE NOTE NURSING/CASE MANAGEMENT/SOCIAL WORK - NSDCPEELIQUISFU_GEN_ALL_CORE
Go for blood tests as directed. Your doctor will do lab tests at regular visits to monitor the effects of this medicine. Please follow up with your doctor and keep your health care provider appointments.
Niecy Mcguire, wife

## 2022-12-06 NOTE — DISCHARGE NOTE PROVIDER - CARE PROVIDER_API CALL
Bladimir Muller)  Surgery; Thoracic and Cardiac Surgery  [address], [address] [place] [address]  Phone: [phone]  Fax: [phone]  Scheduled Appointment: 12/21/2022 11:15 AM    Merissa Crystal)  Infectious Disease; Internal Medicine  [address], [address] [place] [address]  Phone: [phone]  Fax: [phone]  Scheduled Appointment: 12/15/2022 10:40 AM

## 2022-12-06 NOTE — DISCHARGE NOTE PROVIDER - NSDCMRMEDTOKEN_GEN_ALL_CORE_FT
acetaminophen 325 mg oral tablet: 2 tab(s) orally every 6 hours, As needed, Temp greater or equal to 38C (100.4F), Mild Pain (1 - 3)  apixaban 5 mg oral tablet: 1 tab(s) orally every 12 hours  aspirin 81 mg oral tablet, chewable: 1 tab(s) orally once a day  atorvastatin 10 mg oral tablet: 1 tab(s) orally once a day (at bedtime)  budesonide-formoterol 80 mcg-4.5 mcg/inh inhalation aerosol: 2 inhaler(s) inhaled 2 times a day  cefepime 2 g intravenous injection: 2 gram(s) intravenous every 12 hours  DAPTOmycin 500 mg intravenous injection: 500 milligram(s) intravenous every 24 hours  famotidine 20 mg oral tablet: 1 tab(s) orally once a day  ferrous sulfate 325 mg (65 mg elemental iron) oral tablet: 1 tab(s) orally once a day  fluconazole 200 mg oral tablet: 2 tab(s) orally once a day  folic acid 1 mg oral tablet: 1 tab(s) orally once a day  gabapentin 100 mg oral capsule: 1 cap(s) orally once a day (at bedtime)  Heparin 10 units/ML post infusion : Heparin 10 units/ML post infusion   levothyroxine 50 mcg (0.05 mg) oral tablet: 1 tab(s) orally once a day  Metoprolol Tartrate 25 mg oral tablet: 0.5 tab(s) orally every 12 hours   Normal saline 0.9% 10 ML pre and post infusion flushes : Normal saline 0.9% 10 ML pre and post infusion flushes   pantoprazole 40 mg oral delayed release tablet: 1 tab(s) orally once a day (before a meal)  polyethylene glycol 3350 oral powder for reconstitution: 17 gram(s) orally once a day, As needed, Constipation  senna leaf extract oral tablet: 2 tab(s) orally once a day (at bedtime)  Weekly CBC, CMP, ESR, CRP, CK. Please fax to [phone]: 1 unit(s) once a day    acetaminophen 325 mg oral tablet: 2 tab(s) orally every 6 hours, As needed, Temp greater or equal to 38C (100.4F), Mild Pain (1 - 3)  apixaban 5 mg oral tablet: 1 tab(s) orally every 12 hours  aspirin 81 mg oral tablet, chewable: 1 tab(s) orally once a day  atorvastatin 10 mg oral tablet: 1 tab(s) orally once a day (at bedtime)  budesonide-formoterol 80 mcg-4.5 mcg/inh inhalation aerosol: 2 inhaler(s) inhaled 2 times a day  cefepime 2 g intravenous injection: 2 gram(s) intravenous every 12 hours  DAPTOmycin 500 mg intravenous injection: 500 milligram(s) intravenous every 24 hours  ferrous sulfate 325 mg (65 mg elemental iron) oral tablet: 1 tab(s) orally once a day  fluconazole 200 mg oral tablet: 2 tab(s) orally once a day  folic acid 1 mg oral tablet: 1 tab(s) orally once a day  gabapentin 100 mg oral capsule: 1 cap(s) orally once a day (at bedtime)  Heparin 10 units/ML post infusion : Heparin 10 units/ML post infusion   levothyroxine 50 mcg (0.05 mg) oral tablet: 1 tab(s) orally once a day  Metoprolol Tartrate 25 mg oral tablet: 0.5 tab(s) orally every 12 hours   Normal saline 0.9% 10 ML pre and post infusion flushes : Normal saline 0.9% 10 ML pre and post infusion flushes   pantoprazole 40 mg oral delayed release tablet: 1 tab(s) orally once a day (before a meal)  polyethylene glycol 3350 oral powder for reconstitution: 17 gram(s) orally once a day, As needed, Constipation  senna leaf extract oral tablet: 2 tab(s) orally once a day (at bedtime)  Weekly CBC, CMP, ESR, CRP, CK. Please fax to [phone]: 1 unit(s) once a day

## 2022-12-06 NOTE — DISCHARGE NOTE PROVIDER - NSDCFUSCHEDAPPT_GEN_ALL_CORE_FT
Merissa Crystal  [facility]  INFDISEASE [address]  Scheduled Appointment: 12/15/2022    Bladimir Muller  [facility]  CTSURG [address]  Scheduled Appointment: 12/21/2022    Merissa Crystal  [facility]  INFDISEASE [address]  Scheduled Appointment: 12/22/2022    Parvez Khanna  [facility]  HEARTVASC [address]  Scheduled Appointment: 12/22/2022    Aron Blank  [facility]  CARDIOLOGY [address]  Scheduled Appointment: 01/30/2023    Bladimir Muller  [facility]  CTSURG [address]  Scheduled Appointment: 02/15/2023

## 2022-12-06 NOTE — DISCHARGE NOTE NURSING/CASE MANAGEMENT/SOCIAL WORK - PATIENT PORTAL LINK FT
You can access the FollowMyHealth Patient Portal offered by [facility] by registering at the following website: [email or web address]. By joining [facility]’s FollowMyHealth portal, you will also be able to view your health information using other applications (apps) compatible with our system.

## 2022-12-06 NOTE — DISCHARGE NOTE PROVIDER - NSDCFUADDINST_GEN_ALL_CORE_FT
-Walk daily as tolerated and use your incentive spirometer 10 times every hour while you are awake.     -Please weigh yourself daily. If you notice over a 3 pound weight gain in 3 days, this is a sign you are likely retaining too much fluid. It is imperative you call our right away with unexplained rapid weight gain.      -Please continue to wear the compression stockings given to you in the hospital at home. This is a way to prevent fluid from building up in your legs.     -No driving or strenuous activity/exercise until cleared by your surgeon.    -Gently clean your incisions with unscented/antibacterial soap and water, pat dry.  You may leave them open to air.    -Call your doctor if you have shortness of breath, chest pain not relieved by pain medication, dizziness, fever >100.5, or increased redness or drainage from incisions.

## 2022-12-06 NOTE — PROGRESS NOTE ADULT - ASSESSMENT
Med Reconciliation:  Medication Reconciliation Status	Admission Reconciliation is Completed  Discharge Reconciliation is Completed  Discharge Medications	acetaminophen 325 mg oral tablet: 2 tab(s) orally every 6 hours, As needed, Temp greater or equal to 38C (100.4F), Mild Pain (1 - 3)  apixaban 5 mg oral tablet: 1 tab(s) orally every 12 hours  aspirin 81 mg oral tablet, chewable: 1 tab(s) orally once a day  atorvastatin 10 mg oral tablet: 1 tab(s) orally once a day (at bedtime)  budesonide-formoterol 80 mcg-4.5 mcg/inh inhalation aerosol: 2 inhaler(s) inhaled 2 times a day  cefepime 2 g intravenous injection: 2 gram(s) intravenous every 12 hours  DAPTOmycin 500 mg intravenous injection: 500 milligram(s) intravenous every 24 hours  famotidine 20 mg oral tablet: 1 tab(s) orally once a day  ferrous sulfate 325 mg (65 mg elemental iron) oral tablet: 1 tab(s) orally once a day  fluconazole 200 mg oral tablet: 2 tab(s) orally once a day  folic acid 1 mg oral tablet: 1 tab(s) orally once a day  gabapentin 100 mg oral capsule: 1 cap(s) orally once a day (at bedtime)  Heparin 10 units/ML post infusion : Heparin 10 units/ML post infusion   levothyroxine 50 mcg (0.05 mg) oral tablet: 1 tab(s) orally once a day  Metoprolol Tartrate 25 mg oral tablet: 0.5 tab(s) orally every 12 hours   Normal saline 0.9% 10 ML pre and post infusion flushes : Normal saline 0.9% 10 ML pre and post infusion flushes   pantoprazole 40 mg oral delayed release tablet: 1 tab(s) orally once a day (before a meal)  polyethylene glycol 3350 oral powder for reconstitution: 17 gram(s) orally once a day, As needed, Constipation  senna leaf extract oral tablet: 2 tab(s) orally once a day (at bedtime)  Weekly CBC, CMP, ESR, CRP, CK. Please fax to [phone]: 1 unit(s) once a day  ,  ,     Care Plan/Procedures:  Discharge Diagnoses, Assessment and Plan of Treatment	PRINCIPAL DISCHARGE DIAGNOSIS  Diagnosis: AMS (altered mental status)  Assessment and Plan of Treatment:  Goal(s)	To get better and follow your care plan as instructed.     Follow Up:  Care Providers for Follow up (PCP/Outpatient Provider)	Bladimir Muller)  Surgery; Thoracic and Cardiac Surgery  [address], [address] [place] [address]  Phone: [phone]  Fax: [phone]  Scheduled Appointment: 12/21/2022 11:15 AM    Merissa Crystal)  Infectious Disease; Internal Medicine  [address], [address] [place] [address]  Phone: [phone]  Fax: [phone]  Scheduled Appointment: 12/15/2022 10:40 AM  Patient's Scheduled Appointments	Merissa Crystal  [facility] Physician [facility]  INFDISEASE [address]  Scheduled Appointment: 12/15/2022    Bladimir Muller  [facility]  CTSURG [address]  Scheduled Appointment: 12/21/2022    Merissa Crystal  [facility]  INFDISEASE [address]  Scheduled Appointment: 12/22/2022    Parvez Khanna  [facility] Physician [facility]  HEARTVASC [address]  Scheduled Appointment: 12/22/2022    Aron Blank  [facility]  CARDIOLOGY [address]  Scheduled Appointment: 01/30/2023    Bladimir Muller  [facility]  CTSURG [address]  Scheduled Appointment: 02/15/2023  Additional Scheduled Appointments	regarding your follow up appointments, if there are any issues please contact our office at [phone].  Discharge Diet	DASH Diet  Activity	No heavy lifting/straining, Walking - Indoors allowed, Walking - Outdoors allowed, Follow Instructions Provided by your Surgical Team  Additional Instructions	-Walk daily as tolerated and use your incentive spirometer 10 times every hour while you are awake.     -Please weigh yourself daily. If you notice over a 3 pound weight gain in 3 days, this is a sign you are likely retaining too much fluid. It is imperative you call our right away with unexplained rapid weight gain.      -Please continue to wear the compression stockings given to you in the hospital at home. This is a way to prevent fluid from building up in your legs.     -No driving or strenuous activity/exercise until cleared by your surgeon.    -Gently clean your incisions with unscented/antibacterial soap and water, pat dry.  You may leave them open to air.    -Call your doctor if you have shortness of breath, chest pain not relieved by pain medication, dizziness, fever >100.5, or increased redness or drainage from incisions.     Quality Measures:  Patient Condition	Stable  Hospice Patient	No  Does the patient have difficulty running errands alone like visiting a doctor’s office or shopping?	No  Does the patient have difficulty climbing stairs?	No  Does the patient have a principal diagnosis of ischemic stroke, hemorrhagic stroke, or TIA?	No  Does the patient have a principal diagnosis of Acute Myocardial Infarction?	No  Has the patient had a Percutaneous Coronary Intervention?	No  Did the Patient Present With or was Treated for Malnutrition During This Admission	Yes...  Details of Malnutrition Diagnosis/Diagnoses	This patient has been assessed with a concern for Malnutrition and was treated during this hospitalization for the following Nutrition diagnosis/diagnoses:     -  11/23/2022: Severe protein-calorie malnutrition

## 2022-12-06 NOTE — DISCHARGE NOTE PROVIDER - NSDCFUADDAPPT_GEN_ALL_CORE_FT
regarding your follow up appointments, if there are any issues please contact our office at [phone].

## 2022-12-06 NOTE — DISCHARGE NOTE PROVIDER - DETAILS OF MALNUTRITION DIAGNOSIS/DIAGNOSES
This patient has been assessed with a concern for Malnutrition and was treated during this hospitalization for the following Nutrition diagnosis/diagnoses:     -  11/23/2022: Severe protein-calorie malnutrition

## 2022-12-06 NOTE — DISCHARGE NOTE NURSING/CASE MANAGEMENT/SOCIAL WORK - NSDCPEFALRISK_GEN_ALL_CORE
For information on Fall & Injury Prevention, visit: [email or web address] OR  [email or web address] OR  https://www.cdc.gov/steadi/patient.html

## 2022-12-06 NOTE — PROGRESS NOTE ADULT - SUBJECTIVE AND OBJECTIVE BOX
Patient discussed on morning rounds with Dr. Muller     Operation / Date: admitted for AMS, hypokalemia, poor nutrition, sternal wound VAC/anemia  7/21/22 TEVAR with R groin femoral cutdown    Surgeon: Dr. Muller     SUBJECTIVE ASSESSMENT  77y Female seen and examined at bedside with no complaints. She denies dizziness, vision changes, chest pain, palpitations, shortness of breath, cough, n/v/d, extremity swelling, calf tenderness.     HOSPITAL COURSE:  76 yo female, PMHx of CAD, HTN, HLD, spinal stenosis, arthritis, hypothyroidism, bicuspid aortic valve s/p AVR, ascending/hemiarch replacement with frozen elephant trunk and left aorto-subclavian bypass, CABGx2 with Dr. 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 Muller and Dr. Augustin 7/29/22, course complicated by sternal wound infection, presented with AMS. Per daughter, pt was discharged to rehab 3 days prior to presenting to [place] ED and has been getting progressively confused with poor oral intake. Consulted for parasternal abscess and resumed ertapenem, daptomycin, and fluconazole. Cultures remained negative. CK remained wnl. Patient's mental status can be 2/2 metabolic encephalopathy vs. fluctuating delirium. EEG was negative, CTH negative, CXR and UA unremarkable. No focal neurologic deficits. VAC in place at superior poll of sternotomy. Pt transferred to [facility] under Dr. Muller for management and evaluation. On 11/23-11/27 ID consulted, confusion likely from ertapenem toxicity switched to cefepime.  Neuro consulted, recommending MRI if possible. Wound vac removed with wet to dry dressings in place. QTc remains <490 so po fluconazole continued. On 11/28/22 patient felt dizzy and lightheaded and noted a new fluctuant, non tender, non erythematous mass on the left chest. 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 Vascular consulted, no intervention. IR consulted on 11/30/22 for possible drainage. On 12/1 she is s/p IR drainage of the left chest mass with about 10 cc with fluid cultures sent, no growth to date. 12/2-12/5 She is agreeable to [facility] due to family circumstances. On 12/6 she was accepted to a rehab and was deemed medically stable by Dr. Muller for discharge on long term abx. At time of discharge her wet to dry dressing was changed, she ambulated with help of a walker and tolerated a PO diet. She denies dizziness, vision changes, chest pain, palpitations, shortness of breath, cough, n/v/d, extremity swelling, calf tenderness.     pt did not receive lasix during her hospital stay this admission, no furthers need for lasix af DC    *please continue daily wet to dry dressing changes*    Over 35 minutes was spent with the patient reviewing the discharge material including medications, follow up appointments, recovery, concerning symptoms, and how to contact their health care providers if they have questions    Vital Signs Last 24 Hrs  T(C): 36.1 (06 Dec 2022 13:22), Max: 36.4 (05 Dec 2022 21:41)  T(F): 96.9 (06 Dec 2022 13:22), Max: 97.6 (05 Dec 2022 21:41)  HR: 89 (06 Dec 2022 16:12) (71 - 89)  BP: 144/63 (06 Dec 2022 16:12) (99/51 - 144/63)  BP(mean): 91 (06 Dec 2022 16:12) (70 - 93)  RR: 18 (06 Dec 2022 16:12) (17 - 20)  SpO2: 100% (06 Dec 2022 16:12) (96% - 100%)    Parameters below as of 06 Dec 2022 16:12  Patient On (Oxygen Delivery Method): room air    EPICARDIAL WIRES REMOVED: Yes  TIE DOWNS REMOVED: Yes    PHYSICAL EXAM:  GEN: NAD, looks comfortable  Psych: Mood appropriate  Neuro: A&Ox3.  No focal deficits.  Moving all extremities.   HEENT: No obvious abnormalities  CV: S1S2, regular, no murmurs appreciated.  No carotid bruits.  No JVD  Lungs: right lung is clear, left lung has mild crackles at the base.  No wheezing, rales or rhonchi  ABD: Soft, non-tender, non-distended.  +Bowel sounds  EXT: Warm and well perfused.  No peripheral edema noted  Musculoskeletal: Moving all extremities with normal ROM, no joint swelling  PV: Pedal pulses palpable  Incisions: Superior pole of MSI with well granulated tissue.  To the left of the incision mass has decreased in size. No pain or erythema in the area.     LABS:                        8.4    6.46  )-----------( 155      ( 06 Dec 2022 05:30 )             26.7       COUMADIN:  not indicated         12-06    135  |  105  |  21  ----------------------------<  89  4.4   |  19<L>  |  0.86    Ca    9.5      06 Dec 2022 05:30  Phos  2.8     12-05  Mg     2.1     12-06    TPro  7.1  /  Alb  3.2<L>  /  TBili  0.5  /  DBili  x   /  AST  19  /  ALT  14  /  AlkPhos  104  12-05      Discharge CXR:  < from: Xray Chest 1 View- PORTABLE-Routine (Xray Chest 1 View- PORTABLE-Routine in AM.) (12.03.22 @ 05:55) >  IMPRESSION: No acute infiltrates    --- End of Report ---    < end of copied text >    Discharge ECHO:  < from: TTE Echo Complete w/o Contrast w/ Doppler (11.23.22 @ 14:28) >  ----  CONCLUSIONS:     1. Hyperdynamic left ventricular systolic function with cavity   obliteration resulting in an intra-cavitary gradient of 68.00 mmHg.   2. Mild symmetric left ventricular hypertrophy.   3. Normal right ventricular size and systolic function.   4. Mildly dilated left atrium.   5. Bioprosthetic valve is seen in the aortic position with apparent   normal function. The peak transvalvular velocity is 1.91 m/s, the mean   transvalvular gradient is 10.50 mmHg, and the LVOT/AV velocity ratio is   0.78.   6. The mitral valve is mildly thickened and calcified. There is severe   mitral annular calcification. The mean transvalvular gradient is 4.50   mmHg at a heart rate of 88 bpm. There is mild mitral regurgitation.   7. Moderate tricuspid regurgitation.   8. Pulmonary hypertension present, pulmonary artery systolic pressure is   39 mmHg.   9. No pericardial effusion.  10. Compared to the previous TTE performed on 11/2/2022, the left   ventricle is hyperdynamic and intracavitary gradient is now noted.    < end of copied text >     Patient discussed on morning rounds with Dr. Mullre     Operation / Date: admitted for AMS, hypokalemia, poor nutrition, sternal wound VAC/anemia  7/21/22 TEVAR with R groin femoral cutdown    Surgeon: Dr. Muller     SUBJECTIVE ASSESSMENT  77y Female seen and examined at bedside with no complaints. She denies dizziness, vision changes, chest pain, palpitations, shortness of breath, cough, n/v/d, extremity swelling, calf tenderness.     HOSPITAL COURSE:  76 yo female, PMHx of CAD, HTN, HLD, spinal stenosis, arthritis, hypothyroidism, bicuspid aortic valve s/p AVR, ascending/hemiarch replacement with frozen elephant trunk and left aorto-subclavian bypass, CABGx2 with Dr. 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 Muller and Dr. Augustin 7/29/22, course complicated by sternal wound infection, presented with AMS. Per daughter, pt was discharged to rehab 3 days prior to presenting to [place] ED and has been getting progressively confused with poor oral intake. Consulted for parasternal abscess and resumed ertapenem, daptomycin, and fluconazole. Cultures remained negative. CK remained wnl. Patient's mental status can be 2/2 metabolic encephalopathy vs. fluctuating delirium. EEG was negative, CTH negative, CXR and UA unremarkable. No focal neurologic deficits. VAC in place at superior poll of sternotomy. Pt transferred to [facility] under Dr. Muller for management and evaluation. On 11/23-11/27 ID consulted, confusion likely from ertapenem toxicity switched to cefepime.  Neuro consulted, recommending MRI if possible. Wound vac removed with wet to dry dressings in place. QTc remains <490 so po fluconazole continued. On 11/28/22 patient felt dizzy and lightheaded and noted a new fluctuant, non tender, non erythematous mass on the left chest. 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 Vascular consulted, no intervention. IR consulted on 11/30/22 for possible drainage. On 12/1 she is s/p IR drainage of the left chest mass with about 10 cc with fluid cultures sent, no growth to date. 12/2-12/5 She is agreeable to [facility] due to family circumstances. On 12/6 she was accepted to a rehab and was deemed medically stable by Dr. Muller for discharge on long term abx. At time of discharge her wet to dry dressing was changed, she ambulated with help of a walker and tolerated a PO diet. She denies dizziness, vision changes, chest pain, palpitations, shortness of breath, cough, n/v/d, extremity swelling, calf tenderness.     pt did not receive lasix during her hospital stay this admission, no furthers need for lasix af DC    *please continue daily wet to dry dressing changes*    Pt was sent to [facility] rehab in [place] [phone]    Over 35 minutes was spent with the patient reviewing the discharge material including medications, follow up appointments, recovery, concerning symptoms, and how to contact their health care providers if they have questions    Vital Signs Last 24 Hrs  T(C): 36.1 (06 Dec 2022 13:22), Max: 36.4 (05 Dec 2022 21:41)  T(F): 96.9 (06 Dec 2022 13:22), Max: 97.6 (05 Dec 2022 21:41)  HR: 89 (06 Dec 2022 16:12) (71 - 89)  BP: 144/63 (06 Dec 2022 16:12) (99/51 - 144/63)  BP(mean): 91 (06 Dec 2022 16:12) (70 - 93)  RR: 18 (06 Dec 2022 16:12) (17 - 20)  SpO2: 100% (06 Dec 2022 16:12) (96% - 100%)    Parameters below as of 06 Dec 2022 16:12  Patient On (Oxygen Delivery Method): room air    EPICARDIAL WIRES REMOVED: Yes  TIE DOWNS REMOVED: Yes    PHYSICAL EXAM:  GEN: NAD, looks comfortable  Psych: Mood appropriate  Neuro: A&Ox3.  No focal deficits.  Moving all extremities.   HEENT: No obvious abnormalities  CV: S1S2, regular, no murmurs appreciated.  No carotid bruits.  No JVD  Lungs: right lung is clear, left lung has mild crackles at the base.  No wheezing, rales or rhonchi  ABD: Soft, non-tender, non-distended.  +Bowel sounds  EXT: Warm and well perfused.  No peripheral edema noted  Musculoskeletal: Moving all extremities with normal ROM, no joint swelling  PV: Pedal pulses palpable  Incisions: Superior pole of MSI with well granulated tissue.  To the left of the incision mass has decreased in size. No pain or erythema in the area.     LABS:                        8.4    6.46  )-----------( 155      ( 06 Dec 2022 05:30 )             26.7       COUMADIN:  not indicated         12-06    135  |  105  |  21  ----------------------------<  89  4.4   |  19<L>  |  0.86    Ca    9.5      06 Dec 2022 05:30  Phos  2.8     12-05  Mg     2.1     12-06    TPro  7.1  /  Alb  3.2<L>  /  TBili  0.5  /  DBili  x   /  AST  19  /  ALT  14  /  AlkPhos  104  12-05      Discharge CXR:  < from: Xray Chest 1 View- PORTABLE-Routine (Xray Chest 1 View- PORTABLE-Routine in AM.) (12.03.22 @ 05:55) >  IMPRESSION: No acute infiltrates    --- End of Report ---    < end of copied text >    Discharge ECHO:  < from: TTE Echo Complete w/o Contrast w/ Doppler (11.23.22 @ 14:28) >  ----  CONCLUSIONS:     1. Hyperdynamic left ventricular systolic function with cavity   obliteration resulting in an intra-cavitary gradient of 68.00 mmHg.   2. Mild symmetric left ventricular hypertrophy.   3. Normal right ventricular size and systolic function.   4. Mildly dilated left atrium.   5. Bioprosthetic valve is seen in the aortic position with apparent   normal function. The peak transvalvular velocity is 1.91 m/s, the mean   transvalvular gradient is 10.50 mmHg, and the LVOT/AV velocity ratio is   0.78.   6. The mitral valve is mildly thickened and calcified. There is severe   mitral annular calcification. The mean transvalvular gradient is 4.50   mmHg at a heart rate of 88 bpm. There is mild mitral regurgitation.   7. Moderate tricuspid regurgitation.   8. Pulmonary hypertension present, pulmonary artery systolic pressure is   39 mmHg.   9. No pericardial effusion.  10. Compared to the previous TTE performed on 11/2/2022, the left   ventricle is hyperdynamic and intracavitary gradient is now noted.    < end of copied text >

## 2022-12-06 NOTE — DISCHARGE NOTE PROVIDER - PROVIDER TOKENS
PROVIDER:[TOKEN:[[id number]],SCHEDULEDAPPT:[12/21/2022],SCHEDULEDAPPTTIME:[11:15 AM]],PROVIDER:[TOKEN:[[id number]],SCHEDULEDAPPT:[12/15/2022],SCHEDULEDAPPTTIME:[10:40 AM]]

## 2022-12-06 NOTE — DISCHARGE NOTE PROVIDER - HOSPITAL COURSE
78 yo female, PMHx of CAD, HTN, HLD, spinal stenosis, arthritis, hypothyroidism, bicuspid aortic valve s/p AVR, ascending/hemiarch replacement with frozen elephant trunk and left aorto-subclavian bypass, CABGx2 with Dr. 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 Muller and Dr. Augustin 7/29/22, course complicated by sternal wound infection, presented with AMS. Per daughter, pt was discharged to rehab 3 days prior to presenting to [place] ED and has been getting progressively confused with poor oral intake. Consulted for parasternal abscess and resumed ertapenem, daptomycin, and fluconazole. Cultures remained negative. CK remained wnl. Patient's mental status can be 2/2 metabolic encephalopathy vs. fluctuating delirium. EEG was negative, CTH negative, CXR and UA unremarkable. No focal neurologic deficits. VAC in place at superior poll of sternotomy. Pt transferred to [facility] under Dr. Muller for management and evaluation. On 11/23-11/27 ID consulted, confusion likely from ertapenem toxicity switched to cefepime.  Neuro consulted, recommending MRI if possible. Wound vac removed with wet to dry dressings in place. QTc remains <490 so po fluconazole continued. On 11/28/22 patient felt dizzy and lightheaded and noted a new fluctuant, non tender, non erythematous mass on the left chest. 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 Vascular consulted, no intervention. IR consulted on 11/30/22 for possible drainage. On 12/1 she is s/p IR drainage of the left chest mass with about 10 cc with fluid cultures sent, no growth to date. 12/2-12/5 She is agreeable to [facility] due to family circumstances. On 12/6 she was accepted to a rehab and was deemed medically stable by Dr. Muller for discharge on long term abx. At time of discharge her wet to dry dressing was changed, she ambulated with help of a walker and tolerated a PO diet. She denies dizziness, vision changes, chest pain, palpitations, shortness of breath, cough, n/v/d, extremity swelling, calf tenderness.     Over 35 minutes was spent with the patient reviewing the discharge material including medications, follow up appointments, recovery, concerning symptoms, and how to contact their health care providers if they have questions     78 yo female, PMHx of CAD, HTN, HLD, spinal stenosis, arthritis, hypothyroidism, bicuspid aortic valve s/p AVR, ascending/hemiarch replacement with frozen elephant trunk and left aorto-subclavian bypass, CABGx2 with Dr. 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 Muller and Dr. Augustin 7/29/22, course complicated by sternal wound infection, presented with AMS. Per daughter, pt was discharged to rehab 3 days prior to presenting to [place] ED and has been getting progressively confused with poor oral intake. Consulted for parasternal abscess and resumed ertapenem, daptomycin, and fluconazole. Cultures remained negative. CK remained wnl. Patient's mental status can be 2/2 metabolic encephalopathy vs. fluctuating delirium. EEG was negative, CTH negative, CXR and UA unremarkable. No focal neurologic deficits. VAC in place at superior poll of sternotomy. Pt transferred to [facility] under Dr. Muller for management and evaluation. On 11/23-11/27 ID consulted, confusion likely from ertapenem toxicity switched to cefepime.  Neuro consulted, recommending MRI if possible. Wound vac removed with wet to dry dressings in place. QTc remains <490 so po fluconazole continued. On 11/28/22 patient felt dizzy and lightheaded and noted a new fluctuant, non tender, non erythematous mass on the left chest. 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 Vascular consulted, no intervention. IR consulted on 11/30/22 for possible drainage. On 12/1 she is s/p IR drainage of the left chest mass with about 10 cc with fluid cultures sent, no growth to date. 12/2-12/5 She is agreeable to [facility] due to family circumstances. On 12/6 she was accepted to a rehab and was deemed medically stable by Dr. Muller for discharge on long term abx. At time of discharge her wet to dry dressing was changed, she ambulated with help of a walker and tolerated a PO diet. She denies dizziness, vision changes, chest pain, palpitations, shortness of breath, cough, n/v/d, extremity swelling, calf tenderness.     pt did not receive lasix during her hospital stay this admission, no furthers need for lasix af DC    Over 35 minutes was spent with the patient reviewing the discharge material including medications, follow up appointments, recovery, concerning symptoms, and how to contact their health care providers if they have questions     78 yo female, PMHx of CAD, HTN, HLD, spinal stenosis, arthritis, hypothyroidism, bicuspid aortic valve s/p AVR, ascending/hemiarch replacement with frozen elephant trunk and left aorto-subclavian bypass, CABGx2 with Dr. 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 Muller and Dr. Augustin 7/29/22, course complicated by sternal wound infection, presented with AMS. Per daughter, pt was discharged to rehab 3 days prior to presenting to [place] ED and has been getting progressively confused with poor oral intake. Consulted for parasternal abscess and resumed ertapenem, daptomycin, and fluconazole. Cultures remained negative. CK remained wnl. Patient's mental status can be 2/2 metabolic encephalopathy vs. fluctuating delirium. EEG was negative, CTH negative, CXR and UA unremarkable. No focal neurologic deficits. VAC in place at superior poll of sternotomy. Pt transferred to [facility] under Dr. Muller for management and evaluation. On 11/23-11/27 ID consulted, confusion likely from ertapenem toxicity switched to cefepime.  Neuro consulted, recommending MRI if possible. Wound vac removed with wet to dry dressings in place. QTc remains <490 so po fluconazole continued. On 11/28/22 patient felt dizzy and lightheaded and noted a new fluctuant, non tender, non erythematous mass on the left chest. 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 Vascular consulted, no intervention. IR consulted on 11/30/22 for possible drainage. On 12/1 she is s/p IR drainage of the left chest mass with about 10 cc with fluid cultures sent, no growth to date. 12/2-12/5 She is agreeable to [facility] due to family circumstances. On 12/6 she was accepted to a rehab and was deemed medically stable by Dr. Muller for discharge on long term abx. At time of discharge her wet to dry dressing was changed, she ambulated with help of a walker and tolerated a PO diet. She denies dizziness, vision changes, chest pain, palpitations, shortness of breath, cough, n/v/d, extremity swelling, calf tenderness.     pt did not receive lasix during her hospital stay this admission, no furthers need for lasix af DC    *please continue daily wet to dry dressing changes*    Over 35 minutes was spent with the patient reviewing the discharge material including medications, follow up appointments, recovery, concerning symptoms, and how to contact their health care providers if they have questions     76 yo female, PMHx of CAD, HTN, HLD, spinal stenosis, arthritis, hypothyroidism, bicuspid aortic valve s/p AVR, ascending/hemiarch replacement with frozen elephant trunk and left aorto-subclavian bypass, CABGx2 with Dr. 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 Muller and Dr. Augustin 7/29/22, course complicated by sternal wound infection, presented with AMS. Per daughter, pt was discharged to rehab 3 days prior to presenting to [place] ED and has been getting progressively confused with poor oral intake. Consulted for parasternal abscess and resumed ertapenem, daptomycin, and fluconazole. Cultures remained negative. CK remained wnl. Patient's mental status can be 2/2 metabolic encephalopathy vs. fluctuating delirium. EEG was negative, CTH negative, CXR and UA unremarkable. No focal neurologic deficits. VAC in place at superior poll of sternotomy. Pt transferred to [facility] under Dr. Muller for management and evaluation. On 11/23-11/27 ID consulted, confusion likely from ertapenem toxicity switched to cefepime.  Neuro consulted, recommending MRI if possible. Wound vac removed with wet to dry dressings in place. QTc remains <490 so po fluconazole continued. On 11/28/22 patient felt dizzy and lightheaded and noted a new fluctuant, non tender, non erythematous mass on the left chest. 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 Vascular consulted, no intervention. IR consulted on 11/30/22 for possible drainage. On 12/1 she is s/p IR drainage of the left chest mass with about 10 cc with fluid cultures sent, no growth to date. 12/2-12/5 She is agreeable to [facility] due to family circumstances. On 12/6 she was accepted to a rehab and was deemed medically stable by Dr. Muller for discharge on long term abx. At time of discharge her wet to dry dressing was changed, she ambulated with help of a walker and tolerated a PO diet. She denies dizziness, vision changes, chest pain, palpitations, shortness of breath, cough, n/v/d, extremity swelling, calf tenderness.     pt did not receive lasix during her hospital stay this admission, no furthers need for lasix af DC    Pt was sent to [facility] rehab in [place] [phone]    *please continue daily wet to dry dressing changes*    Over 35 minutes was spent with the patient reviewing the discharge material including medications, follow up appointments, recovery, concerning symptoms, and how to contact their health care providers if they have questions     78 yo female, PMHx of CAD, HTN, HLD, spinal stenosis, arthritis, hypothyroidism, bicuspid aortic valve s/p AVR, ascending/hemiarch replacement with frozen elephant trunk and left aorto-subclavian bypass, CABGx2 with Dr. 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 Muller and Dr. Augustin 7/29/22, course complicated by sternal wound infection, presented with AMS. Per daughter, pt was discharged to rehab 3 days prior to presenting to [place] ED and has been getting progressively confused with poor oral intake. Consulted for parasternal abscess and resumed ertapenem, daptomycin, and fluconazole. Cultures remained negative. CK remained wnl. Patient's mental status can be 2/2 metabolic encephalopathy vs. fluctuating delirium. EEG was negative, CTH negative, CXR and UA unremarkable. No focal neurologic deficits. VAC in place at superior poll of sternotomy. Pt transferred to [facility] under Dr. Muller for management and evaluation. On 11/23-11/27 ID consulted, confusion likely from ertapenem toxicity switched to cefepime.  Neuro consulted, recommending MRI if possible. Wound vac removed with wet to dry dressings in place. QTc remains <490 so po fluconazole continued. On 11/28/22 patient felt dizzy and lightheaded and noted a new fluctuant, non tender, non erythematous mass on the left chest. 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 Vascular consulted, no intervention. IR consulted on 11/30/22 for possible drainage. On 12/1 she is s/p IR drainage of the left chest mass with about 10 cc with fluid cultures sent, no growth to date. 12/2-12/5 She is agreeable to [facility] due to family circumstances. On 12/6 she was accepted to a rehab and was deemed medically stable by Dr. Muller for discharge on long term abx. At time of discharge her wet to dry dressing was changed, she ambulated with help of a walker and tolerated a PO diet. She denies dizziness, vision changes, chest pain, palpitations, shortness of breath, cough, n/v/d, extremity swelling, calf tenderness. PICC in place for iv ABX.     pt did not receive lasix during her hospital stay this admission, no furthers need for lasix af DC    Pt was sent to [facility] rehab in [place] [phone]    *please continue daily wet to dry dressing changes*    Over 35 minutes was spent with the patient reviewing the discharge material including medications, follow up appointments, recovery, concerning symptoms, and how to contact their health care providers if they have questions     76 yo female, PMHx of CAD, HTN, HLD, spinal stenosis, arthritis, hypothyroidism, bicuspid aortic valve s/p AVR, ascending/hemiarch replacement with frozen elephant trunk and left aorto-subclavian bypass, CABGx2 with Dr. 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 Muller and Dr. Augustin 7/29/22, course complicated by sternal wound infection, presented with AMS. Per daughter, pt was discharged to rehab 3 days prior to presenting to [place] ED and has been getting progressively confused with poor oral intake. Consulted for parasternal abscess and resumed ertapenem, daptomycin, and fluconazole. Cultures remained negative. CK remained wnl. Patient's mental status can be 2/2 metabolic encephalopathy vs. fluctuating delirium. EEG was negative, CTH negative, CXR and UA unremarkable. No focal neurologic deficits. VAC in place at superior poll of sternotomy. Pt transferred to [facility] under Dr. Muller for management and evaluation. On 11/23-11/27 ID consulted, confusion likely from ertapenem toxicity switched to cefepime.  Neuro consulted, recommending MRI if possible. Wound vac removed with wet to dry dressings in place. QTc remains <490 so po fluconazole continued. On 11/28/22 patient felt dizzy and lightheaded and noted a new fluctuant, non tender, non erythematous mass on the left chest. 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 Vascular consulted, no intervention. IR consulted on 11/30/22 for possible drainage. On 12/1 she is s/p IR drainage of the left chest mass with about 10 cc with fluid cultures sent, no growth to date. 12/2-12/5 She is agreeable to [facility] due to family circumstances. On 12/6 she was accepted to a rehab and was deemed medically stable by Dr. Muller for discharge on long term abx. Due to a transportation issue, patient discharged following day 12/7/22. At time of discharge her wet to dry dressing was changed, she ambulated with help of a walker and tolerated a PO diet. She denies dizziness, vision changes, chest pain, palpitations, shortness of breath, cough, n/v/d, extremity swelling, calf tenderness. PICC in place for IV ABX.     pt did not receive lasix during her hospital stay this admission, no furthers need for lasix af DC    Pt was sent to [facility] rehab in [place] [phone]    *please continue daily wet to dry dressing changes on sternal wound*    Over 35 minutes was spent with the patient reviewing the discharge material including medications, follow up appointments, recovery, concerning symptoms, and how to contact their health care providers if they have questions     78 yo female, PMHx of CAD, HTN, HLD, spinal stenosis, arthritis, hypothyroidism, bicuspid aortic valve s/p AVR, ascending/hemiarch replacement with frozen elephant trunk and left aorto-subclavian bypass, CABGx2 with Dr. 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 Muller and Dr. Augustin 7/29/22, course complicated by sternal wound infection, presented with AMS. Per daughter, pt was discharged to rehab 3 days prior to presenting to [place] ED and has been getting progressively confused with poor oral intake. Consulted for parasternal abscess and resumed ertapenem, daptomycin, and fluconazole. Cultures remained negative. CK remained wnl. Patient's mental status can be 2/2 metabolic encephalopathy vs. fluctuating delirium. EEG was negative, CTH negative, CXR and UA unremarkable. No focal neurologic deficits. VAC in place at superior poll of sternotomy. Pt transferred to [facility] under Dr. Muller for management and evaluation. On 11/23-11/27 ID consulted, confusion likely from ertapenem toxicity switched to cefepime.  Neuro consulted, recommending MRI if possible. Wound vac removed with wet to dry dressings in place. QTc remains <490 so po fluconazole continued. On 11/28/22 patient felt dizzy and lightheaded and noted a new fluctuant, non tender, non erythematous mass on the left chest. 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 Vascular consulted, no intervention. IR consulted on 11/30/22 for possible drainage. On 12/1 she is s/p IR drainage of the left chest mass with about 10 cc with fluid cultures sent, no growth to date. 12/2-12/5 She is agreeable to [facility] due to family circumstances. On 12/6 she was accepted to a rehab and was deemed medically stable by Dr. Muller for discharge on long term abx. Due to a transportation issue, patient discharged following day 12/7/22. At time of discharge her wet to dry dressing was changed, she ambulated with help of a walker and tolerated a PO diet. She denies dizziness, vision changes, chest pain, palpitations, shortness of breath, cough, n/v/d, extremity swelling, calf tenderness. PICC in place for IV ABX.     pt did not receive lasix during her hospital stay this admission, no furthers need for lasix af DC    Pt was sent to [facility] rehab in [place] [phone]    *please continue daily wet to dry dressing changes on sternal wound* please use paper tape    Over 35 minutes was spent with the patient reviewing the discharge material including medications, follow up appointments, recovery, concerning symptoms, and how to contact their health care providers if they have questions

## 2022-12-06 NOTE — DISCHARGE NOTE NURSING/CASE MANAGEMENT/SOCIAL WORK - NSDCFUADDAPPT_GEN_ALL_CORE_FT
regarding your follow up appointments, if there are any issues please contact our office at [phone].

## 2022-12-07 NOTE — PROGRESS NOTE ADULT - NUTRITIONAL ASSESSMENT
This patient has been assessed with a concern for Malnutrition and has been determined to have a diagnosis/diagnoses of Severe protein-calorie malnutrition.    This patient is being managed with:   Diet Regular-  Supplement Feeding Modality:  Oral  Ensure Enlive Cans or Servings Per Day:  1       Frequency:  Three Times a day  Entered: Nov 23 2022  9:09AM      This patient has been assessed with a concern for Malnutrition and has been determined to have a diagnosis/diagnoses of Severe protein-calorie malnutrition.    This patient is being managed with:   Diet Regular-  Supplement Feeding Modality:  Oral  Ensure Enlive Cans or Servings Per Day:  1       Frequency:  Three Times a day  Entered: Nov 23 2022  9:09AM    
This patient has been assessed with a concern for Malnutrition and has been determined to have a diagnosis/diagnoses of Severe protein-calorie malnutrition.    This patient is being managed with:   Diet Regular-  Entered: Nov 30 2022  3:45PM    
This patient has been assessed with a concern for Malnutrition and has been determined to have a diagnosis/diagnoses of Severe protein-calorie malnutrition.    This patient is being managed with:   Diet Regular-  Supplement Feeding Modality:  Oral  Ensure Enlive Cans or Servings Per Day:  1       Frequency:  Three Times a day  Entered: Nov 23 2022  9:09AM    
This patient has been assessed with a concern for Malnutrition and has been determined to have a diagnosis/diagnoses of Severe protein-calorie malnutrition.    This patient is being managed with:   Diet Regular-  Entered: Nov 30 2022  3:45PM    
This patient has been assessed with a concern for Malnutrition and has been determined to have a diagnosis/diagnoses of Severe protein-calorie malnutrition.    This patient is being managed with:   Diet Regular-  Entered: Nov 30 2022  3:45PM      This patient has been assessed with a concern for Malnutrition and has been determined to have a diagnosis/diagnoses of Severe protein-calorie malnutrition.    This patient is being managed with:   Diet Regular-  Entered: Nov 30 2022  3:45PM    
This patient has been assessed with a concern for Malnutrition and has been determined to have a diagnosis/diagnoses of Severe protein-calorie malnutrition.    This patient is being managed with:   Diet Regular-  Entered: Nov 30 2022  3:45PM    Diet NPO after Midnight-     NPO Start Date: 30-Nov-2022   NPO Start Time: 23:59  Entered: Nov 30 2022  3:45PM    Diet NPO after Midnight-     NPO Start Date: 29-Nov-2022   NPO Start Time: 23:59  Entered: Nov 29 2022  5:38PM    
This patient has been assessed with a concern for Malnutrition and has been determined to have a diagnosis/diagnoses of Severe protein-calorie malnutrition.    This patient is being managed with:   Diet Regular-  Supplement Feeding Modality:  Oral  Ensure Enlive Cans or Servings Per Day:  1       Frequency:  Three Times a day  Entered: Nov 23 2022  9:09AM    
This patient has been assessed with a concern for Malnutrition and has been determined to have a diagnosis/diagnoses of Severe protein-calorie malnutrition.    This patient is being managed with:   Diet Regular-  Supplement Feeding Modality:  Oral  Ensure Enlive Cans or Servings Per Day:  1       Frequency:  Three Times a day  Entered: Nov 23 2022  9:09AM    
This patient has been assessed with a concern for Malnutrition and has been determined to have a diagnosis/diagnoses of Severe protein-calorie malnutrition.    This patient is being managed with:   Diet Regular-  Entered: Nov 30 2022  3:45PM    
This patient has been assessed with a concern for Malnutrition and has been determined to have a diagnosis/diagnoses of Severe protein-calorie malnutrition.    This patient is being managed with:   Diet Regular-  Entered: Nov 30 2022  3:45PM    
This patient has been assessed with a concern for Malnutrition and has been determined to have a diagnosis/diagnoses of Severe protein-calorie malnutrition.    This patient is being managed with:   Diet Regular-  Supplement Feeding Modality:  Oral  Ensure Enlive Cans or Servings Per Day:  1       Frequency:  Three Times a day  Entered: Nov 23 2022  9:09AM    
This patient has been assessed with a concern for Malnutrition and has been determined to have a diagnosis/diagnoses of Severe protein-calorie malnutrition.    This patient is being managed with:   Diet Regular-  Supplement Feeding Modality:  Oral  Ensure Enlive Cans or Servings Per Day:  1       Frequency:  Three Times a day  Entered: Nov 23 2022  9:09AM

## 2022-12-07 NOTE — PROGRESS NOTE ADULT - REASON FOR ADMISSION
altered mental status

## 2022-12-07 NOTE — PROGRESS NOTE ADULT - SUBJECTIVE AND OBJECTIVE BOX
Patient discussed on morning rounds with Dr. Muller      Operation / Date: admitted for AMS, hypokalemia, poor nutrition, sternal wound VAC/anemia  7/21/22 TEVAR with R groin femoral cutdown    Surgeon: Dr. Muller    SUBJECTIVE ASSESSMENT AND HOSPITAL COURSE:  76 yo female, PMHx of CAD, HTN, HLD, spinal stenosis, arthritis, hypothyroidism, bicuspid aortic valve s/p AVR, ascending/hemiarch replacement with frozen elephant trunk and left aorto-subclavian bypass, CABGx2 with Dr. 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 Muller and Dr. Augustin 7/29/22, course complicated by sternal wound infection, presented with AMS. Per daughter, pt was discharged to rehab 3 days prior to presenting to [facility] ED and has been getting progressively confused with poor oral intake. Consulted for parasternal abscess and resumed ertapenem, daptomycin, and fluconazole. Cultures remained negative. CK remained wnl. Patient's mental status can be 2/2 metabolic encephalopathy vs. fluctuating delirium. EEG was negative, CTH negative, CXR and UA unremarkable. No focal neurologic deficits. VAC in place at superior poll of sternotomy. Pt transferred to [facility] under Dr. Muller for management and evaluation. On 11/23-11/27 ID consulted, confusion likely from ertapenem toxicity switched to cefepime.  Neuro consulted, recommending MRI if possible. Wound vac removed with wet to dry dressings in place. QTc remains <490 so po fluconazole continued. On 11/28/22 patient felt dizzy and lightheaded and noted a new fluctuant, non tender, non erythematous mass on the left chest. 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 Vascular consulted, no intervention. IR consulted on 11/30/22 for possible drainage. On 12/1 she is s/p IR drainage of the left chest mass with about 10 cc with fluid cultures sent, no growth to date. 12/2-12/5 She is agreeable to [facility] due to family circumstances. On 12/6 she was accepted to a rehab and was deemed medically stable by Dr. Muller for discharge on long term abx. Due to a transportation issue, patient discharged following day 12/7/22. At time of discharge her wet to dry dressing was changed, she ambulated with help of a walker and tolerated a PO diet. She denies dizziness, vision changes, chest pain, palpitations, shortness of breath, cough, n/v/d, extremity swelling, calf tenderness. PICC in place for IV ABX.     pt did not receive lasix during her hospital stay this admission, no furthers need for lasix af DC    Pt was sent to [facility] rehab in [place] [phone]    *please continue daily wet to dry dressing changes on sternal wound*    Over 35 minutes was spent with the patient reviewing the discharge material including medications, follow up appointments, recovery, concerning symptoms, and how to contact their health care providers if they have questions    Vital Signs Last 24 Hrs  T(C): 36.3 (07 Dec 2022 08:37), Max: 36.6 (06 Dec 2022 22:41)  T(F): 97.3 (07 Dec 2022 08:37), Max: 97.8 (06 Dec 2022 22:41)  HR: 73 (07 Dec 2022 08:51) (68 - 89)  BP: 107/55 (07 Dec 2022 08:51) (107/55 - 144/63)  BP(mean): 73 (07 Dec 2022 08:51) (73 - 91)  RR: 16 (07 Dec 2022 08:51) (15 - 18)  SpO2: 100% (07 Dec 2022 08:51) (97% - 100%)    Parameters below as of 07 Dec 2022 08:51  Patient On (Oxygen Delivery Method): room air    EPICARDIAL WIRES REMOVED: Yes.  TIE DOWNS REMOVED: Yes.    PHYSICAL EXAM:  GEN: NAD, looks comfortable  Psych: Mood appropriate  Neuro: A&Ox3.  No focal deficits.  Moving all extremities.   HEENT: No obvious abnormalities  CV: S1S2, regular, no murmurs appreciated.  No carotid bruits.  No JVD  Lungs: right lung is clear, left lung has mild crackles at the base.  No wheezing, rales or rhonchi  ABD: Soft, non-tender, non-distended.  +Bowel sounds  EXT: Warm and well perfused.  No peripheral edema noted  Musculoskeletal: Moving all extremities with normal ROM, no joint swelling  PV: Pedal pulses palpable  Incisions: Superior pole of MSI with well granulated tissue, superficial wet to dry dressing in place.  To the left of the incision mass has significantly decreased in size. No pain or erythema in the area.     LABS:                        8.4    6.46  )-----------( 155      ( 06 Dec 2022 05:30 )             26.7       COUMADIN:   No.        12-06    135  |  105  |  21  ----------------------------<  89  4.4   |  19<L>  |  0.86    Ca    9.5      06 Dec 2022 05:30  Mg     2.1     12-06      Discharge CXR:  < from: Xray Chest 1 View- PORTABLE-Routine (Xray Chest 1 View- PORTABLE-Routine in AM.) (12.03.22 @ 05:55) >    INTERPRETATION:  Clinical History: Postop    Frontal examination of the chest demonstrates the heart to be within   normal limits in transverse diameter. Status post valvular replacement   aortic stent. No interval change this remaining support devices in   comparison to examination of the chest 11/28/2022. No interval change   cardiomediastinal silhouette. Change left lung base. No acute infiltrates.    IMPRESSION: No acute infiltrates    < end of copied text >

## 2022-12-07 NOTE — PROGRESS NOTE ADULT - ASSESSMENT
Assessment and Plan:   · Assessment	   Med Reconciliation:  Medication Reconciliation Status	Admission Reconciliation is Completed  Discharge Reconciliation is Completed  Discharge Medications	acetaminophen 325 mg oral tablet: 2 tab(s) orally every 6 hours, As needed, Temp greater or equal to 38C (100.4F), Mild Pain (1 - 3)  apixaban 5 mg oral tablet: 1 tab(s) orally every 12 hours  aspirin 81 mg oral tablet, chewable: 1 tab(s) orally once a day  atorvastatin 10 mg oral tablet: 1 tab(s) orally once a day (at bedtime)  budesonide-formoterol 80 mcg-4.5 mcg/inh inhalation aerosol: 2 inhaler(s) inhaled 2 times a day  cefepime 2 g intravenous injection: 2 gram(s) intravenous every 12 hours  DAPTOmycin 500 mg intravenous injection: 500 milligram(s) intravenous every 24 hours  famotidine 20 mg oral tablet: 1 tab(s) orally once a day  ferrous sulfate 325 mg (65 mg elemental iron) oral tablet: 1 tab(s) orally once a day  fluconazole 200 mg oral tablet: 2 tab(s) orally once a day  folic acid 1 mg oral tablet: 1 tab(s) orally once a day  gabapentin 100 mg oral capsule: 1 cap(s) orally once a day (at bedtime)  Heparin 10 units/ML post infusion : Heparin 10 units/ML post infusion   levothyroxine 50 mcg (0.05 mg) oral tablet: 1 tab(s) orally once a day  Metoprolol Tartrate 25 mg oral tablet: 0.5 tab(s) orally every 12 hours   Normal saline 0.9% 10 ML pre and post infusion flushes : Normal saline 0.9% 10 ML pre and post infusion flushes   pantoprazole 40 mg oral delayed release tablet: 1 tab(s) orally once a day (before a meal)  polyethylene glycol 3350 oral powder for reconstitution: 17 gram(s) orally once a day, As needed, Constipation  senna leaf extract oral tablet: 2 tab(s) orally once a day (at bedtime)  Weekly CBC, CMP, ESR, CRP, CK. Please fax to [phone]: 1 unit(s) once a day  ,  ,     Care Plan/Procedures:  Discharge Diagnoses, Assessment and Plan of Treatment	PRINCIPAL DISCHARGE DIAGNOSIS  Diagnosis: AMS (altered mental status)  Assessment and Plan of Treatment:  Goal(s)	To get better and follow your care plan as instructed.     Follow Up:  Care Providers for Follow up (PCP/Outpatient Provider)	Bladimir Muller)  Surgery; Thoracic and Cardiac Surgery  [address] [place] [address]  Phone: [phone]  Fax: [phone]  Scheduled Appointment: 12/21/2022 11:15 AM    Merissa Crystal)  Infectious Disease; Internal Medicine  [address], [address] [place] [address]  Phone: [phone]  Fax: [phone]  Scheduled Appointment: 12/15/2022 10:40 AM  Patient's Scheduled Appointments	Merissa Crystal  [facility] Physician [facility]  INFDISEASE [address]  Scheduled Appointment: 12/15/2022    Bladimir Muller  [facility]  CTSURG [address]  Scheduled Appointment: 12/21/2022    Merissa Crystal  [facility]  INFDISEASE [address]  Scheduled Appointment: 12/22/2022    Parvez Khanna  [facility]  HEARTVASC [address]  Scheduled Appointment: 12/22/2022    Aron Blank  [facility]  CARDIOLOGY [address]  Scheduled Appointment: 01/30/2023    Bladimir Muller  [facility]  CTSURG [address]  Scheduled Appointment: 02/15/2023  Additional Scheduled Appointments	regarding your follow up appointments, if there are any issues please contact our office at [phone].  Discharge Diet	DASH Diet  Activity	No heavy lifting/straining, Walking - Indoors allowed, Walking - Outdoors allowed, Follow Instructions Provided by your Surgical Team  Additional Instructions	-Walk daily as tolerated and use your incentive spirometer 10 times every hour while you are awake.     -Please weigh yourself daily. If you notice over a 3 pound weight gain in 3 days, this is a sign you are likely retaining too much fluid. It is imperative you call our right away with unexplained rapid weight gain.      -Please continue to wear the compression stockings given to you in the hospital at home. This is a way to prevent fluid from building up in your legs.     -No driving or strenuous activity/exercise until cleared by your surgeon.    -Gently clean your incisions with unscented/antibacterial soap and water, pat dry.  You may leave them open to air.    -Call your doctor if you have shortness of breath, chest pain not relieved by pain medication, dizziness, fever >100.5, or increased redness or drainage from incisions.     Quality Measures:

## 2022-12-07 NOTE — PROGRESS NOTE ADULT - PROVIDER SPECIALTY LIST ADULT
CT Surgery
Infectious Disease
CT Surgery
Infectious Disease
Vascular Surgery
CT Surgery
Electrophysiology
Infectious Disease
CT Surgery
Infectious Disease
Infectious Disease
CT Surgery
CT Surgery

## 2022-12-11 NOTE — DISCHARGE NOTE NURSING/CASE MANAGEMENT/SOCIAL WORK - NSPROEXTENSIONSOFSELF_GEN_A_NUR
Pt received on stretcher, pt amb, and returned with CBWR in NAD on stretcher.  Pt with Pre 0/10,  session  0/10, Post  0/10 pain levels: RN Aware. Pt presents at functional baseline, PT will not follow.
none

## 2022-12-12 ENCOUNTER — NON-APPOINTMENT (OUTPATIENT)
Age: 77
End: 2022-12-12

## 2022-12-13 DIAGNOSIS — D64.9 ANEMIA, UNSPECIFIED: ICD-10-CM

## 2022-12-13 DIAGNOSIS — Z95.1 PRESENCE OF AORTOCORONARY BYPASS GRAFT: ICD-10-CM

## 2022-12-13 DIAGNOSIS — I95.9 HYPOTENSION, UNSPECIFIED: ICD-10-CM

## 2022-12-13 DIAGNOSIS — T81.32XA DISRUPTION OF INTERNAL OPERATION (SURGICAL) WOUND, NOT ELSEWHERE CLASSIFIED, INITIAL ENCOUNTER: ICD-10-CM

## 2022-12-13 DIAGNOSIS — E43 UNSPECIFIED SEVERE PROTEIN-CALORIE MALNUTRITION: ICD-10-CM

## 2022-12-13 DIAGNOSIS — Z79.82 LONG TERM (CURRENT) USE OF ASPIRIN: ICD-10-CM

## 2022-12-13 DIAGNOSIS — R41.82 ALTERED MENTAL STATUS, UNSPECIFIED: ICD-10-CM

## 2022-12-13 DIAGNOSIS — E03.9 HYPOTHYROIDISM, UNSPECIFIED: ICD-10-CM

## 2022-12-13 DIAGNOSIS — I25.10 ATHEROSCLEROTIC HEART DISEASE OF NATIVE CORONARY ARTERY WITHOUT ANGINA PECTORIS: ICD-10-CM

## 2022-12-13 DIAGNOSIS — Z95.2 PRESENCE OF PROSTHETIC HEART VALVE: ICD-10-CM

## 2022-12-13 DIAGNOSIS — E87.6 HYPOKALEMIA: ICD-10-CM
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
## 2022-12-13 DIAGNOSIS — I49.5 SICK SINUS SYNDROME: ICD-10-CM

## 2022-12-13 DIAGNOSIS — Z95.0 PRESENCE OF CARDIAC PACEMAKER: ICD-10-CM

## 2022-12-13 DIAGNOSIS — R44.1 VISUAL HALLUCINATIONS: ICD-10-CM

## 2022-12-13 DIAGNOSIS — Z28.310 UNVACCINATED FOR COVID-19: ICD-10-CM
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
## 2022-12-13 DIAGNOSIS — G92.8 OTHER TOXIC ENCEPHALOPATHY: ICD-10-CM

## 2022-12-13 DIAGNOSIS — E78.5 HYPERLIPIDEMIA, UNSPECIFIED: ICD-10-CM
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
## 2022-12-13 DIAGNOSIS — I10 ESSENTIAL (PRIMARY) HYPERTENSION: ICD-10-CM

## 2022-12-13 DIAGNOSIS — T36.1X5A ADVERSE EFFECT OF CEPHALOSPORINS AND OTHER BETA-LACTAM ANTIBIOTICS, INITIAL ENCOUNTER: ICD-10-CM

## 2022-12-15 ENCOUNTER — APPOINTMENT (OUTPATIENT)
Dept: INFECTIOUS DISEASE | Facility: CLINIC | Age: 77
End: 2022-12-15

## 2022-12-17 LAB
CULTURE RESULTS: SIGNIFICANT CHANGE UP
SPECIMEN SOURCE: SIGNIFICANT CHANGE UP

## 2022-12-21 ENCOUNTER — APPOINTMENT (OUTPATIENT)
Dept: CARDIOTHORACIC SURGERY | Facility: CLINIC | Age: 77
End: 2022-12-21

## 2022-12-22 ENCOUNTER — APPOINTMENT (OUTPATIENT)
Dept: HEART AND VASCULAR | Facility: CLINIC | Age: 77
End: 2022-12-22

## 2022-12-22 ENCOUNTER — APPOINTMENT (OUTPATIENT)
Dept: INFECTIOUS DISEASE | Facility: CLINIC | Age: 77
End: 2022-12-22

## 2022-12-31 LAB
CULTURE RESULTS: SIGNIFICANT CHANGE UP
SPECIMEN SOURCE: SIGNIFICANT CHANGE UP

## 2023-01-03 ENCOUNTER — NON-APPOINTMENT (OUTPATIENT)
Age: 78
End: 2023-01-03

## 2023-01-30 ENCOUNTER — APPOINTMENT (OUTPATIENT)
Dept: CARDIOLOGY | Facility: CLINIC | Age: 78
End: 2023-01-30

## 2023-01-31 NOTE — PROGRESS NOTE ADULT - ASSESSMENT
BLOOD PRESSURE:    Start VALSARTAN once a day every day (morning or evening)    BLOOD tests in a month       75 y/o female, current every day smoker (59 PY), with PMHx HTN, bicuspid aortic valve, spinal stenosis, arthritis who complained of increased HERRERA for 6 months. TTE 3/2021 revealed EF normal, severe AS, moderate AI. NST 4/2021 revealed transient ischemic dilatation. Cardiac cath 5/11/21 revealed severe AS, prox circumflex 90%, OM1 70%, mid RCA 80%. 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 She was referred to Dr. Muller for surgical evaluation and deemed a good surgical candidate. Admitted for surgical planning and on 8/9/21 pt underwent AVR (tissue), Ascending, hemiarch replacement, frozen elephant trunk, left aorto-subclavian bypass, CABG x 2. OR course significant for 7 U pRBC/2 plt/2 FFP/1000 FEIBA; 3 L IVF. Arrived to CTICU on levo and initial LA was 4.1. POD1-2 extubated. POD3 AF with hypotension. Continued on pressors and lasix gtt, DCCV. POD4 AF, FIDEL. POD5 primacor was weaned off, Cr improved, extubated. POD7 ECHO with moderate pericardial effusion, s/p IR drainage. POD8 failed S/S. POD11 diuresed. POD13 sinus bradycardia, stable. POD15 transferred to 9L. POD 16 rapid AF with hypotension requiring pushes of linda and amio bolus. IV Lopressor  given and bradycardia at a rate of 45, V-paced. EP reconsulted and will need PPM. Monitoring sternal wound for worsening erythema/discharge. POD17 sternal wound erythema worsened with purulence expressed with pressure. Sternal wound opened at bedside and wound vac placed. Vanco started. PPM placement postponed. Remains V-paced at bedside. x1 unit of pRBCs, pending repeat labs. POD18 pending ID clearance for PPM placement given MSI dehiscence. Continuing diuresis. POD 19 wound vac changed POD 20 no events. POD21 repeat wound debridement at bedside completed, wound opened additional inch superiorly. Wound vac replaced. Pending PPM with EP.    Plan:    Neurovascular:   -Pain well controlled with current regimen. PRN's: acetaminophen  -deconditioning: testosterone patch  -anxiety: Xanax PRN     Cardiovascular:   -POD21 AVR, ascending, hemiarch replacement, frozen elephant trunk, left aorto-subclavian bypass, CABGx2 ( SVG-RPDA and SVG-LCx), EF 75%     -continue with ASA, amio, lisinopril    -SSS (tachy-marifer syndrome): pending PPM placement with EP pending resolution of sternal wound infection     -Hemodynamically stable.   -Monitor: BP, HR, tele    Respiratory:   -Oxygenating well on room air  -Encourage continued use of IS 10x/hr and frequent ambulation  -CXR: no acute pathology, no PTX     GI:  -GI PPX: pantoprazole 40mg daily   -PO Diet: Dysphagia 2 mechanical soft-thin liquids, supplementation with Ensure Enlive   -Bowel Regimen: miralax     Renal / :  -lasix 20mg IV Q12 hours + potassium 10mEq   -Continue to monitor renal function: BUN/Cr 15/0.87  -Monitor I/O's daily     Endocrine:    -No hx of DM or thyroid dx  -A1c: 5.6   -TSH: 2.6     Hematologic:  -CBC: H/H- 8.8/28  -Coagulation Panel: WNL     ID:  -Sternal wound infection: +Flebsiella oxytoca and Raoutella ornithinolytica    -continue with Vanco 1000mg Q24 hours (Vanco trough due after 4th dose) and Ceftriaxone 2G Q24 hours    -Temperature: afebrile   -CBC: WBC- 7.9  -Continue to observe for SIRS/Sepsis Syndrome.    Prophylaxis:  -DVT prophylaxis with 5000 SubQ Heparin q8h.  -Continue with SCD's b/l while patient is at rest     Disposition:  -Discharge home once patient is medically ready

## 2023-02-03 ENCOUNTER — NON-APPOINTMENT (OUTPATIENT)
Age: 78
End: 2023-02-03

## 2023-02-07 NOTE — ASU PATIENT PROFILE, ADULT - ALCOHOL USE HISTORY SINGLE SELECT
Patient came in complaining about dysuria and leakage. After discussing with ENZO Steven I was given verbal orders to obtain cath urine specimen. After obtaining consent from patient. Patient was then placed in the dorsal lithotomy position. Using sterile technique patient is carefully prepped with Betadine around the urethra. A pediatric catheterization kit is used which contains an approximate 5 Ophir Hint catheter. Urethral Catheter is introduced into the urinary bladder. Urine specimen is obtained and sent to the lab for culture and sensitivity. Patient tolerated procedure well. ENZO Steven was in office at time of procedure. never

## 2023-02-15 ENCOUNTER — APPOINTMENT (OUTPATIENT)
Dept: CARDIOTHORACIC SURGERY | Facility: CLINIC | Age: 78
End: 2023-02-15

## 2023-03-02 NOTE — ED PROVIDER NOTE - NS ED ATTENDING STATEMENT MOD
This was a shared visit with the JEFF. I reviewed and verified the documentation and independently performed the documented: I have personally provided the amount of critical care time documented below concurrently with the resident/fellow.  This time excludes time spent on separate procedures and time spent teaching. I have reviewed the resident’s / fellow’s documentation and I agree with the history, exam, and assessment and plan of care. Sotyktu Counseling:  I discussed the most common side effects of Sotyktu including: common cold, sore throat, sinus infections, cold sores, canker sores, folliculitis, and acne.  I also discussed more serious side effects of Sotyktu including but not limited to: serious allergic reactions; increased risk for infections such as TB; cancers such as lymphomas; rhabdomyolysis and elevated CPK; and elevated triglycerides and liver enzymes.

## 2023-03-06 NOTE — PROGRESS NOTE ADULT - SUBJECTIVE AND OBJECTIVE BOX
Operation / Date: 7/29: TEVAR, R groin Cutdown and femoral artery repair EF 75%    SUBJECTIVE ASSESSMENT: Patient seen and examined at bedside. Patient states that she is feeling well today and that her RLE weakness & pain has improved. Denies chest pain, SOB, palpitations, N/V.     Vital Signs Last 24 Hrs  T(C): 36.1 (07 Aug 2022 09:00), Max: 37.1 (06 Aug 2022 22:43)  T(F): 97 (07 Aug 2022 09:00), Max: 98.7 (06 Aug 2022 22:43)  HR: 67 (07 Aug 2022 08:47) (56 - 87)  BP: 145/66 (07 Aug 2022 08:47) (140/66 - 177/78)  BP(mean): 95 (07 Aug 2022 08:47) (95 - 103)  RR: 16 (07 Aug 2022 08:47) (14 - 18)  SpO2: 96% (07 Aug 2022 08:47) (95% - 99%)    Parameters below as of 07 Aug 2022 08:47  Patient On (Oxygen Delivery Method): room air    I&O's Detail    06 Aug 2022 07:01  -  07 Aug 2022 07:00  --------------------------------------------------------  IN:    Oral Fluid: 580 mL  Total IN: 580 mL    OUT:    Voided (mL): 150 mL  Total OUT: 150 mL    Total NET: 430 mL    07 Aug 2022 07:01  -  07 Aug 2022 10:25  --------------------------------------------------------  IN:  Total IN: 0 mL    OUT:    Voided (mL): 900 mL  Total OUT: 900 mL    Total NET: -900 mL    CHEST TUBE: None  FRANCHESKA DRAIN:  None  EPICARDIAL WIRES: None  TIE DOWNS: None  REGALADO: None    PHYSICAL EXAM:  GENERAL: NAD, lying in bed comfortably.   HEAD:  Atraumatic, Normocephalic  EYES: EOMI, PERRLA, conjunctiva and sclera clear  ENT: Moist mucous membranes  NECK: Supple, No JVD. TLC removed from R neck, dressing C/D/I  CHEST/LUNG: Clear to auscultation bilaterally; No rales, rhonchi, wheezing, or rubs. Unlabored respirations. Previously well-healed sternotomy incision noted on midline chest.   HEART: Regular rate and rhythm; No murmurs, rubs, or gallops  ABDOMEN: Bowel sounds present; Soft, Nontender, Nondistended. No hepatomegally  GROIN: R groin incision slightly erythematous   EXTREMITIES: Bilateral edema to LEs, +tender to palpation bilaterally, R>L.   NERVOUS SYSTEM:  Alert & Oriented X3, speech clear. No deficits     LABS:                        10.2   9.77  )-----------( 240      ( 06 Aug 2022 07:31 )             32.3     08-06    136  |  105  |  24<H>  ----------------------------<  86  4.3   |  22  |  0.86    Ca    9.4      06 Aug 2022 07:31  Mg     2.2     08-06    MEDICATIONS  (STANDING):  amLODIPine   Tablet 2.5 milliGRAM(s) Oral daily  aspirin enteric coated 81 milliGRAM(s) Oral daily  atorvastatin 10 milliGRAM(s) Oral at bedtime  budesonide  80 MICROgram(s)/formoterol 4.5 MICROgram(s) Inhaler 2 Puff(s) Inhalation two times a day  chlorhexidine 2% Cloths 1 Application(s) Topical <User Schedule>  fludroCORTISONE 0.1 milliGRAM(s) Oral daily  heparin   Injectable 5000 Unit(s) SubCutaneous every 8 hours  hydrALAZINE 10 milliGRAM(s) Oral every 8 hours  levothyroxine 50 MICROGram(s) Oral daily  metoprolol tartrate 12.5 milliGRAM(s) Oral every 6 hours  pantoprazole    Tablet 40 milliGRAM(s) Oral before breakfast  polyethylene glycol 3350 17 Gram(s) Oral daily  senna 2 Tablet(s) Oral at bedtime  testosterone patch 4 mG/24 Hr(s) 4 milliGRAM(s) Transdermal daily    MEDICATIONS  (PRN):  acetaminophen     Tablet .. 650 milliGRAM(s) Oral every 6 hours PRN Mild Pain (1 - 3)  oxyCODONE    IR 5 milliGRAM(s) Oral every 4 hours PRN Moderate Pain (4 - 6)           Pre-procedure checklist reviewed. Operation / Date: 7/29: TEVAR, R groin Cutdown and femoral artery repair EF 75%    SUBJECTIVE ASSESSMENT: Patient seen and examined at bedside. Patient states that she is feeling well today and that her RLE weakness & pain has improved. Denies chest pain, SOB, palpitations, N/V.     Vital Signs Last 24 Hrs  T(C): 36.1 (07 Aug 2022 09:00), Max: 37.1 (06 Aug 2022 22:43)  T(F): 97 (07 Aug 2022 09:00), Max: 98.7 (06 Aug 2022 22:43)  HR: 67 (07 Aug 2022 08:47) (56 - 87)  BP: 145/66 (07 Aug 2022 08:47) (140/66 - 177/78)  BP(mean): 95 (07 Aug 2022 08:47) (95 - 103)  RR: 16 (07 Aug 2022 08:47) (14 - 18)  SpO2: 96% (07 Aug 2022 08:47) (95% - 99%)    Parameters below as of 07 Aug 2022 08:47  Patient On (Oxygen Delivery Method): room air    I&O's Detail    06 Aug 2022 07:01  -  07 Aug 2022 07:00  --------------------------------------------------------  IN:    Oral Fluid: 580 mL  Total IN: 580 mL    OUT:    Voided (mL): 150 mL  Total OUT: 150 mL    Total NET: 430 mL    07 Aug 2022 07:01  -  07 Aug 2022 10:25  --------------------------------------------------------  IN:  Total IN: 0 mL    OUT:    Voided (mL): 900 mL  Total OUT: 900 mL    Total NET: -900 mL    CHEST TUBE: None  FRANCHESKA DRAIN:  None  EPICARDIAL WIRES: None  TIE DOWNS: None  REGALADO: None    PHYSICAL EXAM:  GENERAL: NAD, lying in bed comfortably.   HEAD:  Atraumatic, Normocephalic  EYES: EOMI, PERRLA, conjunctiva and sclera clear  ENT: Moist mucous membranes  NECK: Supple, No JVD. TLC removed from R neck, dressing C/D/I  CHEST/LUNG: Clear to auscultation bilaterally; No rales, rhonchi, wheezing, or rubs. Unlabored respirations. Previously well-healed sternotomy incision noted on midline chest.   HEART: Regular rate and rhythm; No murmurs, rubs, or gallops  ABDOMEN: Bowel sounds present; Soft, Nontender, Nondistended. No hepatomegally  GROIN: Medial aspect of R groin incision w/ purulent discharge, +surrounding erythema  EXTREMITIES: Bilateral edema to LEs, +tender to palpation bilaterally, R>L.   NERVOUS SYSTEM:  Alert & Oriented X3, speech clear. No deficits     LABS:                        10.2   9.77  )-----------( 240      ( 06 Aug 2022 07:31 )             32.3     08-06    136  |  105  |  24<H>  ----------------------------<  86  4.3   |  22  |  0.86    Ca    9.4      06 Aug 2022 07:31  Mg     2.2     08-06    MEDICATIONS  (STANDING):  amLODIPine   Tablet 2.5 milliGRAM(s) Oral daily  aspirin enteric coated 81 milliGRAM(s) Oral daily  atorvastatin 10 milliGRAM(s) Oral at bedtime  budesonide  80 MICROgram(s)/formoterol 4.5 MICROgram(s) Inhaler 2 Puff(s) Inhalation two times a day  chlorhexidine 2% Cloths 1 Application(s) Topical <User Schedule>  fludroCORTISONE 0.1 milliGRAM(s) Oral daily  heparin   Injectable 5000 Unit(s) SubCutaneous every 8 hours  hydrALAZINE 10 milliGRAM(s) Oral every 8 hours  levothyroxine 50 MICROGram(s) Oral daily  metoprolol tartrate 12.5 milliGRAM(s) Oral every 6 hours  pantoprazole    Tablet 40 milliGRAM(s) Oral before breakfast  polyethylene glycol 3350 17 Gram(s) Oral daily  senna 2 Tablet(s) Oral at bedtime  testosterone patch 4 mG/24 Hr(s) 4 milliGRAM(s) Transdermal daily    MEDICATIONS  (PRN):  acetaminophen     Tablet .. 650 milliGRAM(s) Oral every 6 hours PRN Mild Pain (1 - 3)  oxyCODONE    IR 5 milliGRAM(s) Oral every 4 hours PRN Moderate Pain (4 - 6)

## 2023-03-08 NOTE — DISCHARGE NOTE NURSING/CASE MANAGEMENT/SOCIAL WORK - NSDCPEELIQUISCOMP_GEN_ALL_CORE
Apixaban/Eliquis is used to treat and prevent blood clots. If you are not able to swallow the tablets whole, they may be crushed and mixed in water, apple juice, or applesauce and promptly taken within four hours. Never skip a dose of Apixaban/Eliquis. If you forget to take your Apixaban/Eliquis, take a dose as soon as you remember. If it is almost time for your next Apixaban/Eliquis dose, wait until then and take a regular dose. DO NOT take an extra pill to ‘catch up’.  NEVER TAKE A DOUBLE DOSE. Notify your doctor that you missed a dose. Take Apixaban/Eliquis at the same time each morning and evening. Apixaban/Eliquis may be taken with other medication or food. Admission

## 2023-11-21 NOTE — ED ADULT NURSE NOTE - HISTORY OF COVID-19 VACCINATION
Consultation - Infectious Disease   Manisha Rose 62 y.o. female MRN: [id number]  Unit/Bed#: [address] [address] -[id number] Encounter: [id number]    IMPRESSION & RECOMMENDATIONS:   1. Fever. No clear infectious etiology. She has had extensive outpatient work up including negative COVID-19/Flu, negative UA, negative blood cultures, normal TSH, negative CMV, EBV with evidence of past infection, negative Lyme Ab, normal CBCD, normal CMP with slight increase in Tbili, CXR with no acute infectious cardiopulmonary findings, and CT A/P wo contrast which showed no acute intraabdominal or intrapelvic findings. Patient is excellent historian and has no recent international travel, dental work, exposure to wild animals, diet change, or change in personal or home care products (see ROS for full details). Now inpatient, the patient has remained afebrile with normal WBC count. Admission blood cultures are pending. New CT chest with no acute infectious cardiopulmonary findings. She describes some occasional changes in the appearance of her urine output but no dysuria. New urine with no acute findings. At this point I would not recommend antibiotic treatment. She did become quickly flushed and warm during my exam, consider possibility of hormones playing a role in her fever and fatigue. She may benefit from work up by endocrinology. -monitor patient off antibiotics  -monitor CBCD and BMP  -follow up pending blood cultures  -monitor vitals  -consider endocrinology evaluation for hormonal source of fever  -supportive care    If the patient remains inpatient we will continue to follow along. Above plan was discussed in detail with patient at the bedside. Above plan was discussed in detail with SLIM. I have spent 80 minutes with patient, other providers, and in review of records today in completing this consultation.  Total time spent included review of testing, ordering medications and tests, communicating with other providers, documentation time, and counseling/providing education to patient as detailed in my note. HISTORY OF PRESENT ILLNESS:  Reason for Consult: fever  HPI: Renee Yao is a 62y.o. year old female who presented to the [address] [place] [address] ED on 11/20/2023 with fatigue and fever. Patient reports she's been having fevers since early October 2023. Initially started with an URI which then moved into her ear. Has been on three course of outpatient abx (Augmentin, Azithromycin + Prednisone, and Ceftin). Her URI symptoms are resolved but she continues to have fever. Additionally has noted some issue with passing hard gray matter in her urine. PCP completed outpatient work up of fevers including an ID e consult, no infectious source of fever found. Patient reported she was instructed by her PCP to come to the ED for fever over 100.4. Reports she had fever of 100.6 and came in for assessment. Upon arrival to the ED the patient's temperature was 98.2. HR was 87. RR was 16. O2 saturation was 97% on room air. BP was 185/113. WBC count was 6. 18. Creatinine was 0.64 with GFR = 97. LFTs were normal. Glucose was normal. No electrolyte abnormalities. Blood cultures were sent. UA was benign. Patient completed chest CT which showed no acute infectious cardiopulmonary findings. The patient was admitted for additional medical management. Since her admission she's been monitored off antibiotics. No recurrent fever has been noted. We have been asked for formal consult for fever. The patient has IBS, migraines, allergic rhinitis, depression, endometriosis, irregular heart beat, anxiety, pelvic pain, mitral valve prolapse, diffuse cystic mastopathy, hearing loss, PONV, and stress incontinence.  She has a history of irregular bleeding and uterine fibroids which lead patient to having ANCELMO, burn, ovarian cyst, R knee arthroscopy, wisdom tooth extraction, appendectomy, colonoscopy, EDG, endometrial ablation, D&C, and nasal septum surgery. She is a former smoker. She has allergies to bee venom, eggs, fluzone, iodine, shellfish, and ciprofloxacin. REVIEW OF SYSTEMS:  Patient reports that she's been feeling very tired lately. Is normally a busy and active person and for past few weeks she's been feeling slow and achy. Reports the fevers started back in early October around the time she had an URI. Shortly after that was diagnosed with an ear infection. She reports several rounds of antibiotics (Augmentin, Azithromycin + Prednisone, then Ceftin) but other than improvement in her URI symptoms she didn't feel better and continued to have fevers. She can feel her fevers coming on with chills, then has flushing and sweats. She has no ongoing URI symptoms including cough, difficulty breathing, chest pain, or SOB. She has no sore throat, runny nose, or congestion. No L ear pain. She has no nausea, vomiting, abdominal pain, diarrhea, food intolerance. No difficulty chewing or swallowing her food. No new dental pain. No recent dental work. Has no noticed any bleeding of her gums or changes in her teeth. She has occasional headaches, reports she gets migraines and treats them with Imitrex successfully. No dizziness, vertigo, or neck stiffness. Patient with travel to [address] [place] [address] and [address] [place] [address] in the past 6 months. Has been in swimming pools and the ocean. No hot tubs, fresh water. No hiking or time spent in the woods. Denies recent bug bites. Patient is not quiana and has not prepared game meat. Has a pet dog who licks her face sometimes but does not bite or scratch. No pet birds or rodents. Dog has not had any ticks that patient knows of. She has not tried any new foods or restaurants lately. She did have a lobster roll while in [address] [place] [address] which she reports she ate without difficulty. No recent change in home cleaning products, personal care products, or laundry products. No hobbies or jobs with exposure to chemicals, fumes, metals, plastics.  She reports issues with urinating which started earlier this spring. Denies dysuria, frequency, hesitancy. Has seen "flakes" of grey material come out in her urine. Reports it happens randomly and she only notes it after seeing in toilet, doesn't feel them come out. Reports extensive work up from both urology and OBGYN for this matter but hasn't gotten an answer. A complete 12 point system-based review of systems is otherwise negative. PAST MEDICAL HISTORY:  Past Medical History:   Diagnosis Date    Abnormal bleeding in menstrual cycle     Adenomyosis     Allergic reaction     LAST ASSESSED: 11/20/14    Allergic rhinitis     LAST ASSESSED: 11/20/14    Anxiety     Burn     Left upper, inner arm--from cooking.  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    Irregular heart beat     Irregular menses 2017    Irritable bowel syndrome     Migraines     MVP (mitral valve prolapse)     Ovarian cyst March 2019    Pelvic pain     PONV (postoperative nausea and vomiting)     Snores     Stress incontinence     Occasional     Past Surgical History:   Procedure Laterality Date    ABDOMINAL SURGERY      laparotomy secondary to SBO age 24    APPENDECTOMY      COLONOSCOPY      DILATION AND CURETTAGE OF UTERUS      EGD      ENDOMETRIAL ABLATION      HYSTERECTOMY  May 13, 2019    HYSTEROSCOPY  2012    I had 2 completed in 2012    KNEE ARTHROSCOPY Right     NASAL SEPTUM SURGERY      PA LAPS TOTAL HYSTERECT 250 GM/< W/RMVL TUBE/OVARY N/A 5/13/2019    Procedure: LAP TOTAL HYSTERECTOMY, B/L SALPINGECTOMY, EXTENSIVE ADHESIOLYSIS, CYSTOSCOPY;  Surgeon: Sammie Brittle, DO;  Location: [facility];  Service: Gynecology    WISDOM TOOTH EXTRACTION       FAMILY HISTORY:  Non-contributory    SOCIAL HISTORY:  Social History   Social History     Substance and Sexual Activity Alcohol Use Yes    Comment: social     Social History     Substance and Sexual Activity   Drug Use No     Social History     Tobacco Use   Smoking Status Former    Packs/day: 0.25    Years: 10.00    Total pack years: 2.50    Types: Cigarettes    Quit date:     Years since quittin.9   Smokeless Tobacco Never   Tobacco Comments    light smoker / 23 yrs ago     ALLERGIES:  Allergies   Allergen Reactions    Bee Venom Anaphylaxis    Eggs Or Egg-Derived Products - Food Allergy Anaphylaxis     Egg whites- tested at allergist    Fluzone [Influenza Virus Vaccine] Anaphylaxis, Shortness Of Breath, Diarrhea and Throat Swelling     10/18/18 given at employer    Iodine - Food Allergy Tachycardia     IV contrast dye caused tachycardia    Shellfish-Derived Products - Food Allergy Diarrhea and Vomiting    Ciprofloxacin      MEDICATIONS:  All current active medications have been reviewed. ANTIBIOTICS:  No current antibiotic use. PHYSICAL EXAM:  Temp:  [97.7 °F (36.5 °C)-98.4 °F (36.9 °C)] 97.8 °F (36.6 °C)  HR:  [53-87] 70  Resp:  [16-22] 16  BP: (126-185)/() 126/78  SpO2:  [96 %-99 %] 96 %  Temp (24hrs), Av °F (36.7 °C), Min:97.7 °F (36.5 °C), Max:98.4 °F (36.9 °C)  Current: Temperature: 97.8 °F (36.6 °C)  No intake or output data in the 24 hours ending 23 0750    General Appearance:  Appearing well, nontoxic, and in no distress. She appears comfortable sitting up in bed. Did become flushed at one point of my visit after reporting she felt hot. Head:  Normocephalic, without obvious abnormality, atraumatic. Eyes:  Conjunctiva pink and sclera anicteric, both eyes. Nose: Nares normal, mucosa normal, no drainage. Throat: Oropharynx moist without lesions. Neck: Supple, symmetrical, no adenopathy, no tenderness/mass/nodules. Back:   Symmetric, ROM normal, no CVA tenderness. Lungs:   Clear to auscultation bilaterally, respirations unlabored on room air.    Chest Wall:  No tenderness or deformity. Heart:  RRR; no murmur, rub or gallop. Abdomen:   Soft, non-tender, non-distended, positive bowel sounds throughout. Extremities: No cyanosis or clubbing, no edema. Skin: No rashes or lesions. No draining wounds noted. Lymph nodes: Cervical, supraclavicular nodes normal.   Neurologic: Alert and oriented times 3, follows commands, speech is organized and appropriate, symmetric facial movement. LABS, IMAGING, & OTHER STUDIES:  Lab Results:  I have personally reviewed pertinent labs. Results from last 7 days   Lab Units 11/21/23  0452 11/20/23  1335   WBC Thousand/uL 5.49 6.18   HEMOGLOBIN g/dL 12.7 13.3   PLATELETS Thousands/uL 240 258     Results from last 7 days   Lab Units 11/21/23  0452 11/20/23  1335   POTASSIUM mmol/L 3.6 3.9   CHLORIDE mmol/L 105 104   CO2 mmol/L 27 28   BUN mg/dL 16 18   CREATININE mg/dL 0.63 0.64   EGFR ml/min/1.73sq m 97 97   CALCIUM mg/dL 9.1 9.4   AST U/L 17 16   ALT U/L 16 16   ALK PHOS U/L 50 53     Results from last 7 days   Lab Units 11/20/23  1541 11/20/23  1535   BLOOD CULTURE  Received in Microbiology Lab. Culture in Progress. Received in Microbiology Lab. Culture in Progress. Imaging Studies:   I have personally reviewed pertinent imaging study reports and images in PACS. CT CHEST WO CONTRAST 11/20/2023 - I personally reviewed this study which showed no acute infectious cardiopulmonary findings. CT ABDOMEN PELVIS WO CONTRAST 11/17/2023 - I personally reviewed this study which showed no acute infectious intraabdominal or intrapelvic findings. Other Studies:   Blood cultures are pending. I have personally reviewed pertinent reports:  11/20/2023 1653 11/20/2023 1759 COVID/FLU/RSV [[id number]]    Nares from Nose    Final result [address] - copy/paste COVID Guidelines URL to browser: [email or web address]   SARS-CoV-2 assay is a Nucleic Acid Amplification assay intended for the   qualitative detection of nucleic acid from SARS-CoV-2 in nasopharyngeal   swabs. Results are for the presumptive identification of SARS-CoV-2 RNA. Positive results are indicative of infection with SARS-CoV-2, the virus   causing COVID-19, but do not rule out bacterial infection or co-infection   with other viruses. Laboratories within the [facility] and its   territories are required to report all positive results to the appropriate   public health authorities. Negative results do not preclude SARS-CoV-2   infection and should not be used as the sole basis for treatment or other   patient management decisions. Negative results must be combined with   clinical observations, patient history, and epidemiological information. This test has not been FDA cleared or approved. This test has been authorized by FDA under an Emergency Use Authorization   (EUA). This test is only authorized for the duration of time the   declaration that circumstances exist justifying the authorization of the   emergency use of an in vitro diagnostic tests for detection of SARS-CoV-2   virus and/or diagnosis of COVID-19 infection under section 564(b)(1) of   the Act, 21 U. S.C. 481QJT-9(G)(8), unless the authorization is terminated   or revoked sooner. The test has been validated but independent review by FDA   and CLIA is pending. Test performed using [employer]: This RT-PCR assay targets N2,   a region unique to SARS-CoV-2. A conserved region in the E-gene was chosen   for pan-Sarbecovirus detection which includes SARS-CoV-2. According to CMS-2020-01-R, this platform meets the definition of high-throughput technology.     Component Value   SARS-CoV-2 Negative    INFLUENZA A PCR Negative    INFLUENZA B PCR Negative    RSV PCR Negative No

## 2023-11-21 NOTE — PATIENT PROFILE ADULT - PATIENT'S PREFERRED PRONOUN
OFFICE VISIT      Patient: Jean Paul Cancino Date of Service: 2023   : 1977 MRN: [id number]     SUBJECTIVE:   HISTORY OF PRESENT ILLNESS:  Jean Paul Cancino is a 46 year old male who presents today for congestion and cough for one week. Denies fever or chills. He is fatigued. Denies shortness of breath or wheezing. He has been taking nyquil at night and it help with sleep.  Has chest congestion and post nasal drip.   This visit is being performed virtually via Non-integrated Video Visit. Consent to treat includes permission to submit charges to the patient's insurance. It was shared that without being seen and evaluated in person, there is a risk that the information and/or assessment may be incomplete or inaccurate. This video visit may be discontinued by patient or clinician, if it is felt that the videoconferencing connections are not adequate for his situation.   Clinical Location: [facility] VIRTUAL Cough  Jean Paul's location [facility] and is physically present in   the [facility] at the time of this visit.            PAST MEDICAL HISTORY:  Past Medical History:   Diagnosis Date   • H/O removal of neck cyst    • No known problems    • Patient denies medical problems        MEDICATIONS:  Current Outpatient Medications   Medication Sig   • omeprazole (PriLOSEC) 40 MG capsule    • fluticasone (Flonase) 50 MCG/ACT nasal spray Spray 2 sprays in each nostril daily for 10 days.     No current facility-administered medications for this visit.       ALLERGIES:  ALLERGIES:  No Known Allergies    PAST SURGICAL HISTORY:  Past Surgical History:   Procedure Laterality Date   • Cystotomy,excis vesical neck     • Shoulder surgery         FAMILY HISTORY:  Family History   Problem Relation Age of Onset   • Dementia/Alzheimers Mother    • Patient is unaware of any medical problems Father        SOCIAL HISTORY:  Social History     Tobacco Use   • Smoking status: Never   •  Smokeless tobacco: Never   Vaping Use   • Vaping Use: never used   Substance Use Topics   • Alcohol use: Yes     Comment: socially   • Drug use: Never       Review of Systems   Constitutional: Negative.    HENT: Positive for congestion.    Eyes: Negative.    Respiratory: Positive for cough.    Cardiovascular: Negative.    Gastrointestinal: Negative.    Endocrine: Negative.    Genitourinary: Negative.    Musculoskeletal: Negative.    Skin: Negative.    Allergic/Immunologic: Negative.    Neurological: Negative.    Hematological: Negative.    Psychiatric/Behavioral: Negative.          OBJECTIVE:     Physical Exam  Vitals reviewed.   HENT:      Head: Normocephalic.      Right Ear: Hearing, tympanic membrane, ear canal and external ear normal.      Left Ear: Hearing, tympanic membrane, ear canal and external ear normal.      Nose: Congestion present.      Mouth/Throat:      Lips: Pink.      Mouth: Mucous membranes are moist.      Pharynx: Oropharynx is clear. Uvula midline.   Cardiovascular:      Rate and Rhythm: Normal rate and regular rhythm.      Heart sounds: Normal heart sounds.   Pulmonary:      Effort: Pulmonary effort is normal.      Breath sounds: Normal breath sounds and air entry.   Neurological:      Mental Status: He is alert and oriented to person, place, and time.   Psychiatric:         Behavior: Behavior is cooperative.         Visit Vitals  /74 (BP Location: RUE - Right upper extremity, Patient Position: Sitting, Cuff Size: Regular)   Pulse 79   Temp 97.3 °F (36.3 °C) (Temporal)   Resp 18   Ht 6' 3\" (1.905 m)   Wt 108.9 kg (240 lb)   SpO2 98%   BMI 30.00 kg/m²       DIAGNOSTIC STUDIES:   LAB RESULTS:  No results found for this visit on 11/21/23.    Assessment AND PLAN:       DIAGNOSIS:    Viral upper respiratory tract infection        PLAN:    Return if symptoms worsen or fail to improve.    Instructions provided as documented in the AVS.    ASSESSMENT COMPLETED BY RABBL DEVICE WITH   PRESENT ON SITE WITH PATIENT.       Follow up with PCP as needed.    Nick Orozco, CNP      Her/She

## 2023-11-27 NOTE — ED PROVIDER NOTE - MUSCULOSKELETAL, MLM
IMPROVE-DD Application Not Available Spine appears normal, range of motion is not limited, no muscle or joint tenderness

## 2024-01-04 NOTE — PHYSICAL THERAPY INITIAL EVALUATION ADULT - TRANSFER SKILLS, REHAB EVAL
Please call patient  Need telephone only appointment with me in next 4 weeks for on going medications refills      Thanks   needs device

## 2024-01-11 NOTE — DISCHARGE NOTE PROVIDER - NSDCMRMEDTOKEN_GEN_ALL_CORE_FT
Push fluids, take MiraLAX with water or juice 2 times a day, stool softener 2 tablets twice a day.  Take this regimen for 4 to 5 days or until you feel you have cleaned out significantly and then decrease it to 1 tablet twice a day of the stool softener and 1-2 times a day for the MiraLAX.  See your doctor next week for recheck.  If you develop high fevers with vomiting and localizing pain, return to the ER.   acetaminophen 325 mg oral tablet: 2 tab(s) orally every 6 hours, As needed, Mild Pain (1 - 3)  amLODIPine 2.5 mg oral tablet: 1 tab(s) orally once a day  apixaban 5 mg oral tablet: 1 tab(s) orally every 12 hours  aspirin 81 mg oral tablet, chewable: 1 tab(s) orally once a day  atorvastatin 10 mg oral tablet: 1 tab(s) orally once a day (at bedtime)  DAPTOmycin 500 mg intravenous injection: 500 milligram(s) intravenously once a day until 12/13  ertapenem 1 g injection: 1 gram(s) intravenously once a day until 12/13  famotidine 20 mg oral tablet: 1 tab(s) orally once a day  fluconazole 200 mg oral tablet: 2 tab(s) orally once a day take till Dec 17th  furosemide 20 mg oral tablet: Take 2 tab(s) orally once a day till Nov 17th then take 1 tab once a day  Heparin 10 units/ML post infusion : Heparin 10 units/ML post infusion   hydrALAZINE 10 mg oral tablet: 1 tab(s) orally every 8 hours  levothyroxine 50 mcg (0.05 mg) oral tablet: 1 tab(s) orally once a day  metoprolol tartrate 50 mg oral tablet: 1 tab(s) orally 2 times a day  Normal saline 0.9% 10 ML pre and post infusion flushes : Normal saline 0.9% 10 ML pre and post infusion flushes   potassium chloride 10 mEq oral tablet, extended release: Take 2 tab(s) orally once a day till Nov 17th then take 1 tab once a day  senna leaf extract oral tablet: 2 tab(s) orally once a day (at bedtime), As Needed -for constipation   Symbicort 80 mcg-4.5 mcg/inh inhalation aerosol: 2 puff(s) inhaled 2 times a day  traMADol 50 mg oral tablet: 1 tab(s) orally once a day prior to wound vac change MDD:1  Weekly CBC, CMP, ESR, CRP, CK. Please fax to [phone]: 1 unit(s) once a day

## 2024-02-27 NOTE — PROGRESS NOTE ADULT - SUBJECTIVE AND OBJECTIVE BOX
CTICU  CRITICAL  CARE  attending     Hand off received 					   Pertinent clinical, laboratory, radiographic, hemodynamic, echocardiographic, respiratory data, microbiologic data and chart were reviewed and analyzed frequently throughout the course of the day  Patient seen and examined with CTS/ SH attending at bedside  Pt is a 77 yr old female previous smoker with PMH HTN, spinal stenosis, arthritis, biscupid AV sp AVR, ascending & hemiarch replacement with frozen elephant trunk and L aorto-subclavian bypass, CABG x 2 with Dr. Muller 8/9/21. Post op course cb prolonged intubation, SSS sp PPM, poor wound healing with pec flap closure 9/29/21. Presented to [facility] 7/21 with LUE pain and found to have thrombus sp embolectomy L brachial artery 7/21. Also found to have endoleak prompting TEVAR 7/29. Extubated that night. 7/30 required 2 units pRBCs. C/o pain, started on gabapentin.   On 8/2 was increasingly lethargic, went for CTH which was unremarkable however CT chest found LLL subsegmental PE, started on heparin. Improving.     FAMILY HISTORY:  FH: CAD (coronary artery disease) (Sibling)  CABG  Family history of CKD (chronic kidney disease) (Sibling)  ESRD  Family history of diabetes mellitus (DM) (Sibling)    PAST MEDICAL & SURGICAL HISTORY:  HTN (hypertension)  H/O aortic valve stenosis  CAD (coronary artery disease)  S/P aortic valve replacement  S/P CABG (coronary artery bypass graft)  H/O aortic arch replacement  Pacemaker  medtronic micra pacemaker        Patient is a 77y old  Female who presents with a chief complaint of Endoleak (02 Aug 2022 22:00)      14 system review was unremarkable    Vital signs, hemodynamic and respiratory parameters were reviewed from the bedside nursing flowsheet.  ICU Vital Signs Last 24 Hrs  T(C): 36.4 (03 Aug 2022 05:01), Max: 37.9 (02 Aug 2022 15:00)  T(F): 97.6 (03 Aug 2022 05:01), Max: 100.3 (02 Aug 2022 15:00)  HR: 73 (03 Aug 2022 06:00) (53 - 76)  BP: 153/65 (02 Aug 2022 20:51) (146/67 - 170/75)  BP(mean): 94 (02 Aug 2022 20:51) (94 - 115)  ABP: 171/69 (03 Aug 2022 06:00) (123/45 - 183/71)  ABP(mean): 109 (03 Aug 2022 06:00) (69 - 112)  RR: 24 (03 Aug 2022 06:00) (14 - 24)  SpO2: 93% (03 Aug 2022 06:00) (91% - 98%)    O2 Parameters below as of 03 Aug 2022 06:00  Patient On (Oxygen Delivery Method): nasal cannula w/ humidification  O2 Flow (L/min): 1      Intake and output was reviewed and the fluid balance was calculated  Daily     Daily   I&O's Summary    02 Aug 2022 07:01  -  03 Aug 2022 07:00  --------------------------------------------------------  IN: 2834 mL / OUT: 2500 mL / NET: 334 mL        All lines and drain sites were assessed  Glycemic trend was reviewed  POCT Blood Glucose.: 103 mg/dL (02 Aug 2022 20:34)      Neuro:  HEENT:  Heart:  Lungs:  Abdomen:  Extremities:      labs  CBC Full  -  ( 03 Aug 2022 03:56 )  WBC Count : 9.40 K/uL  RBC Count : 3.09 M/uL  Hemoglobin : 8.9 g/dL  Hematocrit : 27.6 %  Platelet Count - Automated : 148 K/uL  Mean Cell Volume : 89.3 fl  Mean Cell Hemoglobin : 28.8 pg  Mean Cell Hemoglobin Concentration : 32.2 gm/dL  Auto Neutrophil # : 8.91 K/uL  Auto Lymphocyte # : 0.33 K/uL  Auto Monocyte # : 0.08 K/uL  Auto Eosinophil # : 0.08 K/uL  Auto Basophil # : 0.00 K/uL  Auto Neutrophil % : 94.8 %  Auto Lymphocyte % : 3.5 %  Auto Monocyte % : 0.9 %  Auto Eosinophil % : 0.8 %  Auto Basophil % : 0.0 %    08-03    136  |  102  |  16  ----------------------------<  143<H>  4.6   |  23  |  0.91    Ca    8.8      03 Aug 2022 03:56  Phos  3.6     08-03  Mg     2.3     08-03    TPro  6.3  /  Alb  3.4  /  TBili  0.8  /  DBili  x   /  AST  36  /  ALT  15  /  AlkPhos  102  08-03    PT/INR - ( 03 Aug 2022 05:31 )   PT: 15.4 sec;   INR: 1.29          PTT - ( 03 Aug 2022 05:31 )  PTT:42.1 sec  The current medications were reviewed   MEDICATIONS  (STANDING):  acetaminophen   IVPB .. 1000 milliGRAM(s) IV Intermittent once  aspirin enteric coated 81 milliGRAM(s) Oral daily  atorvastatin 10 milliGRAM(s) Oral at bedtime  budesonide  80 MICROgram(s)/formoterol 4.5 MICROgram(s) Inhaler 2 Puff(s) Inhalation two times a day  chlorhexidine 2% Cloths 1 Application(s) Topical <User Schedule>  dextrose 10%. 1000 milliLiter(s) (50 mL/Hr) IV Continuous <Continuous>  dextrose 50% Injectable 50 milliLiter(s) IV Push every 15 minutes  heparin  Infusion. 500 Unit(s)/Hr (5 mL/Hr) IV Continuous <Continuous>  hydrocortisone sodium succinate Injectable 75 milliGRAM(s) IV Push every 8 hours  levothyroxine 50 MICROGram(s) Oral daily  pantoprazole    Tablet 40 milliGRAM(s) Oral before breakfast  phenylephrine    Infusion 0.1 MICROgram(s)/kG/Min (2.55 mL/Hr) IV Continuous <Continuous>  piperacillin/tazobactam IVPB.. 3.375 Gram(s) IV Intermittent every 6 hours  polyethylene glycol 3350 17 Gram(s) Oral daily  senna 2 Tablet(s) Oral at bedtime  sodium chloride 0.9%. 1000 milliLiter(s) (10 mL/Hr) IV Continuous <Continuous>    MEDICATIONS  (PRN):  acetaminophen     Tablet .. 650 milliGRAM(s) Oral every 6 hours PRN Mild Pain (1 - 3)  oxyCODONE    IR 5 milliGRAM(s) Oral every 4 hours PRN Moderate Pain (4 - 6)      Assessment/Plan:  77 yr old female with HTN, spinal stenosis, arthritis, biscupid AV sp AVR, ascending & hemiarch replacement with frozen elephant trunk and L aort-subclavian bypass, CABG x 2 with Dr. Muller 8/9/21. Post op course cb prolonged intubation, SSS sp PPM, poor wound healing with pec flap closure 9/29/21. Presented to [facility] 7/21 with LUE pain and found to have thrombus sp embolectomy L brachial artery 7/21.    POD 5 TEVAR with R CFA repair (Dr. Muller, 7/29)  Post operative anemia-trend H/H  LLL Subsegmental PE on heparin gtt  PPM  HTN  Pain control  Diet as tolerated  Replete lytes prn   OOB with PT  GI/DVT PPX  Bowel Regimen  Pain control  Close hemodynamic, ventilatory and drain monitoring and management per post op routine  Monitor for arrhythmias and monitor parameters for organ perfusion  Monitor neurologic status  Head of the bed should remain elevated to 45 deg   Chest PT and IS will be encouraged  Monitor adequacy of oxygenation and ventilation and attempt to wean oxygen  Antibiotic regimen will be tailored to the clinical, laboratory and microbiologic data  Nutritional goals will be met using po eventually, ensure adequate caloric intake and monitor the same  Stress ulcer and VTE prophylaxis will be achieved    Glycemic control is satisfactory  Electrolytes have been repleted as necessary and wound care has been carried out   Pain control has been achieved.   Aggressive physical therapy and early mobility and ambulation goals will be met   The family was updated about the course and plan.    CRITICAL CARE TIME personally provided by me  in evaluation and management, reassessments, review and interpretation of labs and x-rays, ventilator and hemodynamic management, formulating a plan and coordinating care: ___90____ MIN.  Time does not include procedural time.    CTICU ATTENDING     					  Pari Manrique MD [de-identified] : 2/27/24 sinus rhythm normal CTICU  CRITICAL  CARE  attending     Hand off received 					   Pertinent clinical, laboratory, radiographic, hemodynamic, echocardiographic, respiratory data, microbiologic data and chart were reviewed and analyzed frequently throughout the course of the day  Patient seen and examined with CTS/ SH attending at bedside  Pt is a 77 yr old female previous smoker with PMH HTN, spinal stenosis, arthritis, biscupid AV sp AVR, ascending & hemiarch replacement with frozen elephant trunk and L aorto-subclavian bypass, CABG x 2 with Dr. Muller 8/9/21. Post op course cb prolonged intubation, SSS sp PPM, poor wound healing with pec flap closure 9/29/21. Presented to [facility] 7/21 with LUE pain and found to have thrombus sp embolectomy L brachial artery 7/21. Also found to have endoleak prompting TEVAR 7/29. Extubated that night. 7/30 required 2 units pRBCs. C/o pain, started on gabapentin.   On 8/2 was increasingly lethargic, went for CTH which was unremarkable however CT chest found LLL subsegmental PE, started on heparin. Improving.     FAMILY HISTORY:  FH: CAD (coronary artery disease) (Sibling)  CABG  Family history of CKD (chronic kidney disease) (Sibling)  ESRD  Family history of diabetes mellitus (DM) (Sibling)    PAST MEDICAL & SURGICAL HISTORY:  HTN (hypertension)  H/O aortic valve stenosis  CAD (coronary artery disease)  S/P aortic valve replacement  S/P CABG (coronary artery bypass graft)  H/O aortic arch replacement  Pacemaker  medtronic micra pacemaker        Patient is a 77y old  Female who presents with a chief complaint of Endoleak (02 Aug 2022 22:00)      14 system review was unremarkable    Vital signs, hemodynamic and respiratory parameters were reviewed from the bedside nursing flowsheet.  ICU Vital Signs Last 24 Hrs  T(C): 36.4 (03 Aug 2022 05:01), Max: 37.9 (02 Aug 2022 15:00)  T(F): 97.6 (03 Aug 2022 05:01), Max: 100.3 (02 Aug 2022 15:00)  HR: 73 (03 Aug 2022 06:00) (53 - 76)  BP: 153/65 (02 Aug 2022 20:51) (146/67 - 170/75)  BP(mean): 94 (02 Aug 2022 20:51) (94 - 115)  ABP: 171/69 (03 Aug 2022 06:00) (123/45 - 183/71)  ABP(mean): 109 (03 Aug 2022 06:00) (69 - 112)  RR: 24 (03 Aug 2022 06:00) (14 - 24)  SpO2: 93% (03 Aug 2022 06:00) (91% - 98%)    O2 Parameters below as of 03 Aug 2022 06:00  Patient On (Oxygen Delivery Method): nasal cannula w/ humidification  O2 Flow (L/min): 1      Intake and output was reviewed and the fluid balance was calculated  Daily     Daily   I&O's Summary    02 Aug 2022 07:01  -  03 Aug 2022 07:00  --------------------------------------------------------  IN: 2834 mL / OUT: 2500 mL / NET: 334 mL        All lines and drain sites were assessed  Glycemic trend was reviewed  POCT Blood Glucose.: 103 mg/dL (02 Aug 2022 20:34)      Neuro: sitting comfortably in chair, speaking clearly  Heart: s1, s2  Lungs: clear bl  Abdomen: soft, ntnd  Extremities: moving equally      labs  CBC Full  -  ( 03 Aug 2022 03:56 )  WBC Count : 9.40 K/uL  RBC Count : 3.09 M/uL  Hemoglobin : 8.9 g/dL  Hematocrit : 27.6 %  Platelet Count - Automated : 148 K/uL  Mean Cell Volume : 89.3 fl  Mean Cell Hemoglobin : 28.8 pg  Mean Cell Hemoglobin Concentration : 32.2 gm/dL  Auto Neutrophil # : 8.91 K/uL  Auto Lymphocyte # : 0.33 K/uL  Auto Monocyte # : 0.08 K/uL  Auto Eosinophil # : 0.08 K/uL  Auto Basophil # : 0.00 K/uL  Auto Neutrophil % : 94.8 %  Auto Lymphocyte % : 3.5 %  Auto Monocyte % : 0.9 %  Auto Eosinophil % : 0.8 %  Auto Basophil % : 0.0 %    08-03    136  |  102  |  16  ----------------------------<  143<H>  4.6   |  23  |  0.91    Ca    8.8      03 Aug 2022 03:56  Phos  3.6     08-03  Mg     2.3     08-03    TPro  6.3  /  Alb  3.4  /  TBili  0.8  /  DBili  x   /  AST  36  /  ALT  15  /  AlkPhos  102  08-03    PT/INR - ( 03 Aug 2022 05:31 )   PT: 15.4 sec;   INR: 1.29          PTT - ( 03 Aug 2022 05:31 )  PTT:42.1 sec  The current medications were reviewed   MEDICATIONS  (STANDING):  acetaminophen   IVPB .. 1000 milliGRAM(s) IV Intermittent once  aspirin enteric coated 81 milliGRAM(s) Oral daily  atorvastatin 10 milliGRAM(s) Oral at bedtime  budesonide  80 MICROgram(s)/formoterol 4.5 MICROgram(s) Inhaler 2 Puff(s) Inhalation two times a day  chlorhexidine 2% Cloths 1 Application(s) Topical <User Schedule>  dextrose 10%. 1000 milliLiter(s) (50 mL/Hr) IV Continuous <Continuous>  dextrose 50% Injectable 50 milliLiter(s) IV Push every 15 minutes  heparin  Infusion. 500 Unit(s)/Hr (5 mL/Hr) IV Continuous <Continuous>  hydrocortisone sodium succinate Injectable 75 milliGRAM(s) IV Push every 8 hours  levothyroxine 50 MICROGram(s) Oral daily  pantoprazole    Tablet 40 milliGRAM(s) Oral before breakfast  phenylephrine    Infusion 0.1 MICROgram(s)/kG/Min (2.55 mL/Hr) IV Continuous <Continuous>  piperacillin/tazobactam IVPB.. 3.375 Gram(s) IV Intermittent every 6 hours  polyethylene glycol 3350 17 Gram(s) Oral daily  senna 2 Tablet(s) Oral at bedtime  sodium chloride 0.9%. 1000 milliLiter(s) (10 mL/Hr) IV Continuous <Continuous>    MEDICATIONS  (PRN):  acetaminophen     Tablet .. 650 milliGRAM(s) Oral every 6 hours PRN Mild Pain (1 - 3)  oxyCODONE    IR 5 milliGRAM(s) Oral every 4 hours PRN Moderate Pain (4 - 6)      Assessment/Plan:  77 yr old female with HTN, spinal stenosis, arthritis, biscupid AV sp AVR, ascending & hemiarch replacement with frozen elephant trunk and L aort-subclavian bypass, CABG x 2 with Dr. Muller 8/9/21. Post op course cb prolonged intubation, SSS sp PPM, poor wound healing with pec flap closure 9/29/21. Presented to [facility] 7/21 with LUE pain and found to have thrombus sp embolectomy L brachial artery 7/21.    POD 5 TEVAR with R CFA repair (Dr. Muller, 7/29)  Post operative anemia-trend H/H  LLL Subsegmental PE on heparin gtt  PPM  HTN  Pain control  Diet as tolerated  Replete lytes prn   OOB with PT  GI/DVT PPX  Bowel Regimen  Pain control  Close hemodynamic, ventilatory and drain monitoring and management per post op routine  Monitor for arrhythmias and monitor parameters for organ perfusion  Monitor neurologic status  Head of the bed should remain elevated to 45 deg   Chest PT and IS will be encouraged  Monitor adequacy of oxygenation and ventilation and attempt to wean oxygen  Antibiotic regimen will be tailored to the clinical, laboratory and microbiologic data  Nutritional goals will be met using po eventually, ensure adequate caloric intake and monitor the same  Stress ulcer and VTE prophylaxis will be achieved    Glycemic control is satisfactory  Electrolytes have been repleted as necessary and wound care has been carried out   Pain control has been achieved.   Aggressive physical therapy and early mobility and ambulation goals will be met   The family was updated about the course and plan.    CRITICAL CARE TIME personally provided by me  in evaluation and management, reassessments, review and interpretation of labs and x-rays, ventilator and hemodynamic management, formulating a plan and coordinating care: ___90____ MIN.  Time does not include procedural time.    CTICU ATTENDING     					  Pari Manrique MD

## 2024-03-26 NOTE — PHYSICAL THERAPY INITIAL EVALUATION ADULT - FOLLOWS COMMANDS/ANSWERS QUESTIONS, REHAB EVAL
ABG completed. See ABG Below.    Component      Latest Ref Rng 3/26/2024   POC PH      7.35 - 7.45  7.410    POC PCO2      35 - 45 mmHg 38.9    POC PO2      80 - 100 mmHg 91    POC HCO3      24 - 28 mmol/L 24.6    POC BE      -2 to 2 mmol/L 0    POC SATURATED O2      95 - 100 % 97    Sample ARTERIAL    Site LR    Allens Test Pass    DelSys Room Air    Mode SPONT    FiO2 21            Interpretation:  [] Arterial blood gases on room air demonstrate normal pCO2 and pO2  [] Arterial blood gases on room air are abnormal demonstrating hypercarbia (pCO2 >45 mmHg)  [] Arterial blood gases on room air are abnormal demonstrating hypocarbia (pCO2 < 35 mmHg)  [] Arterial blood gases on room air are abnormal demonstrating hypoxemia (pO2 < 80 mmHg)  [] Arterial blood gases on room air are abnormal demonstrating hyperoxemia (pO2 >120 mmHg)  [] Arterial blood gases on room air are abnormal demonstrating hypoxemia (pO2 < 80 mmHg) and hypercarbia (pCO2>45 mmHg)    The table below summarizes the main interpretations of the relationship between the arterial blood gases, pH, pCO2 and HCO-3    []    I   
100% of the time

## 2024-04-05 NOTE — PHYSICAL THERAPY INITIAL EVALUATION ADULT - PERTINENT HX OF CURRENT PROBLEM, REHAB EVAL
Patient: Cortney Nagy    Procedure: Procedure(s):  wide local excision of left breast mass cancer, left lymphatic mapping, left sentinel lymph node biopsy transfer of tissue/advancement flap creation for closure of defect       Diagnosis: Infiltrating ductal carcinoma of left breast (H) [C50.912]  Diagnosis Additional Information: No value filed.    Anesthesia Type:   General     Note:    Oropharynx: oropharynx clear of all foreign objects  Level of Consciousness: drowsy  Oxygen Supplementation: face mask  Level of Supplemental Oxygen (L/min / FiO2): 6  Independent Airway: airway patency satisfactory and stable  Dentition: dentition unchanged  Vital Signs Stable: post-procedure vital signs reviewed and stable  Report to RN Given: handoff report given  Patient transferred to: PACU    Handoff Report: Identifed the Patient, Identified the Reponsible Provider, Reviewed the pertinent medical history, Discussed the surgical course, Reviewed Intra-OP anesthesia mangement and issues during anesthesia, Set expectations for post-procedure period and Allowed opportunity for questions and acknowledgement of understanding      Vitals:  Vitals Value Taken Time   /59 04/05/24 1616   Temp 36.2    Pulse 87 04/05/24 1619   Resp 20 04/05/24 1619   SpO2 100 % 04/05/24 1619   Vitals shown include unfiled device data.    Electronically Signed By: Shruthi Griffith CRNA, APRN CRNA  April 5, 2024  4:20 PM  
As per H&P, Dafne Wong is a 76 yo female, PMHx of CAD, HTN, HLD, spinal stenosis, arthritis, hypothyroidism, bicuspid aortic valve s/p AVR, ascending/hemiarch replacement with frozen elephant trunk and left aorto-subclavian bypass, CABG x2 with Dr. 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 Muller and Dr. Augustin 7/29/22, course complicated by sternal wound  infection, presents with AMS.

## 2024-05-08 NOTE — PRE-ANESTHESIA EVALUATION ADULT - BP NONINVASIVE SYSTOLIC (MM HG)
No care due was identified.  [facility] Embedded Care Due Messages. Reference number: [id number].   5/08/2024 11:51:53 AM CDT  
Refill Encounter    PCP Visits: Recent Visits  Date Type Provider Dept   11/29/23 Office Visit Maxine Alvarenga,  [facility]   06/29/23 Office Visit Maxine Alvarenga,  [facility]   Showing recent visits within past 360 days and meeting all other requirements  Future Appointments  Date Type Provider Dept   05/30/24 Appointment Maxine Alvarenga,  [facility]   Showing future appointments within next 720 days and meeting all other requirements     Last 3 Blood Pressure:   BP Readings from Last 3 Encounters:   02/14/24 (!) 172/81   11/29/23 138/70   09/14/23 (!) 144/90     Preferred Pharmacy:   [facility] [address] [place] [address]  [facility] [place] [address]  Phone: [phone] Fax: [phone]    [facility] [place], [place] [address], [place]  [facility] [address], [place] [address]  Phone: [phone] Fax: [phone]    [id number] [address] [facility] [address] & [facility]  [address] [address] [address]  Phone: [phone] Fax: [phone]    Requested RX:  Requested Prescriptions     Pending Prescriptions Disp Refills    amLODIPine (NORVASC) 10 MG tablet 90 tablet 3     Sig: Take 1 tablet (10 mg total) by mouth once daily.      RX Route: Normal    
129

## 2024-06-14 NOTE — ED PROVIDER NOTE - MDM PATIENT STATEMENT FOR ADDL TREATMENT
[[employer] ] : provided by [employer]   [Time Spent: ____ minutes] : Total time spent using  services: [unfilled] minutes. The patient's primary language is not English thus required  services. [Interpreters_IDNumber] : [id number] [Interpreters_FullName] : Viviana [TWNoteComboBox1] : Ecuad[place] Patient with one or more new problems requiring additional work-up/treatment.

## 2024-10-18 NOTE — PHYSICAL THERAPY INITIAL EVALUATION ADULT - NS ASR RISK AREAS PT EVAL
10/18/2024      RE: Yari Chun  [address] [facility] [address]     Dear Colleague,    Thank you for the opportunity to participate in the care of your patient, Yari Chun, at the [facility]. Please see a copy of my visit note below.    10/18/2024    Rickey Santana MD  [address] [facility] [address]    RE: Yari Chun  MRN: [id number]  : 2022    Dear Rickey Santana MD:    Yari was seen by the Pediatric Psychology Program as part of the  Intensive Care Unit (NICU) Follow-Up Clinic at the [facility] ([facility]) on 2024. Yari was born at 25 weeks gestation weighing 1lbs 7.6oz. The  course was complicated by extreme prematurity, respiratory distress syndrome, chronic lung disease requiring home ventilator therapy, a bowel perforation and later reanastomosis, and gastrostomy tube for feeding. 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 Serial head US had initial unequal sizing of ventricles but has since resolved. She is being monitored for seizures. Yari has a prior diagnosis of gross and fine motor developmental delay. She is currently 18-months 13-days (chronological age) or 14-months 29-days (corrected age) and is presenting to the clinic for a 1-year developmental assessment. She was accompanied to the appointment by her parents. Parents reported concerns that Yari's expressive language, which included several consonant-vowel blends, has decreased markedly since her ventilator was changed recently. Yari currently uses a passy zeeshan valve. Yari is currently receiving weekly speech, occupational, and physical therapy intervention services through [facility]. She also receives Help Me Grow visits three times per month.    Yari was  administered the Vishal Scales of Infant Development- 4th Edition, a comprehensive developmental measure that provides separate scores for cognitive, language, and motor domains. This measure includes Cognitive, Language, and Motor composite scores. The Cognitive Composite (75) includes assessment of sensorimotor awareness, exploration and manipulation, memory, and other aspects of cognitive processing. The Language Composite (70) includes receiptive language such as recognizing sounds, responding to one's name, and being able to identify objects and pictures that are referenced. It also includes expressive language, such as nonverbal and verbal communication (such as gesturing, joint referencing, and turn-taking) and basic vocabulary development. The last domain assessed was the Motor Composite (70)  which includes fine motor skills such as unilateral and bilateral manipulation (ie motor tasks that child does with their hands), visual tracking, and motor control and gross motor skills, including locomotion, coordination, and motor planning.    Assessment indicates that development across domains is delayed. At the same time, Yari was reported by her parents to be making gains across domains, including recently starting to crawl. At the present time, for cognitive skills, Yari was observed to suspend a ring, look at pictures in a book, and  blocks. She was not yet able to place shapes in a puzzle board or find hidden objects. In the receptive language domain, Yari attended to a play routine and responded to her name. She is working towards identifying objects based on their labels. Expressively, she was reported to previously have produced consonant-vowel blends such as timbo, ravindra, and haba, though they are heard less frequently since her ventilator change. Emerging skills include producing word approximations. Observed fine motor skills included using her whole hand to  blocks and pellets. She  is not yet using pincer and thumb-finger grasps. In the gross motor domain, Yari was observed to sit unsupported and crawl a few feet forward. When demonstrating supported standing, Yari's hips were bent and she leaned forward. She also showed a minor plantar reflex.     Yari's parents also completed the Vishal Scales of Infant and Toddler Development, 4th Edition, Social Emotional Scale which suggested mild parent concerns with social and emotional development as well as delays with aspects of verbal and nonverbal communication on the Infant Toddler Checklist. In session, we observed Yari to be a happy and bubbly young child. She appeared to find harjit in engaging in play routines and looking at a picture book with her parents, with whom she had ongoing shared enjoyment.    Based on the current assessment, Yari is demonstrating emerging cognitive, language, and motor skills, although her overall scores are lower for her age and are consistent with a Global Developmental Delay. Yari is currently well-serviced and receiving clinical speech, occupational, and physical therapy services, which we recommended be continued. Early intervention allows children to benefit more fully from the rapid brain development that occurs in early childhood. Our NICU follow-up team placed referrals for a hearing test, to rule this out as a factor contributing to Yari's recent decline in expressive language. We recommend that Yari participate in this evaluation. We recognize the tremendous efforts that caregivers put into support their child's attendance and engagement in various therapies and services and that Yari has strong advocates in her parents.   ?  Given her history of developmental delays, prematurity, and  complications complications, we would like to see Yari again in 9 months for a follow-up evaluation to monitor her overall growth and development.  ?  Thank you for allowing us to participate in  [facility] care. If you have any concerns, please contact us at [phone].  ?  Sincerely,  ?  Latoya Cornelius, PhD?  Postdoctoral Fellow  Department of Pediatrics  ?  Bridget Magallanes, PhD LP  Pediatric Neuropsychologist   of Pediatrics  Department of Pediatrics    TEST SCORES    Vishal Scales of Infant and Toddler Development, Fourth Edition (Vishal-4)  Standard scores from 85 - 115 represent the average range of functioning.  Scaled scores from 7 - 13 represent the average range of functioning.  *Evaluation uses adjusted age of 14-months, 29-days-old.    Composite  Standard Score     Cognitive  75     Language  70     Motor  70           Subtest  Scaled Score Age Equivalent Raw Score   Cognitive  5 11-months 61   Receptive Communication  6 9-months 27   Expressive Communication  3 8-months 15   Fine Motor  3 7-months 31   Gross Motor  1 9-months 54     Vishal Scales of Infant and Toddler Development, Fourth Edition (Vishal-4)  Standard scores from 85 - 115 represent the average range of functioning.    Composite  Standard Score Raw Score   Social Emotional  80 66     Communication and Symbolic Behavior Scales Developmental Profile - Infant/Toddler Checklist (CSBS DP)  Score cutoffs vary based on age and composite. Each composite score is interpreted as a \"concern\" or \"no concern.\"  *Evaluation uses adjusted age of 14-months, 29-days-old.    Composite/Subscale Raw Score Qualitative Descriptor   Communication 12 Concern      Emotion & Use of Eye Gaze 7       Use of Communication 3       Use of Gestures 2    Expressive Speech 2 Concern      Use of Sounds 2       Use of Words 0    Symbolic  3 Concern      Understanding of Words 1       Use of Objects 2    Total 17 Concern         CC  SELF, REFERRED    Copy to patient   PAPIANJEL,UTTA  [address]  [facility] [address]     Neuropsych testing was administered by a trainee under my direct supervision. Total time spent in test administration and " scoring by Latoya Cornelius PhD was 2 hours. ([id number])    Neuropsych testing evaluation completed by a trainee under my direct supervision. Our total time spent on evaluation = 2 hours. ([id number])       Please do not hesitate to contact me if you have any questions/concerns.     Sincerely,       Bridget Magallanes, PhD LP   fall

## 2024-11-07 NOTE — ED ADULT NURSE NOTE - SUICIDE SCREENING DEPRESSION
Specialty Pharmacy Patient Management Program  Prior Authorization Approval     Prior Authorization for [employer] was Approved    Approval Start Date: 11/06/2024  Approval End Date: 11/06/2025  Authorization / Reference / Case Number: [id number]    CoverMyMeds Key: [id number]    Scheduled new prior authorization renewal outreach task to be completed on 11/06/2025.    Donna Syed PharmD, MM   Clinical Speciality Pharmacist, Endocrinology   11/7/2024  09:11 EST     Negative Color consistent with ethnicity/race, warm, dry intact, resilient.

## 2024-11-25 NOTE — PRE-OP CHECKLIST - LAST TOOK
He has been started on steroids for concern for giant cell arteritis.  He should continue on the steroids until the biopsy results have been discussed with you.    It is important that you follow-up with an eye doctor at [facility] as soon as possible  
clears
clears

## 2024-12-04 NOTE — ED ADULT NURSE NOTE - NSFALLRSKPASTHIST_ED_ALL_ED
: [id number], [place]ian    Called parent of patient at this time with use of  to reschedule patient's  appointment due to Dr. Oliveira' on call schedule.    Appointment rescheduled at this time for May 20th 0900 am and patient is on the wait list. Parent verbalized understanding and denies questions or concerns.           no

## 2025-02-19 NOTE — DISCHARGE NOTE NURSING/CASE MANAGEMENT/SOCIAL WORK - PATIENT PORTAL LINK FT
Dr Mendez made aware of K 3.5 and Mg 1.56. Awaiting further orders    You can access the FollowMyHealth Patient Portal offered by [facility] by registering at the following website: [email or web address]. By joining [facility]’s FollowMyHealth portal, you will also be able to view your health information using other applications (apps) compatible with our system.

## 2025-03-04 NOTE — DISCHARGE NOTE PROVIDER - NSDCFUSCHEDAPPT_GEN_ALL_CORE_FT
Aron Blank Physician [facility]  Cardio [address]  Scheduled Appointment: 09/12/2022    Parvez Khanna  [place] [facility] [facility]  Cardio [address]  Scheduled Appointment: 10/13/2022     Unknown

## 2025-03-27 NOTE — BRIEF OPERATIVE NOTE - NSICDXBRIEFPREOP_GEN_ALL_CORE_FT
History of low vitamin D >5 years ago  Recent labs have shown improvement with levels in 40's   Continue vitamin D supplementation daily 1000u  Mgmt of osteopenia as above  Monitor Vit D labs q12 months.     Orders:    Vitamin D 25 hydroxy; Future       PRE-OP DIAGNOSIS:  Fluid collection at surgical site 02-Nov-2022 15:12:13 suprasternal location Jacques Gr

## 2025-04-14 NOTE — CONSULT NOTE ADULT - PROVIDER SPECIALTY LIST ADULT
April 17, 2025     LYNNE Golden  [address] [place] [address] [place] [address]    Patient: Gladys Pino   YOB: 1953   Date of Visit: 4/14/2025       Dear LYNNE Golden,    Gladys Pino was in my office today. Below are the relevant portions of my assessment and plan of care.    Ms. Pino is a 71-year-old female she presents to me with complex valvular heart disease.  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  She also has severe eccentric mitral valve regurgitation.  She has class II NYHA heart failure symptoms.  She is never had surgery on her lungs or chest.  She has known about some valvular heart disease for some time but recently she has been experiencing more heart failure type symptoms with fatigue and shortness of breath with activity, no chest pain.  She has normal endorgan function.  She does not remember having rheumatic fever before.     I discussed with Ms. Pino and her son today the natural history of valvular heart disease and patient such as hers.  We discussed causes, and treatment options.  I would recommend aortic valve replacement mitral valve repair versus replacement.  I did discuss with her the possibility of this being from rheumatic disease before and if this is the case more likely to have to replace mitral valve at time of surgery.  We discussed the rationale and expectations behind this they understand.  We discussed operative approaches and need for double valve surgery required sternotomy.  She understands and she is okay with this.  I discussed with her risk and benefits of surgery at length. We discussed the risk of surgery including but not limited to bleeding, infection, injury to major organs or vessels, chronic heart failure, arrhythmias, need for pacemaker, prolonged ventilation, renal failure, stroke, risk of anesthesia, risk of sternal wound or bone healing problems, reoperation, and/or 
Inter[facility]
death.  I did use the novel STS multi valve risk calculator, her risk of mortality is less than 2% for mitral valve repair aortic valve replacement.  We discussed this she understands.  She wants to proceed with surgery.     We will complete our workup and plan on surgery soon.  She will follow-up with [facility] cardiology after repair.  Thank you for trusting me with the care of Ms. Pino.  Please do not hesitate to call questions or concerns.       Sincerely,        Dilshad Keenan MD        CC: Hayes Donis MD  
Electrophysiology
Intervent Radiology
Infectious Disease

## 2025-06-29 NOTE — OCCUPATIONAL THERAPY INITIAL EVALUATION ADULT - PERTINENT HX OF CURRENT PROBLEM, REHAB EVAL
78 yo female, PMHx of CAD, HTN, HLD, spinal stenosis, arthritis, hypothyroidism, bicuspid aortic valve s/p AVR, ascending/hemiarch replacement with frozen elephant trunk and left aorto-subclavian bypass, CABG x2  on 8/9/2021 (post-op course c/b prolonged intubation, SSS s/p PPM, poor wound healing s/p pec flap and closure on 9/29/21), left brachial occlusion s/p left brachial artery embolectomy, 7/21/22, TEVAR with R groin cutdown and femoral artery repair, course complicated by sternal wound infection, presents with AMS. Per daughter, patient was discharged to rehab 3 days prior to presenting to ED and has been getting progressively confused with poor oral intake. 29-Jun-2025 11:45

## (undated) DEVICE — SOL IRR BAG NS 0.9% 3000ML

## (undated) DEVICE — SUT VICRYL 2-0 27" CT-1

## (undated) DEVICE — VASCULAR DILATOR KIT 8,12,16,20, 24FR

## (undated) DEVICE — SUT NUROLON 1 18" OS-8 (POP-OFF)

## (undated) DEVICE — DRAPE FLUID WARMER 44 X 66"

## (undated) DEVICE — SUT STAINLESS STEEL 6 4-18" CCS

## (undated) DEVICE — PREP BETADINE SPONGE STICKS

## (undated) DEVICE — TUBING SUCTION NONCONDUCTIVE 6MM X 12FT

## (undated) DEVICE — SUT PROLENE 5-0 30" RB-2

## (undated) DEVICE — CATH TRIOX OXIMETRY 8F 3 LUMENS

## (undated) DEVICE — VENODYNE/SCD SLEEVE CALF MEDIUM

## (undated) DEVICE — PACK PROCEDURE HARVEST SMARTPREP APC-60

## (undated) DEVICE — MANIFOLD ANGIO AUTOMD TRNDCR

## (undated) DEVICE — TUBING BRAT 2 SUCTION ASSEMBLY TWIST LOCK

## (undated) DEVICE — POLISHER OR CAUTERY TIP

## (undated) DEVICE — DRAPE IOBAN 33" X 23"

## (undated) DEVICE — SUT PROLENE 4-0 36" RB-1

## (undated) DEVICE — SUT PROLENE 3-0 36" SH

## (undated) DEVICE — DRSG BIOPATCH DISK W CHG 1" W 4.0MM HOLE

## (undated) DEVICE — CHEST DRAIN PLEUR-EVAC DRY/WET ADULT-PEDS SINGLE (QUICK)

## (undated) DEVICE — SUT VICRYL 0 27" CT

## (undated) DEVICE — ELCTR BOVIE PENCIL HANDPIECE ROCKER SWITCH 15FT

## (undated) DEVICE — STAPLER SKIN MULTI DIRECTION W35

## (undated) DEVICE — SUT VICRYL 1 36" CTX UNDYED

## (undated) DEVICE — SUT MONOCRYL 4-0 27" PS-2 UNDYED

## (undated) DEVICE — PACK PROC CV DRAPE

## (undated) DEVICE — SUT BOOT STANDARD (ORIGINAL YELLOW) 5 PAIR

## (undated) DEVICE — KIT APPLICATOR SPRAY FOR HARVEST SYSTEM

## (undated) DEVICE — NDL COUNTER FOAM AND MAGNET 40-70

## (undated) DEVICE — ATS CANNISTERS FOR INFO VAC

## (undated) DEVICE — DRSG KIT CVC TEGADERM ADVANCED

## (undated) DEVICE — CANISTER W/O GEL INFOVAC 500ML

## (undated) DEVICE — POSITIONER FOAM EGG CRATE ULNAR 2PCS (PINK)

## (undated) DEVICE — DRSG VAC GRANUFOAM LARGE (BLACK)

## (undated) DEVICE — PACK HYBRID LHH

## (undated) DEVICE — DRSG VAC GRANUFOAM MEDIUM (BLACK)

## (undated) DEVICE — SUT SILK 2-0 18" SH (POP-OFF)

## (undated) DEVICE — SYS DEL CONTROLLER ANGIOTOUCH

## (undated) DEVICE — DRAPE PROBE COVER 5" X 96"

## (undated) DEVICE — GEL AQUSNC PACKET 20GR

## (undated) DEVICE — WARMING BLANKET LOWER ADULT

## (undated) DEVICE — DRSG MASTISOL

## (undated) DEVICE — DRAPE IOBAN 13" X 13"

## (undated) DEVICE — DRSG PREVENA PEEL & PLACE KIT 20CM

## (undated) DEVICE — TUBING SMOKE EVAC 3/8" X 10FT FOR NEPTUNE

## (undated) DEVICE — DRAPE ULTRASOUND TRANSDUCER COVER

## (undated) DEVICE — DVC TORQ PERIF 0.035

## (undated) DEVICE — VESSEL LOOP MAXI-BLUE 0.120" X 16"

## (undated) DEVICE — TOURNIQUET ESMARK 6"

## (undated) DEVICE — ELCTR STRYKER NEPTUNE SMOKE EVACUATION PENCIL (GREEN)

## (undated) DEVICE — DRAPE MAYO STAND 30"

## (undated) DEVICE — IRR SYS BONE CLNNG INTERPULSE

## (undated) DEVICE — DRSG DERMABOND 0.7ML

## (undated) DEVICE — BLADE SURGICAL #15 CARBON

## (undated) DEVICE — PACK OPEN HEART LNX